# Patient Record
Sex: MALE | Race: WHITE | NOT HISPANIC OR LATINO | Employment: OTHER | URBAN - METROPOLITAN AREA
[De-identification: names, ages, dates, MRNs, and addresses within clinical notes are randomized per-mention and may not be internally consistent; named-entity substitution may affect disease eponyms.]

---

## 2017-01-03 ENCOUNTER — APPOINTMENT (OUTPATIENT)
Dept: LAB | Facility: MEDICAL CENTER | Age: 57
End: 2017-01-03
Payer: MEDICARE

## 2017-01-03 ENCOUNTER — ALLSCRIPTS OFFICE VISIT (OUTPATIENT)
Dept: OTHER | Facility: OTHER | Age: 57
End: 2017-01-03

## 2017-01-03 DIAGNOSIS — R18.8 OTHER ASCITES: ICD-10-CM

## 2017-01-03 DIAGNOSIS — Z12.5 ENCOUNTER FOR SCREENING FOR MALIGNANT NEOPLASM OF PROSTATE: ICD-10-CM

## 2017-01-03 DIAGNOSIS — R60.9 EDEMA: ICD-10-CM

## 2017-01-03 LAB
ALBUMIN SERPL BCP-MCNC: 3.1 G/DL (ref 3.5–5)
ALP SERPL-CCNC: 95 U/L (ref 46–116)
ALT SERPL W P-5'-P-CCNC: 16 U/L (ref 12–78)
ANION GAP SERPL CALCULATED.3IONS-SCNC: 7 MMOL/L (ref 4–13)
AST SERPL W P-5'-P-CCNC: 10 U/L (ref 5–45)
BILIRUB SERPL-MCNC: 0.94 MG/DL (ref 0.2–1)
BUN SERPL-MCNC: 22 MG/DL (ref 5–25)
CALCIUM SERPL-MCNC: 9.2 MG/DL (ref 8.3–10.1)
CHLORIDE SERPL-SCNC: 99 MMOL/L (ref 100–108)
CO2 SERPL-SCNC: 35 MMOL/L (ref 21–32)
CREAT SERPL-MCNC: 6.24 MG/DL (ref 0.6–1.3)
ERYTHROCYTE [DISTWIDTH] IN BLOOD BY AUTOMATED COUNT: 15.9 % (ref 11.6–15.1)
GFR SERPL CREATININE-BSD FRML MDRD: 9.4 ML/MIN/1.73SQ M
GLUCOSE SERPL-MCNC: 103 MG/DL (ref 65–140)
HCT VFR BLD AUTO: 36.7 % (ref 36.5–49.3)
HGB BLD-MCNC: 11.9 G/DL (ref 12–17)
MCH RBC QN AUTO: 30.7 PG (ref 26.8–34.3)
MCHC RBC AUTO-ENTMCNC: 32.4 G/DL (ref 31.4–37.4)
MCV RBC AUTO: 95 FL (ref 82–98)
PLATELET # BLD AUTO: 163 THOUSANDS/UL (ref 149–390)
PMV BLD AUTO: 11.1 FL (ref 8.9–12.7)
POTASSIUM SERPL-SCNC: 4 MMOL/L (ref 3.5–5.3)
PROT SERPL-MCNC: 6.8 G/DL (ref 6.4–8.2)
RBC # BLD AUTO: 3.87 MILLION/UL (ref 3.88–5.62)
SODIUM SERPL-SCNC: 141 MMOL/L (ref 136–145)
WBC # BLD AUTO: 6.1 THOUSAND/UL (ref 4.31–10.16)

## 2017-01-03 PROCEDURE — 80053 COMPREHEN METABOLIC PANEL: CPT

## 2017-01-03 PROCEDURE — 85027 COMPLETE CBC AUTOMATED: CPT

## 2017-01-03 PROCEDURE — 36415 COLL VENOUS BLD VENIPUNCTURE: CPT

## 2017-06-22 ENCOUNTER — GENERIC CONVERSION - ENCOUNTER (OUTPATIENT)
Dept: OTHER | Facility: OTHER | Age: 57
End: 2017-06-22

## 2018-01-12 NOTE — PROGRESS NOTES
Assessment    1  Ascites (789 59) (R18 8)   2  Dependent edema (782 3) (R60 9)   3  Encounter for preventive health examination (V70 0) (Z00 00)   4  Prostate cancer screening (V76 44) (Z12 5)    Plan  Ascites, Dependent edema    · (1) CBC/ PLT (NO DIFF); Status:Resulted - Requires Verification;   Done: [id number]  02:54PM   · (1) COMPREHENSIVE METABOLIC PANEL; Status:Resulted - Requires Verification;    Done: 82NDH8884 02:54PM   · * US ABDOMEN COMPLETE; Status:Hold For - Scheduling; Requested APW:[id number]; Insomnia due to medical condition    · TraZODone HCl - 50 MG Oral Tablet  Peripheral neuropathy    · Gabapentin 100 MG Oral Capsule  Prostate cancer screening    · (1) PSA (SCREEN) (Dx V76 44 Screen for Prostate Cancer); Status:Active; Requested  [id number];     Discussion/Summary  Impression: health maintenance visit  Currently, he eats a healthy diet and has an inadequate exercise regimen  Prostate cancer screening: the risks and benefits of prostate cancer screening were discussed and PSA was ordered  Colorectal cancer screening: colorectal cancer screening is current, colonoscopy is needed every five years and the next colonoscopy is due 2019  The immunizations are needed  He was advised to be evaluated by an optometrist  Advice and education were given regarding nutrition, aerobic exercise and weight bearing exercise  Patient discussion: discussed with the patient  History of Present Illness  HPI: Here CPX, but has two significant complaints  He has weakness in his abdomen  He would like to strengthen his muscles  He also has bilateral pitting edema  He is currently on hemodialysis  He has end stage kidney disease  I have no letters from his nephrologists  Restless Legs Syndrome: The patient is being seen for a routine clinic follow-up of restless legs syndrome  Management changes made at the last visit include adding Gabapentin   Symptoms:  spontaneous leg movements, urge to move legs and sleep disruption, but no abnormal sensation in legs, no leg numbness, no leg pain, no fatigue and no anxiety  The patient is currently experiencing symptoms  Current treatment includes gabapentin (Neurontin)  By report, there is good adherence with treatment, good tolerance of treatment, fair symptom control and Has only tried the 100 mg dose  Review of Systems    Constitutional: feeling poorly and feeling tired, but no fever, no recent weight gain, no chills and no recent weight loss  Eyes: No complaints of eye pain, no red eyes, no discharge from eyes, no itchy eyes  ENT: no complaints of earache, no hearing loss, no nosebleeds, no nasal discharge, no sore throat, no hoarseness  Cardiovascular: No complaints of slow heart rate, no fast heart rate, no chest pain, no palpitations, no leg claudication, no lower extremity  Respiratory: No complaints of shortness of breath, no wheezing, no cough, no SOB on exertion, no orthopnea or PND  Genitourinary: No complaints of dysuria, no incontinence, no hesitancy, no nocturia, no genital lesion, no testicular pain  Neurological: No compliants of headache, no confusion, no convulsions, no numbness or tingling, no dizziness or fainting, no limb weakness, no difficulty walking  Psychiatric: Is not suicidal, no sleep disturbances, no anxiety or depression, no change in personality, no emotional problems  Active Problems    1  Encounter for screening for malignant neoplasm of colon (V76 51) (Z12 11)   2  Hypertension (401 9) (I10)   3  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 01) (G47 01)   4  Neck pain (723 1) (M54 2)   5  Nocturnal leg cramps (327 52) (G47 62)   6  Peripheral neuropathy (356 9) (G62 9)   7  Polycystic kidney disease (753 12) (Q61 3)   8  Renal failure (586) (N19)   9  Tarsal tunnel syndrome of left side (355 5) (G57 52)   10  Tarsal tunnel syndrome, right (355 5) (G57 51)   11   White coat hypertension    Social History    · Alcohol use (V49 89) (Z78 9)   · Caffeine use (V49 89) (F15 90)   · Never a smoker  The social history was reviewed and updated today  The social history was reviewed and is unchanged  Current Meds   1  Calcium Acetate 667 MG Oral Capsule; TAKE 1 CAPSULE 3 TIMES DAILY WITH MEALS   Recorded   2  Fosrenol 500 MG Oral Tablet Chewable; CHEW AND SWALLOW 1 TABLET 3 TIMES   DAILY WITH MEALS Recorded   3  Gabapentin 100 MG Oral Capsule; Therapy: [id number] to (Kilo Gutiérrez)  Requested for: 07QLM7761 Recorded   4  TraZODone HCl - 50 MG Oral Tablet; One or two at hs prn sleep; Therapy: 75OHV1309 to (Jeremiah Stokes)  Requested for: 71TNT7598 Ordered   5  Triphrocaps CAPS; TAKE 1 CAPSULE Daily Recorded   6  Vitamin D3 1000 UNIT Oral Tablet; Take as directed Recorded   7  Vitamin E 200 UNIT Oral Capsule Recorded    The medication list was reviewed and updated today  Allergies    1  No Known Drug Allergies    Vitals   Recorded: 86LPO9390 02:09PM   Heart Rate 110   Respiration 20   Systolic 511 mm Hg   Diastolic 140 mm Hg   Height 5 ft 8 in   Weight 211 lb    BMI Calculated 32 08 kg/m2   BSA Calculated 2 1 m2     Physical Exam    Constitutional   General appearance: No acute distress, well appearing and well nourished  Eyes   Conjunctiva and lids: No erythema, swelling or discharge  Pupils and irises: Equal, round, reactive to light  Ears, Nose, Mouth, and Throat   Otoscopic examination: Tympanic membranes translucent with normal light reflex  Canals patent without erythema  Neck   Neck: Supple, symmetric, trachea midline, no masses  Pulmonary   Respiratory effort: No increased work of breathing or signs of respiratory distress  Auscultation of lungs: Clear to auscultation  Cardiovascular   Auscultation of heart: Normal rate and rhythm, normal S1 and S2, no murmurs  Abdomen   Abdomen: Abnormal   The abdomen was distended  Bowel sounds were normal  The abdomen was soft and nontender   The abdomen had ascites  no CVA tenderness   Lymphatic   Palpation of lymph nodes in neck: No lymphadenopathy  Musculoskeletal   Gait and station: Normal     Inspection/palpation of digits and nails: Normal without clubbing or cyanosis  Skin   Palpation of skin and subcutaneous tissue: Abnormal   Bilateral tense 1+ pitting edema  Psychiatric   Judgment and insight: Normal     Orientation to person, place and time: Normal        Results/Data  (1) COMPREHENSIVE METABOLIC PANEL 96HJY3778 40:08SB [facility] Order Number: [id number]     Test Name Result Flag Reference   GLUCOSE,RANDM 103 mg/dL     If the patient is fasting, the ADA then defines impaired fasting glucose as > 100 mg/dL and diabetes as > or equal to 123 mg/dL  SODIUM 141 mmol/L  136-145   POTASSIUM 4 0 mmol/L  3 5-5 3   CHLORIDE 99 mmol/L L 100-108   CARBON DIOXIDE 35 mmol/L H 21-32   ANION GAP (CALC) 7 mmol/L  4-13   BLOOD UREA NITROGEN 22 mg/dL  5-25   CREATININE 6 24 mg/dL H 0 60-1 30   Standardized to IDMS reference method   CALCIUM 9 2 mg/dL  8 3-10 1   BILI, TOTAL 0 94 mg/dL  0 20-1 00   ALK PHOSPHATAS 95 U/L     ALT (SGPT) 16 U/L  12-78   AST(SGOT) 10 U/L  5-45   ALBUMIN 3 1 g/dL L 3 5-5 0   TOTAL PROTEIN 6 8 g/dL  6 4-8 2   eGFR Non-African American 9 4 ml/min/1 73sq [facility] Disease Education Program recommendations are as follows:  GFR calculation is accurate only with a steady state creatinine  Chronic Kidney disease less than 60 ml/min/1 73 sq  meters  Kidney failure less than 15 ml/min/1 73 sq  meters       (1) CBC/ PLT (NO DIFF) [id number] 02:54PM Artur Childress Order Number: [id number]     Test Name Result Flag Reference   HEMATOCRIT 36 7 %  36 5-49 3   HEMOGLOBIN 11 9 g/dL L 12 0-17 0   MCHC 32 4 g/dL  31 4-37 4   MCH 30 7 pg  26 8-34 3   MCV 95 fL  82-98   PLATELET COUNT 991 Thousands/uL  149-390   RBC COUNT 3 87 Million/uL L 3 88-5 62   RDW 15 9 % H 11 6-15 1   WBC COUNT 6 10 Thousand/uL 4 31-10 16   MP 11 1 fL  8 9-12 7       Signatures   Electronically signed by : LOUISE Hernandez ; Jan 4 2017  9:36AM EST                       (Author)

## 2018-01-14 VITALS
HEIGHT: 68 IN | HEART RATE: 110 BPM | SYSTOLIC BLOOD PRESSURE: 154 MMHG | DIASTOLIC BLOOD PRESSURE: 100 MMHG | BODY MASS INDEX: 31.98 KG/M2 | WEIGHT: 211 LBS | RESPIRATION RATE: 20 BRPM

## 2019-09-21 ENCOUNTER — APPOINTMENT (EMERGENCY)
Dept: RADIOLOGY | Facility: HOSPITAL | Age: 59
DRG: 871 | End: 2019-09-21
Payer: MEDICARE

## 2019-09-21 ENCOUNTER — APPOINTMENT (EMERGENCY)
Dept: CT IMAGING | Facility: HOSPITAL | Age: 59
DRG: 871 | End: 2019-09-21
Payer: MEDICARE

## 2019-09-21 ENCOUNTER — HOSPITAL ENCOUNTER (INPATIENT)
Facility: HOSPITAL | Age: 59
LOS: 12 days | Discharge: NURSING, REHAB OR LONG-TERM CARE | DRG: 871 | End: 2019-10-03
Attending: EMERGENCY MEDICINE | Admitting: INTERNAL MEDICINE
Payer: MEDICARE

## 2019-09-21 DIAGNOSIS — R65.21 SEPTIC SHOCK (HCC): ICD-10-CM

## 2019-09-21 DIAGNOSIS — N18.6 CHRONIC KIDNEY DISEASE WITH END STAGE RENAL FAILURE ON DIALYSIS (HCC): Primary | ICD-10-CM

## 2019-09-21 DIAGNOSIS — I48.91 ATRIAL FIBRILLATION, UNSPECIFIED TYPE (HCC): ICD-10-CM

## 2019-09-21 DIAGNOSIS — Z99.2 CHRONIC KIDNEY DISEASE WITH END STAGE RENAL FAILURE ON DIALYSIS (HCC): Primary | ICD-10-CM

## 2019-09-21 DIAGNOSIS — R65.20 SEVERE SEPSIS WITHOUT SEPTIC SHOCK (HCC): ICD-10-CM

## 2019-09-21 DIAGNOSIS — L03.119 CELLULITIS OF LOWER EXTREMITY: ICD-10-CM

## 2019-09-21 DIAGNOSIS — L03.116 LEFT LEG CELLULITIS: ICD-10-CM

## 2019-09-21 DIAGNOSIS — K21.9 GERD (GASTROESOPHAGEAL REFLUX DISEASE): ICD-10-CM

## 2019-09-21 DIAGNOSIS — A41.9 SEVERE SEPSIS WITHOUT SEPTIC SHOCK (HCC): ICD-10-CM

## 2019-09-21 DIAGNOSIS — R65.20 SEVERE SEPSIS (HCC): ICD-10-CM

## 2019-09-21 DIAGNOSIS — N18.6 END STAGE RENAL DISEASE (HCC): ICD-10-CM

## 2019-09-21 DIAGNOSIS — A41.9 SEVERE SEPSIS (HCC): ICD-10-CM

## 2019-09-21 DIAGNOSIS — A41.9 SEPTIC SHOCK (HCC): ICD-10-CM

## 2019-09-21 DIAGNOSIS — R78.81 GRAM-NEGATIVE BACTEREMIA: ICD-10-CM

## 2019-09-21 DIAGNOSIS — R78.81 BACTEREMIA DUE TO GRAM-NEGATIVE BACTERIA: ICD-10-CM

## 2019-09-21 DIAGNOSIS — I73.9 PERIPHERAL VASCULAR DISEASE (HCC): ICD-10-CM

## 2019-09-21 PROBLEM — E87.29 HIGH ANION GAP METABOLIC ACIDOSIS: Status: ACTIVE | Noted: 2019-09-21

## 2019-09-21 PROBLEM — D69.6 THROMBOCYTOPENIA (HCC): Status: ACTIVE | Noted: 2019-09-21

## 2019-09-21 PROBLEM — Q44.6 POLYCYSTIC LIVER DISEASE: Status: ACTIVE | Noted: 2019-09-21

## 2019-09-21 PROBLEM — E87.20 LACTIC ACIDOSIS: Status: ACTIVE | Noted: 2019-09-21

## 2019-09-21 PROBLEM — R17 TOTAL BILIRUBIN, ELEVATED: Status: ACTIVE | Noted: 2019-09-21

## 2019-09-21 PROBLEM — E87.1 HYPONATREMIA: Status: ACTIVE | Noted: 2019-09-21

## 2019-09-21 PROBLEM — E87.2 LACTIC ACIDOSIS: Status: ACTIVE | Noted: 2019-09-21

## 2019-09-21 PROBLEM — E87.2 HIGH ANION GAP METABOLIC ACIDOSIS: Status: ACTIVE | Noted: 2019-09-21

## 2019-09-21 LAB
ALBUMIN SERPL BCP-MCNC: 2.7 G/DL (ref 3.5–5)
ALP SERPL-CCNC: 101 U/L (ref 46–116)
ALT SERPL W P-5'-P-CCNC: 11 U/L (ref 12–78)
ANION GAP SERPL CALCULATED.3IONS-SCNC: 16 MMOL/L (ref 4–13)
ANISOCYTOSIS BLD QL SMEAR: PRESENT
APTT PPP: 36 SECONDS (ref 23–37)
AST SERPL W P-5'-P-CCNC: 14 U/L (ref 5–45)
BASOPHILS # BLD MANUAL: 0 THOUSAND/UL (ref 0–0.1)
BASOPHILS NFR MAR MANUAL: 0 % (ref 0–1)
BILIRUB SERPL-MCNC: 3.3 MG/DL (ref 0.2–1)
BUN SERPL-MCNC: 75 MG/DL (ref 5–25)
CALCIUM SERPL-MCNC: 8.9 MG/DL (ref 8.3–10.1)
CHLORIDE SERPL-SCNC: 88 MMOL/L (ref 100–108)
CK SERPL-CCNC: 67 U/L (ref 39–308)
CO2 SERPL-SCNC: 28 MMOL/L (ref 21–32)
CREAT SERPL-MCNC: 9.54 MG/DL (ref 0.6–1.3)
DOHLE BOD BLD QL SMEAR: PRESENT
EOSINOPHIL # BLD MANUAL: 0 THOUSAND/UL (ref 0–0.4)
EOSINOPHIL NFR BLD MANUAL: 0 % (ref 0–6)
ERYTHROCYTE [DISTWIDTH] IN BLOOD BY AUTOMATED COUNT: 15.7 % (ref 11.6–15.1)
GFR SERPL CREATININE-BSD FRML MDRD: 5 ML/MIN/1.73SQ M
GLUCOSE SERPL-MCNC: 84 MG/DL (ref 65–140)
HCT VFR BLD AUTO: 36 % (ref 36.5–49.3)
HGB BLD-MCNC: 12.1 G/DL (ref 12–17)
INR PPP: 1.72 (ref 0.84–1.19)
LACTATE SERPL-SCNC: 5.2 MMOL/L (ref 0.5–2)
LACTATE SERPL-SCNC: 6.4 MMOL/L (ref 0.5–2)
LYMPHOCYTES # BLD AUTO: 0.17 THOUSAND/UL (ref 0.6–4.47)
LYMPHOCYTES # BLD AUTO: 5 % (ref 14–44)
MACROCYTES BLD QL AUTO: PRESENT
MCH RBC QN AUTO: 34.5 PG (ref 26.8–34.3)
MCHC RBC AUTO-ENTMCNC: 33.6 G/DL (ref 31.4–37.4)
MCV RBC AUTO: 103 FL (ref 82–98)
METAMYELOCYTES NFR BLD MANUAL: 7 % (ref 0–1)
MONOCYTES # BLD AUTO: 0.14 THOUSAND/UL (ref 0–1.22)
MONOCYTES NFR BLD: 4 % (ref 4–12)
NEUTROPHILS # BLD MANUAL: 2.87 THOUSAND/UL (ref 1.85–7.62)
NEUTS BAND NFR BLD MANUAL: 33 % (ref 0–8)
NEUTS SEG NFR BLD AUTO: 51 % (ref 43–75)
NRBC BLD AUTO-RTO: 0 /100 WBCS
PLATELET # BLD AUTO: 55 THOUSANDS/UL (ref 149–390)
PLATELET BLD QL SMEAR: ABNORMAL
PMV BLD AUTO: 12.3 FL (ref 8.9–12.7)
POLYCHROMASIA BLD QL SMEAR: PRESENT
POTASSIUM SERPL-SCNC: 4.9 MMOL/L (ref 3.5–5.3)
PROT SERPL-MCNC: 7.1 G/DL (ref 6.4–8.2)
PROTHROMBIN TIME: 19.4 SECONDS (ref 11.6–14.5)
RBC # BLD AUTO: 3.51 MILLION/UL (ref 3.88–5.62)
SODIUM SERPL-SCNC: 132 MMOL/L (ref 136–145)
TOTAL CELLS COUNTED SPEC: 100
TOXIC GRANULES BLD QL SMEAR: PRESENT
WBC # BLD AUTO: 3.42 THOUSAND/UL (ref 4.31–10.16)
WBC TOXIC VACUOLES BLD QL SMEAR: PRESENT

## 2019-09-21 PROCEDURE — 99291 CRITICAL CARE FIRST HOUR: CPT | Performed by: EMERGENCY MEDICINE

## 2019-09-21 PROCEDURE — 96365 THER/PROPH/DIAG IV INF INIT: CPT

## 2019-09-21 PROCEDURE — 84145 PROCALCITONIN (PCT): CPT | Performed by: PHYSICIAN ASSISTANT

## 2019-09-21 PROCEDURE — 99291 CRITICAL CARE FIRST HOUR: CPT | Performed by: PHYSICIAN ASSISTANT

## 2019-09-21 PROCEDURE — 96375 TX/PRO/DX INJ NEW DRUG ADDON: CPT

## 2019-09-21 PROCEDURE — 85007 BL SMEAR W/DIFF WBC COUNT: CPT | Performed by: EMERGENCY MEDICINE

## 2019-09-21 PROCEDURE — 96376 TX/PRO/DX INJ SAME DRUG ADON: CPT

## 2019-09-21 PROCEDURE — 83605 ASSAY OF LACTIC ACID: CPT | Performed by: EMERGENCY MEDICINE

## 2019-09-21 PROCEDURE — 99285 EMERGENCY DEPT VISIT HI MDM: CPT

## 2019-09-21 PROCEDURE — 36415 COLL VENOUS BLD VENIPUNCTURE: CPT | Performed by: EMERGENCY MEDICINE

## 2019-09-21 PROCEDURE — 83605 ASSAY OF LACTIC ACID: CPT | Performed by: PHYSICIAN ASSISTANT

## 2019-09-21 PROCEDURE — 82550 ASSAY OF CK (CPK): CPT | Performed by: PHYSICIAN ASSISTANT

## 2019-09-21 PROCEDURE — 75635 CT ANGIO ABDOMINAL ARTERIES: CPT

## 2019-09-21 PROCEDURE — 85730 THROMBOPLASTIN TIME PARTIAL: CPT | Performed by: EMERGENCY MEDICINE

## 2019-09-21 PROCEDURE — 85027 COMPLETE CBC AUTOMATED: CPT | Performed by: EMERGENCY MEDICINE

## 2019-09-21 PROCEDURE — 87186 SC STD MICRODIL/AGAR DIL: CPT | Performed by: EMERGENCY MEDICINE

## 2019-09-21 PROCEDURE — 85610 PROTHROMBIN TIME: CPT | Performed by: EMERGENCY MEDICINE

## 2019-09-21 PROCEDURE — 71046 X-RAY EXAM CHEST 2 VIEWS: CPT

## 2019-09-21 PROCEDURE — 87040 BLOOD CULTURE FOR BACTERIA: CPT | Performed by: EMERGENCY MEDICINE

## 2019-09-21 PROCEDURE — 96367 TX/PROPH/DG ADDL SEQ IV INF: CPT

## 2019-09-21 PROCEDURE — 80053 COMPREHEN METABOLIC PANEL: CPT | Performed by: EMERGENCY MEDICINE

## 2019-09-21 PROCEDURE — 99292 CRITICAL CARE ADDL 30 MIN: CPT | Performed by: PHYSICIAN ASSISTANT

## 2019-09-21 RX ORDER — CHLORHEXIDINE GLUCONATE 0.12 MG/ML
15 RINSE ORAL EVERY 12 HOURS SCHEDULED
Status: DISCONTINUED | OUTPATIENT
Start: 2019-09-21 | End: 2019-09-21

## 2019-09-21 RX ORDER — FENTANYL CITRATE 50 UG/ML
50 INJECTION, SOLUTION INTRAMUSCULAR; INTRAVENOUS ONCE
Status: COMPLETED | OUTPATIENT
Start: 2019-09-21 | End: 2019-09-21

## 2019-09-21 RX ORDER — ALBUMIN, HUMAN INJ 5% 5 %
25 SOLUTION INTRAVENOUS ONCE
Status: COMPLETED | OUTPATIENT
Start: 2019-09-21 | End: 2019-09-22

## 2019-09-21 RX ORDER — HEPARIN SODIUM 5000 [USP'U]/ML
5000 INJECTION, SOLUTION INTRAVENOUS; SUBCUTANEOUS EVERY 8 HOURS SCHEDULED
Status: DISCONTINUED | OUTPATIENT
Start: 2019-09-21 | End: 2019-09-22

## 2019-09-21 RX ORDER — POLYETHYLENE GLYCOL 3350 17 G/17G
17 POWDER, FOR SOLUTION ORAL DAILY PRN
Status: DISCONTINUED | OUTPATIENT
Start: 2019-09-21 | End: 2019-10-03 | Stop reason: HOSPADM

## 2019-09-21 RX ORDER — AMOXICILLIN 250 MG
1 CAPSULE ORAL
Status: DISCONTINUED | OUTPATIENT
Start: 2019-09-22 | End: 2019-10-03 | Stop reason: HOSPADM

## 2019-09-21 RX ORDER — MULTIVITAMIN
1 TABLET ORAL DAILY
COMMUNITY

## 2019-09-21 RX ORDER — CLINDAMYCIN PHOSPHATE 600 MG/50ML
600 INJECTION INTRAVENOUS EVERY 6 HOURS
Status: DISCONTINUED | OUTPATIENT
Start: 2019-09-21 | End: 2019-09-22

## 2019-09-21 RX ADMIN — CEFEPIME HYDROCHLORIDE 2000 MG: 2 INJECTION, POWDER, FOR SOLUTION INTRAVENOUS at 20:34

## 2019-09-21 RX ADMIN — SODIUM CHLORIDE 500 ML: 0.9 INJECTION, SOLUTION INTRAVENOUS at 21:41

## 2019-09-21 RX ADMIN — FENTANYL CITRATE 50 MCG: 50 INJECTION INTRAMUSCULAR; INTRAVENOUS at 20:26

## 2019-09-21 RX ADMIN — VANCOMYCIN HYDROCHLORIDE 1250 MG: 1 INJECTION, POWDER, LYOPHILIZED, FOR SOLUTION INTRAVENOUS at 21:38

## 2019-09-21 RX ADMIN — FENTANYL CITRATE 50 MCG: 50 INJECTION INTRAMUSCULAR; INTRAVENOUS at 21:55

## 2019-09-21 RX ADMIN — ALBUMIN (HUMAN) 25 G: 12.5 SOLUTION INTRAVENOUS at 23:59

## 2019-09-21 RX ADMIN — IODIXANOL 100 ML: 320 INJECTION, SOLUTION INTRAVASCULAR at 21:10

## 2019-09-22 ENCOUNTER — APPOINTMENT (INPATIENT)
Dept: ULTRASOUND IMAGING | Facility: HOSPITAL | Age: 59
DRG: 871 | End: 2019-09-22
Payer: MEDICARE

## 2019-09-22 PROBLEM — R78.81 GRAM-NEGATIVE BACTEREMIA: Status: ACTIVE | Noted: 2019-09-22

## 2019-09-22 LAB
ABO GROUP BLD: NORMAL
ALBUMIN SERPL BCP-MCNC: 2.9 G/DL (ref 3.5–5)
ALBUMIN SERPL BCP-MCNC: 2.9 G/DL (ref 3.5–5)
ALP SERPL-CCNC: 68 U/L (ref 46–116)
ALP SERPL-CCNC: 68 U/L (ref 46–116)
ALT SERPL W P-5'-P-CCNC: 25 U/L (ref 12–78)
ALT SERPL W P-5'-P-CCNC: 9 U/L (ref 12–78)
AMMONIA PLAS-SCNC: <10 UMOL/L (ref 11–35)
ANION GAP SERPL CALCULATED.3IONS-SCNC: 16 MMOL/L (ref 4–13)
ANION GAP SERPL CALCULATED.3IONS-SCNC: 18 MMOL/L (ref 4–13)
ANISOCYTOSIS BLD QL SMEAR: PRESENT
ARTERIAL PATENCY WRIST A: YES
AST SERPL W P-5'-P-CCNC: 10 U/L (ref 5–45)
AST SERPL W P-5'-P-CCNC: 25 U/L (ref 5–45)
BASE EX.OXY STD BLDV CALC-SCNC: 79.2 % (ref 60–80)
BASE EXCESS BLDA CALC-SCNC: -4.2 MMOL/L
BASE EXCESS BLDV CALC-SCNC: -5.4 MMOL/L
BASOPHILS # BLD AUTO: 0 THOUSANDS/ΜL (ref 0–0.1)
BASOPHILS # BLD MANUAL: 0 THOUSAND/UL (ref 0–0.1)
BASOPHILS NFR BLD AUTO: 0 % (ref 0–1)
BASOPHILS NFR MAR MANUAL: 0 % (ref 0–1)
BILIRUB SERPL-MCNC: 3.6 MG/DL (ref 0.2–1)
BILIRUB SERPL-MCNC: 4.3 MG/DL (ref 0.2–1)
BLD GP AB SCN SERPL QL: NEGATIVE
BLD SMEAR INTERP: NORMAL
BUN SERPL-MCNC: 77 MG/DL (ref 5–25)
BUN SERPL-MCNC: 89 MG/DL (ref 5–25)
CA-I BLD-SCNC: 1.01 MMOL/L (ref 1.12–1.32)
CALCIUM SERPL-MCNC: 8.5 MG/DL (ref 8.3–10.1)
CALCIUM SERPL-MCNC: 8.5 MG/DL (ref 8.3–10.1)
CHLORIDE SERPL-SCNC: 88 MMOL/L (ref 100–108)
CHLORIDE SERPL-SCNC: 89 MMOL/L (ref 100–108)
CK SERPL-CCNC: 42 U/L (ref 39–308)
CO2 SERPL-SCNC: 23 MMOL/L (ref 21–32)
CO2 SERPL-SCNC: 24 MMOL/L (ref 21–32)
CREAT SERPL-MCNC: 9.04 MG/DL (ref 0.6–1.3)
CREAT SERPL-MCNC: 9.25 MG/DL (ref 0.6–1.3)
DOHLE BOD BLD QL SMEAR: PRESENT
EOSINOPHIL # BLD AUTO: 0.12 THOUSAND/ΜL (ref 0–0.61)
EOSINOPHIL # BLD MANUAL: 0.09 THOUSAND/UL (ref 0–0.4)
EOSINOPHIL NFR BLD AUTO: 2 % (ref 0–6)
EOSINOPHIL NFR BLD MANUAL: 3 % (ref 0–6)
ERYTHROCYTE [DISTWIDTH] IN BLOOD BY AUTOMATED COUNT: 15.8 % (ref 11.6–15.1)
ERYTHROCYTE [DISTWIDTH] IN BLOOD BY AUTOMATED COUNT: 16 % (ref 11.6–15.1)
FIBRINOGEN PPP-MCNC: 569 MG/DL (ref 227–495)
GFR SERPL CREATININE-BSD FRML MDRD: 6 ML/MIN/1.73SQ M
GFR SERPL CREATININE-BSD FRML MDRD: 6 ML/MIN/1.73SQ M
GLUCOSE SERPL-MCNC: 60 MG/DL (ref 65–140)
GLUCOSE SERPL-MCNC: 78 MG/DL (ref 65–140)
HCO3 BLDA-SCNC: 20.6 MMOL/L (ref 22–28)
HCO3 BLDV-SCNC: 20 MMOL/L (ref 24–30)
HCT VFR BLD AUTO: 31.2 % (ref 36.5–49.3)
HCT VFR BLD AUTO: 35.6 % (ref 36.5–49.3)
HGB BLD-MCNC: 10.3 G/DL (ref 12–17)
HGB BLD-MCNC: 11.4 G/DL (ref 12–17)
IMM GRANULOCYTES # BLD AUTO: 0.02 THOUSAND/UL (ref 0–0.2)
IMM GRANULOCYTES NFR BLD AUTO: 0 % (ref 0–2)
INR PPP: 1.52 (ref 0.84–1.19)
INR PPP: 1.73 (ref 0.84–1.19)
LACTATE SERPL-SCNC: 2.1 MMOL/L (ref 0.5–2)
LACTATE SERPL-SCNC: 3.5 MMOL/L (ref 0.5–2)
LACTATE SERPL-SCNC: 3.6 MMOL/L (ref 0.5–2)
LACTATE SERPL-SCNC: 3.7 MMOL/L (ref 0.5–2)
LACTATE SERPL-SCNC: 3.7 MMOL/L (ref 0.5–2)
LYMPHOCYTES # BLD AUTO: 0.12 THOUSAND/UL (ref 0.6–4.47)
LYMPHOCYTES # BLD AUTO: 0.2 THOUSANDS/ΜL (ref 0.6–4.47)
LYMPHOCYTES # BLD AUTO: 4 % (ref 14–44)
LYMPHOCYTES NFR BLD AUTO: 4 % (ref 14–44)
MACROCYTES BLD QL AUTO: PRESENT
MAGNESIUM SERPL-MCNC: 1.6 MG/DL (ref 1.6–2.6)
MAGNESIUM SERPL-MCNC: 2.6 MG/DL (ref 1.6–2.6)
MCH RBC QN AUTO: 33.7 PG (ref 26.8–34.3)
MCH RBC QN AUTO: 34 PG (ref 26.8–34.3)
MCHC RBC AUTO-ENTMCNC: 32 G/DL (ref 31.4–37.4)
MCHC RBC AUTO-ENTMCNC: 33 G/DL (ref 31.4–37.4)
MCV RBC AUTO: 103 FL (ref 82–98)
MCV RBC AUTO: 105 FL (ref 82–98)
METAMYELOCYTES NFR BLD MANUAL: 1 % (ref 0–1)
MONOCYTES # BLD AUTO: 0.26 THOUSAND/ΜL (ref 0.17–1.22)
MONOCYTES # BLD AUTO: 0.89 THOUSAND/UL (ref 0–1.22)
MONOCYTES NFR BLD AUTO: 5 % (ref 4–12)
MONOCYTES NFR BLD: 29 % (ref 4–12)
NASAL CANNULA: 2
NASAL CANNULA: 2
NEUTROPHILS # BLD AUTO: 5.16 THOUSANDS/ΜL (ref 1.85–7.62)
NEUTROPHILS # BLD MANUAL: 1.94 THOUSAND/UL (ref 1.85–7.62)
NEUTS BAND NFR BLD MANUAL: 12 % (ref 0–8)
NEUTS SEG NFR BLD AUTO: 51 % (ref 43–75)
NEUTS SEG NFR BLD AUTO: 89 % (ref 43–75)
NRBC BLD AUTO-RTO: 0 /100 WBCS
NRBC BLD AUTO-RTO: 0 /100 WBCS
O2 CT BLDA-SCNC: 15.2 ML/DL (ref 16–23)
O2 CT BLDV-SCNC: 12.5 ML/DL
OSMOLALITY UR/SERPL-RTO: 291 MMOL/KG (ref 282–298)
OXYHGB MFR BLDA: 93.9 % (ref 94–97)
PCO2 BLDA: 36.8 MM HG (ref 36–44)
PCO2 BLDV: 38.8 MM HG (ref 42–50)
PH BLDA: 7.37 [PH] (ref 7.35–7.45)
PH BLDV: 7.33 [PH] (ref 7.3–7.4)
PHOSPHATE SERPL-MCNC: 5.2 MG/DL (ref 2.7–4.5)
PLATELET # BLD AUTO: 32 THOUSANDS/UL (ref 149–390)
PLATELET # BLD AUTO: 36 THOUSANDS/UL (ref 149–390)
PLATELET BLD QL SMEAR: ABNORMAL
PMV BLD AUTO: 13.1 FL (ref 8.9–12.7)
PMV BLD AUTO: 14 FL (ref 8.9–12.7)
PO2 BLDA: 91 MM HG (ref 75–129)
PO2 BLDV: 51.1 MM HG (ref 35–45)
POLYCHROMASIA BLD QL SMEAR: PRESENT
POTASSIUM SERPL-SCNC: 5.2 MMOL/L (ref 3.5–5.3)
POTASSIUM SERPL-SCNC: 6.1 MMOL/L (ref 3.5–5.3)
PROCALCITONIN SERPL-MCNC: 63.11 NG/ML
PROCALCITONIN SERPL-MCNC: 66.85 NG/ML
PROT SERPL-MCNC: 6.5 G/DL (ref 6.4–8.2)
PROT SERPL-MCNC: 6.5 G/DL (ref 6.4–8.2)
PROTHROMBIN TIME: 17.6 SECONDS (ref 11.6–14.5)
PROTHROMBIN TIME: 19.5 SECONDS (ref 11.6–14.5)
RBC # BLD AUTO: 3.03 MILLION/UL (ref 3.88–5.62)
RBC # BLD AUTO: 3.38 MILLION/UL (ref 3.88–5.62)
RH BLD: POSITIVE
SODIUM SERPL-SCNC: 129 MMOL/L (ref 136–145)
SODIUM SERPL-SCNC: 129 MMOL/L (ref 136–145)
SPECIMEN EXPIRATION DATE: NORMAL
SPECIMEN SOURCE: ABNORMAL
TOTAL CELLS COUNTED SPEC: 100
TOXIC GRANULES BLD QL SMEAR: PRESENT
TROPONIN I SERPL-MCNC: 0.05 NG/ML
TROPONIN I SERPL-MCNC: 0.06 NG/ML
URATE SERPL-MCNC: 5.6 MG/DL (ref 4.2–8)
WBC # BLD AUTO: 3.08 THOUSAND/UL (ref 4.31–10.16)
WBC # BLD AUTO: 5.76 THOUSAND/UL (ref 4.31–10.16)
WBC TOXIC VACUOLES BLD QL SMEAR: PRESENT

## 2019-09-22 PROCEDURE — 86901 BLOOD TYPING SEROLOGIC RH(D): CPT | Performed by: NURSE PRACTITIONER

## 2019-09-22 PROCEDURE — 99223 1ST HOSP IP/OBS HIGH 75: CPT | Performed by: INTERNAL MEDICINE

## 2019-09-22 PROCEDURE — NC001 PR NO CHARGE: Performed by: PHYSICIAN ASSISTANT

## 2019-09-22 PROCEDURE — 87070 CULTURE OTHR SPECIMN AEROBIC: CPT | Performed by: PHYSICIAN ASSISTANT

## 2019-09-22 PROCEDURE — P9037 PLATE PHERES LEUKOREDU IRRAD: HCPCS

## 2019-09-22 PROCEDURE — 84100 ASSAY OF PHOSPHORUS: CPT | Performed by: PHYSICIAN ASSISTANT

## 2019-09-22 PROCEDURE — 85027 COMPLETE CBC AUTOMATED: CPT | Performed by: PHYSICIAN ASSISTANT

## 2019-09-22 PROCEDURE — 84484 ASSAY OF TROPONIN QUANT: CPT | Performed by: NURSE PRACTITIONER

## 2019-09-22 PROCEDURE — 82550 ASSAY OF CK (CPK): CPT | Performed by: PHYSICIAN ASSISTANT

## 2019-09-22 PROCEDURE — 99291 CRITICAL CARE FIRST HOUR: CPT | Performed by: INTERNAL MEDICINE

## 2019-09-22 PROCEDURE — 85384 FIBRINOGEN ACTIVITY: CPT | Performed by: PHYSICIAN ASSISTANT

## 2019-09-22 PROCEDURE — 80053 COMPREHEN METABOLIC PANEL: CPT | Performed by: PHYSICIAN ASSISTANT

## 2019-09-22 PROCEDURE — 83605 ASSAY OF LACTIC ACID: CPT | Performed by: PHYSICIAN ASSISTANT

## 2019-09-22 PROCEDURE — 82948 REAGENT STRIP/BLOOD GLUCOSE: CPT

## 2019-09-22 PROCEDURE — 84145 PROCALCITONIN (PCT): CPT | Performed by: PHYSICIAN ASSISTANT

## 2019-09-22 PROCEDURE — 87081 CULTURE SCREEN ONLY: CPT | Performed by: NURSE PRACTITIONER

## 2019-09-22 PROCEDURE — 82140 ASSAY OF AMMONIA: CPT | Performed by: INTERNAL MEDICINE

## 2019-09-22 PROCEDURE — 30233R1 TRANSFUSION OF NONAUTOLOGOUS PLATELETS INTO PERIPHERAL VEIN, PERCUTANEOUS APPROACH: ICD-10-PCS | Performed by: INTERNAL MEDICINE

## 2019-09-22 PROCEDURE — 87186 SC STD MICRODIL/AGAR DIL: CPT | Performed by: PHYSICIAN ASSISTANT

## 2019-09-22 PROCEDURE — 85025 COMPLETE CBC W/AUTO DIFF WBC: CPT | Performed by: PHYSICIAN ASSISTANT

## 2019-09-22 PROCEDURE — 83605 ASSAY OF LACTIC ACID: CPT | Performed by: NURSE PRACTITIONER

## 2019-09-22 PROCEDURE — 99222 1ST HOSP IP/OBS MODERATE 55: CPT | Performed by: INTERNAL MEDICINE

## 2019-09-22 PROCEDURE — 82330 ASSAY OF CALCIUM: CPT | Performed by: PHYSICIAN ASSISTANT

## 2019-09-22 PROCEDURE — 93970 EXTREMITY STUDY: CPT

## 2019-09-22 PROCEDURE — 84550 ASSAY OF BLOOD/URIC ACID: CPT | Performed by: NURSE PRACTITIONER

## 2019-09-22 PROCEDURE — 86850 RBC ANTIBODY SCREEN: CPT | Performed by: NURSE PRACTITIONER

## 2019-09-22 PROCEDURE — 86900 BLOOD TYPING SEROLOGIC ABO: CPT | Performed by: NURSE PRACTITIONER

## 2019-09-22 PROCEDURE — 05PY33Z REMOVAL OF INFUSION DEVICE FROM UPPER VEIN, PERCUTANEOUS APPROACH: ICD-10-PCS | Performed by: SURGERY

## 2019-09-22 PROCEDURE — 83735 ASSAY OF MAGNESIUM: CPT | Performed by: PHYSICIAN ASSISTANT

## 2019-09-22 PROCEDURE — 82805 BLOOD GASES W/O2 SATURATION: CPT | Performed by: NURSE PRACTITIONER

## 2019-09-22 PROCEDURE — 83010 ASSAY OF HAPTOGLOBIN QUANT: CPT | Performed by: PHYSICIAN ASSISTANT

## 2019-09-22 PROCEDURE — 85007 BL SMEAR W/DIFF WBC COUNT: CPT | Performed by: PHYSICIAN ASSISTANT

## 2019-09-22 PROCEDURE — 83930 ASSAY OF BLOOD OSMOLALITY: CPT | Performed by: NURSE PRACTITIONER

## 2019-09-22 PROCEDURE — 85610 PROTHROMBIN TIME: CPT | Performed by: PHYSICIAN ASSISTANT

## 2019-09-22 PROCEDURE — 36600 WITHDRAWAL OF ARTERIAL BLOOD: CPT

## 2019-09-22 PROCEDURE — 82805 BLOOD GASES W/O2 SATURATION: CPT | Performed by: INTERNAL MEDICINE

## 2019-09-22 RX ORDER — HYDROMORPHONE HCL/PF 1 MG/ML
0.5 SYRINGE (ML) INJECTION EVERY 4 HOURS PRN
Status: DISCONTINUED | OUTPATIENT
Start: 2019-09-21 | End: 2019-09-22

## 2019-09-22 RX ORDER — DEXTROSE MONOHYDRATE 25 G/50ML
25 INJECTION, SOLUTION INTRAVENOUS ONCE
Status: COMPLETED | OUTPATIENT
Start: 2019-09-22 | End: 2019-09-22

## 2019-09-22 RX ORDER — ALBUMIN, HUMAN INJ 5% 5 %
25 SOLUTION INTRAVENOUS ONCE
Status: COMPLETED | OUTPATIENT
Start: 2019-09-22 | End: 2019-09-22

## 2019-09-22 RX ORDER — ACETAMINOPHEN 325 MG/1
650 TABLET ORAL EVERY 6 HOURS PRN
Status: DISCONTINUED | OUTPATIENT
Start: 2019-09-21 | End: 2019-10-03 | Stop reason: HOSPADM

## 2019-09-22 RX ORDER — LIDOCAINE HYDROCHLORIDE AND EPINEPHRINE 10; 10 MG/ML; UG/ML
1 INJECTION, SOLUTION INFILTRATION; PERINEURAL ONCE
Status: DISCONTINUED | OUTPATIENT
Start: 2019-09-22 | End: 2019-09-22

## 2019-09-22 RX ORDER — VANCOMYCIN HYDROCHLORIDE 500 MG/100ML
500 INJECTION, SOLUTION INTRAVENOUS ONCE
Status: COMPLETED | OUTPATIENT
Start: 2019-09-22 | End: 2019-09-22

## 2019-09-22 RX ORDER — OXYCODONE HYDROCHLORIDE 5 MG/1
5 TABLET ORAL EVERY 4 HOURS PRN
Status: DISCONTINUED | OUTPATIENT
Start: 2019-09-21 | End: 2019-09-22

## 2019-09-22 RX ORDER — LIDOCAINE HYDROCHLORIDE AND EPINEPHRINE 10; 10 MG/ML; UG/ML
20 INJECTION, SOLUTION INFILTRATION; PERINEURAL ONCE
Status: DISCONTINUED | OUTPATIENT
Start: 2019-09-22 | End: 2019-09-24

## 2019-09-22 RX ORDER — DEXTROSE MONOHYDRATE 25 G/50ML
INJECTION, SOLUTION INTRAVENOUS
Status: COMPLETED
Start: 2019-09-22 | End: 2019-09-22

## 2019-09-22 RX ORDER — SODIUM CHLORIDE, SODIUM GLUCONATE, SODIUM ACETATE, POTASSIUM CHLORIDE, MAGNESIUM CHLORIDE, SODIUM PHOSPHATE, DIBASIC, AND POTASSIUM PHOSPHATE .53; .5; .37; .037; .03; .012; .00082 G/100ML; G/100ML; G/100ML; G/100ML; G/100ML; G/100ML; G/100ML
1000 INJECTION, SOLUTION INTRAVENOUS ONCE
Status: COMPLETED | OUTPATIENT
Start: 2019-09-22 | End: 2019-09-22

## 2019-09-22 RX ORDER — MAGNESIUM SULFATE HEPTAHYDRATE 40 MG/ML
2 INJECTION, SOLUTION INTRAVENOUS ONCE
Status: COMPLETED | OUTPATIENT
Start: 2019-09-22 | End: 2019-09-22

## 2019-09-22 RX ADMIN — CALCIUM GLUCONATE 2 G: 98 INJECTION, SOLUTION INTRAVENOUS at 21:45

## 2019-09-22 RX ADMIN — NOREPINEPHRINE BITARTRATE 5 MCG/MIN: 1 INJECTION INTRAVENOUS at 09:43

## 2019-09-22 RX ADMIN — CEFEPIME HYDROCHLORIDE 1000 MG: 1 INJECTION, POWDER, FOR SOLUTION INTRAMUSCULAR; INTRAVENOUS at 21:19

## 2019-09-22 RX ADMIN — VANCOMYCIN HYDROCHLORIDE 500 MG: 500 INJECTION, SOLUTION INTRAVENOUS at 00:15

## 2019-09-22 RX ADMIN — METRONIDAZOLE 500 MG: 500 INJECTION, SOLUTION INTRAVENOUS at 16:00

## 2019-09-22 RX ADMIN — CLINDAMYCIN PHOSPHATE 600 MG: 600 INJECTION, SOLUTION INTRAVENOUS at 00:28

## 2019-09-22 RX ADMIN — DEXTROSE 50 % IN WATER (D50W) INTRAVENOUS SYRINGE 25 G: at 23:27

## 2019-09-22 RX ADMIN — CLINDAMYCIN PHOSPHATE 600 MG: 600 INJECTION, SOLUTION INTRAVENOUS at 06:13

## 2019-09-22 RX ADMIN — MAGNESIUM SULFATE HEPTAHYDRATE 2 G: 40 INJECTION, SOLUTION INTRAVENOUS at 06:15

## 2019-09-22 RX ADMIN — HEPARIN SODIUM 5000 UNITS: 5000 INJECTION INTRAVENOUS; SUBCUTANEOUS at 00:02

## 2019-09-22 RX ADMIN — DEXTROSE MONOHYDRATE 25 G: 25 INJECTION, SOLUTION INTRAVENOUS at 23:27

## 2019-09-22 RX ADMIN — SODIUM CHLORIDE, SODIUM GLUCONATE, SODIUM ACETATE, POTASSIUM CHLORIDE AND MAGNESIUM CHLORIDE 1000 ML: 526; 502; 368; 37; 30 INJECTION, SOLUTION INTRAVENOUS at 05:07

## 2019-09-22 RX ADMIN — ALBUMIN (HUMAN) 25 G: 12.5 SOLUTION INTRAVENOUS at 07:34

## 2019-09-22 RX ADMIN — ALBUMIN (HUMAN) 25 G: 12.5 SOLUTION INTRAVENOUS at 01:52

## 2019-09-22 NOTE — SEPSIS NOTE
Sepsis Note   Andrés Alcala 64 y o  male MRN: [id number]  Unit/Bed#:  Encounter: [id number]      qSOFA     Row Name 09/22/19 0300 09/22/19 0200 09/22/19 0130 09/22/19 0100 09/22/19 0030    Altered mental status GCS < 15              Respiratory Rate > / =22  0  1  1  1  1    Systolic BP < / =676  0  1  1  1  1    Q Sofa Score  0  2  2  2  2    Row Name 09/22/19 0000 09/21/19 2341 09/21/19 2239 09/21/19 2200 09/21/19 2159    Altered mental status GCS < 15  0            Respiratory Rate > / =22  1  0  1  0  0    Systolic BP < / =055  1  1  1  1  1    Q Sofa Score  2  1  2  1  1    Row Name 09/21/19 2029 09/21/19 1959             Altered mental status GCS < 15           Respiratory Rate > / =22  0  0       Systolic BP < / =589  1  1       Q Sofa Score  1  1           Initial Sepsis Screening     Row Name 09/21/19 2308                Is the patient's history suggestive of a new or worsening infection? (!) Yes (Proceed)  -HS        Suspected source of infection  soft tissue  -HS        Are two or more of the following signs & symptoms of infection both present and new to the patient? (!) Yes (Proceed)  -HS        Indicate SIRS criteria  Tachycardia > 90 bpm;Leukopenia (WBC < 4000 IJL);WBC > 10% bands  -HS        If the answer is yes to both questions, suspicion of sepsis is present          If severe sepsis is present AND tissue hypoperfusion perists in the hour after fluid resuscitation or lactate > 4, the patient meets criteria for SEPTIC SHOCK          Are any of the following organ dysfunction criteria present within 6 hours of suspected infection and SIRS criteria that are NOT considered to be chronic conditions? (!) Yes  -HS        Organ dysfunction  Bilirubin > 2 0 mg/dL; Lactate >/equal 4 0 mmol/L Pt chronic ESRD on HD w elevated creatinine, mostly anuric at baseline  SBP chornically low into 90's     -HS        Date of presentation of severe sepsis  09/21/19  -HS        Time of presentation of severe sepsis          Tissue hypoperfusion persists in the hour after crystalloid fluid administration, evidenced, by either:  * OR * Lactate level is greater to or equal 4 mmol/dL ( ___ mmol/dL in comment field)  -HS        Was hypotension present within one hour of the conclusion of crystalloid fluid administration?         Date of presentation of septic shock          Time of presentation of septic shock            User Key  (r) = Recorded By, (t) = Taken By, (c) = Cosigned By    [address]  Name Provider Type    HS [facility], [address]  Nurse Practitioner               Default Flowsheet Data (last 720 hours)      Sepsis Reassess     Row Name 09/22/19 0456                   Repeat Volume Status and Tissue Perfusion Assessment Performed    Repeat Volume Status and Tissue Perfusion Assessment Performed  Yes  -HS           Volume Status and Tissue Perfusion Post Fluid Resuscitation * Must Document All *    Vital Signs Reviewed (HR, RR, BP, T)  Yes  -HS        Shock Index Reviewed          Arterial Oxygen Saturation Reviewed (POx, SaO2 or SpO2)          Cardio  Normal S1/S2; No murmor  -HS        Pulmonary  (!) Rales bilat bases  -HS        Capillary Refill  Brisk  -HS        Peripheral Pulses  Dorsalis Pedis; Posterior Tibialis  -HS        Dorsalis Pedis   by doppler  -HS        Posterior Tibialis   by doppler  -HS        Skin  Warm;Dry BLE reddened, bullae present, some draining     -HS        Urine output assessed  Adequate Pt on MWF HD    -HS           *OR*   Intensive Monitoring- Must Document One of the Following Four *:    Vital Signs Reviewed          * Central Venous Pressure (CVP or RAP)          * Central Venous Oxygen (SVO2, ScvO2 or Oxygen saturation via central catheter)          * Bedside Cardiovascular US in IVC diameter and % collapse          * Passive Leg Raise OR Crystalloid Challenge            User Key  (r) = Recorded By, (t) = Taken By, (c) = Cosigned By    [address] Name Provider Type    HS Yadi Jamil, [facility] Nurse Practitioner

## 2019-09-22 NOTE — SEPSIS NOTE
Sepsis Note   Andrés Alcala 64 y o  male MRN: [id number]  Unit/Bed#: ED 11 Encounter: [id number]      qSOFA     Row Name 09/21/19 2239 09/21/19 2200 09/21/19 2159 09/21/19 2029 09/21/19 1959    Altered mental status GCS < 15              Respiratory Rate > / =22  1  0  0  0  0    Systolic BP < / =733  1  1  1  1  1    Q Sofa Score  2  1  1  1  1        Initial Sepsis Screening     Row Name 09/21/19 2308                Is the patient's history suggestive of a new or worsening infection? (!) Yes (Proceed)  -HS        Suspected source of infection  soft tissue  -HS        Are two or more of the following signs & symptoms of infection both present and new to the patient? (!) Yes (Proceed)  -HS        Indicate SIRS criteria  Tachycardia > 90 bpm;Leukopenia (WBC < 4000 IJL);WBC > 10% bands  -HS        If the answer is yes to both questions, suspicion of sepsis is present          If severe sepsis is present AND tissue hypoperfusion perists in the hour after fluid resuscitation or lactate > 4, the patient meets criteria for SEPTIC SHOCK          Are any of the following organ dysfunction criteria present within 6 hours of suspected infection and SIRS criteria that are NOT considered to be chronic conditions? (!) Yes  -HS        Organ dysfunction  Bilirubin > 2 0 mg/dL; Lactate >/equal 4 0 mmol/L Pt chronic ESRD on HD w elevated creatinine, mostly anuric at baseline  SBP chornically low into 90's  -HS        Date of presentation of severe sepsis  09/21/19  -HS        Time of presentation of severe sepsis          Tissue hypoperfusion persists in the hour after crystalloid fluid administration, evidenced, by either:  * OR * Lactate level is greater to or equal 4 mmol/dL ( ___ mmol/dL in comment field)  -HS        Was hypotension present within one hour of the conclusion of crystalloid fluid administration?           Date of presentation of septic shock          Time of presentation of septic shock   User Key  (r) = Recorded By, (t) = Taken By, (c) = Cosigned By    [address] Name Provider Type    RENETTA Garcia, [address] Nurse Practitioner

## 2019-09-22 NOTE — ED NOTES
Patient's dialysis schedule Monday, Wednesday and Friday  Daughters at bedside state that patient has not being having full dialysis sessions   Tessa Kellogg RN  09/21/19 3730

## 2019-09-22 NOTE — CONSULTS
Consultation - Infectious Disease   Yanira Cos 64 y o  male MRN: [id number]  Unit/Bed#:  Encounter: [id number]      IMPRESSION & RECOMMENDATIONS:   1  Septic shock, POA  Patient presented on admission with leukopenia, tachycardia, hypotension and lactic acidosis  Blood cultures have returned positive  Potential sources at this time include an intra-abdominal abscess/infected cyst, line-related sepsis, lower extremity hemorrhagic/necrotic bullae may be from systemic sepsis or could represent early myonecrosis/necrotizing SSTI, open sores on skin but unable to elicit history of a clear injury  Additional consideration for GI disease but again no clear history for diarrhea/recent GI upset/new foods  Patient remains afebrile however his mentation is not at baseline  Pressor requirements are slowly coming down  Gram stain reviewed with micro lab as below  CT imaging reviewed  Would continue the patient on cefepime  Continue vancomycin for now, likely discontinue tomorrow if improves  Will add Flagyl  Continue to trend fever curve/vitals  Repeat labs with CBC and chemistry  Follow up pending blood cultures  Agree with surgical consult--low threshold for repeat imaging  Patient may require removal of dialysis catheter pending clinical course  Would also obtain IR evaluation of this intra-abdominal cystic lesion for possible drainage  Ongoing close monitoring by ICU    2  Gram-negative bacteremia  Initial Gram stains reviewed with micro lab and there is growth in all 4 bottles, with definitive gram-negative rods, and there is suspicion for an atypical GN possibly Pseudomonas  Continue antibiotics as above  Continue to trend fever curve/vitals  Follow up pending culture data  Additional evaluations as above  Additional care as per primary    3  Acute encephalopathy  After review with the patient's daughter to be at his baseline    Will answer some directed questions but then speech becomes very tangential and he becomes fixated on recent events and is repeating himself  Low suspicion for CNS infection  Likely due to the above  Serial neuro exams  Continue antibiotics as above  Additional care as per primary     4  End-stage renal disease on hemodialysis via chronic catheter  Patient's daughter reported that he has been off peritoneal dialysis for least 2 years  She reports that he had recent central line catheter exchange in August reportedly as the site did not look intact  No acute issues currently at his site however patient acutely ill  Continue antibiotics as above  Follow up pending culture  Ongoing follow-up by Nephrology  Patient will likely require line removal, particularly if he fails to improve/declines further  Additional care as per primary    5  Autosomal dominant Polycystic kidney disease  Patient remains on dialysis as above  Previously evaluated for transplant  Possibly contributing to the above infection  Supportive care otherwise as per primary  Above plan discussed in detail with ICU attending and critical care advanced practitioner  ID consult service will continue to follow closely  HISTORY OF PRESENT ILLNESS:  Reason for Consult:  Gram-negative bacteremia    HPI: Sahra Cornell is a 64y o  year old male with polycystic kidney disease, end-stage renal disease on hemodialysis via right subclavian catheter  He presented to the hospital yesterday for leg pain  On initial evaluations patient had reported that he was golfing 2 days prior to admission and he felt a strain on his left leg and thought he had pulled a muscle  He reported difficulty with ambulation due to pain  Subsequently developed redness and clear fluid filled blisters with black and areas over both of his lower extremities left more than right  The patient was seen by his nephrologist during hemodialysis and he was prescribed Keflex and advised to go to the emergency department    The redness in the bullae worsen and they appear to track up his left leg  In the emergency department he was found to have a lactic acid to 6 4  He had a significant bandemia  He was started on broad-spectrum antibiotics with cefepime and vancomycin  CT abdomen pelvis with lower extremity imaging showed subcutaneous edema of the lower legs without discrete fluid collection to suggest abscess and no subcutaneous emphysema  There is mention of a right lower quadrant cystic mass with thickened walls  Patient was ultimately admitted for severe sepsis due to lower extremity cellulitis  CPK was noted to be normal   Lower extremity duplex was also ordered given patient's limited mobility recently  Vascular surgery has been consulted  Nephrology is also on board  No other acute events noted overnight on chart review  Patient remains afebrile  He remains leukopenic  Two of 2 blood cultures with gram-negative rods  Chest x-ray is unremarkable  CT imaging from admission noted  Patient's documented vitals are stable  LFTs unremarkable  Lactic acid down trending  Patient noted have thrombocytopenia and anemia  Procalcitonin elevated at 66  Patient has not been seen by our service previously  No other prior culture data in our system  Results reviewed in Care everywhere and patient has had prior HIV, hepatitis C and hepatitis B testing which were negative  Previous QuantiFERON testing was negative  Previously patient had peritoneal dialysis catheter which was removed in 2016 due to coagulase-negative Staph infection  Previous imaging of the abdomen and 2014 did not mention a cystic right lower quadrant lesion  Other prosthetic material appreciated on  imaging from CT  We are consulted at this time for further assistance in management given this patient's presentation of septic shock and bacteremia  On evaluation, the patient is difficult to redirect on exam and he has very tangential speech    Seems to be fixated on recently stretching his legs while golfing  On review with the patient he denies cutting into his skin or exposure to any lakes or k[place] or fresh water bodies  He denied having any obvious diarrheal illnesses recently  He denies eating out frequently he denies any new food exposures  He denies any exposures to animals  His daughter is present at bedside and she reports that her sister saw the patient on Wednesday and he was perfectly fine  They report that he recently had his catheter exchange towards the end of August because this site did not appear well  They report also that he has had difficulty with sitting through entire dialysis sessions  They note that they saw 2 small lesions on his left leg and then the patient went on to pop them on his own using a knife  He then started developing more of these lesions over his entire legs  The patient reported that he was having such stiffness and swelling in the legs that he was not able to ambulate or to move around  He denied having any other prosthetic material in his body  Discussed with his daughter and she denies having any recent travel or new exposures that she could think of  Patient is alert and awake and oriented to person, place and time but again difficult to obtain history from him  REVIEW OF SYSTEMS:  A complete 12 point system-based review of systems is negative other than that noted in the HPI  PAST MEDICAL HISTORY:  Past Medical History:   Diagnosis Date    Complication of renal dialysis     Polycystic kidney disease      No past surgical history on file      FAMILY HISTORY:  Non-contributory    SOCIAL HISTORY:  Social History   Social History     Substance and Sexual Activity   Alcohol Use Not on file     Social History     Substance and Sexual Activity   Drug Use Not on file     Social History     Tobacco Use   Smoking Status Never Smoker   Smokeless Tobacco Never Used       ALLERGIES:  No Known Allergies    MEDICATIONS:  All current active medications have been reviewed    PHYSICAL EXAM:  Temp:  [97 5 °F (36 4 °C)-98 8 °F (37 1 °C)] 97 5 °F (36 4 °C)  HR:  [] 98  Resp:  [14-29] 25  BP: ()/(49-65) 93/58  SpO2:  [94 %-100 %] 98 %  Temp (24hrs), Av 2 °F (36 8 °C), Min:97 5 °F (36 4 °C), Max:98 8 °F (37 1 °C)  Current: Temperature: 97 5 °F (36 4 °C)    Intake/Output Summary (Last 24 hours) at 2019 1454  Last data filed at 2019 0801  Gross per 24 hour   Intake 3550 ml   Output    Net 3550 ml       General Appearance:  Acutely ill-appearing, patient has very tangential speech and is difficult to redirect on exam, and in no respiratory distress   Head:  Normocephalic, without obvious abnormality, atraumatic   Eyes:  Conjunctiva pink and sclera anicteric, both eyes   Nose: Nares normal, mucosa normal, no drainage   Throat: Oropharynx moist without lesions   Neck: Supple, symmetrical, no adenopathy, no tenderness/mass/nodules   Back:   Unable to turn patient Vega at this time to examine his back   Lungs:   Clear to auscultation anteriorly bilaterally, respirations unlabored on nasal cannula   Chest Wall:  No tenderness or deformity   Heart:  RRR; no murmur, rub or gallop   Abdomen:   Soft, non-tender, non-distended, positive bowel sounds    Extremities: No cyanosis, clubbing; nonpitting edema present the lower extremities bilaterally   Skin: Patient has large necrotic bullae present in the lower extremities bilaterally some of these lesions have been popped open and patient reports when they popped there was some serous mixed with blood and black tissue  The other bullae remain intact in seemed to be coalescing  Lymph nodes: Cervical, supraclavicular nodes normal   Neurologic: Alert and oriented times 3, he is profoundly weak and has difficulty moving his extremities  Again his speech is very tangential in difficult to obtain history from him         LABS, IMAGING, & OTHER STUDIES:  Lab Results:  I have personally reviewed pertinent labs  Results from last 7 days   Lab Units 09/22/19  0412 09/21/19 2018   WBC Thousand/uL 3 08* 3 42*   HEMOGLOBIN g/dL 10 3* 12 1   PLATELETS Thousands/uL 32* 55*     Results from last 7 days   Lab Units 09/22/19  0412 09/21/19 2018   POTASSIUM mmol/L 5 2 4 9   CHLORIDE mmol/L 89* 88*   CO2 mmol/L 24 28   BUN mg/dL 77* 75*   CREATININE mg/dL 9 04* 9 54*   EGFR ml/min/1 73sq m 6 5   CALCIUM mg/dL 8 5 8 9   AST U/L 10 14   ALT U/L 9* 11*   ALK PHOS U/L 68 101     Results from last 7 days   Lab Units 09/21/19  2019 09/21/19 2018   GRAM STAIN RESULT  Gram negative rods* Gram negative rods*       Imaging Studies:   I have personally reviewed pertinent imaging study reports and images in PACS  Other Studies:   I have personally reviewed pertinent reports

## 2019-09-22 NOTE — PLAN OF CARE
Problem: Prexisting or High Potential for Compromised Skin Integrity  Goal: Skin integrity is maintained or improved  Description  INTERVENTIONS:  - Identify patients at risk for skin breakdown  - Assess and monitor skin integrity  - Assess and monitor nutrition and hydration status  - Monitor labs   - Assess for incontinence   - Turn and reposition patient  - Assist with mobility/ambulation  - Relieve pressure over bony prominences  - Avoid friction and shearing  - Provide appropriate hygiene as needed including keeping skin clean and dry  - Evaluate need for skin moisturizer/barrier cream  - Collaborate with interdisciplinary team   - Patient/family teaching  - Consider wound care consult   Outcome: Progressing     Problem: Potential for Falls  Goal: Patient will remain free of falls  Description  INTERVENTIONS:  - Assess patient frequently for physical needs  -  Identify cognitive and physical deficits and behaviors that affect risk of falls    -  [place] fall precautions as indicated by assessment   - Educate patient/family on patient safety including physical limitations  - Instruct patient to call for assistance with activity based on assessment  - Modify environment to reduce risk of injury  - Consider OT/PT consult to assist with strengthening/mobility  Outcome: Progressing     Problem: CARDIOVASCULAR - ADULT  Goal: Maintains optimal cardiac output and hemodynamic stability  Description  INTERVENTIONS:  - Monitor I/O, vital signs and rhythm  - Monitor for S/S and trends of decreased cardiac output  - Administer and titrate ordered vasoactive medications to optimize hemodynamic stability  - Assess quality of pulses, skin color and temperature  - Assess for signs of decreased coronary artery perfusion  - Instruct patient to report change in severity of symptoms  Outcome: Progressing  Goal: Absence of cardiac dysrhythmias or at baseline rhythm  Description  INTERVENTIONS:  - Continuous cardiac monitoring, vital signs, obtain 12 lead EKG if ordered  - Administer antiarrhythmic and heart rate control medications as ordered  - Monitor electrolytes and administer replacement therapy as ordered  Outcome: Progressing     Problem: RESPIRATORY - ADULT  Goal: Achieves optimal ventilation and oxygenation  Description  INTERVENTIONS:  - Assess for changes in respiratory status  - Assess for changes in mentation and behavior  - Position to facilitate oxygenation and minimize respiratory effort  - Oxygen administered by appropriate delivery if ordered  - Initiate smoking cessation education as indicated  - Encourage broncho-pulmonary hygiene including cough, deep breathe, Incentive Spirometry  - Assess the need for suctioning and aspirate as needed  - Assess and instruct to report SOB or any respiratory difficulty  - Respiratory Therapy support as indicated  Outcome: Progressing     Problem: METABOLIC, FLUID AND ELECTROLYTES - ADULT  Goal: Electrolytes maintained within normal limits  Description  INTERVENTIONS:  - Monitor labs and assess patient for signs and symptoms of electrolyte imbalances  - Administer electrolyte replacement as ordered  - Monitor response to electrolyte replacements, including repeat lab results as appropriate  - Instruct patient on fluid and nutrition as appropriate  Outcome: Progressing  Goal: Fluid balance maintained  Description  INTERVENTIONS:  - Monitor labs   - Monitor I/O and WT  - Instruct patient on fluid and nutrition as appropriate  - Assess for signs & symptoms of volume excess or deficit  Outcome: Progressing     Problem: SKIN/TISSUE INTEGRITY - ADULT  Goal: Incision(s), wounds(s) or drain site(s) healing without S/S of infection  Description  INTERVENTIONS  - Assess and document risk factors for skin impairment   - Assess and document dressing, incision, wound bed, drain sites and surrounding tissue  - Consider nutrition services referral as needed  - Oral mucous membranes remain intact  - Provide patient/ family education  Outcome: Progressing

## 2019-09-22 NOTE — CONSULTS
Consultation - Nephrology   Elmer Hills 64 y o  male MRN: [id number]  Unit/Bed#:  Encounter: [id number]    ASSESSMENT:  1  ESRD on HD (MWF @ [address]): next HD scheduled for Monday (will need to call to get orders as they are closed today)  - CXR reviewed by me today without signs of fluid overload  - etiology due to polycystic kidney disease  2  Access: right CVC  3  Hypotension: consider using albumin and midodrine  - he is not on outpatient antihypertensives  - avoid further IVF boluses  4  Anemia: hgb at goal  5  Mineral and bone disease: continue phoslo, renal diet  6  Severe Sepsis: secondary to LE cellulitis  - lactic acid 3 5  - blood cultures show gram negative rods  7  Hyponatremia: monitor  - avoid further fluid boluses  8  Borderline Hyperkalemia: low K/renal diet  9  Elevated bilirubin: per primary team  - normal LFTs    PLAN:  HD Monday  Will need to call dialysis unit for orders  Avoid further IVF  Consider midodrine/albumin for hypotension    HISTORY OF PRESENT ILLNESS:  Requesting Physician: Valerie Hernandez MD  Reason for Consult: ESRD on HD    Elmer Hills is a 64y o  year old male who was admitted to [address] [place] [address] after presenting with worsening pain and redness from LE wounds  The patient was advised by his nephrologist to be evaluated in the ER after his dialysis treatment for his LE wounds however the patient came to the ER after receiving only half his treatment due to his pain level  A renal consultation is requested today for assistance in the management of ESRD  Elmer Hills is a known ESRD patient who undergoes maintenance hemodialysis at [address] on MWF  He only received 2 hours of dialysis treatment on Friday due to significant pain from his LE wounds  He currently is sitting up in bed  He is uncomfortable in his positioning  He denies acute SOB  He feels it is appropriate to wait until Monday for dialysis    He states he makes a small amount of urine but is not on a diuretic  PAST MEDICAL HISTORY:  Past Medical History:   Diagnosis Date    Complication of renal dialysis     Polycystic kidney disease        PAST SURGICAL HISTORY:  No past surgical history on file  ALLERGIES:  No Known Allergies    SOCIAL HISTORY:  Social History     Substance and Sexual Activity   Alcohol Use Not on file     Social History     Substance and Sexual Activity   Drug Use Not on file     Social History     Tobacco Use   Smoking Status Never Smoker   Smokeless Tobacco Never Used       FAMILY HISTORY:  No family history on file  MEDICATIONS:    Current Facility-Administered Medications:     acetaminophen (TYLENOL) tablet 650 mg, 650 mg, Oral, Q6H PRN, SAM Jimenes    calcium acetate (PHOSLO) capsule 1,334 mg, 1,334 mg, Oral, TID With Meals, SAM Jimenes    cefepime (MAXIPIME) 1,000 mg in dextrose 5 % 50 mL IVPB, 1,000 mg, Intravenous, Q24H, SAM Carrizales    clindamycin (CLEOCIN) IVPB (premix) 600 mg, 600 mg, Intravenous, Q6H, SAM Carrizales, Last Rate: 100 mL/hr at 09/22/19 0613, 600 mg at 09/22/19 8557    HYDROmorphone (DILAUDID) injection 0 5 mg, 0 5 mg, Intravenous, Q4H PRN, SAM Jimenes    magnesium sulfate 2 g/50 mL IVPB (premix) 2 g, 2 g, Intravenous, Once, PAUL SEVILLA, 2 g at 09/22/19 0615    oxyCODONE (ROXICODONE) IR tablet 5 mg, 5 mg, Oral, Q4H PRN, SAM Jimenes    polyethylene glycol (MIRALAX) packet 17 g, 17 g, Oral, Daily PRN, SAM Jimenes    senna-docusate sodium (SENOKOT S) 8 6-50 mg per tablet 1 tablet, 1 tablet, Oral, HS, SAM Carrizales    vancomycin (VANCOCIN) IVPB (premix) 750 mg, 10 mg/kg, Intravenous, After Dialysis, Richard Galdamez MD    REVIEW OF SYSTEMS:  General: No fevers, chills  Cardiovascular: No chest pain, shortness of breath, palpitations, leg edema  Respiratory: No cough, sputum production, shortness of breath    Gastrointestinal: No nausea, vomiting, abdominal pain, diarrhea  Genitourinary: Makes a small amount of urine daily      PHYSICAL EXAM:  Current Weight: Weight - Scale: 86 8 kg (191 lb 5 8 oz)  First Weight: Weight - Scale: 85 9 kg (189 lb 6 oz)  Vitals:    09/22/19 0530 09/22/19 0600 09/22/19 0700 09/22/19 0757   BP: (!) 88/60 92/64 94/57 (!) 80/52   BP Location:   Right arm    Pulse: (!) 112 (!) 110 (!) 110    Resp: 19 22 20    Temp:   98 °F (36 7 °C)    TempSrc:   Oral    SpO2: 94% 97% 100%    Weight:  86 8 kg (191 lb 5 8 oz)     Height:           Intake/Output Summary (Last 24 hours) at 9/22/2019 0807  Last data filed at 9/22/2019 0801  Gross per 24 hour   Intake 3550 ml   Output    Net 3550 ml     General: NAD  Skin: no rash  HEENT: mm dry  Neck: supple  Chest: CTAB  CVS: RRR  Abdomen: soft nt nd  Extremities: no edema, + rash/erythema/bullae  Neuro: alert awake  Psych:  Mood and affect appropriate     Lab Results:   Results from last 7 days   Lab Units 09/22/19  0412 09/21/19 2018   WBC Thousand/uL 3 08* 3 42*   HEMOGLOBIN g/dL 10 3* 12 1   HEMATOCRIT % 31 2* 36 0*   PLATELETS Thousands/uL 32* 55*   POTASSIUM mmol/L 5 2 4 9   CHLORIDE mmol/L 89* 88*   CO2 mmol/L 24 28   BUN mg/dL 77* 75*   CREATININE mg/dL 9 04* 9 54*   CALCIUM mg/dL 8 5 8 9   MAGNESIUM mg/dL 1 6  --    PHOSPHORUS mg/dL 5 2*  --    ALK PHOS U/L 68 101   ALT U/L 9* 11*   AST U/L 10 14     Lab Results   Component Value Date    CALCIUM 8 5 09/22/2019    PHOS 5 2 (H) 09/22/2019

## 2019-09-22 NOTE — ED PROVIDER NOTES
History  Chief Complaint   Patient presents with    Leg Pain     peripheral vascular disease       History provided by:  Patient (Patient's 2 daughters)  History limited by: Patient very poor historian  Leg Pain   Location:  Leg  Time since incident:  3 days  Injury: no    Leg location:  R leg and L leg  Pain details:     Quality:  Aching and burning    Radiates to:  Does not radiate    Severity:  Moderate    Onset quality:  Gradual    Duration:  3 days    Timing:  Constant    Progression:  Worsening  Chronicity:  Recurrent  Relieved by:  None tried  Worsened by:  Nothing  Ineffective treatments:  None tried  Associated symptoms: no fever and no neck pain    Associated symptoms comment:  Multiple lower extremity nonhealing ulcers and blisters, worsening redness and pain particularly of the right leg prompting ED visit  Risk factors comment:  End-stage renal disease on dialysis      Prior to Admission Medications   Prescriptions Last Dose Informant Patient Reported? Taking? Multiple Vitamin (MULTIVITAMIN) tablet   Yes Yes   Sig: Take 1 tablet by mouth daily   NON FORMULARY   Yes Yes   Sig: Hemodialysis Monday, Wednesday, Friday   Sucroferric Oxyhydroxide (VELPHORO PO)   Yes Yes   Sig: Take 600 mg by mouth If the patient eats       1 tablet (600 mg) with meals  0 5 tablet (300 mg) with snacks   calcium acetate (PHOSLO) 667 mg capsule   Yes Yes   Sig: Take 338 mg by mouth If the patient eats       4 tablets (1352 mg) with meals  2 tablets (676 mg) with snacks      Facility-Administered Medications: None       Past Medical History:   Diagnosis Date    Complication of renal dialysis     Polycystic kidney disease        No past surgical history on file  No family history on file  I have reviewed and agree with the history as documented      Social History     Tobacco Use    Smoking status: Never Smoker    Smokeless tobacco: Never Used   Substance Use Topics    Alcohol use: Not on file    Drug use: Not on file Review of Systems   Constitutional: Negative for activity change, chills, diaphoresis and fever  HENT: Negative for congestion, sinus pressure and sore throat  Eyes: Negative for pain and visual disturbance  Respiratory: Negative for cough, chest tightness, shortness of breath, wheezing and stridor  Cardiovascular: Negative for chest pain and palpitations  Gastrointestinal: Negative for abdominal distention, abdominal pain, constipation, diarrhea, nausea and vomiting  Genitourinary: Positive for scrotal swelling (Known hernia)  Negative for dysuria and frequency  Musculoskeletal: Negative for neck pain and neck stiffness  Skin: Positive for color change, rash and wound  Neurological: Negative for dizziness, speech difficulty, light-headedness, numbness and headaches  Physical Exam  Physical Exam   Constitutional: He is oriented to person, place, and time  He appears distressed  Older than stated age   HENT:   Head: Normocephalic and atraumatic  Eyes: Pupils are equal, round, and reactive to light  Neck: Normal range of motion  Neck supple  No tracheal deviation present  Cardiovascular: Regular rhythm, normal heart sounds and intact distal pulses  No murmur heard  Tachycardic   Pulmonary/Chest: Effort normal and breath sounds normal  No stridor  No respiratory distress  Abdominal: Soft  He exhibits no distension  There is no tenderness  There is no rebound and no guarding  Musculoskeletal: Normal range of motion  Neurological: He is alert and oriented to person, place, and time  Skin: Skin is warm and dry  He is not diaphoretic  There is erythema  No pallor  Bilateral lower extremity multiple ulcers multiple ruptured blisters, few hemorrhagic blisters, petechiae over dependent portion of bilateral lower legs   Psychiatric: He has a normal mood and affect  Vitals reviewed        Vital Signs  ED Triage Vitals   Temperature Pulse Respirations Blood Pressure SpO2 09/21/19 2020 09/21/19 1959 09/21/19 1959 09/21/19 1959 09/21/19 1959   98 8 °F (37 1 °C) (!) 124 20 98/62 99 %      Temp Source Heart Rate Source Patient Position - Orthostatic VS BP Location FiO2 (%)   09/21/19 2020 09/21/19 1959 09/21/19 1959 09/21/19 1959 --   Oral Monitor Lying Left arm       Pain Score       09/21/19 2026       Worst Possible Pain           Vitals:    09/21/19 2029 09/21/19 2159 09/21/19 2200 09/21/19 2239   BP: 95/60 99/63 94/59 92/52   Pulse: (!) 119 (!) 118 (!) 116 (!) 117   Patient Position - Orthostatic VS: Sitting Sitting Sitting Lying         Visual Acuity      ED Medications  Medications   albumin human (FLEXBUMIN) 5 % injection 25 g (has no administration in time range)   heparin (porcine) subcutaneous injection 5,000 Units (has no administration in time range)   clindamycin (CLEOCIN) IVPB (premix) 600 mg (has no administration in time range)   cefepime (MAXIPIME) 2 g/50 mL dextrose IVPB (0 mg Intravenous Stopped 9/21/19 2134)   vancomycin (VANCOCIN) 1,250 mg in sodium chloride 0 9 % 250 mL IVPB (1,250 mg Intravenous New Bag 9/21/19 2138)   fentanyl citrate (PF) 100 MCG/2ML 50 mcg (50 mcg Intravenous Given 9/21/19 2026)   sodium chloride 0 9 % bolus 500 mL (0 mL Intravenous Stopped 9/21/19 2230)   iodixanol (VISIPAQUE) 320 MG/ML injection 100 mL (100 mL Intravenous Given 9/21/19 2110)   fentanyl citrate (PF) 100 MCG/2ML 50 mcg (50 mcg Intravenous Given 9/21/19 2155)       Diagnostic Studies  Results Reviewed     Procedure Component Value Units Date/Time    Platelet count [[id number]]     Lab Status:  No result Specimen:  Blood     Lactic acid, plasma [[id number]]  (Abnormal) Collected:  09/21/19 2237    Lab Status:  Final result Specimen:  Blood from Arm, Left Updated:  09/21/19 2317     LACTIC ACID 5 2 mmol/L     Narrative:       Result may be elevated if tourniquet was used during collection      CK [[id number]]  (Normal) Collected:  09/21/19 2716    Lab Status:  Final result Specimen:  Blood from Arm, Left Updated:  09/21/19 2303     Total CK 67 U/L     Procalcitonin [[id number]] Collected:  09/21/19 2237    Lab Status: In process Specimen:  Blood from Arm, Left Updated:  09/21/19 2242    CBC and differential [[id number]]  (Abnormal) Collected:  09/21/19 2018    Lab Status:  Final result Specimen:  Blood from Arm, Right Updated:  09/21/19 2056     WBC 3 42 Thousand/uL      RBC 3 51 Million/uL      Hemoglobin 12 1 g/dL      Hematocrit 36 0 %       fL      MCH 34 5 pg      MCHC 33 6 g/dL      RDW 15 7 %      MPV 12 3 fL      Platelets 55 Thousands/uL      nRBC 0 /100 WBCs     Lactic acid, plasma x2 [[id number]]  (Abnormal) Collected:  09/21/19 2018    Lab Status:  Final result Specimen:  Blood from Arm, Right Updated:  09/21/19 2050     LACTIC ACID 6 4 mmol/L     Narrative:       Result may be elevated if tourniquet was used during collection      Comprehensive metabolic panel [[id number]]  (Abnormal) Collected:  09/21/19 2018    Lab Status:  Final result Specimen:  Blood from Arm, Right Updated:  09/21/19 2041     Sodium 132 mmol/L      Potassium 4 9 mmol/L      Chloride 88 mmol/L      CO2 28 mmol/L      ANION GAP 16 mmol/L      BUN 75 mg/dL      Creatinine 9 54 mg/dL      Glucose 84 mg/dL      Calcium 8 9 mg/dL      AST 14 U/L      ALT 11 U/L      Alkaline Phosphatase 101 U/L      Total Protein 7 1 g/dL      Albumin 2 7 g/dL      Total Bilirubin 3 30 mg/dL      eGFR 5 ml/min/1 73sq m     Narrative:       [place] guidelines for Chronic Kidney Disease (CKD):     Stage 1 with normal or high GFR (GFR > 90 mL/min/1 73 square meters)    Stage 2 Mild CKD (GFR = 60-89 mL/min/1 73 square meters)    Stage 3A Moderate CKD (GFR = 45-59 mL/min/1 73 square meters)    Stage 3B Moderate CKD (GFR = 30-44 mL/min/1 73 square meters)    Stage 4 Severe CKD (GFR = 15-29 mL/min/1 73 square meters)    Stage 5 End Stage CKD (GFR <15 mL/min/1 73 square meters)  Note: GFR calculation is accurate only with a steady state creatinine    Protime-INR [[id number]]  (Abnormal) Collected:  09/21/19 2018    Lab Status:  Final result Specimen:  Blood from Arm, Right Updated:  09/21/19 2037     Protime 19 4 seconds      INR 1 72    APTT [[id number]]  (Normal) Collected:  09/21/19 2018    Lab Status:  Final result Specimen:  Blood from Arm, Right Updated:  09/21/19 2037     PTT 36 seconds     Blood culture #2 [[id number]] Collected:  09/21/19 2018    Lab Status: In process Specimen:  Blood from Arm, Right Updated:  09/21/19 2025    Blood culture #1 [[id number]] Collected:  09/21/19 2019    Lab Status: In process Specimen:  Blood from Arm, Left Updated:  09/21/19 2025                 XR chest 2 views   ED Interpretation by Franc Carreon DO (09/21 2158)   No acute pathology, dialysis catheter in place      CTA abdominal w run off w wo contrast   Final Result by Fariba Morales MD (09/21 2250)      1  Moderate peripheral arterial disease with patent vasculature up to the level of the proximal calves  Evaluation of distal calf arteries are limited due to circumferential calcification  2   Skin thickening and subcutaneous edema of the lower legs which may be due to cellulitis versus peripheral arterial disease  No discrete fluid collection to suggest abscess  No subcutaneous emphysema  3   Polycystic kidney and liver disease with innumerable cysts some which are calcified and hemorrhagic  4   Right lower quadrant cystic mass with thickened walls, calcifications, and heterogeneous hyperdensity which may represent enhancement versus hemorrhage  There is contact with the right kidney and cecum  Differential diagnosis includes infected    right renal cyst versus renal cell carcinoma  Differential also includes appendiceal mass such as mucinous cystadenocarcinoma  Further evaluation with ultrasound or MRI of the abdomen may be of value     5   Submucosal fat deposition within the mildly thickened urinary bladder wall may be due to chronic cystitis  Correlate with urinalysis  6   Distended stomach with fluid and food debris  No small bowel obstruction  Diverticulosis without evidence of diverticulitis  7   Right inguinal hernia with associated large right hydrocele  The study was marked in [facility] for immediate notification  Workstation performed: [id number]         Bilateral Lower Extremity Venous Duplex    (Results Pending)              Procedures  CriticalCare Time  Performed by: Jelani Purcell DO  Authorized by: Jelani Purcell DO     Critical care provider statement:     Critical care time (minutes):  40    Critical care time was exclusive of:  Separately billable procedures and treating other patients    Critical care was necessary to treat or prevent imminent or life-threatening deterioration of the following conditions:  Sepsis    Critical care was time spent personally by me on the following activities:  Obtaining history from patient or surrogate, development of treatment plan with patient or surrogate, discussions with consultants, evaluation of patient's response to treatment, examination of patient, ordering and performing treatments and interventions, ordering and review of laboratory studies, ordering and review of radiographic studies, re-evaluation of patient's condition and review of old charts           ED Course  ED Course as of Sep 21 2341   Sat Sep 21, 2019   2113 Elevated lactate, bandemia, I do have concern for sepsis, patient is an uric end-stage renal disease will give some IV fluid but I do not want to give 30 cc/kg as I do feel this would be detrimental and lead to pulmonary edema/respiratory failure                      Initial Sepsis Screening     Row Name 09/21/19 7855                Is the patient's history suggestive of a new or worsening infection?   (!) Yes (Proceed)  -HS        Suspected source of infection  soft tissue  -HS        Are two or more of the following signs & symptoms of infection both present and new to the patient? (!) Yes (Proceed)  -HS        Indicate SIRS criteria  Tachycardia > 90 bpm;Leukopenia (WBC < 4000 IJL);WBC > 10% bands  -HS        If the answer is yes to both questions, suspicion of sepsis is present          If severe sepsis is present AND tissue hypoperfusion perists in the hour after fluid resuscitation or lactate > 4, the patient meets criteria for SEPTIC SHOCK          Are any of the following organ dysfunction criteria present within 6 hours of suspected infection and SIRS criteria that are NOT considered to be chronic conditions? (!) Yes  -HS        Organ dysfunction  Bilirubin > 2 0 mg/dL; Lactate >/equal 4 0 mmol/L Pt chronic ESRD on HD w elevated creatinine, mostly anuric at baseline  SBP chornically low into 90's  -HS        Date of presentation of severe sepsis  09/21/19  -HS        Time of presentation of severe sepsis          Tissue hypoperfusion persists in the hour after crystalloid fluid administration, evidenced, by either:  * OR * Lactate level is greater to or equal 4 mmol/dL ( ___ mmol/dL in comment field)  -HS        Was hypotension present within one hour of the conclusion of crystalloid fluid administration?           Date of presentation of septic shock          Time of presentation of septic shock            User Key  (r) = Recorded By, (t) = Taken By, (c) = Cosigned By    [address] Name Provider Type    Tu Hagen Nurse Practitioner                  MDM  Number of Diagnoses or Management Options  End stage renal disease [facility]): new and requires workup  Left leg cellulitis: new and requires workup  Peripheral vascular disease [facility]): new and requires workup  Severe sepsis [facility]): new and requires workup  Diagnosis management comments:       Initial ED assessment:  42-year-old male bilateral lower extremity erythema, blisters     Initial DDx includes but is not limited to: Cellulitis, necrotizing fasciitis, peripheral vascular disease with ischemia,    Initial ED plan:   Blood work, IV antibiotics, will give some IV fluid but patient is an uric, I do not want to give large amount of fluid that would cause pulmonary edema        Final ED summary/disposition:   After evaluation and workup in the emergency department, found to have what appears to be cellulitis of lower legs  , multiple other chronic complaints, admitted to hospital for further treatment       Disposition  Final diagnoses:   Left leg cellulitis   Peripheral vascular disease ([facility] )   End stage renal disease ([facility] )   Severe sepsis ([facility] )     Time reflects when diagnosis was documented in both MDM as applicable and the Disposition within this note     Time User Action Codes Description Comment    9/21/2019 11:01 PM Beau Cisneros Add [L03 116] Left leg cellulitis     9/21/2019 11:01 PM Ginette FAIRBANKS Add [I73 9] Peripheral vascular disease ([facility] )     9/21/2019 11:01 PM Ginette FAIRBANKS Add [N18 6] End stage renal disease ([facility] )     9/21/2019 11:21 PM Terrell Motley [N18 6,  Z99 2] Chronic kidney disease with end stage renal failure on dialysis ([place] [address] )     9/21/2019 11:28 PM Ayana Rogers Add [L03 119] Cellulitis of lower extremity- bilateral     9/21/2019 11:39 PM Signyoandy Cisneros Add [A41 9,  R65 20] Severe sepsis [facility])       ED Disposition     ED Disposition Condition Date/Time Comment    Admit Stable Sat Sep 21, 2019 11:01 PM Case was discussed with critical care [address] and the patient's admission status was agreed to be Admission Status: inpatient status to the service of Dr Jimbo Antonio   Follow-up Information    None         Current Discharge Medication List      CONTINUE these medications which have NOT CHANGED    Details   calcium acetate (PHOSLO) 667 mg capsule Take 338 mg by mouth If the patient eats       4 tablets (1352 mg) with meals  2 tablets (676 mg) with snacks      Multiple Vitamin (MULTIVITAMIN) tablet Take 1 tablet by mouth daily      NON FORMULARY Hemodialysis Monday, Wednesday, Friday      Sucroferric Oxyhydroxide (VELPHORO PO) Take 600 mg by mouth If the patient eats       1 tablet (600 mg) with meals  0 5 tablet (300 mg) with snacks           No discharge procedures on file      ED Provider  Electronically Signed by           Esha Crow DO  09/21/19 8461

## 2019-09-22 NOTE — PROGRESS NOTES
Progress Note - Critical Care   Lakisha Garcia 64 y o  male MRN: [id number]  Unit/Bed#:  Encounter: [id number]    Attending Physician: Kenia Gonsalez MD      ______________________________________________________________________  Assessment and Plan:   Principal Problem:    Severe sepsis without septic shock [facility])  Active Problems:    Chronic kidney disease with end stage renal failure on dialysis ([facility] [address] )    Hyponatremia    Cellulitis of lower extremity- bilateral    High anion gap metabolic acidosis    Lactic acidosis    Polycystic liver disease    Total bilirubin, elevated    Thrombocytopenia (HCC)  Resolved Problems:    * No resolved hospital problems  *        Neuro:   Neuro checks, CAM-ICU  Pain Control: acetaminophen, oxy, dilaudid    CV:   Hypotension: reviewed outpatient dialysis records, SBP ranges b/w   Is not on any antihypertensives  Clinically appears dry  Give additional 1 liter isolyte and determine response  Will continue to closely monitor   Most recent Echo: 6/2017: EF 40%  Global hypokinesis  Moderately dilated right atrium  Mild TR, mild MR, mild pulmonary hypertension (PASP 38-50mmhg)  DVT prophylaxis: hold heparin SQ 2/2 thrombocytopenia  (-) venodynes 2/2 open extremity wounds  Access: Peripheral IV x2/Tunneled Subclavian tunneled catheter    Pulm:  Stable    GI:   Hyperbilirubinemia: LFTs WNL, do not suspect obstructive pathology  Trend  Monitor INR  Polycystic Liver Disease: outpatient f/u    :   ADPKD w/ ESRD on HD: Access: RIJ Tunneled subclavian cath  M-W-F [address]  Last HD 9/20  EDW 83 kg  Reviewed records, patient notoriously does not complete full session of HD  Does make minimal urine daily per patient  F/u nephrology consult for HD management  Inguinal Hernia w/ Hydrocele: No pain  Do not suspect strangulation/incarcaration  Stable  F/E/N:   Hyponatremia: f/u serum osm/urine osm, urine sodium (makes minimal urine at baseline)   Suspect hypovolemia    ID: Severe Sepsis: Suspect 2/2 sepsis  CT had no evidence of abcess/SQ emphysema to suggest nec fasc  Continue cefepime/vancomycin  Add clindamycin for endotoxin mediation  Check procalcitonin  Trend lactic, wbc/temps/cultures  Heme:   Thrombocytopenia: f/u Haptoglobin/ hemolysis smear/ fibrinogen to r/o DIC  Trend  Hold chemical DVT  Endo:   Stable     Msk/Skin:    Cellulitis: continue cefepime/vanco/ clinda  Monitor frequently to assess for expansion  CPK nl  CT no evidence of nec fasc  Sensory/motor intact  Given lack of ambulation, will check lower extremity duplex to r/o DVT  Consult wound care  May need surgical consult for potential debridement in near future  Disposition: Continue Step Down 1 care  Code Status: Level 1 - Full Code    Counseling / Coordination of Care  Total Critical Care time spent 0 minutes excluding procedures, teaching and family updates  ______________________________________________________________________    Chief Complaint: leg pain    24 Hour Events: Admitted before midnight  Started on cefepime/vanco/clinda  Received 1 liter albumin + 500 saline  Did not receive initial 30cc/kg IVF in ED 2/2 concern for overload in ESRD making minimal urine  Lactic decreased however persistent  Review of Systems   Respiratory: Negative for chest tightness and shortness of breath  Cardiovascular: Negative for chest pain  All other systems reviewed and are negative     ______________________________________________________________________    Physical Exam:   Physical Exam   Constitutional: He is oriented to person, place, and time  Non-toxic appearance  HENT:   Head: Normocephalic and atraumatic  Dry mucous membranes  tounge discolored dark   Eyes: Scleral icterus is present  Neck: Trachea normal  No JVD present  Cardiovascular: Tachycardia present  Pulmonary/Chest: Effort normal  He has rales in the right lower field and the left lower field     R subclavian HD line Abdominal: Soft  He exhibits distension  There is no tenderness  Reducible periumbilical hernia   Genitourinary: Left testis shows mass  Left testis shows no tenderness  Musculoskeletal: Normal range of motion  Neurological: He is alert and oriented to person, place, and time  No sensory deficit  GCS eye subscore is 4  GCS verbal subscore is 5  GCS motor subscore is 6  Skin: Skin is warm and dry  Capillary refill takes less than 2 seconds  Multiple LE skin wounds with serosanguinous bullae  Strong palable LE pulses (dp/pt)   Psychiatric: He has a normal mood and affect  His speech is normal        ______________________________________________________________________  Vitals:    19 0100 19 0130 19 0200 19 0300   BP: 96/55 (!) 87/57 (!) 79/49 102/65   BP Location:    Right arm   Pulse: (!) 114 (!) 114 (!) 116 (!) 115   Resp: (!) 28 (!) 25 (!) 29 18   Temp:    98 4 °F (36 9 °C)   TempSrc:    Oral   SpO2: 99% 100% 100% 100%   Weight:       Height:           Temperature:   Temp (24hrs), Av 5 °F (36 9 °C), Min:98 4 °F (36 9 °C), Max:98 8 °F (37 1 °C)    Current Temperature: 98 4 °F (36 9 °C)  Weights:   IBW: 68 4 kg    Body mass index is 29 1 kg/m²  Weight (last 2 days)     Date/Time   Weight    19 2341   86 8 (191 36)    19 1959   85 9 (189 38)            Hemodynamic Monitoring:  N/A     Non-Invasive/Invasive Ventilation Settings:  Respiratory    Lab Data (Last 4 hours)    None         O2/Vent Data (Last 4 hours)    None              No results found for: PHART, UDP8XQR, PO2ART, MOF0QIP, X2DJGGWI, BEART, SOURCE  SpO2: SpO2: 100 %  Intake and Outputs:  I/O        07 -  0700  0701 -  0700    IV Piggyback  550    Total Intake(mL/kg)  550 (6 3)    Net  +550                Nutrition:        Diet Orders   (From admission, onward)             Start     Ordered    19 2321  Diet Renal; Renal Restrictive; Yes; Fluid Restriction 1800 ML;  No  Diet effective now     Question Answer Comment   Diet Type Renal    Renal Renal Restrictive    Should patient have a fluid restriction? Yes    Fluid Restriction Fluid Restriction 1800 ML    Should patient have a protein modifier? No    RD to adjust diet per protocol? Yes        09/21/19 2332                Labs:   Results from last 7 days   Lab Units 09/22/19 0412 09/21/19 2018   WBC Thousand/uL 3 08* 3 42*   HEMOGLOBIN g/dL 10 3* 12 1   HEMATOCRIT % 31 2* 36 0*   PLATELETS Thousands/uL 32* 55*   BANDS PCT % 12* 33*   MONO PCT % 29* 4     Results from last 7 days   Lab Units 09/22/19 0412 09/21/19 2018   SODIUM mmol/L 129* 132*   POTASSIUM mmol/L 5 2 4 9   CHLORIDE mmol/L 89* 88*   CO2 mmol/L 24 28   ANION GAP mmol/L 16* 16*   BUN mg/dL 77* 75*   CREATININE mg/dL 9 04* 9 54*   CALCIUM mg/dL 8 5 8 9   GLUCOSE RANDOM mg/dL 78 84   ALT U/L 9* 11*   AST U/L 10 14   ALK PHOS U/L 68 101   ALBUMIN g/dL 2 9* 2 7*   TOTAL BILIRUBIN mg/dL 3 60* 3 30*     Results from last 7 days   Lab Units 09/22/19 0412   MAGNESIUM mg/dL 1 6   PHOSPHORUS mg/dL 5 2*      Results from last 7 days   Lab Units 09/22/19 0412 09/21/19 2018   INR  1 73* 1 72*   PTT seconds  --  36      Results from last 7 days   Lab Units 09/22/19 0412 09/22/19 0056   TROPONIN I ng/mL 0 05* 0 06*     Results from last 7 days   Lab Units 09/22/19 0412 09/22/19 0056 09/21/19 2237 09/21/19 2018   LACTIC ACID mmol/L 3 6* 3 7* 5 2* 6 4*     ABG:    VBG:          Imaging:  I have personally reviewed pertinent reports  EKG: Sinus tachycardia  Micro:       Allergies: No Known Allergies  Medications:   Scheduled Meds:    Current Facility-Administered Medications:  acetaminophen 650 mg Oral Q6H PRN SAM Ozuna    calcium acetate 1,334 mg Oral TID With Meals SAM Ozuna    cefepime 1,000 mg Intravenous Q24H SAM Ozuna    clindamycin 600 mg Intravenous Q6H SAM Ozuna Last Rate: Stopped (09/22/19 0101)   HYDROmorphone 0 5 mg Intravenous Q4H PRN Mackenzie Pool, CRNP    multi-electrolyte 1,000 mL Intravenous Once PAUL SEVILLA    oxyCODONE 5 mg Oral Q4H PRN Mackenzie Pool, CRNP    polyethylene glycol 17 g Oral Daily PRN Mackenzie Pool, CRNP    senna-docusate sodium 1 tablet Oral HS Maria E Franco, SAM    vancomycin 10 mg/kg Intravenous After Dialysis Eboni Pérez MD      Continuous Infusions:   PRN Meds:    acetaminophen 650 mg Q6H PRN   HYDROmorphone 0 5 mg Q4H PRN   oxyCODONE 5 mg Q4H PRN   polyethylene glycol 17 g Daily PRN   vancomycin 10 mg/kg After Dialysis     VTE Pharmacologic Prophylaxis: Pharmacologic VTE Prophylaxis contraindicated due to thrombocytopenia  VTE Mechanical Prophylaxis: reason for no mechanical VTE prophylaxis open LE wounds  Invasive lines and devices: Invasive Devices     Peripheral Intravenous Line            Peripheral IV 09/21/19 Left Arm less than 1 day    Peripheral IV 09/21/19 Right Antecubital less than 1 day          Hemodialysis Catheter            Hemodialysis Catheter Internal jugular less than 1 day    Permanent HD Catheter  less than 1 day                     Portions of the record may have been created with voice recognition software  Occasional wrong word or "sound a like" substitutions may have occurred due to the inherent limitations of voice recognition software  Read the chart carefully and recognize, using context, where substitutions have occurred      TROMSØ, Army Scheuermann

## 2019-09-22 NOTE — PROGRESS NOTES
Vancomycin Assessment    Ade Burnette is a 64 y o  male who received vancomycin 1250mg once for skin-soft tissue infection     Relevant clinical data and objective history reviewed:  Creatinine   Date Value Ref Range Status   09/21/2019 9 54 (H) 0 60 - 1 30 mg/dL Final     Comment:     Standardized to IDMS reference method   01/03/2017 6 24 (H) 0 60 - 1 30 mg/dL Final     Comment:     Standardized to IDMS reference method     BP 97/63 (BP Location: Right arm)   Pulse (!) 116   Temp 98 4 °F (36 9 °C) (Oral)   Resp 19   Ht 5' 8" (1 727 m)   Wt 86 8 kg (191 lb 5 8 oz)   SpO2 98%   BMI 29 10 kg/m²   No intake/output data recorded  Lab Results   Component Value Date/Time    BUN 75 (H) 09/21/2019 08:18 PM    WBC 3 42 (L) 09/21/2019 08:18 PM    HGB 12 1 09/21/2019 08:18 PM    HCT 36 0 (L) 09/21/2019 08:18 PM     (H) 09/21/2019 08:18 PM    PLT 55 (L) 09/21/2019 08:18 PM     Temp Readings from Last 3 Encounters:   09/21/19 98 4 °F (36 9 °C) (Oral)     Vancomycin Days of Therapy: 1    Assessment/Plan  The patient is currently on vancomycin utilizing scheduled dosing based on actual body weight  Baseline risks associated with therapy include: pre-existing renal impairment and advanced age  The patient received 1250mg once, needs a total load of 20mg/kg = 1750mg  After clinical evaluation will be continued with an additional 500mg IV once, then 750mg IV after dialysis  Pharmacy will also follow closely for s/sx of nephrotoxicity, infusion reactions and appropriateness of therapy  BMP and CBC will be ordered per protocol  Plan for trough as patient approaches steady state, prior to the 3rd dialysis session, which is to be determined  Due to infection severity, will target a trough of 15-20 (appropriate for most indications)   Pharmacy will continue to follow the patients culture results and clinical progress daily      Kim Vazquez, Pharmacist

## 2019-09-22 NOTE — H&P
History and Physical - Critical Care   Carlos Mojica 64 y o  male MRN: [id number]  Unit/Bed#: ED 11 Encounter: [id number]    Reason for Admission / Chief Complaint: leg pain    History of Present Illness:  Carlos Mojica is a 64 y o  male h/o autosomal dominant polycystic kidney disease, ESRD on HD M-W-F via R subclavian catheter, polycystic liver disease whom presented to the emergency department of evaluation of leg pain  Patient is a rather poor historian however verbalizes that he was golfing two days ago when he felt that he strained his left left leg and "pulled a muscle"  He had difficulty ambulating 2/2 pain and remained essentially non-ambulatory for the past two days  As of yesterday, he developed redness and clear fluid filled blistering with blackened areas on both lower extremities, L>R  He was seen by his nephrologist during his HD session yesterday which prescribed him keflex with the advisement to go to the ER if it worsens  The redness and bullae worsened and appeared to track up his left leg, prompting him to seek medical attention where he was found in the ED to have a lactic acid of 6 4, Anion Gap of 16 and a bandemia of 33  He was given 500ml of 0 9% NaCl, cefepime, vancomycin and a stat CT abd/pelvis w/ LE runoff showed LE subcutaneous edema of the lower legs w/o decrete fluid collection to suggest abcess and no SQ emphysema as well as a right lower quadrant cystic mass w/ thickened walls  Patient denies fevers, chills, SOB, chest pain, abdominal pain, n/v/d, paresthesia of the LE  He was reportedly ambulatory and able to walk on a golf course earlier on in the week  Due to patient's severe sepsis believed to be from LE cellulitis with AGMA/lactic acidosis, patient will be admitted to the ICU under SD level 1 under the critical care service and anticipate greater than 2 midnight stay       History obtained from chart review and the patient   ______________________________________________________________________    Assessment:  1  Severe Sepsis, suspect 2/2 lower extremity cellulitis (lactic acidosis, tachycardia, bandemia, evidence of infection)  2  B/l multiple Lower Extremity open skin wounds w/ cellulitis, bullae and ecchymosis, POA  3  AGMA/Lactic Acidosis 2/2 #1  4  ESRD on HD M-W-F @ [address]  Access: Right tunneled Subclavian HD catheter  5  Thrombocytopenia  6  Hyponatremia, suspect hypovolemic  7  Autosomal Dominant Polycystic Kidney/liver Disease  8  Known right inguinal hernia w/ large right hydrocele    Plan:    Neuro:   Neuro checks, CAM-ICU  Pain Control: order acetaminophen for mild, oxy moderate/severe, dilaudid breakthrough pain  CV:   Hypotension: reviewed outpatient dialysis records, SBP ranges b/w   Is not on any antihypertensives  Patient is currently asymptomatic and suspect current hypotension is not from sepsis and is close to his baseline  Will continue to closely monitor--give trial volume via albumin to evaluate response  Has received 500ml if isolyte in the emergency department  Most recent Echo: 6/2017: EF 40%  Global hypokinesis  Moderately dilated right atrium  Mild TR, mild MR, mild pulmonary hypertension (PASP 38-50mmhg)  DVT prophylaxis: heparin SQ  May need to hold if platelet count continues to drop  (-) venodynes 2/2 open extremity wounds  Access: Peripheral IV/Tunneled Subclavian tunneled catheter    Pulm:   Currently stable    GI:   Hyperbilirubinemia: LFTs WNL, do not suspect obstructive pathology  Trend  Monitor INR  Polycystic Liver Disease: outpatient f/u    :   ADPKD w/ ESRD on HD: Access: RIJ Tunneled subclavian cath  M-W-F [address]  Last HD 9/20  EDW 83 kg  Reviewed records, patient notoriously does not complete full session of HD  Does make minimal urine daily per patient  Will consult nephrology for HD management  Inguinal Hernia w/ Hydrocele: No pain   Do not suspect strangulation/incarcaration  Stable  F/E/N:   Hyponatremia: Check serum osm/urine osm, urine sodium (makes minimal urine at baseline)  Suspect hypovolemia    ID:   Severe Sepsis: Suspect 2/2 sepsis  CT had no evidence of abcess/SQ emphysema to suggest nec fasc  Continue cefepime/vancomycin  Add clindamycin for endotoxin mediation  Check procalcitonin  Trend lactic, wbc/temps/cultures  Heme:   Thrombocytopenia: check Haptoglobin, hemolysis smear, fibrinogen to r/o DIC  Trend  Hold chemical DVT if platelet <55 or evidence of bleeding  Endo:   Stable  Monitor    Msk/Skin:   Cellulitis: continue cefepime/vanco and will add clinda  Monitor frequently to assess for expansion  CPK nl  CT no evidence of nec fasc  Sensory/motor intact  Given lack of ambulation, will check lower extremity duplex to r/o DVT  Consult wound care  May need surgical consult for potential debridement in near future  Disposition:   Admit to ICU under SD Level 1  Anticipate >2 midnight stay  Spoke w/ Dr Loco Holm my critical care attending regarding management and plan of patient via landline  Spoke w/ patient and requests to be full code  Counseling / Coordination of Care  Total Critical Care time spent 40 minutes excluding procedures, teaching and family updates  ______________________________________________________________________  Past Medical History:  Past Medical History:   Diagnosis Date    Complication of renal dialysis     Polycystic kidney disease        Past Surgical History:  No past surgical history on file  Past Family History:  No family history on file      Social History:  Social History     Tobacco Use   Smoking Status Never Smoker   Smokeless Tobacco Never Used     Social History     Substance and Sexual Activity   Alcohol Use Not on file     Social History     Substance and Sexual Activity   Drug Use Not on file     Marital Status: /Civil Union  Exercise History: n/a    Medications:  Current Facility-Administered Medications   Medication Dose Route Frequency    vancomycin (VANCOCIN) 1,250 mg in sodium chloride 0 9 % 250 mL IVPB  15 mg/kg Intravenous Once     Home medications:  Prior to Admission medications    Medication Sig Start Date End Date Taking? Authorizing Provider   calcium acetate (PHOSLO) 667 mg capsule Take 338 mg by mouth If the patient eats       4 tablets (1352 mg) with meals  2 tablets (676 mg) with snacks   Yes Historical Provider, MD   Multiple Vitamin (MULTIVITAMIN) tablet Take 1 tablet by mouth daily   Yes Historical Provider, MD   NON FORMULARY Hemodialysis Monday, Wednesday, Friday   Yes Historical Provider, MD   Sucroferric Oxyhydroxide (VELPHORO PO) Take 600 mg by mouth If the patient eats       1 tablet (600 mg) with meals  0 5 tablet (300 mg) with snacks   Yes Historical Provider, MD     Allergies:  No Known Allergies    ROS:   Review of Systems   Constitutional: Negative for chills and fever  Respiratory: Negative for cough and shortness of breath  Cardiovascular: Positive for leg swelling  Negative for chest pain and palpitations  Gastrointestinal: Negative for abdominal pain, diarrhea, nausea and vomiting  Genitourinary: Positive for scrotal swelling  Negative for penile pain and testicular pain  Makes minimal urine   Skin: Positive for color change and wound  Negative for rash  Neurological: Negative for tremors, syncope and weakness  All other systems reviewed and are negative        Vitals:  Vitals:    19 2029 19 2159 19 2200 19 2239   BP: 95/60 99/63 94/59 92/52   BP Location: Left arm Left arm Left arm Left arm   Pulse: (!) 119 (!) 118 (!) 116 (!) 117   Resp:  18    Temp:       TempSrc:       SpO2: 95% 95% 95% 98%   Weight:       Height:         Temperature:   Temp (24hrs), Av 8 °F (37 1 °C), Min:98 8 °F (37 1 °C), Max:98 8 °F (37 1 °C)    Current Temperature: 98 8 °F (37 1 °C)    Weights:   IBW: 68 4 kg  Body mass index is 28 79 kg/m²  Hemodynamic Monitoring:  N/A     Non-Invasive/Invasive Ventilation Settings:  Respiratory    Lab Data (Last 4 hours)    None         O2/Vent Data (Last 4 hours)    None              No results found for: PHART, MJH2EST, PO2ART, ZDS2EIE, N2UFYSAO, BEART, SOURCE  SpO2: SpO2: 98 %     Physical Exam:  Physical Exam   Constitutional: He appears well-developed  Non-toxic appearance  No distress  HENT:   Head: Normocephalic and atraumatic  Eyes: Pupils are equal, round, and reactive to light  EOM are normal  Scleral icterus is present  Neck: Normal range of motion  No JVD present  Cardiovascular: Intact distal pulses and normal pulses  Tachycardia present  No murmur heard  Palpable DP/PT pulses b/l LE   Pulmonary/Chest: Effort normal  No respiratory distress  He has rales  RIJ tunneled HD catheter   Abdominal: Soft  Bowel sounds are normal  He exhibits no distension  Periumbilical hernia   Genitourinary:   Genitourinary Comments: Non-tender firm Left scrotal mass   Musculoskeletal: Normal range of motion  Able to dorsiflex/wiggle toes b/l    Neurological: He is alert  Skin: He is not diaphoretic  There is erythema  B/l LE erythema w/ obvious chronic venous stasis changes L>R  Erythema tracking up medial side of leg to groin and marked to eval for expansion  Multiple clear fluid filled bullae on lower extremities  Multiple areas of blackened soft tissue  See images uploaded in media and attached to H&P  Psychiatric: He has a normal mood and affect         Labs:  Results from last 7 days   Lab Units 09/21/19 2018   WBC Thousand/uL 3 42*   HEMOGLOBIN g/dL 12 1   HEMATOCRIT % 36 0*   PLATELETS Thousands/uL 55*   BANDS PCT % 33*   MONO PCT % 4     Results from last 7 days   Lab Units 09/21/19 2018   SODIUM mmol/L 132*   POTASSIUM mmol/L 4 9   CHLORIDE mmol/L 88*   CO2 mmol/L 28   ANION GAP mmol/L 16*   BUN mg/dL 75*   CREATININE mg/dL 9 54*   CALCIUM mg/dL 8 9   GLUCOSE RANDOM mg/dL 84   ALT U/L 11*   AST U/L 14   ALK PHOS U/L 101   ALBUMIN g/dL 2 7*   TOTAL BILIRUBIN mg/dL 3 30*          Results from last 7 days   Lab Units 09/21/19 2018   INR  1 72*   PTT seconds 36          Results from last 7 days   Lab Units 09/21/19 2018   LACTIC ACID mmol/L 6 4*     ABG:    VBG:            Imaging: CTA abdomen w/ runoff: 1   Moderate peripheral arterial disease with patent vasculature up to the level of the proximal calves   Evaluation of distal calf arteries are limited due to circumferential calcification  2   Skin thickening and subcutaneous edema of the lower legs which may be due to cellulitis versus peripheral arterial disease   No discrete fluid collection to suggest abscess   No subcutaneous emphysema  3   Polycystic kidney and liver disease with innumerable cysts some which are calcified and hemorrhagic  4   Right lower quadrant cystic mass with thickened walls, calcifications, and heterogeneous hyperdensity which may represent enhancement versus hemorrhage   There is contact with the right kidney and cecum   Differential diagnosis includes infected   right renal cyst versus renal cell carcinoma   Differential also includes appendiceal mass such as mucinous cystadenocarcinoma   Further evaluation with ultrasound or MRI of the abdomen may be of value  5   Submucosal fat deposition within the mildly thickened urinary bladder wall may be due to chronic cystitis   Correlate with urinalysis  6   Distended stomach with fluid and food debris   No small bowel obstruction   Diverticulosis without evidence of diverticulitis  7   Right inguinal hernia with associated large right hydrocele  I have personally reviewed pertinent reports  EKG: Sinus Tachycardia rate 118  QTc 468  Nonspecific T wave abnormality  This was personally reviewed by myself     Micro:          VTE Pharmacologic Prophylaxis: Heparin  VTE Mechanical Prophylaxis: reason for no mechanical VTE prophylaxis b/l lower extremity wounds    Invasive lines and devices: Invasive Devices     Peripheral Intravenous Line            Peripheral IV 09/21/19 Left Arm less than 1 day    Peripheral IV 09/21/19 Right Antecubital less than 1 day                Code Status: No Order  POA:    POLST:      Given critical illness, patient length of stay will require greater than two midnights  Portions of the record may have been created with voice recognition software  Occasional wrong word or "sound a like" substitutions may have occurred due to the inherent limitations of voice recognition software  Read the chart carefully and recognize, using context, where substitutions have occurred        [place], [place]

## 2019-09-23 ENCOUNTER — APPOINTMENT (INPATIENT)
Dept: ULTRASOUND IMAGING | Facility: HOSPITAL | Age: 59
DRG: 871 | End: 2019-09-23
Payer: MEDICARE

## 2019-09-23 ENCOUNTER — APPOINTMENT (INPATIENT)
Dept: RADIOLOGY | Facility: HOSPITAL | Age: 59
DRG: 871 | End: 2019-09-23
Payer: MEDICARE

## 2019-09-23 ENCOUNTER — TELEPHONE (OUTPATIENT)
Dept: VASCULAR SURGERY | Facility: CLINIC | Age: 59
End: 2019-09-23

## 2019-09-23 ENCOUNTER — APPOINTMENT (INPATIENT)
Dept: NON INVASIVE DIAGNOSTICS | Facility: HOSPITAL | Age: 59
DRG: 871 | End: 2019-09-23
Payer: MEDICARE

## 2019-09-23 PROBLEM — R65.21 SEPTIC SHOCK (HCC): Status: ACTIVE | Noted: 2019-09-21

## 2019-09-23 LAB
ABO GROUP BLD BPU: NORMAL
ALBUMIN SERPL BCP-MCNC: 2.7 G/DL (ref 3.5–5)
ALP SERPL-CCNC: 64 U/L (ref 46–116)
ALT SERPL W P-5'-P-CCNC: 19 U/L (ref 12–78)
ANION GAP SERPL CALCULATED.3IONS-SCNC: 13 MMOL/L (ref 4–13)
ANION GAP SERPL CALCULATED.3IONS-SCNC: 15 MMOL/L (ref 4–13)
ANION GAP SERPL CALCULATED.3IONS-SCNC: 16 MMOL/L (ref 4–13)
ANION GAP SERPL CALCULATED.3IONS-SCNC: 17 MMOL/L (ref 4–13)
ANISOCYTOSIS BLD QL SMEAR: PRESENT
APTT PPP: 37 SECONDS (ref 23–37)
AST SERPL W P-5'-P-CCNC: 15 U/L (ref 5–45)
BASOPHILS # BLD MANUAL: 0 THOUSAND/UL (ref 0–0.1)
BASOPHILS NFR MAR MANUAL: 0 % (ref 0–1)
BILIRUB DIRECT SERPL-MCNC: 2.71 MG/DL (ref 0–0.2)
BILIRUB SERPL-MCNC: 3.9 MG/DL (ref 0.2–1)
BLD SMEAR INTERP: NORMAL
BPU ID: NORMAL
BUN SERPL-MCNC: 74 MG/DL (ref 5–25)
BUN SERPL-MCNC: 92 MG/DL (ref 5–25)
BUN SERPL-MCNC: 92 MG/DL (ref 5–25)
BUN SERPL-MCNC: 97 MG/DL (ref 5–25)
CA-I BLD-SCNC: 1.06 MMOL/L (ref 1.12–1.32)
CA-I BLD-SCNC: 1.15 MMOL/L (ref 1.12–1.32)
CALCIUM SERPL-MCNC: 8.4 MG/DL (ref 8.3–10.1)
CALCIUM SERPL-MCNC: 8.6 MG/DL (ref 8.3–10.1)
CALCIUM SERPL-MCNC: 8.9 MG/DL (ref 8.3–10.1)
CALCIUM SERPL-MCNC: 9.3 MG/DL (ref 8.3–10.1)
CHLORIDE SERPL-SCNC: 89 MMOL/L (ref 100–108)
CHLORIDE SERPL-SCNC: 89 MMOL/L (ref 100–108)
CHLORIDE SERPL-SCNC: 91 MMOL/L (ref 100–108)
CHLORIDE SERPL-SCNC: 96 MMOL/L (ref 100–108)
CK SERPL-CCNC: 36 U/L (ref 39–308)
CO2 SERPL-SCNC: 21 MMOL/L (ref 21–32)
CO2 SERPL-SCNC: 22 MMOL/L (ref 21–32)
CO2 SERPL-SCNC: 22 MMOL/L (ref 21–32)
CO2 SERPL-SCNC: 23 MMOL/L (ref 21–32)
CREAT SERPL-MCNC: 6.61 MG/DL (ref 0.6–1.3)
CREAT SERPL-MCNC: 9.06 MG/DL (ref 0.6–1.3)
CREAT SERPL-MCNC: 9.19 MG/DL (ref 0.6–1.3)
CREAT SERPL-MCNC: 9.21 MG/DL (ref 0.6–1.3)
DEPRECATED AT III PPP: 34 % OF NORMAL (ref 92–136)
DEPRECATED D DIMER PPP: 3372 NG/ML (FEU)
EOSINOPHIL # BLD MANUAL: 0.28 THOUSAND/UL (ref 0–0.4)
EOSINOPHIL NFR BLD MANUAL: 3 % (ref 0–6)
ERYTHROCYTE [DISTWIDTH] IN BLOOD BY AUTOMATED COUNT: 15.9 % (ref 11.6–15.1)
FDP BLD QL AGGL: <10
FIBRINOGEN PPP-MCNC: 725 MG/DL (ref 227–495)
FOLATE SERPL-MCNC: 7 NG/ML (ref 3.1–17.5)
GFR SERPL CREATININE-BSD FRML MDRD: 6 ML/MIN/1.73SQ M
GFR SERPL CREATININE-BSD FRML MDRD: 8 ML/MIN/1.73SQ M
GLUCOSE SERPL-MCNC: 111 MG/DL (ref 65–140)
GLUCOSE SERPL-MCNC: 113 MG/DL (ref 65–140)
GLUCOSE SERPL-MCNC: 149 MG/DL (ref 65–140)
GLUCOSE SERPL-MCNC: 20 MG/DL (ref 65–140)
GLUCOSE SERPL-MCNC: 56 MG/DL (ref 65–140)
GLUCOSE SERPL-MCNC: 57 MG/DL (ref 65–140)
GLUCOSE SERPL-MCNC: 84 MG/DL (ref 65–140)
GLUCOSE SERPL-MCNC: 85 MG/DL (ref 65–140)
GLUCOSE SERPL-MCNC: 85 MG/DL (ref 65–140)
GLUCOSE SERPL-MCNC: 88 MG/DL (ref 65–140)
GLUCOSE SERPL-MCNC: 97 MG/DL (ref 65–140)
GLUCOSE SERPL-MCNC: <20 MG/DL (ref 65–140)
HCT VFR BLD AUTO: 31.9 % (ref 36.5–49.3)
HGB BLD-MCNC: 10.4 G/DL (ref 12–17)
HGB BLD-MCNC: 9.9 G/DL (ref 12–17)
INR PPP: 1.5 (ref 0.84–1.19)
INR PPP: 1.55 (ref 0.84–1.19)
LACTATE SERPL-SCNC: 1.9 MMOL/L (ref 0.5–2)
LDH SERPL-CCNC: 137 U/L (ref 81–234)
LYMPHOCYTES # BLD AUTO: 0.09 THOUSAND/UL (ref 0.6–4.47)
LYMPHOCYTES # BLD AUTO: 1 % (ref 14–44)
MAGNESIUM SERPL-MCNC: 2.1 MG/DL (ref 1.6–2.6)
MAGNESIUM SERPL-MCNC: 2.3 MG/DL (ref 1.6–2.6)
MAGNESIUM SERPL-MCNC: 2.4 MG/DL (ref 1.6–2.6)
MCH RBC QN AUTO: 33.4 PG (ref 26.8–34.3)
MCHC RBC AUTO-ENTMCNC: 32.6 G/DL (ref 31.4–37.4)
MCV RBC AUTO: 103 FL (ref 82–98)
METAMYELOCYTES NFR BLD MANUAL: 1 % (ref 0–1)
MONOCYTES # BLD AUTO: 0.65 THOUSAND/UL (ref 0–1.22)
MONOCYTES NFR BLD: 7 % (ref 4–12)
NEUTROPHILS # BLD MANUAL: 8.2 THOUSAND/UL (ref 1.85–7.62)
NEUTS BAND NFR BLD MANUAL: 35 % (ref 0–8)
NEUTS SEG NFR BLD AUTO: 53 % (ref 43–75)
NRBC BLD AUTO-RTO: 0 /100 WBCS
OVALOCYTES BLD QL SMEAR: PRESENT
PHOSPHATE SERPL-MCNC: 3.3 MG/DL (ref 2.7–4.5)
PHOSPHATE SERPL-MCNC: 4.7 MG/DL (ref 2.7–4.5)
PHOSPHATE SERPL-MCNC: 5.7 MG/DL (ref 2.7–4.5)
PLATELET # BLD AUTO: 51 THOUSANDS/UL (ref 149–390)
PLATELET # BLD AUTO: 54 THOUSANDS/UL (ref 149–390)
PLATELET BLD QL SMEAR: ABNORMAL
PMV BLD AUTO: 12.1 FL (ref 8.9–12.7)
PMV BLD AUTO: 12.5 FL (ref 8.9–12.7)
POIKILOCYTOSIS BLD QL SMEAR: PRESENT
POLYCHROMASIA BLD QL SMEAR: PRESENT
POTASSIUM SERPL-SCNC: 4.8 MMOL/L (ref 3.5–5.3)
POTASSIUM SERPL-SCNC: 5.7 MMOL/L (ref 3.5–5.3)
POTASSIUM SERPL-SCNC: 5.8 MMOL/L (ref 3.5–5.3)
POTASSIUM SERPL-SCNC: 5.9 MMOL/L (ref 3.5–5.3)
PROCALCITONIN SERPL-MCNC: 56.56 NG/ML
PROT SERPL-MCNC: 6.3 G/DL (ref 6.4–8.2)
PROTHROMBIN TIME: 17.4 SECONDS (ref 11.6–14.5)
PROTHROMBIN TIME: 17.8 SECONDS (ref 11.6–14.5)
RBC # BLD AUTO: 3.11 MILLION/UL (ref 3.88–5.62)
RETICS # AUTO: ABNORMAL 10*3/UL (ref 14356–105094)
RETICS # CALC: 2.69 % (ref 0.37–1.87)
SODIUM SERPL-SCNC: 127 MMOL/L (ref 136–145)
SODIUM SERPL-SCNC: 127 MMOL/L (ref 136–145)
SODIUM SERPL-SCNC: 128 MMOL/L (ref 136–145)
SODIUM SERPL-SCNC: 132 MMOL/L (ref 136–145)
TOTAL CELLS COUNTED SPEC: 100
TOXIC GRANULES BLD QL SMEAR: PRESENT
TPA PPP QL CHRO: 37 % OF NORMAL (ref 77–138)
UNIT DISPENSE STATUS: NORMAL
UNIT PRODUCT CODE: NORMAL
UNIT RH: NORMAL
WBC # BLD AUTO: 9.32 THOUSAND/UL (ref 4.31–10.16)

## 2019-09-23 PROCEDURE — 36620 INSERTION CATHETER ARTERY: CPT | Performed by: NURSE PRACTITIONER

## 2019-09-23 PROCEDURE — 85018 HEMOGLOBIN: CPT | Performed by: NURSE PRACTITIONER

## 2019-09-23 PROCEDURE — 87070 CULTURE OTHR SPECIMN AEROBIC: CPT | Performed by: PHYSICIAN ASSISTANT

## 2019-09-23 PROCEDURE — 99233 SBSQ HOSP IP/OBS HIGH 50: CPT | Performed by: INTERNAL MEDICINE

## 2019-09-23 PROCEDURE — 36556 INSERT NON-TUNNEL CV CATH: CPT | Performed by: NURSE PRACTITIONER

## 2019-09-23 PROCEDURE — 71045 X-RAY EXAM CHEST 1 VIEW: CPT

## 2019-09-23 PROCEDURE — 87205 SMEAR GRAM STAIN: CPT | Performed by: PHYSICIAN ASSISTANT

## 2019-09-23 PROCEDURE — 85027 COMPLETE CBC AUTOMATED: CPT | Performed by: PHYSICIAN ASSISTANT

## 2019-09-23 PROCEDURE — 83615 LACTATE (LD) (LDH) ENZYME: CPT | Performed by: PHYSICIAN ASSISTANT

## 2019-09-23 PROCEDURE — 83605 ASSAY OF LACTIC ACID: CPT | Performed by: PHYSICIAN ASSISTANT

## 2019-09-23 PROCEDURE — NC001 PR NO CHARGE: Performed by: NURSE PRACTITIONER

## 2019-09-23 PROCEDURE — 02HV33Z INSERTION OF INFUSION DEVICE INTO SUPERIOR VENA CAVA, PERCUTANEOUS APPROACH: ICD-10-PCS | Performed by: INTERNAL MEDICINE

## 2019-09-23 PROCEDURE — P9037 PLATE PHERES LEUKOREDU IRRAD: HCPCS

## 2019-09-23 PROCEDURE — 85379 FIBRIN DEGRADATION QUANT: CPT | Performed by: PHYSICIAN ASSISTANT

## 2019-09-23 PROCEDURE — 03HY32Z INSERTION OF MONITORING DEVICE INTO UPPER ARTERY, PERCUTANEOUS APPROACH: ICD-10-PCS | Performed by: INTERNAL MEDICINE

## 2019-09-23 PROCEDURE — 90945 DIALYSIS ONE EVALUATION: CPT

## 2019-09-23 PROCEDURE — 85045 AUTOMATED RETICULOCYTE COUNT: CPT | Performed by: PHYSICIAN ASSISTANT

## 2019-09-23 PROCEDURE — 82330 ASSAY OF CALCIUM: CPT | Performed by: INTERNAL MEDICINE

## 2019-09-23 PROCEDURE — 87040 BLOOD CULTURE FOR BACTERIA: CPT | Performed by: PHYSICIAN ASSISTANT

## 2019-09-23 PROCEDURE — 0T9030Z DRAINAGE OF RIGHT KIDNEY WITH DRAINAGE DEVICE, PERCUTANEOUS APPROACH: ICD-10-PCS | Performed by: RADIOLOGY

## 2019-09-23 PROCEDURE — 82550 ASSAY OF CK (CPK): CPT | Performed by: NURSE PRACTITIONER

## 2019-09-23 PROCEDURE — 84145 PROCALCITONIN (PCT): CPT | Performed by: NURSE PRACTITIONER

## 2019-09-23 PROCEDURE — 85730 THROMBOPLASTIN TIME PARTIAL: CPT | Performed by: PHYSICIAN ASSISTANT

## 2019-09-23 PROCEDURE — 80048 BASIC METABOLIC PNL TOTAL CA: CPT | Performed by: PHYSICIAN ASSISTANT

## 2019-09-23 PROCEDURE — 83918 ORGANIC ACIDS TOTAL QUANT: CPT | Performed by: PHYSICIAN ASSISTANT

## 2019-09-23 PROCEDURE — 30233R1 TRANSFUSION OF NONAUTOLOGOUS PLATELETS INTO PERIPHERAL VEIN, PERCUTANEOUS APPROACH: ICD-10-PCS | Performed by: INTERNAL MEDICINE

## 2019-09-23 PROCEDURE — 85049 AUTOMATED PLATELET COUNT: CPT | Performed by: NURSE PRACTITIONER

## 2019-09-23 PROCEDURE — 83735 ASSAY OF MAGNESIUM: CPT | Performed by: INTERNAL MEDICINE

## 2019-09-23 PROCEDURE — 82746 ASSAY OF FOLIC ACID SERUM: CPT | Performed by: PHYSICIAN ASSISTANT

## 2019-09-23 PROCEDURE — 85300 ANTITHROMBIN III ACTIVITY: CPT | Performed by: PHYSICIAN ASSISTANT

## 2019-09-23 PROCEDURE — 80076 HEPATIC FUNCTION PANEL: CPT | Performed by: PHYSICIAN ASSISTANT

## 2019-09-23 PROCEDURE — NC001 PR NO CHARGE: Performed by: INTERNAL MEDICINE

## 2019-09-23 PROCEDURE — 84100 ASSAY OF PHOSPHORUS: CPT | Performed by: INTERNAL MEDICINE

## 2019-09-23 PROCEDURE — C1729 CATH, DRAINAGE: HCPCS

## 2019-09-23 PROCEDURE — 85384 FIBRINOGEN ACTIVITY: CPT | Performed by: PHYSICIAN ASSISTANT

## 2019-09-23 PROCEDURE — 84100 ASSAY OF PHOSPHORUS: CPT | Performed by: PHYSICIAN ASSISTANT

## 2019-09-23 PROCEDURE — 10030 IMG GID FLU COLL DRG SFT TIS: CPT

## 2019-09-23 PROCEDURE — 83735 ASSAY OF MAGNESIUM: CPT | Performed by: PHYSICIAN ASSISTANT

## 2019-09-23 PROCEDURE — 82948 REAGENT STRIP/BLOOD GLUCOSE: CPT

## 2019-09-23 PROCEDURE — 93923 UPR/LXTR ART STDY 3+ LVLS: CPT | Performed by: SURGERY

## 2019-09-23 PROCEDURE — 85007 BL SMEAR W/DIFF WBC COUNT: CPT | Performed by: PHYSICIAN ASSISTANT

## 2019-09-23 PROCEDURE — 99233 SBSQ HOSP IP/OBS HIGH 50: CPT | Performed by: PHYSICIAN ASSISTANT

## 2019-09-23 PROCEDURE — 85362 FIBRIN DEGRADATION PRODUCTS: CPT | Performed by: PHYSICIAN ASSISTANT

## 2019-09-23 PROCEDURE — 4A133J1 MONITORING OF ARTERIAL PULSE, PERIPHERAL, PERCUTANEOUS APPROACH: ICD-10-PCS | Performed by: INTERNAL MEDICINE

## 2019-09-23 PROCEDURE — 93306 TTE W/DOPPLER COMPLETE: CPT | Performed by: INTERNAL MEDICINE

## 2019-09-23 PROCEDURE — 93970 EXTREMITY STUDY: CPT | Performed by: SURGERY

## 2019-09-23 PROCEDURE — 85610 PROTHROMBIN TIME: CPT | Performed by: PHYSICIAN ASSISTANT

## 2019-09-23 PROCEDURE — 49405 IMAGE CATH FLUID COLXN VISC: CPT | Performed by: RADIOLOGY

## 2019-09-23 PROCEDURE — 5A1D90Z PERFORMANCE OF URINARY FILTRATION, CONTINUOUS, GREATER THAN 18 HOURS PER DAY: ICD-10-PCS | Performed by: INTERNAL MEDICINE

## 2019-09-23 PROCEDURE — C8929 TTE W OR WO FOL WCON,DOPPLER: HCPCS

## 2019-09-23 PROCEDURE — 4A133B1 MONITORING OF ARTERIAL PRESSURE, PERIPHERAL, PERCUTANEOUS APPROACH: ICD-10-PCS | Performed by: INTERNAL MEDICINE

## 2019-09-23 PROCEDURE — 85420 FIBRINOLYTIC PLASMINOGEN: CPT | Performed by: PHYSICIAN ASSISTANT

## 2019-09-23 PROCEDURE — 93923 UPR/LXTR ART STDY 3+ LVLS: CPT

## 2019-09-23 PROCEDURE — 99291 CRITICAL CARE FIRST HOUR: CPT | Performed by: PHYSICIAN ASSISTANT

## 2019-09-23 RX ORDER — SEVELAMER HYDROCHLORIDE 800 MG/1
1600 TABLET, FILM COATED ORAL
Status: DISCONTINUED | OUTPATIENT
Start: 2019-09-23 | End: 2019-09-24

## 2019-09-23 RX ORDER — DEXTROSE MONOHYDRATE 25 G/50ML
INJECTION, SOLUTION INTRAVENOUS
Status: COMPLETED
Start: 2019-09-23 | End: 2019-09-23

## 2019-09-23 RX ORDER — LIDOCAINE WITH 8.4% SOD BICARB 0.9%(10ML)
SYRINGE (ML) INJECTION CODE/TRAUMA/SEDATION MEDICATION
Status: COMPLETED | OUTPATIENT
Start: 2019-09-23 | End: 2019-09-23

## 2019-09-23 RX ORDER — ALBUMIN, HUMAN INJ 5% 5 %
25 SOLUTION INTRAVENOUS ONCE
Status: COMPLETED | OUTPATIENT
Start: 2019-09-23 | End: 2019-09-23

## 2019-09-23 RX ORDER — DEXTROSE MONOHYDRATE 25 G/50ML
25 INJECTION, SOLUTION INTRAVENOUS ONCE
Status: COMPLETED | OUTPATIENT
Start: 2019-09-23 | End: 2019-09-23

## 2019-09-23 RX ORDER — ALBUMIN, HUMAN INJ 5% 5 %
12.5 SOLUTION INTRAVENOUS ONCE
Status: COMPLETED | OUTPATIENT
Start: 2019-09-23 | End: 2019-09-23

## 2019-09-23 RX ORDER — ALBUMIN, HUMAN INJ 5% 5 %
SOLUTION INTRAVENOUS
Status: COMPLETED
Start: 2019-09-23 | End: 2019-09-23

## 2019-09-23 RX ADMIN — Medication 20000 ML: at 15:06

## 2019-09-23 RX ADMIN — ALBUMIN, HUMAN INJ 5% 25 G: 5 SOLUTION at 05:33

## 2019-09-23 RX ADMIN — CALCIUM GLUCONATE 1 G: 98 INJECTION, SOLUTION INTRAVENOUS at 23:02

## 2019-09-23 RX ADMIN — DOXYCYCLINE 100 MG: 100 INJECTION, POWDER, LYOPHILIZED, FOR SOLUTION INTRAVENOUS at 12:45

## 2019-09-23 RX ADMIN — METRONIDAZOLE 500 MG: 500 INJECTION, SOLUTION INTRAVENOUS at 00:00

## 2019-09-23 RX ADMIN — ALBUMIN (HUMAN) 12.5 G: 12.5 SOLUTION INTRAVENOUS at 03:41

## 2019-09-23 RX ADMIN — DEXTROSE MONOHYDRATE 50 ML: 25 INJECTION, SOLUTION INTRAVENOUS at 08:30

## 2019-09-23 RX ADMIN — DOXYCYCLINE 100 MG: 100 INJECTION, POWDER, LYOPHILIZED, FOR SOLUTION INTRAVENOUS at 23:56

## 2019-09-23 RX ADMIN — DEXTROSE MONOHYDRATE 25 G: 25 INJECTION, SOLUTION INTRAVENOUS at 01:13

## 2019-09-23 RX ADMIN — DEXTROSE MONOHYDRATE 25 G: 25 INJECTION, SOLUTION INTRAVENOUS at 01:15

## 2019-09-23 RX ADMIN — Medication 20000 ML: at 22:22

## 2019-09-23 RX ADMIN — CEFTAZIDIME 2000 MG: 1 INJECTION, POWDER, FOR SOLUTION INTRAMUSCULAR; INTRAVENOUS at 14:18

## 2019-09-23 RX ADMIN — PERFLUTREN 1.2 ML/MIN: 6.52 INJECTION, SUSPENSION INTRAVENOUS at 08:50

## 2019-09-23 RX ADMIN — CALCIUM GLUCONATE 2 G: 98 INJECTION, SOLUTION INTRAVENOUS at 18:04

## 2019-09-23 RX ADMIN — ALBUMIN (HUMAN) 25 G: 12.5 SOLUTION INTRAVENOUS at 05:33

## 2019-09-23 RX ADMIN — LIDOCAINE HYDROCHLORIDE 10 ML: 10 INJECTION, SOLUTION INFILTRATION; PERINEURAL at 10:18

## 2019-09-23 RX ADMIN — METRONIDAZOLE 500 MG: 500 INJECTION, SOLUTION INTRAVENOUS at 07:37

## 2019-09-23 NOTE — CONSULTS
Progress Note - Carlos Mojica 1960, 64 y o  male MRN: [id number]    Unit/Bed#:  Encounter: [id number]    Primary Care Provider: Matt Bautista MD   Date and time admitted to hospital: 9/21/2019  7:56 PM    * Septic shock [facility])  Assessment & Plan  61-year-old male nonsmoker with a history of polycystic kidney and liver disease and ESRD on HD via chronic right subclavian tunnel catheter admitted with septic shock requiring pressors with GNR bacteremia, acute metabolic encephalopathy and BLE cellulitis w/large hemorrhagic bullae  Vascular surgery consulted to rule out acute LE ischemic process  Diagnostics:  -CTA abdomen,pelvis with bilateral iliofemoral runoff 9/21/2019:  No significant aortoiliac or femoropopliteal occlusive disease  Tibioperoneal trunk and proximal AT, PT and peroneal patent proximally but difficult to assess distal secondary to arterial calcification  + bilateral lower extremity subcutaneous edema and thickening without subcutaneous emphysema or signs of necrotizing fasciitis  Innumerable calcified hepatic and bilateral renal cysts  +RLQ 5 0 x 7 3 x 7 9 cm cystic mass along the right kidney and connected to collapse cecum  -LEVD 9/22/2019:  No acute or chronic DVT or superficial thrombophlebitis  Triphasic arterial waveforms bilaterally    Plan:  -CTA images reviewed by Dr Omer Simpson last p m  and discussed with Dr Katia Martinez this am   Difficult to assess tibial vessel secondary to arterial wall calcification but no evidence of large vessel occlusive disease that would cause BLE changes  No clinical evidence of acute limb ischemia  Change in pulse exam likely related to pressors  + palpable femoral popliteal pulses bilaterally with biphasic DP, PT and AT signals  -sepsis of unclear etiology  Central line infection vs infected abdominal cyst vs primary skin infection    -s/p removal chronic R subclavian permcath last PM by Dr Selma Sales and s/p bedside percutaneous abdominal drain today by IR  -patient evaluated at bedside with Dr Geraldo Richard and Dr Leandro Abdi  CT imaging and clinical exam most consistent with superficial process of the legs resulting from sepsis  Healthy tissue under ruptured bullae  BLEs motor and sensory intact  Recommending continue conservative management of LE wounds presently as sepsis markers improving on abx per ID   -no vascular intervention indicated  -continue to wean pressors as tolerated to improve peripheral perfusion  -local wound care to BLEs per general surgery and Wound Care  -continue supportive care per critical care  -continue abx per ID  -will need temporary HD catheter for CVVHD per nephrology  -d/w Dr Giovanna Shaikh    Gram-negative bacteremia  Assessment & Plan  GNR bacteremia with septic shock  -continue to follow cultures  -continue antibiotics per ID  -supportive care per critical care service  -see plan as outlined    Cellulitis of lower extremity- bilateral  Assessment & Plan  -continue antibiotics per ID  -continue local wound care per Wound Care  -continue supportive care related to sepsis  -see plan as outlined    Chronic kidney disease with end stage renal failure on dialysis [facility])  Assessment & Plan  Polycystic kidney disease with ESRD on HD MWF via chronic R subclavian permcath, s/p removal 9/22/19  -s/p recent line exchange in August @ OSH  -Hx of previous peritoneal dialysis, status post removal of PD catheter '16 secondary to staph infection  -patient refused AVF in past  -nephrology following    Will require temporary line for CVVHD        Consulting Service: Critical Care    Chief Complaint:  Bilateral lower extremity skin changes with blistering x 2 days    HPI: Charlotte Tay is a 64 y o  male nonsmoker with a history of polycystic kidney and liver disease and ESRD on HD via chronic right subclavian tunnel catheter who presented to the emergency room 9/21/2019 with 2 day history of malaise and bilateral skin discoloration and blisters, L>R, that started after golfing on Thursday  Patient initially thought he pulled a muscle in his leg and opened the blisters himself when they initially appeared  The skin changes and blisters progressed and Keflex was started as outpatient by nephrologist at dialysis, at which time he was referred to ER  Patient hypotensive with lactic acidosis (lactate 6 4) on presentation to ER and admitted with septic shock with GNR bacteremia, severe thrombocytopenia, acute metabolic encephalopathy and BLE cellulitis w/large hemorrhagic bullae  Pressors initiated and remains on Levophed @ 9 mcg  On doxycycline and Ceftazidime per ID  Sepsis source unclear and include line infection vs infected abdominal cysts vs skin infection  R subclavian dialysis catheter removed last PM and percutaneous drain of RLQ abdominal cyst placed this am by IR  Vascular surgery consulted to rule out acute LE ischemic process  Patient denies rest pain and denies history of claudication  CTA abdomen,pelvis with bilateral iliofemoral runoff 9/21/2010 reviewed and demonstrated no significant aortoiliac or femoropopliteal occlusive disease  Tibioperoneal trunk and proximal AT, PT and peroneal patent proximally but difficult to assess distal secondary to arterial calcification  + bilateral lower extremity subcutaneous edema and thickening without subcutaneous emphysema or signs of necrotizing fasciitis  Innumerable calcified hepatic and bilateral renal cysts  +RLQ 5 0 x 7 3 x 7 9 cm cystic mass along the right kidney and connected to collapse cecum  LEVD 9/22/2019 reviewed and demonstrates no acute or chronic DVT or superficial thrombophlebitis  Triphasic arterial waveforms bilaterally        Review of Systems:  General:  Generalized malaise  Cardiovascular: no chest pain or dyspnea on exertion  Respiratory: no cough, shortness of breath, or wheezing  Gastrointestinal: no abdominal pain, change in bowel habits, or black or bloody stools  Genitourinary ROS: no dysuria, trouble voiding, or hematuria  Musculoskeletal ROS:  As per the above HPI  Neurological ROS: no TIA or stroke symptoms  Hematological and Lymphatic ROS: + severe thrombocytopenia on admission  Dermatological ROS: As per the above HPI  Psychological ROS: negative  Acute mental status changes improved today  Ophthalmic ROS: negative  ENT ROS: negative    Past Medical History:  Past Medical History:   Diagnosis Date    Complication of renal dialysis     Polycystic kidney disease        Past Surgical History:  History reviewed  No pertinent surgical history  Social History:  Social History     Substance and Sexual Activity   Alcohol Use Not on file     Social History     Substance and Sexual Activity   Drug Use Not on file     Social History     Tobacco Use   Smoking Status Never Smoker   Smokeless Tobacco Never Used       Family History:  History reviewed  No pertinent family history      Allergies:  No Known Allergies    Medications:  Current Facility-Administered Medications   Medication Dose Route Frequency    acetaminophen (TYLENOL) tablet 650 mg  650 mg Oral Q6H PRN    cefTAZidime (FORTAZ) 2,000 mg in sodium chloride 0 9 % 50 mL IVPB  2,000 mg Intravenous Once    doxycycline (VIBRAMYCIN) 100 mg in sodium chloride 0 9 % 100 mL IVPB  100 mg Intravenous Q12H    insulin lispro (HumaLOG) 100 units/mL subcutaneous injection 1-6 Units  1-6 Units Subcutaneous Q6H [facility]    lidocaine-epinephrine (XYLOCAINE/EPINEPHRINE) 1 %-1:100,000 injection 20 mL  20 mL Infiltration Once    norepinephrine (LEVOPHED) 4 mg (STANDARD CONCENTRATION) IV in sodium chloride 0 9% 250 mL  1-30 mcg/min Intravenous Titrated    NxStage K 2/Ca 3 dialysis solution (RFP-400) 20,000 mL  20,000 mL Dialysis Continuous    polyethylene glycol (MIRALAX) packet 17 g  17 g Oral Daily PRN    senna-docusate sodium (SENOKOT S) 8 6-50 mg per tablet 1 tablet  1 tablet Oral HS    sevelamer (RENAGEL) tablet 1,600 mg 1,600 mg Oral TID With Meals       Vitals:  BP 97/60   Pulse (!) 136   Temp (!) 97 4 °F (36 3 °C) (Oral)   Resp 14   Ht 5' 8" (1 727 m)   Wt 90 3 kg (199 lb)   SpO2 95%   BMI 30 26 kg/m²     I/Os:  I/O last 24 hours: In: 1833 3 [I V :297 3; Blood:236; IV Piggyback:1300]  Out: 7 [Drains:7]    Lab Results and Cultures:   Lab Results   Component Value Date    WBC 9 32 09/23/2019    HGB 10 4 (L) 09/23/2019    HCT 31 9 (L) 09/23/2019     (H) 09/23/2019    PLT 51 (L) 09/23/2019     Lab Results   Component Value Date    CALCIUM 8 6 09/23/2019    K 5 9 (H) 09/23/2019    CO2 22 09/23/2019    CL 89 (L) 09/23/2019    BUN 92 (H) 09/23/2019    CREATININE 9 06 (H) 09/23/2019     Lab Results   Component Value Date    INR 1 55 (H) 09/23/2019    INR 1 50 (H) 09/23/2019    INR 1 52 (H) 09/22/2019    PROTIME 17 8 (H) 09/23/2019    PROTIME 17 4 (H) 09/23/2019    PROTIME 17 6 (H) 09/22/2019       Lipid Panel: No results found for: CHOL,     Blood Culture:   Lab Results   Component Value Date    BLOODCX Shewanella putrefaciens (A) 09/21/2019   ,   Urinalysis: No results found for: Joneen Means, SPECGRAV, PHUR, LEUKOCYTESUR, NITRITE, PROTEINUA, GLUCOSEU, KETONESU, BILIRUBINUR, BLOODU,   Urine Culture: No results found for: URINECX,   Wound Culure: No results found for: WOUNDCULT    Imaging:  CTA abdomen pelvis with bilateral iliofemoral runoff 9/21/2019:  Imaging study reviewed and as described above  See full report below:  VASCULAR STRUCTURES:   The abdominal aorta is normal in course and caliber  Moderate atherosclerotic calcifications  The celiac and superior mesenteric arteries are patent  Scattered calcifications of the superior mesenteric artery  Moderate   narrowing of the bilateral renal artery origins  The inferior mesenteric artery is patent  The iliac arteries are patent  The external and internal iliac arteries are patent    Scattered calcifications with mild narrowing of the distal internal iliac arteries  The common, profunda, and superficial femoral arteries are patent  There are mild calcifications of the superficial femoral arteries  The popliteal arteries, tibioperoneal trunks, peroneal arteries, anterior tibial arteries, and posterior   tibial arteries are mildly calcified however patent proximally  The distal arteries are not well evaluated due to circumferential calcification  There is diffuse skin thickening and subcutaneous edema bilateral lower extremities  No discrete fluid   collection to suggest abscess      OTHER FINDINGS     ABDOMEN     LIVER/BILIARY TREE:  Innumerable hepatic cysts some which are calcified  Mass effect on the portal veins which are narrowed  No significant biliary duct dilatation      GALLBLADDER:  No calcified gallstones  No pericholecystic inflammatory change      SPLEEN:  Unremarkable  Normal size      PANCREAS:  Unremarkable      ADRENAL GLANDS: Unremarkable      KIDNEYS/URETERS:  Markedly enlarged kidneys with innumerable renal cysts some which demonstrate calcifications and some which which demonstrate hyperdensity compatible with blood products  There are parapelvic cysts on the right  No hydronephrosis  No   hydroureter  The renal veins are patent      PELVIS     REPRODUCTIVE ORGANS:  Unremarkable for patient's age      URINARY BLADDER:  Submucosal fat deposition and mild urinary bladder wall thickening however it is under distended  Findings may represent chronic cystitis      ADDITIONAL ABDOMINAL AND PELVIC STRUCTURES     STOMACH AND BOWEL:  The stomach is distended with fluid and food debris  No small bowel obstruction  Stool-filled rectosigmoid colon  Diverticulosis without evidence of diverticulitis    The appendix is not well delineated      ABDOMINOPELVIC CAVITY:   There is a right lower quadrant cystic mass with heterogeneous hyperdensity and thick calcifications along the right kidney (series 2, image 80 measuring 5 0 x 7 3 x 7 9 cm with mild surrounding fat stranding  There may be a   connection to the kidney on series 2, image 72  There also appears to be a connection to the collapsed cecum (series 2, image 68)  Trace free fluid in the pelvis and in the perihepatic region      ABDOMINAL WALL/INGUINAL REGIONS:  There is a right-sided inguinal hernia with associated partially visualized large right hydrocele measuring up to 9 3 x 8 2 cm      OSSEOUS STRUCTURES:  No acute fracture or destructive osseous lesion      IMPRESSION:     1  Moderate peripheral arterial disease with patent vasculature up to the level of the proximal calves  Evaluation of distal calf arteries are limited due to circumferential calcification  2   Skin thickening and subcutaneous edema of the lower legs which may be due to cellulitis versus peripheral arterial disease  No discrete fluid collection to suggest abscess  No subcutaneous emphysema  3   Polycystic kidney and liver disease with innumerable cysts some which are calcified and hemorrhagic  4   Right lower quadrant cystic mass with thickened walls, calcifications, and heterogeneous hyperdensity which may represent enhancement versus hemorrhage  There is contact with the right kidney and cecum  Differential diagnosis includes infected   right renal cyst versus renal cell carcinoma  Differential also includes appendiceal mass such as mucinous cystadenocarcinoma  Further evaluation with ultrasound or MRI of the abdomen may be of value  5   Submucosal fat deposition within the mildly thickened urinary bladder wall may be due to chronic cystitis  Correlate with urinalysis  6   Distended stomach with fluid and food debris  No small bowel obstruction  Diverticulosis without evidence of diverticulitis  7   Right inguinal hernia with associated large right hydrocele    LEVD 9/22/2019:  Imaging study reviewed  No evidence of acute or chronic DVT or superficial thrombophlebitis bilaterally    Triphasic arterial waveforms bilaterally    Physical Exam:    General appearance: alert and oriented, in no acute distress  Skin: Skin color, texture, turgor normal  No rashes or lesions or See extremity exam below  Neurologic: Grossly normal  Head: Normocephalic, without obvious abnormality, atraumatic  Eyes: PERRL, EOMI, sclerae nonicteric  Throat: lips, mucosa, and tongue normal; teeth and gums normal  Neck: no adenopathy, no carotid bruit, no JVD, supple, symmetrical, trachea midline and thyroid not enlarged, symmetric, no tenderness/mass/nodules  Back: symmetric, no curvature  ROM normal  No CVA tenderness  Lungs: clear to auscultation bilaterally and Decreased breath sounds bilateral bases  Chest wall: no tenderness, Sutures intact right infraclavicular space s/p subclavian catheter removal   Site clear without erythema, induration or crepitus  Heart: regular rate and rhythm, S1, S2 normal, no murmur, click, rub or gallop  Abdomen: soft, non-tender; bowel sounds normal; no masses,  no organomegaly and Percutaneous drain in place right lower quadrant  Mild distension  No abdominal bruits  Extremities: See Clinical images below  Bilateral large hemorrhagic bulla in medial and lateral aspect of right lower leg and medial left thigh and lower leg  No evidence of necrotic tissue  Erythematous and purple discoloration  Lower extremities warm, pink and motor and sensory intact  Wound/Incision:    Left leg    Left leg    Right leg    Right leg    Right leg      Pulse exam:  Radial: Right: 2+ Left[de-identified] 2+  Femoral: Right: 2+ Left: 2+  Popliteal: Right: 1+ Left: 1+  DP: Right: doppler signal Left: doppler signal  PT: Right: doppler signal Left: doppler signal  Doppler signals:  Right:  Biphasic DP, AT, PT    Left:  Biphasic PT, AT, DP    Carmen Francis PA-C  9/23/2019  [facility], [phone]

## 2019-09-23 NOTE — PROGRESS NOTES
Vancomycin IV Pharmacy-to-Dose Consultation    Danelle Cooney is a 64 y o  male who is currently receiving Vancomycin IV with management by the Pharmacy Consult service  Assessment/Plan:  The patient was reviewed  Renal function is stable and no signs or symptoms of nephrotoxicity and/or infusion reactions were documented in the chart  Based on todays assessment, continue current vancomycin (day #2) dosing of 750 mg IV after dialysis on dialysis days (Monday, Wednesday , Friday), with a plan for trough to be drawn on 09/27  We will continue to follow the patients culture results and clinical progress daily      Junior Singer, Pharmacist

## 2019-09-23 NOTE — PROCEDURES
Arterial Line Insertion  Date/Time: 9/23/2019 12:45 AM  Performed by: Curtis Kowalski  Authorized by: SAM Suazo     Patient location:  Bedside  Other Assisting Provider: Yes (comment) (Pilo Monsivais PA-C)    Consent:     Consent obtained:  Written (WIfe)    Consent given by:  Spouse    Risks discussed:  Bleeding, infection, ischemia, pain and repeat procedure  Universal protocol:     Procedure explained and questions answered to patient or proxy's satisfaction: yes      Immediately prior to procedure a time out was called: yes      Patient identity confirmed:  Verbally with patient, hospital-assigned identification number and arm band  Indications:     Indications: hemodynamic monitoring, multiple ABGs and frequent labs / infusion    Pre-procedure details:     Skin preparation:  Chlorhexidine    Preparation: Patient was prepped and draped in sterile fashion    Sedation:     Sedation type: Anxiolysis  Anesthesia (see MAR for exact dosages): Anesthesia method:  Local infiltration    Local anesthetic:  Lidocaine 1% w/o epi  Procedure details:     Location / Tip of Catheter:  Radial    Laterality:  Right    Dk's test performed: yes      Dk's test abnormal: no      Needle gauge:  20 G    Placement technique:  Percutaneous    Number of attempts:  2    Successful placement: yes      Transducer: waveform confirmed    Post-procedure details:     Post-procedure:  Secured with tape, sutured, sterile dressing applied and wrist guard applied    CMS:  Unchanged    Patient tolerance of procedure:   Tolerated well, no immediate complications

## 2019-09-23 NOTE — CONSULTS
Consult Note - Wound   Darreld Edith 64 y o  male MRN: [id number]  Unit/Bed#:  Encounter: [id number]      Assessment:   Atypical presentation of blood filled blisters on both extremities  Bilateral lower legs with blood filled bullae filled  Bullae covering right lower extremity circumferentially  Bullae on left lower extremity have ruptured  Most of the exposed tissue is dark black which is concerning for necrosis and the possible formation of a thin layer of eschar  Intact, blood filled bullae on upper left leg/thigh area  Left posterior wound measures 5x6x0 1cm  Dark black tissue  Left lateral wounds cover an area that measures 11x7x0 1cm  Most of this tissue appears aly, black  Some yellow tissue  Left medial wounds cover an area that measures 9x5x0 1cm  Wound base is mostly dark, black  Does not appear particularly painful as the nurse is lifting up and down the legs for me to take pictures  Wound Care Plan:     1  Consider dermatology consult of this atypical presentation  2  On left lower leg where bullae have ruptured, cleanse wounds with normal saline  Cover with single layer xeroform  May cover with dry gauze dressing if patient prefers or patient getting out of bed  There are no intact bullae on left lower leg, so the application of a dressing will not cause rupturing of the bullae  Apply Xeroform gauze to any bullae that rupture in the future  Change xeroform gauze dressings once a day  3  Continue frequent repositioning  Use wedges  4  Offload heels from bed surface  5  Moisturize skin daily  6  Pressure redistribution cushion to chair when patient out of bed  7  Routine wound care nurse follow-up    Family in room  Discussed wound care with Adeline Zhao RN and with family  Vitals: Blood pressure 90/61, pulse (!) 141, temperature 97 7 °F (36 5 °C), temperature source Oral, resp  rate 20, height 5' 8" (1 727 m), weight 90 3 kg (199 lb), SpO2 99 %  ,Body mass index is 30 26 kg/m²  Wound 09/22/19 Arterial/ischemic ulcer Leg Left;Right (Active)   Wound Description Beefy red;Dark edges; Epithelialization;Light purple 9/23/2019  8:00 AM   Staging Stage II 9/22/2019 12:00 AM   Drainage Amount Scant 9/23/2019  8:00 AM   Drainage Description Serous;Clear 9/23/2019  8:00 AM   Treatments Elevated 9/23/2019  8:00 AM   Dressing Open to air 9/23/2019  8:00 AM   Patient Tolerance Tolerated well 9/23/2019  8:00 AM       Wound 09/23/19 Incision Catheter entry/exit site Chest Right;Upper (Active)   Wound Description Clean;Dry; Intact 9/23/2019  8:00 AM   [place]-wound Assessment Clean;Dry; Intact 9/23/2019  8:00 AM   Closure Sutures 9/23/2019  8:00 AM   Drainage Amount None 9/23/2019  8:00 AM   Treatments Cleansed 9/23/2019  8:00 AM   Dressing Open to air 9/23/2019  8:00 AM   Patient Tolerance Tolerated well 9/23/2019  8:00 AM       Wound 09/23/19 Pretibial Right (Active)   Wound Image      9/23/2019  4:11 PM       Wound 09/23/19 Pretibial Left;Proximal (Active)   Wound Image     9/23/2019  4:13 PM

## 2019-09-23 NOTE — RESPIRATORY THERAPY NOTE
ABG obtained from pt left radial artery by [facility] PAUL DELGADO  Positive Dk's test   Site held to hemostasis  Specimen labeled and sent to the lab

## 2019-09-23 NOTE — CONSULTS
-4 H [facility] 64 y o  male MRN: [id number]  Unit/Bed#:  Encounter: [id number]          ASSESSMENT:  Gram-negative sepsis with cutaneous manifestations  Cutaneous and Soft-Tissue Manifestations of Sepsis Due to Gram-Negative Enteric Bacilli       Aristeo Madison  Journal Reviews of Infectious Diseases          Vol  2, No  6 (Nov  - Dec , 1980), pp  [phone]    PLAN:  Suspect infected Perma catheter for hemodialysis  Discussed plan to remove Perma catheter with Infectious Disease as well as Nephrology  They were agreeable  This was completed at bedside  Reason for Consult / Principal Problem: Severe sepsis without septic shock ([facility] [address] )    HPI: Kathya Teixeira is a 64y o  year old male who presents with for positive blood cultures for gram-negative rods  , septic shock, Severe sepsis without septic shock ([facility] [address] ), polycystic kidney disease-hemodialysis dependent  Agree with history of present illness as delineated by Critical Care team   Chart reviewed at [facility] as well as [address] [place] [address]  Review of Systems  Constitutional:  Denies fever or chills   Eyes:  Denies change in visual acuity   HENT:  Denies nasal congestion or sore throat   Respiratory:  Denies cough or shortness of breath   Cardiovascular:  Denies chest pain   New peripheral edema  GI:  Denies abdominal pain, nausea, vomiting, bloody stools or diarrhea   Musculoskeletal:  Denies back pain or joint pain   Integument:  Worsening rash up bilateral lower extremities  Neurologic:  Admits to weakness and lethargy  Denies headache, focal weakness or sensory changes   Endocrine:  Denies polyuria or polydipsia  Patient is an uric   Lymphatic:  Denies swollen glands   Psychiatric:  Denies depression or anxiety     Historical Information   Past Medical History:   Diagnosis Date    Complication of renal dialysis     Polycystic kidney disease      No past surgical history on file    Social History   Social History     Substance and Sexual Activity   Alcohol Use Not on file     Social History     Substance and Sexual Activity   Drug Use Not on file     Social History     Tobacco Use   Smoking Status Never Smoker   Smokeless Tobacco Never Used     No family history on file  Meds/Allergies     Medications Prior to Admission   Medication    calcium acetate (PHOSLO) 667 mg capsule    Multiple Vitamin (MULTIVITAMIN) tablet    NON FORMULARY    Sucroferric Oxyhydroxide (VELPHORO PO)     Current Facility-Administered Medications   Medication Dose Route Frequency    acetaminophen (TYLENOL) tablet 650 mg  650 mg Oral Q6H PRN    calcium acetate (PHOSLO) capsule 1,334 mg  1,334 mg Oral TID With Meals    cefepime (MAXIPIME) 1,000 mg in dextrose 5 % 50 mL IVPB  1,000 mg Intravenous Q24H    dextrose 50 % IV solution 25 g  25 g Intravenous Once    [START ON 9/23/2019] insulin lispro (HumaLOG) 100 units/mL subcutaneous injection 1-6 Units  1-6 Units Subcutaneous Q6H [facility]    lidocaine-epinephrine (XYLOCAINE/EPINEPHRINE) 1 %-1:100,000 injection 20 mL  20 mL Infiltration Once    metroNIDAZOLE (FLAGYL) IVPB (premix) 500 mg  500 mg Intravenous Q8H    norepinephrine (LEVOPHED) 4 mg (STANDARD CONCENTRATION) IV in sodium chloride 0 9% 250 mL  1-30 mcg/min Intravenous Titrated    polyethylene glycol (MIRALAX) packet 17 g  17 g Oral Daily PRN    senna-docusate sodium (SENOKOT S) 8 6-50 mg per tablet 1 tablet  1 tablet Oral HS    vancomycin (VANCOCIN) IVPB (premix) 750 mg  10 mg/kg Intravenous After Dialysis       No Known Allergies    Objective     Blood pressure (!) 82/59, pulse 100, temperature 97 6 °F (36 4 °C), temperature source Axillary, resp  rate 18, height 5' 8" (1 727 m), weight 86 8 kg (191 lb 5 8 oz), SpO2 99 %        Intake/Output Summary (Last 24 hours) at 9/22/2019 0510  Last data filed at 9/22/2019 1600  Gross per 24 hour   Intake 3185 58 ml   Output 0 ml   Net 3185 58 ml       PHYSICAL EXAM  General appearance: fatigued  Lungs: clear to auscultation bilaterally  Heart: regular rate and rhythm, S1, S2 normal, no murmur, click, rub or gallop  Abdomen: soft, non-tender; bowel sounds normal; no masses,  no organomegaly  Rectal: deferred  Skin:  Hemorrhagic bullae both lower extremities  Erythema present  Areas of black and soft tissue  Images located on media  Lab Results:   No results displayed because visit has over 200 results  Imaging Studies: I have personally reviewed pertinent reports  Xr Chest 2 Views    Result Date: 9/22/2019  Narrative: CHEST INDICATION:   shortness of breath  Lower leg swelling  Redness  Pain  COMPARISON:  None EXAM PERFORMED/VIEWS:  XR CHEST PA & LATERAL FINDINGS:  Right-sided dialysis catheter noted  Aorta atherosclerotic and mildly uncoiled  Heart size top normal  The lungs are clear  No pneumothorax or pleural effusion  Age-appropriate degenerative changes are noted in the spine  Impression: No acute cardiopulmonary disease  Workstation performed: [id number]     Cta Abdominal W Run Off W Wo Contrast    Result Date: 9/21/2019  Narrative: CT ANGIOGRAM OF THE AORTA AND LOWER EXTREMITIES WITH IV CONTRAST INDICATION:  Abdominal pain, hyperbilirubinemia  COMPARISON: None  TECHNIQUE:  CT angiogram examination of the abdomen, pelvis, and lower extremities was performed according to standard protocol with intravenous contrast   This examination, like all CT scans performed in the [facility], was performed utilizing techniques to minimize radiation dose exposure, including the use of iterative reconstruction and automated exposure control  3D reconstructions were performed an independent workstation, and are supplied for review  Rad dose 2833 mGy-cm IV Contrast:  100 mL of iodixanol (VISIPAQUE) was administered intravenously without immediate adverse reaction    FINDINGS: VASCULAR STRUCTURES:   The abdominal aorta is normal in course and caliber  Moderate atherosclerotic calcifications  The celiac and superior mesenteric arteries are patent  Scattered calcifications of the superior mesenteric artery  Moderate narrowing of the bilateral renal artery origins  The inferior mesenteric artery is patent  The iliac arteries are patent  The external and internal iliac arteries are patent  Scattered calcifications with mild narrowing of the distal internal iliac arteries  The common, profunda, and superficial femoral arteries are patent  There are mild calcifications of the superficial femoral arteries  The popliteal arteries, tibioperoneal trunks, peroneal arteries, anterior tibial arteries, and posterior tibial arteries are mildly calcified however patent proximally  The distal arteries are not well evaluated due to circumferential calcification  There is diffuse skin thickening and subcutaneous edema bilateral lower extremities  No discrete fluid collection to suggest abscess  OTHER FINDINGS ABDOMEN LIVER/BILIARY TREE:  Innumerable hepatic cysts some which are calcified  Mass effect on the portal veins which are narrowed  No significant biliary duct dilatation  GALLBLADDER:  No calcified gallstones  No pericholecystic inflammatory change  SPLEEN:  Unremarkable  Normal size  PANCREAS:  Unremarkable  ADRENAL GLANDS: Unremarkable  KIDNEYS/URETERS:  Markedly enlarged kidneys with innumerable renal cysts some which demonstrate calcifications and some which which demonstrate hyperdensity compatible with blood products  There are parapelvic cysts on the right  No hydronephrosis  No  hydroureter  The renal veins are patent  PELVIS REPRODUCTIVE ORGANS:  Unremarkable for patient's age  URINARY BLADDER:  Submucosal fat deposition and mild urinary bladder wall thickening however it is under distended  Findings may represent chronic cystitis   ADDITIONAL ABDOMINAL AND PELVIC STRUCTURES STOMACH AND BOWEL:  The stomach is distended with fluid and food debris  No small bowel obstruction  Stool-filled rectosigmoid colon  Diverticulosis without evidence of diverticulitis  The appendix is not well delineated  ABDOMINOPELVIC CAVITY:   There is a right lower quadrant cystic mass with heterogeneous hyperdensity and thick calcifications along the right kidney (series 2, image 80 measuring 5 0 x 7 3 x 7 9 cm with mild surrounding fat stranding  There may be a connection to the kidney on series 2, image 72  There also appears to be a connection to the collapsed cecum (series 2, image 68)  Trace free fluid in the pelvis and in the perihepatic region  ABDOMINAL WALL/INGUINAL REGIONS:  There is a right-sided inguinal hernia with associated partially visualized large right hydrocele measuring up to 9 3 x 8 2 cm  OSSEOUS STRUCTURES:  No acute fracture or destructive osseous lesion  Impression: 1  Moderate peripheral arterial disease with patent vasculature up to the level of the proximal calves  Evaluation of distal calf arteries are limited due to circumferential calcification  2   Skin thickening and subcutaneous edema of the lower legs which may be due to cellulitis versus peripheral arterial disease  No discrete fluid collection to suggest abscess  No subcutaneous emphysema  3   Polycystic kidney and liver disease with innumerable cysts some which are calcified and hemorrhagic  4   Right lower quadrant cystic mass with thickened walls, calcifications, and heterogeneous hyperdensity which may represent enhancement versus hemorrhage  There is contact with the right kidney and cecum  Differential diagnosis includes infected right renal cyst versus renal cell carcinoma  Differential also includes appendiceal mass such as mucinous cystadenocarcinoma  Further evaluation with ultrasound or MRI of the abdomen may be of value  5   Submucosal fat deposition within the mildly thickened urinary bladder wall may be due to chronic cystitis    Correlate with urinalysis  6   Distended stomach with fluid and food debris  No small bowel obstruction  Diverticulosis without evidence of diverticulitis  7   Right inguinal hernia with associated large right hydrocele  The study was marked in [facility] for immediate notification  Workstation performed: [id number]        Counseling / Coordination of Care  Total time spent today  30 minutes  Greater than 50% of total time was spent with the patient and / or family counseling and / or coordination of care

## 2019-09-23 NOTE — INTERVAL H&P NOTE
Update:     64 M on dialysis in septic shock, possible sources infected legs, permacath (rmoved by gen surg last night), or inflamed right renal cystic mass  Ct reviewed - has calcifications and hyperdense elements  Hx of PCKD  Positive bacteremia for GNR    Exam: mild right LQ tenderness at site of cystic lesion  Legs with black bullae  Speaking slowsly but able to converse  Defers consent to his wife  I believe his legs are now the source of his septic shock and need to be addressed immediately  However in order to eliminate this other possible source will attempt aspiration/drainage of this lesion  D/w ICu and family that it may be a tumor, complex cyst, or infected or noninfected cystic lesion  Discussed elevated risk of bleeding in setting of thrombocytopenia, platelets will be available    Mp2 ASA4    Patient re-evaluated   Accept as history and physical     Mague Simms MD/September 23, 2019/9:58 AM

## 2019-09-23 NOTE — PROGRESS NOTES
[address] [facility] NOTE   Sahra Cornell 64 y o  male MRN: [id number]  Unit/Bed#:  Encounter: [id number]  Reason for Consult:  ESRD    ASSESSMENT and PLAN:  1  ESRD on hemodialysis:    · [address] dialyzes at [address] Monday, Wednesday, Friday  · End-stage disease secondary to polycystic kidney disease  · Patient will require continuous renal replacement therapy due to shock/pressor requirement  · Plan on inserting temporary catheter and initiating CVVHD  2  Access:    · PermCath right IJ removed due to gram-negative sepsis  · Plan:  Place temporary dialysis catheter  3  GNR septic shock:  · Procalcitonin level peak 66 85  · Etiology unclear   · PermCath removed  · Chest x-ray unrevealing  · Right lower quadrant cystic mass on CT  There may be a connection to the right kidney  There is also what appears to be a connection to the cecum   · IR- thick purulent, bloody material on aspiration  · On Levophed  · Lactic acid  4  Lower extremity lesions:    · Bilateral necrotic looking lesions with large bullae  · Significant arterial calcifications on CT which are circumferential in nature  ?  Calciphylaxis  Concern for lesions related to GNR sepsis  5  Right lower quadrant cystic mass:  Cystic mass right kidney versus renal cell carcinoma  IR performing evaluation  6  Hyperkalemia:    · Potassium 5 9  · Plan on low-potassium dialysate  7  Elevated T bili  8  Anemia, thrombocytopenia:    · Hemoglobin 10 4, monitor  · Platelet count 48-[id number] received platelet transfusion preprocedure  9  CKD MBD:    · Phosphorus 5 7 which is near goal     · Ionized calcium 1 01-received replacement  · Patient on PhosLo  Will discontinue and switch to a non calcium based binder  10  Hyponatremia:  Likely volume mediated, monitor  Sodium level down 127   Volume removal per dialysis  Spoke with critical care regarding plan of care    SUBJECTIVE / INTERVAL HISTORY:  Patient reports that sores on legs with blisters started last week  He states he has had them before and they went away by themselves  He denies lower extremity pain  He has no shortness of breath  OBJECTIVE:  Current Weight: Weight - Scale: 90 3 kg (199 lb)  Vitals:    09/23/19 0845 09/23/19 0900 09/23/19 0915 09/23/19 0930   BP:  105/64     Pulse: (!) 135 (!) 134 (!) 138 (!) 139   Resp: 13 (!) 26 21 22   Temp:       TempSrc:       SpO2: 99% 98% 97% 96%   Weight:       Height:           Intake/Output Summary (Last 24 hours) at 9/23/2019 0948  Last data filed at 9/23/2019 0800  Gross per 24 hour   Intake 747 34 ml   Output 0 ml   Net 747 34 ml     General:  Acutely and chronically ill-appearing  Skin: no rash  Eyes: icteric sclera  ENT: moist mucous membrane  Neck: supple, no JVD  Chest: CTA b/l, no ronchii, no wheeze, no rubs, no rales  CVS: s1s2, no murmur, no gallop, no rub  Abdomen:  Distended, firm  Extremities:  Multiple necrotic looking lesions with large blisters some appear blood filled    : no meeks  Neuro: AAOX3  Psych: normal affect  Medications:    Current Facility-Administered Medications:     acetaminophen (TYLENOL) tablet 650 mg, 650 mg, Oral, Q6H PRN, SAM Wolf    calcium acetate (PHOSLO) capsule 1,334 mg, 1,334 mg, Oral, TID With Meals, SAM Wolf    cefepime (MAXIPIME) 1,000 mg in dextrose 5 % 50 mL IVPB, 1,000 mg, Intravenous, Q24H, SAM Carrizales, Last Rate: 100 mL/hr at 09/22/19 2119, 1,000 mg at 09/22/19 2119    insulin lispro (HumaLOG) 100 units/mL subcutaneous injection 1-6 Units, 1-6 Units, Subcutaneous, Q6H [address] **AND** Fingerstick Glucose (POCT), , , Q6H, Lorenza Tobias PA-C    lidocaine-epinephrine (XYLOCAINE/EPINEPHRINE) 1 %-1:100,000 injection 20 mL, 20 mL, Infiltration, Once, Zaire Climes, MD    metroNIDAZOLE (FLAGYL) IVPB (premix) 500 mg, 500 mg, Intravenous, Q8H, Stefania Bro MD, Last Rate: 200 mL/hr at 09/23/19 0737, 500 mg at 09/23/19 0737    norepinephrine (LEVOPHED) 4 mg (STANDARD CONCENTRATION) IV in sodium chloride 0 9% 250 mL, 1-30 mcg/min, Intravenous, Titrated, SAM Landeros, Last Rate: 30 mL/hr at 09/23/19 0815, 8 mcg/min at 09/23/19 0815    polyethylene glycol (MIRALAX) packet 17 g, 17 g, Oral, Daily PRN, SAM Long    senna-docusate sodium (SENOKOT S) 8 6-50 mg per tablet 1 tablet, 1 tablet, Oral, HS, SAM Long    vancomycin (VANCOCIN) IVPB (premix) 750 mg, 10 mg/kg, Intravenous, After Dialysis, Evangelist Ruano MD    Laboratory Results:  Results from last 7 days   Lab Units 09/23/19  0445 09/23/19  0444 09/23/19  0101 09/22/19  2111 09/22/19  0412 09/21/19 2018   WBC Thousand/uL  --  9 32  --  5 76 3 08* 3 42*   HEMOGLOBIN g/dL  --  10 4*  --  11 4* 10 3* 12 1   HEMATOCRIT %  --  31 9*  --  35 6* 31 2* 36 0*   PLATELETS Thousands/uL  --  51*  --  36* 32* 55*   POTASSIUM mmol/L  --  5 9* 5 8* 6 1* 5 2 4 9   CHLORIDE mmol/L  --  89* 89* 88* 89* 88*   CO2 mmol/L  --  22 21 23 24 28   BUN mg/dL  --  92* 92* 89* 77* 75*   CREATININE mg/dL  --  9 06* 9 21* 9 25* 9 04* 9 54*   CALCIUM mg/dL  --  8 6 8 4 8 5 8 5 8 9   MAGNESIUM mg/dL 2 4  --   --  2 6 1 6  --    PHOSPHORUS mg/dL 5 7*  --   --   --  5 2*  --

## 2019-09-23 NOTE — ASSESSMENT & PLAN NOTE
Polycystic kidney disease with ESRD on HD MWF via chronic R subclavian permcath, s/p removal 9/22/19  -s/p recent line exchange in August @ OSH  -Hx of previous peritoneal dialysis, status post removal of PD catheter '16 secondary to staph infection  -patient refused AVF in past  -nephrology following    Will require temporary line for CVVHD

## 2019-09-23 NOTE — ASSESSMENT & PLAN NOTE
-continue antibiotics per ID  -continue local wound care per Wound Care  -continue supportive care related to sepsis  -see plan as outlined

## 2019-09-23 NOTE — ASSESSMENT & PLAN NOTE
28-year-old male nonsmoker with a history of polycystic kidney and liver disease and ESRD on HD via chronic right subclavian tunnel catheter admitted with septic shock requiring pressors with GNR bacteremia, acute metabolic encephalopathy and BLE cellulitis w/large hemorrhagic bullae  Vascular surgery consulted to rule out acute LE ischemic process  Diagnostics:  -CTA abdomen,pelvis with bilateral iliofemoral runoff 9/21/2019:  No significant aortoiliac or femoropopliteal occlusive disease  Tibioperoneal trunk and proximal AT, PT and peroneal patent proximally but difficult to assess distal secondary to arterial calcification  + bilateral lower extremity subcutaneous edema and thickening without subcutaneous emphysema or signs of necrotizing fasciitis  Innumerable calcified hepatic and bilateral renal cysts  +RLQ 5 0 x 7 3 x 7 9 cm cystic mass along the right kidney and connected to collapse cecum  -LEVD 9/22/2019:  No acute or chronic DVT or superficial thrombophlebitis  Triphasic arterial waveforms bilaterally    Plan:  -CTA images reviewed by Dr Pam Salazar last p m  and discussed with Dr Genevieve Gordon this am   Difficult to assess tibial vessel secondary to arterial wall calcification but no evidence of large vessel occlusive disease that would cause BLE changes  No clinical evidence of acute limb ischemia  Change in pulse exam likely related to pressors  + palpable femoral popliteal pulses bilaterally with biphasic DP, PT and AT signals  -sepsis of unclear etiology  Central line infection vs infected abdominal cyst vs primary skin infection  -s/p removal chronic R subclavian permcath last PM by Dr Hue Ontiveros and s/p bedside percutaneous abdominal drain today by IR  -patient evaluated at bedside with Dr eGm Hilton and Dr Hai Vizcaino  CT imaging and clinical exam most consistent with superficial process of the legs resulting from sepsis  Healthy tissue under ruptured bullae  BLEs motor and sensory intact  Recommending continue conservative management of LE wounds presently as sepsis markers improving on abx per ID   -no vascular intervention indicated  -continue to wean pressors as tolerated to improve peripheral perfusion  -local wound care to BLEs per general surgery and Wound Care  -continue supportive care per critical care  -continue abx per ID  -will need temporary HD catheter for CVVHD per nephrology  -d/w Dr  Rosiland Shaper

## 2019-09-23 NOTE — PROGRESS NOTES
Progress Note - Critical Care   Elmer Hills 64 y o  male MRN: [id number]  Unit/Bed#:  Encounter: [id number]    Attending Physician: Valerie Hernandez MD      ______________________________________________________________________  Assessment and Plan:   1  GNR Septic Shock, POA, source: infx renal cyst vs HD line infx vs infected LE hemorrhagic/necrotic bullae   2  Toxic Metabolic Encephalopathy 2/2 #1  3  Lower Extremity hemorrhagic/necrotic bullae, 2/2 cutaneous manifestation of GNR sepsis vs early myonecrosis/necrotizing STI   4  Persistent High AGMA/Lactic Acidosis  5  Hyponatremia  6  ESRD on HD MWF  7  Thrombocytopenia suspected 2/2 GNR sepsis  8  Hypoglycemia  9  Known autosomal dominant polycystic kidney/liver disease  10  Macrocytic Anemia       Neuro: neuro checks, CAM-ICU    CV:   Septic Shock: titrate norepinephrine for MAP >65mmhg  Continue arterial line monitoring  Currently running peripherally  If pressor requirements escalate/persist, will place central line (currently attempting line holiday given bacteremia)  Obtain 2 D echo-->if bacteremia persists, will need HELENA  Diminished LE pulses: still dopplerable but sign decreased from prior  Motor/sensory intact  Vascular surgery consulted  Personally spoke to them overnight  To see patient in this m  Most recent Echo: 6/2017: EF 40%  Global hypokinesis  Moderately dilated right atrium  Mild TR, mild MR, mild pulmonary hypertension (PASP 38-50mmhg)  DVT prophylaxis:  Chemical prophylaxis on hold secondary to thrombocytopenia  Vena dynes on hold secondary to open lower extremity wound  Current Access:  Peripheral IV x2  Attempting to perform line holiday for as long as possible given bacteremia however if pressor requirements increase, will place emergent central line  Pulm:  Stable  On room air  No known underlying structural lung disease  Monitor for signs of volume overload   Obtain CXR this am to assess volume overload    GI: Hyperbilirubinemia: Given nl LFTs, do not suspect obstructive pathology and instead favor hemolysis in setting of sepsis  F/u hepatic fx panel  Check hemolysis smear, haptoglobin, LDH, reticulocytes  Possible Intraabdominal abscess: Abdominal exam benign  No tenderness  No diarrhea  Per ID rec, will consult IR for evaluation of need for potential need for possible cyst drainage  Serial abdominal exams  Low threshold for repeat imaging  :   ESRD on HD MWF: Nephrology closely following  Is s/p permacath removal 2/2 suspected septic source  To determine if patient is to get dialyzed today and if HD vs CRRT given currently on norepi  BMP q8 hrs  Anuric at baseline  Possible Infected renal cyst: ID rec for IR consult to eval for drainage  Reviewing CT, there is RLQ cystic mass w/ heterogenous hyperdensity w  Mild fat stranding w/ possible connection to kidney and cecum  Polycystic Kidney Disease: was previously on renal transplant list but removed last year at patient's request 2/2 "personal issues" upon reviewing records  On HD for ESRD for past year after failing PD    F/E/N:   Hyponatremia: serum osmol 291  Uric acid 5 6  (-) Urine studies 2/2 anuria  Suspect hypervolemic hyponatremia  Continue to trend    ID:   GNR Septic Shock: Source differential: infected HD catheter vs infected renal cyst vs LE cellulitis  S/p permacath removal overnight  Surgery does not feel LE wounds/bullae are actively infected requiring any surgical intervention/debridement  ID following; cont cefepime/vanco/flagyl  Monitor WBC/temp/culture/procalcitonin  Supportive care  Low threshold to re-image  F/u culture speciation  IR consulted to eval potential need of renal cyst drainage (concern of infection)  Heme:   Thrombocytopenia: suspect 2/2 GNR sepsis  (-) evidence bleeding  Monitor  Is s/p 1 U platelets given for permacath removal  Check DIC, haptoglobin, reticulocyte, hemolysis smear, fibrinogen, LDH  Trend   Holding chemical DVT prophylaxis 2/2 thrombocytopenia  Macrocytic Anemia: check B12/folate    Endo:   Hypoglycemia: s/p several amps Dextrose  Likely 2/2 sepsis  Accuchecks q4hr     Msk/Skin:   Hemorrhagic/Necrotic Bullous LE lesions: Suspected skin manifestion of GNR sepsis per surgery  Not believed to be actively infected per surgery  Wound care consulted  Frequent assessments  Disposition: Continue critical care  Low threshold for re-imaging  F/u on vascular/IR consults  D/w nephrology plan for dialysis  F/u 2D echo and cultures  Supportive care  Wife at bedside has been extensively updated  Code Status: Level 1 - Full Code    Counseling / Coordination of Care  Total Critical Care time spent 55 minutes excluding procedures, teaching and family updates  ______________________________________________________________________    Chief Complaint: Leg pain    24 Hour Events:   · Surgery/Vascular consulted for worsening appearance of LE necrotic/hemorrhagic bullae (concern for necrotizing STI) and decreased pulses in LE (weak dopplers)  · Gen surgery eval @ bedside, felt LE wounds 2/2 GNR cutaneous manifestion and do not suspect deep tissue infection requiring surgical debridement  · Had tunneled HD line removed by general surgery last night for concern of infected HD line  Tip sent for culture  · Hypoglycemic ON requiring several amps of D50  · Remains on peripheral norepinephrine    I/O: 785/0 +785    Review of Systems   Unable to perform ROS: Mental status change   All other systems reviewed and are negative     ______________________________________________________________________    Physical Exam:   Physical Exam   Constitutional: He appears lethargic  He appears toxic  HENT:   Head: Normocephalic  Eyes: Pupils are equal, round, and reactive to light  Scleral icterus is present  Neck: No JVD present  Cardiovascular: Tachycardia present  Exam reveals decreased pulses     Pulses:       Carotid pulses are 2+ on the right side, and 2+ on the left side  Dorsalis pedis pulses are 1+ on the right side, and 1+ on the left side  Posterior tibial pulses are 1+ on the right side, and 1+ on the left side  Dusky nail beds, faint dopplerable signals in the lower extremities (DP/PT)   Pulmonary/Chest: No respiratory distress  He has decreased breath sounds  He has no rales  Abdominal: He exhibits distension  Bowel sounds are decreased  There is hepatomegaly  There is no tenderness  There is no rigidity and no guarding  Musculoskeletal: Normal range of motion  Neurological: He appears lethargic  GCS eye subscore is 3  GCS verbal subscore is 4  GCS motor subscore is 6  Skin: Capillary refill takes more than 3 seconds  There is cyanosis  See images  Multiple large fluid filled hemorrhagic bullae w/ necrosis in lower extremities b/l   Cool distal extremities  ______________________________________________________________________  Vitals:    19 0200 19 0300 19 0400 19 0500   BP: 103/61 96/61 101/58 100/58   Pulse: 103 (!) 106 102 (!) 115   Resp: 15 21 (!) 11 14   Temp:    97 6 °F (36 4 °C)   TempSrc:    Oral   SpO2: 94% 95% 96% 96%   Weight:       Height:           Temperature:   Temp (24hrs), Av 8 °F (36 6 °C), Min:97 5 °F (36 4 °C), Max:98 1 °F (36 7 °C)    Current Temperature: 97 6 °F (36 4 °C)  Weights:   IBW: 68 4 kg    Body mass index is 29 1 kg/m²    Weight (last 2 days)     Date/Time   Weight    19 0600   86 8 (191 36)    19 2341   86 8 (191 36)    19 1959   85 9 (189 38)            Hemodynamic Monitoring:  N/A     Non-Invasive/Invasive Ventilation Settings:  Respiratory    Lab Data (Last 4 hours)    None         O2/Vent Data (Last 4 hours)    None              Lab Results   Component Value Date    PHART 7 366 2019    NGJ5ZMW 36 8 2019    PO2ART 91 0 2019    XCF1USG 20 6 (L) 2019    BEART -4 2 2019    SOURCE Radial, Left 09/22/2019     SpO2: SpO2: 96 %  Intake and Outputs:  I/O       09/21 0701 - 09/22 0700 09/22 0701 - 09/23 0700    I V  (mL/kg) 1000 (11 5) 85 6 (1)    IV Piggyback 2050 600    Total Intake(mL/kg) 3050 (35 1) 685 6 (7 9)    Urine (mL/kg/hr)  0 (0)    Total Output  0    Net +3050 +685 6              Nutrition:        Diet Orders   (From admission, onward)             Start     Ordered    09/22/19 0648  Room Service  Once     Question:  Type of Service  Answer:  Room Service-Appropriate    09/22/19 4016    09/21/19 2321  Diet Renal; Renal Restrictive; Yes; Fluid Restriction 1800 ML; No  Diet effective now     Question Answer Comment   Diet Type Renal    Renal Renal Restrictive    Should patient have a fluid restriction? Yes    Fluid Restriction Fluid Restriction 1800 ML    Should patient have a protein modifier? No    RD to adjust diet per protocol?  Yes        09/21/19 2332                Labs:   Results from last 7 days   Lab Units 09/23/19 0444 09/22/19 2111 09/22/19 0412 09/21/19 2018   WBC Thousand/uL 9 32 5 76 3 08* 3 42*   HEMOGLOBIN g/dL 10 4* 11 4* 10 3* 12 1   HEMATOCRIT % 31 9* 35 6* 31 2* 36 0*   PLATELETS Thousands/uL 51* 36* 32* 55*   NEUTROS PCT %  --  89*  --   --    BANDS PCT % 35*  --  12* 33*   MONOS PCT %  --  5  --   --    MONO PCT % 7  --  29* 4     Results from last 7 days   Lab Units 09/23/19 0445 09/23/19 0444 09/23/19 0101 09/22/19 2111 09/22/19 0412 09/21/19 2018   SODIUM mmol/L  --  127* 127* 129* 129* 132*   POTASSIUM mmol/L  --  5 9* 5 8* 6 1* 5 2 4 9   CHLORIDE mmol/L  --  89* 89* 88* 89* 88*   CO2 mmol/L  --  22 21 23 24 28   ANION GAP mmol/L  --  16* 17* 18* 16* 16*   BUN mg/dL  --  92* 92* 89* 77* 75*   CREATININE mg/dL  --  9 06* 9 21* 9 25* 9 04* 9 54*   CALCIUM mg/dL  --  8 6 8 4 8 5 8 5 8 9   GLUCOSE RANDOM mg/dL  --  111 149* 60* 78 84   ALT U/L 19  --   --  25 9* 11*   AST U/L 15  --   --  25 10 14   ALK PHOS U/L 64  --   --  68 68 101   ALBUMIN g/dL 2 7*  --   -- 2  9* 2 9* 2 7*   TOTAL BILIRUBIN mg/dL 3 90*  --   --  4 30* 3 60* 3 30*     Results from last 7 days   Lab Units 09/23/19 0445 09/22/19 2111 09/22/19 0412   MAGNESIUM mg/dL 2 4 2 6 1 6   PHOSPHORUS mg/dL 5 7*  --  5 2*      Results from last 7 days   Lab Units 09/23/19 0445 09/22/19 2111 09/22/19 0412 09/21/19  2018   INR  1 50*  1 55* 1 52* 1 73* 1 72*   PTT seconds 37  --   --  36      Results from last 7 days   Lab Units 09/22/19 0412 09/22/19  0056   TROPONIN I ng/mL 0 05* 0 06*     Results from last 7 days   Lab Units 09/22/19 2111 09/22/19 0908 09/22/19 0630 09/22/19 0412 09/22/19 0056 09/21/19 2237 09/21/19 2018   LACTIC ACID mmol/L 2 1* 3 7* 3 5* 3 6* 3 7* 5 2* 6 4*     ABG:  Results from last 7 days   Lab Units 09/22/19 2152   PH ART  7 366   PCO2 ART mm Hg 36 8   PO2 ART mm Hg 91 0   HCO3 ART mmol/L 20 6*   BASE EXC ART mmol/L -4 2   ABG SOURCE  Radial, Left     VBG:  Results from last 7 days   Lab Units 09/22/19 2152 09/22/19  1051   PH JESSY   --  7 331   PCO2 JESSY mm Hg  --  38 8*   PO2 JESSY mm Hg  --  51 1*   HCO3 JESSY mmol/L  --  20 0*   BASE EXC JESSY mmol/L  --  -5 4   ABG SOURCE  Radial, Left  --      Results from last 7 days   Lab Units 09/22/19 0412 09/21/19 2237   PROCALCITONIN ng/ml 66 85* 63 11*       Imaging: No recent imaging  CTA 9/21 1   Moderate peripheral arterial disease with patent vasculature up to the level of the proximal calves   Evaluation of distal calf arteries are limited due to circumferential calcification  2   Skin thickening and subcutaneous edema of the lower legs which may be due to cellulitis versus peripheral arterial disease   No discrete fluid collection to suggest abscess   No subcutaneous emphysema  3   Polycystic kidney and liver disease with innumerable cysts some which are calcified and hemorrhagic    4   Right lower quadrant cystic mass with thickened walls, calcifications, and heterogeneous hyperdensity which may represent enhancement versus hemorrhage  Sharmaine Hernández is contact with the right kidney and cecum   Differential diagnosis includes infected   right renal cyst versus renal cell carcinoma   Differential also includes appendiceal mass such as mucinous cystadenocarcinoma   Further evaluation with ultrasound or MRI of the abdomen may be of value  5   Submucosal fat deposition within the mildly thickened urinary bladder wall may be due to chronic cystitis   Correlate with urinalysis  6   Distended stomach with fluid and food debris   No small bowel obstruction   Diverticulosis without evidence of diverticulitis  7   Right inguinal hernia with associated large right hydrocele  I have personally reviewed pertinent reports      EKG: Sinus tach  Micro:  Results from last 7 days   Lab Units 09/21/19  2019 09/21/19 2018   GRAM STAIN RESULT  Gram negative rods* Gram negative rods*     Allergies: No Known Allergies  Medications:   Scheduled Meds:    Current Facility-Administered Medications:  acetaminophen 650 mg Oral Q6H PRN SAM Alaniz    calcium acetate 1,334 mg Oral TID With Meals SAM Alaniz    cefepime 1,000 mg Intravenous Q24H SAM Alaniz Last Rate: 1,000 mg (09/22/19 2119)   insulin lispro 1-6 Units Subcutaneous Q6H [address] Lorenza Tobias PA-C    lidocaine-epinephrine 20 mL Infiltration Once Joanne Herring MD    metroNIDAZOLE 500 mg Intravenous Q8H Joe Javed MD Last Rate: 500 mg (09/23/19 0000)   norepinephrine 1-30 mcg/min Intravenous Titrated SAM Tracey Last Rate: 8 mcg/min (09/23/19 0520)   polyethylene glycol 17 g Oral Daily PRN SAM Alaniz    senna-docusate sodium 1 tablet Oral HS SAM Cabrales    vancomycin 10 mg/kg Intravenous After Dialysis Gagandeep Adams MD      Continuous Infusions:    norepinephrine 1-30 mcg/min Last Rate: 8 mcg/min (09/23/19 0520)     PRN Meds:    acetaminophen 650 mg Q6H PRN   polyethylene glycol 17 g Daily PRN   vancomycin 10 mg/kg After Dialysis     VTE Pharmacologic Prophylaxis: Pharmacologic VTE Prophylaxis contraindicated due to thrombocytopenia  VTE Mechanical Prophylaxis: sequential compression device  Invasive lines and devices: Invasive Devices     Peripheral Intravenous Line            Peripheral IV 09/21/19 Left Arm 1 day    Peripheral IV 09/21/19 Right Antecubital 1 day          Arterial Line            Arterial Line 09/22/19 1 day          Hemodialysis Catheter            Permanent HD Catheter  1 day                     Portions of the record may have been created with voice recognition software  Occasional wrong word or "sound a like" substitutions may have occurred due to the inherent limitations of voice recognition software  Read the chart carefully and recognize, using context, where substitutions have occurred      Irvin Barnhart

## 2019-09-23 NOTE — PROGRESS NOTES
Progress Note - Infectious Disease   Ok Susanne 64 y o  male MRN: [id number]  Unit/Bed#:  Encounter: [id number]      Impression/Recommendations:  1  Septic shock, POA  Leukopenia, tachycardia, refractory hypotension  Due to #2  Patient appears nontoxic, awake, but critically ill with bandemia and persistent pressor requirement  Procalcitonin remains high but trending down 66 1 > 56 57   Rec:  · Continue antibiotics as below  · Follow up blood cultures as below  · Follow temperatures closely  · Recheck CBC in AM  · Supportive care as per the primary service     2  Oxidase-positive GNR bacteremia  Consider Pseudomonas, Stenotrophomas, Aeromonas, Vibrio, Acinetobacter  Unclear source  Consider primary skin infection versus line infection versus superinfected cyst   Rec:  · Change antibiotics to ceftazidime, doxycycline  · Follow up ID/susceptibilities and tailor antibiotics as indicated  · Follow up repeat blood cultures      3   Purpuric skin lesions  Consider primary cellulitis although appears symmetric with no obvious portal of entry  Appears superficial with healthy tissue under ruptrued bullae making necrotizing or seeper infection less likely  Consider secondary phenomenon to sepsis, shock (more likely)  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  Less likely ecthyma gangrenosum as appear quite superficial   Rec:  · Continue management of sepsis as above  · Serial exams  · LWC per primary  · Close vascular follow-up ongoing     4  ESRD on HD  [facility]ia [facility]  Recent port malfunction status post exchange 8/17/19  She reports that he had recent central line catheter exchange x2 in mid August   Noted in Care Everywhere to be noncompliant with diet, HD  Rec:  · CVVHD to start per Renal  · Dose adjust antibiotics for dialysis     5  Polycystic kidney/liver disease  Extensive disease seen on CT imaging      The above plan was discussed in detail with the patient, his daughter at the bedside, Dr Erik Suero, Dr Mojgan Graham, and the vascular surgery service  I have spent >40 minutes with Patient and family today in which greater than 50% of this time was spent in counseling/coordination of care regarding Diagnostic results, Prognosis and Impressions  Antibiotics:  Vancomycin/Cefepime/Flagyl #3    Subjective:  Patient seen on AM rounds  Patient awake and alert  Denies prior skin lesions or wounds at home  Denies fevers, chills, sweats, nausea, vomiting, or diarrhea  24 Hour Events:  No documented fevers, chills, sweats, nausea, vomiting, or diarrhea  Remains in pressors with slight increase in requirements  Renal planning on starting CVVH  Permacath was removed yesterday and patient just had IR drain placed in RLQ cyst seen on CT  Objective:  Vitals:  Temp:  [97 4 °F (36 3 °C)-98 1 °F (36 7 °C)] 97 4 °F (36 3 °C)  HR:  [] 140  Resp:  [11-27] 17  BP: ()/(52-73) 100/59  SpO2:  [92 %-100 %] 94 %  Temp (24hrs), Av 6 °F (36 4 °C), Min:97 4 °F (36 3 °C), Max:98 1 °F (36 7 °C)  Current: Temperature: (!) 97 4 °F (36 3 °C)    Physical Exam:   General:  No acute distress  Eyes:  Normal lids and conjunctivae  ENT:  Normal external ears and nose  Neck:  Neck symmetric with midline trachea  Pulmonary:  Normal respiratory effort without accessory muscle use  Cardiovascular:  Regular rate and rhythm; no peripheral edema  Gastrointestinal:  No tenderness or distention  DILIA with serosanguinous fluid  Musculoskeletal:  No digital clubbing or cyanosis  Skin:  Dusky skin changes with large purpuric bullae over bilateral calves and upper inner thigh  Healthy pink tissue noted under several of the bullae that have unroofed  Neurologic:  Sensation grossly intact to light touch  Psychiatric:  Alert and awake; Normal mood    Lab Results:  I have personally reviewed pertinent labs    Results from last 7 days   Lab Units 19  0445 19  0444 19  0101 19  0412   POTASSIUM mmol/L  -- 5  9* 5 8* 6 1* 5 2   CHLORIDE mmol/L  --  89* 89* 88* 89*   CO2 mmol/L  --  22 21 23 24   BUN mg/dL  --  92* 92* 89* 77*   CREATININE mg/dL  --  9 06* 9 21* 9 25* 9 04*   EGFR ml/min/1 73sq m  --  6 6 6 6   CALCIUM mg/dL  --  8 6 8 4 8 5 8 5   AST U/L 15  --   --  25 10   ALT U/L 19  --   --  25 9*   ALK PHOS U/L 64  --   --  68 68     Results from last 7 days   Lab Units 09/23/19  0444 09/22/19  2111 09/22/19  0412   WBC Thousand/uL 9 32 5 76 3 08*   HEMOGLOBIN g/dL 10 4* 11 4* 10 3*   PLATELETS Thousands/uL 51* 36* 32*     Results from last 7 days   Lab Units 09/21/19 2019 09/21/19  2018   BLOOD CULTURE  Oxidase Positive gram negative pa* Oxidase Positive gram negative pa*   GRAM STAIN RESULT  Gram negative rods* Gram negative rods*       Imaging Studies:   I have personally reviewed pertinent imaging study reports and images in PACS  CT A/P reviewed personally extensive cystic disease of liver and kidney  Large calcified cyst RLQ  EKG, Pathology, and Other Studies:   I have personally reviewed pertinent reports

## 2019-09-23 NOTE — PROGRESS NOTES
Patient is Perma calf was felt to be the source for gram-negative sepsis  I was of the opinion that should be removed urgently in order to assist with recovery  Nephrology as well as Infectious Disease was consulted  This was discussed with the patient as well as his wife  Verbal agreement for this procedure was completed by both the patient's wife as well as the patient  Risks and benefits were explained  Prep diagnosis-Gram-negative sepsis    Postop diagnosis-same    Procedure-removal of Perma catheter  Anesthesia-local    Procedure-patient was identified visually and via armband  The place was in the supine position in the ICU  Verbal permission obtained from patient's family as delineated above  The area was prepped and draped in a sterile fashion  Time-out was then completed  1% lidocaine with epinephrine was then injected at the right anterior chest Perma catheter site  The external opening to the Perma catheter was then widened  The Velcro cuff was exposed by traction  This was not floating, on the other hand was not firmly adherent to subcutaneous tissues  The adhesive tissues to the Velcro cuff were then removed  The catheter was removed and the tip was sent for both cultures  Pressure was held at the insertion site over the right internal jugular site  Three sutures of 4 0 silk sutures were then placed for TULIO of loose closure of the exit site  Patient tolerated this procedure well

## 2019-09-23 NOTE — H&P (VIEW-ONLY)
-4 H [facility] 64 y o  male MRN: [id number]  Unit/Bed#:  Encounter: [id number]          ASSESSMENT:  Gram-negative sepsis with cutaneous manifestations  Cutaneous and Soft-Tissue Manifestations of Sepsis Due to Gram-Negative Enteric Bacilli       Reji Madison  Journal Reviews of Infectious Diseases          Vol  2, No  6 (Nov  - Dec , 1980), pp  [phone]    PLAN:  Suspect infected Perma catheter for hemodialysis  Discussed plan to remove Perma catheter with Infectious Disease as well as Nephrology  They were agreeable  This was completed at bedside  Reason for Consult / Principal Problem: Severe sepsis without septic shock ([facility] [address] )    HPI: Lakisha Garcia is a 64y o  year old male who presents with for positive blood cultures for gram-negative rods  , septic shock, Severe sepsis without septic shock ([facility] [address] ), polycystic kidney disease-hemodialysis dependent  Agree with history of present illness as delineated by Critical Care team   Chart reviewed at [facility] as well as [address] [place] [address]  Review of Systems  Constitutional:  Denies fever or chills   Eyes:  Denies change in visual acuity   HENT:  Denies nasal congestion or sore throat   Respiratory:  Denies cough or shortness of breath   Cardiovascular:  Denies chest pain   New peripheral edema  GI:  Denies abdominal pain, nausea, vomiting, bloody stools or diarrhea   Musculoskeletal:  Denies back pain or joint pain   Integument:  Worsening rash up bilateral lower extremities  Neurologic:  Admits to weakness and lethargy  Denies headache, focal weakness or sensory changes   Endocrine:  Denies polyuria or polydipsia  Patient is an uric   Lymphatic:  Denies swollen glands   Psychiatric:  Denies depression or anxiety     Historical Information   Past Medical History:   Diagnosis Date    Complication of renal dialysis     Polycystic kidney disease      No past surgical history on file    Social History   Social History     Substance and Sexual Activity   Alcohol Use Not on file     Social History     Substance and Sexual Activity   Drug Use Not on file     Social History     Tobacco Use   Smoking Status Never Smoker   Smokeless Tobacco Never Used     No family history on file  Meds/Allergies     Medications Prior to Admission   Medication    calcium acetate (PHOSLO) 667 mg capsule    Multiple Vitamin (MULTIVITAMIN) tablet    NON FORMULARY    Sucroferric Oxyhydroxide (VELPHORO PO)     Current Facility-Administered Medications   Medication Dose Route Frequency    acetaminophen (TYLENOL) tablet 650 mg  650 mg Oral Q6H PRN    calcium acetate (PHOSLO) capsule 1,334 mg  1,334 mg Oral TID With Meals    cefepime (MAXIPIME) 1,000 mg in dextrose 5 % 50 mL IVPB  1,000 mg Intravenous Q24H    dextrose 50 % IV solution 25 g  25 g Intravenous Once    [START ON 9/23/2019] insulin lispro (HumaLOG) 100 units/mL subcutaneous injection 1-6 Units  1-6 Units Subcutaneous Q6H [facility]    lidocaine-epinephrine (XYLOCAINE/EPINEPHRINE) 1 %-1:100,000 injection 20 mL  20 mL Infiltration Once    metroNIDAZOLE (FLAGYL) IVPB (premix) 500 mg  500 mg Intravenous Q8H    norepinephrine (LEVOPHED) 4 mg (STANDARD CONCENTRATION) IV in sodium chloride 0 9% 250 mL  1-30 mcg/min Intravenous Titrated    polyethylene glycol (MIRALAX) packet 17 g  17 g Oral Daily PRN    senna-docusate sodium (SENOKOT S) 8 6-50 mg per tablet 1 tablet  1 tablet Oral HS    vancomycin (VANCOCIN) IVPB (premix) 750 mg  10 mg/kg Intravenous After Dialysis       No Known Allergies    Objective     Blood pressure (!) 82/59, pulse 100, temperature 97 6 °F (36 4 °C), temperature source Axillary, resp  rate 18, height 5' 8" (1 727 m), weight 86 8 kg (191 lb 5 8 oz), SpO2 99 %        Intake/Output Summary (Last 24 hours) at 9/22/2019 7453  Last data filed at 9/22/2019 1600  Gross per 24 hour   Intake 3185 58 ml   Output 0 ml   Net 3185 58 ml       PHYSICAL EXAM  General appearance: fatigued  Lungs: clear to auscultation bilaterally  Heart: regular rate and rhythm, S1, S2 normal, no murmur, click, rub or gallop  Abdomen: soft, non-tender; bowel sounds normal; no masses,  no organomegaly  Rectal: deferred  Skin:  Hemorrhagic bullae both lower extremities  Erythema present  Areas of black and soft tissue  Images located on media  Lab Results:   No results displayed because visit has over 200 results  Imaging Studies: I have personally reviewed pertinent reports  Xr Chest 2 Views    Result Date: 9/22/2019  Narrative: CHEST INDICATION:   shortness of breath  Lower leg swelling  Redness  Pain  COMPARISON:  None EXAM PERFORMED/VIEWS:  XR CHEST PA & LATERAL FINDINGS:  Right-sided dialysis catheter noted  Aorta atherosclerotic and mildly uncoiled  Heart size top normal  The lungs are clear  No pneumothorax or pleural effusion  Age-appropriate degenerative changes are noted in the spine  Impression: No acute cardiopulmonary disease  Workstation performed: [id number]     Cta Abdominal W Run Off W Wo Contrast    Result Date: 9/21/2019  Narrative: CT ANGIOGRAM OF THE AORTA AND LOWER EXTREMITIES WITH IV CONTRAST INDICATION:  Abdominal pain, hyperbilirubinemia  COMPARISON: None  TECHNIQUE:  CT angiogram examination of the abdomen, pelvis, and lower extremities was performed according to standard protocol with intravenous contrast   This examination, like all CT scans performed in the [place], was performed utilizing techniques to minimize radiation dose exposure, including the use of iterative reconstruction and automated exposure control  3D reconstructions were performed an independent workstation, and are supplied for review  Rad dose 2833 mGy-cm IV Contrast:  100 mL of iodixanol (VISIPAQUE) was administered intravenously without immediate adverse reaction    FINDINGS: VASCULAR STRUCTURES:   The abdominal aorta is normal in course and caliber  Moderate atherosclerotic calcifications  The celiac and superior mesenteric arteries are patent  Scattered calcifications of the superior mesenteric artery  Moderate narrowing of the bilateral renal artery origins  The inferior mesenteric artery is patent  The iliac arteries are patent  The external and internal iliac arteries are patent  Scattered calcifications with mild narrowing of the distal internal iliac arteries  The common, profunda, and superficial femoral arteries are patent  There are mild calcifications of the superficial femoral arteries  The popliteal arteries, tibioperoneal trunks, peroneal arteries, anterior tibial arteries, and posterior tibial arteries are mildly calcified however patent proximally  The distal arteries are not well evaluated due to circumferential calcification  There is diffuse skin thickening and subcutaneous edema bilateral lower extremities  No discrete fluid collection to suggest abscess  OTHER FINDINGS ABDOMEN LIVER/BILIARY TREE:  Innumerable hepatic cysts some which are calcified  Mass effect on the portal veins which are narrowed  No significant biliary duct dilatation  GALLBLADDER:  No calcified gallstones  No pericholecystic inflammatory change  SPLEEN:  Unremarkable  Normal size  PANCREAS:  Unremarkable  ADRENAL GLANDS: Unremarkable  KIDNEYS/URETERS:  Markedly enlarged kidneys with innumerable renal cysts some which demonstrate calcifications and some which which demonstrate hyperdensity compatible with blood products  There are parapelvic cysts on the right  No hydronephrosis  No  hydroureter  The renal veins are patent  PELVIS REPRODUCTIVE ORGANS:  Unremarkable for patient's age  URINARY BLADDER:  Submucosal fat deposition and mild urinary bladder wall thickening however it is under distended  Findings may represent chronic cystitis   ADDITIONAL ABDOMINAL AND PELVIC STRUCTURES STOMACH AND BOWEL:  The stomach is distended with fluid and food debris  No small bowel obstruction  Stool-filled rectosigmoid colon  Diverticulosis without evidence of diverticulitis  The appendix is not well delineated  ABDOMINOPELVIC CAVITY:   There is a right lower quadrant cystic mass with heterogeneous hyperdensity and thick calcifications along the right kidney (series 2, image 80 measuring 5 0 x 7 3 x 7 9 cm with mild surrounding fat stranding  There may be a connection to the kidney on series 2, image 72  There also appears to be a connection to the collapsed cecum (series 2, image 68)  Trace free fluid in the pelvis and in the perihepatic region  ABDOMINAL WALL/INGUINAL REGIONS:  There is a right-sided inguinal hernia with associated partially visualized large right hydrocele measuring up to 9 3 x 8 2 cm  OSSEOUS STRUCTURES:  No acute fracture or destructive osseous lesion  Impression: 1  Moderate peripheral arterial disease with patent vasculature up to the level of the proximal calves  Evaluation of distal calf arteries are limited due to circumferential calcification  2   Skin thickening and subcutaneous edema of the lower legs which may be due to cellulitis versus peripheral arterial disease  No discrete fluid collection to suggest abscess  No subcutaneous emphysema  3   Polycystic kidney and liver disease with innumerable cysts some which are calcified and hemorrhagic  4   Right lower quadrant cystic mass with thickened walls, calcifications, and heterogeneous hyperdensity which may represent enhancement versus hemorrhage  There is contact with the right kidney and cecum  Differential diagnosis includes infected right renal cyst versus renal cell carcinoma  Differential also includes appendiceal mass such as mucinous cystadenocarcinoma  Further evaluation with ultrasound or MRI of the abdomen may be of value  5   Submucosal fat deposition within the mildly thickened urinary bladder wall may be due to chronic cystitis    Correlate with urinalysis  6   Distended stomach with fluid and food debris  No small bowel obstruction  Diverticulosis without evidence of diverticulitis  7   Right inguinal hernia with associated large right hydrocele  The study was marked in [facility] for immediate notification  Workstation performed: [id number]        Counseling / Coordination of Care  Total time spent today  30 minutes  Greater than 50% of total time was spent with the patient and / or family counseling and / or coordination of care

## 2019-09-23 NOTE — BRIEF OP NOTE (RAD/CATH)
IR DRAINAGE    PATIENT NAME: Andrés Alcala  : 1960  MRN: [id number]     Pre-op Diagnosis:   1  Left leg cellulitis    2  Peripheral vascular disease ([address] )    3  End stage renal disease ([facility] [address] )    4  Chronic kidney disease with end stage renal failure on dialysis ([facility] [address] )    5  Cellulitis of lower extremity- bilateral    6  Severe sepsis ([address] )    7  Bacteremia due to Gram-negative bacteria    8  Gram-negative bacteremia    9  Septic shock ([address] )    10  Severe sepsis without septic shock ([facility])      Post-op Diagnosis:   1  Left leg cellulitis    2  Peripheral vascular disease ([address] )    3  End stage renal disease ([facility] [address] )    4  Chronic kidney disease with end stage renal failure on dialysis ([facility] [address] )    5  Cellulitis of lower extremity- bilateral    6  Severe sepsis ([address] )    7  Bacteremia due to Gram-negative bacteria    8  Gram-negative bacteremia    9  Septic shock ([facility] [address] )    10  Severe sepsis without septic shock [facility])        Surgeon:   Valerio Lindsey MD  Assistants:     No qualified resident was available, Resident is only observing    Estimated Blood Loss: minimal  Findings:  Suspicious right renal cyst with calcifications, thick purulent bloody material on initial aspiration      Eight [address] Edwin drain placed    Specimens:  Culture    Complications:  None immediate    Anesthesia: Local    Valerio Lindsey MD     Date: 2019  Time: 10:37 AM

## 2019-09-23 NOTE — TELEPHONE ENCOUNTER
Incoming Vascular Consult Telephone Note    Vee VILLANUEVA  from [facility]  called with urgent consult    Return Phone Number: [address] [facility]     [phone]    - (AN ULTRASOUND) - (AN ULTRASOUND)     Provider Requesting:[place]    Diagnosis for Consult:  PVD with necrotic bullae    Britt Duster P:A notified

## 2019-09-23 NOTE — DISCHARGE INSTR - OTHER ORDERS
1   On left lower leg where bullae have ruptured, cleanse wounds with normal saline  Cover with single layer xeroform  May cover with dry gauze dressing if patient prefers or patient getting out of bed  There are no intact bullae on left lower leg, so the application of a dressing will not cause rupturing of the bullae  Apply Xeroform gauze to any bullae that rupture in the future  Change xeroform gauze dressings once a day  2  Continue frequent repositioning  Use wedges  3  Offload heels from bed surface  4  Moisturize skin daily  5  Pressure redistribution cushion to chair when patient out of bed

## 2019-09-23 NOTE — PROGRESS NOTES
Progress Note -Surgery KAY Maciaspili De Luna 64 y o  male MRN: [id number]  Unit/Bed#:  Encounter: [id number]    ASSESSMENT/PLAN:  Problem List     * (Principal) Septic shock ([address] )    Chronic kidney disease with end stage renal failure on dialysis (HCC)    Hyponatremia    Cellulitis of lower extremity- bilateral    High anion gap metabolic acidosis    Lactic acidosis    Polycystic liver disease    Total bilirubin, elevated    Thrombocytopenia (HCC)    Gram-negative bacteremia   63 yo M with GNR sepsis POD #1 s/p removal of permacath  Patient had an uneventful night  Incision looks good with 2 sutures  Have sutures removed in 7-10 days  Please call with any questions  VTE Pharmacologic Prophylaxis: Sequential compression device (Venodyne)     Subjective/Objective     Subjective: no complaints     Objective/Physical Exam: Blood pressure 105/64, pulse (!) 139, temperature (!) 97 4 °F (36 3 °C), temperature source Rectal, resp  rate 22, height 5' 8" (1 727 m), weight 90 3 kg (199 lb), SpO2 96 %  ,Body mass index is 30 26 kg/m²  General appearance: alert  CHest: R chest incision with 2 sutures  Flat and soft  No erythema        Current Facility-Administered Medications:     acetaminophen (TYLENOL) tablet 650 mg, 650 mg, Oral, Q6H PRN, Jet Cheek, CRNP    calcium acetate (PHOSLO) capsule 1,334 mg, 1,334 mg, Oral, TID With Meals, Jet Cheek, CRNP    cefepime (MAXIPIME) 1,000 mg in dextrose 5 % 50 mL IVPB, 1,000 mg, Intravenous, Q24H, SAM Carrizales, Last Rate: 100 mL/hr at 09/22/19 2119, 1,000 mg at 09/22/19 2119    insulin lispro (HumaLOG) 100 units/mL subcutaneous injection 1-6 Units, 1-6 Units, Subcutaneous, Q6H [address] **AND** Fingerstick Glucose (POCT), , , Q6H, Lorenza Tobias PA-C    lidocaine-epinephrine (XYLOCAINE/EPINEPHRINE) 1 %-1:100,000 injection 20 mL, 20 mL, Infiltration, Once, Lafe Gaucher, MD    metroNIDAZOLE (FLAGYL) IVPB (premix) 500 mg, 500 mg, Intravenous, Q8H, Lewis Randhawa MD, Last Rate: 200 mL/hr at 09/23/19 0737, 500 mg at 09/23/19 0737    norepinephrine (LEVOPHED) 4 mg (STANDARD CONCENTRATION) IV in sodium chloride 0 9% 250 mL, 1-30 mcg/min, Intravenous, Titrated, [place], CRNP, Last Rate: 30 mL/hr at 09/23/19 0815, 8 mcg/min at 09/23/19 0815    polyethylene glycol (MIRALAX) packet 17 g, 17 g, Oral, Daily PRN, Jose Rinne, CRNP    senna-docusate sodium (SENOKOT S) 8 6-50 mg per tablet 1 tablet, 1 tablet, Oral, HS, Jose Rinne, CRNP    vancomycin (VANCOCIN) IVPB (premix) 750 mg, 10 mg/kg, Intravenous, After Dialysis, Anel Romeo MD

## 2019-09-23 NOTE — PROGRESS NOTES
Critical Care Interval Progress Note     Donnie Benavides 64 y o  male MRN: [id number]    Unit/Bed#:  Encounter: [id number]    Impression:  1  GNR Septic Shock, POA, source: infx renal cyst vs HD line infx vs infected LE hemorrhagic/necrotic bullae   2  Toxic Metabolic Encephalopathy 2/2 #1  3  Lower Extremity hemorrhagic/necrotic bullae, 2/2 cutaneous manifestation of GNR sepsis vs early myonecrosis/necrotizing STI   4  Persistent High AGMA/Lactic Acidosis  5  Hyponatremia  6  ESRD on HD MWF  7  Thrombocytopenia suspected 2/2 GNR sepsis  8  Hypocalcemia  9  Known autosomal dominant polycystic kidney/liver disease  10  Macrocytic Anemia     Plan:  Neuro: neuro checks, CAM-ICU  Avoid narcotics in setting of encephalopathy  CV:   Shock: continue to titrate norepi for goal MAP >65mmhg  Currently @ 1mcg/min and running peripherally   If pressor requirements increase, will place arterial line/central line  Spoke at length w/ Dr Brianna Malin of general surgery  Feels that source of sepsis is likely from infected HD catheter as opposed to infected renal cyst +/- LE cellulitis and plan is to remove permacath tonight for source control  ID closely following  Trend WBC/temp/cultures  · Check echo to evaluate LV fx  Most recent echo on file 6/2017 showed EF 40% with global hypokinesis  Moderately dilated right atrium  Mild TR, mild MR, mild pulm HTN (PASP 38-50mmhg)  Diminished LE pulses b/l: significantly decreased from night prior however are still dopplerable  Motor/Sensory intact therefor do not suspect compartment syndrome  Vascular surgery consulted during the day but hasn't evaluated  Spoke w/ Dr Hernandez Jorge of vascular surgery  Given the presentation, he does not suspect an immediate vascular emergency and feels the diminished pulses are 2/2 systemic shock on pressors  Vascular to see patient in the am and at that time consider if arterial duplex is warranted  Access: 2 peripherals   HD line to be removed urgently by surgery  DVT prophylaxis: no chemical DVT prophylalxis 2/2 thrombocytopenia  (-) venodynes 2/2 open LE skin wounds  Pulm: Currently stable and on room air  Monitor for volume overload  Patient is ESRD and essentially anuric  GI:   · trend bilirubin/LFTs  Check hepatic function panel in am w/ Direct bili  No abdominal pain  · NPO 2/2 encephalopathy  · Ammonia nl  : Consensus to remove HD line tonight for potential sepsis source  Will re-eval in am for alternate access, unless needed sooner  Currently has no indication for emergent dialysis  If remains on pressors, may require CRRT  FEN: Trend BMP q8h for eval of hyperkalemia/acidosis  NPO  Trend sodium  Heme: Will transfuse 1 unit platelets pre-procedure of permacath removal  trend platelets, coags  ID: Continue cefepime, vanco, flagyl  Repeat BC in am  Await sensitivities  ID closely following  HD catheter to be removed tonight by general surgery and catheter tip will  be cultured  Monitor WBC/temp/cultures  T/c IR consult to eval for potential infected renal cyst   Feels that source of sepsis is likely from infected HD catheter as opposed to infected renal cyst +/- LE cellulitis  Low threshold for re imaging  Other: Updated patient's family at bedside at length multiple times throughout the course of the night  Spoke w/ the critical care fellow Dr Gaye Valentino and attending Dr Barb Bermudez in addition to Dr Nhung Lynch of general surgery and Dr Tatianna Jones of vascular surgery  Counseling / Coordination of Care: Total Critical Care time spent 60 minutes excluding procedures, teaching and family updates  ______________________________________________________________________    Chief Complaint: leg pain    Recent Events / Nursing Concern:   · Upon reassessment of patient's lower extremities from admission to now, the necrotic bullae are significantly worse and expanding   Please see images taken on admit & currently (attached)  · Started on norepi during the day after BP being nonresponsive to volume therapy  Max rate 7mcg/kg/min, currently weaned down to 1mcg/kg/min  · Worsened encephalopathy suspected 2/2 sepsis   · Given relative rapid progression of LE wounds, there is concern for necrotizing STI  Infusions:   norepi 1mcg/kg/min    I/O: 3 0/0 +3liters  Vitals:   Vitals:    19 2220 19 2232 19 2235 19 2300   BP: 92/55 95/55 95/57 (!) 82/59   BP Location:       Pulse: 95 99 99 100   Resp: 16 18 18 18   Temp: 98 1 °F (36 7 °C)  97 6 °F (36 4 °C)    TempSrc:   Axillary    SpO2:  99% 99% 99%   Weight:       Height:                 Temperature: Temp (24hrs), Av 9 °F (36 6 °C), Min:97 5 °F (36 4 °C), Max:98 4 °F (36 9 °C)  Current: Temperature: 97 6 °F (36 4 °C)    Hemodynamic Monitoring:  N/A       Respiratory:  SpO2: SpO2: 99 %       Physical Exam:  Physical Exam   Constitutional: He appears lethargic  He has a sickly appearance  HENT:   Head: Normocephalic and atraumatic  Eyes: Pupils are equal, round, and reactive to light  Conjunctivae are normal  Scleral icterus is present  Neck: No JVD present  Cardiovascular: Intact distal pulses  Tachycardia present  Pulses:       Dorsalis pedis pulses are 1+ on the right side, and 1+ on the left side  Posterior tibial pulses are 1+ on the right side, and 1+ on the left side  Lower extremities dopplerable but significantly weaker than night prior   Pulmonary/Chest: Effort normal and breath sounds normal    Abdominal: Soft  He exhibits distension  Musculoskeletal: Normal range of motion  B/l expanding hemorrhagic necrotic bullous (see images)  Motor/sensory intact  Distal fingertips cyanotic b/l w/ decreased capillary refill  Neurological: He has normal strength  He appears lethargic  No cranial nerve deficit  GCS eye subscore is 3  GCS verbal subscore is 4  GCS motor subscore is 6  Skin: Skin is warm, dry and intact  Capillary refill takes more than 3 seconds   He is not diaphoretic  There is cyanosis  Vitals reviewed        Allergies: No Known Allergies    Medications:   Scheduled Meds:  Current Facility-Administered Medications:  acetaminophen 650 mg Oral Q6H PRN Dewey Pluck, CRNP    calcium acetate 1,334 mg Oral TID With Meals Dewey Pluck, CRNP    cefepime 1,000 mg Intravenous Q24H Dewey Pluck, CRNP Last Rate: 1,000 mg (09/22/19 2119)   dextrose        insulin lispro 1-6 Units Subcutaneous Q6H [address] Lorenza Tobias PA-C    lidocaine-epinephrine 20 mL Infiltration Once Omar Nicole MD    metroNIDAZOLE 500 mg Intravenous Q8H Jeimy Boston MD Last Rate: 500 mg (09/22/19 1600)   norepinephrine 1-30 mcg/min Intravenous Titrated Ophelia MedicoALEXNP Last Rate: 2 mcg/min (09/22/19 1547)   polyethylene glycol 17 g Oral Daily PRN Dewey Pluck, CRNP    senna-docusate sodium 1 tablet Oral HS Dewey Pluck, CRNP    vancomycin 10 mg/kg Intravenous After Dialysis Antonina Rose MD      Continuous Infusions:  norepinephrine 1-30 mcg/min Last Rate: 2 mcg/min (09/22/19 1547)     PRN Meds:    acetaminophen 650 mg Q6H PRN   polyethylene glycol 17 g Daily PRN   vancomycin 10 mg/kg After Dialysis       Labs:   Results from last 7 days   Lab Units 09/22/19 2111 09/22/19 0412 09/21/19 2018   WBC Thousand/uL 5 76 3 08* 3 42*   HEMOGLOBIN g/dL 11 4* 10 3* 12 1   HEMATOCRIT % 35 6* 31 2* 36 0*   PLATELETS Thousands/uL 36* 32* 55*   NEUTROS PCT % 89*  --   --    BANDS PCT %  --  12* 33*   MONOS PCT % 5  --   --    MONO PCT %  --  29* 4     Results from last 7 days   Lab Units 09/22/19 2111 09/22/19 0412 09/21/19 2018   SODIUM mmol/L 129* 129* 132*   POTASSIUM mmol/L 6 1* 5 2 4 9   CHLORIDE mmol/L 88* 89* 88*   CO2 mmol/L 23 24 28   ANION GAP mmol/L 18* 16* 16*   BUN mg/dL 89* 77* 75*   CREATININE mg/dL 9 25* 9 04* 9 54*   CALCIUM mg/dL 8 5 8 5 8 9   GLUCOSE RANDOM mg/dL 60* 78 84   ALT U/L 25 9* 11*   AST U/L 25 10 14   ALK PHOS U/L 68 68 101   ALBUMIN g/dL 2 9* 2 9* 2 7*   TOTAL BILIRUBIN mg/dL 4 30* 3 60* 3 30*     Results from last 7 days   Lab Units 09/22/19 2111 09/22/19 0412   MAGNESIUM mg/dL 2 6 1 6   PHOSPHORUS mg/dL  --  5 2*      Results from last 7 days   Lab Units 09/22/19 2111 09/22/19 0412 09/21/19 2018   INR  1 52* 1 73* 1 72*   PTT seconds  --   --  36      Results from last 7 days   Lab Units 09/22/19 0412 09/22/19  0056   TROPONIN I ng/mL 0 05* 0 06*     Results from last 7 days   Lab Units 09/22/19 2111 09/22/19  0908 09/22/19 0630 09/22/19 0412 09/22/19 0056 09/21/19 2237 09/21/19 2018   LACTIC ACID mmol/L 2 1* 3 7* 3 5* 3 6* 3 7* 5 2* 6 4*     ABG:  Results from last 7 days   Lab Units 09/22/19 2152   PH ART  7 366   PCO2 ART mm Hg 36 8   PO2 ART mm Hg 91 0   HCO3 ART mmol/L 20 6*   BASE EXC ART mmol/L -4 2   ABG SOURCE  Radial, Left     VBG:  Results from last 7 days   Lab Units 09/22/19 2152 09/22/19  1051   PH JESSY   --  7 331   PCO2 JESSY mm Hg  --  38 8*   PO2 JESSY mm Hg  --  51 1*   HCO3 JESSY mmol/L  --  20 0*   BASE EXC JESSY mmol/L  --  -5 4   ABG SOURCE  Radial, Left  --      Results from last 7 days   Lab Units 09/22/19 0412 09/21/19 2237   PROCALCITONIN ng/ml 66 85* 63 11*       Diagnostic Imaging / Data: I have personally reviewed pertinent reports  EKG: Sinus tachycardia rate 105  Code Status: Level 1 - Full Code    Portions of the record may have been created with voice recognition software  Occasional wrong word or "sound a like" substitutions may have occurred due to the inherent limitations of voice recognition software  Read the chart carefully and recognize, using context, where substitutions have occurred      SIGNATURE: [place], [place]  DATE: September 23, 2019  TIME: 1:00 AM

## 2019-09-23 NOTE — ASSESSMENT & PLAN NOTE
GNR bacteremia with septic shock  -continue to follow cultures  -continue antibiotics per ID  -supportive care per critical care service  -see plan as outlined

## 2019-09-23 NOTE — SEDATION DOCUMENTATION
Right renal cyst drain placed by Dr Oliver Corona  Patient tolerated well and VSS  Culture sent to lab  Report given to RN

## 2019-09-23 NOTE — PROCEDURES
Temporary HD Catheter  Date/Time: 9/23/2019 1:33 PM  Performed by: SAM Zepeda  Authorized by: SAM Zepeda     Patient location:  Bedside  Other Assisting Provider: Yes (comment) (Dr Emily Henley)    Consent:     Consent obtained:  Written    Consent given by:  Patient and spouse    Risks discussed:  Arterial puncture, incorrect placement, bleeding, infection, nerve damage and pneumothorax  Universal protocol:     Procedure explained and questions answered to patient or proxy's satisfaction: yes      Relevant documents present and verified: yes      Test results available and properly labeled: yes      Radiology Images displayed and confirmed  If images not available, report reviewed: yes      Required blood products, implants, devices, and special equipment available: yes      Site/side marked: yes      Immediately prior to procedure, a time out was called: yes      Patient identity confirmed:  Verbally with patient, arm band and provided demographic data  Pre-procedure details:     Hand hygiene: Hand hygiene performed prior to insertion      Sterile barrier technique: All elements of maximal sterile technique followed      Skin preparation:  2% chlorhexidine    Skin preparation agent: Skin preparation agent completely dried prior to procedure    Indications:     Central line indications: medications requiring central line and dialysis    Anesthesia (see MAR for exact dosages):      Anesthesia method:  Local infiltration    Local anesthetic:  Lidocaine 1% w/o epi  Procedure details:     Location:  Right internal jugular    Vessel type: vein      Laterality:  Right    Approach: percutaneous technique used      Patient position:  Reverse Trendelenburg    Catheter type:  Triple lumen    Catheter size:  12 5 Fr    Catheter length:  16 cm    Landmarks identified: yes      Ultrasound guidance: yes      Sterile ultrasound techniques: Sterile gel and sterile probe covers were used      Number of attempts:  2    Successful placement: yes      Vessel of catheter tip end:  Cavoatrial junction  Post-procedure details:     Post-procedure:  Dressing applied and line sutured    Assessment:  Blood return through all ports, free fluid flow, no pneumothorax on x-ray and placement verified by x-ray    Post-procedure complications: none      Patient tolerance of procedure:   Tolerated well, no immediate complications    Observer: Yes (Dr Garay Antis)

## 2019-09-24 PROBLEM — Q61.3 POLYCYSTIC KIDNEY DISEASE: Status: ACTIVE | Noted: 2019-09-24

## 2019-09-24 LAB
ABO GROUP BLD BPU: NORMAL
ALBUMIN SERPL BCP-MCNC: 2.2 G/DL (ref 3.5–5)
ALP SERPL-CCNC: 83 U/L (ref 46–116)
ALT SERPL W P-5'-P-CCNC: 13 U/L (ref 12–78)
ANION GAP SERPL CALCULATED.3IONS-SCNC: 11 MMOL/L (ref 4–13)
ANION GAP SERPL CALCULATED.3IONS-SCNC: 11 MMOL/L (ref 4–13)
ANION GAP SERPL CALCULATED.3IONS-SCNC: 13 MMOL/L (ref 4–13)
ANION GAP SERPL CALCULATED.3IONS-SCNC: 14 MMOL/L (ref 4–13)
ANISOCYTOSIS BLD QL SMEAR: PRESENT
AST SERPL W P-5'-P-CCNC: 9 U/L (ref 5–45)
BASOPHILS # BLD MANUAL: 0 THOUSAND/UL (ref 0–0.1)
BASOPHILS NFR MAR MANUAL: 0 % (ref 0–1)
BILIRUB DIRECT SERPL-MCNC: 2.92 MG/DL (ref 0–0.2)
BILIRUB SERPL-MCNC: 4.3 MG/DL (ref 0.2–1)
BPU ID: NORMAL
BUN SERPL-MCNC: 45 MG/DL (ref 5–25)
BUN SERPL-MCNC: 50 MG/DL (ref 5–25)
BUN SERPL-MCNC: 52 MG/DL (ref 5–25)
BUN SERPL-MCNC: 61 MG/DL (ref 5–25)
CA-I BLD-SCNC: 1.16 MMOL/L (ref 1.12–1.32)
CA-I BLD-SCNC: 1.19 MMOL/L (ref 1.12–1.32)
CA-I BLD-SCNC: 1.21 MMOL/L (ref 1.12–1.32)
CA-I BLD-SCNC: 1.23 MMOL/L (ref 1.12–1.32)
CALCIUM SERPL-MCNC: 9.1 MG/DL (ref 8.3–10.1)
CALCIUM SERPL-MCNC: 9.1 MG/DL (ref 8.3–10.1)
CALCIUM SERPL-MCNC: 9.2 MG/DL (ref 8.3–10.1)
CALCIUM SERPL-MCNC: 9.4 MG/DL (ref 8.3–10.1)
CHLORIDE SERPL-SCNC: 100 MMOL/L (ref 100–108)
CHLORIDE SERPL-SCNC: 101 MMOL/L (ref 100–108)
CHLORIDE SERPL-SCNC: 98 MMOL/L (ref 100–108)
CHLORIDE SERPL-SCNC: 98 MMOL/L (ref 100–108)
CO2 SERPL-SCNC: 22 MMOL/L (ref 21–32)
CO2 SERPL-SCNC: 23 MMOL/L (ref 21–32)
CO2 SERPL-SCNC: 24 MMOL/L (ref 21–32)
CO2 SERPL-SCNC: 24 MMOL/L (ref 21–32)
CREAT SERPL-MCNC: 3.7 MG/DL (ref 0.6–1.3)
CREAT SERPL-MCNC: 3.76 MG/DL (ref 0.6–1.3)
CREAT SERPL-MCNC: 4.28 MG/DL (ref 0.6–1.3)
CREAT SERPL-MCNC: 5.17 MG/DL (ref 0.6–1.3)
DOHLE BOD BLD QL SMEAR: PRESENT
EOSINOPHIL # BLD MANUAL: 0 THOUSAND/UL (ref 0–0.4)
EOSINOPHIL NFR BLD MANUAL: 0 % (ref 0–6)
ERYTHROCYTE [DISTWIDTH] IN BLOOD BY AUTOMATED COUNT: 15.9 % (ref 11.6–15.1)
ERYTHROCYTE [DISTWIDTH] IN BLOOD BY AUTOMATED COUNT: 16 % (ref 11.6–15.1)
GFR SERPL CREATININE-BSD FRML MDRD: 11 ML/MIN/1.73SQ M
GFR SERPL CREATININE-BSD FRML MDRD: 14 ML/MIN/1.73SQ M
GFR SERPL CREATININE-BSD FRML MDRD: 17 ML/MIN/1.73SQ M
GFR SERPL CREATININE-BSD FRML MDRD: 17 ML/MIN/1.73SQ M
GLUCOSE SERPL-MCNC: 75 MG/DL (ref 65–140)
GLUCOSE SERPL-MCNC: 77 MG/DL (ref 65–140)
GLUCOSE SERPL-MCNC: 78 MG/DL (ref 65–140)
GLUCOSE SERPL-MCNC: 82 MG/DL (ref 65–140)
GLUCOSE SERPL-MCNC: 83 MG/DL (ref 65–140)
GLUCOSE SERPL-MCNC: 83 MG/DL (ref 65–140)
GLUCOSE SERPL-MCNC: 94 MG/DL (ref 65–140)
GLUCOSE SERPL-MCNC: 94 MG/DL (ref 65–140)
GLUCOSE SERPL-MCNC: 99 MG/DL (ref 65–140)
HAPTOGLOB SERPL-MCNC: 188 MG/DL (ref 34–200)
HCT VFR BLD AUTO: 26.8 % (ref 36.5–49.3)
HCT VFR BLD AUTO: 29 % (ref 36.5–49.3)
HGB BLD-MCNC: 9 G/DL (ref 12–17)
HGB BLD-MCNC: 9.6 G/DL (ref 12–17)
LYMPHOCYTES # BLD AUTO: 0.55 THOUSAND/UL (ref 0.6–4.47)
LYMPHOCYTES # BLD AUTO: 3 % (ref 14–44)
MACROCYTES BLD QL AUTO: PRESENT
MAGNESIUM SERPL-MCNC: 1.9 MG/DL (ref 1.6–2.6)
MAGNESIUM SERPL-MCNC: 2 MG/DL (ref 1.6–2.6)
MCH RBC QN AUTO: 33.8 PG (ref 26.8–34.3)
MCH RBC QN AUTO: 34 PG (ref 26.8–34.3)
MCHC RBC AUTO-ENTMCNC: 33.1 G/DL (ref 31.4–37.4)
MCHC RBC AUTO-ENTMCNC: 33.6 G/DL (ref 31.4–37.4)
MCV RBC AUTO: 101 FL (ref 82–98)
MCV RBC AUTO: 102 FL (ref 82–98)
METAMYELOCYTES NFR BLD MANUAL: 2 % (ref 0–1)
MONOCYTES # BLD AUTO: 1.47 THOUSAND/UL (ref 0–1.22)
MONOCYTES NFR BLD: 8 % (ref 4–12)
MRSA NOSE QL CULT: NORMAL
NEUTROPHILS # BLD MANUAL: 15.94 THOUSAND/UL (ref 1.85–7.62)
NEUTS BAND NFR BLD MANUAL: 10 % (ref 0–8)
NEUTS SEG NFR BLD AUTO: 77 % (ref 43–75)
NRBC BLD AUTO-RTO: 0 /100 WBCS
PHOSPHATE SERPL-MCNC: 2.4 MG/DL (ref 2.7–4.5)
PHOSPHATE SERPL-MCNC: 2.4 MG/DL (ref 2.7–4.5)
PHOSPHATE SERPL-MCNC: 2.5 MG/DL (ref 2.7–4.5)
PHOSPHATE SERPL-MCNC: 2.7 MG/DL (ref 2.7–4.5)
PLATELET # BLD AUTO: 25 THOUSANDS/UL (ref 149–390)
PLATELET # BLD AUTO: 42 THOUSANDS/UL (ref 149–390)
PLATELET BLD QL SMEAR: ABNORMAL
PMV BLD AUTO: 11.5 FL (ref 8.9–12.7)
PMV BLD AUTO: 12.9 FL (ref 8.9–12.7)
POTASSIUM SERPL-SCNC: 4 MMOL/L (ref 3.5–5.3)
POTASSIUM SERPL-SCNC: 4.4 MMOL/L (ref 3.5–5.3)
PROT SERPL-MCNC: 5.5 G/DL (ref 6.4–8.2)
RBC # BLD AUTO: 2.65 MILLION/UL (ref 3.88–5.62)
RBC # BLD AUTO: 2.84 MILLION/UL (ref 3.88–5.62)
SODIUM SERPL-SCNC: 133 MMOL/L (ref 136–145)
SODIUM SERPL-SCNC: 135 MMOL/L (ref 136–145)
SODIUM SERPL-SCNC: 135 MMOL/L (ref 136–145)
SODIUM SERPL-SCNC: 136 MMOL/L (ref 136–145)
TOTAL CELLS COUNTED SPEC: 100
TOXIC GRANULES BLD QL SMEAR: PRESENT
UNIT DISPENSE STATUS: NORMAL
UNIT PRODUCT CODE: NORMAL
UNIT RH: NORMAL
WBC # BLD AUTO: 18.32 THOUSAND/UL (ref 4.31–10.16)
WBC # BLD AUTO: 25.5 THOUSAND/UL (ref 4.31–10.16)

## 2019-09-24 PROCEDURE — 82330 ASSAY OF CALCIUM: CPT | Performed by: INTERNAL MEDICINE

## 2019-09-24 PROCEDURE — 90945 DIALYSIS ONE EVALUATION: CPT | Performed by: INTERNAL MEDICINE

## 2019-09-24 PROCEDURE — 93005 ELECTROCARDIOGRAM TRACING: CPT

## 2019-09-24 PROCEDURE — 80076 HEPATIC FUNCTION PANEL: CPT | Performed by: INTERNAL MEDICINE

## 2019-09-24 PROCEDURE — 85027 COMPLETE CBC AUTOMATED: CPT | Performed by: PHYSICIAN ASSISTANT

## 2019-09-24 PROCEDURE — 90945 DIALYSIS ONE EVALUATION: CPT

## 2019-09-24 PROCEDURE — 99231 SBSQ HOSP IP/OBS SF/LOW 25: CPT | Performed by: DERMATOLOGY

## 2019-09-24 PROCEDURE — 84100 ASSAY OF PHOSPHORUS: CPT | Performed by: INTERNAL MEDICINE

## 2019-09-24 PROCEDURE — 83735 ASSAY OF MAGNESIUM: CPT | Performed by: INTERNAL MEDICINE

## 2019-09-24 PROCEDURE — 99223 1ST HOSP IP/OBS HIGH 75: CPT | Performed by: PHYSICIAN ASSISTANT

## 2019-09-24 PROCEDURE — 80048 BASIC METABOLIC PNL TOTAL CA: CPT | Performed by: INTERNAL MEDICINE

## 2019-09-24 PROCEDURE — 80048 BASIC METABOLIC PNL TOTAL CA: CPT | Performed by: PHYSICIAN ASSISTANT

## 2019-09-24 PROCEDURE — 99233 SBSQ HOSP IP/OBS HIGH 50: CPT | Performed by: INTERNAL MEDICINE

## 2019-09-24 PROCEDURE — 82948 REAGENT STRIP/BLOOD GLUCOSE: CPT

## 2019-09-24 PROCEDURE — 85027 COMPLETE CBC AUTOMATED: CPT | Performed by: INTERNAL MEDICINE

## 2019-09-24 PROCEDURE — 85007 BL SMEAR W/DIFF WBC COUNT: CPT | Performed by: INTERNAL MEDICINE

## 2019-09-24 RX ORDER — MAGNESIUM SULFATE 1 G/100ML
1 INJECTION INTRAVENOUS ONCE
Status: COMPLETED | OUTPATIENT
Start: 2019-09-24 | End: 2019-09-24

## 2019-09-24 RX ORDER — METOPROLOL TARTRATE 5 MG/5ML
2.5 INJECTION INTRAVENOUS ONCE
Status: COMPLETED | OUTPATIENT
Start: 2019-09-24 | End: 2019-09-24

## 2019-09-24 RX ORDER — CHOLECALCIFEROL (VITAMIN D3) 10 MCG
1 TABLET ORAL
Status: DISCONTINUED | OUTPATIENT
Start: 2019-09-24 | End: 2019-10-03 | Stop reason: HOSPADM

## 2019-09-24 RX ADMIN — CEFTAZIDIME 2000 MG: 1 INJECTION, POWDER, FOR SOLUTION INTRAMUSCULAR; INTRAVENOUS at 14:41

## 2019-09-24 RX ADMIN — NOREPINEPHRINE BITARTRATE 7 MCG/MIN: 1 INJECTION INTRAVENOUS at 02:00

## 2019-09-24 RX ADMIN — SODIUM PHOSPHATE, MONOBASIC, MONOHYDRATE 6 MMOL: 276; 142 INJECTION, SOLUTION INTRAVENOUS at 13:17

## 2019-09-24 RX ADMIN — MAGNESIUM SULFATE HEPTAHYDRATE 1 G: 1 INJECTION, SOLUTION INTRAVENOUS at 12:34

## 2019-09-24 RX ADMIN — Medication 20000 ML: at 04:28

## 2019-09-24 RX ADMIN — DOXYCYCLINE 100 MG: 100 INJECTION, POWDER, LYOPHILIZED, FOR SOLUTION INTRAVENOUS at 23:37

## 2019-09-24 RX ADMIN — CALCIUM GLUCONATE 1 G: 98 INJECTION, SOLUTION INTRAVENOUS at 12:34

## 2019-09-24 RX ADMIN — Medication 20000 ML: at 15:57

## 2019-09-24 RX ADMIN — SENNOSIDES AND DOCUSATE SODIUM 1 TABLET: 8.6; 5 TABLET ORAL at 21:19

## 2019-09-24 RX ADMIN — CALCIUM GLUCONATE 1 G: 98 INJECTION, SOLUTION INTRAVENOUS at 06:06

## 2019-09-24 RX ADMIN — Medication 20000 ML: at 09:55

## 2019-09-24 RX ADMIN — CEFTAZIDIME 2000 MG: 1 INJECTION, POWDER, FOR SOLUTION INTRAMUSCULAR; INTRAVENOUS at 02:00

## 2019-09-24 RX ADMIN — SODIUM PHOSPHATE, MONOBASIC, MONOHYDRATE 6 MMOL: 276; 142 INJECTION, SOLUTION INTRAVENOUS at 18:12

## 2019-09-24 RX ADMIN — Medication 20000 ML: at 21:15

## 2019-09-24 RX ADMIN — Medication 1 CAPSULE: at 17:07

## 2019-09-24 RX ADMIN — METOPROLOL TARTRATE 2.5 MG: 1 INJECTION, SOLUTION INTRAVENOUS at 12:29

## 2019-09-24 RX ADMIN — DOXYCYCLINE 100 MG: 100 INJECTION, POWDER, LYOPHILIZED, FOR SOLUTION INTRAVENOUS at 11:49

## 2019-09-24 NOTE — PROGRESS NOTES
[address]  NOTE   Franklin Vizcaino 64 y o  male MRN: [id number]  Unit/Bed#:  Encounter: [id number]  Reason for Consult:  ESRD    ASSESSMENT and PLAN:  1  ESRD on hemodialysis:    · Maintenance dialysis at [address] Monday, Wednesday, Friday  · End-stage disease secondary to polycystic kidney disease  · Patient initiated on CVVHD on 09/23  · Continue current treatment rate but change prescription to 4 K/3 Calcium  Current potassium level 4 4  · Tolerating treatment-off pressors  Will run -50 mL/hour  2  Access:    · PermCath right IJ removed due to gram-negative sepsis  · Temporary catheter placed on 09/23  3  GNR septic shock: ID following  · Etiology   ? PermCath removed- culture revealed gram-negative rods  ? Chest x-ray unrevealing  ? Lower extremity cellulitis  ? Right lower quadrant cystic mass on CT   body fluid culture 2+ polys, no bacteria    ? IR- thick purulent, bloody material on aspiration  · Off pressors for approximately 1-2 hours  4  Lower extremity lesions:    · Bilateral necrotic looking lesions with large bullae-several have ruptured     · Significant arterial calcifications on CT which are circumferential in nature  ?  Calciphylaxis-neuropathy may be blunting pain  Concern for lesions related to GNR sepsis  5  Right lower quadrant cystic mass:  Cystic mass right kidney versus renal cell carcinoma  IR performing evaluation  6  Hyperkalemia:    · Potassium 4 4- switch to 4 K bath and monitor  7  Elevated T bili:  Hepatic panel pending  8  Anemia, thrombocytopenia:    · Hemoglobin 9 6- slight decline which may be somewhat dilutional   Continue to monitor  · Platelet count currently 42, received platelet transfusion yesterday  · DIC panel unrevealing  9  CKD MBD:    · Phosphorus 2 7  · Calcium Level except  · Discontinue sevelamer due to borderline low phosphorus  Continue to monitor since patient starting to take oral nourishment  10   Hyponatremia:  Likely volume mediated, improving  Volume removal per dialysis  Spoke with family member, critical care team regarding plan of care     DISPOSITION:  Continue CVVHD, adjust fluid composition, begin running patient negative    SUBJECTIVE / INTERVAL HISTORY:  No specific complaints  Patient states that the blisters on his legs broke  He describes his legs is feeling numb  Denies shortness of breath  OBJECTIVE:  Current Weight: Weight - Scale: 90 9 kg (200 lb 6 4 oz)  Vitals:    09/24/19 0500 09/24/19 0600 09/24/19 0700 09/24/19 0800   BP: 104/62 113/60 117/56 112/67   BP Location:       Pulse: (!) 107 (!) 106 (!) 108 (!) 111   Resp: 12 (!) 23 22 22   Temp:   98 °F (36 7 °C)    TempSrc:   Oral    SpO2: 95% 96% 96% 98%   Weight:  90 9 kg (200 lb 6 4 oz)     Height:           Intake/Output Summary (Last 24 hours) at 9/24/2019 1714  Last data filed at 9/24/2019 0800  Gross per 24 hour   Intake 1684 3 ml   Output 1017 ml   Net 667 3 ml     General: NAD, acutely and chronically ill-appearing  Skin: no rash  Eyes: anicteric sclera  ENT: moist mucous membrane  Neck: supple  Temporary dialysis catheter right IJ  Chest: CTA b/l, no ronchii, no wheeze, no rubs, no rales  CVS: s1s2, no murmur, no gallop, no rub  Slightly tachycardic  Abdomen: soft, nontender, nl sounds  Extremities: 2+ edema LE b/l    Black Eschar over wounds  : no meeks  Neuro: AAOX3  Psych: normal affect  Medications:    Current Facility-Administered Medications:     acetaminophen (TYLENOL) tablet 650 mg, 650 mg, Oral, Q6H PRN, SAM Loomis    cefTAZidime (FORTAZ) 2,000 mg in sodium chloride 0 9 % 50 mL IVPB, 2,000 mg, Intravenous, Q12H, Leon Ibarra MD, Stopped at 09/24/19 0700    doxycycline (VIBRAMYCIN) 100 mg in sodium chloride 0 9 % 100 mL IVPB, 100 mg, Intravenous, Q12H, Leon Ibarra MD, Stopped at 09/24/19 0700    insulin lispro (HumaLOG) 100 units/mL subcutaneous injection 1-6 Units, 1-6 Units, Subcutaneous, Q6H [facility] **AND** Fingerstick Glucose (POCT), , , Q6H, Lorenza Tobias PA-C    lidocaine-epinephrine (XYLOCAINE/EPINEPHRINE) 1 %-1:100,000 injection 20 mL, 20 mL, Infiltration, Once, Deana Sepulveda MD    norepinephrine (LEVOPHED) 4 mg (STANDARD CONCENTRATION) IV in sodium chloride 0 9% 250 mL, 1-30 mcg/min, Intravenous, Titrated, SAM Landeros, Stopped at 09/24/19 4208    NxStage K 4/Ca 3 dialysis solution (RFP-401) 20,000 mL, 20,000 mL, Dialysis, Continuous, SAM Moore    polyethylene glycol (MIRALAX) packet 17 g, 17 g, Oral, Daily PRN, SAM Long    senna-docusate sodium (SENOKOT S) 8 6-50 mg per tablet 1 tablet, 1 tablet, Oral, HS, SAM Long    sevelamer (RENAGEL) tablet 1,600 mg, 1,600 mg, Oral, TID With Meals, SAM Moore    Laboratory Results:  Results from last 7 days   Lab Units 09/24/19  0409 09/23/19  2216 09/23/19  1510 09/23/19  0445 09/23/19  0444 09/23/19  0101 09/22/19  2111 09/22/19  0412 09/21/19 2018   WBC Thousand/uL 18 32*  --   --   --  9 32  --  5 76 3 08* 3 42*   HEMOGLOBIN g/dL 9 6*  --  9 9*  --  10 4*  --  11 4* 10 3* 12 1   HEMATOCRIT % 29 0*  --   --   --  31 9*  --  35 6* 31 2* 36 0*   PLATELETS Thousands/uL 42*  --  54*  --  51*  --  36* 32* 55*   POTASSIUM mmol/L 4 4 4 8 5 7*  --  5 9* 5 8* 6 1* 5 2 4 9   CHLORIDE mmol/L 98* 96* 91*  --  89* 89* 88* 89* 88*   CO2 mmol/L 24 23 22  --  22 21 23 24 28   BUN mg/dL 61* 74* 97*  --  92* 92* 89* 77* 75*   CREATININE mg/dL 5 17* 6 61* 9 19*  --  9 06* 9 21* 9 25* 9 04* 9 54*   CALCIUM mg/dL 9 1 9 3 8 9  --  8 6 8 4 8 5 8 5 8 9   MAGNESIUM mg/dL 2 0 2 1 2 3 2 4  --   --  2 6 1 6  --    PHOSPHORUS mg/dL 2 7 3 3 4 7* 5 7*  --   --   --  5 2*  --

## 2019-09-24 NOTE — ASSESSMENT & PLAN NOTE
62-year-old male nonsmoker with a history of polycystic kidney and liver disease and ESRD on HD via chronic right subclavian tunnel catheter admitted with septic shock requiring pressors with GNR bacteremia, acute metabolic encephalopathy and BLE cellulitis w/large hemorrhagic bullae  Vascular surgery consulted to rule out acute LE ischemic process  Diagnostics:  -CTA abdomen,pelvis with bilateral iliofemoral runoff 9/21/2019:  No significant aortoiliac or femoropopliteal occlusive disease  Tibioperoneal trunk and proximal AT, PT and peroneal patent proximally but difficult to assess distal secondary to arterial calcification  + bilateral lower extremity subcutaneous edema and thickening without subcutaneous emphysema or signs of necrotizing fasciitis  Innumerable calcified hepatic and bilateral renal cysts  +RLQ 5 0 x 7 3 x 7 9 cm cystic mass along the right kidney and connected to collapse cecum  -LEVD 9/22/2019:  No acute or chronic DVT or superficial thrombophlebitis  Triphasic arterial waveforms bilaterally    -ROSALINE 9/23/19 R Triphasic waveforms at ankle; great toe pressure 84; L 1 94, unreliable, triphasic at ankle, great toe pressure 85    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    Plan:    Review of CTA lower extremity and lower extremity arterial duplex imaging studies show no evidence of large vessel vascular disease  There are triphasic ankle and met pressures  On examination, there are AT/DP signals bilaterally and motor-sensory intact  This is unlikely to be an acute limb ischemic event  No vascular intervention is indicated at this time      -Work up and treat underlying sepsis/ infectious process with ID   Currently on ATB with Fortaz and doxycycline   -s/p removal chronic R subclavian permcath   -local wound care to BLEs per general surgery and Wound Care  -continue supportive care per critical care  -continue abx per ID  -Temporary HD catheter in place as per nephrology  -The case and imaging was discussed with Dr Haydee Strong

## 2019-09-24 NOTE — ASSESSMENT & PLAN NOTE
Polycystic kidney disease with ESRD on HD MWF via chronic R subclavian permcath, s/p removal 9/22/19  -Hx of previous peritoneal dialysis, status post removal of PD catheter '16 secondary to staph infection  -patient refused AVF in past  -Temp catheter for HD as per nephrology

## 2019-09-24 NOTE — SOCIAL WORK
LOS 2 DAYS  PATIENT IS NOT A BUNDLE  PATIENT IS NOT A READMISSION  Patient currently in ICU, CM called patients spouse and review discharge plan and complete CM open  CM determined that patient was living in [address] [place] [address] in a 3 story home with about 6 steps into the home, 13 steps to 2nd floor, with bedroom on 2nd floor, and first floor 1/2 bath  Patient spouse reported that she would do anything she needed to do to get patient a good setup at home  CM reviewed with patients spouse about hospital deconditioning, PT and OT assessments, and possible need for rehab vs home therapy vs outpatient therapy pending progression and improvement  Cm also reviewed with patients spouse that pending recommendation from ID that patient could be sent home or to rehab with IV Abx vs PO pending recommendations  Patient spouse understood and informed CM that patient currently has Dialysis M-W-F at [facility] at 6am-9am [place]  Patients daughter reported that they want him to switch to [facility] dialysis center and would prefer that he change centers during this admission  CM explained to them that CM would check with nephrologist but was unclear if this could take placed during this admission  CM informed family that CM would look into this information  Patient does not have Hx of VNA or rehab  Patient spouse is HCR  Patient does not have POA papers and spouse would be interested in obtaining POA document which will be printed and placed at bedside  CM reviewed d/c planning process including the following: identifying help at home, patient preference for d/c planning needs, [facility] Meds to Bed program, availability of treatment team to discuss questions or concerns patient and/or family may have regarding understanding medications and recognizing signs and symptoms once discharged  CM also encouraged patient to follow up with all recommended appointments after discharge   Patient advised of importance for patient and family to participate in managing patients medical well being  No referrals placed at this time  Sepsis admission and CM will await ID recommendation before final discharge plan can be established department will continue to follow through discharge  CM department will follow through discharge

## 2019-09-24 NOTE — PROGRESS NOTES
Progress Note - Infectious Disease   Kit Siamiley 64 y o  male MRN: [id number]  Unit/Bed#:  Encounter: [id number]      Impression/Recommendations:  1  Septic shock, POA   Leukopenia, tachycardia, refractory hypotension  Due to #2/3  Procalcitonin remains high but trending down 66 1 > 56 57  Slowly improving, now off pressors with improving bandemia, although remains critically ill  Rec:  ? Continue antibiotics as below  ? Follow up blood cultures as below  ? Follow temperatures closely  ? Recheck CBC in AM  ? 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 Continue ceftazidime, doxycycline for now  ? Follow up final ID/susceptibilities and tailor antibiotics as indicated  ? Follow up cath tip cultures  ? Follow up repeat blood cultures      3   Purpuric skin lesions  Suspect primary cellulitis due to underlying chronic foot wounds  Appears superficial with healthy tissue under ruptrued bullae making necrotizing or deeper infection less likely  Consider secondary phenomenon to sepsis, shock  Less likely purpura fulminans given no DIC  Less likely ecthyma gangrenosum as appear quite superficial   Rec:  ? Continue antibiotics as above  ? Serial exams  ? [address] per primary     4  ESRD on HD  [facility]ia [facility]  Recent port malfunction status post exchange 8/17/19   She reports that he had recent central line catheter exchange x2 in mid August   Noted in Care Everywhere to be noncompliant with diet, HD  Rec:  ? CVVHD per Renal  ? Dose adjust antibiotics for dialysis     5   Polycystic kidney/liver disease   Extensive disease seen on CT imaging      6  Chronic foot wounds    Likely multifactorial   Appear superficial   Risk factor for infection as above  Rec:  · Needs close outpatient Podiatry follow-up  · Advised infection prevention strategies including keeping feet and wounds covered as much as possible and no environmental exposures, especially water sources  The above plan was discussed in detail with the patient, his daughter at the bedside, Renal team, and the CCM team   I have spent >40 minutes with Patient and family today in which greater than 50% of this time was spent in counseling/coordination of care regarding Diagnostic results, Intructions for management, Risk factor reductions and Impressions     Antibiotics:  Ceftazidime/Doxycycline #2  Antibiotics #4    Subjective:  Patient seen on AM rounds  Additional history obtained from patient and his daughter  He states that he went fishing with his grandkids [place] before Labor day which included wading in the water with sandals on  He does have chronic foot wounds on his left lateral foot and right hallux that were present at the time of these activities  At some point after that he had chills  On  noted in HD note that he has LLE and ankle pain for one day  24 Hour Events:  No documented fevers, chills, sweats, nausea, vomiting  Loose green stools noted  WBC up today but decreased bandemia  Off pressors  Remains on CVVHD      Objective:  Vitals:  Temp:  [97 4 °F (36 3 °C)-98 1 °F (36 7 °C)] 98 °F (36 7 °C)  HR:  [103-141] 111  Resp:  [12-34] 17  BP: ()/(53-71) 118/64  SpO2:  [92 %-99 %] 98 %  Temp (24hrs), Av 8 °F (36 6 °C), Min:97 4 °F (36 3 °C), Max:98 1 °F (36 7 °C)  Current: Temperature: 98 °F (36 7 °C)    Physical Exam:   General:  No acute distress  Eyes:  Normal lids and conjunctivae  ENT:  Normal external ears and nose  Neck:  Neck symmetric with midline trachea  Pulmonary:  Normal respiratory effort without accessory muscle use  Cardiovascular:  Regular rate and rhythm; no peripheral edema  Gastrointestinal:  No tenderness or distention  Musculoskeletal:  No digital clubbing or cyanosis  Skin:  Large partially circumferential purpura bullae of calves now ruptured with xeeroform in place  No extension of lesions  Dried scabs left lateral foot and right hallux  Neurologic:  Sensation grossly intact to light touch  Psychiatric:  Awake and alert although somewhat lethargic  Seems confused as to exact timeline of past events  Lab Results:  I have personally reviewed pertinent labs  Results from last 7 days   Lab Units 09/24/19  0409 09/23/19  2216 09/23/19  1510 09/23/19  0445  09/22/19 2111 09/22/19  0412   POTASSIUM mmol/L 4 4 4 8 5 7*  --    < > 6 1* 5 2   CHLORIDE mmol/L 98* 96* 91*  --    < > 88* 89*   CO2 mmol/L 24 23 22  --    < > 23 24   BUN mg/dL 61* 74* 97*  --    < > 89* 77*   CREATININE mg/dL 5 17* 6 61* 9 19*  --    < > 9 25* 9 04*   EGFR ml/min/1 73sq m 11 8 6  --    < > 6 6   CALCIUM mg/dL 9 1 9 3 8 9  --    < > 8 5 8 5   AST U/L  --   --   --  15  --  25 10   ALT U/L  --   --   --  19  --  25 9*   ALK PHOS U/L  --   --   --  64  --  68 68    < > = values in this interval not displayed  Results from last 7 days   Lab Units 09/24/19  0409 09/23/19  1510 09/23/19 0444 09/22/19 2111   WBC Thousand/uL 18 32*  --  9 32 5 76   HEMOGLOBIN g/dL 9 6* 9 9* 10 4* 11 4*   PLATELETS Thousands/uL 42* 54* 51* 36*     Results from last 7 days   Lab Units 09/23/19  1022 09/21/19  2019 09/21/19  2018   BLOOD CULTURE   --  Shewanella putrefaciens* Oxidase Positive gram negative pa*   GRAM STAIN RESULT  2+ Polys  No bacteria seen Gram negative rods* Gram negative rods*       Imaging Studies:   I have personally reviewed pertinent imaging study reports and images in PACS  CXR reviewed personally no pneumonia    EKG, Pathology, and Other Studies:   I have personally reviewed pertinent reports

## 2019-09-24 NOTE — ASSESSMENT & PLAN NOTE
24-year-old male nonsmoker with a history of polycystic kidney and liver disease and ESRD on HD via chronic right subclavian tunnel catheter admitted with septic shock requiring pressors with GNR bacteremia, acute metabolic encephalopathy and BLE cellulitis w/large hemorrhagic bullae  Vascular surgery consulted to rule out acute LE ischemic process  Diagnostics:  -CTA abdomen,pelvis with bilateral iliofemoral runoff 9/21/2019:  No significant aortoiliac or femoropopliteal occlusive disease  Tibioperoneal trunk and proximal AT, PT and peroneal patent proximally but difficult to assess distal secondary to arterial calcification  + bilateral lower extremity subcutaneous edema and thickening without subcutaneous emphysema or signs of necrotizing fasciitis  Innumerable calcified hepatic and bilateral renal cysts  +RLQ 5 0 x 7 3 x 7 9 cm cystic mass along the right kidney and connected to collapse cecum  -LEVD 9/22/2019:  No acute or chronic DVT or superficial thrombophlebitis  Triphasic arterial waveforms bilaterally    -ROSALINE 9/23/19 R Triphasic waveforms at ankle; great toe pressure 84; L 1 94, unreliable, triphasic at ankle, great toe pressure 85    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    Plan:    Review of CTA lower extremity and lower extremity arterial duplex imaging studies show no evidence of large vessel vascular disease  There are triphasic ankle and met pressures  On examination, there are AT/DP signals bilaterally and motor-sensory intact  This is unlikely to be an acute limb ischemic event  No vascular intervention is indicated at this time      -Work up and treat underlying sepsis/ infectious process with ID   Currently on ATB with Fortaz and doxycycline   -s/p removal chronic R subclavian permcath   -local wound care to BLEs per general surgery and Wound Care  -continue supportive care per critical care  -continue abx per ID  -Temporary HD catheter in place as per nephrology  -The case and imaging was discussed with Dr Suzanne Eason

## 2019-09-24 NOTE — PROGRESS NOTES
Progress Note - Critical Care   Charlotte Memos 64 y o  male MRN: [id number]  Unit/Bed#:  Encounter: [id number]    Attending Physician: Gaurang De Leon MD    _____________________________________________________________________  Assessment and Plan:   Principal Problem:    Septic shock [facility]  Active Problems:    Cellulitis of lower extremity- bilateral    High anion gap metabolic acidosis    Lactic acidosis    Chronic kidney disease with end stage renal failure on dialysis ([facility] [address] )    Hyponatremia    Total bilirubin, elevated    Polycystic liver disease    Thrombocytopenia (HCC)    Gram-negative bacteremia  Resolved Problems:    * No resolved hospital problems   *    Neuro  -No acute issues  -[facility] ICU  -Delirium precautions  -Regulate sleep/wake cycle    Cardiovascular  Septic Shock 2/2 to GNR Bacteremia  -Continue on levephed, wean as able for MAP >65  -Echo with EF 20%, if bacteremia persists - will need TTE  -Motor/sensory intact in bilateral feet    Respiratory  -No acute issues  -Stable on room air    GI  Hyperbilirubinemia  -Given normal LFT's, do not suspect obstructive pathology, favoring hemolysis in the setting of sepsis   -Check hemolysis studies  -Keep NPO for now  -Last BM 9/24    Renal//FEN  ESRD on HD (MWF) 2/2 PKD   -Npehrology following - input apprecaited  -Permacath removed given possible source of infection; temporary HD cath placed   -CVVH initiated given hypotension and likely inability to tolerate regular HD  -Removed himself from transplant list for "personal issues"  Possible Infected Renal Cyst  -S/P drain placement by IR into cyst near cecum     Infectious Disease  Septic Shock 2/2 to GNR Bacteremia  Bandemia  -Suspected source: infected HD catheter, which has been removed, vs infected renal cyst vs cellulitis  -Infectious Disease following - input appreciated    -Continue ceftazidime, and doxycycline   -Follow cultures     Heme/Onc  Thrombocytopenia   -Likely 2/2 to GNR sepsis, no evidence of bleeding; tolerated procedures well   -S/P 1 unit platelets given for permacath removal  -DIC panel unremarkable, hemolysis smear without schistocytes, retic count elevated, LD unremarkable, haptoglobin pending   -Trend, replace if <50   Macrocytic Anemia   -Check B12, folate WNL    Endocrine  -No acute issues    Musculoskeletal/Derm  Hemorrhagic Bullous LE Lesions  -Suspected skin manifestation of GNR sepsis  -Per surgery, does not appear to be infected  -Vascular Surgery following - input appreciated; anticipate LEAD's to be completed today  -Consult podiatry for wound management, and potential follow up along with Vascular Surgery  -Frequent turns and repositioning    Disposition: Continue ICU level of care 2/2 to [facility]     Code Status: Level 1 - Full Code    Counseling / Coordination of Care  Total time spent today 39 minutes  Greater than 50% of total time was spent with the patient and / or family counseling and / or coordination of care    ______________________________________________________________________    24 Hour Events: Mr Itzel Christian was examined bedisde this AM, with no acute events overnight  Review of Systems   Constitutional: Negative for fever  HENT: Negative for nosebleeds  Eyes: Negative for redness  Respiratory: Negative for stridor  Cardiovascular: Negative for chest pain  Gastrointestinal: Negative for abdominal pain  Genitourinary: Negative for hematuria  Skin: Negative for pallor  Neurological: Negative for seizures  _____________________________________________________________________    Physical Exam:   Physical Exam   Constitutional: He appears well-developed and well-nourished  He is cooperative  HENT:   Head: Normocephalic and atraumatic  Mouth/Throat: Oropharynx is clear and moist and mucous membranes are normal    Eyes: Pupils are equal, round, and reactive to light  Conjunctivae are normal    Neck: Neck supple     Cardiovascular: Regular rhythm, normal heart sounds and intact distal pulses  Tachycardia present  Exam reveals no gallop and no friction rub  No murmur heard  Pulmonary/Chest: Effort normal and breath sounds normal  He has no wheezes  He has no rhonchi  He has no rales  Abdominal: Soft  Normal appearance and bowel sounds are normal  He exhibits no distension  There is no tenderness  Musculoskeletal: He exhibits edema  Neurological: He is alert  Skin: Skin is warm and dry  Nursing note and vitals reviewed  _____________________________________________________________________  Vitals:    19 0253 19 0300 19 0400 19 0500   BP: 110/60 110/61 115/64 104/62   BP Location: Left arm      Pulse: (!) 110 (!) 109 (!) 110 (!) 107   Resp: 18  12   Temp: 97 9 °F (36 6 °C)      TempSrc: Oral      SpO2: 95% 93% 96% 95%   Weight:       Height:         Temperature:   Temp (24hrs), Av 7 °F (36 5 °C), Min:97 4 °F (36 3 °C), Max:98 1 °F (36 7 °C)    Current Temperature: 97 9 °F (36 6 °C)     Weights:   IBW: 68 4 kg    Body mass index is 30 26 kg/m²  Weight (last 2 days)     Date/Time   Weight    19 0600   90 3 (199)    19 0600   86 8 (191 36)            Non-Invasive/Invasive Ventilation Settings:  Respiratory    Lab Data (Last 4 hours)    None         O2/Vent Data (Last 4 hours)    None              SpO2: SpO2: 95 %     Intake and Outputs:  I/O        07 -  0700 701 -  07    P  O   0    I V  (mL/kg) 263 5 (2 9) 535 2 (5 9)    Blood  236    NG/GT  0    IV Piggyback 1200 498 3    Total Intake(mL/kg) 1463 5 (16 2) 1269 5 (14 1)    Urine (mL/kg/hr) 0 (0) 0 (0)    Drains  12    Other  586    Stool  0    Total Output 0 598    Net +1463 5 +671 5          Unmeasured Stool Occurrence  3 x        Nutrition:        Diet Orders   (From admission, onward)             Start     Ordered    19 0721  Diet NPO; Sips of clear liquids  Diet effective now     Question Answer Comment   Diet Type NPO NPO Except: Sips of clear liquids    RD to adjust diet per protocol?  Yes        09/23/19 0721    09/22/19 0648  Room Service  Once     Question:  Type of Service  Answer:  Room Service-Appropriate    09/22/19 0871              Labs:   Results from last 7 days   Lab Units 09/24/19  0409 09/23/19  1510 09/23/19  0444 09/22/19 2111 09/22/19 0412 09/21/19 2018   WBC Thousand/uL 18 32*  --  9 32 5 76 3 08* 3 42*   HEMOGLOBIN g/dL 9 6* 9 9* 10 4* 11 4* 10 3* 12 1   HEMATOCRIT % 29 0*  --  31 9* 35 6* 31 2* 36 0*   PLATELETS Thousands/uL 42* 54* 51* 36* 32* 55*   NEUTROS PCT %  --   --   --  89*  --   --    BANDS PCT % 10*  --  35*  --  12* 33*   MONOS PCT %  --   --   --  5  --   --    MONO PCT % 8  --  7  --  29* 4     Results from last 7 days   Lab Units 09/24/19  0409 09/23/19  2216 09/23/19  1510 09/23/19  0445 09/23/19  0444 09/23/19  0101 09/22/19 2111 09/22/19 0412 09/21/19 2018   SODIUM mmol/L 133* 132* 128*  --  127* 127* 129* 129* 132*   POTASSIUM mmol/L 4 4 4 8 5 7*  --  5 9* 5 8* 6 1* 5 2 4 9   CHLORIDE mmol/L 98* 96* 91*  --  89* 89* 88* 89* 88*   CO2 mmol/L 24 23 22  --  22 21 23 24 28   ANION GAP mmol/L 11 13 15*  --  16* 17* 18* 16* 16*   BUN mg/dL 61* 74* 97*  --  92* 92* 89* 77* 75*   CREATININE mg/dL 5 17* 6 61* 9 19*  --  9 06* 9 21* 9 25* 9 04* 9 54*   CALCIUM mg/dL 9 1 9 3 8 9  --  8 6 8 4 8 5 8 5 8 9   ALT U/L  --   --   --  19  --   --  25 9* 11*   AST U/L  --   --   --  15  --   --  25 10 14   ALK PHOS U/L  --   --   --  64  --   --  68 68 101   ALBUMIN g/dL  --   --   --  2 7*  --   --  2 9* 2 9* 2 7*   TOTAL BILIRUBIN mg/dL  --   --   --  3 90*  --   --  4 30* 3 60* 3 30*     Results from last 7 days   Lab Units 09/24/19  0409 09/23/19 2216 09/23/19  1510 09/23/19 0445 09/22/19 2111 09/22/19 0412   MAGNESIUM mg/dL 2 0 2 1 2 3 2 4 2 6 1 6   PHOSPHORUS mg/dL 2 7 3 3 4 7* 5 7*  --  5 2*      Results from last 7 days   Lab Units 09/23/19 0445 09/22/19 2111 09/22/19 0412 09/21/19 2018 INR  1 50*  1 55* 1 52* 1 73* 1 72*   PTT seconds 37  --   --  36     Results from last 7 days   Lab Units 09/22/19  0412 09/22/19  0056   TROPONIN I ng/mL 0 05* 0 06*     Results from last 7 days   Lab Units 09/23/19  0629 09/22/19  2111 09/22/19  0908 09/22/19  0630 09/22/19  0412 09/22/19  0056 09/21/19  2237   LACTIC ACID mmol/L 1 9 2 1* 3 7* 3 5* 3 6* 3 7* 5 2*     ABG:  Lab Results   Component Value Date    PHART 7 366 09/22/2019    ENB9HSC 36 8 09/22/2019    PO2ART 91 0 09/22/2019    EUH5HPL 20 6 (L) 09/22/2019    BEART -4 2 09/22/2019    SOURCE Radial, Left 09/22/2019     VBG:  Results from last 7 days   Lab Units 09/22/19  2152 09/22/19  1051   PH JESSY   --  7 331   PCO2 JESSY mm Hg  --  38 8*   PO2 JESSY mm Hg  --  51 1*   HCO3 JESSY mmol/L  --  20 0*   BASE EXC JESSY mmol/L  --  -5 4   ABG SOURCE  Radial, Left  --      Results from last 7 days   Lab Units 09/23/19  0445 09/22/19  0412 09/21/19  2237   PROCALCITONIN ng/ml 56 56* 66 85* 63 11*     Imaging:  I have personally reviewed pertinent reports  and I have personally reviewed pertinent films in PACS     EKG: Telemetry reviewed       Micro:  Results from last 7 days   Lab Units 09/23/19  1022 09/21/19  2019 09/21/19  2018   BLOOD CULTURE   --  Shewanella putrefaciens* Oxidase Positive gram negative pa*   GRAM STAIN RESULT  2+ Polys  No bacteria seen Gram negative rods* Gram negative rods*     Allergies: No Known Allergies     Medications:   Scheduled Meds:    Current Facility-Administered Medications:  acetaminophen 650 mg Oral Q6H PRN SAM Forde    calcium gluconate 1 G  100 mL IVPB 1 g Intravenous Once Gaurang De Leon MD    cefTAZidime 2,000 mg Intravenous Q12H Deidra Lara MD Last Rate: 2,000 mg (09/24/19 0200)   doxycycline 100 mg Intravenous Q12H Deidra Lara MD Last Rate: 100 mg (09/23/19 3448)   insulin lispro 1-6 Units Subcutaneous Q6H [address] Lorenza Tobias PA-C    lidocaine-epinephrine 20 mL Infiltration Once Jarvis Avelar MD norepinephrine 1-30 mcg/min Intravenous Titrated SAM Bernard Last Rate: 6 mcg/min (09/24/19 0502)   NxStage K 2/Ca 3 20,000 mL Dialysis Continuous Bibi Mccord MD    polyethylene glycol 17 g Oral Daily PRN SAM Dc    senna-docusate sodium 1 tablet Oral HS SAM Dc    sevelamer 1,600 mg Oral TID With Meals SAM Villa      Continuous Infusions:    norepinephrine 1-30 mcg/min Last Rate: 6 mcg/min (09/24/19 0502)   NxStage K 2/Ca 3 20,000 mL      PRN Meds:    acetaminophen 650 mg Q6H PRN   polyethylene glycol 17 g Daily PRN     VTE Pharmacologic Prophylaxis: Pharmacologic VTE Prophylaxis contraindicated due to thrombocytopenia  VTE Mechanical Prophylaxis: reason for no mechanical VTE prophylaxis large bilateral LE wounds     Invasive lines and devices: Invasive Devices     Peripheral Intravenous Line            Peripheral IV 09/21/19 Left Arm 2 days    Peripheral IV 09/21/19 Right Antecubital 2 days    Peripheral IV 09/23/19 Left Forearm 1 day          Arterial Line            Arterial Line 09/22/19 2 days          Line            Temporary HD Catheter less than 1 day          Drain            Closed/Suction Drain Right RUQ Bulb 8 5 Fr  less than 1 day              Portions of the record may have been created with voice recognition software  Occasional wrong word or "sound a like" substitutions may have occurred due to the inherent limitations of voice recognition software  Read the chart carefully and recognize, using context, where substitutions have occurred      Vasquez Adamson PA-C

## 2019-09-24 NOTE — PROGRESS NOTES
Progress Note - Carroll Wisdom 1960, 64 y o  male MRN: [id number]    Unit/Bed#:  Encounter: [id number]    Primary Care Provider: Annamarie Echols MD   Date and time admitted to hospital: 9/21/2019  7:56 PM    * Septic shock [facility])  Assessment & Plan  51-year-old male nonsmoker with a history of polycystic kidney and liver disease and ESRD on HD via chronic right subclavian tunnel catheter admitted with septic shock requiring pressors with GNR bacteremia, acute metabolic encephalopathy and BLE cellulitis w/large hemorrhagic bullae  Vascular surgery consulted to rule out acute LE ischemic process  Diagnostics:  -CTA abdomen,pelvis with bilateral iliofemoral runoff 9/21/2019:  No significant aortoiliac or femoropopliteal occlusive disease  Tibioperoneal trunk and proximal AT, PT and peroneal patent proximally but difficult to assess distal secondary to arterial calcification  + bilateral lower extremity subcutaneous edema and thickening without subcutaneous emphysema or signs of necrotizing fasciitis  Innumerable calcified hepatic and bilateral renal cysts  +RLQ 5 0 x 7 3 x 7 9 cm cystic mass along the right kidney and connected to collapse cecum  -LEVD 9/22/2019:  No acute or chronic DVT or superficial thrombophlebitis  Triphasic arterial waveforms bilaterally    -ROSALINE 9/23/19 R Triphasic waveforms at ankle; great toe pressure 84; L 1 94, unreliable, triphasic at ankle, great toe pressure 85    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    Plan:    Review of CTA lower extremity and lower extremity arterial duplex imaging studies show no evidence of large vessel vascular disease  There are triphasic ankle and met pressures  On examination, there are AT/DP signals bilaterally and motor-sensory intact  This is unlikely to be an acute limb ischemic event  No vascular intervention is indicated at this time      -Work up and treat underlying sepsis/ infectious process with ID   Currently on ATB with Juan David Vaughan and doxycycline   -s/p removal chronic R subclavian permcath   -local wound care to BLEs per general surgery and Wound Care  -continue supportive care per critical care  -continue abx per ID  -Temporary HD catheter in place as per nephrology  -The case and imaging was discussed with Dr Bobby Ybarra  Gram-negative bacteremia  Assessment & Plan  GNR bacteremia with septic shock  -continue to follow cultures  -continue antibiotics per ID  -supportive care per critical care service  -see plan as outlined    Cellulitis of lower extremity- bilateral  Assessment & Plan  -continue antibiotics per ID  -continue local wound care per Wound Care  -continue supportive care related to sepsis  -see plan as outlined    Chronic kidney disease with end stage renal failure on dialysis [facility])  Assessment & Plan  Polycystic kidney disease with ESRD on HD MWF via chronic R subclavian permcath, s/p removal 9/22/19  -Hx of previous peritoneal dialysis, status post removal of PD catheter '16 secondary to staph infection  -patient refused AVF in past  -Temp catheter for HD as per nephrology  Subjective:    No new complaints  Patient offers that he has been to [facility] and waking in water recently  He had had subjective fever  He suddenly developed marked discoloration of legs and bullae in the past 3 days  Vitals:  /62   Pulse 105   Temp 98 °F (36 7 °C) (Oral)   Resp 18   Ht 5' 8" (1 727 m)   Wt 90 9 kg (200 lb 6 4 oz)   SpO2 99%   BMI 30 47 kg/m²     I/Os:  I/O last 3 completed shifts: In: 2438 1 [I V :855 7; Blood:236; IV Piggyback:1346 3]  Out: 960 [Drains:12; Other:948]  I/O this shift:  In: 849 [P O :240;  I V :10; NG/GT:5; IV Piggyback:413]  Out: 732 [Other:732]    Lab Results and Cultures:   Lab Results   Component Value Date    WBC 18 32 (H) 09/24/2019    HGB 9 6 (L) 09/24/2019    HCT 29 0 (L) 09/24/2019     (H) 09/24/2019    PLT 42 (LL) 09/24/2019     Lab Results   Component Value Date CALCIUM 9 2 09/24/2019    K 4 0 09/24/2019    CO2 24 09/24/2019     09/24/2019    BUN 45 (H) 09/24/2019    CREATININE 3 70 (H) 09/24/2019     Lab Results   Component Value Date    INR 1 55 (H) 09/23/2019    INR 1 50 (H) 09/23/2019    INR 1 52 (H) 09/22/2019    PROTIME 17 8 (H) 09/23/2019    PROTIME 17 4 (H) 09/23/2019    PROTIME 17 6 (H) 09/22/2019        Blood Culture:   Lab Results   Component Value Date    BLOODCX No Growth at 24 hrs  09/23/2019   ,   Urinalysis: No results found for: Vikas Roses, SPECGRAV, PHUR, LEUKOCYTESUR, NITRITE, PROTEINUA, GLUCOSEU, KETONESU, BILIRUBINUR, BLOODU,   Urine Culture: No results found for: URINECX,   Wound Culure: No results found for: WOUNDCULT    Medications:  Current Facility-Administered Medications   Medication Dose Route Frequency    acetaminophen (TYLENOL) tablet 650 mg  650 mg Oral Q6H PRN    b complex-vitamin C-folic acid (NEPHROCAPS) capsule 1 capsule  1 capsule Oral Daily With Dinner    cefTAZidime (FORTAZ) 2,000 mg in sodium chloride 0 9 % 50 mL IVPB  2,000 mg Intravenous Q12H    doxycycline (VIBRAMYCIN) 100 mg in sodium chloride 0 9 % 100 mL IVPB  100 mg Intravenous Q12H    insulin lispro (HumaLOG) 100 units/mL subcutaneous injection 1-6 Units  1-6 Units Subcutaneous Q6H [facility]    lidocaine-epinephrine (XYLOCAINE/EPINEPHRINE) 1 %-1:100,000 injection 20 mL  20 mL Infiltration Once    norepinephrine (LEVOPHED) 4 mg (STANDARD CONCENTRATION) IV in sodium chloride 0 9% 250 mL  1-30 mcg/min Intravenous Titrated    NxStage K 4/Ca 3 dialysis solution (RFP-401) 20,000 mL  20,000 mL Dialysis Continuous    polyethylene glycol (MIRALAX) packet 17 g  17 g Oral Daily PRN    senna-docusate sodium (SENOKOT S) 8 6-50 mg per tablet 1 tablet  1 tablet Oral HS       Imaging:      VAS ROSALINE 9/23/19  CONCLUSION:     Impression  RIGHT LOWER LIMB  Unable to obtain ROSALINE's due to large blisters on patient's foot and ankle  PPG/PVR Tracings are dampened    Triphasic waveforms at the ankle  Great Toe Pressure: 84mmHg  within the healing range  LEFT LOWER LIMB  Ankle/Brachial Index: 1 94, unreliable  PPG/PVR Tracings are dampened  Triphasic waveforms at the ankle  Great Toe Pressure: 85mmHgwithin the healing range  Limited study due to large blisters and wounds  CTA abd with runoff 9/21/19  IMPRESSION:     1  Moderate peripheral arterial disease with patent vasculature up to the level of the proximal calves  Evaluation of distal calf arteries are limited due to circumferential calcification  2   Skin thickening and subcutaneous edema of the lower legs which may be due to cellulitis versus peripheral arterial disease  No discrete fluid collection to suggest abscess  No subcutaneous emphysema  3   Polycystic kidney and liver disease with innumerable cysts some which are calcified and hemorrhagic  4   Right lower quadrant cystic mass with thickened walls, calcifications, and heterogeneous hyperdensity which may represent enhancement versus hemorrhage  There is contact with the right kidney and cecum  Differential diagnosis includes infected   right renal cyst versus renal cell carcinoma  Differential also includes appendiceal mass such as mucinous cystadenocarcinoma  Further evaluation with ultrasound or MRI of the abdomen may be of value  5   Submucosal fat deposition within the mildly thickened urinary bladder wall may be due to chronic cystitis  Correlate with urinalysis  6   Distended stomach with fluid and food debris  No small bowel obstruction  Diverticulosis without evidence of diverticulitis  7   Right inguinal hernia with associated large right hydrocele          Physical Exam:    General appearance: ill appearing; receiving cvvhd; aa+O  Skin: lesions as below  Neurologic: Grossly normal  Head: Normocephalic, without obvious abnormality, atraumatic  Eyes: ? Sl icteric  Neck: no carotid bruit, no JVD and supple, symmetrical, trachea midline  Lungs: decreased BS  Heart: regular rate and rhythm, S1, S2 normal, no murmur, click, rub or gallop  Abdomen: soft, non-tender; bowel sounds normal; no masses,  no organomegaly  Extremities: Large bullae to bilateral lower extremities  Purple discoloration  Wounds to both lower extremities with large bullae as shown below  Many blisters have broken      2+ Radial pulses  There are AT/DP/PT signals present bilaterally            RIGHT    RIGHT        LEFT        LEFT        Mary Jane Cochran PA-C  9/24/2019  [facility]

## 2019-09-24 NOTE — PROGRESS NOTES
Vancomycin IV Pharmacy-to-Dose Consultation    Vanda Sharma is a 64 y o  male who was receiving Vancomycin IV with management by the Pharmacy Consult service for treatment of skin-soft tissue infection  The patient's Vancomycin therapy has been completed / discontinued  Thank you for allowing us to take part in this patient's care  Pharmacy will sign-off now; please call or re-consult if there are any questions          Eric Zamora, PharmD  Pharmacist

## 2019-09-24 NOTE — CONSULTS
Consultation -Dermatology Disease   Shell Manzano 64 y o  male MRN: [id number]  Unit/Bed#:  Encounter: [id number]      Assessment/Plan     Assessment:  Bullous cellulitis with sepsis, suspected gram negative on blood culture  Historically patient has had 5 year history of chronic dialysis with no prior skin diease  Had a few minor sores leg that quickly became much worse after wading in brackish water  with his grand kids at [facility]  Presented in septic shock, now stabilized and gram negative, shewanella putrefaciens, is growing from blood cultures  Although unusual this gram negative from marine enviroment can produce soft tissue and systemic infection  He appear to be responding to treatment        Plan:  Bulla appear to be marginating with viable base  Wound care as outline is appropiate as well as systemic antibiotic  I suspect skin was primary entry site of sepsis  Would not biopsy skin at this time as santiago as responding   This does NOT appear to be the dermatologic picture of other skin processed such as calciphylaxis or other vascular infarctive process  History of Present Illness   Physician Requesting Consult: Jono Plata MD  Reason for Consult / Principal Problem: cellulitis, atypical  Hx and PE limited by: exam legs only   HPI: Shell Manzano is a 64y o  year old male who presents withHistorically patient has had 5 year history of chronic dialysis with no prior skin diease  Had a few minor sores leg that quickly became much worse after wading in brackish water with his grand kids at [facility]  Presented in septic shock, now stabilized and gram negative is growing from  Blood culture      Consults    Review of Systems negative for significant pain and prior skin disease      Historical Information   Past Medical History:   Diagnosis Date    Complication of renal dialysis     Polycystic kidney disease      Past Surgical History:   Procedure Laterality Date    IR IMAGE GUIDED ASPIRATION / DRAINAGE  9/23/2019     Social History   Social History     Substance and Sexual Activity   Alcohol Use Not on file     Social History     Substance and Sexual Activity   Drug Use Not on file     Social History     Tobacco Use   Smoking Status Never Smoker   Smokeless Tobacco Never Used     Family History: non-contributory    Meds/Allergies   all current active meds have been reviewed    No Known Allergies    Objective       Intake/Output Summary (Last 24 hours) at 9/24/2019 1908  Last data filed at 9/24/2019 1812  Gross per 24 hour   Intake 1593 11 ml   Output 2089 ml   Net -495 89 ml       Invasive Devices:   Peripheral IV 09/21/19 Right Antecubital (Active)   Site Assessment Clean;Dry; Intact 9/24/2019 12:00 PM   Dressing Type Transparent 9/24/2019 12:00 PM   Line Status Flushed;Saline locked 9/24/2019 12:00 PM   Dressing Status Clean;Dry; Intact 9/24/2019 12:00 PM   Dressing Change Due 09/25/19 9/23/2019  8:00 AM       Peripheral IV 09/21/19 Left Arm (Active)   Site Assessment Clean;Dry; Intact 9/24/2019 12:00 PM   Dressing Type Transparent 9/24/2019 12:00 PM   Line Status Flushed;Saline locked 9/24/2019 12:00 PM   Dressing Status Clean;Dry; Intact 9/24/2019 12:00 PM   Dressing Change Due 09/25/19 9/23/2019  8:00 AM       Peripheral IV 09/23/19 Left Forearm (Active)   Site Assessment Clean;Dry; Intact 9/24/2019 12:00 PM   Dressing Type Transparent 9/24/2019 12:00 PM   Line Status No blood return;Flushed;Saline locked 9/24/2019 12:00 PM   Dressing Status Clean;Dry; Intact 9/24/2019 12:00 PM   Dressing Change Due 09/27/19 9/23/2019  8:00 AM       Closed/Suction Drain Right RUQ Bulb 8 5 Fr  (Active)   Site Description Healing 9/24/2019 12:00 PM   Dressing Status Old drainage 9/24/2019 12:00 PM   Drainage Appearance Bloody;Purulent 9/24/2019 12:00 PM   Status To bulb suction 9/24/2019 12:00 AM   Intake (mL) 0 mL 9/24/2019  2:00 PM   Output (mL) 0 mL 9/24/2019  2:00 PM       Arterial Line 09/22/19 (Active)   Site Assessment Clean;Dry; Intact 9/24/2019 12:00 PM   Line Status Pulsatile blood flow 9/24/2019 12:00 PM   Art Line Waveform Appropriate;Square wave test performed 9/24/2019 12:00 PM   Art Line Interventions Zeroed and calibrated; Leveled; Flushed per protocol 9/24/2019 12:00 PM   Color/Movement/Sensation Capillary refill greater than 3 sec;Dusky fingers/toes 9/24/2019 12:00 PM   Dressing Type Chlorhexidine dressing 9/24/2019 12:00 PM   Dressing Status Clean;Dry; Intact 9/24/2019 12:00 PM   Dressing Change Due 09/29/19 9/23/2019  8:00 AM       Temporary HD Catheter (Active)   Reasons to continue HD Cath Treatment Therapy 9/24/2019  5:49 AM   Goal for Removal D/C when blood cultures cleared and permacath placed 9/24/2019  5:49 AM   Line Necessity Reviewed Yes, reviewed with provider 9/24/2019  3:00 PM   Site Assessment Clean; Intact;Dry 9/24/2019  3:00 PM   Proximal Lumen ([place] Port-PICC only) Status Infusing 9/24/2019  3:00 PM   Distal Lumen ([place]-PICC only) Status Infusing 9/24/2019  3:00 PM   Dressing Type Chlorhexidine dressing 9/24/2019  3:00 PM   Dressing Status Clean;Dry; Intact 9/24/2019  3:00 PM       Physical Exam    Lab Results:   CBC:   Lab Results   Component Value Date    WBC 18 32 (H) 09/24/2019    RBC 2 84 (L) 09/24/2019     Imaging Studies: I have personally reviewed pertinent reports  EKG, Pathology, and Other Studies: I have personally reviewed pertinent reports  Bulla both legs half unroofed  Clean base on most though some echymmotic change at base  No anesthesia  Multiple erosions up to 10 cm    Plantar surface of feet not involved  Calf and both thighs with bullous erosions

## 2019-09-24 NOTE — CONSULTS
Consult - [address] Shah[address] Banerjee  64 y o  male MRN: [id number]  Unit/Bed#:  Encounter: [id number]    Assessment/Plan     Assessment:  1  Bulla of unknown etiology to bilateral lower extremities  2  Cellulitis of bilateral lower extremities  3  Polycystic kidney disease    Plan:  - after verbal consent was obtained, attention was directed to the right leg where there were noted to be 5 tense remaining bullae  Multiple other bulla have ruptured and have left skin which is desquamating superficially  The bullae were cleansed as needed and incision with a 11 blade  Copious amounts of straw colored serous fluid emanating from the bullae  The roof was left intact  - These bullae have an unclear etiology, he has reported similar issues in the past, but not near as extensive  States that he has incised some of them himself in the past as well  -Dressing comprised of xerform, dsd lightly compressive ACE wraps was applied to the b/l LE  Change qd  - Will check on 9/26, will likely need debridement of superficial desquamating skin at bedside    - continue [address] while in house  - await dermatology input    History of Present Illness     HPI:  Carroll Wisdom is a 64 y o  male who presents with multiple extensive bilateral bullae to the bilateral lower extremities  The left have been all nearly de roofed but roof was intact upon hospital admission  He has been having Xeroform applied to bilateral extremities daily  Notes that he has had this issue in the past, and notes go swimming prior to getting sick with 2 wounds on his feet  He has not been seen previously for this bulla formation    Consults  Review of Systems   Constitutional: Negative  HENT: Negative  Eyes: Negative  Respiratory: Negative  Cardiovascular: Negative  Gastrointestinal: Negative  Musculoskeletal:  Week   Skin:  As above   Neurological: Negative  Psych: negative         Historical Information   Past Medical History: Diagnosis Date    Complication of renal dialysis     Polycystic kidney disease      Past Surgical History:   Procedure Laterality Date    IR IMAGE GUIDED ASPIRATION / DRAINAGE  9/23/2019     Social History   Social History     Substance and Sexual Activity   Alcohol Use Not on file     Social History     Substance and Sexual Activity   Drug Use Not on file     Social History     Tobacco Use   Smoking Status Never Smoker   Smokeless Tobacco Never Used     Family History: History reviewed  No pertinent family history      Meds/Allergies   Medications Prior to Admission   Medication    calcium acetate (PHOSLO) 667 mg capsule    Multiple Vitamin (MULTIVITAMIN) tablet    NON FORMULARY    Sucroferric Oxyhydroxide (VELPHORO PO)     No Known Allergies    Objective   First Vitals:   Blood Pressure: 98/62 (09/21/19 1959)  Pulse: (!) 124 (09/21/19 1959)  Temperature: 98 8 °F (37 1 °C) (09/21/19 2020)  Temp Source: Oral (09/21/19 2020)  Respirations: 20 (09/21/19 1959)  Height: 5' 8" (172 7 cm) (09/21/19 1959)  Weight - Scale: 85 9 kg (189 lb 6 oz) (09/21/19 1959)  SpO2: 99 % (09/21/19 1959)    Current Vitals:   Blood Pressure: 108/70 (09/24/19 1700)  Pulse: (!) 108 (09/24/19 1700)  Temperature: 98 °F (36 7 °C) (09/24/19 0700)  Temp Source: Oral (09/24/19 0700)  Respirations: 16 (09/24/19 1700)  Height: 5' 8" (172 7 cm) (09/23/19 0949)  Weight - Scale: 90 9 kg (200 lb 6 4 oz) (09/24/19 0600)  SpO2: 99 % (09/24/19 1700)        /70   Pulse (!) 108   Temp 98 °F (36 7 °C) (Oral)   Resp 16   Ht 5' 8" (1 727 m)   Wt 90 9 kg (200 lb 6 4 oz)   SpO2 99%   BMI 30 47 kg/m²      General Appearance:    Alert, cooperative, no distress   Head:    Normocephalic, without obvious abnormality, atraumatic       Nose:   Moist mucous membranes   Neck:   Supple, symmetrical, trachea midline   Back:     Symmetric   Lungs:     Respirations unlabored   Heart:    Positive S1/S2   Abdomen:     Soft, non-tender   Extremities:   Manual muscle testing is weakened to bilateral lower extremities  Pulses:   DP is palpable bilaterally, PT is lightly palpable   Skin:   There are multiple full a across the bilateral lower extremities extending from the dorsal feet to the tibial tuberosity  There also of note is a more proximally along the inner aspect of the left thigh  All the bulla have the same similar presentation with hemorrhagic appearance and upon incision serous fluid emanating from the bulla  Cellulitis of the left leg has improved and is minimally warm to touch  Neurologic:   Gross sensation is intact  Protective sensation is intact  Lab Results:   No results displayed because visit has over 200 results  Results from last 7 days   Lab Units 09/23/19  1022 09/21/19 2019 09/21/19 2018   GRAM STAIN RESULT  2+ Polys  No bacteria seen Gram negative rods* Gram negative rods*       Results from last 7 days   Lab Units 09/23/19  0505 09/23/19  0443 09/21/19 2019   BLOOD CULTURE  No Growth at 24 hrs  No Growth at 24 hrs  Shewanella putrefaciens*       Invalid input(s): LABAEARO            Imaging: I have personally reviewed pertinent films in PACS  EKG, Pathology, and Other Studies: I have personally reviewed pertinent reports        Code Status: Level 1 - Full Code  Advance Directive and Living Will:      Power of :    POLST:

## 2019-09-25 ENCOUNTER — APPOINTMENT (INPATIENT)
Dept: ULTRASOUND IMAGING | Facility: HOSPITAL | Age: 59
DRG: 871 | End: 2019-09-25
Payer: MEDICARE

## 2019-09-25 PROBLEM — Q61.2 ADPKD (AUTOSOMAL DOMINANT POLYCYSTIC KIDNEY): Chronic | Status: ACTIVE | Noted: 2018-08-07

## 2019-09-25 PROBLEM — Q61.2 ADPKD (AUTOSOMAL DOMINANT POLYCYSTIC KIDNEY): Status: ACTIVE | Noted: 2018-08-07

## 2019-09-25 PROBLEM — Q44.6 POLYCYSTIC LIVER DISEASE: Status: ACTIVE | Noted: 2018-08-07

## 2019-09-25 PROBLEM — N18.6 CHRONIC KIDNEY DISEASE WITH END STAGE RENAL FAILURE ON DIALYSIS (HCC): Chronic | Status: ACTIVE | Noted: 2019-09-21

## 2019-09-25 PROBLEM — Z99.2 CHRONIC KIDNEY DISEASE WITH END STAGE RENAL FAILURE ON DIALYSIS (HCC): Chronic | Status: ACTIVE | Noted: 2019-09-21

## 2019-09-25 PROBLEM — Q44.6 POLYCYSTIC LIVER DISEASE: Chronic | Status: ACTIVE | Noted: 2018-08-07

## 2019-09-25 LAB
ABO GROUP BLD BPU: NORMAL
ABO GROUP BLD BPU: NORMAL
ANION GAP SERPL CALCULATED.3IONS-SCNC: 9 MMOL/L (ref 4–13)
ANISOCYTOSIS BLD QL SMEAR: PRESENT
ATRIAL RATE: 100 BPM
BACTERIA BLD CULT: ABNORMAL
BACTERIA BLD CULT: ABNORMAL
BACTERIA CATH TIP CULT: ABNORMAL
BASOPHILS # BLD MANUAL: 0 THOUSAND/UL (ref 0–0.1)
BASOPHILS NFR MAR MANUAL: 0 % (ref 0–1)
BPU ID: NORMAL
BPU ID: NORMAL
BUN SERPL-MCNC: 56 MG/DL (ref 5–25)
CALCIUM SERPL-MCNC: 9.6 MG/DL (ref 8.3–10.1)
CHLORIDE SERPL-SCNC: 103 MMOL/L (ref 100–108)
CO2 SERPL-SCNC: 25 MMOL/L (ref 21–32)
CREAT SERPL-MCNC: 4.33 MG/DL (ref 0.6–1.3)
DOHLE BOD BLD QL SMEAR: PRESENT
EOSINOPHIL # BLD MANUAL: 0 THOUSAND/UL (ref 0–0.4)
EOSINOPHIL NFR BLD MANUAL: 0 % (ref 0–6)
ERYTHROCYTE [DISTWIDTH] IN BLOOD BY AUTOMATED COUNT: 15.9 % (ref 11.6–15.1)
GFR SERPL CREATININE-BSD FRML MDRD: 14 ML/MIN/1.73SQ M
GLUCOSE SERPL-MCNC: 113 MG/DL (ref 65–140)
GLUCOSE SERPL-MCNC: 114 MG/DL (ref 65–140)
GLUCOSE SERPL-MCNC: 122 MG/DL (ref 65–140)
GLUCOSE SERPL-MCNC: 83 MG/DL (ref 65–140)
GRAM STN SPEC: ABNORMAL
GRAM STN SPEC: ABNORMAL
HCT VFR BLD AUTO: 27.2 % (ref 36.5–49.3)
HGB BLD-MCNC: 9 G/DL (ref 12–17)
LYMPHOCYTES # BLD AUTO: 0.27 THOUSAND/UL (ref 0.6–4.47)
LYMPHOCYTES # BLD AUTO: 1 % (ref 14–44)
MAGNESIUM SERPL-MCNC: 2 MG/DL (ref 1.6–2.6)
MCH RBC QN AUTO: 33.5 PG (ref 26.8–34.3)
MCHC RBC AUTO-ENTMCNC: 33.1 G/DL (ref 31.4–37.4)
MCV RBC AUTO: 101 FL (ref 82–98)
METAMYELOCYTES NFR BLD MANUAL: 1 % (ref 0–1)
METHYLMALONATE SERPL-SCNC: 814 NMOL/L (ref 0–378)
MONOCYTES # BLD AUTO: 0.54 THOUSAND/UL (ref 0–1.22)
MONOCYTES NFR BLD: 2 % (ref 4–12)
NEUTROPHILS # BLD MANUAL: 25.9 THOUSAND/UL (ref 1.85–7.62)
NEUTS BAND NFR BLD MANUAL: 16 % (ref 0–8)
NEUTS SEG NFR BLD AUTO: 80 % (ref 43–75)
NRBC BLD AUTO-RTO: 1 /100 WBCS
P AXIS: 35 DEGREES
PHOSPHATE SERPL-MCNC: 2 MG/DL (ref 2.7–4.5)
PLATELET # BLD AUTO: 28 THOUSANDS/UL (ref 149–390)
PLATELET BLD QL SMEAR: ABNORMAL
PMV BLD AUTO: 12.6 FL (ref 8.9–12.7)
POTASSIUM SERPL-SCNC: 3.9 MMOL/L (ref 3.5–5.3)
PR INTERVAL: 188 MS
QRS AXIS: -5 DEGREES
QRSD INTERVAL: 118 MS
QT INTERVAL: 364 MS
QTC INTERVAL: 469 MS
RBC # BLD AUTO: 2.69 MILLION/UL (ref 3.88–5.62)
SL AMB DISCLAIMER: ABNORMAL
SODIUM SERPL-SCNC: 137 MMOL/L (ref 136–145)
T WAVE AXIS: 67 DEGREES
TOTAL CELLS COUNTED SPEC: 100
UNIT DISPENSE STATUS: NORMAL
UNIT DISPENSE STATUS: NORMAL
UNIT PRODUCT CODE: NORMAL
UNIT PRODUCT CODE: NORMAL
UNIT RH: NORMAL
UNIT RH: NORMAL
VENTRICULAR RATE: 100 BPM
WBC # BLD AUTO: 26.98 THOUSAND/UL (ref 4.31–10.16)

## 2019-09-25 PROCEDURE — 99232 SBSQ HOSP IP/OBS MODERATE 35: CPT | Performed by: INTERNAL MEDICINE

## 2019-09-25 PROCEDURE — 83735 ASSAY OF MAGNESIUM: CPT | Performed by: INTERNAL MEDICINE

## 2019-09-25 PROCEDURE — 99233 SBSQ HOSP IP/OBS HIGH 50: CPT | Performed by: INTERNAL MEDICINE

## 2019-09-25 PROCEDURE — 93010 ELECTROCARDIOGRAM REPORT: CPT | Performed by: INTERNAL MEDICINE

## 2019-09-25 PROCEDURE — 97530 THERAPEUTIC ACTIVITIES: CPT

## 2019-09-25 PROCEDURE — G8978 MOBILITY CURRENT STATUS: HCPCS

## 2019-09-25 PROCEDURE — 80048 BASIC METABOLIC PNL TOTAL CA: CPT | Performed by: INTERNAL MEDICINE

## 2019-09-25 PROCEDURE — 85027 COMPLETE CBC AUTOMATED: CPT | Performed by: INTERNAL MEDICINE

## 2019-09-25 PROCEDURE — 76705 ECHO EXAM OF ABDOMEN: CPT

## 2019-09-25 PROCEDURE — 85007 BL SMEAR W/DIFF WBC COUNT: CPT | Performed by: INTERNAL MEDICINE

## 2019-09-25 PROCEDURE — 84100 ASSAY OF PHOSPHORUS: CPT | Performed by: INTERNAL MEDICINE

## 2019-09-25 PROCEDURE — 97163 PT EVAL HIGH COMPLEX 45 MIN: CPT

## 2019-09-25 PROCEDURE — 82948 REAGENT STRIP/BLOOD GLUCOSE: CPT

## 2019-09-25 PROCEDURE — 99222 1ST HOSP IP/OBS MODERATE 55: CPT | Performed by: INTERNAL MEDICINE

## 2019-09-25 PROCEDURE — G8979 MOBILITY GOAL STATUS: HCPCS

## 2019-09-25 RX ORDER — CIPROFLOXACIN 500 MG/1
500 TABLET, FILM COATED ORAL EVERY 24 HOURS
Status: DISCONTINUED | OUTPATIENT
Start: 2019-09-25 | End: 2019-10-03 | Stop reason: HOSPADM

## 2019-09-25 RX ADMIN — Medication 1 CAPSULE: at 17:04

## 2019-09-25 RX ADMIN — SENNOSIDES AND DOCUSATE SODIUM 1 TABLET: 8.6; 5 TABLET ORAL at 21:53

## 2019-09-25 RX ADMIN — CIPROFLOXACIN HYDROCHLORIDE 500 MG: 500 TABLET, FILM COATED ORAL at 18:24

## 2019-09-25 RX ADMIN — CEFTAZIDIME 2000 MG: 1 INJECTION, POWDER, FOR SOLUTION INTRAMUSCULAR; INTRAVENOUS at 01:34

## 2019-09-25 RX ADMIN — METOPROLOL TARTRATE 12.5 MG: 25 TABLET ORAL at 12:56

## 2019-09-25 NOTE — SOCIAL WORK
Referral placed to [facility]  As request by patient family  Patient currently active with [facility] in [facility] [address]  CM attempted to call patients spouse to review SNF options however, reach message machine  CM department will need to follow up regarding SNF recommendation and CM department will continue to follow through discharge

## 2019-09-25 NOTE — CONSULTS
Consultation - Cardiology   Kathya Teixeira 64 y o  male MRN: [id number]  Unit/Bed#:  Encounter: [id number]    Assessment/Plan     Principal Problem:    Septic shock ([facility] )  Active Problems:    Chronic kidney disease with end stage renal failure on dialysis ([facility] )    Hyponatremia    Cellulitis of lower extremity- bilateral    High anion gap metabolic acidosis    Lactic acidosis    Polycystic liver disease    Total bilirubin, elevated    Thrombocytopenia (HCC)    Gram-negative bacteremia    Polycystic kidney disease    Benign essential hypertension      Assessment/Plan    1  Atrial fibrillation with RVR  Telemetry reviewed  2 hours of AFib with RVR yesterday  No known history of atrial fibrillation  Continue to monitor for recurrence  Blood pressures have improved  I would start  low-dose metoprolol tartrate- 12 5 BID  2  Cardiomyopathy-worsened from prior , in setting of septic shock  Echo 2016- normal LVEF  Echo 2017- EF 40% (patient unaware of this)  Would advise f/u echo prior to discharge  Would advise f/u in office   Will need eventual ischemic evaluation if patient agreeable  Telemetry reviewed  No VT noted  3  Septic shock   GNR bacteremia  Shewanella putrefaciens suspect skin portal of entry with cellulitis with secondary HD cathetar, now removed  Initiated on CVVHD 9/23  Off Levophed  On fortaz- changed to oral cipro, ID following  F/U with [facility]    4  ESRD/HD  Jarome Buddle a cath removed given possible source of  infection  Temporary HD catheter placed  Pt have refused AV fistula in past    5  Anemia/thrombocytopenia  S/P platelet tranfusion  H/H stable  DIC panel negative    6  Polycystic kidney/liver disease    7  Purpuric skin lesion/chronic foot wounds         History of Present Illness   Physician Requesting Consult: Alfredo Vang MD  Reason for Consult / Principal Problem:  Atrial fibrillation with RVR    HPI: Kathya Teixeira is a 64y o  year old male  History of CMP, HTN, ESRD on HD ( polycystic kidney disease) ,polycystic liver disease who presents with leg pain, redness  Admitting  lactic acid of 6 4 with bandemia  It was felt his severe sepsis was secondary to lower extremity cellulitis and HD catheter which has been removed  Echocardiogram performed demonstrated an EF of 20%  He has been  tachycardic over last few days but has been sinus  Yesterday he had about 2 hours of atrial fibrillation with RVR with heart rates in the 1 teens  He was not given any particular medications  Levophed for hypotension currently off  Patient was seen in presents with his wife  He does not work  He has been able to do his outside chores including cutting breast   He is otherwise sedentary according to his wife  Denies any shortness of breath or chest pain  He does not sleep well but is not very clear why this is  He denies PND orthopnea  He has not seen a primary care provider in over 3 years  He takes minimal medications  Pt currently is poor historian   He is quit focused on his potassium and phosphorus levels and does not seem to be comprehending my conversation  Prior records in Care everywhere reviewed  He had seen Dr Librado Haney  of [facility] 2016 to establish care and echo stress testing  Lexiscan Myoview stress test was negative for ischemia  Transthoracic echo at that time showed normal LV function   Echocardiogram 2017 demonstrated EF of 40%  He did not have cardiology follow-up at that time  Patient does not recall any this  No family history of cardiac disease  Parents are alive  Pt is nonsmoker  Inpatient consult to Cardiology  Consult performed by: SAM Simpson  Consult ordered by: Laurie Hernández DO          Review of Systems   Constitutional: Positive for activity change and fatigue  HENT: Negative  Eyes: Negative  Respiratory: Negative for shortness of breath  Cardiovascular: Negative for chest pain, palpitations and leg swelling  Gastrointestinal: Negative  Musculoskeletal: Positive for gait problem  Neurological: Negative for syncope  Hematological: Bruises/bleeds easily  Psychiatric/Behavioral: Positive for confusion  Historical Information   Past Medical History:   Diagnosis Date    Complication of renal dialysis     Polycystic kidney disease      Past Surgical History:   Procedure Laterality Date    IR IMAGE GUIDED ASPIRATION / DRAINAGE  9/23/2019     Social History     Substance and Sexual Activity   Alcohol Use Not on file     Social History     Substance and Sexual Activity   Drug Use Not on file     Social History     Tobacco Use   Smoking Status Never Smoker   Smokeless Tobacco Never Used     Family History: History reviewed  No pertinent family history      Meds/Allergies   current meds:   Current Facility-Administered Medications   Medication Dose Route Frequency    acetaminophen (TYLENOL) tablet 650 mg  650 mg Oral Q6H PRN    b complex-vitamin C-folic acid (NEPHROCAPS) capsule 1 capsule  1 capsule Oral Daily With Dinner    cefTAZidime (FORTAZ) 2,000 mg in sodium chloride 0 9 % 50 mL IVPB  2,000 mg Intravenous Q12H    doxycycline (VIBRAMYCIN) 100 mg in sodium chloride 0 9 % 100 mL IVPB  100 mg Intravenous Q12H    insulin lispro (HumaLOG) 100 units/mL subcutaneous injection 1-6 Units  1-6 Units Subcutaneous TID AC    insulin lispro (HumaLOG) 100 units/mL subcutaneous injection 1-6 Units  1-6 Units Subcutaneous HS    polyethylene glycol (MIRALAX) packet 17 g  17 g Oral Daily PRN    senna-docusate sodium (SENOKOT S) 8 6-50 mg per tablet 1 tablet  1 tablet Oral HS    and PTA meds:    Medications Prior to Admission   Medication    calcium acetate (PHOSLO) 667 mg capsule    Multiple Vitamin (MULTIVITAMIN) tablet    NON FORMULARY    Sucroferric Oxyhydroxide (VELPHORO PO)     No Known Allergies    Objective   Vitals: Blood pressure 104/55, pulse (!) 106, temperature 98 3 °F (36 8 °C), temperature source Oral, resp  rate 20, height 5' 8" (1 727 m), weight 90 9 kg (200 lb 6 4 oz), SpO2 100 %  Orthostatic Blood Pressures      Most Recent Value   Blood Pressure  104/55 filed at 09/25/2019 6742   Patient Position - Orthostatic VS  Lying filed at 09/25/2019 0300            Intake/Output Summary (Last 24 hours) at 9/25/2019 1020  Last data filed at 9/25/2019 0400  Gross per 24 hour   Intake 1117 ml   Output 1195 ml   Net -78 ml       Invasive Devices     Peripheral Intravenous Line            Peripheral IV 09/23/19 Left Forearm 2 days    Peripheral IV 09/25/19 Left;Upper;Ventral (anterior) Arm less than 1 day          Arterial Line            Arterial Line 09/22/19 3 days          Line            Temporary HD Catheter 1 day          Drain            Closed/Suction Drain Right RUQ Bulb 8 5 Fr  2 days                Physical Exam: /55   Pulse (!) 106   Temp 98 3 °F (36 8 °C) (Oral)   Resp 20   Ht 5' 8" (1 727 m)   Wt 90 9 kg (200 lb 6 4 oz)   SpO2 100%   BMI 30 47 kg/m²   General Appearance:    Alert, cooperative, no distress, appears chronically ill   Head:    Normocephalic, no scleral icterus   Eyes:    PERRL   Nose:   Nares normal, septum midline, mucosa normal, no drainage    Throat:   Lips, mucosa, and tongue normal   Neck:   Supple, symmetrical, trachea midline     no JVD   Back:     Symmetric   Lungs:     Clear to auscultation bilaterally, respirations unlabored   Chest Wall:    No tenderness or deformity    Heart:    Regular rate and rhythm, S1 and S2 normal, no murmur, rub   or gallop   Abdomen:     Soft, non-tender, bowel sounds active all four quadrants,     no masses, no organomegaly   Extremities:   LE wrapped knees down  Mild edema right leg       Skin:   Skin warm   Neurologic:   Alert and oriented to person place and time   No focal deficits       Lab Results:   Recent Results (from the past 72 hour(s))   Ammonia    Collection Time: 09/22/19 10:51 AM   Result Value Ref Range    Ammonia <10 (L) 11 - 35 umol/L   Blood gas, venous    Collection Time: 09/22/19 10:51 AM   Result Value Ref Range    pH, Alvaro 7 331 7 300 - 7 400    pCO2, Alvaro 38 8 (L) 42 0 - 50 0 mm Hg    pO2, Alvaro 51 1 (H) 35 0 - 45 0 mm Hg    HCO3, Alvaro 20 0 (L) 24 - 30 mmol/L    Base Excess, Alvaro -5 4 mmol/L    O2 Content, Alvaro 12 5 ml/dL    O2 HGB, VENOUS 79 2 60 0 - 80 0 %    Nasal Cannula 2    MRSA culture    Collection Time: 09/22/19  4:08 PM   Result Value Ref Range    MRSA Culture Only       No Methicillin Resistant Staphlyococcus aureus (MRSA) isolated   Type and screen    Collection Time: 09/22/19  9:10 PM   Result Value Ref Range    ABO Grouping O     Rh Factor Positive     Antibody Screen Negative     Specimen Expiration Date [id number]    Comprehensive metabolic panel    Collection Time: 09/22/19  9:11 PM   Result Value Ref Range    Sodium 129 (L) 136 - 145 mmol/L    Potassium 6 1 (H) 3 5 - 5 3 mmol/L    Chloride 88 (L) 100 - 108 mmol/L    CO2 23 21 - 32 mmol/L    ANION GAP 18 (H) 4 - 13 mmol/L    BUN 89 (H) 5 - 25 mg/dL    Creatinine 9 25 (H) 0 60 - 1 30 mg/dL    Glucose 60 (L) 65 - 140 mg/dL    Calcium 8 5 8 3 - 10 1 mg/dL    AST 25 5 - 45 U/L    ALT 25 12 - 78 U/L    Alkaline Phosphatase 68 46 - 116 U/L    Total Protein 6 5 6 4 - 8 2 g/dL    Albumin 2 9 (L) 3 5 - 5 0 g/dL    Total Bilirubin 4 30 (H) 0 20 - 1 00 mg/dL    eGFR 6 ml/min/1 73sq m   CBC and differential    Collection Time: 09/22/19  9:11 PM   Result Value Ref Range    WBC 5 76 4 31 - 10 16 Thousand/uL    RBC 3 38 (L) 3 88 - 5 62 Million/uL    Hemoglobin 11 4 (L) 12 0 - 17 0 g/dL    Hematocrit 35 6 (L) 36 5 - 49 3 %     (H) 82 - 98 fL    MCH 33 7 26 8 - 34 3 pg    MCHC 32 0 31 4 - 37 4 g/dL    RDW 16 0 (H) 11 6 - 15 1 %    MPV 14 0 (H) 8 9 - 12 7 fL    Platelets 36 (LL) 938 - 390 Thousands/uL    nRBC 0 /100 WBCs    Neutrophils Relative 89 (H) 43 - 75 %    Immat GRANS % 0 0 - 2 %    Lymphocytes Relative 4 (L) 14 - 44 %    Monocytes Relative 5 4 - 12 %    Eosinophils Relative 2 0 - 6 %    Basophils Relative 0 0 - 1 %    Neutrophils Absolute 5 16 1 85 - 7 62 Thousands/µL    Immature Grans Absolute 0 02 0 00 - 0 20 Thousand/uL    Lymphocytes Absolute 0 20 (L) 0 60 - 4 47 Thousands/µL    Monocytes Absolute 0 26 0 17 - 1 22 Thousand/µL    Eosinophils Absolute 0 12 0 00 - 0 61 Thousand/µL    Basophils Absolute 0 00 0 00 - 0 10 Thousands/µL   CK    Collection Time: 09/22/19  9:11 PM   Result Value Ref Range    Total CK 42 39 - 308 U/L   Lactic acid, plasma    Collection Time: 09/22/19  9:11 PM   Result Value Ref Range    LACTIC ACID 2 1 (HH) 0 5 - 2 0 mmol/L   Magnesium    Collection Time: 09/22/19  9:11 PM   Result Value Ref Range    Magnesium 2 6 1 6 - 2 6 mg/dL   Protime-INR    Collection Time: 09/22/19  9:11 PM   Result Value Ref Range    Protime 17 6 (H) 11 6 - 14 5 seconds    INR 1 52 (H) 0 84 - 1 19   Calcium, ionized    Collection Time: 09/22/19  9:11 PM   Result Value Ref Range    Calcium, Ionized 1 01 (L) 1 12 - 1 32 mmol/L   Blood gas, arterial    Collection Time: 09/22/19  9:52 PM   Result Value Ref Range    pH, Arterial 7 366 7 350 - 7 450    pCO2, Arterial 36 8 36 0 - 44 0 mm Hg    pO2, Arterial 91 0 75 0 - 129 0 mm Hg    HCO3, Arterial 20 6 (L) 22 0 - 28 0 mmol/L    Base Excess, Arterial -4 2 mmol/L    O2 Content, Arterial 15 2 (L) 16 0 - 23 0 mL/dL    O2 HGB,Arterial  93 9 (L) 94 0 - 97 0 %    SOURCE Radial, Left     EMILY TEST Yes     Nasal Cannula 2    Prepare fresh frozen plasma:Has consent been obtained?  Yes, 2 Units    Collection Time: 09/22/19 10:06 PM   Result Value Ref Range    Unit Product Code R2352C79     Unit Number [id number]     Unit ABO O     Unit [facility]     Unit Dispense Status Crossmatched     Unit Product Code P1214I64     Unit Number [id number]     Unit ABO O     Unit [facility]     Unit Dispense Status Crossmatched    Fingerstick Glucose (POCT)    Collection Time: 09/22/19 11:24 PM   Result Value Ref Range    POC Glucose <20 (LL) 65 - 140 mg/dl   Culture, Catheter Tip    Collection Time: 09/22/19 11:32 PM   Result Value Ref Range    Catheter Tip Culture 35 colonies Shewanella putrefaciens (A)        Susceptibility    Shewanella putrefaciens - TOMMY     ZID Performed Yes       Aztreonam ($$$)  <=4 Susceptible ug/ml     Cefepime ($) <=2 00 Susceptible ug/ml     Cefotaxime ($) <=2 00 Susceptible ug/ml     Ceftazidime ($$) <=1 Susceptible ug/ml     Ceftriaxone ($$) <=1 00 Susceptible ug/ml     Ciprofloxacin ($)  <=1 00 Susceptible ug/ml     Gentamicin ($$) <=1 Susceptible ug/ml     Imipenem <=1 Susceptible ug/ml     Levofloxacin ($) <=0 25 Susceptible ug/ml     Meropenem ($$) <=1 00 Susceptible ug/ml     Piperacillin + Tazobactam ($$$) <=4 Susceptible ug/ml     Tobramycin ($) 2 Susceptible ug/ml     Trimethoprim + Sulfamethoxazole ($$$) <=2/38 Susceptible ug/ml   Fingerstick Glucose (POCT)    Collection Time: 09/23/19 12:00 AM   Result Value Ref Range    POC Glucose <20 (LL) 65 - 140 mg/dl   Basic metabolic panel    Collection Time: 09/23/19  1:01 AM   Result Value Ref Range    Sodium 127 (L) 136 - 145 mmol/L    Potassium 5 8 (H) 3 5 - 5 3 mmol/L    Chloride 89 (L) 100 - 108 mmol/L    CO2 21 21 - 32 mmol/L    ANION GAP 17 (H) 4 - 13 mmol/L    BUN 92 (H) 5 - 25 mg/dL    Creatinine 9 21 (H) 0 60 - 1 30 mg/dL    Glucose 149 (H) 65 - 140 mg/dL    Calcium 8 4 8 3 - 10 1 mg/dL    eGFR 6 ml/min/1 73sq m   Blood culture    Collection Time: 09/23/19  4:43 AM   Result Value Ref Range    Blood Culture No Growth at 48 hrs      CBC and differential    Collection Time: 09/23/19  4:44 AM   Result Value Ref Range    WBC 9 32 4 31 - 10 16 Thousand/uL    RBC 3 11 (L) 3 88 - 5 62 Million/uL    Hemoglobin 10 4 (L) 12 0 - 17 0 g/dL    Hematocrit 31 9 (L) 36 5 - 49 3 %     (H) 82 - 98 fL    MCH 33 4 26 8 - 34 3 pg    MCHC 32 6 31 4 - 37 4 g/dL    RDW 15 9 (H) 11 6 - 15 1 %    MPV 12 5 8 9 - 12 7 fL    Platelets 51 (L) 691 - 390 Thousands/uL    nRBC 0 /100 WBCs   Lactate dehydrogenase    Collection Time: 09/23/19  4:44 AM   Result Value Ref Range     81 - 234 U/L   Retic Count    Collection Time: 09/23/19  4:44 AM   Result Value Ref Range    Retic Ct Abs 83,700 14,356-105,094    Retic Ct Pct 2 69 (H) 0 37 - 1 87 %   Hemolysis Smear    Collection Time: 09/23/19  4:44 AM   Result Value Ref Range    Hemolysis Smear No Schistocytes or Helmet Cells noted    Basic metabolic panel    Collection Time: 09/23/19  4:44 AM   Result Value Ref Range    Sodium 127 (L) 136 - 145 mmol/L    Potassium 5 9 (H) 3 5 - 5 3 mmol/L    Chloride 89 (L) 100 - 108 mmol/L    CO2 22 21 - 32 mmol/L    ANION GAP 16 (H) 4 - 13 mmol/L    BUN 92 (H) 5 - 25 mg/dL    Creatinine 9 06 (H) 0 60 - 1 30 mg/dL    Glucose 111 65 - 140 mg/dL    Calcium 8 6 8 3 - 10 1 mg/dL    eGFR 6 ml/min/1 73sq m   Methylmalonic acid, serum    Collection Time: 09/23/19  4:44 AM   Result Value Ref Range    Methylmalonic Acid, S 814 (H) 0 - 378 nmol/L    Disclaimer: Comment    Folate    Collection Time: 09/23/19  4:44 AM   Result Value Ref Range    Folate 7 0 3 1 - 17 5 ng/mL   Manual Differential(PHLEBS Do Not Order)    Collection Time: 09/23/19  4:44 AM   Result Value Ref Range    Segmented % 53 43 - 75 %    Bands % 35 (H) 0 - 8 %    Lymphocytes % 1 (L) 14 - 44 %    Monocytes % 7 4 - 12 %    Eosinophils, % 3 0 - 6 %    Basophils % 0 0 - 1 %    Metamyelocytes% 1 0 - 1 %    Absolute Neutrophils 8 20 (H) 1 85 - 7 62 Thousand/uL    Lymphocytes Absolute 0 09 (L) 0 60 - 4 47 Thousand/uL    Monocytes Absolute 0 65 0 00 - 1 22 Thousand/uL    Eosinophils Absolute 0 28 0 00 - 0 40 Thousand/uL    Basophils Absolute 0 00 0 00 - 0 10 Thousand/uL    Total Counted 100     Toxic Granulation Present     Anisocytosis Present     Ovalocytes Present     Poikilocytes Present     Polychromasia Present     Platelet Estimate Decreased (A) Adequate   Magnesium    Collection Time: 09/23/19  4:45 AM   Result Value Ref Range    Magnesium 2 4 1 6 - 2 6 mg/dL Phosphorus    Collection Time: 09/23/19  4:45 AM   Result Value Ref Range    Phosphorus 5 7 (H) 2 7 - 4 5 mg/dL   Protime-INR    Collection Time: 09/23/19  4:45 AM   Result Value Ref Range    Protime 17 8 (H) 11 6 - 14 5 seconds    INR 1 55 (H) 0 84 - 1 19   Procalcitonin    Collection Time: 09/23/19  4:45 AM   Result Value Ref Range    Procalcitonin 56 56 (H) <=0 25 ng/ml   Hepatic function panel    Collection Time: 09/23/19  4:45 AM   Result Value Ref Range    Total Bilirubin 3 90 (H) 0 20 - 1 00 mg/dL    Bilirubin, Direct 2 71 (H) 0 00 - 0 20 mg/dL    Alkaline Phosphatase 64 46 - 116 U/L    AST 15 5 - 45 U/L    ALT 19 12 - 78 U/L    Total Protein 6 3 (L) 6 4 - 8 2 g/dL    Albumin 2 7 (L) 3 5 - 5 0 g/dL   Fibrin split products    Collection Time: 09/23/19  4:45 AM   Result Value Ref Range    FDP <10 <10, >40 <80   D-dimer, quantitative    Collection Time: 09/23/19  4:45 AM   Result Value Ref Range    D-Dimer, Quant 3,372 (H) <500 ng/ml (FEU)   Antithrombin III Activity    Collection Time: 09/23/19  4:45 AM   Result Value Ref Range    AntiThrombIN III Activity 34 (L) 92 - 136 % of Normal   Plasminogen activity    Collection Time: 09/23/19  4:45 AM   Result Value Ref Range    Plasminogen 37 (L) 77 - 138 % of Normal   Fibrinogen    Collection Time: 09/23/19  4:45 AM   Result Value Ref Range    Fibrinogen 725 (H) 227 - 495 mg/dL   APTT    Collection Time: 09/23/19  4:45 AM   Result Value Ref Range    PTT 37 23 - 37 seconds   Protime-INR    Collection Time: 09/23/19  4:45 AM   Result Value Ref Range    Protime 17 4 (H) 11 6 - 14 5 seconds    INR 1 50 (H) 0 84 - 1 19   CK (with reflex to MB)    Collection Time: 09/23/19  4:45 AM   Result Value Ref Range    Total CK 36 (L) 39 - 308 U/L   Blood culture    Collection Time: 09/23/19  5:05 AM   Result Value Ref Range    Blood Culture No Growth at 48 hrs      Fingerstick Glucose (POCT)    Collection Time: 09/23/19  5:36 AM   Result Value Ref Range    POC Glucose 56 (L) 65 - 140 mg/dl   Prepare platelet pheresis:Has consent been obtained?  Yes, 1 Units    Collection Time: 09/23/19  5:47 AM   Result Value Ref Range    Unit Product Code D0619B75     Unit Number [id number]     Unit ABO O     Unit [facility]     Unit Dispense Status Presumed Trans    Lactic acid, plasma    Collection Time: 09/23/19  6:29 AM   Result Value Ref Range    LACTIC ACID 1 9 0 5 - 2 0 mmol/L   Fingerstick Glucose (POCT)    Collection Time: 09/23/19  8:13 AM   Result Value Ref Range    POC Glucose 57 (L) 65 - 140 mg/dl   Fingerstick Glucose (POCT)    Collection Time: 09/23/19  8:15 AM   Result Value Ref Range    POC Glucose 84 65 - 140 mg/dl   Fingerstick Glucose (POCT)    Collection Time: 09/23/19  9:45 AM   Result Value Ref Range    POC Glucose 113 65 - 140 mg/dl   Body fluid culture and Gram stain    Collection Time: 09/23/19 10:22 AM   Result Value Ref Range    Body Fluid Culture, Sterile No growth     Gram Stain Result 2+ Polys     Gram Stain Result No bacteria seen    Hemoglobin    Collection Time: 09/23/19  3:10 PM   Result Value Ref Range    Hemoglobin 9 9 (L) 12 0 - 17 0 g/dL   Platelet count    Collection Time: 09/23/19  3:10 PM   Result Value Ref Range    Platelets 54 (L) 954 - 390 Thousands/uL    MPV 12 1 8 9 - 12 7 fL   Basic metabolic panel    Collection Time: 09/23/19  3:10 PM   Result Value Ref Range    Sodium 128 (L) 136 - 145 mmol/L    Potassium 5 7 (H) 3 5 - 5 3 mmol/L    Chloride 91 (L) 100 - 108 mmol/L    CO2 22 21 - 32 mmol/L    ANION GAP 15 (H) 4 - 13 mmol/L    BUN 97 (H) 5 - 25 mg/dL    Creatinine 9 19 (H) 0 60 - 1 30 mg/dL    Glucose 97 65 - 140 mg/dL    Calcium 8 9 8 3 - 10 1 mg/dL    eGFR 6 ml/min/1 73sq m   Calcium, ionized    Collection Time: 09/23/19  3:10 PM   Result Value Ref Range    Calcium, Ionized 1 06 (L) 1 12 - 1 32 mmol/L   Phosphorus    Collection Time: 09/23/19  3:10 PM   Result Value Ref Range    Phosphorus 4 7 (H) 2 7 - 4 5 mg/dL   Magnesium    Collection Time: 09/23/19 3:10 PM   Result Value Ref Range    Magnesium 2 3 1 6 - 2 6 mg/dL   Fingerstick Glucose (POCT)    Collection Time: 09/23/19  6:10 PM   Result Value Ref Range    POC Glucose <20 (LL) 65 - 140 mg/dl   Fingerstick Glucose (POCT)    Collection Time: 09/23/19  6:12 PM   Result Value Ref Range    POC Glucose 85 65 - 140 mg/dl   Fingerstick Glucose (POCT)    Collection Time: 09/23/19  7:59 PM   Result Value Ref Range    POC Glucose 20 (LL) 65 - 140 mg/dl   Fingerstick Glucose (POCT)    Collection Time: 09/23/19  8:04 PM   Result Value Ref Range    POC Glucose 85 65 - 140 mg/dl   Basic metabolic panel    Collection Time: 09/23/19 10:16 PM   Result Value Ref Range    Sodium 132 (L) 136 - 145 mmol/L    Potassium 4 8 3 5 - 5 3 mmol/L    Chloride 96 (L) 100 - 108 mmol/L    CO2 23 21 - 32 mmol/L    ANION GAP 13 4 - 13 mmol/L    BUN 74 (H) 5 - 25 mg/dL    Creatinine 6 61 (H) 0 60 - 1 30 mg/dL    Glucose 88 65 - 140 mg/dL    Calcium 9 3 8 3 - 10 1 mg/dL    eGFR 8 ml/min/1 73sq m   Calcium, ionized    Collection Time: 09/23/19 10:16 PM   Result Value Ref Range    Calcium, Ionized 1 15 1 12 - 1 32 mmol/L   Magnesium    Collection Time: 09/23/19 10:16 PM   Result Value Ref Range    Magnesium 2 1 1 6 - 2 6 mg/dL   Phosphorus    Collection Time: 09/23/19 10:16 PM   Result Value Ref Range    Phosphorus 3 3 2 7 - 4 5 mg/dL   Fingerstick Glucose (POCT)    Collection Time: 09/24/19 12:00 AM   Result Value Ref Range    POC Glucose 83 65 - 140 mg/dl   Basic metabolic panel    Collection Time: 09/24/19  4:09 AM   Result Value Ref Range    Sodium 133 (L) 136 - 145 mmol/L    Potassium 4 4 3 5 - 5 3 mmol/L    Chloride 98 (L) 100 - 108 mmol/L    CO2 24 21 - 32 mmol/L    ANION GAP 11 4 - 13 mmol/L    BUN 61 (H) 5 - 25 mg/dL    Creatinine 5 17 (H) 0 60 - 1 30 mg/dL    Glucose 77 65 - 140 mg/dL    Calcium 9 1 8 3 - 10 1 mg/dL    eGFR 11 ml/min/1 73sq m   Calcium, ionized    Collection Time: 09/24/19  4:09 AM   Result Value Ref Range    Calcium, Ionized 1 16 1 12 - 1 32 mmol/L   Magnesium    Collection Time: 09/24/19  4:09 AM   Result Value Ref Range    Magnesium 2 0 1 6 - 2 6 mg/dL   Phosphorus    Collection Time: 09/24/19  4:09 AM   Result Value Ref Range    Phosphorus 2 7 2 7 - 4 5 mg/dL   CBC and differential    Collection Time: 09/24/19  4:09 AM   Result Value Ref Range    WBC 18 32 (H) 4 31 - 10 16 Thousand/uL    RBC 2 84 (L) 3 88 - 5 62 Million/uL    Hemoglobin 9 6 (L) 12 0 - 17 0 g/dL    Hematocrit 29 0 (L) 36 5 - 49 3 %     (H) 82 - 98 fL    MCH 33 8 26 8 - 34 3 pg    MCHC 33 1 31 4 - 37 4 g/dL    RDW 16 0 (H) 11 6 - 15 1 %    MPV 12 9 (H) 8 9 - 12 7 fL    Platelets 42 (LL) 375 - 390 Thousands/uL    nRBC 0 /100 WBCs   Manual Differential(PHLEBS Do Not Order)    Collection Time: 09/24/19  4:09 AM   Result Value Ref Range    Segmented % 77 (H) 43 - 75 %    Bands % 10 (H) 0 - 8 %    Lymphocytes % 3 (L) 14 - 44 %    Monocytes % 8 4 - 12 %    Eosinophils, % 0 0 - 6 %    Basophils % 0 0 - 1 %    Metamyelocytes% 2 (H) 0 - 1 %    Absolute Neutrophils 15 94 (H) 1 85 - 7 62 Thousand/uL    Lymphocytes Absolute 0 55 (L) 0 60 - 4 47 Thousand/uL    Monocytes Absolute 1 47 (H) 0 00 - 1 22 Thousand/uL    Eosinophils Absolute 0 00 0 00 - 0 40 Thousand/uL    Basophils Absolute 0 00 0 00 - 0 10 Thousand/uL    Total Counted 100     Dohle Bodies Present     Toxic Granulation Present     Anisocytosis Present     Macrocytes Present     Platelet Estimate Decreased (A) Adequate   Prepare platelet pheresis:Has consent been obtained?  Yes, 1 Units    Collection Time: 09/24/19  5:47 AM   Result Value Ref Range    Unit Product Code V9585N99     Unit Number [id number]     Unit ABO A     Unit [facility]     Unit Dispense Status Presumed Trans    Fingerstick Glucose (POCT)    Collection Time: 09/24/19  6:17 AM   Result Value Ref Range    POC Glucose 75 65 - 140 mg/dl   Basic metabolic panel    Collection Time: 09/24/19 10:13 AM   Result Value Ref Range    Sodium 135 (L) 136 - 145 mmol/L    Potassium 4 0 3 5 - 5 3 mmol/L    Chloride 98 (L) 100 - 108 mmol/L    CO2 23 21 - 32 mmol/L    ANION GAP 14 (H) 4 - 13 mmol/L    BUN 52 (H) 5 - 25 mg/dL    Creatinine 4 28 (H) 0 60 - 1 30 mg/dL    Glucose 78 65 - 140 mg/dL    Calcium 9 1 8 3 - 10 1 mg/dL    eGFR 14 ml/min/1 73sq m   Calcium, ionized    Collection Time: 09/24/19 10:13 AM   Result Value Ref Range    Calcium, Ionized 1 19 1 12 - 1 32 mmol/L   Magnesium    Collection Time: 09/24/19 10:13 AM   Result Value Ref Range    Magnesium 1 9 1 6 - 2 6 mg/dL   Phosphorus    Collection Time: 09/24/19 10:13 AM   Result Value Ref Range    Phosphorus 2 5 (L) 2 7 - 4 5 mg/dL   Hepatic function panel    Collection Time: 09/24/19 10:13 AM   Result Value Ref Range    Total Bilirubin 4 30 (H) 0 20 - 1 00 mg/dL    Bilirubin, Direct 2 92 (H) 0 00 - 0 20 mg/dL    Alkaline Phosphatase 83 46 - 116 U/L    AST 9 5 - 45 U/L    ALT 13 12 - 78 U/L    Total Protein 5 5 (L) 6 4 - 8 2 g/dL    Albumin 2 2 (L) 3 5 - 5 0 g/dL   Fingerstick Glucose (POCT)    Collection Time: 09/24/19 11:53 AM   Result Value Ref Range    POC Glucose 83 65 - 140 mg/dl   ECG 12 lead    Collection Time: 09/24/19  1:02 PM   Result Value Ref Range    Ventricular Rate 100 BPM    Atrial Rate 100 BPM    UT Interval 188 ms    QRSD Interval 118 ms    QT Interval 364 ms    QTC Interval 469 ms    P Axis 35 degrees    QRS Axis -5 degrees    T Wave Axis 67 degrees   Basic metabolic panel    Collection Time: 09/24/19  4:14 PM   Result Value Ref Range    Sodium 135 (L) 136 - 145 mmol/L    Potassium 4 0 3 5 - 5 3 mmol/L    Chloride 100 100 - 108 mmol/L    CO2 24 21 - 32 mmol/L    ANION GAP 11 4 - 13 mmol/L    BUN 45 (H) 5 - 25 mg/dL    Creatinine 3 70 (H) 0 60 - 1 30 mg/dL    Glucose 94 65 - 140 mg/dL    Calcium 9 2 8 3 - 10 1 mg/dL    eGFR 17 ml/min/1 73sq m   Calcium, ionized    Collection Time: 09/24/19  4:14 PM   Result Value Ref Range    Calcium, Ionized 1 23 1 12 - 1 32 mmol/L   Magnesium Collection Time: 09/24/19  4:14 PM   Result Value Ref Range    Magnesium 2 0 1 6 - 2 6 mg/dL   Phosphorus    Collection Time: 09/24/19  4:14 PM   Result Value Ref Range    Phosphorus 2 4 (L) 2 7 - 4 5 mg/dL   Fingerstick Glucose (POCT)    Collection Time: 09/24/19  6:16 PM   Result Value Ref Range    POC Glucose 82 65 - 140 mg/dl   Basic metabolic panel    Collection Time: 09/24/19  9:24 PM   Result Value Ref Range    Sodium 136 136 - 145 mmol/L    Potassium 4 0 3 5 - 5 3 mmol/L    Chloride 101 100 - 108 mmol/L    CO2 22 21 - 32 mmol/L    ANION GAP 13 4 - 13 mmol/L    BUN 50 (H) 5 - 25 mg/dL    Creatinine 3 76 (H) 0 60 - 1 30 mg/dL    Glucose 99 65 - 140 mg/dL    Calcium 9 4 8 3 - 10 1 mg/dL    eGFR 17 ml/min/1 73sq m   Calcium, ionized    Collection Time: 09/24/19  9:24 PM   Result Value Ref Range    Calcium, Ionized 1 21 1 12 - 1 32 mmol/L   Magnesium    Collection Time: 09/24/19  9:24 PM   Result Value Ref Range    Magnesium 2 0 1 6 - 2 6 mg/dL   Phosphorus    Collection Time: 09/24/19  9:24 PM   Result Value Ref Range    Phosphorus 2 4 (L) 2 7 - 4 5 mg/dL   CBC and Platelet    Collection Time: 09/24/19  9:39 PM   Result Value Ref Range    WBC 25 50 (H) 4 31 - 10 16 Thousand/uL    RBC 2 65 (L) 3 88 - 5 62 Million/uL    Hemoglobin 9 0 (L) 12 0 - 17 0 g/dL    Hematocrit 26 8 (L) 36 5 - 49 3 %     (H) 82 - 98 fL    MCH 34 0 26 8 - 34 3 pg    MCHC 33 6 31 4 - 37 4 g/dL    RDW 15 9 (H) 11 6 - 15 1 %    Platelets 25 (LL) 212 - 390 Thousands/uL    MPV 11 5 8 9 - 12 7 fL   Fingerstick Glucose (POCT)    Collection Time: 09/24/19 11:36 PM   Result Value Ref Range    POC Glucose 94 65 - 140 mg/dl   Basic metabolic panel    Collection Time: 09/25/19  4:18 AM   Result Value Ref Range    Sodium 137 136 - 145 mmol/L    Potassium 3 9 3 5 - 5 3 mmol/L    Chloride 103 100 - 108 mmol/L    CO2 25 21 - 32 mmol/L    ANION GAP 9 4 - 13 mmol/L    BUN 56 (H) 5 - 25 mg/dL    Creatinine 4 33 (H) 0 60 - 1 30 mg/dL    Glucose 114 65 - 140 mg/dL    Calcium 9 6 8 3 - 10 1 mg/dL    eGFR 14 ml/min/1 73sq m   CBC and differential    Collection Time: 09/25/19  4:18 AM   Result Value Ref Range    WBC 26 98 (H) 4 31 - 10 16 Thousand/uL    RBC 2 69 (L) 3 88 - 5 62 Million/uL    Hemoglobin 9 0 (L) 12 0 - 17 0 g/dL    Hematocrit 27 2 (L) 36 5 - 49 3 %     (H) 82 - 98 fL    MCH 33 5 26 8 - 34 3 pg    MCHC 33 1 31 4 - 37 4 g/dL    RDW 15 9 (H) 11 6 - 15 1 %    MPV 12 6 8 9 - 12 7 fL    Platelets 28 (LL) 675 - 390 Thousands/uL    nRBC 1 /100 WBCs   Magnesium    Collection Time: 09/25/19  4:18 AM   Result Value Ref Range    Magnesium 2 0 1 6 - 2 6 mg/dL   Phosphorus    Collection Time: 09/25/19  4:18 AM   Result Value Ref Range    Phosphorus 2 0 (L) 2 7 - 4 5 mg/dL   Manual Differential(PHLEBS Do Not Order)    Collection Time: 09/25/19  4:18 AM   Result Value Ref Range    Segmented % 80 (H) 43 - 75 %    Bands % 16 (H) 0 - 8 %    Lymphocytes % 1 (L) 14 - 44 %    Monocytes % 2 (L) 4 - 12 %    Eosinophils, % 0 0 - 6 %    Basophils % 0 0 - 1 %    Metamyelocytes% 1 0 - 1 %    Absolute Neutrophils 25 90 (H) 1 85 - 7 62 Thousand/uL    Lymphocytes Absolute 0 27 (L) 0 60 - 4 47 Thousand/uL    Monocytes Absolute 0 54 0 00 - 1 22 Thousand/uL    Eosinophils Absolute 0 00 0 00 - 0 40 Thousand/uL    Basophils Absolute 0 00 0 00 - 0 10 Thousand/uL    Total Counted 100     Dohle Bodies Present     Anisocytosis Present     Platelet Estimate Decreased (A) Adequate   Fingerstick Glucose (POCT)    Collection Time: 09/25/19  6:04 AM   Result Value Ref Range    POC Glucose 113 65 - 140 mg/dl     Imaging: I have personally reviewed pertinent reports  EKG: sinus tachycardia 9/24  VTE Prophylaxis: Sequential compression device (Venodyne)     Code Status: Level 1 - Full Code  Advance Directive and Living Will:      Power of :    POLST:      Counseling / Coordination of Care  Total floor / unit time spent today 60 minutes    Greater than 50% of total time was spent with the patient and / or family counseling and / or coordination of care

## 2019-09-25 NOTE — PROGRESS NOTES
Progress Note - Critical Care   [place] Limb 64 y o  male MRN: [id number]  Unit/Bed#:  Encounter: [id number]    Attending Physician: Nicole Olguin MD    ______________________________________________________________________  Assessment and Plan:   Principal Problem:    Septic shock [facility])  Active Problems:    Cellulitis of lower extremity- bilateral    High anion gap metabolic acidosis    Lactic acidosis    Chronic kidney disease with end stage renal failure on dialysis [facility])    Polycystic kidney disease    Hyponatremia    Total bilirubin, elevated    Polycystic liver disease    Thrombocytopenia (HCC)    Gram-negative bacteremia  Resolved Problems:    * No resolved hospital problems   *    Neuro  -No acute issues  -[facility] ICU  -Delirium precautions  -Regulate sleep/wake cycle     Cardiovascular  Septic Shock 2/2 to GNR Bacteremia  -Continue on levephed, wean as able for MAP >65  -Echo with EF 20%, if bacteremia persists - will need TTE  -Motor/sensory intact in bilateral feet     Respiratory  -No acute issues  -Stable on room air     GI  Hyperbilirubinemia  -Given normal LFT's, do not suspect obstructive pathology, favoring hemolysis in the setting of sepsis, trend labs  -Tolerating renal diet  -Last BM 9/24     Renal//FEN  ESRD on HD (MWF) 2/2 PKD   -Npehrology following - input apprecaited  -Permacath removed given possible source of infection; temporary HD cath placed   -CVVH completed last evening, will attempt regular session of HD this morning   -Removed himself from transplant list for "personal issues"  Possible Infected Renal Cyst  -S/P drain placement by IR into cyst near cecum      Infectious Disease  Septic Shock 2/2 to GNR Bacteremia  Bandemia  -Suspected source: infected HD catheter, which has been removed, vs infected renal cyst vs cellulitis  -Infectious Disease following - input appreciated    -Continue ceftazidime, and doxycycline   -Follow cultures      Heme/Onc  Thrombocytopenia -Likely 2/2 to GNR sepsis, no evidence of bleeding; tolerated procedures well   -S/P 1 unit platelets given for permacath removal  -DIC panel unremarkable, hemolysis smear without schistocytes, retic count elevated, LD unremarkable, haptoglobin pending   -Trend, replace if <50   Macrocytic Anemia   -Check B12, folate WNL     Endocrine  -No acute issues     Musculoskeletal/Derm  Hemorrhagic Bullous LE Lesions  -Suspected skin manifestation of GNR sepsis  -Per surgery, does not appear to be infected  -Vascular Surgery following - input appreciated; anticipate LEAD's to be completed today  -Podiatry and Dermatology following - input appreciated   -Frequent turns and repositioning     Disposition: Downgrade to SD level of care     Code Status: Level 1 - Full Code    Counseling / Coordination of Care  Total time spent today 48 minutes  Greater than 50% of total time was spent with the patient and / or family counseling and / or coordination of care    ______________________________________________________________________    24 Hour Events: Mr Rki Park was examined bedside this AM, with no acute events overnight  He remains off the levophed since AM of 9/24  Overnight, his CRRT filter clotted, and after discussion with our Nephrology colleagues, it was determined to discontinue CRRT and attempt for a regular session of HD this morning  Denies pain, but does endorse mild discomfort in his legs with repositioning  Review of Systems   Constitutional: Negative for fever  HENT: Negative for nosebleeds  Eyes: Negative for redness  Respiratory: Negative for shortness of breath  Cardiovascular: Negative for chest pain  Gastrointestinal: Negative for blood in stool  Genitourinary: Negative for hematuria  Skin: Negative for pallor  Neurological: Negative for headaches  Psychiatric/Behavioral: Negative for confusion     ______________________________________________________________________    Physical Exam: Physical Exam   Constitutional: Vital signs are normal  He appears well-developed and well-nourished  He is cooperative  HENT:   Head: Normocephalic and atraumatic  Mouth/Throat: Oropharynx is clear and moist and mucous membranes are normal    Eyes: Pupils are equal, round, and reactive to light  Conjunctivae are normal    Neck: Neck supple  Cardiovascular: Normal rate, regular rhythm, normal heart sounds and intact distal pulses  Exam reveals no gallop and no friction rub  No murmur heard  Pulmonary/Chest: Effort normal and breath sounds normal  He has no wheezes  He has no rhonchi  He has no rales  Abdominal: Soft  Normal appearance and bowel sounds are normal  He exhibits no distension  There is no tenderness  Musculoskeletal: He exhibits edema  Neurological: He is alert  GCS eye subscore is 4  GCS verbal subscore is 5  GCS motor subscore is 6  Skin: Skin is warm and dry  Bilateral lower extremities wrapped up to knees   Nursing note and vitals reviewed  ______________________________________________________________________  Vitals:    19 0200 19 0300 19 0400 19 0500   BP: 103/62 103/51 92/54 101/59   BP Location:  Left arm     Pulse: (!) 113 (!) 110 (!) 108 (!) 109   Resp: 13 (!) 29 (!) 30 19   Temp:  98 5 °F (36 9 °C)     TempSrc:  Oral     SpO2: 98% 93% 96% 98%   Weight:       Height:         Temperature:   Temp (24hrs), Av 6 °F (37 °C), Min:98 °F (36 7 °C), Max:99 2 °F (37 3 °C)    Current Temperature: 98 5 °F (36 9 °C)     Weights:   IBW: 68 4 kg    Body mass index is 30 47 kg/m²  Weight (last 2 days)     Date/Time   Weight    19 0600   90 9 (200 4)    19 0600   90 3 (199)            Non-Invasive/Invasive Ventilation Settings:  Respiratory    Lab Data (Last 4 hours)    None         O2/Vent Data (Last 4 hours)    None              SpO2: SpO2: 98 %     Intake and Outputs:  I/O       701 -  07 07    P  O  0 340 I V  (mL/kg) 677 8 (7 5) 10 (0 1)    Blood 236     NG/GT 0 10    IV Piggyback 746 3 845    Total Intake(mL/kg) 1660 1 (18 3) 1205 (13 3)    Urine (mL/kg/hr) 0 (0) 0 (0)    Drains 12 10    Other 948 1334    Stool 0 0    Total Output 960 1344    Net +700 1 -139          Unmeasured Stool Occurrence 4 x 1 x        Nutrition:        Diet Orders   (From admission, onward)             Start     Ordered    09/24/19 0909  Diet Renal; Renal Restrictive; Yes; Fluid Restriction 1500 ML; No; Potassium 2 GM  Diet effective now     Question Answer Comment   Diet Type Renal    Renal Renal Restrictive    Should patient have a fluid restriction? Yes    Fluid Restriction Fluid Restriction 1500 ML    Should patient have a protein modifier? No    Other Restriction(s): Potassium 2 GM    RD to adjust diet per protocol?  Yes        09/24/19 0910    09/22/19 0648  Room Service  Once     Question:  Type of Service  Answer:  Room Service-Appropriate    09/22/19 3995              Labs:   Results from last 7 days   Lab Units 09/25/19 0418 09/24/19  2139 09/24/19  0409 09/23/19  1510 09/23/19  0444 09/22/19  2111 09/22/19  0412 09/21/19 2018   WBC Thousand/uL 26 98* 25 50* 18 32*  --  9 32 5 76 3 08* 3 42*   HEMOGLOBIN g/dL 9 0* 9 0* 9 6* 9 9* 10 4* 11 4* 10 3* 12 1   HEMATOCRIT % 27 2* 26 8* 29 0*  --  31 9* 35 6* 31 2* 36 0*   PLATELETS Thousands/uL 28* 25* 42* 54* 51* 36* 32* 55*   NEUTROS PCT %  --   --   --   --   --  89*  --   --    BANDS PCT % 16*  --  10*  --  35*  --  12* 33*   MONOS PCT %  --   --   --   --   --  5  --   --    MONO PCT % 2*  --  8  --  7  --  29* 4     Results from last 7 days   Lab Units 09/25/19 0418 09/24/19  2124 09/24/19  1614 09/24/19  1013 09/24/19  0409 09/23/19  2216 09/23/19  1510 09/23/19  0445  09/22/19  2111 09/22/19  0412 09/21/19 2018   SODIUM mmol/L 137 136 135* 135* 133* 132* 128*  --    < > 129* 129* 132*   POTASSIUM mmol/L 3 9 4 0 4 0 4 0 4 4 4 8 5 7*  --    < > 6 1* 5 2 4 9   CHLORIDE mmol/L 103 101 100 98* 98* 96* 91*  --    < > 88* 89* 88*   CO2 mmol/L 25 22 24 23 24 23 22  --    < > 23 24 28   ANION GAP mmol/L 9 13 11 14* 11 13 15*  --    < > 18* 16* 16*   BUN mg/dL 56* 50* 45* 52* 61* 74* 97*  --    < > 89* 77* 75*   CREATININE mg/dL 4 33* 3 76* 3 70* 4 28* 5 17* 6 61* 9 19*  --    < > 9 25* 9 04* 9 54*   CALCIUM mg/dL 9 6 9 4 9 2 9 1 9 1 9 3 8 9  --    < > 8 5 8 5 8 9   ALT U/L  --   --   --  13  --   --   --  19  --  25 9* 11*   AST U/L  --   --   --  9  --   --   --  15  --  25 10 14   ALK PHOS U/L  --   --   --  83  --   --   --  64  --  68 68 101   ALBUMIN g/dL  --   --   --  2 2*  --   --   --  2 7*  --  2 9* 2 9* 2 7*   TOTAL BILIRUBIN mg/dL  --   --   --  4 30*  --   --   --  3 90*  --  4 30* 3 60* 3 30*    < > = values in this interval not displayed       Results from last 7 days   Lab Units 09/25/19 0418 09/24/19 2124 09/24/19  1614 09/24/19  1013 09/24/19  0409 09/23/19  2216 09/23/19  1510   MAGNESIUM mg/dL 2 0 2 0 2 0 1 9 2 0 2 1 2 3   PHOSPHORUS mg/dL 2 0* 2 4* 2 4* 2 5* 2 7 3 3 4 7*      Results from last 7 days   Lab Units 09/23/19  0445 09/22/19 2111 09/22/19 0412 09/21/19  2018   INR  1 50*  1 55* 1 52* 1 73* 1 72*   PTT seconds 37  --   --  36     Results from last 7 days   Lab Units 09/22/19 0412 09/22/19  0056   TROPONIN I ng/mL 0 05* 0 06*     Results from last 7 days   Lab Units 09/23/19  0629 09/22/19  2111 09/22/19  0908 09/22/19  0630 09/22/19  0412 09/22/19  0056 09/21/19  2237   LACTIC ACID mmol/L 1 9 2 1* 3 7* 3 5* 3 6* 3 7* 5 2*     ABG:  Lab Results   Component Value Date    PHART 7 366 09/22/2019    KAG7TYB 36 8 09/22/2019    PO2ART 91 0 09/22/2019    [id number] 20 6 (L) 09/22/2019    BEART -4 2 09/22/2019    SOURCE Radial, Left 09/22/2019     VBG:  Results from last 7 days   Lab Units 09/22/19  2152 09/22/19  1051   PH JESSY   --  7 331   PCO2 JESSY mm Hg  --  38 8*   PO2 JESSY mm Hg  --  51 1*   HCO3 JESSY mmol/L  --  20 0*   BASE EXC JESSY mmol/L  --  -5 4   ABG SOURCE Radial, Left  --      Results from last 7 days   Lab Units 09/23/19  0445 09/22/19  0412 09/21/19  2237   PROCALCITONIN ng/ml 56 56* 66 85* 63 11*     Imaging:  I have personally reviewed pertinent reports  and I have personally reviewed pertinent films in PACS     EKG: Telemetry reviewed  Micro:  Results from last 7 days   Lab Units 09/23/19  1022 09/23/19  0505 09/23/19  0443 09/22/19  1608 09/21/19 2019 09/21/19 2018   BLOOD CULTURE   --  No Growth at 24 hrs  No Growth at 24 hrs   --  Shewanella putrefaciens* Oxidase Positive gram negative pa*   GRAM STAIN RESULT  2+ Polys  No bacteria seen  --   --   --  Gram negative rods* Gram negative rods*   BODY FLUID CULTURE, STERILE  No growth  --   --   --   --   --    MRSA CULTURE ONLY   --   --   --  No Methicillin Resistant Staphlyococcus aureus (MRSA) isolated  --   --      Allergies: No Known Allergies     Medications:   Scheduled Meds:    Current Facility-Administered Medications:  acetaminophen 650 mg Oral Q6H PRN SAM Suazo    b complex-vitamin C-folic acid 1 capsule Oral Daily With Dinner SAM Daniels    cefTAZidime 2,000 mg Intravenous Q12H Bishnu Gifford MD Last Rate: Stopped (09/25/19 0220)   doxycycline 100 mg Intravenous Q12H Bishnu Gifford MD Last Rate: Stopped (09/25/19 0052)   insulin lispro 1-6 Units Subcutaneous Q6H [address] Lorenza Tobias PA-C    polyethylene glycol 17 g Oral Daily PRN SAM Suazo    senna-docusate sodium 1 tablet Oral HS Maria E SAM Dubose      PRN Meds:    acetaminophen 650 mg Q6H PRN   polyethylene glycol 17 g Daily PRN     VTE Pharmacologic Prophylaxis: Pharmacologic VTE Prophylaxis contraindicated due to thrombocytopenia  VTE Mechanical Prophylaxis: sequential compression device     Invasive lines and devices:   Invasive Devices     Peripheral Intravenous Line            Peripheral IV 09/23/19 Left Forearm 2 days    Peripheral IV 09/25/19 Left;Upper;Ventral (anterior) Arm less than 1 day Arterial Line            Arterial Line 09/22/19 3 days          Line            Temporary HD Catheter 1 day          Drain            Closed/Suction Drain Right RUQ Bulb 8 5 Fr  1 day              Portions of the record may have been created with voice recognition software  Occasional wrong word or "sound a like" substitutions may have occurred due to the inherent limitations of voice recognition software  Read the chart carefully and recognize, using context, where substitutions have occurred      Tom Levine PA-C

## 2019-09-25 NOTE — PHYSICAL THERAPY NOTE
PHYSICAL THERAPY EVALUATION NOTE    Patient Name: Lakisha Garcia  [id number] Date: 9/25/2019  AGE:   64 y o  Mrn:   [id number]  ADMIT DX:  End stage renal disease ([place] [address] ) [N18 6]  Leg pain [M79 606]  Peripheral vascular disease ([place] [address] ) [I73 9]  Left leg cellulitis [V18 323]  Cellulitis of lower extremity [L03 119]  Chronic kidney disease with end stage renal failure on dialysis ([place] [address] ) [N18 6, Z99 2]    Past Medical History:   Diagnosis Date    Complication of renal dialysis     Polycystic kidney disease      Length Of Stay: 4  PHYSICAL THERAPY EVALUATION :   09/25/19 1035   Pain Assessment   Pain Assessment FLACC   Pain Location   (right leg)   Pain Rating: FLACC (Rest) - Face 0   Pain Rating: FLACC (Rest) - Legs 0   Pain Rating: FLACC (Rest) - Activity 0   Pain Rating: FLACC (Rest) - Cry 0   Pain Rating: FLACC (Rest) - Consolability 0   Score: FLACC (Rest) 0   Pain Rating: FLACC (Activity) - Face 1   Pain Rating: FLACC (Activity) - Legs 1   Pain Rating: FLACC (Activity) - Activity 1   Pain Rating: FLACC (Activity) - Cry 1   Pain Rating: FLACC (Activity) - Consolability 1   Score: FLACC (Activity) 5   Home Living   Type of Home House   Home Layout Two level;1/2 bath on main level;Bed/bath upstairs; Other (Comment)  (6+1 FRANCISCO JAVIER)   Additional Comments lives w/ spouse (though home alone frequently due to spouse being at work)  ambulates w/o device  no DME  independent w/ ADLs and driving  no falls in last 6 months  Prior Function   Comments pt seen supine in bed w/ spouse present  agreed to participate in PT eval  pt gave inconsistent reports of pain  pt needed frequent redirection for task focus  Restrictions/Precautions   Other Precautions Impulsive;Cognitive; Chair Alarm; Bed Alarm;Telemetry; Fall Risk;Pain   General   Additional Pertinent History 9/25/19 at 6:59, heart rate was 106 BPM   Family/Caregiver Present Yes   Cognition Arousal/Participation Lethargic   Orientation Level Oriented to person;Oriented to place;Oriented to time; Other (Comment)  (pt was identified w/ full name, birth date)   Following Commands Follows one step commands inconsistently   Comments room air resting pulse ox 95% and 106 BPM, active 93% and 112 BPM  2+ edema B LEs    RUE Assessment   RUE Assessment WFL   LUE Assessment   LUE Assessment WFL   RLE Assessment   RLE Assessment X  (limited active/passive ROM)   LLE Assessment   LLE Assessment WFL  (3/5)   Coordination   Movements are Fluid and Coordinated 0   Coordination and Movement Description impaired coordination UEs   Sensation X  (impaired light touch feet)   Bed Mobility   Supine to Sit 2  Maximal assistance   Additional items Assist x 1;HOB elevated; Increased time required;Verbal cues;LE management  (for bedrail use, breathing technique, safety)   Additional Comments pt stood approximately 30 seconds w/ roller walker and maxx1  pt was unable to weight shift or advance retreat  pt had difficulty maintaining L UE  on walker (Ann MELLO notified)  Transfers   Sit to Stand 2  Maximal assistance   Additional items Assist x 1; Increased time required;Verbal cues  (for hand placement, LE positioning)   Stand to Sit 1  Dependent  (uncontrolled descent to edge of bed)   Additional items Assist x 1; Impulsive;Verbal cues  (for hand placement, controlled descent)   Stand pivot Unable to assess   Ambulation/Elevation   Gait pattern Not appropriate   Assistive Device Rolling walker   Balance   Static Sitting Fair   Static Standing Poor -  (w/ roller walker)   Ambulatory Zero   Activity Tolerance   Activity Tolerance Patient limited by fatigue;Patient limited by pain   Nurse Made Aware spoke to Johnny Ernst CM   Assessment   Prognosis Fair   Problem List Decreased strength;Decreased range of motion;Decreased endurance; Impaired balance;Decreased mobility; Decreased coordination;Decreased cognition;Decreased safety awareness; Impaired sensation;Obesity; Decreased skin integrity;Pain  (LE edema)   Assessment Pt was golfing two days ago when he felt that he strained his left left leg and "pulled a muscle"  Had difficulty ambulating secondary to pain and remained essentially non-ambulatory for the past two days  Dx: septic shock, bacteremia, purpuric skin lesion, ESRD, chronic foot wounds, cellulitis, hyperbilirubinemia, and thrombocytopenia  9/22/19 permacath removal  9/23/19 IR drainage  order placed for PT eval and tx  pt presents w/ comorbidities of ESRD and cellulitis  and personal factors of living in 2 story house, stair(s) to enter home, decreased cognition and limited home support  pt presents w/ pain, weakness, decreased ROM, decreased endurance, impaired cognition, impaired balance, altered sensation, impaired coordination, decreased safety awareness, fall risk, LE edema and impaired skin integrity  these impairments are evident in findings from physical examination (weakness, decreased ROM, altered sensation, impaired coordination, impaired skin integrity and edema of extremities), mobility assessment (need for max to total assist w/ bed mobility and transfers, inability to mobilize out of bed, need for input for mobility and breathing technique), and Barthel Index: 20/100  pt needed input for task focus and mobility technique/safety  pt is at risk for falls due to physical and safety awareness deficits  pt's clinical presentation is unstable/unpredictable (evident in tachycardia, need for assist w/ bed mobility and transfers when usually mobilizing independently, pain impacting overall mobility status and need for input for task focus and mobility technique)  pt needs inpatient PT tx to improve mobility deficits and progress mobility training as appropriate  discharge recommendation is for inpatient rehab to reduce fall risk and maximize level of functional independence   Pt would benefit from OT consult to address safety awareness and ability to live alone/drive  Pt presently needs dependent means for out of bed mobilization  Goals   Patient Goals pt did not state goals when asked   STG Expiration Date 10/05/19   Short Term Goal #1 pt will: Increase bilateral LE strength 1/2 grade to facilitate independent mobility, Perform all bed mobility tasks w/ minx1 to decrease fall risk factors, Perform sit <---> stand transfers w/ minx1 to improve independence, Complete stand pivot transfer with roller walker w/ modx1 w/o LOB, Increase static standing balance 1 grade to decrease risk for falls, Complete exercise program independently, Tolerate standing 2 minutes w/ minx1 to facilitate functional task performance and Improve Barthel Index score to 45 or greater to facilitate independence  PT to see when ambulation training is appropriate  Plan   Treatment/Interventions Functional transfer training;LE strengthening/ROM; Therapeutic exercise; Endurance training;Cognitive reorientation;Patient/family training;Equipment eval/education; Bed mobility  (PT to see when ambulation training is appropriate )   PT Frequency 5x/wk   Recommendation   Recommendation Short-term skilled PT;OT consult   Equipment Recommended Other (Comment)  (pt needs dependent means for out of bed mobilization )   Barthel Index   Feeding 5   Bathing 0   Grooming Score 0   Dressing Score 0   Bladder Score 10   Bowels Score 0   Toilet Use Score 0   Transfers (Bed/Chair) Score 5   Mobility (Level Surface) Score 0   Stairs Score 0   Barthel Index Score 20     Skilled PT recommended while in hospital and upon DC to progress pt toward treatment goals       Federico Lea, PT

## 2019-09-25 NOTE — PROGRESS NOTES
[address] [facility] NOTE   Andrés Alcala 64 y o  male MRN: [id number]  Unit/Bed#:  Encounter: [id number]  Reason for Consult:  ESRD    ASSESSMENT and PLAN:  1  ESRD on hemodialysis:    · Maintenance dialysis at [facility], Wednesday, Friday  · End-stage disease secondary to polycystic kidney disease  · Patient initiated on CVVHD on 09/23 in the setting of septic shock  · CVVHD terminated the evening of 09/24/2019  · Although today is patient's usual day for hemodialysis there is no compelling reason to perform dialysis we will evaluate in the morning and possibly perform a short 2 hour treatment tomorrow 9/26 with a full treatment Friday  2  Access:    · PermCath right IJ removed due to gram-negative sepsis  · Temporary catheter placed on 09/23  · Patient will need reinsertion of PermCath after blood cultures clear  3  GNR septic shock: ID following  · Etiology   ? PermCath removed- culture revealed gram-negative rods  ? Chest x-ray unrevealing  ? Lower extremity cellulitis-final culture results showed Shewanella putrefaciens bacteremia which is usually found in water  Lower extremity likely entry point since he was wading in [employer] water at [facility]  ? Right lower quadrant cystic mass on CT   body fluid culture 2+ polys, no bacteria    · Off pressors over 24 hours  4  Lower extremity lesions:    · Bilateral necrotic looking lesions with large hemorrhagic bullae which have ruptured  Legs wrapped with Ace bandages    5  Right lower quadrant cystic mass:  Cystic mass right kidney  6  Hyperkalemia:  Resolved  · Potassium 3 9-low-potassium diet  7  Elevated T bili:  Hepatic panel pending  8  Anemia, thrombocytopenia:    · Hemoglobin 9 0  Below baseline, continue to monitor  · Thrombocytopenia-status post platelet transfusion  · DIC panel unrevealing  9  CKD MBD:    · Low phosphorus-renal restrictive diet discontinued  · Calcium Level except  10   Hyponatremia:  Likely volume mediated,  resolved  Volume removal per dialysis  Spoke with family member, critical care team regarding plan of care     DISPOSITION:  Hold on hemodialysis today and plan for possible 2 hour treatment tomorrow and then full treatment Friday    SUBJECTIVE / INTERVAL HISTORY:  No complaints  OBJECTIVE:  Current Weight: Weight - Scale: 90 9 kg (200 lb 6 4 oz)  Vitals:    09/25/19 0400 09/25/19 0500 09/25/19 0600 09/25/19 0659   BP: 92/54 101/59  104/55   BP Location:       Pulse: (!) 108 (!) 109  (!) 106   Resp: (!) 30 19  20   Temp:    98 3 °F (36 8 °C)   TempSrc:    Oral   SpO2: 96% 98%  100%   Weight:   90 9 kg (200 lb 6 4 oz)    Height:           Intake/Output Summary (Last 24 hours) at 9/25/2019 1025  Last data filed at 9/25/2019 0400  Gross per 24 hour   Intake 1117 ml   Output 1195 ml   Net -78 ml     General: NAD, comfortably lying in bed  Skin: no rash,   Eyes: icteric sclera  ENT: moist mucous membrane  Neck: supple  Chest: CTA b/l, no ronchii, no wheeze, no rubs, no rales  Normal effort  CVS: s1s2, no murmur, no gallop, no rub  Slightly tachycardic  Abdomen: soft, nontender, nl sounds  Extremities:  edema LE b/l-legs wrapped with Ace bandages    Moderate pedal edema noted  : no meeks  Neuro: AAOX3  Psych: normal affect  Medications:    Current Facility-Administered Medications:     acetaminophen (TYLENOL) tablet 650 mg, 650 mg, Oral, Q6H PRN, SAM Mensah    b complex-vitamin C-folic acid (NEPHROCAPS) capsule 1 capsule, 1 capsule, Oral, Daily With Dinner, SAM Johnston, 1 capsule at 09/24/19 1707    cefTAZidime (FORTAZ) 2,000 mg in sodium chloride 0 9 % 50 mL IVPB, 2,000 mg, Intravenous, Q12H, Jesus Coats MD, Stopped at 09/25/19 0220    doxycycline (VIBRAMYCIN) 100 mg in sodium chloride 0 9 % 100 mL IVPB, 100 mg, Intravenous, Q12H, Jesus Coats MD, Stopped at 09/25/19 0052    insulin lispro (HumaLOG) 100 units/mL subcutaneous injection 1-6 Units, 1-6 Units, Subcutaneous, TID AC **AND** Fingerstick Glucose (POCT), , , TID AC, Cecily Quiros, ALEXNP    insulin lispro (HumaLOG) 100 units/mL subcutaneous injection 1-6 Units, 1-6 Units, Subcutaneous, HS, Daiva Hansop, CRNP    polyethylene glycol [facility]) packet 17 g, 17 g, Oral, Daily PRN, Alexanderle Lane, CRNP    senna-docusate sodium (SENOKOT S) 8 6-50 mg per tablet 1 tablet, 1 tablet, Oral, HS, Duyen Lane, CRNP, 1 tablet at 09/24/19 2119    Laboratory Results:  Results from last 7 days   Lab Units 09/25/19  0418 09/24/19  2139 09/24/19  2124 09/24/19  1614 09/24/19  1013 09/24/19  0409 09/23/19  2216 09/23/19  1510  09/23/19  0444  09/22/19  2111 09/22/19  0412  09/21/19 2018   WBC Thousand/uL 26 98* 25 50*  --   --   --  18 32*  --   --   --  9 32  --  5 76 3 08*  --  3 42*   HEMOGLOBIN g/dL 9 0* 9 0*  --   --   --  9 6*  --  9 9*  --  10 4*  --  11 4* 10 3*  --  12 1   HEMATOCRIT % 27 2* 26 8*  --   --   --  29 0*  --   --   --  31 9*  --  35 6* 31 2*  --  36 0*   PLATELETS Thousands/uL 28* 25*  --   --   --  42*  --  54*  --  51*  --  36* 32*  --  55*   POTASSIUM mmol/L 3 9  --  4 0 4 0 4 0 4 4 4 8 5 7*  --  5 9*   < > 6 1* 5 2  --  4 9   CHLORIDE mmol/L 103  --  101 100 98* 98* 96* 91*  --  89*   < > 88* 89*  --  88*   CO2 mmol/L 25  --  22 24 23 24 23 22  --  22   < > 23 24  --  28   BUN mg/dL 56*  --  50* 45* 52* 61* 74* 97*  --  92*   < > 89* 77*  --  75*   CREATININE mg/dL 4 33*  --  3 76* 3 70* 4 28* 5 17* 6 61* 9 19*  --  9 06*   < > 9 25* 9 04*  --  9 54*   CALCIUM mg/dL 9 6  --  9 4 9 2 9 1 9 1 9 3 8 9  --  8 6   < > 8 5 8 5  --  8 9   MAGNESIUM mg/dL 2 0  --  2 0 2 0 1 9 2 0 2 1 2 3   < >  --   --  2 6 1 6   < >  --    PHOSPHORUS mg/dL 2 0*  --  2 4* 2 4* 2 5* 2 7 3 3 4 7*   < >  --   --   --  5 2*  --   --     < > = values in this interval not displayed

## 2019-09-25 NOTE — NURSING NOTE
[facility]  [facility]phrology contacted- verbal orders to d/c CVVHD at this time until further notice  Will continue to monitor

## 2019-09-25 NOTE — PROGRESS NOTES
Progress Note - Infectious Disease   Lakisha Garcia 64 y o  male MRN: [id number]  Unit/Bed#:  Encounter: [id number]      Impression/Recommendations:  1  Septic shock, POA   Leukopenia, tachycardia, refractory hypotension   Due to #2/3   Now off pressors with improving bandemia  Overall clinically improved despite worsening WBC which may be multifactorial and leukomoid at this point  No diarrhea to suggest C diff  Rec:  ? Continue antibiotics as below  ? Follow up final repeat blood cultures as below  ? Follow temperatures closely  ? Recheck CBC in AM  ? Supportive care as per the primary service     2   Shewanella putrefaciens bacteremia    Suspect primary skin portal of entry with cellulitis as below with secondary infection of HD catheter, now removed  Less likely due to renal cyst as fluid culture negative  Repeat blood cultures negative  Rec:  ? Change antibiotics to Cipro to continue for 14 days total of antibiotics through 10/4/19  ? Follow up repeat blood cultures      3   Purpuric skin lesions  Suspect primary cellulitis due to underlying chronic foot wounds in setting of recent [place] water exposure   Appears superficial with healthy tissue under ruptrued bullae making necrotizing or deep infection less likely   Consider additional role of sepsis, shock   Less likely purpura fulminans given no DIC   Less likely ecthyma gangrenosum as appear quite superficial   Rec:  ? Continue antibiotics as above  ? Serial exams  ? [address] per Podiatry     4   ESRD on HD   [facility]ia [facility]   Recent port malfunction status post exchange 8/17/19   She reports that he had recent central line catheter exchange x2 in mid August   Noted in Care Everywhere to be noncompliant with diet, HD  Rec:  ? Possible transition back to HD per Renal  ? Dose adjust antibiotics for dialysis     5   Polycystic kidney/liver disease   Extensive disease seen on CT imaging      6  Chronic foot wounds    Likely multifactorial   Appear superficial   Risk factor for infection as above  Rec:  ? Outpatient Podiatry follow-up  ? Advised infection prevention strategies including keeping feet and wounds covered as much as possible and no environmental exposures, especially water sources      The above plan was discussed in detail with the CCM team      Antibiotics:  Ceftazidime/Doxycycline #3  Antibiotics #5    Subjective:  Patient seen on AM rounds  Denies fevers, chills, sweats, nausea, vomiting, or diarrhea  24 Hour Events:  Was seen by Dermatology who agreed skin lesions likely due to infection  Seen by Podiatry and had remaining bullae drained which yielded straw-colored serous fluid  CVVHD stopped overnight after filter clotted  Remains off pressors  WBC climbing  Objective:  Vitals:  Temp:  [98 3 °F (36 8 °C)-99 2 °F (37 3 °C)] 98 3 °F (36 8 °C)  HR:  [102-147] 106  Resp:  [12-30] 20  BP: ()/(51-73) 104/55  SpO2:  [93 %-100 %] 100 %  Temp (24hrs), Av 7 °F (37 1 °C), Min:98 3 °F (36 8 °C), Max:99 2 °F (37 3 °C)  Current: Temperature: 98 3 °F (36 8 °C)    Physical Exam:   General:  No acute distress  Psychiatric:  Awake but lethargic  Pulmonary:  Normal respiratory excursion without accessory muscle use  Abdomen:  Soft, nontender  Extremities:  Stable purpuric skin lesions bilateral calves with bullae now all ruptured  Skin:  No rashes    Lab Results:  I have personally reviewed pertinent labs    Results from last 7 days   Lab Units 19  0418 19  2124 19  1614 19  1013  19  0445  19  2111   POTASSIUM mmol/L 3 9 4 0 4 0 4 0   < >  --    < > 6 1*   CHLORIDE mmol/L 103 101 100 98*   < >  --    < > 88*   CO2 mmol/L 25 22 24 23   < >  --    < > 23   BUN mg/dL 56* 50* 45* 52*   < >  --    < > 89*   CREATININE mg/dL 4 33* 3 76* 3 70* 4 28*   < >  --    < > 9 25*   EGFR ml/min/1 73sq m 14 17 17 14   < >  --    < > 6   CALCIUM mg/dL 9 6 9 4 9 2 9 1   < >  --    < > 8 5   AST U/L  --   --   --  9  -- 15  --  25   ALT U/L  --   --   --  13  --  19  --  25   ALK PHOS U/L  --   --   --  83  --  64  --  68    < > = values in this interval not displayed  Results from last 7 days   Lab Units 09/25/19  0418 09/24/19  2139 09/24/19  0409   WBC Thousand/uL 26 98* 25 50* 18 32*   HEMOGLOBIN g/dL 9 0* 9 0* 9 6*   PLATELETS Thousands/uL 28* 25* 42*     Results from last 7 days   Lab Units 09/23/19  1022 09/23/19  0505 09/23/19  0443 09/22/19  1608 09/21/19 2019 09/21/19  2018   BLOOD CULTURE   --  No Growth at 24 hrs  No Growth at 24 hrs   --  Shewanella putrefaciens* Oxidase Positive gram negative pa*   GRAM STAIN RESULT  2+ Polys  No bacteria seen  --   --   --  Gram negative rods* Gram negative rods*   BODY FLUID CULTURE, STERILE  No growth  --   --   --   --   --    MRSA CULTURE ONLY   --   --   --  No Methicillin Resistant Staphlyococcus aureus (MRSA) isolated  --   --        Imaging Studies:   I have personally reviewed pertinent imaging study reports and images in PACS  EKG, Pathology, and Other Studies:   I have personally reviewed pertinent reports

## 2019-09-25 NOTE — PLAN OF CARE
Problem: PHYSICAL THERAPY ADULT  Goal: Performs mobility at highest level of function for planned discharge setting  See evaluation for individualized goals  Description  Treatment/Interventions: Functional transfer training, LE strengthening/ROM, Therapeutic exercise, Endurance training, Cognitive reorientation, Patient/family training, Equipment eval/education, Bed mobility(PT to see when ambulation training is appropriate )  Equipment Recommended: Other (Comment)(pt needs dependent means for out of bed mobilization )       See flowsheet documentation for full assessment, interventions and recommendations  9/25/2019 1134 by Maylin Lopez PT  Outcome: Progressing  Note:   Prognosis: Fair  Problem List: Decreased strength, Decreased range of motion, Decreased endurance, Impaired balance, Decreased mobility, Decreased coordination, Decreased cognition, Decreased safety awareness, Impaired sensation, Obesity, Decreased skin integrity, Pain(LE edema)  Assessment: Therapist provided education to pt for mobility technique including transfers and roller walker use  Education was provided due to findings from evaluation  Frequent repetition was needed for carryover to be noted  Pt was found to have improvement after education w/ decreased level of assist to maintain safety and increased standing tolerance  Pt continues to be a fall risk  continued inpatient PT tx is indicated to reduce fall risk factors and progress mobility training as appropriate  Recommendation: Short-term skilled PT, OT consult          See flowsheet documentation for full assessment       9/25/2019 1128 by Maylin Lopez PT  Outcome: Progressing  Note:   Prognosis: Fair  Problem List: Decreased strength, Decreased range of motion, Decreased endurance, Impaired balance, Decreased mobility, Decreased coordination, Decreased cognition, Decreased safety awareness, Impaired sensation, Obesity, Decreased skin integrity, Pain(LE edema)  Assessment: Pt was golfing two days ago when he felt that he strained his left left leg and "pulled a muscle"  Had difficulty ambulating secondary to pain and remained essentially non-ambulatory for the past two days  Dx: septic shock, bacteremia, purpuric skin lesion, ESRD, chronic foot wounds, cellulitis, hyperbilirubinemia, and thrombocytopenia  9/22/19 permacath removal  9/23/19 IR drainage  order placed for PT eval and tx  pt presents w/ comorbidities of ESRD and cellulitis  and personal factors of living in 2 story house, stair(s) to enter home, decreased cognition and limited home support  pt presents w/ pain, weakness, decreased ROM, decreased endurance, impaired cognition, impaired balance, altered sensation, impaired coordination, decreased safety awareness, fall risk, LE edema and impaired skin integrity  these impairments are evident in findings from physical examination (weakness, decreased ROM, altered sensation, impaired coordination, impaired skin integrity and edema of extremities), mobility assessment (need for max to total assist w/ bed mobility and transfers, inability to mobilize out of bed, need for input for mobility and breathing technique), and Barthel Index: 20/100  pt needed input for task focus and mobility technique/safety  pt is at risk for falls due to physical and safety awareness deficits  pt's clinical presentation is unstable/unpredictable (evident in tachycardia, need for assist w/ bed mobility and transfers when usually mobilizing independently, pain impacting overall mobility status and need for input for task focus and mobility technique)  pt needs inpatient PT tx to improve mobility deficits and progress mobility training as appropriate  discharge recommendation is for inpatient rehab to reduce fall risk and maximize level of functional independence  Pt would benefit from OT consult to address safety awareness and ability to live alone/drive   Pt presently needs dependent means for out of bed mobilization  Recommendation: Short-term skilled PT, OT consult          See flowsheet documentation for full assessment

## 2019-09-25 NOTE — PHYSICAL THERAPY NOTE
PHYSICAL THERAPY TREATMENT NOTE    Patient Name: Guilherme Mims  [id number] Date: 9/25/2019 09/25/19 1050   Pain Assessment   Pain Assessment FLACC   Pain Location   (right leg)   Pain Rating: FLACC (Rest) - Face 0   Pain Rating: FLACC (Rest) - Legs 0   Pain Rating: FLACC (Rest) - Activity 0   Pain Rating: FLACC (Rest) - Cry 0   Pain Rating: FLACC (Rest) - Consolability 0   Score: FLACC (Rest) 0   Pain Rating: FLACC (Activity) - Face 1   Pain Rating: FLACC (Activity) - Legs 1   Pain Rating: FLACC (Activity) - Activity 1   Pain Rating: FLACC (Activity) - Cry 1   Pain Rating: FLACC (Activity) - Consolability 1   Score: FLACC (Activity) 5   Restrictions/Precautions   Other Precautions Impulsive;Cognitive; Chair Alarm; Bed Alarm;Telemetry; Fall Risk;Pain   General   Chart Reviewed Yes   Family/Caregiver Present Yes   Cognition   Arousal/Participation Lethargic   Attention Attends with cues to redirect   Orientation Level Oriented to person;Oriented to place;Oriented to time; Other (Comment)  (pt was identified w/ full name, birth date)   Following Commands Follows one step commands inconsistently   Subjective   Subjective pt agreed to participate in PT intervention  pt was provided education regarding mobility technique including walker management and transfer technique  education was completed via verbal instruction and demonstration  Bed Mobility   Additional Comments pt stood approximately 45 seconds w/ roller walker and modx1  pt continued to be unable to weight shift or advance retreat  continued difficulty w/ maintaining L UE  on walker was noted  Transfers   Sit to Stand 3  Moderate assistance   Additional items Assist x 1; Increased time required;Verbal cues  (for hand placement, LE positioning)   Stand to Sit 3  Moderate assistance   Additional items Assist x 1; Impulsive;Verbal cues  (for hand placement, controlled descent)   Stand pivot Unable to assess   Ambulation/Elevation   Gait pattern Not appropriate   Assistive Device Rolling walker   Balance   Static Sitting Fair   Static Standing Poor  (w/ roller walker)   Ambulatory Zero   Activity Tolerance   Activity Tolerance Patient limited by fatigue;Patient limited by pain   Nurse Made Aware spoke to Johnny Ernst CM   Assessment   Prognosis Fair   Problem List Decreased strength;Decreased range of motion;Decreased endurance; Impaired balance;Decreased mobility; Decreased coordination;Decreased cognition;Decreased safety awareness; Impaired sensation;Obesity; Decreased skin integrity;Pain  (LE edema)   Assessment Therapist provided education to pt for mobility technique including transfers and roller walker use  Education was provided due to findings from evaluation  Frequent repetition was needed for carryover to be noted  Pt was found to have improvement after education w/ decreased level of assist to maintain safety and increased standing tolerance  Pt continues to be a fall risk  continued inpatient PT tx is indicated to reduce fall risk factors and progress mobility training as appropriate  Goals   Patient Goals pt did not state goals when asked   STG Expiration Date 10/05/19   Short Term Goal #1 pt will: Increase bilateral LE strength 1/2 grade to facilitate independent mobility, Perform all bed mobility tasks w/ minx1 to decrease fall risk factors, Perform sit <---> stand transfers w/ minx1 to improve independence, Complete stand pivot transfer with roller walker w/ modx1 w/o LOB, Increase static standing balance 1 grade to decrease risk for falls, Complete exercise program independently, Tolerate standing 2 minutes w/ minx1 to facilitate functional task performance and Improve Barthel Index score to 45 or greater to facilitate independence  PT to see when ambulation training is appropriate     PT Treatment Day 1   Plan   Treatment/Interventions Functional transfer training;LE strengthening/ROM; Therapeutic exercise; Endurance training;Cognitive reorientation;Patient/family training;Equipment eval/education; Bed mobility  (PT to see when ambulation training is appropriate)   Progress Progressing toward goals   PT Frequency 5x/wk   Recommendation   Recommendation Short-term skilled PT;OT consult   Equipment Recommended Other (Comment)  (pt needs dependent means for out of bed mobilization)     Skilled inpatient PT recommended while in hospital to progress pt toward treatment goals      Salima Godwin, PT

## 2019-09-25 NOTE — PLAN OF CARE
Problem: PHYSICAL THERAPY ADULT  Goal: Performs mobility at highest level of function for planned discharge setting  See evaluation for individualized goals  Description  Treatment/Interventions: Functional transfer training, LE strengthening/ROM, Therapeutic exercise, Endurance training, Cognitive reorientation, Patient/family training, Equipment eval/education, Bed mobility(PT to see when ambulation training is appropriate )  Equipment Recommended: Other (Comment)(pt needs dependent means for out of bed mobilization )       See flowsheet documentation for full assessment, interventions and recommendations  Outcome: Progressing  Note:   Prognosis: Fair  Problem List: Decreased strength, Decreased range of motion, Decreased endurance, Impaired balance, Decreased mobility, Decreased coordination, Decreased cognition, Decreased safety awareness, Impaired sensation, Obesity, Decreased skin integrity, Pain(LE edema)  Assessment: Pt was golfing two days ago when he felt that he strained his left left leg and "pulled a muscle"  Had difficulty ambulating secondary to pain and remained essentially non-ambulatory for the past two days  Dx: septic shock, bacteremia, purpuric skin lesion, ESRD, chronic foot wounds, cellulitis, hyperbilirubinemia, and thrombocytopenia  9/22/19 permacath removal  9/23/19 IR drainage  order placed for PT eval and tx  pt presents w/ comorbidities of ESRD and cellulitis  and personal factors of living in 2 story house, stair(s) to enter home, decreased cognition and limited home support  pt presents w/ pain, weakness, decreased ROM, decreased endurance, impaired cognition, impaired balance, altered sensation, impaired coordination, decreased safety awareness, fall risk, LE edema and impaired skin integrity   these impairments are evident in findings from physical examination (weakness, decreased ROM, altered sensation, impaired coordination, impaired skin integrity and edema of extremities), mobility assessment (need for max to total assist w/ bed mobility and transfers, inability to mobilize out of bed, need for input for mobility and breathing technique), and Barthel Index: 20/100  pt needed input for task focus and mobility technique/safety  pt is at risk for falls due to physical and safety awareness deficits  pt's clinical presentation is unstable/unpredictable (evident in tachycardia, need for assist w/ bed mobility and transfers when usually mobilizing independently, pain impacting overall mobility status and need for input for task focus and mobility technique)  pt needs inpatient PT tx to improve mobility deficits and progress mobility training as appropriate  discharge recommendation is for inpatient rehab to reduce fall risk and maximize level of functional independence  Pt would benefit from OT consult to address safety awareness and ability to live alone/drive  Pt presently needs dependent means for out of bed mobilization  Recommendation: Short-term skilled PT, OT consult          See flowsheet documentation for full assessment

## 2019-09-26 ENCOUNTER — APPOINTMENT (INPATIENT)
Dept: DIALYSIS | Facility: HOSPITAL | Age: 59
DRG: 871 | End: 2019-09-26
Payer: MEDICARE

## 2019-09-26 PROBLEM — E87.1 HYPONATREMIA: Status: RESOLVED | Noted: 2019-09-21 | Resolved: 2019-09-26

## 2019-09-26 PROBLEM — E87.29 HIGH ANION GAP METABOLIC ACIDOSIS: Status: RESOLVED | Noted: 2019-09-21 | Resolved: 2019-09-26

## 2019-09-26 PROBLEM — I48.91 ATRIAL FIBRILLATION (HCC): Status: ACTIVE | Noted: 2019-09-26

## 2019-09-26 PROBLEM — E87.2 LACTIC ACIDOSIS: Status: RESOLVED | Noted: 2019-09-21 | Resolved: 2019-09-26

## 2019-09-26 PROBLEM — A41.9 SEPTIC SHOCK (HCC): Status: RESOLVED | Noted: 2019-09-21 | Resolved: 2019-09-26

## 2019-09-26 PROBLEM — E87.20 LACTIC ACIDOSIS: Status: RESOLVED | Noted: 2019-09-21 | Resolved: 2019-09-26

## 2019-09-26 PROBLEM — R65.21 SEPTIC SHOCK (HCC): Status: RESOLVED | Noted: 2019-09-21 | Resolved: 2019-09-26

## 2019-09-26 PROBLEM — E87.2 HIGH ANION GAP METABOLIC ACIDOSIS: Status: RESOLVED | Noted: 2019-09-21 | Resolved: 2019-09-26

## 2019-09-26 LAB
ALBUMIN SERPL BCP-MCNC: 1.7 G/DL (ref 3.5–5)
ALP SERPL-CCNC: 154 U/L (ref 46–116)
ALT SERPL W P-5'-P-CCNC: 13 U/L (ref 12–78)
ANION GAP SERPL CALCULATED.3IONS-SCNC: 10 MMOL/L (ref 4–13)
ANISOCYTOSIS BLD QL SMEAR: PRESENT
AST SERPL W P-5'-P-CCNC: 18 U/L (ref 5–45)
BACTERIA SPEC BFLD CULT: NO GROWTH
BASOPHILS # BLD MANUAL: 0 THOUSAND/UL (ref 0–0.1)
BASOPHILS NFR MAR MANUAL: 0 % (ref 0–1)
BILIRUB SERPL-MCNC: 4.3 MG/DL (ref 0.2–1)
BUN SERPL-MCNC: 80 MG/DL (ref 5–25)
CALCIUM SERPL-MCNC: 9.9 MG/DL (ref 8.3–10.1)
CHLORIDE SERPL-SCNC: 103 MMOL/L (ref 100–108)
CO2 SERPL-SCNC: 24 MMOL/L (ref 21–32)
CREAT FLD-MCNC: 3.51 MG/DL
CREAT SERPL-MCNC: 5.41 MG/DL (ref 0.6–1.3)
DOHLE BOD BLD QL SMEAR: PRESENT
EOSINOPHIL # BLD MANUAL: 0.29 THOUSAND/UL (ref 0–0.4)
EOSINOPHIL NFR BLD MANUAL: 1 % (ref 0–6)
ERYTHROCYTE [DISTWIDTH] IN BLOOD BY AUTOMATED COUNT: 16.1 % (ref 11.6–15.1)
GFR SERPL CREATININE-BSD FRML MDRD: 11 ML/MIN/1.73SQ M
GLUCOSE SERPL-MCNC: 102 MG/DL (ref 65–140)
GLUCOSE SERPL-MCNC: 109 MG/DL (ref 65–140)
GLUCOSE SERPL-MCNC: 110 MG/DL (ref 65–140)
GLUCOSE SERPL-MCNC: 128 MG/DL (ref 65–140)
GLUCOSE SERPL-MCNC: 97 MG/DL (ref 65–140)
GRAM STN SPEC: NORMAL
GRAM STN SPEC: NORMAL
HCT VFR BLD AUTO: 27.9 % (ref 36.5–49.3)
HGB BLD-MCNC: 9.2 G/DL (ref 12–17)
INR PPP: 1.55 (ref 0.84–1.19)
LYMPHOCYTES # BLD AUTO: 1.18 THOUSAND/UL (ref 0.6–4.47)
LYMPHOCYTES # BLD AUTO: 4 % (ref 14–44)
MCH RBC QN AUTO: 33.3 PG (ref 26.8–34.3)
MCHC RBC AUTO-ENTMCNC: 33 G/DL (ref 31.4–37.4)
MCV RBC AUTO: 101 FL (ref 82–98)
MONOCYTES # BLD AUTO: 0 THOUSAND/UL (ref 0–1.22)
MONOCYTES NFR BLD: 0 % (ref 4–12)
NEUTROPHILS # BLD MANUAL: 27.98 THOUSAND/UL (ref 1.85–7.62)
NEUTS BAND NFR BLD MANUAL: 11 % (ref 0–8)
NEUTS SEG NFR BLD AUTO: 84 % (ref 43–75)
NRBC BLD AUTO-RTO: 1 /100 WBCS
PLATELET # BLD AUTO: 43 THOUSANDS/UL (ref 149–390)
PLATELET BLD QL SMEAR: ABNORMAL
PMV BLD AUTO: 12.9 FL (ref 8.9–12.7)
POIKILOCYTOSIS BLD QL SMEAR: PRESENT
POLYCHROMASIA BLD QL SMEAR: PRESENT
POTASSIUM SERPL-SCNC: 4.1 MMOL/L (ref 3.5–5.3)
PROT SERPL-MCNC: 5 G/DL (ref 6.4–8.2)
PROTHROMBIN TIME: 17.8 SECONDS (ref 11.6–14.5)
RBC # BLD AUTO: 2.76 MILLION/UL (ref 3.88–5.62)
SODIUM SERPL-SCNC: 137 MMOL/L (ref 136–145)
TOTAL CELLS COUNTED SPEC: 100
WBC # BLD AUTO: 29.45 THOUSAND/UL (ref 4.31–10.16)

## 2019-09-26 PROCEDURE — 82948 REAGENT STRIP/BLOOD GLUCOSE: CPT

## 2019-09-26 PROCEDURE — 82570 ASSAY OF URINE CREATININE: CPT | Performed by: INTERNAL MEDICINE

## 2019-09-26 PROCEDURE — 80053 COMPREHEN METABOLIC PANEL: CPT | Performed by: INTERNAL MEDICINE

## 2019-09-26 PROCEDURE — 85610 PROTHROMBIN TIME: CPT | Performed by: INTERNAL MEDICINE

## 2019-09-26 PROCEDURE — 85007 BL SMEAR W/DIFF WBC COUNT: CPT | Performed by: INTERNAL MEDICINE

## 2019-09-26 PROCEDURE — 85027 COMPLETE CBC AUTOMATED: CPT | Performed by: INTERNAL MEDICINE

## 2019-09-26 PROCEDURE — 5A1D70Z PERFORMANCE OF URINARY FILTRATION, INTERMITTENT, LESS THAN 6 HOURS PER DAY: ICD-10-PCS | Performed by: INTERNAL MEDICINE

## 2019-09-26 PROCEDURE — 99232 SBSQ HOSP IP/OBS MODERATE 35: CPT | Performed by: INTERNAL MEDICINE

## 2019-09-26 PROCEDURE — 99233 SBSQ HOSP IP/OBS HIGH 50: CPT | Performed by: INTERNAL MEDICINE

## 2019-09-26 PROCEDURE — NC001 PR NO CHARGE: Performed by: INTERNAL MEDICINE

## 2019-09-26 RX ADMIN — Medication 1 CAPSULE: at 16:54

## 2019-09-26 RX ADMIN — ACETAMINOPHEN 650 MG: 325 TABLET, FILM COATED ORAL at 17:39

## 2019-09-26 RX ADMIN — SENNOSIDES AND DOCUSATE SODIUM 1 TABLET: 8.6; 5 TABLET ORAL at 21:09

## 2019-09-26 RX ADMIN — CIPROFLOXACIN HYDROCHLORIDE 500 MG: 500 TABLET, FILM COATED ORAL at 18:40

## 2019-09-26 RX ADMIN — METOPROLOL TARTRATE 12.5 MG: 25 TABLET ORAL at 21:09

## 2019-09-26 NOTE — NURSING NOTE
Pt continues to have copious amounts of drainage from the area surrounding the right abdominal DILIA drain  Stoma appliance applied, within one hour 300 mL of straw colored fluid collected with mucous tissue collection  Critical care MD and nephrology aware, fluid sent to lab for specimen as per order

## 2019-09-26 NOTE — PLAN OF CARE
UF goal set for 1 kg  Plan of care discussed with Nephrology NP at bedside  Keep SBP >90       Problem: METABOLIC, FLUID AND ELECTROLYTES - ADULT  Goal: Fluid balance maintained  Description  INTERVENTIONS:  - Monitor labs   - Monitor I/O and WT  - Instruct patient on fluid and nutrition as appropriate  - Assess for signs & symptoms of volume excess or deficit  Outcome: Progressing

## 2019-09-26 NOTE — ASSESSMENT & PLAN NOTE
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  · Blood cultures grew gram-negative rods, 1/2 culture positive for Shewanella Putrifacians (a marine acquired bacteria, likely due to recent fishing trip)  · Source of gram-negative rods HD the PermCath versus bilateral lower extremity cellulitis, less likely renal cyst identified on CT and drained by IR (cyst culture was negative)  · HD catheter removed by surgery and replaced with temporary catheter at this time  · Lower extremity cellulitis with hemorrhagic bullae seen and followed by vascular, surgery, Podiatry, Infectious Disease, wound care, Dermatology  No indication for ischemic changes, cellulitis appears to be superficial, with no deep tissue or bone involvement    · Was on ceftazidime and doxycycline per ID  · White blood cell count currently 29 45, repeat blood cultures negative at 72 hr, afebrile, saturating % on room air, blood pressure is in the high 90s to low 593D systolic, lactic acid within normal limits  · Septic shock has resolved at this time

## 2019-09-26 NOTE — PROGRESS NOTES
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  · Blood cultures grew gram-negative rods, 1/2 culture positive for Shewanella Putrifacians (a marine acquired bacteria, likely due to recent fishing trip)  · Source of gram-negative rods HD the PermCath versus bilateral lower extremity cellulitis, less likely renal cyst identified on CT and drained by IR (cyst culture was negative)  · HD catheter removed by surgery and replaced with temporary catheter at this time  · Lower extremity cellulitis with hemorrhagic bullae seen and followed by vascular, surgery, Podiatry, Infectious Disease, wound care, Dermatology  No indication for ischemic changes, cellulitis appears to be superficial, with no deep tissue or bone involvement    · Was on ceftazidime and doxycycline per ID  · White blood cell count currently 29 45, repeat blood cultures negative at 72 hr, afebrile, saturating % on room air, blood pressure is in the high 90s to low 840S systolic, lactic acid within normal limits  · Septic shock has resolved at this time    Cellulitis of lower extremity- bilateral  Assessment & Plan  · The patient presented with bilateral cellulitis of the lower extremities with hemorrhagic fluid filled black bullae  · According to the patient he frequently has blisters and fluid-filled bullae on his lower extremities but not to this extent  · Approximately 2 weeks prior to presentation the patient stated he had popped a couple of the blisters and shortly after was fishing were he was standing knee deep without boots  · Blood cultures grew gram-negative rods positive for Shewanella Putrefaciens   · Acute surgery, Podiatry, wound care, Dermatology, vascular surgery, infectious disease on consult  No evidence of ischemic changes, appears to be superficial   · Repeat blood culture is negative at 72 hr  · Was on ceftazidime and doxycycline, per ID switched to p o  Ciprofloxacin    Gram-negative bacteremia  Assessment & Plan  · Original olood cultures grew gram-negative rods, 1/2 culture positive for Shewanella Putrifacians (a marine acquired bacteria, likely due to recent fishing trip)  · Source of gram-negative rods HD the PermCath versus bilateral lower extremity cellulitis, less likely renal cyst identified on CT and drained by IR (cyst culture was negative)  · HD catheter removed by surgery and replaced with temporary catheter at this time  · Lower extremity cellulitis with hemorrhagic bullae seen and followed by vascular, surgery, Podiatry, Infectious Disease, wound care, Dermatology  No indication for ischemic changes, cellulitis appears to be superficial, with no deep tissue or bone involvement    · Was on ceftazidime and doxycycline per ID  · White blood cell count currently 29 45, repeat blood cultures negative at 72 hr, afebrile, saturating % on room air, blood pressure is in the high 90s to low 925G systolic, lactic acid within normal limits  · Septic shock has resolved at this time, patient still meets criteria for sepsis with tachycardia, evident source of infection, elevated white cell count currently 29    Total bilirubin, elevated  Assessment & Plan  · Total bilirubin 4 3, LFTs within normal limits  · Right upper quadrant ultrasound showed "Innumerable hepatic and right renal cysts, otherwise unremarkable study "    Thrombocytopenia ([address] )  Assessment & Plan  · Patient has chronic thrombocytopenia likely secondary to pre-existing liver and renal pathology  · Patient received a unit of platelets prior to surgery to remove hemodialysis catheter to increased platelets to greater than 50  · Platelets currently 43 today    Chronic kidney disease with end stage renal failure on dialysis [facility])  Assessment & Plan  · Patient has known chronic kidney disease end-stage renal failure on hemodialysis Monday Wednesday and Friday  · On admission with septic shock dialysis catheter was removed and replaced with temporary catheter end catheter tip blood cultures positive for gram-negative rods  · Patient was on CVVH and has transitioned back to hemodialysis at this time  · Current creatinine is 5 41, baseline according to Care everywhere is in the 10-15 range    Benign essential hypertension  Assessment & Plan  · Patient has known history of hypertension  · Blood pressure is currently running in the 93E to 558 systolic  · On metoprolol    Polycystic kidney disease  Assessment & Plan  · Patient has known polycystic kidney disease and is on hemodialysis for end-stage renal failure secondary to polycystic kidney disease  · CT scan after admission of the abdomen showed a calcified right renal cyst and was drained by IR, drain is still in, currently draining fluid suspicious for urine versus ascitic fluid, sent for analysis      Polycystic liver disease  Assessment & Plan  · Patient has known polycystic liver disease with cyst appreciated on the CT abdomen and ultrasound    Atrial fibrillation ([address] )  Assessment & Plan  · Patient with no previous history of atrial fibrillation developed AFib with RVR into the 130s to 150s lasting for 2 hr on telemetry  · Likely secondary to sepsis  · Cardiology consulted and started metoprolol  · Most recent echocardiogram showed LV EF of 40% cardiology advised follow-up echo prior to discharge  · Cardiology advised follow-up appointment in the office outpatient after discharge    Code Status: Level 1 - Full Code  POA:    POLST:      Reason for ICU admission:   Septic shock secondary to Gram-negative pa bacteremia    Active problems:   Principal Problem (Resolved):    Septic shock (HCC)  Active Problems:    Cellulitis of lower extremity- bilateral    Gram-negative bacteremia    Total bilirubin, elevated    Thrombocytopenia (HCC)    Chronic kidney disease with end stage renal failure on dialysis [facility])    Polycystic liver disease    Polycystic kidney disease    Benign essential hypertension    Atrial fibrillation ([facility] [address] )  Resolved Problems:    High anion gap metabolic acidosis    Lactic acidosis    Hyponatremia      Consultants:   Cardiology, Dermatology, Podiatry, wound care, vascular surgery, General surgery, Infectious Disease, Nephrology, Interventional Radiology    History of Present Illness:   Terri Patel is a 64 y o  male h/o autosomal dominant polycystic kidney disease, ESRD on HD M-W-F via R subclavian catheter, polycystic liver disease whom presented to the emergency department of evaluation of leg pain  Patient is a rather poor historian however verbalizes that he was golfing two days ago when he felt that he strained his left left leg and "pulled a muscle"  He had difficulty ambulating 2/2 pain and remained essentially non-ambulatory for two days prior to admission  As of the day before admission, he developed redness and clear fluid filled blistering with blackened areas on both lower extremities, L>R  He was seen by his nephrologist during his HD session yesterday which prescribed him keflex with the advisement to go to the ER if it worsened  The redness and bullae worsened and appeared to track up his left leg, prompting him to seek medical attention  Summary of clinical course: In the ED the patient was found to have a lactic acid of 6 4, Anion Gap of 16 and a bandemia of 33  He was found to be in septic shock with tachycardia, lactic acid is good elevation, white blood cell count elevation   He was given 500ml of 0 9% NaCl, cefepime, vancomycin and a stat CT abd/pelvis w/ LE runoff showed LE subcutaneous edema of the lower legs w/o decrete fluid collection to suggest abcess and no SQ emphysema as well as a right lower quadrant cystic mass w/ thickened walls  He was unable to keep pressures up after admission and was started on peripheral Levophed and 2 L nasal cannula  Blood cultures grew gram-negative rods (Shewanella Putrifaciens) and there was question whether the source was the lower extremities or hemodialysis catheter  Acute care surgery consulted to remove catheter which also grew gram-negative rods, vascular surgery, Podiatry, wound care, Dermatology and Infectious Disease were all consulted regarding the bilateral lower extremity cellulitis with large fluid-filled hemorrhagic bullae  There was no concern for ischemic changes and tissue changes appear to be superficial   Per infectious disease he was started on ceftazidime and doxycycline and he began to clinically improve, was weaned off Levophed and oxygen and has maintained pressures in the 60I to 507 systolic and is saturating % on room air  Nephrology has been consulted regarding the removal of his hemodialysis catheter and recommended putting in a temporary catheter for CVVH as he was not hemodynamically stable at the time  Patient was maintained on CVVH for 2 days and then filter called and patient was transitioned to hemodialysis as he had become hemodynamically stable by that time  His baseline creatinine appears to be 10 to 15 according to care everywhere and has been trending down with dialysis, currently 4 33  Additionally on CT performed in the ED, suspicious right renal cyst was identified and was seen by IR and put in a drain  Right renal cyst was not considered likely as cause of gram-negative bacteremia, cystic fluid culture did not grow anything  On 09/26 yellow fluid was noted to be leaking from around the drain, fluid collected for analysis possibly urine versus ascitic fluid      Infectious disease was following the patient since admission, he had originally started him on ceftazidime and doxycycline, however white blood cells continue to trend upward, patient switch to p o  Ciprofloxacin  He is presently on day 1 of 14 days  On 09/24 the patient was noted to have a 2 hr run of atrial fibrillation with RVR on telemetry  He has no previous history of atrial fibrillation known  Echocardiogram from 2016 was unremarkable however echocardiogram from 2017 showed LV EF 40% with significant wall motion abnormalities  Cardiology was consulted and recommended repeat echocardiogram prior to discharge and started the patient on metoprolol for rate control  Additionally cardiology recommended follow up outpatient after discharge for ischemic workup  Recent or scheduled procedures:   Hemodialysis Monday Wednesday Friday  HD PermCath removed by surgery  HD temporary catheter placed by critical care  Central line  Echocardiogram  EKG  Right upper quadrant ultrasound liver Dopplers  VAS ROSALINE and waveform analysis  Chest x-ray  IR guided aspiration and drainage  Bilateral lower extremity venous duplex    Outstanding/pending diagnostics:   Body fluid creatinine    Cultures:   None, blood cultures negative at 72 hr (repeat)       Mobilization Plan:   PT/OT and speech therapy    Nutrition Plan:   Currently on renal diet    VTE Pharmacologic Prophylaxis: Pharmacologic VTE Prophylaxis contraindicated due to Sepsis  VTE Mechanical Prophylaxis: foot pump applied    Discharge Plan:   Patient should be ready for discharge to rehab when medically stable    Initial Physical Therapy Recommendations:  See PT recommendations  Initial Occupational Therapy Recommendations:  See OT recommendations  Initial /Plan:  Rehab    Home medications that are not reordered and reason why:   Multivitamin      Spoke with Dr Bill Amador  regarding transfer  Please call [phone] with any questions or concerns       Portions of the record may have been created with voice recognition software  Occasional wrong word or "sound a like" substitutions may have occurred due to the inherent limitations of voice recognition software  Read the chart carefully and recognize, using context, where substitutions have occurred

## 2019-09-26 NOTE — PROGRESS NOTES
[address] [facility] NOTE   Lakisha Garcia 64 y o  male MRN: [id number]  Unit/Bed#:  Encounter: [id number]  Reason for Consult:  ESRD    ASSESSMENT and PLAN:  1  ESRD on hemodialysis:    · Maintenance dialysis at [facility] Monday, Wednesday, Friday  · End-stage disease secondary to polycystic kidney disease  · Patient initiated on CVVHD on 09/23 in the setting of septic shock  · CVVHD terminated the evening of 09/24/2019  · Resume intermittent hemodialysis as of today  Patient on 3 hour treatment with a plan for additional treatment Friday to get him back on his usual hemodialysis schedule  · Patient seen on hemodialysis treatment  Tolerating procedure without incident although blood pressure is dropping with ultrafiltration  Discussed with dialysis nurse  Will ultrafiltrate to tolerance keeping systolic blood pressure greater than 90  · Plan:  3 hour treatment today and usual treatment 9/27  2  Access:    · PermCath right IJ removed due to gram-negative sepsis  · Temporary catheter placed on 09/23  · Patient will need reinsertion of PermCath after blood cultures clear  3  GNR septic shock: ID following  · Etiology   ? PermCath removed- catheter culture revealed gram-negative rods  ? Lower extremity cellulitis-final culture results showed Shewanella putrefaciens bacteremia which is usually found in water  Lower extremity likely entry point since he was wading in [employer] water at [facility]  ? Right lower quadrant cystic mass on CT   Body fluid culture 2+ polys, no bacteria    · Off pressors over 24 hours  4  Lower extremity lesions:    · Bilateral necrotic looking lesions with large hemorrhagic bullae which have ruptured  Legs wrapped with Ace bandages    5  Right lower quadrant cystic mass:  Cystic mass right kidney  6  Hyperkalemia:  Resolved  7  Elevated T bili:  History of polycystic liver disease  8   Anemia, thrombocytopenia:    · Hemoglobin 9 2, stable · Thrombocytopenia-status post platelet transfusion  · DIC panel unrevealing  9  CKD MBD:    · Low phosphorus-renal restrictive diet discontinued  · Continue regular diet with potassium restriction  · Monitor phosphorus level  10  Hyponatremia:  Likely volume mediated,  resolved  Volume removal per dialysis  11  Encephalopathy:  Likely related to ICU delirium  Spoke with family member, hemodialysis nurse regarding plan     DISPOSITION:  Hemodialysis for 3 hours today    SUBJECTIVE / INTERVAL HISTORY:  Patient states he is unable to talk  Staff reports what appears to be ICU delirium  They report the patient has not had any significant rest periods in several days  OBJECTIVE:  Current Weight: Weight - Scale: 88 6 kg (195 lb 5 2 oz)  Vitals:    09/25/19 2300 09/26/19 0300 09/26/19 0556 09/26/19 0700   BP: 97/52 96/63  105/59   BP Location: Right arm Right arm     Pulse: 103 98  97   Resp: 20 20  20   Temp: 98 1 °F (36 7 °C) 98 °F (36 7 °C)  97 6 °F (36 4 °C)   TempSrc: Oral Oral  Oral   SpO2: 100% 100%  97%   Weight:   88 6 kg (195 lb 5 2 oz)    Height:           Intake/Output Summary (Last 24 hours) at 9/26/2019 0811  Last data filed at 9/26/2019 0600  Gross per 24 hour   Intake 20 ml   Output 15 ml   Net 5 ml     General: NAD  Lying in bed flat-appears comfortable  Skin: no rash, color sallow  Eyes: icteric sclera  ENT: moist mucous membrane  Neck: supple, right IJ temporary dialysis catheter in place  Chest: CTA b/l, no ronchii, no wheeze, no rubs, no rales  Normal effort  CVS: s1s2, no murmur, no gallop, no rub  Abdomen: soft, nontender, nl sounds  Extremities:  Mild to moderate edema LE b/l-legs wrapped with Ace bandages  : no meeks  Neuro:  No acute deficits in motor function    Patient states he is unable SP  Psych:  Appears anxious  Medications:    Current Facility-Administered Medications:     acetaminophen (TYLENOL) tablet 650 mg, 650 mg, Oral, Q6H PRN, Lenin Hood, CRNP    b complex-vitamin C-folic acid (NEPHROCAPS) capsule 1 capsule, 1 capsule, Oral, Daily With SAM Alvares, 1 capsule at 09/25/19 1704    ciprofloxacin (CIPRO) tablet 500 mg, 500 mg, Oral, Q24H, Fortino Rodriguez MD, 500 mg at 09/25/19 1824    insulin lispro (HumaLOG) 100 units/mL subcutaneous injection 1-6 Units, 1-6 Units, Subcutaneous, TID AC **AND** Fingerstick Glucose (POCT), , , TID AC, SAM Crowell    insulin lispro (HumaLOG) 100 units/mL subcutaneous injection 1-6 Units, 1-6 Units, Subcutaneous, HS, SAM Crowell    metoprolol tartrate (LOPRESSOR) partial tablet 12 5 mg, 12 5 mg, Oral, Q12H [address], SAM Boo, 12 5 mg at 09/25/19 1256    polyethylene glycol (MIRALAX) packet 17 g, 17 g, Oral, Daily PRN, SAM Dc    senna-docusate sodium (SENOKOT S) 8 6-50 mg per tablet 1 tablet, 1 tablet, Oral, HS, SAM cD, 1 tablet at 09/25/19 2153    Laboratory Results:  Results from last 7 days   Lab Units 09/26/19  0325 09/25/19  0418 09/24/19  2139 09/24/19  2124 09/24/19  1614 09/24/19  1013 09/24/19  0409 09/23/19  2216 09/23/19  1510  09/23/19  0444  09/22/19  2111 09/22/19  0412   WBC Thousand/uL 29 45* 26 98* 25 50*  --   --   --  18 32*  --   --   --  9 32  --  5 76 3 08*   HEMOGLOBIN g/dL 9 2* 9 0* 9 0*  --   --   --  9 6*  --  9 9*  --  10 4*  --  11 4* 10 3*   HEMATOCRIT % 27 9* 27 2* 26 8*  --   --   --  29 0*  --   --   --  31 9*  --  35 6* 31 2*   PLATELETS Thousands/uL 43* 28* 25*  --   --   --  42*  --  54*  --  51*  --  36* 32*   POTASSIUM mmol/L 4 1 3 9  --  4 0 4 0 4 0 4 4 4 8 5 7*  --  5 9*   < > 6 1* 5 2   CHLORIDE mmol/L 103 103  --  101 100 98* 98* 96* 91*  --  89*   < > 88* 89*   CO2 mmol/L 24 25  --  22 24 23 24 23 22  --  22   < > 23 24   BUN mg/dL 80* 56*  --  50* 45* 52* 61* 74* 97*  --  92*   < > 89* 77*   CREATININE mg/dL 5 41* 4 33*  --  3 76* 3 70* 4 28* 5 17* 6 61* 9 19*  --  9 06*   < > 9 25* 9 04*   CALCIUM mg/dL 9 9 9 6  --  9 4 9 2 9 1 9 1 9 3 8 9  --  8 6   < > 8 5 8 5   MAGNESIUM mg/dL  --  2 0  --  2 0 2 0 1 9 2 0 2 1 2 3   < >  --   --  2 6 1 6   PHOSPHORUS mg/dL  --  2 0*  --  2 4* 2 4* 2 5* 2 7 3 3 4 7*   < >  --   --   --  5 2*    < > = values in this interval not displayed

## 2019-09-26 NOTE — SOCIAL WORK
CM met with patient spouse to review discharge plan and recommendation for SNF  CM was asked to send SNF list with dual eligable provider to her e-mail at [email or web address]  Patient spouse will review and provide 3 choices  CM department will follow

## 2019-09-26 NOTE — ASSESSMENT & PLAN NOTE
· Original olood cultures grew gram-negative rods, 1/2 culture positive for Shewanella Putrifacians (a marine acquired bacteria, likely due to recent fishing trip)  · Source of gram-negative rods HD the PermCath versus bilateral lower extremity cellulitis, less likely renal cyst identified on CT and drained by IR (cyst culture was negative)  · HD catheter removed by surgery and replaced with temporary catheter at this time  · Lower extremity cellulitis with hemorrhagic bullae seen and followed by vascular, surgery, Podiatry, Infectious Disease, wound care, Dermatology  No indication for ischemic changes, cellulitis appears to be superficial, with no deep tissue or bone involvement    · Was on ceftazidime and doxycycline per ID  · White blood cell count currently 29 45, repeat blood cultures negative at 72 hr, afebrile, saturating % on room air, blood pressure is in the high 90s to low 043I systolic, lactic acid within normal limits  · Septic shock has resolved at this time, patient still meets criteria for sepsis with tachycardia, evident source of infection, elevated white cell count currently 29

## 2019-09-26 NOTE — ASSESSMENT & PLAN NOTE
· Patient has known history of hypertension  · Blood pressure is currently running in the 71L to 138 systolic  · On metoprolol

## 2019-09-26 NOTE — PROGRESS NOTES
Progress Note - Infectious Disease   Emanuel Carlson 64 y o  male MRN: [id number]  Unit/Bed#:  Encounter: [id number]      Impression/Recommendations:  1  Septic shock, POA   Leukopenia, tachycardia, refractory hypotension   Due to #2/3   Now off pressors with improving bandemia  Overall clinically improved despite worsening WBC which may be multifactorial and leukomoid at this point  No diarrhea to suggest C diff  Rec:  ? Continue antibiotics as below  ? Follow temperatures closely  ? Recheck CBC, procalcitonin in AM  ? Supportive care as per the primary service     2   Shewanella putrefaciens bacteremia    Suspect primary skin portal of entry with cellulitis as below with secondary infection of HD catheter, now removed   Less likely due to renal cyst as fluid culture negative   Repeat blood cultures negative  Rec:  ? Continue Cipro for 14 days total of antibiotics through 10/4/19  ? Follow up repeat blood cultures      3   Purpuric skin lesions   Suspect primary cellulitis due to underlying chronic foot wounds in setting of recent creek[place] water exposure   Appears superficial with healthy tissue under ruptrued bullae making necrotizing or deep infection less likely   Consider additional role of sepsis, shock   Less likely purpura fulminans given no DIC   Less likely ecthyma gangrenosum as appear quite superficial   Rec:  ? Continue antibiotics as above  ? Serial exams  ? [address] per Podiatry     4   ESRD on HD   [facility]   Recent port malfunction status post exchange 8/17/19   She reports that he had recent central line catheter exchange x2 in mid August   Noted in Care Everywhere to be noncompliant with diet, HD  Rec:  ? Possible transition back to HD per Renal  ? Dose adjust antibiotics for dialysis  ?  As blood cultures negative x72 hours, OK from ID for PermCath     5   Polycystic kidney/liver disease   Extensive disease seen on CT imaging      6   Chronic foot wounds   Likely multifactorial   Appear superficial   Risk factor for infection as above  Rec:  ? Outpatient Podiatry follow-up  ? Advised infection prevention strategies including keeping feet and wounds covered as much as possible and no environmental exposures, especially water sources      The above plan was discussed in detail with the Renal service  Antibiotics:  Cipro #2  Antibiotics #6    Subjective:  Patient seen on PM rounds  Patient somewhat lethargic and unable to provide coherent history  Not noted to have received any sedating meds recently  24 Hour Events:  No documented fevers, chills, sweats, nausea, vomiting, or diarrhea  WBC still rising  Objective:  Vitals:  Temp:  [97 6 °F (36 4 °C)-98 2 °F (36 8 °C)] 98 2 °F (36 8 °C)  HR:  [] 103  Resp:  [15-36] 36  BP: ()/(52-79) 107/67  SpO2:  [93 %-100 %] 93 %  Temp (24hrs), Av °F (36 7 °C), Min:97 6 °F (36 4 °C), Max:98 2 °F (36 8 °C)  Current: Temperature: 98 2 °F (36 8 °C)    Physical Exam:   General:  No acute distress  Psychiatric:  Sleeping, difficult to arouse, then lethargic  Pulmonary:  Normal respiratory excursion without accessory muscle use  Abdomen:  Soft, nontender  Extremities:  Bilateral ACE wraps intact  Skin:  No rashes    Lab Results:  I have personally reviewed pertinent labs  Results from last 7 days   Lab Units 19  0325 19  0418 19  2124  19  1013  19  0445   POTASSIUM mmol/L 4 1 3 9 4 0   < > 4 0   < >  --    CHLORIDE mmol/L 103 103 101   < > 98*   < >  --    CO2 mmol/L 24 25 22   < > 23   < >  --    BUN mg/dL 80* 56* 50*   < > 52*   < >  --    CREATININE mg/dL 5 41* 4 33* 3 76*   < > 4 28*   < >  --    EGFR ml/min/1 73sq m 11 14 17   < > 14   < >  --    CALCIUM mg/dL 9 9 9 6 9 4   < > 9 1   < >  --    AST U/L 18  --   --   --  9  --  15   ALT U/L 13  --   --   --  13  --  19   ALK PHOS U/L 154*  --   --   --  83  --  64    < > = values in this interval not displayed       Results from last 7 days   Lab Units 09/26/19  0325 09/25/19  0418 09/24/19  2139   WBC Thousand/uL 29 45* 26 98* 25 50*   HEMOGLOBIN g/dL 9 2* 9 0* 9 0*   PLATELETS Thousands/uL 43* 28* 25*     Results from last 7 days   Lab Units 09/23/19  1022 09/23/19  0505 09/23/19  0443 09/22/19  1608 09/21/19 2019 09/21/19  2018   BLOOD CULTURE   --  No Growth at 72 hrs  No Growth at 72 hrs   --  Shewanella putrefaciens* Shewanella putrefaciens*   GRAM STAIN RESULT  2+ Polys  No bacteria seen  --   --   --  Gram negative rods* Gram negative rods*   BODY FLUID CULTURE, STERILE  No growth  --   --   --   --   --    MRSA CULTURE ONLY   --   --   --  No Methicillin Resistant Staphlyococcus aureus (MRSA) isolated  --   --        Imaging Studies:   I have personally reviewed pertinent imaging study reports and images in PACS  EKG, Pathology, and Other Studies:   I have personally reviewed pertinent reports

## 2019-09-26 NOTE — PROGRESS NOTES
Progress Note - Podiatry  Alana Kemp 64 y o  male MRN: [id number]  Unit/Bed#: -01 Encounter: [id number]    Assessment/Plan:  1  Bullae formation of the b/l extremities  2  Cellulitis of b/l LE  3  PKD  - Patient refused having the dressings taken down today  - They are c/d/i without any evidence of strikethough  - The deroofed bullae have likely desquamated leaving detached nonviable skin  - Will attempt to debride at bedside tomorrow if patient is amenable  - Continue current [facility]    Subjective/Objective   Chief Complaint:   Chief Complaint   Patient presents with    Leg Pain     peripheral vascular disease       Subjective: 64 y o  y/o male was seen and evaluated at bedside  States that his legs are painful and he didn't want the dressing changed again today    Blood pressure 103/61, pulse 102, temperature 97 7 °F (36 5 °C), temperature source Oral, resp  rate 20, height 5' 8" (1 727 m), weight 88 6 kg (195 lb 5 2 oz), SpO2 100 %  ,Body mass index is 29 7 kg/m²  Invasive Devices     Peripheral Intravenous Line            Peripheral IV 09/23/19 Left Forearm 3 days    Peripheral IV 09/25/19 Left;Upper;Ventral (anterior) Arm 1 day          Line            Temporary HD Catheter 3 days          Drain            Closed/Suction Drain Right RUQ Bulb 8 5 Fr  3 days                Physical Exam:   General: Alert, cooperative and no distress  Lungs: Non labored breathing  Heart: Positive S1, S2  Abdomen: Soft, non-tender  Extremity:     dsg c/d/i to the bL LE with no evidence of streikthrough  NVS at baseline  CFT<3 sec      Lab, Imaging and other studies:   I have personally reviewed pertinent lab results  Imaging: I have personally reviewed pertinent films in PACS  EKG, Pathology, and Other Studies: I have personally reviewed pertinent reports

## 2019-09-26 NOTE — ASSESSMENT & PLAN NOTE
· Patient has chronic thrombocytopenia likely secondary to pre-existing liver and renal pathology  · Patient received a unit of platelets prior to surgery to remove hemodialysis catheter to increased platelets to greater than 50  · Platelets currently 43 today

## 2019-09-26 NOTE — ASSESSMENT & PLAN NOTE
· The patient presented with bilateral cellulitis of the lower extremities with hemorrhagic fluid filled black bullae  · According to the patient he frequently has blisters and fluid-filled bullae on his lower extremities but not to this extent  · Approximately 2 weeks prior to presentation the patient stated he had popped a couple of the blisters and shortly after was fishing were he was standing knee deep without boots  · Blood cultures grew gram-negative rods positive for Shewanella Putrefaciens   · Acute surgery, Podiatry, wound care, Dermatology, vascular surgery, infectious disease on consult  No evidence of ischemic changes, appears to be superficial   · Repeat blood culture is negative at 72 hr  · Was on ceftazidime and doxycycline, per ID switched to p o   Ciprofloxacin

## 2019-09-26 NOTE — HEMODIALYSIS
Pt completed HD tx using catheter  Able to maintain prescribed BFR  UF goal turned off d/t drop in bp  683ml removed

## 2019-09-26 NOTE — ASSESSMENT & PLAN NOTE
· Patient has known chronic kidney disease end-stage renal failure on hemodialysis Monday Wednesday and Friday  · On admission with septic shock dialysis catheter was removed and replaced with temporary catheter end catheter tip blood cultures positive for gram-negative rods  · Patient was on CVVH and has transitioned back to hemodialysis at this time  · Current creatinine is 5 41, baseline according to Care everywhere is in the 10-15 range

## 2019-09-26 NOTE — PROGRESS NOTES
Progress Note - Cardiology   Donnie Benavides 64 y o  male MRN: [id number]  Unit/Bed#:  Encounter: [id number]        Active Problems:    Chronic kidney disease with end stage renal failure on dialysis [facility])    Cellulitis of lower extremity- bilateral    Polycystic liver disease    Total bilirubin, elevated    Thrombocytopenia (HCC)    Gram-negative bacteremia    Polycystic kidney disease    Benign essential hypertension      Assessment/Plan     1  Atrial fibrillation with RVR  Telemetry reviewed  2 hours of AFib with RVR 9/24  No known history of atrial fibrillation  Continue to monitor for recurrence  Blood pressures have improved  Started low-dose metoprolol tartrate- 12 5 BID  Doses held for BP       2  Cardiomyopathy-worsened from prior , in setting of septic shock  Echo 2016- normal LVEF  Echo 2017- EF 40% (patient unaware of this)  Would advise f/u echo prior to discharge  Would advise f/u in office   Will need eventual ischemic evaluation if patient agreeable  Telemetry reviewed  No VT noted      3  Septic shock   GNR bacteremia  Shewanella putrefaciens suspect skin portal of entry with cellulitis with secondary HD cathetar, now removed  Off Levophed  On fortaz- changed to oral cipro, ID following  F/U with BC   Leukocytosis worsening     4  ESRD/HD  [facility] a cath removed given possible source of  infection  Temporary HD catheter placed  Pt have refused AV fistula in past     5  Anemia/thrombocytopenia  S/P platelet tranfusion  H/H stable  DIC panel negative     6  Polycystic kidney/liver disease     7  Purpuric skin lesion/chronic foot wounds    8  Altered mental status- encephalopathic  Seems to be worse today on my exam    Subjective/Objective   Chief Complaint/Subjective  Pt more sedate today  Denies CP, SOB  Unable to answer all my question     Spoke with CCM, renal        Vitals: /67   Pulse 103   Temp 98 2 °F (36 8 °C)   Resp (!) 36   Ht 5' 8" (1 727 m)   Wt 88 6 kg (195 lb 5 2 oz)   SpO2 93%   BMI 29 70 kg/m²     Vitals:    09/25/19 0600 09/26/19 0556   Weight: 90 9 kg (200 lb 6 4 oz) 88 6 kg (195 lb 5 2 oz)     Orthostatic Blood Pressures      Most Recent Value   Blood Pressure  107/67 filed at 09/26/2019 1145   Patient Position - Orthostatic VS  Lying filed at 09/26/2019 0300            Intake/Output Summary (Last 24 hours) at 9/26/2019 1319  Last data filed at 9/26/2019 1145  Gross per 24 hour   Intake 610 ml   Output 698 ml   Net -88 ml       Invasive Devices     Peripheral Intravenous Line            Peripheral IV 09/23/19 Left Forearm 3 days    Peripheral IV 09/25/19 Left;Upper;Ventral (anterior) Arm 1 day          Line            Temporary HD Catheter 2 days          Drain            Closed/Suction Drain Right RUQ Bulb 8 5 Fr  3 days                Current Facility-Administered Medications   Medication Dose Route Frequency    acetaminophen (TYLENOL) tablet 650 mg  650 mg Oral Q6H PRN    b complex-vitamin C-folic acid (NEPHROCAPS) capsule 1 capsule  1 capsule Oral Daily With Dinner    ciprofloxacin (CIPRO) tablet 500 mg  500 mg Oral Q24H    insulin lispro (HumaLOG) 100 units/mL subcutaneous injection 1-6 Units  1-6 Units Subcutaneous TID AC    insulin lispro (HumaLOG) 100 units/mL subcutaneous injection 1-6 Units  1-6 Units Subcutaneous HS    metoprolol tartrate (LOPRESSOR) partial tablet 12 5 mg  12 5 mg Oral Q12H SHERON    polyethylene glycol (MIRALAX) packet 17 g  17 g Oral Daily PRN    senna-docusate sodium (SENOKOT S) 8 6-50 mg per tablet 1 tablet  1 tablet Oral HS         Physical Exam: /67   Pulse 103   Temp 98 2 °F (36 8 °C)   Resp (!) 36   Ht 5' 8" (1 727 m)   Wt 88 6 kg (195 lb 5 2 oz)   SpO2 93%   BMI 29 70 kg/m²     General Appearance:    Sleepy,  cooperative, no distress, appears chronically ill   Head:    Normocephalic, no scleral icterus   Eyes:    PERRL   Nose:   Nares normal, septum midline, no drainage    Throat:   Lips, mucosa, and tongue normal   Neck:   Supple, symmetrical, trachea midline,         Back:     Symmetric, no CVA tenderness   Lungs:     Clear to auscultation bilaterally, respirations unlabored   Chest Wall:    No tenderness or deformity    Heart:    Regular rate and rhythm, S1 and S2 normal, no murmur, rub   or gallop               Skin:   Skinwarm   Neurologic:   Asleep, arousable                     Lab Results:   Recent Results (from the past 72 hour(s))   Hemoglobin    Collection Time: 09/23/19  3:10 PM   Result Value Ref Range    Hemoglobin 9 9 (L) 12 0 - 17 0 g/dL   Platelet count    Collection Time: 09/23/19  3:10 PM   Result Value Ref Range    Platelets 54 (L) 217 - 390 Thousands/uL    MPV 12 1 8 9 - 12 7 fL   Basic metabolic panel    Collection Time: 09/23/19  3:10 PM   Result Value Ref Range    Sodium 128 (L) 136 - 145 mmol/L    Potassium 5 7 (H) 3 5 - 5 3 mmol/L    Chloride 91 (L) 100 - 108 mmol/L    CO2 22 21 - 32 mmol/L    ANION GAP 15 (H) 4 - 13 mmol/L    BUN 97 (H) 5 - 25 mg/dL    Creatinine 9 19 (H) 0 60 - 1 30 mg/dL    Glucose 97 65 - 140 mg/dL    Calcium 8 9 8 3 - 10 1 mg/dL    eGFR 6 ml/min/1 73sq m   Calcium, ionized    Collection Time: 09/23/19  3:10 PM   Result Value Ref Range    Calcium, Ionized 1 06 (L) 1 12 - 1 32 mmol/L   Phosphorus    Collection Time: 09/23/19  3:10 PM   Result Value Ref Range    Phosphorus 4 7 (H) 2 7 - 4 5 mg/dL   Magnesium    Collection Time: 09/23/19  3:10 PM   Result Value Ref Range    Magnesium 2 3 1 6 - 2 6 mg/dL   Fingerstick Glucose (POCT)    Collection Time: 09/23/19  6:10 PM   Result Value Ref Range    POC Glucose <20 (LL) 65 - 140 mg/dl   Fingerstick Glucose (POCT)    Collection Time: 09/23/19  6:12 PM   Result Value Ref Range    POC Glucose 85 65 - 140 mg/dl   Fingerstick Glucose (POCT)    Collection Time: 09/23/19  7:59 PM   Result Value Ref Range    POC Glucose 20 (LL) 65 - 140 mg/dl   Fingerstick Glucose (POCT)    Collection Time: 09/23/19  8:04 PM   Result Value Ref Range POC Glucose 85 65 - 140 mg/dl   Basic metabolic panel    Collection Time: 09/23/19 10:16 PM   Result Value Ref Range    Sodium 132 (L) 136 - 145 mmol/L    Potassium 4 8 3 5 - 5 3 mmol/L    Chloride 96 (L) 100 - 108 mmol/L    CO2 23 21 - 32 mmol/L    ANION GAP 13 4 - 13 mmol/L    BUN 74 (H) 5 - 25 mg/dL    Creatinine 6 61 (H) 0 60 - 1 30 mg/dL    Glucose 88 65 - 140 mg/dL    Calcium 9 3 8 3 - 10 1 mg/dL    eGFR 8 ml/min/1 73sq m   Calcium, ionized    Collection Time: 09/23/19 10:16 PM   Result Value Ref Range    Calcium, Ionized 1 15 1 12 - 1 32 mmol/L   Magnesium    Collection Time: 09/23/19 10:16 PM   Result Value Ref Range    Magnesium 2 1 1 6 - 2 6 mg/dL   Phosphorus    Collection Time: 09/23/19 10:16 PM   Result Value Ref Range    Phosphorus 3 3 2 7 - 4 5 mg/dL   Fingerstick Glucose (POCT)    Collection Time: 09/24/19 12:00 AM   Result Value Ref Range    POC Glucose 83 65 - 140 mg/dl   Basic metabolic panel    Collection Time: 09/24/19  4:09 AM   Result Value Ref Range    Sodium 133 (L) 136 - 145 mmol/L    Potassium 4 4 3 5 - 5 3 mmol/L    Chloride 98 (L) 100 - 108 mmol/L    CO2 24 21 - 32 mmol/L    ANION GAP 11 4 - 13 mmol/L    BUN 61 (H) 5 - 25 mg/dL    Creatinine 5 17 (H) 0 60 - 1 30 mg/dL    Glucose 77 65 - 140 mg/dL    Calcium 9 1 8 3 - 10 1 mg/dL    eGFR 11 ml/min/1 73sq m   Calcium, ionized    Collection Time: 09/24/19  4:09 AM   Result Value Ref Range    Calcium, Ionized 1 16 1 12 - 1 32 mmol/L   Magnesium    Collection Time: 09/24/19  4:09 AM   Result Value Ref Range    Magnesium 2 0 1 6 - 2 6 mg/dL   Phosphorus    Collection Time: 09/24/19  4:09 AM   Result Value Ref Range    Phosphorus 2 7 2 7 - 4 5 mg/dL   CBC and differential    Collection Time: 09/24/19  4:09 AM   Result Value Ref Range    WBC 18 32 (H) 4 31 - 10 16 Thousand/uL    RBC 2 84 (L) 3 88 - 5 62 Million/uL    Hemoglobin 9 6 (L) 12 0 - 17 0 g/dL    Hematocrit 29 0 (L) 36 5 - 49 3 %     (H) 82 - 98 fL    MCH 33 8 26 8 - 34 3 pg MCHC 33 1 31 4 - 37 4 g/dL    RDW 16 0 (H) 11 6 - 15 1 %    MPV 12 9 (H) 8 9 - 12 7 fL    Platelets 42 (LL) 908 - 390 Thousands/uL    nRBC 0 /100 WBCs   Manual Differential(PHLEBS Do Not Order)    Collection Time: 09/24/19  4:09 AM   Result Value Ref Range    Segmented % 77 (H) 43 - 75 %    Bands % 10 (H) 0 - 8 %    Lymphocytes % 3 (L) 14 - 44 %    Monocytes % 8 4 - 12 %    Eosinophils, % 0 0 - 6 %    Basophils % 0 0 - 1 %    Metamyelocytes% 2 (H) 0 - 1 %    Absolute Neutrophils 15 94 (H) 1 85 - 7 62 Thousand/uL    Lymphocytes Absolute 0 55 (L) 0 60 - 4 47 Thousand/uL    Monocytes Absolute 1 47 (H) 0 00 - 1 22 Thousand/uL    Eosinophils Absolute 0 00 0 00 - 0 40 Thousand/uL    Basophils Absolute 0 00 0 00 - 0 10 Thousand/uL    Total Counted 100     Dohle Bodies Present     Toxic Granulation Present     Anisocytosis Present     Macrocytes Present     Platelet Estimate Decreased (A) Adequate   Prepare platelet pheresis:Has consent been obtained?  Yes, 1 Units    Collection Time: 09/24/19  5:47 AM   Result Value Ref Range    Unit Product Code Y3331L70     Unit Number [id number]     Unit ABO A     Unit [facility] POS     Unit Dispense Status Presumed Trans    Fingerstick Glucose (POCT)    Collection Time: 09/24/19  6:17 AM   Result Value Ref Range    POC Glucose 75 65 - 140 mg/dl   Basic metabolic panel    Collection Time: 09/24/19 10:13 AM   Result Value Ref Range    Sodium 135 (L) 136 - 145 mmol/L    Potassium 4 0 3 5 - 5 3 mmol/L    Chloride 98 (L) 100 - 108 mmol/L    CO2 23 21 - 32 mmol/L    ANION GAP 14 (H) 4 - 13 mmol/L    BUN 52 (H) 5 - 25 mg/dL    Creatinine 4 28 (H) 0 60 - 1 30 mg/dL    Glucose 78 65 - 140 mg/dL    Calcium 9 1 8 3 - 10 1 mg/dL    eGFR 14 ml/min/1 73sq m   Calcium, ionized    Collection Time: 09/24/19 10:13 AM   Result Value Ref Range    Calcium, Ionized 1 19 1 12 - 1 32 mmol/L   Magnesium    Collection Time: 09/24/19 10:13 AM   Result Value Ref Range    Magnesium 1 9 1 6 - 2 6 mg/dL   Phosphorus Collection Time: 09/24/19 10:13 AM   Result Value Ref Range    Phosphorus 2 5 (L) 2 7 - 4 5 mg/dL   Hepatic function panel    Collection Time: 09/24/19 10:13 AM   Result Value Ref Range    Total Bilirubin 4 30 (H) 0 20 - 1 00 mg/dL    Bilirubin, Direct 2 92 (H) 0 00 - 0 20 mg/dL    Alkaline Phosphatase 83 46 - 116 U/L    AST 9 5 - 45 U/L    ALT 13 12 - 78 U/L    Total Protein 5 5 (L) 6 4 - 8 2 g/dL    Albumin 2 2 (L) 3 5 - 5 0 g/dL   Fingerstick Glucose (POCT)    Collection Time: 09/24/19 11:53 AM   Result Value Ref Range    POC Glucose 83 65 - 140 mg/dl   ECG 12 lead    Collection Time: 09/24/19  1:02 PM   Result Value Ref Range    Ventricular Rate 100 BPM    Atrial Rate 100 BPM    MO Interval 188 ms    QRSD Interval 118 ms    QT Interval 364 ms    QTC Interval 469 ms    P Axis 35 degrees    QRS Axis -5 degrees    T Wave Axis 67 degrees   Basic metabolic panel    Collection Time: 09/24/19  4:14 PM   Result Value Ref Range    Sodium 135 (L) 136 - 145 mmol/L    Potassium 4 0 3 5 - 5 3 mmol/L    Chloride 100 100 - 108 mmol/L    CO2 24 21 - 32 mmol/L    ANION GAP 11 4 - 13 mmol/L    BUN 45 (H) 5 - 25 mg/dL    Creatinine 3 70 (H) 0 60 - 1 30 mg/dL    Glucose 94 65 - 140 mg/dL    Calcium 9 2 8 3 - 10 1 mg/dL    eGFR 17 ml/min/1 73sq m   Calcium, ionized    Collection Time: 09/24/19  4:14 PM   Result Value Ref Range    Calcium, Ionized 1 23 1 12 - 1 32 mmol/L   Magnesium    Collection Time: 09/24/19  4:14 PM   Result Value Ref Range    Magnesium 2 0 1 6 - 2 6 mg/dL   Phosphorus    Collection Time: 09/24/19  4:14 PM   Result Value Ref Range    Phosphorus 2 4 (L) 2 7 - 4 5 mg/dL   Fingerstick Glucose (POCT)    Collection Time: 09/24/19  6:16 PM   Result Value Ref Range    POC Glucose 82 65 - 140 mg/dl   Basic metabolic panel    Collection Time: 09/24/19  9:24 PM   Result Value Ref Range    Sodium 136 136 - 145 mmol/L    Potassium 4 0 3 5 - 5 3 mmol/L    Chloride 101 100 - 108 mmol/L    CO2 22 21 - 32 mmol/L    ANION GAP 13 4 - 13 mmol/L    BUN 50 (H) 5 - 25 mg/dL    Creatinine 3 76 (H) 0 60 - 1 30 mg/dL    Glucose 99 65 - 140 mg/dL    Calcium 9 4 8 3 - 10 1 mg/dL    eGFR 17 ml/min/1 73sq m   Calcium, ionized    Collection Time: 09/24/19  9:24 PM   Result Value Ref Range    Calcium, Ionized 1 21 1 12 - 1 32 mmol/L   Magnesium    Collection Time: 09/24/19  9:24 PM   Result Value Ref Range    Magnesium 2 0 1 6 - 2 6 mg/dL   Phosphorus    Collection Time: 09/24/19  9:24 PM   Result Value Ref Range    Phosphorus 2 4 (L) 2 7 - 4 5 mg/dL   CBC and Platelet    Collection Time: 09/24/19  9:39 PM   Result Value Ref Range    WBC 25 50 (H) 4 31 - 10 16 Thousand/uL    RBC 2 65 (L) 3 88 - 5 62 Million/uL    Hemoglobin 9 0 (L) 12 0 - 17 0 g/dL    Hematocrit 26 8 (L) 36 5 - 49 3 %     (H) 82 - 98 fL    MCH 34 0 26 8 - 34 3 pg    MCHC 33 6 31 4 - 37 4 g/dL    RDW 15 9 (H) 11 6 - 15 1 %    Platelets 25 (LL) 751 - 390 Thousands/uL    MPV 11 5 8 9 - 12 7 fL   Fingerstick Glucose (POCT)    Collection Time: 09/24/19 11:36 PM   Result Value Ref Range    POC Glucose 94 65 - 140 mg/dl   Basic metabolic panel    Collection Time: 09/25/19  4:18 AM   Result Value Ref Range    Sodium 137 136 - 145 mmol/L    Potassium 3 9 3 5 - 5 3 mmol/L    Chloride 103 100 - 108 mmol/L    CO2 25 21 - 32 mmol/L    ANION GAP 9 4 - 13 mmol/L    BUN 56 (H) 5 - 25 mg/dL    Creatinine 4 33 (H) 0 60 - 1 30 mg/dL    Glucose 114 65 - 140 mg/dL    Calcium 9 6 8 3 - 10 1 mg/dL    eGFR 14 ml/min/1 73sq m   CBC and differential    Collection Time: 09/25/19  4:18 AM   Result Value Ref Range    WBC 26 98 (H) 4 31 - 10 16 Thousand/uL    RBC 2 69 (L) 3 88 - 5 62 Million/uL    Hemoglobin 9 0 (L) 12 0 - 17 0 g/dL    Hematocrit 27 2 (L) 36 5 - 49 3 %     (H) 82 - 98 fL    MCH 33 5 26 8 - 34 3 pg    MCHC 33 1 31 4 - 37 4 g/dL    RDW 15 9 (H) 11 6 - 15 1 %    MPV 12 6 8 9 - 12 7 fL    Platelets 28 (LL) 891 - 390 Thousands/uL    nRBC 1 /100 WBCs   Magnesium    Collection Time: 09/25/19 4:18 AM   Result Value Ref Range    Magnesium 2 0 1 6 - 2 6 mg/dL   Phosphorus    Collection Time: 09/25/19  4:18 AM   Result Value Ref Range    Phosphorus 2 0 (L) 2 7 - 4 5 mg/dL   Manual Differential(PHLEBS Do Not Order)    Collection Time: 09/25/19  4:18 AM   Result Value Ref Range    Segmented % 80 (H) 43 - 75 %    Bands % 16 (H) 0 - 8 %    Lymphocytes % 1 (L) 14 - 44 %    Monocytes % 2 (L) 4 - 12 %    Eosinophils, % 0 0 - 6 %    Basophils % 0 0 - 1 %    Metamyelocytes% 1 0 - 1 %    Absolute Neutrophils 25 90 (H) 1 85 - 7 62 Thousand/uL    Lymphocytes Absolute 0 27 (L) 0 60 - 4 47 Thousand/uL    Monocytes Absolute 0 54 0 00 - 1 22 Thousand/uL    Eosinophils Absolute 0 00 0 00 - 0 40 Thousand/uL    Basophils Absolute 0 00 0 00 - 0 10 Thousand/uL    Total Counted 100     Dohle Bodies Present     Anisocytosis Present     Platelet Estimate Decreased (A) Adequate   Fingerstick Glucose (POCT)    Collection Time: 09/25/19  6:04 AM   Result Value Ref Range    POC Glucose 113 65 - 140 mg/dl   Fingerstick Glucose (POCT)    Collection Time: 09/25/19 11:48 AM   Result Value Ref Range    POC Glucose 83 65 - 140 mg/dl   Prepare fresh frozen plasma:Has consent been obtained?  Yes, 2 Units    Collection Time: 09/25/19  1:07 PM   Result Value Ref Range    Unit Product Code W4367U78     Unit Number [id number]     Unit ABO O     Unit [facility] POS     Unit Dispense Status Return to [facility]     Unit Product Code X6937I83     Unit Number [id number]     Unit ABO O     Unit [facility] POS     Unit Dispense Status Return to [facility]    Fingerstick Glucose (POCT)    Collection Time: 09/25/19  5:29 PM   Result Value Ref Range    POC Glucose 128 65 - 140 mg/dl   Fingerstick Glucose (POCT)    Collection Time: 09/25/19  9:06 PM   Result Value Ref Range    POC Glucose 122 65 - 140 mg/dl   CBC and differential    Collection Time: 09/26/19  3:25 AM   Result Value Ref Range    WBC 29 45 (H) 4 31 - 10 16 Thousand/uL    RBC 2 76 (L) 3 88 - 5 62 Million/uL Hemoglobin 9 2 (L) 12 0 - 17 0 g/dL    Hematocrit 27 9 (L) 36 5 - 49 3 %     (H) 82 - 98 fL    MCH 33 3 26 8 - 34 3 pg    MCHC 33 0 31 4 - 37 4 g/dL    RDW 16 1 (H) 11 6 - 15 1 %    MPV 12 9 (H) 8 9 - 12 7 fL    Platelets 43 (LL) 765 - 390 Thousands/uL    nRBC 1 /100 WBCs   Comprehensive metabolic panel    Collection Time: 09/26/19  3:25 AM   Result Value Ref Range    Sodium 137 136 - 145 mmol/L    Potassium 4 1 3 5 - 5 3 mmol/L    Chloride 103 100 - 108 mmol/L    CO2 24 21 - 32 mmol/L    ANION GAP 10 4 - 13 mmol/L    BUN 80 (H) 5 - 25 mg/dL    Creatinine 5 41 (H) 0 60 - 1 30 mg/dL    Glucose 110 65 - 140 mg/dL    Calcium 9 9 8 3 - 10 1 mg/dL    AST 18 5 - 45 U/L    ALT 13 12 - 78 U/L    Alkaline Phosphatase 154 (H) 46 - 116 U/L    Total Protein 5 0 (L) 6 4 - 8 2 g/dL    Albumin 1 7 (L) 3 5 - 5 0 g/dL    Total Bilirubin 4 30 (H) 0 20 - 1 00 mg/dL    eGFR 11 ml/min/1 73sq m   Protime-INR    Collection Time: 09/26/19  3:25 AM   Result Value Ref Range    Protime 17 8 (H) 11 6 - 14 5 seconds    INR 1 55 (H) 0 84 - 1 19   Manual Differential(PHLEBS Do Not Order)    Collection Time: 09/26/19  3:25 AM   Result Value Ref Range    Segmented % 84 (H) 43 - 75 %    Bands % 11 (H) 0 - 8 %    Lymphocytes % 4 (L) 14 - 44 %    Monocytes % 0 (L) 4 - 12 %    Eosinophils, % 1 0 - 6 %    Basophils % 0 0 - 1 %    Absolute Neutrophils 27 98 (H) 1 85 - 7 62 Thousand/uL    Lymphocytes Absolute 1 18 0 60 - 4 47 Thousand/uL    Monocytes Absolute 0 00 0 00 - 1 22 Thousand/uL    Eosinophils Absolute 0 29 0 00 - 0 40 Thousand/uL    Basophils Absolute 0 00 0 00 - 0 10 Thousand/uL    Total Counted 100     Dohle Bodies Present     Anisocytosis Present     Poikilocytes Present     Polychromasia Present     Platelet Estimate Decreased (A) Adequate   Fingerstick Glucose (POCT)    Collection Time: 09/26/19  7:13 AM   Result Value Ref Range    POC Glucose 102 65 - 140 mg/dl     Imaging: I have personally reviewed pertinent reports      Tele- nsr      Counseling / Coordination of Care  Total time spent today 20 minutes  Greater than 50% of total time was spent with the patient and / or family counseling and / or coordination of care

## 2019-09-26 NOTE — PROGRESS NOTES
Progress Note - Critical Care   Taylor Connors 64 y o  male MRN: [id number]  Unit/Bed#:  Encounter: [id number]    Attending Physician: Hilda Conti MD    ______________________________________________________________________  Assessment and Plan:   Principal Problem (Resolved):    Septic shock ([address] )  Active Problems:    Cellulitis of lower extremity- bilateral    Gram-negative bacteremia    Total bilirubin, elevated    Thrombocytopenia (HCC)    Chronic kidney disease with end stage renal failure on dialysis [facility])    Polycystic liver disease    Polycystic kidney disease    Benign essential hypertension  Resolved Problems:    High anion gap metabolic acidosis    Lactic acidosis    Hyponatremia    Neuro  -No acute issues  -[facility] ICU  -Delirium precautions  -Regulate sleep/wake cycle     Cardiovascular  Septic Shock 2/2 to GNR Bacteremia  -Shock resolved, off levophed since 9/24  -Echo with EF 20%, if bacteremia persists - will need TTE  -Motor/sensory intact in bilateral feet     Respiratory  -No acute issues  -Stable on room air     GI  Hyperbilirubinemia  -Given normal LFT's, do not suspect obstructive pathology, favoring hemolysis in the setting of sepsis, trend labs  -Tolerating renal diet  -Last BM 9/24     Renal//FEN   ESRD on HD (MWF) 2/2 PKD    -Npehrology following - input apprecaited  -Permacath removed given possible source of infection; temporary HD cath placed   -CVVH completed 9/24, will attempt regular session of HD this morning   -Removed himself from transplant list for "personal issues"  Possible Infected Renal Cyst  -S/P drain placement by IR into cyst near cecum      Infectious Disease  Septic Shock 2/2 to GNR Bacteremia  Bandemia   -Suspected source: infected HD catheter, which has been removed, vs infected renal cyst vs cellulitis   -Infectious Disease following - input appreciated    -Continue cipro  -Follow cultures    Heme/Onc  Thrombocytopenia   Macrocytic Anemia   -Likely 2/2 to GNR sepsis, no evidence of bleeding; tolerated procedures well   -S/P 1 unit platelets given for permacath removal  -DIC panel unremarkable, hemolysis smear without schistocytes, retic count elevated, LD unremarkable, haptoglobin pending   -Trend, replace if <50      Endocrine  -No acute issues     Musculoskeletal/Derm  Hemorrhagic Bullous LE Lesions  -Suspected skin manifestation of GNR sepsis  -Per surgery, does not appear to be infected  -Vascular Surgery following - input appreciated; anticipate LEAD's to be completed today  -Podiatry and Dermatology following - input appreciated   -Frequent turns and repositioning     Disposition: Downgrade to SD level of care     Code Status: Level 1 - Full Code    Counseling / Coordination of Care  Total time spent today 34 minutes  Greater than 50% of total time was spent with the patient and / or family counseling and / or coordination of care    ______________________________________________________________________    24 Hour Events: Mr Itzel Christian was examined bedside this AM, with no acute events overnight  He reports that he "feels good" and wants to know when he can go home  Denies pain  Review of Systems   Constitutional: Negative for fever  HENT: Negative for nosebleeds  Respiratory: Negative for shortness of breath  Cardiovascular: Negative for chest pain  Gastrointestinal: Negative for abdominal pain  Genitourinary: Negative for hematuria  Skin: Negative for pallor  Neurological: Negative for headaches  Psychiatric/Behavioral: Negative for confusion  ______________________________________________________________________    Physical Exam:   Physical Exam   Constitutional: He is oriented to person, place, and time  Vital signs are normal  He appears well-developed and well-nourished  He is cooperative  HENT:   Head: Normocephalic and atraumatic     Mouth/Throat: Oropharynx is clear and moist and mucous membranes are normal    Eyes: Pupils are equal, round, and reactive to light  Conjunctivae are normal    Neck: Neck supple  Cardiovascular: Normal rate, regular rhythm, normal heart sounds and intact distal pulses  Exam reveals no gallop and no friction rub  No murmur heard  Pulmonary/Chest: Effort normal and breath sounds normal  He has no wheezes  He has no rhonchi  He has no rales  Abdominal: Soft  Normal appearance and bowel sounds are normal  He exhibits no distension  There is no tenderness  Musculoskeletal: He exhibits edema  Neurological: He is alert and oriented to person, place, and time  GCS eye subscore is 4  GCS verbal subscore is 5  GCS motor subscore is 6  Skin: Skin is warm and dry  Bandages from toes to knees on bilateral lower extremities, CDI    Nursing note and vitals reviewed  ______________________________________________________________________  Vitals:    19 1500 19 1937 19 2300 19 0300   BP: 99/62 103/56 97/52 96/63   BP Location:  Right arm Right arm Right arm   Pulse: 105 103 103 98   Resp: (!) 28 15 20 20   Temp: 98 °F (36 7 °C) 98 2 °F (36 8 °C) 98 1 °F (36 7 °C) 98 °F (36 7 °C)   TempSrc: Oral Oral Oral Oral   SpO2: 100% 100% 100% 100%   Weight:       Height:         Temperature:   Temp (24hrs), Av 1 °F (36 7 °C), Min:98 °F (36 7 °C), Max:98 3 °F (36 8 °C)    Current Temperature: 98 °F (36 7 °C)     Weights:   IBW: 68 4 kg    Body mass index is 30 47 kg/m²  Weight (last 2 days)     Date/Time   Weight    19 0600   90 9 (200 4)    19 0600   90 9 (200 4)            Non-Invasive/Invasive Ventilation Settings:  Respiratory    Lab Data (Last 4 hours)    None         O2/Vent Data (Last 4 hours)    None              SpO2: SpO2: 100 %      Intake and Outputs:  I/O       09/ 0700    P  O  340 0    I V  (mL/kg) 10 (0 1)     NG/GT 15 15    IV Piggyback 845     Total Intake(mL/kg) 1210 (13 3) 15 (0 2)    Urine (mL/kg/hr) 0 (0) 0 (0)    Drains 10 10    Other 1334     Stool 0     Total Output 1344 10    Net -134 +5          Unmeasured Stool Occurrence 1 x 1 x        Nutrition:        Diet Orders   (From admission, onward)             Start     Ordered    09/25/19 1058  Diet Regular; Regular House; Potassium 2 GM  Diet effective now     Question Answer Comment   Diet Type Regular    Regular Regular House    Other Restriction(s): Potassium 2 GM    RD to adjust diet per protocol?  Yes        09/25/19 1057    09/22/19 0648  Room Service  Once     Question:  Type of Service  Answer:  Room Service-Appropriate    09/22/19 2389              Labs:   Results from last 7 days   Lab Units 09/26/19  0325 09/25/19  0418 09/24/19  2139 09/24/19  0409 09/23/19  1510 09/23/19  0444 09/22/19 2111 09/22/19 0412 09/21/19 2018   WBC Thousand/uL 29 45* 26 98* 25 50* 18 32*  --  9 32 5 76 3 08* 3 42*   HEMOGLOBIN g/dL 9 2* 9 0* 9 0* 9 6* 9 9* 10 4* 11 4* 10 3* 12 1   HEMATOCRIT % 27 9* 27 2* 26 8* 29 0*  --  31 9* 35 6* 31 2* 36 0*   PLATELETS Thousands/uL 43* 28* 25* 42* 54* 51* 36* 32* 55*   NEUTROS PCT %  --   --   --   --   --   --  89*  --   --    BANDS PCT % 11* 16*  --  10*  --  35*  --  12* 33*   MONOS PCT %  --   --   --   --   --   --  5  --   --    MONO PCT % 0* 2*  --  8  --  7  --  29* 4     Results from last 7 days   Lab Units 09/26/19  0325 09/25/19  0418 09/24/19  2124 09/24/19  1614 09/24/19  1013 09/24/19  0409 09/23/19 2216  09/23/19  0445  09/22/19 2111 09/22/19 0412 09/21/19 2018   SODIUM mmol/L 137 137 136 135* 135* 133* 132*   < >  --    < > 129* 129* 132*   POTASSIUM mmol/L 4 1 3 9 4 0 4 0 4 0 4 4 4 8   < >  --    < > 6 1* 5 2 4 9   CHLORIDE mmol/L 103 103 101 100 98* 98* 96*   < >  --    < > 88* 89* 88*   CO2 mmol/L 24 25 22 24 23 24 23   < >  --    < > 23 24 28   ANION GAP mmol/L 10 9 13 11 14* 11 13   < >  --    < > 18* 16* 16*   BUN mg/dL 80* 56* 50* 45* 52* 61* 74*   < >  --    < > 89* 77* 75*   CREATININE mg/dL 5 41* 4 33* 3 76* 3 70* 4 28* 5 17* 6 61*   < >  --    < > 9 25* 9 04* 9 54*   CALCIUM mg/dL 9 9 9 6 9 4 9 2 9 1 9 1 9 3   < >  --    < > 8 5 8 5 8 9   ALT U/L 13  --   --   --  13  --   --   --  19  --  25 9* 11*   AST U/L 18  --   --   --  9  --   --   --  15  --  25 10 14   ALK PHOS U/L 154*  --   --   --  83  --   --   --  64  --  68 68 101   ALBUMIN g/dL 1 7*  --   --   --  2 2*  --   --   --  2 7*  --  2 9* 2 9* 2 7*   TOTAL BILIRUBIN mg/dL 4 30*  --   --   --  4 30*  --   --   --  3 90*  --  4 30* 3 60* 3 30*    < > = values in this interval not displayed  Results from last 7 days   Lab Units 09/25/19  0418 09/24/19  2124 09/24/19  1614 09/24/19  1013 09/24/19  0409 09/23/19  2216 09/23/19  1510   MAGNESIUM mg/dL 2 0 2 0 2 0 1 9 2 0 2 1 2 3   PHOSPHORUS mg/dL 2 0* 2 4* 2 4* 2 5* 2 7 3 3 4 7*      Results from last 7 days   Lab Units 09/26/19  0325 09/23/19  0445 09/22/19 2111 09/22/19 0412 09/21/19 2018   INR  1 55* 1 50*  1 55* 1 52* 1 73* 1 72*   PTT seconds  --  37  --   --  36     Results from last 7 days   Lab Units 09/22/19  0412 09/22/19  0056   TROPONIN I ng/mL 0 05* 0 06*     Results from last 7 days   Lab Units 09/23/19  0629 09/22/19 2111 09/22/19  0908 09/22/19  0630 09/22/19  0412 09/22/19  0056 09/21/19  2237   LACTIC ACID mmol/L 1 9 2 1* 3 7* 3 5* 3 6* 3 7* 5 2*     ABG:  Lab Results   Component Value Date    PHART 7 366 09/22/2019    SRJ8FQU 36 8 09/22/2019    PO2ART 91 0 09/22/2019    ALU3BFY 20 6 (L) 09/22/2019    BEART -4 2 09/22/2019    SOURCE Radial, Left 09/22/2019     VBG:  Results from last 7 days   Lab Units 09/22/19  2152 09/22/19  1051   PH JESSY   --  7 331   PCO2 JESSY mm Hg  --  38 8*   PO2 JESSY mm Hg  --  51 1*   HCO3 JESSY mmol/L  --  20 0*   BASE EXC JESSY mmol/L  --  -5 4   ABG SOURCE  Radial, Left  --      Results from last 7 days   Lab Units 09/23/19  0445 09/22/19  0412 09/21/19  2237   PROCALCITONIN ng/ml 56 56* 66 85* 63 11*     Imaging:  I have personally reviewed pertinent reports     and I have personally reviewed pertinent films in PACS     EKG: Telemetry reviewed  Micro:  Results from last 7 days   Lab Units 09/23/19  1022 09/23/19  0505 09/23/19  0443 09/22/19  1608 09/21/19 2019 09/21/19  2018   BLOOD CULTURE   --  No Growth at 48 hrs  No Growth at 48 hrs   --  Shewanella putrefaciens* Shewanella putrefaciens*   GRAM STAIN RESULT  2+ Polys  No bacteria seen  --   --   --  Gram negative rods* Gram negative rods*   BODY FLUID CULTURE, STERILE  No growth  --   --   --   --   --    MRSA CULTURE ONLY   --   --   --  No Methicillin Resistant Staphlyococcus aureus (MRSA) isolated  --   --      Allergies: No Known Allergies     Medications:   Scheduled Meds:    Current Facility-Administered Medications:  acetaminophen 650 mg Oral Q6H PRN Karle LaneALEX sandovalNP   b complex-vitamin C-folic acid 1 capsule Oral Daily With Dinner Pinkey Moment, CRNP   ciprofloxacin 500 mg Oral Q24H Jessica Hahn MD   insulin lispro 1-6 Units Subcutaneous TID AC Daiva Galljohn, ALEXNP   insulin lispro 1-6 Units Subcutaneous HS Daiva GallopALEXNP   metoprolol tartrate 12 5 mg Oral Q12H [address] Vidya [place], ALEXNP   polyethylene glycol 17 g Oral Daily PRN Karle LaneALEXNP   senna-docusate sodium 1 tablet Oral HS Karle Lane, CRNP     PRN Meds:    acetaminophen 650 mg Q6H PRN   polyethylene glycol 17 g Daily PRN     VTE Pharmacologic Prophylaxis: Pharmacologic VTE Prophylaxis contraindicated due to thrombocytopenia  VTE Mechanical Prophylaxis: sequential compression device     Invasive lines and devices: Invasive Devices     Peripheral Intravenous Line            Peripheral IV 09/23/19 Left Forearm 3 days    Peripheral IV 09/25/19 Left;Upper;Ventral (anterior) Arm less than 1 day          Line            Temporary HD Catheter 2 days          Drain            Closed/Suction Drain Right RUQ Bulb 8 5 Fr  2 days              Portions of the record may have been created with voice recognition software    Occasional wrong word or "sound a like" substitutions may have occurred due to the inherent limitations of voice recognition software  Read the chart carefully and recognize, using context, where substitutions have occurred      Sharmin Cordova PA-C

## 2019-09-26 NOTE — NURSING NOTE
At lunch time pt eats most of turkey and cranberry juice on tray without difficulty, however, at last bite, pt coughs after taking a sip of juice  Pt has not shown evidence of aspiration previously, however, due to pt fatigue, MD was alerted and speech was consulted

## 2019-09-26 NOTE — ASSESSMENT & PLAN NOTE
· Total bilirubin 4 3, LFTs within normal limits  · Right upper quadrant ultrasound showed "Innumerable hepatic and right renal cysts, otherwise unremarkable study  "

## 2019-09-26 NOTE — ASSESSMENT & PLAN NOTE
· Patient with no previous history of atrial fibrillation developed AFib with RVR into the 130s to 150s lasting for 2 hr on telemetry  · Likely secondary to sepsis  · Cardiology consulted and started metoprolol  · Most recent echocardiogram showed LV EF of 40% cardiology advised follow-up echo prior to discharge  · Cardiology advised follow-up appointment in the office outpatient after discharge

## 2019-09-27 ENCOUNTER — APPOINTMENT (INPATIENT)
Dept: DIALYSIS | Facility: HOSPITAL | Age: 59
DRG: 871 | End: 2019-09-27
Attending: INTERNAL MEDICINE
Payer: MEDICARE

## 2019-09-27 LAB
ERYTHROCYTE [DISTWIDTH] IN BLOOD BY AUTOMATED COUNT: 16.5 % (ref 11.6–15.1)
GLUCOSE SERPL-MCNC: 105 MG/DL (ref 65–140)
GLUCOSE SERPL-MCNC: 69 MG/DL (ref 65–140)
GLUCOSE SERPL-MCNC: 92 MG/DL (ref 65–140)
GLUCOSE SERPL-MCNC: 93 MG/DL (ref 65–140)
HCT VFR BLD AUTO: 29.5 % (ref 36.5–49.3)
HGB BLD-MCNC: 9.4 G/DL (ref 12–17)
MCH RBC QN AUTO: 33.3 PG (ref 26.8–34.3)
MCHC RBC AUTO-ENTMCNC: 31.9 G/DL (ref 31.4–37.4)
MCV RBC AUTO: 105 FL (ref 82–98)
PLATELET # BLD AUTO: 45 THOUSANDS/UL (ref 149–390)
PMV BLD AUTO: 12.3 FL (ref 8.9–12.7)
PROCALCITONIN SERPL-MCNC: 23.03 NG/ML
RBC # BLD AUTO: 2.82 MILLION/UL (ref 3.88–5.62)
WBC # BLD AUTO: 18.91 THOUSAND/UL (ref 4.31–10.16)

## 2019-09-27 PROCEDURE — 85027 COMPLETE CBC AUTOMATED: CPT | Performed by: PSYCHIATRY & NEUROLOGY

## 2019-09-27 PROCEDURE — 0H9NXZZ DRAINAGE OF LEFT FOOT SKIN, EXTERNAL APPROACH: ICD-10-PCS | Performed by: PODIATRIST

## 2019-09-27 PROCEDURE — G8988 SELF CARE GOAL STATUS: HCPCS

## 2019-09-27 PROCEDURE — 97530 THERAPEUTIC ACTIVITIES: CPT

## 2019-09-27 PROCEDURE — 99232 SBSQ HOSP IP/OBS MODERATE 35: CPT | Performed by: INTERNAL MEDICINE

## 2019-09-27 PROCEDURE — G8987 SELF CARE CURRENT STATUS: HCPCS

## 2019-09-27 PROCEDURE — 99231 SBSQ HOSP IP/OBS SF/LOW 25: CPT | Performed by: INTERNAL MEDICINE

## 2019-09-27 PROCEDURE — 99232 SBSQ HOSP IP/OBS MODERATE 35: CPT | Performed by: HOSPITALIST

## 2019-09-27 PROCEDURE — 97167 OT EVAL HIGH COMPLEX 60 MIN: CPT

## 2019-09-27 PROCEDURE — 90935 HEMODIALYSIS ONE EVALUATION: CPT | Performed by: INTERNAL MEDICINE

## 2019-09-27 PROCEDURE — 82948 REAGENT STRIP/BLOOD GLUCOSE: CPT

## 2019-09-27 PROCEDURE — 84145 PROCALCITONIN (PCT): CPT | Performed by: PSYCHIATRY & NEUROLOGY

## 2019-09-27 PROCEDURE — 0HDKXZZ EXTRACTION OF RIGHT LOWER LEG SKIN, EXTERNAL APPROACH: ICD-10-PCS | Performed by: PODIATRIST

## 2019-09-27 PROCEDURE — 5A1D70Z PERFORMANCE OF URINARY FILTRATION, INTERMITTENT, LESS THAN 6 HOURS PER DAY: ICD-10-PCS | Performed by: INTERNAL MEDICINE

## 2019-09-27 RX ORDER — ALBUMIN (HUMAN) 12.5 G/50ML
25 SOLUTION INTRAVENOUS ONCE
Status: COMPLETED | OUTPATIENT
Start: 2019-09-27 | End: 2019-09-27

## 2019-09-27 RX ADMIN — ACETAMINOPHEN 650 MG: 325 TABLET, FILM COATED ORAL at 11:36

## 2019-09-27 RX ADMIN — ALBUMIN (HUMAN) 25 G: 0.25 INJECTION, SOLUTION INTRAVENOUS at 15:21

## 2019-09-27 RX ADMIN — CIPROFLOXACIN HYDROCHLORIDE 500 MG: 500 TABLET, FILM COATED ORAL at 18:14

## 2019-09-27 NOTE — PLAN OF CARE
Problem: PHYSICAL THERAPY ADULT  Goal: Performs mobility at highest level of function for planned discharge setting  See evaluation for individualized goals  Description  Treatment/Interventions: Functional transfer training, LE strengthening/ROM, Therapeutic exercise, Endurance training, Cognitive reorientation, Patient/family training, Equipment eval/education, Bed mobility(PT to see when ambulation training is appropriate )  Equipment Recommended: Other (Comment)(pt needs dependent means for out of bed mobilization )       See flowsheet documentation for full assessment, interventions and recommendations  Outcome: Progressing  Note:   Prognosis: Fair  Problem List: Decreased strength, Decreased range of motion, Decreased endurance, Impaired balance, Decreased mobility, Decreased coordination, Decreased cognition, Decreased safety awareness, Impaired sensation, Obesity, Decreased skin integrity, Pain(LE edema)  Assessment: Patient supine in bed and is agreeable to participate in therapy session  Patient able to transfer supine to sit with mod ax 1, increased time and verbal instruction for technique  Patient requires singificant time for sitting EOB prior to transfer trials  Patient requires manual assistance with limited follow through of verbal instruction for body positoning and technique for sit<>stand transfers  Patient requires mod ax2 for transfers with increased assistance for controlled descend  Patient able to perform stand pivot transfer from EOB to recliner with max ax2 and roller walker  Patient requires steadying balance assistance, walker management as well as step by step instruction for foot placement and path direction  Continue to focus on OOB mobility with progression of transfsers as appropriate  Recommendation: Short-term skilled PT          See flowsheet documentation for full assessment

## 2019-09-27 NOTE — PROGRESS NOTES
[address] [facility] NOTE   Emanuel Carlson 64 y o  male MRN: [id number]  Unit/Bed#: -01 Encounter: [id number]  Reason for Consult:  ESRD    ASSESSMENT and PLAN:  1  ESRD on hemodialysis:    · Maintenance dialysis at [facility] Monday, Wednesday, Friday  · Outpatient prescription:  Sodium 137, 2 K, 40 bicarb, temperature 37°C, 83 kg target weight, Qb 450, Qd standard, filter 180  Treatment time appears to be 3 hours 10 minutes  · Inpatient prescription adjusted:  Sodium 137, potassium per protocol, 35 bicarb, Qb 400, daily standard, filter 160, treatment time 4 hours  · End-stage disease secondary to polycystic kidney disease  · Patient initiated on CVVHD on 09/23 in the setting of septic shock  · CVVHD terminated the evening of 09/24/2019 and iHD restarted on 09/26  · Plan for hemodialysis today to resume usual schedule  · Plan:   usual maintenance hemodialysis treatment  2  Access:    · PermCath right IJ removed due to gram-negative sepsis  · Temporary catheter placed on 09/23  · Blood cultures have cleared  · PermCath insertion 9/30  3  GNR septic shock: ID following  · Etiology   ? PermCath removed- catheter culture revealed gram-negative rods- Shewanella putrefaciens bacteremia which is usually found in water   Lower extremity likely entry point since he [place] [address]  ? Right lower quadrant cystic mass on CT   Body fluid culture 2+ polys, no bacteria    4  Lower extremity lesions:    · Bilateral necrotic looking lesions with large hemorrhagic bullae which have ruptured   Legs wrapped with Ace bandages    5  Right lower quadrant cystic mass:  Cystic mass right kidney  6  Hyperkalemia:  Resolved  7  Elevated T bili:  History of polycystic liver disease  8  Anemia, thrombocytopenia:    · Hemoglobin 9 4, stable   · Thrombocytopenia-status post platelet transfusion  · DIC panel unrevealing  9   CKD MBD:    · Low phosphorus-renal restrictive diet discontinued  · Continue regular diet with potassium restriction  · Monitor phosphorus level  10  Hyponatremia:  Likely volume mediated,  resolved  Volume removal per dialysis  11  Encephalopathy:  Likely related to ICU delirium     DISPOSITION:  Hemodialysis today    SUBJECTIVE / INTERVAL HISTORY:  Complains of leg pain which he rates as 10/10    OBJECTIVE:  Current Weight: Weight - Scale: 84 9 kg (187 lb 2 7 oz)  Vitals:    09/26/19 2056 09/26/19 2306 09/27/19 0545 09/27/19 0700   BP: 101/64 99/60  99/65   BP Location: Right arm Right arm  Right arm   Pulse: 102 98  98   Resp: 18 18  18   Temp:  97 9 °F (36 6 °C)  98 1 °F (36 7 °C)   TempSrc:  Oral  Oral   SpO2: 97% 96%  90%   Weight:   84 9 kg (187 lb 2 7 oz)    Height:           Intake/Output Summary (Last 24 hours) at 9/27/2019 0948  Last data filed at 9/26/2019 1847  Gross per 24 hour   Intake 535 ml   Output 1188 ml   Net -653 ml     General: NAD, chronically ill-appearing  Skin: no rash  Eyes: icteric sclera  ENT: moist mucous membrane  Neck: supple, no JVD  Chest:  Clear to auscultation, tubular breath sounds right lower lobe  CVS: s1s2, no murmur, no gallop, no rub  Abdomen: soft, nontender, nl sounds  Extremities:  Bilateral Ace bandages    Pedal edema noted  : no meeks  Neuro:  no acute deficits  Psych: normal affect  Medications:    Current Facility-Administered Medications:     acetaminophen (TYLENOL) tablet 650 mg, 650 mg, Oral, Q6H PRN, Frank Canavan, MD, 650 mg at 09/26/19 1739    b complex-vitamin C-folic acid (NEPHROCAPS) capsule 1 capsule, 1 capsule, Oral, Daily With Ryan Thibodeaux MD, 1 capsule at 09/26/19 1654    ciprofloxacin (CIPRO) tablet 500 mg, 500 mg, Oral, Q24H, Frank Canavan, MD, 500 mg at 09/26/19 1840    insulin lispro (HumaLOG) 100 units/mL subcutaneous injection 1-6 Units, 1-6 Units, Subcutaneous, TID AC **AND** Fingerstick Glucose (POCT), , , TID AC, Frank Canavan, MD    insulin lispro (HumaLOG) 100 units/mL subcutaneous injection 1-6 Units, 1-6 Units, Subcutaneous, HS, Shweta Oshea MD    metoprolol tartrate (LOPRESSOR) partial tablet 12 5 mg, 12 5 mg, Oral, Q12H [address], Shweta Oshea MD, 12 5 mg at 09/26/19 2109    polyethylene glycol (MIRALAX) packet 17 g, 17 g, Oral, Daily PRN, Shweta Oshea MD    senna-docusate sodium (SENOKOT S) 8 6-50 mg per tablet 1 tablet, 1 tablet, Oral, HS, Shweta Oshea MD, 1 tablet at 09/26/19 2109    Laboratory Results:  Results from last 7 days   Lab Units 09/27/19  0453 09/26/19  0325 09/25/19  0418 09/24/19  2139 09/24/19  2124 09/24/19  1614 09/24/19  1013 09/24/19  0409 09/23/19  2216 09/23/19  1510  09/23/19  0444  09/22/19  2111   WBC Thousand/uL 18 91* 29 45* 26 98* 25 50*  --   --   --  18 32*  --   --   --  9 32  --  5 76   HEMOGLOBIN g/dL 9 4* 9 2* 9 0* 9 0*  --   --   --  9 6*  --  9 9*  --  10 4*  --  11 4*   HEMATOCRIT % 29 5* 27 9* 27 2* 26 8*  --   --   --  29 0*  --   --   --  31 9*  --  35 6*   PLATELETS Thousands/uL 45* 43* 28* 25*  --   --   --  42*  --  54*  --  51*  --  36*   POTASSIUM mmol/L  --  4 1 3 9  --  4 0 4 0 4 0 4 4 4 8 5 7*  --  5 9*   < > 6 1*   CHLORIDE mmol/L  --  103 103  --  101 100 98* 98* 96* 91*  --  89*   < > 88*   CO2 mmol/L  --  24 25  --  22 24 23 24 23 22  --  22   < > 23   BUN mg/dL  --  80* 56*  --  50* 45* 52* 61* 74* 97*  --  92*   < > 89*   CREATININE mg/dL  --  5 41* 4 33*  --  3 76* 3 70* 4 28* 5 17* 6 61* 9 19*  --  9 06*   < > 9 25*   CALCIUM mg/dL  --  9 9 9 6  --  9 4 9 2 9 1 9 1 9 3 8 9  --  8 6   < > 8 5   MAGNESIUM mg/dL  --   --  2 0  --  2 0 2 0 1 9 2 0 2 1 2 3   < >  --   --  2 6   PHOSPHORUS mg/dL  --   --  2 0*  --  2 4* 2 4* 2 5* 2 7 3 3 4 7*   < >  --   --   --     < > = values in this interval not displayed

## 2019-09-27 NOTE — SOCIAL WORK
CM met with patients spouse at bedside and determined that she wanted CM to see if [facility] is willing to get patient into in house dialysis  CM is waiting on confirmation  [facility] is able to accept next week but will require out of pocket cost  CM department will continue to follow through discharge

## 2019-09-27 NOTE — ASSESSMENT & PLAN NOTE
Lower extremity cellulitis and hemorrhagic bullae  Initial blood cultures demonstrated gram-negative rods, specifically Shewanella Putrifacians, a marine acquired bacteria  Additionally, nidus for infection PermCath (replaced by surgery using temporary catheter) versus lower extremity cellulitis; less likely renal cyst demonstrated on CT (drainage by IR and subsequent negative culture)  Sepsis criteria:  Tachycardia, nidus for infection leukocytosis  · Infectious Disease consult appreciated-continue ciprofloxacin through 10/04/2019  Continue to monitor temperature and CBC  · Wound care consult appreciated-ruptured bullae; cleanse using NS, single layer xeroform  Change Xeroform once daily  Frequent repositioning  Offload heels  Routine wound care nurse  · Dermatology consult appreciated-no biopsy recommended  Wound care as outlined above  Antibiotics as outlined above  · Podiatry consult appreciated-attempted debridement this p m  · Cardiology consult appreciated-AFib with RVR; continue metoprolol 12 5 mg b i d  (held for hypotension), cardiomyopathy; limited echo recommended in addition to outpatient ischemic evaluation  · Nephrology consult appreciated-resume usual HD schedule, PermCath insertion 09/30/2019

## 2019-09-27 NOTE — ASSESSMENT & PLAN NOTE
· Continue antibiotics per infectious disease recommendations  · Continue wound care per recommendations above

## 2019-09-27 NOTE — SPEECH THERAPY NOTE
Speech Language/Pathology    Speech/Language Pathology Progress Note    Patient Name: Mauricio Kim  [id number] Date: 9/27/2019     Consult rec'd for swallow eval  Attempted to see pt x2, earlier pt had change in MS, RN requested to hold eval until MD comes to evaluate pt  Second attempt at this time, pt on HD, spoke w/ nsg, pt still very lethargic; attempted to see pt, he could not keep his eyes open and maintain alertness  Will hold eval until tomorrow as able, as appropriate  Consider holding po until speech eval completed       April Elida Manrique CCC-SLP  Speech Pathogist  Available via  tiger text

## 2019-09-27 NOTE — PLAN OF CARE
Problem: OCCUPATIONAL THERAPY ADULT  Goal: Performs self-care activities at highest level of function for planned discharge setting  See evaluation for individualized goals  Description  Treatment Interventions: ADL retraining, Functional transfer training, Endurance training, UE strengthening/ROM, Patient/family training, Equipment evaluation/education, Compensatory technique education, Continued evaluation, Energy conservation, Activityengagement  Equipment Recommended: Bedside commode, Tub seat with back, Other (comment)(will continue to assess at rehab)       See flowsheet documentation for full assessment, interventions and recommendations  Note:   Limitation: Decreased ADL status, Decreased UE strength, Decreased Safe judgement during ADL, Decreased cognition, Decreased endurance, Decreased self-care trans, Decreased high-level ADLs     Assessment: Pt is a 62yo male admitted to [facility] on 9/21/2019  Pt presents w/ gram- negative bacteremia and significant PMH impacting his occupational performance including ESRD on dialysis, polycystic liver disease  Perma -catheter removal on 9/22/2019 due to felt to be source of gram - negative sepsis, and aspiration / drainage of R renal cyst/ lesion on 9/23/2019  Pt transferred from critical care to med surg on 9/26/2019  Pt not accurate historian upon eval, but confirms living in [address] [place] [address] w/ his wife  Pt reports I w/ ADL/ IADL w/ out use AD or DME and enjoys playing golf  Upon eval, pt lethargic but arousable w/ cues and decreased attention  Pt had difficulty following directions  Pt oriented to person consistently but disoriented to time, situation  Pt engaged in SBT ([employer] Screen) w/ score of 22 indicating cognitive impairment  Pt demonstrated B UE AROM WFL to complete ADL  Pt completed UBD w/ mod A  Pt able to adjust / reposition himself in bedside recliner chair w/ S and cues for hand placement, tech using B UE   Will continue to assess standing, functional mobility and functional cognitive skills to assist in safe discharge planning  Pt presents w/ decreased cognition, decreased activity tolerance, decreased endurance, decreased activity engagement, generalized weakness/ deconditioning, and increased pain impacting his I w/ dressing, bathing, functional mobility, functional transfers, activity engagement, clothing mgmt, oral hygiene  Pt would benefit from OT while in acute care to address deficits and post acute rehab when medically stable for discharge from acute care       OT Discharge Recommendation: Other (Comment)(post acute rehab)  OT - OK to Discharge: (post acute rehab)

## 2019-09-27 NOTE — PLAN OF CARE
Problem: CARDIOVASCULAR - ADULT  Goal: Maintains optimal cardiac output and hemodynamic stability  Description  INTERVENTIONS:  - Monitor I/O, vital signs and rhythm  - Monitor for S/S and trends of decreased cardiac output  - Administer and titrate ordered vasoactive medications to optimize hemodynamic stability  - Assess quality of pulses, skin color and temperature  - Assess for signs of decreased coronary artery perfusion  - Instruct patient to report change in severity of symptoms  Outcome: Progressing     Problem: METABOLIC, FLUID AND ELECTROLYTES - ADULT  Goal: Electrolytes maintained within normal limits  Description  INTERVENTIONS:  - Monitor labs and assess patient for signs and symptoms of electrolyte imbalances  - Administer electrolyte replacement as ordered  - Monitor response to electrolyte replacements, including repeat lab results as appropriate  - Instruct patient on fluid and nutrition as appropriate  Outcome: Progressing

## 2019-09-27 NOTE — PHYSICAL THERAPY NOTE
PHYSICAL THERAPY NOTE    Patient Name: Gualberto Craft  [id number] Date: 19 6922   Pain Assessment   Pain Assessment No/denies pain   Pain Score No Pain   Restrictions/Precautions   Other Precautions Impulsive;Cognitive; Chair Alarm; Bed Alarm;Telemetry; Fall Risk;Pain   General   Family/Caregiver Present No   Subjective   Subjective Patient supine in bed and is agreeable to participate in therapy session  Patient identifers obtained from name &   Bed Mobility   Supine to Sit 3  Moderate assistance   Additional items Assist x 1;HOB elevated; Bedrails; Increased time required;Verbal cues;LE management   Sit to Supine Unable to assess   Additional Comments Patient seated OOB in recliner post session with chair alarm engaged, call bell and belongings in reach  Transfers   Sit to Stand 3  Moderate assistance   Additional items Assist x 2;Armrests; Increased time required;Verbal cues   Stand to Sit 3  Moderate assistance   Additional items Assist x 2;Armrests; Increased time required;Verbal cues   Stand pivot 2  Maximal assistance   Additional items Assist x 2;Armrests; Increased time required;Verbal cues   Ambulation/Elevation   Assistive Device Rolling walker   Balance   Static Sitting Poor   Static Standing Poor   Activity Tolerance   Activity Tolerance Patient limited by fatigue;Patient limited by pain   Nurse Made Aware Spoke to Deb Elder [facility]    Assessment   Prognosis Fair   Problem List Decreased strength;Decreased range of motion;Decreased endurance; Impaired balance;Decreased mobility; Decreased coordination;Decreased cognition;Decreased safety awareness; Impaired sensation;Obesity; Decreased skin integrity;Pain  (LE edema)   Assessment Patient supine in bed and is agreeable to participate in therapy session  Patient able to transfer supine to sit with mod ax 1, increased time and verbal instruction for technique  Patient requires singificant time for sitting EOB prior to transfer trials  Patient requires manual assistance with limited follow through of verbal instruction for body positoning and technique for sit<>stand transfers  Patient requires mod ax2 for transfers with increased assistance for controlled descend  Patient able to perform stand pivot transfer from EOB to recliner with max ax2 and roller walker  Patient requires steadying balance assistance, walker management as well as step by step instruction for foot placement and path direction  Continue to focus on OOB mobility with progression of transfsers as appropriate  Goals   [facility] Expiration Date 10/05/19   PT Treatment Day 2   Plan   Treatment/Interventions Functional transfer training;LE strengthening/ROM; Therapeutic exercise; Endurance training;Cognitive reorientation;Patient/family training;Equipment eval/education; Bed mobility  (PT to see when ambulation training is appropriate )   Progress Progressing toward goals   PT Frequency 5x/wk   Recommendation   Recommendation Short-term skilled PT       Ana Salinas, PTA

## 2019-09-27 NOTE — PROGRESS NOTES
Progress Note - Cardiology   Annamaria De Luna 64 y o  male MRN: [id number]  Unit/Bed#: -01 Encounter: [id number]        Active Problems:    Chronic kidney disease with end stage renal failure on dialysis [facility])    Cellulitis of lower extremity- bilateral    Polycystic liver disease    Total bilirubin, elevated    Thrombocytopenia (HCC)    Gram-negative bacteremia    Polycystic kidney disease    Benign essential hypertension    Atrial fibrillation (HCC)      Assessment/Plan     1  Atrial fibrillation with RVR  Telemetry reviewed  2 hours of AFib with RVR 9/24  No known history of atrial fibrillation  Continue to monitor for recurrence  Blood pressures have improved  Started low-dose metoprolol tartrate- 12 5 BID  Doses held for BP       2  Cardiomyopathy-worsened from prior , in setting of septic shock  Echo 2016- normal LVEF  Echo 2017- EF 40% (patient unaware of this)  Would advise f/u limited echo prior to discharge  Would advise f/u in office   Will need eventual ischemic evaluation if patient agreeable  Telemetry reviewed  No VT noted      3  Septic shock   GNR bacteremia  Shewanella putrefaciens suspect skin portal of entry with cellulitis with secondary HD cathetar, now removed  Off Levophed for several days  On fortaz- changed to oral cipro, ID following  F/U with BC   Leukocytosis improved today     4  ESRD/HD  Howe [place] a cath removed given possible source of  infection  Temporary HD catheter placed  Pt have refused AV fistula in past     5  Anemia/thrombocytopenia  S/P platelet tranfusion  H/H stable  DIC panel negative     6  Polycystic kidney/liver disease     7  Purpuric skin lesion/chronic foot wounds    8  Altered mental status- encephalopathic  Similar to yesterday  Seems worse than few days ago    9/22 ammonia low    Subjective/Objective   Chief Complaint/Subjective  Pt still  sedate today  Denies CP, SOB  Unable to answer all my questions             Vitals: BP 99/65 (BP Location: Right arm)   Pulse 98   Temp 98 1 °F (36 7 °C) (Oral)   Resp 18   Ht 5' 8" (1 727 m)   Wt 84 9 kg (187 lb 2 7 oz)   SpO2 90%   BMI 28 46 kg/m²     Vitals:    09/26/19 0556 09/27/19 0545   Weight: 88 6 kg (195 lb 5 2 oz) 84 9 kg (187 lb 2 7 oz)     Orthostatic Blood Pressures      Most Recent Value   Blood Pressure  99/65 filed at 09/27/2019 0700   Patient Position - Orthostatic VS  Lying filed at 09/27/2019 0700            Intake/Output Summary (Last 24 hours) at 9/27/2019 0912  Last data filed at 9/26/2019 1847  Gross per 24 hour   Intake 535 ml   Output 1188 ml   Net -653 ml       Invasive Devices     Peripheral Intravenous Line            Peripheral IV 09/23/19 Left Forearm 4 days    Peripheral IV 09/25/19 Left;Upper;Ventral (anterior) Arm 2 days          Line            Temporary HD Catheter 3 days          Drain            Closed/Suction Drain Right RUQ Bulb 8 5 Fr  3 days                Current Facility-Administered Medications   Medication Dose Route Frequency    acetaminophen (TYLENOL) tablet 650 mg  650 mg Oral Q6H PRN    b complex-vitamin C-folic acid (NEPHROCAPS) capsule 1 capsule  1 capsule Oral Daily With Dinner    ciprofloxacin (CIPRO) tablet 500 mg  500 mg Oral Q24H    insulin lispro (HumaLOG) 100 units/mL subcutaneous injection 1-6 Units  1-6 Units Subcutaneous TID AC    insulin lispro (HumaLOG) 100 units/mL subcutaneous injection 1-6 Units  1-6 Units Subcutaneous HS    metoprolol tartrate (LOPRESSOR) partial tablet 12 5 mg  12 5 mg Oral Q12H SHERON    polyethylene glycol (MIRALAX) packet 17 g  17 g Oral Daily PRN    senna-docusate sodium (SENOKOT S) 8 6-50 mg per tablet 1 tablet  1 tablet Oral HS         Physical Exam: BP 99/65 (BP Location: Right arm)   Pulse 98   Temp 98 1 °F (36 7 °C) (Oral)   Resp 18   Ht 5' 8" (1 727 m)   Wt 84 9 kg (187 lb 2 7 oz)   SpO2 90%   BMI 28 46 kg/m²     General Appearance:    Sleepy,  cooperative, no distress, appears chronically ill   Head: Normocephalic, no scleral icterus   Eyes:    PERRL   Nose:   Nares normal, septum midline, no drainage    Throat:   Lips, mucosa, and tongue normal   Neck:   Supple, symmetrical, trachea midline,         Back:     Symmetric, no CVA tenderness   Lungs:     Clear to auscultation bilaterally, respirations unlabored   Chest Wall:    No tenderness or deformity    Heart:    Regular rate and rhythm, S1 and S2 normal, no murmur, rub   or gallop               Skin:   Skinwarm   Neurologic:   Asleep, arousable                     Lab Results:   Recent Results (from the past 72 hour(s))   Basic metabolic panel    Collection Time: 09/24/19 10:13 AM   Result Value Ref Range    Sodium 135 (L) 136 - 145 mmol/L    Potassium 4 0 3 5 - 5 3 mmol/L    Chloride 98 (L) 100 - 108 mmol/L    CO2 23 21 - 32 mmol/L    ANION GAP 14 (H) 4 - 13 mmol/L    BUN 52 (H) 5 - 25 mg/dL    Creatinine 4 28 (H) 0 60 - 1 30 mg/dL    Glucose 78 65 - 140 mg/dL    Calcium 9 1 8 3 - 10 1 mg/dL    eGFR 14 ml/min/1 73sq m   Calcium, ionized    Collection Time: 09/24/19 10:13 AM   Result Value Ref Range    Calcium, Ionized 1 19 1 12 - 1 32 mmol/L   Magnesium    Collection Time: 09/24/19 10:13 AM   Result Value Ref Range    Magnesium 1 9 1 6 - 2 6 mg/dL   Phosphorus    Collection Time: 09/24/19 10:13 AM   Result Value Ref Range    Phosphorus 2 5 (L) 2 7 - 4 5 mg/dL   Hepatic function panel    Collection Time: 09/24/19 10:13 AM   Result Value Ref Range    Total Bilirubin 4 30 (H) 0 20 - 1 00 mg/dL    Bilirubin, Direct 2 92 (H) 0 00 - 0 20 mg/dL    Alkaline Phosphatase 83 46 - 116 U/L    AST 9 5 - 45 U/L    ALT 13 12 - 78 U/L    Total Protein 5 5 (L) 6 4 - 8 2 g/dL    Albumin 2 2 (L) 3 5 - 5 0 g/dL   Fingerstick Glucose (POCT)    Collection Time: 09/24/19 11:53 AM   Result Value Ref Range    POC Glucose 83 65 - 140 mg/dl   ECG 12 lead    Collection Time: 09/24/19  1:02 PM   Result Value Ref Range    Ventricular Rate 100 BPM    Atrial Rate 100 BPM    MS Interval 188 ms QRSD Interval 118 ms    QT Interval 364 ms    QTC Interval 469 ms    P Axis 35 degrees    QRS Axis -5 degrees    T Wave Axis 67 degrees   Basic metabolic panel    Collection Time: 09/24/19  4:14 PM   Result Value Ref Range    Sodium 135 (L) 136 - 145 mmol/L    Potassium 4 0 3 5 - 5 3 mmol/L    Chloride 100 100 - 108 mmol/L    CO2 24 21 - 32 mmol/L    ANION GAP 11 4 - 13 mmol/L    BUN 45 (H) 5 - 25 mg/dL    Creatinine 3 70 (H) 0 60 - 1 30 mg/dL    Glucose 94 65 - 140 mg/dL    Calcium 9 2 8 3 - 10 1 mg/dL    eGFR 17 ml/min/1 73sq m   Calcium, ionized    Collection Time: 09/24/19  4:14 PM   Result Value Ref Range    Calcium, Ionized 1 23 1 12 - 1 32 mmol/L   Magnesium    Collection Time: 09/24/19  4:14 PM   Result Value Ref Range    Magnesium 2 0 1 6 - 2 6 mg/dL   Phosphorus    Collection Time: 09/24/19  4:14 PM   Result Value Ref Range    Phosphorus 2 4 (L) 2 7 - 4 5 mg/dL   Fingerstick Glucose (POCT)    Collection Time: 09/24/19  6:16 PM   Result Value Ref Range    POC Glucose 82 65 - 140 mg/dl   Basic metabolic panel    Collection Time: 09/24/19  9:24 PM   Result Value Ref Range    Sodium 136 136 - 145 mmol/L    Potassium 4 0 3 5 - 5 3 mmol/L    Chloride 101 100 - 108 mmol/L    CO2 22 21 - 32 mmol/L    ANION GAP 13 4 - 13 mmol/L    BUN 50 (H) 5 - 25 mg/dL    Creatinine 3 76 (H) 0 60 - 1 30 mg/dL    Glucose 99 65 - 140 mg/dL    Calcium 9 4 8 3 - 10 1 mg/dL    eGFR 17 ml/min/1 73sq m   Calcium, ionized    Collection Time: 09/24/19  9:24 PM   Result Value Ref Range    Calcium, Ionized 1 21 1 12 - 1 32 mmol/L   Magnesium    Collection Time: 09/24/19  9:24 PM   Result Value Ref Range    Magnesium 2 0 1 6 - 2 6 mg/dL   Phosphorus    Collection Time: 09/24/19  9:24 PM   Result Value Ref Range    Phosphorus 2 4 (L) 2 7 - 4 5 mg/dL   CBC and Platelet    Collection Time: 09/24/19  9:39 PM   Result Value Ref Range    WBC 25 50 (H) 4 31 - 10 16 Thousand/uL    RBC 2 65 (L) 3 88 - 5 62 Million/uL    Hemoglobin 9 0 (L) 12 0 - 17 0 g/dL    Hematocrit 26 8 (L) 36 5 - 49 3 %     (H) 82 - 98 fL    MCH 34 0 26 8 - 34 3 pg    MCHC 33 6 31 4 - 37 4 g/dL    RDW 15 9 (H) 11 6 - 15 1 %    Platelets 25 (LL) 627 - 390 Thousands/uL    MPV 11 5 8 9 - 12 7 fL   Fingerstick Glucose (POCT)    Collection Time: 09/24/19 11:36 PM   Result Value Ref Range    POC Glucose 94 65 - 140 mg/dl   Basic metabolic panel    Collection Time: 09/25/19  4:18 AM   Result Value Ref Range    Sodium 137 136 - 145 mmol/L    Potassium 3 9 3 5 - 5 3 mmol/L    Chloride 103 100 - 108 mmol/L    CO2 25 21 - 32 mmol/L    ANION GAP 9 4 - 13 mmol/L    BUN 56 (H) 5 - 25 mg/dL    Creatinine 4 33 (H) 0 60 - 1 30 mg/dL    Glucose 114 65 - 140 mg/dL    Calcium 9 6 8 3 - 10 1 mg/dL    eGFR 14 ml/min/1 73sq m   CBC and differential    Collection Time: 09/25/19  4:18 AM   Result Value Ref Range    WBC 26 98 (H) 4 31 - 10 16 Thousand/uL    RBC 2 69 (L) 3 88 - 5 62 Million/uL    Hemoglobin 9 0 (L) 12 0 - 17 0 g/dL    Hematocrit 27 2 (L) 36 5 - 49 3 %     (H) 82 - 98 fL    MCH 33 5 26 8 - 34 3 pg    MCHC 33 1 31 4 - 37 4 g/dL    RDW 15 9 (H) 11 6 - 15 1 %    MPV 12 6 8 9 - 12 7 fL    Platelets 28 (LL) 696 - 390 Thousands/uL    nRBC 1 /100 WBCs   Magnesium    Collection Time: 09/25/19  4:18 AM   Result Value Ref Range    Magnesium 2 0 1 6 - 2 6 mg/dL   Phosphorus    Collection Time: 09/25/19  4:18 AM   Result Value Ref Range    Phosphorus 2 0 (L) 2 7 - 4 5 mg/dL   Manual Differential(PHLEBS Do Not Order)    Collection Time: 09/25/19  4:18 AM   Result Value Ref Range    Segmented % 80 (H) 43 - 75 %    Bands % 16 (H) 0 - 8 %    Lymphocytes % 1 (L) 14 - 44 %    Monocytes % 2 (L) 4 - 12 %    Eosinophils, % 0 0 - 6 %    Basophils % 0 0 - 1 %    Metamyelocytes% 1 0 - 1 %    Absolute Neutrophils 25 90 (H) 1 85 - 7 62 Thousand/uL    Lymphocytes Absolute 0 27 (L) 0 60 - 4 47 Thousand/uL    Monocytes Absolute 0 54 0 00 - 1 22 Thousand/uL    Eosinophils Absolute 0 00 0 00 - 0 40 Thousand/uL Basophils Absolute 0 00 0 00 - 0 10 Thousand/uL    Total Counted 100     Dohle Bodies Present     Anisocytosis Present     Platelet Estimate Decreased (A) Adequate   Fingerstick Glucose (POCT)    Collection Time: 09/25/19  6:04 AM   Result Value Ref Range    POC Glucose 113 65 - 140 mg/dl   Fingerstick Glucose (POCT)    Collection Time: 09/25/19 11:48 AM   Result Value Ref Range    POC Glucose 83 65 - 140 mg/dl   Prepare fresh frozen plasma:Has consent been obtained?  Yes, 2 Units    Collection Time: 09/25/19  1:07 PM   Result Value Ref Range    Unit Product Code D9375K83     Unit Number [id number]     Unit ABO O     Unit [facility] POS     Unit Dispense Status Return to [facility]     Unit Product Code A8008M27     Unit Number [id number]     Unit ABO O     Unit [facility] POS     Unit Dispense Status Return to [facility]    Fingerstick Glucose (POCT)    Collection Time: 09/25/19  5:29 PM   Result Value Ref Range    POC Glucose 128 65 - 140 mg/dl   Fingerstick Glucose (POCT)    Collection Time: 09/25/19  9:06 PM   Result Value Ref Range    POC Glucose 122 65 - 140 mg/dl   CBC and differential    Collection Time: 09/26/19  3:25 AM   Result Value Ref Range    WBC 29 45 (H) 4 31 - 10 16 Thousand/uL    RBC 2 76 (L) 3 88 - 5 62 Million/uL    Hemoglobin 9 2 (L) 12 0 - 17 0 g/dL    Hematocrit 27 9 (L) 36 5 - 49 3 %     (H) 82 - 98 fL    MCH 33 3 26 8 - 34 3 pg    MCHC 33 0 31 4 - 37 4 g/dL    RDW 16 1 (H) 11 6 - 15 1 %    MPV 12 9 (H) 8 9 - 12 7 fL    Platelets 43 (LL) 101 - 390 Thousands/uL    nRBC 1 /100 WBCs   Comprehensive metabolic panel    Collection Time: 09/26/19  3:25 AM   Result Value Ref Range    Sodium 137 136 - 145 mmol/L    Potassium 4 1 3 5 - 5 3 mmol/L    Chloride 103 100 - 108 mmol/L    CO2 24 21 - 32 mmol/L    ANION GAP 10 4 - 13 mmol/L    BUN 80 (H) 5 - 25 mg/dL    Creatinine 5 41 (H) 0 60 - 1 30 mg/dL    Glucose 110 65 - 140 mg/dL    Calcium 9 9 8 3 - 10 1 mg/dL    AST 18 5 - 45 U/L    ALT 13 12 - 78 U/L    Alkaline Phosphatase 154 (H) 46 - 116 U/L    Total Protein 5 0 (L) 6 4 - 8 2 g/dL    Albumin 1 7 (L) 3 5 - 5 0 g/dL    Total Bilirubin 4 30 (H) 0 20 - 1 00 mg/dL    eGFR 11 ml/min/1 73sq m   Protime-INR    Collection Time: 09/26/19  3:25 AM   Result Value Ref Range    Protime 17 8 (H) 11 6 - 14 5 seconds    INR 1 55 (H) 0 84 - 1 19   Manual Differential(PHLEBS Do Not Order)    Collection Time: 09/26/19  3:25 AM   Result Value Ref Range    Segmented % 84 (H) 43 - 75 %    Bands % 11 (H) 0 - 8 %    Lymphocytes % 4 (L) 14 - 44 %    Monocytes % 0 (L) 4 - 12 %    Eosinophils, % 1 0 - 6 %    Basophils % 0 0 - 1 %    Absolute Neutrophils 27 98 (H) 1 85 - 7 62 Thousand/uL    Lymphocytes Absolute 1 18 0 60 - 4 47 Thousand/uL    Monocytes Absolute 0 00 0 00 - 1 22 Thousand/uL    Eosinophils Absolute 0 29 0 00 - 0 40 Thousand/uL    Basophils Absolute 0 00 0 00 - 0 10 Thousand/uL    Total Counted 100     Dohle Bodies Present     Anisocytosis Present     Poikilocytes Present     Polychromasia Present     Platelet Estimate Decreased (A) Adequate   Fingerstick Glucose (POCT)    Collection Time: 09/26/19  7:13 AM   Result Value Ref Range    POC Glucose 102 65 - 140 mg/dl   Fingerstick Glucose (POCT)    Collection Time: 09/26/19 12:52 PM   Result Value Ref Range    POC Glucose 69 65 - 140 mg/dl   Creatinine, body fluid    Collection Time: 09/26/19  2:19 PM   Result Value Ref Range    Creat, Fluid 3 51 mg/dL   Fingerstick Glucose (POCT)    Collection Time: 09/26/19  3:09 PM   Result Value Ref Range    POC Glucose 109 65 - 140 mg/dl   Fingerstick Glucose (POCT)    Collection Time: 09/26/19  9:16 PM   Result Value Ref Range    POC Glucose 97 65 - 140 mg/dl   CBC    Collection Time: 09/27/19  4:53 AM   Result Value Ref Range    WBC 18 91 (H) 4 31 - 10 16 Thousand/uL    RBC 2 82 (L) 3 88 - 5 62 Million/uL    Hemoglobin 9 4 (L) 12 0 - 17 0 g/dL    Hematocrit 29 5 (L) 36 5 - 49 3 %     (H) 82 - 98 fL    MCH 33 3 26 8 - 34 3 pg    MCHC 31 9 31 4 - 37 4 g/dL    RDW 16 5 (H) 11 6 - 15 1 %    Platelets 45 (LL) 272 - 390 Thousands/uL    MPV 12 3 8 9 - 12 7 fL   Fingerstick Glucose (POCT)    Collection Time: 09/27/19  7:40 AM   Result Value Ref Range    POC Glucose 93 65 - 140 mg/dl     Imaging: I have personally reviewed pertinent reports  Tele- nsr      Counseling / Coordination of Care  Total time spent today 20 minutes  Greater than 50% of total time was spent with the patient and / or family counseling and / or coordination of care

## 2019-09-27 NOTE — ASSESSMENT & PLAN NOTE
Present blood pressure 99/65  · Continue to monitor vitals  · Continue to hold metoprolol 12 5 mg in presence of hypotension

## 2019-09-27 NOTE — OCCUPATIONAL THERAPY NOTE
[address] Evaluation     Patient Name: Charlotte Tay  EOGTP'B Date: 9/27/2019  Problem List  Principal Problem:    Gram-negative bacteremia  Active Problems:    Chronic kidney disease with end stage renal failure on dialysis [facility])    Cellulitis of lower extremity- bilateral    Polycystic liver disease    Total bilirubin, elevated    Thrombocytopenia (HCC)    Polycystic kidney disease    Benign essential hypertension    Atrial fibrillation [facility])    Past Medical History  Past Medical History:   Diagnosis Date    Complication of renal dialysis     Polycystic kidney disease      Past Surgical History  Past Surgical History:   Procedure Laterality Date    IR IMAGE GUIDED ASPIRATION / DRAINAGE  9/23/2019 09/27/19 1225   Note Type   Note type Eval/Treat   Restrictions/Precautions   Weight Bearing Precautions Per Order No   Other Precautions Impulsive;Cognitive; Chair Alarm; Bed Alarm;Multiple lines;Telemetry; Fall Risk;Pain   Pain Assessment   Pain Assessment 0-10   Pain Score Worst Possible Pain   Pain Type Acute pain   Pain Location Leg   Pain Orientation Bilateral   Pain Frequency Constant/continuous   Effect of Pain on Daily Activities limits activity tolerance, standind tolerance and I w/ ADL performance   Hospital Pain Intervention(s) Repositioned; Ambulation/increased activity; Emotional support  (RNMalgorzata medicated pt prior to eval)   Response to Interventions tolerated, not changed   Home Living   Type of [address] [place] [address] Two level;Bed/bath upstairs;1/2 bath on main level  (+ FRANCISCO JAVIER)   Bathroom Shower/Tub Walk-in shower   Bathroom Toilet Standard   Bathroom Accessibility Accessible   Home Equipment Other (Comment)  (no AD for functional mobility)   Additional Comments Pt reports living w/ wife in [address] [place] [address]  Pt unsure # or if any steps to enter   Pt questionable historian upon eval (lethargic and limited attention, cog)   Prior Function   Level of [place] Independent with ADLs and functional mobility   Lives With Spouse   ADL Assistance Independent   IADLs Independent   Falls in the last 6 months   (pt unable to report upon eval)   Vocational Retired   Comments Pt reports I w/ ADL PTA   Lifestyle   Autonomy Pt reports I w/ ADL PTA w/ out use of AD  Pt questionable historian upon eval, and unable to provide detailed social history   Reciprocal Relationships Pt reports living w/ wife in [facility]   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   Intrinsic Gratification Pt reports enjoying golf and spending time w/ family   Psychosocial   Patient Behaviors/Mood [place] affect  (lethargic, confused, decreased attention / arousal )   ADL   Eating Assistance Unable to assess  (demo ROM / coordination to complete)   Grooming Assistance Unable to assess   UB Bathing Assistance Unable to assess   LB Bathing Assistance Unable to assess   UB Dressing Assistance 3  Moderate Assistance   UB Dressing Deficit Setup;Verbal cueing;Supervision/safety; Increased time to complete; Thread RUE; Thread LUE; Fasteners   LB Dressing Assistance Unable to assess   Additional Comments limited ADL assessment this AM due to fatigue, lethargic and difficulty following directions   Bed Mobility   Supine to Sit Unable to assess   Sit to Supine Unable to assess   Additional Comments Pt seated OOB in chair upon arrival and post eval w/ needs met, call bell in reach, and chair alarm activated   Transfers   Sit to Stand Unable to assess   Additional Comments Pt lethargic and difficulty following directions this AM  Pt able to adjust / Richarda Outlaw in chair w/ S and cues for hand placement   Functional Mobility   Functional Mobility   (will continue to assess)   Balance   Static Sitting Fair -   Dynamic Sitting Poor +   Activity Tolerance   Activity Tolerance Patient limited by fatigue;Patient limited by pain; Other (Comment)  (difficulty sustain arousal to follow directions)   RUE Assessment   RUE Assessment WFL   RUE Strength RUE Overall Strength   (able to complete ADL, generalized weakness)   LUE Assessment   LUE Assessment WFL   LUE Strength   LUE Overall Strength Other (Comment)  (able to complete ADL, generalized weakness)   Hand Function   Gross Motor Coordination Functional   Fine Motor Coordination Impaired  (will continue to assess, difficulty follow directions)   Sensation   Light Touch No apparent deficits  (B UE)   Vision - Complex Assessment   Acuity Able to read clock/calendar on wall without difficulty   Additional Comments will continue to assess  Lethargic, eye closed  Cues to maintain arousal w/ eyes open   Cognition   Overall Cognitive Status Impaired   Arousal/Participation Arousable;Lethargic   Attention Difficulty attending to directions   Orientation Level Oriented to person;Disoriented to situation;Disoriented to time   Memory Decreased recall of recent events;Decreased short term memory;Decreased recall of precautions  (able to report full name and birthdate)   Following Commands Follows one step commands inconsistently   Comments Identified pt by full name and birthdate  Pt lethargic but responsive for short periods  Difficulty following directions and limited recall  Pt unable to provide detailed social history  Pt engaged in SBT ([employer] Screen) w/ score of 22 indicating cognitive impairment  Will continue to assess to assist in safe discharge planning   Assessment   Limitation Decreased ADL status; Decreased UE strength;Decreased Safe judgement during ADL;Decreased cognition;Decreased endurance;Decreased self-care trans;Decreased high-level ADLs   Assessment Pt is a 60yo male [facility] on 9/21/2019  Pt presents w/ gram- negative bacteremia and significant PMH impacting his occupational performance including ESRD on dialysis, polycystic liver disease   Perma -catheter removal on 9/22/2019 due to felt to be source of gram - negative sepsis, and aspiration / drainage of R renal cyst/ lesion on 9/23/2019  Pt transferred from critical care to med surg on 9/26/2019  Pt not accurate historian upon eval, but confirms living in [address] w/ his wife  Pt reports I w/ ADL/ IADL w/ out use AD or DME and enjoys playing golf  Upon eval, pt lethargic but arousable w/ cues and decreased attention  Pt had difficulty following directions  Pt oriented to person consistently but disoriented to time, situation  Pt engaged in SBT ([employer] Screen) w/ score of 22 indicating cognitive impairment  Pt demonstrated B UE AROM WFL to complete ADL  Pt completed UBD w/ mod A  Pt able to adjust / reposition himself in bedside recliner chair w/ S and cues for hand placement, tech using B UE  Will continue to assess standing, functional mobility and functional cognitive skills to assist in safe discharge planning  Pt presents w/ decreased cognition, decreased activity tolerance, decreased endurance, decreased activity engagement, generalized weakness/ deconditioning, and increased pain impacting his I w/ dressing, bathing, functional mobility, functional transfers, activity engagement, clothing mgmt, oral hygiene  Pt would benefit from OT while in acute care to address deficits and post acute rehab when medically stable for discharge from acute care  Goals   Patient Goals Pt did not state upon eval due to lethargic and difficulty following directions and maintain arousal  Will continue to assess   Plan   Treatment Interventions ADL retraining;Functional transfer training; Endurance training;UE strengthening/ROM; Patient/family training;Equipment evaluation/education; Compensatory technique education;Continued evaluation; Energy conservation; Activityengagement   Goal Expiration Date 10/07/19   OT Frequency 3-5x/wk   Recommendation   OT Discharge Recommendation Other (Comment)  (post acute rehab)   Equipment Recommended Bedside commode;Tub seat with back; Other (comment)  (will continue to assess at rehab)   OT - OK to Discharge   (post acute rehab)   Barthel Index   Feeding 5   Bathing 0   Grooming Score 0   Dressing Score 5   Bladder Score 10   Bowels Score 0   Toilet Use Score 0   Transfers (Bed/Chair) Score 5   Mobility (Level Surface) Score 0   Stairs Score 0   Barthel Index Score 25   Modified Hugo Scale   Modified Greenwood Scale 4   Pt goals to be met by 10/7/2019:  1  Pt will demonstrate increased attention and participation in continued evaluation of functional cognitive skills, functional transfers/ mobility to assist in safe discharge planning  2  Pt will demonstrate increased arousal and activity tolerance for at least 15 minutes while engaged in UB ADL and grooming w/ S after set- up while seated unsupported at EOB w/ at least fair + balance to max I w/ ADL performance to return to PLOF w/ spouse  3  Pt will demonstrate good attention and participation in continued evaluation to assess LBD  4  Pt will demonstrated increased UE strength at least 4-/5 bilateral UE to max I w/ ADL performance and improve activity engagement  5  Pt will consistently follow two step directions w/ less than 1 cue/prompt to max I w/ ADL performance  6   Pt will complete bed mobility supine <> sit w/ min A x 1    Alana Rebollar, OTR/L

## 2019-09-27 NOTE — PROGRESS NOTES
Progress Note - Infectious Disease   Donnie Benavides 64 y o  male MRN: [id number]  Unit/Bed#: -01 Encounter: [id number]      Impression/Recommendations:  1  Septic shock, POA   Leukopenia, tachycardia, refractory hypotension   Due to #2/3   No other appreciable source   Clinically improving, now off pressors, afebrile with WBC trending down  Rec:  ? Continue antibiotics as below  ? Follow temperatures and WBC closely  ? Supportive care as per the primary service     2   Shewanella putrefaciens bacteremia    Suspect primary skin portal of entry with cellulitis as below with secondary infection of HD catheter, now removed   Less likely due to renal cyst as fluid culture negative   Repeat blood cultures negative  Rec:  ? Continue Cipro for 14 days total of antibiotics through 10/4/19  ? Follow mental status closely on quinolone although mild confusion preceded initiation     3   Bilateral lower extremity necrotizing skin infection   Suspect due to underlying chronic foot wounds in setting of recent creek/pond water exposure   Appears superficial with healthy tissue under ruptured bullae making deep infection less likely   Slowly improving  Rec:  ? Continue antibiotics as above  ? Serial exams  ? [address] per Podiatry     4   ESRD on HD   Via [facility]   Now with temp catheter in setting of #2  Noted in Care Everywhere to be noncompliant with diet, HD  Rec:  ? Dose adjust antibiotics for dialysis  ? As blood cultures negative >72 hours, OK from ID for PermCath     5   Polycystic kidney/liver disease   Extensive disease seen on CT imaging      6   Chronic foot wounds   Likely multifactorial   Appear superficial   Risk factor for infection as above  Rec:  ? Outpatient Podiatry follow-up  ?  Advised infection prevention strategies including keeping feet and wounds covered as much as possible and no environmental exposures, especially water sources      The patient is stable from an ID standpoint  I will reassess the patient on   Please call in the interim with new questions      Antibiotics:  Cipro #3  Antibiotics #7    Subjective:  Patient seen on AM rounds  Awake but still somewhat confused  States he has an "E infection from the ponds"  Complains of pain and pressure from ACE wraps  Wants to get up  Denies fevers, chills, sweats, nausea, vomiting, or diarrhea  24 Hour Events:  Was seen by Podiatry yesterday  Patient refused having dressings taken down  They will revisit today for possible debridement of devitalized skin  No documented fevers, chills, sweats, nausea, vomiting, or diarrhea  WBC now trending down  Objective:  Vitals:  Temp:  [97 7 °F (36 5 °C)-98 2 °F (36 8 °C)] 98 1 °F (36 7 °C)  HR:  [] 98  Resp:  [18-36] 18  BP: ()/(59-79) 99/65  SpO2:  [90 %-100 %] 90 %  Temp (24hrs), Av °F (36 7 °C), Min:97 7 °F (36 5 °C), Max:98 2 °F (36 8 °C)  Current: Temperature: 98 1 °F (36 7 °C)    Physical Exam:   General:  No acute distress  Psychiatric:  Awake but somewhat lethargic and confused  Pulmonary:  Normal respiratory excursion without accessory muscle use  Abdomen:  Soft, nontender  Extremities:  ACE wraps intact bilaterally without strikethrough  Skin:  No rashes    Lab Results:  I have personally reviewed pertinent labs  Results from last 7 days   Lab Units 19  0325 19  0418 19  2124  19  1013  19  0445   POTASSIUM mmol/L 4 1 3 9 4 0   < > 4 0   < >  --    CHLORIDE mmol/L 103 103 101   < > 98*   < >  --    CO2 mmol/L 24 25 22   < > 23   < >  --    BUN mg/dL 80* 56* 50*   < > 52*   < >  --    CREATININE mg/dL 5 41* 4 33* 3 76*   < > 4 28*   < >  --    EGFR ml/min/1 73sq m 11 14 17   < > 14   < >  --    CALCIUM mg/dL 9 9 9 6 9 4   < > 9 1   < >  --    AST U/L 18  --   --   --  9  --  15   ALT U/L 13  --   --   --  13  --  19   ALK PHOS U/L 154*  --   --   --  83  --  64    < > = values in this interval not displayed       Results from last 7 days Lab Units 09/27/19  0453 09/26/19  0325 09/25/19  0418   WBC Thousand/uL 18 91* 29 45* 26 98*   HEMOGLOBIN g/dL 9 4* 9 2* 9 0*   PLATELETS Thousands/uL 45* 43* 28*     Results from last 7 days   Lab Units 09/23/19  1022 09/23/19  0505 09/23/19  0443 09/22/19  1608 09/21/19 2019 09/21/19  2018   BLOOD CULTURE   --  No Growth at 72 hrs  No Growth at 72 hrs   --  Shewanella putrefaciens* Shewanella putrefaciens*   GRAM STAIN RESULT  2+ Polys  No bacteria seen  --   --   --  Gram negative rods* Gram negative rods*   BODY FLUID CULTURE, STERILE  No growth  --   --   --   --   --    MRSA CULTURE ONLY   --   --   --  No Methicillin Resistant Staphlyococcus aureus (MRSA) isolated  --   --        Imaging Studies:   I have personally reviewed pertinent imaging study reports and images in PACS  EKG, Pathology, and Other Studies:   I have personally reviewed pertinent reports

## 2019-09-27 NOTE — PROGRESS NOTES
Progress Note - Sahra Cornell 1960, 64 y o  male MRN: [id number]    Unit/Bed#: -01 Encounter: [id number]    Primary Care Provider: Maria T Craig MD   Date and time admitted to hospital: 9/21/2019  7:56 PM    * Gram-negative bacteremia  Assessment & Plan  Lower extremity cellulitis and hemorrhagic bullae  Initial blood cultures demonstrated gram-negative rods, specifically Shewanella Putrifacians, a marine acquired bacteria  Additionally, nidus for infection PermCath (replaced by surgery using temporary catheter) versus lower extremity cellulitis; less likely renal cyst demonstrated on CT (drainage by IR and subsequent negative culture)  Sepsis criteria:  Tachycardia, nidus for infection leukocytosis  · Infectious Disease consult appreciated-continue ciprofloxacin through 10/04/2019  Continue to monitor temperature and CBC  · Wound care consult appreciated-ruptured bullae; cleanse using NS, single layer xeroform  Change Xeroform once daily  Frequent repositioning  Offload heels  Routine wound care nurse  · Dermatology consult appreciated-no biopsy recommended  Wound care as outlined above  Antibiotics as outlined above  · Podiatry consult appreciated-attempted debridement this p m  · Cardiology consult appreciated-AFib with RVR; continue metoprolol 12 5 mg b i d  (held for hypotension), cardiomyopathy; limited echo recommended in addition to outpatient ischemic evaluation  · Nephrology consult appreciated-resume usual HD schedule, PermCath insertion 09/30/2019  Atrial fibrillation ([address] )  Assessment & Plan  AFib with RVR  · Continue telemetry  · See above for Cardiology recommendations  Benign essential hypertension  Assessment & Plan  Present blood pressure 99/65  · Continue to monitor vitals  · Continue to hold metoprolol 12 5 mg in presence of hypotension      Polycystic kidney disease  Assessment & Plan  Ultrasound right upper quadrant demonstrated hypertrophic and polycystic right kidney  Polycystic liver disease  Assessment & Plan   Innumerable hepatic cysts demonstrated on CTA abdomen  Cellulitis of lower extremity- bilateral  Assessment & Plan  · Continue antibiotics per infectious disease recommendations  · Continue wound care per recommendations above  Chronic kidney disease with end stage renal failure on dialysis [facility])  Assessment & Plan  · Continue to trend BMP  · Continue HD per nephrology recommendation  VTE Pharmacologic Prophylaxis:   Pharmacologic: Pharmacologic VTE Prophylaxis contraindicated due to Sepsis/bacteremia  Mechanical VTE Prophylaxis in Place: Yes    Patient Centered Rounds: I have performed bedside rounds with nursing staff today  Discussions with Specialists or Other Care Team Provider:  Podiatry  Education and Discussions with Family / Patient:  Patient  Time Spent for Care: 30 minutes  More than 50% of total time spent on counseling and coordination of care as described above  Current Length of Stay: 6 day(s)    Current Patient Status: Inpatient   Certification Statement: The patient will continue to require additional inpatient hospital stay due to Management of sepsis/bacteremia  Discharge Plan: To be determined  Code Status: Level 1 - Full Code      Subjective:   No acute distress noted at time of exam   Patient endorsed lower extremity pain/discomfort, bilaterally; adamant about removal of dressings, although he declined dressing removal yesterday  Recommended against removal of lower extremity dressings several times; patient continue to remove dressings-likely secondary to encephalopathic state  He denied fever, chills, nausea/vomiting/diarrhea      Objective:     Vitals:   Temp (24hrs), Av 9 °F (36 6 °C), Min:97 7 °F (36 5 °C), Max:98 1 °F (36 7 °C)    Temp:  [97 7 °F (36 5 °C)-98 1 °F (36 7 °C)] 98 1 °F (36 7 °C)  HR:  [] 98  Resp:  [18-20] 18  BP: ()/(59-79) 99/65  SpO2:  [90 %-100 %] 90 %  Body mass index is 28 46 kg/m²  Input and Output Summary (last 24 hours): Intake/Output Summary (Last 24 hours) at 9/27/2019 1219  Last data filed at 9/26/2019 1847  Gross per 24 hour   Intake 125 ml   Output 505 ml   Net -380 ml       Physical Exam:     Physical Exam   Constitutional: Vital signs are normal  He appears well-developed and well-nourished  He appears ill  No distress  HENT:   Head: Normocephalic and atraumatic  Eyes: Pupils are equal, round, and reactive to light  EOM are normal    Neck: Normal range of motion  Neck supple  Cardiovascular: Normal rate, regular rhythm, normal heart sounds, intact distal pulses and normal pulses  No murmur heard  Pulmonary/Chest: Effort normal and breath sounds normal  No respiratory distress  He has no decreased breath sounds  Abdominal: Soft  Normal appearance and bowel sounds are normal  He exhibits no distension  There is no tenderness  There is no rigidity and no rebound  Musculoskeletal: Normal range of motion  He exhibits edema and tenderness  Lower legs bandaged bilaterally  Serous drainage noted  Neurological: He is alert  Skin: Skin is warm and dry  Capillary refill takes less than 2 seconds  He is not diaphoretic  Psychiatric: He has a normal mood and affect  His behavior is normal  Judgment and thought content normal    Nursing note and vitals reviewed  Additional Data:     Labs:    Results from last 7 days   Lab Units 09/27/19  0453 09/26/19  0325  09/22/19  2111   WBC Thousand/uL 18 91* 29 45*   < > 5 76   HEMOGLOBIN g/dL 9 4* 9 2*   < > 11 4*   HEMATOCRIT % 29 5* 27 9*   < > 35 6*   PLATELETS Thousands/uL 45* 43*   < > 36*   BANDS PCT %  --  11*   < >  --    NEUTROS PCT %  --   --   --  89*   LYMPHS PCT %  --   --   --  4*   LYMPHO PCT %  --  4*   < >  --    MONOS PCT %  --   --   --  5   MONO PCT %  --  0*   < >  --    EOS PCT %  --  1   < > 2    < > = values in this interval not displayed       Results from last 7 days   Lab Units 09/26/19  0325   SODIUM mmol/L 137   POTASSIUM mmol/L 4 1   CHLORIDE mmol/L 103   CO2 mmol/L 24   BUN mg/dL 80*   CREATININE mg/dL 5 41*   ANION GAP mmol/L 10   CALCIUM mg/dL 9 9   ALBUMIN g/dL 1 7*   TOTAL BILIRUBIN mg/dL 4 30*   ALK PHOS U/L 154*   ALT U/L 13   AST U/L 18   GLUCOSE RANDOM mg/dL 110     Results from last 7 days   Lab Units 09/26/19  0325   INR  1 55*     Results from last 7 days   Lab Units 09/27/19  1129 09/27/19  0740 09/26/19  2116 09/26/19  1509 09/26/19  1252 09/26/19  0713 09/25/19  2106 09/25/19  1729 09/25/19  1148 09/25/19  0604 09/24/19  2336 09/24/19  1816   POC GLUCOSE mg/dl 105 93 97 109 69 102 122 128 83 113 94 82         Results from last 7 days   Lab Units 09/27/19  0453 09/23/19  0629 09/23/19  0445 09/22/19  2111 09/22/19  0908 09/22/19  0630 09/22/19  0412  09/21/19  2237   LACTIC ACID mmol/L  --  1 9  --  2 1* 3 7* 3 5* 3 6*   < > 5 2*   PROCALCITONIN ng/ml 23 03*  --  56 56*  --   --   --  66 85*  --  63 11*    < > = values in this interval not displayed  * I Have Reviewed All Lab Data Listed Above  * Additional Pertinent Lab Tests Reviewed: All Labs For Current Hospital Admission Reviewed    Imaging:    Imaging Reports Reviewed Today Include:  None  Imaging Personally Reviewed by Myself Includes:  None  Recent Cultures (last 7 days):     Results from last 7 days   Lab Units 09/23/19  1022 09/23/19  0505 09/23/19  0443 09/21/19  2019 09/21/19  2018   BLOOD CULTURE   --  No Growth After 4 Days  No Growth After 4 Days   Shewanella putrefaciens* Shewanella putrefaciens*   GRAM STAIN RESULT  2+ Polys  No bacteria seen  --   --  Gram negative rods* Gram negative rods*   BODY FLUID CULTURE, STERILE  No growth  --   --   --   --        Last 24 Hours Medication List:     Current Facility-Administered Medications:  acetaminophen 650 mg Oral Q6H PRN Aliyah Francis MD   b complex-vitamin C-folic acid 1 capsule Oral Daily With Aviva Hadley, MD   ciprofloxacin 500 mg Oral Q24H Tanisha Yuan MD   insulin lispro 1-6 Units Subcutaneous TID [facility] Tanisha Yuan MD   insulin lispro 1-6 Units Subcutaneous HS Tanisha Yuan MD   metoprolol tartrate 12 5 mg Oral Q12H [facility] Tanisha Yuna MD   polyethylene glycol 17 g Oral Daily PRN Tanisha Yuan MD   senna-docusate sodium 1 tablet Oral HS Tanisha Yuan MD        Today, Patient Was Seen By: Achille Gentleman Holter, DO    ** Please Note: Dictation voice to text software may have been used in the creation of this document   **

## 2019-09-27 NOTE — PROGRESS NOTES
Progress Note - Podiatry  Ade Burnette 64 y o  male MRN: [id number]  Unit/Bed#: -01 Encounter: [id number]    Assessment/Plan:  1  Bilateral lower extremity bulla formation  2  Venous stasis bilateral lower extremities  3  PKD on dialysis    -patient has been refusing to wear his dressings, this will significantly worse in his condition  He was also sitting with his feet in dependent position in his recliner   -due to copious drainage he must have feet elevated at all points at times  - dressing was changed today with Adaptic, Maxorb, ABD, and DSD, compressive Ace wraps   -after verbal consent was obtained devitalized tissue measuring 29 x 10 x 0 1 cm was removed from the right lateral leg   - there are also new onset blisters to the left foot, new bulla x3 were pierced and drained and approximately 15 cc of serous drainage was emanated from these bulla, roof was left intact  - some of the skin and previous bulla are beginning to form eschars, this is rather interesting considering that his ABIs are within normal limits  However this would likely indicate that there is some issues with his microvascular flow which could be stemming from his dialysis  - will check on Monday for further demarcation, patient will likely need further removal of devitalized tissue, and will need chronic wound care to allow these extensive wounds to heal     Subjective/Objective   Chief Complaint:   Chief Complaint   Patient presents with    Leg Pain     peripheral vascular disease       Subjective: 64 y o  y/o male was seen and evaluated at bedside  He is not very oriented, and seems confused    Blood pressure 99/65, pulse 98, temperature 98 1 °F (36 7 °C), temperature source Oral, resp  rate 18, height 5' 8" (1 727 m), weight 84 9 kg (187 lb 2 7 oz), SpO2 90 %  ,Body mass index is 28 46 kg/m²      Invasive Devices     Peripheral Intravenous Line            Peripheral IV 09/23/19 Left Forearm 4 days    Peripheral IV 09/25/19 Left;Upper;Ventral (anterior) Arm 2 days          Line            Temporary HD Catheter 3 days          Drain            Closed/Suction Drain Right RUQ Bulb 8 5 Fr  4 days                Physical Exam:   General: Alert, cooperative and no distress  Lungs: Non labored breathing  Heart: Positive S1, S2  Abdomen: Soft, non-tender  Extremity:     There are bilateral bulla formation, more so to the left lower extremity today  There is extensive desquamation of the devitalized tissue along the right lower extremity  The remainder of the tissue beneath the bulla appears discolored and almost necrotic, there have been several eschar formations along the left leg  Pulses remain palpable  There is no change in macro vascular status, MT is weakened but intact  Gross sensation is intact      Lab, Imaging and other studies:   I have personally reviewed pertinent lab results  Imaging: I have personally reviewed pertinent films in PACS  EKG, Pathology, and Other Studies: I have personally reviewed pertinent reports

## 2019-09-27 NOTE — SOCIAL WORK
CM receive call from patients spouse who request referral to [facility], and [facility]  CM will place referral and will await determination  CM department will follow through discharge

## 2019-09-27 NOTE — HEMODIALYSIS
Pre weight 84 9   Post weight 84 9 kg   No fluid removed  Able to maintain FBR as prescribed  Slept on and off with abrupt restlessness

## 2019-09-28 LAB
ABO GROUP BLD: NORMAL
ANION GAP SERPL CALCULATED.3IONS-SCNC: 13 MMOL/L (ref 4–13)
BACTERIA BLD CULT: NORMAL
BACTERIA BLD CULT: NORMAL
BLD GP AB SCN SERPL QL: NEGATIVE
BUN SERPL-MCNC: 65 MG/DL (ref 5–25)
CALCIUM SERPL-MCNC: 9.2 MG/DL (ref 8.3–10.1)
CHLORIDE SERPL-SCNC: 99 MMOL/L (ref 100–108)
CO2 SERPL-SCNC: 25 MMOL/L (ref 21–32)
CREAT SERPL-MCNC: 4.75 MG/DL (ref 0.6–1.3)
ERYTHROCYTE [DISTWIDTH] IN BLOOD BY AUTOMATED COUNT: 17.2 % (ref 11.6–15.1)
GFR SERPL CREATININE-BSD FRML MDRD: 13 ML/MIN/1.73SQ M
GLUCOSE SERPL-MCNC: 103 MG/DL (ref 65–140)
GLUCOSE SERPL-MCNC: 113 MG/DL (ref 65–140)
GLUCOSE SERPL-MCNC: 68 MG/DL (ref 65–140)
GLUCOSE SERPL-MCNC: 81 MG/DL (ref 65–140)
GLUCOSE SERPL-MCNC: 84 MG/DL (ref 65–140)
GLUCOSE SERPL-MCNC: 96 MG/DL (ref 65–140)
HCT VFR BLD AUTO: 26.7 % (ref 36.5–49.3)
HCT VFR BLD AUTO: 28.4 % (ref 36.5–49.3)
HCT VFR BLD AUTO: 29.4 % (ref 36.5–49.3)
HGB BLD-MCNC: 8.7 G/DL (ref 12–17)
HGB BLD-MCNC: 9 G/DL (ref 12–17)
HGB BLD-MCNC: 9.3 G/DL (ref 12–17)
MCH RBC QN AUTO: 33.2 PG (ref 26.8–34.3)
MCHC RBC AUTO-ENTMCNC: 31.7 G/DL (ref 31.4–37.4)
MCV RBC AUTO: 105 FL (ref 82–98)
PLATELET # BLD AUTO: 57 THOUSANDS/UL (ref 149–390)
PMV BLD AUTO: 12.3 FL (ref 8.9–12.7)
POTASSIUM SERPL-SCNC: 4.9 MMOL/L (ref 3.5–5.3)
RBC # BLD AUTO: 2.71 MILLION/UL (ref 3.88–5.62)
RH BLD: POSITIVE
SODIUM SERPL-SCNC: 137 MMOL/L (ref 136–145)
SPECIMEN EXPIRATION DATE: NORMAL
WBC # BLD AUTO: 15.03 THOUSAND/UL (ref 4.31–10.16)

## 2019-09-28 PROCEDURE — 86900 BLOOD TYPING SEROLOGIC ABO: CPT | Performed by: HOSPITALIST

## 2019-09-28 PROCEDURE — 86901 BLOOD TYPING SEROLOGIC RH(D): CPT | Performed by: HOSPITALIST

## 2019-09-28 PROCEDURE — 82948 REAGENT STRIP/BLOOD GLUCOSE: CPT

## 2019-09-28 PROCEDURE — 99233 SBSQ HOSP IP/OBS HIGH 50: CPT | Performed by: INTERNAL MEDICINE

## 2019-09-28 PROCEDURE — C9113 INJ PANTOPRAZOLE SODIUM, VIA: HCPCS | Performed by: HOSPITALIST

## 2019-09-28 PROCEDURE — 86850 RBC ANTIBODY SCREEN: CPT | Performed by: HOSPITALIST

## 2019-09-28 PROCEDURE — 99232 SBSQ HOSP IP/OBS MODERATE 35: CPT | Performed by: HOSPITALIST

## 2019-09-28 PROCEDURE — 85014 HEMATOCRIT: CPT | Performed by: HOSPITALIST

## 2019-09-28 PROCEDURE — 80048 BASIC METABOLIC PNL TOTAL CA: CPT | Performed by: PSYCHIATRY & NEUROLOGY

## 2019-09-28 PROCEDURE — G8996 SWALLOW CURRENT STATUS: HCPCS

## 2019-09-28 PROCEDURE — G8997 SWALLOW GOAL STATUS: HCPCS

## 2019-09-28 PROCEDURE — 99232 SBSQ HOSP IP/OBS MODERATE 35: CPT | Performed by: INTERNAL MEDICINE

## 2019-09-28 PROCEDURE — 92610 EVALUATE SWALLOWING FUNCTION: CPT

## 2019-09-28 PROCEDURE — 85027 COMPLETE CBC AUTOMATED: CPT | Performed by: PSYCHIATRY & NEUROLOGY

## 2019-09-28 PROCEDURE — 85018 HEMOGLOBIN: CPT | Performed by: HOSPITALIST

## 2019-09-28 RX ORDER — PANTOPRAZOLE SODIUM 40 MG/1
40 TABLET, DELAYED RELEASE ORAL
Status: DISCONTINUED | OUTPATIENT
Start: 2019-09-28 | End: 2019-09-28

## 2019-09-28 RX ORDER — PANTOPRAZOLE SODIUM 40 MG/1
40 INJECTION, POWDER, FOR SOLUTION INTRAVENOUS
Status: DISCONTINUED | OUTPATIENT
Start: 2019-09-28 | End: 2019-10-01

## 2019-09-28 RX ADMIN — CIPROFLOXACIN HYDROCHLORIDE 500 MG: 500 TABLET, FILM COATED ORAL at 18:02

## 2019-09-28 RX ADMIN — SENNOSIDES AND DOCUSATE SODIUM 1 TABLET: 8.6; 5 TABLET ORAL at 22:39

## 2019-09-28 RX ADMIN — PANTOPRAZOLE SODIUM 40 MG: 40 INJECTION, POWDER, FOR SOLUTION INTRAVENOUS at 11:15

## 2019-09-28 RX ADMIN — Medication 1 CAPSULE: at 18:02

## 2019-09-28 NOTE — PROGRESS NOTES
Cardiology Progress Note - Natividad Adames 64 y o  male MRN: [id number]    Unit/Bed#: -01 Encounter: [id number]      Assessment:  1  Cardiomyopathy - ?etiology  2  Paroxysmal atrial fibrillation  3  Septic shock  4  Shewanella putrefaciens bacteremia  5  Polycystic kidney/liver disease  6  ESRD on HD  7  Anemia/thrombocytopenia     Plan:  1  No further PAF, no ventricular rhythms noted on telemetry during stay   2  No AC due to one time event in setting of sepsis, also thrombocytopenic  3  Needs ischemic work-up, mental status precludes conversation regarding work-up  4  Continue beta-blocker as blood pressure allows, doses held due to hypotension  5  Low BP precludes addition of ACE-I    Subjective:   Patient seen and examined  No significant events overnight  Objective:     Vitals: Blood pressure 92/53, pulse 101, temperature 97 9 °F (36 6 °C), temperature source Oral, resp  rate 18, height 5' 8" (1 727 m), weight 82 8 kg (182 lb 8 7 oz), SpO2 98 %  , Body mass index is 27 76 kg/m² ,   Orthostatic Blood Pressures      Most Recent Value   Blood Pressure  92/53 filed at 09/27/2019 2220   Patient Position - Orthostatic VS  Lying filed at 09/27/2019 2220            Intake/Output Summary (Last 24 hours) at 9/28/2019 0755  Last data filed at 9/27/2019 1800  Gross per 24 hour   Intake 1000 ml   Output 500 ml   Net 500 ml       Physical Exam:    GEN: Natividad Adames appears confused  HEENT: pupils equal, round, and reactive to light; extraocular muscles intact  NECK: supple, no carotid bruits   HEART: regular rhythm, normal S1 and S2, no murmur  LUNGS: clear to auscultation bilaterally; no wheezes, rales, or rhonchi   ABDOMEN: normal bowel sounds, soft, no tenderness, no distention  EXTREMITIES: LE ACE-wraps in place   NEURO: no focal findings   SKIN: normal without suspicious lesions on exposed skin    Medications:      Current Facility-Administered Medications:     acetaminophen (TYLENOL) tablet 650 mg, 650 mg, Oral, Q6H PRN, Bindu De Jesus MD, 650 mg at 09/27/19 1136    b complex-vitamin C-folic acid (NEPHROCAPS) capsule 1 capsule, 1 capsule, Oral, Daily With Wendy Farah MD, 1 capsule at 09/26/19 1654    ciprofloxacin (CIPRO) tablet 500 mg, 500 mg, Oral, Q24H, Bindu De Jesus MD, 500 mg at 09/27/19 1814    insulin lispro (HumaLOG) 100 units/mL subcutaneous injection 1-6 Units, 1-6 Units, Subcutaneous, TID AC **AND** Fingerstick Glucose (POCT), , , TID AC, Bindu De Jesus MD    insulin lispro (HumaLOG) 100 units/mL subcutaneous injection 1-6 Units, 1-6 Units, Subcutaneous, HS, Bindu De Jesus MD    metoprolol tartrate (LOPRESSOR) partial tablet 12 5 mg, 12 5 mg, Oral, Q12H [facility], Bindu De Jesus MD, 12 5 mg at 09/26/19 2109    polyethylene glycol (MIRALAX) packet 17 g, 17 g, Oral, Daily PRN, Bindu De Jesus MD    senna-docusate sodium (SENOKOT S) 8 6-50 mg per tablet 1 tablet, 1 tablet, Oral, HS, Bindu De Jesus MD, 1 tablet at 09/26/19 2109     Labs & Results:    Results from last 7 days   Lab Units 09/23/19  0445 09/22/19  2111 09/22/19  0412 09/22/19  0056 09/21/19  2237   CK TOTAL U/L 36* 42  --   --  67   TROPONIN I ng/mL  --   --  0 05* 0 06*  --      Results from last 7 days   Lab Units 09/27/19  0453 09/26/19  0325 09/25/19  0418   WBC Thousand/uL 18 91* 29 45* 26 98*   HEMOGLOBIN g/dL 9 4* 9 2* 9 0*   HEMATOCRIT % 29 5* 27 9* 27 2*   PLATELETS Thousands/uL 45* 43* 28*         Results from last 7 days   Lab Units 09/26/19  0325 09/25/19  0418 09/24/19  2124  09/24/19  1013  09/23/19  0445   POTASSIUM mmol/L 4 1 3 9 4 0   < > 4 0   < >  --    CHLORIDE mmol/L 103 103 101   < > 98*   < >  --    CO2 mmol/L 24 25 22   < > 23   < >  --    BUN mg/dL 80* 56* 50*   < > 52*   < >  --    CREATININE mg/dL 5 41* 4 33* 3 76*   < > 4 28*   < >  --    CALCIUM mg/dL 9 9 9 6 9 4   < > 9 1   < >  --    ALK PHOS U/L 154*  --   --   --  83  --  64   ALT U/L 13  --   --   --  13 --  19   AST U/L 18  --   --   --  9  --  15    < > = values in this interval not displayed  Results from last 7 days   Lab Units 09/26/19  0325 09/23/19  0445 09/22/19 2111 09/21/19 2018   INR  1 55* 1 50*  1 55* 1 52*   < > 1 72*   PTT seconds  --  37  --   --  36    < > = values in this interval not displayed  Results from last 7 days   Lab Units 09/25/19  0418 09/24/19  2124 09/24/19  1614   MAGNESIUM mg/dL 2 0 2 0 2 0       Counseling / Coordination of Care  Total floor / unit time spent today 25 minutes  Greater than 50% of total time was spent with the patient and / or family counseling and / or coordination of care

## 2019-09-28 NOTE — SPEECH THERAPY NOTE
Speech Language/Pathology  Today's Date: 9/28/2019     Problem List  Patient Active Problem List   Diagnosis    Chronic kidney disease with end stage renal failure on dialysis ([facility] [address] )    Cellulitis of lower extremity- bilateral    Polycystic liver disease    Total bilirubin, elevated    Thrombocytopenia (HCC)    Gram-negative bacteremia    Polycystic kidney disease    ADPKD (autosomal dominant polycystic kidney)    Benign essential hypertension    Peripheral neuropathy    Atrial fibrillation [facility])       Past Medical History  Past Medical History:   Diagnosis Date    Complication of renal dialysis     Polycystic kidney disease        Past Surgical History  Past Surgical History:   Procedure Laterality Date    IR IMAGE GUIDED ASPIRATION / DRAINAGE  9/23/2019       Summary    Pt presents with mild-mod oral dysphagia with decreased and prolonged  mastication/bolus breakdown and oral holding which appear to be greatly impacted by pt's AMS and decreased attention to task  Pt frequently perseverating on the ingredients in food, writing down what he was doing, and touching wounds which SLP advised him not to do  Highly recommend decreasing distractions in room and providing with full supervision during meals for increased safety  Recommendations:   Diet: mechanically altered/level 2 diet and thin liquids   Meds: whole with liquid and whole with puree   Frequent Oral care: 4x/day  Aspiration precautions and compensatory swallowing strategies: upright posture, only feed when fully alert, slow rate of feeding, small bites/sips, quiet environment (tv off, limit talking, door closed, etc ) and alternating bites and sips  Other Recommendations/ considerations:   -Verbal prompts to clear oral cavity/take sips of liquids   -Extra gravy/sauces with meals       Current Medical Status  Pt is a 64 y o  male who presented to [address] [place] [address]  On 9/21 with leg pain found to be in septic shock   PMH significant for autosomal dominant polycystic kidney disease, ESRD on HD M-W-F via R subclavian catheter, and polycystic liver disease  General surgery consulted and there was concern his perma cath vs renal cyst vs leg wounds being the source of sepsis  Perma cath was removed 9/22  Pt was initially on a regular diet throughout hospital course with documentation being limited re % of meals eaten (25%  Documented a few times)  Pt was lethargic yesterday and ST was cancelled 2/2 pt being inappropriate for PO  This SLP spoke with RN this AM who reported pt was awake, confused and eating breakfast  Upon SLPs arrival pt was covered in his own feces, attempting to call someone from his room phone and orally holding large pieces of [place] toast in his mouth  SLP immediately asked pt to spit out material  Korina Shipley RN came in to assist SLP prior to beginning bedside assessment 2/2 large amount of feces throughout his bed and on his gown  Dysphagia eval delayed 2/2 to such  Past medical history:   Please see H&P for details    Special Studies:  CTA abdomin 9/21/19-  1  Moderate peripheral arterial disease with patent vasculature up to the level of the proximal calves  Evaluation of distal calf arteries are limited due to circumferential calcification  2   Skin thickening and subcutaneous edema of the lower legs which may be due to cellulitis versus peripheral arterial disease  No discrete fluid collection to suggest abscess  No subcutaneous emphysema  3   Polycystic kidney and liver disease with innumerable cysts some which are calcified and hemorrhagic  4   Right lower quadrant cystic mass with thickened walls, calcifications, and heterogeneous hyperdensity which may represent enhancement versus hemorrhage  There is contact with the right kidney and cecum  Differential diagnosis includes infected   right renal cyst versus renal cell carcinoma  Differential also includes appendiceal mass such as mucinous cystadenocarcinoma  Further evaluation with ultrasound or MRI of the abdomen may be of value  5   Submucosal fat deposition within the mildly thickened urinary bladder wall may be due to chronic cystitis  Correlate with urinalysis  6   Distended stomach with fluid and food debris  No small bowel obstruction  Diverticulosis without evidence of diverticulitis  7   Right inguinal hernia with associated large right hydrocele  CXR 9/23/19-Cardiomegaly  No acute disease        Social/Education/Vocational Hx:  Pt lives with family    Swallow Information   Current Risks for Dysphagia & Aspiration: AMS  Current Symptoms/Concerns: ? decreased PO intake; AMS concerning for increased risk of aspiration  Current Diet: regular diet and thin liquids   Baseline Diet: ? regular; pt unable to state    Baseline Assessment   Behavior/Cognition: alert and decreased attention  Speech/Language Status: able to participate in basic conversation and able to follow commands inconsistently- perseverating on tasks  Patient Positioning: upright in bed     Swallow Mechanism Exam   Facial: masked facies  Labial: WFL  Lingual: WFL  Velum: unable to visualize  Mandible: adequate ROM  Dentition: poor oral hygiene and some missing teeth  Vocal quality:clear/adequate   Volitional Cough: strong/productive   Respiratory: RA    Consistencies Assessed and Performance   Consistencies Administered: thin liquids, nectar thick, puree, soft solids and hard solids- small pieces of [place] toast, herberth doones     Please note assessment/PO intake was limited 2/2 pt perseverating on writing down ingredients of all food/drink "to help his memory and keep intake consistent"  SLP attempted several times to redirect and participate but ability to follow through was limited  Oral Stage: Pt self fed w/o difficulty  Retrieval from tsp, cup and straw were functional w/o anterior spill out of mouth   Mastication of solids was prolonged and at least mildly decreased with oral residue posteriorly on teeth- improved mastication and oral clearance with smaller bites controled by SLP  Oral control and transfer of puree and liquids was functional  SLP required moderate verbal and visual prompts throughout to maintain attention and clear oral cavity  Due ti current mental status highly recommend conservative diet of mechanical solids  Pharyngeal Stage: timely swallow across all trials  No overt s/s of aspiration  Voice clear, no coughing  Esophageal Concerns: none reported      Results Reviewed with: RN and MD   Dysphagia Goals:  1  pt will tolerate mechanical solids with thin liquids without s/s of aspiration x1-5 days or as required by treating SLP     2  Pt will trial advanced solids with SLP w/ adequate oral processing/no evidence of oral holding or residue for possible diet advancement        Speech Therapy Prognosis   Prognosis: fair    Prognosis Considerations: age, medical status, prior medical history, cognitive status and therapeutic potential      Tara NUNES  [address] Pathologist  Available via [employer]   [place] #[id number]  Alphonsa Frankel #[id number]

## 2019-09-28 NOTE — PLAN OF CARE
Bedside eval complete  Rx dysphagia 2-mechanical with thin liquids    -Meds as tolerated (RN today did whole in puree)  -Full supervision with meals  -Aspiration precautions

## 2019-09-28 NOTE — ASSESSMENT & PLAN NOTE
Paroxysmal atrial fibrillation with RVR  Not noted on telemetry  · See above for Cardiology recommendations; no recommendation to anticoagulate, although heparin 5000 units initiated until INR greater than or equal to 2 is achieved

## 2019-09-28 NOTE — PROGRESS NOTES
Progress Note - Donnie Benavides 1960, 64 y o  male MRN: [id number]    Unit/Bed#: -01 Encounter: [id number]    Primary Care Provider: Janice Orr MD   Date and time admitted to hospital: 9/21/2019  7:56 PM    * Gram-negative bacteremia  Assessment & Plan  Lower extremity cellulitis and hemorrhagic bullae  Initial blood cultures demonstrated gram-negative rods, specifically Shewanella Putrifacians, a marine acquired bacteria  Additionally, nidus for infection PermCath (replaced by surgery using temporary catheter) versus lower extremity cellulitis; less likely renal cyst demonstrated on CT (drainage by IR and subsequent negative culture)  Sepsis criteria:  tachycardia, nidus for infection and leukocytosis  · Infectious Disease consult appreciated-continue ciprofloxacin through 10/04/2019  Continue to monitor temperature and CBC  · Wound care consult appreciated-ruptured bullae; cleanse using NS, single layer xeroform  Change Xeroform once daily  Frequent repositioning  Offload heels  Routine wound care nurse  · Dermatology consult appreciated-no biopsy recommended  Wound care as outlined above  Antibiotics as outlined above  · Podiatry consult appreciated-debridement of the right lateral leg  Drainage of left foot bullae; eschars noted  · Cardiology consult appreciated-AFib with RVR; continue metoprolol 12 5 mg b i d  (hold for hypotension), no anti coagulation recommended, cardiomyopathy; limited echo recommended in addition to eventual ischemic evaluation  · Nephrology consult appreciated-resume usual HD schedule  · PermCath insertion 09/30/2019 (interventional radiology)  Atrial fibrillation [facility])  Assessment & Plan  Paroxysmal atrial fibrillation with RVR  Not noted on telemetry  · See above for Cardiology recommendations  Benign essential hypertension  Assessment & Plan  Present blood pressure 92/53  · Continue to monitor vitals    · Continue metoprolol 12 5 mg; hold in presence of hypotension (systolic less than 95)     Polycystic kidney disease  Assessment & Plan  Ultrasound right upper quadrant demonstrated hypertrophic and polycystic right kidney  Polycystic liver disease  Assessment & Plan   Innumerable hepatic cysts demonstrated on CTA abdomen  Cellulitis of lower extremity- bilateral  Assessment & Plan  · Continue antibiotics per infectious disease recommendations  · Continue wound care per recommendations above  Chronic kidney disease with end stage renal failure on dialysis [facility])  Assessment & Plan  · Continue to trend BMP  · Continue HD per nephrology recommendation; PermCath insertion on 2019  VTE Pharmacologic Prophylaxis:   Pharmacologic: Pharmacologic VTE Prophylaxis contraindicated due to Sepsis/bacteremia  Mechanical VTE Prophylaxis in Place: Yes    Patient Centered Rounds: I have performed bedside rounds with nursing staff today  Discussions with Specialists or Other Care Team Provider:  Case management  Education and Discussions with Family / Patient:  Patient  Time Spent for Care: 30 minutes  More than 50% of total time spent on counseling and coordination of care as described above  Current Length of Stay: 7 day(s)    Current Patient Status: Inpatient   Certification Statement: The patient will continue to require additional inpatient hospital stay due to Management of sepsis/bacteremia  Discharge Plan: To be determined  Code Status: Level 1 - Full Code      Subjective:   No acute distress noted at time of exam   Patient denied lower extremity pain/discomfort secondary to chronic venous stasis and lower extremity bullae; ace bandages present  Denied fever, chills, chest pain/palpitations  He stated that he is eating okay; modified diet to dysphagia level 2 mechanical and thin liquids per speech language pathology recommendation      Objective:     Vitals:   Temp (24hrs), Av 1 °F (36 7 °C), Min:97 9 °F (36 6 °C), Max:98 2 °F (36 8 °C)    Temp:  [97 9 °F (36 6 °C)-98 2 °F (36 8 °C)] 98 2 °F (36 8 °C)  HR:  [] 95  Resp:  [18] 18  BP: (82-97)/(46-54) 86/51  SpO2:  [95 %-98 %] 95 %  Body mass index is 27 76 kg/m²  Input and Output Summary (last 24 hours): Intake/Output Summary (Last 24 hours) at 9/28/2019 1023  Last data filed at 9/27/2019 1800  Gross per 24 hour   Intake 1000 ml   Output 500 ml   Net 500 ml       Physical Exam:     Physical Exam   Constitutional: Vital signs are normal  He appears well-developed and well-nourished  He appears ill  No distress  HENT:   Head: Normocephalic and atraumatic  Eyes: Pupils are equal, round, and reactive to light  EOM are normal    Neck: Normal range of motion  Neck supple  Cardiovascular: Normal rate, regular rhythm, normal heart sounds, intact distal pulses and normal pulses  No murmur heard  Pulmonary/Chest: Effort normal and breath sounds normal  No respiratory distress  He has no decreased breath sounds  Abdominal: Soft  Normal appearance and bowel sounds are normal  He exhibits no distension  There is no tenderness  There is no rigidity and no rebound  Musculoskeletal: Normal range of motion  He exhibits edema and tenderness  Lower legs bandaged bilaterally  Serous drainage noted  Neurological: He is alert  Alert, although he is somnolent and requires redirection at times to answer questions  Skin: Skin is warm and dry  Capillary refill takes less than 2 seconds  He is not diaphoretic  Psychiatric: He has a normal mood and affect  His behavior is normal  Judgment and thought content normal    Nursing note and vitals reviewed          Additional Data:     Labs:    Results from last 7 days   Lab Units 09/28/19  0816  09/26/19  0325  09/22/19  2111   WBC Thousand/uL 15 03*   < > 29 45*   < > 5 76   HEMOGLOBIN g/dL 9 0*   < > 9 2*   < > 11 4*   HEMATOCRIT % 28 4*   < > 27 9*   < > 35 6*   PLATELETS Thousands/uL 57*   < > 43*   < > 36*   BANDS PCT %  --   --  11*   < >  --    NEUTROS PCT %  --   --   --   --  89*   LYMPHS PCT %  --   --   --   --  4*   LYMPHO PCT %  --   --  4*   < >  --    MONOS PCT %  --   --   --   --  5   MONO PCT %  --   --  0*   < >  --    EOS PCT %  --   --  1   < > 2    < > = values in this interval not displayed  Results from last 7 days   Lab Units 09/28/19  0816 09/26/19  0325   SODIUM mmol/L 137 137   POTASSIUM mmol/L 4 9 4 1   CHLORIDE mmol/L 99* 103   CO2 mmol/L 25 24   BUN mg/dL 65* 80*   CREATININE mg/dL 4 75* 5 41*   ANION GAP mmol/L 13 10   CALCIUM mg/dL 9 2 9 9   ALBUMIN g/dL  --  1 7*   TOTAL BILIRUBIN mg/dL  --  4 30*   ALK PHOS U/L  --  154*   ALT U/L  --  13   AST U/L  --  18   GLUCOSE RANDOM mg/dL 84 110     Results from last 7 days   Lab Units 09/26/19  0325   INR  1 55*     Results from last 7 days   Lab Units 09/28/19  0801 09/27/19  2122 09/27/19  1725 09/27/19  1129 09/27/19  0740 09/26/19  2116 09/26/19  1509 09/26/19  1252 09/26/19  0713 09/25/19  2106 09/25/19  1729 09/25/19  1148   POC GLUCOSE mg/dl 68 81 92 105 93 97 109 69 102 122 128 83         Results from last 7 days   Lab Units 09/27/19  0453 09/23/19  0629 09/23/19  0445 09/22/19  2111 09/22/19  0908 09/22/19  0630 09/22/19  0412  09/21/19  2237   LACTIC ACID mmol/L  --  1 9  --  2 1* 3 7* 3 5* 3 6*   < > 5 2*   PROCALCITONIN ng/ml 23 03*  --  56 56*  --   --   --  66 85*  --  63 11*    < > = values in this interval not displayed  * I Have Reviewed All Lab Data Listed Above  * Additional Pertinent Lab Tests Reviewed: All Labs For Current Hospital Admission Reviewed    Imaging:    Imaging Reports Reviewed Today Include:  None  Imaging Personally Reviewed by Myself Includes:  None  Recent Cultures (last 7 days):     Results from last 7 days   Lab Units 09/23/19  1022 09/23/19  0505 09/23/19  0443 09/21/19 2019 09/21/19 2018   BLOOD CULTURE   --  No Growth After 5 Days  No Growth After 5 Days   Shewanella putrefaciens* Shewanella putrefaciens*   GRAM STAIN RESULT  2+ Polys  No bacteria seen  --   --  Gram negative rods* Gram negative rods*   BODY FLUID CULTURE, STERILE  No growth  --   --   --   --        Last 24 Hours Medication List:     Current Facility-Administered Medications:  acetaminophen 650 mg Oral Q6H PRN Rj Velez MD   b complex-vitamin C-folic acid 1 capsule Oral Daily With Desi Hill MD   ciprofloxacin 500 mg Oral Q24H Rj Velez MD   insulin lispro 1-6 Units Subcutaneous TID [facility] Rj Velez MD   insulin lispro 1-6 Units Subcutaneous HS Rj Velez MD   metoprolol tartrate 12 5 mg Oral Q12H [facility] [facility]   polyethylene glycol 17 g Oral Daily PRN Rj Velez MD   senna-docusate sodium 1 tablet Oral HS Rj Velez MD        Today, Patient Was Seen By: Verdel Dines Holter, DO    ** Please Note: Dictation voice to text software may have been used in the creation of this document   **

## 2019-09-28 NOTE — PLAN OF CARE
Problem: Prexisting or High Potential for Compromised Skin Integrity  Goal: Skin integrity is maintained or improved  Description  INTERVENTIONS:  - Identify patients at risk for skin breakdown  - Assess and monitor skin integrity  - Assess and monitor nutrition and hydration status  - Monitor labs   - Assess for incontinence   - Turn and reposition patient  - Assist with mobility/ambulation  - Relieve pressure over bony prominences  - Avoid friction and shearing  - Provide appropriate hygiene as needed including keeping skin clean and dry  - Evaluate need for skin moisturizer/barrier cream  - Collaborate with interdisciplinary team   - Patient/family teaching  - Consider wound care consult   Outcome: Progressing     Problem: Potential for Falls  Goal: Patient will remain free of falls  Description  INTERVENTIONS:  - Assess patient frequently for physical needs  -  Identify cognitive and physical deficits and behaviors that affect risk of falls    -  [place] fall precautions as indicated by assessment   - Educate patient/family on patient safety including physical limitations  - Instruct patient to call for assistance with activity based on assessment  - Modify environment to reduce risk of injury  - Consider OT/PT consult to assist with strengthening/mobility  Outcome: Progressing     Problem: CARDIOVASCULAR - ADULT  Goal: Maintains optimal cardiac output and hemodynamic stability  Description  INTERVENTIONS:  - Monitor I/O, vital signs and rhythm  - Monitor for S/S and trends of decreased cardiac output  - Administer and titrate ordered vasoactive medications to optimize hemodynamic stability  - Assess quality of pulses, skin color and temperature  - Assess for signs of decreased coronary artery perfusion  - Instruct patient to report change in severity of symptoms  Outcome: Progressing  Goal: Absence of cardiac dysrhythmias or at baseline rhythm  Description  INTERVENTIONS:  - Continuous cardiac monitoring, vital signs, obtain 12 lead EKG if ordered  - Administer antiarrhythmic and heart rate control medications as ordered  - Monitor electrolytes and administer replacement therapy as ordered  Outcome: Progressing     Problem: RESPIRATORY - ADULT  Goal: Achieves optimal ventilation and oxygenation  Description  INTERVENTIONS:  - Assess for changes in respiratory status  - Assess for changes in mentation and behavior  - Position to facilitate oxygenation and minimize respiratory effort  - Oxygen administered by appropriate delivery if ordered  - Initiate smoking cessation education as indicated  - Encourage broncho-pulmonary hygiene including cough, deep breathe, Incentive Spirometry  - Assess the need for suctioning and aspirate as needed  - Assess and instruct to report SOB or any respiratory difficulty  - Respiratory Therapy support as indicated  Outcome: Progressing     Problem: METABOLIC, FLUID AND ELECTROLYTES - ADULT  Goal: Electrolytes maintained within normal limits  Description  INTERVENTIONS:  - Monitor labs and assess patient for signs and symptoms of electrolyte imbalances  - Administer electrolyte replacement as ordered  - Monitor response to electrolyte replacements, including repeat lab results as appropriate  - Instruct patient on fluid and nutrition as appropriate  Outcome: Progressing  Goal: Fluid balance maintained  Description  INTERVENTIONS:  - Monitor labs   - Monitor I/O and WT  - Instruct patient on fluid and nutrition as appropriate  - Assess for signs & symptoms of volume excess or deficit  Outcome: Progressing     Problem: SKIN/TISSUE INTEGRITY - ADULT  Goal: Incision(s), wounds(s) or drain site(s) healing without S/S of infection  Description  INTERVENTIONS  - Assess and document risk factors for skin impairment   - Assess and document dressing, incision, wound bed, drain sites and surrounding tissue  - Consider nutrition services referral as needed  - Oral mucous membranes remain intact  - Provide patient/ family education  Outcome: Progressing     Problem: PAIN - ADULT  Goal: Verbalizes/displays adequate comfort level or baseline comfort level  Description  Interventions:  - Encourage patient to monitor pain and request assistance  - Assess pain using appropriate pain scale  - Administer analgesics based on type and severity of pain and evaluate response  - Implement non-pharmacological measures as appropriate and evaluate response  - Consider cultural and social influences on pain and pain management  - Notify physician/advanced practitioner if interventions unsuccessful or patient reports new pain  Outcome: Progressing     Problem: INFECTION - ADULT  Goal: Absence or prevention of progression during hospitalization  Description  INTERVENTIONS:  - Assess and monitor for signs and symptoms of infection  - Monitor lab/diagnostic results  - Monitor all insertion sites, i e  indwelling lines, tubes, and drains  - Monitor endotracheal if appropriate and nasal secretions for changes in amount and color  - [place] appropriate cooling/warming therapies per order  - Administer medications as ordered  - Instruct and encourage patient and family to use good hand hygiene technique  - Identify and instruct in appropriate isolation precautions for identified infection/condition  Outcome: Progressing  Goal: Absence of fever/infection during neutropenic period  Description  INTERVENTIONS:  - Monitor WBC    Outcome: Progressing     Problem: SAFETY ADULT  Goal: Maintain or return to baseline ADL function  Description  INTERVENTIONS:  -  Assess patient's ability to carry out ADLs; assess patient's baseline for ADL function and identify physical deficits which impact ability to perform ADLs (bathing, care of mouth/teeth, toileting, grooming, dressing, etc )  - Assess/evaluate cause of self-care deficits   - Assess range of motion  - Assess patient's mobility; develop plan if impaired  - Assess patient's need for assistive devices and provide as appropriate  - Encourage maximum independence but intervene and supervise when necessary  - Involve family in performance of ADLs  - Assess for home care needs following discharge   - Consider OT consult to assist with ADL evaluation and planning for discharge  - Provide patient education as appropriate  Outcome: Progressing  Goal: Maintain or return mobility status to optimal level  Description  INTERVENTIONS:  - Assess patient's baseline mobility status (ambulation, transfers, stairs, etc )    - Identify cognitive and physical deficits and behaviors that affect mobility  - Identify mobility aids required to assist with transfers and/or ambulation (gait belt, sit-to-stand, lift, walker, cane, etc )  - [place] fall precautions as indicated by assessment  - Record patient progress and toleration of activity level on Mobility SBAR; progress patient to next Phase/Stage  - Instruct patient to call for assistance with activity based on assessment  - Consider rehabilitation consult to assist with strengthening/weightbearing, etc   Outcome: Progressing     Problem: Knowledge Deficit  Goal: Patient/family/caregiver demonstrates understanding of disease process, treatment plan, medications, and discharge instructions  Description  Complete learning assessment and assess knowledge base  Interventions:  - Provide teaching at level of understanding  - Provide teaching via preferred learning methods  Outcome: Progressing     Problem: Nutrition/Hydration-ADULT  Goal: Nutrient/Hydration intake appropriate for improving, restoring or maintaining nutritional needs  Description  Monitor and assess patient's nutrition/hydration status for malnutrition  Collaborate with interdisciplinary team and initiate plan and interventions as ordered  Monitor patient's weight and dietary intake as ordered or per policy  Utilize nutrition screening tool and intervene as necessary   Determine patient's food preferences and provide high-protein, high-caloric foods as appropriate       INTERVENTIONS:  - Monitor oral intake, urinary output, labs, and treatment plans  - Assess nutrition and hydration status and recommend course of action  - Evaluate amount of meals eaten  - Assist patient with eating if necessary   - Allow adequate time for meals  - Recommend/ encourage appropriate diets, oral nutritional supplements, and vitamin/mineral supplements  - Order, calculate, and assess calorie counts as needed  - Recommend, monitor, and adjust tube feedings and TPN/PPN based on assessed needs  - Assess need for intravenous fluids  - Provide specific nutrition/hydration education as appropriate  - Include patient/family/caregiver in decisions related to nutrition  Outcome: Progressing

## 2019-09-28 NOTE — ASSESSMENT & PLAN NOTE
Present blood pressure 93/53  · Continue to monitor vitals    · Continue metoprolol 12 5 mg; hold parameters in presence of hypotension changed per cardiology (systolic less than 90)

## 2019-09-28 NOTE — PROGRESS NOTES
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: on Cipro per ID until 10/4/19  4  Leukocytosis: Improved today  5  Encephalopathy likely secondary to infection  6  Hemorrhagic bullae in lower extremities:  7  Anemia: Hgb stable  Continue to monitor  Stable  8  Mineral and bone disease: Check phos  9  PKD with renal and hepatic cysts  10  RLQ drainage: Appears to be urine draining from RLQ drain  SUBJECTIVE / INTERVAL HISTORY:    No overnight events    OBJECTIVE:  Current Weight: Weight - Scale: 82 8 kg (182 lb 8 7 oz)  Vitals:    09/27/19 2118 09/27/19 2220 09/28/19 0600 09/28/19 0832   BP: 95/51 92/53  (!) 86/51   BP Location:  Right arm  Right arm   Pulse: 97 101  95   Resp:  18  18   Temp:  97 9 °F (36 6 °C)  98 2 °F (36 8 °C)   TempSrc:  Oral  Oral   SpO2:  98%  95%   Weight:   82 8 kg (182 lb 8 7 oz)    Height:           Intake/Output Summary (Last 24 hours) at 9/28/2019 0900  Last data filed at 9/27/2019 1800  Gross per 24 hour   Intake 1000 ml   Output 500 ml   Net 500 ml       Review of Systems:    12 point ROS has been reviewed  Physical Exam   Constitutional: He is oriented to person, place, and time  He appears well-developed and well-nourished  No distress  HENT:   Head: Normocephalic and atraumatic  Eyes: Pupils are equal, round, and reactive to light  No scleral icterus  Neck: Normal range of motion  Neck supple     Cardiovascular: Normal rate, regular rhythm and normal heart sounds  Exam reveals no gallop and no friction rub  No murmur heard  Pulmonary/Chest: Effort normal and breath sounds normal  No respiratory distress  He has no wheezes  He has no rales  He exhibits no tenderness  Abdominal: Soft  Bowel sounds are normal  He exhibits no distension  There is no tenderness  There is no rebound  Musculoskeletal: Normal range of motion  He exhibits no edema  Neurological: He is alert and oriented to person, place, and time  Skin: No rash noted  He is not diaphoretic  Psychiatric: He has a normal mood and affect  Nursing note reviewed        Medications:    Current Facility-Administered Medications:     acetaminophen (TYLENOL) tablet 650 mg, 650 mg, Oral, Q6H PRN, Elvis Silva MD, 650 mg at 09/27/19 1136    b complex-vitamin C-folic acid (NEPHROCAPS) capsule 1 capsule, 1 capsule, Oral, Daily With Shannan Babinski, MD, 1 capsule at 09/26/19 1654    ciprofloxacin (CIPRO) tablet 500 mg, 500 mg, Oral, Q24H, Elvis Silva MD, 500 mg at 09/27/19 1814    insulin lispro (HumaLOG) 100 units/mL subcutaneous injection 1-6 Units, 1-6 Units, Subcutaneous, TID AC **AND** Fingerstick Glucose (POCT), , , TID AC, Elvis Silva MD    insulin lispro (HumaLOG) 100 units/mL subcutaneous injection 1-6 Units, 1-6 Units, Subcutaneous, HS, Elvis Silva MD    metoprolol tartrate (LOPRESSOR) partial tablet 12 5 mg, 12 5 mg, Oral, Q12H [facility]     polyethylene glycol (MIRALAX) packet 17 g, 17 g, Oral, Daily PRN, Elvis Silva MD    senna-docusate sodium (SENOKOT S) 8 6-50 mg per tablet 1 tablet, 1 tablet, Oral, HS, Elvis Silva MD, 1 tablet at 09/26/19 2109    Laboratory Results:  Results from last 7 days   Lab Units 09/28/19  0816 09/27/19  0453 09/26/19  0325 09/25/19  0418 09/24/19  2139 09/24/19  2124 09/24/19  1614 09/24/19  1013 09/24/19  0409 09/23/19  2216 09/23/19  1510  09/23/19  0444   WBC Thousand/uL 15 03* 18 91* 29 45* 26 98* 25 50* --   --   --  18 32*  --   --   --  9 32   HEMOGLOBIN g/dL 9 0* 9 4* 9 2* 9 0* 9 0*  --   --   --  9 6*  --  9 9*  --  10 4*   HEMATOCRIT % 28 4* 29 5* 27 9* 27 2* 26 8*  --   --   --  29 0*  --   --   --  31 9*   PLATELETS Thousands/uL 57* 45* 43* 28* 25*  --   --   --  42*  --  54*  --  51*   POTASSIUM mmol/L 4 9  --  4 1 3 9  --  4 0 4 0 4 0 4 4 4 8 5 7*  --  5 9*   CHLORIDE mmol/L 99*  --  103 103  --  101 100 98* 98* 96* 91*  --  89*   CO2 mmol/L 25  --  24 25  --  22 24 23 24 23 22  --  22   BUN mg/dL 65*  --  80* 56*  --  50* 45* 52* 61* 74* 97*  --  92*   CREATININE mg/dL 4 75*  --  5 41* 4 33*  --  3 76* 3 70* 4 28* 5 17* 6 61* 9 19*  --  9 06*   CALCIUM mg/dL 9 2  --  9 9 9 6  --  9 4 9 2 9 1 9 1 9 3 8 9  --  8 6   MAGNESIUM mg/dL  --   --   --  2 0  --  2 0 2 0 1 9 2 0 2 1 2 3   < >  --    PHOSPHORUS mg/dL  --   --   --  2 0*  --  2 4* 2 4* 2 5* 2 7 3 3 4 7*   < >  --     < > = values in this interval not displayed

## 2019-09-28 NOTE — ASSESSMENT & PLAN NOTE
Lower extremity cellulitis and hemorrhagic bullae  Initial blood cultures demonstrated gram-negative rods, specifically Shewanella Putrifacians, a marine acquired bacteria  Additionally, nidus for infection PermCath (replaced by surgery using temporary catheter) versus lower extremity cellulitis; less likely renal cyst demonstrated on CT (drainage and drain placement by IR-subsequent negative culture)  Sepsis criteria:  tachycardia, nidus for infection and leukocytosis  · Infectious Disease consult appreciated-continue ciprofloxacin through 10/04/2019  Continue to monitor temperature and CBC  · Wound care consult appreciated-ruptured bullae; cleanse using NS, single layer xeroform  Change Xeroform once daily  Frequent repositioning  Offload heels  Routine wound care nurse  · Dermatology consult appreciated-no biopsy recommended  Wound care as outlined above  Antibiotics as outlined above  · Podiatry consult appreciated-debridement of the right lateral leg  Drainage of left foot bullae; eschars noted  · Cardiology consult appreciated-AFib with RVR; continue metoprolol 12 5 mg b i d  (hold for hypotension), no anti coagulation recommended, cardiomyopathy; limited echo recommended in addition to eventual ischemic evaluation  · Nephrology consult appreciated-resume usual HD schedule  Discontinuation of D5W  · Interventional radiology consult appreciated- PermCath insertion and drain replacement

## 2019-09-28 NOTE — ASSESSMENT & PLAN NOTE
· Continue to trend BMP  · Continue HD per nephrology recommendation; PermCath placement by Interventional Radiology

## 2019-09-29 LAB
BASOPHILS # BLD MANUAL: 0.16 THOUSAND/UL (ref 0–0.1)
BASOPHILS NFR MAR MANUAL: 1 % (ref 0–1)
DOHLE BOD BLD QL SMEAR: PRESENT
EOSINOPHIL # BLD MANUAL: 0.33 THOUSAND/UL (ref 0–0.4)
EOSINOPHIL NFR BLD MANUAL: 2 % (ref 0–6)
ERYTHROCYTE [DISTWIDTH] IN BLOOD BY AUTOMATED COUNT: 17.2 % (ref 11.6–15.1)
GLUCOSE SERPL-MCNC: 105 MG/DL (ref 65–140)
GLUCOSE SERPL-MCNC: 165 MG/DL (ref 65–140)
GLUCOSE SERPL-MCNC: 217 MG/DL (ref 65–140)
GLUCOSE SERPL-MCNC: 37 MG/DL (ref 65–140)
GLUCOSE SERPL-MCNC: 45 MG/DL (ref 65–140)
GLUCOSE SERPL-MCNC: 45 MG/DL (ref 65–140)
GLUCOSE SERPL-MCNC: 62 MG/DL (ref 65–140)
GLUCOSE SERPL-MCNC: 89 MG/DL (ref 65–140)
GLUCOSE SERPL-MCNC: 93 MG/DL (ref 65–140)
HCT VFR BLD AUTO: 27.9 % (ref 36.5–49.3)
HGB BLD-MCNC: 9.1 G/DL (ref 12–17)
LYMPHOCYTES # BLD AUTO: 0.65 THOUSAND/UL (ref 0.6–4.47)
LYMPHOCYTES # BLD AUTO: 4 % (ref 14–44)
MACROCYTES BLD QL AUTO: PRESENT
MCH RBC QN AUTO: 33.7 PG (ref 26.8–34.3)
MCHC RBC AUTO-ENTMCNC: 32.6 G/DL (ref 31.4–37.4)
MCV RBC AUTO: 103 FL (ref 82–98)
MONOCYTES # BLD AUTO: 0.98 THOUSAND/UL (ref 0–1.22)
MONOCYTES NFR BLD: 6 % (ref 4–12)
NEUTROPHILS # BLD MANUAL: 14.19 THOUSAND/UL (ref 1.85–7.62)
NEUTS SEG NFR BLD AUTO: 87 % (ref 43–75)
NRBC BLD AUTO-RTO: 0 /100 WBCS
PLATELET # BLD AUTO: 72 THOUSANDS/UL (ref 149–390)
PLATELET BLD QL SMEAR: ABNORMAL
PMV BLD AUTO: 12.1 FL (ref 8.9–12.7)
POLYCHROMASIA BLD QL SMEAR: PRESENT
RBC # BLD AUTO: 2.7 MILLION/UL (ref 3.88–5.62)
TOTAL CELLS COUNTED SPEC: 100
TOXIC GRANULES BLD QL SMEAR: PRESENT
WBC # BLD AUTO: 16.31 THOUSAND/UL (ref 4.31–10.16)

## 2019-09-29 PROCEDURE — 82947 ASSAY GLUCOSE BLOOD QUANT: CPT | Performed by: HOSPITALIST

## 2019-09-29 PROCEDURE — 97530 THERAPEUTIC ACTIVITIES: CPT

## 2019-09-29 PROCEDURE — 90945 DIALYSIS ONE EVALUATION: CPT | Performed by: INTERNAL MEDICINE

## 2019-09-29 PROCEDURE — 85027 COMPLETE CBC AUTOMATED: CPT | Performed by: HOSPITALIST

## 2019-09-29 PROCEDURE — 85007 BL SMEAR W/DIFF WBC COUNT: CPT | Performed by: HOSPITALIST

## 2019-09-29 PROCEDURE — 97110 THERAPEUTIC EXERCISES: CPT

## 2019-09-29 PROCEDURE — 99232 SBSQ HOSP IP/OBS MODERATE 35: CPT | Performed by: HOSPITALIST

## 2019-09-29 PROCEDURE — 82948 REAGENT STRIP/BLOOD GLUCOSE: CPT

## 2019-09-29 PROCEDURE — 99231 SBSQ HOSP IP/OBS SF/LOW 25: CPT | Performed by: INTERNAL MEDICINE

## 2019-09-29 PROCEDURE — C9113 INJ PANTOPRAZOLE SODIUM, VIA: HCPCS | Performed by: HOSPITALIST

## 2019-09-29 RX ORDER — DEXTROSE MONOHYDRATE 25 G/50ML
INJECTION, SOLUTION INTRAVENOUS
Status: COMPLETED
Start: 2019-09-29 | End: 2019-09-29

## 2019-09-29 RX ORDER — DEXTROSE MONOHYDRATE 50 MG/ML
20 INJECTION, SOLUTION INTRAVENOUS CONTINUOUS
Status: DISCONTINUED | OUTPATIENT
Start: 2019-09-29 | End: 2019-10-01

## 2019-09-29 RX ADMIN — DEXTROSE 50 % IN WATER (D50W) INTRAVENOUS SYRINGE 50 ML: at 16:35

## 2019-09-29 RX ADMIN — DEXTROSE 20 ML/HR: 5 SOLUTION INTRAVENOUS at 17:02

## 2019-09-29 RX ADMIN — ACETAMINOPHEN 650 MG: 325 TABLET, FILM COATED ORAL at 20:32

## 2019-09-29 RX ADMIN — METOPROLOL TARTRATE 12.5 MG: 25 TABLET ORAL at 08:44

## 2019-09-29 RX ADMIN — CIPROFLOXACIN HYDROCHLORIDE 500 MG: 500 TABLET, FILM COATED ORAL at 20:32

## 2019-09-29 RX ADMIN — METOPROLOL TARTRATE 12.5 MG: 25 TABLET ORAL at 22:21

## 2019-09-29 RX ADMIN — PANTOPRAZOLE SODIUM 40 MG: 40 INJECTION, POWDER, FOR SOLUTION INTRAVENOUS at 08:44

## 2019-09-29 RX ADMIN — Medication 1 CAPSULE: at 15:43

## 2019-09-29 NOTE — PROGRESS NOTES
Pt DILIA drain had no output, flushed with 5mL  Urostomy bag emptied 220mL was obtained  Pt did not report any pain or discomfort at this time  Pt family bedside assisting with eating  IVF infusing at 20mL/hr  Pt telemetry working   VSS

## 2019-09-29 NOTE — PLAN OF CARE
Problem: Prexisting or High Potential for Compromised Skin Integrity  Goal: Skin integrity is maintained or improved  Description  INTERVENTIONS:  - Identify patients at risk for skin breakdown  - Assess and monitor skin integrity  - Assess and monitor nutrition and hydration status  - Monitor labs   - Assess for incontinence   - Turn and reposition patient  - Assist with mobility/ambulation  - Relieve pressure over bony prominences  - Avoid friction and shearing  - Provide appropriate hygiene as needed including keeping skin clean and dry  - Evaluate need for skin moisturizer/barrier cream  - Collaborate with interdisciplinary team   - Patient/family teaching  - Consider wound care consult   Outcome: Progressing     Problem: Potential for Falls  Goal: Patient will remain free of falls  Description  INTERVENTIONS:  - Assess patient frequently for physical needs  -  Identify cognitive and physical deficits and behaviors that affect risk of falls    -  [place] fall precautions as indicated by assessment   - Educate patient/family on patient safety including physical limitations  - Instruct patient to call for assistance with activity based on assessment  - Modify environment to reduce risk of injury  - Consider OT/PT consult to assist with strengthening/mobility  Outcome: Progressing     Problem: CARDIOVASCULAR - ADULT  Goal: Maintains optimal cardiac output and hemodynamic stability  Description  INTERVENTIONS:  - Monitor I/O, vital signs and rhythm  - Monitor for S/S and trends of decreased cardiac output  - Administer and titrate ordered vasoactive medications to optimize hemodynamic stability  - Assess quality of pulses, skin color and temperature  - Assess for signs of decreased coronary artery perfusion  - Instruct patient to report change in severity of symptoms  Outcome: Progressing  Goal: Absence of cardiac dysrhythmias or at baseline rhythm  Description  INTERVENTIONS:  - Continuous cardiac monitoring, vital signs, obtain 12 lead EKG if ordered  - Administer antiarrhythmic and heart rate control medications as ordered  - Monitor electrolytes and administer replacement therapy as ordered  Outcome: Progressing     Problem: RESPIRATORY - ADULT  Goal: Achieves optimal ventilation and oxygenation  Description  INTERVENTIONS:  - Assess for changes in respiratory status  - Assess for changes in mentation and behavior  - Position to facilitate oxygenation and minimize respiratory effort  - Oxygen administered by appropriate delivery if ordered  - Initiate smoking cessation education as indicated  - Encourage broncho-pulmonary hygiene including cough, deep breathe, Incentive Spirometry  - Assess the need for suctioning and aspirate as needed  - Assess and instruct to report SOB or any respiratory difficulty  - Respiratory Therapy support as indicated  Outcome: Progressing     Problem: METABOLIC, FLUID AND ELECTROLYTES - ADULT  Goal: Electrolytes maintained within normal limits  Description  INTERVENTIONS:  - Monitor labs and assess patient for signs and symptoms of electrolyte imbalances  - Administer electrolyte replacement as ordered  - Monitor response to electrolyte replacements, including repeat lab results as appropriate  - Instruct patient on fluid and nutrition as appropriate  Outcome: Progressing  Goal: Fluid balance maintained  Description  INTERVENTIONS:  - Monitor labs   - Monitor I/O and WT  - Instruct patient on fluid and nutrition as appropriate  - Assess for signs & symptoms of volume excess or deficit  Outcome: Progressing     Problem: SKIN/TISSUE INTEGRITY - ADULT  Goal: Incision(s), wounds(s) or drain site(s) healing without S/S of infection  Description  INTERVENTIONS  - Assess and document risk factors for skin impairment   - Assess and document dressing, incision, wound bed, drain sites and surrounding tissue  - Consider nutrition services referral as needed  - Oral mucous membranes remain intact  - Provide patient/ family education  Outcome: Progressing     Problem: PAIN - ADULT  Goal: Verbalizes/displays adequate comfort level or baseline comfort level  Description  Interventions:  - Encourage patient to monitor pain and request assistance  - Assess pain using appropriate pain scale  - Administer analgesics based on type and severity of pain and evaluate response  - Implement non-pharmacological measures as appropriate and evaluate response  - Consider cultural and social influences on pain and pain management  - Notify physician/advanced practitioner if interventions unsuccessful or patient reports new pain  Outcome: Progressing     Problem: INFECTION - ADULT  Goal: Absence or prevention of progression during hospitalization  Description  INTERVENTIONS:  - Assess and monitor for signs and symptoms of infection  - Monitor lab/diagnostic results  - Monitor all insertion sites, i e  indwelling lines, tubes, and drains  - Monitor endotracheal if appropriate and nasal secretions for changes in amount and color  - [place] appropriate cooling/warming therapies per order  - Administer medications as ordered  - Instruct and encourage patient and family to use good hand hygiene technique  - Identify and instruct in appropriate isolation precautions for identified infection/condition  Outcome: Progressing  Goal: Absence of fever/infection during neutropenic period  Description  INTERVENTIONS:  - Monitor WBC    Outcome: Progressing     Problem: SAFETY ADULT  Goal: Maintain or return to baseline ADL function  Description  INTERVENTIONS:  -  Assess patient's ability to carry out ADLs; assess patient's baseline for ADL function and identify physical deficits which impact ability to perform ADLs (bathing, care of mouth/teeth, toileting, grooming, dressing, etc )  - Assess/evaluate cause of self-care deficits   - Assess range of motion  - Assess patient's mobility; develop plan if impaired  - Assess patient's need for assistive devices and provide as appropriate  - Encourage maximum independence but intervene and supervise when necessary  - Involve family in performance of ADLs  - Assess for home care needs following discharge   - Consider OT consult to assist with ADL evaluation and planning for discharge  - Provide patient education as appropriate  Outcome: Progressing  Goal: Maintain or return mobility status to optimal level  Description  INTERVENTIONS:  - Assess patient's baseline mobility status (ambulation, transfers, stairs, etc )    - Identify cognitive and physical deficits and behaviors that affect mobility  - Identify mobility aids required to assist with transfers and/or ambulation (gait belt, sit-to-stand, lift, walker, cane, etc )  - [place] fall precautions as indicated by assessment  - Record patient progress and toleration of activity level on Mobility SBAR; progress patient to next Phase/Stage  - Instruct patient to call for assistance with activity based on assessment  - Consider rehabilitation consult to assist with strengthening/weightbearing, etc   Outcome: Progressing     Problem: Knowledge Deficit  Goal: Patient/family/caregiver demonstrates understanding of disease process, treatment plan, medications, and discharge instructions  Description  Complete learning assessment and assess knowledge base  Interventions:  - Provide teaching at level of understanding  - Provide teaching via preferred learning methods  Outcome: Progressing     Problem: Nutrition/Hydration-ADULT  Goal: Nutrient/Hydration intake appropriate for improving, restoring or maintaining nutritional needs  Description  Monitor and assess patient's nutrition/hydration status for malnutrition  Collaborate with interdisciplinary team and initiate plan and interventions as ordered  Monitor patient's weight and dietary intake as ordered or per policy  Utilize nutrition screening tool and intervene as necessary   Determine patient's food preferences and provide high-protein, high-caloric foods as appropriate       INTERVENTIONS:  - Monitor oral intake, urinary output, labs, and treatment plans  - Assess nutrition and hydration status and recommend course of action  - Evaluate amount of meals eaten  - Assist patient with eating if necessary   - Allow adequate time for meals  - Recommend/ encourage appropriate diets, oral nutritional supplements, and vitamin/mineral supplements  - Order, calculate, and assess calorie counts as needed  - Recommend, monitor, and adjust tube feedings and TPN/PPN based on assessed needs  - Assess need for intravenous fluids  - Provide specific nutrition/hydration education as appropriate  - Include patient/family/caregiver in decisions related to nutrition  Outcome: Progressing

## 2019-09-29 NOTE — PROGRESS NOTES
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: on Cipro per ID until 10/4/19  4  Leukocytosis:  Slightly up today   5  Encephalopathy likely secondary to infection  6  Hemorrhagic bullae in lower extremities:  7  Anemia: Hgb stable  Continue to monitor  Stable  8  Mineral and bone disease: Check phos  9  PKD with renal and hepatic cysts  10  RLQ drainage: Appears to be urine draining from RLQ drain  For IR tomorrow      SUBJECTIVE / INTERVAL HISTORY:    No overnight events    OBJECTIVE:  Current Weight: Weight - Scale: 82 kg (180 lb 12 4 oz)  Vitals:    09/28/19 2215 09/28/19 2244 09/29/19 0553 09/29/19 0740   BP: 96/56 90/53  99/54   BP Location: Left arm Left arm  Left arm   Pulse: 101   90   Resp: 18   20   Temp: 98 6 °F (37 °C)   98 6 °F (37 °C)   TempSrc: Oral   Oral   SpO2: 98%   99%   Weight:   82 kg (180 lb 12 4 oz)    Height:           Intake/Output Summary (Last 24 hours) at 9/29/2019 1034  Last data filed at 9/29/2019 0740  Gross per 24 hour   Intake 10 ml   Output 460 ml   Net -450 ml       Review of Systems:    12 point ROS has been reviewed  Physical Exam   Constitutional: He is oriented to person, place, and time  He appears well-developed and well-nourished  No distress  HENT:   Head: Normocephalic and atraumatic  Eyes: Pupils are equal, round, and reactive to light  No scleral icterus  Neck: Normal range of motion  Neck supple     Cardiovascular: Normal rate, regular rhythm and normal heart sounds  Exam reveals no gallop and no friction rub  No murmur heard  Pulmonary/Chest: Effort normal and breath sounds normal  No respiratory distress  He has no wheezes  He has no rales  He exhibits no tenderness  Abdominal: Soft  Bowel sounds are normal  He exhibits no distension  There is no tenderness  There is no rebound  Musculoskeletal: Normal range of motion  He exhibits no edema  Neurological: He is alert and oriented to person, place, and time  Skin: No rash noted  He is not diaphoretic  Psychiatric: He has a normal mood and affect  Nursing note and vitals reviewed        Medications:    Current Facility-Administered Medications:     acetaminophen (TYLENOL) tablet 650 mg, 650 mg, Oral, Q6H PRN, Triston Cervantes MD, 650 mg at 09/27/19 1136    b complex-vitamin C-folic acid (NEPHROCAPS) capsule 1 capsule, 1 capsule, Oral, Daily With Zeyad Hill MD, 1 capsule at 09/28/19 1802    ciprofloxacin (CIPRO) tablet 500 mg, 500 mg, Oral, Q24H, Triston Cervantes MD, 500 mg at 09/28/19 1802    insulin lispro (HumaLOG) 100 units/mL subcutaneous injection 1-6 Units, 1-6 Units, Subcutaneous, TID AC **AND** Fingerstick Glucose (POCT), , , TID AC, Triston Cervantes MD    insulin lispro (HumaLOG) 100 units/mL subcutaneous injection 1-6 Units, 1-6 Units, Subcutaneous, HS, Triston Cervantes MD    metoprolol tartrate (LOPRESSOR) partial tablet 12 5 mg, 12 5 mg, Oral, Q12H [facility], Dotty Ronquillo DO, 12 5 mg at 09/29/19 0844    pantoprazole (PROTONIX) injection 40 mg, 40 mg, Intravenous, Q24H [facility], Azalia Christie MD, 40 mg at 09/29/19 0844    polyethylene glycol (MIRALAX) packet 17 g, 17 g, Oral, Daily PRN, Triston Cervantes MD    senna-docusate sodium (SENOKOT S) 8 6-50 mg per tablet 1 tablet, 1 tablet, Oral, HS, Triston Cervantes MD, 1 tablet at 09/28/19 4077    Laboratory Results:  Results from last 7 days   Lab Units 09/29/19  0561 09/28/19  2158 09/28/19  1427 09/28/19  0816 09/27/19  0453 09/26/19  0325 09/25/19  0418 09/24/19  2139 09/24/19  2124 09/24/19  1614 09/24/19  1013 09/24/19  0409 09/23/19  2216 09/23/19  1510   WBC Thousand/uL 16 31*  --   --  15 03* 18 91* 29 45* 26 98* 25 50*  --   --   --  18 32*  --   --    HEMOGLOBIN g/dL 9 1* 9 3* 8 7* 9 0* 9 4* 9 2* 9 0* 9 0*  --   --   --  9 6*  --  9 9*   HEMATOCRIT % 27 9* 29 4* 26 7* 28 4* 29 5* 27 9* 27 2* 26 8*  --   --   --  29 0*  --   --    PLATELETS Thousands/uL 72*  --   --  57* 45* 43* 28* 25*  --   --   --  42*  --  54*   POTASSIUM mmol/L  --   --   --  4 9  --  4 1 3 9  --  4 0 4 0 4 0 4 4 4 8 5 7*   CHLORIDE mmol/L  --   --   --  99*  --  103 103  --  101 100 98* 98* 96* 91*   CO2 mmol/L  --   --   --  25  --  24 25  --  22 24 23 24 23 22   BUN mg/dL  --   --   --  65*  --  80* 56*  --  50* 45* 52* 61* 74* 97*   CREATININE mg/dL  --   --   --  4 75*  --  5 41* 4 33*  --  3 76* 3 70* 4 28* 5 17* 6 61* 9 19*   CALCIUM mg/dL  --   --   --  9 2  --  9 9 9 6  --  9 4 9 2 9 1 9 1 9 3 8 9   MAGNESIUM mg/dL  --   --   --   --   --   --  2 0  --  2 0 2 0 1 9 2 0 2 1 2 3   PHOSPHORUS mg/dL  --   --   --   --   --   --  2 0*  --  2 4* 2 4* 2 5* 2 7 3 3 4 7*

## 2019-09-29 NOTE — ASSESSMENT & PLAN NOTE
Present blood pressure 90/53  · Continue to monitor vitals    · Continue metoprolol 12 5 mg; hold in presence of hypotension (systolic less than 90)

## 2019-09-29 NOTE — ASSESSMENT & PLAN NOTE
Paroxysmal atrial fibrillation with RVR  Not noted on telemetry    · Cardiology recommendations appreciated

## 2019-09-29 NOTE — PHYSICAL THERAPY NOTE
PHYSICAL THERAPY NOTE    Patient Name: Carroll Wisdom  [id number] Date: 19 1031   Pain Assessment   Pain Assessment No/denies pain   Pain Score No Pain   Restrictions/Precautions   Weight Bearing Precautions Per Order No   Other Precautions Impulsive;Cognitive; Chair Alarm; Bed Alarm;Multiple lines;Telemetry; Fall Risk;Pain   General   Family/Caregiver Present No   Subjective   Subjective Patient supine in bed and is agreeable to participate in therapy session  Patient identifers obtained from name,  & ID bracelet  Bed Mobility   Supine to Sit Unable to assess   Sit to Supine Unable to assess   Additional Comments Patient supine in bed pre and post session with call bell and belongings in reach  Transfers   Sit to Stand Unable to assess  (pt refusing OOB mobility)   Activity Tolerance   Activity Tolerance Patient limited by fatigue; Other (Comment)  (limited cognition, decreased arousal & following directions)   Nurse Made Aware Spoke to Henry Obrien RN    Exercises   Quad Sets Supine;10 reps;AROM; Bilateral   Hip Flexion Supine;10 reps;AAROM; Bilateral   Ankle Pumps Supine;10 reps;AROM; Bilateral   Assessment   Prognosis Fair   Problem List Decreased strength;Decreased range of motion;Decreased endurance; Impaired balance;Decreased mobility; Decreased coordination;Decreased cognition;Decreased safety awareness; Impaired sensation;Obesity; Decreased skin integrity;Pain  (LE edema)   Assessment Patient supine in bed attempting to reposition in bed and using controls of bed  Provided mod ax1 for repositioning in bed  Patient participated in supine B LE exercise with AROM/AAROM and 100% input for form  Patient with limited arousal at times requiring stimulation to stay alert  Patient with difficulty following direction requiring repeative instruction with visual demonstration to participate   Patietn resistant and refused OOB mobility this sessio, nursing notified  Continue to focus on bed mobility and transfer progression as appropriate and able  Goals   Patient Goals "to stretch my legs"   STG Expiration Date 10/05/19   PT Treatment Day 3   Plan   Treatment/Interventions Functional transfer training;LE strengthening/ROM; Therapeutic exercise; Endurance training;Cognitive reorientation;Patient/family training;Equipment eval/education; Bed mobility;Spoke to nursing  (PT to see when ambulation training is appropriate )   Progress Slow progress, decreased activity tolerance   PT Frequency 5x/wk   Recommendation   Recommendation Short-term skilled PT       Gloria Novak, PTA

## 2019-09-29 NOTE — PROGRESS NOTES
Progress Note - Annamaria De Luna 1960, 64 y o  male MRN: [id number]    Unit/Bed#: -01 Encounter: [id number]    Primary Care Provider: Sha Bailey MD   Date and time admitted to hospital: 9/21/2019  7:56 PM        * Gram-negative bacteremia  Assessment & Plan  Lower extremity cellulitis and hemorrhagic bullae  Initial blood cultures demonstrated gram-negative rods, specifically Shewanella Putrifacians, a marine acquired bacteria  Additionally, nidus for infection PermCath (replaced by surgery using temporary catheter) versus lower extremity cellulitis; less likely renal cyst demonstrated on CT (drainage by IR and subsequent negative culture)  Sepsis criteria:  tachycardia, nidus for infection and leukocytosis  · Infectious Disease consult appreciated-continue ciprofloxacin through 10/04/2019  Continue to monitor temperature and CBC  · Wound care consult appreciated-ruptured bullae; cleanse using NS, single layer xeroform  Change Xeroform once daily  Frequent repositioning  Offload heels  Routine wound care nurse  · Dermatology consult appreciated-no biopsy recommended  Wound care as outlined above  Antibiotics as outlined above  · Podiatry consult appreciated-debridement of the right lateral leg  Drainage of left foot bullae; eschars noted  · Cardiology consult appreciated-AFib with RVR; continue metoprolol 12 5 mg b i d  (hold for hypotension), no anti coagulation recommended, cardiomyopathy; limited echo recommended in addition to eventual ischemic evaluation  · Nephrology consult appreciated-resume usual HD schedule  · PermCath insertion 09/30/2019 (interventional radiology)  Benign essential hypertension  Assessment & Plan  Present blood pressure 90/53  · Continue to monitor vitals    · Continue metoprolol 12 5 mg; hold in presence of hypotension (systolic less than 90)     Chronic kidney disease with end stage renal failure on dialysis [facility])  Assessment & Plan  Patient has known chronic kidney disease end-stage renal failure on hemodialysis  and Friday    · Continue to trend BMP  · Continue HD per nephrology recommendation; PermCath insertion on 2019  Polycystic kidney disease  Assessment & Plan  Patient has known polycystic kidney disease and is on hemodialysis for end-stage renal failure secondary to polycystic kidney disease  · CT scan after admission of the abdomen showed a calcified right renal cyst and was drained by IR, drain is still in, currently draining fluid suspicious for urine versus ascitic fluid, sent for analysis  · Ultrasound right upper quadrant demonstrated hypertrophic and polycystic right kidney  Cellulitis of lower extremity- bilateral  Assessment & Plan  · Continue antibiotics per infectious disease recommendations  · Continue wound care per recommendations above  Atrial fibrillation [facility])  Assessment & Plan  Paroxysmal atrial fibrillation with RVR  Not noted on telemetry  · Cardiology recommendations appreciated         VTE Pharmacologic Prophylaxis:   Pharmacologic: Pharmacologic VTE Prophylaxis contraindicated due to sepsis/bacteremia   Mechanical VTE Prophylaxis in Place: No    Discussions with Specialists or Other Care Team Provider: None    Education and Discussions with Family / Patient: Patient    Current Length of Stay: 8 day(s)    Current Patient Status: Inpatient     Discharge Plan / Estimated Discharge Date: Not yet determined     Code Status: Level 1 - Full Code      Subjective:   Patient was seen and examined this morning  He is alert and oriented to person, place and time  He denies chest pain, sob, nausea, vomiting, abdominal pain or leg pains  He is tolerating diet well       Objective:     Vitals:   Temp (24hrs), Av 3 °F (36 8 °C), Min:98 2 °F (36 8 °C), Max:98 6 °F (37 °C)    Temp:  [98 2 °F (36 8 °C)-98 6 °F (37 °C)] 98 6 °F (37 °C)  HR:  [] 101  Resp:  [18] 18  BP: ()/(51-56) 90/53  SpO2:  [93 %-98 %] 98 %  Body mass index is 27 49 kg/m²  Input and Output Summary (last 24 hours): Intake/Output Summary (Last 24 hours) at 9/29/2019 0815  Last data filed at 9/29/2019 0740  Gross per 24 hour   Intake 210 ml   Output 460 ml   Net -250 ml       Physical Exam:     Physical Exam   Constitutional: He is oriented to person, place, and time  He appears well-developed  He appears ill  No distress  HENT:   Head: Normocephalic  Eyes: Scleral icterus is present  Neck: Neck supple  No JVD present  Cardiovascular: Normal rate and regular rhythm  Pulmonary/Chest: Effort normal and breath sounds normal  No respiratory distress  He has no wheezes  He has no rales  Abdominal: Soft  Bowel sounds are normal  He exhibits distension  There is no tenderness  Musculoskeletal: He exhibits edema and tenderness  Bilateral lower legs bandage   Neurological: He is alert and oriented to person, place, and time  Skin: Skin is warm and dry  He is not diaphoretic  Psychiatric: He has a normal mood and affect  Vitals reviewed  Additional Data:     Labs:    Results from last 7 days   Lab Units 09/29/19  0533  09/22/19  2111   WBC Thousand/uL 16 31*   < > 5 76   HEMOGLOBIN g/dL 9 1*   < > 11 4*   HEMATOCRIT % 27 9*   < > 35 6*   PLATELETS Thousands/uL 72*   < > 36*   NEUTROS PCT %  --   --  89*   LYMPHS PCT %  --   --  4*   LYMPHO PCT % 4*   < >  --    MONOS PCT %  --   --  5   MONO PCT % 6   < >  --    EOS PCT % 2   < > 2    < > = values in this interval not displayed  Results from last 7 days   Lab Units 09/28/19  0816 09/26/19  0325   POTASSIUM mmol/L 4 9 4 1   CHLORIDE mmol/L 99* 103   CO2 mmol/L 25 24   BUN mg/dL 65* 80*   CREATININE mg/dL 4 75* 5 41*   CALCIUM mg/dL 9 2 9 9   ALK PHOS U/L  --  154*   ALT U/L  --  13   AST U/L  --  18     Results from last 7 days   Lab Units 09/26/19  0325   INR  1 55*       * I Have Reviewed All Lab Data Listed Above    * Additional Pertinent Lab Tests Reviewed: Asaf 68 Admission Reviewed    Imaging:    Imaging Reports Reviewed Today Include: None  Imaging Personally Reviewed by Myself Includes:  None    Recent Cultures (last 7 days):     Results from last 7 days   Lab Units 09/23/19  1022 09/23/19  0505 09/23/19  0443   BLOOD CULTURE   --  No Growth After 5 Days  No Growth After 5 Days  GRAM STAIN RESULT  2+ Polys  No bacteria seen  --   --    BODY FLUID CULTURE, STERILE  No growth  --   --        Last 24 Hours Medication List:     Current Facility-Administered Medications:  acetaminophen 650 mg Oral Q6H PRN Jen Guerrero MD   b complex-vitamin C-folic acid 1 capsule Oral Daily With Qi Contreras MD   ciprofloxacin 500 mg Oral Q24H Jen Guerrero MD   insulin lispro 1-6 Units Subcutaneous TID [facility] Jen Gu[facility]ro [facility]   insulin lispro 1-6 Units Subcutaneous HS Jen Guerrero MD   metoprolol tartrate 12 5 mg Oral Q12H [address] [facility],    pantoprazole 40 mg Intravenous Q24H [facility] Mindy Gross MD   polyethylene glycol 17 g Oral Daily PRN Jen Guerrero MD   senna-docusate sodium 1 tablet Oral HS Jen Guerrero MD        Today, Patient Was Seen By: Alan Tijerina MD    ** Please Note: This note has been constructed using a voice recognition system   **

## 2019-09-29 NOTE — ASSESSMENT & PLAN NOTE
Patient has known chronic kidney disease end-stage renal failure on hemodialysis Monday Wednesday and Friday    · Continue to trend BMP  · Continue HD per nephrology recommendation; PermCath insertion on 09/30/2019

## 2019-09-29 NOTE — PLAN OF CARE
Problem: PHYSICAL THERAPY ADULT  Goal: Performs mobility at highest level of function for planned discharge setting  See evaluation for individualized goals  Description  Treatment/Interventions: Functional transfer training, LE strengthening/ROM, Therapeutic exercise, Endurance training, Cognitive reorientation, Patient/family training, Equipment eval/education, Bed mobility(PT to see when ambulation training is appropriate )  Equipment Recommended: Other (Comment)(pt needs dependent means for out of bed mobilization )       See flowsheet documentation for full assessment, interventions and recommendations  Outcome: Not Progressing  Note:   Prognosis: Fair  Problem List: Decreased strength, Decreased range of motion, Decreased endurance, Impaired balance, Decreased mobility, Decreased coordination, Decreased cognition, Decreased safety awareness, Impaired sensation, Obesity, Decreased skin integrity, Pain(LE edema)  Assessment: Patient supine in bed attempting to reposition in bed and using controls of bed  Provided mod ax1 for repositioning in bed  Patient participated in supine B LE exercise with AROM/AAROM and 100% input for form  Patient with limited arousal at times requiring stimulation to stay alert  Patient with difficulty following direction requiring repeative instruction with visual demonstration to participate  Patietn resistant and refused OOB mobility this sessio, nursing notified  Continue to focus on bed mobility and transfer progression as appropriate and able  Recommendation: Short-term skilled PT          See flowsheet documentation for full assessment

## 2019-09-29 NOTE — PROGRESS NOTES
Cardiology Progress Note - Alana Kemp 64 y o  male MRN: [id number]    Unit/Bed#: -01 Encounter: [id number]      Assessment:  1  Cardiomyopathy - ?etiology  2  Paroxysmal atrial fibrillation  3  Septic shock  4  Shewanella putrefaciens bacteremia  5  Polycystic kidney/liver disease  6  ESRD on HD  7  Anemia/thrombocytopenia     Plan:  1  No further PAF, no ventricular rhythms noted on telemetry during stay   2  No AC due to one time event in setting of sepsis, also thrombocytopenic, now with melanotic stool  3  Needs ischemic work-up, mental status precludes conversation regarding work-up  4  Continue beta-blocker as blood pressure allows, doses continue to be held due to hypotension, will change  hold parameters   5  Low BP precludes addition of ACE-I  6  Volume status management with HD     Subjective:   Patient seen and examined  No significant events overnight  Objective:     Vitals: Blood pressure 90/53, pulse 101, temperature 98 6 °F (37 °C), temperature source Oral, resp  rate 18, height 5' 8" (1 727 m), weight 82 kg (180 lb 12 4 oz), SpO2 98 %  , Body mass index is 27 49 kg/m² ,   Orthostatic Blood Pressures      Most Recent Value   Blood Pressure  90/53 filed at 09/28/2019 2244   Patient Position - Orthostatic VS  Lying filed at 09/28/2019 2244            Intake/Output Summary (Last 24 hours) at 9/29/2019 0741  Last data filed at 9/29/2019 0740  Gross per 24 hour   Intake 210 ml   Output 360 ml   Net -150 ml       Physical Exam:    GEN: Alana Kemp appears confused  HEENT: pupils equal, round, and reactive to light; extraocular muscles intact  NECK: supple, no carotid bruits   HEART: regular rhythm, normal S1 and S2, no murmur  LUNGS: clear to auscultation bilaterally; no wheezes, rales, or rhonchi   ABDOMEN: normal bowel sounds, soft, no tenderness, no distention  EXTREMITIES: LE ACE-wraps in place   NEURO: no focal findings   SKIN: normal without suspicious lesions on exposed skin    Medications:      Current Facility-Administered Medications:     acetaminophen (TYLENOL) tablet 650 mg, 650 mg, Oral, Q6H PRN, Jen Guerrero MD, 650 mg at 09/27/19 1136    b complex-vitamin C-folic acid (NEPHROCAPS) capsule 1 capsule, 1 capsule, Oral, Daily With Betito Montes MD, 1 capsule at 09/28/19 1802    ciprofloxacin (CIPRO) tablet 500 mg, 500 mg, Oral, Q24H, Jen Guerrero MD, 500 mg at 09/28/19 1802    insulin lispro (HumaLOG) 100 units/mL subcutaneous injection 1-6 Units, 1-6 Units, Subcutaneous, TID AC **AND** Fingerstick Glucose (POCT), , , TID AC, Jen Guerrero MD    insulin lispro (HumaLOG) 100 units/mL subcutaneous injection 1-6 Units, 1-6 Units, Subcutaneous, HS, Jen Guerrero MD    metoprolol tartrate (LOPRESSOR) partial tablet 12 5 mg, 12 5 mg, Oral, Q12H [facility], Keshia Irizarry DO    pantoprazole (PROTONIX) injection 40 mg, 40 mg, Intravenous, Q24H [facility], Mindy Gross MD, 40 mg at 09/28/19 1115    polyethylene glycol (MIRALAX) packet 17 g, 17 g, Oral, Daily PRN, Jen Guerrero MD    senna-docusate sodium (SENOKOT S) 8 6-50 mg per tablet 1 tablet, 1 tablet, Oral, HS, Jen Guerrero MD, 1 tablet at 09/28/19 2239     Labs & Results:    Results from last 7 days   Lab Units 09/23/19  0445 09/22/19  2111   CK TOTAL U/L 36* 42     Results from last 7 days   Lab Units 09/29/19  0533 09/28/19  2158 09/28/19  1427 09/28/19  0816 09/27/19  0453   WBC Thousand/uL 16 31*  --   --  15 03* 18 91*   HEMOGLOBIN g/dL 9 1* 9 3* 8 7* 9 0* 9 4*   HEMATOCRIT % 27 9* 29 4* 26 7* 28 4* 29 5*   PLATELETS Thousands/uL 72*  --   --  57* 45*         Results from last 7 days   Lab Units 09/28/19  0816 09/26/19  0325 09/25/19  0418  09/24/19  1013  09/23/19  0445   POTASSIUM mmol/L 4 9 4 1 3 9   < > 4 0   < >  --    CHLORIDE mmol/L 99* 103 103   < > 98*   < >  --    CO2 mmol/L 25 24 25   < > 23   < >  --    BUN mg/dL 65* 80* 56*   < > 52*   < >  --    CREATININE mg/dL 4 75* 5 41* 4 33*   < > 4 28*   < >  --    CALCIUM mg/dL 9 2 9 9 9 6   < > 9 1   < >  --    ALK PHOS U/L  --  154*  --   --  83  --  64   ALT U/L  --  13  --   --  13  --  19   AST U/L  --  18  --   --  9  --  15    < > = values in this interval not displayed  Results from last 7 days   Lab Units 09/26/19  0325 09/23/19  0445 09/22/19  2111   INR  1 55* 1 50*  1 55* 1 52*   PTT seconds  --  37  --      Results from last 7 days   Lab Units 09/25/19  0418 09/24/19  2124 09/24/19  1614   MAGNESIUM mg/dL 2 0 2 0 2 0       Counseling / Coordination of Care  Total floor / unit time spent today 25 minutes  Greater than 50% of total time was spent with the patient and / or family counseling and / or coordination of care

## 2019-09-29 NOTE — ASSESSMENT & PLAN NOTE
Patient has known polycystic kidney disease and is on hemodialysis for end-stage renal failure secondary to polycystic kidney disease  · CT scan after admission of the abdomen showed a calcified right renal cyst and was drained by IR, drain is still in, currently draining fluid suspicious for urine versus ascitic fluid, sent for analysis  · Ultrasound right upper quadrant demonstrated hypertrophic and polycystic right kidney

## 2019-09-29 NOTE — ASSESSMENT & PLAN NOTE
Lower extremity cellulitis and hemorrhagic bullae  Initial blood cultures demonstrated gram-negative rods, specifically Shewanella Putrifacians, a marine acquired bacteria  Additionally, nidus for infection PermCath (replaced by surgery using temporary catheter) versus lower extremity cellulitis; less likely renal cyst demonstrated on CT (drainage by IR and subsequent negative culture)  Sepsis criteria:  tachycardia, nidus for infection and leukocytosis  · Infectious Disease consult appreciated-continue ciprofloxacin through 10/04/2019  Continue to monitor temperature and CBC  · Wound care consult appreciated-ruptured bullae; cleanse using NS, single layer xeroform  Change Xeroform once daily  Frequent repositioning  Offload heels  Routine wound care nurse  · Dermatology consult appreciated-no biopsy recommended  Wound care as outlined above  Antibiotics as outlined above  · Podiatry consult appreciated-debridement of the right lateral leg  Drainage of left foot bullae; eschars noted  · Cardiology consult appreciated-AFib with RVR; continue metoprolol 12 5 mg b i d  (hold for hypotension), no anti coagulation recommended, cardiomyopathy; limited echo recommended in addition to eventual ischemic evaluation  · Nephrology consult appreciated-resume usual HD schedule  · PermCath insertion 09/30/2019 (interventional radiology)

## 2019-09-30 ENCOUNTER — TELEPHONE (OUTPATIENT)
Dept: RADIOLOGY | Facility: HOSPITAL | Age: 59
End: 2019-09-30

## 2019-09-30 ENCOUNTER — HOSPITAL ENCOUNTER (INPATIENT)
Dept: DIALYSIS | Facility: HOSPITAL | Age: 59
Discharge: HOME | DRG: 871 | End: 2019-09-30
Payer: MEDICARE

## 2019-09-30 ENCOUNTER — APPOINTMENT (INPATIENT)
Dept: RADIOLOGY | Facility: HOSPITAL | Age: 59
DRG: 871 | End: 2019-09-30
Payer: MEDICARE

## 2019-09-30 ENCOUNTER — APPOINTMENT (INPATIENT)
Dept: DIALYSIS | Facility: HOSPITAL | Age: 59
DRG: 871 | End: 2019-09-30
Payer: MEDICARE

## 2019-09-30 ENCOUNTER — APPOINTMENT (INPATIENT)
Dept: RADIOLOGY | Facility: HOSPITAL | Age: 59
DRG: 871 | End: 2019-09-30
Attending: INTERNAL MEDICINE
Payer: MEDICARE

## 2019-09-30 LAB
ALBUMIN SERPL BCP-MCNC: 1.9 G/DL (ref 3.5–5)
ANION GAP SERPL CALCULATED.3IONS-SCNC: 12 MMOL/L (ref 4–13)
ANION GAP SERPL CALCULATED.3IONS-SCNC: 12 MMOL/L (ref 4–13)
ANION GAP SERPL CALCULATED.3IONS-SCNC: 17 MMOL/L (ref 4–13)
BASOPHILS # BLD AUTO: 0.09 THOUSANDS/ΜL (ref 0–0.1)
BASOPHILS NFR BLD AUTO: 1 % (ref 0–1)
BUN SERPL-MCNC: 49 MG/DL (ref 5–25)
BUN SERPL-MCNC: 51 MG/DL (ref 5–25)
BUN SERPL-MCNC: 95 MG/DL (ref 5–25)
CALCIUM SERPL-MCNC: 8.4 MG/DL (ref 8.3–10.1)
CALCIUM SERPL-MCNC: 8.4 MG/DL (ref 8.3–10.1)
CALCIUM SERPL-MCNC: 8.5 MG/DL (ref 8.3–10.1)
CHLORIDE SERPL-SCNC: 92 MMOL/L (ref 100–108)
CHLORIDE SERPL-SCNC: 96 MMOL/L (ref 100–108)
CHLORIDE SERPL-SCNC: 97 MMOL/L (ref 100–108)
CO2 SERPL-SCNC: 20 MMOL/L (ref 21–32)
CO2 SERPL-SCNC: 25 MMOL/L (ref 21–32)
CO2 SERPL-SCNC: 26 MMOL/L (ref 21–32)
CREAT SERPL-MCNC: 4.41 MG/DL (ref 0.6–1.3)
CREAT SERPL-MCNC: 4.45 MG/DL (ref 0.6–1.3)
CREAT SERPL-MCNC: 7.54 MG/DL (ref 0.6–1.3)
EOSINOPHIL # BLD AUTO: 0.18 THOUSAND/ΜL (ref 0–0.61)
EOSINOPHIL NFR BLD AUTO: 1 % (ref 0–6)
ERYTHROCYTE [DISTWIDTH] IN BLOOD BY AUTOMATED COUNT: 17.2 % (ref 11.6–15.1)
GFR SERPL CREATININE-BSD FRML MDRD: 14 ML/MIN/1.73SQ M
GFR SERPL CREATININE-BSD FRML MDRD: 14 ML/MIN/1.73SQ M
GFR SERPL CREATININE-BSD FRML MDRD: 7 ML/MIN/1.73SQ M
GLUCOSE SERPL-MCNC: 106 MG/DL (ref 65–140)
GLUCOSE SERPL-MCNC: 107 MG/DL (ref 65–140)
GLUCOSE SERPL-MCNC: 108 MG/DL (ref 65–140)
GLUCOSE SERPL-MCNC: 119 MG/DL (ref 65–140)
GLUCOSE SERPL-MCNC: 88 MG/DL (ref 65–140)
GLUCOSE SERPL-MCNC: 88 MG/DL (ref 65–140)
HCT VFR BLD AUTO: 28.6 % (ref 36.5–49.3)
HGB BLD-MCNC: 9.5 G/DL (ref 12–17)
IMM GRANULOCYTES # BLD AUTO: 0.25 THOUSAND/UL (ref 0–0.2)
IMM GRANULOCYTES NFR BLD AUTO: 2 % (ref 0–2)
INR PPP: 1.4 (ref 0.84–1.19)
LYMPHOCYTES # BLD AUTO: 0.56 THOUSANDS/ΜL (ref 0.6–4.47)
LYMPHOCYTES NFR BLD AUTO: 4 % (ref 14–44)
MCH RBC QN AUTO: 34.1 PG (ref 26.8–34.3)
MCHC RBC AUTO-ENTMCNC: 33.2 G/DL (ref 31.4–37.4)
MCV RBC AUTO: 103 FL (ref 82–98)
MONOCYTES # BLD AUTO: 0.77 THOUSAND/ΜL (ref 0.17–1.22)
MONOCYTES NFR BLD AUTO: 5 % (ref 4–12)
NEUTROPHILS # BLD AUTO: 12.77 THOUSANDS/ΜL (ref 1.85–7.62)
NEUTS SEG NFR BLD AUTO: 87 % (ref 43–75)
NRBC BLD AUTO-RTO: 0 /100 WBCS
PHOSPHATE SERPL-MCNC: 5.2 MG/DL (ref 2.7–4.5)
PLATELET # BLD AUTO: 99 THOUSANDS/UL (ref 149–390)
PMV BLD AUTO: 11.5 FL (ref 8.9–12.7)
POTASSIUM SERPL-SCNC: 4.2 MMOL/L (ref 3.5–5.3)
POTASSIUM SERPL-SCNC: 4.3 MMOL/L (ref 3.5–5.3)
POTASSIUM SERPL-SCNC: 6.2 MMOL/L (ref 3.5–5.3)
PROTHROMBIN TIME: 16.5 SECONDS (ref 11.6–14.5)
RBC # BLD AUTO: 2.79 MILLION/UL (ref 3.88–5.62)
SODIUM SERPL-SCNC: 129 MMOL/L (ref 136–145)
SODIUM SERPL-SCNC: 134 MMOL/L (ref 136–145)
SODIUM SERPL-SCNC: 134 MMOL/L (ref 136–145)
WBC # BLD AUTO: 14.62 THOUSAND/UL (ref 4.31–10.16)

## 2019-09-30 PROCEDURE — 85025 COMPLETE CBC W/AUTO DIFF WBC: CPT | Performed by: HOSPITALIST

## 2019-09-30 PROCEDURE — 99232 SBSQ HOSP IP/OBS MODERATE 35: CPT | Performed by: INTERNAL MEDICINE

## 2019-09-30 PROCEDURE — 75984 XRAY CONTROL CATHETER CHANGE: CPT | Performed by: RADIOLOGY

## 2019-09-30 PROCEDURE — C1769 GUIDE WIRE: HCPCS

## 2019-09-30 PROCEDURE — 85610 PROTHROMBIN TIME: CPT | Performed by: HOSPITALIST

## 2019-09-30 PROCEDURE — 75984 XRAY CONTROL CATHETER CHANGE: CPT

## 2019-09-30 PROCEDURE — C1894 INTRO/SHEATH, NON-LASER: HCPCS

## 2019-09-30 PROCEDURE — 80069 RENAL FUNCTION PANEL: CPT | Performed by: PHYSICIAN ASSISTANT

## 2019-09-30 PROCEDURE — 80048 BASIC METABOLIC PNL TOTAL CA: CPT | Performed by: INTERNAL MEDICINE

## 2019-09-30 PROCEDURE — 90935 HEMODIALYSIS ONE EVALUATION: CPT | Performed by: INTERNAL MEDICINE

## 2019-09-30 PROCEDURE — C1729 CATH, DRAINAGE: HCPCS

## 2019-09-30 PROCEDURE — C9113 INJ PANTOPRAZOLE SODIUM, VIA: HCPCS | Performed by: HOSPITALIST

## 2019-09-30 PROCEDURE — 02H633Z INSERTION OF INFUSION DEVICE INTO RIGHT ATRIUM, PERCUTANEOUS APPROACH: ICD-10-PCS | Performed by: RADIOLOGY

## 2019-09-30 PROCEDURE — 77001 FLUOROGUIDE FOR VEIN DEVICE: CPT | Performed by: RADIOLOGY

## 2019-09-30 PROCEDURE — 76937 US GUIDE VASCULAR ACCESS: CPT | Performed by: RADIOLOGY

## 2019-09-30 PROCEDURE — 82948 REAGENT STRIP/BLOOD GLUCOSE: CPT

## 2019-09-30 PROCEDURE — 0JH63XZ INSERTION OF TUNNELED VASCULAR ACCESS DEVICE INTO CHEST SUBCUTANEOUS TISSUE AND FASCIA, PERCUTANEOUS APPROACH: ICD-10-PCS | Performed by: RADIOLOGY

## 2019-09-30 PROCEDURE — C1750 CATH, HEMODIALYSIS,LONG-TERM: HCPCS

## 2019-09-30 PROCEDURE — 36558 INSERT TUNNELED CV CATH: CPT | Performed by: RADIOLOGY

## 2019-09-30 PROCEDURE — 99232 SBSQ HOSP IP/OBS MODERATE 35: CPT | Performed by: HOSPITALIST

## 2019-09-30 PROCEDURE — 36558 INSERT TUNNELED CV CATH: CPT

## 2019-09-30 PROCEDURE — NC001 PR NO CHARGE: Performed by: RADIOLOGY

## 2019-09-30 PROCEDURE — 76937 US GUIDE VASCULAR ACCESS: CPT

## 2019-09-30 PROCEDURE — 49423 EXCHANGE DRAINAGE CATHETER: CPT

## 2019-09-30 PROCEDURE — 80048 BASIC METABOLIC PNL TOTAL CA: CPT | Performed by: HOSPITALIST

## 2019-09-30 PROCEDURE — 77001 FLUOROGUIDE FOR VEIN DEVICE: CPT

## 2019-09-30 PROCEDURE — 49423 EXCHANGE DRAINAGE CATHETER: CPT | Performed by: RADIOLOGY

## 2019-09-30 PROCEDURE — 0T25X0Z CHANGE DRAINAGE DEVICE IN KIDNEY, EXTERNAL APPROACH: ICD-10-PCS | Performed by: RADIOLOGY

## 2019-09-30 RX ORDER — OXYCODONE HYDROCHLORIDE 5 MG/1
2.5 TABLET ORAL EVERY 4 HOURS PRN
Status: DISCONTINUED | OUTPATIENT
Start: 2019-09-30 | End: 2019-10-03 | Stop reason: HOSPADM

## 2019-09-30 RX ORDER — MIDODRINE HYDROCHLORIDE 5 MG/1
5 TABLET ORAL
Status: DISCONTINUED | OUTPATIENT
Start: 2019-10-02 | End: 2019-10-03

## 2019-09-30 RX ORDER — ALBUMIN (HUMAN) 12.5 G/50ML
25 SOLUTION INTRAVENOUS ONCE
Status: COMPLETED | OUTPATIENT
Start: 2019-09-30 | End: 2019-09-30

## 2019-09-30 RX ADMIN — METOPROLOL TARTRATE 12.5 MG: 25 TABLET ORAL at 21:02

## 2019-09-30 RX ADMIN — PANTOPRAZOLE SODIUM 40 MG: 40 INJECTION, POWDER, FOR SOLUTION INTRAVENOUS at 08:01

## 2019-09-30 RX ADMIN — CIPROFLOXACIN HYDROCHLORIDE 500 MG: 500 TABLET, FILM COATED ORAL at 19:52

## 2019-09-30 RX ADMIN — IOHEXOL 10 ML: 300 INJECTION, SOLUTION INTRAVENOUS at 14:20

## 2019-09-30 RX ADMIN — OXYCODONE HYDROCHLORIDE 2.5 MG: 5 TABLET ORAL at 11:52

## 2019-09-30 RX ADMIN — SENNOSIDES AND DOCUSATE SODIUM 1 TABLET: 8.6; 5 TABLET ORAL at 21:02

## 2019-09-30 RX ADMIN — OXYCODONE HYDROCHLORIDE 2.5 MG: 5 TABLET ORAL at 17:25

## 2019-09-30 RX ADMIN — Medication 1 CAPSULE: at 17:25

## 2019-09-30 RX ADMIN — ALBUMIN (HUMAN) 25 G: 12.5 SOLUTION INTRAVENOUS at 10:14

## 2019-09-30 NOTE — PROGRESS NOTES
61-year-old male on chronic hemodialysis secondary to polycystic kidney disease presents for placement of a tunneled dialysis catheter  The patient also had 1 of his cysts percutaneously drained, which was believed to be infected, and is now leaking around the drainage catheter  Cultures from that cyst were negative  We will also evaluate the drainage catheter today  The history and physical were reviewed, along with progress notes, and the patient was examined  There are no changes since it was written

## 2019-09-30 NOTE — PROCEDURES
Central Line Insertion  Date/Time: 9/30/2019 6:01 PM  Performed by: Abhinav Valdez MD  Authorized by: Abhinav Valdez MD     Patient location:  IR  Consent:     Consent obtained:  Written    Consent given by:  Patient    Risks discussed:  Arterial puncture, bleeding and infection    Alternatives discussed:  No treatment and delayed treatment  Universal protocol:     Procedure explained and questions answered to patient or proxy's satisfaction: yes      Relevant documents present and verified: yes      Test results available and properly labeled: yes      Radiology Images displayed and confirmed  If images not available, report reviewed: yes      Required blood products, implants, devices, and special equipment available: yes      Site/side marked: yes      Immediately prior to procedure, a time out was called: yes      Patient identity confirmed:  Verbally with patient and arm band  Pre-procedure details:     Hand hygiene: Hand hygiene performed prior to insertion      Sterile barrier technique: All elements of maximal sterile technique followed      Skin preparation:  2% chlorhexidine    Skin preparation agent: Skin preparation agent completely dried prior to procedure    Indications:     Central line indications: dialysis    Sedation:     Sedation type: Moderate (conscious) sedation  Anesthesia (see MAR for exact dosages):      Anesthesia method:  Local infiltration    Local anesthetic:  Lidocaine 1% WITH epi  Procedure details:     Location:  Right internal jugular    Vessel type: vein      Laterality:  Right    Approach: percutaneous technique used      Patient position:  Flat    Catheter type:  Double lumen    Catheter size:  14 Fr    Landmarks identified: yes      Ultrasound guidance: yes      Sterile ultrasound techniques: Sterile gel and sterile probe covers were used      Number of attempts:  1    Successful placement: yes      Vessel of catheter tip end:  RA  Post-procedure details:     Post-procedure: Dressing applied and line sutured    Assessment:  Blood return through all ports and placement verified by x-ray    Post-procedure complications: none      Patient tolerance of procedure:   Tolerated well, no immediate complications

## 2019-09-30 NOTE — OCCUPATIONAL THERAPY NOTE
Occupational Therapy Cancellation Note        Patient Name: Rose Best  MOCJD'C Date: 9/30/2019    Chart review completed  Spoke w/ RN, Laure Coe and PT, Krysta Lizama  Pt currently not appropriate to participate in OT tx session per RN due to medical status (low BP)  Will continue to follow as appropriate and schedule allows       Danna Rocha, OTR/L

## 2019-09-30 NOTE — PROGRESS NOTES
Progress Note - Infectious Disease   Ok Neither 64 y o  male MRN: [id number]  Unit/Bed#: -01 Encounter: [id number]      Impression/Recommendations:  1  Septic shock, POA   Leukopenia, tachycardia, refractory hypotension   Due to #2/3   No other appreciable source   Clinically improving, now off pressors, afebrile with WBC trending down  Rec:  ? Continue antibiotics as below  ? Follow temperatures and WBC closely  ? Supportive care as per the primary service     2   Shewanella putrefaciens bacteremia    Suspect primary skin portal of entry with cellulitis as below with secondary infection of HD catheter, now removed   Less likely due to renal cyst as fluid culture negative   Repeat blood cultures negative  Rec:  ? Continue Cipro for 14 days total of antibiotics through 10/4/19  ? Follow mental status closely on quinolone although mild confusion preceded initiation     3   Bilateral lower extremity necrotizing skin infection   Suspect due to underlying chronic foot wounds in setting of recent creek/pond water exposure   Appeared superficial with healthy tissue under ruptured bullae making deep infection less likely   Cellulitis improved but now with evolving necrotic wounds after bullae rupture  Rec:  ? Continue antibiotics as above  ? Serial exams  ? [address] per Podiatry  Needs reevaluation today      4   ESRD on HD   Via [facility]   Now with temp catheter in setting of #2  Noted in Care Everywhere to be noncompliant with diet, HD  Rec:  ? Dose adjust antibiotics for dialysis  ? As blood cultures negative >72 hours, OK from ID for PermCath  As temp cath placed when blood cultures already negative, could have conversion of current line to tunneled line      5   Polycystic kidney/liver disease   Extensive disease seen on CT imaging      6   Chronic foot wounds   Likely multifactorial   Appear superficial   Risk factor for infection as above  Rec:  ? Outpatient Podiatry follow-up  ?  Advised infection prevention strategies including keeping feet and wounds covered as much as possible and no environmental exposures, especially water sources      The above plan was discussed in detail with the Renal service  Antibiotics:  Cipro #6  Antibiotics #10    Subjective:  Patient seen on AM rounds  Legs are painful  Denies fevers, chills, sweats, nausea, vomiting, or diarrhea  24 Hour Events:  No documented fevers, chills, sweats, nausea, vomiting, or diarrhea  WBC trending down  Objective:  Vitals:  Temp:  [97 6 °F (36 4 °C)-98 2 °F (36 8 °C)] 97 6 °F (36 4 °C)  HR:  [82-95] 88  Resp:  [18-22] 22  BP: (75-97)/(43-54) 87/53  SpO2:  [96 %-97 %] 96 %  Temp (24hrs), Av °F (36 7 °C), Min:97 6 °F (36 4 °C), Max:98 2 °F (36 8 °C)  Current: Temperature: 97 6 °F (36 4 °C)    Physical Exam:   General:  No acute distress  Psychiatric:  Awake and alert  Pulmonary:  Normal respiratory excursion without accessory muscle use  Abdomen:  Soft, nontender  Extremities:  Near complete desquamation of lower extremity bullae with underlying necrotic changes of soft tissue  Resolved cellulitic changes of calves  Skin:  No rashes    Lab Results:  I have personally reviewed pertinent labs  Results from last 7 days   Lab Units 19  0645 19  0816 19  0325  19  1013   POTASSIUM mmol/L 6 2* 4 9 4 1   < > 4 0   CHLORIDE mmol/L 92* 99* 103   < > 98*   CO2 mmol/L 20* 25 24   < > 23   BUN mg/dL 95* 65* 80*   < > 52*   CREATININE mg/dL 7 54* 4 75* 5 41*   < > 4 28*   EGFR ml/min/1 73sq m 7 13 11   < > 14   CALCIUM mg/dL 8 5 9 2 9 9   < > 9 1   AST U/L  --   --  18  --  9   ALT U/L  --   --  13  --  13   ALK PHOS U/L  --   --  154*  --  83    < > = values in this interval not displayed       Results from last 7 days   Lab Units 19  0658 19  0533 19  2158  19  0816   WBC Thousand/uL 14 62* 16 31*  --   --  15 03*   HEMOGLOBIN g/dL 9 5* 9 1* 9 3*   < > 9 0*   PLATELETS Thousands/uL 99* 72*  -- --  57*    < > = values in this interval not displayed  Imaging Studies:   I have personally reviewed pertinent imaging study reports and images in PACS  EKG, Pathology, and Other Studies:   I have personally reviewed pertinent reports

## 2019-09-30 NOTE — BRIEF OP NOTE (RAD/CATH)
INTERVENTIONAL RADIOLOGY PROCEDURE NOTE    Date: 9/30/2019    Preoperative diagnosis:  Infected polycystic kidneys    Postoperative diagnosis: Same    Procedure:  Tube check and change    Surgeon: Yolanda Hahn MD     Assistant: None  No qualified resident was available  Blood loss:  Trace    Specimens: none    Findings:  Existing catheter occluded  Exchanged for new, larger 10 [employer] [place] catheter  Complex cyst with connection to a portion of the collecting system noted      Complications:  None immediate    Anesthesia: local

## 2019-09-30 NOTE — PROGRESS NOTES
Cardiology Progress Note - Redd Richards 64 y o  male MRN: [id number]    Unit/Bed#: -01 Encounter: [id number]        Subjective:    No significant events overnight  Scheduled to have permacath changes today  Review of Systems   Cardiovascular: Negative for chest pain, leg swelling and palpitations  Respiratory: Negative for shortness of breath  Objective:   Vitals: Blood pressure 90/53, pulse 83, temperature 98 °F (36 7 °C), temperature source Oral, resp  rate 20, height 5' 8" (1 727 m), weight 82 kg (180 lb 12 4 oz), SpO2 96 %  , Body mass index is 27 49 kg/m² , Orthostatic Blood Pressures      Most Recent Value   Blood Pressure  90/53 filed at 09/30/2019 0700   Patient Position - Orthostatic VS  Lying filed at 09/30/2019 4916         Systolic (79EKI), ZOS:13 , Min:90 , TJQ:22     Diastolic (13CLC), GET:50, Min:53, Max:54      Intake/Output Summary (Last 24 hours) at 9/30/2019 0843  Last data filed at 9/30/2019 0820  Gross per 24 hour   Intake 865 ml   Output 200 ml   Net 665 ml     Weight (last 2 days)     Date/Time   Weight    09/29/19 0553   82 (180 78)    09/28/19 0600   82 8 (182 54)                  Telemetry Review: NSR    Physical Exam   Cardiovascular: Normal rate, regular rhythm and normal heart sounds  Exam reveals no gallop and no friction rub  No murmur heard  Pulmonary/Chest: Breath sounds normal  He has no wheezes  He has no rales  Musculoskeletal: He exhibits no edema           Laboratory Results:        CBC with diff: Results from last 7 days   Lab Units 09/30/19  0658 09/29/19  0533 09/28/19  2158 09/28/19  1427 09/28/19  0816 09/27/19  0453 09/26/19  0325 09/25/19  0418 09/24/19  2139 09/24/19  0409   WBC Thousand/uL 14 62* 16 31*  --   --  15 03* 18 91* 29 45* 26 98* 25 50* 18 32*   HEMOGLOBIN g/dL 9 5* 9 1* 9 3* 8 7* 9 0* 9 4* 9 2* 9 0* 9 0* 9 6*   HEMATOCRIT % 28 6* 27 9* 29 4* 26 7* 28 4* 29 5* 27 9* 27 2* 26 8* 29 0*   MCV fL 103* 103*  --   --  105* 105* 101* 101* 101* 102*   PLATELETS Thousands/uL 99* 72*  --   --  57* 45* 43* 28* 25* 42*   MCH pg 34 1 33 7  --   --  33 2 33 3 33 3 33 5 34 0 33 8   MCHC g/dL 33 2 32 6  --   --  31 7 31 9 33 0 33 1 33 6 33 1   RDW % 17 2* 17 2*  --   --  17 2* 16 5* 16 1* 15 9* 15 9* 16 0*   MPV fL 11 5 12 1  --   --  12 3 12 3 12 9* 12 6 11 5 12 9*   NRBC AUTO /100 WBCs 0 0  --   --   --   --  1 1  --  0         CMP:  Results from last 7 days   Lab Units 09/30/19  0645 09/28/19  0816 09/26/19 0325 09/25/19 0418 09/24/19 2124 09/24/19 1614 09/24/19  1013   POTASSIUM mmol/L 6 2* 4 9 4 1 3 9 4 0 4 0 4 0   CHLORIDE mmol/L 92* 99* 103 103 101 100 98*   CO2 mmol/L 20* 25 24 25 22 24 23   BUN mg/dL 95* 65* 80* 56* 50* 45* 52*   CREATININE mg/dL 7 54* 4 75* 5 41* 4 33* 3 76* 3 70* 4 28*   CALCIUM mg/dL 8 5 9 2 9 9 9 6 9 4 9 2 9 1   AST U/L  --   --  18  --   --   --  9   ALT U/L  --   --  13  --   --   --  13   ALK PHOS U/L  --   --  154*  --   --   --  83   EGFR ml/min/1 73sq m 7 13 11 14 17 17 14         BMP:  Results from last 7 days   Lab Units 09/30/19  0645 09/28/19  0816 09/26/19 0325 09/25/19 0418 09/24/19 2124 09/24/19 1614 09/24/19  1013   POTASSIUM mmol/L 6 2* 4 9 4 1 3 9 4 0 4 0 4 0   CHLORIDE mmol/L 92* 99* 103 103 101 100 98*   CO2 mmol/L 20* 25 24 25 22 24 23   BUN mg/dL 95* 65* 80* 56* 50* 45* 52*   CREATININE mg/dL 7 54* 4 75* 5 41* 4 33* 3 76* 3 70* 4 28*   CALCIUM mg/dL 8 5 9 2 9 9 9 6 9 4 9 2 9 1       BNP:     Magnesium:   Results from last 7 days   Lab Units 09/25/19  0418 09/24/19  2124 09/24/19  1614 09/24/19  1013 09/24/19  0409 09/23/19  2216 09/23/19  1510   MAGNESIUM mg/dL 2 0 2 0 2 0 1 9 2 0 2 1 2 3       Coags:   Results from last 7 days   Lab Units 09/30/19  0647 09/26/19  0325   INR  1 40* 1 55*           Cardiac testing:   Results for orders placed during the hospital encounter of 09/21/19   Echo complete with contrast if indicated    Nilton Zavala [address] [place] [address]  [phone]    Transthoracic Echocardiogram  2D, M-mode, Doppler, and Color Doppler    Study date:  23-Sep-2019    Patient: Negra Rivas  MR number: [id number]  Account number: [de-identified]  : 1960  Age: 64 years  Gender: Male  Status: Inpatient  Location: Bedside  Height: 68 in  Weight: 198 7 lb  BP: 88/ 53 mmHg    Indications: Septic shock  Diagnoses: R57 9 - Shock, unspecified    Sonographer:  Nathaly Warren RDCS  Referring Physician:  Mojgan Vargas PA-C  Group:  [facility]  Interpreting Physician:  DO AZALEA Kingston    LEFT VENTRICLE:  The ventricle was dilated  Systolic function was markedly reduced  Ejection fraction was estimated to be 20 %  There was severe diffuse hypokinesis with regional variations  Wall thickness was mildly increased  The changes were consistent with eccentric hypertrophy  Left ventricular diastolic function parameters were abnormal     RIGHT VENTRICLE:  The ventricle was mildly to moderately dilated  Systolic function was moderately reduced  LEFT ATRIUM:  The atrium was moderately dilated  RIGHT ATRIUM:  The atrium was mildly dilated  MITRAL VALVE:  There was mild regurgitation  TRICUSPID VALVE:  There was moderate regurgitation  Estimated peak PA pressure was 46 mmHg  The findings suggest mild pulmonary hypertension  PULMONIC VALVE:  There was mild regurgitation  PERICARDIUM:  There was a left pleural effusion  HISTORY: PRIOR HISTORY: ESRD on dialysis  PROCEDURE: The procedure was performed at the bedside  This was a routine study  The transthoracic approach was used  The study included complete 2D imaging, M-mode, complete spectral Doppler, and color Doppler  The heart rate was 97 bpm,  at the start of the study  Intravenous contrast ( 1 2 mL of Definity in NSS) was administered to opacify the left ventricle  Echocardiographic views were limited due to restricted patient mobility   Image quality was adequate  LEFT VENTRICLE: The ventricle was dilated  Systolic function was markedly reduced  Ejection fraction was estimated to be 20 %  There was severe diffuse hypokinesis with regional variations  Wall thickness was mildly increased  The changes  were consistent with eccentric hypertrophy  No evidence of apical thrombus  DOPPLER: Left ventricular diastolic function parameters were abnormal     VENTRICULAR SEPTUM: There was diastolic flattening  These changes are consistent with RV volume overload  RIGHT VENTRICLE: The ventricle was mildly to moderately dilated  Systolic function was moderately reduced  LEFT ATRIUM: The atrium was moderately dilated  RIGHT ATRIUM: The atrium was mildly dilated  MITRAL VALVE: Valve structure was normal  There was normal leaflet separation  DOPPLER: The transmitral velocity was within the normal range  There was no evidence for stenosis  There was mild regurgitation  AORTIC VALVE: The valve was trileaflet  Leaflets exhibited normal thickness and normal cuspal separation  DOPPLER: Transaortic velocity was within the normal range  There was no evidence for stenosis  There was no regurgitation  TRICUSPID VALVE: The valve structure was normal  There was normal leaflet separation  DOPPLER: The transtricuspid velocity was within the normal range  There was no evidence for stenosis  There was moderate regurgitation  Pulmonary artery  systolic pressure was mildly increased  Estimated peak PA pressure was 46 mmHg  The findings suggest mild pulmonary hypertension  PULMONIC VALVE: Leaflets exhibited normal thickness, no calcification, and normal cuspal separation  DOPPLER: The transpulmonic velocity was within the normal range  There was mild regurgitation  PERICARDIUM: There was no pericardial effusion  There was a left pleural effusion  The pericardium was normal in appearance  AORTA: The root exhibited normal size      SYSTEMIC VEINS: IVC: The inferior vena cava was mildly dilated  Respirophasic changes were blunted (less than 50% variation)      SYSTEM MEASUREMENT TABLES    2D  %FS: 16 42 %  Ao Diam: 3 47 cm  EDV(Teich): 202 97 ml  EF Biplane: 21 5 %  EF(Teich): 33 73 %  ESV(Teich): 134 52 ml  IVSd: 1 13 cm  LA Area: 29 62 cm2  LA Diam: 4 27 cm  LVEDV MOD A2C: 134 96 ml  LVEDV MOD A4C: 168 64 ml  LVEDV MOD BP: 156 71 ml  LVEF MOD A2C: 13 35 %  LVEF MOD A4C: 23 3 %  LVESV MOD A2C: 116 94 ml  LVESV MOD A4C: 129 35 ml  LVESV MOD BP: 123 01 ml  LVIDd: 6 32 cm  LVIDs: 5 29 cm  LVLd A2C: 8 49 cm  LVLd A4C: 8 82 cm  LVLs A2C: 8 6 cm  LVLs A4C: 8 59 cm  LVPWd: 1 13 cm  RA Area: 19 52 cm2  RVIDd: 4 84 cm  SV MOD A2C: 18 02 ml  SV MOD A4C: 39 29 ml  SV(Teich): 68 45 ml    CW  TR MaxP 78 mmHg  TR Vmax: 2 82 m/s    MM  TAPSE: 1 26 cm    PW  E': 0 07 m/s  E/E': 12 01  MV A Jorje: 0 3 m/s  MV Dec [place]: 8 51 m/s2  MV DecT: 92 39 ms  MV E Jorje: 0 79 m/s  MV E/A Ratio: 2 66  MV PHT: 26 79 ms  MVA By PHT: 8 21 cm2    [facility] Accredited [facility]ch[facility]    Prepared and electronically signed by    Shawanda Gallardo DO  Signed 23-Sep-2019 13:24:36         Meds/Allergies     Current Facility-Administered Medications:  acetaminophen 650 mg Oral Q6H PRN Shivani Tolliver MD    b complex-vitamin C-folic acid 1 capsule Oral Daily With Sandrita Michelle MD    ciprofloxacin 500 mg Oral Q24H Shivani Tolliver MD    dextrose 20 mL/hr Intravenous Continuous Ana Hutchinson MD Last Rate: 20 mL/hr (19 1702)   metoprolol tartrate 12 5 mg Oral Q12H [address] Shawanda Gallardo DO    pantoprazole 40 mg Intravenous Q24H [address] Ana Hutchinson MD    polyethylene glycol 17 g Oral Daily PRN Shivani Tolliver MD    senna-docusate sodium 1 tablet Oral HS Shivani Tolliver MD        dextrose 20 mL/hr Last Rate: 20 mL/hr (19 1702)     Medications Prior to Admission   Medication    calcium acetate (PHOSLO) 667 mg capsule    Multiple Vitamin (MULTIVITAMIN) tablet    NON FORMULARY    Sucroferric Oxyhydroxide (VELPHORO PO)       Assessment:  Principal Problem:    Gram-negative bacteremia  Active Problems:    Chronic kidney disease with end stage renal failure on dialysis (HCC)    Cellulitis of lower extremity- bilateral    Polycystic liver disease    Total bilirubin, elevated    Thrombocytopenia (HCC)    Polycystic kidney disease    Benign essential hypertension    Atrial fibrillation (HCC)      Impression:  1  Cardiomyopathy - unclear etiology  Will need ischemic eval, but suspect non-ischemic component  2  ESRD - on HD  3  Sepsis - GN bacteremia  4  PAF - in NSR  Holding anticoagulation due to thrombocytopenia  No further episodes  Plan:  1  Will continue b-blockers as BP allows  2  Will need w/u for cardiomyopathy once stabilizes

## 2019-09-30 NOTE — DISCHARGE INSTRUCTIONS
TUBE CARE INSTRUCTIONS    Care after your procedure:    Resume your normal diet  Small sips of flat soda will help with nausea  1  The properly functioning catheter should be forward flushed once (1x) daily with 10ml of normal saline using clean technique  You will be given a prescription for flushes  To flush the tube, clean both connections with alcohol swab  Twist off the drainage bag/ bulb  tubing and twist the saline syringe into the drainage tube and flush  Remove the syringe and twist the drainage bag / bulb tubing tubing back on     2  The drainage bag/bulb may be emptied as necessary  Keep a record of the amount of fluid you drain from your tube  This should be done with clean technique as well  3  A fresh dressing should be applied daily over the tube insertion site  4  As the tube is secured to the skin with only a suture,try not to pull on your tube  Tub baths are not permitted  Showers are permitted if the patient's skin entry site is prevented from getting wet  Similarly, washcloth "baths" are acceptable  Contact Interventional Radiology at [phone] Marija PATIENTS: Contact Interventional Radiology at [phone]) Keyana Thompson PATIENTS: Contact Interventional Radiology at [phone]) if:    1  Leakage or large amounts of liquid around the catheter  2  Fever of 101 degrees lasting several hours without other obvious cause (such as sore throat, flu, etc)  3  Persistent nausea or vomiting  4  Diminished drainage, which may be associated with pressure or pain  Or when the     drainage from your tube is less than 10mls for 48 hours  5  Catheter pulled back or falls out  The following pharmacies carry the flush syringes         [facility]                     [facility]  [address]                         [address] [facility]  Phone [phone]            Phone [phone]   [place] [address]                                [phone]  [address] [facility] [place] Sheila [address]                      [address] [place]  Phone [phone]            Phone [phone]                      Cathie Heal                                                                                                          [phone]  [facility]  [facility] [address]  [address] [facility]  Phone [phone] Placement   WHAT YOU NEED TO KNOW:   A perma-cath is a catheter placed through a vein into or near your right atrium  Your right atrium is the right upper chamber of your heart  A perma-cath is used for dialysis in an emergency or until a long-term device is ready to use  After your procedure, you will have some pain and swelling on your chest and neck  You may have some bruises on your chest and neck  You may also have 2 dressings, one on your chest and one on your neck  DISCHARGE INSTRUCTIONS:   Call 911 for any of the following:   · You feel lightheaded, short of breath, and have chest pain  · Your catheter comes out   Contact Interventional Radiology at [phone] Marija PATIENTS: Contact Interventional Radiology at [phone]) Dami Kay PATIENTS: Contact Interventional Radiology at [phone]) if:  · Blood soaks through your bandage  · You have new swelling in your arm, neck, face, or chest on your right side  · Your catheter gets wet  · Your bruises or pain get worse  · You have a fever or chills  · Persistent nausea or vomiting  · Your incision is red, swollen, or draining pus     · You have questions or concerns about your condition or care  Self-care:       · Resume your normal diet  · Keep your dressings dry  Do not take a shower or swim  You may take a tub bath, but do not get your dressings wet  Water in your wound can cause bacteria to grow and cause an infection  If your dressing gets wet, dry it off and cover it with dry sterile gauze  Call your healthcare provider  Do not use soaps or ointments  · Do not change your dressings  Your healthcare provider or dialysis nurse will change your dressings  Your dressings should stay in place until your healthcare provider removes them  The dressing on your chest will stay as long as you have the catheter in place  The dressing prevents infection  · Do not remove the red and blue caps from the end of your catheter  The caps prevent air from getting into your catheter  Follow up with your healthcare provider as directed: Write down your questions so you remember to ask them during your visits        Epidural Blood Patch   WHAT YOU NEED TO KNOW:   An epidural blood patch is a procedure used to relieve a headache caused by spinal fluid leak after a dural puncture  A healthcare provider will inject a sample of your own blood into your back, near the dural puncture site  The blood will clot, which may patch the leak  An epidural blood patch may also help reduce other spinal fluid leak symptoms, such as nausea, vomiting, hearing or vision trouble, or a stiff neck  WHILE YOU ARE HERE:   Before the procedure:   · Informed consent is a legal document that explains the tests, treatments, or procedures that you may need  Informed consent means you understand what will be done and can make decisions about what you want  You give your permission when you sign the consent form  You can have someone sign this form for you if you are not able to sign it  You have the right to understand your medical care in words you know   Before you sign the consent form, understand the risks and benefits of what will be done  Make sure all your questions are answered  · You may be given caffeine to drink or as a medicine through an IV  Caffeine causes your veins to constrict (narrow), which could help improve your symptoms  · You may be given medicine to decrease pain and nausea  During the procedure: Your healthcare provider will ask you to sit down, lie on your side, or lie on your stomach  He will then locate the area of your spine where he will inject the blood  This will likely be right below your dural puncture site, in between the vertebrae  You may need an x-ray to show the right area to place the epidural blood patch  Your healthcare provider will draw blood from a vein in your arm  He will then slowly inject the blood into the area of your spine near the puncture site  Tell your healthcare provider if you feel back or neck pain, or pain that spreads down your legs  Also tell your healthcare provider if your headache gets worse or you feel pressure  After the procedure:   · You may need a spinal CT scan to check the blood patch  You will need to lie still and flat on your back for 2 to 24 hours after your procedure  You may also be directed to elevate your legs  Do not get up to walk until healthcare providers tell you it is okay  · Your headache may improve immediately or within a few days  You may have mild back, neck, or leg pain, or a higher temperature for 1 to 2 days after your procedure  Ask your healthcare provider if you may use NSAIDs for pain and fever  RISKS:   Rarely, you may be at risk for an infection in the injection site  Without treatment, your spinal fluid may continue to leak  This may increase your risk of infection  Your headache may lead to a blood clot near your brain  A severe leak could lead to seizures and may be life-threatening  You may need a second blood patch procedure if a large amount of spinal fluid has leaked   You may need surgery to repair your dural damage    CARE AGREEMENT:

## 2019-09-30 NOTE — PROGRESS NOTES
Progress Note - Natividad Adames 1960, 64 y o  male MRN: [id number]    Unit/Bed#: -01 Encounter: [id number]    Primary Care Provider: Larry Martinez MD   Date and time admitted to hospital: 9/21/2019  7:56 PM    * Gram-negative bacteremia  Assessment & Plan  Lower extremity cellulitis and hemorrhagic bullae  Initial blood cultures demonstrated gram-negative rods, specifically Shewanella Putrifacians, a marine acquired bacteria  Additionally, nidus for infection PermCath (replaced by surgery using temporary catheter) versus lower extremity cellulitis; less likely renal cyst demonstrated on CT (drainage by IR and subsequent negative culture)  Sepsis criteria:  tachycardia, nidus for infection and leukocytosis  · Infectious Disease consult appreciated-continue ciprofloxacin through 10/04/2019  Continue to monitor temperature and CBC  · Wound care consult appreciated-ruptured bullae; cleanse using NS, single layer xeroform  Change Xeroform once daily  Frequent repositioning  Offload heels  Routine wound care nurse  · Dermatology consult appreciated-no biopsy recommended  Wound care as outlined above  Antibiotics as outlined above  · Podiatry consult appreciated-debridement of the right lateral leg  Drainage of left foot bullae; eschars noted  · Cardiology consult appreciated-AFib with RVR; continue metoprolol 12 5 mg b i d  (hold for hypotension), no anti coagulation recommended, cardiomyopathy; limited echo recommended in addition to eventual ischemic evaluation  · Nephrology consult appreciated-resume usual HD schedule  · PermCath insertion and DILIA drain removal by interventional radiology  Atrial fibrillation [facility])  Assessment & Plan  Paroxysmal atrial fibrillation with RVR  Not noted on telemetry  · See above for Cardiology recommendations  Benign essential hypertension  Assessment & Plan  Present blood pressure 87/53  · Continue to monitor vitals    · Continue metoprolol 12 5 mg; hold parameters in presence of hypotension changed per cardiology (systolic less than 90)     Polycystic kidney disease  Assessment & Plan  Ultrasound right upper quadrant demonstrated hypertrophic and polycystic right kidney  Polycystic liver disease  Assessment & Plan   Innumerable hepatic cysts demonstrated on CTA abdomen  Cellulitis of lower extremity- bilateral  Assessment & Plan  · Continue antibiotics per infectious disease recommendations  · Continue wound care per recommendations above  Chronic kidney disease with end stage renal failure on dialysis [facility])  Assessment & Plan  · Continue to trend BMP  · Continue HD per nephrology recommendation; PermCath insertion and right flank drain removal by interventional radiology  VTE Pharmacologic Prophylaxis:   Pharmacologic: Pharmacologic VTE Prophylaxis contraindicated due to sepsis/bacteremia  Mechanical VTE Prophylaxis in Place: Yes    Patient Centered Rounds: I have performed bedside rounds with nursing staff today  Discussions with Specialists or Other Care Team Provider:  Case management  Education and Discussions with Family / Patient:  Patient  Time Spent for Care: 30 minutes  More than 50% of total time spent on counseling and coordination of care as described above  Current Length of Stay: 9 day(s)    Current Patient Status: Inpatient   Certification Statement: The patient will continue to require additional inpatient hospital stay due to Management of sepsis/bacteremia  Discharge Plan: To be determined  Code Status: Level 1 - Full Code      Subjective:   No acute distress noted at time of exam   Patient alert and oriented to person, although he is not oriented to place or time  He denied fever, chills, breathing problems and chest pain/palpitations  Also, he denied lower extremity pain/discomfort  He endorsed appropriate appetite and stated he is eating OK      Objective:     Vitals:   Temp (24hrs), Av °F (36 7 °C), Min:97 6 °F (36 4 °C), Max:98 2 °F (36 8 °C)    Temp:  [97 6 °F (36 4 °C)-98 2 °F (36 8 °C)] 97 6 °F (36 4 °C)  HR:  [82-95] 88  Resp:  [18-22] 22  BP: (75-97)/(43-54) 87/53  SpO2:  [96 %-97 %] 96 %  Body mass index is 27 49 kg/m²  Input and Output Summary (last 24 hours): Intake/Output Summary (Last 24 hours) at 9/30/2019 1110  Last data filed at 9/30/2019 0930  Gross per 24 hour   Intake 1265 ml   Output 200 ml   Net 1065 ml       Physical Exam:     Physical Exam   Constitutional: Vital signs are normal  He appears well-developed and well-nourished  He appears ill  No distress  HENT:   Head: Normocephalic and atraumatic  Eyes: Pupils are equal, round, and reactive to light  EOM are normal    Neck: Normal range of motion  Neck supple  Cardiovascular: Normal rate, regular rhythm, normal heart sounds, intact distal pulses and normal pulses  No murmur heard  Pulmonary/Chest: Effort normal and breath sounds normal  No respiratory distress  He has no decreased breath sounds  Abdominal: Soft  Normal appearance and bowel sounds are normal  He exhibits no distension  There is no tenderness  There is no rigidity and no rebound  Musculoskeletal: Normal range of motion  He exhibits edema  Lower legs bandaged bilaterally  Serous drainage noted  Neurological: He is alert  Alert, although he is somnolent and requires redirection at times to answer questions  Skin: Skin is warm and dry  Capillary refill takes less than 2 seconds  He is not diaphoretic  Psychiatric: He has a normal mood and affect  His behavior is normal  Judgment and thought content normal    Nursing note and vitals reviewed          Additional Data:     Labs:    Results from last 7 days   Lab Units 09/30/19  0658  09/26/19  0325   WBC Thousand/uL 14 62*   < > 29 45*   HEMOGLOBIN g/dL 9 5*   < > 9 2*   HEMATOCRIT % 28 6*   < > 27 9*   PLATELETS Thousands/uL 99*   < > 43*   BANDS PCT %  --   --  11*   NEUTROS PCT % 87*  -- --    LYMPHS PCT % 4*  --   --    LYMPHO PCT   --    < > 4*   MONOS PCT % 5  --   --    MONO PCT   --    < > 0*   EOS PCT % 1   < > 1    < > = values in this interval not displayed  Results from last 7 days   Lab Units 09/30/19  0645  09/26/19  0325   SODIUM mmol/L 129*   < > 137   POTASSIUM mmol/L 6 2*   < > 4 1   CHLORIDE mmol/L 92*   < > 103   CO2 mmol/L 20*   < > 24   BUN mg/dL 95*   < > 80*   CREATININE mg/dL 7 54*   < > 5 41*   ANION GAP mmol/L 17*   < > 10   CALCIUM mg/dL 8 5   < > 9 9   ALBUMIN g/dL  --   --  1 7*   TOTAL BILIRUBIN mg/dL  --   --  4 30*   ALK PHOS U/L  --   --  154*   ALT U/L  --   --  13   AST U/L  --   --  18   GLUCOSE RANDOM mg/dL 108   < > 110    < > = values in this interval not displayed  Results from last 7 days   Lab Units 09/30/19  0647   INR  1 40*     Results from last 7 days   Lab Units 09/30/19  0741 09/30/19  0029 09/29/19  2049 09/29/19  1654 09/29/19  1634 09/29/19  1626 09/29/19  1622 09/29/19  1551 09/29/19  1139 09/29/19  0831 09/28/19  2110 09/28/19  1601   POC GLUCOSE mg/dl 107 106 105 165* 45* 37* 45* 62* 89 93 113 103         Results from last 7 days   Lab Units 09/27/19  0453   PROCALCITONIN ng/ml 23 03*           * I Have Reviewed All Lab Data Listed Above  * Additional Pertinent Lab Tests Reviewed: All Labs For Current Hospital Admission Reviewed    Imaging:    Imaging Reports Reviewed Today Include:  None  Imaging Personally Reviewed by Myself Includes:  None      Recent Cultures (last 7 days):           Last 24 Hours Medication List:     Current Facility-Administered Medications:  acetaminophen 650 mg Oral Q6H PRN Tritson Cervantes MD    b complex-vitamin C-folic acid 1 capsule Oral Daily With Collins Fitzgerald MD    ciprofloxacin 500 mg Oral Q24H Triston Cervantes MD    dextrose 20 mL/hr Intravenous Continuous Azalia Christie MD Last Rate: 20 mL/hr (09/29/19 6373)   metoprolol tartrate 12 5 mg Oral Q12H [address],     pantoprazole 40 mg Intravenous Q24H Kaiden Soto MD    polyethylene glycol 17 g Oral Daily PRN Frank Canavan, MD    senna-docusate sodium 1 tablet Oral HS Frank Canavan, MD         Today, Patient Was Seen By: Houston Clinch Holter, DO    ** Please Note: Dictation voice to text software may have been used in the creation of this document   **

## 2019-09-30 NOTE — SOCIAL WORK
Patient remains not medically stable for discharge  CM followed up with the STR facilities that are following patient [facility] and [facility]  [facility] was interested in [facility] as they would be able to possible provide the in house HD  CM also looked into the referral to [facility] to see the status of getting patient switched from [address]  Admissions had responded to in [facility] requesting copies of the records from [facility] for them to send to [place] to review to make a decision  CM spoke with Nephrology PA to see if there was simpler way for this to happen vs having to send the information through 3 different people  [facility] confirmed they need to get the information to review from Admissions  CM will follow up with admissions at [facility] to see if we can obtain what is needed to review  CM department will continue to follow to assist with discharge coordination

## 2019-09-30 NOTE — PROGRESS NOTES
NEPHROLOGY PROGRESS NOTE   Natividad Adames 64 y o  male MRN: [id number]  Unit/Bed#: -01 Encounter: [id number]  Reason for Consult: ESRD on HD    ASSESSMENT/PLAN:  1  ESRD on HD MWF at [facility]  - was on CVVHD, off since 9/24/19  - HD MWF  2  Access- for perm cath placement by IR today (perm cath removed 9/22)  3  Anemia- hgb stable  4  Shewanella putrefaciens bacteremia- ID on board  - continue cipro until 10/4  - hemorrhagic bullae in bilateral LE  5  Encephalopathy- likely secondary to infection  6  Hypotension- acute on chronic  - not on any antihypertensive medications at home  - give albumin x1 today with dialysis  7  Hyperphosphatemia- on binders as an outpatient  - hold binders now that he is not eating well  - recheck phos  8  Hyperkalemia- recheck blood work 2hr after dialysis today  9  Hyponatremia- recheck blood work 2hr after dialysis today  10  PCKD with renal and hepatic cysts    Patient seen and examined on HD  Blood pressure low but patient asymptomatic   2K bath  4 hr treatment  UF goal even but with hypotension, nurse did give 500ml bolus and also reduced goal to 100ml so he will have received approximately 900ml fluid by the end of treatment if we are unable to pull any more fluid due to hypotension  SUBJECTIVE:  Patient states he is very tired  He denies SOB  He states at outpatient dialysis his blood pressures are usually around 653 systolic      OBJECTIVE:  Current Weight: Weight - Scale: 82 kg (180 lb 12 4 oz)  Vitals:    09/30/19 0830 09/30/19 0900 09/30/19 0930 09/30/19 0945   BP: (!) 86/53 (!) 85/52 (!) 75/43 90/51   BP Location:       Pulse: 87 88 82 88   Resp: 20 22 20 22   Temp: 97 6 °F (36 4 °C)      TempSrc: Oral      SpO2:       Weight:       Height:           Intake/Output Summary (Last 24 hours) at 9/30/2019 0959  Last data filed at 9/30/2019 0930  Gross per 24 hour   Intake 1265 ml   Output 200 ml   Net 1065 ml     General: NAD  Skin: no rash  HEENT: normocephalic  Neck: supple  Lungs: CTAB  Cardiac: RRR  Extremities: wrapped, mild edema in dependent hips  GI: soft nt nd  Neuro: alert arousable, answers questions appropriately and follows commands  Psych: mood and affect approrpiate    Medications:    Current Facility-Administered Medications:     acetaminophen (TYLENOL) tablet 650 mg, 650 mg, Oral, Q6H PRN, Pamela Singer MD, 650 mg at 09/29/19 2032    b complex-vitamin C-folic acid (NEPHROCAPS) capsule 1 capsule, 1 capsule, Oral, Daily With Sam Buitrago MD, 1 capsule at 09/29/19 1543    ciprofloxacin (CIPRO) tablet 500 mg, 500 mg, Oral, Q24H, Pamela Singer MD, 500 mg at 09/29/19 2032    dextrose 5 % infusion, 20 mL/hr, Intravenous, Continuous, Herson Olivera MD, Last Rate: 20 mL/hr at 09/29/19 1702, 20 mL/hr at 09/29/19 1702    metoprolol tartrate (LOPRESSOR) partial tablet 12 5 mg, 12 5 mg, Oral, Q12H [address], Kennedy Phelps DO, 12 5 mg at 09/29/19 2221    pantoprazole (PROTONIX) injection 40 mg, 40 mg, Intravenous, Q24H [address], Herson Olivera MD, 40 mg at 09/30/19 0801    polyethylene glycol (MIRALAX) packet 17 g, 17 g, Oral, Daily PRN, Pamela Singer MD    senna-docusate sodium (SENOKOT S) 8 6-50 mg per tablet 1 tablet, 1 tablet, Oral, HS, Pamela Singer MD, 1 tablet at 09/28/19 2239    Laboratory Results:  Results from last 7 days   Lab Units 09/30/19  0658 09/30/19  0645 09/29/19  0533 09/28/19  2158 09/28/19  1427 09/28/19  0816 09/27/19  0453 09/26/19  0325 09/25/19  0418 09/24/19  2139 09/24/19  2124 09/24/19  1614 09/24/19  1013 09/24/19  0409  09/23/19  2216 09/23/19  1510   WBC Thousand/uL 14 62*  --  16 31*  --   --  15 03* 18 91* 29 45* 26 98* 25 50*  --   --   --  18 32*   < >  --   --    HEMOGLOBIN g/dL 9 5*  --  9 1* 9 3* 8 7* 9 0* 9 4* 9 2* 9 0* 9 0*  --   --   --  9 6*  --   --  9 9*   HEMATOCRIT % 28 6*  --  27 9* 29 4* 26 7* 28 4* 29 5* 27 9* 27 2* 26 8*  --   --   --  29 0*   < >  --   --    PLATELETS Thousands/uL 99* --  72*  --   --  57* 45* 43* 28* 25*  --   --   --  42*  --   --  54*   POTASSIUM mmol/L  --  6 2*  --   --   --  4 9  --  4 1 3 9  --  4 0 4 0 4 0 4 4  --  4 8 5 7*   CHLORIDE mmol/L  --  92*  --   --   --  99*  --  103 103  --  101 100 98* 98*  --  96* 91*   CO2 mmol/L  --  20*  --   --   --  25  --  24 25  --  22 24 23 24  --  23 22   BUN mg/dL  --  95*  --   --   --  65*  --  80* 56*  --  50* 45* 52* 61*  --  74* 97*   CREATININE mg/dL  --  7 54*  --   --   --  4 75*  --  5 41* 4 33*  --  3 76* 3 70* 4 28* 5 17*  --  6 61* 9 19*   CALCIUM mg/dL  --  8 5  --   --   --  9 2  --  9 9 9 6  --  9 4 9 2 9 1 9 1  --  9 3 8 9   MAGNESIUM mg/dL  --   --   --   --   --   --   --   --  2 0  --  2 0 2 0 1 9 2 0  --  2 1 2 3   PHOSPHORUS mg/dL  --   --   --   --   --   --   --   --  2 0*  --  2 4* 2 4* 2 5* 2 7  --  3 3 4 7*    < > = values in this interval not displayed

## 2019-09-30 NOTE — HEMODIALYSIS
Patient completed 4 hrs of HD, hypotensive through out the treatment, 800 ml NSS given and albumin per MAR    Post HD weight 84 7 kg

## 2019-10-01 LAB
ANION GAP SERPL CALCULATED.3IONS-SCNC: 11 MMOL/L (ref 4–13)
BASOPHILS # BLD MANUAL: 0 THOUSAND/UL (ref 0–0.1)
BASOPHILS NFR MAR MANUAL: 0 % (ref 0–1)
BUN SERPL-MCNC: 60 MG/DL (ref 5–25)
CALCIUM SERPL-MCNC: 7.9 MG/DL (ref 8.3–10.1)
CHLORIDE SERPL-SCNC: 96 MMOL/L (ref 100–108)
CO2 SERPL-SCNC: 24 MMOL/L (ref 21–32)
CREAT SERPL-MCNC: 5.55 MG/DL (ref 0.6–1.3)
DOHLE BOD BLD QL SMEAR: PRESENT
EOSINOPHIL # BLD MANUAL: 0 THOUSAND/UL (ref 0–0.4)
EOSINOPHIL NFR BLD MANUAL: 0 % (ref 0–6)
ERYTHROCYTE [DISTWIDTH] IN BLOOD BY AUTOMATED COUNT: 17.7 % (ref 11.6–15.1)
GFR SERPL CREATININE-BSD FRML MDRD: 10 ML/MIN/1.73SQ M
GLUCOSE SERPL-MCNC: 158 MG/DL (ref 65–140)
HBV CORE AB SER QL: NORMAL
HBV CORE IGM SER QL: NORMAL
HBV SURFACE AB SER-ACNC: <3.1 MIU/ML
HBV SURFACE AG SER QL: NORMAL
HCT VFR BLD AUTO: 26.2 % (ref 36.5–49.3)
HCV AB SER QL: NORMAL
HGB BLD-MCNC: 8.3 G/DL (ref 12–17)
INR PPP: 1.52 (ref 0.84–1.19)
LYMPHOCYTES # BLD AUTO: 0.15 THOUSAND/UL (ref 0.6–4.47)
LYMPHOCYTES # BLD AUTO: 1 % (ref 14–44)
MACROCYTES BLD QL AUTO: PRESENT
MCH RBC QN AUTO: 33.5 PG (ref 26.8–34.3)
MCHC RBC AUTO-ENTMCNC: 31.7 G/DL (ref 31.4–37.4)
MCV RBC AUTO: 106 FL (ref 82–98)
MONOCYTES # BLD AUTO: 0.6 THOUSAND/UL (ref 0–1.22)
MONOCYTES NFR BLD: 4 % (ref 4–12)
NEUTROPHILS # BLD MANUAL: 14.35 THOUSAND/UL (ref 1.85–7.62)
NEUTS BAND NFR BLD MANUAL: 4 % (ref 0–8)
NEUTS SEG NFR BLD AUTO: 91 % (ref 43–75)
NRBC BLD AUTO-RTO: 0 /100 WBCS
PLATELET # BLD AUTO: 96 THOUSANDS/UL (ref 149–390)
PLATELET BLD QL SMEAR: ABNORMAL
PMV BLD AUTO: 12.1 FL (ref 8.9–12.7)
POLYCHROMASIA BLD QL SMEAR: PRESENT
POTASSIUM SERPL-SCNC: 5.3 MMOL/L (ref 3.5–5.3)
PROTHROMBIN TIME: 17.6 SECONDS (ref 11.6–14.5)
RBC # BLD AUTO: 2.48 MILLION/UL (ref 3.88–5.62)
SODIUM SERPL-SCNC: 131 MMOL/L (ref 136–145)
TOTAL CELLS COUNTED SPEC: 100
WBC # BLD AUTO: 15.11 THOUSAND/UL (ref 4.31–10.16)

## 2019-10-01 PROCEDURE — 99232 SBSQ HOSP IP/OBS MODERATE 35: CPT | Performed by: INTERNAL MEDICINE

## 2019-10-01 PROCEDURE — 86706 HEP B SURFACE ANTIBODY: CPT | Performed by: INTERNAL MEDICINE

## 2019-10-01 PROCEDURE — C9113 INJ PANTOPRAZOLE SODIUM, VIA: HCPCS | Performed by: HOSPITALIST

## 2019-10-01 PROCEDURE — 86803 HEPATITIS C AB TEST: CPT | Performed by: INTERNAL MEDICINE

## 2019-10-01 PROCEDURE — 85610 PROTHROMBIN TIME: CPT | Performed by: HOSPITALIST

## 2019-10-01 PROCEDURE — 85007 BL SMEAR W/DIFF WBC COUNT: CPT | Performed by: HOSPITALIST

## 2019-10-01 PROCEDURE — 85027 COMPLETE CBC AUTOMATED: CPT | Performed by: HOSPITALIST

## 2019-10-01 PROCEDURE — 87340 HEPATITIS B SURFACE AG IA: CPT | Performed by: INTERNAL MEDICINE

## 2019-10-01 PROCEDURE — 86704 HEP B CORE ANTIBODY TOTAL: CPT | Performed by: INTERNAL MEDICINE

## 2019-10-01 PROCEDURE — 86705 HEP B CORE ANTIBODY IGM: CPT | Performed by: INTERNAL MEDICINE

## 2019-10-01 PROCEDURE — 93005 ELECTROCARDIOGRAM TRACING: CPT

## 2019-10-01 PROCEDURE — 80048 BASIC METABOLIC PNL TOTAL CA: CPT | Performed by: PSYCHIATRY & NEUROLOGY

## 2019-10-01 RX ORDER — OXYCODONE HYDROCHLORIDE 5 MG/1
5 TABLET ORAL EVERY 4 HOURS PRN
Status: DISCONTINUED | OUTPATIENT
Start: 2019-10-01 | End: 2019-10-03 | Stop reason: HOSPADM

## 2019-10-01 RX ORDER — HEPARIN SODIUM 5000 [USP'U]/ML
5000 INJECTION, SOLUTION INTRAVENOUS; SUBCUTANEOUS EVERY 8 HOURS SCHEDULED
Status: DISCONTINUED | OUTPATIENT
Start: 2019-10-01 | End: 2019-10-03 | Stop reason: HOSPADM

## 2019-10-01 RX ADMIN — Medication 1 CAPSULE: at 19:00

## 2019-10-01 RX ADMIN — PANTOPRAZOLE SODIUM 40 MG: 40 INJECTION, POWDER, FOR SOLUTION INTRAVENOUS at 08:34

## 2019-10-01 RX ADMIN — METOPROLOL TARTRATE 12.5 MG: 25 TABLET ORAL at 21:21

## 2019-10-01 RX ADMIN — METOPROLOL TARTRATE 12.5 MG: 25 TABLET ORAL at 08:40

## 2019-10-01 RX ADMIN — OXYCODONE HYDROCHLORIDE 2.5 MG: 5 TABLET ORAL at 06:07

## 2019-10-01 RX ADMIN — CIPROFLOXACIN HYDROCHLORIDE 500 MG: 500 TABLET, FILM COATED ORAL at 19:04

## 2019-10-01 RX ADMIN — OXYCODONE HYDROCHLORIDE 5 MG: 5 TABLET ORAL at 10:44

## 2019-10-01 RX ADMIN — HEPARIN SODIUM 5000 UNITS: 5000 INJECTION INTRAVENOUS; SUBCUTANEOUS at 21:22

## 2019-10-01 NOTE — SOCIAL WORK
CM called and left message with [facility] admissions at [facility] awaiting return call  Message also sent in [address]

## 2019-10-01 NOTE — PROGRESS NOTES
Progress Note - Infectious Disease   Giselle Chamberlain 64 y o  male MRN: [id number]  Unit/Bed#: -01 Encounter: [id number]      Impression/Plan:  1  Septic shock, POA   Leukopenia, tachycardia, refractory hypotension   Due to #2/3   No other appreciable source   Clinically improving, now off pressors, afebrile with WBC moderately elevated  Rec:  ? Continue antibiotics as below  ? Follow temperatures and [address]  ? Supportive care as per the primary service     2   Shewanella putrefaciens bacteremia    Suspect primary skin portal of entry with cellulitis as below with secondary infection of HD catheter, now removed   Less likely due to renal cyst as fluid culture negative   Repeat blood cultures negative  Rec:  ? Continue Cipro for 14 days total of antibiotics through 10/4/19  ? Follow mental status closely on quinolone although mild confusion preceded initiation     3   Bilateral lower extremity necrotizing skin infection   Suspect due to underlying chronic foot wounds in setting of recent creek/pond water exposure   Appeared superficial with healthy tissue under ruptured bullae making deep infection less likely   Cellulitis improved but now with evolving necrotic wounds after bullae rupture  Rec:  ? Continue antibiotics as above  ? Serial exams  ? [address] per Podiatry and discussed with RN     4   ESRD on HD   Via [facility]   Now status post removal of temporary catheter and placement of new PermCath today in setting of #2   Noted in Care Everywhere to be noncompliant with diet, HD  Rec:  ? Dose adjust antibiotics for dialysis  ? As blood cultures negative >72 hours, PermCath placed today      5   Polycystic kidney/liver disease   Extensive disease seen on CT imaging      6   Chronic foot wounds   Likely multifactorial   Appear superficial   Risk factor for infection as above  Rec:  ? Outpatient Podiatry follow-up  ?  Advised infection prevention strategies including keeping feet and wounds covered as much as possible and no environmental exposures, especially water sources      The above plan was discussed in detail with patient, RN, and primary care team      Antibiotics:  Cipro #7  Antibiotics #11     Subjective:  Patient is status post bilateral leg wound care about 10 minutes ago  Legs are painful  RN reports the patient has been asking for pain medication more today and prior to this he had been fairly controlled on Tylenol alone  He denies fevers, chills, sweats, nausea, vomiting, or diarrhea  Objective:  Vitals:  Temp:  [98 2 °F (36 8 °C)-99 7 °F (37 6 °C)] 98 3 °F (36 8 °C)  HR:  [] 95  Resp:  [16-20] 20  BP: ()/(50-66) 93/53  SpO2:  [90 %-100 %] 96 %  Temp (24hrs), Av 7 °F (37 1 °C), Min:98 2 °F (36 8 °C), Max:99 7 °F (37 6 °C)  Current: Temperature: 98 3 °F (36 8 °C)    Physical Exam:   General Appearance:  Arousable from sleep, requesting pain medication, no acute distress  Legs elevated in bed    Throat: Oropharynx moist without lesions  Lungs:   Clear to auscultation bilaterally, respirations unlabored   Heart:  RRR; no murmur   Abdomen:   Soft, non-tender  Extremities: Bilateral legs wrapped from toes to thighs with Xeroform, gauze, and Ace wraps intact without strike through drainage  Recent wound photos reviewed   : Soft 4+ scrotal swelling without erythema or tenderness   Skin: No new rashes    Right anterior chest wall PermCath in place and nontender     Labs, Imaging, & Other studies:   All pertinent labs and imaging studies were personally reviewed  Results from last 7 days   Lab Units 10/01/19  0519 09/30/19  0658 19  0533   WBC Thousand/uL 15 11* 14 62* 16 31*   HEMOGLOBIN g/dL 8 3* 9 5* 9 1*   PLATELETS Thousands/uL 96* 99* 72*     Results from last 7 days   Lab Units 10/01/19  0519  09/26/19  0325   POTASSIUM mmol/L 5 3   < > 4 1   CHLORIDE mmol/L 96*   < > 103   CO2 mmol/L 24   < > 24   BUN mg/dL 60*   < > 80*   CREATININE mg/dL 5 55*   < > 5 41*   EGFR ml/min/1 73sq m 10   < > 11   CALCIUM mg/dL 7 9*   < > 9 9   AST U/L  --   --  18   ALT U/L  --   --  13   ALK PHOS U/L  --   --  154*    < > = values in this interval not displayed       Results from last 7 days   Lab Units 09/27/19  0453   PROCALCITONIN ng/ml 23 03*

## 2019-10-01 NOTE — MALNUTRITION/BMI
This medical record reflects one or more clinical indicators suggestive of malnutrition and/or morbid obesity  Malnutrition Findings:   Malnutrition type: Acute illness(in the setting of chronic illness)  Degree of Malnutrition: Unspecified protein calorie malnutrition(related to inadequate oral intake)  Malnutrition Characteristics: Inadequate energy(as evidenced by 7 day period poor PO intake, stress of infection  )    BMI Findings: Body mass index is 28 53 kg/m²  See Nutrition note dated 10/1/19 for additional details  Completed nutrition assessment is viewable in the nutrition documentation

## 2019-10-01 NOTE — SOCIAL WORK
CM received call back from Celia at [facility] and determined that patient did not have 3 nights in ICU which would place this patient in "site neutral"  Celia informed CM that they do not have any beds on that unit  CM department will continue to follow through discharge

## 2019-10-01 NOTE — PROGRESS NOTES
Cardiology Progress Note - Lakisha Garcia 64 y o  male MRN: [id number]    Unit/Bed#: -01 Encounter: [id number]        Subjective:    No significant events overnight  Remains in NSR  Review of Systems   Cardiovascular: Negative for chest pain, leg swelling and palpitations  Respiratory: Negative for shortness of breath  Objective:   Vitals: Blood pressure 93/53, pulse 95, temperature 98 3 °F (36 8 °C), temperature source Oral, resp  rate 20, height 5' 8" (1 727 m), weight 85 1 kg (187 lb 9 8 oz), SpO2 96 %  , Body mass index is 28 53 kg/m² ,   Orthostatic Blood Pressures      Most Recent Value   Blood Pressure  93/53 filed at 10/01/2019 0700   Patient Position - Orthostatic VS  Lying filed at 10/01/2019 8672         Systolic (34ADW), XTU:43 , Min:75 , CHRISTINA:578     Diastolic (73LAT), EYE:81, Min:43, Max:66      Intake/Output Summary (Last 24 hours) at 10/1/2019 0828  Last data filed at 9/30/2019 2101  Gross per 24 hour   Intake 980 ml   Output 300 ml   Net 680 ml     Weight (last 2 days)     Date/Time   Weight    10/01/19 0600   85 1 (187 61)    09/29/19 0553   82 (180 78)                  Telemetry Review: NSR    Physical Exam   Cardiovascular: Normal rate, regular rhythm and normal heart sounds  Exam reveals no gallop and no friction rub  No murmur heard  Pulmonary/Chest: Breath sounds normal  He has no wheezes  He has no rales  Musculoskeletal: He exhibits no edema           Laboratory Results:        CBC with diff:   Results from last 7 days   Lab Units 10/01/19  0519 09/30/19  7825 09/29/19  0533 09/28/19  2158 09/28/19  1427 09/28/19  0816 09/27/19  0453 09/26/19  0325 09/25/19  0418   WBC Thousand/uL 15 11* 14 62* 16 31*  --   --  15 03* 18 91* 29 45* 26 98*   HEMOGLOBIN g/dL 8 3* 9 5* 9 1* 9 3* 8 7* 9 0* 9 4* 9 2* 9 0*   HEMATOCRIT % 26 2* 28 6* 27 9* 29 4* 26 7* 28 4* 29 5* 27 9* 27 2*   MCV fL 106* 103* 103*  --   --  105* 105* 101* 101*   PLATELETS Thousands/uL 96* 99* 72*  --   -- 57* 45* 43* 28*   MCH pg 33 5 34 1 33 7  --   --  33 2 33 3 33 3 33 5   MCHC g/dL 31 7 33 2 32 6  --   --  31 7 31 9 33 0 33 1   RDW % 17 7* 17 2* 17 2*  --   --  17 2* 16 5* 16 1* 15 9*   MPV fL 12 1 11 5 12 1  --   --  12 3 12 3 12 9* 12 6   NRBC AUTO /100 WBCs 0 0 0  --   --   --   --  1 1         CMP:  Results from last 7 days   Lab Units 10/01/19  0519 09/30/19  1313 09/30/19  1311 09/30/19  0645 09/28/19  0816 09/26/19  0325 09/25/19  0418  09/24/19  1013   POTASSIUM mmol/L 5 3 4 3 4 2 6 2* 4 9 4 1 3 9   < > 4 0   CHLORIDE mmol/L 96* 97* 96* 92* 99* 103 103   < > 98*   CO2 mmol/L 24 25 26 20* 25 24 25   < > 23   BUN mg/dL 60* 51* 49* 95* 65* 80* 56*   < > 52*   CREATININE mg/dL 5 55* 4 45* 4 41* 7 54* 4 75* 5 41* 4 33*   < > 4 28*   CALCIUM mg/dL 7 9* 8 4 8 4 8 5 9 2 9 9 9 6   < > 9 1   AST U/L  --   --   --   --   --  18  --   --  9   ALT U/L  --   --   --   --   --  13  --   --  13   ALK PHOS U/L  --   --   --   --   --  154*  --   --  83   EGFR ml/min/1 73sq m 10 14 14 7 13 11 14   < > 14    < > = values in this interval not displayed           BMP:  Results from last 7 days   Lab Units 10/01/19  0519 09/30/19  1313 09/30/19  1311 09/30/19  0645 09/28/19  0816 09/26/19  0325 09/25/19  0418   POTASSIUM mmol/L 5 3 4 3 4 2 6 2* 4 9 4 1 3 9   CHLORIDE mmol/L 96* 97* 96* 92* 99* 103 103   CO2 mmol/L 24 25 26 20* 25 24 25   BUN mg/dL 60* 51* 49* 95* 65* 80* 56*   CREATININE mg/dL 5 55* 4 45* 4 41* 7 54* 4 75* 5 41* 4 33*   CALCIUM mg/dL 7 9* 8 4 8 4 8 5 9 2 9 9 9 6       BNP:     Magnesium:   Results from last 7 days   Lab Units 09/25/19  0418 09/24/19  2124 09/24/19  1614 09/24/19  1013   MAGNESIUM mg/dL 2 0 2 0 2 0 1 9       Coags:   Results from last 7 days   Lab Units 10/01/19  0519 09/30/19  0647 09/26/19  0325   INR  1 52* 1 40* 1 55*           Cardiac testing:   Results for orders placed during the hospital encounter of 09/21/19   Echo complete with contrast if indicated    N[facility] V[facility]quez [address]  [phone]    Transthoracic Echocardiogram  2D, M-mode, Doppler, and Color Doppler    Study date:  23-Sep-2019    Patient: Montez Duron  MR number: [id number]  Account number: [de-identified]  : 1960  Age: 64 years  Gender: Male  Status: Inpatient  Location: Bedside  Height: 68 in  Weight: 198 7 lb  BP: 88/ 53 mmHg    Indications: Septic shock  Diagnoses: R57 9 - Shock, unspecified    Sonographer:  Latonia Gimenez RDCS  Referring Physician:  Marylou Fowler PA-C  Group:  [facility]  Interpreting Physician:  DO AZALEA Fairbanks    LEFT VENTRICLE:  The ventricle was dilated  Systolic function was markedly reduced  Ejection fraction was estimated to be 20 %  There was severe diffuse hypokinesis with regional variations  Wall thickness was mildly increased  The changes were consistent with eccentric hypertrophy  Left ventricular diastolic function parameters were abnormal     RIGHT VENTRICLE:  The ventricle was mildly to moderately dilated  Systolic function was moderately reduced  LEFT ATRIUM:  The atrium was moderately dilated  RIGHT ATRIUM:  The atrium was mildly dilated  MITRAL VALVE:  There was mild regurgitation  TRICUSPID VALVE:  There was moderate regurgitation  Estimated peak PA pressure was 46 mmHg  The findings suggest mild pulmonary hypertension  PULMONIC VALVE:  There was mild regurgitation  PERICARDIUM:  There was a left pleural effusion  HISTORY: PRIOR HISTORY: ESRD on dialysis  PROCEDURE: The procedure was performed at the bedside  This was a routine study  The transthoracic approach was used  The study included complete 2D imaging, M-mode, complete spectral Doppler, and color Doppler  The heart rate was 97 bpm,  at the start of the study  Intravenous contrast ( 1 2 mL of Definity in NSS) was administered to opacify the left ventricle   Echocardiographic views were limited due to restricted patient mobility  Image quality was adequate  LEFT VENTRICLE: The ventricle was dilated  Systolic function was markedly reduced  Ejection fraction was estimated to be 20 %  There was severe diffuse hypokinesis with regional variations  Wall thickness was mildly increased  The changes  were consistent with eccentric hypertrophy  No evidence of apical thrombus  DOPPLER: Left ventricular diastolic function parameters were abnormal     VENTRICULAR SEPTUM: There was diastolic flattening  These changes are consistent with RV volume overload  RIGHT VENTRICLE: The ventricle was mildly to moderately dilated  Systolic function was moderately reduced  LEFT ATRIUM: The atrium was moderately dilated  RIGHT ATRIUM: The atrium was mildly dilated  MITRAL VALVE: Valve structure was normal  There was normal leaflet separation  DOPPLER: The transmitral velocity was within the normal range  There was no evidence for stenosis  There was mild regurgitation  AORTIC VALVE: The valve was trileaflet  Leaflets exhibited normal thickness and normal cuspal separation  DOPPLER: Transaortic velocity was within the normal range  There was no evidence for stenosis  There was no regurgitation  TRICUSPID VALVE: The valve structure was normal  There was normal leaflet separation  DOPPLER: The transtricuspid velocity was within the normal range  There was no evidence for stenosis  There was moderate regurgitation  Pulmonary artery  systolic pressure was mildly increased  Estimated peak PA pressure was 46 mmHg  The findings suggest mild pulmonary hypertension  PULMONIC VALVE: Leaflets exhibited normal thickness, no calcification, and normal cuspal separation  DOPPLER: The transpulmonic velocity was within the normal range  There was mild regurgitation  PERICARDIUM: There was no pericardial effusion  There was a left pleural effusion  The pericardium was normal in appearance      AORTA: The root exhibited normal size     SYSTEMIC VEINS: IVC: The inferior vena cava was mildly dilated  Respirophasic changes were blunted (less than 50% variation)      SYSTEM MEASUREMENT TABLES    2D  %FS: 16 42 %  Ao Diam: 3 47 cm  EDV(Teich): 202 97 ml  EF Biplane: 21 5 %  EF(Teich): 33 73 %  ESV(Teich): 134 52 ml  IVSd: 1 13 cm  LA Area: 29 62 cm2  LA Diam: 4 27 cm  LVEDV MOD A2C: 134 96 ml  LVEDV MOD A4C: 168 64 ml  LVEDV MOD BP: 156 71 ml  LVEF MOD A2C: 13 35 %  LVEF MOD A4C: 23 3 %  LVESV MOD A2C: 116 94 ml  LVESV MOD A4C: 129 35 ml  LVESV MOD BP: 123 01 ml  LVIDd: 6 32 cm  LVIDs: 5 29 cm  LVLd A2C: 8 49 cm  LVLd A4C: 8 82 cm  LVLs A2C: 8 6 cm  LVLs A4C: 8 59 cm  LVPWd: 1 13 cm  RA Area: 19 52 cm2  RVIDd: 4 84 cm  SV MOD A2C: 18 02 ml  SV MOD A4C: 39 29 ml  SV(Teich): 68 45 ml    CW  TR MaxP 78 mmHg  TR Vmax: 2 82 m/s    MM  TAPSE: 1 26 cm    PW  E': 0 07 m/s  E/E': 12 01  MV A Jorje: 0 3 m/s  MV Dec Sullivan: 8 51 m/s2  MV DecT: 92 39 ms  MV E Jorje: 0 79 m/s  MV E/A Ratio: 2 66  MV PHT: 26 79 ms  MVA By PHT: 8 21 cm2    [employer] Accredited Echocardiography Laboratory    Prepared and electronically signed by    Marcia Hebert DO  Signed 23-Sep-2019 13:24:36         Meds/Allergies     Current Facility-Administered Medications:  acetaminophen 650 mg Oral Q6H PRN Jarocho Mukherjee MD    b complex-vitamin C-folic acid 1 capsule Oral Daily With Michelle Choi MD    ciprofloxacin 500 mg Oral Q24H Jarocho Mukherjee MD    dextrose 20 mL/hr Intravenous Continuous Raleigh Vega MD Last Rate: 20 mL/hr (19 1702)   metoprolol tartrate 12 5 mg Oral Q12H [facility]    [START ON 10/2/2019] midodrine 5 mg Oral Before Dialysis Margurite Cabot, MD    oxyCODONE 2 5 mg Oral Q4H PRN [place] C Holter, DO    pantoprazole 40 mg Intravenous Q24H [address] Raleigh Vega MD    polyethylene glycol 17 g Oral Daily PRN Jarocho Mukherjee MD    senna-docusate sodium 1 tablet Oral HS Jarocho Mukherjee MD        dextrose 20 mL/hr Last Rate: 20 mL/hr (09/29/19 1702)     Medications Prior to Admission   Medication    calcium acetate (PHOSLO) 667 mg capsule    Multiple Vitamin (MULTIVITAMIN) tablet    NON FORMULARY    Sucroferric Oxyhydroxide (VELPHORO PO)       Assessment:  Principal Problem:    Gram-negative bacteremia  Active Problems:    Chronic kidney disease with end stage renal failure on dialysis (HCC)    Cellulitis of lower extremity- bilateral    Polycystic liver disease    Total bilirubin, elevated    Thrombocytopenia (HCC)    Polycystic kidney disease    Benign essential hypertension    Atrial fibrillation (HCC)      Impression:  1  Cardiomyopathy - unclear etiology  Will need ischemic eval, but suspect non-ischemic component  2  ESRD - on HD  3  Sepsis - GN bacteremia  On antibiotics  4  PAF - in NSR  Holding anticoagulation due to thrombocytopenia  No further episodes  Plan:  1  Will continue b-blockers as BP allows  2  Will need w/u for cardiomyopathy once stabilizes

## 2019-10-01 NOTE — PROGRESS NOTES
NEPHROLOGY PROGRESS NOTE   Elmer Hills 64 y o  male MRN: [id number]  Unit/Bed#: -01 Encounter: [id number]  Reason for Consult: ESRD    ASSESSMENT/PLAN:  1  ESRD on HD MWF at [facility]  - was on CVVHD, off since 9/24/19  - HD MWF  - patient potentially wants to switch to out dialysis unit at [facility]  2  Access- perm cath placement by IR 9/30  3  Anemia- hgb stable  4  Shewanella putrefaciens bacteremia- ID on board  - continue cipro until 10/4  - hemorrhagic bullae in bilateral LE  5  Encephalopathy- likely secondary to infection  6  Hypotension- acute on chronic  - not on any antihypertensive medications at home  - start midodrine 5mg pre dialysis treatment  7  Hyperphosphatemia- on phoslo as an outpatient  - restart binders if patient continues to eat better  - place on a renal diet  8  Hyperkalemia- mild, monitor  9  Hyponatremia- monitor  - may be secondary to D5W, discontinue today  10  PCKD with renal and hepatic cysts    Disposition:  HD tomorrow    SUBJECTIVE:  Patient complaining that he is not receiving pain medication fast enough  States his legs hurt badly this am   Denies sob      OBJECTIVE:  Current Weight: Weight - Scale: 85 1 kg (187 lb 9 8 oz)  Vitals:    09/30/19 2352 10/01/19 0600 10/01/19 0602 10/01/19 0700   BP: 92/51  112/62 93/53   BP Location: Left arm  Left arm Left arm   Pulse: 98   95   Resp: 16   20   Temp: 99 7 °F (37 6 °C)   98 3 °F (36 8 °C)   TempSrc: Oral   Oral   SpO2: 96%   96%   Weight:  85 1 kg (187 lb 9 8 oz)     Height:           Intake/Output Summary (Last 24 hours) at 10/1/2019 0934  Last data filed at 9/30/2019 2101  Gross per 24 hour   Intake 580 ml   Output 300 ml   Net 280 ml     General: NAD  Skin: bullae of LE, wrapped  HEENT: normocephalic  Neck: supple  Lungs: CTAB  Cardiac: RRR  Extremities: bullae of LE  GI: soft nt nd  Neuro: alert awake  Psych: mood and affect appropriate    Medications:    Current Facility-Administered Medications:    acetaminophen (TYLENOL) tablet 650 mg, 650 mg, Oral, Q6H PRN, Leon Watson MD, 650 mg at 09/29/19 2032    b complex-vitamin C-folic acid (NEPHROCAPS) capsule 1 capsule, 1 capsule, Oral, Daily With Nicki Patten MD, 1 capsule at 09/30/19 1725    ciprofloxacin (CIPRO) tablet 500 mg, 500 mg, Oral, Q24H, Leon Watson MD, 500 mg at 09/30/19 1952    dextrose 5 % infusion, 20 mL/hr, Intravenous, Continuous, Ming Lundy MD, Last Rate: 20 mL/hr at 09/29/19 1702, 20 mL/hr at 09/29/19 1702    metoprolol tartrate (LOPRESSOR) partial tablet 12 5 mg, 12 5 mg, Oral, Q12H [address], Olga Irvin DO, 12 5 mg at 10/01/19 0840    [START ON 10/2/2019] midodrine (PROAMATINE) tablet 5 mg, 5 mg, Oral, Before Dialysis, Lauren Rosado MD    oxyCODONE (ROXICODONE) IR tablet 2 5 mg, 2 5 mg, Oral, Q4H PRN, Jordan C Holter, DO, 2 5 mg at 10/01/19 0607    pantoprazole (PROTONIX) injection 40 mg, 40 mg, Intravenous, Q24H [address], Ming Lundy MD, 40 mg at 10/01/19 0834    polyethylene glycol (MIRALAX) packet 17 g, 17 g, Oral, Daily PRN, Leon Watson MD    senna-docusate sodium (SENOKOT S) 8 6-50 mg per tablet 1 tablet, 1 tablet, Oral, HS, Leon Watson MD, 1 tablet at 09/30/19 2102    Laboratory Results:  Results from last 7 days   Lab Units 10/01/19  0519 09/30/19  1313 09/30/19  1311 09/30/19  0658 09/30/19  0645 09/29/19  0533 09/28/19  2158 09/28/19  1427 09/28/19  0816 09/27/19  0453 09/26/19  0325 09/25/19  0418  09/24/19  2124 09/24/19  1614 09/24/19  1013   WBC Thousand/uL 15 11*  --   --  14 62*  --  16 31*  --   --  15 03* 18 91* 29 45* 26 98*   < >  --   --   --    HEMOGLOBIN g/dL 8 3*  --   --  9 5*  --  9 1* 9 3* 8 7* 9 0* 9 4* 9 2* 9 0*   < >  --   --   --    HEMATOCRIT % 26 2*  --   --  28 6*  --  27 9* 29 4* 26 7* 28 4* 29 5* 27 9* 27 2*   < >  --   --   --    PLATELETS Thousands/uL 96*  --   --  99*  --  72*  --   --  57* 45* 43* 28*   < >  --   --   --    POTASSIUM mmol/L 5 3 4 3 4 2  --  6 2*  -- --   --  4 9  --  4 1 3 9  --  4 0 4 0 4 0   CHLORIDE mmol/L 96* 97* 96*  --  92*  --   --   --  99*  --  103 103  --  101 100 98*   CO2 mmol/L 24 25 26  --  20*  --   --   --  25  --  24 25  --  22 24 23   BUN mg/dL 60* 51* 49*  --  95*  --   --   --  65*  --  80* 56*  --  50* 45* 52*   CREATININE mg/dL 5 55* 4 45* 4 41*  --  7 54*  --   --   --  4 75*  --  5 41* 4 33*  --  3 76* 3 70* 4 28*   CALCIUM mg/dL 7 9* 8 4 8 4  --  8 5  --   --   --  9 2  --  9 9 9 6  --  9 4 9 2 9 1   MAGNESIUM mg/dL  --   --   --   --   --   --   --   --   --   --   --  2 0  --  2 0 2 0 1 9   PHOSPHORUS mg/dL  --   --  5 2*  --   --   --   --   --   --   --   --  2 0*  --  2 4* 2 4* 2 5*    < > = values in this interval not displayed

## 2019-10-01 NOTE — PROGRESS NOTES
Krystin Richardson. I have attached this chart to inform you that despite my best efforts, Mrs. Branch does not wish to be treated for her sleep apnea. I have gone over this in detail with her. She states she will call back if she is interested and I have given her my card. Progress Note - Podiatry  Shell Manzano 64 y o  male MRN: [id number]  Unit/Bed#: -01 Encounter: [id number]    Assessment/Plan:  B/l LE ulcerations  B/l LE necrotizing soft tissue infection  PKD    - Ulcerations are continuing to demarcate  Bullae formation has nearly seized  - Xeroform, dsd, compressive dressing was applied to the b/l LE  - His drainage is still significant from the b/l LE  - Continue elevation and daily dressing changes  - Many of the areas are now turning into eschar, this is again likely due to compromised microvascular circulation  - Patient will have a protracted wound healing course  - Thankfully the cellulitis remains resolved  Subjective/Objective   Chief Complaint:   Chief Complaint   Patient presents with    Leg Pain     peripheral vascular disease       Subjective: 64 y o  y/o male was seen and evaluated at bedside  Wants pain medication  Blood pressure 91/53, pulse 98, temperature 99 1 °F (37 3 °C), temperature source Oral, resp  rate 20, height 5' 8" (1 727 m), weight 85 1 kg (187 lb 9 8 oz), SpO2 96 %  ,Body mass index is 28 53 kg/m²  Invasive Devices     Central Venous Catheter Line            CVC Central Lines 09/30/19 Double less than 1 day          Peripheral Intravenous Line            Peripheral IV 09/29/19 Right Forearm 2 days          Hemodialysis Catheter            Permanent HD Catheter  1 day          Drain            Closed/Suction Drain Right RUQ Bulb 10 Fr  8 days                Physical Exam:   General: Alert, cooperative and no distress  Lungs: Non labored breathing  Heart: Positive S1, S2  Abdomen: Soft, non-tender  Extremity: There are extensive wound involving the entire LE b/l below the knee extending onto the feet  There have been no new bullae formation over the past 24 hours  The skin continues to desquamate and demarcate  The wounds are weeping serous fluid, copious amounts  The area are now beginnins to turn into eschar             Lab, Imaging and other studies:   I have personally reviewed pertinent lab results  Imaging: I have personally reviewed pertinent films in PACS  EKG, Pathology, and Other Studies: I have personally reviewed pertinent reports

## 2019-10-01 NOTE — SOCIAL WORK
CM spoke with patient spouse again via phone and informed her that NOLAN is planning for possible discharge tomorrow pending medical stability, setup of dialysis, and bed at rehab  CM reviewed with patient spouse about transportation concerns and informed her that if patient meets medical necessity to be transport to and from dialysis via BLS they will do that  Once patient require [facility] they will work with her and him to obtain/complete [facility] [id number] to reduce cost for patient  Patients spouse is very pleased and will let CM know later today regarding which agency she decided that she would select for him  OSCAR called Johnnette Cockayne at [facility] to review info and determined that they spoke with [facility] today, awaiting transfer form, they requested hep B panel, CM spoke with NOLAN who placed order, CM reviewed discharge plan with [facility] and determined that he maybe getting switch to Tuesday, Thursday, Saturday chair times at their agency  Johnnette Cockayne will inform CM department once approved  CM department will need to follow up with final choice from spouse and finish final needs for dialysis switch  Cm department will follow through discharge

## 2019-10-01 NOTE — PROGRESS NOTES
Progress Note - Zuleika Vallecillo 1960, 64 y o  male MRN: [id number]    Unit/Bed#: -01 Encounter: [id number]    Primary Care Provider: Rosemarie Banks MD   Date and time admitted to hospital: 9/21/2019  7:56 PM    * Gram-negative bacteremia  Assessment & Plan  Lower extremity cellulitis and hemorrhagic bullae  Initial blood cultures demonstrated gram-negative rods, specifically Shewanella Putrifacians, a marine acquired bacteria  Additionally, nidus for infection PermCath (replaced by surgery using temporary catheter) versus lower extremity cellulitis; less likely renal cyst demonstrated on CT (drainage and drain placement by IR-subsequent negative culture)  Sepsis criteria:  tachycardia, nidus for infection and leukocytosis  · Infectious Disease consult appreciated-continue ciprofloxacin through 10/04/2019  Continue to monitor temperature and CBC  · Wound care consult appreciated-ruptured bullae; cleanse using NS, single layer xeroform  Change Xeroform once daily  Frequent repositioning  Offload heels  Routine wound care nurse  · Dermatology consult appreciated-no biopsy recommended  Wound care as outlined above  Antibiotics as outlined above  · Podiatry consult appreciated-debridement of the right lateral leg  Drainage of left foot bullae; eschars noted  · Cardiology consult appreciated-AFib with RVR; continue metoprolol 12 5 mg b i d  (hold for hypotension), no anti coagulation recommended, cardiomyopathy; limited echo recommended in addition to eventual ischemic evaluation  · Nephrology consult appreciated-resume usual HD schedule  Discontinuation of D5W  · Interventional radiology consult appreciated- PermCath insertion and drain replacement  Atrial fibrillation [facility])  Assessment & Plan  Paroxysmal atrial fibrillation with RVR  Not noted on telemetry    · See above for Cardiology recommendations; no recommendation to anticoagulate, although heparin 5000 units initiated until INR greater than or equal to 2 is achieved  Benign essential hypertension  Assessment & Plan  Present blood pressure 93/53  · Continue to monitor vitals  · Continue metoprolol 12 5 mg; hold parameters in presence of hypotension changed per cardiology (systolic less than 90)     Polycystic kidney disease  Assessment & Plan  Ultrasound right upper quadrant demonstrated hypertrophic and polycystic right kidney  Patient possesses autosomal dominant polycystic kidney disease and cyst infection noted on CT  · Previous occlusion of catheter; replaced by Interventional Radiology  Polycystic liver disease  Assessment & Plan   Innumerable hepatic cysts demonstrated on CTA abdomen  Cellulitis of lower extremity- bilateral  Assessment & Plan  · Continue antibiotics per infectious disease recommendations  · Continue wound care per recommendations above  Chronic kidney disease with end stage renal failure on dialysis [facility])  Assessment & Plan  · Continue to trend BMP  · Continue HD per nephrology recommendation; PermCath placement by Interventional Radiology  VTE Pharmacologic Prophylaxis:   Pharmacologic: Heparin  Mechanical VTE Prophylaxis in Place: No    Patient Centered Rounds: I have performed bedside rounds with nursing staff today  Discussions with Specialists or Other Care Team Provider:  Case management  Education and Discussions with Family / Patient:  Patient and patient's spouse  Time Spent for Care: 30 minutes  More than 50% of total time spent on counseling and coordination of care as described above  Current Length of Stay: 10 day(s)    Current Patient Status: Inpatient   Certification Statement: The patient will continue to require additional inpatient hospital stay due to continued management for sepsis/bacteremia in addition to placement  Discharge Plan: To be determined      Code Status: Level 1 - Full Code    Subjective:   No acute distress noted at time of exam   Patient endorsed lower extremity pain/discomfort, particularly throughout wound care  Discussed with patient addition of pain medication to regimen  No other complaints stated at time of exam   He endorsed appropriate appetite and stated he is eating well  Objective:     Vitals:   Temp (24hrs), Av 7 °F (37 1 °C), Min:98 2 °F (36 8 °C), Max:99 7 °F (37 6 °C)    Temp:  [98 2 °F (36 8 °C)-99 7 °F (37 6 °C)] 98 3 °F (36 8 °C)  HR:  [] 95  Resp:  [16-20] 20  BP: ()/(50-66) 93/53  SpO2:  [90 %-100 %] 96 %  Body mass index is 28 53 kg/m²  Input and Output Summary (last 24 hours): Intake/Output Summary (Last 24 hours) at 10/1/2019 1224  Last data filed at 2019 2101  Gross per 24 hour   Intake 280 ml   Output    Net 280 ml       Physical Exam:     Physical Exam   Constitutional: He is oriented to person, place, and time  He appears well-developed  He is cooperative  He is easily aroused  He appears ill  No distress  HENT:   Head: Normocephalic and atraumatic  Eyes: Pupils are equal, round, and reactive to light  EOM are normal    Neck: Normal range of motion  Neck supple  Cardiovascular: Normal rate, regular rhythm, normal heart sounds, intact distal pulses and normal pulses  Exam reveals no friction rub  No murmur heard  Pulmonary/Chest: Effort normal and breath sounds normal  No respiratory distress  He has no decreased breath sounds  Abdominal: Soft  Normal appearance and bowel sounds are normal  He exhibits no distension  There is no tenderness  There is no rigidity and no rebound  Musculoskeletal: Normal range of motion  He exhibits edema and tenderness  Right ankle: He exhibits swelling  Tenderness  Left ankle: He exhibits swelling  Tenderness  Right lower leg: He exhibits tenderness and edema  Left lower leg: He exhibits tenderness and edema  Edema and erythema noted in addition to bullae of the lower extremites  Dressings and bandages in place  Serosanguineous drainage noted  Neurological: He is alert, oriented to person, place, and time and easily aroused  Skin: Skin is warm, dry and intact  Capillary refill takes less than 2 seconds  He is not diaphoretic  Psychiatric: He has a normal mood and affect  His speech is normal and behavior is normal  Judgment and thought content normal  Cognition and memory are normal    Nursing note and vitals reviewed  Additional Data:     Labs:    Results from last 7 days   Lab Units 10/01/19  0519 09/30/19  0658   WBC Thousand/uL 15 11* 14 62*   HEMOGLOBIN g/dL 8 3* 9 5*   HEMATOCRIT % 26 2* 28 6*   PLATELETS Thousands/uL 96* 99*   BANDS PCT % 4  --    NEUTROS PCT %  --  87*   LYMPHS PCT %  --  4*   LYMPHO PCT % 1*  --    MONOS PCT %  --  5   MONO PCT % 4  --    EOS PCT % 0 1     Results from last 7 days   Lab Units 10/01/19  0519  09/30/19  1311  09/26/19  0325   SODIUM mmol/L 131*   < > 134*   < > 137   POTASSIUM mmol/L 5 3   < > 4 2   < > 4 1   CHLORIDE mmol/L 96*   < > 96*   < > 103   CO2 mmol/L 24   < > 26   < > 24   BUN mg/dL 60*   < > 49*   < > 80*   CREATININE mg/dL 5 55*   < > 4 41*   < > 5 41*   ANION GAP mmol/L 11   < > 12   < > 10   CALCIUM mg/dL 7 9*   < > 8 4   < > 9 9   ALBUMIN g/dL  --   --  1 9*  --  1 7*   TOTAL BILIRUBIN mg/dL  --   --   --   --  4 30*   ALK PHOS U/L  --   --   --   --  154*   ALT U/L  --   --   --   --  13   AST U/L  --   --   --   --  18   GLUCOSE RANDOM mg/dL 158*   < > 88   < > 110    < > = values in this interval not displayed       Results from last 7 days   Lab Units 10/01/19  0519   INR  1 52*     Results from last 7 days   Lab Units 09/30/19  1140 09/30/19  0741 09/30/19  0029 09/29/19  2049 09/29/19  1654 09/29/19  1634 09/29/19  1626 09/29/19  1622 09/29/19  1551 09/29/19  1139 09/29/19  0831 09/28/19  2110   POC GLUCOSE mg/dl 119 107 106 105 165* 45* 37* 45* 62* 89 93 113         Results from last 7 days   Lab Units 09/27/19  0453   PROCALCITONIN ng/ml 23 03* * I Have Reviewed All Lab Data Listed Above  * Additional Pertinent Lab Tests Reviewed: All Labs For Current Hospital Admission Reviewed    Imaging:    Imaging Reports Reviewed Today Include:  None  Imaging Personally Reviewed by Myself Includes:  None  Recent Cultures (last 7 days):           Last 24 Hours Medication List:     Current Facility-Administered Medications:  acetaminophen 650 mg Oral Q6H PRN Pamela Singer MD   b complex-vitamin C-folic acid 1 capsule Oral Daily With Mandy Zaragoza MD   ciprofloxacin 500 mg Oral Q24H Pamela Singer MD   heparin (porcine) 5,000 Units Subcutaneous Q8H [address] [facility] C Holter, DO   metoprolol tartrate 12 5 mg Oral Q12H [facility],    [START ON 10/2/2019] midodrine 5 mg Oral Before Dialysis Roseanna Quiroga MD   [START ON 10/2/2019] omeprazole (PRILOSEC) oral suspension 20 mg Oral Early Morning Marvin C Holter, DO   oxyCODONE 2 5 mg Oral Q4H PRN [place] C Holter,    oxyCODONE 5 mg Oral Q4H PRN [place] C Holter,    polyethylene glycol 17 g Oral Daily PRN Pamela Singer MD   senna-docusate sodium 1 tablet Oral HS Pamela Singer MD        Today, Patient Was Seen By: Timmy Settler Holter, DO    ** Please Note: Dictation voice to text software may have been used in the creation of this document   **

## 2019-10-01 NOTE — PROGRESS NOTES
Bilateral wound care completed with Dr Catherine Reyes (podiatry)  Ok to continue use of xerofoam in place of adaptic

## 2019-10-01 NOTE — SOCIAL WORK
OSCAR received message from [facility] after asking several time if they have a bed and was informed today after being told possible T,TH,S that they do not have bed now  Cm spoke with patient spouse, reviewed additional options, and determined that she wanted to Stay at [facility] until one of the agencies could help get patient transfer to [place]  OSCAR called [address] at [phone] and spoke with Glory Cordero to inform her that patient has since opted to keep his appointment at [facility]  Glory Cordero understand and will have ready for possible re start of dialysis at [facility] on Friday  CM department will follow through discharge  Patient will require BLS transport at this time for transfer to dialysis center  CM department will need to follow up with rehabs once patients spouse select facility  CM department will follow through discharge

## 2019-10-01 NOTE — SOCIAL WORK
CM spoke with [facility] admissions and determined that they did not receive call yet from [facility] to release the information  CM requested that they release the info but they require [facility] admissions to call them to release info  [facility] rep will be calling in next few minutes to provide them the [phone] number to call  CM message back through [facility] to clarify process

## 2019-10-02 ENCOUNTER — APPOINTMENT (INPATIENT)
Dept: DIALYSIS | Facility: HOSPITAL | Age: 59
DRG: 871 | End: 2019-10-02
Payer: MEDICARE

## 2019-10-02 LAB
ANION GAP SERPL CALCULATED.3IONS-SCNC: 12 MMOL/L (ref 4–13)
ATRIAL RATE: 96 BPM
BUN SERPL-MCNC: 81 MG/DL (ref 5–25)
CALCIUM SERPL-MCNC: 8.3 MG/DL (ref 8.3–10.1)
CHLORIDE SERPL-SCNC: 93 MMOL/L (ref 100–108)
CO2 SERPL-SCNC: 23 MMOL/L (ref 21–32)
CREAT SERPL-MCNC: 7.34 MG/DL (ref 0.6–1.3)
ERYTHROCYTE [DISTWIDTH] IN BLOOD BY AUTOMATED COUNT: 16.8 % (ref 11.6–15.1)
GFR SERPL CREATININE-BSD FRML MDRD: 7 ML/MIN/1.73SQ M
GLUCOSE SERPL-MCNC: 98 MG/DL (ref 65–140)
HCT VFR BLD AUTO: 26 % (ref 36.5–49.3)
HGB BLD-MCNC: 8.6 G/DL (ref 12–17)
INR PPP: 1.2 (ref 0.84–1.19)
MCH RBC QN AUTO: 34 PG (ref 26.8–34.3)
MCHC RBC AUTO-ENTMCNC: 33.1 G/DL (ref 31.4–37.4)
MCV RBC AUTO: 103 FL (ref 82–98)
P AXIS: 3 DEGREES
PLATELET # BLD AUTO: 151 THOUSANDS/UL (ref 149–390)
PMV BLD AUTO: 11.6 FL (ref 8.9–12.7)
POTASSIUM SERPL-SCNC: 6.3 MMOL/L (ref 3.5–5.3)
PR INTERVAL: 196 MS
PROTHROMBIN TIME: 14.6 SECONDS (ref 11.6–14.5)
QRS AXIS: -9 DEGREES
QRSD INTERVAL: 116 MS
QT INTERVAL: 384 MS
QTC INTERVAL: 485 MS
RBC # BLD AUTO: 2.53 MILLION/UL (ref 3.88–5.62)
SODIUM SERPL-SCNC: 128 MMOL/L (ref 136–145)
T WAVE AXIS: 131 DEGREES
VENTRICULAR RATE: 96 BPM
WBC # BLD AUTO: 15.29 THOUSAND/UL (ref 4.31–10.16)

## 2019-10-02 PROCEDURE — 85027 COMPLETE CBC AUTOMATED: CPT | Performed by: PSYCHIATRY & NEUROLOGY

## 2019-10-02 PROCEDURE — 85610 PROTHROMBIN TIME: CPT | Performed by: HOSPITALIST

## 2019-10-02 PROCEDURE — 99232 SBSQ HOSP IP/OBS MODERATE 35: CPT | Performed by: INTERNAL MEDICINE

## 2019-10-02 PROCEDURE — 80048 BASIC METABOLIC PNL TOTAL CA: CPT | Performed by: PHYSICIAN ASSISTANT

## 2019-10-02 PROCEDURE — 92526 ORAL FUNCTION THERAPY: CPT

## 2019-10-02 PROCEDURE — 90935 HEMODIALYSIS ONE EVALUATION: CPT | Performed by: INTERNAL MEDICINE

## 2019-10-02 PROCEDURE — 93010 ELECTROCARDIOGRAM REPORT: CPT | Performed by: INTERNAL MEDICINE

## 2019-10-02 RX ORDER — GABAPENTIN 100 MG/1
100 CAPSULE ORAL DAILY
Status: DISCONTINUED | OUTPATIENT
Start: 2019-10-02 | End: 2019-10-03 | Stop reason: HOSPADM

## 2019-10-02 RX ADMIN — MIDODRINE HYDROCHLORIDE 5 MG: 5 TABLET ORAL at 08:48

## 2019-10-02 RX ADMIN — HEPARIN SODIUM 5000 UNITS: 5000 INJECTION INTRAVENOUS; SUBCUTANEOUS at 22:16

## 2019-10-02 RX ADMIN — OXYCODONE HYDROCHLORIDE 5 MG: 5 TABLET ORAL at 08:48

## 2019-10-02 RX ADMIN — OXYCODONE HYDROCHLORIDE 5 MG: 5 TABLET ORAL at 18:13

## 2019-10-02 RX ADMIN — HEPARIN SODIUM 5000 UNITS: 5000 INJECTION INTRAVENOUS; SUBCUTANEOUS at 06:20

## 2019-10-02 RX ADMIN — SENNOSIDES AND DOCUSATE SODIUM 1 TABLET: 8.6; 5 TABLET ORAL at 22:17

## 2019-10-02 RX ADMIN — Medication 1 CAPSULE: at 18:13

## 2019-10-02 RX ADMIN — OXYCODONE HYDROCHLORIDE 5 MG: 5 TABLET ORAL at 13:00

## 2019-10-02 RX ADMIN — CIPROFLOXACIN HYDROCHLORIDE 500 MG: 500 TABLET, FILM COATED ORAL at 18:13

## 2019-10-02 RX ADMIN — OXYCODONE HYDROCHLORIDE 5 MG: 5 TABLET ORAL at 02:56

## 2019-10-02 RX ADMIN — EPOETIN ALFA 4000 UNITS: 4000 SOLUTION INTRAVENOUS; SUBCUTANEOUS at 12:39

## 2019-10-02 NOTE — PLAN OF CARE
Dialysis Goals:  Receive 4 hours of hemodialysis today without problems or issues  Remove 1kg (1500ml) of fluid, or more if BP allows  Keep SBP >90

## 2019-10-02 NOTE — NURSING NOTE
Patient awake in bed, c/o pain and discomfort  Pain meds given, patient repositioned  Safety measures in place   Will continue to monitor

## 2019-10-02 NOTE — ASSESSMENT & PLAN NOTE
Lower extremity cellulitis and hemorrhagic bullae  Initial blood cultures demonstrated gram-negative rods, specifically Shewanella Putrifacians, a marine acquired bacteria  Additionally, nidus for infection PermCath (replaced by surgery using temporary catheter) versus lower extremity cellulitis; less likely renal cyst demonstrated on CT (drainage and drain placement by IR-subsequent negative culture)  Sepsis criteria:  tachycardia, nidus for infection and leukocytosis  · Infectious Disease consult appreciated  · Continue ciprofloxacin through 10/04/2019  · Continue to monitor temperature and CBC  · Wound care consult appreciated  · Continue local wound care  · Dermatology consult appreciated-no biopsy recommended  · Wound care as outlined above  · Antibiotics as outlined above  · Podiatry consult appreciated-previous debridement  Presence of eschars  · Continue local wound care  · Cardiology consult appreciated-AFib with RVR  · Continue metoprolol 12 5 mg b i d  (hold for hypotension)  · No anti coagulation recommended  · Limited echo recommended in addition to eventual ischemic evaluation  · Nephrology consult appreciated-resume usual HD schedule  · Interventional radiology consult appreciated  · PermCath and drain present

## 2019-10-02 NOTE — PHYSICAL THERAPY NOTE
PHYSICAL THERAPY NOTE      Patient Name: Mahin MCCRAY Date: 10/2/2019     Attempt made to see patient for physical therapy session however patient receiving dialysis  Will continue to follow as appropriate and able      Ender Morales PTA

## 2019-10-02 NOTE — PROGRESS NOTES
Cardiology Progress Note - Ok Susanne 64 y o  male MRN: [id number]    Unit/Bed#: -01 Encounter: [id number]        Subjective:    No significant events overnight  Remains in NSR  Review of Systems   Cardiovascular: Negative for chest pain, leg swelling and palpitations  Respiratory: Negative for shortness of breath  Objective:   Vitals: Blood pressure 103/56, pulse 93, temperature 98 8 °F (37 1 °C), temperature source Oral, resp  rate 18, height 5' 8" (1 727 m), weight 84 1 kg (185 lb 4 8 oz), SpO2 98 %  , Body mass index is 28 17 kg/m² ,   Orthostatic Blood Pressures      Most Recent Value   Blood Pressure  103/56 filed at 10/02/2019 0836   Patient Position - Orthostatic VS  Lying filed at 10/02/2019 4873         Systolic (36JXP), SVC:61 , Min:91 , QLO:217     Diastolic (28DTU), GYL:18, Min:53, Max:58      Intake/Output Summary (Last 24 hours) at 10/2/2019 0907  Last data filed at 10/1/2019 2101  Gross per 24 hour   Intake 900 ml   Output 5 ml   Net 895 ml     Weight (last 2 days)     Date/Time   Weight    10/02/19 0600   84 1 (185 3)    10/01/19 0600   85 1 (187 61)                  Telemetry Review: NSR    Physical Exam   Cardiovascular: Normal rate, regular rhythm and normal heart sounds  Exam reveals no gallop and no friction rub  No murmur heard  Pulmonary/Chest: Breath sounds normal  He has no wheezes  He has no rales  Musculoskeletal: He exhibits no edema           Laboratory Results:        CBC with diff:   Results from last 7 days   Lab Units 10/01/19  0519 09/30/19  0658 09/29/19  0533 09/28/19  2158 09/28/19  1427 09/28/19  0816 09/27/19  0453 09/26/19  0325   WBC Thousand/uL 15 11* 14 62* 16 31*  --   --  15 03* 18 91* 29 45*   HEMOGLOBIN g/dL 8 3* 9 5* 9 1* 9 3* 8 7* 9 0* 9 4* 9 2*   HEMATOCRIT % 26 2* 28 6* 27 9* 29 4* 26 7* 28 4* 29 5* 27 9*   MCV fL 106* 103* 103*  --   --  105* 105* 101*   PLATELETS Thousands/uL 96* 99* 72*  --   --  57* 45* 43*   MCH pg 33 5 34 1 33 7 --   --  33 2 33 3 33 3   MCHC g/dL 31 7 33 2 32 6  --   --  31 7 31 9 33 0   RDW % 17 7* 17 2* 17 2*  --   --  17 2* 16 5* 16 1*   MPV fL 12 1 11 5 12 1  --   --  12 3 12 3 12 9*   NRBC AUTO /100 WBCs 0 0 0  --   --   --   --  1         CMP:  Results from last 7 days   Lab Units 10/01/19  0519 09/30/19  1313 09/30/19  1311 19  0645 19  0816 19  0325   POTASSIUM mmol/L 5 3 4 3 4 2 6 2* 4 9 4 1   CHLORIDE mmol/L 96* 97* 96* 92* 99* 103   CO2 mmol/L 24 25 26 20* 25 24   BUN mg/dL 60* 51* 49* 95* 65* 80*   CREATININE mg/dL 5 55* 4 45* 4 41* 7 54* 4 75* 5 41*   CALCIUM mg/dL 7 9* 8 4 8 4 8 5 9 2 9 9   AST U/L  --   --   --   --   --  18   ALT U/L  --   --   --   --   --  13   ALK PHOS U/L  --   --   --   --   --  154*   EGFR ml/min/1 73sq m 10 14 14 7 13 11         BMP:  Results from last 7 days   Lab Units 10/01/19  0519 09/30/19  1313 09/30/19  1311 19  0645 19  0819  0325   POTASSIUM mmol/L 5 3 4 3 4 2 6 2* 4 9 4 1   CHLORIDE mmol/L 96* 97* 96* 92* 99* 103   CO2 mmol/L 24 25 26 20* 25 24   BUN mg/dL 60* 51* 49* 95* 65* 80*   CREATININE mg/dL 5 55* 4 45* 4 41* 7 54* 4 75* 5 41*   CALCIUM mg/dL 7 9* 8 4 8 4 8 5 9 2 9 9       BNP:     Magnesium:         Coags:   Results from last 7 days   Lab Units 10/01/19  0519 09/30/19  0647 19  0325   INR  1 52* 1 40* 1 55*           Cardiac testing:   Results for orders placed during the hospital encounter of 19   Echo complete with contrast if indicated    Narrative [facility] [address] [facility]  [phone]    Transthoracic Echocardiogram  2D, M-mode, Doppler, and Color Doppler    Study date:  23-Sep-2019    Patient: Laquita Cai  MR number: [id number]  Account number: [de-identified]  : 1960  Age: 64 years  Gender: Male  Status: Inpatient  Location: Bedside  Height: 68 in  Weight: 198 7 lb  BP: 88/ 53 mmHg    Indications: Septic shock      Diagnoses: R57 9 - Shock, unspecified    Sonographer:  Ahmet Littlejohn RDCS  Ref[facility] Phys[facility]:  [facility]  Group:  [facility]  Interpreting Physician:  DO AZALEA Armstrong    LEFT VENTRICLE:  The ventricle was dilated  Systolic function was markedly reduced  Ejection fraction was estimated to be 20 %  There was severe diffuse hypokinesis with regional variations  Wall thickness was mildly increased  The changes were consistent with eccentric hypertrophy  Left ventricular diastolic function parameters were abnormal     RIGHT VENTRICLE:  The ventricle was mildly to moderately dilated  Systolic function was moderately reduced  LEFT ATRIUM:  The atrium was moderately dilated  RIGHT ATRIUM:  The atrium was mildly dilated  MITRAL VALVE:  There was mild regurgitation  TRICUSPID VALVE:  There was moderate regurgitation  Estimated peak PA pressure was 46 mmHg  The findings suggest mild pulmonary hypertension  PULMONIC VALVE:  There was mild regurgitation  PERICARDIUM:  There was a left pleural effusion  HISTORY: PRIOR HISTORY: ESRD on dialysis  PROCEDURE: The procedure was performed at the bedside  This was a routine study  The transthoracic approach was used  The study included complete 2D imaging, M-mode, complete spectral Doppler, and color Doppler  The heart rate was 97 bpm,  at the start of the study  Intravenous contrast ( 1 2 mL of Definity in NSS) was administered to opacify the left ventricle  Echocardiographic views were limited due to restricted patient mobility  Image quality was adequate  LEFT VENTRICLE: The ventricle was dilated  Systolic function was markedly reduced  Ejection fraction was estimated to be 20 %  There was severe diffuse hypokinesis with regional variations  Wall thickness was mildly increased  The changes  were consistent with eccentric hypertrophy  No evidence of apical thrombus   DOPPLER: Left ventricular diastolic function parameters were abnormal     VENTRICULAR SEPTUM: There was diastolic flattening  These changes are consistent with RV volume overload  RIGHT VENTRICLE: The ventricle was mildly to moderately dilated  Systolic function was moderately reduced  LEFT ATRIUM: The atrium was moderately dilated  RIGHT ATRIUM: The atrium was mildly dilated  MITRAL VALVE: Valve structure was normal  There was normal leaflet separation  DOPPLER: The transmitral velocity was within the normal range  There was no evidence for stenosis  There was mild regurgitation  AORTIC VALVE: The valve was trileaflet  Leaflets exhibited normal thickness and normal cuspal separation  DOPPLER: Transaortic velocity was within the normal range  There was no evidence for stenosis  There was no regurgitation  TRICUSPID VALVE: The valve structure was normal  There was normal leaflet separation  DOPPLER: The transtricuspid velocity was within the normal range  There was no evidence for stenosis  There was moderate regurgitation  Pulmonary artery  systolic pressure was mildly increased  Estimated peak PA pressure was 46 mmHg  The findings suggest mild pulmonary hypertension  PULMONIC VALVE: Leaflets exhibited normal thickness, no calcification, and normal cuspal separation  DOPPLER: The transpulmonic velocity was within the normal range  There was mild regurgitation  PERICARDIUM: There was no pericardial effusion  There was a left pleural effusion  The pericardium was normal in appearance  AORTA: The root exhibited normal size  SYSTEMIC VEINS: IVC: The inferior vena cava was mildly dilated  Respirophasic changes were blunted (less than 50% variation)      SYSTEM MEASUREMENT TABLES    2D  %FS: 16 42 %  Ao Diam: 3 47 cm  EDV(Teich): 202 97 ml  EF Biplane: 21 5 %  EF(Teich): 33 73 %  ESV(Teich): 134 52 ml  IVSd: 1 13 cm  LA Area: 29 62 cm2  LA Diam: 4 27 cm  LVEDV MOD A2C: 134 96 ml  LVEDV MOD A4C: 168 64 ml  LVEDV MOD BP: 156 71 ml  LVEF MOD A2C: 13 35 %  LVEF MOD A4C: 23 3 %  LVESV MOD A2C: 116 94 ml  LVESV MOD A4C: 129 35 ml  LVESV MOD BP: 123 01 ml  LVIDd: 6 32 cm  LVIDs: 5 29 cm  LVLd A2C: 8 49 cm  LVLd A4C: 8 82 cm  LVLs A2C: 8 6 cm  LVLs A4C: 8 59 cm  LVPWd: 1 13 cm  RA Area: 19 52 cm2  RVIDd: 4 84 cm  SV MOD A2C: 18 02 ml  SV MOD A4C: 39 29 ml  SV(Teich): 68 45 ml    CW  TR MaxP 78 mmHg  TR Vmax: 2 82 m/s    MM  TAPSE: 1 26 cm    PW  E': 0 07 m/s  E/E': 12 01  MV A Jorje: 0 3 m/s  MV Dec [place]: 8 51 m/s2  MV DecT: 92 39 ms  MV E Jorje: 0 79 m/s  MV E/A Ratio: 2 66  MV PHT: 26 79 ms  MVA By PHT: 8 21 cm2    [facility] Commission Accredited Echocardiography Laboratory    Prepared and electronically signed by    Kennedy Phelps DO  Signed 23-Sep-2019 13:24:36         Meds/Allergies     Current Facility-Administered Medications:  acetaminophen 650 mg Oral Q6H PRN Pamela Singer MD   b complex-vitamin C-folic acid 1 capsule Oral Daily With Mandy Zaragoza MD   ciprofloxacin 500 mg Oral Q24H Pamela Singer MD   heparin (porcine) 5,000 Units Subcutaneous Q8H [address] [facility] Holter, DO   metoprolol tartrate 12 5 mg Oral Q12H [address], DO   midodrine 5 mg Oral Before Dialysis Roseanna Quiroga MD   omeprazole (PRILOSEC) oral suspension 20 mg Oral Early Morning Marvin SUKHDEV Connorter, DO   oxyCODONE 2 5 mg Oral Q4H PRN [facility] C Holter, DO   oxyCODONE 5 mg Oral Q4H PRN [facility] C Holter, DO   polyethylene glycol 17 g Oral Daily PRN Pamela Singer MD   senna-docusate sodium 1 tablet Oral HS Pamela Singer MD        Medications Prior to Admission   Medication    calcium acetate (PHOSLO) 667 mg capsule    Multiple Vitamin (MULTIVITAMIN) tablet    NON FORMULARY    Sucroferric Oxyhydroxide (VELPHORO PO)       Assessment:  Principal Problem:    Gram-negative bacteremia  Active Problems:    Chronic kidney disease with end stage renal failure on dialysis (HCC)    Cellulitis of lower extremity- bilateral    Polycystic liver disease    Total bilirubin, elevated Thrombocytopenia ([facility] [address] )    Polycystic kidney disease    Benign essential hypertension    Atrial fibrillation (HCC)      Impression:  1  Cardiomyopathy - unclear etiology  Will need ischemic eval, but suspect non-ischemic component  2  ESRD - on HD  3  Sepsis - GN bacteremia  On antibiotics  4  PAF - in NSR  Holding anticoagulation due to thrombocytopenia  No further episodes  Plan:  1  Will continue b-blockers as BP allows  2  Will need w/u for cardiomyopathy once stabilizes  Possibly as an outpatient

## 2019-10-02 NOTE — ASSESSMENT & PLAN NOTE
Present blood pressure 82/58  · Continue to monitor vitals    · Continue metoprolol 12 5 mg; hold parameters in presence of hypotension changed per cardiology (systolic less than 90) no abdominal distension

## 2019-10-02 NOTE — ASSESSMENT & PLAN NOTE
Ultrasound right upper quadrant demonstrated hypertrophic and polycystic right kidney  Patient possesses autosomal dominant polycystic kidney disease and cyst infection noted on CT  · Catheter present

## 2019-10-02 NOTE — HEMODIALYSIS
Received 4 hours of hemodialysis  UF was turned off for 1hr 15 min for SBP 80's  Right permacath worked very well, blood flow of 400 ml/min maintained  0 6kg (896ml) of fluid removed  Dr Walker Getting aware of low BP's and UF off for short time  Pre wt = 86kg  Post wt = 85 4kg, both using bed scale

## 2019-10-02 NOTE — ASSESSMENT & PLAN NOTE
Paroxysmal atrial fibrillation with RVR  Not noted on telemetry  · See above for Cardiology recommendations; no recommendation to anticoagulate

## 2019-10-02 NOTE — PHYSICAL THERAPY NOTE
Physical Therapy Cancellation Note    Second attempt made to see patient for physical therapy session however patient blood pressure low, not appropriate at this time  Will continue to follow as appropriate      Yolis Mahan, SOFY

## 2019-10-02 NOTE — PROGRESS NOTES
NEPHROLOGY PROGRESS NOTE   Rip Krishnan 64 y o  male MRN: [id number]  Unit/Bed#: -01 Encounter: [id number]  Reason for Consult: esrd    ASSESSMENT/PLAN:  1  ESRD on HD MWF at [facility]  - was on CVVHD, off since 9/24/19  - continue HD MWF  - patient wants to switch to our dialysis [facility] but no [facility] available as per  note, patient to go back to [facility] until [facility] has an availability  2  Access- perm cath placement by IR 9/30  3  Anemia- hgb stable  4  Shewanella putrefaciens bacteremia- ID on board  - continue cipro until 10/4  - hemorrhagic bullae in bilateral LE  5  Encephalopathy- likely secondary to infection  6  Hypotension- acute on chronic  - not on any antihypertensive medications at home  - start midodrine 5mg pre dialysis treatment  7  Hyperphosphatemia- on phoslo as an outpatient  - restart binders if patient continues to eat better  - place on a renal diet  8  Hyperkalemia- mild, monitor  9  Hyponatremia- monitor  - may be secondary to D5W, discontinued 10/1  10  PCKD with renal and hepatic cysts    Disposition:  HD today, patient seen and examined on HD, midodrine given prior to HD start, goal UF 1L    SUBJECTIVE:  Patient denies sob  Complaining of pain in his legs      OBJECTIVE:  Current Weight: Weight - Scale: 84 1 kg (185 lb 4 8 oz)  Vitals:    10/01/19 1519 10/01/19 2121 10/02/19 0600 10/02/19 0836   BP: 91/53 104/58  103/56   BP Location: Left arm Left arm  Left arm   Pulse: 98 92  93   Resp: 20 20  18   Temp: 99 1 °F (37 3 °C) 99 4 °F (37 4 °C)  98 8 °F (37 1 °C)   TempSrc: Oral Oral  Oral   SpO2: 96% 96%  98%   Weight:   84 1 kg (185 lb 4 8 oz)    Height:           Intake/Output Summary (Last 24 hours) at 10/2/2019 1002  Last data filed at 10/1/2019 2101  Gross per 24 hour   Intake 900 ml   Output 5 ml   Net 895 ml     General: NAD  Skin: no rash  HEENT: normocephalic  Neck: supple  Lungs: CTAB  Cardiac: RRR  Extremities: + bullae of wrapped legs  GI: soft nt nd  Neuro: alert awake  Psych: mood and affect appropriate    Medications:    Current Facility-Administered Medications:     acetaminophen (TYLENOL) tablet 650 mg, 650 mg, Oral, Q6H PRN, Kelvin Unger MD, 650 mg at 09/29/19 2032    b complex-vitamin C-folic acid (NEPHROCAPS) capsule 1 capsule, 1 capsule, Oral, Daily With Jared Covington MD, 1 capsule at 10/01/19 1900    ciprofloxacin (CIPRO) tablet 500 mg, 500 mg, Oral, Q24H, Kelvin Unger MD, 500 mg at 10/01/19 1904    gabapentin (NEURONTIN) capsule 100 mg, 100 mg, Oral, Daily, Jordan C Holter, DO    heparin (porcine) subcutaneous injection 5,000 Units, 5,000 Units, Subcutaneous, Q8H [address], Jordan C Holter, DO, 5,000 Units at 10/02/19 0620    metoprolol tartrate (LOPRESSOR) partial tablet 12 5 mg, 12 5 mg, Oral, Q12H [address], Koffi Almaguer DO, 12 5 mg at 10/01/19 2121    midodrine (PROAMATINE) tablet 5 mg, 5 mg, Oral, Before Dialysis, Yousif Lemus MD, 5 mg at 10/02/19 0848    omeprazole (PriLOSEC) oral suspension 20 mg, 20 mg, Oral, Early Morning, Jordan C Holter, DO, Stopped at 10/02/19 0849    oxyCODONE (ROXICODONE) IR tablet 2 5 mg, 2 5 mg, Oral, Q4H PRN, Jordan C Holter, DO, 2 5 mg at 10/01/19 0607    oxyCODONE (ROXICODONE) IR tablet 5 mg, 5 mg, Oral, Q4H PRN, Jordan C Holter, DO, 5 mg at 10/02/19 0848    polyethylene glycol (MIRALAX) packet 17 g, 17 g, Oral, Daily PRN, Kelvin Unger MD    senna-docusate sodium (SENOKOT S) 8 6-50 mg per tablet 1 tablet, 1 tablet, Oral, HS, Kelvin Unger MD, 1 tablet at 09/30/19 2102    Laboratory Results:  Results from last 7 days   Lab Units 10/02/19  0947 10/01/19  0519 09/30/19  1313 09/30/19  1311 09/30/19  0658 09/30/19  0645 09/29/19  0533 09/28/19  2158 09/28/19  1427 09/28/19  0816 09/27/19  0453 09/26/19  0325   WBC Thousand/uL 15 29* 15 11*  --   --  14 62*  --  16 31*  --   --  15 03* 18 91* 29 45*   HEMOGLOBIN g/dL 8 6* 8 3*  --   --  9 5*  --  9 1* 9 3* 8 7* 9  0* 9 4* 9 2*   HEMATOCRIT % 26 0* 26 2*  --   --  28 6*  --  27 9* 29 4* 26 7* 28 4* 29 5* 27 9*   PLATELETS Thousands/uL 151 96*  --   --  99*  --  72*  --   --  57* 45* 43*   POTASSIUM mmol/L  --  5 3 4 3 4 2  --  6 2*  --   --   --  4 9  --  4 1   CHLORIDE mmol/L  --  96* 97* 96*  --  92*  --   --   --  99*  --  103   CO2 mmol/L  --  24 25 26  --  20*  --   --   --  25  --  24   BUN mg/dL  --  60* 51* 49*  --  95*  --   --   --  65*  --  80*   CREATININE mg/dL  --  5 55* 4 45* 4 41*  --  7 54*  --   --   --  4 75*  --  5 41*   CALCIUM mg/dL  --  7 9* 8 4 8 4  --  8 5  --   --   --  9 2  --  9 9   PHOSPHORUS mg/dL  --   --   --  5 2*  --   --   --   --   --   --   --   --

## 2019-10-02 NOTE — SPEECH THERAPY NOTE
Speech Language/Pathology    Speech/Language Pathology Progress Note    Patient Name: Romy Esteban  [id number] Date: 10/2/2019       Subjective:  Pt reported tolerating current diet, however c/o tongue being dry and burning sensation  Pt requesting cookie earlier today  Objective:  Pt seen for ongoing diagnostic dysphagia tx  Assessed for diet upgrade with hard and soft solid (cookie w/ milk)  Pt with adequate bolus acceptance  Mild anterior loss with thin liquid  Prolonged mastication with hard solid (pt independently dipped cookie in milk to make softer since tongue burning)  Pt with ongoing waxing/waning mental status as he was noted to close eyes and lean head to R side while masticating food  Swallow initiation appeared timely and complete  No overt s/s aspiration with all trials  Assessment:  Pt continues to present with mild to moderate oral dysphagia given prolonged mastication time and increased risk for aspiration given fluctuating level of alertness while eating       Plan/Recommendations:  Continue dysphagia level 2 diet w/ thin liquids  Meds as tolerated  Aspiration precautions and compensatory strategies: only feed when fully awake/alert, slow rate, small bites and sips, reduce distractions (tv off, limit talking, door closed), alternate bites/sips   Oral care 4x/day

## 2019-10-02 NOTE — PROGRESS NOTES
Progress Note - Redd Richards 1960, 64 y o  male MRN: [id number]    Unit/Bed#: -01 Encounter: [id number]    Primary Care Provider: Francoise Chacon MD   Date and time admitted to hospital: 9/21/2019  7:56 PM    * Gram-negative bacteremia  Assessment & Plan  Lower extremity cellulitis and hemorrhagic bullae  Initial blood cultures demonstrated gram-negative rods, specifically Shewanella Putrifacians, a marine acquired bacteria  Additionally, nidus for infection PermCath (replaced by surgery using temporary catheter) versus lower extremity cellulitis; less likely renal cyst demonstrated on CT (drainage and drain placement by IR-subsequent negative culture)  Sepsis criteria:  tachycardia, nidus for infection and leukocytosis  · Infectious Disease consult appreciated  · Continue ciprofloxacin through 10/04/2019  · Continue to monitor temperature and CBC  · Wound care consult appreciated  · Continue local wound care  · Dermatology consult appreciated-no biopsy recommended  · Wound care as outlined above  · Antibiotics as outlined above  · Podiatry consult appreciated-previous debridement  Presence of eschars  · Continue local wound care  · Cardiology consult appreciated-AFib with RVR  · Continue metoprolol 12 5 mg b i d  (hold for hypotension)  · No anti coagulation recommended  · Limited echo recommended in addition to eventual ischemic evaluation  · Nephrology consult appreciated-resume usual HD schedule  · Interventional radiology consult appreciated  · PermCath and drain present  Atrial fibrillation [facility])  Assessment & Plan  Paroxysmal atrial fibrillation with RVR  Not noted on telemetry  · See above for Cardiology recommendations; no recommendation to anticoagulate  Benign essential hypertension  Assessment & Plan  Present blood pressure 82/58  · Continue to monitor vitals    · Continue metoprolol 12 5 mg; hold parameters in presence of hypotension changed per cardiology (systolic less than 90)     Polycystic kidney disease  Assessment & Plan  Ultrasound right upper quadrant demonstrated hypertrophic and polycystic right kidney  Patient possesses autosomal dominant polycystic kidney disease and cyst infection noted on CT  · Catheter present  Thrombocytopenia (HCC)  Assessment & Plan  Presence of pancytopenia  · DIC panel pending  Polycystic liver disease  Assessment & Plan   Innumerable hepatic cysts demonstrated on CTA abdomen  Cellulitis of lower extremity- bilateral  Assessment & Plan  · Continue antibiotics per infectious disease recommendations  · Continue wound care per recommendations above  · Gabapentin 100 mg daily initiated for lower extremity pain  Chronic kidney disease with end stage renal failure on dialysis [facility])  Assessment & Plan  · Continue to trend BMP  · Continue HD this a m  per nephrology recommendation; PermCath present  VTE Pharmacologic Prophylaxis:   Pharmacologic: Heparin  Mechanical VTE Prophylaxis in Place: No    Patient Centered Rounds: I have performed bedside rounds with nursing staff today  Discussions with Specialists or Other Care Team Provider:  Case management  Education and Discussions with Family / Patient:  Patient  Time Spent for Care: 30 minutes  More than 50% of total time spent on counseling and coordination of care as described above  Current Length of Stay: 11 day(s)    Current Patient Status: Inpatient   Certification Statement: The patient will continue to require additional inpatient hospital stay due to continued management sepsis/bacteremia and hemodialysis  Discharge Plan: To be determined  Code Status: Level 1 - Full Code      Subjective:   No acute distress noted at time of exam   He endorsed lower extremity pain/discomfort, although lesser in comparison to previous exam; advised patient to use p r n  pain medication and gabapentin  No other complaints stated by patient    He endorsed appropriate appetite and stated he is eating well  Objective:     Vitals:   Temp (24hrs), Av °F (37 2 °C), Min:98 8 °F (37 1 °C), Max:99 4 °F (37 4 °C)    Temp:  [98 8 °F (37 1 °C)-99 4 °F (37 4 °C)] 98 8 °F (37 1 °C)  HR:  [92-98] 95  Resp:  [18-20] 18  BP: ()/(44-61) 82/58  SpO2:  [96 %-98 %] 98 %  Body mass index is 28 17 kg/m²  Input and Output Summary (last 24 hours): Intake/Output Summary (Last 24 hours) at 10/2/2019 1125  Last data filed at 10/2/2019 0935  Gross per 24 hour   Intake 1100 ml   Output 0 ml   Net 1100 ml       Physical Exam:     Physical Exam   Constitutional: He is oriented to person, place, and time  Vital signs are normal  He appears well-developed  He appears cachectic  He is cooperative  He appears ill  No distress  HENT:   Head: Normocephalic and atraumatic  Eyes: Pupils are equal, round, and reactive to light  EOM are normal    Neck: Normal range of motion  Neck supple  Cardiovascular: Normal rate, regular rhythm, normal heart sounds, intact distal pulses and normal pulses  No murmur heard  Pulmonary/Chest: Effort normal and breath sounds normal  No respiratory distress  He has no decreased breath sounds  Abdominal: Soft  Normal appearance and bowel sounds are normal  He exhibits no distension  There is no tenderness  There is no rigidity and no rebound  Musculoskeletal: Normal range of motion  Right ankle: He exhibits swelling  Tenderness  Left ankle: He exhibits swelling  Tenderness  Right lower leg: He exhibits tenderness and swelling  Left lower leg: He exhibits tenderness and swelling  Lower extremity dressings and bandages intact, bilaterally  Serosanguineous drainage noted  Neurological: He is alert and oriented to person, place, and time  Skin: Skin is warm and dry  Capillary refill takes less than 2 seconds  He is not diaphoretic  Psychiatric: He has a normal mood and affect   His behavior is normal  Judgment and thought content normal    Nursing note and vitals reviewed  Additional Data:     Labs:    Results from last 7 days   Lab Units 10/02/19  0947 10/01/19  0519 09/30/19  0658   WBC Thousand/uL 15 29* 15 11* 14 62*   HEMOGLOBIN g/dL 8 6* 8 3* 9 5*   HEMATOCRIT % 26 0* 26 2* 28 6*   PLATELETS Thousands/uL 151 96* 99*   BANDS PCT %  --  4  --    NEUTROS PCT %  --   --  87*   LYMPHS PCT %  --   --  4*   LYMPHO PCT %  --  1*  --    MONOS PCT %  --   --  5   MONO PCT %  --  4  --    EOS PCT %  --  0 1     Results from last 7 days   Lab Units 10/02/19  0947  09/30/19  1311  09/26/19  0325   SODIUM mmol/L 128*   < > 134*   < > 137   POTASSIUM mmol/L 6 3*   < > 4 2   < > 4 1   CHLORIDE mmol/L 93*   < > 96*   < > 103   CO2 mmol/L 23   < > 26   < > 24   BUN mg/dL 81*   < > 49*   < > 80*   CREATININE mg/dL 7 34*   < > 4 41*   < > 5 41*   ANION GAP mmol/L 12   < > 12   < > 10   CALCIUM mg/dL 8 3   < > 8 4   < > 9 9   ALBUMIN g/dL  --   --  1 9*  --  1 7*   TOTAL BILIRUBIN mg/dL  --   --   --   --  4 30*   ALK PHOS U/L  --   --   --   --  154*   ALT U/L  --   --   --   --  13   AST U/L  --   --   --   --  18   GLUCOSE RANDOM mg/dL 98   < > 88   < > 110    < > = values in this interval not displayed  Results from last 7 days   Lab Units 10/02/19  0947   INR  1 20*     Results from last 7 days   Lab Units 09/30/19  1140 09/30/19  0741 09/30/19  0029 09/29/19  2049 09/29/19  1654 09/29/19  1634 09/29/19  1626 09/29/19  1622 09/29/19  1551 09/29/19  1139 09/29/19  0831 09/28/19  2110   POC GLUCOSE mg/dl 119 107 106 105 165* 45* 37* 45* 62* 89 93 113         Results from last 7 days   Lab Units 09/27/19  0453   PROCALCITONIN ng/ml 23 03*           * I Have Reviewed All Lab Data Listed Above  * Additional Pertinent Lab Tests Reviewed: All Labs For Current Hospital Admission Reviewed    Imaging:    Imaging Reports Reviewed Today Include:  None  Imaging Personally Reviewed by Myself Includes:  None      Recent Cultures (last 7 days):           Last 24 Hours Medication List:     Current Facility-Administered Medications:  acetaminophen 650 mg Oral Q6H PRN Gwen Loyd MD   b complex-vitamin C-folic acid 1 capsule Oral Daily With Tima Coelho MD   ciprofloxacin 500 mg Oral Q24H Gwen Loyd MD   gabapentin 100 mg Oral Daily [place] C Holter, DO   heparin (porcine) 5,000 Units Subcutaneous Q8H [facility] Jordan C Holter, DO   metoprolol tartrate 12 5 mg Oral Q12H [facility] Melecio Handley DO   midodrine 5 mg Oral Before Dialysis Suraj Fung MD   omeprazole (PRILOSEC) oral suspension 20 mg Oral Early Morning Jordan C Holter,    oxyCODONE 2 5 mg Oral Q4H PRN [place] C Holter,    oxyCODONE 5 mg Oral Q4H PRN [place] C Holter, DO   polyethylene glycol 17 g Oral Daily PRN Gwen Loyd MD   senna-docusate sodium 1 tablet Oral HS Gwen Loyd MD        Today, Patient Was Seen By: Brenita Curet Holter, DO    ** Please Note: Dictation voice to text software may have been used in the creation of this document   **

## 2019-10-02 NOTE — PROGRESS NOTES
Progress Note - Infectious Disease   Emanuel Carlson 64 y o  male MRN: [id number]  Unit/Bed#: -01 Encounter: [id number]      Impression/Plan:  1  Septic shock, POA   Leukopenia, tachycardia, refractory hypotension   Due to #2/3   No other appreciable source   Clinically stable, off pressors, afebrile with WBC moderately elevated  Rec:  ? Continue antibiotic as below  ? Follow temperatures and [address]  ? Supportive care as per the primary service     2   Shewanella putrefaciens bacteremia    Suspect primary skin portal of entry with cellulitis as below with secondary infection of HD catheter, now removed   Less likely due to renal cyst as fluid culture negative   Repeat blood cultures negative  Rec:  ? Continue Cipro for 14 days total of antibiotics through 10/4/19  ? Follow mental status closely on quinolone although mild confusion preceded initiation     3   Bilateral lower extremity necrotizing skin infection   Suspect due to underlying chronic foot wounds in setting of recent creek/pond water exposure   Appeared superficial with healthy tissue under ruptured bullae making deep infection less likely   Cellulitis improved but now with evolving necrotic wounds after bullae rupture  Rec:  ? Continue antibiotics as above  ? Serial exams  ? [facility] per Podiatry and discussed with RN  Will take photos with next dressing change      4   ESRD on HD   Via [facility]   Now status post removal of temporary catheter and placement of new PermCath today in setting of #2   Noted in Care Everywhere to be noncompliant with diet, HD  Rec:  ? Dose adjust antibiotic for dialysis  ? New PermCath placed 10/1/19      5   Polycystic kidney/liver disease   Extensive disease seen on CT imaging      6   Chronic foot wounds   Likely multifactorial   Appear superficial   Risk factor for infection as above  Rec:  ? Outpatient Podiatry follow-up  ?  Advised infection prevention strategies including keeping feet and wounds covered as much as possible and no environmental exposures, especially water sources      The above plan was discussed in detail with patient, RN, and primary care team      Antibiotics:  Cipro #8  Antibiotics #12     Subjective:  Patient is status post HD today  He is for bilateral leg wound care later today  RN will coordinate and update wound photos in chart    Legs are painful   Patient is groggy and falling asleep while eating food  He denies fevers, chills, sweats, nausea, vomiting, or diarrhea  Objective:  Vitals:  Temp:  [98 °F (36 7 °C)-99 4 °F (37 4 °C)] 98 °F (36 7 °C)  HR:  [90-99] 98  Resp:  [18-20] 18  BP: ()/(44-61) 98/54  SpO2:  [96 %-98 %] 98 %  Temp (24hrs), Av 8 °F (37 1 °C), Min:98 °F (36 7 °C), Max:99 4 °F (37 4 °C)  Current: Temperature: 98 °F (36 7 °C)    Physical Exam:   General Appearance:  Arousable from sleep, resting in bed, in no acute distress  Throat: Oropharynx moist without lesions  Lungs:   Clear to auscultation bilaterally; no wheezes, rhonchi or rales; respirations unlabored   Heart:  RRR; no murmur   Abdomen:   Soft, non-tender, non-distended, positive bowel sounds  Extremities: Bilateral legs wrapped from toes to thighs with Xeroform gauze and Ace wraps intact without strike through drainage  Skin: No new rashes or lesions    Right anterior chest wall PermCath site in place and nontender and arm IV site nontender     Labs, Imaging, & Other studies:   All pertinent labs and imaging studies were personally reviewed  Results from last 7 days   Lab Units 10/02/19  0947 10/01/19  0519 19  0658   WBC Thousand/uL 15 29* 15 11* 14 62*   HEMOGLOBIN g/dL 8 6* 8 3* 9 5*   PLATELETS Thousands/uL 151 96* 99*     Results from last 7 days   Lab Units 10/02/19  0947  19  0325   POTASSIUM mmol/L 6 3*   < > 4 1   CHLORIDE mmol/L 93*   < > 103   CO2 mmol/L 23   < > 24   BUN mg/dL 81*   < > 80*   CREATININE mg/dL 7 34*   < > 5 41*   EGFR ml/min/1 73sq m 7   < > 11   CALCIUM mg/dL 8 3   < > 9 9   AST U/L  --   --  18   ALT U/L  --   --  13   ALK PHOS U/L  --   --  154*    < > = values in this interval not displayed       Results from last 7 days   Lab Units 09/27/19  0453   PROCALCITONIN ng/ml 23 03*

## 2019-10-03 VITALS
DIASTOLIC BLOOD PRESSURE: 55 MMHG | SYSTOLIC BLOOD PRESSURE: 96 MMHG | BODY MASS INDEX: 28.8 KG/M2 | OXYGEN SATURATION: 93 % | RESPIRATION RATE: 18 BRPM | WEIGHT: 190.04 LBS | HEIGHT: 68 IN | HEART RATE: 109 BPM | TEMPERATURE: 98.2 F

## 2019-10-03 LAB
ANION GAP SERPL CALCULATED.3IONS-SCNC: 11 MMOL/L (ref 4–13)
APTT PPP: 41 SECONDS (ref 23–37)
BASOPHILS # BLD AUTO: 0.12 THOUSANDS/ΜL (ref 0–0.1)
BASOPHILS NFR BLD AUTO: 1 % (ref 0–1)
BLD SMEAR INTERP: NORMAL
BUN SERPL-MCNC: 51 MG/DL (ref 5–25)
CALCIUM SERPL-MCNC: 8 MG/DL (ref 8.3–10.1)
CHLORIDE SERPL-SCNC: 94 MMOL/L (ref 100–108)
CO2 SERPL-SCNC: 25 MMOL/L (ref 21–32)
CREAT SERPL-MCNC: 5.38 MG/DL (ref 0.6–1.3)
DEPRECATED AT III PPP: 57 % OF NORMAL (ref 92–136)
DEPRECATED D DIMER PPP: 7145 NG/ML (FEU)
EOSINOPHIL # BLD AUTO: 0.13 THOUSAND/ΜL (ref 0–0.61)
EOSINOPHIL NFR BLD AUTO: 1 % (ref 0–6)
ERYTHROCYTE [DISTWIDTH] IN BLOOD BY AUTOMATED COUNT: 16.9 % (ref 11.6–15.1)
FDP BLD QL AGGL: >10 <20
FERRITIN SERPL-MCNC: 1339 NG/ML (ref 8–388)
FIBRINOGEN PPP-MCNC: 796 MG/DL (ref 227–495)
GFR SERPL CREATININE-BSD FRML MDRD: 11 ML/MIN/1.73SQ M
GLUCOSE SERPL-MCNC: 108 MG/DL (ref 65–140)
HCT VFR BLD AUTO: 26.6 % (ref 36.5–49.3)
HGB BLD-MCNC: 8.2 G/DL (ref 12–17)
IMM GRANULOCYTES # BLD AUTO: 0.12 THOUSAND/UL (ref 0–0.2)
IMM GRANULOCYTES NFR BLD AUTO: 1 % (ref 0–2)
INR PPP: 1.42 (ref 0.84–1.19)
IRON SATN MFR SERPL: 10 %
IRON SERPL-MCNC: 16 UG/DL (ref 65–175)
LYMPHOCYTES # BLD AUTO: 0.53 THOUSANDS/ΜL (ref 0.6–4.47)
LYMPHOCYTES NFR BLD AUTO: 4 % (ref 14–44)
MAGNESIUM SERPL-MCNC: 2 MG/DL (ref 1.6–2.6)
MCH RBC QN AUTO: 33.1 PG (ref 26.8–34.3)
MCHC RBC AUTO-ENTMCNC: 30.8 G/DL (ref 31.4–37.4)
MCV RBC AUTO: 107 FL (ref 82–98)
MONOCYTES # BLD AUTO: 1.05 THOUSAND/ΜL (ref 0.17–1.22)
MONOCYTES NFR BLD AUTO: 9 % (ref 4–12)
NEUTROPHILS # BLD AUTO: 10.01 THOUSANDS/ΜL (ref 1.85–7.62)
NEUTS SEG NFR BLD AUTO: 84 % (ref 43–75)
NRBC BLD AUTO-RTO: 0 /100 WBCS
PHOSPHATE SERPL-MCNC: 7.4 MG/DL (ref 2.7–4.5)
PLATELET # BLD AUTO: 184 THOUSANDS/UL (ref 149–390)
PMV BLD AUTO: 11.4 FL (ref 8.9–12.7)
POTASSIUM SERPL-SCNC: 4.9 MMOL/L (ref 3.5–5.3)
PROTHROMBIN TIME: 16.6 SECONDS (ref 11.6–14.5)
RBC # BLD AUTO: 2.48 MILLION/UL (ref 3.88–5.62)
SODIUM SERPL-SCNC: 130 MMOL/L (ref 136–145)
TIBC SERPL-MCNC: 166 UG/DL (ref 250–450)
TPA PPP QL CHRO: 71 % OF NORMAL (ref 77–138)
WBC # BLD AUTO: 11.96 THOUSAND/UL (ref 4.31–10.16)

## 2019-10-03 PROCEDURE — 85025 COMPLETE CBC W/AUTO DIFF WBC: CPT | Performed by: INTERNAL MEDICINE

## 2019-10-03 PROCEDURE — 99232 SBSQ HOSP IP/OBS MODERATE 35: CPT | Performed by: INTERNAL MEDICINE

## 2019-10-03 PROCEDURE — 85362 FIBRIN DEGRADATION PRODUCTS: CPT | Performed by: PSYCHIATRY & NEUROLOGY

## 2019-10-03 PROCEDURE — 85730 THROMBOPLASTIN TIME PARTIAL: CPT | Performed by: PSYCHIATRY & NEUROLOGY

## 2019-10-03 PROCEDURE — 92526 ORAL FUNCTION THERAPY: CPT

## 2019-10-03 PROCEDURE — 82728 ASSAY OF FERRITIN: CPT | Performed by: INTERNAL MEDICINE

## 2019-10-03 PROCEDURE — 85379 FIBRIN DEGRADATION QUANT: CPT | Performed by: PSYCHIATRY & NEUROLOGY

## 2019-10-03 PROCEDURE — 85420 FIBRINOLYTIC PLASMINOGEN: CPT | Performed by: PSYCHIATRY & NEUROLOGY

## 2019-10-03 PROCEDURE — 83550 IRON BINDING TEST: CPT | Performed by: INTERNAL MEDICINE

## 2019-10-03 PROCEDURE — 85384 FIBRINOGEN ACTIVITY: CPT | Performed by: PSYCHIATRY & NEUROLOGY

## 2019-10-03 PROCEDURE — 83735 ASSAY OF MAGNESIUM: CPT | Performed by: INTERNAL MEDICINE

## 2019-10-03 PROCEDURE — 85300 ANTITHROMBIN III ACTIVITY: CPT | Performed by: PSYCHIATRY & NEUROLOGY

## 2019-10-03 PROCEDURE — 85610 PROTHROMBIN TIME: CPT | Performed by: PSYCHIATRY & NEUROLOGY

## 2019-10-03 PROCEDURE — 84100 ASSAY OF PHOSPHORUS: CPT | Performed by: INTERNAL MEDICINE

## 2019-10-03 PROCEDURE — 99239 HOSP IP/OBS DSCHRG MGMT >30: CPT | Performed by: INTERNAL MEDICINE

## 2019-10-03 PROCEDURE — 80048 BASIC METABOLIC PNL TOTAL CA: CPT | Performed by: INTERNAL MEDICINE

## 2019-10-03 PROCEDURE — 83540 ASSAY OF IRON: CPT | Performed by: INTERNAL MEDICINE

## 2019-10-03 RX ORDER — MIDODRINE HYDROCHLORIDE 5 MG/1
10 TABLET ORAL
Status: DISCONTINUED | OUTPATIENT
Start: 2019-10-03 | End: 2019-10-03 | Stop reason: HOSPADM

## 2019-10-03 RX ORDER — OXYCODONE HYDROCHLORIDE 5 MG/1
5 TABLET ORAL EVERY 4 HOURS PRN
Qty: 30 TABLET | Refills: 0 | Status: ON HOLD | OUTPATIENT
Start: 2019-10-03 | End: 2019-10-15

## 2019-10-03 RX ORDER — MIDODRINE HYDROCHLORIDE 10 MG/1
10 TABLET ORAL
Qty: 12 TABLET | Refills: 0 | Status: ON HOLD | OUTPATIENT
Start: 2019-10-03 | End: 2019-11-21 | Stop reason: SDUPTHER

## 2019-10-03 RX ORDER — OMEPRAZOLE 20 MG/1
20 TABLET, DELAYED RELEASE ORAL DAILY
Qty: 30 TABLET | Refills: 0 | Status: SHIPPED | OUTPATIENT
Start: 2019-10-03

## 2019-10-03 RX ORDER — GABAPENTIN 100 MG/1
100 CAPSULE ORAL DAILY
Qty: 30 CAPSULE | Refills: 0 | Status: SHIPPED | OUTPATIENT
Start: 2019-10-04 | End: 2019-11-21 | Stop reason: HOSPADM

## 2019-10-03 RX ORDER — CHOLECALCIFEROL (VITAMIN D3) 10 MCG
1 TABLET ORAL
Qty: 30 CAPSULE | Refills: 0 | Status: SHIPPED | OUTPATIENT
Start: 2019-10-03 | End: 2020-01-10 | Stop reason: ALTCHOICE

## 2019-10-03 RX ORDER — CIPROFLOXACIN 500 MG/1
500 TABLET, FILM COATED ORAL EVERY 24 HOURS
Qty: 1 TABLET | Refills: 0 | Status: SHIPPED | OUTPATIENT
Start: 2019-10-03 | End: 2019-10-04

## 2019-10-03 RX ADMIN — OXYCODONE HYDROCHLORIDE 5 MG: 5 TABLET ORAL at 12:01

## 2019-10-03 RX ADMIN — OXYCODONE HYDROCHLORIDE 5 MG: 5 TABLET ORAL at 02:33

## 2019-10-03 RX ADMIN — OXYCODONE HYDROCHLORIDE 5 MG: 5 TABLET ORAL at 06:33

## 2019-10-03 RX ADMIN — HEPARIN SODIUM 5000 UNITS: 5000 INJECTION INTRAVENOUS; SUBCUTANEOUS at 06:34

## 2019-10-03 RX ADMIN — GABAPENTIN 100 MG: 100 CAPSULE ORAL at 09:19

## 2019-10-03 RX ADMIN — OMEPRAZOLE 20 MG: 20 CAPSULE, DELAYED RELEASE ORAL at 06:34

## 2019-10-03 RX ADMIN — OXYCODONE HYDROCHLORIDE 2.5 MG: 5 TABLET ORAL at 09:19

## 2019-10-03 NOTE — PROGRESS NOTES
NEPHROLOGY PROGRESS NOTE   Danelle Cooney 64 y o  male MRN: [id number]  Unit/Bed#: -01 Encounter: [id number]  Reason for Consult: ESRD    ASSESSMENT/PLAN:  1  ESRD on HD MWF at [facility]  - was on CVVHD, off since 9/24/19  - continue HD MWF  - patient wants to switch to our dialysis unit at [facility] but no chairs available, [address] unavailable to accept patient, patient will go back to [address] [facility] unit  2  Access- perm cath placement by IR 9/30  3  Anemia- hgb slightly decreased from admission  4  Shewanella putrefaciens bacteremia- ID on board  - continue cipro until 10/4  - hemorrhagic bullae in bilateral LE  5  Encephalopathy- likely secondary to infection  6  Hypotension- acute on chronic  - not on any antihypertensive medications at home  - start midodrine 5mg pre dialysis treatment, increase to 10mg as patient still low BP on HD yesterday  7  Hyperphosphatemia- initially binders on hold due to patient not eating well  - restarted binders phoslo 1334mg TID with meals  - place on a renal diet  8  Hyperkalemia- mild, monitor  9  Hyponatremia- monitor  - may be secondary to D5W, discontinued 10/1  10  PCKD with renal and hepatic cysts    Disposition:  Ok for discharge today    SUBJECTIVE:  Patient denies SOB  Still with leg pain  Had a long discussion with patient about dialysis clinic options  I also then called his wife Rolando March to explain the situation with her   also aware of my discussion with patient and wife  All in all, patient will need to [facility] [address] [facility] as [facility] not have a chair and [address] does not offer beds during treatment  Wife very frustrated as she feels [facility] not [facility] appropriately  She is upset he will need to go back [facility]  She understands he can try to transfer to the [facility] unit once he is discharged  She understands that we want to discharge him from the hospital today as old [place] [facility] has a bed available for him  Upon talking with Dr Mich Santiago, he stated the [facility] unit states the patient is noncompliant with dialysis treatments and only shows 1-2 times per week       Time of discussion with  and wife was 30 minutes total     OBJECTIVE:  Current Weight: Weight - Scale: 86 2 kg (190 lb 0 6 oz)  Vitals:    10/02/19 2243 10/03/19 0540 10/03/19 0600 10/03/19 0824   BP: (!) 82/52 92/58  96/55   BP Location: Right arm Right arm  Right arm   Pulse: 104   (!) 109   Resp: 18   18   Temp: 98 4 °F (36 9 °C)   98 2 °F (36 8 °C)   TempSrc: Oral   Oral   SpO2: 92%   93%   Weight:   86 2 kg (190 lb 0 6 oz)    Height:           Intake/Output Summary (Last 24 hours) at 10/3/2019 1137  Last data filed at 10/2/2019 2117  Gross per 24 hour   Intake 1040 ml   Output    Net 1040 ml     General: NAD  Skin: no diffuse rash  HEENT: normocephalic  Neck: supple  Lungs: CTAB  Cardiac: RRR  Extremities: + bullous rash over extremities  GI: soft nt nd  Neuro: alert awake  Psych: slow to respond, appears confused    Medications:    Current Facility-Administered Medications:     acetaminophen (TYLENOL) tablet 650 mg, 650 mg, Oral, Q6H PRN, Leon Watson MD, 650 mg at 09/29/19 2032    b complex-vitamin C-folic acid (NEPHROCAPS) capsule 1 capsule, 1 capsule, Oral, Daily With Nicki Patten MD, 1 capsule at 10/02/19 1813    calcium acetate (PHOSLO) capsule 1,334 mg, 1,334 mg, Oral, TID With Meals, Eliseo Ortega, PAReguloC    ciprofloxacin (CIPRO) tablet 500 mg, 500 mg, Oral, Q24H, Leon Watson MD, 500 mg at 10/02/19 1813    epoetin bob (EPOGEN,PROCRIT) injection 4,000 Units, 4,000 Units, Intravenous, After Dialysis, Jamel Rainey MD, 4,000 Units at 10/02/19 1239    gabapentin (NEURONTIN) capsule 100 mg, 100 mg, Oral, Daily, Jordan C Holter, DO, 100 mg at 10/03/19 0919    heparin (porcine) subcutaneous injection 5,000 Units, 5,000 Units, Subcutaneous, Q8H [facility], Jordan C Holter, DO, 5,000 Units at 10/03/19 0634    metoprolol tartrate (LOPRESSOR) partial tablet 12 5 mg, 12 5 mg, Oral, Q12H [address], Paticia Salines, DO, 12 5 mg at 10/01/19 2121    midodrine (PROAMATINE) tablet 10 mg, 10 mg, Oral, Before Dialysis, [facility], PA-C    omeprazole (PriLOSEC) oral suspension 20 mg, 20 mg, Oral, Early Morning, Jordan C Holter, DO, 20 mg at 10/03/19 5125    oxyCODONE (ROXICODONE) IR tablet 2 5 mg, 2 5 mg, Oral, Q4H PRN, Jordan C Holter, DO, 2 5 mg at 10/03/19 0919    oxyCODONE (ROXICODONE) IR tablet 5 mg, 5 mg, Oral, Q4H PRN, Jordan C Holter, DO, 5 mg at 10/03/19 8976    polyethylene glycol (MIRALAX) packet 17 g, 17 g, Oral, Daily PRN, Shweta Oshea MD    senna-docusate sodium (SENOKOT S) 8 6-50 mg per tablet 1 tablet, 1 tablet, Oral, HS, Shweta Oshea MD, 1 tablet at 10/02/19 2217    Laboratory Results:  Results from last 7 days   Lab Units 10/03/19  0532 10/02/19  0947 10/01/19  0519 09/30/19  1313 09/30/19  1311 09/30/19  0658 09/30/19  0645 09/29/19  0533 09/28/19  2158 09/28/19  1427 09/28/19  0816 09/27/19  0453   WBC Thousand/uL 11 96* 15 29* 15 11*  --   --  14 62*  --  16 31*  --   --  15 03* 18 91*   HEMOGLOBIN g/dL 8 2* 8 6* 8 3*  --   --  9 5*  --  9 1* 9 3* 8 7* 9 0* 9 4*   HEMATOCRIT % 26 6* 26 0* 26 2*  --   --  28 6*  --  27 9* 29 4* 26 7* 28 4* 29 5*   PLATELETS Thousands/uL 184 151 96*  --   --  99*  --  72*  --   --  57* 45*   POTASSIUM mmol/L 4 9 6 3* 5 3 4 3 4 2  --  6 2*  --   --   --  4 9  --    CHLORIDE mmol/L 94* 93* 96* 97* 96*  --  92*  --   --   --  99*  --    CO2 mmol/L 25 23 24 25 26  --  20*  --   --   --  25  --    BUN mg/dL 51* 81* 60* 51* 49*  --  95*  --   --   --  65*  --    CREATININE mg/dL 5 38* 7 34* 5 55* 4 45* 4 41*  --  7 54*  --   --   --  4 75*  --    CALCIUM mg/dL 8 0* 8 3 7 9* 8 4 8 4  --  8 5  --   --   --  9 2  --    MAGNESIUM mg/dL 2 0  --   --   --   --   --   --   --   --   --   --   --    PHOSPHORUS mg/dL 7 4*  --   --   --  5 2*  --   --   --   --   --   --   --

## 2019-10-03 NOTE — ASSESSMENT & PLAN NOTE
DIC panel demonstrated abnormal fibrinogen, fibrin split products and dimer; likely secondary to infection

## 2019-10-03 NOTE — SPEECH THERAPY NOTE
Speech Language/Pathology    Speech/Language Pathology Progress Note    Patient Name: Yanira Sung  MFRYS'S Date: 10/3/2019      Subjective:  Pt asleep upon arrival  Easily roused to auditory stim with increased SYMONE this session  Pt c/o tongue hurting, SLP observed redness/sores on surface and anterior portion of tongue  Pt also c/o limited food options on current diet at hospital, and is eager to be upgraded to regular diet  Objective:  Pt seen for ongoing diagnostic dysphagia tx  Assessed diet tolerance and possible upgrade at lunch with finely chopped turkey and gravy, bread, and pears  Pt w/ good bolus acceptance without anterior loss  Slow mastication, though observed to take very large bites of turkey/bread  Pt independently cut smaller bites as session progressed  Adequate oral control with thin liquids via cup and straw  Timely swallow initiation  No overt s/s aspiration with all consistencies  Assessment:  Pt presents with improving oral dysphagia given increased SYMONE this session, however rec to continue current diet given fluctuating mental status  May consider advancing diet at next level of care as mental status remains stable  Plan/Recommendations:  Continue dysphagia level 2 diet w/ thin liquids     Meds as tolerated  Aspiration precautions and compensatory strategies: only feed when fully awake/alert, small bites and sips, slow rate, reduce distractions, alternate bites and sips

## 2019-10-03 NOTE — ASSESSMENT & PLAN NOTE
Lower extremity cellulitis and hemorrhagic bullae  Initial blood cultures demonstrated gram-negative rods, specifically Shewanella Putrifacians, a marine acquired bacteria  Nidus for infection PermCath (replaced by surgery using temporary catheter and reinserted by interventional radiology) versus lower extremity cellulitis; less likely renal cyst demonstrated on CT (drainage and drain placement by IR-subsequent negative culture)  Initial Sepsis criteria achieved:  tachycardia, obvious infection and leukocytosis  · Infectious Disease consult appreciated  · Continue ciprofloxacin through 10/04/2019  · Continue to monitor temperature and CBC  · Wound care consult appreciated  · Continue local wound care  · Dermatology consult appreciated-no biopsy recommended  · Wound care as outlined above  · Antibiotics as outlined above  · Podiatry consult appreciated-previous debridement  Presence of eschars  · Continue local wound care  · Cardiology consult appreciated-AFib with RVR  · Continue metoprolol 12 5 mg b i d  (hold for hypotension)  · No anti coagulation recommended at this time; recommended outpatient cardiology management [facility] cardiology [place])  · Limited echo recommended in addition to eventual ischemic evaluation outpatient  · Nephrology consult appreciated-resume usual HD schedule  · Interventional radiology consult appreciated  · PermCath and drain present

## 2019-10-03 NOTE — ASSESSMENT & PLAN NOTE
Present blood pressure 96/55  · Continue to monitor vitals    · Continue metoprolol 12 5 mg; hold parameters in presence of hypotension changed per cardiology (systolic less than 90)

## 2019-10-03 NOTE — PROGRESS NOTES
Cardiology Progress Note - Yanira Sung 64 y o  male MRN: [id number]    Unit/Bed#: -01 Encounter: [id number]        Subjective:    No significant events overnight  Remains in NSR  Pain is improving  Review of Systems   Cardiovascular: Negative for chest pain, leg swelling and palpitations  Respiratory: Negative for shortness of breath  Objective:   Vitals: Blood pressure 92/58, pulse 104, temperature 98 4 °F (36 9 °C), temperature source Oral, resp  rate 18, height 5' 8" (1 727 m), weight 86 2 kg (190 lb 0 6 oz), SpO2 92 %  , Body mass index is 28 89 kg/m² ,   Orthostatic Blood Pressures      Most Recent Value   Blood Pressure  92/58 filed at 10/03/2019 0540   Patient Position - Orthostatic VS  Lying filed at 10/02/2019 4427         Systolic (04CLZ), GSC:67 , Min:79 , WRN:153     Diastolic (20CBL), FQH:57, Min:44, Max:61      Intake/Output Summary (Last 24 hours) at 10/3/2019 0823  Last data filed at 10/2/2019 2117  Gross per 24 hour   Intake 1240 ml   Output    Net 1240 ml     Weight (last 2 days)     Date/Time   Weight    10/03/19 0600   86 2 (190 04)    10/02/19 0600   84 1 (185 3)    10/01/19 0600   85 1 (187 61)                  Telemetry Review: NSR    Physical Exam   Cardiovascular: Normal rate, regular rhythm and normal heart sounds  Exam reveals no gallop and no friction rub  No murmur heard  Pulmonary/Chest: Breath sounds normal  He has no wheezes  He has no rales  Musculoskeletal: He exhibits no edema           Laboratory Results:        CBC with diff:   Results from last 7 days   Lab Units 10/03/19  0532 10/02/19  8614 10/01/19  0519 09/30/19  6363 09/29/19  0533 09/28/19  2158 09/28/19  1427 09/28/19  0816 09/27/19  0453   WBC Thousand/uL 11 96* 15 29* 15 11* 14 62* 16 31*  --   --  15 03* 18 91*   HEMOGLOBIN g/dL 8 2* 8 6* 8 3* 9 5* 9 1* 9 3* 8 7* 9 0* 9 4*   HEMATOCRIT % 26 6* 26 0* 26 2* 28 6* 27 9* 29 4* 26 7* 28 4* 29 5*   MCV fL 107* 103* 106* 103* 103*  --   --  105* 105*   PLATELETS Thousands/uL 184 151 96* 99* 72*  --   --  57* 45*   MCH pg 33 1 34 0 33 5 34 1 33 7  --   --  33 2 33 3   MCHC g/dL 30 8* 33 1 31 7 33 2 32 6  --   --  31 7 31 9   RDW % 16 9* 16 8* 17 7* 17 2* 17 2*  --   --  17 2* 16 5*   MPV fL 11 4 11 6 12 1 11 5 12 1  --   --  12 3 12 3   NRBC AUTO /100 WBCs 0  --  0 0 0  --   --   --   --          CMP:  Results from last 7 days   Lab Units 10/03/19  0532 10/02/19  0947 10/01/19  0519 09/30/19  1313 09/30/19  1311 09/30/19  0645 09/28/19  0816   POTASSIUM mmol/L 4 9 6 3* 5 3 4 3 4 2 6 2* 4 9   CHLORIDE mmol/L 94* 93* 96* 97* 96* 92* 99*   CO2 mmol/L 25 23 24 25 26 20* 25   BUN mg/dL 51* 81* 60* 51* 49* 95* 65*   CREATININE mg/dL 5 38* 7 34* 5 55* 4 45* 4 41* 7 54* 4 75*   CALCIUM mg/dL 8 0* 8 3 7 9* 8 4 8 4 8 5 9 2   EGFR ml/min/1 73sq m 11 7 10 14 14 7 13         BMP:  Results from last 7 days   Lab Units 10/03/19  0532 10/02/19  0947 10/01/19  0519 09/30/19  1313 09/30/19  1311 09/30/19  0645 09/28/19  0816   POTASSIUM mmol/L 4 9 6 3* 5 3 4 3 4 2 6 2* 4 9   CHLORIDE mmol/L 94* 93* 96* 97* 96* 92* 99*   CO2 mmol/L 25 23 24 25 26 20* 25   BUN mg/dL 51* 81* 60* 51* 49* 95* 65*   CREATININE mg/dL 5 38* 7 34* 5 55* 4 45* 4 41* 7 54* 4 75*   CALCIUM mg/dL 8 0* 8 3 7 9* 8 4 8 4 8 5 9 2       BNP:     Magnesium:   Results from last 7 days   Lab Units 10/03/19  0532   MAGNESIUM mg/dL 2 0       Coags:   Results from last 7 days   Lab Units 10/03/19  0200 10/02/19  0947 10/01/19  0519 09/30/19  0647   PTT seconds 41*  --   --   --    INR  1 42* 1 20* 1 52* 1 40*           Cardiac testing:   Results for orders placed during the hospital encounter of 09/21/19   Echo complete with contrast if indicated    Narrative [place] [id number], [address] [facility]  [phone]    Transthoracic Echocardiogram  2D, M-mode, Doppler, and Color Doppler    Study date:  23-Sep-2019    Patient: Montez Duron  MR number: [id number]  Account number: [id number]  : 1960  Age: 64 years  Gender: Male  Status: Inpatient  Location: Bedside  Height: 68 in  Weight: 198 7 lb  BP: 88/ 53 mmHg    Indications: Septic shock  Diagnoses: R57 9 - Shock, unspecified    Sonographer:  Dimple Noguera RDCS  Referring Physician:  Enrrique Estrada PA-C  Group:  [facility]  Interpreting Physician:  Issac Carroll DO    SUMMARY    LEFT VENTRICLE:  The ventricle was dilated  Systolic function was markedly reduced  Ejection fraction was estimated to be 20 %  There was severe diffuse hypokinesis with regional variations  Wall thickness was mildly increased  The changes were consistent with eccentric hypertrophy  Left ventricular diastolic function parameters were abnormal     RIGHT VENTRICLE:  The ventricle was mildly to moderately dilated  Systolic function was moderately reduced  LEFT ATRIUM:  The atrium was moderately dilated  RIGHT ATRIUM:  The atrium was mildly dilated  MITRAL VALVE:  There was mild regurgitation  TRICUSPID VALVE:  There was moderate regurgitation  Estimated peak PA pressure was 46 mmHg  The findings suggest mild pulmonary hypertension  PULMONIC VALVE:  There was mild regurgitation  PERICARDIUM:  There was a left pleural effusion  HISTORY: PRIOR HISTORY: ESRD on dialysis  PROCEDURE: The procedure was performed at the bedside  This was a routine study  The transthoracic approach was used  The study included complete 2D imaging, M-mode, complete spectral Doppler, and color Doppler  The heart rate was 97 bpm,  at the start of the study  Intravenous contrast ( 1 2 mL of Definity in NSS) was administered to opacify the left ventricle  Echocardiographic views were limited due to restricted patient mobility  Image quality was adequate  LEFT VENTRICLE: The ventricle was dilated  Systolic function was markedly reduced  Ejection fraction was estimated to be 20 %   There was severe diffuse hypokinesis with regional variations  Wall thickness was mildly increased  The changes  were consistent with eccentric hypertrophy  No evidence of apical thrombus  DOPPLER: Left ventricular diastolic function parameters were abnormal     VENTRICULAR SEPTUM: There was diastolic flattening  These changes are consistent with RV volume overload  RIGHT VENTRICLE: The ventricle was mildly to moderately dilated  Systolic function was moderately reduced  LEFT ATRIUM: The atrium was moderately dilated  RIGHT ATRIUM: The atrium was mildly dilated  MITRAL VALVE: Valve structure was normal  There was normal leaflet separation  DOPPLER: The transmitral velocity was within the normal range  There was no evidence for stenosis  There was mild regurgitation  AORTIC VALVE: The valve was trileaflet  Leaflets exhibited normal thickness and normal cuspal separation  DOPPLER: Transaortic velocity was within the normal range  There was no evidence for stenosis  There was no regurgitation  TRICUSPID VALVE: The valve structure was normal  There was normal leaflet separation  DOPPLER: The transtricuspid velocity was within the normal range  There was no evidence for stenosis  There was moderate regurgitation  Pulmonary artery  systolic pressure was mildly increased  Estimated peak PA pressure was 46 mmHg  The findings suggest mild pulmonary hypertension  PULMONIC VALVE: Leaflets exhibited normal thickness, no calcification, and normal cuspal separation  DOPPLER: The transpulmonic velocity was within the normal range  There was mild regurgitation  PERICARDIUM: There was no pericardial effusion  There was a left pleural effusion  The pericardium was normal in appearance  AORTA: The root exhibited normal size  SYSTEMIC VEINS: IVC: The inferior vena cava was mildly dilated  Respirophasic changes were blunted (less than 50% variation)      SYSTEM MEASUREMENT TABLES    2D  %FS: 16 42 %  Ao Diam: 3 47 cm  EDV(Teich): 202 97 ml  EF Biplane: 21 5 %  EF(Teich): 33 73 %  ESV(Teich): 134 52 ml  IVSd: 1 13 cm  LA Area: 29 62 cm2  LA Diam: 4 27 cm  LVEDV MOD A2C: 134 96 ml  LVEDV MOD A4C: 168 64 ml  LVEDV MOD BP: 156 71 ml  LVEF MOD A2C: 13 35 %  LVEF MOD A4C: 23 3 %  LVESV MOD A2C: 116 94 ml  LVESV MOD A4C: 129 35 ml  LVESV MOD BP: 123 01 ml  LVIDd: 6 32 cm  LVIDs: 5 29 cm  LVLd A2C: 8 49 cm  LVLd A4C: 8 82 cm  LVLs A2C: 8 6 cm  LVLs A4C: 8 59 cm  LVPWd: 1 13 cm  RA Area: 19 52 cm2  RVIDd: 4 84 cm  SV MOD A2C: 18 02 ml  SV MOD A4C: 39 29 ml  SV(Teich): 68 45 ml    CW  TR MaxP 78 mmHg  TR Vmax: 2 82 m/s    MM  TAPSE: 1 26 cm    PW  E': 0 07 m/s  E/E': 12 01  MV A Jorje: 0 3 m/s  MV Dec Dixon: 8 51 m/s2  MV DecT: 92 39 ms  MV E Jorje: 0 79 m/s  MV E/A Ratio: 2 66  MV PHT: 26 79 ms  MVA By PHT: 8 21 cm2    [employer] Accredited Echocardiography Laboratory    Prepared and electronically signed by    Partha Ramesh DO  Signed 23-Sep-2019 13:24:36         Meds/Allergies     Current Facility-Administered Medications:  acetaminophen 650 mg Oral Q6H PRN Aliyah Francis MD   b complex-vitamin C-folic acid 1 capsule Oral Daily With Aviva Hadley MD   ciprofloxacin 500 mg Oral Q24H Aliyah Francis MD   epoetin bob 4,000 Units Intravenous After Dialysis Norman Rivas MD   gabapentin 100 mg Oral Daily Jordan C Holter, DO   heparin (porcine) 5,000 Units Subcutaneous Q8H [facility] Jordan C Holter, DO   metoprolol tartrate 12 5 mg Oral Q12H [facility], DO   midodrine 5 mg Oral Before Dialysis Norman Rivas MD   omeprazole (PRILOSEC) oral suspension 20 mg Oral Early Morning Jordan C Holter, DO   oxyCODONE 2 5 mg Oral Q4H PRN [place] C Holter, DO   oxyCODONE 5 mg Oral Q4H PRN [place] C Holter,    polyethylene glycol 17 g Oral Daily PRN Aliyah Francis MD   senna-docusate sodium 1 tablet Oral HS Aliyah Francis MD        Medications Prior to Admission   Medication    calcium acetate (PHOSLO) 667 mg capsule    Multiple Vitamin (MULTIVITAMIN) tablet  NON FORMULARY    Sucroferric Oxyhydroxide (VELPHORO PO)       Assessment:  Principal Problem:    Gram-negative bacteremia  Active Problems:    Chronic kidney disease with end stage renal failure on dialysis (HCC)    Cellulitis of lower extremity- bilateral    Polycystic liver disease    Total bilirubin, elevated    Thrombocytopenia (HCC)    Polycystic kidney disease    Benign essential hypertension    Atrial fibrillation (HCC)      Impression:  1  Cardiomyopathy - unclear etiology  Will need ischemic eval, but suspect non-ischemic component  2  ESRD - on HD  3  Sepsis - GN bacteremia  On antibiotics  4  PAF - in NSR  Holding anticoagulation due to thrombocytopenia  No further episodes  Plan:  1  Will continue b-blockers as BP allows  2  Will need w/u for cardiomyopathy once stabilizes  Possibly as an outpatient

## 2019-10-03 NOTE — TRANSPORTATION MEDICAL NECESSITY
Section I - General Information    Name of Patient: Rip Krishnan                 : 1960    Medicare #: [id number]  Transport Date: 10/03/19 (PCS is valid for round trips on this date and for all repetitive trips in the 60-day range as noted below )  Origin: [address]: [address]  Is the pt's stay covered under Medicare Part A (PPS/DRG)   []     Closest appropriate facility? If no, why is transport to more distant facility required? Yes  If hospice pt, is this transport related to pt's terminal illness? NA       Section II - Medical Necessity Questionnaire  Ambulance transportation is medically necessary only if other means of transport are contraindicated or would be potentially harmful to the patient  To meet this requirement, the patient must either be "bed confined" or suffer from a condition such that transport by means other than ambulance is contraindicated by the patient's condition  The following questions must be answered by the medical professional signing below for this form to be valid:    1)  Describe the MEDICAL CONDITION (physical and/or mental) of this patient AT [address] [place] [address] that requires the patient to be transported in an ambulance and why transport by other means is contraindicated by the patient's condition: Cellulitis of lower extremity; Peripheral vascular disease; Gram-negative bacteremia; Extensive lower extremity wounds - Bullae on left lower extremity have ruptured  Most of the exposed tissue is dark black which is concerning for necrosis and the possible formation of a thin layer of eschar  Intact, blood filled bullae on upper left leg/thigh area  2) Is the patient "bed confined" as defined below?      Yes  To be "be confined" the patient must satisfy all three of the following conditions: (1) unable to get up from bed without Assistance; AND (2) unable to ambulate; AND (3) unable to sit in a chair or wheelchair  3) Can this patient safely be transported by car or wheelchair van (i e , seated during transport without a medical attendant or monitoring)? No    4) In addition to completing questions 1-3 above, please check any of the following conditions that apply*:   *Note: supporting documentation for any boxes checked must be maintained in the patient's medical records  If hosp-hosp transfer, describe services needed at 2nd facility not available at 1st facility? Moderate/severe pain on movement   Medical attendant required   Unable to tolerate seated position for time needed to transport   Other(specify) [place] high fall risk; Patient requires mechanical lift for all OOB transfers      Section III - Signature of Physician or Healthcare Professional  I certify that the above information is true and correct based on my evaluation of this patient, and represent that the patient requires transport by ambulance and that other forms of transport are contraindicated  I understand that this information will be used by the Centers for Medicare and Medicaid Services (CMS) to support the determination of medical necessity for ambulance services, and I represent that I have personal knowledge of the patient's condition at time of transport  [x]  If this box is checked, I also certify that the patient is physically or mentally incapable of signing the ambulance service's claim and that the institution with which I am affiliated has furnished care, services, or assistance to the patient  My signature below is made on behalf of the patient pursuant to 42 CFR §424 36(b)(4)   In accordance with 42 CFR §424 37, the specific reason(s) that the patient is physically or mentally incapable of signing the claim form is as follows:       Signature of Physician* or Healthcare Professional______________________________________________________________  Signature Date 10/03/19 (For scheduled repetitive transports, this form is not valid for transports performed more than 60 days after this date)    Printed Name & Credentials of Physician or Healthcare Professional (MD, DO, RN, etc )________________________________  *Form must be signed by patient's attending physician for scheduled, repetitive transports   For non-repetitive, unscheduled ambulance transports, if unable to obtain the signature of the attending physician, any of the following may sign (choose appropriate option below)  [] Physician Assistant []  Clinical Nurse Specialist []  Registered Nurse  []  Nurse Practitioner  [x] Discharge Planner

## 2019-10-03 NOTE — DISCHARGE SUMMARY
Discharge- Romy Esteban 1960, 64 y o  male MRN: [id number]    Unit/Bed#: -01 Encounter: [id number]    Primary Care Provider: Aditya Payne MD   Date and time admitted to hospital: 9/21/2019  7:56 PM    * Gram-negative bacteremia  Assessment & Plan  Lower extremity cellulitis and hemorrhagic bullae  Initial blood cultures demonstrated gram-negative rods, specifically Shewanella Putrifacians, a marine acquired bacteria  Nidus for infection PermCath (replaced by surgery using temporary catheter and reinserted by interventional radiology) versus lower extremity cellulitis; less likely renal cyst demonstrated on CT (drainage and drain placement by IR-subsequent negative culture)  Initial Sepsis criteria achieved:  tachycardia, obvious infection and leukocytosis  · Infectious Disease consult appreciated  · Continue ciprofloxacin through 10/04/2019  · Continue to monitor temperature and CBC  · Wound care consult appreciated  · Continue local wound care  · Dermatology consult appreciated-no biopsy recommended  · Wound care as outlined above  · Antibiotics as outlined above  · Podiatry consult appreciated-previous debridement  Presence of eschars  · Continue local wound care  · Cardiology consult appreciated-AFib with RVR  · Continue metoprolol 12 5 mg b i d  (hold for hypotension)  · No anti coagulation recommended at this time; recommended outpatient cardiology management [facility] cardiology [facility])  · Limited echo recommended in addition to eventual ischemic evaluation outpatient  · Nephrology consult appreciated-resume usual HD schedule  · Interventional radiology consult appreciated  · PermCath and drain present  Atrial fibrillation [facility])  Assessment & Plan  Paroxysmal atrial fibrillation with RVR  Not noted on telemetry  · See above for Cardiology recommendations; no recommendation to anticoagulate      Benign essential hypertension  Assessment & Plan  Present blood pressure 96/55  · Continue to monitor vitals  · Continue metoprolol 12 5 mg; hold parameters in presence of hypotension changed per cardiology (systolic less than 90)     Polycystic kidney disease  Assessment & Plan  Ultrasound right upper quadrant demonstrated hypertrophic and polycystic right kidney  Patient possesses autosomal dominant polycystic kidney disease and cyst infection noted on CT  · Catheter present  Thrombocytopenia ([facility] [address] )  Assessment & Plan  DIC panel demonstrated abnormal fibrinogen, fibrin split products and dimer; likely secondary to infection  Polycystic liver disease  Assessment & Plan   Innumerable hepatic cysts demonstrated on CTA abdomen  Cellulitis of lower extremity- bilateral  Assessment & Plan  · Continue antibiotics per infectious disease recommendations  · Continue wound care per recommendations above  · Continue Gabapentin 100 mg daily for lower extremity pain  Chronic kidney disease with end stage renal failure on dialysis [facility])  Assessment & Plan  · Continue to trend BMP  · Continue HD Monday, Wednesday, Friday per nephrology recommendation; PermCath present  Discharging Physician / Practitioner: Bella Stinson DO  PCP: Austin Santos MD  Admission Date:   Admission Orders (From admission, onward)     Ordered        09/21/19 2302  Inpatient Admission (expected length of stay for this patient Order details is greater than two midnights)  Once                   Discharge Date: 10/03/19    Resolved Problems  Date Reviewed: 10/3/2019          Resolved    Hyponatremia 9/26/2019     Resolved by  Jorge Barnes PA-C    High anion gap metabolic acidosis 3/00/1745     Resolved by  Jorge Barnes PA-C    Lactic acidosis 9/26/2019     Resolved by  Jorge Barnes PA-C    Septic shock ([facility] [address] ) 9/26/2019     Resolved by  Bella Stinson DO          Consultations During Hospital Stay:  · Infectious disease  · Nephrology  · Cardiology  · General surgery    · Vascular surgery  · Interventional Radiology  · Physical therapy  · Podiatry  · Occupational therapy  · Speech language pathology  Procedures Performed:   · Atrial line insertion  · Temporary catheter insertion/removal   · PermCath removal/insertion  · Right renal cyst aspiration  Significant Findings / Test Results:   · CTA abdomen-monitor peripheral arterial disease  Subcutaneous edema of the lower legs; cellulitis versus PAD  Polycystic kidney/liver disease and innumerable cysts; calcified and hemorrhagic  Right lower quadrant cystic mass, calcifications and heterogeneous hyperdensity; infected right renal cyst versus renal cell carcinoma  Submucosal deposition within the mildly thickened urinary bladder; chronic cystitis  Distended stomach; no small bowel obstruction  Diverticulosis without diverticulitis  · VAS lower limb venous Doppler-no evidence of acute or chronic DVT  No evidence of superficial thrombophlebitis  · VAS ROSALINE-left lower limb ankle/brachial index 1 94  Right lower limb unable to obtain secondary to blisters on patient has a foot/ankle  · XR chest-no acute cardiopulmonary disease  · US right upper quadrant-innumerable hepatic right renal cysts  · Blood culture-gram negative rods (Shewanella putrefaciens)  · Lactic acid-6 4  · Procalcitonin-63  · Troponin-0 06, 0 05   · Fibrinogen-569  · DIC-abnormal fibrin split, dimer, antithrombin III, plasminogen, fibrinogen, PTT, PT/INR  · Hepatic function-total bili 3 9, direct bili 2 7, total protein 6 3  · Ferritin 1339  · TIBC 166, iron 16, iron saturation 10%  · CMP-sodium 129, potassium 6 1 creatinine 9 25   · Calcium, ionized-1 01  Incidental Findings:   · Right inguinal hernia with associated large right hydrocele  Test Results Pending at Discharge (will require follow up): · None  Outpatient Tests Requested:  · None  Complications:  None      Reason for Admission:  Severe sepsis and gram-negative bacteremia secondary to lower extremity cellulitis  Hospital Course:     Charlotte Tay is a 64 y o  male patient that originally [facility] ED 9/21/2019 due to lower extremity pain  Initial vitals in the ED:  Temperature 98 8° F, blood pressure 98/62, heart rate 124, SpO2 99%  See above for pertinent labs and imaging  Patient admitted to [address] [place] [address]; initially critical care/ICU  Severe sepsis secondary to lower extremity cellulitis in presence of lactic acidosis, tachycardia, bandemia and nidus of infection  See above for consultants and recommendations  IV antibiotics continued  Labs trended  Removal of PermCath  Insertion of temporary catheter  Aspiration and insertion of drain in presence of infected right renal cyst   Continuation of hemodialysis on Monday/Wednesday and Friday schedule  Intermittent hypotension, although patient's vitals stable  Primary complaint of lower extremity pain, managed by pain regimen including addition gabapentin 100 mg q d  Continuation of local wound care and intermittent debridement as warranted  Labs trending downward pertaining to patient's gram-negative bacteremia/sepsis  Patient and patient's spouse amenable to discharge to [address] [place] [address] for STR and continuation of dialysis  Please see above list of diagnoses and related plan for additional information  Condition at Discharge: stable     Discharge Day Visit / Exam:     Subjective:  No acute distress noted at time of exam   He endorsed lower extremity pain, although he stated it is lesser in comparison to previous exam   He denied chest pain/palpitations, shortness of breath/breathing problems and nausea/vomiting/diarrhea  He endorsed appropriate appetite stated he is eating well  Also, he stated that he is sleeping OK  Discussed with patient and patient's spouse discharge to [address] [place] [address] for short-term rehab-he is amenable    Vitals: Blood Pressure: 96/55 (10/03/19 0824)  Pulse: (!) 109 (10/03/19 0824)  Temperature: 98 2 °F (36 8 °C) (10/03/19 0824)  Temp Source: Oral (10/03/19 0824)  Respirations: 18 (10/03/19 0824)  Height: 5' 8" (172 7 cm) (09/23/19 0949)  Weight - Scale: 86 2 kg (190 lb 0 6 oz) (10/03/19 0600)  SpO2: 93 % (10/03/19 0824)  Exam:   Physical Exam   Constitutional: He is oriented to person, place, and time  Vital signs are normal  He appears well-developed  He is cooperative  He appears ill  No distress  HENT:   Head: Normocephalic and atraumatic  Eyes: Pupils are equal, round, and reactive to light  EOM are normal    Neck: Normal range of motion  Neck supple  Cardiovascular: Normal rate, regular rhythm, normal heart sounds, intact distal pulses and normal pulses  Exam reveals no friction rub  No murmur heard  Pulses:       Dorsalis pedis pulses are 2+ on the right side, and 2+ on the left side  Pulmonary/Chest: Effort normal and breath sounds normal  No respiratory distress  He has no decreased breath sounds  Abdominal: Soft  Normal appearance and bowel sounds are normal  He exhibits no distension and no abdominal bruit  There is no tenderness  There is no rigidity and no rebound  Musculoskeletal:   Dressing and bandaging of lower extremities intact bilaterally  Serosanguineous drainage noted  Erythema and edema noted  Bullae and eschars noted  Neurological: He is alert and oriented to person, place, and time  Skin: Skin is warm and dry  Capillary refill takes less than 2 seconds  Psychiatric: He has a normal mood and affect  His speech is normal and behavior is normal  Judgment and thought content normal  Cognition and memory are normal  He is attentive  Nursing note and vitals reviewed  Discussion with Family:  Patient's spouse  Discharge instructions/Information to patient and family:   See after visit summary for information provided to patient and family        Provisions for Follow-Up Care:  See after visit summary for information related to follow-up care and any pertinent home health orders  Disposition:     Laura Owen (see below)    For Discharges to [address]:   · [facility] [place] [facility] to reach physician and no message left  Planned Readmission:  None  Discharge Statement:  I spent greater than 30 minutes discharging the patient  This time was spent on the day of discharge  I had direct contact with the patient on the day of discharge  Greater than 50% of the total time was spent examining patient, answering all patient questions, arranging and discussing plan of care with patient as well as directly providing post-discharge instructions  Additional time then spent on discharge activities  Discharge Medications:  See after visit summary for reconciled discharge medications provided to patient and family        ** Please Note: This note has been constructed using a voice recognition system **

## 2019-10-03 NOTE — PROGRESS NOTES
Progress Note - Infectious Disease   Rissa Arts 64 y o  male MRN: [id number]  Unit/Bed#: -01 Encounter: [id number]      Impression/Plan:  1  Septic shock, POA   Leukopenia, tachycardia, refractory hypotension   Due to #2/3   No other appreciable source   Clinically stable, off pressors, afebrile with WBC trending down to 11 9 today  Rec:  ? Continue antibiotic as below  ? Follow temperatures and [address]  ? Supportive care as per the primary service     2   Shewanella putrefaciens bacteremia    Suspect primary skin portal of entry with cellulitis as below with secondary infection of HD catheter, now removed   Less likely due to renal cyst as fluid culture negative   Repeat blood cultures negative  Rec:  ? Continue Cipro for 14 days total of antibiotics through 10/4/19  ? Following mental status closely on quinolone although mild confusion preceded initiation     3   Bilateral lower extremity necrotizing skin infection   Suspect due to underlying chronic foot wounds in setting of recent creek/pond water exposure   Appeared superficial with healthy tissue under ruptured bullae making deep infection less likely   Cellulitis improved but now with evolving necrotic wounds after bullae rupture  Rec:  ? Continue antibiotics as above  ? Serial exams  ? [facility] per Podiatry and RN     4   ESRD on HD   [facility]   Now status post removal of temporary catheter and placement of new PermCath today in setting of #2   Noted in Care Everywhere to be noncompliant with diet, HD  Rec:  ? Dose adjust antibiotic for dialysis  ? New PermCath placed 10/1/19      5   Polycystic kidney/liver disease   Extensive disease seen on CT imaging      6   Chronic foot wounds   Likely multifactorial   Appear superficial   Risk factor for infection as above  Rec:  ? Outpatient Podiatry and wound center follow-up  ?  Advised infection prevention strategies including keeping feet and wounds covered as much as possible and no environmental exposures, especially water sources      The above plan was discussed in detail with patient, RN, and primary care team      Antibiotics:  Cipro #9  Antibiotics #13     Subjective:  He is for bilateral leg wound care later today  Yesterday's wound dressing change photos reviewed  Legs are painful   Patient is groggy and falling asleep during visit but requesting pain medication for wound dressing and wanted to know when he can have a different diet   He denies fevers, chills, sweats, nausea, vomiting, or diarrhea  Objective:  Vitals:  Temp:  [98 °F (36 7 °C)-99 °F (37 2 °C)] 98 4 °F (36 9 °C)  HR:  [] 104  Resp:  [18] 18  BP: ()/(44-61) 92/58  SpO2:  [92 %-98 %] 92 %  Temp (24hrs), Av 6 °F (37 °C), Min:98 °F (36 7 °C), Max:99 °F (37 2 °C)  Current: Temperature: 98 4 °F (36 9 °C)    Physical Exam:   General Appearance:  Arousable from sleep, falls asleep during visit, nontoxic, no acute distress  Throat: Oropharynx moist without lesions  Lungs:   Clear to auscultation bilaterally; no wheezes, rhonchi or rales; respirations unlabored   Heart:  RRR; no murmur   Abdomen:   Soft, non-tender, non-distended, positive bowel sounds  Extremities: Bilateral leg Ace wraps in place from toes to thighs without strike through drainage or erythema outside of Ace wraps  Yesterday's wound photo dressings reviewed with blisters deflated and no purulence or redness surrounding wound edges that are cleaning up with local wound care   Skin: No new rashes or lesions    Anterior right chest wall PermCath site nontender, noninflamed     Labs, Imaging, & Other studies:   All pertinent labs and imaging studies were personally reviewed  Results from last 7 days   Lab Units 10/03/19  0532 10/02/19  0947 10/01/19  0519   WBC Thousand/uL 11 96* 15 29* 15 11*   HEMOGLOBIN g/dL 8 2* 8 6* 8 3*   PLATELETS Thousands/uL 184 151 96*     Results from last 7 days   Lab Units 10/03/19  0532   POTASSIUM mmol/L 4 9   CHLORIDE mmol/L 94*   CO2 mmol/L 25   BUN mg/dL 51*   CREATININE mg/dL 5 38*   EGFR ml/min/1 73sq m 11   CALCIUM mg/dL 8 0*     Results from last 7 days   Lab Units 09/27/19  0453   PROCALCITONIN ng/ml 23 03*

## 2019-10-03 NOTE — SOCIAL WORK
OSCAR heard from the nephrology team that DEPART[facility] OF [facility] was not able to accept patient at this time  Nephrology PA did reach out to patient and family and update them on this and that at this point he would need to return to [facility] and work with them on getting transferred to another site of their choice  Patient is medically stable for discharge  CM set up BLS transport through [facility] and spoke with Bernice LEVY 2:45 PM transport was arranged with [facility]  Medical necessity was completed and provided to primary nurse  A copy was made a placed in medical records for scanning  CM reviewed discharge plan and time with patient and wife  CM answered all patient's wife's questions about switching dialysis units and why this couldn't happen at this point  CM notified [facility] liaison that patient would be discharging to them and CM was asked to make arrangements for transportation for HD tomorrow  CM contacted his [facility] site to notify them that he would be returning and to confirm his chair time  Patient was a MWF 5:35 AM   Patient is on for 3 hours and 10 min  CM called [facility] and spoke with Shekhar Wade and [facility] KEO  CM was told that they were unable to do the transport that they did not have a crew available that early  CM contacted [facility] and spoke with Theresa Fountain and and asked him if this was something that we could provide  He reported that yes it was something that we could do  When CM then tried to actually set up the transport Theresa Fountain said he had to ask his supervisor to get approval       CM attempted to get transportation from additional EMS companies including [employer] , [employer], [employer], and [employer]  None of them are able to provide this transportation to get patient to his HD  [facility] was able to provide a transport home from the HD  CM contacted [facility] and left a message requesting a call back to discuss possibly getting his chair time switched    OSCAR received a call back from [facility] who originally told CM that they did not have any availability to change patient's chair time but that someone could follow up tomorrow the  Evalene Collet to see if they could move anything around  CM then received another call from [facility] who reported that they could take patient at a 2:00 PM slot  CM accepted this chair time for tomorrow  CM then contacted [facility] and changed the transportation arrangements  The morning return trip was cancelled and a new trip was scheduled  Patient will get picked up at 1:15 pm and then again at 5:30 pm to return to facility  CM updated Colleen the [facility] liaison with the new HD chair time info for just tomorrow  Since patient is already at [facility], [facility] staff will notify patient and his family of the change in chair time for tomorrow  No further CM discharge needs were discussed or identified

## 2019-10-03 NOTE — ASSESSMENT & PLAN NOTE
· Continue to trend BMP  · Continue HD Monday, Wednesday, Friday per nephrology recommendation; Phani mckya

## 2019-10-03 NOTE — ASSESSMENT & PLAN NOTE
· Continue antibiotics per infectious disease recommendations  · Continue wound care per recommendations above  · Continue Gabapentin 100 mg daily for lower extremity pain

## 2019-10-03 NOTE — PLAN OF CARE
Dysphagia level 2 w/ thin liquids  Meds as tolerated  Aspiration precautions and compensatory strategies (small bites/sips, slow rate, only feed when awake/alert)

## 2019-10-04 ENCOUNTER — TRANSITIONAL CARE MANAGEMENT (OUTPATIENT)
Dept: FAMILY MEDICINE CLINIC | Facility: MEDICAL CENTER | Age: 59
End: 2019-10-04

## 2019-10-04 ENCOUNTER — TELEPHONE (OUTPATIENT)
Dept: INFECTIOUS DISEASES | Facility: CLINIC | Age: 59
End: 2019-10-04

## 2019-10-04 NOTE — TELEPHONE ENCOUNTER
Debra Patron called me from the dialysis center for this pt  He presented to the center today with a temp of 100 degrees  Also, they state he was not dc'd with a dose of cipro for today  I did contact Carly Potts, the nurse on his unit at [facility] and they took a verbal order for patient to receive his cipro this afternoon once he returns from dialysis  I also let her know per Dr Lorie Stanton that we should not be concerned with temps less than 100 4  I contacted the dialysis center back and Dr Kylah Johnson spoke with me to inform that she has done blood cultures and questions whether we want her to give this patient any antibiotics with HD, and I stated that he completes his oral course of cipro today and that at this time, there is no need for IV abx  Dr Kylah Johnson verbalized understanding and will continue to monitor this patient and call us with any further questions or concerns

## 2019-10-05 ENCOUNTER — TELEPHONE (OUTPATIENT)
Dept: OTHER | Facility: OTHER | Age: 59
End: 2019-10-05

## 2019-10-05 NOTE — TELEPHONE ENCOUNTER
Sent this message to Dr Daksha Shen @ [phone]/ Xuan Buck from [place]   Lenoard Friends  98- / Abnormal labs       [facility] 20-30 minutes if no call back from Dr Stephen Pittman

## 2019-10-06 ENCOUNTER — APPOINTMENT (EMERGENCY)
Dept: RADIOLOGY | Facility: HOSPITAL | Age: 59
DRG: 314 | End: 2019-10-06
Payer: MEDICARE

## 2019-10-06 ENCOUNTER — TELEPHONE (OUTPATIENT)
Dept: OTHER | Facility: OTHER | Age: 59
End: 2019-10-06

## 2019-10-06 ENCOUNTER — HOSPITAL ENCOUNTER (INPATIENT)
Facility: HOSPITAL | Age: 59
LOS: 9 days | Discharge: NURSING, REHAB OR LONG-TERM CARE | DRG: 314 | End: 2019-10-16
Attending: EMERGENCY MEDICINE | Admitting: INTERNAL MEDICINE
Payer: MEDICARE

## 2019-10-06 DIAGNOSIS — R78.81 GRAM-NEGATIVE BACTEREMIA: ICD-10-CM

## 2019-10-06 DIAGNOSIS — K40.90 INGUINAL HERNIA: ICD-10-CM

## 2019-10-06 DIAGNOSIS — T14.8XXA MULTIPLE WOUNDS OF SKIN: ICD-10-CM

## 2019-10-06 DIAGNOSIS — T07.XXXA MULTIPLE WOUNDS: ICD-10-CM

## 2019-10-06 DIAGNOSIS — Q44.6 POLYCYSTIC LIVER DISEASE: Chronic | ICD-10-CM

## 2019-10-06 DIAGNOSIS — L03.119 CELLULITIS OF LOWER EXTREMITY: ICD-10-CM

## 2019-10-06 DIAGNOSIS — R60.0 LOWER EXTREMITY EDEMA: ICD-10-CM

## 2019-10-06 DIAGNOSIS — Q61.2 ADPKD (AUTOSOMAL DOMINANT POLYCYSTIC KIDNEY): Chronic | ICD-10-CM

## 2019-10-06 DIAGNOSIS — M79.89 SWELLING OF LOWER EXTREMITY: ICD-10-CM

## 2019-10-06 DIAGNOSIS — G62.9 PERIPHERAL NEUROPATHY: Chronic | ICD-10-CM

## 2019-10-06 DIAGNOSIS — I95.9 HYPOTENSION: Chronic | ICD-10-CM

## 2019-10-06 DIAGNOSIS — K56.7 ILEUS (HCC): ICD-10-CM

## 2019-10-06 DIAGNOSIS — E87.70 VOLUME OVERLOAD: Primary | ICD-10-CM

## 2019-10-06 DIAGNOSIS — T14.90XD HEALING WOUND: ICD-10-CM

## 2019-10-06 DIAGNOSIS — N18.6 CHRONIC KIDNEY DISEASE WITH END STAGE RENAL FAILURE ON DIALYSIS (HCC): Chronic | ICD-10-CM

## 2019-10-06 DIAGNOSIS — Z99.2 CHRONIC KIDNEY DISEASE WITH END STAGE RENAL FAILURE ON DIALYSIS (HCC): Chronic | ICD-10-CM

## 2019-10-06 PROBLEM — D63.1 ANEMIA DUE TO CHRONIC KIDNEY DISEASE, ON CHRONIC DIALYSIS (HCC): Chronic | Status: ACTIVE | Noted: 2019-10-06

## 2019-10-06 LAB
ALBUMIN SERPL BCP-MCNC: 1.5 G/DL (ref 3.5–5)
ALP SERPL-CCNC: 236 U/L (ref 46–116)
ALT SERPL W P-5'-P-CCNC: 20 U/L (ref 12–78)
ANION GAP SERPL CALCULATED.3IONS-SCNC: 11 MMOL/L (ref 4–13)
ANISOCYTOSIS BLD QL SMEAR: PRESENT
APTT PPP: 34 SECONDS (ref 23–37)
AST SERPL W P-5'-P-CCNC: 32 U/L (ref 5–45)
BASOPHILS # BLD MANUAL: 0.07 THOUSAND/UL (ref 0–0.1)
BASOPHILS NFR MAR MANUAL: 1 % (ref 0–1)
BILIRUB SERPL-MCNC: 2.9 MG/DL (ref 0.2–1)
BUN SERPL-MCNC: 60 MG/DL (ref 5–25)
CALCIUM SERPL-MCNC: 8.6 MG/DL (ref 8.3–10.1)
CHLORIDE SERPL-SCNC: 92 MMOL/L (ref 100–108)
CO2 SERPL-SCNC: 29 MMOL/L (ref 21–32)
CREAT SERPL-MCNC: 7.54 MG/DL (ref 0.6–1.3)
DOHLE BOD BLD QL SMEAR: PRESENT
EOSINOPHIL # BLD MANUAL: 0.14 THOUSAND/UL (ref 0–0.4)
EOSINOPHIL NFR BLD MANUAL: 2 % (ref 0–6)
ERYTHROCYTE [DISTWIDTH] IN BLOOD BY AUTOMATED COUNT: 15.9 % (ref 11.6–15.1)
GFR SERPL CREATININE-BSD FRML MDRD: 7 ML/MIN/1.73SQ M
GLUCOSE SERPL-MCNC: 105 MG/DL (ref 65–140)
GLUCOSE SERPL-MCNC: 109 MG/DL (ref 65–140)
HCT VFR BLD AUTO: 30.4 % (ref 36.5–49.3)
HGB BLD-MCNC: 9.4 G/DL (ref 12–17)
HYPERCHROMIA BLD QL SMEAR: PRESENT
INR PPP: 1.34 (ref 0.84–1.19)
LACTATE SERPL-SCNC: 2.1 MMOL/L (ref 0.5–2)
LG PLATELETS BLD QL SMEAR: PRESENT
LYMPHOCYTES # BLD AUTO: 0.9 THOUSAND/UL (ref 0.6–4.47)
LYMPHOCYTES # BLD AUTO: 13 % (ref 14–44)
MCH RBC QN AUTO: 32.4 PG (ref 26.8–34.3)
MCHC RBC AUTO-ENTMCNC: 30.9 G/DL (ref 31.4–37.4)
MCV RBC AUTO: 105 FL (ref 82–98)
MONOCYTES # BLD AUTO: 0.49 THOUSAND/UL (ref 0–1.22)
MONOCYTES NFR BLD: 7 % (ref 4–12)
NEUTROPHILS # BLD MANUAL: 5.2 THOUSAND/UL (ref 1.85–7.62)
NEUTS BAND NFR BLD MANUAL: 9 % (ref 0–8)
NEUTS SEG NFR BLD AUTO: 66 % (ref 43–75)
NRBC BLD AUTO-RTO: 0 /100 WBCS
PLATELET # BLD AUTO: 263 THOUSANDS/UL (ref 149–390)
PLATELET BLD QL SMEAR: ADEQUATE
PMV BLD AUTO: 10.3 FL (ref 8.9–12.7)
POLYCHROMASIA BLD QL SMEAR: PRESENT
POTASSIUM SERPL-SCNC: 4.8 MMOL/L (ref 3.5–5.3)
PROCALCITONIN SERPL-MCNC: 10.48 NG/ML
PROT SERPL-MCNC: 6.3 G/DL (ref 6.4–8.2)
PROTHROMBIN TIME: 15.9 SECONDS (ref 11.6–14.5)
RBC # BLD AUTO: 2.9 MILLION/UL (ref 3.88–5.62)
SODIUM SERPL-SCNC: 132 MMOL/L (ref 136–145)
TOTAL CELLS COUNTED SPEC: 100
TOXIC GRANULES BLD QL SMEAR: PRESENT
VARIANT LYMPHS # BLD AUTO: 2 %
WBC # BLD AUTO: 6.93 THOUSAND/UL (ref 4.31–10.16)

## 2019-10-06 PROCEDURE — 87040 BLOOD CULTURE FOR BACTERIA: CPT | Performed by: EMERGENCY MEDICINE

## 2019-10-06 PROCEDURE — 85007 BL SMEAR W/DIFF WBC COUNT: CPT | Performed by: EMERGENCY MEDICINE

## 2019-10-06 PROCEDURE — 99220 PR INITIAL OBSERVATION CARE/DAY 70 MINUTES: CPT | Performed by: INTERNAL MEDICINE

## 2019-10-06 PROCEDURE — 82948 REAGENT STRIP/BLOOD GLUCOSE: CPT

## 2019-10-06 PROCEDURE — 85730 THROMBOPLASTIN TIME PARTIAL: CPT | Performed by: EMERGENCY MEDICINE

## 2019-10-06 PROCEDURE — 96365 THER/PROPH/DIAG IV INF INIT: CPT

## 2019-10-06 PROCEDURE — 99284 EMERGENCY DEPT VISIT MOD MDM: CPT | Performed by: EMERGENCY MEDICINE

## 2019-10-06 PROCEDURE — 71045 X-RAY EXAM CHEST 1 VIEW: CPT

## 2019-10-06 PROCEDURE — 36415 COLL VENOUS BLD VENIPUNCTURE: CPT | Performed by: EMERGENCY MEDICINE

## 2019-10-06 PROCEDURE — 85610 PROTHROMBIN TIME: CPT | Performed by: EMERGENCY MEDICINE

## 2019-10-06 PROCEDURE — 80053 COMPREHEN METABOLIC PANEL: CPT | Performed by: EMERGENCY MEDICINE

## 2019-10-06 PROCEDURE — 84145 PROCALCITONIN (PCT): CPT | Performed by: EMERGENCY MEDICINE

## 2019-10-06 PROCEDURE — 83605 ASSAY OF LACTIC ACID: CPT | Performed by: EMERGENCY MEDICINE

## 2019-10-06 PROCEDURE — 99285 EMERGENCY DEPT VISIT HI MDM: CPT

## 2019-10-06 PROCEDURE — 85027 COMPLETE CBC AUTOMATED: CPT | Performed by: EMERGENCY MEDICINE

## 2019-10-06 RX ORDER — SIMETHICONE 80 MG
80 TABLET,CHEWABLE ORAL ONCE
Status: COMPLETED | OUTPATIENT
Start: 2019-10-06 | End: 2019-10-06

## 2019-10-06 RX ORDER — NICOTINE POLACRILEX 2 MG
1 LOZENGE BUCCAL DAILY
COMMUNITY
End: 2020-01-10 | Stop reason: ALTCHOICE

## 2019-10-06 RX ORDER — OXYCODONE HYDROCHLORIDE 5 MG/1
5 TABLET ORAL EVERY 4 HOURS PRN
Status: DISPENSED | OUTPATIENT
Start: 2019-10-06 | End: 2019-10-13

## 2019-10-06 RX ORDER — PANTOPRAZOLE SODIUM 40 MG/1
40 TABLET, DELAYED RELEASE ORAL
Status: DISCONTINUED | OUTPATIENT
Start: 2019-10-07 | End: 2019-10-16 | Stop reason: HOSPADM

## 2019-10-06 RX ORDER — SIMETHICONE 80 MG
80 TABLET,CHEWABLE ORAL EVERY 6 HOURS PRN
Status: DISCONTINUED | OUTPATIENT
Start: 2019-10-06 | End: 2019-10-16 | Stop reason: HOSPADM

## 2019-10-06 RX ORDER — CIPROFLOXACIN 2 MG/ML
400 INJECTION, SOLUTION INTRAVENOUS ONCE
Status: COMPLETED | OUTPATIENT
Start: 2019-10-06 | End: 2019-10-06

## 2019-10-06 RX ORDER — ACETAMINOPHEN 325 MG/1
650 TABLET ORAL EVERY 6 HOURS PRN
Status: DISCONTINUED | OUTPATIENT
Start: 2019-10-06 | End: 2019-10-16 | Stop reason: HOSPADM

## 2019-10-06 RX ORDER — CHOLECALCIFEROL (VITAMIN D3) 10 MCG
1 TABLET ORAL
Status: DISCONTINUED | OUTPATIENT
Start: 2019-10-07 | End: 2019-10-16 | Stop reason: HOSPADM

## 2019-10-06 RX ORDER — HEPARIN SODIUM 5000 [USP'U]/ML
5000 INJECTION, SOLUTION INTRAVENOUS; SUBCUTANEOUS EVERY 8 HOURS SCHEDULED
Status: DISCONTINUED | OUTPATIENT
Start: 2019-10-06 | End: 2019-10-16 | Stop reason: HOSPADM

## 2019-10-06 RX ORDER — MIDODRINE HYDROCHLORIDE 5 MG/1
10 TABLET ORAL
Status: DISCONTINUED | OUTPATIENT
Start: 2019-10-06 | End: 2019-10-16 | Stop reason: HOSPADM

## 2019-10-06 RX ORDER — ONDANSETRON 2 MG/ML
4 INJECTION INTRAMUSCULAR; INTRAVENOUS EVERY 6 HOURS PRN
Status: DISCONTINUED | OUTPATIENT
Start: 2019-10-06 | End: 2019-10-16 | Stop reason: HOSPADM

## 2019-10-06 RX ORDER — GABAPENTIN 100 MG/1
100 CAPSULE ORAL DAILY
Status: DISCONTINUED | OUTPATIENT
Start: 2019-10-07 | End: 2019-10-16 | Stop reason: HOSPADM

## 2019-10-06 RX ADMIN — CIPROFLOXACIN 400 MG: 2 INJECTION, SOLUTION INTRAVENOUS at 19:23

## 2019-10-06 RX ADMIN — SIMETHICONE CHEW TAB 80 MG 80 MG: 80 TABLET ORAL at 21:15

## 2019-10-06 NOTE — TELEPHONE ENCOUNTER
Twylla Sensing from [address] called regarding pt needing to go to the Er  Directly Sourav to Twylla Sensing from [address]

## 2019-10-06 NOTE — ED PROVIDER NOTES
History  Chief Complaint   Patient presents with    Abdominal Pain     Pt presents to the ED with c/o abdominal pain and swelling that started a few days ago  Pt also has B/L leg infections that he was being treated for at the [facility], pt was sent by staff for evaluation of swelling in his abdomen     63 y/o male presents today from [address] [place] [address] facility with concern for infection in his leg wounds and abdominal swelling  He was recently admitted for gram negative bacteremia due to lower extremity wounds and was discharged 3 days ago on Cipro  His last day of Cipro was yesterday  He is also on hemodialysis and his last treatment was Friday  Bps have been running low per [address], however the patient states his blood pressures are usually  systolic  He states he is not due for dialysis until Tuesday  No fever  Patient has no complaints at this time  History provided by:  Patient, medical records, nursing home and EMS personnel      Prior to Admission Medications   Prescriptions Last Dose Informant Patient Reported? Taking? Multiple Vitamin (MULTIVITAMIN) tablet   Yes Yes   Sig: Take 1 tablet by mouth daily   NON FORMULARY   Yes No   Sig: Hemodialysis Monday, Wednesday, Friday   Sucroferric Oxyhydroxide (VELPHORO PO)   Yes Yes   Sig: Take 600 mg by mouth If the patient eats       1 tablet (600 mg) with meals  0 5 tablet (300 mg) with snacks   b complex-vitamin C-folic acid (NEPHROCAPS) 1 mg capsule   No Yes   Sig: Take 1 capsule by mouth daily with dinner   calcium acetate (PHOSLO) 667 mg capsule   Yes Yes   Sig: Take 338 mg by mouth If the patient eats       4 tablets (1352 mg) with meals  2 tablets (676 mg) with snacks   gabapentin (NEURONTIN) 100 mg capsule   No Yes   Sig: Take 1 capsule (100 mg total) by mouth daily   metoprolol tartrate (LOPRESSOR) 25 mg tablet   No Yes   Sig: Take 0 5 tablets (12 5 mg total) by mouth every 12 (twelve) hours   midodrine (PROAMATINE) 10 MG tablet   No Yes Sig: Take 1 tablet (10 mg total) by mouth Before Dialysis (30 mins before dialysis) for up to 12 doses   multivitamin-iron-minerals-folic acid (THERAPEUTIC-M) TABS tablet   Yes Yes   Sig: Take 1 tablet by mouth daily   omeprazole (PriLOSEC OTC) 20 MG tablet   No Yes   Sig: Take 1 tablet (20 mg total) by mouth daily   oxyCODONE (ROXICODONE) 5 mg immediate release tablet   No Yes   Sig: Take 1 tablet (5 mg total) by mouth every 4 (four) hours as needed for severe pain for up to 10 daysMax Daily Amount: 30 mg      Facility-Administered Medications: None       Past Medical History:   Diagnosis Date    Complication of renal dialysis     Polycystic kidney disease        Past Surgical History:   Procedure Laterality Date    IR IMAGE GUIDED ASPIRATION / DRAINAGE  9/23/2019    IR [place] [facility] [employer] [facility] PLACEMENT  9/30/2019       History reviewed  No pertinent family history  I have reviewed and agree with the history as documented  Social History     Tobacco Use    Smoking status: Never Smoker    Smokeless tobacco: Never Used   Substance Use Topics    Alcohol use: Not on file    Drug use: Not on file        Review of Systems   Constitutional: Positive for fatigue  Negative for chills  Respiratory: Negative for chest tightness and shortness of breath  Cardiovascular: Positive for leg swelling  Negative for chest pain  Gastrointestinal: Positive for abdominal distention  Musculoskeletal: Positive for back pain (chronic)  Neurological: Negative for dizziness and headaches  Psychiatric/Behavioral: Negative for confusion  Physical Exam  Physical Exam   Constitutional: He is oriented to person, place, and time  Appears chronically ill   HENT:   Head: Normocephalic and atraumatic  Eyes: Pupils are equal, round, and reactive to light  EOM are normal    Neck: Normal range of motion  Cardiovascular: Normal rate and regular rhythm     Pulmonary/Chest: Effort normal and breath sounds normal    Abdominal: He exhibits distension (moderately, soft)  There is no tenderness  There is no rebound  Neurological: He is alert and oriented to person, place, and time  Skin: Capillary refill takes less than 2 seconds  Patient has healing wounds with eschar present on b/l lower legs  There is some dependent edema present in both thighs with some residual erythema present  Psychiatric: He has a normal mood and affect  Nursing note and vitals reviewed  Vital Signs  ED Triage Vitals [10/06/19 1840]   Temperature Pulse Respirations Blood Pressure SpO2   99 9 °F (37 7 °C) 65 16 100/55 99 %      Temp Source Heart Rate Source Patient Position - Orthostatic VS BP Location FiO2 (%)   Oral Monitor Lying Right arm --      Pain Score       --           Vitals:    10/06/19 1840   BP: 100/55   Pulse: 65   Patient Position - Orthostatic VS: Lying         Visual Acuity      ED Medications  Medications   ciprofloxacin (CIPRO) IVPB (premix) 400 mg (400 mg Intravenous New Bag 10/6/19 1923)       Diagnostic Studies  Results Reviewed     Procedure Component Value Units Date/Time    CBC and differential [[id number]]  (Abnormal) Collected:  10/06/19 1908    Lab Status:  Final result Specimen:  Blood from Arm, Left Updated:  10/06/19 1953     WBC 6 93 Thousand/uL      RBC 2 90 Million/uL      Hemoglobin 9 4 g/dL      Hematocrit 30 4 %       fL      MCH 32 4 pg      MCHC 30 9 g/dL      RDW 15 9 %      MPV 10 3 fL      Platelets 688 Thousands/uL      nRBC 0 /100 WBCs     Narrative: This is an appended report  These results have been appended to a previously verified report  Lactic acid x2 [[id number]]  (Abnormal) Collected:  10/06/19 1908    Lab Status:  Final result Specimen:  Blood from Arm, Left Updated:  10/06/19 1943     LACTIC ACID 2 1 mmol/L     Narrative:       Result may be elevated if tourniquet was used during collection      Comprehensive metabolic panel [[id number]]  (Abnormal) Collected:  10/06/19 1908    Lab Status:  Final result Specimen:  Blood from Arm, Left Updated:  10/06/19 1931     Sodium 132 mmol/L      Potassium 4 8 mmol/L      Chloride 92 mmol/L      CO2 29 mmol/L      ANION GAP 11 mmol/L      BUN 60 mg/dL      Creatinine 7 54 mg/dL      Glucose 109 mg/dL      Calcium 8 6 mg/dL      AST 32 U/L      ALT 20 U/L      Alkaline Phosphatase 236 U/L      Total Protein 6 3 g/dL      Albumin 1 5 g/dL      Total Bilirubin 2 90 mg/dL      eGFR 7 ml/min/1 73sq m     Narrative:       [place] guidelines for Chronic Kidney Disease (CKD):     Stage 1 with normal or high GFR (GFR > 90 mL/min/1 73 square meters)    Stage 2 Mild CKD (GFR = 60-89 mL/min/1 73 square meters)    Stage 3A Moderate CKD (GFR = 45-59 mL/min/1 73 square meters)    Stage 3B Moderate CKD (GFR = 30-44 mL/min/1 73 square meters)    Stage 4 Severe CKD (GFR = 15-29 mL/min/1 73 square meters)    Stage 5 End Stage CKD (GFR <15 mL/min/1 73 square meters)  Note: GFR calculation is accurate only with a steady state creatinine    Protime-INR [[id number]]  (Abnormal) Collected:  10/06/19 1908    Lab Status:  Final result Specimen:  Blood from Arm, Left Updated:  10/06/19 1926     Protime 15 9 seconds      INR 1 34    APTT [[id number]]  (Normal) Collected:  10/06/19 1908    Lab Status:  Final result Specimen:  Blood from Arm, Left Updated:  10/06/19 1926     PTT 34 seconds     Blood culture #2 [[id number]] Collected:  10/06/19 1922    Lab Status: In process Specimen:  Blood from Arm, Left Updated:  10/06/19 1925    Procalcitonin [[id number]] Collected:  10/06/19 1908    Lab Status: In process Specimen:  Blood from Arm, Left Updated:  10/06/19 1913    Blood culture #1 [[id number]] Collected:  10/06/19 1908    Lab Status:   In process Specimen:  Blood from Arm, Left Updated:  10/06/19 1913    Lactic acid x2 [[id number]]     Lab Status:  No result Specimen:  Blood                  XR chest 1 view portable    (Results Pending) Procedures  Procedures       ED Course                               MDM  Number of Diagnoses or Management Options  Healing wound: new and requires workup  Lower extremity edema: new and requires workup  Volume overload: new and requires workup  Diagnosis management comments: MDM: Patient presents to the Emergency Department and was diagnosed with acute volume overload  This is a new problem that will require additional planned work-up in a hospitalized setting  Clinical laboratory testing, radiology imaging, and medical testing (EKG) were ordered  I independently reviewed the radiologic imaging, EKG, and laboratory studies  This case is considered high risk secondary to the above listed disease process that poses a threat to bodily function that requires further diagnostic testing and management which may include the administration of parenteral controlled substances  Discussed with NOLAN  We had a detailed discussion of the patient's condition and case,  including need for admission  Accepts to his/her service  Bed request/bridging orders placed  Patient's lactate is 2 1  He does not meet sepsis criteria and is overall fluid overloaded  I discussed with hospitalist and we do not think patient needs IVF resucitation at this time             Amount and/or Complexity of Data Reviewed  Clinical lab tests: ordered and reviewed  Tests in the radiology section of CPT®: ordered and reviewed  Tests in the medicine section of CPT®: reviewed and ordered  Obtain history from someone other than the patient: yes  Review and summarize past medical records: yes  Discuss the patient with other providers: yes  Independent visualization of images, tracings, or specimens: yes    Risk of Complications, Morbidity, and/or Mortality  Presenting problems: high  Diagnostic procedures: high  Management options: high    Patient Progress  Patient progress: stable      Disposition  Final diagnoses:   Volume overload   Lower extremity edema   Healing wound     Time reflects when diagnosis was documented in both MDM as applicable and the Disposition within this note     Time User Action Codes Description Comment    10/6/2019  7:04 PM Maryene Ask Add [E87 70] Volume overload     10/6/2019  7:04 PM Karen Deras [G65 364] Lower extremity cellulitis     10/6/2019  7:35 PM Aliene Ask Remove [M56 [id number]] Lower extremity cellulitis     10/6/2019  7:36 PM Aubrie Ask Add [R60 0] Lower extremity edema     10/6/2019  7:39 PM [place], [address] [place] [address] [T14 [id number]] Healing wound       ED Disposition     ED Disposition Condition Date/Time Comment    Admit Stable Sun Oct 6, 2019  7:48 PM Case was discussed with NOLAN and the patient's admission status was agreed to be Admission Status: observation status to the service of Dr Susy Abreu   Follow-up Information    None         Patient's Medications   Discharge Prescriptions    No medications on file     No discharge procedures on file      ED Provider  Electronically Signed by           Wilfrido Reynolds DO  10/06/19 Eyad Shoemaker DO  10/06/19 Emelina

## 2019-10-07 ENCOUNTER — APPOINTMENT (INPATIENT)
Dept: CT IMAGING | Facility: HOSPITAL | Age: 59
DRG: 314 | End: 2019-10-07
Payer: MEDICARE

## 2019-10-07 LAB
ANION GAP SERPL CALCULATED.3IONS-SCNC: 10 MMOL/L (ref 4–13)
ANISOCYTOSIS BLD QL SMEAR: PRESENT
BASOPHILS # BLD MANUAL: 0 THOUSAND/UL (ref 0–0.1)
BASOPHILS NFR MAR MANUAL: 0 % (ref 0–1)
BUN SERPL-MCNC: 70 MG/DL (ref 5–25)
CALCIUM SERPL-MCNC: 8.6 MG/DL (ref 8.3–10.1)
CHLORIDE SERPL-SCNC: 94 MMOL/L (ref 100–108)
CO2 SERPL-SCNC: 27 MMOL/L (ref 21–32)
CREAT SERPL-MCNC: 8.39 MG/DL (ref 0.6–1.3)
DOHLE BOD BLD QL SMEAR: PRESENT
EOSINOPHIL # BLD MANUAL: 0.15 THOUSAND/UL (ref 0–0.4)
EOSINOPHIL NFR BLD MANUAL: 2 % (ref 0–6)
ERYTHROCYTE [DISTWIDTH] IN BLOOD BY AUTOMATED COUNT: 16 % (ref 11.6–15.1)
GFR SERPL CREATININE-BSD FRML MDRD: 6 ML/MIN/1.73SQ M
GLUCOSE SERPL-MCNC: 103 MG/DL (ref 65–140)
GLUCOSE SERPL-MCNC: 107 MG/DL (ref 65–140)
GLUCOSE SERPL-MCNC: 92 MG/DL (ref 65–140)
GLUCOSE SERPL-MCNC: 95 MG/DL (ref 65–140)
GLUCOSE SERPL-MCNC: 96 MG/DL (ref 65–140)
HCT VFR BLD AUTO: 29.2 % (ref 36.5–49.3)
HGB BLD-MCNC: 9 G/DL (ref 12–17)
HYPERCHROMIA BLD QL SMEAR: PRESENT
LG PLATELETS BLD QL SMEAR: PRESENT
LYMPHOCYTES # BLD AUTO: 1.04 THOUSAND/UL (ref 0.6–4.47)
LYMPHOCYTES # BLD AUTO: 14 % (ref 14–44)
MCH RBC QN AUTO: 32.4 PG (ref 26.8–34.3)
MCHC RBC AUTO-ENTMCNC: 30.8 G/DL (ref 31.4–37.4)
MCV RBC AUTO: 105 FL (ref 82–98)
MONOCYTES # BLD AUTO: 0.22 THOUSAND/UL (ref 0–1.22)
MONOCYTES NFR BLD: 3 % (ref 4–12)
NEUTROPHILS # BLD MANUAL: 6.01 THOUSAND/UL (ref 1.85–7.62)
NEUTS BAND NFR BLD MANUAL: 7 % (ref 0–8)
NEUTS SEG NFR BLD AUTO: 74 % (ref 43–75)
NRBC BLD AUTO-RTO: 0 /100 WBCS
PLATELET # BLD AUTO: 232 THOUSANDS/UL (ref 149–390)
PLATELET BLD QL SMEAR: ADEQUATE
PMV BLD AUTO: 10.3 FL (ref 8.9–12.7)
POLYCHROMASIA BLD QL SMEAR: PRESENT
POTASSIUM SERPL-SCNC: 4.9 MMOL/L (ref 3.5–5.3)
RBC # BLD AUTO: 2.78 MILLION/UL (ref 3.88–5.62)
SODIUM SERPL-SCNC: 131 MMOL/L (ref 136–145)
TOTAL CELLS COUNTED SPEC: 100
WBC # BLD AUTO: 7.42 THOUSAND/UL (ref 4.31–10.16)

## 2019-10-07 PROCEDURE — 99232 SBSQ HOSP IP/OBS MODERATE 35: CPT | Performed by: INTERNAL MEDICINE

## 2019-10-07 PROCEDURE — 85027 COMPLETE CBC AUTOMATED: CPT | Performed by: PHYSICIAN ASSISTANT

## 2019-10-07 PROCEDURE — 82948 REAGENT STRIP/BLOOD GLUCOSE: CPT

## 2019-10-07 PROCEDURE — 85007 BL SMEAR W/DIFF WBC COUNT: CPT | Performed by: PHYSICIAN ASSISTANT

## 2019-10-07 PROCEDURE — 80048 BASIC METABOLIC PNL TOTAL CA: CPT | Performed by: PHYSICIAN ASSISTANT

## 2019-10-07 PROCEDURE — 87040 BLOOD CULTURE FOR BACTERIA: CPT | Performed by: INTERNAL MEDICINE

## 2019-10-07 PROCEDURE — 99222 1ST HOSP IP/OBS MODERATE 55: CPT | Performed by: INTERNAL MEDICINE

## 2019-10-07 PROCEDURE — 5A1D70Z PERFORMANCE OF URINARY FILTRATION, INTERMITTENT, LESS THAN 6 HOURS PER DAY: ICD-10-PCS | Performed by: INTERNAL MEDICINE

## 2019-10-07 PROCEDURE — NC001 PR NO CHARGE: Performed by: RADIOLOGY

## 2019-10-07 PROCEDURE — 74177 CT ABD & PELVIS W/CONTRAST: CPT

## 2019-10-07 RX ORDER — ALBUMIN (HUMAN) 12.5 G/50ML
25 SOLUTION INTRAVENOUS AS NEEDED
Status: DISCONTINUED | OUTPATIENT
Start: 2019-10-07 | End: 2019-10-16 | Stop reason: HOSPADM

## 2019-10-07 RX ADMIN — CALCIUM ACETATE 1334 MG: 667 CAPSULE ORAL at 17:52

## 2019-10-07 RX ADMIN — IOHEXOL 100 ML: 350 INJECTION, SOLUTION INTRAVENOUS at 15:11

## 2019-10-07 RX ADMIN — CALCIUM ACETATE 1334 MG: 667 CAPSULE ORAL at 09:09

## 2019-10-07 RX ADMIN — ACETAMINOPHEN 650 MG: 325 TABLET ORAL at 17:52

## 2019-10-07 RX ADMIN — ACETAMINOPHEN 650 MG: 325 TABLET ORAL at 21:17

## 2019-10-07 RX ADMIN — HEPARIN SODIUM 5000 UNITS: 5000 INJECTION INTRAVENOUS; SUBCUTANEOUS at 21:17

## 2019-10-07 RX ADMIN — IOHEXOL 50 ML: 240 INJECTION, SOLUTION INTRATHECAL; INTRAVASCULAR; INTRAVENOUS; ORAL at 15:09

## 2019-10-07 RX ADMIN — GABAPENTIN 100 MG: 100 CAPSULE ORAL at 09:09

## 2019-10-07 RX ADMIN — PANTOPRAZOLE SODIUM 40 MG: 40 TABLET, DELAYED RELEASE ORAL at 06:09

## 2019-10-07 RX ADMIN — Medication 1 CAPSULE: at 17:52

## 2019-10-07 RX ADMIN — HEPARIN SODIUM 5000 UNITS: 5000 INJECTION INTRAVENOUS; SUBCUTANEOUS at 06:08

## 2019-10-07 NOTE — SOCIAL WORK
CM met with patient, patient's spouse and patient's daughter at bedside  Patient appears alert but confused at times and his family provided all history  Patient's family stated they just found out now that patient was readmitted from [facility] and weren't sure why he came to [facility]  Patient's spouse did state she's aware he was supposed to switch to [facility] starting tomorrow for dialysis  Originally, patient's spouse indicated she'd like for patient to return to [facility] upon discharge  OSCAR did mention that [facility] does have a male bedside dialysis bed available as per [facility]  Patient's family expressed interest in this during last admission and patient, his spouse and daughter all would like to meet with [facility] to discuss their program and would like CM to send a referral to [facility] CM sent referral to [facility]  [facility] is on site at [facility] and will meet with patient and his family  CM Department will continue to follow patient

## 2019-10-07 NOTE — PROGRESS NOTES
[facility] Internal [facility] Progress Note  Patient: Rose Best 64 y o  male   MRN: [id number]  PCP: Milton Bingham MD  Unit/Bed#: -01 Encounter: [id number]  Date Of Visit: 10/07/19    Assessment:    Principal Problem:    Hypotension  Active Problems:    Chronic kidney disease with end stage renal failure on dialysis ([facility])    Hyponatremia    Swelling of lower extremity    Lactic acidosis    Benign essential hypertension    Atrial fibrillation ([facility] )    Anemia due to chronic kidney disease, on chronic dialysis ([facility] )      Plan:    Anemia due to chronic kidney disease, on chronic dialysis ([facility] )  Assessment & Plan  · Hemoglobin today is 9 4, monitor      Atrial fibrillation ([facility] )  Assessment & Plan  · Developed atrial fibrillation with rapid ventricular response during last admission  · Not on any anticoagulation per Cardiology  · Continue metoprolol 12 5 mg b i d  With hold parameters  · Monitor vital signs      Benign essential hypertension  Assessment & Plan  · Per Cardiology, continue metoprolol with hold parameters at reduced dose  · Midodrine for hypotension  · Monitor blood pressure  Some improvement after admission given multiple wounds in his lower extremities DILIA drain from abdomen and abdominal pain would obtain repeat set of blood cultures     Lactic acidosis  Assessment & Plan  · Lactic acidosis present upon admission at 2 1  Doubt acute infectious etiology  Check blood cultures nonetheless  · Monitoring off of antibiotics      Swelling of lower extremity  Assessment & Plan  · Lower extremities are still swollen, however do not appear acutely infected at this time  But do appear to need active wound care will consult wound care service  · Completed oral ciprofloxacin as an outpatient 2 days ago  Received extra dose of IV ciprofloxacin emergency department tonight  ? Patient also had gram-negative bacteremia, grew Shewanella Putrifacians, a marine acquired bacteria    Had PermCath replaced at that time  Infectious Disease had been on board  · No fevers  Monitor vital signs      Hyponatremia  Assessment & Plan  · Mild, 132 upon admission  · Suspect hypervolemic hyponatremia  Defer management to Nephrology assess status after hemodialysis  · Monitor      Chronic kidney disease with end stage renal failure on dialysis [facility])  Assessment & Plan  · Patient originally is scheduled for hemodialysis this Tuesday  · Appears somewhat volume overloaded on exam today  · Consult Nephrology  · Monitor electrolytes  · Continue Nephrocaps, phosphate binders      * Hypotension  Assessment & Plan  · History of chronic hypotension, per patient he typically runs anywhere from  systolic  · Continue midodrine on dialysis days  · Patient is acutely overloaded, no indication for IV fluids  · Monitor blood pressure  · Nephrology added albumin today      Abdominal pain, has indwelling DILIA drain for infected renal cyst versus renal carcinoma based on recent CT imaging also had abdominal distension from retained stomach contents, right inguinal hernia with large right hydrocele/would obtain further imaging/may also need IR tube check         VTE Pharmacologic Prophylaxis:   Pharmacologic: Heparin  Mechanical VTE Prophylaxis in Place: No active ulcers lower extremities    Discussions with Specialists or Other Care Team Provider:  PATIENT WILL REQUIRE MORE THAN 2 MIDNIGHT STAY DUE TO ONGOING ISSUES WITH HYPOTENSION ABDOMINAL DISTENSION WHICH IS INCREASING UNCLEAR SOURCE AND NEEDS FURTHER WORKUP AND NEED FOR ADVANCED WOUND CARE    Time Spent for Care: 45 minutes  More than 50% of total time spent on counseling and coordination of care as described above  Subjective:   Continues to complain of lower abdominal pain and distension denies recent bowel movement and denies recent course of diarrhea nausea vomiting    Appears chronically ill    Objective:     Vitals:   Temp (24hrs), Av 8 °F (37 1 °C), Min:97 9 °F (36 6 °C), Max:99 9 °F (37 7 °C)    Temp:  [97 9 °F (36 6 °C)-99 9 °F (37 7 °C)] 97 9 °F (36 6 °C)  HR:  [] 79  Resp:  [16-19] 19  BP: ()/(44-62) 100/60  SpO2:  [95 %-100 %] 96 %  Body mass index is 31 71 kg/m²  Input and Output Summary (last 24 hours): Intake/Output Summary (Last 24 hours) at 10/7/2019 1015  Last data filed at 10/7/2019 0901  Gross per 24 hour   Intake 200 ml   Output 0 ml   Net 200 ml       Physical Exam:     Physical Exam:   General appearance: alert, appears stated age and cooperative  Head: Normocephalic, without obvious abnormality, atraumatic  Lungs: diminished breath sounds  Heart: regular rate and rhythm  Abdomen: Distended with tympany active bowel sounds heard right lower quadrant DILIA drain with cloudy drainage no rebound or guarding  Back: Both lower extremities dressed with gauze dressings with breakthrough drainage serous  Extremities: Both lower extremities with the gauze dressings with breakthrough drainage  Neurologic: Grossly normal      Additional Data:     Labs:    Results from last 7 days   Lab Units 10/06/19  1908 10/03/19  0532   WBC Thousand/uL 6 93 11 96*   HEMOGLOBIN g/dL 9 4* 8 2*   HEMATOCRIT % 30 4* 26 6*   PLATELETS Thousands/uL 263 184   NEUTROS PCT %  --  84*   LYMPHS PCT %  --  4*   LYMPHO PCT % 13*  --    MONOS PCT %  --  9   MONO PCT % 7  --    EOS PCT % 2 1     Results from last 7 days   Lab Units 10/06/19  1908   POTASSIUM mmol/L 4 8   CHLORIDE mmol/L 92*   CO2 mmol/L 29   BUN mg/dL 60*   CREATININE mg/dL 7 54*   CALCIUM mg/dL 8 6   ALK PHOS U/L 236*   ALT U/L 20   AST U/L 32     Results from last 7 days   Lab Units 10/06/19  1908   INR  1 34*       * I Have Reviewed All Lab Data Listed Above  * Additional Pertinent Lab Tests Reviewed: All Labs For Current Hospital Admission Reviewed    Imaging:  Xr Chest 1 View Portable    Result Date: 10/7/2019  Narrative: CHEST INDICATION:   shortness of breath   COMPARISON:  9/23/2019 EXAM PERFORMED/VIEWS:  XR CHEST PORTABLE Images: 2 FINDINGS:  Right-sided central venous catheter is present and appear satisfactory in position, similar to prior  Cardia mediastinal silhouette probably within normal limits given limited inspiratory effort on both images  The lungs are clear  No pneumothorax or pleural effusion  Osseous structures appear within normal limits for patient age  Impression: Satisfactory line positioning  No acute findings Workstation performed: [id number]     Xr Chest Portable    Result Date: 9/23/2019  Narrative: CHEST INDICATION:   temp catheter line insertion  COMPARISON:  9/23/2019 EXAM PERFORMED/VIEWS:  XR CHEST PORTABLE FINDINGS:  There is a right internal jugular central line with its tip projecting at the cavoatrial junction  Cardiomediastinal silhouette appears unremarkable  No pneumothorax  No effusions  Clear lung fields  Osseous structures appear within normal limits for patient age  Impression: No pneumothorax status post central line placement  Workstation performed: [id number]     Xr Chest 2 Views    Result Date: 9/22/2019  Narrative: CHEST INDICATION:   shortness of breath  Lower leg swelling  Redness  Pain  COMPARISON:  None EXAM PERFORMED/VIEWS:  XR CHEST PA & LATERAL FINDINGS:  Right-sided dialysis catheter noted  Aorta atherosclerotic and mildly uncoiled  Heart size top normal  The lungs are clear  No pneumothorax or pleural effusion  Age-appropriate degenerative changes are noted in the spine  Impression: No acute cardiopulmonary disease  Workstation performed: [id number]     Cta Abdominal W Run Off W Wo Contrast    Result Date: 9/21/2019  Narrative: CT ANGIOGRAM OF THE AORTA AND LOWER EXTREMITIES WITH IV CONTRAST INDICATION:  Abdominal pain, hyperbilirubinemia  COMPARISON: None   TECHNIQUE:  CT angiogram examination of the abdomen, pelvis, and lower extremities was performed according to standard protocol with intravenous contrast   This examination, like all CT scans performed in the [facility], was performed utilizing techniques to minimize radiation dose exposure, including the use of iterative reconstruction and automated exposure control  3D reconstructions were performed an independent workstation, and are supplied for review  Rad dose 2833 mGy-cm IV Contrast:  100 mL of iodixanol (VISIPAQUE) was administered intravenously without immediate adverse reaction  FINDINGS: VASCULAR STRUCTURES:   The abdominal aorta is normal in course and caliber  Moderate atherosclerotic calcifications  The celiac and superior mesenteric arteries are patent  Scattered calcifications of the superior mesenteric artery  Moderate narrowing of the bilateral renal artery origins  The inferior mesenteric artery is patent  The iliac arteries are patent  The external and internal iliac arteries are patent  Scattered calcifications with mild narrowing of the distal internal iliac arteries  The common, profunda, and superficial femoral arteries are patent  There are mild calcifications of the superficial femoral arteries  The popliteal arteries, tibioperoneal trunks, peroneal arteries, anterior tibial arteries, and posterior tibial arteries are mildly calcified however patent proximally  The distal arteries are not well evaluated due to circumferential calcification  There is diffuse skin thickening and subcutaneous edema bilateral lower extremities  No discrete fluid collection to suggest abscess  OTHER FINDINGS ABDOMEN LIVER/BILIARY TREE:  Innumerable hepatic cysts some which are calcified  Mass effect on the portal veins which are narrowed  No significant biliary duct dilatation  GALLBLADDER:  No calcified gallstones  No pericholecystic inflammatory change  SPLEEN:  Unremarkable  Normal size  PANCREAS:  Unremarkable  ADRENAL GLANDS: Unremarkable   KIDNEYS/URETERS:  Markedly enlarged kidneys with innumerable renal cysts some which demonstrate calcifications and some which which demonstrate hyperdensity compatible with blood products  There are parapelvic cysts on the right  No hydronephrosis  No  hydroureter  The renal veins are patent  PELVIS REPRODUCTIVE ORGANS:  Unremarkable for patient's age  URINARY BLADDER:  Submucosal fat deposition and mild urinary bladder wall thickening however it is under distended  Findings may represent chronic cystitis  ADDITIONAL ABDOMINAL AND PELVIC STRUCTURES STOMACH AND BOWEL:  The stomach is distended with fluid and food debris  No small bowel obstruction  Stool-filled rectosigmoid colon  Diverticulosis without evidence of diverticulitis  The appendix is not well delineated  ABDOMINOPELVIC CAVITY:   There is a right lower quadrant cystic mass with heterogeneous hyperdensity and thick calcifications along the right kidney (series 2, image 80 measuring 5 0 x 7 3 x 7 9 cm with mild surrounding fat stranding  There may be a connection to the kidney on series 2, image 72  There also appears to be a connection to the collapsed cecum (series 2, image 68)  Trace free fluid in the pelvis and in the perihepatic region  ABDOMINAL WALL/INGUINAL REGIONS:  There is a right-sided inguinal hernia with associated partially visualized large right hydrocele measuring up to 9 3 x 8 2 cm  OSSEOUS STRUCTURES:  No acute fracture or destructive osseous lesion  Impression: 1  Moderate peripheral arterial disease with patent vasculature up to the level of the proximal calves  Evaluation of distal calf arteries are limited due to circumferential calcification  2   Skin thickening and subcutaneous edema of the lower legs which may be due to cellulitis versus peripheral arterial disease  No discrete fluid collection to suggest abscess  No subcutaneous emphysema  3   Polycystic kidney and liver disease with innumerable cysts some which are calcified and hemorrhagic   4   Right lower quadrant cystic mass with thickened walls, calcifications, and heterogeneous hyperdensity which may represent enhancement versus hemorrhage  There is contact with the right kidney and cecum  Differential diagnosis includes infected right renal cyst versus renal cell carcinoma  Differential also includes appendiceal mass such as mucinous cystadenocarcinoma  Further evaluation with ultrasound or MRI of the abdomen may be of value  5   Submucosal fat deposition within the mildly thickened urinary bladder wall may be due to chronic cystitis  Correlate with urinalysis  6   Distended stomach with fluid and food debris  No small bowel obstruction  Diverticulosis without evidence of diverticulitis  7   Right inguinal hernia with associated large right hydrocele  The study was marked in [facility] for immediate notification  Workstation performed: [id number]     Ir Tube Change    Result Date: 10/1/2019  Narrative: Infected right renal cyst tube check and exchange Clinical History: Leaking around catheter placed into right renal cyst   Patient has autosomal dominant polycystic kidney disease and suspected cyst infection  Patient also has renal failure  Contrast: 10 mL Omnipaque 300 Fluoro time: 3 minutes Number of Images: 7 Concious sedation time: None given Technique: The patient was brought to the interventional radiology suite and placed supine on the table  After a  view was obtained, contrast was injected into the cyst drainage catheter and multiple images were obtained  Findings: The existing catheter is kinked and partially occluded  The cyst is complex, and there appears to be a connection to an isolated portion of the collecting system of the right kidney  Intervention: The existing catheter was prepped and draped in the usual sterile fashion  Lidocaine was administered to the skin, and the suture was cut  The catheter was transected and removed over a heavy duty wire   A new 10 [place] catheter was advanced  over the wire, the loop formed, and contrast injected confirming satisfactory position  The catheter was sutured in place and connected to a DILIA bulb  Impression: Impression: 1  Existing catheter kinked and partially occluded  2   Existing cyst is complex and appears to connect to an isolated portion of the right collecting system  Workstation performed: [id number]     Nikolai Arciniega & Waveform Analysis, Multiple Levels    Result Date: 9/23/2019  Narrative:  [facility] REPORT CLINICAL: Indications:  PVD, Unspecified [I73 9]  Patient presents in the ICU with bilateral large blisters and open wounds  There is discoloration of both legs  Risk Factors The patient has history of CKD and PAD  He has no history of HTN, Diabetes or HLD  Clinical Right Pressure:  88/ mm Hg  FINDINGS:  Segment       Ri             Left                         P  W            P  W          Ant  Tibial       Triphasic  171  Triphasic  Post  Tibial      Triphasic       Triphasic  Ankle                        171             Great Toe     84              85                CONCLUSION:  Impression RIGHT LOWER LIMB Unable to obtain ROSALINE's due to large blisters on patient's foot and ankle  PPG/PVR Tracings are dampened  Triphasic waveforms at the ankle  Great Toe Pressure: 84mmHg  within the healing range  LEFT LOWER LIMB Ankle/Brachial Index: 1 94, unreliable  PPG/PVR Tracings are dampened  Triphasic waveforms at the ankle  Great Toe Pressure: 85mmHgwithin the healing range  Limited study due to large blisters and wounds  SIGNATURE: Electronically Signed by: Leydi Hall on 2019-09-23 06:30:18 PM    Ir Image Guided Aspiration / Drainage    Result Date: 9/23/2019  Narrative: Abscess Drainage right renal cyst History: Septic shock with gram-negative bacteremia  Evidence of inflammation of a right inferior partially calcified renal cyst on CT September 21, 2019    Patient is unstable and is not safe to go to the CT scanner, therefore ultrasound-guided aspiration versus drainage reattempted  His blood pressure is too low for sedation  Polycystic kidney disease  Thrombocytopenia  Contrast: 0 Fluoroscopy time: 0 Anesthesia: Local Procedure: After explaining the risks and benefits of the procedure to the patient and his family, informed consent was obtained  Specifically the risk of bleeding, worsening sepsis, or placing a drain into a neoplasm were discussed  Ultrasound was used to localize the suspicious right renal cysts  Procedure: The patient was identified verbally and by wristband  Timeout was performed  The procedure was performed in the ICU  The patient was prepped and draped in the usual sterile fashion  Using live ultrasound guidance a 22-gauge Chiba needle was advanced into the calcified right inferior cyst correlating with series 2 image 81 on CT of September 21  The patient was focally tender at this site  Thick purulent material could be aspirated  Decision was made to place a drain  Given the small size of the cyst it was felt that a wire would give minimal purchase and therefore trocar technique was chosen  In 8-Polish Rodriguez-Allen drain was then advanced in parallel with the existing needle under live ultrasound guidance into the cystic mass  Thick bloody material resulted  Saline was injected under live ultrasound confirming that the tube communicated with the drain  The drain was hooked to gravity bag drainage and sutured to the skin  The patient tolerated the procedure well without apparent immediate complications  Findings: Thick walled partially calcified cystic mass arising from the inferior pole of the polycystic right kidney correlating with site on CT  Needle within the lesion  Drain within the lesion  On injection, saline in the cavity via ultrasound  Thick bloody purulent material obtained   Specimens: Culture     Impression: Impression: Ultrasound-guided drain placement into an inferior right renal cyst suspicious for infection with bloody pus obtained  Plan: Flush every shift Monitor output Return for tube check in 10 days Workstation performed: [id number]     Xr Chest Portable Icu    Result Date: 9/23/2019  Narrative: CHEST INDICATION:   r/o pulm edema  COMPARISON:  9/21/2019 EXAM PERFORMED/VIEWS:  XR CHEST PORTABLE ICU FINDINGS: Cardiomegaly is seen  Hypoventilation is noted  There is no infiltrate or pneumothorax  The costophrenic angles are clear  Osseous structures appear within normal limits for patient age  Impression: Cardiomegaly  No acute disease Workstation performed: [id number]     Bilateral Lower Extremity Venous Duplex    Result Date: 9/23/2019  Narrative:  [facility] REPORT CLINICAL: Indications: Bilateral Swelling of Limb [R22 4]  Bilateral leg swelling and blistering for 2 days Risk Factors The patient has history of CKD and PAD  FINDINGS:  Segment  Right            Left              Impression       Impression       CFV      Normal (Patent)  Normal (Patent)     CONCLUSION:  Impression: RIGHT LOWER LIMB: No evidence of acute or chronic deep vein thrombosis  No evidence of superficial thrombophlebitis noted  Doppler evaluation shows a normal response to augmentation maneuvers  Popliteal, posterior tibial and anterior tibial arterial Doppler waveforms are triphasic  LEFT LOWER LIMB: No evidence of acute or chronic deep vein thrombosis  No evidence of superficial thrombophlebitis noted  Doppler evaluation shows a normal response to augmentation maneuvers  Popliteal, posterior tibial and anterior tibial arterial Doppler waveforms are triphasic    SIGNATURE: Electronically Signed by: Richard Sterling on 2019-09-23 09:28:33 AM    Ir Permacath Placement    Result Date: 10/1/2019  Narrative: Tunneled dialysis catheter placement Clinical History: Patient with a temporary catheter placed after existing tunneled catheter infection now presents for placement of a new tunneled catheter   Fluoro time: 0 4 minutes Number of Images: 2 Concious sedation time: None given Technique: The patient was brought to the interventional radiology suite and identified verbally and by wrist band  The patient was placed supine on the table  The right internal jugular vein was evaluated as a potential access site with ultrasound  The vessel was found to be patent and compressible  The right neck and upper chest were prepped and draped in the usual sterile fashion  All elements of maximal sterile barrier technique were followed (cap, mask, sterile gown, sterile gloves, large sterile sheet, hand hygiene, and 2% chlorhexidine for cutaneous antisepsis)  Lidocaine was administered to the skin and a small skin incision was made  Under ultrasound guidance, the right internal jugular vein was accessed using single wall Seldinger technique  Static images of real time needle entry into the vessel were obtained  An 0 018 wire was then advanced through the needle into the central venous system  The needle was removed, and a 5 Romanian coaxial dilator was inserted  A heavy duty wire was inserted through the outer dilator and a 14 Romanian peel-away sheath was inserted over the wire  A 28 cm Hemo flow cath was then tunneled under the skin of the upper chest  The catheter was advanced through the peel-away and the peel-away was removed  The catheter tip was then positioned in the right atrium under fluoroscopic control  The external portion of the catheter was sutured to the skin with 2-0 Prolene  The neck incision was then closed with Vicryl suture and Histoacryl  A sterile dressing was applied and 1,000 unit heparin/cc solution was administered into each of the lumens  Impression: Impression: 1  Successful ultrasound and fluoroscopically guided placement of a 28cm Hemo flow cath via the right internal jugular vein  The tip of the catheter is in the right atrium and may be used immediately   Workstation performed: [id number]     Us Right Upper Quadrant With Liver Dopplers    Result Date: 9/25/2019  Narrative: RIGHT UPPER QUADRANT ULTRASOUND INDICATION: Elevated bilirubin  COMPARISON: CT 9/21/2019  TECHNIQUE:   Real-time ultrasound of the right upper quadrant was performed with a curvilinear transducer with both volumetric sweeps and still imaging techniques  FINDINGS: PANCREAS:  Visualized portions show no abnormality  AORTA AND IVC:  Visualized portions are normal for patient age  LIVER: Hepatomegaly is noted  Echogenicity and contour are normal  Innumerable hepatic cysts are present  Normal directional flow is present within the portal vein  BILIARY: The gallbladder is normal in caliber  No wall thickening or pericholecystic fluid  No stones or sludge  No sonographic Delaney's sign  No intrahepatic biliary dilatation  CBD measures 4 mm  No choledocholithiasis  KIDNEY: Right kidney is enlarged and polycystic  ASCITES:   None  Impression: Innumerable hepatic and right renal cysts, otherwise unremarkable study  Workstation performed: [id number]     Imaging Reports Reviewed Today Include:  Reviewed chest x-ray  Imaging Personally Reviewed by Myself Includes:    Procedure: Xr Chest 1 View Portable    Result Date: 10/7/2019  Narrative: CHEST INDICATION:   shortness of breath  COMPARISON:  9/23/2019 EXAM PERFORMED/VIEWS:  XR CHEST PORTABLE Images: 2 FINDINGS:  Right-sided central venous catheter is present and appear satisfactory in position, similar to prior  Cardia mediastinal silhouette probably within normal limits given limited inspiratory effort on both images  The lungs are clear  No pneumothorax or pleural effusion  Osseous structures appear within normal limits for patient age  Impression: Satisfactory line positioning    No acute findings Workstation performed: [id number]        Recent Cultures (last 7 days):           Last 24 Hours Medication List:     Current Facility-Administered Medications:  acetaminophen 650 mg Oral Q6H PRN Asuncion Goltz, PA-C albumin human 25 g Intravenous PRN Hilaria Poplar, CRNP   b complex-vitamin C-folic acid 1 capsule Oral Daily With [place] ColacePAUL   calcium acetate 1,334 mg Oral TID With Meals Ahmet Arteaga PA-C   gabapentin 100 mg Oral Daily Ahmet Arteaga PA-C   heparin (porcine) 5,000 Units Subcutaneous [facility] Arthmary Arteaga PA-C   metoprolol tartrate 12 5 mg Oral Q12H [address] Ahmet Arteaga PA-C   midodrine 10 mg Oral Before Dialysis Ahmet Arteaga PA-C   ondansetron 4 mg Intravenous Q6H PRN Ahmet Arteaga PA-C   oxyCODONE 5 mg Oral Q4H PRN Ahmet Arteaga PA-C   pantoprazole 40 mg Oral Early Morning Arthmary Arteaga PA-C   simethicone 80 mg Oral Q6H PRN Ahmet Arteaga PA-C        Today, Patient Was Seen By: Curtis Bolaños MD    ** Please Note: Dragon 360 Dictation voice to text software may have been used in the creation of this document   **

## 2019-10-07 NOTE — PLAN OF CARE
Problem: GENITOURINARY - ADULT  Goal: Maintains or returns to baseline urinary function  Description  INTERVENTIONS:  - Assess urinary function  - Encourage oral fluids to ensure adequate hydration if ordered  - Administer IV fluids as ordered to ensure adequate hydration  - Administer ordered medications as needed  - Offer frequent toileting  - Follow urinary retention protocol if ordered  Outcome: Progressing  Goal: Absence of urinary retention  Description  INTERVENTIONS:  - Assess patients ability to void and empty bladder  - Monitor I/O  - Bladder scan as needed  - Discuss with physician/AP medications to alleviate retention as needed  - Discuss catheterization for long term situations as appropriate  Outcome: Progressing     Problem: METABOLIC, FLUID AND ELECTROLYTES - ADULT  Goal: Electrolytes maintained within normal limits  Description  INTERVENTIONS:  - Monitor labs and assess patient for signs and symptoms of electrolyte imbalances  - Administer electrolyte replacement as ordered  - Monitor response to electrolyte replacements, including repeat lab results as appropriate  - Instruct patient on fluid and nutrition as appropriate  Outcome: Progressing  Goal: Fluid balance maintained  Description  INTERVENTIONS:  - Monitor labs   - Monitor I/O and WT  - Instruct patient on fluid and nutrition as appropriate  - Assess for signs & symptoms of volume excess or deficit  Outcome: Progressing  Goal: Glucose maintained within target range  Description  INTERVENTIONS:  - Monitor Blood Glucose as ordered  - Assess for signs and symptoms of hyperglycemia and hypoglycemia  - Administer ordered medications to maintain glucose within target range  - Assess nutritional intake and initiate nutrition service referral as needed  Outcome: Progressing     Problem: SKIN/TISSUE INTEGRITY - ADULT  Goal: Skin integrity remains intact  Description  INTERVENTIONS  - Identify patients at risk for skin breakdown  - Assess and monitor skin integrity  - Assess and monitor nutrition and hydration status  - Monitor labs (i e  albumin)  - Assess for incontinence   - Turn and reposition patient  - Assist with mobility/ambulation  - Relieve pressure over bony prominences  - Avoid friction and shearing  - Provide appropriate hygiene as needed including keeping skin clean and dry  - Evaluate need for skin moisturizer/barrier cream  - Collaborate with interdisciplinary team (i e  Nutrition, Rehabilitation, etc )   - Patient/family teaching  Outcome: Progressing  Goal: Incision(s), wounds(s) or drain site(s) healing without S/S of infection  Description  INTERVENTIONS  - Assess and document risk factors for skin impairment   - Assess and document dressing, incision, wound bed, drain sites and surrounding tissue  - Consider nutrition services referral as needed  - Oral mucous membranes remain intact  - Provide patient/ family education  Outcome: Progressing  Goal: Oral mucous membranes remain intact  Description  INTERVENTIONS  - Assess oral mucosa and hygiene practices  - Implement preventative oral hygiene regimen  - Implement oral medicated treatments as ordered  - Initiate Nutrition services referral as needed  Outcome: Progressing

## 2019-10-07 NOTE — ASSESSMENT & PLAN NOTE
· History of chronic hypotension, per patient he typically runs anywhere from  systolic  · Continue midodrine on dialysis days  · Patient is acutely overloaded, no indication for IV fluids  · Monitor blood pressure

## 2019-10-07 NOTE — ASSESSMENT & PLAN NOTE
· Per Cardiology, continue metoprolol with hold parameters at reduced dose  · Midodrine for hypotension  · Monitor blood pressure

## 2019-10-07 NOTE — ED NOTES
Pt's O2 sat dropped to 86%, applied 2L of O2 via nasal canula   Dr Anh Fermin made aware     Alis Dawson, GABRIEL  10/06/19 2049

## 2019-10-07 NOTE — H&P
H&P- Redd Richards 1960, 64 y o  male MRN: [id number]  Unit/Bed#: -01 Encounter: [id number]  Primary Care Provider: Francoise Chacon MD   Date and time admitted to hospital: 10/6/2019  6:37 PM    Anemia due to chronic kidney disease, on chronic dialysis [facility])  Assessment & Plan  · Hemoglobin today is 9 4, monitor  Atrial fibrillation (HCC)  Assessment & Plan  · Developed atrial fibrillation with rapid ventricular response during last admission  · Not on any anticoagulation per Cardiology  · Continue metoprolol 12 5 mg b i d  With hold parameters  · Monitor vital signs  Benign essential hypertension  Assessment & Plan  · Per Cardiology, continue metoprolol with hold parameters at reduced dose  · Midodrine for hypotension  · Monitor blood pressure  Lactic acidosis  Assessment & Plan  · Lactic acidosis present upon admission at 2 1  Doubt acute infectious etiology  · Monitoring off of antibiotics  Swelling of lower extremity  Assessment & Plan  · Lower extremities are still swollen, however do not appear acutely infected at this time  · Completed oral ciprofloxacin as an outpatient 2 days ago  Received extra dose of IV ciprofloxacin emergency department tonight  · Patient also had gram-negative bacteremia, grew Shewanella Putrifacians, a marine acquired bacteria  Had PermCath replaced at that time  Infectious Disease had been on board  · No fevers  Monitor vital signs  Hyponatremia  Assessment & Plan  · Mild, 132 upon admission  · Suspect hypervolemic hyponatremia  · Monitor  Chronic kidney disease with end stage renal failure on dialysis [facility])  Assessment & Plan  · Patient originally is scheduled for hemodialysis this Tuesday  · Appears somewhat volume overloaded on exam today  · Consult Nephrology  · Monitor electrolytes  · Continue Nephrocaps, phosphate binders      * Hypotension  Assessment & Plan  · History of chronic hypotension, per patient he typically runs anywhere from  systolic  · Continue midodrine on dialysis days  · Patient is acutely overloaded, no indication for IV fluids  · Monitor blood pressure  VTE Prophylaxis: Heparin  / reason for no mechanical VTE prophylaxis bilateral healing wounds   Code Status: FULL CODE  POLST: POLST form is not discussed and not completed at this time  Discussion with family: patient at bedside     Anticipated Length of Stay:  Patient will be admitted on an Observation basis with an anticipated length of stay of less than 2 midnights  Justification for Hospital Stay: volume overload, likely needs dialysis    Total Time for Visit, including Counseling / Coordination of Care: 1 hour  Greater than 50% of this total time spent on direct patient counseling and coordination of care  Chief Complaint:   Leg swelling     History of Present Illness:    Carlos Mojica is a 64 y o  male with a PMH of ESRD on HD who presents with lower extremity edema and abdominal pain  Patient had a recent hospitalization for almost 2 weeks secondary to gram-negative bacteremia and severe sepsis  During that stay the patient had his PermCath replaced, finished a course of ciprofloxacin  Notably during his stay the patient developed atrial fibrillation with rapid ventricular response  Had been seen in consultation by Infectious Disease  He completed a course of ciprofloxacin through 10/04/2019 and was discharged to acute rehabilitation  The patient was sent in for evaluation secondary to abdominal pain as well as lower leg edema  Nursing facility was concerned about reinfection in his leg wounds  Denied any fevers or chills  Reports that the swelling in his legs has been getting worse  He is scheduled for dialysis on Tuesday  Patient reports abdominal pain that he attributes to gas pains  Reports that he had 1 episode of passing gas when he rolled over, and his abdominal pain felt much improved    The patient typically has blood pressures anywhere from  systolic  Denies any pain at this time  Review of Systems:    Review of Systems   Constitutional: Positive for fatigue  Negative for activity change, appetite change, chills and fever  HENT: Negative for congestion, rhinorrhea, sinus pressure and sore throat  Eyes: Negative for photophobia, pain and visual disturbance  Respiratory: Negative for cough, shortness of breath and wheezing  Cardiovascular: Positive for leg swelling  Negative for chest pain and palpitations  Gastrointestinal: Positive for abdominal pain  Negative for abdominal distention, constipation, diarrhea, nausea and vomiting  Endocrine: Negative for cold intolerance, heat intolerance, polydipsia and polyuria  Genitourinary: Negative for difficulty urinating, dysuria, flank pain, frequency and hematuria  Musculoskeletal: Negative for arthralgias, back pain and joint swelling  Skin: Negative for color change, pallor and rash  Allergic/Immunologic: Negative  Neurological: Negative for dizziness, syncope, weakness, light-headedness and headaches  Hematological: Negative  Psychiatric/Behavioral: Negative  Past Medical and Surgical History:     Past Medical History:   Diagnosis Date    Complication of renal dialysis     Polycystic kidney disease        Past Surgical History:   Procedure Laterality Date    IR IMAGE GUIDED ASPIRATION / DRAINAGE  9/23/2019    IR [address] PLACEMENT  9/30/2019       Meds/Allergies:    Prior to Admission medications    Medication Sig Start Date End Date Taking? Authorizing Provider   b complex-vitamin C-folic acid (NEPHROCAPS) 1 mg capsule Take 1 capsule by mouth daily with dinner 10/3/19  Yes Ronnie Kulkarni MD   calcium acetate (PHOSLO) 667 mg capsule Take 338 mg by mouth If the patient eats       4 tablets (1352 mg) with meals  2 tablets (676 mg) with snacks   Yes Historical Provider, MD   gabapentin (NEURONTIN) 100 mg capsule Take 1 capsule (100 mg total) by mouth daily 10/4/19 11/3/19 Yes Ronnie Kulkarni MD   metoprolol tartrate (LOPRESSOR) 25 mg tablet Take 0 5 tablets (12 5 mg total) by mouth every 12 (twelve) hours 10/3/19 11/2/19 Yes Andrei Barfield MD   midodrine (PROAMATINE) 10 MG tablet Take 1 tablet (10 mg total) by mouth Before Dialysis (30 mins before dialysis) for up to 12 doses 10/3/19  Yes Andrei Barfield MD   Multiple Vitamin (MULTIVITAMIN) tablet Take 1 tablet by mouth daily   Yes Historical Provider, MD   multivitamin-iron-minerals-folic acid (THERAPEUTIC-M) TABS tablet Take 1 tablet by mouth daily   Yes Historical Provider, MD   omeprazole (PriLOSEC OTC) 20 MG tablet Take 1 tablet (20 mg total) by mouth daily 10/3/19  Yes Ronnie Kulkarni MD   oxyCODONE (ROXICODONE) 5 mg immediate release tablet Take 1 tablet (5 mg total) by mouth every 4 (four) hours as needed for severe pain for up to 10 daysMax Daily Amount: 30 mg 10/3/19 10/13/19 Yes Ronnie Kulkarni MD   Sucroferric Oxyhydroxide (VELPHORO PO) Take 600 mg by mouth If the patient eats       1 tablet (600 mg) with meals  0 5 tablet (300 mg) with snacks   Yes Historical Provider, MD   NON FORMULARY Hemodialysis Monday, Wednesday, Friday    Historical Provider, MD     I have reviewed home medications with patient personally      Allergies: No Known Allergies    Social History:     Marital Status: /Civil Union   Occupation: non-contributory   Patient Pre-hospital Living Situation: [facility]  Patient Pre-hospital Level of Mobility: not assessed  Patient Pre-hospital Diet Restrictions: renal restrictive, dysphagia 2 - thin liquids  Substance Use History:   Social History     Substance and Sexual Activity   Alcohol Use Not on file     Social History     Tobacco Use   Smoking Status Never Smoker   Smokeless Tobacco Never Used     Social History     Substance and Sexual Activity   Drug Use Not on file       Family History:    non-contributory    Physical Exam:     Vitals:   Blood Pressure: (!) 77/44 (10/06/19 2104)  Pulse: 103 (10/06/19 2104)  Temperature: 98 6 °F (37 °C) (10/06/19 2104)  Temp Source: Oral (10/06/19 2104)  Respirations: 16 (10/06/19 2104)  Height: 5' 8" (172 7 cm) (10/06/19 2104)  Weight - Scale: 94 5 kg (208 lb 5 4 oz) (10/06/19 2104)  SpO2: 99 % (10/06/19 2048)    Physical Exam   Constitutional: He is oriented to person, place, and time  No distress  Nasal cannula in place  Chronically ill-appearing, appears tired  HENT:   Head: Normocephalic and atraumatic  Mouth/Throat: Oropharynx is clear and moist    Eyes: Pupils are equal, round, and reactive to light  EOM are normal  No scleral icterus  Neck: Neck supple  Cardiovascular: Normal rate, regular rhythm and normal heart sounds  No murmur heard  B/l lower extremity healing wounds  No evidence of infection  B/l edema  Pulmonary/Chest: Effort normal and breath sounds normal  No respiratory distress  He has no wheezes  He has no rales  Abdominal: Soft  Bowel sounds are normal  He exhibits no distension  There is tenderness (mild, lower quadrants)  Musculoskeletal: He exhibits no deformity  Neurological: He is alert and oriented to person, place, and time  No cranial nerve deficit  GCS eye subscore is 4  GCS verbal subscore is 5  GCS motor subscore is 6  Skin: Skin is warm and dry  Capillary refill takes less than 2 seconds  No rash noted  He is not diaphoretic  No erythema  No pallor  Psychiatric: He has a normal mood and affect  Nursing note and vitals reviewed  Additional Data:     Lab Results: I have personally reviewed pertinent reports        Results from last 7 days   Lab Units 10/06/19  1908 10/03/19  0532   WBC Thousand/uL 6 93 11 96*   HEMOGLOBIN g/dL 9 4* 8 2*   HEMATOCRIT % 30 4* 26 6*   PLATELETS Thousands/uL 263 184   BANDS PCT % 9*  --    NEUTROS PCT %  --  84*   LYMPHS PCT %  --  4*   LYMPHO PCT % 13*  --    MONOS PCT %  --  9   MONO PCT % 7  --    EOS PCT % 2 1     Results from last 7 days   Lab Units 10/06/19  1908   SODIUM mmol/L 132*   POTASSIUM mmol/L 4 8   CHLORIDE mmol/L 92*   CO2 mmol/L 29   BUN mg/dL 60*   CREATININE mg/dL 7 54*   ANION GAP mmol/L 11   CALCIUM mg/dL 8 6   ALBUMIN g/dL 1 5*   TOTAL BILIRUBIN mg/dL 2 90*   ALK PHOS U/L 236*   ALT U/L 20   AST U/L 32   GLUCOSE RANDOM mg/dL 109     Results from last 7 days   Lab Units 10/06/19  1908   INR  1 34*     Results from last 7 days   Lab Units 10/06/19  2114 09/30/19  1140 09/30/19  0741 09/30/19  0029   POC GLUCOSE mg/dl 105 119 107 106         Results from last 7 days   Lab Units 10/06/19  1908   LACTIC ACID mmol/L 2 1*       Imaging: I have personally reviewed pertinent films in PACS    XR chest 1 view portable    (Results Pending)     EKG, Pathology, and Other Studies Reviewed on Admission:   · None     Allscripts / Epic Records Reviewed: Yes     ** Please Note: This note has been constructed using a voice recognition system   **

## 2019-10-07 NOTE — CONSULTS
[address] [facility] 64 y o  male MRN: [id number]  Unit/Bed#: -01 Encounter: [id number]    ASSESSMENT and PLAN:  1  ESRD on HD:   · Previously dialyzed at [address] felt Monday, Wednesday, Friday  · Now being transitioned to TTS since he is going to unit closer to his rehab facility  · Current hemodialysis prescription:  Qt 3 hrs 10 min  Qb 450, Qd standard, 2 K, 2 0 calcium,  40 bicarbonate, sodium 137, filter size 180  Target weight 83 kg  · Patient appears clinically stable  Labs assessed and chest x-ray personally reviewed  No compelling reason for hemodialysis today  · Plan:    · Switch to outpatient schedule TTS  · Adjust hemodialysis treatment  Extend time to 3 hours 30 minutes  · Utilize albumin during treatment for hypotension  2  Access:  Right IJ PermCath site looks clean, nontender  3  Chronic hypotension:    · On midodrine pre treatment  · On beta-blocker due to recent treatment for atrial fibrillation with RVR during last hospitalization  · Plan:  Continue midodrine 10 mg predialysis  4  Anemia:   · Hemoglobin below goal 9 4  · Started on GRISELDA during recent hospitalization  Previously on micera which will be continued at outpatient clinic  · Plan:  Continue Epogen  5  Mineral and bone disease:  Continue binder, renal diet  6  Abdominal pain:    · Management per primary team  · Hyperbilirubinemia  7  Lower extremity edema/cellulitis/wounds:    · Recent infection due to Shewanella putrifacians likely acquired when patient was in [place]  Completed course of p o  Cipr0  Given a dose of IV Cipro on admission   · Afebrile  Temperature 99 9° on admission  8  Atrial fibrillation: On beta-blocker  9  Hyponatremia:  Likely volume mediated  Monitor  10  Lactic acidosis:  Lactic acid 2 1 on admission    Monitor    SUMMARY OF RECOMMENDATIONS:  Hemodialysis tomorrow    HISTORY OF PRESENT ILLNESS:  Requesting Physician: Jayda Leos MD  Reason for Consult: ESRD on HD    Rissa Foster is a 64 y o  male with end-stage renal disease on hemodialysis who was admitted to S  after presenting for evaluation of abdominal pain and increasing lower extremity edema  Patient was recently hospitalized from 09/21/2019 to 10/03/2019 for treatment of gram-negative bacteremia due to Shewanella putrefaciens  He also was treated for new onset atrial fibrillation with RVR  He required removal of PermCath and had a temporary dialysis catheter in place for CRRT in the setting of septic shock felt to be secondary to lower extremity cellulitis/wound infection  PermCath was reinserted on 09/30/2019  Patient completed course of antibiotics and was discharged to rehab  He has chronic hypotension treated with midodrine  A renal consultation is requested today for assistance in the management of ESRD  Rissa Foster is a known ESRD patient who undergoes maintenance currently at [address] [place] [address]  Up until now patient was being dialyzed at [facility] Monday, Wednesday, Friday  PAST MEDICAL HISTORY:  Past Medical History:   Diagnosis Date    Complication of renal dialysis     Polycystic kidney disease        PAST SURGICAL HISTORY:  Past Surgical History:   Procedure Laterality Date    IR IMAGE GUIDED ASPIRATION / DRAINAGE  9/23/2019    IR [facility] PLACEMENT  9/30/2019       ALLERGIES:  No Known Allergies    SOCIAL HISTORY:  Social History     Substance and Sexual Activity   Alcohol Use Not on file     Social History     Substance and Sexual Activity   Drug Use Not on file     Social History     Tobacco Use   Smoking Status Never Smoker   Smokeless Tobacco Never Used       FAMILY HISTORY:  History reviewed  No pertinent family history      MEDICATIONS:    Current Facility-Administered Medications:     acetaminophen (TYLENOL) tablet 650 mg, 650 mg, Oral, Q6H PRN, Karine Rodriguez PA-C    b complex-vitamin C-folic acid (NEPHROCAPS) capsule 1 capsule, 1 capsule, Oral, Daily With Michael Hernandez, PA-C    calcium acetate (PHOSLO) capsule 1,334 mg, 1,334 mg, Oral, TID With Meals, Elia Pemberton PA-C    gabapentin (NEURONTIN) capsule 100 mg, 100 mg, Oral, Daily, Elia Pemberton, PA-C    heparin (porcine) subcutaneous injection 5,000 Units, 5,000 Units, Subcutaneous, Q8H [facility], 5,000 Units at 10/07/19 0608 **AND** Platelet count, , , Once, Elia Pemberton PA-C    metoprolol tartrate (LOPRESSOR) partial tablet 12 5 mg, 12 5 mg, Oral, Q12H [place], Elia Pemberton PA-C    midodrine (PROAMATINE) tablet 10 mg, 10 mg, Oral, Before Dialysis, Elia Pemberton PA-C    ondansetron [facility]) injection 4 mg, 4 mg, Intravenous, Q6H PRN, Elia Pemberton PA-C    oxyCODONE (ROXICODONE) IR tablet 5 mg, 5 mg, Oral, Q4H PRN, Elia Pemberton, PA-C    pantoprazole (PROTONIX) EC tablet 40 mg, 40 mg, Oral, Early Morning, Elia Pemberton PA-C, 40 mg at 10/07/19 0609    simethicone (MYLICON) chewable tablet 80 mg, 80 mg, Oral, Q6H PRN, Elia Pemberton PA-C    REVIEW OF SYSTEMS:  General: No fevers, chills  Cardiovascular: No chest pain, shortness of breath, palpitations  Respiratory: No cough, sputum production, shortness of breath  Gastrointestinal: No nausea, vomiting,  diarrhea  Positive for abdominal pain  Genitourinary: No hematuria      PHYSICAL EXAM:  Current Weight: Weight - Scale: 94 6 kg (208 lb 8 9 oz)(patient not ambulating at this time)  First Weight: Weight - Scale: 96 3 kg (212 lb 4 9 oz)  Vitals:    10/06/19 2300 10/07/19 0000 10/07/19 0600 10/07/19 0617   BP: (!) 77/47 (!) 82/62     BP Location: Right arm Right arm     Pulse: 100      Resp: 16      Temp: 98 6 °F (37 °C)      TempSrc: Oral      SpO2: 98%   95%   Weight:   94 6 kg (208 lb 8 9 oz)    Height:           Intake/Output Summary (Last 24 hours) at 10/7/2019 0754  Last data filed at 10/6/2019 2201  Gross per 24 hour   Intake 200 ml   Output 0 ml   Net 200 ml     Physical Exam   Constitutional: He appears well-developed and well-nourished  He has a sickly appearance  HENT:   Head: Normocephalic and atraumatic  Mouth/Throat: Oropharynx is clear and moist    Eyes: Conjunctivae and EOM are normal    Neck: Normal range of motion  Neck supple  No JVD present  Cardiovascular: Normal rate, regular rhythm and intact distal pulses  Exam reveals no gallop and no friction rub  No murmur heard  Pulmonary/Chest: Effort normal and breath sounds normal  No stridor  No respiratory distress  He has no wheezes  He has no rales  Abdominal: Soft  He exhibits distension  He exhibits no mass  There is no tenderness  There is no rebound and no guarding  Tympanic to percussion, high-pitched bowel sounds  Musculoskeletal: Normal range of motion  He exhibits edema (Lower extremity edema  Legs wrapped with Kerlix)  He exhibits no tenderness or deformity  Neurological: He is alert  Skin: Skin is warm and dry  No rash noted  No erythema  No pallor  Psychiatric: He has a normal mood and affect  His behavior is normal  Judgment and thought content normal          Invasive Devices:      Lab Results:   Results from last 7 days   Lab Units 10/06/19  1908 10/03/19  0532 10/02/19  0947  09/30/19  1311   WBC Thousand/uL 6 93 11 96* 15 29*   < >  --    HEMOGLOBIN g/dL 9 4* 8 2* 8 6*   < >  --    HEMATOCRIT % 30 4* 26 6* 26 0*   < >  --    PLATELETS Thousands/uL 263 184 151   < >  --    POTASSIUM mmol/L 4 8 4 9 6 3*   < > 4 2   CHLORIDE mmol/L 92* 94* 93*   < > 96*   CO2 mmol/L 29 25 23   < > 26   BUN mg/dL 60* 51* 81*   < > 49*   CREATININE mg/dL 7 54* 5 38* 7 34*   < > 4 41*   CALCIUM mg/dL 8 6 8 0* 8 3   < > 8 4   MAGNESIUM mg/dL  --  2 0  --   --   --    PHOSPHORUS mg/dL  --  7 4*  --   --  5 2*   ALK PHOS U/L 236*  --   --   --   --    ALT U/L 20  --   --   --   --    AST U/L 32  --   --   --   --     < > = values in this interval not displayed       Lab Results   Component Value Date    CALCIUM 8 6 10/06/2019    PHOS 7 4 (H) 10/03/2019     Other Studies:

## 2019-10-07 NOTE — UTILIZATION REVIEW
10/6/2019  2017 OBSERVATION and CHANGED 10/7/2019  1220 INPATIENT RE:  Patient will need > 2 midnight stay for continued treatment of hyponatremia and continued complaints of lower abdominal pain and distention  Start   Ordered   10/07/19 1220  Inpatient Admission Once     Transfer Service: General Medicine       Question Answer Comment   Admitting Physician SHIRAZ Carlisle    Level of Care Med Surg    Estimated length of stay More than 2 Midnights    Certification I certify that inpatient services are medically necessary for this patient for a duration of greater than two midnights  See H&P and MD Progress Notes for additional information about the patient's course of treatment          10/07/19 1220

## 2019-10-07 NOTE — UTILIZATION REVIEW
Initial Clinical Review    Admission: Date/Time/Statement:   Orders Placed This Encounter   Procedures    Place in Observation (expected length of stay for this patient is less than two midnights)     Standing Status:   Standing     Number of Occurrences:   1     Order Specific Question:   Admitting Physician     Answer:   Selwyn Loyd     Order Specific Question:   Level of Care     Answer:   Med Surg [16]     ED Arrival Information     Expected Arrival Acuity Means of Arrival Escorted By Service Admission Type    - 10/6/2019 18:36 [facility]gent [facility] General Medicine Emergency    Arrival Complaint    Leg Infection        Chief Complaint   Patient presents with    Abdominal Pain     Pt presents to the ED with c/o abdominal pain and swelling that started a few days ago  Pt also has B/L leg infections that he was being treated for at the SNF, pt was sent by staff for evaluation of swelling in his abdomen     Assessment/Plan: 64 y o  male on HD who presents with lower extremity edema and abdominal pain  The patient was sent in for evaluation secondary to abdominal pain as well as lower leg edema  Nursing facility was concerned about reinfection in his leg wounds  Denied any fevers or chills  Reports that the swelling in his legs has been getting worse  He is scheduled for dialysis on Tuesday  Patient reports abdominal pain that he attributes to gas pains  Reports that he had 1 episode of passing gas when he rolled over, and his abdominal pain felt much improved  The patient typically has blood pressures anywhere from  systolic       Anemia due to chronic kidney disease, on chronic dialysis [facility])  Assessment & Plan  · Hemoglobin today is 9 4, monitor  Atrial fibrillation (HCC)  Assessment & Plan  · Developed atrial fibrillation with rapid ventricular response during last admission  · Not on any anticoagulation per Cardiology    · Continue metoprolol 12 5 mg b i d  With hold parameters  · Monitor vital signs  Benign essential hypertension  Assessment & Plan  · Per Cardiology, continue metoprolol with hold parameters at reduced dose  · Midodrine for hypotension  · Monitor blood pressure  Lactic acidosis  Assessment & Plan  · Lactic acidosis present upon admission at 2 1  Doubt acute infectious etiology  · Monitoring off of antibiotics  Swelling of lower extremity  Assessment & Plan  · Lower extremities are still swollen, however do not appear acutely infected at this time  · Completed oral ciprofloxacin as an outpatient 2 days ago  Received extra dose of IV ciprofloxacin emergency department tonight  ? Patient also had gram-negative bacteremia, grew Shewanella Putrifacians, a marine acquired bacteria  Had PermCath replaced at that time  Infectious Disease had been on board  · No fevers  Monitor vital signs  Hyponatremia  Assessment & Plan  · Mild, 132 upon admission  · Suspect hypervolemic hyponatremia  · Monitor  Chronic kidney disease with end stage renal failure on dialysis [facility])  Assessment & Plan  · Patient originally is scheduled for hemodialysis this Tuesday  · Appears somewhat volume overloaded on exam today  · Consult Nephrology  · Monitor electrolytes  · Continue Nephrocaps, phosphate binders  * Hypotension  Assessment & Plan  · History of chronic hypotension, per patient he typically runs anywhere from  systolic  · Continue midodrine on dialysis days  · Patient is acutely overloaded, no indication for IV fluids  · Monitor blood pressure    ED Triage Vitals   Temperature Pulse Respirations Blood Pressure SpO2   10/06/19 1840 10/06/19 1840 10/06/19 1840 10/06/19 1840 10/06/19 1840   99 9 °F (37 7 °C) 65 16 100/55 99 %      Temp Source Heart Rate Source Patient Position - Orthostatic VS BP Location FiO2 (%)   10/06/19 1840 10/06/19 1840 10/06/19 1840 10/06/19 1840 --   Oral Monitor Lying Right arm       Pain Score       10/06/19 2048       Worst Possible Pain        Wt Readings from Last 1 Encounters:   10/07/19 94 6 kg (208 lb 8 9 oz)     Additional Vital Signs:   10/07/19 0745  97 9 °F (36 6 °C)  79  19  100/60  96 %  None (Room air)  Lying   10/07/19 0617          95 %  None (Room air)     10/07/19 0000        82/62Abnormal       Lying   10/06/19 2300  98 6 °F (37 °C)  100  16  77/47Abnormal   98 %  Nasal cannula  Lying   10/06/19 2104  98 6 °F (37 °C)  103  16  77/44Abnormal   97 %  Nasal cannula  Lying   10/06/19 2048    105  16  87/53Abnormal   99 %  Nasal cannula  Lying   10/06/19 2019      18           10/06/19 2018    107Abnormal     79/50Abnormal   100 %  Nasal cannula  Lying   10/06/19 1840  99 9 °F (37 7 °C)  65  16  100/55  99 %  None (Room air)  Lying      Weights (last 14 days)     Date/Time  Weight  Weight Method  Height   10/07/19 0600  94 6 kg (208 lb 8 9 oz)   Bed scale     Weight: patient not ambulating at this time at 10/07/19 0600   10/06/19 2104  94 5 kg (208 lb 5 4 oz)  Bed scale  5' 8" (1 727 m)   10/06/19 1840  96 3 kg (212 lb 4 9 oz)    5' 8" (1 727 m)       Pertinent Labs/Diagnostic Test Results:   Results from last 7 days   Lab Units 10/06/19  1908 10/03/19  0532 10/02/19  0947 10/01/19  0519   WBC Thousand/uL 6 93 11 96* 15 29* 15 11*   HEMOGLOBIN g/dL 9 4* 8 2* 8 6* 8 3*   HEMATOCRIT % 30 4* 26 6* 26 0* 26 2*   PLATELETS Thousands/uL 263 184 151 96*   NEUTROS ABS Thousands/µL  --  10 01*  --   --    BANDS PCT % 9*  --   --  4         Results from last 7 days   Lab Units 10/06/19  1908 10/03/19  0532 10/02/19  0947 10/01/19  0519 09/30/19  1313 09/30/19  1311   SODIUM mmol/L 132* 130* 128* 131* 134* 134*   POTASSIUM mmol/L 4 8 4 9 6 3* 5 3 4 3 4 2   CHLORIDE mmol/L 92* 94* 93* 96* 97* 96*   CO2 mmol/L 29 25 23 24 25 26   ANION GAP mmol/L 11 11 12 11 12 12   BUN mg/dL 60* 51* 81* 60* 51* 49*   CREATININE mg/dL 7 54* 5 38* 7 34* 5 55* 4 45* 4 41*   EGFR ml/min/1 73sq m 7 11 7 10 14 14 CALCIUM mg/dL 8 6 8 0* 8 3 7 9* 8 4 8 4   MAGNESIUM mg/dL  --  2 0  --   --   --   --    PHOSPHORUS mg/dL  --  7 4*  --   --   --  5 2*     Results from last 7 days   Lab Units 10/06/19  1908 09/30/19  1311   AST U/L 32  --    ALT U/L 20  --    ALK PHOS U/L 236*  --    TOTAL PROTEIN g/dL 6 3*  --    ALBUMIN g/dL 1 5* 1 9*   TOTAL BILIRUBIN mg/dL 2 90*  --      Results from last 7 days   Lab Units 10/07/19  0738 10/06/19  2114 09/30/19  1140   POC GLUCOSE mg/dl 96 105 119     Results from last 7 days   Lab Units 10/06/19  1908 10/03/19  0532 10/02/19  0947 10/01/19  0519 09/30/19  1313 09/30/19  1311   GLUCOSE RANDOM mg/dL 109 108 98 158* 88 88       Results from last 7 days   Lab Units 10/03/19  0200   D DIMER QUANT ng/ml (FEU) 7,145*     Results from last 7 days   Lab Units 10/06/19  1908 10/03/19  0200 10/02/19  0947   PROTIME seconds 15 9* 16 6* 14 6*   INR  1 34* 1 42* 1 20*   PTT seconds 34 41*  --          Results from last 7 days   Lab Units 10/06/19  1908   PROCALCITONIN ng/ml 10 48*     Results from last 7 days   Lab Units 10/06/19  1908   LACTIC ACID mmol/L 2 1*                 Results from last 7 days   Lab Units 10/03/19  0532   FERRITIN ng/mL 1,339*     Results from last 7 days   Lab Units 10/01/19  1325   HEP B S AG  Non-reactive   HEP C AB  Non-reactive   HEP B C IGM  Non-reactive   HEP B C TOTAL AB  Non-reactive           Results from last 7 days   Lab Units 10/06/19  1908 10/01/19  0519   TOTAL COUNTED  100 100       10/7 cxr: no acute findings      ED Treatment:   Medication Administration from 10/06/2019 1836 to 10/06/2019 2102       Date/Time Order Dose Route Action Action by Comments     10/06/2019 2023 ciprofloxacin (CIPRO) IVPB (premix) 400 mg 0 mg Intravenous Stopped Tomma Prosper, RN      10/06/2019 1923 ciprofloxacin (CIPRO) IVPB (premix) 400 mg 400 mg Intravenous New Bag Neftaly Cheng RN         Past Medical History:   Diagnosis Date    Complication of renal dialysis     Polycystic kidney disease      Present on Admission:   Benign essential hypertension   Swelling of lower extremity   Atrial fibrillation (HCC)   Lactic acidosis   Hyponatremia   Hypotension      Admitting Diagnosis: Legionella infection (HCC) [A48 1]  Lower extremity edema [R60 0]  Volume overload [E87 70]  Healing wound [T14 90XD]  Age/Sex: 64 y o  male  Admission Orders:    Current Facility-Administered Medications:  acetaminophen 650 mg Oral Q6H PRN Waynetta Body, PA-C   albumin human 25 g Intravenous PRN SAM Bhatti complex-vitamin C-folic acid 1 capsule Oral Daily With [employer], PA-C   calcium acetate 1,334 mg Oral TID With Meals Waynetta Body, PA-C   gabapentin 100 mg Oral Daily Waynetta Body, PA-C   heparin (porcine) 5,000 Units Subcutaneous [facility] Waynetta Body, PA-C   metoprolol tartrate 12 5 mg Oral Q12H [address] Waynetta Body, PA-C   midodrine 10 mg Oral Before Dialysis Waynetta Body, PA-C   ondansetron 4 mg Intravenous Q6H PRN Waynetta Body, PA-C   oxyCODONE 5 mg Oral Q4H PRN Waynetta Body, PA-C   pantoprazole 40 mg Oral Early Morning Waynetta Body, PA-C   simethicone 80 mg Oral Q6H PRN Waynetta Body, PA-C       IP CONSULT TO NEPHROLOGY  Renal diet & fluid restriction 1500 ml  Daily wt  I&O  Nasal cannula      Network Utilization Review Department  Phone: [phone]; Fax [phone]  [email or web address]  ATTENTION: Please call with any questions or concerns to [phone]  and carefully listen to the prompts so that you are directed to the right person  Send all requests for admission clinical reviews, approved or denied determinations and any other requests to fax [phone]   All voicemails are confidential

## 2019-10-07 NOTE — PLAN OF CARE
Problem: Potential for Falls  Goal: Patient will remain free of falls  Description  INTERVENTIONS:  - Assess patient frequently for physical needs  -  Identify cognitive and physical deficits and behaviors that affect risk of falls    -  [place] fall precautions as indicated by assessment   - Educate patient/family on patient safety including physical limitations  - Instruct patient to call for assistance with activity based on assessment  - Modify environment to reduce risk of injury  - Consider OT/PT consult to assist with strengthening/mobility  Outcome: Progressing     Problem: Prexisting or High Potential for Compromised Skin Integrity  Goal: Skin integrity is maintained or improved  Description  INTERVENTIONS:  - Identify patients at risk for skin breakdown  - Assess and monitor skin integrity  - Assess and monitor nutrition and hydration status  - Monitor labs   - Assess for incontinence   - Turn and reposition patient  - Assist with mobility/ambulation  - Relieve pressure over bony prominences  - Avoid friction and shearing  - Provide appropriate hygiene as needed including keeping skin clean and dry  - Evaluate need for skin moisturizer/barrier cream  - Collaborate with interdisciplinary team   - Patient/family teaching  - Consider wound care consult   Outcome: Progressing     Problem: PAIN - ADULT  Goal: Verbalizes/displays adequate comfort level or baseline comfort level  Description  Interventions:  - Encourage patient to monitor pain and request assistance  - Assess pain using appropriate pain scale  - Administer analgesics based on type and severity of pain and evaluate response  - Implement non-pharmacological measures as appropriate and evaluate response  - Consider cultural and social influences on pain and pain management  - Notify physician/advanced practitioner if interventions unsuccessful or patient reports new pain  Outcome: Progressing     Problem: INFECTION - ADULT  Goal: Absence or prevention of progression during hospitalization  Description  INTERVENTIONS:  - Assess and monitor for signs and symptoms of infection  - Monitor lab/diagnostic results  - Monitor all insertion sites, i e  indwelling lines, tubes, and drains  - Monitor endotracheal if appropriate and nasal secretions for changes in amount and color  - [place] appropriate cooling/warming therapies per order  - Administer medications as ordered  - Instruct and encourage patient and family to use good hand hygiene technique  - Identify and instruct in appropriate isolation precautions for identified infection/condition  Outcome: Progressing  Goal: Absence of fever/infection during neutropenic period  Description  INTERVENTIONS:  - Monitor WBC    Outcome: Progressing     Problem: SAFETY ADULT  Goal: Patient will remain free of falls  Description  INTERVENTIONS:  - Assess patient frequently for physical needs  -  Identify cognitive and physical deficits and behaviors that affect risk of falls    -  [place] fall precautions as indicated by assessment   - Educate patient/family on patient safety including physical limitations  - Instruct patient to call for assistance with activity based on assessment  - Modify environment to reduce risk of injury  - Consider OT/PT consult to assist with strengthening/mobility  Outcome: Progressing  Goal: Maintain or return to baseline ADL function  Description  INTERVENTIONS:  -  Assess patient's ability to carry out ADLs; assess patient's baseline for ADL function and identify physical deficits which impact ability to perform ADLs (bathing, care of mouth/teeth, toileting, grooming, dressing, etc )  - Assess/evaluate cause of self-care deficits   - Assess range of motion  - Assess patient's mobility; develop plan if impaired  - Assess patient's need for assistive devices and provide as appropriate  - Encourage maximum independence but intervene and supervise when necessary  - Involve family in performance of ADLs  - Assess for home care needs following discharge   - Consider OT consult to assist with ADL evaluation and planning for discharge  - Provide patient education as appropriate  Outcome: Progressing  Goal: Maintain or return mobility status to optimal level  Description  INTERVENTIONS:  - Assess patient's baseline mobility status (ambulation, transfers, stairs, etc )    - Identify cognitive and physical deficits and behaviors that affect mobility  - Identify mobility aids required to assist with transfers and/or ambulation (gait belt, sit-to-stand, lift, walker, cane, etc )  - [place] fall precautions as indicated by assessment  - Record patient progress and toleration of activity level on Mobility SBAR; progress patient to next Phase/Stage  - Instruct patient to call for assistance with activity based on assessment  - Consider rehabilitation consult to assist with strengthening/weightbearing, etc   Outcome: Progressing     Problem: DISCHARGE PLANNING  Goal: Discharge to home or other facility with appropriate resources  Description  INTERVENTIONS:  - Identify barriers to discharge w/patient and caregiver  - Arrange for needed discharge resources and transportation as appropriate  - Identify discharge learning needs (meds, wound care, etc )  - Arrange for interpretive services to assist at discharge as needed  - Refer to Case Management Department for coordinating discharge planning if the patient needs post-hospital services based on physician/advanced practitioner order or complex needs related to functional status, cognitive ability, or social support system  Outcome: Progressing     Problem: Knowledge Deficit  Goal: Patient/family/caregiver demonstrates understanding of disease process, treatment plan, medications, and discharge instructions  Description  Complete learning assessment and assess knowledge base    Interventions:  - Provide teaching at level of understanding  - Provide teaching via preferred learning methods  Outcome: Progressing

## 2019-10-07 NOTE — ASSESSMENT & PLAN NOTE
· Patient originally is scheduled for hemodialysis this Tuesday  · Appears somewhat volume overloaded on exam today  · Consult Nephrology  · Monitor electrolytes  · Continue Nephrocaps, phosphate binders

## 2019-10-07 NOTE — SOCIAL WORK
CM received VM from [facility] ([phone] [id number]) asking for call back re: existing referral  CM called back and spoke with Neda Levine reports patient was denied at [address] but she is unable to state why  Neda Levine did state there is a TTS bed available at [facility] if family is interested in pursuing this facility instead of continuing with [facility]  CM Department to follow up with patient and family to see if interested and will send referral to [facility] admissions for review  CM Department will continue to follow patient

## 2019-10-07 NOTE — PROGRESS NOTES
Pt blood pressure upon arrival on MS3 was 77 systolic  Admitting SLIM notified of finding, per SLIM pt overloaded with fluid, will monitor

## 2019-10-07 NOTE — ASSESSMENT & PLAN NOTE
· Developed atrial fibrillation with rapid ventricular response during last admission  · Not on any anticoagulation per Cardiology  · Continue metoprolol 12 5 mg b i d  With hold parameters  · Monitor vital signs

## 2019-10-07 NOTE — CONSULTS
Consult Note - Wound   Redd Richards 64 y o  male MRN: [id number]  Unit/Bed#: -01 Encounter: [id number]      Assessment:   Pt  Just recently discharged from [facility] where podiatry had seen patient for bilat lower extremity blood filled bullae  Debridement performed last admission  Bilat legs with dressings on them  Dried green drainage seen on dressings  Pt  Scheduled for IR today at 3pm  Family in room  Plan:   1, Need to consult podiatry for leg wound care  Dr Danna Cuenca DPM, saw patient last admission  2  KAY Putnam for SLIM, tiger texted Dr Issac Dugan to tell him of need for podiatry consult  Discussed with Anahi Rivera RN, and patient and family  Vitals: Blood pressure 96/60, pulse 101, temperature 98 5 °F (36 9 °C), temperature source Oral, resp  rate 18, height 5' 8" (1 727 m), weight 94 6 kg (208 lb 8 9 oz), SpO2 96 %  ,Body mass index is 31 71 kg/m²  Wound 09/23/19 Incision Catheter entry/exit site Chest Right;Upper (Active)       Wound 09/21/19 Traumatic Other (Comment) Leg Right; Lower (Active)   Wound Description MISHA 10/7/2019  7:45 AM   [place]-wound Assessment MISHA 10/7/2019  7:45 AM   Closure None 10/7/2019  7:45 AM   Drainage Amount Scant 10/6/2019  9:04 PM   Treatments Site care 10/6/2019  9:04 PM   Dressing Non adherent;Dry dressing 10/7/2019  7:45 AM   Wound packed? No 10/7/2019  7:45 AM   Dressing Status Clean;Dry; Intact 10/7/2019  7:45 AM       Wound 09/21/19 Traumatic Other (Comment) Leg Left; Lower (Active)   Wound Description MISHA 10/7/2019  7:45 AM   [place]-wound Assessment MISHA 10/7/2019  7:45 AM   Closure None 10/7/2019  7:45 AM   Drainage Amount Scant 10/6/2019  9:04 PM   Treatments Site care 10/6/2019  9:04 PM   Dressing Non adherent;Dry dressing 10/7/2019  7:45 AM   Wound packed? No 10/7/2019  7:45 AM   Dressing Status Clean;Dry; Intact 10/7/2019  7:45 AM       Wound 09/27/19 Traumatic Other (Comment) Thigh Inner;Medial;Left (Active)

## 2019-10-07 NOTE — ASSESSMENT & PLAN NOTE
· Lower extremities are still swollen, however do not appear acutely infected at this time  · Completed oral ciprofloxacin as an outpatient 2 days ago  Received extra dose of IV ciprofloxacin emergency department tonight  · Patient also had gram-negative bacteremia, grew Shewanella Putrifacians, a marine acquired bacteria  Had PermCath replaced at that time  Infectious Disease had been on board  · No fevers  Monitor vital signs

## 2019-10-07 NOTE — CONSULTS
IR consulted for right renal cyst tube check  This tube is in a right renal cyst of polycystic kidney  Suggest CT scan of the abdomen and then we can remove the tube since it is not draining much fluid daily  It is likely not the cause of any symptoms or abdominal pain

## 2019-10-08 ENCOUNTER — APPOINTMENT (INPATIENT)
Dept: DIALYSIS | Facility: HOSPITAL | Age: 59
DRG: 314 | End: 2019-10-08
Payer: MEDICARE

## 2019-10-08 LAB
ANION GAP SERPL CALCULATED.3IONS-SCNC: 11 MMOL/L (ref 4–13)
BASOPHILS # BLD AUTO: 0.03 THOUSANDS/ΜL (ref 0–0.1)
BASOPHILS NFR BLD AUTO: 0 % (ref 0–1)
BUN SERPL-MCNC: 74 MG/DL (ref 5–25)
CALCIUM SERPL-MCNC: 8.5 MG/DL (ref 8.3–10.1)
CHLORIDE SERPL-SCNC: 90 MMOL/L (ref 100–108)
CO2 SERPL-SCNC: 26 MMOL/L (ref 21–32)
CREAT SERPL-MCNC: 8.93 MG/DL (ref 0.6–1.3)
EOSINOPHIL # BLD AUTO: 0.23 THOUSAND/ΜL (ref 0–0.61)
EOSINOPHIL NFR BLD AUTO: 3 % (ref 0–6)
ERYTHROCYTE [DISTWIDTH] IN BLOOD BY AUTOMATED COUNT: 15.9 % (ref 11.6–15.1)
GFR SERPL CREATININE-BSD FRML MDRD: 6 ML/MIN/1.73SQ M
GLUCOSE SERPL-MCNC: 101 MG/DL (ref 65–140)
GLUCOSE SERPL-MCNC: 62 MG/DL (ref 65–140)
GLUCOSE SERPL-MCNC: 75 MG/DL (ref 65–140)
GLUCOSE SERPL-MCNC: 85 MG/DL (ref 65–140)
GLUCOSE SERPL-MCNC: 92 MG/DL (ref 65–140)
HCT VFR BLD AUTO: 28.8 % (ref 36.5–49.3)
HGB BLD-MCNC: 8.9 G/DL (ref 12–17)
IMM GRANULOCYTES # BLD AUTO: 0.09 THOUSAND/UL (ref 0–0.2)
IMM GRANULOCYTES NFR BLD AUTO: 1 % (ref 0–2)
LYMPHOCYTES # BLD AUTO: 0.52 THOUSANDS/ΜL (ref 0.6–4.47)
LYMPHOCYTES NFR BLD AUTO: 6 % (ref 14–44)
MCH RBC QN AUTO: 32.1 PG (ref 26.8–34.3)
MCHC RBC AUTO-ENTMCNC: 30.9 G/DL (ref 31.4–37.4)
MCV RBC AUTO: 104 FL (ref 82–98)
MONOCYTES # BLD AUTO: 1.21 THOUSAND/ΜL (ref 0.17–1.22)
MONOCYTES NFR BLD AUTO: 15 % (ref 4–12)
NEUTROPHILS # BLD AUTO: 6.15 THOUSANDS/ΜL (ref 1.85–7.62)
NEUTS SEG NFR BLD AUTO: 75 % (ref 43–75)
NRBC BLD AUTO-RTO: 0 /100 WBCS
PLATELET # BLD AUTO: 230 THOUSANDS/UL (ref 149–390)
PMV BLD AUTO: 10.5 FL (ref 8.9–12.7)
POTASSIUM SERPL-SCNC: 5.2 MMOL/L (ref 3.5–5.3)
RBC # BLD AUTO: 2.77 MILLION/UL (ref 3.88–5.62)
SODIUM SERPL-SCNC: 127 MMOL/L (ref 136–145)
WBC # BLD AUTO: 8.23 THOUSAND/UL (ref 4.31–10.16)

## 2019-10-08 PROCEDURE — 99223 1ST HOSP IP/OBS HIGH 75: CPT | Performed by: PODIATRIST

## 2019-10-08 PROCEDURE — 82948 REAGENT STRIP/BLOOD GLUCOSE: CPT

## 2019-10-08 PROCEDURE — 99232 SBSQ HOSP IP/OBS MODERATE 35: CPT | Performed by: INTERNAL MEDICINE

## 2019-10-08 PROCEDURE — 5A1D70Z PERFORMANCE OF URINARY FILTRATION, INTERMITTENT, LESS THAN 6 HOURS PER DAY: ICD-10-PCS | Performed by: INTERNAL MEDICINE

## 2019-10-08 PROCEDURE — 85025 COMPLETE CBC W/AUTO DIFF WBC: CPT | Performed by: INTERNAL MEDICINE

## 2019-10-08 PROCEDURE — 80048 BASIC METABOLIC PNL TOTAL CA: CPT | Performed by: INTERNAL MEDICINE

## 2019-10-08 RX ORDER — MIDODRINE HYDROCHLORIDE 5 MG/1
10 TABLET ORAL
Status: DISCONTINUED | OUTPATIENT
Start: 2019-10-08 | End: 2019-10-16 | Stop reason: HOSPADM

## 2019-10-08 RX ORDER — LIDOCAINE HYDROCHLORIDE 20 MG/ML
15 SOLUTION OROPHARYNGEAL 4 TIMES DAILY PRN
Status: DISCONTINUED | OUTPATIENT
Start: 2019-10-08 | End: 2019-10-16

## 2019-10-08 RX ADMIN — HEPARIN SODIUM 5000 UNITS: 5000 INJECTION INTRAVENOUS; SUBCUTANEOUS at 21:48

## 2019-10-08 RX ADMIN — Medication 1 CAPSULE: at 16:50

## 2019-10-08 RX ADMIN — ALBUMIN (HUMAN) 25 G: 0.25 INJECTION, SOLUTION INTRAVENOUS at 09:55

## 2019-10-08 RX ADMIN — CALCIUM ACETATE 1334 MG: 667 CAPSULE ORAL at 13:38

## 2019-10-08 RX ADMIN — MIDODRINE HYDROCHLORIDE 10 MG: 5 TABLET ORAL at 08:35

## 2019-10-08 RX ADMIN — METOPROLOL TARTRATE 12.5 MG: 25 TABLET ORAL at 21:48

## 2019-10-08 RX ADMIN — PANTOPRAZOLE SODIUM 40 MG: 40 TABLET, DELAYED RELEASE ORAL at 05:24

## 2019-10-08 RX ADMIN — HEPARIN SODIUM 5000 UNITS: 5000 INJECTION INTRAVENOUS; SUBCUTANEOUS at 05:24

## 2019-10-08 RX ADMIN — HEPARIN SODIUM 5000 UNITS: 5000 INJECTION INTRAVENOUS; SUBCUTANEOUS at 13:38

## 2019-10-08 RX ADMIN — OXYCODONE HYDROCHLORIDE 5 MG: 5 TABLET ORAL at 13:44

## 2019-10-08 NOTE — PLAN OF CARE
Problem: Potential for Falls  Goal: Patient will remain free of falls  Description  INTERVENTIONS:  - Assess patient frequently for physical needs  -  Identify cognitive and physical deficits and behaviors that affect risk of falls    -  [place] fall precautions as indicated by assessment   - Educate patient/family on patient safety including physical limitations  - Instruct patient to call for assistance with activity based on assessment  - Modify environment to reduce risk of injury  - Consider OT/PT consult to assist with strengthening/mobility  Outcome: Progressing     Problem: Prexisting or High Potential for Compromised Skin Integrity  Goal: Skin integrity is maintained or improved  Description  INTERVENTIONS:  - Identify patients at risk for skin breakdown  - Assess and monitor skin integrity  - Assess and monitor nutrition and hydration status  - Monitor labs   - Assess for incontinence   - Turn and reposition patient  - Assist with mobility/ambulation  - Relieve pressure over bony prominences  - Avoid friction and shearing  - Provide appropriate hygiene as needed including keeping skin clean and dry  - Evaluate need for skin moisturizer/barrier cream  - Collaborate with interdisciplinary team   - Patient/family teaching  - Consider wound care consult   Outcome: Progressing     Problem: PAIN - ADULT  Goal: Verbalizes/displays adequate comfort level or baseline comfort level  Description  Interventions:  - Encourage patient to monitor pain and request assistance  - Assess pain using appropriate pain scale  - Administer analgesics based on type and severity of pain and evaluate response  - Implement non-pharmacological measures as appropriate and evaluate response  - Consider cultural and social influences on pain and pain management  - Notify physician/advanced practitioner if interventions unsuccessful or patient reports new pain  Outcome: Progressing     Problem: INFECTION - ADULT  Goal: Absence or prevention of progression during hospitalization  Description  INTERVENTIONS:  - Assess and monitor for signs and symptoms of infection  - Monitor lab/diagnostic results  - Monitor all insertion sites, i e  indwelling lines, tubes, and drains  - Monitor endotracheal if appropriate and nasal secretions for changes in amount and color  - [place] appropriate cooling/warming therapies per order  - Administer medications as ordered  - Instruct and encourage patient and family to use good hand hygiene technique  - Identify and instruct in appropriate isolation precautions for identified infection/condition  Outcome: Progressing  Goal: Absence of fever/infection during neutropenic period  Description  INTERVENTIONS:  - Monitor WBC    Outcome: Progressing     Problem: SAFETY ADULT  Goal: Patient will remain free of falls  Description  INTERVENTIONS:  - Assess patient frequently for physical needs  -  Identify cognitive and physical deficits and behaviors that affect risk of falls    -  [place] fall precautions as indicated by assessment   - Educate patient/family on patient safety including physical limitations  - Instruct patient to call for assistance with activity based on assessment  - Modify environment to reduce risk of injury  - Consider OT/PT consult to assist with strengthening/mobility  Outcome: Progressing  Goal: Maintain or return to baseline ADL function  Description  INTERVENTIONS:  -  Assess patient's ability to carry out ADLs; assess patient's baseline for ADL function and identify physical deficits which impact ability to perform ADLs (bathing, care of mouth/teeth, toileting, grooming, dressing, etc )  - Assess/evaluate cause of self-care deficits   - Assess range of motion  - Assess patient's mobility; develop plan if impaired  - Assess patient's need for assistive devices and provide as appropriate  - Encourage maximum independence but intervene and supervise when necessary  - Involve family in performance of ADLs  - Assess for home care needs following discharge   - Consider OT consult to assist with ADL evaluation and planning for discharge  - Provide patient education as appropriate  Outcome: Progressing  Goal: Maintain or return mobility status to optimal level  Description  INTERVENTIONS:  - Assess patient's baseline mobility status (ambulation, transfers, stairs, etc )    - Identify cognitive and physical deficits and behaviors that affect mobility  - Identify mobility aids required to assist with transfers and/or ambulation (gait belt, sit-to-stand, lift, walker, cane, etc )  - [place] fall precautions as indicated by assessment  - Record patient progress and toleration of activity level on Mobility SBAR; progress patient to next Phase/Stage  - Instruct patient to call for assistance with activity based on assessment  - Consider rehabilitation consult to assist with strengthening/weightbearing, etc   Outcome: Progressing     Problem: DISCHARGE PLANNING  Goal: Discharge to home or other facility with appropriate resources  Description  INTERVENTIONS:  - Identify barriers to discharge w/patient and caregiver  - Arrange for needed discharge resources and transportation as appropriate  - Identify discharge learning needs (meds, wound care, etc )  - Arrange for interpretive services to assist at discharge as needed  - Refer to Case Management Department for coordinating discharge planning if the patient needs post-hospital services based on physician/advanced practitioner order or complex needs related to functional status, cognitive ability, or social support system  Outcome: Progressing     Problem: Knowledge Deficit  Goal: Patient/family/caregiver demonstrates understanding of disease process, treatment plan, medications, and discharge instructions  Description  Complete learning assessment and assess knowledge base    Interventions:  - Provide teaching at level of understanding  - Provide teaching via preferred learning methods  Outcome: Progressing     Problem: GENITOURINARY - ADULT  Goal: Maintains or returns to baseline urinary function  Description  INTERVENTIONS:  - Assess urinary function  - Encourage oral fluids to ensure adequate hydration if ordered  - Administer IV fluids as ordered to ensure adequate hydration  - Administer ordered medications as needed  - Offer frequent toileting  - Follow urinary retention protocol if ordered  Outcome: Progressing  Goal: Absence of urinary retention  Description  INTERVENTIONS:  - Assess patients ability to void and empty bladder  - Monitor I/O  - Bladder scan as needed  - Discuss with physician/AP medications to alleviate retention as needed  - Discuss catheterization for long term situations as appropriate  Outcome: Progressing     Problem: METABOLIC, FLUID AND ELECTROLYTES - ADULT  Goal: Electrolytes maintained within normal limits  Description  INTERVENTIONS:  - Monitor labs and assess patient for signs and symptoms of electrolyte imbalances  - Administer electrolyte replacement as ordered  - Monitor response to electrolyte replacements, including repeat lab results as appropriate  - Instruct patient on fluid and nutrition as appropriate  Outcome: Progressing  Goal: Fluid balance maintained  Description  INTERVENTIONS:  - Monitor labs   - Monitor I/O and WT  - Instruct patient on fluid and nutrition as appropriate  - Assess for signs & symptoms of volume excess or deficit  Outcome: Progressing  Goal: Glucose maintained within target range  Description  INTERVENTIONS:  - Monitor Blood Glucose as ordered  - Assess for signs and symptoms of hyperglycemia and hypoglycemia  - Administer ordered medications to maintain glucose within target range  - Assess nutritional intake and initiate nutrition service referral as needed  Outcome: Progressing     Problem: SKIN/TISSUE INTEGRITY - ADULT  Goal: Skin integrity remains intact  Description  INTERVENTIONS  - Identify patients at risk for skin breakdown  - Assess and monitor skin integrity  - Assess and monitor nutrition and hydration status  - Monitor labs (i e  albumin)  - Assess for incontinence   - Turn and reposition patient  - Assist with mobility/ambulation  - Relieve pressure over bony prominences  - Avoid friction and shearing  - Provide appropriate hygiene as needed including keeping skin clean and dry  - Evaluate need for skin moisturizer/barrier cream  - Collaborate with interdisciplinary team (i e  Nutrition, Rehabilitation, etc )   - Patient/family teaching  Outcome: Progressing  Goal: Incision(s), wounds(s) or drain site(s) healing without S/S of infection  Description  INTERVENTIONS  - Assess and document risk factors for skin impairment   - Assess and document dressing, incision, wound bed, drain sites and surrounding tissue  - Consider nutrition services referral as needed  - Oral mucous membranes remain intact  - Provide patient/ family education  Outcome: Progressing  Goal: Oral mucous membranes remain intact  Description  INTERVENTIONS  - Assess oral mucosa and hygiene practices  - Implement preventative oral hygiene regimen  - Implement oral medicated treatments as ordered  - Initiate Nutrition services referral as needed  Outcome: Progressing

## 2019-10-08 NOTE — PROGRESS NOTES
[address] Internal Medicine Progress Note  Patient: Natividad Adames 64 y o  male   MRN: [id number]  PCP: Larry Martinez MD  Unit/Bed#: -[place] Encounter: [id number]  Date Of Visit: 10/08/19    Assessment:    Principal Problem:    Hypotension  Active Problems:    Chronic kidney disease with end stage renal failure on dialysis ([facility])    Hyponatremia    Swelling of lower extremity    Lactic acidosis    Benign essential hypertension    Atrial fibrillation ([facility] )    Anemia due to chronic kidney disease, on chronic dialysis ([facility] )      Plan:    Anemia due to chronic kidney disease, on chronic dialysis ([facility] )  Assessment & Plan  · Hemoglobin today is 9 4, monitor      Atrial fibrillation ([facility] )  Assessment & Plan  · Developed atrial fibrillation with rapid ventricular response during last admission  · Not on any anticoagulation per Cardiology  · Continue metoprolol 12 5 mg b i d  With hold parameters  · Monitor vital signs      Benign essential hypertension  Assessment & Plan  · Per Cardiology, continue metoprolol with hold parameters at reduced dose  · Midodrine for hypotension  · Monitor blood pressure  Some improvement after admission given multiple wounds in his lower extremities DILIA drain from abdomen and abdominal pain would obtain repeat set of blood cultures     Lactic acidosis  Assessment & Plan  · Lactic acidosis present upon admission at 2 1  Doubt acute infectious etiology  Check blood cultures nonetheless  · Monitoring off of antibiotics      Swelling of lower extremity  Assessment & Plan  · Lower extremities are still swollen, however do not appear acutely infected at this time  But do appear to need active wound care will consult wound care service and Podiatry service has attended to wounds with dressing changes as outlined  · Completed oral ciprofloxacin as an outpatient 2 days ago  Received extra dose of IV ciprofloxacin emergency department tonight    ? Patient also had gram-negative bacteremia, grew Shewanella Putrifacians, a marine acquired bacteria  Had PermCath replaced at that time  Infectious Disease had been on board prior and will have re-evaluation here I cannot ascertain whether this patient has cellulitis which is less likely but concern for calciphylaxis  · No fevers  Monitor vital signs      Hyponatremia  Assessment & Plan  · Mild, 132 upon admission  · Suspect hypervolemic hyponatremia  Defer management to Nephrology assess status after hemodialysis  · Monitor      Chronic kidney disease with end stage renal failure on dialysis [facility])  Assessment & Plan  · Patient originally is scheduled for hemodialysis this Tuesday  · Appears somewhat volume overloaded on exam today  · Consult Nephrology  · Monitor electrolytes  · Continue Nephrocaps, phosphate binders  · Nephrology raising concerns of possible calciphylaxis lower extremities will ask surgical input to see if we can get punch biopsy and also have Infectious Disease opinion     * Hypotension  Assessment & Plan  · History of chronic hypotension, per patient he typically runs anywhere from  systolic  · Continue midodrine on dialysis days was again hypotensive today during dialysis in receive albumin and IV fluids  · Patient is acutely overloaded, no indication for IV fluids  · Monitor blood pressure    · Nephrology added albumin today      Abdominal pain, has indwelling DILIA drain for infected renal cyst versus renal carcinoma based on recent CT imaging also had abdominal distension from retained stomach contents, right inguinal hernia with large right hydrocele/would obtain further imaging/may seen by IR who felt that DILIA drain can probably be pulled had CT scan yesterday showing moderate small-bowel ileus(change from previous) and moderate ascites which is increased from prior and he may benefit from paracentesis will ask IR to assess and perform therapeutic and diagnostic/    General surgery to assess and follow small-bowel ileus and inguinal hernia and large right hydrocele in relation to ileus however he is moving his bowels and does not have emesis but very poor appetite  Reviewed surgical opinion and no surgical intervention is indicated at this time for his mild ileus and hernia but did not address possible calciphylaxis involving lower extremities(I did not raise the issue on my original consult) I would certainly welcome their input and possible need for punch biopsy although was seen by Dermatology on last admission and their impression was bullouscellulitis  And not calciphylaxis         VTE Pharmacologic Prophylaxis:   Pharmacologic: Heparin  Mechanical VTE Prophylaxis in Place: No active ulcers lower extremities    Discussions with Specialists or Other Care Team Provider:  INFORMED CASE MANAGEMENT PATIENT WILL NEED EXTENDED COURSE OF CARE DUE TO ONGOING HYPOTENSION INCREASING ABDOMINAL DISTENSION NEED FOR PARACENTESIS AND WORKUP AND SURGICAL EVALUATION OF ILEUS    Time Spent for Care: 45 minutes  More than 50% of total time spent on counseling and coordination of care as described above  Subjective:   Continues to complain of lower abdominal pain and distension denies recent bowel movement and denies recent course of diarrhea nausea vomiting  Appears chronically ill poor appetite and persistently hypotensive    Objective:     Vitals:   Temp (24hrs), Av 3 °F (36 8 °C), Min:98 °F (36 7 °C), Max:98 5 °F (36 9 °C)    Temp:  [98 °F (36 7 °C)-98 5 °F (36 9 °C)] 98 °F (36 7 °C)  HR:  [] 99  Resp:  [16-18] 16  BP: ()/(41-63) 86/52  SpO2:  [94 %-95 %] 95 %  Body mass index is 32 08 kg/m²  Input and Output Summary (last 24 hours):        Intake/Output Summary (Last 24 hours) at 10/8/2019 1137  Last data filed at 10/8/2019 1000  Gross per 24 hour   Intake 660 ml   Output 10 ml   Net 650 ml       Physical Exam:     Physical Exam:   General appearance: alert, appears stated age and cooperative  Head: Normocephalic, without obvious abnormality, atraumatic  Lungs: diminished breath sounds  Heart: regular rate and rhythm  Abdomen: Distended with tympany active bowel sounds heard right lower quadrant DILIA drain with cloudy drainage no rebound or guarding  Back: Both lower extremities dressed with gauze dressings with breakthrough drainage serous  Extremities: Both lower extremities with the gauze dressings with breakthrough drainage reviewed photographs from patient's phone of wounds  Neurologic: Grossly normal      Additional Data:     Labs:    Results from last 7 days   Lab Units 10/08/19  0500   WBC Thousand/uL 8 23   HEMOGLOBIN g/dL 8 9*   HEMATOCRIT % 28 8*   PLATELETS Thousands/uL 230   NEUTROS PCT % 75   LYMPHS PCT % 6*   MONOS PCT % 15*   EOS PCT % 3     Results from last 7 days   Lab Units 10/08/19  0500  10/06/19  1908   POTASSIUM mmol/L 5 2   < > 4 8   CHLORIDE mmol/L 90*   < > 92*   CO2 mmol/L 26   < > 29   BUN mg/dL 74*   < > 60*   CREATININE mg/dL 8 93*   < > 7 54*   CALCIUM mg/dL 8 5   < > 8 6   ALK PHOS U/L  --   --  236*   ALT U/L  --   --  20   AST U/L  --   --  32    < > = values in this interval not displayed  Results from last 7 days   Lab Units 10/06/19  1908   INR  1 34*       * I Have Reviewed All Lab Data Listed Above  * Additional Pertinent Lab Tests Reviewed: All Labs For Current Hospital Admission Reviewed    Imaging:  Xr Chest 1 View Portable    Result Date: 10/7/2019  Narrative: CHEST INDICATION:   shortness of breath  COMPARISON:  9/23/2019 EXAM PERFORMED/VIEWS:  XR CHEST PORTABLE Images: 2 FINDINGS:  Right-sided central venous catheter is present and appear satisfactory in position, similar to prior  Cardia mediastinal silhouette probably within normal limits given limited inspiratory effort on both images  The lungs are clear  No pneumothorax or pleural effusion  Osseous structures appear within normal limits for patient age  Impression: Satisfactory line positioning    No acute findings Workstation performed: [id number]     Xr Chest Portable    Result Date: 9/23/2019  Narrative: CHEST INDICATION:   temp catheter line insertion  COMPARISON:  9/23/2019 EXAM PERFORMED/VIEWS:  XR CHEST PORTABLE FINDINGS:  There is a right internal jugular central line with its tip projecting at the cavoatrial junction  Cardiomediastinal silhouette appears unremarkable  No pneumothorax  No effusions  Clear lung fields  Osseous structures appear within normal limits for patient age  Impression: No pneumothorax status post central line placement  Workstation performed: [id number]     Xr Chest 2 Views    Result Date: 9/22/2019  Narrative: CHEST INDICATION:   shortness of breath  Lower leg swelling  Redness  Pain  COMPARISON:  None EXAM PERFORMED/VIEWS:  XR CHEST PA & LATERAL FINDINGS:  Right-sided dialysis catheter noted  Aorta atherosclerotic and mildly uncoiled  Heart size top normal  The lungs are clear  No pneumothorax or pleural effusion  Age-appropriate degenerative changes are noted in the spine  Impression: No acute cardiopulmonary disease  Workstation performed: [id number]     Cta Abdominal W Run Off W Wo Contrast    Result Date: 9/21/2019  Narrative: CT ANGIOGRAM OF THE AORTA AND LOWER EXTREMITIES WITH IV CONTRAST INDICATION:  Abdominal pain, hyperbilirubinemia  COMPARISON: None  TECHNIQUE:  CT angiogram examination of the abdomen, pelvis, and lower extremities was performed according to standard protocol with intravenous contrast   This examination, like all CT scans performed in the [facility], was performed utilizing techniques to minimize radiation dose exposure, including the use of iterative reconstruction and automated exposure control  3D reconstructions were performed an independent workstation, and are supplied for review     Rad dose 2833 mGy-cm IV Contrast:  100 mL of iodixanol (VISIPAQUE) was administered intravenously without immediate adverse reaction  FINDINGS: VASCULAR STRUCTURES:   The abdominal aorta is normal in course and caliber  Moderate atherosclerotic calcifications  The celiac and superior mesenteric arteries are patent  Scattered calcifications of the superior mesenteric artery  Moderate narrowing of the bilateral renal artery origins  The inferior mesenteric artery is patent  The iliac arteries are patent  The external and internal iliac arteries are patent  Scattered calcifications with mild narrowing of the distal internal iliac arteries  The common, profunda, and superficial femoral arteries are patent  There are mild calcifications of the superficial femoral arteries  The popliteal arteries, tibioperoneal trunks, peroneal arteries, anterior tibial arteries, and posterior tibial arteries are mildly calcified however patent proximally  The distal arteries are not well evaluated due to circumferential calcification  There is diffuse skin thickening and subcutaneous edema bilateral lower extremities  No discrete fluid collection to suggest abscess  OTHER FINDINGS ABDOMEN LIVER/BILIARY TREE:  Innumerable hepatic cysts some which are calcified  Mass effect on the portal veins which are narrowed  No significant biliary duct dilatation  GALLBLADDER:  No calcified gallstones  No pericholecystic inflammatory change  SPLEEN:  Unremarkable  Normal size  PANCREAS:  Unremarkable  ADRENAL GLANDS: Unremarkable  KIDNEYS/URETERS:  Markedly enlarged kidneys with innumerable renal cysts some which demonstrate calcifications and some which which demonstrate hyperdensity compatible with blood products  There are parapelvic cysts on the right  No hydronephrosis  No  hydroureter  The renal veins are patent  PELVIS REPRODUCTIVE ORGANS:  Unremarkable for patient's age  URINARY BLADDER:  Submucosal fat deposition and mild urinary bladder wall thickening however it is under distended  Findings may represent chronic cystitis   ADDITIONAL ABDOMINAL AND PELVIC STRUCTURES STOMACH AND BOWEL:  The stomach is distended with fluid and food debris  No small bowel obstruction  Stool-filled rectosigmoid colon  Diverticulosis without evidence of diverticulitis  The appendix is not well delineated  ABDOMINOPELVIC CAVITY:   There is a right lower quadrant cystic mass with heterogeneous hyperdensity and thick calcifications along the right kidney (series 2, image 80 measuring 5 0 x 7 3 x 7 9 cm with mild surrounding fat stranding  There may be a connection to the kidney on series 2, image 72  There also appears to be a connection to the collapsed cecum (series 2, image 68)  Trace free fluid in the pelvis and in the perihepatic region  ABDOMINAL WALL/INGUINAL REGIONS:  There is a right-sided inguinal hernia with associated partially visualized large right hydrocele measuring up to 9 3 x 8 2 cm  OSSEOUS STRUCTURES:  No acute fracture or destructive osseous lesion  Impression: 1  Moderate peripheral arterial disease with patent vasculature up to the level of the proximal calves  Evaluation of distal calf arteries are limited due to circumferential calcification  2   Skin thickening and subcutaneous edema of the lower legs which may be due to cellulitis versus peripheral arterial disease  No discrete fluid collection to suggest abscess  No subcutaneous emphysema  3   Polycystic kidney and liver disease with innumerable cysts some which are calcified and hemorrhagic  4   Right lower quadrant cystic mass with thickened walls, calcifications, and heterogeneous hyperdensity which may represent enhancement versus hemorrhage  There is contact with the right kidney and cecum  Differential diagnosis includes infected right renal cyst versus renal cell carcinoma  Differential also includes appendiceal mass such as mucinous cystadenocarcinoma  Further evaluation with ultrasound or MRI of the abdomen may be of value   5   Submucosal fat deposition within the mildly thickened urinary bladder wall may be due to chronic cystitis  Correlate with urinalysis  6   Distended stomach with fluid and food debris  No small bowel obstruction  Diverticulosis without evidence of diverticulitis  7   Right inguinal hernia with associated large right hydrocele  The study was marked in [facility] for immediate notification  Workstation performed: [id number]     Ir Tube Change    Result Date: 10/1/2019  Narrative: Infected right renal cyst tube check and exchange Clinical History: Leaking around catheter placed into right renal cyst   Patient has autosomal dominant polycystic kidney disease and suspected cyst infection  Patient also has renal failure  Contrast: 10 mL Omnipaque 300 Fluoro time: 3 minutes Number of Images: 7 Concious sedation time: None given Technique: The patient was brought to the interventional radiology suite and placed supine on the table  After a  view was obtained, contrast was injected into the cyst drainage catheter and multiple images were obtained  Findings: The existing catheter is kinked and partially occluded  The cyst is complex, and there appears to be a connection to an isolated portion of the collecting system of the right kidney  Intervention: The existing catheter was prepped and draped in the usual sterile fashion  Lidocaine was administered to the skin, and the suture was cut  The catheter was transected and removed over a heavy duty wire  A new 10 British catheter was advanced  over the wire, the loop formed, and contrast injected confirming satisfactory position  The catheter was sutured in place and connected to a DILIA bulb  Impression: Impression: 1  Existing catheter kinked and partially occluded  2   Existing cyst is complex and appears to connect to an isolated portion of the right collecting system   Workstation performed: [id number]     [place] Brand & Waveform Analysis, Multiple Levels    Result Date: 9/23/2019  Narrative:  [facility] REPORT CLINICAL: Indications:  PVD, Unspecified [I73 9]  Patient presents in the ICU with bilateral large blisters and open wounds  There is discoloration of both legs  Risk Factors The patient has history of CKD and PAD  He has no history of HTN, Diabetes or HLD  Clinical Right Pressure:  88/ mm Hg  FINDINGS:  Segment       Ri             Left                         P  W            P  W          Ant  Tibial       Triphasic  171  Triphasic  Post  Tibial      Triphasic       Triphasic  Ankle                        171             Great Toe     84              85                CONCLUSION:  Impression RIGHT LOWER LIMB Unable to obtain ROSALINE's due to large blisters on patient's foot and ankle  PPG/PVR Tracings are dampened  Triphasic waveforms at the ankle  Great Toe Pressure: 84mmHg  within the healing range  LEFT LOWER LIMB Ankle/Brachial Index: 1 94, unreliable  PPG/PVR Tracings are dampened  Triphasic waveforms at the ankle  Great Toe Pressure: 85mmHgwithin the healing range  Limited study due to large blisters and wounds  SIGNATURE: Electronically Signed by: Balta Arciniega on 2019-09-23 06:30:18 PM    Ir Image Guided Aspiration / Drainage    Result Date: 9/23/2019  Narrative: Abscess Drainage right renal cyst History: Septic shock with gram-negative bacteremia  Evidence of inflammation of a right inferior partially calcified renal cyst on CT September 21, 2019  Patient is unstable and is not safe to go to the CT scanner, therefore ultrasound-guided aspiration versus drainage reattempted  His blood pressure is too low for sedation  Polycystic kidney disease  Thrombocytopenia  Contrast: 0 Fluoroscopy time: 0 Anesthesia: Local Procedure: After explaining the risks and benefits of the procedure to the patient and his family, informed consent was obtained  Specifically the risk of bleeding, worsening sepsis, or placing a drain into a neoplasm were discussed    Ultrasound was used to localize the suspicious right renal cysts  Procedure: The patient was identified verbally and by wristband  Timeout was performed  The procedure was performed in the ICU  The patient was prepped and draped in the usual sterile fashion  Using live ultrasound guidance a 22-gauge Chiba needle was advanced into the calcified right inferior cyst correlating with series 2 image 81 on CT of September 21  The patient was focally tender at this site  Thick purulent material could be aspirated  Decision was made to place a drain  Given the small size of the cyst it was felt that a wire would give minimal purchase and therefore trocar technique was chosen  In 8-Korean Rodriguez-Allen drain was then advanced in parallel with the existing needle under live ultrasound guidance into the cystic mass  Thick bloody material resulted  Saline was injected under live ultrasound confirming that the tube communicated with the drain  The drain was hooked to gravity bag drainage and sutured to the skin  The patient tolerated the procedure well without apparent immediate complications  Findings: Thick walled partially calcified cystic mass arising from the inferior pole of the polycystic right kidney correlating with site on CT  Needle within the lesion  Drain within the lesion  On injection, saline in the cavity via ultrasound  Thick bloody purulent material obtained  Specimens: Culture     Impression: Impression: Ultrasound-guided drain placement into an inferior right renal cyst suspicious for infection with bloody pus obtained  Plan: Flush every shift Monitor output Return for tube check in 10 days Workstation performed: [id number]     Xr Chest Portable Icu    Result Date: 9/23/2019  Narrative: CHEST INDICATION:   r/o pulm edema  COMPARISON:  9/21/2019 EXAM PERFORMED/VIEWS:  XR CHEST PORTABLE ICU FINDINGS: Cardiomegaly is seen  Hypoventilation is noted  There is no infiltrate or pneumothorax  The costophrenic angles are clear   Osseous structures appear within normal limits for patient age  Impression: Cardiomegaly  No acute disease Workstation performed: [id number]     Bilateral Lower Extremity Venous Duplex    Result Date: 9/23/2019  Narrative:  [facility] REPORT CLINICAL: Indications: Bilateral Swelling of Limb [R22 4]  Bilateral leg swelling and blistering for 2 days Risk Factors The patient has history of CKD and PAD  FINDINGS:  Segment  Right            Left              Impression       Impression       CFV      Normal (Patent)  Normal (Patent)     CONCLUSION:  Impression: RIGHT LOWER LIMB: No evidence of acute or chronic deep vein thrombosis  No evidence of superficial thrombophlebitis noted  Doppler evaluation shows a normal response to augmentation maneuvers  Popliteal, posterior tibial and anterior tibial arterial Doppler waveforms are triphasic  LEFT LOWER LIMB: No evidence of acute or chronic deep vein thrombosis  No evidence of superficial thrombophlebitis noted  Doppler evaluation shows a normal response to augmentation maneuvers  Popliteal, posterior tibial and anterior tibial arterial Doppler waveforms are triphasic    SIGNATURE: Electronically Signed by: Tanner Coffey on 2019-09-23 09:28:33 AM    Ir Permacath Placement    Result Date: 10/1/2019  Narrative: Tunneled dialysis catheter placement Clinical History: Patient with a temporary catheter placed after existing tunneled catheter infection now presents for placement of a new tunneled catheter  Fluoro time: 0 4 minutes Number of Images: 2 Concious sedation time: None given Technique: The patient was brought to the interventional radiology suite and identified verbally and by wrist band  The patient was placed supine on the table  The right internal jugular vein was evaluated as a potential access site with ultrasound  The vessel was found to be patent and compressible   The right neck and upper chest were prepped and draped in the usual sterile fashion  All elements of maximal sterile barrier technique were followed (cap, mask, sterile gown, sterile gloves, large sterile sheet, hand hygiene, and 2% chlorhexidine for cutaneous antisepsis)  Lidocaine was administered to the skin and a small skin incision was made  Under ultrasound guidance, the right internal jugular vein was accessed using single wall Seldinger technique  Static images of real time needle entry into the vessel were obtained  An 0 018 wire was then advanced through the needle into the central venous system  The needle was removed, and a 5 [place] coaxial dilator was inserted  A heavy duty wire was inserted through the outer dilator and a 14 [place] peel-away sheath was inserted over the wire  A 28 cm Hemo flow cath was then tunneled under the skin of the upper chest  The catheter was advanced through the peel-away and the peel-away was removed  The catheter tip was then positioned in the right atrium under fluoroscopic control  The external portion of the catheter was sutured to the skin with 2-0 Prolene  The neck incision was then closed with Vicryl suture and Histoacryl  A sterile dressing was applied and 1,000 unit heparin/cc solution was administered into each of the lumens  Impression: Impression: 1  Successful ultrasound and fluoroscopically guided placement of a 28cm Hemo flow cath via the right internal jugular vein  The tip of the catheter is in the right atrium and may be used immediately  Workstation performed: [id number]     Us Right Upper Quadrant With Liver Dopplers    Result Date: 9/25/2019  Narrative: RIGHT UPPER QUADRANT ULTRASOUND INDICATION: Elevated bilirubin  COMPARISON: CT 9/21/2019  TECHNIQUE:   Real-time ultrasound of the right upper quadrant was performed with a curvilinear transducer with both volumetric sweeps and still imaging techniques  FINDINGS: PANCREAS:  Visualized portions show no abnormality  AORTA AND IVC:  Visualized portions are normal for patient age  LIVER: Hepatomegaly is noted  Echogenicity and contour are normal  Innumerable hepatic cysts are present  Normal directional flow is present within the portal vein  BILIARY: The gallbladder is normal in caliber  No wall thickening or pericholecystic fluid  No stones or sludge  No sonographic Delaney's sign  No intrahepatic biliary dilatation  CBD measures 4 mm  No choledocholithiasis  KIDNEY: Right kidney is enlarged and polycystic  ASCITES:   None  Impression: Innumerable hepatic and right renal cysts, otherwise unremarkable study  Workstation performed: [id number]     Imaging Reports Reviewed Today Include:  Reviewed chest x-ray  Imaging Personally Reviewed by Myself Includes:    Procedure: Xr Chest 1 View Portable    Result Date: 10/7/2019  Narrative: CHEST INDICATION:   shortness of breath  COMPARISON:  9/23/2019 EXAM PERFORMED/VIEWS:  XR CHEST PORTABLE Images: 2 FINDINGS:  Right-sided central venous catheter is present and appear satisfactory in position, similar to prior  Cardia mediastinal silhouette probably within normal limits given limited inspiratory effort on both images  The lungs are clear  No pneumothorax or pleural effusion  Osseous structures appear within normal limits for patient age  Impression: Satisfactory line positioning  No acute findings Workstation performed: [id number]        Recent Cultures (last 7 days):     Results from last 7 days   Lab Units 10/06/19  1922 10/06/19  1908   BLOOD CULTURE  No Growth at 24 hrs  No Growth at 24 hrs         Last 24 Hours Medication List:     Current Facility-Administered Medications:  acetaminophen 650 mg Oral Q6H PRN Layla Lopez PA-C    albumin human 25 g Intravenous PRN SAM García Last Rate: Stopped (10/08/19 1006)   b complex-vitamin C-folic acid 1 capsule Oral Daily With Susie Gutierrez PA-C    calcium acetate 1,334 mg Oral TID With Meals Layla Lopez PA-C    gabapentin 100 mg Oral Daily Layla Lopez PA-C heparin (porcine) 5,000 Units Subcutaneous Replaced by [facility] Dicie Nawaf, PAUL    metoprolol tartrate 12 5 mg Oral Q12H [address] Dicie Nawaf, PAUL    midodrine 10 mg Oral Before Dialysis Dicruel Nawaf, PAUL    ondansetron 4 mg Intravenous Q6H PRN Dicie Nawaf, PA-SUKHDEV    oxyCODONE 5 mg Oral Q4H PRN Dicie NawafPAUL hidalgo    pantoprazole 40 mg Oral Early Morning Dicie Nawaf, PA-SUKHDEV    simethicone 80 mg Oral Q6H PRN Dicie Nawaf, PAUL         Today, Patient Was Seen By: Vickey Mccauley MD    ** Please Note: Dragon 360 Dictation voice to text software may have been used in the creation of this document   **

## 2019-10-08 NOTE — PROGRESS NOTES
[address] [facility] NOTE   Rissa Arts 64 y o  male MRN: [id number]  Unit/Bed#: -01 Encounter: [id number]  Reason for Consult:  ESRD    ASSESSMENT and PLAN:  1  ESRD on HD:  underlying disease, PCKD  · Patient was previously dialyzed at [facility] but has now transition to local [facility] unit  Current plan is for treatment at [facility]  · Current hemodialysis prescription:  Qt 3 hrs 10 min  Qb 450, Qd standard, 2 K, 2 0 calcium,  40 bicarbonate, sodium 137, filter size 180  Target weight 83 kg  · Patient had hemodialysis today  Patient was hypotensive and filter clotted x1 on treatment  Patient lost blood in the circuit  Filter was changed and dialysis treatment was completed  2 L ultrafiltrate was removed  · Plan:    ? Continue hemodialysis TTS  ? May need to add heparin to prevent filter clotting  ? Continue to Utilize albumin during treatment for hypotension  ? Check labs on Thursday  2  Access:  Right IJ PermCath site looks clean, nontender  3  Chronic hypotension:    · On midodrine pre treatment  · On beta-blocker due to recent treatment for atrial fibrillation with RVR during last hospitalization  · Plan:    · Continue midodrine 10 mg predialysis  Add a 2nd dose half-way through treatment  4  Anemia:   · Hemoglobin below goal  · No schistocytes on hemolyzed smear, haptoglobin normal  · Continue Epogen    5  Mineral and bone disease:    · Phosphorus 7 4  · Continue binder, renal diet  · Recheck phosphorus  6  Abdominal pain/ileus:   History of polycystic liver disease  · CT of the abdomen:  Findings consistent with ileus  Moderate ascites  · Spoke with primary team who was planning on paracentesis with fluid cultures  · Hyperbilirubinemia  7  Lower extremity edema/cellulitis/wounds:    · Recent infection due to Shewanella putrifacians likely acquired when patient was in pond water  Completed course of p o  Cipro    Given a dose of IV Cipro on admission   · Severe wounds with black eschar  8  Atrial fibrillation: On beta-blocker  9  Hyponatremia:  Sodium down to 127 this morning-  Likely volume mediated  Monitor    DISPOSITION:  Continue current hemodialysis schedule  Check phosphorus in the morning may need to adjust binder    SUBJECTIVE / INTERVAL HISTORY:  Complains of not being able to get out of bed and walk although I pointed out to him the severity of his wounds likely precludes walking  OBJECTIVE:  Current Weight: Weight - Scale: 95 7 kg (210 lb 15 7 oz)(pt not ambulating nomi  Elmo Palma )  Vitals:    10/08/19 1030 10/08/19 1100 10/08/19 1130 10/08/19 1135   BP: 101/55 (!) 96/49 (!) 86/52 98/50   BP Location:   Right arm    Pulse: 95 102 99 96   Resp: 18 18 16 16   Temp:    98 5 °F (36 9 °C)   TempSrc:       SpO2:       Weight:       Height:           Intake/Output Summary (Last 24 hours) at 10/8/2019 1313  Last data filed at 10/8/2019 1130  Gross per 24 hour   Intake 960 ml   Output 2010 ml   Net -1050 ml     General: NAD  Skin: no rash  Eyes: Icteric sclera  ENT: moist mucous membrane  Neck: supple, no JVD  Chest:  Decreased breath sounds in the bases, no wheezes rhonchi or rales  CVS: s1s2, no murmur, no gallop, no rub  Abdomen:  Distended, tympanic  Extremities:  Severe diffuse wounds lower extremities which are covered by black eschar  Some surrounding erythema noted  Pain with movement reported    No significant edema  : no meeks  Neuro: AAOX3  Psych: normal affect  Medications:    Current Facility-Administered Medications:     acetaminophen (TYLENOL) tablet 650 mg, 650 mg, Oral, Q6H PRN, Katie Rico PA-C, 650 mg at 10/07/19 2117    albumin human (FLEXBUMIN) 25 % injection 25 g, 25 g, Intravenous, PRN, SAM Wong, Stopped at 10/08/19 1006    b complex-vitamin C-folic acid (NEPHROCAPS) capsule 1 capsule, 1 capsule, Oral, Daily With Dinner, Katie Rico PA-C, 1 capsule at 10/07/19 1752    calcium acetate (PHOSLO) capsule 1,334 mg, 1,334 mg, Oral, TID With Meals, Ifeoma Sandhu PA-C, Stopped at 10/08/19 1434    gabapentin (NEURONTIN) capsule 100 mg, 100 mg, Oral, Daily, Ifeoma Sandhu PA-C, Stopped at 10/08/19 0948    heparin (porcine) subcutaneous injection 5,000 Units, 5,000 Units, Subcutaneous, Q8H [address], 5,000 Units at 10/08/19 0524 **AND** Platelet count, , , Once, Ifeoma Sandhu PA-C    metoprolol tartrate (LOPRESSOR) partial tablet 12 5 mg, 12 5 mg, Oral, Q12H SHERON, Ifeoma Sandhu PA-C, Stopped at 10/07/19 2123    midodrine (PROAMATINE) tablet 10 mg, 10 mg, Oral, Before Dialysis, Ifeoma Sandhu PA-C, 10 mg at 10/08/19 0835    ondansetron (ZOFRAN) injection 4 mg, 4 mg, Intravenous, Q6H PRN, Ifeoma Sandhu PA-C    oxyCODONE (ROXICODONE) IR tablet 5 mg, 5 mg, Oral, Q4H PRN, Ifeoma Sandhu PA-C    pantoprazole (PROTONIX) EC tablet 40 mg, 40 mg, Oral, Early Morning, Ifeoma Sandhu PA-C, 40 mg at 10/08/19 0524    simethicone (MYLICON) chewable tablet 80 mg, 80 mg, Oral, Q6H PRN, Ifeoma Sandhu PA-C    Laboratory Results:  Results from last 7 days   Lab Units 10/08/19  0500 10/07/19  1301 10/06/19  1908 10/03/19  0532 10/02/19  0947   WBC Thousand/uL 8 23 7 42 6 93 11 96* 15 29*   HEMOGLOBIN g/dL 8 9* 9 0* 9 4* 8 2* 8 6*   HEMATOCRIT % 28 8* 29 2* 30 4* 26 6* 26 0*   PLATELETS Thousands/uL 230 232 263 184 151   POTASSIUM mmol/L 5 2 4 9 4 8 4 9 6 3*   CHLORIDE mmol/L 90* 94* 92* 94* 93*   CO2 mmol/L 26 27 29 25 23   BUN mg/dL 74* 70* 60* 51* 81*   CREATININE mg/dL 8 93* 8 39* 7 54* 5 38* 7 34*   CALCIUM mg/dL 8 5 8 6 8 6 8 0* 8 3   MAGNESIUM mg/dL  --   --   --  2 0  --    PHOSPHORUS mg/dL  --   --   --  7 4*  --

## 2019-10-08 NOTE — CONSULTS
Consult - [address] Shahriar Banerjee  64 y o  male MRN: [id number]  Unit/Bed#: -01 Encounter: [id number]        ASSESSMENT:  Multiple wounds with skin infarction / necrosis bilateral lower extremity  ESRD with dialysis    PLAN:  Patient examined at the bedside with his daughter  Reviewed previous medical records including arterial study  His condition, prognosis, and plan of care were discussed  His skin is probably devascularized after severe sepsis 3 weeks ago  Wounds are demarcating at this time without significant cellulitis  Some epithelization starts to appear on margins of some eschars  Will continue to monitor the progress for now  Discussed possible wound debridement  This patient is under care of Dr Gabriela Firas and will transfer the service  History of Present Illness     Danelle Cooney is a 64 y o  male who consulted for LE wounds  He developed wounds / bullous lesions about 3 weeks ago  He was treated with local care and removing of the lesions  Wounds started to necrose after that  His daughter reported it started when he had sepsis  He feels wounds are much better now without much drainage  Still painful and he is not able to straighten his left leg because of the wounds  Pain presents with pressure  Not able to walk at this time          Inpatient consult to Podiatry  Consult performed by: Angella Blankenship DPM  Consult ordered by: Shaylee Felipe MD          Review of Systems   Constitutional: Negative  HENT: Negative  Eyes: Negative  Respiratory: Negative  Cardiovascular: Negative  Gastrointestinal: Negative  Musculoskeletal: See HPI  Skin: See HPI  Neurological: Negative  Psych: negative         Historical Information   Past Medical History:   Diagnosis Date    Complication of renal dialysis     Polycystic kidney disease      Past Surgical History:   Procedure Laterality Date    IR IMAGE GUIDED ASPIRATION / DRAINAGE  9/23/2019    YOV[address] ALVES PLACEMENT  9/30/2019     Social History   Social History     Substance and Sexual Activity   Alcohol Use Not on file     Social History     Substance and Sexual Activity   Drug Use Not on file     Social History     Tobacco Use   Smoking Status Never Smoker   Smokeless Tobacco Never Used     Family History: History reviewed  No pertinent family history  Meds/Allergies   Medications Prior to Admission   Medication    b complex-vitamin C-folic acid (NEPHROCAPS) 1 mg capsule    calcium acetate (PHOSLO) 667 mg capsule    gabapentin (NEURONTIN) 100 mg capsule    metoprolol tartrate (LOPRESSOR) 25 mg tablet    midodrine (PROAMATINE) 10 MG tablet    Multiple Vitamin (MULTIVITAMIN) tablet    multivitamin-iron-minerals-folic acid (THERAPEUTIC-M) TABS tablet    omeprazole (PriLOSEC OTC) 20 MG tablet    oxyCODONE (ROXICODONE) 5 mg immediate release tablet    Sucroferric Oxyhydroxide (VELPHORO PO)    NON FORMULARY     No Known Allergies    Objective   First Vitals:   Blood Pressure: 100/55 (10/06/19 1840)  Pulse: 65 (10/06/19 1840)  Temperature: 99 9 °F (37 7 °C) (10/06/19 1840)  Temp Source: Oral (10/06/19 1840)  Respirations: 16 (10/06/19 1840)  Height: 5' 8" (172 7 cm) (10/06/19 1840)  Weight - Scale: 96 3 kg (212 lb 4 9 oz) (10/06/19 1840)  SpO2: 99 % (10/06/19 1840)    Current Vitals:   Blood Pressure: 97/56 (10/08/19 1342)  Pulse: 97 (10/08/19 1342)  Temperature: 98 5 °F (36 9 °C) (10/08/19 1135)  Temp Source: Oral (10/08/19 0000)  Respirations: 16 (10/08/19 1135)  Height: 5' 8" (172 7 cm) (10/06/19 2104)  Weight - Scale: 95 7 kg (210 lb 15 7 oz)(pt not ambulating nomi  Elaina Arbour ) (10/08/19 0600)  SpO2: 95 % (10/08/19 0000)        BP 97/56 (BP Location: Right arm)   Pulse 97   Temp 98 5 °F (36 9 °C)   Resp 16   Ht 5' 8" (1 727 m)   Wt 95 7 kg (210 lb 15 7 oz) Comment: pt not ambulating nomi     SpO2 95%   BMI 32 08 kg/m²      General Appearance:    Alert, cooperative, no distress   Head:    Normocephalic, without obvious abnormality, atraumatic             Neck:   Supple, symmetrical, trachea midline   Back:     Symmetric   Lungs:     Respirations unlabored   Heart:    Regular rate and rhythm   Abdomen:     Soft, non-tender   Extremities:   LE edema without cellulitis  Pulses:   Non-palpable pedal pulses  Skin:   Multiple eschars noted BLE including left lower thigh  Wounds are dry  Skin is still warm BLE  Wounds start to demarcate  Neurologic:   Gross sensation is intact  AAO X 3  Lab Results:     Admission on 10/06/2019   Component Date Value    WBC 10/06/2019 6 93     RBC 10/06/2019 2 90*    Hemoglobin 10/06/2019 9 4*    Hematocrit 10/06/2019 30 4*    MCV 10/06/2019 105*    MCH 10/06/2019 32 4     MCHC 10/06/2019 30 9*    RDW 10/06/2019 15 9*    MPV 10/06/2019 10 3     Platelets 02/44/9478 263     nRBC 10/06/2019 0     Sodium 10/06/2019 132*    Potassium 10/06/2019 4 8     Chloride 10/06/2019 92*    CO2 10/06/2019 29     ANION GAP 10/06/2019 11     BUN 10/06/2019 60*    Creatinine 10/06/2019 7 54*    Glucose 10/06/2019 109     Calcium 10/06/2019 8 6     AST 10/06/2019 32     ALT 10/06/2019 20     Alkaline Phosphatase 10/06/2019 236*    Total Protein 10/06/2019 6 3*    Albumin 10/06/2019 1 5*    Total Bilirubin 10/06/2019 2 90*    eGFR 10/06/2019 7     LACTIC ACID 10/06/2019 2 1*    Procalcitonin 10/06/2019 10 48*    Protime 10/06/2019 15 9*    INR 10/06/2019 1 34*    PTT 10/06/2019 34     Blood Culture 10/06/2019 No Growth at 24 hrs   Blood Culture 10/06/2019 No Growth at 24 hrs       Segmented % 10/06/2019 66     Bands % 10/06/2019 9*    Lymphocytes % 10/06/2019 13*    Monocytes % 10/06/2019 7     Eosinophils, % 10/06/2019 2     Basophils % 10/06/2019 1     Atypical Lymphocytes % 10/06/2019 2*    Absolute Neutrophils 10/06/2019 5 20     Lymphocytes Absolute 10/06/2019 0 90     Monocytes Absolute 10/06/2019 0 49     Eosinophils Absolute 10/06/2019 0 14  Basophils Absolute 10/06/2019 0 07     Total Counted 10/06/2019 100     Dohle Bodies 10/06/2019 Present     Toxic Granulation 10/06/2019 Present     Anisocytosis 10/06/2019 Present     Hypochromia 10/06/2019 Present     Polychromasia 10/06/2019 Present     Platelet Estimate 67/15/6599 Adequate     Large Platelet 16/73/2174 Present     POC Glucose 10/06/2019 105     Sodium 10/07/2019 131*    Potassium 10/07/2019 4 9     Chloride 10/07/2019 94*    CO2 10/07/2019 27     ANION GAP 10/07/2019 10     BUN 10/07/2019 70*    Creatinine 10/07/2019 8 39*    Glucose 10/07/2019 107     Calcium 10/07/2019 8 6     eGFR 10/07/2019 6     WBC 10/07/2019 7 42     RBC 10/07/2019 2 78*    Hemoglobin 10/07/2019 9 0*    Hematocrit 10/07/2019 29 2*    MCV 10/07/2019 105*    MCH 10/07/2019 32 4     MCHC 10/07/2019 30 8*    RDW 10/07/2019 16 0*    MPV 10/07/2019 10 3     Platelets 28/14/0587 232     nRBC 10/07/2019 0     POC Glucose 10/07/2019 96     Blood Culture 10/07/2019 No Growth at 24 hrs       POC Glucose 10/07/2019 103     Segmented % 10/07/2019 74     Bands % 10/07/2019 7     Lymphocytes % 10/07/2019 14     Monocytes % 10/07/2019 3*    Eosinophils, % 10/07/2019 2     Basophils % 10/07/2019 0     Absolute Neutrophils 10/07/2019 6 01     Lymphocytes Absolute 10/07/2019 1 04     Monocytes Absolute 10/07/2019 0 22     Eosinophils Absolute 10/07/2019 0 15     Basophils Absolute 10/07/2019 0 00     Total Counted 10/07/2019 100     Dohle Bodies 10/07/2019 Present     Anisocytosis 10/07/2019 Present     Hypochromia 10/07/2019 Present     Polychromasia 10/07/2019 Present     Platelet Estimate 72/28/3967 Adequate     Large Platelet 48/79/9103 Present     POC Glucose 10/07/2019 92     POC Glucose 10/07/2019 95     WBC 10/08/2019 8 23     RBC 10/08/2019 2 77*    Hemoglobin 10/08/2019 8 9*    Hematocrit 10/08/2019 28 8*    MCV 10/08/2019 104*    MCH 10/08/2019 32 1     [facility] 10/08/2019 30 9*    RDW 10/08/2019 15 9*    MPV 10/08/2019 10 5     Platelets 66/44/9817 230     nRBC 10/08/2019 0     Neutrophils Relative 10/08/2019 75     Immat GRANS % 10/08/2019 1     Lymphocytes Relative 10/08/2019 6*    Monocytes Relative 10/08/2019 15*    Eosinophils Relative 10/08/2019 3     Basophils Relative 10/08/2019 0     Neutrophils Absolute 10/08/2019 6 15     Immature Grans Absolute 10/08/2019 0 09     Lymphocytes Absolute 10/08/2019 0 52*    Monocytes Absolute 10/08/2019 1 21     Eosinophils Absolute 10/08/2019 0 23     Basophils Absolute 10/08/2019 0 03     Sodium 10/08/2019 127*    Potassium 10/08/2019 5 2     Chloride 10/08/2019 90*    CO2 10/08/2019 26     ANION GAP 10/08/2019 11     BUN 10/08/2019 74*    Creatinine 10/08/2019 8 93*    Glucose 10/08/2019 92     Calcium 10/08/2019 8 5     eGFR 10/08/2019 6     POC Glucose 10/08/2019 85     POC Glucose 10/08/2019 62*    POC Glucose 10/08/2019 101              Results from last 7 days   Lab Units 10/07/19  1301 10/06/19  1922 10/06/19  1908   BLOOD CULTURE  No Growth at 24 hrs  No Growth at 24 hrs  No Growth at 24 hrs  Imaging: I have personally reviewed pertinent films in PACS  EKG, Pathology, labs, and Other Studies: I have personally reviewed pertinent reports

## 2019-10-08 NOTE — HEMODIALYSIS
Pt completed tx using perm cath  Able to maintain prescribed BFR  System clotted mid way through tx  Unable to return blood  Tx resumed with new system  BP dipped during tx  Albumin and NSS given  Removed 2L

## 2019-10-08 NOTE — PROGRESS NOTES
Consultation -General Surgery  Gualberto Craft 64 y o  male MRN: [id number]  Unit/Bed#: -01 Encounter: [id number]        Consults    ASSESSMENT/PLAN:  Problem List Right inguinal hernia  Abdominal pain    * (Principal) Hypotension (Chronic)    Chronic kidney disease with end stage renal failure on dialysis (HCC) (Chronic)    Hyponatremia    Swelling of lower extremity    Lactic acidosis    Polycystic liver disease (Chronic)    Total bilirubin, elevated    Thrombocytopenia (HCC)    Gram-negative bacteremia    Polycystic kidney disease (Chronic)    ADPKD (autosomal dominant polycystic kidney) (Chronic)    Benign essential hypertension (Chronic)    Peripheral neuropathy (Chronic)    Atrial fibrillation (HCC)    Anemia due to chronic kidney disease, on chronic dialysis (HCC) (Chronic)   65 yo M with multiple medical problems and history of right inguinal hernia for >30 years  No clinical change in hernia nor signs of obstruction  Denies scrotal pain  Patient is having bowel movements and no nausea or vomiting  Has abdominal pain only at drain site when lying on right side  IR consulted for paracentesis  No urgency for surgical intervention at this time  Discussed with Dr Frank Pearson  · Care per primary team  · PO pain medication PRN, avoid narcotics  · Recommend Miralax daily  · Encourage OOB and ambulation         Reason for Consult / Principal Problem:    HPI: Gualberto Craft is a 64y o  year old male who presents with Hypotension      Review of Systems   Constitutional: Positive for activity change and appetite change  Negative for chills and fever  HENT: Negative  Eyes: Negative  Respiratory: Negative  Gastrointestinal: Positive for abdominal distention and abdominal pain (at drain site)  Negative for constipation, diarrhea, nausea and vomiting  Endocrine: Negative  Genitourinary: Negative  Musculoskeletal: Negative  Skin: Negative  Allergic/Immunologic: Negative  Neurological: Negative  Hematological: Negative  Psychiatric/Behavioral: Negative  Historical Information   Past Medical History:   Diagnosis Date    Complication of renal dialysis     Polycystic kidney disease      Past Surgical History:   Procedure Laterality Date    IR IMAGE GUIDED ASPIRATION / DRAINAGE  9/23/2019    IR [address] PLACEMENT  9/30/2019     Social History   Social History     Substance and Sexual Activity   Alcohol Use Not on file     Social History     Substance and Sexual Activity   Drug Use Not on file     Social History     Tobacco Use   Smoking Status Never Smoker   Smokeless Tobacco Never Used     History reviewed  No pertinent family history      Meds/Allergies     Medications Prior to Admission   Medication    b complex-vitamin C-folic acid (NEPHROCAPS) 1 mg capsule    calcium acetate (PHOSLO) 667 mg capsule    gabapentin (NEURONTIN) 100 mg capsule    metoprolol tartrate (LOPRESSOR) 25 mg tablet    midodrine (PROAMATINE) 10 MG tablet    Multiple Vitamin (MULTIVITAMIN) tablet    multivitamin-iron-minerals-folic acid (THERAPEUTIC-M) TABS tablet    omeprazole (PriLOSEC OTC) 20 MG tablet    oxyCODONE (ROXICODONE) 5 mg immediate release tablet    Sucroferric Oxyhydroxide (VELPHORO PO)    NON FORMULARY     Current Facility-Administered Medications   Medication Dose Route Frequency    acetaminophen (TYLENOL) tablet 650 mg  650 mg Oral Q6H PRN    albumin human (FLEXBUMIN) 25 % injection 25 g  25 g Intravenous PRN    b complex-vitamin C-folic acid (NEPHROCAPS) capsule 1 capsule  1 capsule Oral Daily With Dinner    calcium acetate (PHOSLO) capsule 1,334 mg  1,334 mg Oral TID With Meals    gabapentin (NEURONTIN) capsule 100 mg  100 mg Oral Daily    heparin (porcine) subcutaneous injection 5,000 Units  5,000 Units Subcutaneous Q8H [facility]    metoprolol tartrate (LOPRESSOR) partial tablet 12 5 mg  12 5 mg Oral Q12H SHERON    midodrine (PROAMATINE) tablet 10 mg  10 mg Oral Before Dialysis    midodrine (PROAMATINE) tablet 10 mg  10 mg Oral Once in dialysis    ondansetron (ZOFRAN) injection 4 mg  4 mg Intravenous Q6H PRN    oxyCODONE (ROXICODONE) IR tablet 5 mg  5 mg Oral Q4H PRN    pantoprazole (PROTONIX) EC tablet 40 mg  40 mg Oral Early Morning    simethicone (MYLICON) chewable tablet 80 mg  80 mg Oral Q6H PRN       No Known Allergies    Objective     Blood pressure 97/56, pulse 97, temperature 98 5 °F (36 9 °C), resp  rate 16, height 5' 8" (1 727 m), weight 95 7 kg (210 lb 15 7 oz), SpO2 95 %  Intake/Output Summary (Last 24 hours) at 10/8/2019 1419  Last data filed at 10/8/2019 1345  Gross per 24 hour   Intake 960 ml   Output 2010 ml   Net -1050 ml       PHYSICAL EXAM  General appearance: alert and oriented, in no acute distress  Skin: Skin color, texture, turgor normal  No rashes or lesions  Head: Normocephalic, without obvious abnormality  Heart: regular rate and rhythm, S1, S2 normal, no murmur, click, rub or gallop  Lungs: clear to auscultation bilaterally  Abdomen: large and round, tender at drain site, RUQ, no rebound or guarding  bowel contents in right scrotum , nonredicible and nontentder    Drain site looks without drainage or erythema  Neurological: normal without focal findings    Lab Results:    WBC 10/08/2019 8 23     RBC 10/08/2019 2 77*    Hemoglobin 10/08/2019 8 9*    Hematocrit 10/08/2019 28 8*    MCV 10/08/2019 104*    MCH 10/08/2019 32 1     MCHC 10/08/2019 30 9*    RDW 10/08/2019 15 9*    MPV 10/08/2019 10 5     Platelets 93/37/2770 230     nRBC 10/08/2019 0     Neutrophils Relative 10/08/2019 75     Immat GRANS % 10/08/2019 1     Lymphocytes Relative 10/08/2019 6*    Monocytes Relative 10/08/2019 15*    Eosinophils Relative 10/08/2019 3     Basophils Relative 10/08/2019 0     Neutrophils Absolute 10/08/2019 6 15     Immature Grans Absolute 10/08/2019 0 09     Lymphocytes Absolute 10/08/2019 0 52*    Monocytes Absolute 10/08/2019 1 21     Eosinophils Absolute 10/08/2019 0 23     Basophils Absolute 10/08/2019 0 03     Sodium 10/08/2019 127*    Potassium 10/08/2019 5 2     Chloride 10/08/2019 90*    CO2 10/08/2019 26     ANION GAP 10/08/2019 11     BUN 10/08/2019 74*    Creatinine 10/08/2019 8 93*    Glucose 10/08/2019 92     Calcium 10/08/2019 8 5     eGFR 10/08/2019 6     POC Glucose 10/08/2019 85     POC Glucose 10/08/2019 62*     Imaging Studies: I have personally reviewed pertinent reports  Counseling / Coordination of Care  Total time spent today  30 minutes  Greater than 50% of total time was spent with the patient and / or family counseling and / or coordination of care

## 2019-10-08 NOTE — PLAN OF CARE
Problem: Potential for Falls  Goal: Patient will remain free of falls  Description  INTERVENTIONS:  - Assess patient frequently for physical needs  -  Identify cognitive and physical deficits and behaviors that affect risk of falls    -  [place] fall precautions as indicated by assessment   - Educate patient/family on patient safety including physical limitations  - Instruct patient to call for assistance with activity based on assessment  - Modify environment to reduce risk of injury  - Consider OT/PT consult to assist with strengthening/mobility  Outcome: Progressing     Problem: Prexisting or High Potential for Compromised Skin Integrity  Goal: Skin integrity is maintained or improved  Description  INTERVENTIONS:  - Identify patients at risk for skin breakdown  - Assess and monitor skin integrity  - Assess and monitor nutrition and hydration status  - Monitor labs   - Assess for incontinence   - Turn and reposition patient  - Assist with mobility/ambulation  - Relieve pressure over bony prominences  - Avoid friction and shearing  - Provide appropriate hygiene as needed including keeping skin clean and dry  - Evaluate need for skin moisturizer/barrier cream  - Collaborate with interdisciplinary team   - Patient/family teaching  - Consider wound care consult   Outcome: Progressing     Problem: PAIN - ADULT  Goal: Verbalizes/displays adequate comfort level or baseline comfort level  Description  Interventions:  - Encourage patient to monitor pain and request assistance  - Assess pain using appropriate pain scale  - Administer analgesics based on type and severity of pain and evaluate response  - Implement non-pharmacological measures as appropriate and evaluate response  - Consider cultural and social influences on pain and pain management  - Notify physician/advanced practitioner if interventions unsuccessful or patient reports new pain  Outcome: Progressing     Problem: INFECTION - ADULT  Goal: Absence or prevention of progression during hospitalization  Description  INTERVENTIONS:  - Assess and monitor for signs and symptoms of infection  - Monitor lab/diagnostic results  - Monitor all insertion sites, i e  indwelling lines, tubes, and drains  - Monitor endotracheal if appropriate and nasal secretions for changes in amount and color  - [place] appropriate cooling/warming therapies per order  - Administer medications as ordered  - Instruct and encourage patient and family to use good hand hygiene technique  - Identify and instruct in appropriate isolation precautions for identified infection/condition  Outcome: Progressing  Goal: Absence of fever/infection during neutropenic period  Description  INTERVENTIONS:  - Monitor WBC    Outcome: Progressing     Problem: SAFETY ADULT  Goal: Patient will remain free of falls  Description  INTERVENTIONS:  - Assess patient frequently for physical needs  -  Identify cognitive and physical deficits and behaviors that affect risk of falls    -  [place] fall precautions as indicated by assessment   - Educate patient/family on patient safety including physical limitations  - Instruct patient to call for assistance with activity based on assessment  - Modify environment to reduce risk of injury  - Consider OT/PT consult to assist with strengthening/mobility  Outcome: Progressing  Goal: Maintain or return to baseline ADL function  Description  INTERVENTIONS:  -  Assess patient's ability to carry out ADLs; assess patient's baseline for ADL function and identify physical deficits which impact ability to perform ADLs (bathing, care of mouth/teeth, toileting, grooming, dressing, etc )  - Assess/evaluate cause of self-care deficits   - Assess range of motion  - Assess patient's mobility; develop plan if impaired  - Assess patient's need for assistive devices and provide as appropriate  - Encourage maximum independence but intervene and supervise when necessary  - Involve family in performance of ADLs  - Assess for home care needs following discharge   - Consider OT consult to assist with ADL evaluation and planning for discharge  - Provide patient education as appropriate  Outcome: Progressing  Goal: Maintain or return mobility status to optimal level  Description  INTERVENTIONS:  - Assess patient's baseline mobility status (ambulation, transfers, stairs, etc )    - Identify cognitive and physical deficits and behaviors that affect mobility  - Identify mobility aids required to assist with transfers and/or ambulation (gait belt, sit-to-stand, lift, walker, cane, etc )  - [place] fall precautions as indicated by assessment  - Record patient progress and toleration of activity level on Mobility SBAR; progress patient to next Phase/Stage  - Instruct patient to call for assistance with activity based on assessment  - Consider rehabilitation consult to assist with strengthening/weightbearing, etc   Outcome: Progressing     Problem: DISCHARGE PLANNING  Goal: Discharge to home or other facility with appropriate resources  Description  INTERVENTIONS:  - Identify barriers to discharge w/patient and caregiver  - Arrange for needed discharge resources and transportation as appropriate  - Identify discharge learning needs (meds, wound care, etc )  - Arrange for interpretive services to assist at discharge as needed  - Refer to Case Management Department for coordinating discharge planning if the patient needs post-hospital services based on physician/advanced practitioner order or complex needs related to functional status, cognitive ability, or social support system  Outcome: Progressing     Problem: Knowledge Deficit  Goal: Patient/family/caregiver demonstrates understanding of disease process, treatment plan, medications, and discharge instructions  Description  Complete learning assessment and assess knowledge base    Interventions:  - Provide teaching at level of understanding  - Provide teaching via preferred learning methods  Outcome: Progressing     Problem: GENITOURINARY - ADULT  Goal: Maintains or returns to baseline urinary function  Description  INTERVENTIONS:  - Assess urinary function  - Encourage oral fluids to ensure adequate hydration if ordered  - Administer IV fluids as ordered to ensure adequate hydration  - Administer ordered medications as needed  - Offer frequent toileting  - Follow urinary retention protocol if ordered  Outcome: Progressing  Goal: Absence of urinary retention  Description  INTERVENTIONS:  - Assess patients ability to void and empty bladder  - Monitor I/O  - Bladder scan as needed  - Discuss with physician/AP medications to alleviate retention as needed  - Discuss catheterization for long term situations as appropriate  Outcome: Progressing     Problem: METABOLIC, FLUID AND ELECTROLYTES - ADULT  Goal: Electrolytes maintained within normal limits  Description  INTERVENTIONS:  - Monitor labs and assess patient for signs and symptoms of electrolyte imbalances  - Administer electrolyte replacement as ordered  - Monitor response to electrolyte replacements, including repeat lab results as appropriate  - Instruct patient on fluid and nutrition as appropriate  Outcome: Progressing  Goal: Fluid balance maintained  Description  INTERVENTIONS:  - Monitor labs   - Monitor I/O and WT  - Instruct patient on fluid and nutrition as appropriate  - Assess for signs & symptoms of volume excess or deficit  Outcome: Progressing  Goal: Glucose maintained within target range  Description  INTERVENTIONS:  - Monitor Blood Glucose as ordered  - Assess for signs and symptoms of hyperglycemia and hypoglycemia  - Administer ordered medications to maintain glucose within target range  - Assess nutritional intake and initiate nutrition service referral as needed  Outcome: Progressing     Problem: SKIN/TISSUE INTEGRITY - ADULT  Goal: Skin integrity remains intact  Description  INTERVENTIONS  - Identify patients at risk for skin breakdown  - Assess and monitor skin integrity  - Assess and monitor nutrition and hydration status  - Monitor labs (i e  albumin)  - Assess for incontinence   - Turn and reposition patient  - Assist with mobility/ambulation  - Relieve pressure over bony prominences  - Avoid friction and shearing  - Provide appropriate hygiene as needed including keeping skin clean and dry  - Evaluate need for skin moisturizer/barrier cream  - Collaborate with interdisciplinary team (i e  Nutrition, Rehabilitation, etc )   - Patient/family teaching  Outcome: Progressing  Goal: Incision(s), wounds(s) or drain site(s) healing without S/S of infection  Description  INTERVENTIONS  - Assess and document risk factors for skin impairment   - Assess and document dressing, incision, wound bed, drain sites and surrounding tissue  - Consider nutrition services referral as needed  - Oral mucous membranes remain intact  - Provide patient/ family education  Outcome: Progressing  Goal: Oral mucous membranes remain intact  Description  INTERVENTIONS  - Assess oral mucosa and hygiene practices  - Implement preventative oral hygiene regimen  - Implement oral medicated treatments as ordered  - Initiate Nutrition services referral as needed  Outcome: Progressing

## 2019-10-08 NOTE — PLAN OF CARE
Pt above dry weight  UF goal set for 3L   Plan of care reviewed with patient and Nephrologist    Problem: METABOLIC, FLUID AND ELECTROLYTES - ADULT  Goal: Fluid balance maintained  Description  INTERVENTIONS:  - Monitor labs   - Monitor I/O and WT  - Instruct patient on fluid and nutrition as appropriate  - Assess for signs & symptoms of volume excess or deficit  Outcome: Progressing

## 2019-10-08 NOTE — SOCIAL WORK
CM received notification from [address] [place] [address] liaison that [address] has given approval for [address] [place] [address] to initiate the referral process for patient to get bedside dialysis at [address] [place] [address] liaison to meet with family  CM will continue to follow

## 2019-10-09 ENCOUNTER — APPOINTMENT (INPATIENT)
Dept: RADIOLOGY | Facility: HOSPITAL | Age: 59
DRG: 314 | End: 2019-10-09
Payer: MEDICARE

## 2019-10-09 PROBLEM — R79.89 ELEVATED D-DIMER: Status: ACTIVE | Noted: 2019-10-09

## 2019-10-09 PROBLEM — R18.8 ASCITES: Status: ACTIVE | Noted: 2019-10-09

## 2019-10-09 PROBLEM — T14.8XXA MULTIPLE WOUNDS OF SKIN: Status: ACTIVE | Noted: 2019-09-21

## 2019-10-09 PROBLEM — K56.7 ILEUS (HCC): Status: ACTIVE | Noted: 2019-10-09

## 2019-10-09 LAB
ALBUMIN SERPL BCP-MCNC: 1.6 G/DL (ref 3.5–5)
ALP SERPL-CCNC: 229 U/L (ref 46–116)
ALT SERPL W P-5'-P-CCNC: 15 U/L (ref 12–78)
ANION GAP SERPL CALCULATED.3IONS-SCNC: 15 MMOL/L (ref 4–13)
APPEARANCE FLD: NORMAL
AST SERPL W P-5'-P-CCNC: 32 U/L (ref 5–45)
BILIRUB DIRECT SERPL-MCNC: 1.86 MG/DL (ref 0–0.2)
BILIRUB SERPL-MCNC: 2.4 MG/DL (ref 0.2–1)
BUN SERPL-MCNC: 58 MG/DL (ref 5–25)
CALCIUM SERPL-MCNC: 8.5 MG/DL (ref 8.3–10.1)
CHLORIDE SERPL-SCNC: 93 MMOL/L (ref 100–108)
CO2 SERPL-SCNC: 24 MMOL/L (ref 21–32)
COLOR FLD: YELLOW
CREAT SERPL-MCNC: 7.6 MG/DL (ref 0.6–1.3)
EOSINOPHIL NFR FLD MANUAL: 1 %
GFR SERPL CREATININE-BSD FRML MDRD: 7 ML/MIN/1.73SQ M
GLUCOSE FLD-MCNC: 122 MG/DL
GLUCOSE SERPL-MCNC: 142 MG/DL (ref 65–140)
GLUCOSE SERPL-MCNC: 143 MG/DL (ref 65–140)
GLUCOSE SERPL-MCNC: 145 MG/DL (ref 65–140)
GLUCOSE SERPL-MCNC: 86 MG/DL (ref 65–140)
GLUCOSE SERPL-MCNC: 92 MG/DL (ref 65–140)
LDH FLD L TO P-CCNC: 61 U/L
LYMPHOCYTES NFR BLD AUTO: 5 %
MONO+MESO NFR FLD MANUAL: 1 %
MONOCYTES NFR BLD AUTO: 48 %
NEUTS SEG NFR BLD AUTO: 45 %
PHOSPHATE SERPL-MCNC: 6.7 MG/DL (ref 2.7–4.5)
POTASSIUM SERPL-SCNC: 4.5 MMOL/L (ref 3.5–5.3)
PROCALCITONIN SERPL-MCNC: 8.29 NG/ML
PROT SERPL-MCNC: 5.7 G/DL (ref 6.4–8.2)
SITE: NORMAL
SODIUM SERPL-SCNC: 132 MMOL/L (ref 136–145)
TOTAL CELLS COUNTED SPEC: 100
WBC # FLD MANUAL: 446 /UL

## 2019-10-09 PROCEDURE — 49083 ABD PARACENTESIS W/IMAGING: CPT

## 2019-10-09 PROCEDURE — 99223 1ST HOSP IP/OBS HIGH 75: CPT | Performed by: INTERNAL MEDICINE

## 2019-10-09 PROCEDURE — 84145 PROCALCITONIN (PCT): CPT | Performed by: INTERNAL MEDICINE

## 2019-10-09 PROCEDURE — 76080 X-RAY EXAM OF FISTULA: CPT

## 2019-10-09 PROCEDURE — 99231 SBSQ HOSP IP/OBS SF/LOW 25: CPT | Performed by: DERMATOLOGY

## 2019-10-09 PROCEDURE — 80048 BASIC METABOLIC PNL TOTAL CA: CPT | Performed by: INTERNAL MEDICINE

## 2019-10-09 PROCEDURE — 80076 HEPATIC FUNCTION PANEL: CPT | Performed by: INTERNAL MEDICINE

## 2019-10-09 PROCEDURE — 49424 ASSESS CYST CONTRAST INJECT: CPT | Performed by: RADIOLOGY

## 2019-10-09 PROCEDURE — 49424 ASSESS CYST CONTRAST INJECT: CPT

## 2019-10-09 PROCEDURE — 49083 ABD PARACENTESIS W/IMAGING: CPT | Performed by: RADIOLOGY

## 2019-10-09 PROCEDURE — 99233 SBSQ HOSP IP/OBS HIGH 50: CPT | Performed by: INTERNAL MEDICINE

## 2019-10-09 PROCEDURE — 82945 GLUCOSE OTHER FLUID: CPT | Performed by: INTERNAL MEDICINE

## 2019-10-09 PROCEDURE — 83615 LACTATE (LD) (LDH) ENZYME: CPT | Performed by: INTERNAL MEDICINE

## 2019-10-09 PROCEDURE — 76080 X-RAY EXAM OF FISTULA: CPT | Performed by: RADIOLOGY

## 2019-10-09 PROCEDURE — 0W9G3ZZ DRAINAGE OF PERITONEAL CAVITY, PERCUTANEOUS APPROACH: ICD-10-PCS | Performed by: RADIOLOGY

## 2019-10-09 PROCEDURE — 99232 SBSQ HOSP IP/OBS MODERATE 35: CPT | Performed by: INTERNAL MEDICINE

## 2019-10-09 PROCEDURE — 87205 SMEAR GRAM STAIN: CPT | Performed by: INTERNAL MEDICINE

## 2019-10-09 PROCEDURE — 89051 BODY FLUID CELL COUNT: CPT | Performed by: INTERNAL MEDICINE

## 2019-10-09 PROCEDURE — 87070 CULTURE OTHR SPECIMN AEROBIC: CPT | Performed by: INTERNAL MEDICINE

## 2019-10-09 PROCEDURE — 82948 REAGENT STRIP/BLOOD GLUCOSE: CPT

## 2019-10-09 PROCEDURE — 84100 ASSAY OF PHOSPHORUS: CPT | Performed by: INTERNAL MEDICINE

## 2019-10-09 RX ORDER — NYSTATIN 100000 [USP'U]/G
POWDER TOPICAL 2 TIMES DAILY
Status: DISCONTINUED | OUTPATIENT
Start: 2019-10-09 | End: 2019-10-16 | Stop reason: HOSPADM

## 2019-10-09 RX ADMIN — IOHEXOL 5 ML: 350 INJECTION, SOLUTION INTRAVENOUS at 11:10

## 2019-10-09 RX ADMIN — OXYCODONE HYDROCHLORIDE 5 MG: 5 TABLET ORAL at 17:14

## 2019-10-09 RX ADMIN — HEPARIN SODIUM 5000 UNITS: 5000 INJECTION INTRAVENOUS; SUBCUTANEOUS at 06:06

## 2019-10-09 RX ADMIN — Medication 1 CAPSULE: at 17:14

## 2019-10-09 RX ADMIN — HEPARIN SODIUM 5000 UNITS: 5000 INJECTION INTRAVENOUS; SUBCUTANEOUS at 14:08

## 2019-10-09 RX ADMIN — GABAPENTIN 100 MG: 100 CAPSULE ORAL at 08:47

## 2019-10-09 RX ADMIN — PANTOPRAZOLE SODIUM 40 MG: 40 TABLET, DELAYED RELEASE ORAL at 06:06

## 2019-10-09 RX ADMIN — OXYCODONE HYDROCHLORIDE 5 MG: 5 TABLET ORAL at 21:03

## 2019-10-09 RX ADMIN — METOPROLOL TARTRATE 12.5 MG: 25 TABLET ORAL at 21:37

## 2019-10-09 RX ADMIN — METOPROLOL TARTRATE 12.5 MG: 25 TABLET ORAL at 08:46

## 2019-10-09 RX ADMIN — HEPARIN SODIUM 5000 UNITS: 5000 INJECTION INTRAVENOUS; SUBCUTANEOUS at 21:03

## 2019-10-09 RX ADMIN — NYSTATIN: 100000 POWDER TOPICAL at 17:15

## 2019-10-09 NOTE — SEDATION DOCUMENTATION
1800 cc cloudy yellow fluid drained via left paracentesis  Labs sent per orders  Adhesive bandage to site c/d/i

## 2019-10-09 NOTE — SOCIAL WORK
CM attempted to contact patient's spouse, Nataliia Sotelo, to discuss and see if she is interested in [address] [place] [address] reviewing for admission or if she would like to continue with [facility]  CM left voicemail and requested return call to discuss  If interested in pursuing [facility], OSCAR to call Dejah Castro in admissions at [facility] at [phone]  CM Department will continue to follow patient

## 2019-10-09 NOTE — ASSESSMENT & PLAN NOTE
· Continue hemodialysis per Nephrology  · Appeared somewhat volume overloaded on exam   · Nephrology on board  · Monitor electrolytes  · Continue Nephrocaps, phosphate binders

## 2019-10-09 NOTE — SEDATION DOCUMENTATION
Renal abscess tube check completed in IR by Dr Yves Silva without complication  New dry dressing applied to site  Tube to bulb suction

## 2019-10-09 NOTE — ASSESSMENT & PLAN NOTE
· History of chronic hypotension, per patient he typically runs anywhere from  systolic  · Continue midodrine on dialysis days  · Patient was acutely overloaded, no indication for IV fluids  · Monitor blood pressure

## 2019-10-09 NOTE — ASSESSMENT & PLAN NOTE
· Lactic acidosis present upon admission at 2 1  Doubt acute infectious etiology  · Monitoring off of antibiotics

## 2019-10-09 NOTE — ASSESSMENT & PLAN NOTE
· Mild, 132 upon admission  · Stable  · Suspect hypervolemic hyponatremia  · Continue dialysis  · Nephrology on board  · Monitor

## 2019-10-09 NOTE — ASSESSMENT & PLAN NOTE
· Underwent diagnostic and therapeutic paracenteses today  · Follow-up results  · As per discussion with Infectious Disease doctor, does not have SBP with the results of the ascitic fluid analysis

## 2019-10-09 NOTE — PROGRESS NOTES
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  · Completed oral ciprofloxacin as an outpatient 2 days ago  Received extra dose of IV ciprofloxacin emergency department tonight  · Patient also had gram-negative bacteremia, grew Shewanella Putrifacians, a marine acquired bacteria  Had PermCath replaced at that time  Infectious Disease had been on board  · No fevers  Monitor vital signs  · As per Infectious Disease doctor, no additional antibiotics for now  · Hematology was consulted for possible biopsy  · Continue local wound care  · Follow-up repeat blood cultures  · Podiatry on board  · As per my discussion with Dermatology, he will evaluate the patient and if patient needs biopsy to rule out calciphylaxis, he would recommend that the surgeon be the one to do it  We will wait for the official consult  Ileus ([address] )  Assessment & Plan  · With history of right inguinal hernia for more than 30 years  · Surgery on board  · No origin see for any surgical intervention at this time  · Recommend MiraLax daily  · Encourage out of bed and ambulation  · P o  Pain medication p r n  And avoid narcotics as much as possible    Chronic kidney disease with end stage renal failure on dialysis [facility])  Assessment & Plan  · Continue hemodialysis per Nephrology  · Appeared somewhat volume overloaded on exam   · Nephrology on board  · Monitor electrolytes  · Continue Nephrocaps, phosphate binders      Ascites  Assessment & Plan  · Underwent diagnostic and therapeutic paracenteses today   · Follow-up results  · As per discussion with Infectious Disease doctor, does not have SBP with the results of the ascitic fluid analysis  Elevated d-dimer  Assessment & Plan  · Nephrologist recommended a V/Q scan to rule out PE  Nephrologist told me that patient had significantly elevated D-dimer on the prior admissions  Lower extremity duplex scan done at that time was negative for DVT  · Follow-up results of the V/Q scan  Anemia due to chronic kidney disease, on chronic dialysis [facility])  Assessment & Plan  · Monitor  · For further workup and management if this worsens significantly    Atrial fibrillation ([address] )  Assessment & Plan  · Developed atrial fibrillation with rapid ventricular response during last admission  · Not on any anticoagulation per Cardiology  · Continue metoprolol 12 5 mg b i d  With hold parameters  · Monitor vital signs  Benign essential hypertension  Assessment & Plan  · Per Cardiology, continue metoprolol with hold parameters at reduced dose  · Midodrine for hypotension  · Monitor blood pressure  Polycystic kidney disease  Assessment & Plan  · Patient still has indwelling DILIA drain for the renal cyst   · May eventually need to be referred to IR, for possible pulling out of this DILIA drain  Lactic acidosis  Assessment & Plan  · Lactic acidosis present upon admission at 2 1  Doubt acute infectious etiology  · Monitoring off of antibiotics  Hyponatremia  Assessment & Plan  · Mild, 132 upon admission  · Stable  · Suspect hypervolemic hyponatremia  · Continue dialysis  · Nephrology on board  · Monitor  * Hypotension  Assessment & Plan  · History of chronic hypotension, per patient he typically runs anywhere from  systolic  · Continue midodrine on dialysis days  · Patient was acutely overloaded, no indication for IV fluids  · Monitor blood pressure          VTE Pharmacologic Prophylaxis:   Pharmacologic: Heparin  Mechanical VTE Prophylaxis in Place: No    Patient Centered Rounds: I have performed bedside rounds with nursing staff today  Discussions with Specialists or Other Care Team Provider:  Case management  Nephrologist   Advance practitioner for Nephrology  General surgeon  Dermatologist   Infectious Disease doctor  Education and Discussions with Family / Patient:  Patient  I offered to call patient's family but patient declined  Time Spent for Care: 45 minutes  More than 50% of total time spent on counseling and coordination of care as described above  Current Length of Stay: 2 day(s)    Current Patient Status: Inpatient   Certification Statement: The patient will continue to require additional inpatient hospital stay due to Above findings and plans  Discharge Plan:  None yet  Code Status: Level 1 - Full Code      Subjective: When I saw the patient earlier this morning, patient said his doing fine  However, patient admits to occasional lower extremity pains from his wounds  Patient denies any abdominal pains  No other pains  No other complaints  Patient denies any shortness of breath      Objective:     Vitals:   Temp (24hrs), Av 8 °F (37 1 °C), Min:98 6 °F (37 °C), Max:99 °F (37 2 °C)    Temp:  [98 6 °F (37 °C)-99 °F (37 2 °C)] 98 7 °F (37 1 °C)  HR:  [] 96  Resp:  [16-18] 18  BP: ()/(47-59) 83/50  SpO2:  [89 %-99 %] 97 %  Body mass index is 31 98 kg/m²  Input and Output Summary (last 24 hours): Intake/Output Summary (Last 24 hours) at 10/9/2019 1714  Last data filed at 10/9/2019 1551  Gross per 24 hour   Intake 420 ml   Output 1800 ml   Net -1380 ml       Physical Exam:     Physical Exam   Constitutional: No distress  HENT:   Head: Normocephalic and atraumatic  Eyes: Right eye exhibits no discharge  Left eye exhibits no discharge  No scleral icterus  Neck: No JVD present  No tracheal deviation present  No thyromegaly present     Cardiovascular: Normal rate, regular rhythm and normal heart sounds  Exam reveals no gallop and no friction rub  No murmur heard  Pulmonary/Chest: Effort normal and breath sounds normal  No stridor  No respiratory distress  He has no wheezes  He has no rales  Abdominal: Soft  Bowel sounds are normal  He exhibits distension  He exhibits no mass  There is no tenderness  There is no rebound and no guarding  Positive for DILIA drain  Musculoskeletal: He exhibits edema and tenderness  Positive for bilateral lower extremity edema and tenderness  Positive for wound dressing on bilateral legs  Neurological: He is alert  Skin: Skin is warm  No rash noted  He is not diaphoretic  No pallor  Psychiatric: He has a normal mood and affect  His behavior is normal  Thought content normal    Vitals reviewed  Additional Data:     Labs:    Results from last 7 days   Lab Units 10/08/19  0500   WBC Thousand/uL 8 23   HEMOGLOBIN g/dL 8 9*   HEMATOCRIT % 28 8*   PLATELETS Thousands/uL 230   NEUTROS PCT % 75   LYMPHS PCT % 6*   MONOS PCT % 15*   EOS PCT % 3     Results from last 7 days   Lab Units 10/09/19  0605   POTASSIUM mmol/L 4 5   CHLORIDE mmol/L 93*   CO2 mmol/L 24   BUN mg/dL 58*   CREATININE mg/dL 7 60*   CALCIUM mg/dL 8 5   ALK PHOS U/L 229*   ALT U/L 15   AST U/L 32     Results from last 7 days   Lab Units 10/06/19  1908   INR  1 34*       * I Have Reviewed All Lab Data Listed Above  * Additional Pertinent Lab Tests Reviewed: [facility] Admission Reviewed    Imaging:    Imaging Reports Reviewed Today Include:  Diagnostic imaging studies that were done on this admission  Imaging Personally Reviewed by Myself Includes:  None  Recent Cultures (last 7 days):     Results from last 7 days   Lab Units 10/07/19  1301 10/06/19  1922 10/06/19  1908   BLOOD CULTURE  No Growth at 48 hrs  No Growth at 48 hrs  No Growth at 48 hrs         Last 24 Hours Medication List:     Current Facility-Administered Medications:  acetaminophen 650 mg Oral Q6H PRN Aldean Josefa PAUL Ambrosio    albumin human 25 g Intravenous PRN SAM Banegas Last Rate: Stopped (10/08/19 1006)   b complex-vitamin C-folic acid 1 capsule Oral Daily With Sherita SullivanPAUL hernandez    gabapentin 100 mg Oral Daily Kelly NawafPAUL hidalgo    heparin (porcine) 5,000 Units Subcutaneous [facility] Dicie NawafPAUL hidalgo    Lidocaine Viscous HCl 15 mL Swish & Spit 4x Daily PRN Kelly NawafPAUL hidalgo    metoprolol tartrate 12 5 mg Oral Q12H [address] Dicie NawafPAUL hidalgo    midodrine 10 mg Oral Before Dialysis Dicie Nawaf, PAUL    midodrine 10 mg Oral Once in dialysis SAM Banegas    nystatin  Topical BID Ignacia Pham PA-C    ondansetron 4 mg Intravenous Q6H PRN Dicie NawafPAUL hidalgo    oxyCODONE 5 mg Oral Q4H PRN Kelly NawafPAUL hidalgo    pantoprazole 40 mg Oral Early Morning Dicie NawafPAUL    simethicone 80 mg Oral Q6H PRN Barneyie NawafPAUL hidalgo         Today, Patient Was Seen By: Jose Echevarria MD    ** Please Note: Dragon 360 Dictation voice to text software may have been used in the creation of this document   **

## 2019-10-09 NOTE — PROGRESS NOTES
Progress Note - Podiatry  Gualberto Craft 64 y o  male MRN: [id number]  Unit/Bed#: -01 Encounter: [id number]    Assessment/Plan:  1  B/l LE bullae formation with eschar formation 2/2 2 and 3  2  PKD on dialysis  3  Diabetes with neuropathy  4  Cellulitis of left thigh    - Dressings were taken down and legs were examined with ID  - no evidence of cellulitis below the knee  - Demarcation continues for the b/l LE wounds  -Microvascular circulation is likely comprised and could very well be complicated by calciphylaxis  - Dermatology deferred biopsy and any intervention on the last admission  - Continue Xeroform dsg changes  - Advised elevation of the b/l LE  - Will need follow-up with me in the wound care center  - He will likely need debridement in the future with skin substitute application  Subjective/Objective   Chief Complaint:   Chief Complaint   Patient presents with    Abdominal Pain     Pt presents to the ED with c/o abdominal pain and swelling that started a few days ago  Pt also has B/L leg infections that he was being treated for at the SNF, pt was sent by staff for evaluation of swelling in his abdomen       Subjective: 64 y o  y/o male was seen and evaluated at bedside  Blood pressure 91/54, pulse 91, temperature 98 6 °F (37 °C), temperature source Oral, resp  rate 18, height 5' 8" (1 727 m), weight 95 4 kg (210 lb 5 1 oz), SpO2 94 %  ,Body mass index is 31 98 kg/m²  Invasive Devices     Peripheral Intravenous Line            Peripheral IV 10/06/19 Left Antecubital 2 days    Peripheral IV 10/06/19 Left Antecubital 2 days          Hemodialysis Catheter            Permanent HD Catheter  8 days          Drain            Closed/Suction Drain Right RUQ Bulb 10 Fr  16 days                Physical Exam:   General: Alert, cooperative and no distress  Lungs: Non labored breathing  Heart: Positive S1, S2  Abdomen: Soft, non-tender    Extremity: b/l LE bullae formation with extensive eschar formation      DP are palpabel b/l   There is minimal erythema below the knee  Significant skin sloughing b/l  Continues to demarcate  Eschars are adherent  No SOI below the knee  MMT is 4/5 b/l  Lab, Imaging and other studies:   I have personally reviewed pertinent lab results  Imaging: I have personally reviewed pertinent films in PACS  EKG, Pathology, and Other Studies: I have personally reviewed pertinent reports

## 2019-10-09 NOTE — TREATMENT PLAN
Met with wife at bedside       Answered all questions    Relayed questions that wife had for Attending to the Attending on IM team

## 2019-10-09 NOTE — ASSESSMENT & PLAN NOTE
· Nephrologist recommended a V/Q scan to rule out PE  Nephrologist told me that patient had significantly elevated D-dimer on the prior admissions  Lower extremity duplex scan done at that time was negative for DVT  · Follow-up results of the V/Q scan

## 2019-10-09 NOTE — PLAN OF CARE
Problem: Potential for Falls  Goal: Patient will remain free of falls  Description  INTERVENTIONS:  - Assess patient frequently for physical needs  -  Identify cognitive and physical deficits and behaviors that affect risk of falls  -  [place] fall precautions as indicated by assessment   - Educate patient/family on patient safety including physical limitations  - Instruct patient to call for assistance with activity based on assessment  - Modify environment to reduce risk of injury  - Consider OT/PT consult to assist with strengthening/mobility  Outcome: Progressing     Problem: SAFETY ADULT  Goal: Patient will remain free of falls  Description  INTERVENTIONS:  - Assess patient frequently for physical needs  -  Identify cognitive and physical deficits and behaviors that affect risk of falls    -  [place] fall precautions as indicated by assessment   - Educate patient/family on patient safety including physical limitations  - Instruct patient to call for assistance with activity based on assessment  - Modify environment to reduce risk of injury  - Consider OT/PT consult to assist with strengthening/mobility  Outcome: Progressing     Problem: SKIN/TISSUE INTEGRITY - ADULT  Goal: Skin integrity remains intact  Description  INTERVENTIONS  - Identify patients at risk for skin breakdown  - Assess and monitor skin integrity  - Assess and monitor nutrition and hydration status  - Monitor labs (i e  albumin)  - Assess for incontinence   - Turn and reposition patient  - Assist with mobility/ambulation  - Relieve pressure over bony prominences  - Avoid friction and shearing  - Provide appropriate hygiene as needed including keeping skin clean and dry  - Evaluate need for skin moisturizer/barrier cream  - Collaborate with interdisciplinary team (i e  Nutrition, Rehabilitation, etc )   - Patient/family teaching  Outcome: Progressing  Goal: Incision(s), wounds(s) or drain site(s) healing without S/S of infection  Description  INTERVENTIONS  - Assess and document risk factors for skin impairment   - Assess and document dressing, incision, wound bed, drain sites and surrounding tissue  - Consider nutrition services referral as needed  - Oral mucous membranes remain intact  - Provide patient/ family education  Outcome: Progressing  Goal: Oral mucous membranes remain intact  Description  INTERVENTIONS  - Assess oral mucosa and hygiene practices  - Implement preventative oral hygiene regimen  - Implement oral medicated treatments as ordered  - Initiate Nutrition services referral as needed  Outcome: Progressing

## 2019-10-09 NOTE — CONSULTS
Consultation - Infectious Disease   Rip Krishnan 64 y o  male MRN: [id number]  Unit/Bed#: -01 Encounter: [id number]      IMPRESSION & RECOMMENDATIONS:     1  Bilateral lower extremity eschar skin wounds   Initially suspected to develop due to underlying chronic foot wounds in setting of recent fishing water exposures including [place] 8 weeks ago, followed by [facility] and then [facility]  Cellulitis has improved, but he continues to evolve eschar wounds after bullae rupture  Calciphylaxis component is quite possible  Rec:  · Continue monitoring closely off completed antibiotic course   · [address] per Podiatry and RN   · Serial exams  · Recommend dermatology consult for skin biopsy     2  Groin/inner thigh intertrigo  Developed with increased overall edema likely secondary to ascites  Rec:  Continue barrier cream prn  Add nystatin powder BID    3   s/p recent Shewanella putrefaciens bacteremia    Presented 9/21/19 Septic shock, POA with leukopenia, tachycardia, refractory hypotension  Suspect primary skin portal of entry with cellulitis as below with secondary infection of HD catheter, s/p removal   Repeat blood cultures negative  Rec:  ? Finished Cipro completing 14 days total of antibiotics through 10/4/19     4   ESRD on HD   [facility]   status post new PermCath 10/1/19 in setting of #2     Rec:  ? Dialysis schedule is Tuesday Thursday Saturday     5   Polycystic kidney/liver disease   Extensive disease seen on CT imaging  S/p IR tube check in right renal cyst and changed today and status post paracentesis  Parasitic fluid with 446 white cells and 45% neutrophils  SBP clinically not suspect  Rec:   Follow-up parasitic fluid culture off antibiotics at present     6   Chronic foot wounds   Likely multifactorial   Appear superficial   Risk factor for infection as above  Rec:  ? Local wound care per Podiatry     The above plan was discussed in detail with patient, RN, Dr Danielle Garzon, and primary care team   Thank you for this consultation  We will follow along with you  Antibiotics:  None     Completed a 2+ week course of antibiotics with Cipro on 10/6/19  HISTORY OF PRESENT ILLNESS:  Reason for Consult: Cellulitis versus calciphylaxis     HPI: Carroll Wisdom is a 64 y o  male with a PMH of ESRD on HD, PCKD and liver disease who presented with lower extremity edema and abdominal pain on 10/6/19  Patient is well known to us s/p recent hospitalization for almost 2 weeks secondary to Shewanella putrefaciens bacteremia presenting with septic shock on 09/21/2019  During that admission, he had his PermCath replaced and finished a 2 week antibiotic course with ciprofloxacin on 10/4/19 upon discharge to acute rehabilitation  The patient was sent in for evaluation on 10/6/19 secondary to abdominal pain as well as lower leg edema as his nursing facility was concerned about infection in his leg wounds  patient blood cultures were obtained, he was given a 1 time dose of ciprofloxacin, and admitted for further workup  Podiatry and wound care were consulted for his leg wound care  Daily Xeroform gauze dressings have been being applied  Infectious Disease is now being consulted today regarding evaluation of the leg wounds for cellulitis versus calciphylaxis  Patient denies any fevers or chills and no fever has been reported here  White cell count has been within normal limits  Procalcitonin level was elevated at 66 on last admission and 10/6/19, greatly reduced to 10  The patient has history of blood pressures frequently in the  systolic range  He denies any abdominal or focal foot or leg pain at this time  He is status post paracentesis this a m  with removal of 2 L of fluid  He denies any nausea, vomiting, chest pain, shortness of breath, or cough  He does not urinate      REVIEW OF SYSTEMS:  As above in HPI as well as  A complete system-based review of systems is otherwise negative  PAST MEDICAL HISTORY:  Past Medical History:   Diagnosis Date    Complication of renal dialysis     Polycystic kidney disease      Past Surgical History:   Procedure Laterality Date    IR IMAGE GUIDED ASPIRATION / DRAINAGE  2019    IR [place] [facility] PLACEMENT  2019       FAMILY HISTORY:  Non-contributory    SOCIAL HISTORY:  Social History     Substance and Sexual Activity   Alcohol Use Not on file     Social History     Substance and Sexual Activity   Drug Use Not on file     Social History     Tobacco Use   Smoking Status Never Smoker   Smokeless Tobacco Never Used       ALLERGIES:  No Known Allergies    MEDICATIONS:  All current active medications have been reviewed  PHYSICAL EXAM:  Vitals:  Temp:  [98 5 °F (36 9 °C)-99 °F (37 2 °C)] 98 6 °F (37 °C)  HR:  [] 103  Resp:  [16-18] 18  BP: ()/(47-59) 85/49  SpO2:  [89 %-99 %] 93 %  Temp (24hrs), Av 7 °F (37 1 °C), Min:98 5 °F (36 9 °C), Max:99 °F (37 2 °C)  Current: Temperature: 98 6 °F (37 °C)     Physical Exam:  General:  72-year-old disheveled, chronically ill-appearing male being cleaned up at present,   turned in bed, in no acute distress  Eyes:  Conjunctive clear with no hemorrhages or effusions  Oropharynx:  No ulcers, no lesions  Neck:  Supple, no lymphadenopathy  Lungs:  Clear to auscultation bilaterally, no accessory muscle use  Cardiac:  Regular rate and rhythm, decreased tones, no murmur  Abdomen:  Soft, no focal tenderness, soft protuberance with ascites, right lower quadrant new DILIA bulb with scant dark kaylin drainage  Extremities:  Bilateral lower legs Ace wrap from toes to below knees with Xeroform gauze and Ace wraps without any obvious bloody strike through drainage  No palpable tenderness at few areas of redness in thighs adjacent to proximal wound edges  Dressings unwrapped and leg wound photographs as below:  :  No Vega catheter in place, soft 3+ scrotal edema, no urethral drainage    Skin:  Right anterior chest wall PermCath in place and nontender  Patient has satellite eruption of inner thighs, scrotal and groin areas  Neurological:  Alert and oriented x3, Moves all four extremities freely to help turn self in bed                          LABS, IMAGING, & OTHER STUDIES:  Lab Results:  I have personally reviewed pertinent labs  Results from last 7 days   Lab Units 10/09/19  0605 10/08/19  0500 10/07/19  1301 10/06/19  1908   POTASSIUM mmol/L 4 5 5 2 4 9 4 8   CHLORIDE mmol/L 93* 90* 94* 92*   CO2 mmol/L 24 26 27 29   BUN mg/dL 58* 74* 70* 60*   CREATININE mg/dL 7 60* 8 93* 8 39* 7 54*   EGFR ml/min/1 73sq m 7 6 6 7   CALCIUM mg/dL 8 5 8 5 8 6 8 6   AST U/L 32  --   --  32   ALT U/L 15  --   --  20   ALK PHOS U/L 229*  --   --  236*     Results from last 7 days   Lab Units 10/08/19  0500 10/07/19  1301 10/06/19  1908   WBC Thousand/uL 8 23 7 42 6 93   HEMOGLOBIN g/dL 8 9* 9 0* 9 4*   PLATELETS Thousands/uL 230 232 263     Results from last 7 days   Lab Units 10/07/19  1301 10/06/19  1922 10/06/19  1908   BLOOD CULTURE  No Growth at 24 hrs  No Growth at 48 hrs  No Growth at 48 hrs  Results from last 7 days   Lab Units 10/06/19  1908   PROCALCITONIN ng/ml 10 48*       Imaging Studies:   I have personally reviewed pertinent imaging study reports and images in PACS  10/7/19 CT A/P: 1   Redemonstration of findings of ADPKD  Status post placement of a percutaneous drainage catheter into an exophytic complex cystic lesion at the lower pole of the right kidney with resultant mild decrease in size   The renal lesions are incompletely   characterized on this single phase exam     2   Mild distal small bowel and marked diffuse colonic dilation, without a transition point   Findings are new since the prior exam and consistent with colonic/small bowel ileus  3   Increased volume of ascites, now moderate  EKG, Pathology, and Other Studies:   I have personally reviewed pertinent reports

## 2019-10-09 NOTE — ASSESSMENT & PLAN NOTE
· With history of right inguinal hernia for more than 30 years  · Surgery on board  · No origin see for any surgical intervention at this time  · Recommend MiraLax daily  · Encourage out of bed and ambulation  · P o  Pain medication p r n   And avoid narcotics as much as possible

## 2019-10-09 NOTE — ASSESSMENT & PLAN NOTE
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  · Completed oral ciprofloxacin as an outpatient 2 days ago  Received extra dose of IV ciprofloxacin emergency department tonight  · Patient also had gram-negative bacteremia, grew Shewanella Putrifacians, a marine acquired bacteria  Had PermCath replaced at that time  Infectious Disease had been on board  · No fevers  Monitor vital signs  · As per Infectious Disease doctor, no additional antibiotics for now  · Hematology was consulted for possible biopsy  · Continue local wound care  · Follow-up repeat blood cultures  · Podiatry on board  · As per my discussion with Dermatology, he will evaluate the patient and if patient needs biopsy to rule out calciphylaxis, he would recommend that the surgeon be the one to do it  We will wait for the official consult

## 2019-10-09 NOTE — ASSESSMENT & PLAN NOTE
· Patient still has indwelling DILIA drain for the renal cyst   · May eventually need to be referred to IR, for possible pulling out of this DILIA drain

## 2019-10-09 NOTE — PROGRESS NOTES
[address] [facility] NOTE   Gualberto Craft 64 y o  male MRN: [id number]  Unit/Bed#: -01 Encounter: [id number]  Reason for Consult: ESRD       ASSESSMENT and PLAN:  1  ESRD on HD:  underlying disease, PCKD  · Patient was previously dialyzed at [facility] but has now transition to local [facility] unit  Current plan is for treatment at [facility]  · Outpatient hemodialysis prescription:  Qt 3 hrs 10 min  Qb 450, Qd standard, 2 K, 2 0 calcium,  40 bicarbonate, sodium 137, filter size 180   Target weight 83 kg  · Last hemodialysis treatment 14/7 which was complicated by hypotension and filter clotting during treatment  Unfortunately patient lost blood in circuit  · Plan:    ? Continue hemodialysis TTS  ? May need to add heparin to prevent filter clotting-will hold for now and monitor  ? Continue to Utilize albumin during treatment for hypotension  2  Access:  Right IJ PermCath site looks clean, nontender  3  Chronic hypotension:    · On midodrine  · On beta-blocker due to recent treatment for atrial fibrillation with RVR during last hospitalization  · Plan:    ? Midodrine pre treatment and [facility]FP through hemodialysis treatment  4  Anemia:   · Hemoglobin below goal  · No schistocytes on hemolyzed smear, haptoglobin normal  · Platelet count has rebounded and is currently normal  · Continue Epogen    5  Mineral and bone disease:    · Phosphorus 6 7  · Calcium based binder discontinue 10/8 due lower extremity wounds and suspicion for calciphylaxis  · Unfortunately binders are contraindicated at this time due to ileus  · Will discuss plan of care with Dr Micah Miller service/surgery  6  Abdominal pain/ileus:   History of polycystic liver disease  · CT of the abdomen:  Findings consistent with ileus  Moderate ascites  · Surgery following  · Plan for paracentesis today with fluid cultures  7   Lower extremity edema/cellulitis/wounds:    · Recent infection due to Shewanella putrifacians likely acquired when patient was in pond water   Completed course of p o  Gracie [place] a dose of IV Cipro on admission   · Severe wounds with  black eschar- ?  Calciphylaxis  · Discussed concerns with Dr Marina Almaguer who related concerns to primary service  · Calcium based binder discontinued 10/8  · Procalcitonin level trending down but remains elevated, 10 48 as of 10/6  · I spoke with primary service this morning, Dr Yodit Sharma regarding concerns  · He has consulted Infectious Disease for evaluation  · He spoke with surgery concerning biopsy  Surgery recommends dermatology evaluation  · I also spoke with primary service regarding use of binder which is contraindicated at this time due to ileus  8  Atrial fibrillation:  On beta-blocker  9  Hyponatremia:  Generally mild with current sodium level 132  Likely volume mediated  Continue to monitor  SUBJECTIVE / INTERVAL HISTORY:  No complaints at this time  Patient eating breakfast    OBJECTIVE:  Current Weight: Weight - Scale: 95 4 kg (210 lb 5 1 oz)(pt not ambulating)  Vitals:    10/08/19 1342 10/08/19 2100 10/09/19 0600 10/09/19 0700   BP: 97/56 113/51  119/52   BP Location: Right arm Right arm  Right arm   Pulse: 97 101  101   Resp:  16  18   Temp:  99 °F (37 2 °C)  98 6 °F (37 °C)   TempSrc:  Oral  Oral   SpO2:  95%  (!) 89%   Weight:   95 4 kg (210 lb 5 1 oz)    Height:           Intake/Output Summary (Last 24 hours) at 10/9/2019 0905  Last data filed at 10/8/2019 1710  Gross per 24 hour   Intake 400 ml   Output 2000 ml   Net -1600 ml     General: NAD, appears to be comfortably lying in bed  Patient lays flat a great deal of the time  Skin: no rash  Eyes: Icteric sclera  ENT: moist mucous membrane  Neck: supple, no JVD  Chest: CTA b/l, no ronchii, no wheeze, no rubs, no rales  CVS: s1s2, no murmur, no gallop, no rub  Abdomen:  Distended, tympanic  Extremities:  No significant edema lower extremities  Legs are wrapped bilaterally with Kerlix    Patchy erythema some of which extends from approximately wounds in the upper thighs    : no meeks  Neuro:  No acute deficits  Psych: normal affect  Medications:    Current Facility-Administered Medications:     acetaminophen (TYLENOL) tablet 650 mg, 650 mg, Oral, Q6H PRN, Bob Vang PA-C, 650 mg at 10/07/19 2117    albumin human (FLEXBUMIN) 25 % injection 25 g, 25 g, Intravenous, PRN, SAM Johnston, Stopped at 10/08/19 1006    b complex-vitamin C-folic acid (NEPHROCAPS) capsule 1 capsule, 1 capsule, Oral, Daily With Cristiane Jane PA-C, 1 capsule at 10/08/19 1650    gabapentin (NEURONTIN) capsule 100 mg, 100 mg, Oral, Daily, Bob Vang PA-C, 100 mg at 10/09/19 0847    heparin (porcine) subcutaneous injection 5,000 Units, 5,000 Units, Subcutaneous, Q8H [address], 5,000 Units at 10/09/19 0606 **AND** Platelet count, , , Once, Bob Vang PA-C    Lidocaine Viscous HCl (XYLOCAINE) 2 % mucosal solution 15 mL, 15 mL, Swish & Spit, 4x Daily PRN, Bob Vang PA-C    metoprolol tartrate (LOPRESSOR) partial tablet 12 5 mg, 12 5 mg, Oral, Q12H [address], Bob Vang PA-C, 12 5 mg at 10/09/19 0846    midodrine (PROAMATINE) tablet 10 mg, 10 mg, Oral, Before Dialysis, Bob Vang PA-C, 10 mg at 10/08/19 0835    midodrine (PROAMATINE) tablet 10 mg, 10 mg, Oral, Once in dialysis, SAM Johnston, Stopped at 10/08/19 1320    ondansetron (ZOFRAN) injection 4 mg, 4 mg, Intravenous, Q6H PRN, Bob Vang PA-C    oxyCODONE (ROXICODONE) IR tablet 5 mg, 5 mg, Oral, Q4H PRN, Bob Vang PA-C, 5 mg at 10/08/19 1344    pantoprazole (PROTONIX) EC tablet 40 mg, 40 mg, Oral, Early Morning, Bob Vang PA-C, 40 mg at 10/09/19 0606    simethicone (MYLICON) chewable tablet 80 mg, 80 mg, Oral, Q6H PRN, Bob Vang PA-C    Laboratory Results:  Results from last 7 days   Lab Units 10/09/19  0605 10/08/19  0500 10/07/19  1301 10/06/19  1908 10/03/19  0532 10/02/19  0947   WBC Thousand/uL  --  8 23 7 42 6 93 11 96* 15 29*   HEMOGLOBIN g/dL  --  8 9* 9 0* 9 4* 8 2* 8 6*   HEMATOCRIT %  --  28 8* 29 2* 30 4* 26 6* 26 0*   PLATELETS Thousands/uL  --  230 232 263 184 151   POTASSIUM mmol/L 4 5 5 2 4 9 4 8 4 9 6 3*   CHLORIDE mmol/L 93* 90* 94* 92* 94* 93*   CO2 mmol/L 24 26 27 29 25 23   BUN mg/dL 58* 74* 70* 60* 51* 81*   CREATININE mg/dL 7 60* 8 93* 8 39* 7 54* 5 38* 7 34*   CALCIUM mg/dL 8 5 8 5 8 6 8 6 8 0* 8 3   MAGNESIUM mg/dL  --   --   --   --  2 0  --    PHOSPHORUS mg/dL 6 7*  --   --   --  7 4*  --

## 2019-10-09 NOTE — PLAN OF CARE
Problem: Potential for Falls  Goal: Patient will remain free of falls  Description  INTERVENTIONS:  - Assess patient frequently for physical needs  -  Identify cognitive and physical deficits and behaviors that affect risk of falls    -  [place] fall precautions as indicated by assessment   - Educate patient/family on patient safety including physical limitations  - Instruct patient to call for assistance with activity based on assessment  - Modify environment to reduce risk of injury  - Consider OT/PT consult to assist with strengthening/mobility  Outcome: Progressing     Problem: Prexisting or High Potential for Compromised Skin Integrity  Goal: Skin integrity is maintained or improved  Description  INTERVENTIONS:  - Identify patients at risk for skin breakdown  - Assess and monitor skin integrity  - Assess and monitor nutrition and hydration status  - Monitor labs   - Assess for incontinence   - Turn and reposition patient  - Assist with mobility/ambulation  - Relieve pressure over bony prominences  - Avoid friction and shearing  - Provide appropriate hygiene as needed including keeping skin clean and dry  - Evaluate need for skin moisturizer/barrier cream  - Collaborate with interdisciplinary team   - Patient/family teaching  - Consider wound care consult   Outcome: Progressing     Problem: PAIN - ADULT  Goal: Verbalizes/displays adequate comfort level or baseline comfort level  Description  Interventions:  - Encourage patient to monitor pain and request assistance  - Assess pain using appropriate pain scale  - Administer analgesics based on type and severity of pain and evaluate response  - Implement non-pharmacological measures as appropriate and evaluate response  - Consider cultural and social influences on pain and pain management  - Notify physician/advanced practitioner if interventions unsuccessful or patient reports new pain  Outcome: Progressing     Problem: INFECTION - ADULT  Goal: Absence or prevention of progression during hospitalization  Description  INTERVENTIONS:  - Assess and monitor for signs and symptoms of infection  - Monitor lab/diagnostic results  - Monitor all insertion sites, i e  indwelling lines, tubes, and drains  - Monitor endotracheal if appropriate and nasal secretions for changes in amount and color  - [place] appropriate cooling/warming therapies per order  - Administer medications as ordered  - Instruct and encourage patient and family to use good hand hygiene technique  - Identify and instruct in appropriate isolation precautions for identified infection/condition  Outcome: Progressing  Goal: Absence of fever/infection during neutropenic period  Description  INTERVENTIONS:  - Monitor WBC    Outcome: Progressing     Problem: SAFETY ADULT  Goal: Patient will remain free of falls  Description  INTERVENTIONS:  - Assess patient frequently for physical needs  -  Identify cognitive and physical deficits and behaviors that affect risk of falls    -  [place] fall precautions as indicated by assessment   - Educate patient/family on patient safety including physical limitations  - Instruct patient to call for assistance with activity based on assessment  - Modify environment to reduce risk of injury  - Consider OT/PT consult to assist with strengthening/mobility  Outcome: Progressing  Goal: Maintain or return to baseline ADL function  Description  INTERVENTIONS:  -  Assess patient's ability to carry out ADLs; assess patient's baseline for ADL function and identify physical deficits which impact ability to perform ADLs (bathing, care of mouth/teeth, toileting, grooming, dressing, etc )  - Assess/evaluate cause of self-care deficits   - Assess range of motion  - Assess patient's mobility; develop plan if impaired  - Assess patient's need for assistive devices and provide as appropriate  - Encourage maximum independence but intervene and supervise when necessary  - Involve family in performance of ADLs  - Assess for home care needs following discharge   - Consider OT consult to assist with ADL evaluation and planning for discharge  - Provide patient education as appropriate  Outcome: Progressing  Goal: Maintain or return mobility status to optimal level  Description  INTERVENTIONS:  - Assess patient's baseline mobility status (ambulation, transfers, stairs, etc )    - Identify cognitive and physical deficits and behaviors that affect mobility  - Identify mobility aids required to assist with transfers and/or ambulation (gait belt, sit-to-stand, lift, walker, cane, etc )  - [place] fall precautions as indicated by assessment  - Record patient progress and toleration of activity level on Mobility SBAR; progress patient to next Phase/Stage  - Instruct patient to call for assistance with activity based on assessment  - Consider rehabilitation consult to assist with strengthening/weightbearing, etc   Outcome: Progressing     Problem: DISCHARGE PLANNING  Goal: Discharge to home or other facility with appropriate resources  Description  INTERVENTIONS:  - Identify barriers to discharge w/patient and caregiver  - Arrange for needed discharge resources and transportation as appropriate  - Identify discharge learning needs (meds, wound care, etc )  - Arrange for interpretive services to assist at discharge as needed  - Refer to Case Management Department for coordinating discharge planning if the patient needs post-hospital services based on physician/advanced practitioner order or complex needs related to functional status, cognitive ability, or social support system  Outcome: Progressing     Problem: Knowledge Deficit  Goal: Patient/family/caregiver demonstrates understanding of disease process, treatment plan, medications, and discharge instructions  Description  Complete learning assessment and assess knowledge base    Interventions:  - Provide teaching at level of understanding  - Provide teaching via preferred learning methods  Outcome: Progressing     Problem: GENITOURINARY - ADULT  Goal: Maintains or returns to baseline urinary function  Description  INTERVENTIONS:  - Assess urinary function  - Encourage oral fluids to ensure adequate hydration if ordered  - Administer IV fluids as ordered to ensure adequate hydration  - Administer ordered medications as needed  - Offer frequent toileting  - Follow urinary retention protocol if ordered  Outcome: Progressing  Goal: Absence of urinary retention  Description  INTERVENTIONS:  - Assess patients ability to void and empty bladder  - Monitor I/O  - Bladder scan as needed  - Discuss with physician/AP medications to alleviate retention as needed  - Discuss catheterization for long term situations as appropriate  Outcome: Progressing     Problem: METABOLIC, FLUID AND ELECTROLYTES - ADULT  Goal: Electrolytes maintained within normal limits  Description  INTERVENTIONS:  - Monitor labs and assess patient for signs and symptoms of electrolyte imbalances  - Administer electrolyte replacement as ordered  - Monitor response to electrolyte replacements, including repeat lab results as appropriate  - Instruct patient on fluid and nutrition as appropriate  Outcome: Progressing  Goal: Fluid balance maintained  Description  INTERVENTIONS:  - Monitor labs   - Monitor I/O and WT  - Instruct patient on fluid and nutrition as appropriate  - Assess for signs & symptoms of volume excess or deficit  Outcome: Progressing  Goal: Glucose maintained within target range  Description  INTERVENTIONS:  - Monitor Blood Glucose as ordered  - Assess for signs and symptoms of hyperglycemia and hypoglycemia  - Administer ordered medications to maintain glucose within target range  - Assess nutritional intake and initiate nutrition service referral as needed  Outcome: Progressing     Problem: SKIN/TISSUE INTEGRITY - ADULT  Goal: Skin integrity remains intact  Description  INTERVENTIONS  - Identify patients at risk for skin breakdown  - Assess and monitor skin integrity  - Assess and monitor nutrition and hydration status  - Monitor labs (i e  albumin)  - Assess for incontinence   - Turn and reposition patient  - Assist with mobility/ambulation  - Relieve pressure over bony prominences  - Avoid friction and shearing  - Provide appropriate hygiene as needed including keeping skin clean and dry  - Evaluate need for skin moisturizer/barrier cream  - Collaborate with interdisciplinary team (i e  Nutrition, Rehabilitation, etc )   - Patient/family teaching  Outcome: Progressing  Goal: Incision(s), wounds(s) or drain site(s) healing without S/S of infection  Description  INTERVENTIONS  - Assess and document risk factors for skin impairment   - Assess and document dressing, incision, wound bed, drain sites and surrounding tissue  - Consider nutrition services referral as needed  - Oral mucous membranes remain intact  - Provide patient/ family education  Outcome: Progressing  Goal: Oral mucous membranes remain intact  Description  INTERVENTIONS  - Assess oral mucosa and hygiene practices  - Implement preventative oral hygiene regimen  - Implement oral medicated treatments as ordered  - Initiate Nutrition services referral as needed  Outcome: Progressing

## 2019-10-09 NOTE — SOCIAL WORK
CM received call back from patient's spouse, Robin Simmons is not interested in [facility] at this time as it is too far from her home  Patient's spouse asked about Veron[facility] Benjamin stated as per her knowledge, the only facility in that area [facility] is [facility]  Robin Simmons stated she couldn't decide between [facility] and [facility] until she did research and would be calling CM back tomorrow  CM will continue to follow patient

## 2019-10-10 ENCOUNTER — APPOINTMENT (INPATIENT)
Dept: NUCLEAR MEDICINE | Facility: HOSPITAL | Age: 59
DRG: 314 | End: 2019-10-10
Payer: MEDICARE

## 2019-10-10 ENCOUNTER — APPOINTMENT (INPATIENT)
Dept: RADIOLOGY | Facility: HOSPITAL | Age: 59
DRG: 314 | End: 2019-10-10
Payer: MEDICARE

## 2019-10-10 ENCOUNTER — APPOINTMENT (INPATIENT)
Dept: DIALYSIS | Facility: HOSPITAL | Age: 59
DRG: 314 | End: 2019-10-10
Payer: MEDICARE

## 2019-10-10 PROBLEM — E87.2 LACTIC ACIDOSIS: Status: RESOLVED | Noted: 2019-09-21 | Resolved: 2019-10-10

## 2019-10-10 PROBLEM — E87.20 LACTIC ACIDOSIS: Status: RESOLVED | Noted: 2019-09-21 | Resolved: 2019-10-10

## 2019-10-10 LAB
ANION GAP SERPL CALCULATED.3IONS-SCNC: 10 MMOL/L (ref 4–13)
BUN SERPL-MCNC: 66 MG/DL (ref 5–25)
CALCIUM SERPL-MCNC: 8.1 MG/DL (ref 8.3–10.1)
CHLORIDE SERPL-SCNC: 96 MMOL/L (ref 100–108)
CO2 SERPL-SCNC: 27 MMOL/L (ref 21–32)
CREAT SERPL-MCNC: 8.62 MG/DL (ref 0.6–1.3)
ERYTHROCYTE [DISTWIDTH] IN BLOOD BY AUTOMATED COUNT: 16.3 % (ref 11.6–15.1)
GFR SERPL CREATININE-BSD FRML MDRD: 6 ML/MIN/1.73SQ M
GLUCOSE SERPL-MCNC: 115 MG/DL (ref 65–140)
GLUCOSE SERPL-MCNC: 115 MG/DL (ref 65–140)
GLUCOSE SERPL-MCNC: 120 MG/DL (ref 65–140)
GLUCOSE SERPL-MCNC: 138 MG/DL (ref 65–140)
GLUCOSE SERPL-MCNC: 158 MG/DL (ref 65–140)
HCT VFR BLD AUTO: 26.7 % (ref 36.5–49.3)
HGB BLD-MCNC: 8.1 G/DL (ref 12–17)
MCH RBC QN AUTO: 31.9 PG (ref 26.8–34.3)
MCHC RBC AUTO-ENTMCNC: 30.3 G/DL (ref 31.4–37.4)
MCV RBC AUTO: 105 FL (ref 82–98)
PHOSPHATE SERPL-MCNC: 6.1 MG/DL (ref 2.7–4.5)
PLATELET # BLD AUTO: 193 THOUSANDS/UL (ref 149–390)
PMV BLD AUTO: 10 FL (ref 8.9–12.7)
POTASSIUM SERPL-SCNC: 4.5 MMOL/L (ref 3.5–5.3)
PTH-INTACT SERPL-MCNC: 33.9 PG/ML (ref 18.4–80.1)
RBC # BLD AUTO: 2.54 MILLION/UL (ref 3.88–5.62)
SODIUM SERPL-SCNC: 133 MMOL/L (ref 136–145)
WBC # BLD AUTO: 12.69 THOUSAND/UL (ref 4.31–10.16)

## 2019-10-10 PROCEDURE — 0JBM3ZX EXCISION OF LEFT UPPER LEG SUBCUTANEOUS TISSUE AND FASCIA, PERCUTANEOUS APPROACH, DIAGNOSTIC: ICD-10-PCS | Performed by: SURGERY

## 2019-10-10 PROCEDURE — 99232 SBSQ HOSP IP/OBS MODERATE 35: CPT | Performed by: INTERNAL MEDICINE

## 2019-10-10 PROCEDURE — 83970 ASSAY OF PARATHORMONE: CPT | Performed by: INTERNAL MEDICINE

## 2019-10-10 PROCEDURE — A9558 XE133 XENON 10MCI: HCPCS

## 2019-10-10 PROCEDURE — 88312 SPECIAL STAINS GROUP 1: CPT | Performed by: PATHOLOGY

## 2019-10-10 PROCEDURE — 84100 ASSAY OF PHOSPHORUS: CPT | Performed by: NURSE PRACTITIONER

## 2019-10-10 PROCEDURE — 11106 INCAL BX SKN SINGLE LES: CPT | Performed by: SURGERY

## 2019-10-10 PROCEDURE — 71046 X-RAY EXAM CHEST 2 VIEWS: CPT

## 2019-10-10 PROCEDURE — 80048 BASIC METABOLIC PNL TOTAL CA: CPT | Performed by: INTERNAL MEDICINE

## 2019-10-10 PROCEDURE — 88305 TISSUE EXAM BY PATHOLOGIST: CPT | Performed by: PATHOLOGY

## 2019-10-10 PROCEDURE — 78582 LUNG VENTILAT&PERFUS IMAGING: CPT

## 2019-10-10 PROCEDURE — A9540 TC99M MAA: HCPCS

## 2019-10-10 PROCEDURE — 85027 COMPLETE CBC AUTOMATED: CPT | Performed by: INTERNAL MEDICINE

## 2019-10-10 PROCEDURE — 82948 REAGENT STRIP/BLOOD GLUCOSE: CPT

## 2019-10-10 RX ORDER — SEVELAMER HYDROCHLORIDE 800 MG/1
800 TABLET, FILM COATED ORAL
Status: DISCONTINUED | OUTPATIENT
Start: 2019-10-10 | End: 2019-10-16 | Stop reason: HOSPADM

## 2019-10-10 RX ORDER — DOCUSATE SODIUM 100 MG/1
100 CAPSULE, LIQUID FILLED ORAL 2 TIMES DAILY
Status: DISCONTINUED | OUTPATIENT
Start: 2019-10-10 | End: 2019-10-16 | Stop reason: HOSPADM

## 2019-10-10 RX ADMIN — DOCUSATE SODIUM 100 MG: 100 CAPSULE, LIQUID FILLED ORAL at 08:58

## 2019-10-10 RX ADMIN — GABAPENTIN 100 MG: 100 CAPSULE ORAL at 13:17

## 2019-10-10 RX ADMIN — EPOETIN ALFA 4000 UNITS: 4000 SOLUTION INTRAVENOUS; SUBCUTANEOUS at 18:36

## 2019-10-10 RX ADMIN — ALBUMIN (HUMAN) 25 G: 0.25 INJECTION, SOLUTION INTRAVENOUS at 17:45

## 2019-10-10 RX ADMIN — Medication 1 CAPSULE: at 20:12

## 2019-10-10 RX ADMIN — RENAGEL 800 MG: 800 TABLET ORAL at 13:15

## 2019-10-10 RX ADMIN — OXYCODONE HYDROCHLORIDE 5 MG: 5 TABLET ORAL at 03:03

## 2019-10-10 RX ADMIN — RENAGEL 800 MG: 800 TABLET ORAL at 20:12

## 2019-10-10 RX ADMIN — MIDODRINE HYDROCHLORIDE 10 MG: 5 TABLET ORAL at 14:57

## 2019-10-10 RX ADMIN — ACETAMINOPHEN 650 MG: 325 TABLET ORAL at 13:18

## 2019-10-10 RX ADMIN — NYSTATIN: 100000 POWDER TOPICAL at 08:59

## 2019-10-10 RX ADMIN — HEPARIN SODIUM 5000 UNITS: 5000 INJECTION INTRAVENOUS; SUBCUTANEOUS at 22:04

## 2019-10-10 RX ADMIN — HEPARIN SODIUM 5000 UNITS: 5000 INJECTION INTRAVENOUS; SUBCUTANEOUS at 05:13

## 2019-10-10 RX ADMIN — HEPARIN SODIUM 5000 UNITS: 5000 INJECTION INTRAVENOUS; SUBCUTANEOUS at 13:15

## 2019-10-10 RX ADMIN — PANTOPRAZOLE SODIUM 40 MG: 40 TABLET, DELAYED RELEASE ORAL at 05:13

## 2019-10-10 RX ADMIN — DOCUSATE SODIUM 100 MG: 100 CAPSULE, LIQUID FILLED ORAL at 20:12

## 2019-10-10 RX ADMIN — RENAGEL 800 MG: 800 TABLET ORAL at 09:17

## 2019-10-10 NOTE — ASSESSMENT & PLAN NOTE
· Mild  · Improving  · Stable  Suspect hypervolemic hyponatremia  · Continue dialysis    · Continue to monitor daily BMP

## 2019-10-10 NOTE — PLAN OF CARE
Problem: Potential for Falls  Goal: Patient will remain free of falls  Description  INTERVENTIONS:  - Assess patient frequently for physical needs  -  Identify cognitive and physical deficits and behaviors that affect risk of falls    -  [place] fall precautions as indicated by assessment   - Educate patient/family on patient safety including physical limitations  - Instruct patient to call for assistance with activity based on assessment  - Modify environment to reduce risk of injury  - Consider OT/PT consult to assist with strengthening/mobility  Outcome: Progressing     Problem: Prexisting or High Potential for Compromised Skin Integrity  Goal: Skin integrity is maintained or improved  Description  INTERVENTIONS:  - Identify patients at risk for skin breakdown  - Assess and monitor skin integrity  - Assess and monitor nutrition and hydration status  - Monitor labs   - Assess for incontinence   - Turn and reposition patient  - Assist with mobility/ambulation  - Relieve pressure over bony prominences  - Avoid friction and shearing  - Provide appropriate hygiene as needed including keeping skin clean and dry  - Evaluate need for skin moisturizer/barrier cream  - Collaborate with interdisciplinary team   - Patient/family teaching  - Consider wound care consult   Outcome: Progressing     Problem: PAIN - ADULT  Goal: Verbalizes/displays adequate comfort level or baseline comfort level  Description  Interventions:  - Encourage patient to monitor pain and request assistance  - Assess pain using appropriate pain scale  - Administer analgesics based on type and severity of pain and evaluate response  - Implement non-pharmacological measures as appropriate and evaluate response  - Consider cultural and social influences on pain and pain management  - Notify physician/advanced practitioner if interventions unsuccessful or patient reports new pain  Outcome: Progressing     Problem: INFECTION - ADULT  Goal: Absence or prevention of progression during hospitalization  Description  INTERVENTIONS:  - Assess and monitor for signs and symptoms of infection  - Monitor lab/diagnostic results  - Monitor all insertion sites, i e  indwelling lines, tubes, and drains  - Monitor endotracheal if appropriate and nasal secretions for changes in amount and color  - [place] appropriate cooling/warming therapies per order  - Administer medications as ordered  - Instruct and encourage patient and family to use good hand hygiene technique  - Identify and instruct in appropriate isolation precautions for identified infection/condition  Outcome: Progressing  Goal: Absence of fever/infection during neutropenic period  Description  INTERVENTIONS:  - Monitor WBC    Outcome: Progressing     Problem: SAFETY ADULT  Goal: Patient will remain free of falls  Description  INTERVENTIONS:  - Assess patient frequently for physical needs  -  Identify cognitive and physical deficits and behaviors that affect risk of falls    -  [place] fall precautions as indicated by assessment   - Educate patient/family on patient safety including physical limitations  - Instruct patient to call for assistance with activity based on assessment  - Modify environment to reduce risk of injury  - Consider OT/PT consult to assist with strengthening/mobility  Outcome: Progressing  Goal: Maintain or return to baseline ADL function  Description  INTERVENTIONS:  -  Assess patient's ability to carry out ADLs; assess patient's baseline for ADL function and identify physical deficits which impact ability to perform ADLs (bathing, care of mouth/teeth, toileting, grooming, dressing, etc )  - Assess/evaluate cause of self-care deficits   - Assess range of motion  - Assess patient's mobility; develop plan if impaired  - Assess patient's need for assistive devices and provide as appropriate  - Encourage maximum independence but intervene and supervise when necessary  - Involve family in performance of ADLs  - Assess for home care needs following discharge   - Consider OT consult to assist with ADL evaluation and planning for discharge  - Provide patient education as appropriate  Outcome: Progressing  Goal: Maintain or return mobility status to optimal level  Description  INTERVENTIONS:  - Assess patient's baseline mobility status (ambulation, transfers, stairs, etc )    - Identify cognitive and physical deficits and behaviors that affect mobility  - Identify mobility aids required to assist with transfers and/or ambulation (gait belt, sit-to-stand, lift, walker, cane, etc )  - [place] fall precautions as indicated by assessment  - Record patient progress and toleration of activity level on Mobility SBAR; progress patient to next Phase/Stage  - Instruct patient to call for assistance with activity based on assessment  - Consider rehabilitation consult to assist with strengthening/weightbearing, etc   Outcome: Progressing     Problem: DISCHARGE PLANNING  Goal: Discharge to home or other facility with appropriate resources  Description  INTERVENTIONS:  - Identify barriers to discharge w/patient and caregiver  - Arrange for needed discharge resources and transportation as appropriate  - Identify discharge learning needs (meds, wound care, etc )  - Arrange for interpretive services to assist at discharge as needed  - Refer to Case Management Department for coordinating discharge planning if the patient needs post-hospital services based on physician/advanced practitioner order or complex needs related to functional status, cognitive ability, or social support system  Outcome: Progressing     Problem: Knowledge Deficit  Goal: Patient/family/caregiver demonstrates understanding of disease process, treatment plan, medications, and discharge instructions  Description  Complete learning assessment and assess knowledge base    Interventions:  - Provide teaching at level of understanding  - Provide teaching via preferred learning methods  Outcome: Progressing     Problem: GENITOURINARY - ADULT  Goal: Maintains or returns to baseline urinary function  Description  INTERVENTIONS:  - Assess urinary function  - Encourage oral fluids to ensure adequate hydration if ordered  - Administer IV fluids as ordered to ensure adequate hydration  - Administer ordered medications as needed  - Offer frequent toileting  - Follow urinary retention protocol if ordered  Outcome: Progressing  Goal: Absence of urinary retention  Description  INTERVENTIONS:  - Assess patients ability to void and empty bladder  - Monitor I/O  - Bladder scan as needed  - Discuss with physician/AP medications to alleviate retention as needed  - Discuss catheterization for long term situations as appropriate  Outcome: Progressing     Problem: METABOLIC, FLUID AND ELECTROLYTES - ADULT  Goal: Electrolytes maintained within normal limits  Description  INTERVENTIONS:  - Monitor labs and assess patient for signs and symptoms of electrolyte imbalances  - Administer electrolyte replacement as ordered  - Monitor response to electrolyte replacements, including repeat lab results as appropriate  - Instruct patient on fluid and nutrition as appropriate  Outcome: Progressing  Goal: Fluid balance maintained  Description  INTERVENTIONS:  - Monitor labs   - Monitor I/O and WT  - Instruct patient on fluid and nutrition as appropriate  - Assess for signs & symptoms of volume excess or deficit  Outcome: Progressing  Goal: Glucose maintained within target range  Description  INTERVENTIONS:  - Monitor Blood Glucose as ordered  - Assess for signs and symptoms of hyperglycemia and hypoglycemia  - Administer ordered medications to maintain glucose within target range  - Assess nutritional intake and initiate nutrition service referral as needed  Outcome: Progressing     Problem: SKIN/TISSUE INTEGRITY - ADULT  Goal: Skin integrity remains intact  Description  INTERVENTIONS  - Identify patients at risk for skin breakdown  - Assess and monitor skin integrity  - Assess and monitor nutrition and hydration status  - Monitor labs (i e  albumin)  - Assess for incontinence   - Turn and reposition patient  - Assist with mobility/ambulation  - Relieve pressure over bony prominences  - Avoid friction and shearing  - Provide appropriate hygiene as needed including keeping skin clean and dry  - Evaluate need for skin moisturizer/barrier cream  - Collaborate with interdisciplinary team (i e  Nutrition, Rehabilitation, etc )   - Patient/family teaching  Outcome: Progressing  Goal: Incision(s), wounds(s) or drain site(s) healing without S/S of infection  Description  INTERVENTIONS  - Assess and document risk factors for skin impairment   - Assess and document dressing, incision, wound bed, drain sites and surrounding tissue  - Consider nutrition services referral as needed  - Oral mucous membranes remain intact  - Provide patient/ family education  Outcome: Progressing  Goal: Oral mucous membranes remain intact  Description  INTERVENTIONS  - Assess oral mucosa and hygiene practices  - Implement preventative oral hygiene regimen  - Implement oral medicated treatments as ordered  - Initiate Nutrition services referral as needed  Outcome: Progressing

## 2019-10-10 NOTE — PROGRESS NOTES
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 contributing to extensive wound development  Mild leukocytosis  May be reactive to inflammation in setting of extensive wounds  Rec:  · Continue monitoring closely off completed antibiotic course   · [address] per Podiatry and RN   · Serial exams  · Dermatology recommending wedge biopsy by surgery     2  Groin/inner thigh intertrigo  Developed with increased overall edema likely secondary to ascites  Improving  Rec:  Continue barrier cream prn  Add nystatin powder BID     3   s/p recent Shewanella putrefaciens bacteremia    Presented 9/21/19 Septic shock, POA with leukopenia, tachycardia, refractory hypotension  Suspect primary skin portal of entry with cellulitis as below with secondary infection of HD catheter, s/p removal   Repeat blood cultures negative  Rec:  ? Finished Cipro completing 14 days total of antibiotics through 10/4/19     4   ESRD on HD   [facility]   status post new PermCath 10/1/19 in setting of #2     Rec:  ? Dialysis schedule is Tuesday Thursday Saturday     5   Polycystic kidney/liver disease   Extensive disease seen on CT imaging  S/p IR tube check in right renal cyst and changed today and status post paracentesis  Parasitic fluid with 446 white cells and 45% neutrophils  SBP clinically not suspect  Rec:   Follow-up parasitic fluid culture off antibiotics at present     6   Chronic foot wounds   Likely multifactorial   Appear superficial   Risk factor for infection as above  Rec:  ? Local wound care per Podiatry     The above plan was discussed in detail with patient, RN, and primary care team      Antibiotics:  None     Subjective:  Patient has no fever, chills, sweats; no nausea, vomiting, diarrhea; no cough, shortness of breath; no pain at rest at moment with patient quite sleepy  No new symptoms  Objective:  Vitals:  Temp:  [98 6 °F (37 °C)-98 9 °F (37 2 °C)] 98 9 °F (37 2 °C)  HR:  [] 100  Resp:  [18] 18  BP: ()/(47-93) 100/52  SpO2:  [90 %-97 %] 90 %  Temp (24hrs), Av 7 °F (37 1 °C), Min:98 6 °F (37 °C), Max:98 9 °F (37 2 °C)  Current: Temperature: 98 9 °F (37 2 °C)    Physical Exam:   General Appearance:  Sleeping soundly, weakly arousable on exam, nontoxic, no acute distress  Throat: Oropharynx moist without lesions  Lungs:   Clear to auscultation bilaterally; respirations unlabored   Heart:  RRR; no murmur   Abdomen:   Soft, non-tender, soft protuberant with ascites, positive bowel sounds, right lower quadrant DILIA bulb in place with scant kaylin rate drainage   Extremities: Bilateral lower extremity gauze wrapped wounds with soap strike through kaylin drainage scattered  At proximal medial thigh wound edges purplish pink hue without induration, tenderness to palpation, or active drainage   : No Vega catheter in place, soft 3+ scrotal edema with scrotum elevated with towel  Skin: No new rashes or lesions  Extensive leg wounds as above    Right chest PermCath site nontender, left arm IV site nontender     Labs, Imaging, & Other studies:   All pertinent labs and imaging studies were personally reviewed  Results from last 7 days   Lab Units 10/10/19  0442 10/08/19  0500 10/07/19  1301   WBC Thousand/uL 12 69* 8 23 7 42   HEMOGLOBIN g/dL 8 1* 8 9* 9 0*   PLATELETS Thousands/uL 193 230 232     Results from last 7 days   Lab Units 10/10/19  0442 10/09/19  0605  10/06/19  1908   POTASSIUM mmol/L 4 5 4 5   < > 4 8   CHLORIDE mmol/L 96* 93* < > 92*   CO2 mmol/L 27 24   < > 29   BUN mg/dL 66* 58*   < > 60*   CREATININE mg/dL 8 62* 7 60*   < > 7 54*   EGFR ml/min/1 73sq m 6 7   < > 7   CALCIUM mg/dL 8 1* 8 5   < > 8 6   AST U/L  --  32  --  32   ALT U/L  --  15  --  20   ALK PHOS U/L  --  229*  --  236*    < > = values in this interval not displayed  Results from last 7 days   Lab Units 10/09/19  1040 10/06/19  1908   PROCALCITONIN ng/ml 8 29* 10 48*     Results from last 7 days   Lab Units 10/09/19  1122 10/07/19  1301 10/06/19  1922 10/06/19  1908   BLOOD CULTURE   --  No Growth at 48 hrs  No Growth at 72 hrs  No Growth at 72 hrs     GRAM STAIN RESULT  Rare Polys  No bacteria seen  --   --   --

## 2019-10-10 NOTE — ASSESSMENT & PLAN NOTE
· Status post paracentesis  Ascitic fluid was noted to be cloudy with 200 PMNs  Not consistent with SBP  Will continue to monitor clinically at this time    Abdominal pain is actually improved today  · May need further therapeutic paracenteses, monitor clinically

## 2019-10-10 NOTE — PROGRESS NOTES
[address] [facility] NOTE   Zuleika Vallecillo 64 y o  male MRN: [id number]  Unit/Bed#: -01 Encounter: [id number]  Reason for Consult: ESRD    ASSESSMENT and PLAN:  1  ESRD on HD:  underlying disease, PCKD  · Patient was previously dialyzed at [facility] but has now transition to local [facility] unit   Current plan is for treatment at [facility] TTS  · Outpatient hemodialysis prescription:  Qt 3 hrs 10 min  Qb 450, Qd standard, 2 K, 2 0 calcium,  40 bicarbonate, sodium 137, filter size 180   Target weight 83 kg  Historically poorly compliant with treatment  · Last hemodialysis treatment 84/3 which was complicated by hypotension and filter clotting during treatment  Unfortunately patient lost blood in circuit  Hemoglobin was near 9, currently 8 1  · Plan:    ? Continue hemodialysis TTS  Increase treatment time to 4 hours   ? Continue to utilize albumin during treatment for hypotension  2  Access:  Right IJ PermCath site looks clean, nontender  3  Chronic hypotension:    · On midodrine  · On beta-blocker due to recent treatment for atrial fibrillation with RVR during last hospitalization  · Plan:    ? Midodrine pre treatment and assisted through hemodialysis treatment  4  Anemia:   · Hemoglobin below goal  · Iron low, ferritin elevated  · No schistocytes on hemolyzed smear, haptoglobin normal  · Platelet count has rebounded and is currently normal  · Continue Epogen    5  Mineral and bone disease:    · Phosphorus 6 1  · Calcium based binder discontinue 10/8 due lower extremity wounds and suspicion for calciphylaxis  · Start sevelamer (low dose)-ileus seems to be resolving  6  Abdominal pain/ileus:   History of polycystic liver disease  · CT of the abdomen:  Findings consistent with ileus   Moderate ascites  · Surgery following  · Status post paracentesis for 1800 mL  · No evidence of SBE- ID follow-up  · Ileus may be resolving patient having output rectally   7   Lower extremity edema/cellulitis/wounds:    · Recent infection due to Shewanella putrifacians likely acquired when patient was in pond water   Completed course of p o  Maria Esther Solis a dose of IV Cipro on admission  ID on board  · Lower extremity wounds, 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 Calcium based binder previously discontinued  ? No vitamin D supplement/vitamin-D analogs  ? Patient is not on Coumadin  ? No Iron supplement  ? Increase hemodialysis time to increase clearances  May need daily treatment  ? Start sevelamer to control serum phosphorus levels optimally less than 4 5  ? PTH 33 9  No indication for cinacalcet which is indicated to keep PTH >100, <400 optimally   ? Seen by Dermatology recommending full-thickness biopsy  · Sodium thiosulfate:    ? Closely monitor serum calcium level, bicarbonate levels, WBCs, anion gap  ? QTC personally calculated at 480 ms (EKG calculation 500 ms) which is slightly above normal for adult male (normal 470 ms)  ? Will need to determine risk versus benefit  Most of the studies are with a QTC greater than or equal to 500 ms  Benefit of sodium thiosulfate in addition to supportive care is unclear  Will discuss with Dr Fadia Prado  ? Sodium thiosulfate 25 g with each hemodialysis treatment  Monitor for side effects:  Hypocalcemia, prolonged QT interval, volume overload  May need to decrease dose to 12 5 g if patient has symptomatology  8  Atrial fibrillation:  On beta-blocker  Not on Coumadin  9  Hyponatremia:  Generally mild with current sodium level 133  Likely volume mediated  Continue to monitor  10  Prior D-dimer elevation:  V/Q scan today    DISPOSITION:  V/Q scan this morning  Hemodialysis plan for this afternoon  Likely start sodium thiosulfate with treatment  Increase hemodialysis time to 4 hours- may need to perform hemodialysis daily    SUBJECTIVE / INTERVAL HISTORY:  No complaints at this time    Requesting breakfast   Spoke with nursing and primary service regarding plan of care   According to nursing the patient's wife did not know with dialysis was when she was talking to the nurse last evening (?)    OBJECTIVE:  Current Weight: Weight - Scale: 95 1 kg (209 lb 10 5 oz)(pt not ambulating)  Vitals:    10/09/19 1551 10/09/19 2135 10/10/19 0559 10/10/19 0700   BP: (!) 83/50 131/93  100/52   BP Location: Right arm Right arm  Right arm   Pulse: 96 97  100   Resp: 18 18  18   Temp: 98 7 °F (37 1 °C) 98 6 °F (37 °C)  98 9 °F (37 2 °C)   TempSrc: Oral Oral  Oral   SpO2: 97% 97%  90%   Weight:   95 1 kg (209 lb 10 5 oz)    Height:           Intake/Output Summary (Last 24 hours) at 10/10/2019 0936  Last data filed at 10/10/2019 0444  Gross per 24 hour   Intake 300 ml   Output 1800 ml   Net -1500 ml     General: NAD, comfortably lying in bed  Skin: no rash, skin sallow  Eyes: icteric sclera  ENT: moist mucous membrane  Neck: supple, no G  Chest: CTA b/l, no ronchii, no wheeze, no rubs, no rales  CVS: s1s2, no murmur, no gallop, no rub  Irregular rhythm  Abdomen: soft, nontender, nl sounds  Extremities:  Lower extremities wrapped bilaterally    No excessive edema  : no meeks  Neuro: AAOX3  Psych:  Flat affect  Medications:    Current Facility-Administered Medications:     acetaminophen (TYLENOL) tablet 650 mg, 650 mg, Oral, Q6H PRN, Elia Pemberton PA-C, 650 mg at 10/07/19 2117    albumin human (FLEXBUMIN) 25 % injection 25 g, 25 g, Intravenous, PRN, SAM Salas, Stopped at 10/08/19 1006    b complex-vitamin C-folic acid (NEPHROCAPS) capsule 1 capsule, 1 capsule, Oral, Daily With Michael Hernandez PA-C, 1 capsule at 10/09/19 1714    docusate sodium (COLACE) capsule 100 mg, 100 mg, Oral, BID, Martita Jorgensen MD, 100 mg at 10/10/19 0858    gabapentin (NEURONTIN) capsule 100 mg, 100 mg, Oral, Daily, Elia Pemberton PA-C, 100 mg at 10/09/19 0847    heparin (porcine) subcutaneous injection 5,000 Units, 5,000 Units, Subcutaneous, Q8H [address], 5,000 Units at 10/10/19 0513 **AND** Platelet count, , , Once, Opal Osobrne PA-C    Lidocaine Viscous HCl (XYLOCAINE) 2 % mucosal solution 15 mL, 15 mL, Swish & Spit, 4x Daily PRN, Opal Osborne PA-C    metoprolol tartrate (LOPRESSOR) partial tablet 12 5 mg, 12 5 mg, Oral, Q12H [address], Opal Bustossen, PA-C, Stopped at 10/10/19 0900    midodrine (PROAMATINE) tablet 10 mg, 10 mg, Oral, Before Dialysis, Opal Osborne, PA-C, 10 mg at 10/08/19 0835    midodrine (PROAMATINE) tablet 10 mg, 10 mg, Oral, Once in dialysis, Zollie Sames, CRNP, Stopped at 10/08/19 1320    nystatin (MYCOSTATIN) powder, , Topical, BID, Gerhardt Lynn, PA-C    [facility]etron [facility]) injection 4 mg, 4 mg, Intravenous, Q6H PRN, Opal Bustossen, PA-C    oxyCODONE (ROXICODONE) IR tablet 5 mg, 5 mg, Oral, Q4H PRN, Opal Osborne, PA-C, 5 mg at 10/10/19 0303    pantoprazole (PROTONIX) EC tablet 40 mg, 40 mg, Oral, Early Morning, Opal Osborne, PA-C, 40 mg at 10/10/19 1190    sevelamer (RENAGEL) tablet 800 mg, 800 mg, Oral, TID With Meals, Zollie Sames, CRNP, 800 mg at 10/10/19 0917    simethicone (MYLICON) chewable tablet 80 mg, 80 mg, Oral, Q6H PRN, Opal Antoniomussen, PA-C    Laboratory Results:  Results from last 7 days   Lab Units 10/10/19  0442 10/09/19  0605 10/08/19  0500 10/07/19  1301 10/06/19  1908   WBC Thousand/uL 12 69*  --  8 23 7 42 6 93   HEMOGLOBIN g/dL 8 1*  --  8 9* 9 0* 9 4*   HEMATOCRIT % 26 7*  --  28 8* 29 2* 30 4*   PLATELETS Thousands/uL 193  --  230 232 263   POTASSIUM mmol/L 4 5 4 5 5 2 4 9 4 8   CHLORIDE mmol/L 96* 93* 90* 94* 92*   CO2 mmol/L 27 24 26 27 29   BUN mg/dL 66* 58* 74* 70* 60*   CREATININE mg/dL 8 62* 7 60* 8 93* 8 39* 7 54*   CALCIUM mg/dL 8 1* 8 5 8 5 8 6 8 6   PHOSPHORUS mg/dL 6 1* 6 7*  --   --   --

## 2019-10-10 NOTE — ASSESSMENT & PLAN NOTE
· VQ scan negative   Lower extremity duplex scan done on the recent admission was negative for DVT as well

## 2019-10-10 NOTE — HEMODIALYSIS
Tx completed via catheter  BFR at 325 for tx  Pt tolerated well  Midodrine, epo, and albumin given during tx  I am unsure why the previous weights were so high  I weighed the pt with no extra pillow or blankets on the bed and with the bed in proper position and post HD weight was 83 5 kg  Pt unable to stand on standing scale at this time

## 2019-10-10 NOTE — ASSESSMENT & PLAN NOTE
· History of chronic hypotension, per patient he typically runs anywhere from  systolic  · Continue midodrine on dialysis days    · Monitor blood pressure

## 2019-10-10 NOTE — SOCIAL WORK
CM met with Dr Claudean Bears and Edin Brewer PA-C to discuss HD plans at [facility]  Per Dr Claudean Bears and Jarad Tesfaye, Dana Turner will do HD at [facility]  [place], at that time if family wants to  [address] HD from [address] to Dana Turner they will have to contact Dana Turner and request the change  CM will inform family of this information and notify Dana Turner of same

## 2019-10-10 NOTE — PROGRESS NOTES
Progress Note - Taylor Connors 1960, 64 y o  male MRN: [id number]    Unit/Bed#: -01 Encounter: [id number]    Primary Care Provider: Shon Griffiths MD   Date and time admitted to hospital: 10/6/2019  6:37 PM        * Hypotension  Assessment & Plan  · History of chronic hypotension, per patient he typically runs anywhere from  systolic  · Continue midodrine on dialysis days  · Monitor blood pressure    Chronic kidney disease with end stage renal failure on dialysis [facility])  Assessment & Plan  · Continue hemodialysis per Nephrology  Multiple wounds of skin  Assessment & Plan  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  · Completed oral ciprofloxacin as an outpatient 2 days ago  Received extra dose of IV ciprofloxacin emergency department   · Patient also had gram-negative bacteremia, grew Shewanella Putrifacians, a marine acquired bacteria  Had PermCath replaced at that time  Infectious Disease had been on board  · No fevers  Monitor vital signs  As per Infectious Disease doctor, no additional antibiotics for now  · Hematology was consulted for possible biopsy  · Continue local wound care  · Follow-up repeat blood cultures  · Podiatry on board  · As per discussion with Dermatology - recommending wedge biopsy to definitively diagnose calciphylaxis  Patient is noted to have prolonged QTC of around 500  Nephrology wanted to start treatment with sodium thiosulfate though in the setting of his prolonged QTC, hypocalcemia, and volume overload, patient has opted to wait for biopsy diagnosis in order to decide if he would want to receive sodium thiosulfate given that this medication comes with its own risks    I believe that it is reasonable at this time to seek a biopsy diagnosis before proceeding with this treatment modality  I have spoke with Dr Ana Andino with General surgery to see if they can perform a wedge biopsy - he will see patient today 10/10  Will follow up with plan of care  Discussed this with Nephrology    Elevated d-dimer  Assessment & Plan  · VQ scan negative  Lower extremity duplex scan done at that time was negative for DVT as well  ·     Ascites  Assessment & Plan  · Status post paracentesis  Ascitic fluid was noted to be cloudy with 200 PMNs  Not consistent with SBP  Will continue to monitor clinically at this time  Abdominal pain is actually improved today  · May need further therapeutic paracenteses, monitor clinically    Ileus [facility])  Assessment & Plan  · With history of right inguinal hernia for more than 30 years  · Surgery on board  Bowel regimen  No surgical intervention indicated at this time  · Recommend MiraLax daily  · Encourage out of bed and ambulation  · P o  Pain medication p r n  And avoid narcotics as much as possible    Anemia due to chronic kidney disease, on chronic dialysis [facility])  Assessment & Plan  · Monitor closely  Hemoglobin currently stable at 8 1  · For further workup and management if this worsens significantly this admission    Atrial fibrillation [facility])  Assessment & Plan  · Developed atrial fibrillation with rapid ventricular response during last admission  · Not on any anticoagulation per Cardiology  · Continue metoprolol 12 5 mg b i d  With hold parameters  · Monitor vital signs  Benign essential hypertension  Assessment & Plan  · Per Cardiology, continue metoprolol with hold parameters at reduced dose  · Midodrine for hypotension  · Monitor blood pressure  Polycystic kidney disease  Assessment & Plan  · Patient still has indwelling DILIA drain for the renal cyst   · Will need to monitor output over the next 24 hours    If less than 10 cc over the next 24 hours will consider removing and discussed with IR as if not draining anymore this will only act as potential nidus for infection    Hyponatremia  Assessment & Plan  · Mild, 133  · Stable  Suspect hypervolemic hyponatremia  · Continue dialysis  · Continue to monitor daily BMP        VTE Pharmacologic Prophylaxis:   Pharmacologic: Heparin  Mechanical VTE Prophylaxis in Place: No    Patient Centered Rounds: I have performed bedside rounds with nursing staff today  Discussions with Specialists or Other Care Team Provider:  Case management  Nephrologist   Advance practitioner for Nephrology  General surgeon  Dermatologist   Infectious Disease doctor  Education and Discussions with Family / Patient:  Patient  I offered to call patient's family but patient declined  Time Spent for Care: 45 minutes  More than 50% of total time spent on counseling and coordination of care as described above  Current Length of Stay: 3 day(s)    Current Patient Status: Inpatient   Certification Statement: The patient will continue to require additional inpatient hospital stay due to Above findings and plans  Discharge Plan:  None yet  Code Status: Level 1 - Full Code      Subjective: When I saw the patient earlier this morning, patient said his doing fine  However, patient admits to occasional lower extremity pains from his wounds  Patient denies any abdominal pains  No other pains  No other complaints  Patient denies any shortness of breath      Objective:     Vitals:   Temp (24hrs), Av 8 °F (37 1 °C), Min:98 6 °F (37 °C), Max:98 9 °F (37 2 °C)    Temp:  [98 6 °F (37 °C)-98 9 °F (37 2 °C)] 98 9 °F (37 2 °C)  HR:  [] 102  Resp:  [16-18] 16  BP: ()/(47-93) 102/54  SpO2:  [90 %-97 %] 90 %  Body mass index is 31 88 kg/m²  Input and Output Summary (last 24 hours): Intake/Output Summary (Last 24 hours) at 10/10/2019 1634  Last data filed at 10/10/2019 1430  Gross per 24 hour   Intake 320 ml   Output 0 ml   Net 320 ml       Physical Exam:     Physical Exam   Constitutional: No distress  HENT:   Head: Normocephalic and atraumatic  Eyes: Right eye exhibits no discharge  Left eye exhibits no discharge  No scleral icterus  Neck: No JVD present  No tracheal deviation present  No thyromegaly present  Cardiovascular: Normal rate, regular rhythm and normal heart sounds  Exam reveals no gallop and no friction rub  No murmur heard  Pulmonary/Chest: Effort normal and breath sounds normal  No stridor  No respiratory distress  He has no wheezes  He has no rales  Abdominal: Soft  Bowel sounds are normal  He exhibits distension  He exhibits no mass  There is no tenderness  There is no rebound and no guarding  Positive for DILIA drain  Musculoskeletal: He exhibits edema and tenderness  Positive for bilateral lower extremity edema and tenderness  Positive for wound dressing on bilateral legs  Neurological: He is alert  Skin: Skin is warm  No rash noted  He is not diaphoretic  No pallor  Psychiatric: He has a normal mood and affect  His behavior is normal  Thought content normal    Vitals reviewed  Additional Data:     Labs:    Results from last 7 days   Lab Units 10/10/19  0442 10/08/19  0500   WBC Thousand/uL 12 69* 8 23   HEMOGLOBIN g/dL 8 1* 8 9*   HEMATOCRIT % 26 7* 28 8*   PLATELETS Thousands/uL 193 230   NEUTROS PCT %  --  75   LYMPHS PCT %  --  6*   MONOS PCT %  --  15*   EOS PCT %  --  3     Results from last 7 days   Lab Units 10/10/19  0442 10/09/19  0605   POTASSIUM mmol/L 4 5 4 5   CHLORIDE mmol/L 96* 93*   CO2 mmol/L 27 24   BUN mg/dL 66* 58*   CREATININE mg/dL 8 62* 7 60*   CALCIUM mg/dL 8 1* 8 5   ALK PHOS U/L  --  229*   ALT U/L  --  15   AST U/L  --  32     Results from last 7 days   Lab Units 10/06/19  1908   INR  1 34*       * I Have Reviewed All Lab Data Listed Above  * Additional Pertinent Lab Tests Reviewed:  [facility]    Imaging:    Imaging Reports Reviewed Today Include:  Diagnostic imaging studies that were done on this admission  Imaging Personally Reviewed by Myself Includes:  None  Recent Cultures (last 7 days):     Results from last 7 days   Lab Units 10/09/19  1122 10/07/19  1301 10/06/19  1922 10/06/19  1908   BLOOD CULTURE   --  No Growth at 72 hrs  No Growth at 72 hrs  No Growth at 72 hrs  GRAM STAIN RESULT  Rare Polys  No bacteria seen  --   --   --    BODY FLUID CULTURE, STERILE  No growth  --   --   --        Last 24 Hours Medication List:     Current Facility-Administered Medications:  acetaminophen 650 mg Oral Q6H PRN Katie Rico PA-C    albumin human 25 g Intravenous PRN SAM Wong Last Rate: Stopped (10/08/19 1006)   b complex-vitamin C-folic acid 1 capsule Oral Daily With Jg Naik PA-C    docusate sodium 100 mg Oral BID Estephanie López MD    gabapentin 100 mg Oral Daily Katie Rico PA-C    heparin (porcine) 5,000 Units Subcutaneous [facility] Katie Rico PA-C    Lidocaine Viscous HCl 15 mL Swish & Spit 4x Daily PRN Katie Rico PA-C    lidocaine-epinephrine 20 mL Infiltration Once Homero Valentin DO    metoprolol tartrate 12 5 mg Oral Q12H Denise Block PA-C    midodrine 10 mg Oral Before Dialysis Katie Rico PA-C    midodrine 10 mg Oral Once in dialysis SAM Wong    nystatin  Topical BID Keanu Jacobsen PA-C    ondansetron 4 mg Intravenous Q6H PRN Katie Rico PA-C    oxyCODONE 5 mg Oral Q4H PRN Katie Rico PA-C    pantoprazole 40 mg Oral Early Morning Katie Rico PA-C    sevelamer 800 mg Oral TID With Meals SAM Wong    simethicone 80 mg Oral Q6H PRN Katie Rico PA-C         Today, Patient Was Seen By: Estephanie López MD    ** Please Note: Dragon 360 Dictation voice to text software may have been used in the creation of this document   **

## 2019-10-10 NOTE — ASSESSMENT & PLAN NOTE
· Patient still has indwelling DILIA drain for the renal cyst   · Will need to monitor output over the next 24 hours  If less than 10 cc over the next 24 hours will consider removing and discussed with IR as if not draining anymore this will only act as potential nidus for infection  · According to my discussion with patient's nurse, patient's drain is less than 10 cc for the past 24 hours  Thus I will refer the patient to IR for removal of the DILIA drain

## 2019-10-10 NOTE — CONSULTS
Consultation - Dermatology    Felicia Rivas 64 y o  male MRN: [id number]  Unit/Bed#: -01 Encounter: [id number]      Assessment/Plan     Assessment:  Extensive leg ulceration and necrosis, secondary to prior cellulitis  There is a probable underlying component of calciphylxis that precipitated the acute cellulitic event and is provoking nonhealing of present ulcers  Plan:  Consider wedge biopsy by surgery of ascending area of ulcerated necrotic area ( edge of one of thigh ulcers)  The pathology is in the arteriole  and medium sized vessels of dermis and fascia where there is intimal calcification  (Punch biopsy at bedside will not obtain adequate tissue for analysis)  If diagnosis of end stage renal disease calciphylaxis is confirmed, treatment is controversial   Supportive care and  calcium phosphate optimization  Sodium thiosulfate is considered but efficacy in  controlled trials is not clear but may be considered if failure of supportive care  History of Present Illness   Physician Requesting Consult: Kelley Best MD  Reason for Consult / Principal Problem: leg ulcers  Hx and PE limited by: none  HPI: Felicia Rivas is a 64y o  year old male who presents with nonhealing leg ulcers  Historically he presented with sepsis and acute bullous ulceration of leg, calves , thigh bilaterally  He stabilized with antibiotic but necrotic eschars have been slow to heal   He is a chonic dialysis patient  The acute episode ocuured after marine exposure      Consults    Review of Systems    Historical Information   Past Medical History:   Diagnosis Date    Complication of renal dialysis     Polycystic kidney disease      Past Surgical History:   Procedure Laterality Date    IR IMAGE GUIDED ASPIRATION / DRAINAGE  9/23/2019    IR PARACENTESIS  10/9/2019    IR [place] F[place] PLACEMENT  9/30/2019     Social History   Social History     Substance and Sexual Activity   Alcohol Use Not on file Social History     Substance and Sexual Activity   Drug Use Not on file     Social History     Tobacco Use   Smoking Status Never Smoker   Smokeless Tobacco Never Used     Family History: non-contributory    Meds/Allergies   all current active meds have been reviewed    No Known Allergies    Objective       Intake/Output Summary (Last 24 hours) at 10/9/2019 2015  Last data filed at 10/9/2019 1832  Gross per 24 hour   Intake 420 ml   Output 1800 ml   Net -1380 ml       Invasive Devices:   Peripheral IV 10/06/19 Left Antecubital (Active)   Site Assessment Clean;Dry; Intact 10/9/2019  6:00 PM   Dressing Type Transparent 10/9/2019  6:00 PM   Line Status Flushed;Saline locked 10/9/2019  6:00 PM   Dressing Status Clean;Dry; Intact 10/9/2019  6:00 PM   Dressing Change Due 10/10/19 10/9/2019  6:00 PM   Reason Not Rotated Not due 10/9/2019  6:00 PM       Peripheral IV 10/06/19 Left Antecubital (Active)   Site Assessment Clean;Dry; Intact 10/9/2019  6:00 PM   Dressing Type Transparent 10/9/2019  6:00 PM   Line Status Saline locked 10/9/2019  6:00 PM   Dressing Status Clean;Dry; Intact 10/9/2019  6:00 PM   Dressing Change Due 10/10/19 10/9/2019  6:00 PM   Reason Not Rotated Not due 10/9/2019  6:00 PM       Closed/Suction Drain Right RUQ Bulb 10 Fr  (Active)   Site Description Healing 10/7/2019  9:01 AM   Dressing Status Clean;Dry; Intact 10/7/2019  9:01 AM   Drainage Appearance Brown;Green 10/7/2019  9:01 AM   Status To bulb suction 10/7/2019  9:01 AM   Intake (mL) 6 mL 10/7/2019 11:25 AM   Output (mL) 10 mL 10/7/2019  6:01 PM       Permanent HD Catheter  (Active)   Line Necessity Reviewed Yes, reviewed with provider 10/9/2019  6:00 PM   Site Assessment Clean;Dry; Intact 10/9/2019  6:00 PM   Proximal Lumen ([place]-PICC only) Status Capped;Normal saline locked 10/9/2019  6:00 PM   Distal Lumen ([place]-PICC only) Status Normal saline locked; Capped 10/9/2019  6:00 PM   Dressing Type Chlorhexidine dressing 10/9/2019  6:00 PM Dressing Status Clean;Dry; Intact 10/9/2019  6:00 PM   Dressing Change Due 10/13/19 10/9/2019  6:00 PM       Physical Exam    Lab Results: CBC: No results found for: WBC, RBC  Imaging Studies: I have personally reviewed pertinent reports  EKG, Pathology, and Other Studies: I have personally reviewed pertinent reports  Counseling / Coordination of Care  Total floor / unit time spent today 30 minutes  Skin exam shows sallow color  Ulceration and escahar confined to legs only  Marked scrotal edema present  Eschar more than 20 cm present both calves ankles and thigh  (  As compared to admission photos  At time of sepsis, the present areas of involvement are confined to previous bullous and necrotic areas  I don't believe there are new and necrotic areas)  Capillary fill is intact

## 2019-10-10 NOTE — ASSESSMENT & PLAN NOTE
· Monitor closely  Hemoglobin went down today to 7 2   · No active bleeding  · EPO shots, as per Nephrology, 3 times a week  · For further workup and management if this worsens significantly

## 2019-10-10 NOTE — PROCEDURES
Left thigh skin biopsy    After obtaining both written and verbal consent from the patient as well as discussing his wife left thigh area was prepped and draped in a sterile fashion  Time-out was performed with appropriate nursing staff in the room  I chose an area at the edge of a moist eschar as well as erythema  This area was cleansed with alcohol  1% lidocaine with epinephrine was used to infiltrate skin and subcutaneous tissues  Full-thickness skin wedge biopsy was performed  This was 8 mm wide by 1 5 cm in length  This was closed with simple 3 0 chromic sutures  There was excellent hemostasis  Sterile dressing was applied  Tolerated well

## 2019-10-10 NOTE — ASSESSMENT & PLAN NOTE
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  · Completed oral ciprofloxacin as an outpatient 2 days ago  Received extra dose of IV ciprofloxacin emergency department   · Patient also had gram-negative bacteremia, grew Shewanella Putrifacians, a marine acquired bacteria  Had PermCath replaced at that time  Infectious Disease had been on board  · No fevers  Monitor vital signs  As per Infectious Disease doctor, no additional antibiotics for now  · Hematology was consulted for possible biopsy  · Continue local wound care  · Follow-up repeat blood cultures: So far negative  · Podiatry on board  · As per discussion with Dermatology - recommended wedge biopsy to definitively diagnose calciphylaxis  Status post wedge biopsy done by surgery yesterday  Follow-up biopsy results  Patient is noted to have prolonged QTC of around 500  Nephrology wanted to start treatment with sodium thiosulfate though in the setting of his prolonged QTC, hypocalcemia, and volume overload, patient has opted to wait for biopsy diagnosis in order to decide if he would want to receive sodium thiosulfate given that this medication comes with its own risks  I believe that it is reasonable at this time to seek a biopsy diagnosis before proceeding with this treatment modality  Will follow up with plan of care    Discussed this with Nephrology

## 2019-10-10 NOTE — ASSESSMENT & PLAN NOTE
· With history of right inguinal hernia for more than 30 years  · Surgery on board  Bowel regimen  No surgical intervention indicated at this time  · Recommend MiraLax daily  · Encourage out of bed and ambulation  · P o  Pain medication p r n   And avoid narcotics as much as possible

## 2019-10-11 LAB
ANION GAP SERPL CALCULATED.3IONS-SCNC: 8 MMOL/L (ref 4–13)
BASOPHILS # BLD MANUAL: 0 THOUSAND/UL (ref 0–0.1)
BASOPHILS NFR MAR MANUAL: 0 % (ref 0–1)
BUN SERPL-MCNC: 55 MG/DL (ref 5–25)
CALCIUM SERPL-MCNC: 8 MG/DL (ref 8.3–10.1)
CHLORIDE SERPL-SCNC: 98 MMOL/L (ref 100–108)
CO2 SERPL-SCNC: 28 MMOL/L (ref 21–32)
CREAT SERPL-MCNC: 6.82 MG/DL (ref 0.6–1.3)
EOSINOPHIL # BLD MANUAL: 0.11 THOUSAND/UL (ref 0–0.4)
EOSINOPHIL NFR BLD MANUAL: 1 % (ref 0–6)
ERYTHROCYTE [DISTWIDTH] IN BLOOD BY AUTOMATED COUNT: 16.3 % (ref 11.6–15.1)
GFR SERPL CREATININE-BSD FRML MDRD: 8 ML/MIN/1.73SQ M
GLUCOSE SERPL-MCNC: 116 MG/DL (ref 65–140)
GLUCOSE SERPL-MCNC: 120 MG/DL (ref 65–140)
GLUCOSE SERPL-MCNC: 131 MG/DL (ref 65–140)
GLUCOSE SERPL-MCNC: 144 MG/DL (ref 65–140)
GLUCOSE SERPL-MCNC: 279 MG/DL (ref 65–140)
HCT VFR BLD AUTO: 23.6 % (ref 36.5–49.3)
HGB BLD-MCNC: 7.2 G/DL (ref 12–17)
LYMPHOCYTES # BLD AUTO: 0.67 THOUSAND/UL (ref 0.6–4.47)
LYMPHOCYTES # BLD AUTO: 6 % (ref 14–44)
MCH RBC QN AUTO: 32.4 PG (ref 26.8–34.3)
MCHC RBC AUTO-ENTMCNC: 30.5 G/DL (ref 31.4–37.4)
MCV RBC AUTO: 106 FL (ref 82–98)
METAMYELOCYTES NFR BLD MANUAL: 1 % (ref 0–1)
MONOCYTES # BLD AUTO: 0.89 THOUSAND/UL (ref 0–1.22)
MONOCYTES NFR BLD: 8 % (ref 4–12)
NEUTROPHILS # BLD MANUAL: 9.37 THOUSAND/UL (ref 1.85–7.62)
NEUTS BAND NFR BLD MANUAL: 10 % (ref 0–8)
NEUTS SEG NFR BLD AUTO: 74 % (ref 43–75)
NRBC BLD AUTO-RTO: 0 /100 WBCS
PLATELET # BLD AUTO: 123 THOUSANDS/UL (ref 149–390)
PLATELET BLD QL SMEAR: ADEQUATE
PMV BLD AUTO: 10.7 FL (ref 8.9–12.7)
POTASSIUM SERPL-SCNC: 4 MMOL/L (ref 3.5–5.3)
RBC # BLD AUTO: 2.22 MILLION/UL (ref 3.88–5.62)
SODIUM SERPL-SCNC: 134 MMOL/L (ref 136–145)
TOTAL CELLS COUNTED SPEC: 100
WBC # BLD AUTO: 11.16 THOUSAND/UL (ref 4.31–10.16)

## 2019-10-11 PROCEDURE — 99232 SBSQ HOSP IP/OBS MODERATE 35: CPT | Performed by: INTERNAL MEDICINE

## 2019-10-11 PROCEDURE — 80048 BASIC METABOLIC PNL TOTAL CA: CPT | Performed by: INTERNAL MEDICINE

## 2019-10-11 PROCEDURE — 82948 REAGENT STRIP/BLOOD GLUCOSE: CPT

## 2019-10-11 PROCEDURE — 85027 COMPLETE CBC AUTOMATED: CPT | Performed by: INTERNAL MEDICINE

## 2019-10-11 PROCEDURE — 85007 BL SMEAR W/DIFF WBC COUNT: CPT | Performed by: INTERNAL MEDICINE

## 2019-10-11 RX ADMIN — RENAGEL 800 MG: 800 TABLET ORAL at 17:27

## 2019-10-11 RX ADMIN — NYSTATIN: 100000 POWDER TOPICAL at 08:13

## 2019-10-11 RX ADMIN — RENAGEL 800 MG: 800 TABLET ORAL at 08:13

## 2019-10-11 RX ADMIN — ALBUMIN (HUMAN) 25 G: 0.25 INJECTION, SOLUTION INTRAVENOUS at 23:39

## 2019-10-11 RX ADMIN — NYSTATIN: 100000 POWDER TOPICAL at 17:29

## 2019-10-11 RX ADMIN — METOPROLOL TARTRATE 12.5 MG: 25 TABLET ORAL at 08:12

## 2019-10-11 RX ADMIN — HEPARIN SODIUM 5000 UNITS: 5000 INJECTION INTRAVENOUS; SUBCUTANEOUS at 15:00

## 2019-10-11 RX ADMIN — HEPARIN SODIUM 5000 UNITS: 5000 INJECTION INTRAVENOUS; SUBCUTANEOUS at 21:07

## 2019-10-11 RX ADMIN — DOCUSATE SODIUM 100 MG: 100 CAPSULE, LIQUID FILLED ORAL at 17:27

## 2019-10-11 RX ADMIN — ACETAMINOPHEN 650 MG: 325 TABLET ORAL at 21:12

## 2019-10-11 RX ADMIN — RENAGEL 800 MG: 800 TABLET ORAL at 13:20

## 2019-10-11 RX ADMIN — Medication 1 CAPSULE: at 17:27

## 2019-10-11 RX ADMIN — HEPARIN SODIUM 5000 UNITS: 5000 INJECTION INTRAVENOUS; SUBCUTANEOUS at 04:46

## 2019-10-11 RX ADMIN — GABAPENTIN 100 MG: 100 CAPSULE ORAL at 08:12

## 2019-10-11 RX ADMIN — PANTOPRAZOLE SODIUM 40 MG: 40 TABLET, DELAYED RELEASE ORAL at 04:46

## 2019-10-11 NOTE — PROGRESS NOTES
Progress Note - Infectious Disease   Carlos Mojica 64 y o  male MRN: [id number]  Unit/Bed#: -01 Encounter: [id number]      Impression/Plan:  1  Bilateral lower extremity eschar skin wounds   Initially suspected to develop due to underlying chronic foot wounds in setting of recent fishing water exposures including [address] 8 weeks ago, followed by [facility] and then [facility]  Cellulitis has improved, but he has evolved extensive eschar wounds after bullae rupture  Consider underlying calciphylaxis component contributing to extensive wound development  Mild leukocytosis  May be reactive to inflammation in setting of extensive wounds  Rec:  · Continue monitoring closely off completed antibiotic course   · [facility] per Podiatry and RN   · Serial exams  · Follow up 10/10/19 skin biopsy results     2  Groin/inner thigh intertrigo  Developed with increased overall edema likely secondary to ascites  Improving  Rec:  Continue barrier cream prn  Continue nystatin powder BID     3   s/p recent Shewanella putrefaciens bacteremia    Presented 9/21/19 Septic shock, POA with leukopenia, tachycardia, refractory hypotension  Suspect primary skin portal of entry with cellulitis as below with secondary infection of HD catheter, s/p removal   Repeat blood cultures negative  Rec:  ? Finished Cipro completing 14 days total of antibiotics through 10/4/19     4   ESRD on HD   [facility]ia [facility]   status post new PermCath 10/1/19 in setting of #2     Rec:  ? Dialysis schedule is Tuesday Thursday Saturday     5   Polycystic kidney/liver disease   Extensive disease seen on CT imaging  S/p IR tube check in right renal cyst and changed today and status post paracentesis   Parasitic fluid with 446 white cells and 45% neutrophils, Ascites fluid cx no growth   SBP clinically not suspect  Rec:   Follow-up parasitic fluid culture off antibiotics at present     6   Chronic foot wounds   Likely multifactorial   Appear superficial   Risk factor for infection as above  Rec:  ? Local wound care per Podiatry     The above plan was discussed in detail with patient, RN, and primary care team  Infectious disease follow-up will resume on 10/14/2019  Please call infectious disease on-call physician over the weekend if any questions develop  Thank you!     Antibiotics:  None      Subjective:  Patient has no fever, chills, sweats; no nausea, vomiting, diarrhea; no cough, shortness of breath; no pain at rest at moment  in legs  No new symptoms  Objective:  Vitals:  Temp:  [97 9 °F (36 6 °C)-98 5 °F (36 9 °C)] 98 5 °F (36 9 °C)  HR:  [] 108  Resp:  [16-18] 18  BP: ()/(47-64) 118/64  SpO2:  [94 %-98 %] 98 %  Temp (24hrs), Av 2 °F (36 8 °C), Min:97 9 °F (36 6 °C), Max:98 5 °F (36 9 °C)  Current: Temperature: 98 5 °F (36 9 °C)    Physical Exam:   General Appearance:  Alert, sleepy in bed, cooperative, nontoxic, no acute distress  Throat: Oropharynx moist without lesions  Lungs:   Clear to auscultation bilaterally; respirations unlabored   Heart:  RRR; no murmur   Abdomen:   Soft, non-tender, protuberant, positive bowel sounds  Extremities: Bilateral lower extremity gauze wraps with some honey crusted strike through drainage  At proximal medial thigh wound edges purplish pink you without induration, tenderness or active drainage  Left medial thigh skin biopsy site sutures intact, closed and dry  Skin: No new rashes or lesions    Right chest PermCath site nontender     Labs, Imaging, & Other studies:   All pertinent labs and imaging studies were personally reviewed  Results from last 7 days   Lab Units 10/11/19  0443 10/10/19  0442 10/08/19  0500   WBC Thousand/uL 11 16* 12 69* 8 23   HEMOGLOBIN g/dL 7 2* 8 1* 8 9*   PLATELETS Thousands/uL 123* 193 230     Results from last 7 days   Lab Units 10/11/19  0443  10/09/19  0605  10/06/19  1908   POTASSIUM mmol/L 4 0   < > 4 5   < > 4 8   CHLORIDE mmol/L 98*   < > 93*   < > 92*   CO2 mmol/L 28   < > 24   < > 29   BUN mg/dL 55*   < > 58*   < > 60*   CREATININE mg/dL 6 82*   < > 7 60*   < > 7 54*   EGFR ml/min/1 73sq m 8   < > 7   < > 7   CALCIUM mg/dL 8 0*   < > 8 5   < > 8 6   AST U/L  --   --  32  --  32   ALT U/L  --   --  15  --  20   ALK PHOS U/L  --   --  229*  --  236*    < > = values in this interval not displayed  Results from last 7 days   Lab Units 10/09/19  1040 10/06/19  1908   PROCALCITONIN ng/ml 8 29* 10 48*     Results from last 7 days   Lab Units 10/09/19  1122 10/07/19  1301 10/06/19  1922 10/06/19  1908   BLOOD CULTURE   --  No Growth at 72 hrs  No Growth After 4 Days  No Growth After 4 Days     GRAM STAIN RESULT  Rare Polys  No bacteria seen  --   --   --    BODY FLUID CULTURE, STERILE  No growth  --   --   --

## 2019-10-11 NOTE — PROGRESS NOTES
Pt resting in bed  No complaints at this time  Call bell and bedside table within reach  Alarm on  Will continue to monitor

## 2019-10-11 NOTE — PROGRESS NOTES
Pt sleeping comfortably, no pain noted  Call bell within reach, bed locked and in lowest position  New pillowcase and absorbant pad put on pillow under feet  Will continue to monitor     Srinivas Donis RN

## 2019-10-11 NOTE — SOCIAL WORK
OSCAR returned a call to [facility] admissions after receiving voicemail inquiring as to patient status  CM called back and spoke with Cassandra; OSCAR relayed that as far as she is aware, patient's spouse wanted to do some research and wasn't prepared to pursue [facility] at this time  As per chart note review, patient will discharge to [facility] for SNF and will follow up with [facility] upon discharge if still interested in switching to [facility] from  [facility]  Président Rik MOORE will continue to follow patient

## 2019-10-11 NOTE — SOCIAL WORK
CM received call from [address] [place] [address] looking for update  Declan Juan knows patient is interested in [facility] but stated he still has bed available at [facility] if he would like  CM also called [facility] and spoke with Charge RN Ivette Landers who confirmed patient's chair is on hold for TTS at 1 pm     CM received call from Clements at [facility] who stated she was told patient was approved for their IP HD bed  Patient not yet medically stable for discharge  CM will continue to follow patient

## 2019-10-11 NOTE — PROGRESS NOTES
[address] [facility] NOTE   Emanuel Carlson 64 y o  male MRN: [id number]  Unit/Bed#: -01 Encounter: [id number]  Reason for Consult: ESRD    ASSESSMENT and PLAN:  1  ESRD on HD:  underlying disease, PCKD  · Patient was previously dialyzed at [facility] but has now transition to local [facility] unit   Current plan is for treatment at [facility] TTS  · Outpatient hemodialysis prescription:  Qt 3 hrs 10 min  Qb 450, Qd standard, 2 K, 2 0 calcium,  40 bicarbonate, sodium 137, filter size 180   Target weight 83 kg  Historically poorly compliant with treatment  · Hemodialysis treatment 88/0 which was complicated by hypotension and filter clotting during treatment   Unfortunately patient lost blood in circuit  · Treatment 10/10 proceeded without incident   Patient 0 5 kg above dry weight at the end of treatment  · Plan:    ? Continue hemodialysis TTS  ? Continue to utilize albumin during treatment for hypotension  2  Access:  Right IJ PermCath site looks clean, nontender  3  Chronic hypotension:    · On midodrine  · On beta-blocker due to recent treatment for atrial fibrillation with RVR during last hospitalization  · Plan:    ? Midodrine pre treatment and jail through hemodialysis treatment  4  Anemia:   · Hemoglobin below goal, currently 7 2  Patient lost blood in hemodialysis circuit during treatment on 10/08 which is likely contributing  · Iron low, ferritin elevated  · No schistocytes on hemolyzed smear, haptoglobin normal  · Platelet count has rebounded and is currently normal  · Continue Epogen    5  Mineral and bone disease:    · Phosphorus 6 1  · Calcium based binder discontinue 10/8 due lower extremity wounds and suspicion for calciphylaxis  · Sevelamer started 10/10  6  Abdominal pain/ileus:   History of polycystic liver disease  · CT of the abdomen:  Findings consistent with ileus   Moderate ascites  · Surgery following  · Status post paracentesis for 1800 mL    · No evidence of SBE- ID follow-up  · Ileus may be resolving patient having output rectally   7  Lower extremity edema/cellulitis/wounds:    · Recent infection due to Shewanella putrifacians likely acquired when patient was [place]   Completed course of p o  Cipro  Given a dose of IV Cipro on admission  ID on board  · No indication for Abx at this time per ID  · Lower extremity wounds, 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 Calcium based binder previously discontinued  ? No vitamin D supplement/vitamin-D analogs  ? Patient is not on Coumadin  ? No Iron supplement  ? Increase hemodialysis time to increase clearances  ? Start sevelamer to control serum phosphorus levels optimally less than 4 5  ? PTH 33 9  No indication for cinacalcet which is indicated to keep PTH >100, <400 optimally   ? Status post skin biopsy 10/10/2019-awaiting results  ? Will need to determine if this is truly calciphylaxis before patient will consider treatment with sodium thiosulfate due to increased risk with prolonged QT  8  Atrial fibrillation:  On beta-blocker  Not on Coumadin  9  Hyponatremia:  Mild, sodium currently 134  10   Prior D-dimer elevation:  V/Q scan -low probability of PE     DISPOSITION:  Plan for hemodialysis tomorrow  Await results of skin biopsy    SUBJECTIVE / INTERVAL HISTORY:  Sleepy, no complaints    OBJECTIVE:  Current Weight: Weight - Scale: 94 5 kg (208 lb 5 4 oz)(pt not ambulating)  Vitals:    10/10/19 2300 10/11/19 0600 10/11/19 0700 10/11/19 1200   BP:   118/64 135/56   BP Location:   Left arm    Pulse: 99  (!) 108    Resp: 18  18    Temp: 97 9 °F (36 6 °C)  98 5 °F (36 9 °C)    TempSrc: Oral  Oral    SpO2: 94%  98%    Weight:  94 5 kg (208 lb 5 4 oz)     Height:           Intake/Output Summary (Last 24 hours) at 10/11/2019 1419  Last data filed at 10/11/2019 0443  Gross per 24 hour   Intake 500 ml   Output 2500 ml   Net -2000 ml     General: NAD  Skin: no rash, color poor  Eyes: icteric sclera  ENT: moist mucous membrane  Neck: supple  No JVD  Chest: CTA b/l, no ronchii, no wheeze, no rubs, no rales  Normal effort  CVS: s1s2, no murmur, no gallop, no rub  Abdomen:  Abdomen distended, nontender, tympanic, bowel sounds present  Extremities:  Extensive bilateral lower extremity wounds  Legs wrapped with Kerlix    Erythematous areas particularly left thigh is extending towards groin  : no meeks  Neuro:  No acute deficits  Psych: normal affect  Medications:    Current Facility-Administered Medications:     acetaminophen (TYLENOL) tablet 650 mg, 650 mg, Oral, Q6H PRN, Norma Mahoney, PA-C, 650 mg at 10/10/19 1318    albumin human (FLEXBUMIN) 25 % injection 25 g, 25 g, Intravenous, PRN, Kelli Fisher, CRNP, Last Rate: 0 mL/hr at 10/08/19 1006, 25 g at 10/10/19 1745    b complex-vitamin C-folic acid (NEPHROCAPS) capsule 1 capsule, 1 capsule, Oral, Daily With Ashley Benítez PA-C, 1 capsule at 10/10/19 2012    docusate sodium (COLACE) capsule 100 mg, 100 mg, Oral, BID, Todd Flowers MD, 100 mg at 10/10/19 2012    epoetin bob (EPOGEN,PROCRIT) injection 4,000 Units, 4,000 Units, Intravenous, Once per day on Tue Thu Sat, Kelli Fisher, ALEXNP, 4,000 Units at 10/10/19 1836    gabapentin (NEURONTIN) capsule 100 mg, 100 mg, Oral, Daily, Norma Clear, PA-C, 100 mg at 10/11/19 1931    heparin (porcine) subcutaneous injection 5,000 Units, 5,000 Units, Subcutaneous, Q8H [facility], 5,000 Units at 10/11/19 0446 **AND** Platelet count, , , Once, Norma Clear PA-C    Lidocaine Viscous HCl (XYLOCAINE) 2 % mucosal solution 15 mL, 15 mL, Swish & Spit, 4x Daily PRN, Norma Mahoney PA-C    lidocaine-epinephrine (XYLOCAINE-MPF/EPINEPHRINE) 1%-1:200,000 injection 20 mL, 20 mL, Infiltration, Once, Homero Valentin,     metoprolol tartrate (LOPRESSOR) partial tablet 12 5 mg, 12 5 mg, Oral, Q12H [facility], [place] Clear, PA-C, 12 5 mg at 10/11/19 4376    midodrine (PROAMATINE) tablet 10 mg, 10 mg, Oral, Before Dialysis, [place] Clear, PA-C, 10 mg at 10/10/19 1457    midodrine (PROAMATINE) tablet 10 mg, 10 mg, Oral, Once in dialysis, Valli Scheuermann, CRNP, Stopped at 10/08/19 1320    nystatin (MYCOSTATIN) powder, , Topical, BID, KAY Denise-C    ondansetron [facility]) injection 4 mg, 4 mg, Intravenous, Q6H PRN, Danyel Rutledge PA-C    oxyCODONE (ROXICODONE) IR tablet 5 mg, 5 mg, Oral, Q4H PRN, Danyel Rutledge PA-C, 5 mg at 10/10/19 0303    pantoprazole (PROTONIX) EC tablet 40 mg, 40 mg, Oral, Early Morning, KAY [place]-C, 40 mg at 10/11/19 0446    sevelamer (RENAGEL) tablet 800 mg, 800 mg, Oral, TID With Meals, Valli Scheuermann, CRNP, 800 mg at 10/11/19 1320    simethicone (MYLICON) chewable tablet 80 mg, 80 mg, Oral, Q6H PRN, KAY [place]-C    Laboratory Results:  Results from last 7 days   Lab Units 10/11/19  0443 10/10/19  0442 10/09/19  0605 10/08/19  0500 10/07/19  1301 10/06/19  1908   WBC Thousand/uL 11 16* 12 69*  --  8 23 7 42 6 93   HEMOGLOBIN g/dL 7 2* 8 1*  --  8 9* 9 0* 9 4*   HEMATOCRIT % 23 6* 26 7*  --  28 8* 29 2* 30 4*   PLATELETS Thousands/uL 123* 193  --  230 232 263   POTASSIUM mmol/L 4 0 4 5 4 5 5 2 4 9 4 8   CHLORIDE mmol/L 98* 96* 93* 90* 94* 92*   CO2 mmol/L 28 27 24 26 27 29   BUN mg/dL 55* 66* 58* 74* 70* 60*   CREATININE mg/dL 6 82* 8 62* 7 60* 8 93* 8 39* 7 54*   CALCIUM mg/dL 8 0* 8 1* 8 5 8 5 8 6 8 6   PHOSPHORUS mg/dL  --  6 1* 6 7*  --   --   --

## 2019-10-11 NOTE — PLAN OF CARE
Problem: Potential for Falls  Goal: Patient will remain free of falls  Description  INTERVENTIONS:  - Assess patient frequently for physical needs  -  Identify cognitive and physical deficits and behaviors that affect risk of falls    -  [place] fall precautions as indicated by assessment   - Educate patient/family on patient safety including physical limitations  - Instruct patient to call for assistance with activity based on assessment  - Modify environment to reduce risk of injury  - Consider OT/PT consult to assist with strengthening/mobility  Outcome: Progressing     Problem: Prexisting or High Potential for Compromised Skin Integrity  Goal: Skin integrity is maintained or improved  Description  INTERVENTIONS:  - Identify patients at risk for skin breakdown  - Assess and monitor skin integrity  - Assess and monitor nutrition and hydration status  - Monitor labs   - Assess for incontinence   - Turn and reposition patient  - Assist with mobility/ambulation  - Relieve pressure over bony prominences  - Avoid friction and shearing  - Provide appropriate hygiene as needed including keeping skin clean and dry  - Evaluate need for skin moisturizer/barrier cream  - Collaborate with interdisciplinary team   - Patient/family teaching  - Consider wound care consult   Outcome: Progressing     Problem: PAIN - ADULT  Goal: Verbalizes/displays adequate comfort level or baseline comfort level  Description  Interventions:  - Encourage patient to monitor pain and request assistance  - Assess pain using appropriate pain scale  - Administer analgesics based on type and severity of pain and evaluate response  - Implement non-pharmacological measures as appropriate and evaluate response  - Consider cultural and social influences on pain and pain management  - Notify physician/advanced practitioner if interventions unsuccessful or patient reports new pain  Outcome: Progressing     Problem: INFECTION - ADULT  Goal: Absence or prevention of progression during hospitalization  Description  INTERVENTIONS:  - Assess and monitor for signs and symptoms of infection  - Monitor lab/diagnostic results  - Monitor all insertion sites, i e  indwelling lines, tubes, and drains  - Monitor endotracheal if appropriate and nasal secretions for changes in amount and color  - [place] appropriate cooling/warming therapies per order  - Administer medications as ordered  - Instruct and encourage patient and family to use good hand hygiene technique  - Identify and instruct in appropriate isolation precautions for identified infection/condition  Outcome: Progressing     Problem: INFECTION - ADULT  Goal: Absence of fever/infection during neutropenic period  Description  INTERVENTIONS:  - Monitor WBC    Outcome: Progressing     Problem: SAFETY ADULT  Goal: Patient will remain free of falls  Description  INTERVENTIONS:  - Assess patient frequently for physical needs  -  Identify cognitive and physical deficits and behaviors that affect risk of falls    -  [place] fall precautions as indicated by assessment   - Educate patient/family on patient safety including physical limitations  - Instruct patient to call for assistance with activity based on assessment  - Modify environment to reduce risk of injury  - Consider OT/PT consult to assist with strengthening/mobility  Outcome: Progressing  Goal: Maintain or return to baseline ADL function  Description  INTERVENTIONS:  -  Assess patient's ability to carry out ADLs; assess patient's baseline for ADL function and identify physical deficits which impact ability to perform ADLs (bathing, care of mouth/teeth, toileting, grooming, dressing, etc )  - Assess/evaluate cause of self-care deficits   - Assess range of motion  - Assess patient's mobility; develop plan if impaired  - Assess patient's need for assistive devices and provide as appropriate  - Encourage maximum independence but intervene and supervise when necessary  - Involve family in performance of ADLs  - Assess for home care needs following discharge   - Consider OT consult to assist with ADL evaluation and planning for discharge  - Provide patient education as appropriate  Outcome: Progressing  Goal: Maintain or return mobility status to optimal level  Description  INTERVENTIONS:  - Assess patient's baseline mobility status (ambulation, transfers, stairs, etc )    - Identify cognitive and physical deficits and behaviors that affect mobility  - Identify mobility aids required to assist with transfers and/or ambulation (gait belt, sit-to-stand, lift, walker, cane, etc )  - [place] fall precautions as indicated by assessment  - Record patient progress and toleration of activity level on Mobility SBAR; progress patient to next Phase/Stage  - Instruct patient to call for assistance with activity based on assessment  - Consider rehabilitation consult to assist with strengthening/weightbearing, etc   Outcome: Progressing     Problem: DISCHARGE PLANNING  Goal: Discharge to home or other facility with appropriate resources  Description  INTERVENTIONS:  - Identify barriers to discharge w/patient and caregiver  - Arrange for needed discharge resources and transportation as appropriate  - Identify discharge learning needs (meds, wound care, etc )  - Arrange for interpretive services to assist at discharge as needed  - Refer to Case Management Department for coordinating discharge planning if the patient needs post-hospital services based on physician/advanced practitioner order or complex needs related to functional status, cognitive ability, or social support system  Outcome: Progressing     Problem: Knowledge Deficit  Goal: Patient/family/caregiver demonstrates understanding of disease process, treatment plan, medications, and discharge instructions  Description  Complete learning assessment and assess knowledge base    Interventions:  - Provide teaching at level of understanding  - Provide teaching via preferred learning methods  Outcome: Progressing     Problem: METABOLIC, FLUID AND ELECTROLYTES - ADULT  Goal: Electrolytes maintained within normal limits  Description  INTERVENTIONS:  - Monitor labs and assess patient for signs and symptoms of electrolyte imbalances  - Administer electrolyte replacement as ordered  - Monitor response to electrolyte replacements, including repeat lab results as appropriate  - Instruct patient on fluid and nutrition as appropriate  Outcome: Progressing  Goal: Fluid balance maintained  Description  INTERVENTIONS:  - Monitor labs   - Monitor I/O and WT  - Instruct patient on fluid and nutrition as appropriate  - Assess for signs & symptoms of volume excess or deficit  Outcome: Progressing  Goal: Glucose maintained within target range  Description  INTERVENTIONS:  - Monitor Blood Glucose as ordered  - Assess for signs and symptoms of hyperglycemia and hypoglycemia  - Administer ordered medications to maintain glucose within target range  - Assess nutritional intake and initiate nutrition service referral as needed  Outcome: Progressing

## 2019-10-11 NOTE — PLAN OF CARE
Problem: Potential for Falls  Goal: Patient will remain free of falls  Description  INTERVENTIONS:  - Assess patient frequently for physical needs  -  Identify cognitive and physical deficits and behaviors that affect risk of falls    -  [place] fall precautions as indicated by assessment   - Educate patient/family on patient safety including physical limitations  - Instruct patient to call for assistance with activity based on assessment  - Modify environment to reduce risk of injury  - Consider OT/PT consult to assist with strengthening/mobility  Outcome: Progressing     Problem: Prexisting or High Potential for Compromised Skin Integrity  Goal: Skin integrity is maintained or improved  Description  INTERVENTIONS:  - Identify patients at risk for skin breakdown  - Assess and monitor skin integrity  - Assess and monitor nutrition and hydration status  - Monitor labs   - Assess for incontinence   - Turn and reposition patient  - Assist with mobility/ambulation  - Relieve pressure over bony prominences  - Avoid friction and shearing  - Provide appropriate hygiene as needed including keeping skin clean and dry  - Evaluate need for skin moisturizer/barrier cream  - Collaborate with interdisciplinary team   - Patient/family teaching  - Consider wound care consult   Outcome: Progressing     Problem: PAIN - ADULT  Goal: Verbalizes/displays adequate comfort level or baseline comfort level  Description  Interventions:  - Encourage patient to monitor pain and request assistance  - Assess pain using appropriate pain scale  - Administer analgesics based on type and severity of pain and evaluate response  - Implement non-pharmacological measures as appropriate and evaluate response  - Consider cultural and social influences on pain and pain management  - Notify physician/advanced practitioner if interventions unsuccessful or patient reports new pain  Outcome: Progressing     Problem: INFECTION - ADULT  Goal: Absence or prevention of progression during hospitalization  Description  INTERVENTIONS:  - Assess and monitor for signs and symptoms of infection  - Monitor lab/diagnostic results  - Monitor all insertion sites, i e  indwelling lines, tubes, and drains  - Monitor endotracheal if appropriate and nasal secretions for changes in amount and color  - [place] appropriate cooling/warming therapies per order  - Administer medications as ordered  - Instruct and encourage patient and family to use good hand hygiene technique  - Identify and instruct in appropriate isolation precautions for identified infection/condition  Outcome: Progressing  Goal: Absence of fever/infection during neutropenic period  Description  INTERVENTIONS:  - Monitor WBC    Outcome: Progressing     Problem: SAFETY ADULT  Goal: Patient will remain free of falls  Description  INTERVENTIONS:  - Assess patient frequently for physical needs  -  Identify cognitive and physical deficits and behaviors that affect risk of falls    -  [place] fall precautions as indicated by assessment   - Educate patient/family on patient safety including physical limitations  - Instruct patient to call for assistance with activity based on assessment  - Modify environment to reduce risk of injury  - Consider OT/PT consult to assist with strengthening/mobility  Outcome: Progressing  Goal: Maintain or return to baseline ADL function  Description  INTERVENTIONS:  -  Assess patient's ability to carry out ADLs; assess patient's baseline for ADL function and identify physical deficits which impact ability to perform ADLs (bathing, care of mouth/teeth, toileting, grooming, dressing, etc )  - Assess/evaluate cause of self-care deficits   - Assess range of motion  - Assess patient's mobility; develop plan if impaired  - Assess patient's need for assistive devices and provide as appropriate  - Encourage maximum independence but intervene and supervise when necessary  - Involve family in performance of ADLs  - Assess for home care needs following discharge   - Consider OT consult to assist with ADL evaluation and planning for discharge  - Provide patient education as appropriate  Outcome: Progressing  Goal: Maintain or return mobility status to optimal level  Description  INTERVENTIONS:  - Assess patient's baseline mobility status (ambulation, transfers, stairs, etc )    - Identify cognitive and physical deficits and behaviors that affect mobility  - Identify mobility aids required to assist with transfers and/or ambulation (gait belt, sit-to-stand, lift, walker, cane, etc )  - [place] fall precautions as indicated by assessment  - Record patient progress and toleration of activity level on Mobility SBAR; progress patient to next Phase/Stage  - Instruct patient to call for assistance with activity based on assessment  - Consider rehabilitation consult to assist with strengthening/weightbearing, etc   Outcome: Progressing     Problem: DISCHARGE PLANNING  Goal: Discharge to home or other facility with appropriate resources  Description  INTERVENTIONS:  - Identify barriers to discharge w/patient and caregiver  - Arrange for needed discharge resources and transportation as appropriate  - Identify discharge learning needs (meds, wound care, etc )  - Arrange for interpretive services to assist at discharge as needed  - Refer to Case Management Department for coordinating discharge planning if the patient needs post-hospital services based on physician/advanced practitioner order or complex needs related to functional status, cognitive ability, or social support system  Outcome: Progressing     Problem: Knowledge Deficit  Goal: Patient/family/caregiver demonstrates understanding of disease process, treatment plan, medications, and discharge instructions  Description  Complete learning assessment and assess knowledge base    Interventions:  - Provide teaching at level of understanding  - Provide teaching via preferred learning methods  Outcome: Progressing     Problem: GENITOURINARY - ADULT  Goal: Maintains or returns to baseline urinary function  Description  INTERVENTIONS:  - Assess urinary function  - Encourage oral fluids to ensure adequate hydration if ordered  - Administer IV fluids as ordered to ensure adequate hydration  - Administer ordered medications as needed  - Offer frequent toileting  - Follow urinary retention protocol if ordered  Outcome: Progressing  Goal: Absence of urinary retention  Description  INTERVENTIONS:  - Assess patients ability to void and empty bladder  - Monitor I/O  - Bladder scan as needed  - Discuss with physician/AP medications to alleviate retention as needed  - Discuss catheterization for long term situations as appropriate  Outcome: Progressing     Problem: METABOLIC, FLUID AND ELECTROLYTES - ADULT  Goal: Electrolytes maintained within normal limits  Description  INTERVENTIONS:  - Monitor labs and assess patient for signs and symptoms of electrolyte imbalances  - Administer electrolyte replacement as ordered  - Monitor response to electrolyte replacements, including repeat lab results as appropriate  - Instruct patient on fluid and nutrition as appropriate  Outcome: Progressing  Goal: Fluid balance maintained  Description  INTERVENTIONS:  - Monitor labs   - Monitor I/O and WT  - Instruct patient on fluid and nutrition as appropriate  - Assess for signs & symptoms of volume excess or deficit  Outcome: Progressing  Goal: Glucose maintained within target range  Description  INTERVENTIONS:  - Monitor Blood Glucose as ordered  - Assess for signs and symptoms of hyperglycemia and hypoglycemia  - Administer ordered medications to maintain glucose within target range  - Assess nutritional intake and initiate nutrition service referral as needed  Outcome: Progressing     Problem: SKIN/TISSUE INTEGRITY - ADULT  Goal: Skin integrity remains intact  Description  INTERVENTIONS  - Identify patients at risk for skin breakdown  - Assess and monitor skin integrity  - Assess and monitor nutrition and hydration status  - Monitor labs (i e  albumin)  - Assess for incontinence   - Turn and reposition patient  - Assist with mobility/ambulation  - Relieve pressure over bony prominences  - Avoid friction and shearing  - Provide appropriate hygiene as needed including keeping skin clean and dry  - Evaluate need for skin moisturizer/barrier cream  - Collaborate with interdisciplinary team (i e  Nutrition, Rehabilitation, etc )   - Patient/family teaching  Outcome: Progressing  Goal: Incision(s), wounds(s) or drain site(s) healing without S/S of infection  Description  INTERVENTIONS  - Assess and document risk factors for skin impairment   - Assess and document dressing, incision, wound bed, drain sites and surrounding tissue  - Consider nutrition services referral as needed  - Oral mucous membranes remain intact  - Provide patient/ family education  Outcome: Progressing  Goal: Oral mucous membranes remain intact  Description  INTERVENTIONS  - Assess oral mucosa and hygiene practices  - Implement preventative oral hygiene regimen  - Implement oral medicated treatments as ordered  - Initiate Nutrition services referral as needed  Outcome: Progressing     Problem: Nutrition/Hydration-ADULT  Goal: Nutrient/Hydration intake appropriate for improving, restoring or maintaining nutritional needs  Description  Monitor and assess patient's nutrition/hydration status for malnutrition  Collaborate with interdisciplinary team and initiate plan and interventions as ordered  Monitor patient's weight and dietary intake as ordered or per policy  Utilize nutrition screening tool and intervene as necessary  Determine patient's food preferences and provide high-protein, high-caloric foods as appropriate       INTERVENTIONS:  - Monitor oral intake, urinary output, labs, and treatment plans  - Assess nutrition and hydration status and recommend course of action  - Evaluate amount of meals eaten  - Assist patient with eating if necessary   - Allow adequate time for meals  - Recommend/ encourage appropriate diets, oral nutritional supplements, and vitamin/mineral supplements  - Order, calculate, and assess calorie counts as needed  - Recommend, monitor, and adjust tube feedings and TPN/PPN based on assessed needs  - Assess need for intravenous fluids  - Provide specific nutrition/hydration education as appropriate  - Include patient/family/caregiver in decisions related to nutrition  Outcome: Progressing

## 2019-10-11 NOTE — PROGRESS NOTES
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  · Completed oral ciprofloxacin as an outpatient 2 days ago  Received extra dose of IV ciprofloxacin emergency department   · Patient also had gram-negative bacteremia, grew Shewanella Putrifacians, a marine acquired bacteria  Had PermCath replaced at that time  Infectious Disease had been on board  · No fevers  Monitor vital signs  As per Infectious Disease doctor, no additional antibiotics for now  · Hematology was consulted for possible biopsy  · Continue local wound care  · Follow-up repeat blood cultures: So far negative  · Podiatry on board  · As per discussion with Dermatology - recommended wedge biopsy to definitively diagnose calciphylaxis  Status post wedge biopsy done by surgery yesterday  Follow-up biopsy results  Patient is noted to have prolonged QTC of around 500  Nephrology wanted to start treatment with sodium thiosulfate though in the setting of his prolonged QTC, hypocalcemia, and volume overload, patient has opted to wait for biopsy diagnosis in order to decide if he would want to receive sodium thiosulfate given that this medication comes with its own risks  I believe that it is reasonable at this time to seek a biopsy diagnosis before proceeding with this treatment modality  Will follow up with plan of care  Discussed this with Nephrology    [facility]  Assessment & Plan  · With history of right inguinal hernia for more than 30 years  · Surgery on board  Bowel regimen    No surgical intervention indicated at this time  · Recommend MiraLax daily  · Encourage out of bed and ambulation  · P o  Pain medication p r n  And avoid narcotics as much as possible    Polycystic kidney disease  Assessment & Plan  · Patient still has indwelling DILIA drain for the renal cyst   · Will need to monitor output over the next 24 hours  If less than 10 cc over the next 24 hours will consider removing and discussed with IR as if not draining anymore this will only act as potential nidus for infection  · According to my discussion with patient's nurse, patient's drain is less than 10 cc for the past 24 hours  Thus I will refer the patient to IR for removal of the DILIA drain  Chronic kidney disease with end stage renal failure on dialysis [facility])  Assessment & Plan  · Continue hemodialysis per Nephrology  Ascites  Assessment & Plan  · Status post paracentesis  Ascitic fluid was noted to be cloudy with 200 PMNs  Not consistent with SBP  Will continue to monitor clinically at this time  Abdominal pain is actually improved today  · May need further therapeutic paracenteses, monitor clinically    Elevated d-dimer  Assessment & Plan  · VQ scan negative  Lower extremity duplex scan done on the recent admission was negative for DVT as well      Anemia due to chronic kidney disease, on chronic dialysis [facility])  Assessment & Plan  · Monitor closely  Hemoglobin went down today to 7 2   · No active bleeding  · EPO shots, as per Nephrology, 3 times a week  · For further workup and management if this worsens significantly  Atrial fibrillation (HCC)  Assessment & Plan  · Developed atrial fibrillation with rapid ventricular response during last admission  · Not on any anticoagulation per Cardiology  · Continue metoprolol 12 5 mg b i d  With hold parameters  · Monitor vital signs  Benign essential hypertension  Assessment & Plan  · Per Cardiology, continue metoprolol with hold parameters at reduced dose    · Midodrine for hypotension  · Monitor blood pressure  Hyponatremia  Assessment & Plan  · Mild  · Improving  · Stable  Suspect hypervolemic hyponatremia  · Continue dialysis  · Continue to monitor daily BMP    * Hypotension  Assessment & Plan  · History of chronic hypotension, per patient he typically runs anywhere from  systolic  · Continue midodrine on dialysis days  · Monitor blood pressure        VTE Pharmacologic Prophylaxis:   Pharmacologic: Heparin  Mechanical VTE Prophylaxis in Place: No    Patient Centered Rounds: I have performed bedside rounds with nursing staff today  Discussions with Specialists or Other Care Team Provider:  Case management  Nephrology  Education and Discussions with Family / Patient:  Patient  I called patient's spouse, and left a voicemail message for call back  Time Spent for Care: 30 minutes  More than 50% of total time spent on counseling and coordination of care as described above  Current Length of Stay: 4 day(s)    Current Patient Status: Inpatient   Certification Statement: The patient will continue to require additional inpatient hospital stay due to Above findings and plans  Discharge Plan:  None yet today  Code Status: Level 1 - Full Code      Subjective:   Patient feels better today  However, patient still has occasional pains on the lower extremities  No abdominal pains  No other complaints  No shortness of breath  Objective:     Vitals:   Temp (24hrs), Av 3 °F (36 8 °C), Min:97 9 °F (36 6 °C), Max:98 5 °F (36 9 °C)    Temp:  [97 9 °F (36 6 °C)-98 5 °F (36 9 °C)] 98 4 °F (36 9 °C)  HR:  [] 98  Resp:  [16-18] 16  BP: ()/(48-64) 96/52  SpO2:  [94 %-98 %] 96 %  Body mass index is 31 68 kg/m²  Input and Output Summary (last 24 hours):        Intake/Output Summary (Last 24 hours) at 10/11/2019 1723  Last data filed at 10/11/2019 0443  Gross per 24 hour   Intake 300 ml   Output 2500 ml   Net -2200 ml       Physical Exam: Physical Exam   Constitutional: No distress  HENT:   Head: Normocephalic and atraumatic  Eyes: Right eye exhibits no discharge  Left eye exhibits no discharge  No scleral icterus  Neck: No JVD present  No tracheal deviation present  No thyromegaly present  Cardiovascular: Normal rate, regular rhythm and normal heart sounds  Exam reveals no gallop and no friction rub  No murmur heard  Pulmonary/Chest: Effort normal and breath sounds normal  No stridor  No respiratory distress  He has no wheezes  He has no rales  Abdominal: Soft  Bowel sounds are normal  He exhibits distension  He exhibits no mass  There is no tenderness  There is no rebound and no guarding  Positive for mild distension  Musculoskeletal: He exhibits edema and tenderness  Positive for bilateral lower extremity tenderness and edema  Neurological: He is alert  Skin: Skin is warm  No rash noted  He is not diaphoretic  No pallor  Positive for bilateral lower extremity edema and tenderness  Positive for wound dressing on bilateral legs  Psychiatric: He has a normal mood and affect  His behavior is normal  Thought content normal    Vitals reviewed  Additional Data:     Labs:    Results from last 7 days   Lab Units 10/11/19  0443  10/08/19  0500   WBC Thousand/uL 11 16*   < > 8 23   HEMOGLOBIN g/dL 7 2*   < > 8 9*   HEMATOCRIT % 23 6*   < > 28 8*   PLATELETS Thousands/uL 123*   < > 230   NEUTROS PCT %  --   --  75   LYMPHS PCT %  --   --  6*   LYMPHO PCT % 6*  --   --    MONOS PCT %  --   --  15*   MONO PCT % 8  --   --    EOS PCT % 1  --  3    < > = values in this interval not displayed       Results from last 7 days   Lab Units 10/11/19  0443  10/09/19  0605   POTASSIUM mmol/L 4 0   < > 4 5   CHLORIDE mmol/L 98*   < > 93*   CO2 mmol/L 28   < > 24   BUN mg/dL 55*   < > 58*   CREATININE mg/dL 6 82*   < > 7 60*   CALCIUM mg/dL 8 0*   < > 8 5   ALK PHOS U/L  --   --  229*   ALT U/L  --   --  15   AST U/L  --   --  32 < > = values in this interval not displayed  Results from last 7 days   Lab Units 10/06/19  1908   INR  1 34*       * I Have Reviewed All Lab Data Listed Above  * Additional Pertinent Lab Tests Reviewed: Asaf 68 Admission Reviewed    Imaging:    Imaging Reports Reviewed Today Include:  Diagnostic imaging studies that were done on this admission  Imaging Personally Reviewed by Myself Includes:  None  Recent Cultures (last 7 days):     Results from last 7 days   Lab Units 10/09/19  1122 10/07/19  1301 10/06/19  1922 10/06/19  1908   BLOOD CULTURE   --  No Growth After 4 Days  No Growth After 4 Days  No Growth After 4 Days     GRAM STAIN RESULT  Rare Polys  No bacteria seen  --   --   --    BODY FLUID CULTURE, STERILE  No growth  --   --   --        Last 24 Hours Medication List:     Current Facility-Administered Medications:  acetaminophen 650 mg Oral Q6H PRN [place] Clear, PA-C    albumin human 25 g Intravenous PRN Kelli Divine, CRNP Last Rate: 0 g (10/08/19 1006)   b complex-vitamin C-folic acid 1 capsule Oral Daily With KAY Lomeli-C    docusate sodium 100 mg Oral BID Todd Flowers MD    epoetin bob 4,000 Units Intravenous Once per day on Tue Thu Sat Kelli Vikyine, CRNP    gabapentin 100 mg Oral Daily [place] Clear, PA-C    heparin (porcine) 5,000 Units Subcutaneous [facility] [place] Clear, PA-C    Lidocaine Viscous HCl 15 mL Swish & Spit 4x Daily PRN [place] Clear, PA-C    lidocaine-epinephrine 20 mL Infiltration Once Homero Valentin DO    metoprolol tartrate 12 5 mg Oral Q12H KAY Perez-SUKHDEV    midodrine 10 mg Oral Before Dialysis [place] Clear, PA-C    midodrine 10 mg Oral Once in dialysis Kelli Divine, CRNP    nystatin  Topical BID Vasyl Murillo PA-C    ondansetron 4 mg Intravenous Q6H PRN [place] Clear, PA-C    oxyCODONE 5 mg Oral Q4H PRN [place] Clear, PA-C    pantoprazole 40 mg Oral Early Morning [place] Clear, PA-C    sevelamer 800 mg Oral TID With Meals SAM Soliz    simethicone 80 mg Oral Q6H PRN Ifeoma Sandhu PA-C         Today, Patient Was Seen By: Gardenia Alaniz MD    ** Please Note: Dragon 360 Dictation voice to text software may have been used in the creation of this document   **

## 2019-10-12 ENCOUNTER — APPOINTMENT (INPATIENT)
Dept: DIALYSIS | Facility: HOSPITAL | Age: 59
DRG: 314 | End: 2019-10-12
Payer: MEDICARE

## 2019-10-12 LAB
ANION GAP SERPL CALCULATED.3IONS-SCNC: 10 MMOL/L (ref 4–13)
BACTERIA BLD CULT: NORMAL
BACTERIA SPEC BFLD CULT: NO GROWTH
BUN SERPL-MCNC: 75 MG/DL (ref 5–25)
CALCIUM SERPL-MCNC: 8.1 MG/DL (ref 8.3–10.1)
CHLORIDE SERPL-SCNC: 96 MMOL/L (ref 100–108)
CO2 SERPL-SCNC: 29 MMOL/L (ref 21–32)
CREAT SERPL-MCNC: 8.49 MG/DL (ref 0.6–1.3)
ERYTHROCYTE [DISTWIDTH] IN BLOOD BY AUTOMATED COUNT: 16.2 % (ref 11.6–15.1)
GFR SERPL CREATININE-BSD FRML MDRD: 6 ML/MIN/1.73SQ M
GLUCOSE SERPL-MCNC: 109 MG/DL (ref 65–140)
GLUCOSE SERPL-MCNC: 110 MG/DL (ref 65–140)
GLUCOSE SERPL-MCNC: 123 MG/DL (ref 65–140)
GLUCOSE SERPL-MCNC: 125 MG/DL (ref 65–140)
GLUCOSE SERPL-MCNC: 136 MG/DL (ref 65–140)
GRAM STN SPEC: NORMAL
GRAM STN SPEC: NORMAL
HCT VFR BLD AUTO: 23.4 % (ref 36.5–49.3)
HGB BLD-MCNC: 7.1 G/DL (ref 12–17)
MCH RBC QN AUTO: 32 PG (ref 26.8–34.3)
MCHC RBC AUTO-ENTMCNC: 30.3 G/DL (ref 31.4–37.4)
MCV RBC AUTO: 105 FL (ref 82–98)
PLATELET # BLD AUTO: 137 THOUSANDS/UL (ref 149–390)
PMV BLD AUTO: 10.3 FL (ref 8.9–12.7)
POTASSIUM SERPL-SCNC: 4 MMOL/L (ref 3.5–5.3)
RBC # BLD AUTO: 2.22 MILLION/UL (ref 3.88–5.62)
SODIUM SERPL-SCNC: 135 MMOL/L (ref 136–145)
WBC # BLD AUTO: 12.59 THOUSAND/UL (ref 4.31–10.16)

## 2019-10-12 PROCEDURE — 90935 HEMODIALYSIS ONE EVALUATION: CPT | Performed by: INTERNAL MEDICINE

## 2019-10-12 PROCEDURE — 80048 BASIC METABOLIC PNL TOTAL CA: CPT | Performed by: INTERNAL MEDICINE

## 2019-10-12 PROCEDURE — 85027 COMPLETE CBC AUTOMATED: CPT | Performed by: INTERNAL MEDICINE

## 2019-10-12 PROCEDURE — 99232 SBSQ HOSP IP/OBS MODERATE 35: CPT | Performed by: INTERNAL MEDICINE

## 2019-10-12 PROCEDURE — 82948 REAGENT STRIP/BLOOD GLUCOSE: CPT

## 2019-10-12 RX ADMIN — HEPARIN SODIUM 5000 UNITS: 5000 INJECTION INTRAVENOUS; SUBCUTANEOUS at 21:59

## 2019-10-12 RX ADMIN — HEPARIN SODIUM 5000 UNITS: 5000 INJECTION INTRAVENOUS; SUBCUTANEOUS at 05:22

## 2019-10-12 RX ADMIN — PANTOPRAZOLE SODIUM 40 MG: 40 TABLET, DELAYED RELEASE ORAL at 05:22

## 2019-10-12 RX ADMIN — HEPARIN SODIUM 5000 UNITS: 5000 INJECTION INTRAVENOUS; SUBCUTANEOUS at 14:05

## 2019-10-12 RX ADMIN — DOCUSATE SODIUM 100 MG: 100 CAPSULE, LIQUID FILLED ORAL at 17:02

## 2019-10-12 RX ADMIN — OXYCODONE HYDROCHLORIDE 5 MG: 5 TABLET ORAL at 15:58

## 2019-10-12 RX ADMIN — RENAGEL 800 MG: 800 TABLET ORAL at 17:02

## 2019-10-12 RX ADMIN — EPOETIN ALFA 4000 UNITS: 4000 SOLUTION INTRAVENOUS; SUBCUTANEOUS at 12:05

## 2019-10-12 RX ADMIN — Medication 1 CAPSULE: at 17:02

## 2019-10-12 RX ADMIN — OXYCODONE HYDROCHLORIDE 5 MG: 5 TABLET ORAL at 22:07

## 2019-10-12 RX ADMIN — ACETAMINOPHEN 650 MG: 325 TABLET ORAL at 14:03

## 2019-10-12 RX ADMIN — RENAGEL 800 MG: 800 TABLET ORAL at 14:04

## 2019-10-12 RX ADMIN — NYSTATIN: 100000 POWDER TOPICAL at 09:09

## 2019-10-12 NOTE — ASSESSMENT & PLAN NOTE
· Monitor closely  Hemoglobin went down today to 7 2   · No active bleeding  · EPO shots, as per Nephrology, 3 times a week

## 2019-10-12 NOTE — HEMODIALYSIS
Patient tolerated 4 hour treatment with removal of 1 6kg  No distress noted post treatment,BP stable throughout treatment

## 2019-10-12 NOTE — PROGRESS NOTES
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 contributing to extensive wound development   Mild leukocytosis   May be reactive to inflammation in setting of extensive wounds  Wounds reportedly remains stable with no overt evidence of developing cellulitis  Rec:  · Continue to monitor closely off antibiotics  · Continue close serial skin exams  · [facility] per Podiatry and RN   · Follow up 10/10/19 skin biopsy results     2  Groin/inner thigh intertrigo  Developed with increased overall edema likely secondary to ascites  Improving  Rec:  Continue barrier cream prn  Continue nystatin powder BID     3   s/p recent Shewanella putrefaciens bacteremia    Presented 9/21/19 Septic shock, POA with leukopenia, tachycardia, refractory hypotension  Suspect primary skin portal of entry with cellulitis as below with secondary infection of HD catheter, s/p removal   Repeat blood cultures negative  Rec:  ? Finished Cipro completing 14 days total of antibiotics through 10/4/19     4   ESRD on HD   Via [facility]   status post new PermCath 10/1/19 in setting of #2     Rec:  ? Dialysis schedule is Tuesday Thursday Saturday     5   Polycystic kidney/liver disease   Extensive disease seen on CT imaging  S/p IR tube check in right renal cyst and changed today and status post paracentesis   Parasitic fluid with 446 white cells and 45% neutrophils, Ascites fluid cx remains negative    SBP clinically not suspected      6   Chronic foot wounds   Likely multifactorial   Appear superficial   Risk factor for infection as above  Rec:  ? Local wound care per Podiatry     Antibiotics:  None       Subjective:  Patient states he feels tired  Currently receiving hemodialysis at bedside  Denies fevers, chills  Tolerating oral intake  Objective:  Vitals:  Temp:  [97 3 °F (36 3 °C)-98 4 °F (36 9 °C)] 97 3 °F (36 3 °C)  HR:  [] 113  Resp:  [16-18] 18  BP: ()/(42-99) 114/65  SpO2:  [96 %-97 %] 97 %  Temp (24hrs), Av °F (36 7 °C), Min:97 3 °F (36 3 °C), Max:98 4 °F (36 9 °C)  Current: Temperature: (!) 97 3 °F (36 3 °C)    Physical Exam:   General:  No acute distress, pale  Eyes:  Conjunctive clear with no hemorrhages or effusions  Oropharynx:  No ulcers, no lesions  Neck:  Supple, no lymphadenopathy  Lungs:  Clear to auscultation bilaterally, no accessory muscle use  Cardiac:  Regular rate and rhythm, no murmurs  Abdomen:  Soft, non-tender, non-distented  Extremities:  No peripheral cyanosis, clubbing, or edema  Skin:  Bilateral lower extremity eschars on, with prominent eschar at left medial thigh with mild surrounding erythema  No fluctuance, drainage  Neurological:  Moves all four extremities spontaneously    Lab Results:  I have personally reviewed pertinent labs  Results from last 7 days   Lab Units 10/12/19  0932 10/11/19  0443 10/10/19  0442 10/09/19  0605  10/06/19  1908   POTASSIUM mmol/L 4 0 4 0 4 5 4 5   < > 4 8   CHLORIDE mmol/L 96* 98* 96* 93*   < > 92*   CO2 mmol/L 29 28 27 24   < > 29   BUN mg/dL 75* 55* 66* 58*   < > 60*   CREATININE mg/dL 8 49* 6 82* 8 62* 7 60*   < > 7 54*   EGFR ml/min/1 73sq m 6 8 6 7   < > 7   CALCIUM mg/dL 8 1* 8 0* 8 1* 8 5   < > 8 6   AST U/L  --   --   --  32  --  32   ALT U/L  --   --   --  15  --  20   ALK PHOS U/L  --   --   --  229*  --  236*    < > = values in this interval not displayed       Results from last 7 days   Lab Units 10/12/19  0932 10/11/19  0443 10/10/19  0442   WBC Thousand/uL 12 59* 11 16* 12 69*   HEMOGLOBIN g/dL 7 1* 7 2* 8 1*   PLATELETS Thousands/uL 137* 123* 193     Results from last 7 days   Lab Units 10/09/19  1122 10/07/19  1301 10/06/19  1922 10/06/19  1908   BLOOD CULTURE   --  No Growth After 4 Days  No Growth After 5 Days  No Growth After 5 Days  GRAM STAIN RESULT  Rare Polys  No bacteria seen  --   --   --    BODY FLUID CULTURE, STERILE  No growth  --   --   --        Imaging Studies:   I have personally reviewed pertinent imaging study reports and images in PACS  EKG, Pathology, and Other Studies:   I have personally reviewed pertinent reports

## 2019-10-12 NOTE — PROGRESS NOTES
Progress Note - Alana Kemp 1960, 64 y o  male MRN: [id number]    Unit/Bed#: -01 Encounter: [id number]    Primary Care Provider: Teodoro Watts MD   Date and time admitted to hospital: 10/6/2019  6:37 PM        * Hypotension  Assessment & Plan  · History of chronic hypotension, per patient he typically runs anywhere from  systolic  · Continue midodrine on dialysis days  · Monitor blood pressure    Ascites  Assessment & Plan  · Status post paracentesis  Ascitic fluid was noted to be cloudy with 200 PMNs  Not consistent with SBP  Will continue to monitor clinically at this time  Abdominal pain is actually improved today  · May need further therapeutic paracenteses, monitor clinically    Ileus [facility])  Assessment & Plan  · With history of right inguinal hernia for more than 30 years  · Surgery on board  Bowel regimen  No surgical intervention indicated at this time  · Recommend MiraLax daily  · Encourage out of bed and ambulation  · P o  Pain medication p r n  And avoid narcotics as much as possible    Anemia due to chronic kidney disease, on chronic dialysis [facility])  Assessment & Plan  · Monitor closely  Hemoglobin went down today to 7 2   · No active bleeding  · EPO shots, as per Nephrology, 3 times a week  Atrial fibrillation (HCC)  Assessment & Plan  · Developed atrial fibrillation with rapid ventricular response during last admission  · Not on any anticoagulation per Cardiology  · Continue metoprolol 12 5 mg b i d  With hold parameters  · Monitor vital signs  Benign essential hypertension  Assessment & Plan  · Per Cardiology, continue metoprolol with hold parameters at reduced dose  · Midodrine for hypotension  · Monitor blood pressure    Polycystic kidney disease  Assessment & Plan  · Patient still has indwelling DILIA drain for the renal cyst   · Will need to monitor output over the next 24 hours    If less than 10 cc over the next 24 hours will consider removing and discussed with IR as if not draining anymore this will only act as potential nidus for infection  · IR consulted    Multiple wounds of skin  Assessment & Plan  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  · Continue local wound care  · Follow-up on biopsy results  · Dermatology and Infectious Disease input noted    Hyponatremia  Assessment & Plan  · Monitor sodium levels  · Nephrology following    Chronic kidney disease with end stage renal failure on dialysis [facility])  Assessment & Plan  · Continue hemodialysis per Nephrology  VTE Pharmacologic Prophylaxis:   Pharmacologic: Heparin  Mechanical VTE Prophylaxis in Place: Yes    Patient Centered Rounds: I have performed bedside rounds with nursing staff today  Discussions with Specialists or Other Care Team Provider:     Education and Discussions with Family / Patient:  Discussed with the patient    Time Spent for Care: 30 minutes  More than 50% of total time spent on counseling and coordination of care as described above  Current Length of Stay: 5 day(s)    Current Patient Status: Inpatient   Certification Statement: The patient will continue to require additional inpatient hospital stay due to As mentioned    Discharge Plan:  Awaiting clinical and symptomatic improvement    Code Status: Level 1 - Full Code      Subjective:     Comfortably in bed  Pulse feeling okay  History chart labs medications reviewed    Objective:     Vitals:   Temp (24hrs), Av °F (36 7 °C), Min:97 3 °F (36 3 °C), Max:98 4 °F (36 9 °C)    Temp:  [97 3 °F (36 3 °C)-98 4 °F (36 9 °C)] 97 3 °F (36 3 °C)  HR:  [] 104  Resp:  [16-18] 18  BP: ()/(42-99) 116/63  SpO2:  [96 %-97 %] 97 %  Body mass index is 28 66 kg/m²  Input and Output Summary (last 24 hours):        Intake/Output Summary (Last 24 hours) at 10/12/2019 1420  Last data filed at 10/12/2019 1330  Gross per 24 hour   Intake 1200 ml   Output 3700 ml   Net -2500 ml       Physical Exam:     Physical Exam    Comfortably in bed  Neck supple  Lungs diminished breath sounds bases  Heart sounds S1-S2 noted  Abdomen soft  Ascites noted  Awake alert obeys simple commands  Bilateral lower extremity ulcerative lesions noted  Pulses noted  No rash    Additional Data:     Labs:    Results from last 7 days   Lab Units 10/12/19  0932 10/11/19  0443  10/08/19  0500   WBC Thousand/uL 12 59* 11 16*   < > 8 23   HEMOGLOBIN g/dL 7 1* 7 2*   < > 8 9*   HEMATOCRIT % 23 4* 23 6*   < > 28 8*   PLATELETS Thousands/uL 137* 123*   < > 230   BANDS PCT %  --  10*  --   --    NEUTROS PCT %  --   --   --  75   LYMPHS PCT %  --   --   --  6*   LYMPHO PCT %  --  6*  --   --    MONOS PCT %  --   --   --  15*   MONO PCT %  --  8  --   --    EOS PCT %  --  1  --  3    < > = values in this interval not displayed  Results from last 7 days   Lab Units 10/12/19  0932  10/09/19  0605   SODIUM mmol/L 135*   < > 132*   POTASSIUM mmol/L 4 0   < > 4 5   CHLORIDE mmol/L 96*   < > 93*   CO2 mmol/L 29   < > 24   BUN mg/dL 75*   < > 58*   CREATININE mg/dL 8 49*   < > 7 60*   ANION GAP mmol/L 10   < > 15*   CALCIUM mg/dL 8 1*   < > 8 5   ALBUMIN g/dL  --   --  1 6*   TOTAL BILIRUBIN mg/dL  --   --  2 40*   ALK PHOS U/L  --   --  229*   ALT U/L  --   --  15   AST U/L  --   --  32   GLUCOSE RANDOM mg/dL 110   < > 92    < > = values in this interval not displayed       Results from last 7 days   Lab Units 10/06/19  1908   INR  1 34*     Results from last 7 days   Lab Units 10/12/19  1111 10/12/19  0741 10/11/19  2105 10/11/19  1540 10/11/19  1052 10/11/19  0738 10/10/19  2049 10/10/19  1510 10/10/19  1104 10/10/19  0814 10/09/19  2054 10/09/19  1553   POC GLUCOSE mg/dl 109 123 120 144* 279* 131 158* 138 115 115 142* 145*         Results from last 7 days   Lab Units 10/09/19  1040 10/06/19  1908   LACTIC ACID mmol/L  --  2 1*   PROCALCITONIN ng/ml 8 29* 10 48*           * I Have Reviewed All Lab Data Listed Above  * Additional Pertinent Lab Tests Reviewed: All Labs Within Last 24 Hours Reviewed    Imaging:    Imaging Reports Reviewed Today Include:  Imaging studies noted  Imaging Personally Reviewed by Myself Includes:     Recent Cultures (last 7 days):     Results from last 7 days   Lab Units 10/09/19  1122 10/07/19  1301 10/06/19  1922 10/06/19  1908   BLOOD CULTURE   --  No Growth After 4 Days  No Growth After 5 Days  No Growth After 5 Days     GRAM STAIN RESULT  Rare Polys  No bacteria seen  --   --   --    BODY FLUID CULTURE, STERILE  No growth  --   --   --        Last 24 Hours Medication List:     Current Facility-Administered Medications:  acetaminophen 650 mg Oral Q6H PRN Ifeoma Sandhu PA-C    albumin human 25 g Intravenous PRN SAM Soliz Last Rate: 0 g (10/08/19 1006)   b complex-vitamin C-folic acid 1 capsule Oral Daily With Luis Alberto Vera PA-C    docusate sodium 100 mg Oral BID Allan Robert MD    epoetin bob 4,000 Units Intravenous Once per day on Tue Thu Sat SAM Soliz    gabapentin 100 mg Oral Daily Ifeoma Sandhu PA-C    heparin (porcine) 5,000 Units Subcutaneous [facility] Ifeoma Sandhu PA-C    Lidocaine Viscous HCl 15 mL Swish & Spit 4x Daily PRN Ifeoma Sandhu PA-C    lidocaine-epinephrine 20 mL Infiltration Once Homero Valentin DO    metoprolol tartrate 12 5 mg Oral Q12H Darby Mohs, PA-C    midodrine 10 mg Oral Before Dialysis Ifeoma Sandhu PA-C    midodrine 10 mg Oral Once in dialysis SAM Soliz    nystatin  Topical BID Faizan Carlin PA-C    ondansetron 4 mg Intravenous Q6H PRN Ifeoma Sandhu PA-C    oxyCODONE 5 mg Oral Q4H PRN Ifeoma Sandhu PA-C    pantoprazole 40 mg Oral Early Morning Ifeoma Sandhu PA-C    sevelamer 800 mg Oral TID With Meals SAM Soliz    simethicone 80 mg Oral Q6H PRN Fredderick Meuse, PA-C         Today, Patient Was Seen By: Regina Quigley MD    ** Please Note: Dictation voice to text software may have been used in the creation of this document   **

## 2019-10-12 NOTE — ASSESSMENT & PLAN NOTE
· Patient still has indwelling DILIA drain for the renal cyst   · Will need to monitor output over the next 24 hours  If less than 10 cc over the next 24 hours will consider removing and discussed with IR as if not draining anymore this will only act as potential nidus for infection    · IR consulted

## 2019-10-12 NOTE — PROGRESS NOTES
Pt is stable and resting comfortably in bed  Bed is locked on lowest position and alarmed  Call bell with in reach  Will continue to monitor      Mario Marcial RN

## 2019-10-12 NOTE — ASSESSMENT & PLAN NOTE
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

## 2019-10-12 NOTE — PROGRESS NOTES
[address] [facility] NOTE   Sahra Cornell 64 y o  male MRN: [id number]  Unit/Bed#: -01 Encounter: [id number]  Reason for Consult: ESRD    ASSESSMENT and PLAN:    65 yo male with PMHx of ESRD on HD, a fib with RVR, HTN, PKD, recent admission and d/c on 10/3 for shewanella putrifacians infection, who presents on 10/6 with abd pain and LE edema  During prior admission, pt had TDC removed and was on CRRT for shock  Eventually [facility] was reinserted on 9/30/19       1) ESRD     - outpatient dialysis unit [address] [facility]  - outpatient dialysis time TTS (was on MWF)  - access R IJ TDC  - agree with hold ca based phos binder  - cont phos binder  - Qtc prolonged  - consider sodium thiosulfate soon - await biopsy results  - albumin with dialysis and midodrine with dialysis  - PTH is not elevated  - HD Saturday - seen and examined on dialysis  UF 2 5 L if tolerates  - please do not d/c from renal standpoint until final pathology returns and we have plan thereafter  Thank you  - pt will next be evaluated on Monday  Please call if issues shall arise until then anytime  Thank you       2) abd pain     - CT  completed  Ascites  Ileus  - Per Primary Team and Surgery team  - to note  Pt has PKD  - bili leone down trending  - s/p paracentesis 10/9 - f/u final studies     3) MBD - cont phos binder     4) hypotension      - on midodrine     5) anemia     - monitoring Hb  - iron deficient   Ferritin high  - transfuse if Hb < 7     6) LE wounds - per Primary Care team     - prior Derm Eval did not appear to be calciphylaxis   - but, proximal lesions currently may be starting of calciphylaxis?  - may need to consider sodium thiosulfate  - Derm Consult reviewed  - Surgery completed biopsy on 10/01     7) prior D Dimer elevation     - per Primary Team  - V/Q unrevealing  - prior duplex unrevealing LE     8) drain management - per IR     9) cardiomyopathy - pt was to have outpatient eval    SUBJECTIVE / INTERVAL HISTORY:  Pt denies complaints  Seen and examined on dialysis  OBJECTIVE:  Current Weight: Weight - Scale: 85 5 kg (188 lb 7 9 oz)(unable to ambulate)  Vitals:    10/12/19 0400 10/12/19 0537 10/12/19 0700 10/12/19 0758   BP: 102/56  105/99 105/99   BP Location:   Left arm    Pulse:   98    Resp:   18    Temp:   (!) 97 3 °F (36 3 °C)    TempSrc:   Oral    SpO2:   97%    Weight:  85 5 kg (188 lb 7 9 oz)     Height:           Intake/Output Summary (Last 24 hours) at 10/12/2019 0936  Last data filed at 10/12/2019 0453  Gross per 24 hour   Intake 900 ml   Output 700 ml   Net 200 ml     General: NAD  Skin: no rash  Eyes: anicteric sclera  ENT: moist mucous membrane  Neck: supple  Chest: CTA b/l, no ronchii, no wheeze, no rubs, no rales  CVS: s1s2, no murmur, no gallop, no rub  Abdomen: soft, nontender, nl sounds, distended  Extremities: no edema LE b/l but b/l LE wrapped  Wounds necrotic in places   Erythema proximally  : no meeks  Neuro: AAOX3  Psych: normal affect    Medications:    Current Facility-Administered Medications:     acetaminophen (TYLENOL) tablet 650 mg, 650 mg, Oral, Q6H PRN, Jeff Bedoya PA-C, 650 mg at 10/11/19 2112    albumin human (FLEXBUMIN) 25 % injection 25 g, 25 g, Intravenous, PRN, PedALEX NielsenNP, Last Rate: 0 mL/hr at 10/08/19 1006, 25 g at 10/11/19 2339    b complex-vitamin C-folic acid (NEPHROCAPS) capsule 1 capsule, 1 capsule, Oral, Daily With Brigitte Holloway PA-C, 1 capsule at 10/11/19 1727    docusate sodium (COLACE) capsule 100 mg, 100 mg, Oral, BID, Clifton Luevano MD, Stopped at 10/12/19 0908    epoetin bob (EPOGEN,PROCRIT) injection 4,000 Units, 4,000 Units, Intravenous, Once per day on Tue Thu Sat, PedALEX NielsenNP, 4,000 Units at 10/10/19 1836    gabapentin (NEURONTIN) capsule 100 mg, 100 mg, Oral, Daily, Jeff Bedoya PA-C, Stopped at 10/12/19 0908    heparin (porcine) subcutaneous injection 5,000 Units, 5,000 Units, Subcutaneous, Q8H [facility], 5,000 Units at 10/12/19 1623 **AND** Platelet count, , , Once, Bob Vang PA-C    Lidocaine Viscous HCl (XYLOCAINE) 2 % mucosal solution 15 mL, 15 mL, Swish & Spit, 4x Daily PRN, Bob Vang PA-C    lidocaine-epinephrine (XYLOCAINE-MPF/EPINEPHRINE) 1%-1:200,000 injection 20 mL, 20 mL, Infiltration, Once, Homero Valentin DO    metoprolol tartrate (LOPRESSOR) partial tablet 12 5 mg, 12 5 mg, Oral, Q12H [place], Bob Vang PA-C, Stopped at 10/12/19 0909    midodrine (PROAMATINE) tablet 10 mg, 10 mg, Oral, Before Dialysis, Bob Vang PA-C, 10 mg at 10/10/19 1457    midodrine (PROAMATINE) tablet 10 mg, 10 mg, Oral, Once in dialysis, SAM Johnston, Stopped at 10/08/19 1320    nystatin (MYCOSTATIN) powder, , Topical, BID, Oni Swanson PA-C    ondansetron [facility]) injection 4 mg, 4 mg, Intravenous, Q6H PRN, Bob Vang PA-C    oxyCODONE (ROXICODONE) IR tablet 5 mg, 5 mg, Oral, Q4H PRN, Bob Vang PA-C, 5 mg at 10/10/19 0303    pantoprazole (PROTONIX) EC tablet 40 mg, 40 mg, Oral, Early Morning, Bob Vang PA-C, 40 mg at 10/12/19 0522    sevelamer (RENAGEL) tablet 800 mg, 800 mg, Oral, TID With Meals, SAM Johnston, Stopped at 10/12/19 0909    simethicone (MYLICON) chewable tablet 80 mg, 80 mg, Oral, Q6H PRN, Bob Vang PA-C    Laboratory Results:  Results from last 7 days   Lab Units 10/11/19  0443 10/10/19  0442 10/09/19  0605 10/08/19  0500 10/07/19  1301 10/06/19  1908   WBC Thousand/uL 11 16* 12 69*  --  8 23 7 42 6 93   HEMOGLOBIN g/dL 7 2* 8 1*  --  8 9* 9 0* 9 4*   HEMATOCRIT % 23 6* 26 7*  --  28 8* 29 2* 30 4*   PLATELETS Thousands/uL 123* 193  --  230 232 263   POTASSIUM mmol/L 4 0 4 5 4 5 5 2 4 9 4 8   CHLORIDE mmol/L 98* 96* 93* 90* 94* 92*   CO2 mmol/L 28 27 24 26 27 29   BUN mg/dL 55* 66* 58* 74* 70* 60*   CREATININE mg/dL 6 82* 8 62* 7 60* 8 93* 8 39* 7 54*   CALCIUM mg/dL 8 0* 8 1* 8 5 8 5 8 6 8 6   PHOSPHORUS mg/dL  --  6 1* 6 7*  --   --   --

## 2019-10-13 LAB
GLUCOSE SERPL-MCNC: 122 MG/DL (ref 65–140)
GLUCOSE SERPL-MCNC: 124 MG/DL (ref 65–140)
GLUCOSE SERPL-MCNC: 141 MG/DL (ref 65–140)

## 2019-10-13 PROCEDURE — 82948 REAGENT STRIP/BLOOD GLUCOSE: CPT

## 2019-10-13 PROCEDURE — 99232 SBSQ HOSP IP/OBS MODERATE 35: CPT | Performed by: INTERNAL MEDICINE

## 2019-10-13 RX ADMIN — ACETAMINOPHEN 650 MG: 325 TABLET ORAL at 05:53

## 2019-10-13 RX ADMIN — GABAPENTIN 100 MG: 100 CAPSULE ORAL at 08:06

## 2019-10-13 RX ADMIN — HEPARIN SODIUM 5000 UNITS: 5000 INJECTION INTRAVENOUS; SUBCUTANEOUS at 13:44

## 2019-10-13 RX ADMIN — HEPARIN SODIUM 5000 UNITS: 5000 INJECTION INTRAVENOUS; SUBCUTANEOUS at 22:02

## 2019-10-13 RX ADMIN — NYSTATIN 1 APPLICATION: 100000 POWDER TOPICAL at 08:08

## 2019-10-13 RX ADMIN — DOCUSATE SODIUM 100 MG: 100 CAPSULE, LIQUID FILLED ORAL at 08:06

## 2019-10-13 RX ADMIN — NYSTATIN: 100000 POWDER TOPICAL at 18:23

## 2019-10-13 RX ADMIN — RENAGEL 800 MG: 800 TABLET ORAL at 08:06

## 2019-10-13 RX ADMIN — DOCUSATE SODIUM 100 MG: 100 CAPSULE, LIQUID FILLED ORAL at 18:23

## 2019-10-13 RX ADMIN — Medication 1 CAPSULE: at 18:23

## 2019-10-13 RX ADMIN — PANTOPRAZOLE SODIUM 40 MG: 40 TABLET, DELAYED RELEASE ORAL at 05:53

## 2019-10-13 RX ADMIN — OXYCODONE HYDROCHLORIDE 5 MG: 5 TABLET ORAL at 10:03

## 2019-10-13 RX ADMIN — RENAGEL 800 MG: 800 TABLET ORAL at 13:44

## 2019-10-13 RX ADMIN — HEPARIN SODIUM 5000 UNITS: 5000 INJECTION INTRAVENOUS; SUBCUTANEOUS at 05:53

## 2019-10-13 RX ADMIN — RENAGEL 800 MG: 800 TABLET ORAL at 18:23

## 2019-10-13 RX ADMIN — OXYCODONE HYDROCHLORIDE 5 MG: 5 TABLET ORAL at 15:47

## 2019-10-13 NOTE — PROGRESS NOTES
Progress Note - Donnie Benavides 1960, 64 y o  male MRN: [id number]    Unit/Bed#: -01 Encounter: [id number]    Primary Care Provider: Janice Orr MD   Date and time admitted to hospital: 10/6/2019  6:37 PM        * Hypotension  Assessment & Plan  · History of chronic hypotension, per patient he typically runs anywhere from  systolic  · Continue midodrine on dialysis days  · Monitor blood pressure    Ascites  Assessment & Plan  · Status post paracentesis  Ascitic fluid was noted to be cloudy with 200 PMNs  Not consistent with SBP  Will continue to monitor clinically at this time  Abdominal pain is actually improved today  · May need further therapeutic paracenteses, monitor clinically    [facility])  Assessment & Plan  · With history of right inguinal hernia for more than 30 years  · Surgery on board  Bowel regimen  No surgical intervention indicated at this time  · Recommend MiraLax daily  · Encourage out of bed and ambulation  · P o  Pain medication p r n  And avoid narcotics as much as possible    Anemia due to chronic kidney disease, on chronic dialysis [facility])  Assessment & Plan  · Monitor closely  Hemoglobin went down today to 7 2   · No active bleeding  · EPO shots, as per Nephrology, 3 times a week  Atrial fibrillation (HCC)  Assessment & Plan  · Developed atrial fibrillation with rapid ventricular response during last admission  · Not on any anticoagulation per Cardiology  · Continue metoprolol 12 5 mg b i d  With hold parameters  Benign essential hypertension  Assessment & Plan  · Per Cardiology, continue metoprolol with hold parameters at reduced dose  · Midodrine for hypotension  · Monitor blood pressure    Polycystic kidney disease  Assessment & Plan  · Patient still has indwelling DILIA drain for the renal cyst   · Will need to monitor output over the next 24 hours    If less than 10 cc over the next 24 hours will consider removing and discussed with IR as if not draining anymore this will only act as potential nidus for infection  · IR consulted    Multiple wounds of skin  Assessment & Plan  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  · Continue local wound care  · Follow-up on biopsy results  · Dermatology and Infectious Disease input noted    Hyponatremia  Assessment & Plan  · Monitor sodium levels  · Nephrology following    Chronic kidney disease with end stage renal failure on dialysis [facility])  Assessment & Plan  · Continue hemodialysis per Nephrology  VTE Pharmacologic Prophylaxis:   Pharmacologic: Heparin  Mechanical VTE Prophylaxis in Place: Yes    Patient Centered Rounds: I have performed bedside rounds with nursing staff today  Discussions with Specialists or Other Care Team Provider:  Nephrology    Education and Discussions with Family / Patient:  Discussed with the patient, spouse at bedside discussed in detail questions answered    Time Spent for Care: 30 minutes  More than 50% of total time spent on counseling and coordination of care as described above  Current Length of Stay: 6 day(s)    Current Patient Status: Inpatient   Certification Statement: The patient will continue to require additional inpatient hospital stay due to As mentioned    Discharge Plan:  Awaiting skin biopsy results discussed with Nephrology Dr Judson Linn    Code Status: Level 1 - Full Code      Subjective:     Comfortably in bed  Encouraged out of bed into a chair    Objective:     Vitals:   Temp (24hrs), Av 7 °F (37 1 °C), Min:97 8 °F (36 6 °C), Max:99 6 °F (37 6 °C)    Temp:  [97 8 °F (36 6 °C)-99 6 °F (37 6 °C)] 97 8 °F (36 6 °C)  HR:  [] 110  Resp:  [18] 18  BP: ()/(48-59) 100/59  SpO2:  [96 %] 96 %  Body mass index is 27 89 kg/m²       Input and Output Summary (last 24 hours):     No intake or output data in the 24 hours ending 10/13/19 1357    Physical Exam:     Physical Exam    Comfortably in bed  Neck supple  Lungs diminished breath sounds bilaterally  Heart sounds S1-S2 noted  Abdomen soft ascites noted  Awake obeys simple commands  Bilateral lower extremity ulcerations noted  Pulses noted  No rash    Additional Data:     Labs:    Results from last 7 days   Lab Units 10/12/19  0932 10/11/19  0443  10/08/19  0500   WBC Thousand/uL 12 59* 11 16*   < > 8 23   HEMOGLOBIN g/dL 7 1* 7 2*   < > 8 9*   HEMATOCRIT % 23 4* 23 6*   < > 28 8*   PLATELETS Thousands/uL 137* 123*   < > 230   BANDS PCT %  --  10*  --   --    NEUTROS PCT %  --   --   --  75   LYMPHS PCT %  --   --   --  6*   LYMPHO PCT %  --  6*  --   --    MONOS PCT %  --   --   --  15*   MONO PCT %  --  8  --   --    EOS PCT %  --  1  --  3    < > = values in this interval not displayed  Results from last 7 days   Lab Units 10/12/19  0932  10/09/19  0605   SODIUM mmol/L 135*   < > 132*   POTASSIUM mmol/L 4 0   < > 4 5   CHLORIDE mmol/L 96*   < > 93*   CO2 mmol/L 29   < > 24   BUN mg/dL 75*   < > 58*   CREATININE mg/dL 8 49*   < > 7 60*   ANION GAP mmol/L 10   < > 15*   CALCIUM mg/dL 8 1*   < > 8 5   ALBUMIN g/dL  --   --  1 6*   TOTAL BILIRUBIN mg/dL  --   --  2 40*   ALK PHOS U/L  --   --  229*   ALT U/L  --   --  15   AST U/L  --   --  32   GLUCOSE RANDOM mg/dL 110   < > 92    < > = values in this interval not displayed       Results from last 7 days   Lab Units 10/06/19  1908   INR  1 34*     Results from last 7 days   Lab Units 10/13/19  1127 10/13/19  0806 10/12/19  2056 10/12/19  1625 10/12/19  1111 10/12/19  0741 10/11/19  2105 10/11/19  1540 10/11/19  1052 10/11/19  0738 10/10/19  2049 10/10/19  1510   POC GLUCOSE mg/dl 124 141* 136 125 109 123 120 144* 279* 131 158* 138         Results from last 7 days   Lab Units 10/09/19  1040 10/06/19  1908   LACTIC ACID mmol/L  --  2 1*   PROCALCITONIN ng/ml 8 29* 10 48*           * I Have Reviewed All Lab Data Listed Above  * Additional Pertinent Lab Tests Reviewed: All Labs Within Last 24 Hours Reviewed    Imaging:    Imaging Reports Reviewed Today Include:   Imaging Personally Reviewed by Myself Includes:     Recent Cultures (last 7 days):     Results from last 7 days   Lab Units 10/09/19  1122 10/07/19  1301 10/06/19  1922 10/06/19  1908   BLOOD CULTURE   --  No Growth After 5 Days  No Growth After 5 Days  No Growth After 5 Days     GRAM STAIN RESULT  Rare Polys  No bacteria seen  --   --   --    BODY FLUID CULTURE, STERILE  No growth  --   --   --        Last 24 Hours Medication List:     Current Facility-Administered Medications:  acetaminophen 650 mg Oral Q6H PRN Jeff Bedoya, PA-SUKHDEV    albumin human 25 g Intravenous PRN Peder Darting, CRNP Last Rate: 0 g (10/08/19 1006)   b complex-vitamin C-folic acid 1 capsule Oral Daily With Ladan Harris PA-C    docusate sodium 100 mg Oral BID Clifton Luevano MD    epoetin bob 4,000 Units Intravenous Once per day on Tue Thu Sat Pedgwen Santamaria, SAM    gabapentin 100 mg Oral Daily Jeff PAUL Bedoya    heparin (porcine) 5,000 Units Subcutaneous [facility] [address] Jeff PAUL Bedoya    Lidocaine Viscous HCl 15 mL Swish & Spit 4x Daily PRN Jeff BedyoaPAUL rod    lidocaine-epinephrine 20 mL Infiltration Once Homero Valentin DO    metoprolol tartrate 12 5 mg Oral Q12H Mmae Ba PA-C    midodrine 10 mg Oral Before Dialysis Jeff PAUL Bedoya    midodrine 10 mg Oral Once in dialysis Jono Santamaria, ASM    nystatin  Topical BID Linda Rivers PA-C    ondansetron 4 mg Intravenous Q6H PRN Jeff Bedoya, PA-SUKHDEV    oxyCODONE 5 mg Oral Q4H PRN Jeff Bedoya, PA-SUKHDEV    pantoprazole 40 mg Oral Early Morning Jeff Bedoya, PA-SUKHDEV    sevelamer 800 mg Oral TID With Meals Pedgwen Darting, CROLIVIA    simethicone 80 mg Oral Q6H PRN Jeff PAUL Bedoya         Today, Patient Was Seen By: Josué Pacheco MD    ** Please Note: Dictation voice to text software may have been used in the creation of this document   **

## 2019-10-13 NOTE — PLAN OF CARE
Problem: Potential for Falls  Goal: Patient will remain free of falls  Description  INTERVENTIONS:  - Assess patient frequently for physical needs  -  Identify cognitive and physical deficits and behaviors that affect risk of falls    -  [place] fall precautions as indicated by assessment   - Educate patient/family on patient safety including physical limitations  - Instruct patient to call for assistance with activity based on assessment  - Modify environment to reduce risk of injury  - Consider OT/PT consult to assist with strengthening/mobility  Outcome: Progressing     Problem: Prexisting or High Potential for Compromised Skin Integrity  Goal: Skin integrity is maintained or improved  Description  INTERVENTIONS:  - Identify patients at risk for skin breakdown  - Assess and monitor skin integrity  - Assess and monitor nutrition and hydration status  - Monitor labs   - Assess for incontinence   - Turn and reposition patient  - Assist with mobility/ambulation  - Relieve pressure over bony prominences  - Avoid friction and shearing  - Provide appropriate hygiene as needed including keeping skin clean and dry  - Evaluate need for skin moisturizer/barrier cream  - Collaborate with interdisciplinary team   - Patient/family teaching  - Consider wound care consult   Outcome: Progressing     Problem: PAIN - ADULT  Goal: Verbalizes/displays adequate comfort level or baseline comfort level  Description  Interventions:  - Encourage patient to monitor pain and request assistance  - Assess pain using appropriate pain scale  - Administer analgesics based on type and severity of pain and evaluate response  - Implement non-pharmacological measures as appropriate and evaluate response  - Consider cultural and social influences on pain and pain management  - Notify physician/advanced practitioner if interventions unsuccessful or patient reports new pain  Outcome: Progressing     Problem: INFECTION - ADULT  Goal: Absence or prevention of progression during hospitalization  Description  INTERVENTIONS:  - Assess and monitor for signs and symptoms of infection  - Monitor lab/diagnostic results  - Monitor all insertion sites, i e  indwelling lines, tubes, and drains  - Monitor endotracheal if appropriate and nasal secretions for changes in amount and color  - [place] appropriate cooling/warming therapies per order  - Administer medications as ordered  - Instruct and encourage patient and family to use good hand hygiene technique  - Identify and instruct in appropriate isolation precautions for identified infection/condition  Outcome: Progressing  Goal: Absence of fever/infection during neutropenic period  Description  INTERVENTIONS:  - Monitor WBC    Outcome: Progressing     Problem: SAFETY ADULT  Goal: Patient will remain free of falls  Description  INTERVENTIONS:  - Assess patient frequently for physical needs  -  Identify cognitive and physical deficits and behaviors that affect risk of falls    -  [place] fall precautions as indicated by assessment   - Educate patient/family on patient safety including physical limitations  - Instruct patient to call for assistance with activity based on assessment  - Modify environment to reduce risk of injury  - Consider OT/PT consult to assist with strengthening/mobility  Outcome: Progressing  Goal: Maintain or return to baseline ADL function  Description  INTERVENTIONS:  -  Assess patient's ability to carry out ADLs; assess patient's baseline for ADL function and identify physical deficits which impact ability to perform ADLs (bathing, care of mouth/teeth, toileting, grooming, dressing, etc )  - Assess/evaluate cause of self-care deficits   - Assess range of motion  - Assess patient's mobility; develop plan if impaired  - Assess patient's need for assistive devices and provide as appropriate  - Encourage maximum independence but intervene and supervise when necessary  - Involve family in performance of ADLs  - Assess for home care needs following discharge   - Consider OT consult to assist with ADL evaluation and planning for discharge  - Provide patient education as appropriate  Outcome: Progressing  Goal: Maintain or return mobility status to optimal level  Description  INTERVENTIONS:  - Assess patient's baseline mobility status (ambulation, transfers, stairs, etc )    - Identify cognitive and physical deficits and behaviors that affect mobility  - Identify mobility aids required to assist with transfers and/or ambulation (gait belt, sit-to-stand, lift, walker, cane, etc )  - [place] fall precautions as indicated by assessment  - Record patient progress and toleration of activity level on Mobility SBAR; progress patient to next Phase/Stage  - Instruct patient to call for assistance with activity based on assessment  - Consider rehabilitation consult to assist with strengthening/weightbearing, etc   Outcome: Progressing     Problem: DISCHARGE PLANNING  Goal: Discharge to home or other facility with appropriate resources  Description  INTERVENTIONS:  - Identify barriers to discharge w/patient and caregiver  - Arrange for needed discharge resources and transportation as appropriate  - Identify discharge learning needs (meds, wound care, etc )  - Arrange for interpretive services to assist at discharge as needed  - Refer to Case Management Department for coordinating discharge planning if the patient needs post-hospital services based on physician/advanced practitioner order or complex needs related to functional status, cognitive ability, or social support system  Outcome: Progressing     Problem: Knowledge Deficit  Goal: Patient/family/caregiver demonstrates understanding of disease process, treatment plan, medications, and discharge instructions  Description  Complete learning assessment and assess knowledge base    Interventions:  - Provide teaching at level of understanding  - Provide teaching via preferred learning methods  Outcome: Progressing     Problem: GENITOURINARY - ADULT  Goal: Maintains or returns to baseline urinary function  Description  INTERVENTIONS:  - Assess urinary function  - Encourage oral fluids to ensure adequate hydration if ordered  - Administer IV fluids as ordered to ensure adequate hydration  - Administer ordered medications as needed  - Offer frequent toileting  - Follow urinary retention protocol if ordered  Outcome: Progressing  Goal: Absence of urinary retention  Description  INTERVENTIONS:  - Assess patients ability to void and empty bladder  - Monitor I/O  - Bladder scan as needed  - Discuss with physician/AP medications to alleviate retention as needed  - Discuss catheterization for long term situations as appropriate  Outcome: Progressing     Problem: METABOLIC, FLUID AND ELECTROLYTES - ADULT  Goal: Electrolytes maintained within normal limits  Description  INTERVENTIONS:  - Monitor labs and assess patient for signs and symptoms of electrolyte imbalances  - Administer electrolyte replacement as ordered  - Monitor response to electrolyte replacements, including repeat lab results as appropriate  - Instruct patient on fluid and nutrition as appropriate  Outcome: Progressing  Goal: Fluid balance maintained  Description  INTERVENTIONS:  - Monitor labs   - Monitor I/O and WT  - Instruct patient on fluid and nutrition as appropriate  - Assess for signs & symptoms of volume excess or deficit  Outcome: Progressing  Goal: Glucose maintained within target range  Description  INTERVENTIONS:  - Monitor Blood Glucose as ordered  - Assess for signs and symptoms of hyperglycemia and hypoglycemia  - Administer ordered medications to maintain glucose within target range  - Assess nutritional intake and initiate nutrition service referral as needed  Outcome: Progressing     Problem: SKIN/TISSUE INTEGRITY - ADULT  Goal: Skin integrity remains intact  Description  INTERVENTIONS  - Identify patients at risk for skin breakdown  - Assess and monitor skin integrity  - Assess and monitor nutrition and hydration status  - Monitor labs (i e  albumin)  - Assess for incontinence   - Turn and reposition patient  - Assist with mobility/ambulation  - Relieve pressure over bony prominences  - Avoid friction and shearing  - Provide appropriate hygiene as needed including keeping skin clean and dry  - Evaluate need for skin moisturizer/barrier cream  - Collaborate with interdisciplinary team (i e  Nutrition, Rehabilitation, etc )   - Patient/family teaching  Outcome: Progressing  Goal: Incision(s), wounds(s) or drain site(s) healing without S/S of infection  Description  INTERVENTIONS  - Assess and document risk factors for skin impairment   - Assess and document dressing, incision, wound bed, drain sites and surrounding tissue  - Consider nutrition services referral as needed  - Oral mucous membranes remain intact  - Provide patient/ family education  Outcome: Progressing  Goal: Oral mucous membranes remain intact  Description  INTERVENTIONS  - Assess oral mucosa and hygiene practices  - Implement preventative oral hygiene regimen  - Implement oral medicated treatments as ordered  - Initiate Nutrition services referral as needed  Outcome: Progressing     Problem: Nutrition/Hydration-ADULT  Goal: Nutrient/Hydration intake appropriate for improving, restoring or maintaining nutritional needs  Description  Monitor and assess patient's nutrition/hydration status for malnutrition  Collaborate with interdisciplinary team and initiate plan and interventions as ordered  Monitor patient's weight and dietary intake as ordered or per policy  Utilize nutrition screening tool and intervene as necessary  Determine patient's food preferences and provide high-protein, high-caloric foods as appropriate       INTERVENTIONS:  - Monitor oral intake, urinary output, labs, and treatment plans  - Assess nutrition and hydration status and recommend course of action  - Evaluate amount of meals eaten  - Assist patient with eating if necessary   - Allow adequate time for meals  - Recommend/ encourage appropriate diets, oral nutritional supplements, and vitamin/mineral supplements  - Order, calculate, and assess calorie counts as needed  - Recommend, monitor, and adjust tube feedings and TPN/PPN based on assessed needs  - Assess need for intravenous fluids  - Provide specific nutrition/hydration education as appropriate  - Include patient/family/caregiver in decisions related to nutrition  Outcome: Progressing

## 2019-10-13 NOTE — TREATMENT PLAN
Renal short note    Reviewed with Primary Team Attending     - awaiting path  - please do not discharge patient until we have final biopsy results on skin biopsy and if pt does require sodium thiosulfate the plan is to start treatment inpatient due to prolonged Qtc  - also from [facility] rehab standpoint - pending is ensuring we have sodium thiosulfate there  I have Naveen Dies today regarding this  Pt cannot be d/c to [facility] until we have supply if this is what pt needs    Weekday renal team - please call me with final plans once set up and I can assist in facilitating if needed with regards to [facility]       Thank you

## 2019-10-13 NOTE — ASSESSMENT & PLAN NOTE
· Developed atrial fibrillation with rapid ventricular response during last admission  · Not on any anticoagulation per Cardiology  · Continue metoprolol 12 5 mg b i d  With hold parameters

## 2019-10-13 NOTE — PROGRESS NOTES
Progress Note - Infectious Disease   Ade Burnette 64 y o  male MRN: [id number]  Unit/Bed#: -01 Encounter: [id number]    Impression/Plan:  1  Bilateral lower extremity eschar skin wounds   Initially suspected to develop due to underlying chronic foot wounds in setting of recent fishing water exposures including [address] 8 weeks ago, followed by [facility] and then [facility]  Cellulitis has improved, but [place] extensive eschar wounds after bullae rupture  Consider underlying calciphylaxis component contributing to extensive wound development   Mild leukocytosis   May be reactive to inflammation in setting of extensive wounds  Wounds reportedly remains stable with no overt evidence of developing cellulitis  Rec:  · Continue to monitor closely off antibiotics  · Continue close serial skin exams  · [facility] per Podiatry and RN   · Follow up 10/10/19 skin biopsy results, which are still pending      2  Groin/inner thigh intertrigo  Developed with increased overall edema likely secondary to ascites  Improving  Rec:  Continue barrier cream prn  Continue nystatin powder BID     3   s/p recent Shewanella putrefaciens bacteremia    Presented 9/21/19 Septic shock, POA with leukopenia, tachycardia, refractory hypotension  Suspect primary skin portal of entry with cellulitis as below with secondary infection of HD catheter, s/p removal   Repeat blood cultures negative  Rec:  ? Finished Cipro completing 14 days total of antibiotics through 10/4/19     4   ESRD on HD   [facility]   status post new PermCath 10/1/19 in setting of #2     Rec:  ? Dialysis schedule is Tuesday Thursday Saturday     5   Polycystic kidney/liver disease   Extensive disease seen on CT imaging  S/p IR tube check in right renal cyst and changed today and status post paracentesis   Parasitic fluid with 446 white cells and 45% neutrophils, Ascites fluid cx remains negative    SBP clinically not suspected      6   Chronic foot wounds   Likely multifactorial   Appear superficial   Risk factor for infection as above  Rec:  ? Local wound care per Podiatry     Antibiotics:  None        Subjective:  Wounds are being redressed this morning  They appear stable with no worsening redness  Patient denies pain  No fevers, chills, sweats  Objective:  Vitals:  Temp:  [97 8 °F (36 6 °C)-99 6 °F (37 6 °C)] 97 8 °F (36 6 °C)  HR:  [] 110  Resp:  [18] 18  BP: ()/(48-66) 109/56  SpO2:  [96 %] 96 %  Temp (24hrs), Av 7 °F (37 1 °C), Min:97 8 °F (36 6 °C), Max:99 6 °F (37 6 °C)  Current: Temperature: 97 8 °F (36 6 °C)    Physical Exam:   General:  No acute distress, resting comfortably, chronically ill-appearing  Eyes:  Conjunctive clear with no hemorrhages or effusions  Oropharynx:  No ulcers, no lesions  Neck:  Supple, no lymphadenopathy  Lungs:  Clear to auscultation bilaterally, no accessory muscle use  Cardiac:  Regular rate and rhythm, no murmurs  Abdomen:  Soft, non-tender, non-distented  Extremities:  No peripheral cyanosis, clubbing, or edema  Skin:  Bilateral lower extremity eschars appears stable with mild surrounding erythema which is stable  No fluctuance, active drainage  Neurological:  Moves all four extremities spontaneously    Lab Results:  I have personally reviewed pertinent labs  Results from last 7 days   Lab Units 10/12/19  0932 10/11/19  0443 10/10/19  0442 10/09/19  0605  10/06/19  1908   POTASSIUM mmol/L 4 0 4 0 4 5 4 5   < > 4 8   CHLORIDE mmol/L 96* 98* 96* 93*   < > 92*   CO2 mmol/L 29 28 27 24   < > 29   BUN mg/dL 75* 55* 66* 58*   < > 60*   CREATININE mg/dL 8 49* 6 82* 8 62* 7 60*   < > 7 54*   EGFR ml/min/1 73sq m 6 8 6 7   < > 7   CALCIUM mg/dL 8 1* 8 0* 8 1* 8 5   < > 8 6   AST U/L  --   --   --  32  --  32   ALT U/L  --   --   --  15  --  20   ALK PHOS U/L  --   --   --  229*  --  236*    < > = values in this interval not displayed       Results from last 7 days   Lab Units 10/12/19  0932 10/11/19  118 10/10/19  0442   WBC Thousand/uL 12 59* 11 16* 12 69*   HEMOGLOBIN g/dL 7 1* 7 2* 8 1*   PLATELETS Thousands/uL 137* 123* 193     Results from last 7 days   Lab Units 10/09/19  1122 10/07/19  1301 10/06/19  1922 10/06/19  1908   BLOOD CULTURE   --  No Growth After 5 Days  No Growth After 5 Days  No Growth After 5 Days  GRAM STAIN RESULT  Rare Polys  No bacteria seen  --   --   --    BODY FLUID CULTURE, STERILE  No growth  --   --   --        Imaging Studies:   I have personally reviewed pertinent imaging study reports and images in PACS  EKG, Pathology, and Other Studies:   I have personally reviewed pertinent reports

## 2019-10-14 ENCOUNTER — APPOINTMENT (INPATIENT)
Dept: RADIOLOGY | Facility: HOSPITAL | Age: 59
DRG: 314 | End: 2019-10-14
Payer: MEDICARE

## 2019-10-14 LAB
ANION GAP SERPL CALCULATED.3IONS-SCNC: 8 MMOL/L (ref 4–13)
BUN SERPL-MCNC: 72 MG/DL (ref 5–25)
CALCIUM SERPL-MCNC: 8.3 MG/DL (ref 8.3–10.1)
CHLORIDE SERPL-SCNC: 98 MMOL/L (ref 100–108)
CO2 SERPL-SCNC: 28 MMOL/L (ref 21–32)
CREAT SERPL-MCNC: 7.97 MG/DL (ref 0.6–1.3)
ERYTHROCYTE [DISTWIDTH] IN BLOOD BY AUTOMATED COUNT: 16.4 % (ref 11.6–15.1)
GFR SERPL CREATININE-BSD FRML MDRD: 7 ML/MIN/1.73SQ M
GLUCOSE SERPL-MCNC: 101 MG/DL (ref 65–140)
GLUCOSE SERPL-MCNC: 109 MG/DL (ref 65–140)
GLUCOSE SERPL-MCNC: 120 MG/DL (ref 65–140)
GLUCOSE SERPL-MCNC: 129 MG/DL (ref 65–140)
GLUCOSE SERPL-MCNC: 131 MG/DL (ref 65–140)
GLUCOSE SERPL-MCNC: 167 MG/DL (ref 65–140)
HCT VFR BLD AUTO: 23.9 % (ref 36.5–49.3)
HGB BLD-MCNC: 7.2 G/DL (ref 12–17)
MCH RBC QN AUTO: 31.9 PG (ref 26.8–34.3)
MCHC RBC AUTO-ENTMCNC: 30.1 G/DL (ref 31.4–37.4)
MCV RBC AUTO: 106 FL (ref 82–98)
PLATELET # BLD AUTO: 168 THOUSANDS/UL (ref 149–390)
PMV BLD AUTO: 10 FL (ref 8.9–12.7)
POTASSIUM SERPL-SCNC: 4 MMOL/L (ref 3.5–5.3)
RBC # BLD AUTO: 2.26 MILLION/UL (ref 3.88–5.62)
SODIUM SERPL-SCNC: 134 MMOL/L (ref 136–145)
WBC # BLD AUTO: 11.22 THOUSAND/UL (ref 4.31–10.16)

## 2019-10-14 PROCEDURE — 85027 COMPLETE CBC AUTOMATED: CPT | Performed by: INTERNAL MEDICINE

## 2019-10-14 PROCEDURE — 99232 SBSQ HOSP IP/OBS MODERATE 35: CPT | Performed by: INTERNAL MEDICINE

## 2019-10-14 PROCEDURE — 82948 REAGENT STRIP/BLOOD GLUCOSE: CPT

## 2019-10-14 PROCEDURE — 0TP5X0Z REMOVAL OF DRAINAGE DEVICE FROM KIDNEY, EXTERNAL APPROACH: ICD-10-PCS | Performed by: RADIOLOGY

## 2019-10-14 PROCEDURE — 99231 SBSQ HOSP IP/OBS SF/LOW 25: CPT | Performed by: RADIOLOGY

## 2019-10-14 PROCEDURE — 80048 BASIC METABOLIC PNL TOTAL CA: CPT | Performed by: INTERNAL MEDICINE

## 2019-10-14 RX ORDER — HYDROMORPHONE HCL/PF 1 MG/ML
1 SYRINGE (ML) INJECTION ONCE
Status: COMPLETED | OUTPATIENT
Start: 2019-10-14 | End: 2019-10-14

## 2019-10-14 RX ADMIN — RENAGEL 800 MG: 800 TABLET ORAL at 13:24

## 2019-10-14 RX ADMIN — DOCUSATE SODIUM 100 MG: 100 CAPSULE, LIQUID FILLED ORAL at 18:18

## 2019-10-14 RX ADMIN — HYDROMORPHONE HYDROCHLORIDE 1 MG: 1 INJECTION, SOLUTION INTRAMUSCULAR; INTRAVENOUS; SUBCUTANEOUS at 14:11

## 2019-10-14 RX ADMIN — HEPARIN SODIUM 5000 UNITS: 5000 INJECTION INTRAVENOUS; SUBCUTANEOUS at 21:01

## 2019-10-14 RX ADMIN — DOCUSATE SODIUM 100 MG: 100 CAPSULE, LIQUID FILLED ORAL at 09:11

## 2019-10-14 RX ADMIN — Medication 1 CAPSULE: at 18:18

## 2019-10-14 RX ADMIN — GABAPENTIN 100 MG: 100 CAPSULE ORAL at 09:11

## 2019-10-14 RX ADMIN — PANTOPRAZOLE SODIUM 40 MG: 40 TABLET, DELAYED RELEASE ORAL at 05:42

## 2019-10-14 RX ADMIN — NYSTATIN 1 APPLICATION: 100000 POWDER TOPICAL at 09:14

## 2019-10-14 RX ADMIN — HEPARIN SODIUM 5000 UNITS: 5000 INJECTION INTRAVENOUS; SUBCUTANEOUS at 13:24

## 2019-10-14 RX ADMIN — RENAGEL 800 MG: 800 TABLET ORAL at 09:11

## 2019-10-14 RX ADMIN — RENAGEL 800 MG: 800 TABLET ORAL at 18:18

## 2019-10-14 RX ADMIN — HEPARIN SODIUM 5000 UNITS: 5000 INJECTION INTRAVENOUS; SUBCUTANEOUS at 05:42

## 2019-10-14 RX ADMIN — NYSTATIN 1 APPLICATION: 100000 POWDER TOPICAL at 18:18

## 2019-10-14 NOTE — PROGRESS NOTES
Progress Note - Giselle Chamberlain 1960, 64 y o  male MRN: [id number]    Unit/Bed#: -01 Encounter: [id number]    Primary Care Provider: Nicky Figueroa MD   Date and time admitted to hospital: 10/6/2019  6:37 PM        * Hypotension  Assessment & Plan  · History of chronic hypotension, per patient he typically runs anywhere from  systolic  · Continue midodrine on dialysis days  · Monitor blood pressure    Ascites  Assessment & Plan  · Status post paracentesis  Ascitic fluid was noted to be cloudy with 200 PMNs  Not consistent with SBP  Will continue to monitor clinically at this time  Abdominal pain is actually improved today  · May need further therapeutic paracenteses, monitor clinically    [facility])  Assessment & Plan  · With history of right inguinal hernia for more than 30 years  · Surgery on board  Bowel regimen  No surgical intervention indicated at this time  · Recommend MiraLax daily  · Encourage out of bed and ambulation  · P o  Pain medication p r n  And avoid narcotics as much as possible    Anemia due to chronic kidney disease, on chronic dialysis [facility])  Assessment & Plan  · Monitor closely  Hemoglobin went down today to 7 2   · No active bleeding  · EPO shots, as per Nephrology, 3 times a week  Atrial fibrillation (HCC)  Assessment & Plan  · Developed atrial fibrillation with rapid ventricular response during last admission  · Not on any anticoagulation per Cardiology  · Continue metoprolol 12 5 mg b i d  With hold parameters  Benign essential hypertension  Assessment & Plan  · Per Cardiology, continue metoprolol with hold parameters at reduced dose  · Midodrine for hypotension  · Monitor blood pressure    Polycystic kidney disease  Assessment & Plan  · Patient still has indwelling DILIA drain for the renal cyst   · Will need to monitor output over the next 24 hours    If less than 10 cc over the next 24 hours will consider removing and discussed with IR as if not draining anymore this will only act as potential nidus for infection  · IR consulted    Multiple wounds of skin  Assessment & Plan  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  · Continue local wound care  · Follow-up on biopsy results  · Dermatology and Infectious Disease input noted    Hyponatremia  Assessment & Plan  · Monitor sodium levels  · Nephrology following    Chronic kidney disease with end stage renal failure on dialysis [facility])  Assessment & Plan  · Continue hemodialysis per Nephrology  VTE Pharmacologic Prophylaxis:   Pharmacologic: Heparin  Mechanical VTE Prophylaxis in Place: Yes    Patient Centered Rounds: I have performed bedside rounds with nursing staff today  Discussions with Specialists or Other Care Team Provider:     Education and Discussions with Family / Patient: pt, updated spouse    Time Spent for Care: 30 minutes  More than 50% of total time spent on counseling and coordination of care as described above  Current Length of Stay: 7 day(s)    Current Patient Status: Inpatient   Certification Statement: The patient will continue to require additional inpatient hospital stay due to as mentioned    Discharge Plan: pending placement    Code Status: Level 1 - Full Code      Subjective:     Comfortably lying in bed       Objective:     Vitals:   Temp (24hrs), Av 6 °F (37 °C), Min:98 6 °F (37 °C), Max:98 7 °F (37 1 °C)    Temp:  [98 6 °F (37 °C)-98 7 °F (37 1 °C)] 98 6 °F (37 °C)  HR:  [106-115] 107  Resp:  [17-18] 18  BP: ()/(54-63) 107/62  SpO2:  [97 %-99 %] 97 %  Body mass index is 27 86 kg/m²  Input and Output Summary (last 24 hours):        Intake/Output Summary (Last 24 hours) at 10/14/2019 1247  Last data filed at 10/14/2019 0910  Gross per 24 hour   Intake 240 ml   Output 0 ml   Net 240 ml Physical Exam:     Physical Exam    Comfortably in bed  Neck supple  Mucous members are moist  Lungs diminished breath sounds bilaterally  Heart sounds S1-S2 noted  Abdomen soft, ascites noted  Awake alert obeys simple commands  Bilateral lower extremity ulcerations noted  Pulses noted  No rash    Additional Data:     Labs:    Results from last 7 days   Lab Units 10/14/19  0606  10/11/19  0443  10/08/19  0500   WBC Thousand/uL 11 22*   < > 11 16*   < > 8 23   HEMOGLOBIN g/dL 7 2*   < > 7 2*   < > 8 9*   HEMATOCRIT % 23 9*   < > 23 6*   < > 28 8*   PLATELETS Thousands/uL 168   < > 123*   < > 230   BANDS PCT %  --   --  10*  --   --    NEUTROS PCT %  --   --   --   --  75   LYMPHS PCT %  --   --   --   --  6*   LYMPHO PCT %  --   --  6*  --   --    MONOS PCT %  --   --   --   --  15*   MONO PCT %  --   --  8  --   --    EOS PCT %  --   --  1  --  3    < > = values in this interval not displayed  Results from last 7 days   Lab Units 10/14/19  0606  10/09/19  0605   SODIUM mmol/L 134*   < > 132*   POTASSIUM mmol/L 4 0   < > 4 5   CHLORIDE mmol/L 98*   < > 93*   CO2 mmol/L 28   < > 24   BUN mg/dL 72*   < > 58*   CREATININE mg/dL 7 97*   < > 7 60*   ANION GAP mmol/L 8   < > 15*   CALCIUM mg/dL 8 3   < > 8 5   ALBUMIN g/dL  --   --  1 6*   TOTAL BILIRUBIN mg/dL  --   --  2 40*   ALK PHOS U/L  --   --  229*   ALT U/L  --   --  15   AST U/L  --   --  32   GLUCOSE RANDOM mg/dL 101   < > 92    < > = values in this interval not displayed  Results from last 7 days   Lab Units 10/14/19  1139 10/14/19  0839 10/13/19  2048 10/13/19  1608 10/13/19  1127 10/13/19  0806 10/12/19  2056 10/12/19  1625 10/12/19  1111 10/12/19  0741 10/11/19  2105 10/11/19  1540   POC GLUCOSE mg/dl 131 129 120 122 124 141* 136 125 109 123 120 144*         Results from last 7 days   Lab Units 10/09/19  1040   PROCALCITONIN ng/ml 8 29*           * I Have Reviewed All Lab Data Listed Above  * Additional Pertinent Lab Tests Reviewed:  All Labs Within Last 24 Hours Reviewed    Imaging:    Imaging Reports Reviewed Today Include:   Imaging Personally Reviewed by Myself Includes:     Recent Cultures (last 7 days):     Results from last 7 days   Lab Units 10/09/19  1122 10/07/19  1301   BLOOD CULTURE   --  No Growth After 5 Days  GRAM STAIN RESULT  Rare Polys  No bacteria seen  --    BODY FLUID CULTURE, STERILE  No growth  --        Last 24 Hours Medication List:     Current Facility-Administered Medications:  acetaminophen 650 mg Oral Q6H PRN Darcie Body, PA-C    albumin human 25 g Intravenous PRN Maria Guadalupe Overall, CRNP Last Rate: 0 g (10/08/19 1006)   b complex-vitamin C-folic acid 1 capsule Oral Daily With Kael Guadalupe PA-C    docusate sodium 100 mg Oral BID Dileep Mendoza MD    epoetin bob 4,000 Units Intravenous Once per day on Tue Thu Sat Maria Guadalupe Overall, CRNP    gabapentin 100 mg Oral Daily Darcie Body, PA-C    heparin (porcine) 5,000 Units Subcutaneous [facility] [address] Darcie Body, PA-C    HYDROmorphone 1 mg Intravenous Once Genia Pollock MD    Lidocaine Viscous HCl 15 mL Swish & Spit 4x Daily PRN Darcie Body, PA-C    lidocaine-epinephrine 20 mL Infiltration Once Homero Valentin DO    metoprolol tartrate 12 5 mg Oral Q12H [address] Darcie Body, PA-C    midodrine 10 mg Oral Before Dialysis Darcie Body, PA-C    midodrine 10 mg Oral Once in dialysis Maria Guadalupe Overall, CRNP    nystatin  Topical BID [place], PA-C    ondansetron 4 mg Intravenous Q6H PRN Darcie Body, PA-C    pantoprazole 40 mg Oral Early Morning Darcie Body, PA-C    sevelamer 800 mg Oral TID With Meals Maria Guadalupe Overall, CRNP    simethicone 80 mg Oral Q6H PRN Darcie Body, PA-C         Today, Patient Was Seen By: Genia Pollock MD    ** Please Note: Dictation voice to text software may have been used in the creation of this document   **

## 2019-10-14 NOTE — PROGRESS NOTES
IR followup note on this 59m with a drain in a right renal cyst in a polycystic kidney  I initially placed this drain bedside in the ICU on 9/23 in the setting of septic shock  CT at that time revealed inflammation near this cyst and without a clear source for infection drain placement was indicated    Output has been low, near 5-10 cc/day  This never was culture positive  Tube check has revealed some communication with a portion of the collection system, but most recent check is simply a small cavity  Given the rim calcifications I am not sure the cavity will ever collapse truly as it may be rigid, it may simply refill with fluid  On exam, site is nontender and there is rust colored fluid in the DILIA bulb  Given location and his ESRD, I believe this is low risk of urine leak  I discussed this with the patient and removed the tube at bedside   There was trace fluid leakage after removal which is normal    Plan:  Change dressing BID until no further leakage  Monitor for abd pain    IR to sign off

## 2019-10-14 NOTE — PROGRESS NOTES
Progress Note - Podiatry  Kathyakhurram Teixeira 64 y o  male MRN: [id number]  Unit/Bed#: -01 Encounter: [id number]    Assessment/Plan:  1  Eschars of the bilateral lower extremities complicated by venous stasis and impairment microvascular circulation  - thankfully legs appear free of infection  - demarcation appears to be more or less complete, continue Xeroform, DSD for now  - may need debridement on outpatient basis  Continue daily dressing changes and is stable to discharge from podiatric standpoint  - biopsy seems to be negative for calciphylaxis    Subjective/Objective   Chief Complaint:   Chief Complaint   Patient presents with    Abdominal Pain     Pt presents to the ED with c/o abdominal pain and swelling that started a few days ago  Pt also has B/L leg infections that he was being treated for at the [facility], pt was sent by staff for evaluation of swelling in his abdomen       Subjective: 64 y o  y/o male was seen and evaluated at bedside  Offers no complaints    Blood pressure 103/64, pulse (!) 110, temperature 98 3 °F (36 8 °C), temperature source Oral, resp  rate 20, height 5' 8" (1 727 m), weight 83 1 kg (183 lb 3 2 oz), SpO2 96 %  ,Body mass index is 27 86 kg/m²  Invasive Devices     Peripheral Intravenous Line            Peripheral IV 10/14/19 Left Antecubital less than 1 day          Hemodialysis Catheter            Permanent HD Catheter  14 days                Physical Exam:   General: Alert, cooperative and no distress  Lungs: Non labored breathing  Heart: Positive S1, S2  Abdomen: Soft, non-tender  Extremity:   Eschar formation bilateral lower extremities, weeping as much improved, pulses are palpable  Lab, Imaging and other studies:   I have personally reviewed pertinent lab results  Imaging: I have personally reviewed pertinent films in PACS  EKG, Pathology, and Other Studies: I have personally reviewed pertinent reports

## 2019-10-14 NOTE — PROGRESS NOTES
Progress Note - Infectious Disease   Redd Richards 64 y o  male MRN: [id number]  Unit/Bed#: -01 Encounter: [id number]      Impression/Plan:  1  Bilateral lower extremity eschar skin wounds   Initially suspected to develop due to underlying chronic foot wounds in setting of recent fishing water exposures including [address] 8 weeks ago, followed by [employer] and then [facility]  Cellulitis has improved, but [facility] extensive eschar wounds after bullae rupture   Mild leukocytosis   May be reactive to inflammation in setting of extensive wounds   Wounds reportedly remains stable with no overt evidence of developing cellulitis  Rec:  · Continue to monitor closely off antibiotics  · Continue close serial skin exams  · [address] per Podiatry and RN  Will photograph wounds today with dressing change  · 10/10/19 skin biopsy results not consistent with calciphylaxis     2  Groin/inner thigh intertrigo  Developed with increased overall edema likely secondary to ascites  Improving  Rec:  Continue barrier cream prn  Continue nystatin powder BID     3   s/p recent Shewanella putrefaciens bacteremia    Presented 9/21/19 Septic shock, POA with leukopenia, tachycardia, refractory hypotension  Suspect primary skin portal of entry with cellulitis as below with secondary infection of HD catheter, s/p removal   Repeat blood cultures negative  Rec:  ? Finished Cipro completing 14 days total of antibiotics through 10/4/19     4   ESRD on HD   [facility]ia [facility]   status post new PermCath 10/1/19 in setting of #2     Rec:  ? Dialysis schedule is Tuesday Thursday Saturday     5   Polycystic kidney/liver disease   Extensive disease seen on CT imaging   S/p IR tube check in right renal cyst and changed today and status post paracentesis   Parasitic fluid with 446 white cells and 45% neutrophils, Ascites fluid cx remains negative   SBP clinically not suspected      6   Chronic foot wounds   Likely multifactorial   Appear superficial   Risk factor for infection as above  Rec:  ? Local wound care per Podiatry    Above plan discussed in detail with patient, RN, Mary Steel liaison, family at bedside and Dr Shraddha Preciado  All of questions answered and reassurance given  I spend 40 minutes on unit floor with 25 minutes in counseling and coordination of wound care      Antibiotics:  None      Subjective:  Wounds are to be redressed today when supplies arrive  Patient reports leg wound pain is the same  No fevers, chills, sweats  Objective:  Vitals:  Temp:  [98 6 °F (37 °C)-98 7 °F (37 1 °C)] 98 6 °F (37 °C)  HR:  [106-115] 107  Resp:  [17-18] 18  BP: ()/(54-63) 107/62  SpO2:  [97 %-99 %] 97 %  Temp (24hrs), Av 6 °F (37 °C), Min:98 6 °F (37 °C), Max:98 7 °F (37 1 °C)  Current: Temperature: 98 6 °F (37 °C)    Physical Exam:   General Appearance:  Alert, interactive, nontoxic, no acute distress  Resting in bed  Throat: Oropharynx moist without lesions  Lungs:   Clear to auscultation bilaterally; no wheezes, rhonchi or rales; respirations unlabored   Heart:  RRR; no murmur   Abdomen:   Soft, non-tender, protuberant, scant dark output in RLQ DILIA bulb, positive bowel sounds  Extremities: Bilateral lower extremity gauze wraps with some honey crusted strike through drainage  No erythema of thighs extending past dressing  Left medial thigh skin biopsy site sutures intact  Skin: No new rashes or lesions  Right chest permcath site nontender       Labs, Imaging, & Other studies:   All pertinent labs and imaging studies were personally reviewed  Results from last 7 days   Lab Units 10/14/19  0606 10/12/19  0932 10/11/19  0443   WBC Thousand/uL 11 22* 12 59* 11 16*   HEMOGLOBIN g/dL 7 2* 7 1* 7 2*   PLATELETS Thousands/uL 168 137* 123*     Results from last 7 days   Lab Units 10/14/19  0606  10/09/19  0605   POTASSIUM mmol/L 4 0   < > 4 5   CHLORIDE mmol/L 98*   < > 93*   CO2 mmol/L 28   < > 24   BUN mg/dL 72*   < > 58*   CREATININE mg/dL 7 97*   < > 7 60*   EGFR ml/min/1 73sq m 7   < > 7   CALCIUM mg/dL 8 3   < > 8 5   AST U/L  --   --  32   ALT U/L  --   --  15   ALK PHOS U/L  --   --  229*    < > = values in this interval not displayed  Results from last 7 days   Lab Units 10/09/19  1040   PROCALCITONIN ng/ml 8 29*     Results from last 7 days   Lab Units 10/09/19  1122 10/07/19  1301   BLOOD CULTURE   --  No Growth After 5 Days     GRAM STAIN RESULT  Rare Polys  No bacteria seen  --    BODY FLUID CULTURE, STERILE  No growth  --

## 2019-10-14 NOTE — PROGRESS NOTES
[address] [facility] NOTE   Felicia Rivas 64 y o  male MRN: [id number]  Unit/Bed#: -01 Encounter: [id number]  Reason for Consult:  ESRD    ASSESSMENT and PLAN:  1  ESRD on HD:  underlying disease, PCKD  · Patient was previously dialyzed at [facility] but has now transition to local [address] [place] [address] unit     · Currently being dialyzed TTS due to Outpatient plan  · Outpatient hemodialysis prescription:  Qt 3 hrs 10 min  Qb 450, Qd standard, 2 K, 2 0 calcium,  40 bicarbonate, sodium 137, filter size 180   Target weight 83 kg   Historically poorly compliant with treatment    · Patient has had intermittent inter dialytic hypotension therefore midodrine dose increased with 2nd dose given during treatment  · Patient still has indwelling DILIA drain into renal cyst  · Plan:    ? Continue hemodialysis TTS  2  Access:  Right IJ PermCath site looks clean, nontender  3  Chronic hypotension:    · On midodrine  · On beta-blocker due to recent treatment for atrial fibrillation with RVR during last hospitalization  · Plan:    ? Continue Midodrine pre treatment and longterm through hemodialysis treatment  4  Anemia:   · Hemoglobin below goal, currently 7 2   · Transfuse for hemoglobin less than 7  · Iron low, ferritin elevated   Iron was on hold due to concerns for calciphylaxis  Iron infusions can be resumed on hemodialysis  · No schistocytes on hemolyzed smear, haptoglobin normal  · Platelet count remains normal  · Continue Epogen    5  Mineral and bone disease:    · On sevelamer  6  Abdominal pain/ileus:   History of polycystic liver disease  · CT of the abdomen:  Findings consistent with ileus   Moderate ascites  · On MiraLax daily  · Status post paracentesis for 1800 mL  No evidence of SBP  7  Lower extremity edema/cellulitis/wounds:    · Recent infection due to Shewanella putrifacians likely acquired when patient was in pond water[place]river water   Completed course of p o  Cipro   Given a dose of IV Cipro on admission   ID on board  · No indication for Abx at this time per ID  · Lower extremity wounds, concern for calciphylaxis  ? Tissue biopsy 10/10/2019:  Multiple levels are examined revealing ulceration with superficial and deep vascular proliferation, early scar and fat necrosis  No deep dermal or subcutaneous medial small vascular calcifications diagnostic of calciphylaxis are identified  ? Due to concerns for calciphylaxis:    ? Calcium based binder previously discontinued- patient on sevelamer  ? No vitamin D supplement/vitamin-D analogs  ? Dialysis time increased to 4 hours to increase clearances  ? PTH 33 9    ? At this time no definitive indicators of calciphylaxis on biopsy therefore hold sodium thiosulfate  Will discuss further with Dr Timmy Montes      8  Atrial fibrillation:  On beta-blocker   Not on Coumadin  9  Hyponatremia:  Mild, sodium currently 134  10  Prior D-dimer elevation:  V/Q scan -low probability of PE    DISPOSITION:  Hemodialysis tomorrow    SUBJECTIVE / INTERVAL HISTORY:  No complaints  Leg pain with dressing changes      OBJECTIVE:  Current Weight: Weight - Scale: 83 1 kg (183 lb 3 2 oz)(pt not ambulating)  Vitals:    10/13/19 2200 10/14/19 0600 10/14/19 0700 10/14/19 0911   BP: (!) 85/54  113/62 107/62   BP Location: Right arm  Right arm    Pulse: (!) 115  (!) 109 (!) 107   Resp: 18  18    Temp: 98 7 °F (37 1 °C)  98 6 °F (37 °C)    TempSrc: Oral  Oral    SpO2: 98%  97%    Weight:  83 1 kg (183 lb 3 2 oz)     Height:           Intake/Output Summary (Last 24 hours) at 10/14/2019 1208  Last data filed at 10/14/2019 0910  Gross per 24 hour   Intake 240 ml   Output 0 ml   Net 240 ml     General: NAD  Skin: no rash, color poor, sallow  Eyes: icteric sclera  ENT: moist mucous membrane  Neck: supple, no JVD  Chest: CTA b/l, no ronchii, no wheeze, no rubs, no rales  CVS: s1s2, no murmur, no gallop, no rub  Abdomen: soft, nontender, nl sounds  Extremities:  Extensive wounds with black-are over lower extremities extending up into the thigh area on the left leg    Some patchy reddened areas right thigh  : no meeks  Neuro: AAOX3  Psych: normal affect  Medications:    Current Facility-Administered Medications:     acetaminophen (TYLENOL) tablet 650 mg, 650 mg, Oral, Q6H PRN, Celine Morrison PA-C, 650 mg at 10/13/19 0553    albumin human (FLEXBUMIN) 25 % injection 25 g, 25 g, Intravenous, PRN, SAM Martinez, Last Rate: 0 mL/hr at 10/08/19 1006, 25 g at 10/11/19 2339    b complex-vitamin C-folic acid (NEPHROCAPS) capsule 1 capsule, 1 capsule, Oral, Daily With Bon De Oliveira PA-C, 1 capsule at 10/13/19 1823    docusate sodium (COLACE) capsule 100 mg, 100 mg, Oral, BID, Melisa Ogden MD, 100 mg at 10/14/19 0911    epoetin bob (EPOGEN,PROCRIT) injection 4,000 Units, 4,000 Units, Intravenous, Once per day on Tue Thu Sat, SAM Martinez, 4,000 Units at 10/12/19 1205    gabapentin (NEURONTIN) capsule 100 mg, 100 mg, Oral, Daily, Celine Morrison PA-C, 100 mg at 10/14/19 0911    heparin (porcine) subcutaneous injection 5,000 Units, 5,000 Units, Subcutaneous, Q8H [address], 5,000 Units at 10/14/19 0542 **AND** Platelet count, , , Once, Celine Morrison PA-C    HYDROmorphone (DILAUDID) injection 1 mg, 1 mg, Intravenous, Once, Regina Quigley MD    Lidocaine Viscous HCl (XYLOCAINE) 2 % mucosal solution 15 mL, 15 mL, Swish & Spit, 4x Daily PRN, Celine Morrison PA-C    lidocaine-epinephrine (XYLOCAINE-MPF/EPINEPHRINE) 1%-1:200,000 injection 20 mL, 20 mL, Infiltration, Once, Homero Valentin DO    metoprolol tartrate (LOPRESSOR) partial tablet 12 5 mg, 12 5 mg, Oral, Q12H [address], Celine Morrison PA-C, Stopped at 10/12/19 0909    midodrine (PROAMATINE) tablet 10 mg, 10 mg, Oral, Before Dialysis, Celine Morrison PA-C, 10 mg at 10/10/19 1457    midodrine (PROAMATINE) tablet 10 mg, 10 mg, Oral, Once in dialysis, SAM Martinez, Stopped at 10/08/19 1320    nystatin (MYCOSTATIN) powder, , Topical, BID, Kelly Addison PA-C, 1 application at 66/32/12 0914    ondansetron [facility]) injection 4 mg, 4 mg, Intravenous, Q6H PRN, Kimi Martinez PA-C    pantoprazole (PROTONIX) EC tablet 40 mg, 40 mg, Oral, Early Morning, Kimi Martinez PA-C, 40 mg at 10/14/19 0542    sevelamer (RENAGEL) tablet 800 mg, 800 mg, Oral, TID With Meals, SAM Concepcion, 800 mg at 10/14/19 0911    simethicone (MYLICON) chewable tablet 80 mg, 80 mg, Oral, Q6H PRN, Kimi Martinez PA-C    Laboratory Results:  Results from last 7 days   Lab Units 10/14/19  0606 10/12/19  0932 10/11/19  0443 10/10/19  0442 10/09/19  0605 10/08/19  0500 10/07/19  1301   WBC Thousand/uL 11 22* 12 59* 11 16* 12 69*  --  8 23 7 42   HEMOGLOBIN g/dL 7 2* 7 1* 7 2* 8 1*  --  8 9* 9 0*   HEMATOCRIT % 23 9* 23 4* 23 6* 26 7*  --  28 8* 29 2*   PLATELETS Thousands/uL 168 137* 123* 193  --  230 232   POTASSIUM mmol/L 4 0 4 0 4 0 4 5 4 5 5 2 4 9   CHLORIDE mmol/L 98* 96* 98* 96* 93* 90* 94*   CO2 mmol/L 28 29 28 27 24 26 27   BUN mg/dL 72* 75* 55* 66* 58* 74* 70*   CREATININE mg/dL 7 97* 8 49* 6 82* 8 62* 7 60* 8 93* 8 39*   CALCIUM mg/dL 8 3 8 1* 8 0* 8 1* 8 5 8 5 8 6   PHOSPHORUS mg/dL  --   --   --  6 1* 6 7*  --   --

## 2019-10-14 NOTE — PLAN OF CARE
Problem: Potential for Falls  Goal: Patient will remain free of falls  Description  INTERVENTIONS:  - Assess patient frequently for physical needs  -  Identify cognitive and physical deficits and behaviors that affect risk of falls    -  [place] fall precautions as indicated by assessment   - Educate patient/family on patient safety including physical limitations  - Instruct patient to call for assistance with activity based on assessment  - Modify environment to reduce risk of injury  - Consider OT/PT consult to assist with strengthening/mobility  Outcome: Progressing     Problem: Prexisting or High Potential for Compromised Skin Integrity  Goal: Skin integrity is maintained or improved  Description  INTERVENTIONS:  - Identify patients at risk for skin breakdown  - Assess and monitor skin integrity  - Assess and monitor nutrition and hydration status  - Monitor labs   - Assess for incontinence   - Turn and reposition patient  - Assist with mobility/ambulation  - Relieve pressure over bony prominences  - Avoid friction and shearing  - Provide appropriate hygiene as needed including keeping skin clean and dry  - Evaluate need for skin moisturizer/barrier cream  - Collaborate with interdisciplinary team   - Patient/family teaching  - Consider wound care consult   Outcome: Progressing     Problem: PAIN - ADULT  Goal: Verbalizes/displays adequate comfort level or baseline comfort level  Description  Interventions:  - Encourage patient to monitor pain and request assistance  - Assess pain using appropriate pain scale  - Administer analgesics based on type and severity of pain and evaluate response  - Implement non-pharmacological measures as appropriate and evaluate response  - Consider cultural and social influences on pain and pain management  - Notify physician/advanced practitioner if interventions unsuccessful or patient reports new pain  Outcome: Progressing     Problem: INFECTION - ADULT  Goal: Absence or prevention of progression during hospitalization  Description  INTERVENTIONS:  - Assess and monitor for signs and symptoms of infection  - Monitor lab/diagnostic results  - Monitor all insertion sites, i e  indwelling lines, tubes, and drains  - Monitor endotracheal if appropriate and nasal secretions for changes in amount and color  - [place] appropriate cooling/warming therapies per order  - Administer medications as ordered  - Instruct and encourage patient and family to use good hand hygiene technique  - Identify and instruct in appropriate isolation precautions for identified infection/condition  Outcome: Progressing  Goal: Absence of fever/infection during neutropenic period  Description  INTERVENTIONS:  - Monitor WBC    Outcome: Progressing     Problem: SAFETY ADULT  Goal: Patient will remain free of falls  Description  INTERVENTIONS:  - Assess patient frequently for physical needs  -  Identify cognitive and physical deficits and behaviors that affect risk of falls    -  [place] fall precautions as indicated by assessment   - Educate patient/family on patient safety including physical limitations  - Instruct patient to call for assistance with activity based on assessment  - Modify environment to reduce risk of injury  - Consider OT/PT consult to assist with strengthening/mobility  Outcome: Progressing  Goal: Maintain or return to baseline ADL function  Description  INTERVENTIONS:  -  Assess patient's ability to carry out ADLs; assess patient's baseline for ADL function and identify physical deficits which impact ability to perform ADLs (bathing, care of mouth/teeth, toileting, grooming, dressing, etc )  - Assess/evaluate cause of self-care deficits   - Assess range of motion  - Assess patient's mobility; develop plan if impaired  - Assess patient's need for assistive devices and provide as appropriate  - Encourage maximum independence but intervene and supervise when necessary  - Involve family in performance of ADLs  - Assess for home care needs following discharge   - Consider OT consult to assist with ADL evaluation and planning for discharge  - Provide patient education as appropriate  Outcome: Progressing  Goal: Maintain or return mobility status to optimal level  Description  INTERVENTIONS:  - Assess patient's baseline mobility status (ambulation, transfers, stairs, etc )    - Identify cognitive and physical deficits and behaviors that affect mobility  - Identify mobility aids required to assist with transfers and/or ambulation (gait belt, sit-to-stand, lift, walker, cane, etc )  - [place] fall precautions as indicated by assessment  - Record patient progress and toleration of activity level on Mobility SBAR; progress patient to next Phase/Stage  - Instruct patient to call for assistance with activity based on assessment  - Consider rehabilitation consult to assist with strengthening/weightbearing, etc   Outcome: Progressing     Problem: DISCHARGE PLANNING  Goal: Discharge to home or other facility with appropriate resources  Description  INTERVENTIONS:  - Identify barriers to discharge w/patient and caregiver  - Arrange for needed discharge resources and transportation as appropriate  - Identify discharge learning needs (meds, wound care, etc )  - Arrange for interpretive services to assist at discharge as needed  - Refer to Case Management Department for coordinating discharge planning if the patient needs post-hospital services based on physician/advanced practitioner order or complex needs related to functional status, cognitive ability, or social support system  Outcome: Progressing     Problem: Knowledge Deficit  Goal: Patient/family/caregiver demonstrates understanding of disease process, treatment plan, medications, and discharge instructions  Description  Complete learning assessment and assess knowledge base    Interventions:  - Provide teaching at level of understanding  - Provide teaching via preferred learning methods  Outcome: Progressing     Problem: GENITOURINARY - ADULT  Goal: Maintains or returns to baseline urinary function  Description  INTERVENTIONS:  - Assess urinary function  - Encourage oral fluids to ensure adequate hydration if ordered  - Administer IV fluids as ordered to ensure adequate hydration  - Administer ordered medications as needed  - Offer frequent toileting  - Follow urinary retention protocol if ordered  Outcome: Progressing  Goal: Absence of urinary retention  Description  INTERVENTIONS:  - Assess patients ability to void and empty bladder  - Monitor I/O  - Bladder scan as needed  - Discuss with physician/AP medications to alleviate retention as needed  - Discuss catheterization for long term situations as appropriate  Outcome: Progressing     Problem: METABOLIC, FLUID AND ELECTROLYTES - ADULT  Goal: Electrolytes maintained within normal limits  Description  INTERVENTIONS:  - Monitor labs and assess patient for signs and symptoms of electrolyte imbalances  - Administer electrolyte replacement as ordered  - Monitor response to electrolyte replacements, including repeat lab results as appropriate  - Instruct patient on fluid and nutrition as appropriate  Outcome: Progressing  Goal: Fluid balance maintained  Description  INTERVENTIONS:  - Monitor labs   - Monitor I/O and WT  - Instruct patient on fluid and nutrition as appropriate  - Assess for signs & symptoms of volume excess or deficit  Outcome: Progressing  Goal: Glucose maintained within target range  Description  INTERVENTIONS:  - Monitor Blood Glucose as ordered  - Assess for signs and symptoms of hyperglycemia and hypoglycemia  - Administer ordered medications to maintain glucose within target range  - Assess nutritional intake and initiate nutrition service referral as needed  Outcome: Progressing     Problem: SKIN/TISSUE INTEGRITY - ADULT  Goal: Skin integrity remains intact  Description  INTERVENTIONS  - Identify patients at risk for skin breakdown  - Assess and monitor skin integrity  - Assess and monitor nutrition and hydration status  - Monitor labs (i e  albumin)  - Assess for incontinence   - Turn and reposition patient  - Assist with mobility/ambulation  - Relieve pressure over bony prominences  - Avoid friction and shearing  - Provide appropriate hygiene as needed including keeping skin clean and dry  - Evaluate need for skin moisturizer/barrier cream  - Collaborate with interdisciplinary team (i e  Nutrition, Rehabilitation, etc )   - Patient/family teaching  Outcome: Progressing  Goal: Incision(s), wounds(s) or drain site(s) healing without S/S of infection  Description  INTERVENTIONS  - Assess and document risk factors for skin impairment   - Assess and document dressing, incision, wound bed, drain sites and surrounding tissue  - Consider nutrition services referral as needed  - Oral mucous membranes remain intact  - Provide patient/ family education  Outcome: Progressing  Goal: Oral mucous membranes remain intact  Description  INTERVENTIONS  - Assess oral mucosa and hygiene practices  - Implement preventative oral hygiene regimen  - Implement oral medicated treatments as ordered  - Initiate Nutrition services referral as needed  Outcome: Progressing     Problem: Nutrition/Hydration-ADULT  Goal: Nutrient/Hydration intake appropriate for improving, restoring or maintaining nutritional needs  Description  Monitor and assess patient's nutrition/hydration status for malnutrition  Collaborate with interdisciplinary team and initiate plan and interventions as ordered  Monitor patient's weight and dietary intake as ordered or per policy  Utilize nutrition screening tool and intervene as necessary  Determine patient's food preferences and provide high-protein, high-caloric foods as appropriate       INTERVENTIONS:  - Monitor oral intake, urinary output, labs, and treatment plans  - Assess nutrition and hydration status and recommend course of action  - Evaluate amount of meals eaten  - Assist patient with eating if necessary   - Allow adequate time for meals  - Recommend/ encourage appropriate diets, oral nutritional supplements, and vitamin/mineral supplements  - Order, calculate, and assess calorie counts as needed  - Recommend, monitor, and adjust tube feedings and TPN/PPN based on assessed needs  - Assess need for intravenous fluids  - Provide specific nutrition/hydration education as appropriate  - Include patient/family/caregiver in decisions related to nutrition  Outcome: Progressing

## 2019-10-15 ENCOUNTER — APPOINTMENT (INPATIENT)
Dept: DIALYSIS | Facility: HOSPITAL | Age: 59
DRG: 314 | End: 2019-10-15
Payer: MEDICARE

## 2019-10-15 LAB
GLUCOSE SERPL-MCNC: 102 MG/DL (ref 65–140)
GLUCOSE SERPL-MCNC: 119 MG/DL (ref 65–140)
GLUCOSE SERPL-MCNC: 120 MG/DL (ref 65–140)
GLUCOSE SERPL-MCNC: 94 MG/DL (ref 65–140)

## 2019-10-15 PROCEDURE — 99232 SBSQ HOSP IP/OBS MODERATE 35: CPT | Performed by: INTERNAL MEDICINE

## 2019-10-15 PROCEDURE — 82948 REAGENT STRIP/BLOOD GLUCOSE: CPT

## 2019-10-15 PROCEDURE — 90935 HEMODIALYSIS ONE EVALUATION: CPT | Performed by: INTERNAL MEDICINE

## 2019-10-15 RX ORDER — OXYCODONE HYDROCHLORIDE 5 MG/1
5 TABLET ORAL EVERY 4 HOURS PRN
Qty: 12 TABLET | Refills: 0 | Status: SHIPPED | OUTPATIENT
Start: 2019-10-15 | End: 2019-10-16

## 2019-10-15 RX ORDER — SIMETHICONE 80 MG
80 TABLET,CHEWABLE ORAL EVERY 6 HOURS PRN
Qty: 30 TABLET | Refills: 0 | Status: SHIPPED | OUTPATIENT
Start: 2019-10-15

## 2019-10-15 RX ORDER — SEVELAMER HYDROCHLORIDE 800 MG/1
800 TABLET, FILM COATED ORAL
Qty: 30 TABLET | Refills: 0 | Status: SHIPPED | OUTPATIENT
Start: 2019-10-15 | End: 2019-10-25

## 2019-10-15 RX ORDER — HYDROMORPHONE HCL/PF 1 MG/ML
0.5 SYRINGE (ML) INJECTION EVERY 6 HOURS PRN
Status: DISCONTINUED | OUTPATIENT
Start: 2019-10-15 | End: 2019-10-16 | Stop reason: HOSPADM

## 2019-10-15 RX ORDER — NYSTATIN 100000 [USP'U]/G
POWDER TOPICAL 2 TIMES DAILY
Qty: 15 G | Refills: 0 | Status: SHIPPED | OUTPATIENT
Start: 2019-10-15 | End: 2020-01-10

## 2019-10-15 RX ADMIN — NYSTATIN: 100000 POWDER TOPICAL at 12:16

## 2019-10-15 RX ADMIN — DOCUSATE SODIUM 100 MG: 100 CAPSULE, LIQUID FILLED ORAL at 08:42

## 2019-10-15 RX ADMIN — GABAPENTIN 100 MG: 100 CAPSULE ORAL at 08:43

## 2019-10-15 RX ADMIN — Medication 1 CAPSULE: at 18:44

## 2019-10-15 RX ADMIN — MIDODRINE HYDROCHLORIDE 10 MG: 5 TABLET ORAL at 15:33

## 2019-10-15 RX ADMIN — HEPARIN SODIUM 5000 UNITS: 5000 INJECTION INTRAVENOUS; SUBCUTANEOUS at 13:14

## 2019-10-15 RX ADMIN — HEPARIN SODIUM 5000 UNITS: 5000 INJECTION INTRAVENOUS; SUBCUTANEOUS at 05:18

## 2019-10-15 RX ADMIN — RENAGEL 800 MG: 800 TABLET ORAL at 08:42

## 2019-10-15 RX ADMIN — METOPROLOL TARTRATE 12.5 MG: 25 TABLET ORAL at 21:21

## 2019-10-15 RX ADMIN — EPOETIN ALFA 4000 UNITS: 4000 SOLUTION INTRAVENOUS; SUBCUTANEOUS at 18:41

## 2019-10-15 RX ADMIN — HEPARIN SODIUM 5000 UNITS: 5000 INJECTION INTRAVENOUS; SUBCUTANEOUS at 21:21

## 2019-10-15 RX ADMIN — RENAGEL 800 MG: 800 TABLET ORAL at 18:44

## 2019-10-15 RX ADMIN — ACETAMINOPHEN 650 MG: 325 TABLET ORAL at 05:18

## 2019-10-15 RX ADMIN — RENAGEL 800 MG: 800 TABLET ORAL at 12:16

## 2019-10-15 RX ADMIN — HYDROMORPHONE HYDROCHLORIDE 0.5 MG: 1 INJECTION, SOLUTION INTRAMUSCULAR; INTRAVENOUS; SUBCUTANEOUS at 16:09

## 2019-10-15 RX ADMIN — DOCUSATE SODIUM 100 MG: 100 CAPSULE, LIQUID FILLED ORAL at 18:44

## 2019-10-15 RX ADMIN — PANTOPRAZOLE SODIUM 40 MG: 40 TABLET, DELAYED RELEASE ORAL at 05:18

## 2019-10-15 NOTE — ASSESSMENT & PLAN NOTE
Patient still has indwelling DILIA drain for the renal cyst   Will need to monitor output over the next 24 hours  If less than 10 cc over the next 24 hours will consider removing and discussed with IR as if not draining anymore this will only act as potential nidus for infection    IR consulted

## 2019-10-15 NOTE — ASSESSMENT & PLAN NOTE
Developed atrial fibrillation with rapid ventricular response during last admission  Not on any anticoagulation per Cardiology  Continue metoprolol 12 5 mg b i d  With hold parameters

## 2019-10-15 NOTE — SOCIAL WORK
CM scheduled BLS transport via Sharmila Lopez @ [facility] for tentative picku @ 11:00am to [facility] to update CM Tirzi on Wednesday  Pt's wife Amada Gonsalez [phone] updated re: tentative discharge time  Wife requested medical update; Dr Clau Estrada made aware  SLIM and facility updated re: discharge time   Report: [phone] and fax# [phone]

## 2019-10-15 NOTE — PROGRESS NOTES
Progress Note - Felicia Rivas 1960, 64 y o  male MRN: [id number]    Unit/Bed#: -01 Encounter: [id number]    Primary Care Provider: Chantel Goldstein MD   Date and time admitted to hospital: 10/6/2019  6:37 PM        ESRD (end stage renal disease) on dialysis [facility])  Assessment & Plan  HD today  Ileus [facility])  Assessment & Plan  With history of right inguinal hernia for more than 30 years  Surgery on board  Bowel regimen  No surgical intervention indicated at this time  Recommend MiraLax daily  Encourage out of bed and ambulation  P o  Pain medication p r n  And avoid narcotics as much as possible    Anemia due to chronic kidney disease, on chronic dialysis [facility])  Assessment & Plan  Monitor closely  Hemoglobin went down today to 7 2  No active bleeding  EPO shots, as per Nephrology, 3 times a week  Atrial fibrillation [facility])  Assessment & Plan  Developed atrial fibrillation with rapid ventricular response during last admission  Not on any anticoagulation per Cardiology  Continue metoprolol 12 5 mg b i d  With hold parameters  Benign essential hypertension  Assessment & Plan  Per Cardiology, continue metoprolol with hold parameters at reduced dose  Midodrine for hypotension  Monitor blood pressure    Polycystic kidney disease  Assessment & Plan  Patient still has indwelling DILIA drain for the renal cyst   Will need to monitor output over the next 24 hours  If less than 10 cc over the next 24 hours will consider removing and discussed with IR as if not draining anymore this will only act as potential nidus for infection  IR consulted    Multiple wounds of skin  Assessment & Plan  Lower extremities are still swollen, however do not appear acutely infected at this time  With bilateral lower extremity eschar skin wounds, 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    Continue local wound care  Follow-up on biopsy results  Dermatology and Infectious Disease input noted - no abx at this time  Hyponatremia  Assessment & Plan  Monitor sodium levels  Nephrology following    * Hypotension  Assessment & Plan  History of chronic hypotension, per patient he typically runs anywhere from  systolic  Continue midodrine on dialysis days  Monitor blood pressure        VTE Pharmacologic Prophylaxis:   Pharmacologic: Heparin  Mechanical VTE Prophylaxis in Place: Yes    Patient Centered Rounds: I have performed bedside rounds with nursing staff today  Discussions with Specialists or Other Care Team Provider: Discussed with ID - no abx  Education and Discussions with Family / Patient: discussed with patient and with daughter  Time Spent for Care: 30 minutes  More than 50% of total time spent on counseling and coordination of care as described above  Current Length of Stay: 8 day(s)    Current Patient Status: Inpatient   Certification Statement: The patient will continue to require additional inpatient hospital stay due to Needs rehab  Medically stable but rehab tomorrow - after HD today  Discharge Plan: Likely tomorrow  Code Status: Level 1 - Full Code      Subjective:   Patient seen and examined  Feeling "okay'    Objective:     Vitals:   Temp (24hrs), Av 1 °F (36 7 °C), Min:98 °F (36 7 °C), Max:98 4 °F (36 9 °C)    Temp:  [98 °F (36 7 °C)-98 4 °F (36 9 °C)] 98 °F (36 7 °C)  HR:  [102-107] 102  Resp:  [17-18] 17  BP: ()/(57-70) 117/69  SpO2:  [97 %-98 %] 98 %  Body mass index is 28 02 kg/m²  Input and Output Summary (last 24 hours): Intake/Output Summary (Last 24 hours) at 10/15/2019 1611  Last data filed at 10/15/2019 1200  Gross per 24 hour   Intake 540 ml   Output    Net 540 ml       Physical Exam:     Physical Exam   Constitutional: He appears well-developed  No distress  HENT:   Head: Normocephalic  Mouth/Throat: No oropharyngeal exudate  Eyes: Pupils are equal, round, and reactive to light   Right eye exhibits no discharge  Left eye exhibits no discharge  No scleral icterus  Neck: No tracheal deviation present  No thyromegaly present  Cardiovascular: Normal rate  Exam reveals no gallop and no friction rub  No murmur heard  Pulmonary/Chest: Effort normal  No stridor  No respiratory distress  He has no wheezes  He exhibits no tenderness  Abdominal: Soft  He exhibits no distension and no mass  There is no tenderness  There is no rebound and no guarding  No hernia  Musculoskeletal: He exhibits no edema, tenderness or deformity  Lymphadenopathy:     He has no cervical adenopathy  Neurological: He is alert  He displays normal reflexes  No cranial nerve deficit  He exhibits normal muscle tone  Coordination normal    Skin: Capillary refill takes less than 2 seconds  No rash noted  He is not diaphoretic  No erythema  No pallor  Chronic wounds  Psychiatric: He has a normal mood and affect  Additional Data:     Labs:    Results from last 7 days   Lab Units 10/14/19  0606  10/11/19  0443   WBC Thousand/uL 11 22*   < > 11 16*   HEMOGLOBIN g/dL 7 2*   < > 7 2*   HEMATOCRIT % 23 9*   < > 23 6*   PLATELETS Thousands/uL 168   < > 123*   BANDS PCT %  --   --  10*   LYMPHO PCT %  --   --  6*   MONO PCT %  --   --  8   EOS PCT %  --   --  1    < > = values in this interval not displayed  Results from last 7 days   Lab Units 10/14/19  0606  10/09/19  0605   SODIUM mmol/L 134*   < > 132*   POTASSIUM mmol/L 4 0   < > 4 5   CHLORIDE mmol/L 98*   < > 93*   CO2 mmol/L 28   < > 24   BUN mg/dL 72*   < > 58*   CREATININE mg/dL 7 97*   < > 7 60*   ANION GAP mmol/L 8   < > 15*   CALCIUM mg/dL 8 3   < > 8 5   ALBUMIN g/dL  --   --  1 6*   TOTAL BILIRUBIN mg/dL  --   --  2 40*   ALK PHOS U/L  --   --  229*   ALT U/L  --   --  15   AST U/L  --   --  32   GLUCOSE RANDOM mg/dL 101   < > 92    < > = values in this interval not displayed           Results from last 7 days   Lab Units 10/15/19  1526 10/15/19  1133 10/15/19  0820 10/14/19  2102 10/14/19  1537 10/14/19  1139 10/14/19  0839 10/13/19  2048 10/13/19  1608 10/13/19  1127 10/13/19  0806 10/12/19  2056   POC GLUCOSE mg/dl 94 102 120 109 167* 131 129 120 122 124 141* 136         Results from last 7 days   Lab Units 10/09/19  1040   PROCALCITONIN ng/ml 8 29*           * I Have Reviewed All Lab Data Listed Above  * Additional Pertinent Lab Tests Reviewed: All Labs For Current Hospital Admission Reviewed    Imaging:    Imaging Reports Reviewed Today Include:   None pertinent to this encounter  Imaging Personally Reviewed by Myself Includes:  As above      Recent Cultures (last 7 days):     Results from last 7 days   Lab Units 10/09/19  1122   GRAM STAIN RESULT  Rare Polys  No bacteria seen   BODY FLUID CULTURE, STERILE  No growth       Last 24 Hours Medication List:     Current Facility-Administered Medications:  acetaminophen 650 mg Oral Q6H PRN Robin Rinaldi PA-C    albumin human 25 g Intravenous PRN Clydene Comes, CRNP Last Rate: 0 g (10/08/19 1006)   b complex-vitamin C-folic acid 1 capsule Oral Daily With [place] PAUL Salinas    docusate sodium 100 mg Oral BID Dionicio Nascimento MD    epoetin bob 4,000 Units Intravenous Once per day on Tue Thu Sat Clydene Comes, CRNP    gabapentin 100 mg Oral Daily Robin Rinaldi PA-C    heparin (porcine) 5,000 Units Subcutaneous [facility] Robin Rinaldi PA-C    HYDROmorphone 0 5 mg Intravenous Q6H PRN Tino Gomez MD    Lidocaine Viscous HCl 15 mL Swish & Spit 4x Daily PRN Robin Rinaldi PA-C    lidocaine-epinephrine 20 mL Infiltration Once Homero Valentin DO    metoprolol tartrate 12 5 mg Oral Q12H [address] Robin Rinaldi PA-C    midodrine 10 mg Oral Before Dialysis Robin Rinaldi PA-C    midodrine 10 mg Oral Once in dialysis Clydene Comes, CRNP    nystatin  Topical BID Maribel NightPAUL    ondansetron 4 mg Intravenous Q6H PRN Robin Rinaldi PA-C    pantoprazole 40 mg Oral Early Morning Robin Rinaldi PA-C    sevelamer 800 mg Oral TID With Meals SAM Haddad    simethicone 80 mg Oral Q6H PRN Natasha Limon PA-C         Today, Patient Was Seen By: Steph Dee MD    ** Please Note: Dictation voice to text software may have been used in the creation of this document   **

## 2019-10-15 NOTE — ASSESSMENT & PLAN NOTE
Monitor closely  Hemoglobin went down today to 7 2  No active bleeding  EPO shots, as per Nephrology, 3 times a week

## 2019-10-15 NOTE — ASSESSMENT & PLAN NOTE
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

## 2019-10-15 NOTE — PLAN OF CARE
UF goal -1L  Problem: Nutrition/Hydration-ADULT  Goal: Nutrient/Hydration intake appropriate for improving, restoring or maintaining nutritional needs  Description  Monitor and assess patient's nutrition/hydration status for malnutrition  Collaborate with interdisciplinary team and initiate plan and interventions as ordered  Monitor patient's weight and dietary intake as ordered or per policy  Utilize nutrition screening tool and intervene as necessary  Determine patient's food preferences and provide high-protein, high-caloric foods as appropriate       INTERVENTIONS:  - Monitor oral intake, urinary output, labs, and treatment plans  - Assess nutrition and hydration status and recommend course of action  - Evaluate amount of meals eaten  - Assist patient with eating if necessary   - Allow adequate time for meals  - Recommend/ encourage appropriate diets, oral nutritional supplements, and vitamin/mineral supplements  - Order, calculate, and assess calorie counts as needed  - Recommend, monitor, and adjust tube feedings and TPN/PPN based on assessed needs  - Assess need for intravenous fluids  - Provide specific nutrition/hydration education as appropriate  - Include patient/family/caregiver in decisions related to nutrition  Outcome: Progressing

## 2019-10-15 NOTE — ASSESSMENT & PLAN NOTE
Per Cardiology, continue metoprolol with hold parameters at reduced dose  Midodrine for hypotension    Monitor blood pressure

## 2019-10-15 NOTE — HEMODIALYSIS
HD treatment completed  (abreviated as per discussion with advance practitioners)   Good catheter performance 400 flow  Venous chamber resistance increasing with close of treatment  HR increasing with UF  Following discussion of Pt presentation with on call Nephrology, UF decreased  Bed scale descrepancy   UF with today's treatment     1 0 Kg    No distress evident with Pt

## 2019-10-15 NOTE — PROGRESS NOTES
NEPHROLOGY PROGRESS NOTE   Danelle Cooney 64 y o  male MRN: [id number]  Unit/Bed#: -01 Encounter: [id number]  Reason for Consult: ESRD    ASSESSMENT/PLAN:  1  ESRD on HD TTS @ [address] (previously at [facility]  - possibility of patient going to [facility] for dialysis now  - EDW is 83kg  - history of poor compliance with dialysis  2  Access- right IJ perm cath  3  Anemia- hgb below goal  - transfuse for hgb <7  - ok to restart IV iron with HD  - continue epogen 4,000 units with HD  4  Secondary Hyperparathyroidism- continue sevelamer with meals  - previously on phoslo  - could go back on phoslo if needed (discontinued initially due to concern for calciphylaxis)  5  LE wounds  - Tissue biopsy 10/10/2019:  Multiple levels are examined revealing ulceration with superficial and deep vascular proliferation, early scar and fat necrosis  No deep dermal or subcutaneous medial small vascular calcifications diagnostic of calciphylaxis are identified  - no need for sodium thiosulfate  - 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  6  Hypotension- continue midodrine 10mg pre HD    Disposition:  Okay to discharge from renal standpoint  Plan per wife for patient to go to [address] [place] [address] Drive:  Patient without acute complaints      OBJECTIVE:  Current Weight: Weight - Scale: 83 6 kg (184 lb 4 9 oz)(unable to stand )  Vitals:    10/15/19 0000 10/15/19 0400 10/15/19 0600 10/15/19 0751   BP: 94/57 101/60  104/60   BP Location:    Right arm   Pulse:    (!) 107   Resp:    18   Temp:    98 °F (36 7 °C)   TempSrc:    Oral   SpO2:    97%   Weight:   83 6 kg (184 lb 4 9 oz)    Height:         No intake or output data in the 24 hours ending 10/15/19 1143  General: NAD  Skin: no diffuse rash  HEENT: normocephalic  Neck: mm dry  Lungs: CTAB  Cardiac: RRR  Extremities: LE wrapped, no edema  GI: soft nt  Neuro: alert awake  Psych: mood and affect appropriate    Medications:    Current Facility-Administered Medications:     acetaminophen (TYLENOL) tablet 650 mg, 650 mg, Oral, Q6H PRN, Luis Carlos Adamson PA-C, 650 mg at 10/15/19 0518    albumin human (FLEXBUMIN) 25 % injection 25 g, 25 g, Intravenous, PRN, Debi Ros, CRNP, Last Rate: 0 mL/hr at 10/08/19 1006, 25 g at 10/11/19 2339    b complex-vitamin C-folic acid (NEPHROCAPS) capsule 1 capsule, 1 capsule, Oral, Daily With Lexx Cortés PA-C, 1 capsule at 10/14/19 1818    docusate sodium (COLACE) capsule 100 mg, 100 mg, Oral, BID, Jasmina Block MD, 100 mg at 10/15/19 3623    epoetin bob (EPOGEN,PROCRIT) injection 4,000 Units, 4,000 Units, Intravenous, Once per day on Tue Thu Sat, Debi Ros, CRNP, 4,000 Units at 10/12/19 1205    gabapentin (NEURONTIN) capsule 100 mg, 100 mg, Oral, Daily, Luis Carlos Adamson PA-C, 100 mg at 10/15/19 0843    heparin (porcine) subcutaneous injection 5,000 Units, 5,000 Units, Subcutaneous, Q8H [address], 5,000 Units at 10/15/19 0518 **AND** Platelet count, , , Once, Luis Carlos Adamson PA-C    Lidocaine Viscous HCl (XYLOCAINE) 2 % mucosal solution 15 mL, 15 mL, Swish & Spit, 4x Daily PRN, Luis Carlos Adamson PA-C    lidocaine-epinephrine (XYLOCAINE-MPF/EPINEPHRINE) 1%-1:200,000 injection 20 mL, 20 mL, Infiltration, Once, Homero Valentin,     metoprolol tartrate (LOPRESSOR) partial tablet 12 5 mg, 12 5 mg, Oral, Q12H [address], Luis Carlos Adamson PA-C, Stopped at 10/12/19 0909    midodrine (PROAMATINE) tablet 10 mg, 10 mg, Oral, Before Dialysis, Luis Carlos Adamson PA-C, 10 mg at 10/10/19 1457    midodrine (PROAMATINE) tablet 10 mg, 10 mg, Oral, Once in dialysis, SAM Graves, Stopped at 10/08/19 1320    nystatin (MYCOSTATIN) powder, , Topical, BID, Nicole Curiel PA-C, 1 application at 59/66/42 1818    ondansetron (ZOFRAN) injection 4 mg, 4 mg, Intravenous, Q6H PRN, Luis Carlos Adamson PA-C    pantoprazole (PROTONIX) EC tablet 40 mg, 40 mg, Oral, Early Morning, Luis Carlos Adamson, PAUL, 40 mg at 10/15/19 0518    sevelamer (RENAGEL) tablet 800 mg, 800 mg, Oral, TID With Meals, SAM White, 800 mg at 10/15/19 7573    simethicone (MYLICON) chewable tablet 80 mg, 80 mg, Oral, Q6H PRN, Lexis Khan PA-C    Laboratory Results:  Results from last 7 days   Lab Units 10/14/19  0606 10/12/19  0932 10/11/19  0443 10/10/19  0442 10/09/19  0605   WBC Thousand/uL 11 22* 12 59* 11 16* 12 69*  --    HEMOGLOBIN g/dL 7 2* 7 1* 7 2* 8 1*  --    HEMATOCRIT % 23 9* 23 4* 23 6* 26 7*  --    PLATELETS Thousands/uL 168 137* 123* 193  --    POTASSIUM mmol/L 4 0 4 0 4 0 4 5 4 5   CHLORIDE mmol/L 98* 96* 98* 96* 93*   CO2 mmol/L 28 29 28 27 24   BUN mg/dL 72* 75* 55* 66* 58*   CREATININE mg/dL 7 97* 8 49* 6 82* 8 62* 7 60*   CALCIUM mg/dL 8 3 8 1* 8 0* 8 1* 8 5   PHOSPHORUS mg/dL  --   --   --  6 1* 6 7*

## 2019-10-15 NOTE — TRANSPORTATION MEDICAL NECESSITY
Section I - General Information    Name of Patient: Vanda Sharma                 : 1960    Medicare #: [id number]  Transport Date: 10/15/19 (PCS is valid for round trips on this date and for all repetitive trips in the 60-day range as noted below )  Origin: [address]: [place]   Is the pt's stay covered under Medicare Part A (PPS/DRG)   []     Closest appropriate facility? If no, why is transport to more distant facility required? Yes  If hospice pt, is this transport related to pt's terminal illness? NA       Section II - Medical Necessity Questionnaire  Ambulance transportation is medically necessary only if other means of transport are contraindicated or would be potentially harmful to the patient  To meet this requirement, the patient must either be "bed confined" or suffer from a condition such that transport by means other than ambulance is contraindicated by the patient's condition  The following questions must be answered by the medical professional signing below for this form to be valid:    1)  Describe the MEDICAL CONDITION (physical and/or mental) of this patient AT [address] [place] [address] that requires the patient to be transported in an ambulance and why transport by other means is contraindicated by the patient's Hypotension; hyponatremia; multiple wounds of skin; lower extremities are still swollen; bilateral lower extremity eschar; polycystic kidney disease; pt has indwelling DILIA drain; hypertension; ;atrial fibrillation; end stage renal disease on dialysis    2) Is the patient "bed confined" as defined below? Yes  To be "be confined" the patient must satisfy all three of the following conditions: (1) unable to get up from bed without Assistance; AND (2) unable to ambulate; AND (3) unable to sit in a chair or wheelchair      3) Can this patient safely be transported by car or wheelchair van (i e , seated during transport without a medical attendant or monitoring)? No    4) In addition to completing questions 1-3 above, please check any of the following conditions that apply*:   *Note: supporting documentation for any boxes checked must be maintained in the patient's medical records  If hosp-hosp transfer, describe services needed at 2nd facility not available at 1st facility? Moderate/severe pain on movement   Medical attendant required   Unable to tolerate seated position for time needed to transport   Unable to sit in a chair or wheelchair due to decubitus ulcers or other wounds       Section III - Signature of Physician or Healthcare Professional  I certify that the above information is true and correct based on my evaluation of this patient, and represent that the patient requires transport by ambulance and that other forms of transport are contraindicated  I understand that this information will be used by the Centers for Medicare and Medicaid Services (CMS) to support the determination of medical necessity for ambulance services, and I represent that I have personal knowledge of the patient's condition at time of transport  [x]  If this box is checked, I also certify that the patient is physically or mentally incapable of signing the ambulance service's claim and that the institution with which I am affiliated has furnished care, services, or assistance to the patient  My signature below is made on behalf of the patient pursuant to 42 CFR §424 36(b)(4)  In accordance with 42 CFR §424 37, the specific reason(s) that the patient is physically or mentally incapable of signing the claim form is as follows:  Kin Arroyo Physician* or Healthcare Professional_____________________________________________________  Signature Date 10/15/19 (For scheduled repetitive transports, this form is not valid for transports performed more than 60 days after this date)    Printed Name & Credentials of Physician or Healthcare Professional (MD, DO, RN, etc )_[place], [place]       *Form must be signed by patient's attending physician for scheduled, repetitive transports   For non-repetitive, unscheduled ambulance transports, if unable to obtain the signature of the attending physician, any of the following may sign (choose appropriate option below)  [] Physician Assistant []  Clinical Nurse Specialist []  Registered Nurse  []  Nurse Practitioner  [x] Discharge Planner

## 2019-10-15 NOTE — ASSESSMENT & PLAN NOTE
With history of right inguinal hernia for more than 30 years  Surgery on board  Bowel regimen  No surgical intervention indicated at this time  Recommend MiraLax daily  Encourage out of bed and ambulation  P o  Pain medication p r n   And avoid narcotics as much as possible

## 2019-10-15 NOTE — SOCIAL WORK
CM met with pt and wife to update re: [facility] plan [facility]  HD scheduled for 1:15 today  CM messaged [facility] @ [facility] via [facility] confirming facility can accept patient after  HD today

## 2019-10-16 VITALS
RESPIRATION RATE: 18 BRPM | WEIGHT: 183.42 LBS | SYSTOLIC BLOOD PRESSURE: 107 MMHG | BODY MASS INDEX: 27.8 KG/M2 | TEMPERATURE: 98.7 F | OXYGEN SATURATION: 97 % | HEART RATE: 100 BPM | HEIGHT: 68 IN | DIASTOLIC BLOOD PRESSURE: 59 MMHG

## 2019-10-16 LAB
GLUCOSE SERPL-MCNC: 104 MG/DL (ref 65–140)
GLUCOSE SERPL-MCNC: 126 MG/DL (ref 65–140)

## 2019-10-16 PROCEDURE — 99239 HOSP IP/OBS DSCHRG MGMT >30: CPT | Performed by: INTERNAL MEDICINE

## 2019-10-16 PROCEDURE — 82948 REAGENT STRIP/BLOOD GLUCOSE: CPT

## 2019-10-16 PROCEDURE — 99232 SBSQ HOSP IP/OBS MODERATE 35: CPT | Performed by: INTERNAL MEDICINE

## 2019-10-16 RX ORDER — OXYCODONE HYDROCHLORIDE 5 MG/1
5 TABLET ORAL EVERY 4 HOURS PRN
Qty: 12 TABLET | Refills: 0 | Status: SHIPPED | OUTPATIENT
Start: 2019-10-16 | End: 2019-10-19

## 2019-10-16 RX ORDER — LIDOCAINE HYDROCHLORIDE 20 MG/ML
15 SOLUTION OROPHARYNGEAL 4 TIMES DAILY PRN
Status: DISCONTINUED | OUTPATIENT
Start: 2019-10-16 | End: 2019-10-16 | Stop reason: HOSPADM

## 2019-10-16 RX ADMIN — NYSTATIN: 100000 POWDER TOPICAL at 08:09

## 2019-10-16 RX ADMIN — GABAPENTIN 100 MG: 100 CAPSULE ORAL at 08:08

## 2019-10-16 RX ADMIN — ACETAMINOPHEN 650 MG: 325 TABLET ORAL at 00:04

## 2019-10-16 RX ADMIN — RENAGEL 800 MG: 800 TABLET ORAL at 08:08

## 2019-10-16 RX ADMIN — HEPARIN SODIUM 5000 UNITS: 5000 INJECTION INTRAVENOUS; SUBCUTANEOUS at 06:26

## 2019-10-16 RX ADMIN — METOPROLOL TARTRATE 12.5 MG: 25 TABLET ORAL at 08:08

## 2019-10-16 RX ADMIN — DOCUSATE SODIUM 100 MG: 100 CAPSULE, LIQUID FILLED ORAL at 08:08

## 2019-10-16 RX ADMIN — HYDROMORPHONE HYDROCHLORIDE 0.5 MG: 1 INJECTION, SOLUTION INTRAMUSCULAR; INTRAVENOUS; SUBCUTANEOUS at 10:29

## 2019-10-16 RX ADMIN — PANTOPRAZOLE SODIUM 40 MG: 40 TABLET, DELAYED RELEASE ORAL at 06:26

## 2019-10-16 NOTE — PLAN OF CARE
Problem: Potential for Falls  Goal: Patient will remain free of falls  Description  INTERVENTIONS:  - Assess patient frequently for physical needs  -  Identify cognitive and physical deficits and behaviors that affect risk of falls    -  [place] fall precautions as indicated by assessment   - Educate patient/family on patient safety including physical limitations  - Instruct patient to call for assistance with activity based on assessment  - Modify environment to reduce risk of injury  - Consider OT/PT consult to assist with strengthening/mobility  Outcome: Progressing     Problem: Prexisting or High Potential for Compromised Skin Integrity  Goal: Skin integrity is maintained or improved  Description  INTERVENTIONS:  - Identify patients at risk for skin breakdown  - Assess and monitor skin integrity  - Assess and monitor nutrition and hydration status  - Monitor labs   - Assess for incontinence   - Turn and reposition patient  - Assist with mobility/ambulation  - Relieve pressure over bony prominences  - Avoid friction and shearing  - Provide appropriate hygiene as needed including keeping skin clean and dry  - Evaluate need for skin moisturizer/barrier cream  - Collaborate with interdisciplinary team   - Patient/family teaching  - Consider wound care consult   Outcome: Progressing     Problem: PAIN - ADULT  Goal: Verbalizes/displays adequate comfort level or baseline comfort level  Description  Interventions:  - Encourage patient to monitor pain and request assistance  - Assess pain using appropriate pain scale  - Administer analgesics based on type and severity of pain and evaluate response  - Implement non-pharmacological measures as appropriate and evaluate response  - Consider cultural and social influences on pain and pain management  - Notify physician/advanced practitioner if interventions unsuccessful or patient reports new pain  Outcome: Progressing     Problem: INFECTION - ADULT  Goal: Absence or prevention of progression during hospitalization  Description  INTERVENTIONS:  - Assess and monitor for signs and symptoms of infection  - Monitor lab/diagnostic results  - Monitor all insertion sites, i e  indwelling lines, tubes, and drains  - Monitor endotracheal if appropriate and nasal secretions for changes in amount and color  - [place] appropriate cooling/warming therapies per order  - Administer medications as ordered  - Instruct and encourage patient and family to use good hand hygiene technique  - Identify and instruct in appropriate isolation precautions for identified infection/condition  Outcome: Progressing  Goal: Absence of fever/infection during neutropenic period  Description  INTERVENTIONS:  - Monitor WBC    Outcome: Progressing     Problem: SAFETY ADULT  Goal: Patient will remain free of falls  Description  INTERVENTIONS:  - Assess patient frequently for physical needs  -  Identify cognitive and physical deficits and behaviors that affect risk of falls    -  [place] fall precautions as indicated by assessment   - Educate patient/family on patient safety including physical limitations  - Instruct patient to call for assistance with activity based on assessment  - Modify environment to reduce risk of injury  - Consider OT/PT consult to assist with strengthening/mobility  Outcome: Progressing  Goal: Maintain or return to baseline ADL function  Description  INTERVENTIONS:  -  Assess patient's ability to carry out ADLs; assess patient's baseline for ADL function and identify physical deficits which impact ability to perform ADLs (bathing, care of mouth/teeth, toileting, grooming, dressing, etc )  - Assess/evaluate cause of self-care deficits   - Assess range of motion  - Assess patient's mobility; develop plan if impaired  - Assess patient's need for assistive devices and provide as appropriate  - Encourage maximum independence but intervene and supervise when necessary  - Involve family in performance of ADLs  - Assess for home care needs following discharge   - Consider OT consult to assist with ADL evaluation and planning for discharge  - Provide patient education as appropriate  Outcome: Progressing  Goal: Maintain or return mobility status to optimal level  Description  INTERVENTIONS:  - Assess patient's baseline mobility status (ambulation, transfers, stairs, etc )    - Identify cognitive and physical deficits and behaviors that affect mobility  - Identify mobility aids required to assist with transfers and/or ambulation (gait belt, sit-to-stand, lift, walker, cane, etc )  - [place] fall precautions as indicated by assessment  - Record patient progress and toleration of activity level on Mobility SBAR; progress patient to next Phase/Stage  - Instruct patient to call for assistance with activity based on assessment  - Consider rehabilitation consult to assist with strengthening/weightbearing, etc   Outcome: Progressing     Problem: DISCHARGE PLANNING  Goal: Discharge to home or other facility with appropriate resources  Description  INTERVENTIONS:  - Identify barriers to discharge w/patient and caregiver  - Arrange for needed discharge resources and transportation as appropriate  - Identify discharge learning needs (meds, wound care, etc )  - Arrange for interpretive services to assist at discharge as needed  - Refer to Case Management Department for coordinating discharge planning if the patient needs post-hospital services based on physician/advanced practitioner order or complex needs related to functional status, cognitive ability, or social support system  Outcome: Progressing     Problem: Knowledge Deficit  Goal: Patient/family/caregiver demonstrates understanding of disease process, treatment plan, medications, and discharge instructions  Description  Complete learning assessment and assess knowledge base    Interventions:  - Provide teaching at level of understanding  - Provide teaching via preferred learning methods  Outcome: Progressing     Problem: GENITOURINARY - ADULT  Goal: Maintains or returns to baseline urinary function  Description  INTERVENTIONS:  - Assess urinary function  - Encourage oral fluids to ensure adequate hydration if ordered  - Administer IV fluids as ordered to ensure adequate hydration  - Administer ordered medications as needed  - Offer frequent toileting  - Follow urinary retention protocol if ordered  Outcome: Progressing  Goal: Absence of urinary retention  Description  INTERVENTIONS:  - Assess patients ability to void and empty bladder  - Monitor I/O  - Bladder scan as needed  - Discuss with physician/AP medications to alleviate retention as needed  - Discuss catheterization for long term situations as appropriate  Outcome: Progressing     Problem: METABOLIC, FLUID AND ELECTROLYTES - ADULT  Goal: Electrolytes maintained within normal limits  Description  INTERVENTIONS:  - Monitor labs and assess patient for signs and symptoms of electrolyte imbalances  - Administer electrolyte replacement as ordered  - Monitor response to electrolyte replacements, including repeat lab results as appropriate  - Instruct patient on fluid and nutrition as appropriate  Outcome: Progressing  Goal: Fluid balance maintained  Description  INTERVENTIONS:  - Monitor labs   - Monitor I/O and WT  - Instruct patient on fluid and nutrition as appropriate  - Assess for signs & symptoms of volume excess or deficit  Outcome: Progressing  Goal: Glucose maintained within target range  Description  INTERVENTIONS:  - Monitor Blood Glucose as ordered  - Assess for signs and symptoms of hyperglycemia and hypoglycemia  - Administer ordered medications to maintain glucose within target range  - Assess nutritional intake and initiate nutrition service referral as needed  Outcome: Progressing     Problem: SKIN/TISSUE INTEGRITY - ADULT  Goal: Skin integrity remains intact  Description  INTERVENTIONS  - Identify patients at risk for skin breakdown  - Assess and monitor skin integrity  - Assess and monitor nutrition and hydration status  - Monitor labs (i e  albumin)  - Assess for incontinence   - Turn and reposition patient  - Assist with mobility/ambulation  - Relieve pressure over bony prominences  - Avoid friction and shearing  - Provide appropriate hygiene as needed including keeping skin clean and dry  - Evaluate need for skin moisturizer/barrier cream  - Collaborate with interdisciplinary team (i e  Nutrition, Rehabilitation, etc )   - Patient/family teaching  Outcome: Progressing  Goal: Incision(s), wounds(s) or drain site(s) healing without S/S of infection  Description  INTERVENTIONS  - Assess and document risk factors for skin impairment   - Assess and document dressing, incision, wound bed, drain sites and surrounding tissue  - Consider nutrition services referral as needed  - Oral mucous membranes remain intact  - Provide patient/ family education  Outcome: Progressing  Goal: Oral mucous membranes remain intact  Description  INTERVENTIONS  - Assess oral mucosa and hygiene practices  - Implement preventative oral hygiene regimen  - Implement oral medicated treatments as ordered  - Initiate Nutrition services referral as needed  Outcome: Progressing     Problem: Nutrition/Hydration-ADULT  Goal: Nutrient/Hydration intake appropriate for improving, restoring or maintaining nutritional needs  Description  Monitor and assess patient's nutrition/hydration status for malnutrition  Collaborate with interdisciplinary team and initiate plan and interventions as ordered  Monitor patient's weight and dietary intake as ordered or per policy  Utilize nutrition screening tool and intervene as necessary  Determine patient's food preferences and provide high-protein, high-caloric foods as appropriate       INTERVENTIONS:  - Monitor oral intake, urinary output, labs, and treatment plans  - Assess nutrition and hydration status and recommend course of action  - Evaluate amount of meals eaten  - Assist patient with eating if necessary   - Allow adequate time for meals  - Recommend/ encourage appropriate diets, oral nutritional supplements, and vitamin/mineral supplements  - Order, calculate, and assess calorie counts as needed  - Recommend, monitor, and adjust tube feedings and TPN/PPN based on assessed needs  - Assess need for intravenous fluids  - Provide specific nutrition/hydration education as appropriate  - Include patient/family/caregiver in decisions related to nutrition  Outcome: Progressing

## 2019-10-16 NOTE — PLAN OF CARE
Problem: Potential for Falls  Goal: Patient will remain free of falls  Description  INTERVENTIONS:  - Assess patient frequently for physical needs  -  Identify cognitive and physical deficits and behaviors that affect risk of falls    -  [place] fall precautions as indicated by assessment   - Educate patient/family on patient safety including physical limitations  - Instruct patient to call for assistance with activity based on assessment  - Modify environment to reduce risk of injury  - Consider OT/PT consult to assist with strengthening/mobility  Outcome: Progressing     Problem: Prexisting or High Potential for Compromised Skin Integrity  Goal: Skin integrity is maintained or improved  Description  INTERVENTIONS:  - Identify patients at risk for skin breakdown  - Assess and monitor skin integrity  - Assess and monitor nutrition and hydration status  - Monitor labs   - Assess for incontinence   - Turn and reposition patient  - Assist with mobility/ambulation  - Relieve pressure over bony prominences  - Avoid friction and shearing  - Provide appropriate hygiene as needed including keeping skin clean and dry  - Evaluate need for skin moisturizer/barrier cream  - Collaborate with interdisciplinary team   - Patient/family teaching  - Consider wound care consult   Outcome: Progressing     Problem: PAIN - ADULT  Goal: Verbalizes/displays adequate comfort level or baseline comfort level  Description  Interventions:  - Encourage patient to monitor pain and request assistance  - Assess pain using appropriate pain scale  - Administer analgesics based on type and severity of pain and evaluate response  - Implement non-pharmacological measures as appropriate and evaluate response  - Consider cultural and social influences on pain and pain management  - Notify physician/advanced practitioner if interventions unsuccessful or patient reports new pain  Outcome: Progressing     Problem: INFECTION - ADULT  Goal: Absence or prevention of progression during hospitalization  Description  INTERVENTIONS:  - Assess and monitor for signs and symptoms of infection  - Monitor lab/diagnostic results  - Monitor all insertion sites, i e  indwelling lines, tubes, and drains  - Monitor endotracheal if appropriate and nasal secretions for changes in amount and color  - [place] appropriate cooling/warming therapies per order  - Administer medications as ordered  - Instruct and encourage patient and family to use good hand hygiene technique  - Identify and instruct in appropriate isolation precautions for identified infection/condition  Outcome: Progressing  Goal: Absence of fever/infection during neutropenic period  Description  INTERVENTIONS:  - Monitor WBC    Outcome: Progressing     Problem: SAFETY ADULT  Goal: Patient will remain free of falls  Description  INTERVENTIONS:  - Assess patient frequently for physical needs  -  Identify cognitive and physical deficits and behaviors that affect risk of falls    -  [place] fall precautions as indicated by assessment   - Educate patient/family on patient safety including physical limitations  - Instruct patient to call for assistance with activity based on assessment  - Modify environment to reduce risk of injury  - Consider OT/PT consult to assist with strengthening/mobility  Outcome: Progressing  Goal: Maintain or return to baseline ADL function  Description  INTERVENTIONS:  -  Assess patient's ability to carry out ADLs; assess patient's baseline for ADL function and identify physical deficits which impact ability to perform ADLs (bathing, care of mouth/teeth, toileting, grooming, dressing, etc )  - Assess/evaluate cause of self-care deficits   - Assess range of motion  - Assess patient's mobility; develop plan if impaired  - Assess patient's need for assistive devices and provide as appropriate  - Encourage maximum independence but intervene and supervise when necessary  - Involve family in performance of ADLs  - Assess for home care needs following discharge   - Consider OT consult to assist with ADL evaluation and planning for discharge  - Provide patient education as appropriate  Outcome: Progressing  Goal: Maintain or return mobility status to optimal level  Description  INTERVENTIONS:  - Assess patient's baseline mobility status (ambulation, transfers, stairs, etc )    - Identify cognitive and physical deficits and behaviors that affect mobility  - Identify mobility aids required to assist with transfers and/or ambulation (gait belt, sit-to-stand, lift, walker, cane, etc )  - [place] fall precautions as indicated by assessment  - Record patient progress and toleration of activity level on Mobility SBAR; progress patient to next Phase/Stage  - Instruct patient to call for assistance with activity based on assessment  - Consider rehabilitation consult to assist with strengthening/weightbearing, etc   Outcome: Progressing     Problem: DISCHARGE PLANNING  Goal: Discharge to home or other facility with appropriate resources  Description  INTERVENTIONS:  - Identify barriers to discharge w/patient and caregiver  - Arrange for needed discharge resources and transportation as appropriate  - Identify discharge learning needs (meds, wound care, etc )  - Arrange for interpretive services to assist at discharge as needed  - Refer to Case Management Department for coordinating discharge planning if the patient needs post-hospital services based on physician/advanced practitioner order or complex needs related to functional status, cognitive ability, or social support system  Outcome: Progressing     Problem: Knowledge Deficit  Goal: Patient/family/caregiver demonstrates understanding of disease process, treatment plan, medications, and discharge instructions  Description  Complete learning assessment and assess knowledge base    Interventions:  - Provide teaching at level of understanding  - Provide teaching via preferred learning methods  Outcome: Progressing     Problem: GENITOURINARY - ADULT  Goal: Maintains or returns to baseline urinary function  Description  INTERVENTIONS:  - Assess urinary function  - Encourage oral fluids to ensure adequate hydration if ordered  - Administer IV fluids as ordered to ensure adequate hydration  - Administer ordered medications as needed  - Offer frequent toileting  - Follow urinary retention protocol if ordered  Outcome: Progressing  Goal: Absence of urinary retention  Description  INTERVENTIONS:  - Assess patients ability to void and empty bladder  - Monitor I/O  - Bladder scan as needed  - Discuss with physician/AP medications to alleviate retention as needed  - Discuss catheterization for long term situations as appropriate  Outcome: Progressing     Problem: METABOLIC, FLUID AND ELECTROLYTES - ADULT  Goal: Electrolytes maintained within normal limits  Description  INTERVENTIONS:  - Monitor labs and assess patient for signs and symptoms of electrolyte imbalances  - Administer electrolyte replacement as ordered  - Monitor response to electrolyte replacements, including repeat lab results as appropriate  - Instruct patient on fluid and nutrition as appropriate  Outcome: Progressing  Goal: Fluid balance maintained  Description  INTERVENTIONS:  - Monitor labs   - Monitor I/O and WT  - Instruct patient on fluid and nutrition as appropriate  - Assess for signs & symptoms of volume excess or deficit  Outcome: Progressing  Goal: Glucose maintained within target range  Description  INTERVENTIONS:  - Monitor Blood Glucose as ordered  - Assess for signs and symptoms of hyperglycemia and hypoglycemia  - Administer ordered medications to maintain glucose within target range  - Assess nutritional intake and initiate nutrition service referral as needed  Outcome: Progressing     Problem: SKIN/TISSUE INTEGRITY - ADULT  Goal: Skin integrity remains intact  Description  INTERVENTIONS  - Identify patients at risk for skin breakdown  - Assess and monitor skin integrity  - Assess and monitor nutrition and hydration status  - Monitor labs (i e  albumin)  - Assess for incontinence   - Turn and reposition patient  - Assist with mobility/ambulation  - Relieve pressure over bony prominences  - Avoid friction and shearing  - Provide appropriate hygiene as needed including keeping skin clean and dry  - Evaluate need for skin moisturizer/barrier cream  - Collaborate with interdisciplinary team (i e  Nutrition, Rehabilitation, etc )   - Patient/family teaching  Outcome: Progressing  Goal: Incision(s), wounds(s) or drain site(s) healing without S/S of infection  Description  INTERVENTIONS  - Assess and document risk factors for skin impairment   - Assess and document dressing, incision, wound bed, drain sites and surrounding tissue  - Consider nutrition services referral as needed  - Oral mucous membranes remain intact  - Provide patient/ family education  Outcome: Progressing  Goal: Oral mucous membranes remain intact  Description  INTERVENTIONS  - Assess oral mucosa and hygiene practices  - Implement preventative oral hygiene regimen  - Implement oral medicated treatments as ordered  - Initiate Nutrition services referral as needed  Outcome: Progressing     Problem: Nutrition/Hydration-ADULT  Goal: Nutrient/Hydration intake appropriate for improving, restoring or maintaining nutritional needs  Description  Monitor and assess patient's nutrition/hydration status for malnutrition  Collaborate with interdisciplinary team and initiate plan and interventions as ordered  Monitor patient's weight and dietary intake as ordered or per policy  Utilize nutrition screening tool and intervene as necessary  Determine patient's food preferences and provide high-protein, high-caloric foods as appropriate       INTERVENTIONS:  - Monitor oral intake, urinary output, labs, and treatment plans  - Assess nutrition and hydration status and recommend course of action  - Evaluate amount of meals eaten  - Assist patient with eating if necessary   - Allow adequate time for meals  - Recommend/ encourage appropriate diets, oral nutritional supplements, and vitamin/mineral supplements  - Order, calculate, and assess calorie counts as needed  - Recommend, monitor, and adjust tube feedings and TPN/PPN based on assessed needs  - Assess need for intravenous fluids  - Provide specific nutrition/hydration education as appropriate  - Include patient/family/caregiver in decisions related to nutrition  Outcome: Progressing

## 2019-10-16 NOTE — SOCIAL WORK
CM informed by [facility] patient is medically stable [facility]  CM had VM from [place] with Tomasa Moran; patient will be transported via [address] [place] [address] at 11 am to [facility] CM confirmed timing with [facility]  CM also called and spoke with patient's spouse, Nataliia Sotelo  IMM reviewed  Nataliia Sotelo is in agreement with discharge today but is requesting update from [facility] first; message relayed to Dr Ene Henry  Patient and facility also aware of discharge plans  CMN completed, copy placed on sticker chart and copy placed for scanning to medical records

## 2019-10-16 NOTE — PROGRESS NOTES
NEPHROLOGY PROGRESS NOTE   Lakisha Garcia 64 y o  male MRN: [id number]  Unit/Bed#: -01 Encounter: [id number]  Reason for Consult: ESRD    ASSESSMENT/PLAN:  1  ESRD on HD TTS @ [address] (previously at [facility])  - patient going to [facility] for rehab and dialysis now  - EDW is 83kg  - history of poor compliance with dialysis  2  Access- right IJ perm cath  3  Anemia- hgb below goal  - transfuse for hgb <7  - ok to restart IV iron with HD  - continue epogen 4,000 units with HD  4  Secondary Hyperparathyroidism- continue sevelamer with meals  - previously on phoslo  - could go back on phoslo if needed (discontinued initially due to concern for calciphylaxis)  5  LE wounds  - Tissue biopsy 10/10/2019:  Multiple levels are examined revealing ulceration with superficial and deep vascular proliferation, early scar and fat necrosis  No deep dermal or subcutaneous medial small vascular calcifications diagnostic of calciphylaxis are identified  - no need for sodium thiosulfate  - 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  6  Hypotension- continue midodrine 10mg pre HD    Disposition:  Okay for discharge today from a renal standpoint    SUBJECTIVE:  Patient denies SOB  Feels well overall      OBJECTIVE:  Current Weight: Weight - Scale: 83 2 kg (183 lb 6 8 oz)(pt unable to ambulate)  Vitals:    10/15/19 2212 10/16/19 0354 10/16/19 0600 10/16/19 0700   BP: 99/58 102/60  107/59   BP Location: Right arm   Right arm   Pulse: (!) 110   100   Resp: 18   18   Temp: 99 8 °F (37 7 °C)   98 7 °F (37 1 °C)   TempSrc: Oral   Oral   SpO2: 99%   97%   Weight:   83 2 kg (183 lb 6 8 oz)    Height:           Intake/Output Summary (Last 24 hours) at 10/16/2019 0929  Last data filed at 10/15/2019 2230  Gross per 24 hour   Intake 1280 ml   Output 1600 ml   Net -320 ml     General: NAD  Skin: no rash  HEENT: normocephalic  Neck: supple  Lungs: CTAB  Cardiac: RRR  Extremities: LE wrapped without edema  GI: soft nt nd  Neuro: alert awake  Psych: mood and affect appropriate    Medications:    Current Facility-Administered Medications:     acetaminophen (TYLENOL) tablet 650 mg, 650 mg, Oral, Q6H PRN, Ani Hurtado PA-C, 650 mg at 10/16/19 0004    albumin human (FLEXBUMIN) 25 % injection 25 g, 25 g, Intravenous, PRN, SAM Perales, Last Rate: 0 mL/hr at 10/08/19 1006, 25 g at 10/11/19 2339    b complex-vitamin C-folic acid (NEPHROCAPS) capsule 1 capsule, 1 capsule, Oral, Daily With She Green PA-C, 1 capsule at 10/15/19 1844    docusate sodium (COLACE) capsule 100 mg, 100 mg, Oral, BID, Marisa Catalan MD, 100 mg at 10/16/19 0808    epoetin bob (EPOGEN,PROCRIT) injection 4,000 Units, 4,000 Units, Intravenous, Once per day on Tue Thu Sat, SAM Perales, 4,000 Units at 10/15/19 1841    gabapentin (NEURONTIN) capsule 100 mg, 100 mg, Oral, Daily, Ani Hurtado PA-C, 100 mg at 10/16/19 0808    heparin (porcine) subcutaneous injection 5,000 Units, 5,000 Units, Subcutaneous, Q8H [facility], 5,000 Units at 10/16/19 0626 **AND** Platelet count, , , Once, Ani Hurtado PA-C    HYDROmorphone (DILAUDID) injection 0 5 mg, 0 5 mg, Intravenous, Q6H PRN, Delmis Ga MD, 0 5 mg at 10/15/19 1609    Lidocaine Viscous HCl (XYLOCAINE) 2 % mucosal solution 15 mL, 15 mL, Swish & Spit, 4x Daily PRN, Ani Hurtado PA-C    lidocaine-epinephrine (XYLOCAINE-MPF/EPINEPHRINE) 1%-1:200,000 injection 20 mL, 20 mL, Infiltration, Once, Homero Valentin,     metoprolol tartrate (LOPRESSOR) partial tablet 12 5 mg, 12 5 mg, Oral, Q12H [facility], Ani Hurtado PA-C, 12 5 mg at 10/16/19 0808    midodrine (PROAMATINE) tablet 10 mg, 10 mg, Oral, Before Dialysis, Ani Hurtado PA-C, 10 mg at 10/15/19 1533    midodrine (PROAMATINE) tablet 10 mg, 10 mg, Oral, Once in dialysis, SAM Perales, Stopped at 10/08/19 1320    nystatin (MYCOSTATIN) powder, , Topical, BID, PAUL Mcfarlane ondansetron (ZOFRAN) injection 4 mg, 4 mg, Intravenous, Q6H PRN, [place] Clear, PA-C    pantoprazole (PROTONIX) EC tablet 40 mg, 40 mg, Oral, Early Morning, [place] Clear, PA-C, 40 mg at 10/16/19 9761    sevelamer (RENAGEL) tablet 800 mg, 800 mg, Oral, TID With Meals, Kelli Divine, CRNP, 800 mg at 10/16/19 0808    simethicone (MYLICON) chewable tablet 80 mg, 80 mg, Oral, Q6H PRN, [place] Clear, PA-C    Laboratory Results:  Results from last 7 days   Lab Units 10/14/19  0606 10/12/19  0932 10/11/19  0443 10/10/19  0442   WBC Thousand/uL 11 22* 12 59* 11 16* 12 69*   HEMOGLOBIN g/dL 7 2* 7 1* 7 2* 8 1*   HEMATOCRIT % 23 9* 23 4* 23 6* 26 7*   PLATELETS Thousands/uL 168 137* 123* 193   POTASSIUM mmol/L 4 0 4 0 4 0 4 5   CHLORIDE mmol/L 98* 96* 98* 96*   CO2 mmol/L 28 29 28 27   BUN mg/dL 72* 75* 55* 66*   CREATININE mg/dL 7 97* 8 49* 6 82* 8 62*   CALCIUM mg/dL 8 3 8 1* 8 0* 8 1*   PHOSPHORUS mg/dL  --   --   --  6 1*

## 2019-10-16 NOTE — TRANSPORTATION MEDICAL NECESSITY
Section I - General Information    Name of Patient: Sahra Cornell                 : 1960    Medicare #: [id number]  Transport Date: 10/16/19 (PCS is valid for round trips on this date and for all repetitive trips in the 60-day range as noted below )  Origin: [place] [address]: [address], [address], [place], [address] [place]  Is the pt's stay covered under Medicare Part A (PPS/DRG)   [x]     Closest appropriate facility? If no, why is transport to more distant facility required? Yes  If hospice pt, is this transport related to pt's terminal illness? NA       Section II - Medical Necessity Questionnaire  Ambulance transportation is medically necessary only if other means of transport are contraindicated or would be potentially harmful to the patient  To meet this requirement, the patient must either be "bed confined" or suffer from a condition such that transport by means other than ambulance is contraindicated by the patient's condition  The following questions must be answered by the medical professional signing below for this form to be valid:    1)  Describe the MEDICAL CONDITION (physical and/or mental) of this patient AT [address] [place] [address] that requires the patient to be transported in an ambulance and why transport by other means is contraindicated by the patient's condition:Patient is a max assist for transfers and is bed confined at this time  Patient has eschars of the bilateral lower extremities complicated by venous stasis and impairment microvascular circulation and is unable to complete stand and pivot at this time; lower extremities are still swollen  Unable to tolerated seated position unattended for duration of transport, medical attendant required to maintain safety  2) Is the patient "bed confined" as defined below?      Yes  To be "be confined" the patient must satisfy all three of the following conditions: (1) unable to get up from bed without Assistance; AND (2) unable to ambulate; AND (3) unable to sit in a chair or wheelchair  3) Can this patient safely be transported by car or wheelchair van (i e , seated during transport without a medical attendant or monitoring)? No    4) In addition to completing questions 1-3 above, please check any of the following conditions that apply*:   *Note: supporting documentation for any boxes checked must be maintained in the patient's medical records  If hosp-hosp transfer, describe services needed at 2nd facility not available at 1st facility? Medical attendant required   Unable to tolerate seated position for time needed to transport   Unable to sit in a chair or wheelchair due to decubitus ulcers or other wounds   Other(specify) patient has eschars and swelling of the bilateral lower extremities complicated by venous stasis and impairment microvascular circulation  unable to complete stand and pivot      Section III - Signature of Physician or Healthcare Professional  I certify that the above information is true and correct based on my evaluation of this patient, and represent that the patient requires transport by ambulance and that other forms of transport are contraindicated  I understand that this information will be used by the Centers for Medicare and Medicaid Services (CMS) to support the determination of medical necessity for ambulance services, and I represent that I have personal knowledge of the patient's condition at time of transport  []  If this box is checked, I also certify that the patient is physically or mentally incapable of signing the ambulance service's claim and that the institution with which I am affiliated has furnished care, services, or assistance to the patient  My signature below is made on behalf of the patient pursuant to 42 CFR §424 36(b)(4)   In accordance with 42 CFR §424 37, the specific reason(s) that the patient is physically or mentally incapable of signing the claim form is as follows: N/A  Signature of Physician* or Healthcare Professional______________________________________________________________  Signature Date 10/16/19 (For scheduled repetitive transports, this form is not valid for transports performed more than 60 days after this date)    Printed Name & Credentials of Physician or Healthcare Professional (MD, DO, RN, etc )________________________________  *Form must be signed by patient's attending physician for scheduled, repetitive transports   For non-repetitive, unscheduled ambulance transports, if unable to obtain the signature of the attending physician, any of the following may sign (choose appropriate option below)  [] Physician Assistant []  Clinical Nurse Specialist []  Registered Nurse  []  Nurse Practitioner  [x] Discharge Planner

## 2019-10-16 NOTE — PROGRESS NOTES
Progress Note - Infectious Disease   Terri Patel 64 y o  male MRN: [id number]  Unit/Bed#: -01 Encounter: [id number]      Impression/Plan:  1  Bilateral lower extremity eschar skin wounds   Initially suspected to develop due to underlying chronic foot wounds in setting of recent fishing water exposures including [address] 8 weeks ago, followed [facility] [employer] [facility]  Cellulitis has improved, but [facility] extensive eschar wounds after bullae rupture   Mild leukocytosis likely reactive to inflammation in setting of extensive wounds   Wounds remain stable with no overt evidence of developing cellulitis  Rec:  · Monitoring closely off antibiotics  · Continue close serial skin exams  · [address] per Podiatry and RN  10/14/19 wounds photos uploaded to chart  · 10/10/19 skin biopsy results not consistent with calciphylaxis  · F/U with podiatry and wound center outpatient     2  Groin/inner thigh intertrigo  Developed with increased overall edema likely secondary to ascites  Improved  Rec:  Continue barrier cream prn     3   s/p recent Shewanella putrefaciens bacteremia    Presented 9/21/19 Septic shock, POA with leukopenia, tachycardia, refractory hypotension  Suspect primary skin portal of entry with cellulitis as below with secondary infection of HD catheter, s/p removal   Repeat blood cultures negative  Rec:  ? Finished Cipro completing 14 days total of antibiotics through 10/4/19     4   ESRD on HD   [facility]   status post new PermCath 10/1/19 in setting of #2     Rec:  ? Dialysis schedule is Tuesday Thursday Saturday     5   Polycystic kidney/liver disease   Extensive disease seen on CT imaging   S/p IR tube check in right renal cyst and changed today and status post paracentesis   Parasitic fluid with 446 white cells and 45% neutrophils, Ascites fluid cx remains negative   SBP clinically not suspected      6   Chronic foot wounds   Likely multifactorial   Appear superficial   Risk factor for infection as above  Rec:  ? Local wound care per Podiatry     Above plan discussed in detail with patient, PCT, RN, and Dr Parish Mis  Disposition planning to [facility] in progress      Antibiotics:  None      Subjective:  Patient feels ok  No fevers, chills, sweats  He had a large BM and being cleaned up  Leg wound pain is fairly well controlled  Objective:  Vitals:  Temp:  [97 8 °F (36 6 °C)-99 8 °F (37 7 °C)] 98 7 °F (37 1 °C)  HR:  [100-126] 100  Resp:  [17-18] 18  BP: ()/(58-87) 107/59  SpO2:  [97 %-99 %] 97 %  Temp (24hrs), Av 5 °F (36 9 °C), Min:97 8 °F (36 6 °C), Max:99 8 °F (37 7 °C)  Current: Temperature: 98 7 °F (37 1 °C)    Physical Exam:   General Appearance:  Alert, interactive, nontoxic, no acute distress  Helps turn self in bed for bedding and pad change   Throat: Oropharynx moist without lesions  Lungs:   Clear to auscultation bilaterally; no wheezes, rhonchi or rales; respirations unlabored   Heart:  RRR; no murmur   Abdomen:   Soft, non-tender, protuberant, positive bowel sounds  Extremities: Bilateral LE gauze wraps with some honey crusted strike through drainage  No erythema of thighs outside of dressings  Left medial thigh skin biopsy site sutures intact without surrounding palpable tenderness or fluctuance  Skin: No new rashes or lesions  Right chest permacath site and left arm IV site nontender       Labs, Imaging, & Other studies:   All pertinent labs and imaging studies were personally reviewed  Results from last 7 days   Lab Units 10/14/19  0606 10/12/19  0932 10/11/19  0443   WBC Thousand/uL 11 22* 12 59* 11 16*   HEMOGLOBIN g/dL 7 2* 7 1* 7 2*   PLATELETS Thousands/uL 168 137* 123*     Results from last 7 days   Lab Units 10/14/19  0606   POTASSIUM mmol/L 4 0   CHLORIDE mmol/L 98*   CO2 mmol/L 28   BUN mg/dL 72*   CREATININE mg/dL 7 97*   EGFR ml/min/1 73sq m 7   CALCIUM mg/dL 8 3         Results from last 7 days   Lab Units 10/09/19  1122   GRAM STAIN RESULT  Rare Polys  No bacteria seen   BODY FLUID CULTURE, STERILE  No growth

## 2019-10-17 ENCOUNTER — TRANSITIONAL CARE MANAGEMENT (OUTPATIENT)
Dept: FAMILY MEDICINE CLINIC | Facility: MEDICAL CENTER | Age: 59
End: 2019-10-17

## 2019-10-17 ENCOUNTER — HOSPITAL ENCOUNTER (OUTPATIENT)
Dept: RADIOLOGY | Facility: HOSPITAL | Age: 59
Discharge: HOME | End: 2019-10-17
Admitting: RADIOLOGY
Payer: MEDICARE

## 2019-10-17 ENCOUNTER — HOSPITAL ENCOUNTER (OUTPATIENT)
Dept: RADIOLOGY | Facility: HOSPITAL | Age: 59
Discharge: HOME | End: 2019-10-17
Payer: MEDICARE

## 2019-10-17 VITALS — SYSTOLIC BLOOD PRESSURE: 119 MMHG | DIASTOLIC BLOOD PRESSURE: 63 MMHG | HEART RATE: 99 BPM | OXYGEN SATURATION: 99 %

## 2019-10-17 DIAGNOSIS — N18.9 CHRONIC RENAL DISEASE: ICD-10-CM

## 2019-10-17 DIAGNOSIS — N18.6 ESRD (END STAGE RENAL DISEASE) (HCC): ICD-10-CM

## 2019-10-17 PROCEDURE — C1750 CATH, HEMODIALYSIS,LONG-TERM: HCPCS

## 2019-10-17 PROCEDURE — 36581 REPLACE TUNNELED CV CATH: CPT | Performed by: RADIOLOGY

## 2019-10-17 PROCEDURE — 36598 INJ W/FLUOR EVAL CV DEVICE: CPT

## 2019-10-17 PROCEDURE — 36581 REPLACE TUNNELED CV CATH: CPT

## 2019-10-17 PROCEDURE — C1769 GUIDE WIRE: HCPCS

## 2019-10-17 PROCEDURE — NC001 PR NO CHARGE: Performed by: RADIOLOGY

## 2019-10-17 PROCEDURE — 77001 FLUOROGUIDE FOR VEIN DEVICE: CPT | Performed by: RADIOLOGY

## 2019-10-17 PROCEDURE — C1725 CATH, TRANSLUMIN NON-LASER: HCPCS

## 2019-10-17 PROCEDURE — C1894 INTRO/SHEATH, NON-LASER: HCPCS

## 2019-10-17 PROCEDURE — 77001 FLUOROGUIDE FOR VEIN DEVICE: CPT

## 2019-10-17 RX ADMIN — IOHEXOL 10 ML: 350 INJECTION, SOLUTION INTRAVENOUS at 13:50

## 2019-10-17 RX ADMIN — IOHEXOL 5 ML: 350 INJECTION, SOLUTION INTRAVENOUS at 17:21

## 2019-10-17 RX ADMIN — Medication: at 14:00

## 2019-10-17 NOTE — DISCHARGE INSTRUCTIONS
Perma-cath Placement   WHAT YOU NEED TO KNOW:   A perma-cath is a catheter placed through a vein into or near your right atrium  Your right atrium is the right upper chamber of your heart  A perma-cath is used for dialysis in an emergency or until a long-term device is ready to use  After your procedure, you will have some pain and swelling on your chest and neck  You may have some bruises on your chest and neck  You may also have 2 dressings, one on your chest and one on your neck  DISCHARGE INSTRUCTIONS:   Call 911 for any of the following:   · You feel lightheaded, short of breath, and have chest pain  · Your catheter comes out   Contact Interventional Radiology at [phone] Marija PATIENTS: Contact Interventional Radiology at [phone]) Rosario Cobos PATIENTS: Contact Interventional Radiology at [phone]) if:  · Blood soaks through your bandage  · You have new swelling in your arm, neck, face, or chest on your right side  · Your catheter gets wet  · Your bruises or pain get worse  · You have a fever or chills  · Persistent nausea or vomiting  · Your incision is red, swollen, or draining pus  · You have questions or concerns about your condition or care  Self-care:       · Resume your normal diet  · Keep your dressings dry  Do not take a shower or swim  You may take a tub bath, but do not get your dressings wet  Water in your wound can cause bacteria to grow and cause an infection  If your dressing gets wet, dry it off and cover it with dry sterile gauze  Call your healthcare provider  Do not use soaps or ointments  · Do not change your dressings  Your healthcare provider or dialysis nurse will change your dressings  Your dressings should stay in place until your healthcare provider removes them  The dressing on your chest will stay as long as you have the catheter in place  The dressing prevents infection  · Do not remove the red and blue caps from the end of your catheter   The caps prevent air from getting into your catheter    Follow up with your healthcare provider as directed: Write down your questions so you remember to ask them during your visits

## 2019-10-17 NOTE — BRIEF OP NOTE (RAD/CATH)
[facility] EXCH[facility]  Procedure Note    PATIENT NAME: Giselle Chamberlain  : 1960  MRN: [id number]     Pre-op Diagnosis:   1  Chronic renal disease      Post-op Diagnosis:   1  Chronic renal disease        Surgeon:   Dipak Salamanca MD  Assistants:     No qualified resident was available, Resident is only observing    Estimated Blood Loss: minimal  Findings:     Initially, a catheter g was performed  This revealed thrombus at the end of the dialysis catheter  TPA infusion was attempted in the holding area, this did not succeed in establishing flow  Therefore decision made is made to exchange the catheter  It was exchange over wire  The clot/fibrin was disrupted with a balloon    New 28 cm tunneled dialysis catheter was placed and it aspirates and flushes appropriately    Specimens:  None    Complications:  None immediate    Anesthesia: Local    Dipak Salamanca MD     Date: 10/17/2019  Time: 5:00 PM

## 2019-10-17 NOTE — SEDATION DOCUMENTATION
Blue port flushing, but not aspirating blood  Dr Jessica Kincaid made aware  Pt states he can tolerate permacath exchange without sedation

## 2019-10-24 ENCOUNTER — TELEPHONE (OUTPATIENT)
Dept: INTERNAL MEDICINE CLINIC | Facility: CLINIC | Age: 59
End: 2019-10-24

## 2019-10-24 NOTE — TELEPHONE ENCOUNTER
Wife called she states dr Gaudencio Fournier recommended you for her , and he has end stage renal disease, made an appt with you on 11-7-19, (hope that will work for you), also she wants to know if you go to [facility] to see pt?

## 2019-10-24 NOTE — TELEPHONE ENCOUNTER
Nov 7 will work, I saw 10 am  For primary care, we are seeing patients in the office right now  Please let her know that I will be speaking with Dr Charis Pallas from nephrology regarding her 's care for continuity of care  That being said, I would need to  be seeing him in the primary care office for right now

## 2019-10-26 ENCOUNTER — HOSPITAL ENCOUNTER (INPATIENT)
Facility: HOSPITAL | Age: 59
LOS: 26 days | Discharge: NURSING, REHAB OR LONG-TERM CARE | DRG: 576 | End: 2019-11-21
Attending: EMERGENCY MEDICINE | Admitting: INTERNAL MEDICINE
Payer: MEDICARE

## 2019-10-26 DIAGNOSIS — Z99.2 CHRONIC KIDNEY DISEASE WITH END STAGE RENAL FAILURE ON DIALYSIS (HCC): ICD-10-CM

## 2019-10-26 DIAGNOSIS — E87.5 HYPERKALEMIA: ICD-10-CM

## 2019-10-26 DIAGNOSIS — L03.90 CELLULITIS: Primary | ICD-10-CM

## 2019-10-26 DIAGNOSIS — E87.2 LACTIC ACIDOSIS: ICD-10-CM

## 2019-10-26 DIAGNOSIS — N50.89 SCROTAL SWELLING: ICD-10-CM

## 2019-10-26 DIAGNOSIS — K59.00 CONSTIPATION, UNSPECIFIED CONSTIPATION TYPE: ICD-10-CM

## 2019-10-26 DIAGNOSIS — N18.6 ESRD (END STAGE RENAL DISEASE) ON DIALYSIS (HCC): ICD-10-CM

## 2019-10-26 DIAGNOSIS — T14.8XXA MULTIPLE WOUNDS OF SKIN: ICD-10-CM

## 2019-10-26 DIAGNOSIS — Z99.2 ESRD (END STAGE RENAL DISEASE) ON DIALYSIS (HCC): ICD-10-CM

## 2019-10-26 DIAGNOSIS — S81.809A MULTIPLE OPENS WOUND OF LOWER EXTREMITY, UNSPECIFIED LATERALITY, INITIAL ENCOUNTER: ICD-10-CM

## 2019-10-26 DIAGNOSIS — S81.802D WOUND OF LEFT LOWER EXTREMITY, SUBSEQUENT ENCOUNTER: ICD-10-CM

## 2019-10-26 DIAGNOSIS — N18.6 CHRONIC KIDNEY DISEASE WITH END STAGE RENAL FAILURE ON DIALYSIS (HCC): ICD-10-CM

## 2019-10-26 DIAGNOSIS — S81.801D WOUND OF RIGHT LOWER EXTREMITY, SUBSEQUENT ENCOUNTER: ICD-10-CM

## 2019-10-26 DIAGNOSIS — E77.8 HYPOPROTEINEMIA (HCC): ICD-10-CM

## 2019-10-26 LAB
ALBUMIN SERPL BCP-MCNC: 1.5 G/DL (ref 3.5–5)
ALP SERPL-CCNC: 405 U/L (ref 46–116)
ALT SERPL W P-5'-P-CCNC: 13 U/L (ref 12–78)
ANION GAP SERPL CALCULATED.3IONS-SCNC: 9 MMOL/L (ref 4–13)
APTT PPP: 41 SECONDS (ref 23–37)
AST SERPL W P-5'-P-CCNC: 26 U/L (ref 5–45)
BASOPHILS # BLD AUTO: 0.06 THOUSANDS/ΜL (ref 0–0.1)
BASOPHILS NFR BLD AUTO: 1 % (ref 0–1)
BILIRUB SERPL-MCNC: 1 MG/DL (ref 0.2–1)
BUN SERPL-MCNC: 65 MG/DL (ref 5–25)
CALCIUM SERPL-MCNC: 8.8 MG/DL (ref 8.3–10.1)
CHLORIDE SERPL-SCNC: 96 MMOL/L (ref 100–108)
CO2 SERPL-SCNC: 31 MMOL/L (ref 21–32)
CREAT SERPL-MCNC: 6.96 MG/DL (ref 0.6–1.3)
EOSINOPHIL # BLD AUTO: 1.41 THOUSAND/ΜL (ref 0–0.61)
EOSINOPHIL NFR BLD AUTO: 18 % (ref 0–6)
ERYTHROCYTE [DISTWIDTH] IN BLOOD BY AUTOMATED COUNT: 16 % (ref 11.6–15.1)
GFR SERPL CREATININE-BSD FRML MDRD: 8 ML/MIN/1.73SQ M
GLUCOSE SERPL-MCNC: 118 MG/DL (ref 65–140)
HCT VFR BLD AUTO: 31.2 % (ref 36.5–49.3)
HGB BLD-MCNC: 9.6 G/DL (ref 12–17)
IMM GRANULOCYTES # BLD AUTO: 0.04 THOUSAND/UL (ref 0–0.2)
IMM GRANULOCYTES NFR BLD AUTO: 1 % (ref 0–2)
INR PPP: 1.08 (ref 0.84–1.19)
LACTATE SERPL-SCNC: 1.3 MMOL/L (ref 0.5–2)
LACTATE SERPL-SCNC: 2.2 MMOL/L (ref 0.5–2)
LYMPHOCYTES # BLD AUTO: 0.64 THOUSANDS/ΜL (ref 0.6–4.47)
LYMPHOCYTES NFR BLD AUTO: 8 % (ref 14–44)
MCH RBC QN AUTO: 31.4 PG (ref 26.8–34.3)
MCHC RBC AUTO-ENTMCNC: 30.8 G/DL (ref 31.4–37.4)
MCV RBC AUTO: 102 FL (ref 82–98)
MONOCYTES # BLD AUTO: 0.9 THOUSAND/ΜL (ref 0.17–1.22)
MONOCYTES NFR BLD AUTO: 12 % (ref 4–12)
NEUTROPHILS # BLD AUTO: 4.78 THOUSANDS/ΜL (ref 1.85–7.62)
NEUTS SEG NFR BLD AUTO: 60 % (ref 43–75)
NRBC BLD AUTO-RTO: 0 /100 WBCS
PLATELET # BLD AUTO: 316 THOUSANDS/UL (ref 149–390)
PMV BLD AUTO: 9.1 FL (ref 8.9–12.7)
POTASSIUM SERPL-SCNC: 5.8 MMOL/L (ref 3.5–5.3)
PROCALCITONIN SERPL-MCNC: 6.49 NG/ML
PROT SERPL-MCNC: 7.3 G/DL (ref 6.4–8.2)
PROTHROMBIN TIME: 13.4 SECONDS (ref 11.6–14.5)
RBC # BLD AUTO: 3.06 MILLION/UL (ref 3.88–5.62)
SODIUM SERPL-SCNC: 136 MMOL/L (ref 136–145)
WBC # BLD AUTO: 7.83 THOUSAND/UL (ref 4.31–10.16)

## 2019-10-26 PROCEDURE — 85730 THROMBOPLASTIN TIME PARTIAL: CPT | Performed by: EMERGENCY MEDICINE

## 2019-10-26 PROCEDURE — 85025 COMPLETE CBC W/AUTO DIFF WBC: CPT | Performed by: EMERGENCY MEDICINE

## 2019-10-26 PROCEDURE — 36415 COLL VENOUS BLD VENIPUNCTURE: CPT | Performed by: EMERGENCY MEDICINE

## 2019-10-26 PROCEDURE — 93005 ELECTROCARDIOGRAM TRACING: CPT

## 2019-10-26 PROCEDURE — 99291 CRITICAL CARE FIRST HOUR: CPT | Performed by: EMERGENCY MEDICINE

## 2019-10-26 PROCEDURE — 85610 PROTHROMBIN TIME: CPT | Performed by: EMERGENCY MEDICINE

## 2019-10-26 PROCEDURE — 99223 1ST HOSP IP/OBS HIGH 75: CPT | Performed by: PHYSICIAN ASSISTANT

## 2019-10-26 PROCEDURE — 87040 BLOOD CULTURE FOR BACTERIA: CPT | Performed by: EMERGENCY MEDICINE

## 2019-10-26 PROCEDURE — 87205 SMEAR GRAM STAIN: CPT | Performed by: EMERGENCY MEDICINE

## 2019-10-26 PROCEDURE — 99285 EMERGENCY DEPT VISIT HI MDM: CPT

## 2019-10-26 PROCEDURE — 83605 ASSAY OF LACTIC ACID: CPT | Performed by: EMERGENCY MEDICINE

## 2019-10-26 PROCEDURE — 84145 PROCALCITONIN (PCT): CPT | Performed by: EMERGENCY MEDICINE

## 2019-10-26 PROCEDURE — 87186 SC STD MICRODIL/AGAR DIL: CPT | Performed by: EMERGENCY MEDICINE

## 2019-10-26 PROCEDURE — 87147 CULTURE TYPE IMMUNOLOGIC: CPT | Performed by: EMERGENCY MEDICINE

## 2019-10-26 PROCEDURE — 87070 CULTURE OTHR SPECIMN AEROBIC: CPT | Performed by: EMERGENCY MEDICINE

## 2019-10-26 PROCEDURE — 96365 THER/PROPH/DIAG IV INF INIT: CPT

## 2019-10-26 PROCEDURE — 80053 COMPREHEN METABOLIC PANEL: CPT | Performed by: EMERGENCY MEDICINE

## 2019-10-26 PROCEDURE — 87077 CULTURE AEROBIC IDENTIFY: CPT | Performed by: EMERGENCY MEDICINE

## 2019-10-26 PROCEDURE — 96374 THER/PROPH/DIAG INJ IV PUSH: CPT

## 2019-10-26 PROCEDURE — 96361 HYDRATE IV INFUSION ADD-ON: CPT

## 2019-10-26 RX ORDER — ACETAMINOPHEN 325 MG/1
650 TABLET ORAL EVERY 6 HOURS PRN
Status: DISCONTINUED | OUTPATIENT
Start: 2019-10-26 | End: 2019-11-21 | Stop reason: HOSPADM

## 2019-10-26 RX ORDER — ONDANSETRON 2 MG/ML
4 INJECTION INTRAMUSCULAR; INTRAVENOUS EVERY 6 HOURS PRN
Status: DISCONTINUED | OUTPATIENT
Start: 2019-10-26 | End: 2019-11-21 | Stop reason: HOSPADM

## 2019-10-26 RX ORDER — FENTANYL CITRATE 50 UG/ML
50 INJECTION, SOLUTION INTRAMUSCULAR; INTRAVENOUS ONCE
Status: COMPLETED | OUTPATIENT
Start: 2019-10-26 | End: 2019-10-26

## 2019-10-26 RX ORDER — HEPARIN SODIUM 5000 [USP'U]/ML
5000 INJECTION, SOLUTION INTRAVENOUS; SUBCUTANEOUS EVERY 8 HOURS SCHEDULED
Status: DISCONTINUED | OUTPATIENT
Start: 2019-10-26 | End: 2019-11-21 | Stop reason: HOSPADM

## 2019-10-26 RX ORDER — SEVELAMER HYDROCHLORIDE 800 MG/1
800 TABLET, FILM COATED ORAL
Status: DISCONTINUED | OUTPATIENT
Start: 2019-10-27 | End: 2019-11-21 | Stop reason: HOSPADM

## 2019-10-26 RX ORDER — OXYCODONE HYDROCHLORIDE 5 MG/1
5 TABLET ORAL EVERY 8 HOURS PRN
Status: DISCONTINUED | OUTPATIENT
Start: 2019-10-26 | End: 2019-10-30

## 2019-10-26 RX ORDER — PANTOPRAZOLE SODIUM 40 MG/1
40 TABLET, DELAYED RELEASE ORAL
Status: DISCONTINUED | OUTPATIENT
Start: 2019-10-27 | End: 2019-11-21 | Stop reason: HOSPADM

## 2019-10-26 RX ORDER — CHOLECALCIFEROL (VITAMIN D3) 10 MCG
1 TABLET ORAL
Status: DISCONTINUED | OUTPATIENT
Start: 2019-10-27 | End: 2019-11-21 | Stop reason: HOSPADM

## 2019-10-26 RX ORDER — OXYCODONE HYDROCHLORIDE 5 MG/1
5 TABLET ORAL EVERY 6 HOURS PRN
Status: DISCONTINUED | OUTPATIENT
Start: 2019-10-26 | End: 2019-10-26

## 2019-10-26 RX ORDER — GABAPENTIN 100 MG/1
100 CAPSULE ORAL DAILY
Status: DISCONTINUED | OUTPATIENT
Start: 2019-10-27 | End: 2019-11-13

## 2019-10-26 RX ADMIN — HEPARIN SODIUM 5000 UNITS: 5000 INJECTION INTRAVENOUS; SUBCUTANEOUS at 22:36

## 2019-10-26 RX ADMIN — ACETAMINOPHEN 650 MG: 325 TABLET, FILM COATED ORAL at 23:33

## 2019-10-26 RX ADMIN — CEFEPIME HYDROCHLORIDE 2000 MG: 2 INJECTION, POWDER, FOR SOLUTION INTRAVENOUS at 18:25

## 2019-10-26 RX ADMIN — FUROSEMIDE 120 MG: 10 INJECTION, SOLUTION INTRAMUSCULAR; INTRAVENOUS at 22:26

## 2019-10-26 RX ADMIN — FENTANYL CITRATE 50 MCG: 50 INJECTION INTRAMUSCULAR; INTRAVENOUS at 18:02

## 2019-10-26 RX ADMIN — SODIUM CHLORIDE 500 ML: 0.9 INJECTION, SOLUTION INTRAVENOUS at 18:25

## 2019-10-26 NOTE — ED PROVIDER NOTES
History  Chief Complaint   Patient presents with    Cellulitis     Pt states that he has cellulitis in both lower extremities and a hernia  Pt states that he is afraid to move his legs due to the hernia  EMS reports that nursing home states that pts cellulitis is necrotic  History provided by:  Patient   used: No    Fever - 9 weeks to 76 years   Max temp prior to arrival:  100 7  Severity:  Moderate  Duration:  1 day  Timing:  Constant  Progression:  Worsening  Chronicity:  New  Relieved by:  None tried  Worsened by:  Nothing  Ineffective treatments:  None tried  Associated symptoms: rash    Associated symptoms: no chest pain, no chills, no confusion, no congestion, no diarrhea, no dysuria, no nausea and no sore throat        Prior to Admission Medications   Prescriptions Last Dose Informant Patient Reported? Taking? Multiple Vitamin (MULTIVITAMIN) tablet   Yes Yes   Sig: Take 1 tablet by mouth daily   NON FORMULARY   Yes Yes   Sig: Hemodialysis Monday, Wednesday, Friday   Sucroferric Oxyhydroxide (VELPHORO PO)   Yes Yes   Sig: Take 600 mg by mouth If the patient eats       1 tablet (600 mg) with meals  0 5 tablet (300 mg) with snacks   b complex-vitamin C-folic acid (NEPHROCAPS) 1 mg capsule   No Yes   Sig: Take 1 capsule by mouth daily with dinner   calcium acetate (PHOSLO) 667 mg capsule   Yes Yes   Sig: Take 338 mg by mouth If the patient eats       4 tablets (1352 mg) with meals  2 tablets (676 mg) with snacks   gabapentin (NEURONTIN) 100 mg capsule   No Yes   Sig: Take 1 capsule (100 mg total) by mouth daily   metoprolol tartrate (LOPRESSOR) 25 mg tablet   No Yes   Sig: Take 0 5 tablets (12 5 mg total) by mouth every 12 (twelve) hours   midodrine (PROAMATINE) 10 MG tablet   No Yes   Sig: Take 1 tablet (10 mg total) by mouth Before Dialysis (30 mins before dialysis) for up to 12 doses   multivitamin-iron-minerals-folic acid (THERAPEUTIC-M) TABS tablet   Yes Yes   Sig: Take 1 tablet by mouth daily   nystatin (MYCOSTATIN) powder   No Yes   Sig: Apply topically 2 (two) times a day   omeprazole (PriLOSEC OTC) 20 MG tablet   No Yes   Sig: Take 1 tablet (20 mg total) by mouth daily   sevelamer (RENAGEL) 800 mg tablet   No No   Sig: Take 1 tablet (800 mg total) by mouth 3 (three) times a day with meals for 10 days   simethicone (MYLICON) 80 mg chewable tablet   No Yes   Sig: Chew 1 tablet (80 mg total) every 6 (six) hours as needed for flatulence      Facility-Administered Medications: None       Past Medical History:   Diagnosis Date    Complication of renal dialysis     Polycystic kidney disease        Past Surgical History:   Procedure Laterality Date    IR CATHETERGRAM  10/17/2019    IR IMAGE GUIDED ASPIRATION / DRAINAGE  9/23/2019    IR PARACENTESIS  10/9/2019    IR [address] EXCHANGE  10/17/2019    IR [address] PLACEMENT  9/30/2019       History reviewed  No pertinent family history  I have reviewed and agree with the history as documented  Social History     Tobacco Use    Smoking status: Never Smoker    Smokeless tobacco: Never Used   Substance Use Topics    Alcohol use: Not Currently    Drug use: Not Currently        Review of Systems   Constitutional: Positive for fever  Negative for activity change, appetite change, chills and fatigue  HENT: Negative for congestion, dental problem, facial swelling, sore throat, tinnitus and trouble swallowing  Eyes: Negative for pain, discharge and itching  Respiratory: Negative for apnea, chest tightness and wheezing  Cardiovascular: Negative for chest pain, palpitations and leg swelling  Gastrointestinal: Negative for abdominal pain, diarrhea and nausea  Genitourinary: Negative for difficulty urinating, dysuria and flank pain  Musculoskeletal: Positive for gait problem  Negative for arthralgias, back pain, joint swelling and neck pain  Skin: Positive for rash and wound  Negative for color change     Neurological: Negative for dizziness and facial asymmetry  Psychiatric/Behavioral: Negative for agitation, behavioral problems and confusion  The patient is not nervous/anxious  All other systems reviewed and are negative  Physical Exam  Physical Exam   Constitutional: He is oriented to person, place, and time  He appears well-developed and well-nourished  No distress  HENT:   Head: Normocephalic and atraumatic  Right Ear: External ear normal    Left Ear: External ear normal    Eyes: Pupils are equal, round, and reactive to light  EOM are normal  Right eye exhibits no discharge  Left eye exhibits no discharge  Neck: Normal range of motion  Neck supple  No tracheal deviation present  No thyromegaly present  Cardiovascular: Regular rhythm  No murmur heard  Tachycardic   Pulmonary/Chest: Effort normal and breath sounds normal    Abdominal: Soft  Bowel sounds are normal  He exhibits no distension  There is no tenderness  Genitourinary:   Genitourinary Comments: Large right scrotum consistent with previous description of ascites to right scrotum   Musculoskeletal: Normal range of motion  He exhibits no edema or deformity  Neurological: He is alert and oriented to person, place, and time  No cranial nerve deficit  He exhibits normal muscle tone  Skin: Skin is warm  Capillary refill takes less than 2 seconds  Rash noted  He is not diaphoretic  Multiple areas of open wound with black eschar  Draining purulent, foul-smelling drainage   Psychiatric: He has a normal mood and affect  His behavior is normal    Nursing note and vitals reviewed        Vital Signs  ED Triage Vitals   Temperature Pulse Respirations Blood Pressure SpO2   10/26/19 1720 10/26/19 1716 10/26/19 1716 10/26/19 1716 10/26/19 1716   99 1 °F (37 3 °C) (!) 107 16 114/73 100 %      Temp Source Heart Rate Source Patient Position - Orthostatic VS BP Location FiO2 (%)   10/26/19 1720 10/26/19 1716 10/26/19 1716 10/26/19 1716 --   Oral Monitor Lying Right arm Pain Score       10/26/19 1716       Worst Possible Pain           Vitals:    10/26/19 2030 10/26/19 2132 10/26/19 2233 10/26/19 2302   BP: 123/79 113/69 114/70 107/64   Pulse: (!) 108 (!) 106 104    Patient Position - Orthostatic VS: Lying Lying  Lying         Visual Acuity      ED Medications  Medications   b complex-vitamin C-folic acid (NEPHROCAPS) capsule 1 capsule (has no administration in time range)   calcium acetate (PHOSLO) capsule 1,334 mg (has no administration in time range)   gabapentin (NEURONTIN) capsule 100 mg (has no administration in time range)   metoprolol tartrate (LOPRESSOR) partial tablet 12 5 mg (0 mg Oral Hold 10/26/19 2255)   pantoprazole (PROTONIX) EC tablet 40 mg (has no administration in time range)   sevelamer (RENAGEL) tablet 800 mg (has no administration in time range)   ondansetron (ZOFRAN) injection 4 mg (has no administration in time range)   heparin (porcine) subcutaneous injection 5,000 Units (5,000 Units Subcutaneous Given 10/26/19 2236)   acetaminophen (TYLENOL) tablet 650 mg (has no administration in time range)   oxyCODONE (ROXICODONE) IR tablet 5 mg (has no administration in time range)   fentanyl citrate (PF) 100 MCG/2ML 50 mcg (50 mcg Intravenous Given 10/26/19 1802)   sodium chloride 0 9 % bolus 500 mL (0 mL Intravenous Stopped 10/26/19 2025)   cefepime (MAXIPIME) 2 g/50 mL dextrose IVPB (0 mg Intravenous Stopped 10/26/19 1855)   furosemide (LASIX) 120 mg in dextrose 5 % 50 mL IVPB (120 mg Intravenous New Bag 10/26/19 2226)       Diagnostic Studies  Results Reviewed     Procedure Component Value Units Date/Time    Lactic acid x2 [[id number]]  (Normal) Collected:  10/26/19 2053    Lab Status:  Final result Specimen:  Blood from Arm, Right Updated:  10/26/19 2115     LACTIC ACID 1 3 mmol/L     Narrative:       Result may be elevated if tourniquet was used during collection  Wound culture and Gram stain [[id number]] Collected:  10/26/19 2023    Lab Status:   In process Specimen:  Wound from Leg, Right Updated:  10/26/19 2025    Lactic acid x2 [[id number]]  (Abnormal) Collected:  10/26/19 1745    Lab Status:  Final result Specimen:  Blood from Arm, Right Updated:  10/26/19 1818     LACTIC ACID 2 2 mmol/L     Narrative:       Result may be elevated if tourniquet was used during collection      Comprehensive metabolic panel [[id number]]  (Abnormal) Collected:  10/26/19 1745    Lab Status:  Final result Specimen:  Blood from Arm, Right Updated:  10/26/19 1809     Sodium 136 mmol/L      Potassium 5 8 mmol/L      Chloride 96 mmol/L      CO2 31 mmol/L      ANION GAP 9 mmol/L      BUN 65 mg/dL      Creatinine 6 96 mg/dL      Glucose 118 mg/dL      Calcium 8 8 mg/dL      AST 26 U/L      ALT 13 U/L      Alkaline Phosphatase 405 U/L      Total Protein 7 3 g/dL      Albumin 1 5 g/dL      Total Bilirubin 1 00 mg/dL      eGFR 8 ml/min/1 73sq m     Narrative:       [place] guidelines for Chronic Kidney Disease (CKD):     Stage 1 with normal or high GFR (GFR > 90 mL/min/1 73 square meters)    Stage 2 Mild CKD (GFR = 60-89 mL/min/1 73 square meters)    Stage 3A Moderate CKD (GFR = 45-59 mL/min/1 73 square meters)    Stage 3B Moderate CKD (GFR = 30-44 mL/min/1 73 square meters)    Stage 4 Severe CKD (GFR = 15-29 mL/min/1 73 square meters)    Stage 5 End Stage CKD (GFR <15 mL/min/1 73 square meters)  Note: GFR calculation is accurate only with a steady state creatinine    Protime-INR [[id number]]  (Normal) Collected:  10/26/19 1745    Lab Status:  Final result Specimen:  Blood from Arm, Right Updated:  10/26/19 1806     Protime 13 4 seconds      INR 1 08    APTT [[id number]]  (Abnormal) Collected:  10/26/19 1745    Lab Status:  Final result Specimen:  Blood from Arm, Right Updated:  10/26/19 1806     PTT 41 seconds     CBC and differential [[id number]]  (Abnormal) Collected:  10/26/19 1745    Lab Status:  Final result Specimen:  Blood from Arm, Right Updated: 10/26/19 1754     WBC 7 83 Thousand/uL      RBC 3 06 Million/uL      Hemoglobin 9 6 g/dL      Hematocrit 31 2 %       fL      MCH 31 4 pg      MCHC 30 8 g/dL      RDW 16 0 %      MPV 9 1 fL      Platelets 492 Thousands/uL      nRBC 0 /100 WBCs      Neutrophils Relative 60 %      Immat GRANS % 1 %      Lymphocytes Relative 8 %      Monocytes Relative 12 %      Eosinophils Relative 18 %      Basophils Relative 1 %      Neutrophils Absolute 4 78 Thousands/µL      Immature Grans Absolute 0 04 Thousand/uL      Lymphocytes Absolute 0 64 Thousands/µL      Monocytes Absolute 0 90 Thousand/µL      Eosinophils Absolute 1 41 Thousand/µL      Basophils Absolute 0 06 Thousands/µL     Blood culture #1 [[id number]] Collected:  10/26/19 1745    Lab Status: In process Specimen:  Blood from Arm, Left Updated:  10/26/19 1751    Blood culture #2 [[id number]] Collected:  10/26/19 1745    Lab Status: In process Specimen:  Blood from Arm, Right Updated:  10/26/19 1750    Procalcitonin [[id number]] Collected:  10/26/19 1745    Lab Status:   In process Specimen:  Blood from Arm, Right Updated:  10/26/19 1749    UA w Reflex to Microscopic w Reflex to Culture [[id number]]     Lab Status:  No result Specimen:  Urine                  No orders to display              Procedures  CriticalCare Time  Performed by: Rajendra Pimentel MD  Authorized by: Rajendra Pimentel MD     Critical care provider statement:     Critical care time (minutes):  35    Critical care was necessary to treat or prevent imminent or life-threatening deterioration of the following conditions:  Sepsis    Critical care was time spent personally by me on the following activities:  Ordering and review of laboratory studies and ordering and review of radiographic studies    I assumed direction of critical care for this patient from another provider in my specialty: no    ECG 12 Lead Documentation Only  Date/Time: 10/26/2019 11:14 PM  Performed by: Rajendra Pimentel MD  Authorized by: Ramakrishna Quintana MD     Indications / Diagnosis:  Hyperkalemia  ECG reviewed by me, the ED Provider: yes    Patient location:  ED  Rate:     ECG rate:  105    ECG rate assessment: normal    Rhythm:     Rhythm: sinus rhythm and sinus tachycardia    Ectopy:     Ectopy: none    QRS:     QRS axis:  Left    QRS intervals:  Normal  Conduction:     Conduction: normal    ST segments:     ST segments:  Normal  T waves:     T waves: normal             ED Course                   Initial Sepsis Screening     Row Name 10/26/19 1831                Is the patient's history suggestive of a new or worsening infection?         Suspected source of infection  wound infection  -MI        Are two or more of the following signs & symptoms of infection both present and new to the patient? (!) Yes (Proceed)  -MI        Indicate SIRS criteria  Altered mental status; Tachycardia > 90 bpm  -MI        If the answer is yes to both questions, suspicion of sepsis is present          If severe sepsis is present AND tissue hypoperfusion perists in the hour after fluid resuscitation or lactate > 4, the patient meets criteria for SEPTIC SHOCK          Are any of the following organ dysfunction criteria present within 6 hours of suspected infection and SIRS criteria that are NOT considered to be chronic conditions?         Organ dysfunction  Lactate > 2 0 mmol/L  -MI        Date of presentation of severe sepsis  10/26/19  -MI        Time of presentation of severe sepsis  1832  -MI        Tissue hypoperfusion persists in the hour after crystalloid fluid administration, evidenced, by either:          Was hypotension present within one hour of the conclusion of crystalloid fluid administration?           Date of presentation of septic shock          Time of presentation of septic shock            User Key  (r) = Recorded By, (t) = Taken By, (c) = Cosigned By    Initials Name Provider Type    MI Ramakrishna Quintana MD Physician MDM  Number of Diagnoses or Management Options  Cellulitis: new and requires workup  Hyperkalemia: new and requires workup  Lactic acidosis: new and requires workup  Diagnosis management comments: Patient sent from rehab for evaluation of fever, worsening lower extremity rash with foul-smelling discharge  Tachycardic upon arrival with suspected source of infection  Sepsis labs ordered  Lactic acid 2 2, sepsis alert initiated  No hemodynamic instability  Patient given gentle hydration  Given cefepime and vancomycin  Laboratory studies with hyperkalemia, no EKG changes  Known dialysis patient  No treatment indicated due to no EKG changes  Broad antibiotics given for patient due to suspected worsening lower extremity infected wounds  Wound culture ordered  Patient admitted to hospitalist service in stable condition  Seen in ER by admitting team        Amount and/or Complexity of Data Reviewed  Clinical lab tests: ordered and reviewed  Tests in the radiology section of CPT®: ordered and reviewed  Tests in the medicine section of CPT®: reviewed and ordered        Disposition  Final diagnoses:   Cellulitis   Lactic acidosis   Hyperkalemia     Time reflects when diagnosis was documented in both MDM as applicable and the Disposition within this note     Time User Action Codes Description Comment    10/26/2019  7:41 PM [employer] Add [L03 90] Cellulitis     10/26/2019  7:41 PM [employer] Add [E87 2] Lactic acidosis     10/26/2019  7:41 PM [employer] Add [E87 5] Hyperkalemia       ED Disposition     ED Disposition Condition Date/Time Comment    Admit Stable Sat Oct 26, 2019  7:40 PM Case was discussed with Dr Cecile Church and the patient's admission status was agreed to be Admission Status: inpatient status to the service of Dr Cecile Church           Follow-up Information    None         Current Discharge Medication List      CONTINUE these medications which have NOT CHANGED    Details   b complex-vitamin C-folic acid (NEPHROCAPS) 1 mg capsule Take 1 capsule by mouth daily with dinner  Qty: 30 capsule, Refills: 0    Associated Diagnoses: Chronic kidney disease with end stage renal failure on dialysis ([facility])      calcium acetate (PHOSLO) 667 mg capsule Take 338 mg by mouth If the patient eats       4 tablets (1352 mg) with meals  2 tablets (676 mg) with snacks      gabapentin (NEURONTIN) 100 mg capsule Take 1 capsule (100 mg total) by mouth daily  Qty: 30 capsule, Refills: 0    Associated Diagnoses: Cellulitis of lower extremity      metoprolol tartrate (LOPRESSOR) 25 mg tablet Take 0 5 tablets (12 5 mg total) by mouth every 12 (twelve) hours  Qty: 30 tablet, Refills: 0    Associated Diagnoses: Atrial fibrillation, unspecified type ([facility])      midodrine (PROAMATINE) 10 MG tablet Take 1 tablet (10 mg total) by mouth Before Dialysis (30 mins before dialysis) for up to 12 doses  Qty: 12 tablet, Refills: 0    Associated Diagnoses: Chronic kidney disease with end stage renal failure on dialysis [facility])      Multiple Vitamin (MULTIVITAMIN) tablet Take 1 tablet by mouth daily      multivitamin-iron-minerals-folic acid (THERAPEUTIC-M) TABS tablet Take 1 tablet by mouth daily      NON FORMULARY Hemodialysis Monday, Wednesday, Friday      nystatin (MYCOSTATIN) powder Apply topically 2 (two) times a day  Qty: 15 g, Refills: 0    Associated Diagnoses: Multiple wounds of skin      omeprazole (PriLOSEC OTC) 20 MG tablet Take 1 tablet (20 mg total) by mouth daily  Qty: 30 tablet, Refills: 0    Associated Diagnoses: GERD (gastroesophageal reflux disease)      simethicone (MYLICON) 80 mg chewable tablet Chew 1 tablet (80 mg total) every 6 (six) hours as needed for flatulence  Qty: 30 tablet, Refills: 0    Associated Diagnoses: Chronic kidney disease with end stage renal failure on dialysis ([facility])      Sucroferric Oxyhydroxide (VELPHORO PO) Take 600 mg by mouth If the patient eats       1 tablet (600 mg) with meals  0 5 tablet (300 mg) with snacks      sevelamer (RENAGEL) 800 mg tablet Take 1 tablet (800 mg total) by mouth 3 (three) times a day with meals for 10 days  Qty: 30 tablet, Refills: 0    Associated Diagnoses: Chronic kidney disease with end stage renal failure on dialysis [facility]); Multiple wounds of skin           No discharge procedures on file      ED Provider  Electronically Signed by           Jose Guadalupe Carbajal MD  10/26/19 1418

## 2019-10-26 NOTE — SEPSIS NOTE
Sepsis Note   Rose Best 64 y o  male MRN: [id number]  Unit/Bed#: ED 28 Encounter: [id number]      qSOFA     Row Name 10/26/19 1800 10/26/19 1716             Altered mental status GCS < 15           Respiratory Rate > / =22  0  0       Systolic BP < / =316  0  0       Q Sofa Score  0  0           Initial Sepsis Screening     Row Name 10/26/19 1831                Is the patient's history suggestive of a new or worsening infection?         Suspected source of infection  wound infection  -MI        Are two or more of the following signs & symptoms of infection both present and new to the patient? (!) Yes (Proceed)  -MI        Indicate SIRS criteria  Altered mental status; Tachycardia > 90 bpm  -MI        If the answer is yes to both questions, suspicion of sepsis is present          If severe sepsis is present AND tissue hypoperfusion perists in the hour after fluid resuscitation or lactate > 4, the patient meets criteria for SEPTIC SHOCK          Are any of the following organ dysfunction criteria present within 6 hours of suspected infection and SIRS criteria that are NOT considered to be chronic conditions?         Organ dysfunction  Lactate > 2 0 mmol/L  -MI        Date of presentation of severe sepsis  10/26/19  -MI        Time of presentation of severe sepsis  1832  -MI        Tissue hypoperfusion persists in the hour after crystalloid fluid administration, evidenced, by either:          Was hypotension present within one hour of the conclusion of crystalloid fluid administration?           Date of presentation of septic shock          Time of presentation of septic shock            User Key  (r) = Recorded By, (t) = Taken By, (c) = Cosigned By    Initials Name Provider Type    MI Linus Rodriguez MD Physician

## 2019-10-26 NOTE — LETTER
Date: 11/18/2019    To whom it may concern: This is to certify that Giselle Chamberlain has been under my care for the following diagnosis: Severe Lower Extremity Wounds requiring grafts with encephalopathy requiring his wife to assist with medical decision making  I feel that his wife, Hugo Roth, is unable to serve on Jury Duty at this time for the above mentioned medical reasons as her assistance will continue to be required for medical decision making while he is in the hospital and for care post discharge        Sincerely,        Jose Andres, DO

## 2019-10-27 ENCOUNTER — APPOINTMENT (INPATIENT)
Dept: ULTRASOUND IMAGING | Facility: HOSPITAL | Age: 59
DRG: 576 | End: 2019-10-27
Payer: MEDICARE

## 2019-10-27 PROBLEM — N50.89 SCROTAL SWELLING: Status: ACTIVE | Noted: 2019-10-27

## 2019-10-27 LAB
ANION GAP SERPL CALCULATED.3IONS-SCNC: 13 MMOL/L (ref 4–13)
BUN SERPL-MCNC: 71 MG/DL (ref 5–25)
CALCIUM SERPL-MCNC: 8.3 MG/DL (ref 8.3–10.1)
CHLORIDE SERPL-SCNC: 98 MMOL/L (ref 100–108)
CO2 SERPL-SCNC: 22 MMOL/L (ref 21–32)
CREAT SERPL-MCNC: 7.41 MG/DL (ref 0.6–1.3)
GFR SERPL CREATININE-BSD FRML MDRD: 7 ML/MIN/1.73SQ M
GLUCOSE SERPL-MCNC: 94 MG/DL (ref 65–140)
PLATELET # BLD AUTO: 249 THOUSANDS/UL (ref 149–390)
PMV BLD AUTO: 9.4 FL (ref 8.9–12.7)
POTASSIUM SERPL-SCNC: 5.6 MMOL/L (ref 3.5–5.3)
POTASSIUM SERPL-SCNC: 5.8 MMOL/L (ref 3.5–5.3)
SODIUM SERPL-SCNC: 133 MMOL/L (ref 136–145)

## 2019-10-27 PROCEDURE — 99222 1ST HOSP IP/OBS MODERATE 55: CPT | Performed by: INTERNAL MEDICINE

## 2019-10-27 PROCEDURE — 76870 US EXAM SCROTUM: CPT

## 2019-10-27 PROCEDURE — 99232 SBSQ HOSP IP/OBS MODERATE 35: CPT | Performed by: INTERNAL MEDICINE

## 2019-10-27 PROCEDURE — 80048 BASIC METABOLIC PNL TOTAL CA: CPT | Performed by: PHYSICIAN ASSISTANT

## 2019-10-27 PROCEDURE — 84132 ASSAY OF SERUM POTASSIUM: CPT | Performed by: PHYSICIAN ASSISTANT

## 2019-10-27 PROCEDURE — 85049 AUTOMATED PLATELET COUNT: CPT | Performed by: PHYSICIAN ASSISTANT

## 2019-10-27 RX ORDER — SODIUM BICARBONATE 650 MG/1
1300 TABLET ORAL
Status: COMPLETED | OUTPATIENT
Start: 2019-10-27 | End: 2019-10-27

## 2019-10-27 RX ORDER — TORSEMIDE 20 MG/1
40 TABLET ORAL
Status: DISCONTINUED | OUTPATIENT
Start: 2019-10-27 | End: 2019-11-21 | Stop reason: HOSPADM

## 2019-10-27 RX ADMIN — HEPARIN SODIUM 5000 UNITS: 5000 INJECTION INTRAVENOUS; SUBCUTANEOUS at 21:28

## 2019-10-27 RX ADMIN — SODIUM BICARBONATE 650 MG TABLET 1300 MG: at 16:32

## 2019-10-27 RX ADMIN — METOPROLOL TARTRATE 12.5 MG: 25 TABLET ORAL at 09:34

## 2019-10-27 RX ADMIN — OXYCODONE HYDROCHLORIDE 5 MG: 5 TABLET ORAL at 23:00

## 2019-10-27 RX ADMIN — CALCIUM ACETATE 1334 MG: 667 CAPSULE ORAL at 09:34

## 2019-10-27 RX ADMIN — CALCIUM ACETATE 1334 MG: 667 CAPSULE ORAL at 13:22

## 2019-10-27 RX ADMIN — FUROSEMIDE 120 MG: 10 INJECTION, SOLUTION INTRAMUSCULAR; INTRAVENOUS at 13:25

## 2019-10-27 RX ADMIN — Medication 1 CAPSULE: at 16:18

## 2019-10-27 RX ADMIN — GABAPENTIN 100 MG: 100 CAPSULE ORAL at 09:34

## 2019-10-27 RX ADMIN — SEVELAMER HYDROCHLORIDE 800 MG: 800 TABLET, FILM COATED PARENTERAL at 09:34

## 2019-10-27 RX ADMIN — OXYCODONE HYDROCHLORIDE 5 MG: 5 TABLET ORAL at 13:22

## 2019-10-27 RX ADMIN — ACETAMINOPHEN 650 MG: 325 TABLET, FILM COATED ORAL at 09:39

## 2019-10-27 RX ADMIN — ACETAMINOPHEN 650 MG: 325 TABLET, FILM COATED ORAL at 16:41

## 2019-10-27 RX ADMIN — SODIUM BICARBONATE 650 MG TABLET 1300 MG: at 14:51

## 2019-10-27 RX ADMIN — TORSEMIDE 40 MG: 20 TABLET ORAL at 16:26

## 2019-10-27 RX ADMIN — HEPARIN SODIUM 5000 UNITS: 5000 INJECTION INTRAVENOUS; SUBCUTANEOUS at 13:22

## 2019-10-27 RX ADMIN — PANTOPRAZOLE SODIUM 40 MG: 40 TABLET, DELAYED RELEASE ORAL at 06:05

## 2019-10-27 RX ADMIN — CALCIUM ACETATE 1334 MG: 667 CAPSULE ORAL at 16:19

## 2019-10-27 RX ADMIN — HEPARIN SODIUM 5000 UNITS: 5000 INJECTION INTRAVENOUS; SUBCUTANEOUS at 06:05

## 2019-10-27 RX ADMIN — METOPROLOL TARTRATE 12.5 MG: 25 TABLET ORAL at 21:27

## 2019-10-27 RX ADMIN — SEVELAMER HYDROCHLORIDE 800 MG: 800 TABLET, FILM COATED PARENTERAL at 13:22

## 2019-10-27 RX ADMIN — OXYCODONE HYDROCHLORIDE 5 MG: 5 TABLET ORAL at 04:42

## 2019-10-27 RX ADMIN — SEVELAMER HYDROCHLORIDE 800 MG: 800 TABLET, FILM COATED PARENTERAL at 16:19

## 2019-10-27 NOTE — H&P
[address] [facility]  H&P- Javier Couchn 1960, 64 y o  male MRN: [id number]    Unit/Bed#: -01 Encounter: [id number]    Primary Care Provider: Alan De Anda   Date and time admitted to hospital: 10/26/2019  5:10 PM        * Multiple wounds of skin  Assessment & Plan  · POA, there was concerns for sepsis by ER provider  Apparently patient had fever at nursing home  Wounds are chronic and appear stable  Patient is overall asymptomatic in this regard  No leukocytosis  Not meeting sepsis criteria  · Admit patient to med/surg under inpatient status   · Hold further antibiotics   · Trend temps, CBC  · Would restart IV Ancef if patient spikes temperature and obtain ID consultation     ESRD (end stage renal disease) on dialysis [facility])  Assessment & Plan  · Patient is supposed to get HD MWF  Slightly hyperkalemic, but less than 6 0 and is asymptomatic  As per multiple sources the patient has been shortening his dialysis sessions   · If the patient is still here Monday he will need a Nephrology consultation   · Monitor BMP     ADPKD (autosomal dominant polycystic kidney)  Assessment & Plan  · On dialysis   · See plan for ESRD     Atrial fibrillation [facility])  Assessment & Plan  · Patient is tachycardic on admission, but not in A  Fib  Prior episode was a localized event  Not on anticoagulation   · Continue Metoprolol   · No need for Telemetry     Anemia due to chronic kidney disease, on chronic dialysis (HCC)  Assessment & Plan  · Hgb appears to be at baseline  · Monitor CBC    Hyperkalemia  Assessment & Plan  · POA at 5 8  Asymptomatic  No EKG changes  Patient does cut his HD sessions short as per sources     · Reached out to on call nephrology for further recommendations   · Recommended Lasix 120 mg IV once - ordered   · Recheck K at midnight   · He will need nephrology consult if still here Monday for routine HD      VTE Prophylaxis: Heparin  / reason for no mechanical VTE prophylaxis None needed as per VTE protocol    Code Status: Full Code  POLST: POLST form is not discussed and not completed at this time  Discussion with family: Wife at bedside     Anticipated Length of Stay:  Patient will be admitted on an Inpatient basis with an anticipated length of stay of  Greater than 2 midnights  Justification for Hospital Stay: As per above assessment and plan     Total Time for Visit, including Counseling / Coordination of Care: 1 hour  Greater than 50% of this total time spent on direct patient counseling and coordination of care  Chief Complaint:   Leg Wounds    History of Present Illness:    Taylor Connors is a 64 y o  male with a history of PKD, ESRD on HD, and Atrial Fibrillation who presents with leg wounds  As per patient he apparently had a fever of 100*F at his nursing home so he was sent in to the hospital  Overall the patient denies feeling feverish  The patient states that he does have chronic leg wounds, but overall reports that he feels that they are improving  He denies any new pain, increased drainage, or increased redness at the area  The patient's main complaint is an inguinal hernia, however this is not a new complaint and was evaluated prior to not be incarcerated  He denies any chest pain or palpitations  He denies any shortness of breath  The patient's wife expressed concerns that his abdomen retains fluid, however the patient denies he feels his abdomen is filling up with fluid and does not know any change  The patient states that he only very rarely makes urine  As per the patient he states that he is getting dialysis 4 times weekly for a short treatment as less intense  Currently the patient is not complaining of any new acute complaint  Review of Systems:    Review of Systems   Constitutional: Negative for appetite change, chills, diaphoresis, fatigue and fever  HENT: Negative for congestion, rhinorrhea and sore throat      Respiratory: Negative for cough, chest tightness, shortness of breath and wheezing  Cardiovascular: Negative for chest pain, palpitations and leg swelling  Gastrointestinal: Negative for abdominal pain, constipation, diarrhea, nausea and vomiting  Genitourinary: Positive for testicular pain (Hernia)  Negative for dysuria  Musculoskeletal: Negative for arthralgias and myalgias  Skin: Positive for wound (Chronic)  Negative for color change  Neurological: Negative for dizziness, syncope, weakness, light-headedness, numbness and headaches  All other systems reviewed and are negative  Past Medical and Surgical History:     Past Medical History:   Diagnosis Date    Complication of renal dialysis     Polycystic kidney disease        Past Surgical History:   Procedure Laterality Date    IR CATHETERGRAM  10/17/2019    IR IMAGE GUIDED ASPIRATION / DRAINAGE  9/23/2019    IR PARACENTESIS  10/9/2019    IR [address] EXCHANGE  10/17/2019    IR [address] PLACEMENT  9/30/2019       Meds/Allergies:    Prior to Admission medications    Medication Sig Start Date End Date Taking? Authorizing Provider   b complex-vitamin C-folic acid (NEPHROCAPS) 1 mg capsule Take 1 capsule by mouth daily with dinner 10/3/19  Yes Ronnie Kulkarni MD   calcium acetate (PHOSLO) 667 mg capsule Take 338 mg by mouth If the patient eats       4 tablets (1352 mg) with meals  2 tablets (676 mg) with snacks   Yes Historical Provider, MD   gabapentin (NEURONTIN) 100 mg capsule Take 1 capsule (100 mg total) by mouth daily 10/4/19 11/3/19 Yes Ronnie Kulkarni MD   metoprolol tartrate (LOPRESSOR) 25 mg tablet Take 0 5 tablets (12 5 mg total) by mouth every 12 (twelve) hours 10/3/19 11/2/19 Yes Ronnie Kulkarni MD   midodrine (PROAMATINE) 10 MG tablet Take 1 tablet (10 mg total) by mouth Before Dialysis (30 mins before dialysis) for up to 12 doses 10/3/19  Yes Ronnie Kulkarni MD   Multiple Vitamin (MULTIVITAMIN) tablet Take 1 tablet by mouth daily   Yes Historical Provider, MD   multivitamin-iron-minerals-folic acid (THERAPEUTIC-M) TABS tablet Take 1 tablet by mouth daily   Yes Historical Provider, MD   NON FORMULARY Hemodialysis Monday, Wednesday, Friday   Yes Historical Provider, MD   nystatin (MYCOSTATIN) powder Apply topically 2 (two) times a day 10/15/19  Yes Elva Quispe MD   omeprazole (PriLOSEC OTC) 20 MG tablet Take 1 tablet (20 mg total) by mouth daily 10/3/19  Yes Edmar Penn MD   simethicone (MYLICON) 80 mg chewable tablet Chew 1 tablet (80 mg total) every 6 (six) hours as needed for flatulence 10/15/19  Yes Elva Quispe MD   Sucroferric Oxyhydroxide (VELPHORO PO) Take 600 mg by mouth If the patient eats       1 tablet (600 mg) with meals  0 5 tablet (300 mg) with snacks   Yes Historical Provider, MD   sevelamer (RENAGEL) 800 mg tablet Take 1 tablet (800 mg total) by mouth 3 (three) times a day with meals for 10 days 10/15/19 10/25/19  Elva Quispe MD     I have reviewed home medications with a medical source (PCP, Pharmacy, other)  Allergies: No Known Allergies    Social History:     Marital Status: /Civil Union   Occupation: none  Patient Pre-hospital Living Situation: [facility]  Patient Pre-hospital Level of Mobility: Full  Patient Pre-hospital Diet Restrictions: None  Substance Use History:   Social History     Substance and Sexual Activity   Alcohol Use Not Currently     Social History     Tobacco Use   Smoking Status Never Smoker   Smokeless Tobacco Never Used     Social History     Substance and Sexual Activity   Drug Use Not Currently       Family History:    History reviewed  No pertinent family history      Physical Exam:     Vitals:   Blood Pressure: 123/79 (10/26/19 2030)  Pulse: (!) 108 (10/26/19 2030)  Temperature: 99 1 °F (37 3 °C) (10/26/19 1720)  Temp Source: Oral (10/26/19 1720)  Respirations: 16 (10/26/19 2030)  Height: 5' 8" (172 7 cm) (10/26/19 1716)  Weight - Scale: 84 2 kg (185 lb 10 oz) (10/26/19 1716)  SpO2: 98 % (10/26/19 2030)    Physical Exam   Constitutional: He is oriented to person, place, and time  Vital signs are normal  He appears well-developed and well-nourished  Non-toxic appearance  He appears ill (Chronic)  No distress  HENT:   Head: Normocephalic and atraumatic  Mouth/Throat: Mucous membranes are not dry  Eyes: Pupils are equal, round, and reactive to light  Conjunctivae and EOM are normal  Pupils are equal    Neck: Neck supple  Cardiovascular: Regular rhythm, S1 normal, S2 normal, normal heart sounds and intact distal pulses  Tachycardia present  Exam reveals no S3 and no S4  No murmur heard  Pulmonary/Chest: Effort normal and breath sounds normal  No accessory muscle usage or stridor  No respiratory distress  He has no decreased breath sounds  He has no wheezes  He has no rhonchi  He has no rales  He exhibits no tenderness  Abdominal: Soft  Bowel sounds are normal  He exhibits distension  He exhibits no fluid wave and no mass  There is no tenderness  There is no rigidity, no rebound and no guarding  Neurological: He is alert and oriented to person, place, and time  He is not disoriented  GCS eye subscore is 4  GCS verbal subscore is 5  GCS motor subscore is 6  Skin: Skin is warm and dry  Patient with multiple severe chronic leg wounds  None of which appear to be actively infected  No erythema extending proximally  No lymphangitis noted  Additional Data:     Lab Results: I have personally reviewed pertinent reports        Results from last 7 days   Lab Units 10/26/19  1745   WBC Thousand/uL 7 83   HEMOGLOBIN g/dL 9 6*   HEMATOCRIT % 31 2*   PLATELETS Thousands/uL 316   NEUTROS PCT % 60   LYMPHS PCT % 8*   MONOS PCT % 12   EOS PCT % 18*     Results from last 7 days   Lab Units 10/26/19  1745   SODIUM mmol/L 136   POTASSIUM mmol/L 5 8*   CHLORIDE mmol/L 96*   CO2 mmol/L 31   BUN mg/dL 65*   CREATININE mg/dL 6 96*   ANION GAP mmol/L 9   CALCIUM mg/dL 8 8 ALBUMIN g/dL 1 5*   TOTAL BILIRUBIN mg/dL 1 00   ALK PHOS U/L 405*   ALT U/L 13   AST U/L 26   GLUCOSE RANDOM mg/dL 118     Results from last 7 days   Lab Units 10/26/19  1745   INR  1 08             Results from last 7 days   Lab Units 10/26/19  2053 10/26/19  1745   LACTIC ACID mmol/L 1 3 2 2*       Imaging: I have personally reviewed pertinent reports  No orders to display       EKG, Pathology, and Other Studies Reviewed on Admission:   · EKG: Sinus Tachy, 105 BPM    Allscripts / Epic Records Reviewed: Yes     ** Please Note: This note has been constructed using a voice recognition system   **

## 2019-10-27 NOTE — ASSESSMENT & PLAN NOTE
· Patient is supposed to get HD MWF  Slightly hyperkalemic, but less than 6 0 and is asymptomatic   As per multiple sources the patient has been shortening his dialysis sessions   · If the patient is still here Monday he will need a Nephrology consultation   · Monitor BMP

## 2019-10-27 NOTE — ASSESSMENT & PLAN NOTE
· Patient is tachycardic on admission, but not in A  Fib  Prior episode was a localized event   Not on anticoagulation   · Continue Metoprolol   · No need for Telemetry

## 2019-10-27 NOTE — CONSULTS
Consultation - General Surgery  Yanira Sung 64 y o  male MRN: [id number]  Unit/Bed#: -01 Encounter: [id number]    Assessment/Plan     Assessment/Plan:  64 y o  yo male with chronic right inguinal hernia    Patient remains a poor surgical candidate, with persistent wound healing problems and cardiac history  Would avoid invasive surgical intervention at this time  Continue symptom management with elevation    History of Present Illness     HPI:  Yanira Sung is a 64 y o  male who presents with multiple complaints  Patient reports that he was admitted for a fever of 100  He is known to general surgery for recent sepsis requiring emergent dialysis catheter removal as well as bilateral lower extremity wounds  Today, the patient reports persistent right scrotal swelling  He reports this has been present for years  He reports increased discomfort when he attempts to ambulate  He reports that previously, he had been able to reduce the hernia, but for patient's recent memory, it has remained constant  Patient is a poor historian  Inpatient consult to Acute Care Surgery     Performed by  Asher Polk     Authorized by Dami Carroll MD              Review of Systems   Constitutional: Negative  HENT: Negative  Eyes: Negative  Respiratory: Negative  Cardiovascular: Negative  Gastrointestinal: Positive for abdominal distention  Endocrine: Negative  Genitourinary: Positive for scrotal swelling (right)  Musculoskeletal:        Difficulty ambulating   Skin: Positive for wound  Allergic/Immunologic: Negative  Neurological: Negative  Hematological: Negative  Psychiatric/Behavioral: Negative          Historical Information   Past Medical History:   Diagnosis Date    Complication of renal dialysis     Polycystic kidney disease      Past Surgical History:   Procedure Laterality Date    IR CATHETERGRAM  10/17/2019    IR IMAGE GUIDED ASPIRATION / DRAINAGE 9/23/2019    IR PARACENTESIS  10/9/2019    IR [address] EXCHANGE  10/17/2019    IR [address] [place] [address] PLACEMENT  9/30/2019     Social History   Social History     Substance and Sexual Activity   Alcohol Use Not Currently     Social History     Substance and Sexual Activity   Drug Use Not Currently     Social History     Tobacco Use   Smoking Status Never Smoker   Smokeless Tobacco Never Used     Family History: History reviewed  No pertinent family history  Meds/Allergies   current meds:   Current Facility-Administered Medications   Medication Dose Route Frequency    acetaminophen (TYLENOL) tablet 650 mg  650 mg Oral Q6H PRN    b complex-vitamin C-folic acid (NEPHROCAPS) capsule 1 capsule  1 capsule Oral Daily With Dinner    calcium acetate (PHOSLO) capsule 1,334 mg  1,334 mg Oral TID With Meals    furosemide (LASIX) 120 mg in dextrose 5 % 50 mL IVPB  120 mg Intravenous Once    gabapentin (NEURONTIN) capsule 100 mg  100 mg Oral Daily    heparin (porcine) subcutaneous injection 5,000 Units  5,000 Units Subcutaneous Q8H [address]    metoprolol tartrate (LOPRESSOR) partial tablet 12 5 mg  12 5 mg Oral Q12H [address]    ondansetron (ZOFRAN) injection 4 mg  4 mg Intravenous Q6H PRN    oxyCODONE (ROXICODONE) IR tablet 5 mg  5 mg Oral Q8H PRN    pantoprazole (PROTONIX) EC tablet 40 mg  40 mg Oral Early Morning    sevelamer (RENAGEL) tablet 800 mg  800 mg Oral TID With Meals    sodium bicarbonate tablet 1,300 mg  1,300 mg Oral BID after meals    torsemide (DEMADEX) tablet 40 mg  40 mg Oral BID (diuretic)    and PTA meds:   Prior to Admission Medications   Prescriptions Last Dose Informant Patient Reported? Taking? Multiple Vitamin (MULTIVITAMIN) tablet   Yes Yes   Sig: Take 1 tablet by mouth daily   NON FORMULARY   Yes Yes   Sig: Hemodialysis Monday, Wednesday, Friday   Sucroferric Oxyhydroxide (VELPHORO PO)   Yes Yes   Sig: Take 600 mg by mouth If the patient eats       1 tablet (600 mg) with meals  0 5 tablet (300 mg) with snacks   b complex-vitamin C-folic acid (NEPHROCAPS) 1 mg capsule   No Yes   Sig: Take 1 capsule by mouth daily with dinner   calcium acetate (PHOSLO) 667 mg capsule   Yes Yes   Sig: Take 338 mg by mouth If the patient eats       4 tablets (1352 mg) with meals  2 tablets (676 mg) with snacks   gabapentin (NEURONTIN) 100 mg capsule   No Yes   Sig: Take 1 capsule (100 mg total) by mouth daily   metoprolol tartrate (LOPRESSOR) 25 mg tablet   No Yes   Sig: Take 0 5 tablets (12 5 mg total) by mouth every 12 (twelve) hours   midodrine (PROAMATINE) 10 MG tablet   No Yes   Sig: Take 1 tablet (10 mg total) by mouth Before Dialysis (30 mins before dialysis) for up to 12 doses   multivitamin-iron-minerals-folic acid (THERAPEUTIC-M) TABS tablet   Yes Yes   Sig: Take 1 tablet by mouth daily   nystatin (MYCOSTATIN) powder   No Yes   Sig: Apply topically 2 (two) times a day   omeprazole (PriLOSEC OTC) 20 MG tablet   No Yes   Sig: Take 1 tablet (20 mg total) by mouth daily   sevelamer (RENAGEL) 800 mg tablet   No No   Sig: Take 1 tablet (800 mg total) by mouth 3 (three) times a day with meals for 10 days   simethicone (MYLICON) 80 mg chewable tablet   No Yes   Sig: Chew 1 tablet (80 mg total) every 6 (six) hours as needed for flatulence      Facility-Administered Medications: None     No Known Allergies    Objective   First Vitals:   Blood Pressure: 114/73 (10/26/19 1716)  Pulse: (!) 107 (10/26/19 1716)  Temperature: 99 1 °F (37 3 °C) (10/26/19 1720)  Temp Source: Oral (10/26/19 1720)  Respirations: 16 (10/26/19 1716)  Height: 5' 8" (172 7 cm) (10/26/19 1716)  Weight - Scale: 84 2 kg (185 lb 10 oz) (10/26/19 1716)  SpO2: 100 % (10/26/19 1716)    Current Vitals:   Blood Pressure: 126/64 (10/27/19 0846)  Pulse: 104 (10/27/19 0846)  Temperature: 98 2 °F (36 8 °C) (10/27/19 0846)  Temp Source: Temporal (10/27/19 0846)  Respirations: 18 (10/27/19 0846)  Height: 5' 8" (172 7 cm) (10/26/19 2132)  Weight - Scale: 84 2 kg (185 lb 10 oz) (10/26/19 1716)  SpO2: 100 % (10/27/19 0846)      Intake/Output Summary (Last 24 hours) at 10/27/2019 1119  Last data filed at 10/26/2019 2025  Gross per 24 hour   Intake 550 ml   Output    Net 550 ml       Invasive Devices     Central Venous Catheter Line            CVC Central Lines Double -- days          Peripheral Intravenous Line            Peripheral IV 10/26/19 Left Antecubital less than 1 day    Peripheral IV 10/26/19 Right Antecubital less than 1 day          Line            Temporary HD Catheter -- days                Physical Exam   Constitutional: He appears well-developed and well-nourished  No distress  HENT:   Head: Normocephalic and atraumatic  Eyes: Pupils are equal, round, and reactive to light  EOM are normal    Neck: Normal range of motion  Cardiovascular: Normal rate and regular rhythm  Pulmonary/Chest: Effort normal and breath sounds normal    Abdominal: Soft  He exhibits no distension  There is no tenderness  A hernia is present  Genitourinary:   Genitourinary Comments: R scrotal swelling   Musculoskeletal: Normal range of motion  Skin: He is not diaphoretic  Lab Results:   CBC:   Lab Results   Component Value Date    WBC 7 83 10/26/2019    HGB 9 6 (L) 10/26/2019    HCT 31 2 (L) 10/26/2019     (H) 10/26/2019     10/27/2019    MCH 31 4 10/26/2019    MCHC 30 8 (L) 10/26/2019    RDW 16 0 (H) 10/26/2019    MPV 9 4 10/27/2019    NRBC 0 10/26/2019   , CMP:   Lab Results   Component Value Date    SODIUM 133 (L) 10/27/2019    K 5 8 (H) 10/27/2019    CL 98 (L) 10/27/2019    CO2 22 10/27/2019    BUN 71 (H) 10/27/2019    CREATININE 7 41 (H) 10/27/2019    CALCIUM 8 3 10/27/2019    AST 26 10/26/2019    ALT 13 10/26/2019    ALKPHOS 405 (H) 10/26/2019    EGFR 7 10/27/2019   , Coagulation:   Lab Results   Component Value Date    INR 1 08 10/26/2019     Imaging: I have personally reviewed pertinent reports     and I have personally reviewed pertinent films in PACS  EKG, Pathology, and Other Studies: I have personally reviewed pertinent reports  and I have personally reviewed pertinent films in PACS    Counseling / Coordination of Care  Total floor / unit time spent today 20 minutes  Greater than 50% of total time was spent with the patient and / or family counseling and / or coordination of care

## 2019-10-27 NOTE — CONSULTS
Consultation - Nephrology   Felicia Rivas 64 y o  male MRN: [id number]  Unit/Bed#: -01 Encounter: [id number]    Inpatient consult to Nephrology  Consult performed by: Gurpreet Farrar MD  Consult ordered by: Po Sullivan MD          History of Present Illness   Physician Requesting Consult: Po Sullivan MD  Reason for Consult / Principal Problem:  ESRD on hemodialysis  Hx and PE limited by:  Patient is not a good historian   HPI: Felicia Rivas is a 64y o  year old male with history of polycystic kidney disease/ESRD on hemodialysis/atrial fibrillation with chronic leg wounds who presents with a fever of 100 at the nursing home facility subsequently sent to the hospital   He denies any chills  He denies any new type pain but chronic leg pain  He denies any nausea vomiting or diarrhea  No chest pain or shortness of Breath  No significant cough  He still urinates but minimal urine output  Reportedly he receives hemodialysis 4 times a week at the nursing home  This will include Monday Wednesday Friday and Tuesday      History obtained from the patient, the chart and old records which I completely reviewed        General:  See HPI, eating and drinking well according to the patient  Cardiovascular:  No chest pain or shortness of Breath  Respiratory:  No cough or shortness of Breath  Gastrointestinal:  No nausea vomiting or diarrhea  Genitourinary:  See HPI  Neuro:  No significant headaches  Rest of review of systems as completely reviewed with the patient are negative    Historical Information   Past Medical History:   Diagnosis Date    Complication of renal dialysis     Polycystic kidney disease      Past Surgical History:   Procedure Laterality Date    IR CATHETERGRAM  10/17/2019    IR IMAGE GUIDED ASPIRATION / DRAINAGE  9/23/2019    IR PARACENTESIS  10/9/2019    [facility]  10/[facility]    [facility]  9/30/2019     Social History   Social History Substance and Sexual Activity   Alcohol Use Not Currently     Social History     Substance and Sexual Activity   Drug Use Not Currently     Social History     Tobacco Use   Smoking Status Never Smoker   Smokeless Tobacco Never Used     History reviewed  No pertinent family history      Meds/Allergies   all current active meds have been reviewed, current meds:   Current Facility-Administered Medications   Medication Dose Route Frequency    acetaminophen (TYLENOL) tablet 650 mg  650 mg Oral Q6H PRN    b complex-vitamin C-folic acid (NEPHROCAPS) capsule 1 capsule  1 capsule Oral Daily With Dinner    calcium acetate (PHOSLO) capsule 1,334 mg  1,334 mg Oral TID With Meals    gabapentin (NEURONTIN) capsule 100 mg  100 mg Oral Daily    heparin (porcine) subcutaneous injection 5,000 Units  5,000 Units Subcutaneous Q8H [facility]    metoprolol tartrate (LOPRESSOR) partial tablet 12 5 mg  12 5 mg Oral Q12H [facility]    ondansetron (ZOFRAN) injection 4 mg  4 mg Intravenous Q6H PRN    oxyCODONE (ROXICODONE) IR tablet 5 mg  5 mg Oral Q8H PRN    pantoprazole (PROTONIX) EC tablet 40 mg  40 mg Oral Early Morning    sevelamer (RENAGEL) tablet 800 mg  800 mg Oral TID With Meals    and PTA meds:    Medications Prior to Admission   Medication    b complex-vitamin C-folic acid (NEPHROCAPS) 1 mg capsule    calcium acetate (PHOSLO) 667 mg capsule    gabapentin (NEURONTIN) 100 mg capsule    metoprolol tartrate (LOPRESSOR) 25 mg tablet    midodrine (PROAMATINE) 10 MG tablet    Multiple Vitamin (MULTIVITAMIN) tablet    multivitamin-iron-minerals-folic acid (THERAPEUTIC-M) TABS tablet    NON FORMULARY    nystatin (MYCOSTATIN) powder    omeprazole (PriLOSEC OTC) 20 MG tablet    simethicone (MYLICON) 80 mg chewable tablet    Sucroferric Oxyhydroxide (VELPHORO PO)    sevelamer (RENAGEL) 800 mg tablet       No Known Allergies    Objective     Intake/Output Summary (Last 24 hours) at 10/27/2019 1113  Last data filed at 10/26/2019 2025  Gross per 24 hour   Intake 550 ml   Output    Net 550 ml     Patient Vitals for the past 24 hrs:   BP Temp Temp src Pulse Resp SpO2 Height Weight   10/27/19 0846 126/64 98 2 °F (36 8 °C) Temporal 104 18 100 %     10/26/19 2302 107/64          10/26/19 2233 114/70   104       10/26/19 2132 113/69 98 5 °F (36 9 °C) Oral (!) 106 16 94 % 5' 8" (1 727 m)    10/26/19 2030 123/79   (!) 108 16 98 %     10/26/19 1900 118/75   102 16 100 %     10/26/19 1830 127/70   102 18 98 %     10/26/19 1800 116/79   105 18 96 %     10/26/19 1720  99 1 °F (37 3 °C) Oral        10/26/19 1716 114/73   (!) 107 16 100 % 5' 8" (1 727 m) 84 2 kg (185 lb 10 oz)       Invasive Devices:      Vitals:    10/27/19 0846   BP: 126/64   Pulse: 104   Resp: 18   Temp: 98 2 °F (36 8 °C)   SpO2: 100%     General:  Well-developed well-nourished no acute distress at this time lying in bed  Skin:  Lower extremities are completely wrapped; no other acute rash  Eyes:  No scleral icterus and noninjected  ENT:  Normocephalic/atraumatic, mucous membranes moist  Neck:  Supple, no jugular venous distention, no carotid bruits, 1+ carotid upstroke, no thyromegaly  Back:  No CVA tenderness  Chest:  Clear to auscultation percussion, good respiratory effort, no use of accessory respiratory muscles  Right IJ permanent Jhon catheter clean and dry with no erythema no exudate and no tenderness over the tunnel site  CVS:  Regular rate and rhythm without evidence of a cardiac rub or gallops or murmurs  Abdomen:  Slightly distended, but soft nontender with normal bowel sounds, no overt fluid wave, no overt masses, no hepatosplenomegaly  Extremities:  Legs are wrapped, no overt edema, no overt cyanosis, no femoral bruits  Groin:  Extremely large inguinal hernia and to the scrotal area  Neuro:  No gross focality  Psych:  Alert and oriented    Current Weight: Weight - Scale: 84 2 kg (185 lb 10 oz)  First Weight: Weight - Scale: 84 2 kg (185 lb 10 oz)    Lab Results:  I have personally reviewed pertinent labs  CBC:   Lab Results   Component Value Date    WBC 7 83 10/26/2019    RBC 3 06 (L) 10/26/2019     CMP:   Lab Results   Component Value Date    CL 98 (L) 10/27/2019    CO2 22 10/27/2019    BUN 71 (H) 10/27/2019    CREATININE 7 41 (H) 10/27/2019    CALCIUM 8 3 10/27/2019    AST 26 10/26/2019    ALT 13 10/26/2019    ALKPHOS 405 (H) 10/26/2019    EGFR 7 10/27/2019     Phosphorus:   Lab Results   Component Value Date    PHOS 6 1 (H) 10/10/2019     Magnesium:   Lab Results   Component Value Date    MG 2 0 10/03/2019     Urinalysis: No results found for: COLORU, CLARITYU, SPECGRAV, PHUR, LEUKOCYTESUR, NITRITE, PROTEINUA, GLUCOSEU, KETONESU, BILIRUBINUR, BLOODU  BMP:   Lab Results   Component Value Date    SODIUM 133 (L) 10/27/2019    CO2 22 10/27/2019    BUN 71 (H) 10/27/2019    CREATININE 7 41 (H) 10/27/2019    CALCIUM 8 3 10/27/2019     Radiology review:  None at this time          Assessment/Plan   1  ESRD:  · HD  MTWF at the nursing home facility which we will verify  · Next HD in a m  Will notify hemodialysis  · Access:  Permanent Jhon catheter in the right IJ area recently changed on 10/17  · Renal diet especially low-potassium    2  Blood pressure/volume:    · Blood pressure acceptable  Hold parameters  · Volume:  Appears euvolemic  3  Electrolytes:  · Hyperkalemia:  Less than 6 0  Appears somewhat chronic  Push low-potassium diet/HD in a m , will give 1 dose of furosemide intravenously  No indications for urgent hemodialysis as long as potassium is less than 6 0 which is the case  Will also add sodium bicarbonate for 1 day  · Hypokalemia:  From ESRD:  Fluid restriction and HD  4  Mineral bone disorder:  · Hyperphosphatemia:  On PhosLo/Sevelamar and renal diet    5   Anemia of ESRD:  · Hemoglobin acceptable at 9 6 which is higher than recent  · Check outpatient orders regarding GRISELDA  · Check stool for occult blood  · Monitor hemoglobin    6  Other issues:  · PRIMARY PROBLEM:  POSSIBLE SEPSIS WITH FEVER? RELATED TO LEG WOUNDS AND OF COURSE WE ALWAYS NEED TO MAKE SURE IT IS NOT RELATED TO LINE SEPSIS FROM THE PERMANENT ALANA CATHETER  INFECTIOUS DISEASE HAS BEEN PLACED ON CONSULT AND PATIENT IS ON EMPIRIC ANTIBIOTICS PER PRIMARY SERVICE  AWAIT BLOOD CULTURES  · Polycystic kidney disease asymptomatic  · Atrial fibrillation    Discussed with primary service          Portions of the record may have been created with voice recognition software   Occasional wrong word or "sound a like" substitutions may have occurred due to the inherent limitations of voice recognition software   Read the chart carefully and recognize, using context, where substitutions have occurred

## 2019-10-27 NOTE — ASSESSMENT & PLAN NOTE
Chronic inguinal hernia - scrotal swelling causing patient impairment of quality of life  Scrotal support  Patient request surgical inputs  Surgery consult

## 2019-10-27 NOTE — ASSESSMENT & PLAN NOTE
· POA, there was concerns for sepsis by ER provider  Apparently patient had fever at nursing home  Wounds are chronic and appear stable  Patient is overall asymptomatic in this regard  No leukocytosis  Not meeting sepsis criteria     · Admit patient to med/surg under inpatient status   · Hold further antibiotics   · Trend temps, CBC  · Would restart IV Ancef if patient spikes temperature and obtain ID consultation

## 2019-10-27 NOTE — PROGRESS NOTES
Progress Note - Emanuel Carlson 1960, 64 y o  male MRN: [id number]    Unit/Bed#: -01 Encounter: [id number]    Primary Care Provider: Tonja Medina   Date and time admitted to hospital: 10/26/2019  5:10 PM        * Multiple wounds of skin  Assessment & Plan  · POA, there was concerns for sepsis by ER provider  Apparently patient had fever at nursing home  Wounds are chronic and appear stable  Patient is overall asymptomatic in this regard  No leukocytosis  Not meeting sepsis criteria  · Admit patient to med/surg under inpatient status   · Hold further antibiotics   · Trend temps, CBC  · Would restart IV Ancef if patient spikes temperature and obtain ID consultation     Scrotal swelling  Assessment & Plan  Chronic inguinal hernia - scrotal swelling causing patient impairment of quality of life  Scrotal support  Patient request surgical inputs  Surgery consult      Hyperkalemia  Assessment & Plan  Nephrology following  Monitor    ESRD (end stage renal disease) on dialysis [facility])  Assessment & Plan  · Patient is supposed to get HD MWF  Slightly hyperkalemic, but less than 6 0 and is asymptomatic  As per multiple sources the patient has been shortening his dialysis sessions   · Nephrology following    Anemia due to chronic kidney disease, on chronic dialysis ([facility] [address] )  Assessment & Plan  · Hgb appears to be at baseline    Atrial fibrillation [facility])  Assessment & Plan  · Patient is tachycardic on admission, Not on anticoagulation   · Continue Metoprolol     ADPKD (autosomal dominant polycystic kidney)  Assessment & Plan  · On dialysis     VTE Pharmacologic Prophylaxis:   Pharmacologic: Heparin  Mechanical VTE Prophylaxis in Place: Yes    Patient Centered Rounds: I have performed bedside rounds with nursing staff today  Discussions with Specialists or Other Care Team Provider:     Education and Discussions with Family / Patient: pt, called updated spouse in detail     Time Spent for Care: 30 minutes  More than 50% of total time spent on counseling and coordination of care as described above  Current Length of Stay: 1 day(s)    Current Patient Status: Inpatient   Certification Statement: The patient will continue to require additional inpatient hospital stay due to as mentioned    Discharge Plan: awaiting clinical and symptomatic improvement    Code Status: Level 1 - Full Code      Subjective:     Reports scrotal fullness and pain  He reports scrotal swelling is impairing his quality of life and ambulation  He reports he had a mild fever in the SNF and was referred here  He is agreeable with plan as outlined  Encouraged out of bed into a chair    Objective:     Vitals:   Temp (24hrs), Av 6 °F (37 °C), Min:98 2 °F (36 8 °C), Max:99 1 °F (37 3 °C)    Temp:  [98 2 °F (36 8 °C)-99 1 °F (37 3 °C)] 98 2 °F (36 8 °C)  HR:  [102-108] 104  Resp:  [16-18] 18  BP: (107-127)/(64-79) 126/64  SpO2:  [94 %-100 %] 100 %  Body mass index is 28 22 kg/m²  Input and Output Summary (last 24 hours):        Intake/Output Summary (Last 24 hours) at 10/27/2019 1250  Last data filed at 10/26/2019 2025  Gross per 24 hour   Intake 550 ml   Output    Net 550 ml       Physical Exam:     Physical Exam    Comfortably in bed  Neck supple  Lungs diminished breath sounds bilateral  Heart sounds S1-S2 noted  Abdomen soft  Scrotal swelling noted  Bilateral lower extremity skin breakdown/ulcerations noted  Awake alert obeys simple commands  Pulses noted  No rash      Additional Data:     Labs:    Results from last 7 days   Lab Units 10/27/19  0034 10/26/19  1745   WBC Thousand/uL  --  7 83   HEMOGLOBIN g/dL  --  9 6*   HEMATOCRIT %  --  31 2*   PLATELETS Thousands/uL 249 316   NEUTROS PCT %  --  60   LYMPHS PCT %  --  8*   MONOS PCT %  --  12   EOS PCT %  --  18*     Results from last 7 days   Lab Units 10/27/19  0517  10/26/19  1745   SODIUM mmol/L 133*  --  136   POTASSIUM mmol/L 5 8*   < > 5 8*   CHLORIDE mmol/L 98*  --  96*   CO2 mmol/L 22  --  31   BUN mg/dL 71*  --  65*   CREATININE mg/dL 7 41*  --  6 96*   ANION GAP mmol/L 13  --  9   CALCIUM mg/dL 8 3  --  8 8   ALBUMIN g/dL  --   --  1 5*   TOTAL BILIRUBIN mg/dL  --   --  1 00   ALK PHOS U/L  --   --  405*   ALT U/L  --   --  13   AST U/L  --   --  26   GLUCOSE RANDOM mg/dL 94  --  118    < > = values in this interval not displayed  Results from last 7 days   Lab Units 10/26/19  1745   INR  1 08             Results from last 7 days   Lab Units 10/26/19  2053 10/26/19  1745   LACTIC ACID mmol/L 1 3 2 2*   PROCALCITONIN ng/ml  --  6 49*           * I Have Reviewed All Lab Data Listed Above  * Additional Pertinent Lab Tests Reviewed:  All Labs Within Last 24 Hours Reviewed    Imaging:    Imaging Reports Reviewed Today Include:   Imaging Personally Reviewed by Myself Includes:     Recent Cultures (last 7 days):     Results from last 7 days   Lab Units 10/26/19  2023   GRAM STAIN RESULT  3+ Polys*  3+ Gram Positive Rods Resembling Diphtheroids*  2+ Gram positive cocci in pairs*       Last 24 Hours Medication List:     Current Facility-Administered Medications:  acetaminophen 650 mg Oral Q6H PRN SAM Flores   b complex-vitamin C-folic acid 1 capsule Oral Daily With Dinner Jesse Sandy PA-C   calcium acetate 1,334 mg Oral TID With Meals Jesse Sandy PA-C   furosemide 120 mg Intravenous Once Nancy Brown MD   gabapentin 100 mg Oral Daily Jesse Sandy PA-C   heparin (porcine) 5,000 Units Subcutaneous [facility] Jesse Chun PA-C   metoprolol tartrate 12 5 mg Oral Q12H [facility] [place] Nancy Brown MD   ondansetron 4 mg Intravenous Q6H PRN Jesse Sandy PA-C   oxyCODONE 5 mg Oral Q8H PRN SAM Flores   pantoprazole 40 mg Oral Early Morning Jessekeerthi Sandy PA-C   sevelamer 800 mg Oral TID With Meals Jesse Sandy PA-C   sodium bicarbonate 1,300 mg Oral BID after meals Nancy Brown MD   torsemide 40 mg Oral BID (diuretic) Nancy Brown MD        Today, Patient Was Seen By: Marielle Lazar MD    ** Please Note: Dictation voice to text software may have been used in the creation of this document   **

## 2019-10-27 NOTE — ASSESSMENT & PLAN NOTE
· POA at 5 8  Asymptomatic  No EKG changes  Patient does cut his HD sessions short as per sources     · Reached out to on call nephrology for further recommendations   · Recommended Lasix 120 mg IV once - ordered   · Recheck K at midnight   · He will need nephrology consult if still here Monday for routine HD

## 2019-10-27 NOTE — ASSESSMENT & PLAN NOTE
· Patient is supposed to get HD MWF  Slightly hyperkalemic, but less than 6 0 and is asymptomatic   As per multiple sources the patient has been shortening his dialysis sessions   · Nephrology following

## 2019-10-28 ENCOUNTER — APPOINTMENT (INPATIENT)
Dept: DIALYSIS | Facility: HOSPITAL | Age: 59
DRG: 576 | End: 2019-10-28
Payer: MEDICARE

## 2019-10-28 PROBLEM — N18.6 ESRD (END STAGE RENAL DISEASE) (HCC): Status: ACTIVE | Noted: 2019-10-28

## 2019-10-28 LAB
ANION GAP SERPL CALCULATED.3IONS-SCNC: 12 MMOL/L (ref 4–13)
ATRIAL RATE: 105 BPM
BASOPHILS # BLD AUTO: 0.13 THOUSANDS/ΜL (ref 0–0.1)
BASOPHILS NFR BLD AUTO: 2 % (ref 0–1)
BUN SERPL-MCNC: 82 MG/DL (ref 5–25)
CALCIUM SERPL-MCNC: 8.7 MG/DL (ref 8.3–10.1)
CHLORIDE SERPL-SCNC: 99 MMOL/L (ref 100–108)
CO2 SERPL-SCNC: 24 MMOL/L (ref 21–32)
CREAT SERPL-MCNC: 8.77 MG/DL (ref 0.6–1.3)
EOSINOPHIL # BLD AUTO: 1.27 THOUSAND/ΜL (ref 0–0.61)
EOSINOPHIL NFR BLD AUTO: 17 % (ref 0–6)
ERYTHROCYTE [DISTWIDTH] IN BLOOD BY AUTOMATED COUNT: 15.9 % (ref 11.6–15.1)
FERRITIN SERPL-MCNC: 956 NG/ML (ref 8–388)
GFR SERPL CREATININE-BSD FRML MDRD: 6 ML/MIN/1.73SQ M
GLUCOSE SERPL-MCNC: 101 MG/DL (ref 65–140)
HCT VFR BLD AUTO: 28.7 % (ref 36.5–49.3)
HGB BLD-MCNC: 8.6 G/DL (ref 12–17)
IMM GRANULOCYTES # BLD AUTO: 0.03 THOUSAND/UL (ref 0–0.2)
IMM GRANULOCYTES NFR BLD AUTO: 0 % (ref 0–2)
IRON SATN MFR SERPL: 21 %
IRON SERPL-MCNC: 90 UG/DL (ref 65–175)
LYMPHOCYTES # BLD AUTO: 0.58 THOUSANDS/ΜL (ref 0.6–4.47)
LYMPHOCYTES NFR BLD AUTO: 8 % (ref 14–44)
MAGNESIUM SERPL-MCNC: 2 MG/DL (ref 1.6–2.6)
MCH RBC QN AUTO: 31.3 PG (ref 26.8–34.3)
MCHC RBC AUTO-ENTMCNC: 30 G/DL (ref 31.4–37.4)
MCV RBC AUTO: 104 FL (ref 82–98)
MONOCYTES # BLD AUTO: 0.98 THOUSAND/ΜL (ref 0.17–1.22)
MONOCYTES NFR BLD AUTO: 13 % (ref 4–12)
NEUTROPHILS # BLD AUTO: 4.62 THOUSANDS/ΜL (ref 1.85–7.62)
NEUTS SEG NFR BLD AUTO: 60 % (ref 43–75)
NRBC BLD AUTO-RTO: 0 /100 WBCS
P AXIS: 77 DEGREES
PHOSPHATE SERPL-MCNC: 6.3 MG/DL (ref 2.7–4.5)
PLATELET # BLD AUTO: 256 THOUSANDS/UL (ref 149–390)
PMV BLD AUTO: 9.3 FL (ref 8.9–12.7)
POTASSIUM SERPL-SCNC: 6.1 MMOL/L (ref 3.5–5.3)
PR INTERVAL: 160 MS
QRS AXIS: -9 DEGREES
QRSD INTERVAL: 104 MS
QT INTERVAL: 350 MS
QTC INTERVAL: 462 MS
RBC # BLD AUTO: 2.75 MILLION/UL (ref 3.88–5.62)
SODIUM SERPL-SCNC: 135 MMOL/L (ref 136–145)
T WAVE AXIS: 118 DEGREES
TIBC SERPL-MCNC: 435 UG/DL (ref 250–450)
VENTRICULAR RATE: 105 BPM
WBC # BLD AUTO: 7.61 THOUSAND/UL (ref 4.31–10.16)

## 2019-10-28 PROCEDURE — 99232 SBSQ HOSP IP/OBS MODERATE 35: CPT | Performed by: INTERNAL MEDICINE

## 2019-10-28 PROCEDURE — 83540 ASSAY OF IRON: CPT | Performed by: INTERNAL MEDICINE

## 2019-10-28 PROCEDURE — 82728 ASSAY OF FERRITIN: CPT | Performed by: INTERNAL MEDICINE

## 2019-10-28 PROCEDURE — 83735 ASSAY OF MAGNESIUM: CPT | Performed by: INTERNAL MEDICINE

## 2019-10-28 PROCEDURE — 80048 BASIC METABOLIC PNL TOTAL CA: CPT | Performed by: INTERNAL MEDICINE

## 2019-10-28 PROCEDURE — 90935 HEMODIALYSIS ONE EVALUATION: CPT | Performed by: INTERNAL MEDICINE

## 2019-10-28 PROCEDURE — 83550 IRON BINDING TEST: CPT | Performed by: INTERNAL MEDICINE

## 2019-10-28 PROCEDURE — 84100 ASSAY OF PHOSPHORUS: CPT | Performed by: INTERNAL MEDICINE

## 2019-10-28 PROCEDURE — 85025 COMPLETE CBC W/AUTO DIFF WBC: CPT | Performed by: INTERNAL MEDICINE

## 2019-10-28 PROCEDURE — 93010 ELECTROCARDIOGRAM REPORT: CPT | Performed by: INTERNAL MEDICINE

## 2019-10-28 RX ADMIN — OXYCODONE HYDROCHLORIDE 5 MG: 5 TABLET ORAL at 20:40

## 2019-10-28 RX ADMIN — HEPARIN SODIUM 5000 UNITS: 5000 INJECTION INTRAVENOUS; SUBCUTANEOUS at 13:12

## 2019-10-28 RX ADMIN — SEVELAMER HYDROCHLORIDE 800 MG: 800 TABLET, FILM COATED PARENTERAL at 09:11

## 2019-10-28 RX ADMIN — CALCIUM ACETATE 1334 MG: 667 CAPSULE ORAL at 17:29

## 2019-10-28 RX ADMIN — ACETAMINOPHEN 650 MG: 325 TABLET, FILM COATED ORAL at 06:31

## 2019-10-28 RX ADMIN — HEPARIN SODIUM 5000 UNITS: 5000 INJECTION INTRAVENOUS; SUBCUTANEOUS at 20:39

## 2019-10-28 RX ADMIN — Medication 1 CAPSULE: at 17:29

## 2019-10-28 RX ADMIN — GABAPENTIN 100 MG: 100 CAPSULE ORAL at 09:11

## 2019-10-28 RX ADMIN — CALCIUM ACETATE 1334 MG: 667 CAPSULE ORAL at 13:12

## 2019-10-28 RX ADMIN — CALCIUM ACETATE 1334 MG: 667 CAPSULE ORAL at 09:12

## 2019-10-28 RX ADMIN — PANTOPRAZOLE SODIUM 40 MG: 40 TABLET, DELAYED RELEASE ORAL at 05:32

## 2019-10-28 RX ADMIN — HEPARIN SODIUM 5000 UNITS: 5000 INJECTION INTRAVENOUS; SUBCUTANEOUS at 05:32

## 2019-10-28 RX ADMIN — SEVELAMER HYDROCHLORIDE 800 MG: 800 TABLET, FILM COATED PARENTERAL at 17:29

## 2019-10-28 RX ADMIN — OXYCODONE HYDROCHLORIDE 5 MG: 5 TABLET ORAL at 09:11

## 2019-10-28 RX ADMIN — ACETAMINOPHEN 650 MG: 325 TABLET, FILM COATED ORAL at 12:20

## 2019-10-28 RX ADMIN — SEVELAMER HYDROCHLORIDE 800 MG: 800 TABLET, FILM COATED PARENTERAL at 13:12

## 2019-10-28 NOTE — PROGRESS NOTES
Progress Note - General Surgery   Taylor Connors 64 y o  male MRN: [id number]  Unit/Bed#: -[place] Encounter: [id number]    Assessment/Plan:  64 y o  male with symptomatic R scrotal swelling     R scrotal ultrasound reveals R hydrocele  Would defer to urology for symptom control  Given patient comorbidities, will recommend against surgical correction of R inguinal hernia at this time    Subjective/Objective     Subjective: Patient denies complaints  Objective:     Vitals: Temp:  [98 2 °F (36 8 °C)-98 9 °F (37 2 °C)] 98 6 °F (37 °C)  HR:  [] 102  Resp:  [18] 18  BP: (103-126)/(59-72) 107/65  Body mass index is 28 22 kg/m²  I/O       10/26 0701 - 10/27 0700 10/27 0701 - 10/28 0700 10/28 0701 - 10/29 0700    P  O   240     IV Piggyback 550      Total Intake(mL/kg) 550 (6 5) 240 (2 9)     Net +550 +240                  Physical Exam:  GEN: NAD  HEENT: MMM  CV: RRR  Lung: Normal effort  Ab: Soft, NT/ND  Extrem: B/L lower extremity dressings C/D/I  Neuro:  A+Ox3    Lab, Imaging and other studies:   CBC with diff:   Lab Results   Component Value Date    WBC 7 61 10/28/2019    HGB 8 6 (L) 10/28/2019    HCT 28 7 (L) 10/28/2019     (H) 10/28/2019     10/28/2019    MCH 31 3 10/28/2019    MCHC 30 0 (L) 10/28/2019    RDW 15 9 (H) 10/28/2019    MPV 9 3 10/28/2019    NRBC 0 10/28/2019   , BMP/CMP:   Lab Results   Component Value Date    SODIUM 135 (L) 10/28/2019    K 6 1 (H) 10/28/2019    CL 99 (L) 10/28/2019    CO2 24 10/28/2019    BUN 82 (H) 10/28/2019    CREATININE 8 77 (H) 10/28/2019    CALCIUM 8 7 10/28/2019    EGFR 6 10/28/2019   , Magnesium: No components found for: MAG  VTE Pharmacologic Prophylaxis: Heparin  VTE Mechanical Prophylaxis: reason for no mechanical VTE prophylaxis B/L wounds

## 2019-10-28 NOTE — PROGRESS NOTES
Progress Note - Carroll Wisdom 1960, 64 y o  male MRN: [id number]    Unit/Bed#: -01 Encounter: [id number]    Primary Care Provider: Felisa Alejandre   Date and time admitted to hospital: 10/26/2019  5:10 PM        ESRD (end stage renal disease) ([facility] [address] )  Assessment & Plan  Potassium is 6 1  Scheduled for dialysis today  Renal following  Scrotal swelling  Assessment & Plan  Chronic inguinal hernia - scrotal swelling causing patient impairment of quality of life  US -     "1   Large complex right hydrocele containing numerous septations  2   Increased vascularity in the left testicle with respect to the right, although both testicles could not be imaged on the same plane limiting direct comparison   Correlate clinically for orchitis "    * Multiple wounds of skin  Assessment & Plan  Will continue to monitor for temperatures  No temperature since admission  Patient states he is unable to ambulate secondary to pain in his scrotum from swelling  VTE Pharmacologic Prophylaxis:   Pharmacologic: Heparin  Mechanical VTE Prophylaxis in Place: Yes    Patient Centered Rounds: I have performed bedside rounds with nursing staff today  Discussions with Specialists or Other Care Team Provider: Discussed with surgery  They are currently in the OR  They will get back to me after they are out of the OR  I did discussed the patient's concerns with the surgical team and they are now or  Education and Discussions with Family / Patient:  Not discussed with family today  Time Spent for Care: 30 minutes  More than 50% of total time spent on counseling and coordination of care as described above  Current Length of Stay: 2 day(s)    Current Patient Status: Inpatient   Certification Statement: The patient will continue to require additional inpatient hospital stay due to Monitor for temperatures  Serial labs    Need definitive plan regarding Scrotal swelling    Discharge Plan:  Not medically stable for discharge back to nursing home  Code Status: Level 1 - Full Code      Subjective:   Patient seen and examined  Feeling okay"  "Frustrated" by scrotal swelling and feels like his leg wounds will never heal as long as he cannot walk and he cannot walk secondary to scrotal swelling  Objective:     Vitals:   Temp (24hrs), Av 5 °F (36 9 °C), Min:98 °F (36 7 °C), Max:98 9 °F (37 2 °C)    Temp:  [98 °F (36 7 °C)-98 9 °F (37 2 °C)] 98 °F (36 7 °C)  HR:  [] 109  Resp:  [18-20] 18  BP: ()/(56-73) 100/61  SpO2:  [96 %-97 %] 96 %  Body mass index is 28 22 kg/m²  Input and Output Summary (last 24 hours): Intake/Output Summary (Last 24 hours) at 10/28/2019 1452  Last data filed at 10/28/2019 5908  Gross per 24 hour   Intake 440 ml   Output    Net 440 ml       Physical Exam:     Physical Exam   Constitutional: He appears well-developed  No distress  HENT:   Head: Normocephalic  Mouth/Throat: No oropharyngeal exudate  Eyes: Pupils are equal, round, and reactive to light  Right eye exhibits no discharge  Left eye exhibits no discharge  No scleral icterus  Neck: Normal range of motion  No tracheal deviation present  No thyromegaly present  Cardiovascular: Normal rate  Exam reveals no gallop and no friction rub  No murmur heard  Pulmonary/Chest: Effort normal  No respiratory distress  He has no wheezes  He has no rales  Abdominal: Soft  He exhibits no distension and no mass  There is no tenderness  There is no guarding  Genitourinary:   Genitourinary Comments: scoral swelling and tenderness  Neurological: He is alert  He displays normal reflexes  No cranial nerve deficit  Coordination normal    Skin: Capillary refill takes less than 2 seconds  No rash noted  He is not diaphoretic  No erythema  There is pallor  Psychiatric: He has a normal mood and affect           Additional Data:     Labs:    Results from last 7 days   Lab Units 10/28/19  0624   WBC Thousand/uL 7 61   HEMOGLOBIN g/dL 8 6*   HEMATOCRIT % 28 7*   PLATELETS Thousands/uL 256   NEUTROS PCT % 60   LYMPHS PCT % 8*   MONOS PCT % 13*   EOS PCT % 17*     Results from last 7 days   Lab Units 10/28/19  0624  10/26/19  1745   SODIUM mmol/L 135*   < > 136   POTASSIUM mmol/L 6 1*   < > 5 8*   CHLORIDE mmol/L 99*   < > 96*   CO2 mmol/L 24   < > 31   BUN mg/dL 82*   < > 65*   CREATININE mg/dL 8 77*   < > 6 96*   ANION GAP mmol/L 12   < > 9   CALCIUM mg/dL 8 7   < > 8 8   ALBUMIN g/dL  --   --  1 5*   TOTAL BILIRUBIN mg/dL  --   --  1 00   ALK PHOS U/L  --   --  405*   ALT U/L  --   --  13   AST U/L  --   --  26   GLUCOSE RANDOM mg/dL 101   < > 118    < > = values in this interval not displayed  Results from last 7 days   Lab Units 10/26/19  1745   INR  1 08             Results from last 7 days   Lab Units 10/26/19  2053 10/26/19  1745   LACTIC ACID mmol/L 1 3 2 2*   PROCALCITONIN ng/ml  --  6 49*           * I Have Reviewed All Lab Data Listed Above  * Additional Pertinent Lab Tests Reviewed: [address] Admission Reviewed    Imaging:    Imaging Reports Reviewed Today Include:   US testicles -     "1   Large complex right hydrocele containing numerous septations  2   Increased vascularity in the left testicle with respect to the right, although both testicles could not be imaged on the same plane limiting direct comparison   Correlate clinically for orchitis "  Imaging Personally Reviewed by Myself Includes:  As above  Recent Cultures (last 7 days):     Results from last 7 days   Lab Units 10/26/19  2023 10/26/19  1745   BLOOD CULTURE   --  No Growth at 24 hrs  No Growth at 24 hrs     GRAM STAIN RESULT  3+ Polys*  3+ Gram Positive Rods Resembling Diphtheroids*  2+ Gram positive cocci in pairs*  --    WOUND CULTURE  2+ Growth of Non lactose fermenting gram negative pa*  --        Last 24 Hours Medication List:     Current Facility-Administered Medications:  acetaminophen 650 mg Oral Q6H PRN Damaris S Na, SAM   b complex-vitamin C-folic acid 1 capsule Oral Daily With Dinner Jesse Andrade PA-C   calcium acetate 1,334 mg Oral TID With Meals Jesse Andrade PA-C   epoetin bob 8,000 Units Intravenous After Dialysis Katarina Husain MD   gabapentin 100 mg Oral Daily Jesse Chun PA-C   heparin (porcine) 5,000 Units Subcutaneous [facility] Jesse Chun PA-C   metoprolol tartrate 12 5 mg Oral Q12H [address] Hilary Severino MD   ondansetron 4 mg Intravenous Q6H PRN Jesse Andrade PA-C   oxyCODONE 5 mg Oral Q8H PRN Damaris MoranxSAM   pantoprazole 40 mg Oral Early Morning Jesse Andrade PA-C   sevelamer 800 mg Oral TID With Meals Jesse Andrade PA-C   torsemide 40 mg Oral BID (diuretic) Hilary Severino MD        Today, Patient Was Seen By: Salvador Guallpa MD    ** Please Note: Dictation voice to text software may have been used in the creation of this document   **

## 2019-10-28 NOTE — ASSESSMENT & PLAN NOTE
Chronic inguinal hernia - scrotal swelling causing patient impairment of quality of life  US -     "1   Large complex right hydrocele containing numerous septations      2   Increased vascularity in the left testicle with respect to the right, although both testicles could not be imaged on the same plane limiting direct comparison   Correlate clinically for orchitis "

## 2019-10-28 NOTE — ASSESSMENT & PLAN NOTE
History of chronic hypotension, per patient he typically runs anywhere from  systolic  Continue midodrine on dialysis days    Monitor blood pressure

## 2019-10-28 NOTE — DISCHARGE SUMMARY
Discharge- Danelle Cooney 1960, 64 y o  male MRN: [id number]    Unit/Bed#: -01 Encounter: [id number]    Primary Care Provider: Noel Perez   Date and time admitted to hospital: 10/6/2019  6:37 PM        [facility])  Assessment & Plan  Tolerating diet and passing stool/BM  Resolved  Polycystic kidney disease  Assessment & Plan  Patient still has indwelling DILIA drain for the renal cyst   Outpatient removal by IR  Hyponatremia  Assessment & Plan  Monitor sodium levels  Nephrology following while in hospital    Management as per nephrology outpatient  * Hypotension  Assessment & Plan  History of chronic hypotension, per patient he typically runs anywhere from  systolic  Continue midodrine on dialysis days  Monitor blood pressure          Discharging Physician / Practitioner: Malou Byers MD  PCP: Noel Perez  Admission Date:   Admission Orders (From admission, onward)     Ordered        10/07/19 1220  Inpatient Admission  Once         10/06/19 2017  Place in Observation (expected length of stay for this patient is less than two midnights)  Once                   Discharge Date: 10/28/19    Resolved Problems  Date Reviewed: 10/28/2019          Resolved    Lactic acidosis 10/10/2019     Resolved by  Sheela Stallworth MD          Consultations During Hospital Stay:  · Nephrology - Dr Kuldip Dowling  · ID - Dr Benji Corrales  · Podiatry - Dr Anum Rivero  · Wound care  · IR - Dr Bethanie Regalado      Procedures Performed:   None  Significant Findings / Test Results:   · None     Incidental Findings:   · None  Test Results Pending at Discharge (will require follow up): · None  Outpatient Tests Requested:  · None  Complications:    None  Reason for Admission:   Abdominal [facility] CT Course:     Danelle Cooney is a 64 y o  male patient who originally presented to the hospital on 10/6/2019 due to abdominal pain as well as lower extremity edema      The patient had been recently hospitalized for almost 2 weeks secondary to gram-negative bacteremia and severe sepsis which was thought to be secondary to lower extremity wounds  At the time he had a Perma-Cath which was replaced and had completed as outpatient course of ciprofloxacin  There was concern for ileus on this admission however the patient's symptoms which included constipation and abdominal distention resolved with treatment of constipation and at the time of discharge the patient was tolerating a diet and having normal bowel movements  He does have end-stage renal disease and Nephrology were consulted and they managed his end-stage renal disease with hemodialysis  He also had hyponatremia which was also managed by Nephrology  Infectious diseases were consulted given the patient's recent history of bacteremia  Antibiotics were initiated on admission but these were discontinued by Infectious Diseases as the lower extremity edema was felt to be chronic  Podiatry were also consulted and did not recommend antibiotics either  The patient was discharged to [facility] for [facility]hab  Please see above list of diagnoses and related plan for additional information  Condition at Discharge: stable     Discharge Day Visit / Exam:     Subjective:      Patient seen and examined   No new complaints  Vitals: Blood Pressure: 107/59 (10/16/19 0800)  Pulse: 100 (10/16/19 0700)  Temperature: 98 7 °F (37 1 °C) (10/16/19 0700)  Temp Source: Oral (10/16/19 0700)  Respirations: 18 (10/16/19 0700)  Height: 5' 8" (172 7 cm) (10/11/19 1534)  Weight - Scale: 83 2 kg (183 lb 6 8 oz)(pt unable to ambulate) (10/16/19 0600)  SpO2: 97 % (10/16/19 0700)  Exam:   Physical Exam   Constitutional: No distress  HENT:   Head: Normocephalic  Mouth/Throat: No oropharyngeal exudate  Eyes: Right eye exhibits no discharge  Left eye exhibits no discharge  No scleral icterus  Neck: No JVD present  No tracheal deviation present  No thyromegaly present  Cardiovascular: Normal rate  Exam reveals no gallop and no friction rub  No murmur heard  Pulmonary/Chest: Effort normal  No stridor  No respiratory distress  He has no wheezes  He has no rales  He exhibits no tenderness  Abdominal: Soft  Bowel sounds are normal  He exhibits no distension and no mass  There is no rebound and no guarding  No hernia  Musculoskeletal: He exhibits edema  He exhibits no tenderness or deformity  Lymphadenopathy:     He has no cervical adenopathy  Neurological: He is alert  No cranial nerve deficit or sensory deficit  He exhibits normal muscle tone  Coordination normal    Skin: Capillary refill takes less than 2 seconds  He is not diaphoretic  There is pallor  Discussion with Family: not discussed with family  Discharge instructions/Information to patient and family:   See after visit summary for information provided to patient and family  Provisions for Follow-Up Care:  See after visit summary for information related to follow-up care and any pertinent home health orders  Disposition:     Other [facility]   For Discharges to [address]:   · Not Applicable to this Patient - Not Applicable to this Patient    Planned Readmission: none   Discharge Statement:  I spent 45 minutes discharging the patient  This time was spent on the day of discharge  I had direct contact with the patient on the day of discharge  Greater than 50% of the total time was spent examining patient, answering all patient questions, arranging and discussing plan of care with patient as well as directly providing post-discharge instructions  Additional time then spent on discharge activities  Discharge Medications:  See after visit summary for reconciled discharge medications provided to patient and family        ** Please Note: This note has been constructed using a voice recognition system **

## 2019-10-28 NOTE — PLAN OF CARE
Completed 2 1/2 hour of hemodialysis  Post treatment weight 82 7 kg  No issues noted during treatment  Next scheduled treatment will be tomorrow  Current hemodialysis plan of care is to keep even  Patient is 0 4 kg below EDW so no fluid is going to be removed during treatment today  Pre treatment serum potassium 6 1 so utilize a 2 K+ bath in order to decrease serum potassium to more acceptable level in order to maintain homeostasis  CVC currently patent at 450 BFR

## 2019-10-28 NOTE — ASSESSMENT & PLAN NOTE
Will continue to monitor for temperatures  No temperature since admission  Patient states he is unable to ambulate secondary to pain in his scrotum from swelling

## 2019-10-28 NOTE — PROGRESS NOTES
NEPHROLOGY DIALYSIS PROCEDURE NOTE      Patient seen and examined on Hemodialysis, tolerating procedure well  All documentation, labs, medications were reviewed by myself, and the treatment plan was reviewed with nurse and patient  Seen on Dialysis at : 9:57 am  Dialysis Access: Left IJ tunneled PermCath  Vitals: 88/60  Dialysis time: 2 5 Hours  Dialyzer: F180  Sodium bath: 137  Potassium bath: 2 K+  Bicarbonate bath: 30  Calcium bath: 2 5  Ultrafiltration: run even  Blood flow rate: 450 cc/min  Dialysis flow rate: 600 cc/min  Dialysis temperature: 36C  Medications given on HD: Epogen    Assessment/Plan:    1 ) End Stage Renal Disease  -Modality:In-Center Hemodialysis  -Schedule: Monday/Tuesday/Wednesday/Friday  -Dialysis Unit: [facility]  -Access: Right IJ tunneled PermCath  -Presciption:  Reviewed  -Status:  Hyperkalemic, blood pressure dropped during dialysis  -Plan:   · Currently below his dry weight, will plan to run him even  · Placed on the low 2 K potassium bath  · Low-potassium diet  · Plan for next dialysis tomorrow  · Will continue his 4 days a week schedule  · Blood cultures are negative    2 ) Anemia  - Epogen with dialysis    3 ) Mineral bone disorder-chronic kidney disease  - renal diet  -continue calcium acetate with meals along with Renagel    4 ) Blood pressure/volume status  - currently below his dry weight will plan to run him even, developed intra dialytic hypotension, asymptomatic    Review of Systems: The entire 12 point ROS has been reviewed      Physical Exam:    General:  Awake, no acute distress  Skin:  No rashes no lesions  CVS:  S1-S2 appreciated  Lungs:  Clear  Abdomen:  Nontender nondistended  Access:  Right IJ PermCath with no erythema or exudate  Extremities:  No edema, legs are wrapped  Neuro:  Alert orient x3          Current Facility-Administered Medications:     acetaminophen (TYLENOL) tablet 650 mg, 650 mg, Oral, Q6H PRN, Damaris S Na, SAM, 650 mg at 10/28/19 2870    b complex-vitamin C-folic acid (NEPHROCAPS) capsule 1 capsule, 1 capsule, Oral, Daily With Dinner, Jesse Alba PA-C, 1 capsule at 10/27/19 1618    calcium acetate (PHOSLO) capsule 1,334 mg, 1,334 mg, Oral, TID With Meals, Chloe Leger PA-C, 1,334 mg at 10/28/19 0912    gabapentin (NEURONTIN) capsule 100 mg, 100 mg, Oral, Daily, Jesse Chun PA-C, 100 mg at 10/28/19 0911    heparin (porcine) subcutaneous injection 5,000 Units, 5,000 Units, Subcutaneous, Q8H [address], 5,000 Units at 10/28/19 0532 **AND** [COMPLETED] Platelet count, , , Once, Jesse Chun PA-C    metoprolol tartrate (LOPRESSOR) partial tablet 12 5 mg, 12 5 mg, Oral, Q12H [address], Martinez Yang MD, 12 5 mg at 10/27/19 2127    ondansetron (ZOFRAN) injection 4 mg, 4 mg, Intravenous, Q6H PRN, Chloe Leger PA-C    oxyCODONE (ROXICODONE) IR tablet 5 mg, 5 mg, Oral, Q8H PRN, Damaris Moranx, CRNP, 5 mg at 10/28/19 0911    pantoprazole (PROTONIX) EC tablet 40 mg, 40 mg, Oral, Early Morning, Jesse Chun PA-C, 40 mg at 10/28/19 0532    sevelamer (RENAGEL) tablet 800 mg, 800 mg, Oral, TID With Meals, Jesse Chun PA-C, 800 mg at 10/28/19 0911    torsemide (DEMADEX) tablet 40 mg, 40 mg, Oral, BID (diuretic), Martinez Yang MD, 40 mg at 10/27/19 1626

## 2019-10-28 NOTE — ASSESSMENT & PLAN NOTE
Monitor sodium levels  Nephrology following while in hospital    Management as per nephrology outpatient

## 2019-10-29 ENCOUNTER — APPOINTMENT (INPATIENT)
Dept: DIALYSIS | Facility: HOSPITAL | Age: 59
DRG: 576 | End: 2019-10-29
Attending: INTERNAL MEDICINE
Payer: MEDICARE

## 2019-10-29 PROBLEM — L89.309 PRESSURE ULCER, BUTTOCK: Status: ACTIVE | Noted: 2019-10-29

## 2019-10-29 LAB
ANION GAP SERPL CALCULATED.3IONS-SCNC: 8 MMOL/L (ref 4–13)
BUN SERPL-MCNC: 51 MG/DL (ref 5–25)
CALCIUM SERPL-MCNC: 8.4 MG/DL (ref 8.3–10.1)
CHLORIDE SERPL-SCNC: 100 MMOL/L (ref 100–108)
CO2 SERPL-SCNC: 29 MMOL/L (ref 21–32)
CREAT SERPL-MCNC: 6.3 MG/DL (ref 0.6–1.3)
ERYTHROCYTE [DISTWIDTH] IN BLOOD BY AUTOMATED COUNT: 15.9 % (ref 11.6–15.1)
GFR SERPL CREATININE-BSD FRML MDRD: 9 ML/MIN/1.73SQ M
GLUCOSE SERPL-MCNC: 76 MG/DL (ref 65–140)
HCT VFR BLD AUTO: 31.1 % (ref 36.5–49.3)
HGB BLD-MCNC: 9.4 G/DL (ref 12–17)
MCH RBC QN AUTO: 30.9 PG (ref 26.8–34.3)
MCHC RBC AUTO-ENTMCNC: 30.2 G/DL (ref 31.4–37.4)
MCV RBC AUTO: 102 FL (ref 82–98)
PLATELET # BLD AUTO: 228 THOUSANDS/UL (ref 149–390)
PMV BLD AUTO: 9.6 FL (ref 8.9–12.7)
POTASSIUM SERPL-SCNC: 5 MMOL/L (ref 3.5–5.3)
RBC # BLD AUTO: 3.04 MILLION/UL (ref 3.88–5.62)
SODIUM SERPL-SCNC: 137 MMOL/L (ref 136–145)
WBC # BLD AUTO: 6.55 THOUSAND/UL (ref 4.31–10.16)

## 2019-10-29 PROCEDURE — 99232 SBSQ HOSP IP/OBS MODERATE 35: CPT | Performed by: INTERNAL MEDICINE

## 2019-10-29 PROCEDURE — 80048 BASIC METABOLIC PNL TOTAL CA: CPT | Performed by: PHYSICIAN ASSISTANT

## 2019-10-29 PROCEDURE — 85027 COMPLETE CBC AUTOMATED: CPT | Performed by: PHYSICIAN ASSISTANT

## 2019-10-29 PROCEDURE — 99223 1ST HOSP IP/OBS HIGH 75: CPT | Performed by: UROLOGY

## 2019-10-29 PROCEDURE — 99232 SBSQ HOSP IP/OBS MODERATE 35: CPT | Performed by: PHYSICIAN ASSISTANT

## 2019-10-29 RX ADMIN — PANTOPRAZOLE SODIUM 40 MG: 40 TABLET, DELAYED RELEASE ORAL at 05:42

## 2019-10-29 RX ADMIN — TORSEMIDE 40 MG: 20 TABLET ORAL at 17:00

## 2019-10-29 RX ADMIN — ACETAMINOPHEN 650 MG: 325 TABLET, FILM COATED ORAL at 15:33

## 2019-10-29 RX ADMIN — OXYCODONE HYDROCHLORIDE 5 MG: 5 TABLET ORAL at 21:08

## 2019-10-29 RX ADMIN — OXYCODONE HYDROCHLORIDE 5 MG: 5 TABLET ORAL at 11:38

## 2019-10-29 RX ADMIN — SEVELAMER HYDROCHLORIDE 800 MG: 800 TABLET, FILM COATED PARENTERAL at 18:08

## 2019-10-29 RX ADMIN — CALCIUM ACETATE 1334 MG: 667 CAPSULE ORAL at 13:00

## 2019-10-29 RX ADMIN — SEVELAMER HYDROCHLORIDE 800 MG: 800 TABLET, FILM COATED PARENTERAL at 08:56

## 2019-10-29 RX ADMIN — METOPROLOL TARTRATE 25 MG: 25 TABLET, FILM COATED ORAL at 21:08

## 2019-10-29 RX ADMIN — GABAPENTIN 100 MG: 100 CAPSULE ORAL at 11:38

## 2019-10-29 RX ADMIN — CALCIUM ACETATE 1334 MG: 667 CAPSULE ORAL at 18:08

## 2019-10-29 RX ADMIN — ACETAMINOPHEN 650 MG: 325 TABLET, FILM COATED ORAL at 04:13

## 2019-10-29 RX ADMIN — CALCIUM ACETATE 1334 MG: 667 CAPSULE ORAL at 08:57

## 2019-10-29 RX ADMIN — HEPARIN SODIUM 5000 UNITS: 5000 INJECTION INTRAVENOUS; SUBCUTANEOUS at 21:09

## 2019-10-29 RX ADMIN — METOPROLOL TARTRATE 12.5 MG: 25 TABLET ORAL at 11:38

## 2019-10-29 RX ADMIN — HEPARIN SODIUM 5000 UNITS: 5000 INJECTION INTRAVENOUS; SUBCUTANEOUS at 05:42

## 2019-10-29 RX ADMIN — HEPARIN SODIUM 5000 UNITS: 5000 INJECTION INTRAVENOUS; SUBCUTANEOUS at 14:11

## 2019-10-29 RX ADMIN — SEVELAMER HYDROCHLORIDE 800 MG: 800 TABLET, FILM COATED PARENTERAL at 13:01

## 2019-10-29 RX ADMIN — Medication 1 CAPSULE: at 18:08

## 2019-10-29 NOTE — PLAN OF CARE
1000 ml to be removed, discussed with patient  Problem: METABOLIC, FLUID AND ELECTROLYTES - ADULT  Goal: Electrolytes maintained within normal limits  Description  INTERVENTIONS:  - Monitor labs and assess patient for signs and symptoms of electrolyte imbalances  - Administer electrolyte replacement as ordered  - Monitor response to electrolyte replacements, including repeat lab results as appropriate  - Instruct patient on fluid and nutrition as appropriate  Outcome: Progressing  Goal: Fluid balance maintained  Description  INTERVENTIONS:  - Monitor labs   - Monitor I/O and WT  - Instruct patient on fluid and nutrition as appropriate  - Assess for signs & symptoms of volume excess or deficit  Outcome: Progressing

## 2019-10-29 NOTE — CONSULTS
UROLOGY CONSULTATION NOTE     Patient Identifiers: Tricia Negrete (MRN [id number])  Service Requesting Consultation: Brad Rader DO    Service Providing Consultation:  Urology, Ursula Suero, Tu Kaufman    Date of Service: 10/29/2019  Inpatient consult to Urology  Consult performed by: SAM Adan  Consult ordered by: Yefri Schultz DO    Inpatient consult to Urology  Consult performed by: SAM Adan  Consult ordered by: Elva Quispe MD          Reason for Consultation:     ASSESSMENT:     64 y o  old male with  history of polycystic kidney disease, infected status, status post percutaneous abscess drain and significantly complex right hydrocele  PLAN:   Continue medical optimization  Although imaging appears nonsuspicious, will rule out malignancy  Obtain AFP, beta HCG and LD  No emergent  surgical intervention indicated at this time  Attending to perform 2nd physical examination and discussion of possible management options at bedside late today  History of Present Illness:     Tricia Negrete is a 64 y o  old comorbid male and nursing home resident with history of end-stage renal disease, hemodialysis dependence, polycystic kidney disease with prior infected right renal cyst status post IR percutaneous drain insertion, admitted for multiple lower extremity wounds  Patient reports significant scrotal swelling and presence of CT confirmed hydrocele for at least 1 year not accompanied by fever or chills, but reportedly impeding mobility  Of note, patient is anuric  His clinical situation is further complicated by abdominal pelvic ascites  Ultrasound of scrotum and testicles confirmed large complex hydrocele with numerous internal septations and debris measuring 15 6 x 9 3 x 11 4 without evidence of varicocele or scrotal thickness  There is no evidence for  Intra or extra testicular mass or hernia  There is mild hyperemia of left testicle   Patient was evaluated by General Surgery with physical examination findings negative for inguinal hernia   Past Medical, Past Surgical History:     Past Medical History:   Diagnosis Date    Complication of renal dialysis     Polycystic kidney disease    :    Past Surgical History:   Procedure Laterality Date    IR CATHETERGRAM  10/17/2019    IR IMAGE GUIDED ASPIRATION / DRAINAGE  9/23/2019    IR PARACENTESIS  10/9/2019    IR [address] EXCHANGE  10/17/2019    IR [address] PLACEMENT  9/30/2019   :    Medications, Allergies:     Current Facility-Administered Medications   Medication Dose Route Frequency    acetaminophen (TYLENOL) tablet 650 mg  650 mg Oral Q6H PRN    b complex-vitamin C-folic acid (NEPHROCAPS) capsule 1 capsule  1 capsule Oral Daily With Dinner    calcium acetate (PHOSLO) capsule 1,334 mg  1,334 mg Oral TID With Meals    epoetin bob (EPOGEN,PROCRIT) injection 8,000 Units  8,000 Units Intravenous After Dialysis    gabapentin (NEURONTIN) capsule 100 mg  100 mg Oral Daily    heparin (porcine) subcutaneous injection 5,000 Units  5,000 Units Subcutaneous Q8H [address]    metoprolol tartrate (LOPRESSOR) partial tablet 12 5 mg  12 5 mg Oral Q12H [address]    ondansetron (ZOFRAN) injection 4 mg  4 mg Intravenous Q6H PRN    oxyCODONE (ROXICODONE) IR tablet 5 mg  5 mg Oral Q8H PRN    pantoprazole (PROTONIX) EC tablet 40 mg  40 mg Oral Early Morning    sevelamer (RENAGEL) tablet 800 mg  800 mg Oral TID With Meals    torsemide (DEMADEX) tablet 40 mg  40 mg Oral BID (diuretic)       Allergies:  No Known Allergies:    Social and Family History:   Social History:   Social History     Tobacco Use    Smoking status: Never Smoker    Smokeless tobacco: Never Used   Substance Use Topics    Alcohol use: Not Currently    Drug use: Not Currently     Social History     Tobacco Use   Smoking Status Never Smoker   Smokeless Tobacco Never Used       Family History:  History reviewed   No pertinent family history :     Review of Systems: Review of Systems - History obtained from chart review and the patient  Respiratory ROS: no cough, shortness of breath, or wheezing  Cardiovascular ROS: no chest pain or dyspnea on exertion  Gastrointestinal ROS: positive for - increasing abdominal girth  Genito-Urinary ROS: positive for - scrotal mass/pain and scrotal swelling  negative for - dysuria, genital discharge, hematuria, nocturia, urinary frequency/urgency or primarily and uric secondary to ESRD/HD dependent  Neurological ROS: no TIA or stroke symptoms    All other systems queried were negative  Physical Exam:   General: Patient is pleasant and in NAD  Awake and alert  /71   Pulse (!) 111   Temp 98 5 °F (36 9 °C) (Oral)   Resp 18   Ht 5' 8" (1 727 m)   Wt 84 2 kg (185 lb 10 oz)   SpO2 94%   BMI 28 22 kg/m² Temp (24hrs), Av 1 °F (37 3 °C), Min:98 5 °F (36 9 °C), Max:100 °F (37 8 °C)  current; Temperature: 98 5 °F (36 9 °C)  I/O last 24 hours: In: 9086 [P O :850; I V :1000]  Out:  [Other:2000]    General appearance: alert and oriented, in no acute distress, appears older than stated age, cooperative, no distress, pale and Chronically ill-appearing  Head: Normocephalic, without obvious abnormality, atraumatic  Neck: no adenopathy, no carotid bruit, no JVD, supple, symmetrical, trachea midline and thyroid not enlarged, symmetric, no tenderness/mass/nodules  Lungs: diminished breath sounds  Heart: regular rate and rhythm, S1, S2 normal, no murmur, click, rub or gallop  Abdomen: abnormal findings:  ascites  Extremities: Bilateral lower extremity dressing  Pulses: 2+ and symmetric  Neurologic: Grossly normal  Genitalia--circumcised male with normal glans penis--midline urethra, normal phallus  Normal left testicle  Significant right palpable fluid collection without superficial scrotal sac edema, erythema or swelling  Right scrotal sac is firm without ability to significantly compress    No drainage of genitalia either via urethra or from scrotal sac skin  No crepitus and mildly tender on palpation    Labs:     Lab Results   Component Value Date    HGB 9 4 (L) 10/29/2019    HCT 31 1 (L) 10/29/2019    WBC 6 55 10/29/2019     10/29/2019   ]    Lab Results   Component Value Date    K 5 0 10/29/2019     10/29/2019    CO2 29 10/29/2019    BUN 51 (H) 10/29/2019    CREATININE 6 30 (H) 10/29/2019    CALCIUM 8 4 10/29/2019   ]    Imaging:   I personally reviewed the images and report of the following studies, and reviewed them with the patient:    CT Abdomen: See below and US Scrotal: See below    US scrotum and testicles [[id number]] Collected: 10/27/19 1541   Order Status: Completed Updated: 10/27/19 2019   Narrative:     SCROTAL ULTRASOUND    INDICATION:    Scrotal swelling       COMPARISON: CT abdomen and pelvis on 10/7/2019    TECHNIQUE:   Ultrasound the scrotal contents was performed with a high frequency linear transducer utilizing volumetric sweep imaging as well as standard still image techniques  Imaging performed in longitudinal and transverse orientation  Color and   spectral Doppler evaluation also performed bilaterally  FINDINGS:    TESTES:     RIGHT testis = 3 5 x 2 9 x 3 1 cm   Normal contour with homogeneous smooth echotexture  No intratesticular mass lesion or calcifications  LEFT testis = 4 4 x 1 9 x 2 6 cm  Normal contour with heterogeneous echotexture  No intratesticular mass lesion or calcifications  There is increased vascularity in the left testicle with respect to the right, although both testicles could not be imaged on the same plane during Doppler interrogation  EPIDIDYMIDES:   Right epididymal head is heterogeneous and asymmetrically enlarged measuring 2 5 x 2 7 x 1 7 cm   Left epididymal head measures 1 2 x 1 2 x 1 0 cm  Doppler ultrasound demonstrates normal blood flow        HYDROCELE:  Large complex right hydrocele with numerous internal septations and debris measuring 15 6 x 9 3 x 11 4     VARICOCELE:  None present  SCROTUM:  Scrotal thickness and appearance within normal limits  No evidence for extratesticular mass or hernia demonstrated  Impression:        1   Large complex right hydrocele containing numerous septations  2   Increased vascularity in the left testicle with respect to the right, although both testicles could not be imaged on the same plane limiting direct comparison   Correlate clinically for orchitis  Thank you for allowing me to participate in this patients care  Please do not hesitate to call with any additional questions    SAM Tolbert

## 2019-10-29 NOTE — ASSESSMENT & PLAN NOTE
· Podiatry evaluation for wound care recommendations given severe pains reported at times  · Will continue to monitor for temperatures  No temperature since admission    · Supportive care

## 2019-10-29 NOTE — PROGRESS NOTES
NEPHROLOGY PROGRESS NOTE   Mauricio Kim 64 y o  male MRN: [id number]  Unit/Bed#: [facility] Encounter: [id number]  Reason for Consult: ESRD on HD    ASSESSMENT/PLAN:  1  ESRD on HD 4x/week at [address] x2 5 hours per treatment  - EDW: 83kg  - currently below EDW to 81 7kg  - appears euvolemic  2  Access- permanent dialysis catheter  3  Anemia- he receives venofer weekly  4  Hypotension- continue midodrine 10mg TID  5  Tachycardia- consider cardiology consult  - blood cultures negative x48 hours  6  Hyperkalemia- renal diet/low K diet  - 2K bath  - recheck labs today/tomorrow  7  Hyperphosphatemia- continue phoslo and sevelamer with meals  8  Right Hydrocele with numerous septations  9  LE wounds- status post abx course  - continue local wound care    Disposition:  HD today    SUBJECTIVE:  Patient without acute complaints  Denies sob  Has pain in legs        OBJECTIVE:  Current Weight: Weight - Scale: 84 2 kg (185 lb 10 oz)  Vitals:    10/29/19 0900 10/29/19 0930 10/29/19 1000 10/29/19 1030   BP: 121/75 112/65 100/67 108/64   Pulse: (!) 108 (!) 114 (!) 115 (!) 119   Resp: 18 20 18 20   Temp:       TempSrc:       SpO2:       Weight:       Height:           Intake/Output Summary (Last 24 hours) at 10/29/2019 1106  Last data filed at 10/29/2019 0835  Gross per 24 hour   Intake 740 ml   Output 500 ml   Net 240 ml     General: NAD  Skin: no rash  HEENT: normocephalic  Neck: no JVD  Lungs: CTAB  Cardiac: RRR  Extremities: no edema, legs wrapped  GI: soft nt nd  Neuro: alert awake  Psych: mood and affect appropriate    Medications:    Current Facility-Administered Medications:     acetaminophen (TYLENOL) tablet 650 mg, 650 mg, Oral, Q6H PRN, Damaris Monzon, SAM, 650 mg at 10/29/19 2270    b complex-vitamin C-folic acid (NEPHROCAPS) capsule 1 capsule, 1 capsule, Oral, Daily With Dinner, Jesse Steel PA-C, 1 capsule at 10/28/19 8380    calcium acetate (PHOSLO) capsule 1,334 mg, 1,334 mg, Oral, TID With Meals, Elmer Gramajo PA-C, 1,334 mg at 10/29/19 3955    epoetin bob (EPOGEN,PROCRIT) injection 8,000 Units, 8,000 Units, Intravenous, After Dialysis, Paris Lawrence MD    gabapentin (NEURONTIN) capsule 100 mg, 100 mg, Oral, Daily, Jesse Chun PA-C, 100 mg at 10/28/19 0911    heparin (porcine) subcutaneous injection 5,000 Units, 5,000 Units, Subcutaneous, Q8H [address], 5,000 Units at 10/29/19 0542 **AND** [COMPLETED] Platelet count, , , Once, Jesse Camarillo PA-C    metoprolol tartrate (LOPRESSOR) partial tablet 12 5 mg, 12 5 mg, Oral, Q12H [address], Lanny Gurrola MD, 12 5 mg at 10/27/19 2127    ondansetron (ZOFRAN) injection 4 mg, 4 mg, Intravenous, Q6H PRN, Elmer Gramajo PA-C    oxyCODONE (ROXICODONE) IR tablet 5 mg, 5 mg, Oral, Q8H PRN, Damaris S Na, CRNP, 5 mg at 10/28/19 2040    pantoprazole (PROTONIX) EC tablet 40 mg, 40 mg, Oral, Early Morning, Jesse Chun PA-C, 40 mg at 10/29/19 0542    sevelamer (RENAGEL) tablet 800 mg, 800 mg, Oral, TID With Meals, Jesse Chun PA-C, 800 mg at 10/29/19 0856    torsemide (DEMADEX) tablet 40 mg, 40 mg, Oral, BID (diuretic), Lanny Gurrola MD, 40 mg at 10/27/19 1626    Laboratory Results:  Results from last 7 days   Lab Units 10/28/19  0624 10/27/19  0517 10/27/19  0034 10/26/19  1745   WBC Thousand/uL 7 61  --   --  7 83   HEMOGLOBIN g/dL 8 6*  --   --  9 6*   HEMATOCRIT % 28 7*  --   --  31 2*   PLATELETS Thousands/uL 256  --  249 316   POTASSIUM mmol/L 6 1* 5 8* 5 6* 5 8*   CHLORIDE mmol/L 99* 98*  --  96*   CO2 mmol/L 24 22  --  31   BUN mg/dL 82* 71*  --  65*   CREATININE mg/dL 8 77* 7 41*  --  6 96*   CALCIUM mg/dL 8 7 8 3  --  8 8   MAGNESIUM mg/dL 2 0  --   --   --    PHOSPHORUS mg/dL 6 3*  --   --   --

## 2019-10-29 NOTE — ASSESSMENT & PLAN NOTE
· Rate / Rhythm control -   · Metoprolol  · Monitor on telemetry  · Consider cardiologist evaluation  · Anticoagulation - None prehospital   Defer to outpatient team following discharge

## 2019-10-29 NOTE — SOCIAL WORK
LOS 3  Patient is not a bundle  Patient is a 30 day readmission  This is patient's 3rd hospitalization in 6 weeks  Patient lives at home with his wife Evaristo Hylton however he has been at a [address] since 10/3 when he discharged to [place]  Patient returned 10/6/2019 due to abdominal pain as well as lower extremity edema  The first admission he had been hospitalized for almost 2 weeks secondary to gram-negative bacteremia and severe sepsis which was thought to be secondary to lower extremity wounds  At the time he had a Perma-Cath which was replaced and had completed as outpatient course of ciprofloxacin  This admission patient had a fever at [facility] and was sent to the ED for evaluation  Patient verbalized to CM that he didn't feel feverish but what was bothering him the most was his hernia, but is also not a new complaint  CM discussed how he was doing at [facility] with the bedside HD  He said he is getting it 4 times a week and it is going good  He hasn't been able to do much rehab yet do to swelling and his leg wounds but is anxious to start this  A post acute care recommendation was made by your care team for STR  Discussed Freedom of Choice with patient  Choice is to return/continue services  A referral was made to [facility] for patient  [facility] is able to accept patient back for STR and bedside HD  CM offered to contact patient's wife Evaristo Hylton however, he reported that she would be back shortly and discuss this with her  CM reviewed discharge planning process including the following: identifying caregivers at home, preference for d/c planning needs, [facility] Meds to Bed program, availability of treatment team to discuss questions or concerns patient and/or family may have regarding diagnosis, plan of care, old or new medications and discharge planning  CM name and role reviewed  CM will continue to follow for care coordination and update assessment as necessary

## 2019-10-29 NOTE — PLAN OF CARE
Problem: Prexisting or High Potential for Compromised Skin Integrity  Goal: Skin integrity is maintained or improved  Description  INTERVENTIONS:  - Identify patients at risk for skin breakdown  - Assess and monitor skin integrity  - Assess and monitor nutrition and hydration status  - Monitor labs   - Assess for incontinence   - Turn and reposition patient  - Assist with mobility/ambulation  - Relieve pressure over bony prominences  - Avoid friction and shearing  - Provide appropriate hygiene as needed including keeping skin clean and dry  - Evaluate need for skin moisturizer/barrier cream  - Collaborate with interdisciplinary team   - Patient/family teaching  - Consider wound care consult   Outcome: Progressing     Problem: Potential for Falls  Goal: Patient will remain free of falls  Description  INTERVENTIONS:  - Assess patient frequently for physical needs  -  Identify cognitive and physical deficits and behaviors that affect risk of falls    -  [place] fall precautions as indicated by assessment   - Educate patient/family on patient safety including physical limitations  - Instruct patient to call for assistance with activity based on assessment  - Modify environment to reduce risk of injury  - Consider OT/PT consult to assist with strengthening/mobility  Outcome: Progressing     Problem: INFECTION - ADULT  Goal: Absence or prevention of progression during hospitalization  Description  INTERVENTIONS:  - Assess and monitor for signs and symptoms of infection  - Monitor lab/diagnostic results  - Monitor all insertion sites, i e  indwelling lines, tubes, and drains  - Monitor endotracheal if appropriate and nasal secretions for changes in amount and color  - [place] appropriate cooling/warming therapies per order  - Administer medications as ordered  - Instruct and encourage patient and family to use good hand hygiene technique  - Identify and instruct in appropriate isolation precautions for identified infection/condition  Outcome: Progressing     Problem: METABOLIC, FLUID AND ELECTROLYTES - ADULT  Goal: Electrolytes maintained within normal limits  Description  INTERVENTIONS:  - Monitor labs and assess patient for signs and symptoms of electrolyte imbalances  - Administer electrolyte replacement as ordered  - Monitor response to electrolyte replacements, including repeat lab results as appropriate  - Instruct patient on fluid and nutrition as appropriate  Outcome: Progressing  Goal: Fluid balance maintained  Description  INTERVENTIONS:  - Monitor labs   - Monitor I/O and WT  - Instruct patient on fluid and nutrition as appropriate  - Assess for signs & symptoms of volume excess or deficit  Outcome: Progressing     Problem: SKIN/TISSUE INTEGRITY - ADULT  Goal: Skin integrity remains intact  Description  INTERVENTIONS  - Identify patients at risk for skin breakdown  - Assess and monitor skin integrity  - Assess and monitor nutrition and hydration status  - Monitor labs (i e  albumin)  - Assess for incontinence   - Turn and reposition patient  - Assist with mobility/ambulation  - Relieve pressure over bony prominences  - Avoid friction and shearing  - Provide appropriate hygiene as needed including keeping skin clean and dry  - Evaluate need for skin moisturizer/barrier cream  - Collaborate with interdisciplinary team (i e  Nutrition, Rehabilitation, etc )   - Patient/family teaching  Outcome: Progressing  Goal: Incision(s), wounds(s) or drain site(s) healing without S/S of infection  Description  INTERVENTIONS  - Assess and document risk factors for skin impairment   - Assess and document dressing, incision, wound bed, drain sites and surrounding tissue  - Consider nutrition services referral as needed  - Oral mucous membranes remain intact  - Provide patient/ family education  Outcome: Progressing  Goal: Oral mucous membranes remain intact  Description  INTERVENTIONS  - Assess oral mucosa and hygiene practices  - Implement preventative oral hygiene regimen  - Implement oral medicated treatments as ordered  - Initiate Nutrition services referral as needed  Outcome: Progressing     Problem: Nutrition/Hydration-ADULT  Goal: Nutrient/Hydration intake appropriate for improving, restoring or maintaining nutritional needs  Description  Monitor and assess patient's nutrition/hydration status for malnutrition  Collaborate with interdisciplinary team and initiate plan and interventions as ordered  Monitor patient's weight and dietary intake as ordered or per policy  Utilize nutrition screening tool and intervene as necessary  Determine patient's food preferences and provide high-protein, high-caloric foods as appropriate       INTERVENTIONS:  - Monitor oral intake, urinary output, labs, and treatment plans  - Assess nutrition and hydration status and recommend course of action  - Evaluate amount of meals eaten  - Assist patient with eating if necessary   - Allow adequate time for meals  - Recommend/ encourage appropriate diets, oral nutritional supplements, and vitamin/mineral supplements  - Order, calculate, and assess calorie counts as needed  - Recommend, monitor, and adjust tube feedings and TPN/PPN based on assessed needs  - Assess need for intravenous fluids  - Provide specific nutrition/hydration education as appropriate  - Include patient/family/caregiver in decisions related to nutrition  Outcome: Progressing

## 2019-10-29 NOTE — PROGRESS NOTES
Progress Note - Annamaria De Luna 1960, 64 y o  male MRN: [id number]    Unit/Bed#: -01 Encounter: [id number]    Primary Care Provider: Rickie Crespo   Date and time admitted to hospital: 10/26/2019  5:10 PM        * Multiple wounds of skin  Assessment & Plan  · Podiatry evaluation for wound care recommendations given severe pains reported at times  · Will continue to monitor for temperatures  No temperature since admission  · Supportive care          Scrotal swelling  Assessment & Plan  · Ultrasound showed "Large complex right hydrocele containing numerous septations  Increased vascularity in the left testicle with respect to the right, although both testicles could not be imaged on the same plane limiting direct comparison   Correlate clinically for orchitis "  · Urology consultation today - awaiting attending evaluation, but preliminary recommendation is for the patient to have AFP, beta HCG, and LD checked  Couch that there is no emergent  surgical intervention needed  · Supportive care  · Pain management  Monitor pain levels  Atrial fibrillation (HCC)  Assessment & Plan  · Rate / Rhythm control -   · Metoprolol  · Monitor on telemetry  · Consider cardiologist evaluation  · Anticoagulation - None prehospital   Defer to outpatient team following discharge  ESRD (end stage renal disease) ([facility] )  Assessment & Plan  · On routine dialysis  · Nephrology following  Anemia due to chronic kidney disease, on chronic dialysis ([facility] )  Assessment & Plan  · At baseline overall  · Intermittent monitoring  Pressure ulcer, right and left buttocks POA  Assessment & Plan  · Local wound care  · Turn / Reposition frequently  VTE Pharmacologic Prophylaxis:   Pharmacologic: Heparin  Mechanical VTE Prophylaxis in Place: Yes    Patient Centered Rounds: I have performed bedside rounds with nursing staff today      Discussions with Specialists or Other Care Team Provider: None yet today     Education and Discussions with Family / Patient: Patient only  Time Spent for Care: 30 minutes  More than 50% of total time spent on counseling and coordination of care as described above  Current Length of Stay: 3 day(s)  Current Patient Status: Inpatient     Certification Statement: The patient will continue to require additional inpatient hospital stay due to evaluation by urology and supportive care to be able to ambulate  Discharge Plan / Estimated Discharge Date: to be determined  Code Status: Level 1 - Full Code      Subjective: The patient continues to have lower extremity pain  He feels that sometimes his skin is so hard that feels like he is wearing sandals  The patient still has some pain in the scrotum region  He denies any dizziness or lightheadedness  He has had some persistent tachycardia with heart rates typically between 100 - 120  He denies any chest pain or palpitations  Objective:     Vitals:   Temp (24hrs), Av 8 °F (37 1 °C), Min:98 5 °F (36 9 °C), Max:99 4 °F (37 4 °C)    Temp:  [98 5 °F (36 9 °C)-99 4 °F (37 4 °C)] 98 7 °F (37 1 °C)  HR:  [104-119] 104  Resp:  [18-20] 18  BP: (100-124)/(61-75) 112/61  SpO2:  [93 %-95 %] 95 %  Body mass index is 28 22 kg/m²  Input and Output Summary (last 24 hours): Intake/Output Summary (Last 24 hours) at 10/29/2019 1828  Last data filed at 10/29/2019 1230  Gross per 24 hour   Intake 830 ml   Output 1500 ml   Net -670 ml       Physical Exam:     Physical Exam   Constitutional: He is oriented to person, place, and time  No distress  HENT:   Mouth/Throat: Oropharynx is clear and moist  No oropharyngeal exudate  Eyes: Pupils are equal, round, and reactive to light  Cardiovascular: Regular rhythm  Tachycardic rate   Pulmonary/Chest: Effort normal  He has no wheezes  He has no rales  Abdominal: Soft  Bowel sounds are normal  He exhibits no distension  There is no tenderness     Genitourinary:   Genitourinary Comments: Massive scrotal edema with some tenderness on palpation   Musculoskeletal: He exhibits edema and tenderness  Ulcers covered with bandages on the bilateral lower extremities  Neurological: He is alert and oriented to person, place, and time  Skin: Skin is warm and dry  No rash noted  Vitals reviewed  Additional Data:     Labs:    Results from last 7 days   Lab Units 10/29/19  1105 10/28/19  0624   WBC Thousand/uL 6 55 7 61   HEMOGLOBIN g/dL 9 4* 8 6*   HEMATOCRIT % 31 1* 28 7*   PLATELETS Thousands/uL 228 256   NEUTROS PCT %  --  60   LYMPHS PCT %  --  8*   MONOS PCT %  --  13*   EOS PCT %  --  17*     Results from last 7 days   Lab Units 10/29/19  1105  10/26/19  1745   POTASSIUM mmol/L 5 0   < > 5 8*   CHLORIDE mmol/L 100   < > 96*   CO2 mmol/L 29   < > 31   BUN mg/dL 51*   < > 65*   CREATININE mg/dL 6 30*   < > 6 96*   CALCIUM mg/dL 8 4   < > 8 8   ALK PHOS U/L  --   --  405*   ALT U/L  --   --  13   AST U/L  --   --  26    < > = values in this interval not displayed  Results from last 7 days   Lab Units 10/26/19  1745   INR  1 08       * I Have Reviewed All Lab Data Listed Above  * Additional Pertinent Lab Tests Reviewed: All Labs For Current Hospital Admission Reviewed    Imaging:    Imaging Reports Reviewed Today Include: all imaging since admission  Imaging Personally Reviewed by Myself Includes:  None    Recent Cultures (last 7 days):     Results from last 7 days   Lab Units 10/26/19  2023 10/26/19  1745   BLOOD CULTURE   --  No Growth at 48 hrs  No Growth at 48 hrs     GRAM STAIN RESULT  3+ Polys*  3+ Gram Positive Rods Resembling Diphtheroids*  2+ Gram positive cocci in pairs*  --    WOUND CULTURE  2+ Growth of Proteus mirabilis*  2+ Growth of Staphylococcus aureus*  --        Last 24 Hours Medication List:     Current Facility-Administered Medications:  acetaminophen 650 mg Oral Q6H PRN Damaris SAM [place]   b complex-vitamin C-folic acid 1 capsule Oral Daily With tidy Jesse Alba PA-C   calcium acetate 1,334 mg Oral TID With Meals Shawn P Brannon Boas, PA-C   epoetin bob 8,000 Units Intravenous After Dialysis Quin Castañeda MD   gabapentin 100 mg Oral Daily Jesse Chun PA-C   heparin (porcine) 5,000 Units Subcutaneous [facility] Jesse Chun PA-C   metoprolol tartrate 25 mg Oral Q12H [address] Homero Purcell DO   ondansetron 4 mg Intravenous Q6H PRN Jesse Alba PA-C   oxyCODONE 5 mg Oral Q8H PRN SAM Flores   pantoprazole 40 mg Oral Early Morning Jesse Josebelgica Alba PA-C   sevelamer 800 mg Oral TID With Meals Jesse Alba PA-C   torsemide 40 mg Oral BID (diuretic) Martinez Yang MD        Today, Patient Was Seen By: Cleveland Yates DO    ** Please Note: This note has been constructed using a voice recognition system   **

## 2019-10-29 NOTE — ASSESSMENT & PLAN NOTE
· Ultrasound showed "Large complex right hydrocele containing numerous septations  Increased vascularity in the left testicle with respect to the right, although both testicles could not be imaged on the same plane limiting direct comparison   Correlate clinically for orchitis "  · Urology consultation today - awaiting attending evaluation, but preliminary recommendation is for the patient to have AFP, beta HCG, and LD checked  Axis that there is no emergent  surgical intervention needed  · Supportive care  · Pain management  Monitor pain levels

## 2019-10-30 PROBLEM — S81.801A WOUND OF RIGHT LEG: Status: ACTIVE | Noted: 2019-10-26

## 2019-10-30 PROBLEM — S81.802A WOUND OF LEFT LEG: Status: ACTIVE | Noted: 2019-10-26

## 2019-10-30 LAB
AFP-TM SERPL-MCNC: 1.8 NG/ML (ref 0.5–8)
ANION GAP SERPL CALCULATED.3IONS-SCNC: 7 MMOL/L (ref 4–13)
BACTERIA WND AEROBE CULT: ABNORMAL
BUN SERPL-MCNC: 39 MG/DL (ref 5–25)
CALCIUM SERPL-MCNC: 8.7 MG/DL (ref 8.3–10.1)
CHLORIDE SERPL-SCNC: 99 MMOL/L (ref 100–108)
CO2 SERPL-SCNC: 29 MMOL/L (ref 21–32)
CREAT SERPL-MCNC: 5.01 MG/DL (ref 0.6–1.3)
GFR SERPL CREATININE-BSD FRML MDRD: 12 ML/MIN/1.73SQ M
GLUCOSE SERPL-MCNC: 90 MG/DL (ref 65–140)
GRAM STN SPEC: ABNORMAL
HCG SERPL QL: NEGATIVE
LDH SERPL-CCNC: 138 U/L (ref 81–234)
POTASSIUM SERPL-SCNC: 4.6 MMOL/L (ref 3.5–5.3)
SODIUM SERPL-SCNC: 135 MMOL/L (ref 136–145)

## 2019-10-30 PROCEDURE — 83615 LACTATE (LD) (LDH) ENZYME: CPT | Performed by: NURSE PRACTITIONER

## 2019-10-30 PROCEDURE — 84703 CHORIONIC GONADOTROPIN ASSAY: CPT | Performed by: NURSE PRACTITIONER

## 2019-10-30 PROCEDURE — 82105 ALPHA-FETOPROTEIN SERUM: CPT | Performed by: NURSE PRACTITIONER

## 2019-10-30 PROCEDURE — 99232 SBSQ HOSP IP/OBS MODERATE 35: CPT | Performed by: INTERNAL MEDICINE

## 2019-10-30 PROCEDURE — 80048 BASIC METABOLIC PNL TOTAL CA: CPT | Performed by: PHYSICIAN ASSISTANT

## 2019-10-30 RX ORDER — OXYCODONE HYDROCHLORIDE 5 MG/1
5 TABLET ORAL EVERY 6 HOURS PRN
Status: DISCONTINUED | OUTPATIENT
Start: 2019-10-30 | End: 2019-10-31

## 2019-10-30 RX ADMIN — METOPROLOL TARTRATE 25 MG: 25 TABLET, FILM COATED ORAL at 09:54

## 2019-10-30 RX ADMIN — METOPROLOL TARTRATE 25 MG: 25 TABLET, FILM COATED ORAL at 22:01

## 2019-10-30 RX ADMIN — PANTOPRAZOLE SODIUM 40 MG: 40 TABLET, DELAYED RELEASE ORAL at 06:00

## 2019-10-30 RX ADMIN — SEVELAMER HYDROCHLORIDE 800 MG: 800 TABLET, FILM COATED PARENTERAL at 09:54

## 2019-10-30 RX ADMIN — HEPARIN SODIUM 5000 UNITS: 5000 INJECTION INTRAVENOUS; SUBCUTANEOUS at 21:57

## 2019-10-30 RX ADMIN — CALCIUM ACETATE 1334 MG: 667 CAPSULE ORAL at 13:42

## 2019-10-30 RX ADMIN — CALCIUM ACETATE 1334 MG: 667 CAPSULE ORAL at 09:54

## 2019-10-30 RX ADMIN — HEPARIN SODIUM 5000 UNITS: 5000 INJECTION INTRAVENOUS; SUBCUTANEOUS at 05:59

## 2019-10-30 RX ADMIN — GABAPENTIN 100 MG: 100 CAPSULE ORAL at 09:54

## 2019-10-30 RX ADMIN — SEVELAMER HYDROCHLORIDE 800 MG: 800 TABLET, FILM COATED PARENTERAL at 13:42

## 2019-10-30 RX ADMIN — HEPARIN SODIUM 5000 UNITS: 5000 INJECTION INTRAVENOUS; SUBCUTANEOUS at 14:13

## 2019-10-30 RX ADMIN — OXYCODONE HYDROCHLORIDE 5 MG: 5 TABLET ORAL at 10:14

## 2019-10-30 RX ADMIN — ACETAMINOPHEN 650 MG: 325 TABLET, FILM COATED ORAL at 16:57

## 2019-10-30 RX ADMIN — TORSEMIDE 40 MG: 20 TABLET ORAL at 09:53

## 2019-10-30 NOTE — H&P (VIEW-ONLY)
Consult - [address] Lavonne  64 y o  male MRN: [id number]  Unit/Bed#: -01 Encounter: [id number]    Assessment/Plan     Assessment:  1  Wound of the b/l LE that are unstageable 2/2 DM with neuropathy complicated by history oe bullae formation and microvascular disease    - continue xerform dsd changes qd  - Eschars are becoming more less adherent and would benefit from debridement and possible graft application  - Will plan to take to OR on 10/31 for removal of eschar, debridement and possible stravix application to the right foot, debridement of b/l legs and feet    History of Present Illness     HPI:  Annamaria De Luna is a 64 y o  male who presents with eschar formation and is well known to the podiatry service  He suffered necrotixing infection with bullae formation and later eschar formation  He has been unable to make it to a wound care appointment due to hospital readmission       Consults  Review of Systems   Constitutional: Negative  HENT: Negative  Eyes: Negative  Respiratory: Negative  Cardiovascular: Negative  Gastrointestinal: Negative  Musculoskeletal: as above   Skin:as above   Neurological: Negative  Psych: negative  Historical Information   Past Medical History:   Diagnosis Date    Complication of renal dialysis     Polycystic kidney disease      Past Surgical History:   Procedure Laterality Date    IR CATHETERGRAM  10/17/2019    IR IMAGE GUIDED ASPIRATION / DRAINAGE  9/23/2019    IR PARACENTESIS  10/9/2019    IR [place] [facility] EXCHANGE  10/17/2019    IR [place] [facility] PLACEMENT  9/30/2019     Social History   Social History     Substance and Sexual Activity   Alcohol Use Not Currently     Social History     Substance and Sexual Activity   Drug Use Not Currently     Social History     Tobacco Use   Smoking Status Never Smoker   Smokeless Tobacco Never Used     Family History: History reviewed  No pertinent family history      Meds/Allergies   Medications Prior to Admission   Medication    b complex-vitamin C-folic acid (NEPHROCAPS) 1 mg capsule    calcium acetate (PHOSLO) 667 mg capsule    gabapentin (NEURONTIN) 100 mg capsule    metoprolol tartrate (LOPRESSOR) 25 mg tablet    midodrine (PROAMATINE) 10 MG tablet    Multiple Vitamin (MULTIVITAMIN) tablet    multivitamin-iron-minerals-folic acid (THERAPEUTIC-M) TABS tablet    NON FORMULARY    nystatin (MYCOSTATIN) powder    omeprazole (PriLOSEC OTC) 20 MG tablet    simethicone (MYLICON) 80 mg chewable tablet    Sucroferric Oxyhydroxide (VELPHORO PO)    sevelamer (RENAGEL) 800 mg tablet     No Known Allergies    Objective   First Vitals:   Blood Pressure: 114/73 (10/26/19 1716)  Pulse: (!) 107 (10/26/19 1716)  Temperature: 99 1 °F (37 3 °C) (10/26/19 1720)  Temp Source: Oral (10/26/19 1720)  Respirations: 16 (10/26/19 1716)  Height: 5' 8" (172 7 cm) (10/26/19 1716)  Weight - Scale: 84 2 kg (185 lb 10 oz) (10/26/19 1716)  SpO2: 100 % (10/26/19 1716)    Current Vitals:   Blood Pressure: 111/63 (10/30/19 0700)  Pulse: 97 (10/30/19 0700)  Temperature: 99 °F (37 2 °C) (10/30/19 0700)  Temp Source: Oral (10/30/19 0700)  Respirations: 18 (10/30/19 0700)  Height: 5' 8" (172 7 cm) (10/26/19 2132)  Weight - Scale: 84 2 kg (185 lb 10 oz) (10/26/19 1716)  SpO2: 94 % (10/30/19 0700)        /63 (BP Location: Right arm)   Pulse 97   Temp 99 °F (37 2 °C) (Oral)   Resp 18   Ht 5' 8" (1 727 m)   Wt 84 2 kg (185 lb 10 oz)   SpO2 94%   BMI 28 22 kg/m²      General Appearance:    Alert, cooperative, no distress   Head:    Normocephalic, without obvious abnormality, atraumatic   Eyes:    PERRL,       Nose:   Moist mucous membranes   Neck:   Supple, symmetrical, trachea midline   Back:     Symmetric   Lungs:     Respirations unlabored   Heart:    S1 and S2 normal   Abdomen:     Soft, non-tender   Extremities:   Eschar formation which is lifting encompassing nearly the entire right extremity below the knee   Pulses: DP are lightly palpable b/l, Pt are difficult to palpable  Skin:   Multiple eschar formation with underlying fibrotic tissue  No SOI   Neurologic:   Gross sensation is intact  Lab Results:   Admission on 10/26/2019   Component Date Value    WBC 10/26/2019 7 83     RBC 10/26/2019 3 06*    Hemoglobin 10/26/2019 9 6*    Hematocrit 10/26/2019 31 2*    MCV 10/26/2019 102*    MCH 10/26/2019 31 4     MCHC 10/26/2019 30 8*    RDW 10/26/2019 16 0*    MPV 10/26/2019 9 1     Platelets 87/22/0738 316     nRBC 10/26/2019 0     Neutrophils Relative 10/26/2019 60     Immat GRANS % 10/26/2019 1     Lymphocytes Relative 10/26/2019 8*    Monocytes Relative 10/26/2019 12     Eosinophils Relative 10/26/2019 18*    Basophils Relative 10/26/2019 1     Neutrophils Absolute 10/26/2019 4 78     Immature Grans Absolute 10/26/2019 0 04     Lymphocytes Absolute 10/26/2019 0 64     Monocytes Absolute 10/26/2019 0 90     Eosinophils Absolute 10/26/2019 1 41*    Basophils Absolute 10/26/2019 0 06     Sodium 10/26/2019 136     Potassium 10/26/2019 5 8*    Chloride 10/26/2019 96*    CO2 10/26/2019 31     ANION GAP 10/26/2019 9     BUN 10/26/2019 65*    Creatinine 10/26/2019 6 96*    Glucose 10/26/2019 118     Calcium 10/26/2019 8 8     AST 10/26/2019 26     ALT 10/26/2019 13     Alkaline Phosphatase 10/26/2019 405*    Total Protein 10/26/2019 7 3     Albumin 10/26/2019 1 5*    Total Bilirubin 10/26/2019 1 00     eGFR 10/26/2019 8     LACTIC ACID 10/26/2019 2 2*    LACTIC ACID 10/26/2019 1 3     Procalcitonin 10/26/2019 6 49*    Protime 10/26/2019 13 4     INR 10/26/2019 1 08     PTT 10/26/2019 41*    Blood Culture 10/26/2019 No Growth at 72 hrs   Blood Culture 10/26/2019 No Growth at 72 hrs       Wound Culture 10/26/2019 2+ Growth of Proteus mirabilis*    Wound Culture 10/26/2019 2+ Growth of Methicillin Resistant Staphylococcus aureus*    Wound Culture 10/26/2019 4+ Growth of      Gram Stain Result 10/26/2019 3+ Polys*    Gram Stain Result 10/26/2019 3+ Gram Positive Rods Resembling Diphtheroids*    Gram Stain Result 10/26/2019 2+ Gram positive cocci in pairs*    Platelets 91/57/5377 249     MPV 10/27/2019 9 4     Potassium 10/27/2019 5 6*    Sodium 10/27/2019 133*    Potassium 10/27/2019 5 8*    Chloride 10/27/2019 98*    CO2 10/27/2019 22     ANION GAP 10/27/2019 13     BUN 10/27/2019 71*    Creatinine 10/27/2019 7 41*    Glucose 10/27/2019 94     Calcium 10/27/2019 8 3     eGFR 10/27/2019 7     WBC 10/28/2019 7 61     RBC 10/28/2019 2 75*    Hemoglobin 10/28/2019 8 6*    Hematocrit 10/28/2019 28 7*    MCV 10/28/2019 104*    MCH 10/28/2019 31 3     MCHC 10/28/2019 30 0*    RDW 10/28/2019 15 9*    MPV 10/28/2019 9 3     Platelets 10/38/6710 256     nRBC 10/28/2019 0     Neutrophils Relative 10/28/2019 60     Immat GRANS % 10/28/2019 0     Lymphocytes Relative 10/28/2019 8*    Monocytes Relative 10/28/2019 13*    Eosinophils Relative 10/28/2019 17*    Basophils Relative 10/28/2019 2*    Neutrophils Absolute 10/28/2019 4 62     Immature Grans Absolute 10/28/2019 0 03     Lymphocytes Absolute 10/28/2019 0 58*    Monocytes Absolute 10/28/2019 0 98     Eosinophils Absolute 10/28/2019 1 27*    Basophils Absolute 10/28/2019 0 13*    Sodium 10/28/2019 135*    Potassium 10/28/2019 6 1*    Chloride 10/28/2019 99*    CO2 10/28/2019 24     ANION GAP 10/28/2019 12     BUN 10/28/2019 82*    Creatinine 10/28/2019 8 77*    Glucose 10/28/2019 101     Calcium 10/28/2019 8 7     eGFR 10/28/2019 6     Magnesium 10/28/2019 2 0     Phosphorus 10/28/2019 6 3*    Ferritin 10/28/2019 956*    Iron Saturation 10/28/2019 21     TIBC 10/28/2019 435     Iron 10/28/2019 90     Ventricular Rate 10/26/2019 105     Atrial Rate 10/26/2019 105     RI Interval 10/26/2019 160     QRSD Interval 10/26/2019 104     QT Interval 10/26/2019 350     QTC Interval 10/26/2019 462  P Axis 10/26/2019 77     QRS Axis 10/26/2019 -9     T Wave Axis 10/26/2019 118     Sodium 10/29/2019 137     Potassium 10/29/2019 5 0     Chloride 10/29/2019 100     CO2 10/29/2019 29     ANION GAP 10/29/2019 8     BUN 10/29/2019 51*    Creatinine 10/29/2019 6 30*    Glucose 10/29/2019 76     Calcium 10/29/2019 8 4     eGFR 10/29/2019 9     WBC 10/29/2019 6 55     RBC 10/29/2019 3 04*    Hemoglobin 10/29/2019 9 4*    Hematocrit 10/29/2019 31 1*    MCV 10/29/2019 102*    MCH 10/29/2019 30 9     MCHC 10/29/2019 30 2*    RDW 10/29/2019 15 9*    Platelets 85/85/7338 228     MPV 10/29/2019 9 6     AFP TUMOR MARKER 10/30/2019 1 8     LD 10/30/2019 138     Preg, Serum 10/30/2019 Negative     Sodium 10/30/2019 135*    Potassium 10/30/2019 4 6     Chloride 10/30/2019 99*    CO2 10/30/2019 29     ANION GAP 10/30/2019 7     BUN 10/30/2019 39*    Creatinine 10/30/2019 5 01*    Glucose 10/30/2019 90     Calcium 10/30/2019 8 7     eGFR 10/30/2019 12        Results from last 7 days   Lab Units 10/26/19  2023   GRAM STAIN RESULT  3+ Polys*  3+ Gram Positive Rods Resembling Diphtheroids*  2+ Gram positive cocci in pairs*       Results from last 7 days   Lab Units 10/26/19  1745   BLOOD CULTURE  No Growth at 72 hrs  No Growth at 72 hrs  Invalid input(s): LABAEARO            Imaging: I have personally reviewed pertinent films in PACS  EKG, Pathology, and Other Studies: I have personally reviewed pertinent reports        Code Status: Level 1 - Full Code  Advance Directive and Living Will:      Power of :    POLST:

## 2019-10-30 NOTE — CONSULTS
Consult - [address] Lavonne  64 y o  male MRN: [id number]  Unit/Bed#: -01 Encounter: [id number]    Assessment/Plan     Assessment:  1  Wound of the b/l LE that are unstageable 2/2 DM with neuropathy complicated by history oe bullae formation and microvascular disease    - continue xerform dsd changes qd  - Eschars are becoming more less adherent and would benefit from debridement and possible graft application  - Will plan to take to OR on 10/31 for removal of eschar, debridement and possible stravix application to the right foot, debridement of b/l legs and feet    History of Present Illness     HPI:  Giselle Chamberlain is a 64 y o  male who presents with eschar formation and is well known to the podiatry service  He suffered necrotixing infection with bullae formation and later eschar formation  He has been unable to make it to a wound care appointment due to hospital readmission       Consults  Review of Systems   Constitutional: Negative  HENT: Negative  Eyes: Negative  Respiratory: Negative  Cardiovascular: Negative  Gastrointestinal: Negative  Musculoskeletal: as above   Skin:as above   Neurological: Negative  Psych: negative  Historical Information   Past Medical History:   Diagnosis Date    Complication of renal dialysis     Polycystic kidney disease      Past Surgical History:   Procedure Laterality Date    IR CATHETERGRAM  10/17/2019    IR IMAGE GUIDED ASPIRATION / DRAINAGE  9/23/2019    IR PARACENTESIS  10/9/2019    [facility] [facility] EXCHANGE  10/17/2019    IR [place] [facility] PLACEMENT  9/30/2019     Social History   Social History     Substance and Sexual Activity   Alcohol Use Not Currently     Social History     Substance and Sexual Activity   Drug Use Not Currently     Social History     Tobacco Use   Smoking Status Never Smoker   Smokeless Tobacco Never Used     Family History: History reviewed  No pertinent family history      Meds/Allergies   Medications Prior to Admission   Medication    b complex-vitamin C-folic acid (NEPHROCAPS) 1 mg capsule    calcium acetate (PHOSLO) 667 mg capsule    gabapentin (NEURONTIN) 100 mg capsule    metoprolol tartrate (LOPRESSOR) 25 mg tablet    midodrine (PROAMATINE) 10 MG tablet    Multiple Vitamin (MULTIVITAMIN) tablet    multivitamin-iron-minerals-folic acid (THERAPEUTIC-M) TABS tablet    NON FORMULARY    nystatin (MYCOSTATIN) powder    omeprazole (PriLOSEC OTC) 20 MG tablet    simethicone (MYLICON) 80 mg chewable tablet    Sucroferric Oxyhydroxide (VELPHORO PO)    sevelamer (RENAGEL) 800 mg tablet     No Known Allergies    Objective   First Vitals:   Blood Pressure: 114/73 (10/26/19 1716)  Pulse: (!) 107 (10/26/19 1716)  Temperature: 99 1 °F (37 3 °C) (10/26/19 1720)  Temp Source: Oral (10/26/19 1720)  Respirations: 16 (10/26/19 1716)  Height: 5' 8" (172 7 cm) (10/26/19 1716)  Weight - Scale: 84 2 kg (185 lb 10 oz) (10/26/19 1716)  SpO2: 100 % (10/26/19 1716)    Current Vitals:   Blood Pressure: 111/63 (10/30/19 0700)  Pulse: 97 (10/30/19 0700)  Temperature: 99 °F (37 2 °C) (10/30/19 0700)  Temp Source: Oral (10/30/19 0700)  Respirations: 18 (10/30/19 0700)  Height: 5' 8" (172 7 cm) (10/26/19 2132)  Weight - Scale: 84 2 kg (185 lb 10 oz) (10/26/19 1716)  SpO2: 94 % (10/30/19 0700)        /63 (BP Location: Right arm)   Pulse 97   Temp 99 °F (37 2 °C) (Oral)   Resp 18   Ht 5' 8" (1 727 m)   Wt 84 2 kg (185 lb 10 oz)   SpO2 94%   BMI 28 22 kg/m²      General Appearance:    Alert, cooperative, no distress   Head:    Normocephalic, without obvious abnormality, atraumatic   Eyes:    PERRL,       Nose:   Moist mucous membranes   Neck:   Supple, symmetrical, trachea midline   Back:     Symmetric   Lungs:     Respirations unlabored   Heart:    S1 and S2 normal   Abdomen:     Soft, non-tender   Extremities:   Eschar formation which is lifting encompassing nearly the entire right extremity below the knee   Pulses: DP are lightly palpable b/l, Pt are difficult to palpable  Skin:   Multiple eschar formation with underlying fibrotic tissue  No SOI   Neurologic:   Gross sensation is intact  Lab Results:   Admission on 10/26/2019   Component Date Value    WBC 10/26/2019 7 83     RBC 10/26/2019 3 06*    Hemoglobin 10/26/2019 9 6*    Hematocrit 10/26/2019 31 2*    MCV 10/26/2019 102*    MCH 10/26/2019 31 4     MCHC 10/26/2019 30 8*    RDW 10/26/2019 16 0*    MPV 10/26/2019 9 1     Platelets 89/48/2039 316     nRBC 10/26/2019 0     Neutrophils Relative 10/26/2019 60     Immat GRANS % 10/26/2019 1     Lymphocytes Relative 10/26/2019 8*    Monocytes Relative 10/26/2019 12     Eosinophils Relative 10/26/2019 18*    Basophils Relative 10/26/2019 1     Neutrophils Absolute 10/26/2019 4 78     Immature Grans Absolute 10/26/2019 0 04     Lymphocytes Absolute 10/26/2019 0 64     Monocytes Absolute 10/26/2019 0 90     Eosinophils Absolute 10/26/2019 1 41*    Basophils Absolute 10/26/2019 0 06     Sodium 10/26/2019 136     Potassium 10/26/2019 5 8*    Chloride 10/26/2019 96*    CO2 10/26/2019 31     ANION GAP 10/26/2019 9     BUN 10/26/2019 65*    Creatinine 10/26/2019 6 96*    Glucose 10/26/2019 118     Calcium 10/26/2019 8 8     AST 10/26/2019 26     ALT 10/26/2019 13     Alkaline Phosphatase 10/26/2019 405*    Total Protein 10/26/2019 7 3     Albumin 10/26/2019 1 5*    Total Bilirubin 10/26/2019 1 00     eGFR 10/26/2019 8     LACTIC ACID 10/26/2019 2 2*    LACTIC ACID 10/26/2019 1 3     Procalcitonin 10/26/2019 6 49*    Protime 10/26/2019 13 4     INR 10/26/2019 1 08     PTT 10/26/2019 41*    Blood Culture 10/26/2019 No Growth at 72 hrs   Blood Culture 10/26/2019 No Growth at 72 hrs       Wound Culture 10/26/2019 2+ Growth of Proteus mirabilis*    Wound Culture 10/26/2019 2+ Growth of Methicillin Resistant Staphylococcus aureus*    Wound Culture 10/26/2019 4+ Growth of      Gram Stain Result 10/26/2019 3+ Polys*    Gram Stain Result 10/26/2019 3+ Gram Positive Rods Resembling Diphtheroids*    Gram Stain Result 10/26/2019 2+ Gram positive cocci in pairs*    Platelets 06/14/3869 249     MPV 10/27/2019 9 4     Potassium 10/27/2019 5 6*    Sodium 10/27/2019 133*    Potassium 10/27/2019 5 8*    Chloride 10/27/2019 98*    CO2 10/27/2019 22     ANION GAP 10/27/2019 13     BUN 10/27/2019 71*    Creatinine 10/27/2019 7 41*    Glucose 10/27/2019 94     Calcium 10/27/2019 8 3     eGFR 10/27/2019 7     WBC 10/28/2019 7 61     RBC 10/28/2019 2 75*    Hemoglobin 10/28/2019 8 6*    Hematocrit 10/28/2019 28 7*    MCV 10/28/2019 104*    MCH 10/28/2019 31 3     MCHC 10/28/2019 30 0*    RDW 10/28/2019 15 9*    MPV 10/28/2019 9 3     Platelets 78/39/6101 256     nRBC 10/28/2019 0     Neutrophils Relative 10/28/2019 60     Immat GRANS % 10/28/2019 0     Lymphocytes Relative 10/28/2019 8*    Monocytes Relative 10/28/2019 13*    Eosinophils Relative 10/28/2019 17*    Basophils Relative 10/28/2019 2*    Neutrophils Absolute 10/28/2019 4 62     Immature Grans Absolute 10/28/2019 0 03     Lymphocytes Absolute 10/28/2019 0 58*    Monocytes Absolute 10/28/2019 0 98     Eosinophils Absolute 10/28/2019 1 27*    Basophils Absolute 10/28/2019 0 13*    Sodium 10/28/2019 135*    Potassium 10/28/2019 6 1*    Chloride 10/28/2019 99*    CO2 10/28/2019 24     ANION GAP 10/28/2019 12     BUN 10/28/2019 82*    Creatinine 10/28/2019 8 77*    Glucose 10/28/2019 101     Calcium 10/28/2019 8 7     eGFR 10/28/2019 6     Magnesium 10/28/2019 2 0     Phosphorus 10/28/2019 6 3*    Ferritin 10/28/2019 956*    Iron Saturation 10/28/2019 21     TIBC 10/28/2019 435     Iron 10/28/2019 90     Ventricular Rate 10/26/2019 105     Atrial Rate 10/26/2019 105     MI Interval 10/26/2019 160     QRSD Interval 10/26/2019 104     QT Interval 10/26/2019 350     QTC Interval 10/26/2019 462  P Axis 10/26/2019 77     QRS Axis 10/26/2019 -9     T Wave Axis 10/26/2019 118     Sodium 10/29/2019 137     Potassium 10/29/2019 5 0     Chloride 10/29/2019 100     CO2 10/29/2019 29     ANION GAP 10/29/2019 8     BUN 10/29/2019 51*    Creatinine 10/29/2019 6 30*    Glucose 10/29/2019 76     Calcium 10/29/2019 8 4     eGFR 10/29/2019 9     WBC 10/29/2019 6 55     RBC 10/29/2019 3 04*    Hemoglobin 10/29/2019 9 4*    Hematocrit 10/29/2019 31 1*    MCV 10/29/2019 102*    MCH 10/29/2019 30 9     MCHC 10/29/2019 30 2*    RDW 10/29/2019 15 9*    Platelets 92/12/3231 228     MPV 10/29/2019 9 6     AFP TUMOR MARKER 10/30/2019 1 8     LD 10/30/2019 138     Preg, Serum 10/30/2019 Negative     Sodium 10/30/2019 135*    Potassium 10/30/2019 4 6     Chloride 10/30/2019 99*    CO2 10/30/2019 29     ANION GAP 10/30/2019 7     BUN 10/30/2019 39*    Creatinine 10/30/2019 5 01*    Glucose 10/30/2019 90     Calcium 10/30/2019 8 7     eGFR 10/30/2019 12        Results from last 7 days   Lab Units 10/26/19  2023   GRAM STAIN RESULT  3+ Polys*  3+ Gram Positive Rods Resembling Diphtheroids*  2+ Gram positive cocci in pairs*       Results from last 7 days   Lab Units 10/26/19  1745   BLOOD CULTURE  No Growth at 72 hrs  No Growth at 72 hrs  Invalid input(s): LABAEARO            Imaging: I have personally reviewed pertinent films in PACS  EKG, Pathology, and Other Studies: I have personally reviewed pertinent reports        Code Status: Level 1 - Full Code  Advance Directive and Living Will:      Power of :    POLST:

## 2019-10-30 NOTE — SOCIAL WORK
Patient is not medically stable for discharge  CM updated liaison Leigh from [facility] who is continuing to follow patient as they have accepted him back  SLIM had reported that pending the podiatry consult he could possibly be ready in the next 24- 48 hours  Podiatry has now seen patient and plans on taking him to the OR tomorrow 10/31 for removal of eschar, debridement and possible stravix application to the right foot, debridement of b/l legs and feet  CM department will continue to follow to assist with discharge coordination

## 2019-10-30 NOTE — PROGRESS NOTES
NEPHROLOGY PROGRESS NOTE   Luisa Yates 64 y o  male MRN: [id number]  Unit/Bed#: -Lin[place] Encounter: [id number]  Reason for Consult: esrd    ASSESSMENT/PLAN:  1  ESRD on HD 4x/week at [address] x2 5 hours per treatment M/T/Th/F  - EDW: 83kg  - currently below EDW to 81 7kg  - appears euvolemic  2  Access- permanent dialysis catheter  3  Anemia- he receives venofer weekly  4  Hypotension- continue midodrine 10mg TID  5  Tachycardia/ A fib- consider cardiology consult  - blood cultures negative x48 hours  6  Hyperkalemia- renal diet/low K diet  - improved s/p dialysis  7  Hyperphosphatemia- continue phoslo and sevelamer with meals  8  Right Hydrocele with numerous septations  9  LE wounds- status post abx course  - continue local wound care    Disposition:  Next HD Thursday  Stable from a renal standpoint for discharge    SUBJECTIVE:  Patient without acute complaints  Denies sob      OBJECTIVE:  Current Weight: Weight - Scale: 84 2 kg (185 lb 10 oz)  Vitals:    10/29/19 1511 10/29/19 2108 10/29/19 2226 10/30/19 0700   BP: 112/61 109/63 128/70 111/63   BP Location: Right arm  Right arm Right arm   Pulse: 104 100 100 97   Resp: 18  18 18   Temp: 98 7 °F (37 1 °C)  98 3 °F (36 8 °C) 99 °F (37 2 °C)   TempSrc: Oral  Oral Oral   SpO2: 95%  96% 94%   Weight:       Height:           Intake/Output Summary (Last 24 hours) at 10/30/2019 1041  Last data filed at 10/29/2019 2052  Gross per 24 hour   Intake 540 ml   Output 1500 ml   Net -960 ml     General: NAD  Skin: no rash  HEENT: normocephalic  Neck: no JVD  Lungs: CTAB  Cardiac: RRR  Extremities: legs wrapped without edema  GI: soft nt round  Neuro: alert awake  Psych: mood and affect appropriate    Medications:    Current Facility-Administered Medications:     acetaminophen (TYLENOL) tablet 650 mg, 650 mg, Oral, Q6H PRN, Damaris S Na, CRNP, 650 mg at 10/29/19 1533    b complex-vitamin C-folic acid (NEPHROCAPS) capsule 1 capsule, 1 capsule, Oral, Daily With Tor PAUL Peña, 1 capsule at 10/29/19 1808    calcium acetate (PHOSLO) capsule 1,334 mg, 1,334 mg, Oral, TID With Meals, Chloe Leger PA-C, 1,334 mg at 10/30/19 0954    epoetin bob (EPOGEN,PROCRIT) injection 8,000 Units, 8,000 Units, Intravenous, After Dialysis, Quin Castañeda MD    gabapentin (NEURONTIN) capsule 100 mg, 100 mg, Oral, Daily, Jesse Chun PA-C, 100 mg at 10/30/19 0954    heparin (porcine) subcutaneous injection 5,000 Units, 5,000 Units, Subcutaneous, Q8H [address], 5,000 Units at 10/30/19 0559 **AND** [COMPLETED] Platelet count, , , Once, Jesse Alba PA-C    metoprolol tartrate (LOPRESSOR) tablet 25 mg, 25 mg, Oral, Q12H [address], [place] Yates, DO, 25 mg at 10/30/19 0954    ondansetron (ZOFRAN) injection 4 mg, 4 mg, Intravenous, Q6H PRN, Jesse Chun PA-C    oxyCODONE (ROXICODONE) IR tablet 5 mg, 5 mg, Oral, Q8H PRN, Damaris Moranx, CRNP, 5 mg at 10/30/19 1014    pantoprazole (PROTONIX) EC tablet 40 mg, 40 mg, Oral, Early Morning, Jesse Chun PA-C, 40 mg at 10/30/19 0600    sevelamer (RENAGEL) tablet 800 mg, 800 mg, Oral, TID With Meals, Jesse Chun PA-C, 800 mg at 10/30/19 0954    torsemide (DEMADEX) tablet 40 mg, 40 mg, Oral, BID (diuretic), Martinez Yang MD, 40 mg at 10/30/19 0953    Laboratory Results:  Results from last 7 days   Lab Units 10/30/19  0420 10/29/19  1105 10/28/19  0624 10/27/19  0517 10/27/19  0034 10/26/19  1745   WBC Thousand/uL  --  6 55 7 61  --   --  7 83   HEMOGLOBIN g/dL  --  9 4* 8 6*  --   --  9 6*   HEMATOCRIT %  --  31 1* 28 7*  --   --  31 2*   PLATELETS Thousands/uL  --  228 256  --  249 316   POTASSIUM mmol/L 4 6 5 0 6 1* 5 8* 5 6* 5 8*   CHLORIDE mmol/L 99* 100 99* 98*  --  96*   CO2 mmol/L 29 29 24 22  --  31   BUN mg/dL 39* 51* 82* 71*  --  65*   CREATININE mg/dL 5 01* 6 30* 8 77* 7 41*  --  6 96*   CALCIUM mg/dL 8 7 8 4 8 7 8 3  --  8 8   MAGNESIUM mg/dL  --   --  2 0  --   --   --    PHOSPHORUS mg/dL  --   --  6 3*  --   --   --

## 2019-10-30 NOTE — ASSESSMENT & PLAN NOTE
· Ultrasound showed "Large complex right hydrocele containing numerous septations  Increased vascularity in the left testicle with respect to the right, although both testicles could not be imaged on the same plane limiting direct comparison   Correlate clinically for orchitis "  · Urology saw patient 10/29/2019 and recommends no surgical intervention acutely and to manage problems with legs and get therapy first   · Follow up AFP, beta HCG, and LD checked  · Supportive care  · Pain management  Monitor pain levels

## 2019-10-30 NOTE — PROGRESS NOTES
Patient resting in bed  Bed low and locked  Call bell within reach  No signs of distress  Will continue to monitor

## 2019-10-30 NOTE — ASSESSMENT & PLAN NOTE
· Rate / Rhythm control -   · Metoprolol increased to 25 mg bid on 10/29/2019  · Monitor on telemetry  Monitor HR and blood pressures  · Anticoagulation - None prehospital   Defer to outpatient team following discharge

## 2019-10-30 NOTE — PROGRESS NOTES
Progress Note - Alana Kemp 1960, 64 y o  male MRN: [id number]    Unit/Bed#: -01 Encounter: [id number]    Primary Care Provider: Tete Chen   Date and time admitted to hospital: 10/26/2019  5:10 PM        * Multiple wounds of skin  Assessment & Plan  · Plan for OR tomorrow for debridement of the lower extremity wounds  · Will continue to monitor for temperatures  No temperature since admission  · Supportive care          Scrotal swelling  Assessment & Plan  · Ultrasound showed "Large complex right hydrocele containing numerous septations  Increased vascularity in the left testicle with respect to the right, although both testicles could not be imaged on the same plane limiting direct comparison   Correlate clinically for orchitis "  · Urology saw patient 10/29/2019 and recommends no surgical intervention acutely and to manage problems with legs and get therapy first   · Follow up AFP, beta HCG, and LD checked  · Supportive care  · Pain management  Monitor pain levels  [facility]  Assessment & Plan  · Rate / Rhythm control -   · Metoprolol increased to 25 mg bid on 10/29/2019  · Monitor on telemetry  Monitor HR and blood pressures  · Anticoagulation - None prehospital   Defer to outpatient team following discharge  ESRD (end stage renal disease) ([facility] )  Assessment & Plan  · On routine dialysis  · Nephrology following  Anemia due to chronic kidney disease, on chronic dialysis ([facility] )  Assessment & Plan  · At baseline overall  · Intermittent monitoring  Pressure ulcer, right and left buttocks POA  Assessment & Plan  · Local wound care  · Turn / Reposition frequently  VTE Pharmacologic Prophylaxis:   Pharmacologic: Heparin  Mechanical VTE Prophylaxis in Place: Yes    Patient Centered Rounds: I have performed bedside rounds with nursing staff today      Discussions with Specialists or Other Care Team Provider:  None yet today    Education and Discussions with Family / Patient:  Updated wife at bedside    Time Spent for Care: 30 minutes  More than 50% of total time spent on counseling and coordination of care as described above  Current Length of Stay: 4 day(s)  Current Patient Status: Inpatient     Certification Statement: The patient will continue to require additional inpatient hospital stay due to Continued evaluation treatment for ulcers of the lower extremities  Discharge Plan / Estimated Discharge Date:  Most likely by Saturday, patient may be discharged full  However, depends on Podiatry clearance  Plan is for subacute rehab post discharge  Code Status: Level 1 - Full Code      Subjective: The patient continues to have some pain in his legs which was worse transiently after he had his debridement done  However, the patient feels that his pain is currently controlled  Objective:     Vitals:   Temp (24hrs), Av 9 °F (37 2 °C), Min:98 3 °F (36 8 °C), Max:99 3 °F (37 4 °C)    Temp:  [98 3 °F (36 8 °C)-99 3 °F (37 4 °C)] 99 3 °F (37 4 °C)  HR:  [] 93  Resp:  [18] 18  BP: (106-128)/(58-70) 106/58  SpO2:  [94 %-97 %] 97 %  Body mass index is 28 22 kg/m²  Input and Output Summary (last 24 hours): Intake/Output Summary (Last 24 hours) at 10/30/2019 1640  Last data filed at 10/29/2019 2052  Gross per 24 hour   Intake 120 ml   Output    Net 120 ml       Physical Exam:     Physical Exam   Constitutional: He is oriented to person, place, and time  No distress  HENT:   Mouth/Throat: Oropharynx is clear and moist  No oropharyngeal exudate  Cardiovascular: Normal rate and regular rhythm  No murmur heard  Pulmonary/Chest: Effort normal and breath sounds normal  He has no wheezes  He has no rales  Abdominal: Soft  Bowel sounds are normal  He exhibits no distension  There is no tenderness     Genitourinary:   Genitourinary Comments: Stable scrotal edema   Musculoskeletal:   Small amount of drainage through bandages which cover the bilateral lower extremities  No evidence of any erythema or warmth  No focal tenderness on palpation of the lower extremities  Neurological: He is alert and oriented to person, place, and time  Vitals reviewed  Additional Data:     Labs:    Results from last 7 days   Lab Units 10/29/19  1105 10/28/19  0624   WBC Thousand/uL 6 55 7 61   HEMOGLOBIN g/dL 9 4* 8 6*   HEMATOCRIT % 31 1* 28 7*   PLATELETS Thousands/uL 228 256   NEUTROS PCT %  --  60   LYMPHS PCT %  --  8*   MONOS PCT %  --  13*   EOS PCT %  --  17*     Results from last 7 days   Lab Units 10/30/19  0420  10/26/19  1745   POTASSIUM mmol/L 4 6   < > 5 8*   CHLORIDE mmol/L 99*   < > 96*   CO2 mmol/L 29   < > 31   BUN mg/dL 39*   < > 65*   CREATININE mg/dL 5 01*   < > 6 96*   CALCIUM mg/dL 8 7   < > 8 8   ALK PHOS U/L  --   --  405*   ALT U/L  --   --  13   AST U/L  --   --  26    < > = values in this interval not displayed  Results from last 7 days   Lab Units 10/26/19  1745   INR  1 08       * I Have Reviewed All Lab Data Listed Above  * Additional Pertinent Lab Tests Reviewed: All Labs Within Last 24 Hours Reviewed    Imaging:    Imaging Reports Reviewed Today Include: No new imaging  Imaging Personally Reviewed by Myself Includes:  None    Recent Cultures (last 7 days):     Results from last 7 days   Lab Units 10/26/19  2023 10/26/19  1745   BLOOD CULTURE   --  No Growth at 72 hrs  No Growth at 72 hrs     GRAM STAIN RESULT  3+ Polys*  3+ Gram Positive Rods Resembling Diphtheroids*  2+ Gram positive cocci in pairs*  --    WOUND CULTURE  2+ Growth of Proteus mirabilis*  2+ Growth of Methicillin Resistant Staphylococcus aureus*  4+ Growth of   --        Last 24 Hours Medication List:     Current Facility-Administered Medications:  acetaminophen 650 mg Oral Q6H PRN SAM Flores   b complex-vitamin C-folic acid 1 capsule Oral Daily With Dinner Jesse Chun PA-C   calcium acetate 1,334 mg Oral TID With Meals Jesse BRAY Olivia Frias PA-C   epoetin bob 8,000 Units Intravenous After Dialysis Sharlene Escobedo MD   gabapentin 100 mg Oral Daily Jesse Chun PA-C   heparin (porcine) 5,000 Units Subcutaneous [facility] Jesse Chun PA-C   metoprolol tartrate 25 mg Oral Q12H [address] Homero Purcell DO   ondansetron 4 mg Intravenous Q6H PRN Kari Davies PA-C   oxyCODONE 5 mg Oral Q8H PRN SAM Flores   pantoprazole 40 mg Oral Early Morning Jesse Sims PA-C   sevelamer 800 mg Oral TID With Meals Jesse Sims PA-C   torsemide 40 mg Oral BID (diuretic) Estrellita Sin MD        Today, Patient Was Seen By: Leonardo Lopez DO    ** Please Note: This note has been constructed using a voice recognition system   **

## 2019-10-30 NOTE — ASSESSMENT & PLAN NOTE
· Plan for OR tomorrow for debridement of the lower extremity wounds  · Will continue to monitor for temperatures  No temperature since admission    · Supportive care

## 2019-10-31 ENCOUNTER — APPOINTMENT (INPATIENT)
Dept: DIALYSIS | Facility: HOSPITAL | Age: 59
DRG: 576 | End: 2019-10-31
Payer: MEDICARE

## 2019-10-31 ENCOUNTER — ANESTHESIA (INPATIENT)
Dept: PERIOP | Facility: HOSPITAL | Age: 59
DRG: 576 | End: 2019-10-31
Payer: MEDICARE

## 2019-10-31 ENCOUNTER — ANESTHESIA EVENT (INPATIENT)
Dept: PERIOP | Facility: HOSPITAL | Age: 59
DRG: 576 | End: 2019-10-31
Payer: MEDICARE

## 2019-10-31 PROBLEM — S81.809A MULTIPLE AND OPEN WOUND OF LOWER LIMB: Status: ACTIVE | Noted: 2019-09-21

## 2019-10-31 LAB
ALBUMIN SERPL BCP-MCNC: 1.4 G/DL (ref 3.5–5)
ALP SERPL-CCNC: 189 U/L (ref 46–116)
ALT SERPL W P-5'-P-CCNC: <6 U/L (ref 12–78)
ANION GAP SERPL CALCULATED.3IONS-SCNC: 9 MMOL/L (ref 4–13)
AST SERPL W P-5'-P-CCNC: 14 U/L (ref 5–45)
BASOPHILS # BLD AUTO: 0.08 THOUSANDS/ΜL (ref 0–0.1)
BASOPHILS NFR BLD AUTO: 1 % (ref 0–1)
BILIRUB SERPL-MCNC: 0.9 MG/DL (ref 0.2–1)
BUN SERPL-MCNC: 31 MG/DL (ref 5–25)
CALCIUM SERPL-MCNC: 8 MG/DL (ref 8.3–10.1)
CHLORIDE SERPL-SCNC: 100 MMOL/L (ref 100–108)
CO2 SERPL-SCNC: 29 MMOL/L (ref 21–32)
CREAT SERPL-MCNC: 4.61 MG/DL (ref 0.6–1.3)
EOSINOPHIL # BLD AUTO: 0.48 THOUSAND/ΜL (ref 0–0.61)
EOSINOPHIL NFR BLD AUTO: 5 % (ref 0–6)
ERYTHROCYTE [DISTWIDTH] IN BLOOD BY AUTOMATED COUNT: 15.9 % (ref 11.6–15.1)
GFR SERPL CREATININE-BSD FRML MDRD: 13 ML/MIN/1.73SQ M
GLUCOSE SERPL-MCNC: 90 MG/DL (ref 65–140)
GLUCOSE SERPL-MCNC: 92 MG/DL (ref 65–140)
HCT VFR BLD AUTO: 22.7 % (ref 36.5–49.3)
HGB BLD-MCNC: 7 G/DL (ref 12–17)
IMM GRANULOCYTES # BLD AUTO: 0.06 THOUSAND/UL (ref 0–0.2)
IMM GRANULOCYTES NFR BLD AUTO: 1 % (ref 0–2)
LYMPHOCYTES # BLD AUTO: 0.7 THOUSANDS/ΜL (ref 0.6–4.47)
LYMPHOCYTES NFR BLD AUTO: 7 % (ref 14–44)
MCH RBC QN AUTO: 31.8 PG (ref 26.8–34.3)
MCHC RBC AUTO-ENTMCNC: 30.8 G/DL (ref 31.4–37.4)
MCV RBC AUTO: 103 FL (ref 82–98)
MONOCYTES # BLD AUTO: 0.97 THOUSAND/ΜL (ref 0.17–1.22)
MONOCYTES NFR BLD AUTO: 9 % (ref 4–12)
NEUTROPHILS # BLD AUTO: 8.16 THOUSANDS/ΜL (ref 1.85–7.62)
NEUTS SEG NFR BLD AUTO: 77 % (ref 43–75)
NRBC BLD AUTO-RTO: 0 /100 WBCS
PLATELET # BLD AUTO: 204 THOUSANDS/UL (ref 149–390)
PMV BLD AUTO: 9.4 FL (ref 8.9–12.7)
POTASSIUM SERPL-SCNC: 4.5 MMOL/L (ref 3.5–5.3)
PROT SERPL-MCNC: 5.8 G/DL (ref 6.4–8.2)
RBC # BLD AUTO: 2.2 MILLION/UL (ref 3.88–5.62)
SODIUM SERPL-SCNC: 138 MMOL/L (ref 136–145)
WBC # BLD AUTO: 10.45 THOUSAND/UL (ref 4.31–10.16)

## 2019-10-31 PROCEDURE — 0JBP0ZZ EXCISION OF LEFT LOWER LEG SUBCUTANEOUS TISSUE AND FASCIA, OPEN APPROACH: ICD-10-PCS | Performed by: PODIATRIST

## 2019-10-31 PROCEDURE — 99232 SBSQ HOSP IP/OBS MODERATE 35: CPT | Performed by: INTERNAL MEDICINE

## 2019-10-31 PROCEDURE — 80053 COMPREHEN METABOLIC PANEL: CPT | Performed by: INTERNAL MEDICINE

## 2019-10-31 PROCEDURE — 0JBN0ZZ EXCISION OF RIGHT LOWER LEG SUBCUTANEOUS TISSUE AND FASCIA, OPEN APPROACH: ICD-10-PCS | Performed by: PODIATRIST

## 2019-10-31 PROCEDURE — 0JBR0ZZ EXCISION OF LEFT FOOT SUBCUTANEOUS TISSUE AND FASCIA, OPEN APPROACH: ICD-10-PCS | Performed by: PODIATRIST

## 2019-10-31 PROCEDURE — 85025 COMPLETE CBC W/AUTO DIFF WBC: CPT | Performed by: INTERNAL MEDICINE

## 2019-10-31 PROCEDURE — 94664 DEMO&/EVAL PT USE INHALER: CPT

## 2019-10-31 PROCEDURE — 82948 REAGENT STRIP/BLOOD GLUCOSE: CPT

## 2019-10-31 PROCEDURE — 0JBM0ZZ EXCISION OF LEFT UPPER LEG SUBCUTANEOUS TISSUE AND FASCIA, OPEN APPROACH: ICD-10-PCS | Performed by: PODIATRIST

## 2019-10-31 PROCEDURE — 0JBQ0ZZ EXCISION OF RIGHT FOOT SUBCUTANEOUS TISSUE AND FASCIA, OPEN APPROACH: ICD-10-PCS | Performed by: PODIATRIST

## 2019-10-31 PROCEDURE — 87040 BLOOD CULTURE FOR BACTERIA: CPT | Performed by: INTERNAL MEDICINE

## 2019-10-31 RX ORDER — FENTANYL CITRATE 50 UG/ML
INJECTION, SOLUTION INTRAMUSCULAR; INTRAVENOUS AS NEEDED
Status: DISCONTINUED | OUTPATIENT
Start: 2019-10-31 | End: 2019-10-31 | Stop reason: SURG

## 2019-10-31 RX ORDER — HYDROMORPHONE HCL/PF 1 MG/ML
2 SYRINGE (ML) INJECTION EVERY 4 HOURS PRN
Status: DISCONTINUED | OUTPATIENT
Start: 2019-10-31 | End: 2019-11-01

## 2019-10-31 RX ORDER — VANCOMYCIN HYDROCHLORIDE 1 G/200ML
12.5 INJECTION, SOLUTION INTRAVENOUS EVERY 24 HOURS
Status: DISCONTINUED | OUTPATIENT
Start: 2019-10-31 | End: 2019-10-31

## 2019-10-31 RX ORDER — ALBUMIN, HUMAN INJ 5% 5 %
12.5 SOLUTION INTRAVENOUS ONCE
Status: COMPLETED | OUTPATIENT
Start: 2019-10-31 | End: 2019-10-31

## 2019-10-31 RX ORDER — ACETAMINOPHEN 325 MG/1
650 TABLET ORAL EVERY 6 HOURS SCHEDULED
Status: DISCONTINUED | OUTPATIENT
Start: 2019-10-31 | End: 2019-11-21 | Stop reason: HOSPADM

## 2019-10-31 RX ORDER — OXYCODONE HYDROCHLORIDE 10 MG/1
10 TABLET ORAL EVERY 4 HOURS PRN
Status: DISCONTINUED | OUTPATIENT
Start: 2019-10-31 | End: 2019-11-21 | Stop reason: HOSPADM

## 2019-10-31 RX ORDER — FENTANYL CITRATE/PF 50 MCG/ML
25 SYRINGE (ML) INJECTION
Status: DISCONTINUED | OUTPATIENT
Start: 2019-10-31 | End: 2019-10-31 | Stop reason: HOSPADM

## 2019-10-31 RX ORDER — CEFAZOLIN SODIUM 1 G/3ML
INJECTION, POWDER, FOR SOLUTION INTRAMUSCULAR; INTRAVENOUS AS NEEDED
Status: DISCONTINUED | OUTPATIENT
Start: 2019-10-31 | End: 2019-10-31 | Stop reason: SURG

## 2019-10-31 RX ORDER — MAGNESIUM HYDROXIDE 1200 MG/15ML
LIQUID ORAL AS NEEDED
Status: DISCONTINUED | OUTPATIENT
Start: 2019-10-31 | End: 2019-10-31 | Stop reason: HOSPADM

## 2019-10-31 RX ORDER — PROPOFOL 10 MG/ML
INJECTION, EMULSION INTRAVENOUS CONTINUOUS PRN
Status: DISCONTINUED | OUTPATIENT
Start: 2019-10-31 | End: 2019-10-31 | Stop reason: SURG

## 2019-10-31 RX ORDER — PROPOFOL 10 MG/ML
INJECTION, EMULSION INTRAVENOUS AS NEEDED
Status: DISCONTINUED | OUTPATIENT
Start: 2019-10-31 | End: 2019-10-31 | Stop reason: SURG

## 2019-10-31 RX ORDER — ONDANSETRON 2 MG/ML
INJECTION INTRAMUSCULAR; INTRAVENOUS AS NEEDED
Status: DISCONTINUED | OUTPATIENT
Start: 2019-10-31 | End: 2019-10-31 | Stop reason: SURG

## 2019-10-31 RX ORDER — SODIUM CHLORIDE, SODIUM LACTATE, POTASSIUM CHLORIDE, CALCIUM CHLORIDE 600; 310; 30; 20 MG/100ML; MG/100ML; MG/100ML; MG/100ML
INJECTION, SOLUTION INTRAVENOUS CONTINUOUS PRN
Status: DISCONTINUED | OUTPATIENT
Start: 2019-10-31 | End: 2019-10-31 | Stop reason: SURG

## 2019-10-31 RX ORDER — OXYCODONE HYDROCHLORIDE 5 MG/1
5 TABLET ORAL EVERY 4 HOURS PRN
Status: DISCONTINUED | OUTPATIENT
Start: 2019-10-31 | End: 2019-11-21 | Stop reason: HOSPADM

## 2019-10-31 RX ORDER — MIDAZOLAM HYDROCHLORIDE 2 MG/2ML
INJECTION, SOLUTION INTRAMUSCULAR; INTRAVENOUS AS NEEDED
Status: DISCONTINUED | OUTPATIENT
Start: 2019-10-31 | End: 2019-10-31 | Stop reason: SURG

## 2019-10-31 RX ORDER — ONDANSETRON 2 MG/ML
4 INJECTION INTRAMUSCULAR; INTRAVENOUS ONCE AS NEEDED
Status: DISCONTINUED | OUTPATIENT
Start: 2019-10-31 | End: 2019-10-31 | Stop reason: HOSPADM

## 2019-10-31 RX ADMIN — HEPARIN SODIUM 5000 UNITS: 5000 INJECTION INTRAVENOUS; SUBCUTANEOUS at 22:06

## 2019-10-31 RX ADMIN — ACETAMINOPHEN 650 MG: 325 TABLET, FILM COATED ORAL at 15:00

## 2019-10-31 RX ADMIN — CALCIUM ACETATE 1334 MG: 667 CAPSULE ORAL at 08:05

## 2019-10-31 RX ADMIN — ACETAMINOPHEN 650 MG: 325 TABLET, FILM COATED ORAL at 21:29

## 2019-10-31 RX ADMIN — PHENYLEPHRINE HYDROCHLORIDE 100 MCG: 10 INJECTION INTRAVENOUS at 18:55

## 2019-10-31 RX ADMIN — ONDANSETRON 4 MG: 2 INJECTION INTRAMUSCULAR; INTRAVENOUS at 18:22

## 2019-10-31 RX ADMIN — PHENYLEPHRINE HYDROCHLORIDE 100 MCG: 10 INJECTION INTRAVENOUS at 18:41

## 2019-10-31 RX ADMIN — MIDAZOLAM HYDROCHLORIDE 0.5 MG: 1 INJECTION, SOLUTION INTRAMUSCULAR; INTRAVENOUS at 18:31

## 2019-10-31 RX ADMIN — METOPROLOL TARTRATE 25 MG: 25 TABLET, FILM COATED ORAL at 08:04

## 2019-10-31 RX ADMIN — PHENYLEPHRINE HYDROCHLORIDE 200 MCG: 10 INJECTION INTRAVENOUS at 19:21

## 2019-10-31 RX ADMIN — PHENYLEPHRINE HYDROCHLORIDE 100 MCG: 10 INJECTION INTRAVENOUS at 19:06

## 2019-10-31 RX ADMIN — FENTANYL CITRATE 25 MCG: 50 INJECTION INTRAMUSCULAR; INTRAVENOUS at 18:37

## 2019-10-31 RX ADMIN — Medication 1000 MG: at 22:03

## 2019-10-31 RX ADMIN — SEVELAMER HYDROCHLORIDE 800 MG: 800 TABLET, FILM COATED PARENTERAL at 08:04

## 2019-10-31 RX ADMIN — PROPOFOL 80 MCG/KG/MIN: 10 INJECTION, EMULSION INTRAVENOUS at 18:27

## 2019-10-31 RX ADMIN — PHENYLEPHRINE HYDROCHLORIDE 300 MCG: 10 INJECTION INTRAVENOUS at 19:24

## 2019-10-31 RX ADMIN — VANCOMYCIN HYDROCHLORIDE 1750 MG: 1 INJECTION, POWDER, LYOPHILIZED, FOR SOLUTION INTRAVENOUS at 23:47

## 2019-10-31 RX ADMIN — ALBUMIN (HUMAN) 12.5 G: 12.5 SOLUTION INTRAVENOUS at 20:02

## 2019-10-31 RX ADMIN — PROPOFOL 25 MG: 10 INJECTION, EMULSION INTRAVENOUS at 18:23

## 2019-10-31 RX ADMIN — MIDAZOLAM HYDROCHLORIDE 1 MG: 1 INJECTION, SOLUTION INTRAMUSCULAR; INTRAVENOUS at 18:20

## 2019-10-31 RX ADMIN — OXYCODONE HYDROCHLORIDE 5 MG: 5 TABLET ORAL at 05:30

## 2019-10-31 RX ADMIN — GABAPENTIN 100 MG: 100 CAPSULE ORAL at 08:04

## 2019-10-31 RX ADMIN — SODIUM CHLORIDE, SODIUM LACTATE, POTASSIUM CHLORIDE, AND CALCIUM CHLORIDE: .6; .31; .03; .02 INJECTION, SOLUTION INTRAVENOUS at 18:15

## 2019-10-31 RX ADMIN — PANTOPRAZOLE SODIUM 40 MG: 40 TABLET, DELAYED RELEASE ORAL at 05:26

## 2019-10-31 RX ADMIN — ACETAMINOPHEN 650 MG: 325 TABLET, FILM COATED ORAL at 08:04

## 2019-10-31 RX ADMIN — PHENYLEPHRINE HYDROCHLORIDE 200 MCG: 10 INJECTION INTRAVENOUS at 19:16

## 2019-10-31 RX ADMIN — FENTANYL CITRATE 25 MCG: 50 INJECTION, SOLUTION INTRAMUSCULAR; INTRAVENOUS at 20:09

## 2019-10-31 RX ADMIN — CEFAZOLIN SODIUM 2000 MG: 1 INJECTION, POWDER, FOR SOLUTION INTRAMUSCULAR; INTRAVENOUS at 18:29

## 2019-10-31 RX ADMIN — PROPOFOL 25 MG: 10 INJECTION, EMULSION INTRAVENOUS at 18:22

## 2019-10-31 RX ADMIN — EPOETIN ALFA 8000 UNITS: 10000 SOLUTION INTRAVENOUS; SUBCUTANEOUS at 13:21

## 2019-10-31 RX ADMIN — FENTANYL CITRATE 50 MCG: 50 INJECTION INTRAMUSCULAR; INTRAVENOUS at 18:21

## 2019-10-31 RX ADMIN — PHENYLEPHRINE HYDROCHLORIDE 200 MCG: 10 INJECTION INTRAVENOUS at 19:12

## 2019-10-31 RX ADMIN — PROPOFOL 50 MG: 10 INJECTION, EMULSION INTRAVENOUS at 18:21

## 2019-10-31 RX ADMIN — PHENYLEPHRINE HYDROCHLORIDE 300 MCG: 10 INJECTION INTRAVENOUS at 19:27

## 2019-10-31 NOTE — OP NOTE
OPERATIVE REPORT - Podiatry  PATIENT NAME: Kathya Teixeira    :  1960  MRN: [id number]  Pt Location: AN OR ROOM 02    SURGERY DATE: 10/31/2019    Surgeon(s) and Role:     * Caroline Ragland DPM - Primary     * Julio Mazariegos DPM - Assisting    Pre-op Diagnosis:  Wound of right lower extremity, subsequent encounter [S81 801D]  Wound of left lower extremity, subsequent encounter [S81 802D]    Post-Op Diagnosis Codes:     * Wound of right lower extremity, subsequent encounter [S81 801D]     * Wound of left lower extremity, subsequent encounter [S81 802D]    Procedure(s) (LRB):  DEBRIDEMENT WOUND ([address] OUT) (Bilateral)    Specimen(s):  * No specimens in log *    Estimated Blood Loss:   60 mL    Drains:  * No LDAs found *    Anesthesia Type:   Light IV sedation, Choice    Hemostasis:  Surgical dissection  Materials:  None    Operative Findings:  Extensive bilateral lower extremity eshars extending up to the knee bilaterally and continuing onto the distal medial thigh of the left lower extremity  Post debridement, underneath b/l eshars liquified fat was noted  Unclear etiology of liquified fat  Underneath eshars and fat, bleeding granular tissue and muscle belly was noted  Adequate bleeding was noted throughout the entire procedure  Complications:   None    Procedure and Technique:     Under mild sedation, the patient was brought into the operating room  Patient was left on the stretcher in the supine position throughout the entire duration of the procedure  A time out was performed to confirm the correct patient, procedure and site with all parties in agreement  Following IV sedation, the legs were scrubbed, prepped and draped in the usual aseptic manner and placed on the operating room table  Attention was then directed to eshars of bilateral lower extremities  Utilizing a #10 blade and forceps, the eshars were superficially and excisionally debrided to the level of subcutaneous fat    Once all eshars were removed, curettes were then used to debride slough, necrotic, and devitalized tissue from the wound bases down to the level of muscle and granular tissue, without removing muscle  During this process liquified fat was noted of bilateral lower extremities  Is unclear the etiology of the liquified fat  No purulence was appreciated, no deep probing sinus tracts were appreciated, moderate undermining of the wounds was appreciated bilaterally  Wounds were not malodorous, in no acute clinical signs of infection were appreciated of lower extremity wounds bilaterally  Wound bases were a combination of fibro granular tissue and muscle belly  Post surgical debridement the wounds were irrigated with 3L of normal sterile saline  The wounds were then measured as follows:    Left hallux - 3 cm x 2 5 cm x 0 2 cm  Left dorsal foot - 2 6 cm x 3 5 cm x 0 2 cm  Left distal dorsal foot - 0 8 cm x 1 cm x 0 2 cm  Left leg - 11 2 cm in circumference  Left leg extension above the tibial tuberosity, medial thigh - 16 cm x 5 cm  Right dorsal foot - 9 5 cm x 11 cm x 0 5 cm  Right lateral heel - 1 cm x 0 5 cm x 0 2 cm  Right leg - 34 cm x 27 cm (almost entire circumference of leg)  x  0 5 cm    A total of 1,161 4  sq cm was debrided  The wounds were then dressed with Xeroform applied to the wound bed, gauze 4x4s, ABDs, Kerlix, and a light Ace wrap  The patient tolerated the procedure and anesthesia well and was transported to the PACU with vital signs stable  There is still  fibrotic tissue on the base of the wounds  Due to the extensive nature of the wounds, the fibrotic tissue, wound beds are not currently ready for graft application  Podiatry planning to prep the wounds for graft application by utilizing enzymatic debrider Santyl on the floor, before returning for another future debridement of the wounds with graft application      Dr Prem Rosado was present during the entire procedure and participated in all key aspects  SIGNATURE: Susan Solano DPM  DATE: October 31, 2019  TIME: 7:44 PM      Portions of the record may have been created with voice recognition software  Occasional wrong word or "sound a like" substitutions may have occurred due to the inherent limitations of voice recognition software  Read the chart carefully and recognize, using context, where substitutions have occurred

## 2019-10-31 NOTE — PROGRESS NOTES
Progress Note - Mahin Mora 1960, 64 y o  male MRN: [id number]    Unit/Bed#: S -01 Encounter: [id number]    Primary Care Provider: Mee Jaquez   Date and time admitted to hospital: 10/26/2019  5:10 PM        * Multiple and open wound of lower limb  Assessment & Plan  · Procedure - Plan for Debridement, removal of Eschar, and Possible Stravix application today by Dr Alonzo Mcdaniel  · Preoperative Assessment -   · Prehospital patient identified to have EF 20%  He currently appears to be euvolemic through as he has ESRD and regular dialysis treatments  He will receive dialysis before surgery as well  Avoid high amounts of fluids at all perioperatively  Monitor blood pressures and oxygen saturations  However, procedure is medically necessary and he is as optimized as he will be for surgery  Patient is moderate but acceptable risk to proceed with surgery today  · Will continue to monitor for temperatures  No temperature since admission  · Supportive care    Scrotal swelling  Assessment & Plan  · Ultrasound showed "Large complex right hydrocele containing numerous septations  Increased vascularity in the left testicle with respect to the right, although both testicles could not be imaged on the same plane limiting direct comparison   Correlate clinically for orchitis "  · Urology saw patient 10/29/2019 and recommends no surgical intervention acutely and to manage problems with legs and get therapy first   · AFP and LDH within normal limits  · Follow up beta HCG  · Supportive care  · Pain management  Monitor pain levels  Atrial fibrillation ([address] )  Assessment & Plan  · Rate / Rhythm control -   · Metoprolol increased to 25 mg bid on 10/29/2019  · Monitor on telemetry  Monitor HR and blood pressures  · Anticoagulation - None prehospital   Defer to outpatient team following discharge  ESRD (end stage renal disease) ([address] )  Assessment & Plan  · On routine dialysis      · Nephrology following  Anemia due to chronic kidney disease, on chronic dialysis ([facility] [address] )  Assessment & Plan  · At baseline overall  · Intermittent monitoring  Pressure ulcer, right and left buttocks POA  Assessment & Plan  · Local wound care  · Turn / Reposition frequently  VTE Pharmacologic Prophylaxis:   Pharmacologic: Heparin  Mechanical VTE Prophylaxis in Place: Yes    Patient Centered Rounds: I have performed bedside rounds with nursing staff today  Discussions with Specialists or Other Care Team Provider: None    Education and Discussions with Family / Patient:  Patient only    Time Spent for Care: 30 minutes  More than 50% of total time spent on counseling and coordination of care as described above  Current Length of Stay: 5 day(s)  Current Patient Status: Inpatient     Certification Statement: The patient will continue to require additional inpatient hospital stay due to evaluation and treatment of above medical conditions  Discharge Plan / Estimated Discharge Date: to be determined once cleared by podiatry  Code Status: Level 1 - Full Code      Subjective: The patient has no acute complaints of pain today  He feels that his pain is reasonably controlled  The patient states that normally he can walk without significant shortness of breath  The patient denies any shortness of breath currently  He denies any dizziness or lightheadedness  The patient has some anxiety about surgery but is reassured by the end of our visit  Objective:     Vitals:   Temp (24hrs), Av 6 °F (37 °C), Min:98 1 °F (36 7 °C), Max:99 3 °F (37 4 °C)    Temp:  [98 1 °F (36 7 °C)-99 3 °F (37 4 °C)] 98 4 °F (36 9 °C)  HR:  [93-97] 97  Resp:  [18] 18  BP: (102-121)/(58-66) 102/62  SpO2:  [97 %] 97 %  Body mass index is 28 22 kg/m²  Input and Output Summary (last 24 hours):        Intake/Output Summary (Last 24 hours) at 10/31/2019 1141  Last data filed at 10/31/2019 0800  Gross per 24 hour   Intake 420 ml Output    Net 420 ml       Physical Exam:     Physical Exam   Constitutional: He is oriented to person, place, and time  No distress  HENT:   Mouth/Throat: Oropharynx is clear and moist    Cardiovascular: Normal rate and regular rhythm  Pulmonary/Chest: Effort normal  He has no wheezes  He has no rales  Abdominal: Soft  Bowel sounds are normal  He exhibits no distension  There is no tenderness  Genitourinary:   Genitourinary Comments: Scrotal edema / hydrocele   Musculoskeletal: He exhibits no edema or tenderness  Legs wrapped with only scant drainage on to the bandages  Bandages are jami unwrapped  Neurological: He is alert and oriented to person, place, and time  No cranial nerve deficit or sensory deficit  He exhibits normal muscle tone  Vitals reviewed  Additional Data:     Labs:    Results from last 7 days   Lab Units 10/29/19  1105 10/28/19  0624   WBC Thousand/uL 6 55 7 61   HEMOGLOBIN g/dL 9 4* 8 6*   HEMATOCRIT % 31 1* 28 7*   PLATELETS Thousands/uL 228 256   NEUTROS PCT %  --  60   LYMPHS PCT %  --  8*   MONOS PCT %  --  13*   EOS PCT %  --  17*     Results from last 7 days   Lab Units 10/30/19  0420  10/26/19  1745   POTASSIUM mmol/L 4 6   < > 5 8*   CHLORIDE mmol/L 99*   < > 96*   CO2 mmol/L 29   < > 31   BUN mg/dL 39*   < > 65*   CREATININE mg/dL 5 01*   < > 6 96*   CALCIUM mg/dL 8 7   < > 8 8   ALK PHOS U/L  --   --  405*   ALT U/L  --   --  13   AST U/L  --   --  26    < > = values in this interval not displayed  Results from last 7 days   Lab Units 10/26/19  1745   INR  1 08       * I Have Reviewed All Lab Data Listed Above  * Additional Pertinent Lab Tests Reviewed:  All Labs Within Last 24 Hours Reviewed    Imaging:    Imaging Reports Reviewed Today Include: No new imaging  Imaging Personally Reviewed by Myself Includes:  None    Recent Cultures (last 7 days):     Results from last 7 days   Lab Units 10/26/19  2023 10/26/19  1745   BLOOD CULTURE   --  No Growth After 4 Days   No Growth After 4 Days  GRAM STAIN RESULT  3+ Polys*  3+ Gram Positive Rods Resembling Diphtheroids*  2+ Gram positive cocci in pairs*  --    WOUND CULTURE  2+ Growth of Proteus mirabilis*  2+ Growth of Methicillin Resistant Staphylococcus aureus*  4+ Growth of   --        Last 24 Hours Medication List:     Current Facility-Administered Medications:  acetaminophen 650 mg Oral Q6H PRN SAM Flores   b complex-vitamin C-folic acid 1 capsule Oral Daily With Dinner Jesse Chun PA-C   calcium acetate 1,334 mg Oral TID With Meals Jesse Cleotha DoorPAUL   epoetin bob 8,000 Units Intravenous After Dialysis Christopher Mariscal MD   gabapentin 100 mg Oral Daily Jesse Chun PA-C   heparin (porcine) 5,000 Units Subcutaneous [facility] Jesse Chun PA-C   metoprolol tartrate 25 mg Oral Q12H [address] Homero Purcell DO   ondansetron 4 mg Intravenous Q6H PRN Hang Brooke PA-C   oxyCODONE 5 mg Oral Q6H PRN Sky Mckeon DO   pantoprazole 40 mg Oral Early Morning Jesse Cleotha PAUL [place]   sevelamer 800 mg Oral TID With Meals Jesse Cleotha PAUL [place]   torsemide 40 mg Oral BID (diuretic) Janeen Rivera MD        Today, Patient Was Seen By: Sky Mckeon DO    ** Please Note: This note has been constructed using a voice recognition system   **

## 2019-10-31 NOTE — HEMODIALYSIS
Uneventful hemodialysis treatment lasting 2 5 hours as planned    Right permacath maintains 450 bfr   Given epogen 8000 units   -volume removal 1450  -pre weight 80 8 kg  -post weight 80 kg

## 2019-10-31 NOTE — ANESTHESIA PREPROCEDURE EVALUATION
Review of Systems/Medical History          Cardiovascular  Hypertension ,    Pulmonary       GI/Hepatic    Liver disease ,        Kidney disease ESRD, Chronic kidney disease , Dialysis ,        Endo/Other  Diabetes poorly controlled ,      GYN       Hematology  Anemia ,     Musculoskeletal       Neurology   Psychology           Physical Exam    Airway    Mallampati score: II         Dental   No notable dental hx     Cardiovascular      Pulmonary      Other Findings        Anesthesia Plan  ASA Score- 3     Anesthesia Type- IV sedation with anesthesia with ASA Monitors  Additional Monitors:   Airway Plan:     Comment: I, Dr Yamila Palencia, the attending physician, have personally seen and evaluated the patient prior to anesthetic care  I have reviewed the pre-anesthetic record, and other medical records if appropriate to the anesthetic care  If a CRNA is involved in the case, I have reviewed the CRNA assessment, if present, and agree  The patient is in a suitable condition to proceed with my formulated anesthetic plan        Plan Factors-    Induction- intravenous  Postoperative Plan-     Informed Consent- Anesthetic plan and risks discussed with patient  I personally reviewed this patient with the CRNA  Discussed and agreed on the Anesthesia Plan with the CRNA  Millie Waddell

## 2019-10-31 NOTE — PROGRESS NOTES
Progress Note - Podiatry  Martínezranjana Kim 64 y o  male MRN: [id number]  Unit/Bed#: S -01 Encounter: [id number]    Assessment/Plan:  1  Bilateral LE eschar formation and wounds 2/2 #2 and #3  2  PKD    - npo status confirmed, clearance per SLIM  - Will plan for OR tonight after 1730 for debridement of b/l LE wounds, possible graft and wound vac application  -I expect that this will take multiple debridements, grafts to heal these extensive b/l LE wounds, but thankfully they are stable with no signs of infection  - consent signed and in chart    Subjective/Objective   Chief Complaint:   Chief Complaint   Patient presents with    Cellulitis     Pt states that he has cellulitis in both lower extremities and a hernia  Pt states that he is afraid to move his legs due to the hernia  EMS reports that nursing home states that pts cellulitis is necrotic  Subjective: 64 y o  y/o male was seen and evaluated at bedside  Offers no O/N complaints  Blood pressure 102/62, pulse 97, temperature 98 4 °F (36 9 °C), temperature source Oral, resp  rate 18, height 5' 8" (1 727 m), weight 84 2 kg (185 lb 10 oz), SpO2 97 %  ,Body mass index is 28 22 kg/m²  Invasive Devices     Peripheral Intravenous Line            Peripheral IV 10/30/19 Distal;Left;Upper;Ventral (anterior) Arm 1 day          Line            Temporary HD Catheter -- days                Physical Exam:   General: Alert, cooperative and no distress  Lungs: Non labored breathing  Heart: Positive S1, S2  Abdomen: Soft, non-tender  Extremity: b/l LE are wrapped and are c/d/i with no evidence of strikethrough          Lab, Imaging and other studies:   I have personally reviewed pertinent lab results  Imaging: I have personally reviewed pertinent films in PACS  EKG, Pathology, and Other Studies: I have personally reviewed pertinent reports    VTE Pharmacologic Prophylaxis: Heparin

## 2019-10-31 NOTE — ASSESSMENT & PLAN NOTE
· Procedure - Plan for Debridement, removal of Eschar, and Possible Stravix application today by Dr Marie Katz  · Preoperative Assessment -   · Prehospital patient identified to have EF 20%  He currently appears to be euvolemic through as he has ESRD and regular dialysis treatments  He will receive dialysis before surgery as well  Avoid high amounts of fluids at all perioperatively  Monitor blood pressures and oxygen saturations  However, procedure is medically necessary and he is as optimized as he will be for surgery  Patient is moderate but acceptable risk to proceed with surgery today  · Will continue to monitor for temperatures  No temperature since admission    · Supportive care

## 2019-10-31 NOTE — PLAN OF CARE
Chronic hemodialysis treatment scheduled for 2 5 hours  Using 2 mEq/L dialysate solution for serum potassium 4 6 yesterday  Fluid goal 1500 ml as tolerated as patient is below EDW of 83 kg  Treatment plan discussed with Dr Mathew Lyons and patient

## 2019-10-31 NOTE — ASSESSMENT & PLAN NOTE
· Ultrasound showed "Large complex right hydrocele containing numerous septations  Increased vascularity in the left testicle with respect to the right, although both testicles could not be imaged on the same plane limiting direct comparison   Correlate clinically for orchitis "  · Urology saw patient 10/29/2019 and recommends no surgical intervention acutely and to manage problems with legs and get therapy first   · AFP and LDH within normal limits  · Follow up beta HCG  · Supportive care  · Pain management  Monitor pain levels

## 2019-10-31 NOTE — ANESTHESIA POSTPROCEDURE EVALUATION
Post-Op Assessment Note    CV Status:  Stable  Pain Score: 0    Pain management: adequate     Mental Status:  Sleepy   Hydration Status:  Euvolemic   PONV Controlled:  Controlled   Airway Patency:  Patent   Post Op Vitals Reviewed: Yes      Staff: CRNA   Comments: vss sv nonobstructed uneventful          BP   101/48   Temp     Pulse 111   Resp 24   SpO2 98

## 2019-10-31 NOTE — PROGRESS NOTES
NEPHROLOGY PROGRESS NOTE   Sahra Cornell 64 y o  male MRN: [id number]  Unit/Bed#: S -01 Encounter: [id number]  Reason for Consult: esrd    ASSESSMENT/PLAN:  1  ESRD on HD 4x/week at [facility]  - HD x2 5 hours per treatment M/T/Th/F  - EDW: 83kg  - appears euvolemic  2  Access- permanent dialysis catheter  3  Anemia- he receives venofer weekly  4  Hypotension- continue midodrine 10mg TID  5  Tachycardia/ A fib- consider cardiology consult  - blood cultures negative x48 hours  6  Hyperkalemia- renal diet/low K diet  - improved s/p dialysis  7  Hyperphosphatemia- continue phoslo and sevelamer with meals  8  Right Hydrocele with numerous septations  9   LE wounds- status post abx course  - continue local wound care  - debridement today per podiatry    Disposition:  HD this afternoon    SUBJECTIVE:  Patient without acute complaints    OBJECTIVE:  Current Weight: Weight - Scale: 84 2 kg (185 lb 10 oz)  Vitals:    10/30/19 1505 10/30/19 2158 10/31/19 0700 10/31/19 0859   BP: 106/58 108/65 121/66 102/62   BP Location: Right arm Left arm Right arm    Pulse: 93 97 97    Resp: 18 18 18    Temp: 99 3 °F (37 4 °C) 98 1 °F (36 7 °C) 98 4 °F (36 9 °C)    TempSrc: Oral Oral Oral    SpO2: 97% 97% 97%    Weight:       Height:           Intake/Output Summary (Last 24 hours) at 10/31/2019 0942  Last data filed at 10/31/2019 0800  Gross per 24 hour   Intake 420 ml   Output    Net 420 ml     General: NAD  Skin: no diffuse rash  HEENT: normocephalic  Neck: supple  Lungs: CTAB  Cardiac: RRR  Extremities: legs wrapped  GI: soft nt nd  Neuro: alert awake  Psych: mood and affect appropriate    Medications:    Current Facility-Administered Medications:     acetaminophen (TYLENOL) tablet 650 mg, 650 mg, Oral, Q6H PRN, Damaris S Na, CRNP, 650 mg at 10/31/19 0804    b complex-vitamin C-folic acid (NEPHROCAPS) capsule 1 capsule, 1 capsule, Oral, Daily With Dinner, Jesse Camarillo PA-C, 1 capsule at 10/29/19 1808    calcium acetate (PHOSLO) capsule 1,334 mg, 1,334 mg, Oral, TID With Meals, Tato Gaming PA-C, 1,334 mg at 10/31/19 0805    epoetin bob (EPOGEN,PROCRIT) injection 8,000 Units, 8,000 Units, Intravenous, After Dialysis, Cira Alaniz MD    gabapentin (NEURONTIN) capsule 100 mg, 100 mg, Oral, Daily, Jesse Chun PA-C, 100 mg at 10/31/19 0804    heparin (porcine) subcutaneous injection 5,000 Units, 5,000 Units, Subcutaneous, Q8H [address], 5,000 Units at 10/30/19 2157 **AND** [COMPLETED] Platelet count, , , Once, Jesse Sage PA-C    metoprolol tartrate (LOPRESSOR) tablet 25 mg, 25 mg, Oral, Q12H [address], [place] , DO, 25 mg at 10/31/19 0804    ondansetron (ZOFRAN) injection 4 mg, 4 mg, Intravenous, Q6H PRN, Jesse Chun PA-C    oxyCODONE (ROXICODONE) IR tablet 5 mg, 5 mg, Oral, Q6H PRN, Twylla , DO, 5 mg at 10/31/19 0530    pantoprazole (PROTONIX) EC tablet 40 mg, 40 mg, Oral, Early Morning, Jesse Chun PA-C, 40 mg at 10/31/19 0526    sevelamer (RENAGEL) tablet 800 mg, 800 mg, Oral, TID With Meals, Tato Gaming PA-C, 800 mg at 10/31/19 0804    torsemide (DEMADEX) tablet 40 mg, 40 mg, Oral, BID (diuretic), Margo Siddiqui MD, 40 mg at 10/30/19 9626    Laboratory Results:  Results from last 7 days   Lab Units 10/30/19  0420 10/29/19  1105 10/28/19  0624 10/27/19  0517 10/27/19  0034 10/26/19  1745   WBC Thousand/uL  --  6 55 7 61  --   --  7 83   HEMOGLOBIN g/dL  --  9 4* 8 6*  --   --  9 6*   HEMATOCRIT %  --  31 1* 28 7*  --   --  31 2*   PLATELETS Thousands/uL  --  228 256  --  249 316   POTASSIUM mmol/L 4 6 5 0 6 1* 5 8* 5 6* 5 8*   CHLORIDE mmol/L 99* 100 99* 98*  --  96*   CO2 mmol/L 29 29 24 22  --  31   BUN mg/dL 39* 51* 82* 71*  --  65*   CREATININE mg/dL 5 01* 6 30* 8 77* 7 41*  --  6 96*   CALCIUM mg/dL 8 7 8 4 8 7 8 3  --  8 8   MAGNESIUM mg/dL  --   --  2 0  --   --   --    PHOSPHORUS mg/dL  --   --  6 3*  --   --   --

## 2019-11-01 ENCOUNTER — APPOINTMENT (INPATIENT)
Dept: DIALYSIS | Facility: HOSPITAL | Age: 59
DRG: 576 | End: 2019-11-01
Payer: MEDICARE

## 2019-11-01 PROBLEM — Z99.2 ANEMIA DUE TO CHRONIC KIDNEY DISEASE, ON CHRONIC DIALYSIS (HCC): Status: ACTIVE | Noted: 2019-10-06

## 2019-11-01 PROBLEM — D63.1 ANEMIA DUE TO CHRONIC KIDNEY DISEASE, ON CHRONIC DIALYSIS (HCC): Status: ACTIVE | Noted: 2019-10-06

## 2019-11-01 PROBLEM — N18.6 ANEMIA DUE TO CHRONIC KIDNEY DISEASE, ON CHRONIC DIALYSIS (HCC): Status: ACTIVE | Noted: 2019-10-06

## 2019-11-01 LAB
ABO GROUP BLD BPU: NORMAL
ABO GROUP BLD: NORMAL
ANION GAP SERPL CALCULATED.3IONS-SCNC: 8 MMOL/L (ref 4–13)
BACTERIA BLD CULT: NORMAL
BACTERIA BLD CULT: NORMAL
BASOPHILS # BLD AUTO: 0.08 THOUSANDS/ΜL (ref 0–0.1)
BASOPHILS NFR BLD AUTO: 1 % (ref 0–1)
BLD GP AB SCN SERPL QL: NEGATIVE
BPU ID: NORMAL
BUN SERPL-MCNC: 35 MG/DL (ref 5–25)
CALCIUM SERPL-MCNC: 8.2 MG/DL (ref 8.3–10.1)
CHLORIDE SERPL-SCNC: 100 MMOL/L (ref 100–108)
CO2 SERPL-SCNC: 27 MMOL/L (ref 21–32)
CREAT SERPL-MCNC: 5.2 MG/DL (ref 0.6–1.3)
CROSSMATCH: NORMAL
EOSINOPHIL # BLD AUTO: 0.65 THOUSAND/ΜL (ref 0–0.61)
EOSINOPHIL NFR BLD AUTO: 7 % (ref 0–6)
ERYTHROCYTE [DISTWIDTH] IN BLOOD BY AUTOMATED COUNT: 16.2 % (ref 11.6–15.1)
GFR SERPL CREATININE-BSD FRML MDRD: 11 ML/MIN/1.73SQ M
GLUCOSE SERPL-MCNC: 113 MG/DL (ref 65–140)
GLUCOSE SERPL-MCNC: 83 MG/DL (ref 65–140)
HCT VFR BLD AUTO: 25.6 % (ref 36.5–49.3)
HGB BLD-MCNC: 7.7 G/DL (ref 12–17)
IMM GRANULOCYTES # BLD AUTO: 0.03 THOUSAND/UL (ref 0–0.2)
IMM GRANULOCYTES NFR BLD AUTO: 0 % (ref 0–2)
LYMPHOCYTES # BLD AUTO: 0.63 THOUSANDS/ΜL (ref 0.6–4.47)
LYMPHOCYTES NFR BLD AUTO: 7 % (ref 14–44)
MCH RBC QN AUTO: 30.4 PG (ref 26.8–34.3)
MCHC RBC AUTO-ENTMCNC: 30.1 G/DL (ref 31.4–37.4)
MCV RBC AUTO: 101 FL (ref 82–98)
MONOCYTES # BLD AUTO: 1.02 THOUSAND/ΜL (ref 0.17–1.22)
MONOCYTES NFR BLD AUTO: 11 % (ref 4–12)
NEUTROPHILS # BLD AUTO: 7.17 THOUSANDS/ΜL (ref 1.85–7.62)
NEUTS SEG NFR BLD AUTO: 74 % (ref 43–75)
NRBC BLD AUTO-RTO: 0 /100 WBCS
PLATELET # BLD AUTO: 210 THOUSANDS/UL (ref 149–390)
PMV BLD AUTO: 9.4 FL (ref 8.9–12.7)
POTASSIUM SERPL-SCNC: 4.7 MMOL/L (ref 3.5–5.3)
RBC # BLD AUTO: 2.53 MILLION/UL (ref 3.88–5.62)
RH BLD: POSITIVE
SODIUM SERPL-SCNC: 135 MMOL/L (ref 136–145)
SPECIMEN EXPIRATION DATE: NORMAL
UNIT DISPENSE STATUS: NORMAL
UNIT PRODUCT CODE: NORMAL
UNIT RH: NORMAL
WBC # BLD AUTO: 9.58 THOUSAND/UL (ref 4.31–10.16)

## 2019-11-01 PROCEDURE — 86923 COMPATIBILITY TEST ELECTRIC: CPT

## 2019-11-01 PROCEDURE — 86900 BLOOD TYPING SEROLOGIC ABO: CPT | Performed by: PHYSICIAN ASSISTANT

## 2019-11-01 PROCEDURE — 94762 N-INVAS EAR/PLS OXIMTRY CONT: CPT

## 2019-11-01 PROCEDURE — 85025 COMPLETE CBC W/AUTO DIFF WBC: CPT | Performed by: STUDENT IN AN ORGANIZED HEALTH CARE EDUCATION/TRAINING PROGRAM

## 2019-11-01 PROCEDURE — 99221 1ST HOSP IP/OBS SF/LOW 40: CPT | Performed by: PHYSICIAN ASSISTANT

## 2019-11-01 PROCEDURE — P9016 RBC LEUKOCYTES REDUCED: HCPCS

## 2019-11-01 PROCEDURE — 30233N1 TRANSFUSION OF NONAUTOLOGOUS RED BLOOD CELLS INTO PERIPHERAL VEIN, PERCUTANEOUS APPROACH: ICD-10-PCS | Performed by: INTERNAL MEDICINE

## 2019-11-01 PROCEDURE — 99232 SBSQ HOSP IP/OBS MODERATE 35: CPT | Performed by: INTERNAL MEDICINE

## 2019-11-01 PROCEDURE — 80048 BASIC METABOLIC PNL TOTAL CA: CPT | Performed by: STUDENT IN AN ORGANIZED HEALTH CARE EDUCATION/TRAINING PROGRAM

## 2019-11-01 PROCEDURE — 94760 N-INVAS EAR/PLS OXIMETRY 1: CPT

## 2019-11-01 PROCEDURE — 86901 BLOOD TYPING SEROLOGIC RH(D): CPT | Performed by: PHYSICIAN ASSISTANT

## 2019-11-01 PROCEDURE — 86850 RBC ANTIBODY SCREEN: CPT | Performed by: PHYSICIAN ASSISTANT

## 2019-11-01 PROCEDURE — 90935 HEMODIALYSIS ONE EVALUATION: CPT | Performed by: INTERNAL MEDICINE

## 2019-11-01 PROCEDURE — 5A1D70Z PERFORMANCE OF URINARY FILTRATION, INTERMITTENT, LESS THAN 6 HOURS PER DAY: ICD-10-PCS | Performed by: INTERNAL MEDICINE

## 2019-11-01 RX ORDER — HYDROMORPHONE HCL/PF 1 MG/ML
1 SYRINGE (ML) INJECTION EVERY 4 HOURS PRN
Status: DISCONTINUED | OUTPATIENT
Start: 2019-11-01 | End: 2019-11-21 | Stop reason: HOSPADM

## 2019-11-01 RX ORDER — ALBUMIN (HUMAN) 12.5 G/50ML
25 SOLUTION INTRAVENOUS ONCE
Status: COMPLETED | OUTPATIENT
Start: 2019-11-01 | End: 2019-11-01

## 2019-11-01 RX ADMIN — SEVELAMER HYDROCHLORIDE 800 MG: 800 TABLET, FILM COATED PARENTERAL at 18:23

## 2019-11-01 RX ADMIN — HEPARIN SODIUM 5000 UNITS: 5000 INJECTION INTRAVENOUS; SUBCUTANEOUS at 06:21

## 2019-11-01 RX ADMIN — ACETAMINOPHEN 650 MG: 325 TABLET, FILM COATED ORAL at 18:22

## 2019-11-01 RX ADMIN — Medication 1000 MG: at 21:45

## 2019-11-01 RX ADMIN — OXYCODONE HYDROCHLORIDE 10 MG: 10 TABLET ORAL at 10:53

## 2019-11-01 RX ADMIN — GABAPENTIN 100 MG: 100 CAPSULE ORAL at 08:30

## 2019-11-01 RX ADMIN — METOPROLOL TARTRATE 25 MG: 25 TABLET, FILM COATED ORAL at 22:00

## 2019-11-01 RX ADMIN — HYDROMORPHONE HYDROCHLORIDE 1 MG: 1 INJECTION, SOLUTION INTRAMUSCULAR; INTRAVENOUS; SUBCUTANEOUS at 14:17

## 2019-11-01 RX ADMIN — PANTOPRAZOLE SODIUM 40 MG: 40 TABLET, DELAYED RELEASE ORAL at 06:22

## 2019-11-01 RX ADMIN — CALCIUM ACETATE 1334 MG: 667 CAPSULE ORAL at 18:23

## 2019-11-01 RX ADMIN — ACETAMINOPHEN 650 MG: 325 TABLET, FILM COATED ORAL at 06:22

## 2019-11-01 RX ADMIN — COLLAGENASE SANTYL: 250 OINTMENT TOPICAL at 08:31

## 2019-11-01 RX ADMIN — VANCOMYCIN HYDROCHLORIDE 750 MG: 750 INJECTION, SOLUTION INTRAVENOUS at 10:04

## 2019-11-01 RX ADMIN — Medication 1 CAPSULE: at 18:23

## 2019-11-01 RX ADMIN — HEPARIN SODIUM 5000 UNITS: 5000 INJECTION INTRAVENOUS; SUBCUTANEOUS at 22:00

## 2019-11-01 RX ADMIN — TORSEMIDE 40 MG: 20 TABLET ORAL at 18:23

## 2019-11-01 RX ADMIN — HEPARIN SODIUM 5000 UNITS: 5000 INJECTION INTRAVENOUS; SUBCUTANEOUS at 14:17

## 2019-11-01 RX ADMIN — ACETAMINOPHEN 650 MG: 325 TABLET, FILM COATED ORAL at 11:00

## 2019-11-01 RX ADMIN — EPOETIN ALFA 8000 UNITS: 10000 SOLUTION INTRAVENOUS; SUBCUTANEOUS at 10:42

## 2019-11-01 RX ADMIN — ALBUMIN (HUMAN) 25 G: 12.5 SOLUTION INTRAVENOUS at 09:14

## 2019-11-01 RX ADMIN — ACETAMINOPHEN 650 MG: 325 TABLET, FILM COATED ORAL at 03:29

## 2019-11-01 NOTE — PROGRESS NOTES
NEPHROLOGY PROGRESS NOTE   Rose Best 64 y o  male MRN: [id number]  Unit/Bed#: S -01 Encounter: [id number]  Reason for Consult: ESRD    ASSESSMENT/PLAN:  1  ESRD on HD 4x/week at [facility]  - HD x2 5 hours per treatment M/T/Th/F  - EDW: 83kg  - appears euvolemic  2  Access- permanent dialysis catheter  3  Anemia- he receives venofer weekly  4  Hypotension- continue midodrine 10mg TID  5  Tachycardia/ A fib- consider cardiology consult  - blood cultures negative x48 hours  6  Hyperkalemia- renal diet/low K diet  - improved s/p dialysis  7  Hyperphosphatemia- continue phoslo and sevelamer with meals  8  Right Hydrocele with numerous septations  9  LE wounds- status post abx course  - continue local wound care  - debridement 10/31 per podiatry    Disposition:  Stable from a renal standpoint for discharge    SUBJECTIVE:  Patient asking for more pain medications  Currently on dialysis  Tolerating well but BP soft      OBJECTIVE:  Current Weight: Weight - Scale: 84 2 kg (185 lb 10 oz)  Vitals:    11/01/19 0858 11/01/19 0900 11/01/19 0930 11/01/19 1000   BP: 91/62 99/68 (!) 82/59 99/61   BP Location: Right arm Right arm Right arm Right arm   Pulse: 88 88 92 93   Resp: 16 16 16 16   Temp: 99 °F (37 2 °C)      TempSrc: Oral      SpO2:       Weight:       Height:           Intake/Output Summary (Last 24 hours) at 11/1/2019 1009  Last data filed at 11/1/2019 0858  Gross per 24 hour   Intake 1740 ml   Output 1450 ml   Net 290 ml     General: NAD  Skin: no rash  HEENT: normocephalic, mm dry  Neck: supple no JVD  Lungs: CTAB  Cardiac: RRR  Extremities: legs wrapped  GI: soft nt nd  Neuro: alert awake  Psych: mood and affect appropriate    Medications:    Current Facility-Administered Medications:     acetaminophen (TYLENOL) tablet 650 mg, 650 mg, Oral, Q6H PRN, Mihir , DPM, 650 mg at 11/01/19 0329    acetaminophen (TYLENOL) tablet 650 mg, 650 mg, Oral, Q6H [address], Mihir , DPM, 650 mg at 11/01/19 0622    b complex-vitamin C-folic acid (NEPHROCAPS) capsule 1 capsule, 1 capsule, Oral, Daily With Dinner, Marlo Cohen, DPM, 1 capsule at 10/29/19 1808    calcium acetate (PHOSLO) capsule 1,334 mg, 1,334 mg, Oral, TID With Meals, Angelitoil Cohen, DPM, 1,334 mg at 10/31/19 0805    cefepime (MAXIPIME) 1,000 mg in dextrose 5 % 50 mL IVPB, 1,000 mg, Intravenous, Q24H, Gearld Bradley, DO, Last Rate: 100 mL/hr at 10/31/19 2203, 1,000 mg at 10/31/19 2203    collagenase (SANTYL) ointment, , Topical, Daily, Marlo Cohen, DPM, Stopped at 11/01/19 0831    epoetin bob (EPOGEN,PROCRIT) injection 8,000 Units, 8,000 Units, Intravenous, After Dialysis, Marlo Cohen DPM, 8,000 Units at 10/31/19 1321    gabapentin (NEURONTIN) capsule 100 mg, 100 mg, Oral, Daily, Marlo Cohen, DPM, Stopped at 11/01/19 0830    heparin (porcine) subcutaneous injection 5,000 Units, 5,000 Units, Subcutaneous, Q8H [facility], 5,000 Units at 11/01/19 0621 **AND** [COMPLETED] Platelet count, , , Once, Jesse Chun PA-C    HYDROmorphone (DILAUDID) injection 2 mg, 2 mg, Intravenous, Q4H PRN, Marlo Cohen, DPM    metoprolol tartrate (LOPRESSOR) tablet 25 mg, 25 mg, Oral, Q12H [facility], Marlo Cohen, DPM, 25 mg at 10/31/19 0804    ondansetron (ZOFRAN) injection 4 mg, 4 mg, Intravenous, Q6H PRN, Marlo Cohen, DPM    oxyCODONE (ROXICODONE) IR tablet 10 mg, 10 mg, Oral, Q4H PRN, Marlo Cohen, DPM    oxyCODONE (ROXICODONE) IR tablet 5 mg, 5 mg, Oral, Q4H PRN, Daril Cohen, DPM    pantoprazole (PROTONIX) EC tablet 40 mg, 40 mg, Oral, Early Morning, Daril Cohen, DPM, 40 mg at 11/01/19 6462    sevelamer (RENAGEL) tablet 800 mg, 800 mg, Oral, TID With Meals, Daril Cohen, DPM, 800 mg at 10/31/19 0804    torsemide (DEMADEX) tablet 40 mg, 40 mg, Oral, BID (diuretic), Daril Cohen, DPM, 40 mg at 10/30/19 0953    vancomycin (VANCOCIN) IVPB (premix) 750 mg, 10 mg/kg, Intravenous, After Dialysis, Brad Rader DO, Last Rate: 150 mL/hr at 11/01/19 1004, 750 mg at 11/01/19 1004    Laboratory Results:  Results from last 7 days   Lab Units 11/01/19  0629 10/31/19  2308 10/31/19  2307 10/30/19  0420 10/29/19  1105 10/28/19  0624 10/27/19  0517 10/27/19  0034 10/26/19  1745   WBC Thousand/uL 9 58 10 45*  --   --  6 55 7 61  --   --  7 83   HEMOGLOBIN g/dL 7 7* 7 0*  --   --  9 4* 8 6*  --   --  9 6*   HEMATOCRIT % 25 6* 22 7*  --   --  31 1* 28 7*  --   --  31 2*   PLATELETS Thousands/uL 210 204  --   --  228 256  --  249 316   POTASSIUM mmol/L 4 7  --  4 5 4 6 5 0 6 1* 5 8* 5 6* 5 8*   CHLORIDE mmol/L 100  --  100 99* 100 99* 98*  --  96*   CO2 mmol/L 27  --  29 29 29 24 22  --  31   BUN mg/dL 35*  --  31* 39* 51* 82* 71*  --  65*   CREATININE mg/dL 5 20*  --  4 61* 5 01* 6 30* 8 77* 7 41*  --  6 96*   CALCIUM mg/dL 8 2*  --  8 0* 8 7 8 4 8 7 8 3  --  8 8   MAGNESIUM mg/dL  --   --   --   --   --  2 0  --   --   --    PHOSPHORUS mg/dL  --   --   --   --   --  6 3*  --   --   --

## 2019-11-01 NOTE — CONSULTS
Consultation - Plastic Surgery   Rip Krishnan 64 y o  male MRN: [id number]  Unit/Bed#: S -01 Encounter: [id number]      Assessment/Plan   63 yo CM admitted 10/26/19 with acute on chronic bilateral lower extremity wounds since September 2019, previously presented with severe sepsis with gram negative bacteremia from marine acquired bacteria Shewanella Putrifacians, who yesterday had operative debridement of necrotic tissue and eschar formation by podiatry  Wound cultures positive for MRSA and Proteus  Plastic surgery has been consulted for consideration of skin grafting to assist in healing of these extensive wounds      -Continue Vanco/cefepime, renal dosing   -Appreciate podiatry's consultation  Discussed care with Dr Danielle Garzon and recommend additional debridement prior to consideration of skin grafting    -Enzymatic debridement with Santyl and daily dressing changes per podiatry    -Tentatively will target next Thursday 11/7 for STSG  Await Dr Nate Orosco' personal evaluation for potential change in plan  -Chronic disease anemia, Hgb 7 7  Will consider this when taking to OR  Increased possibility of requiring blood products    -Optimize nutritional status   -Continue non-weightbearing   -Continue to offload pressure from legs and sacrum with frequent turning, non weightbearing    -Will need to establish care with outpatient wound care center  History of Present Illness   Physician Requesting Consult: Sky Mckeon DO  Reason for Consult / Principal Problem: Extensive necrotic lower extremity wounds, for consideration of skin graft vs  Flap  HPI: Rip Krishnan is a 64y o  year old male who presents with multiple and extensive necrotic wounds of lower extremity circumferentially from knee to toe  These began in September "spontaneously" over the course of 2 days without trauma that he recalls    He is ESRD on Dialysis 4x/week and has been admitted twice before for bacteremia from these wounds  Podiatry took him to the OR yesterday for debridement and requested this consultation for consideration of skin grafting to minimize further risk of infection, sepsis, and delayed wound healing  He is currently a resident of [facility] for the past 3 weeks  Prior to that, he lived at home  He is presently non weight bearing due to these wounds since September  He complains of a great deal of pain as well as continual muscle wasting  Other comorbidities include atrial fibrillation (not presently anticoagulated), essential HTN, polycystic kidney disease progressed to HD-ESRD, polycystic liver disease, anemia of chronic kidney disease on EPO  New this admission is large hydrocele of scrotum including inguinal hernia, seen by gen surg and urology, no surgical intervention advised  Consults    Review of Systems   Constitutional: Negative for chills, diaphoresis, fatigue and fever  HENT: Negative  Eyes: Negative  Respiratory: Negative  Cardiovascular: Negative  Gastrointestinal: Negative  Endocrine: Negative  Genitourinary: Positive for scrotal swelling  Negative for dysuria and urgency  Musculoskeletal: Positive for gait problem and myalgias  Allergic/Immunologic: Negative  Hematological: Bruises/bleeds easily  Psychiatric/Behavioral: Negative for agitation, behavioral problems and confusion  The patient is nervous/anxious  Historical Information   Past Medical History:   Diagnosis Date    Complication of renal dialysis     Polycystic kidney disease      Past Surgical History:   Procedure Laterality Date    IR CATHETERGRAM  10/17/2019    IR IMAGE GUIDED ASPIRATION / DRAINAGE  9/23/2019    IR PARACENTESIS  10/9/2019    IR [address] EXCHANGE  10/17/2019    IR [address] PLACEMENT  9/30/2019    WOUND DEBRIDEMENT Bilateral 10/31/2019    Procedure: DEBRIDEMENT WOUND ([facility]);   Surgeon: Elia Mauro DPM;  Location: [facility]; Service: Podiatry     Social History   Social History     Substance and Sexual Activity   Alcohol Use Not Currently     Social History     Substance and Sexual Activity   Drug Use Not Currently     Social History     Tobacco Use   Smoking Status Never Smoker   Smokeless Tobacco Never Used     Family History: non-contributory    Meds/Allergies   current meds:   Current Facility-Administered Medications   Medication Dose Route Frequency    acetaminophen (TYLENOL) tablet 650 mg  650 mg Oral Q6H PRN    acetaminophen (TYLENOL) tablet 650 mg  650 mg Oral Q6H [address]    b complex-vitamin C-folic acid (NEPHROCAPS) capsule 1 capsule  1 capsule Oral Daily With Dinner    calcium acetate (PHOSLO) capsule 1,334 mg  1,334 mg Oral TID With Meals    cefepime (MAXIPIME) 1,000 mg in dextrose 5 % 50 mL IVPB  1,000 mg Intravenous Q24H    collagenase (SANTYL) ointment   Topical Daily    epoetin bob (EPOGEN,PROCRIT) injection 8,000 Units  8,000 Units Intravenous After Dialysis    gabapentin (NEURONTIN) capsule 100 mg  100 mg Oral Daily    heparin (porcine) subcutaneous injection 5,000 Units  5,000 Units Subcutaneous Q8H [address]    HYDROmorphone (DILAUDID) injection 1 mg  1 mg Intravenous Q4H PRN    metoprolol tartrate (LOPRESSOR) tablet 25 mg  25 mg Oral Q12H [address]    ondansetron (ZOFRAN) injection 4 mg  4 mg Intravenous Q6H PRN    oxyCODONE (ROXICODONE) IR tablet 10 mg  10 mg Oral Q4H PRN    oxyCODONE (ROXICODONE) IR tablet 5 mg  5 mg Oral Q4H PRN    pantoprazole (PROTONIX) EC tablet 40 mg  40 mg Oral Early Morning    sevelamer (RENAGEL) tablet 800 mg  800 mg Oral TID With Meals    torsemide (DEMADEX) tablet 40 mg  40 mg Oral BID (diuretic)    vancomycin (VANCOCIN) IVPB (premix) 750 mg  10 mg/kg Intravenous After Dialysis       No Known Allergies    Objective     Intake/Output Summary (Last 24 hours) at 11/1/2019 1509  Last data filed at 11/1/2019 1130  Gross per 24 hour   Intake 1740 ml   Output 700 ml   Net 1040 ml Invasive Devices     Peripheral Intravenous Line            Peripheral IV 10/30/19 Distal;Left;Upper;Ventral (anterior) Arm 2 days    Peripheral IV 11/01/19 Right;Ventral (anterior) Forearm less than 1 day          Line            Temporary HD Catheter -- days                Physical Exam   Constitutional: He is oriented to person, place, and time  He appears well-developed and well-nourished  No distress  HENT:   Head: Normocephalic and atraumatic  Cardiovascular: Normal rate, regular rhythm, normal heart sounds and intact distal pulses  Exam reveals no gallop and no friction rub  No murmur heard  Pulmonary/Chest: Effort normal and breath sounds normal  No stridor  No respiratory distress  He has no wheezes  He has no rales  He exhibits no tenderness  Abdominal: Soft  He exhibits no distension  There is no tenderness  Musculoskeletal: He exhibits tenderness and deformity  He exhibits no edema  ACE wrap, kerlix, ABD, and xeroform dressings removed to reveal bilateral lower extremities with extensive necrotic tissue loss with yellow exudate RLE > LLE extending circumferentially from base of toes, dorsum of feet, up to the knee  Left medial thigh also with superficial debrided wound  Intermittent healthy bleeding wound beds but predominantly yellow brown to black nonviable tissue remaining  Please refer to many clinical images taken at bedside  Neurological: He is alert and oriented to person, place, and time  No cranial nerve deficit  Skin: Skin is warm and dry  He is not diaphoretic                 Lab Results:   Lab Results   Component Value Date    WBC 9 58 11/01/2019    HGB 7 7 (L) 11/01/2019    HCT 25 6 (L) 11/01/2019     (H) 11/01/2019     11/01/2019      Lab Results   Component Value Date/Time    WOUNDCULT 2+ Growth of Proteus mirabilis (A) 10/26/2019 08:23 PM    WOUNDCULT (A) 10/26/2019 08:23 PM     2+ Growth of Methicillin Resistant Staphylococcus aureus    WOUNDCULT 4+ Growth of  10/26/2019 08:23 PM     EKG, Pathology, and Other Studies:   Lab Results   Component Value Date/Time    [facility]  10/10/2019 07:54 PM     A  Skin, left thigh, excisional biopsy:  - Ulceration with superficial and deep vascular proliferation, regenerative squamous atypia, early     scar, and fat necrosis, associated with fibrinopurulent debris and acute and chronic inflammation  See Note  -- Single blood vessel with intimal vascular calcifications and rare superficial ulcer bed       calcifications; not diagnostic for calciphylaxis  -- Favor ulcer bed vascular changes; no definitive vasculitis  -- Hyaline arteriolosclerosis and intraluminal foamy macrophages suggestive of atherosclerosis;       atherosclerotic thromboemboli cannot be excluded  -- At least superficial fungal yeast and bacteria; confirming special stains for fungus (PAS),      bacteria (B&H) and acid fast bacilli (Janeal Reji) pending; results will be reported in an addendum    - Negative for malignancy on multiple examined levels  Interpretation performed at [facility], [address] [place] [address]  VTE Prophylaxis: Sequential compression device (Venodyne)  and Heparin    Code Status: Level 1 - Full Code    Counseling / Coordination of Care  Total floor / unit time spent today 45 minutes  Greater than 50% of total time was spent with the patient and / or family counseling and / or coordination of care  A description of the counseling / coordination of care: changing wound dressings to fully assess wounds and form accurate assessment of acuity of care and need for surgical intervention      Linda Gutiérrez PA-C

## 2019-11-01 NOTE — HEMODIALYSIS
Pre weight 83 5 kg  Post weight 83 5 kg  No fluid removal with HD  Unable to maintain BFR as prescribed  No issues with HD, stable

## 2019-11-01 NOTE — PROGRESS NOTES
Progress Note - Podiatry  Giselle Chamberlain 64 y o  male MRN: [id number]  Unit/Bed#: S -01 Encounter: [id number]    Assessment/Plan:  1  B/l Extensive LE wound 1 day s/p debridement  2  PKD on dialysis  -Dressing left intact today  - Extensive b/l LE wounds with loss of tissue to muscle  Will likely need repeat debridement  - I am concerned about the increased opportunity of infection with such extensive loss of soft tissue envelope below the knee  Will consult plastic surgery for evaluation for possible grafting vs  Flap  - currently in pain, but controlled  - Long discussion with patient regarding the severity of wounds, possibility of infection, and the long duration of care needed to restore his LE  Subjective/Objective   Chief Complaint:   Chief Complaint   Patient presents with    Cellulitis     Pt states that he has cellulitis in both lower extremities and a hernia  Pt states that he is afraid to move his legs due to the hernia  EMS reports that nursing home states that pts cellulitis is necrotic  Subjective: 64 y o  y/o male was seen and evaluated at bedside  Complaining of pain in his legs    Blood pressure 96/53, pulse 90, temperature 98 2 °F (36 8 °C), temperature source Oral, resp  rate 16, height 5' 8" (1 727 m), weight 84 2 kg (185 lb 10 oz), SpO2 99 %  ,Body mass index is 28 22 kg/m²  Invasive Devices     Peripheral Intravenous Line            Peripheral IV 10/30/19 Distal;Left;Upper;Ventral (anterior) Arm 2 days          Line            Temporary HD Catheter -- days                Physical Exam:   General: Alert, cooperative and no distress  Lungs: Non labored breathing  Heart: Positive S1, S2  Abdomen: Soft, non-tender  Extremity: CFT intact and normal to b/l LE  dsg intact c/d/i with no evidence of strikethrough  Lab, Imaging and other studies:   I have personally reviewed pertinent lab results        Imaging: I have personally reviewed pertinent films in PACS  EKG, Pathology, and Other Studies: I have personally reviewed pertinent reports    VTE Pharmacologic Prophylaxis: Heparin

## 2019-11-01 NOTE — PROGRESS NOTES
Progress Note - Danelle Cooney 1960, 64 y o  male MRN: [id number]    Unit/Bed#: S -01 Encounter: [id number]    Primary Care Provider: Noel Perez   Date and time admitted to hospital: 10/26/2019  5:10 PM        * Multiple and open wound of lower limb  Assessment & Plan  · Procedure - Debridement, removal of Eschar on 10/31/2019 by Dr Garbiela Frias  · Podiatry consulting plastic surgery to evaluate for possible graft vs  Flap procedure  · Monitor for any signs of Infection (felt to be at risk) -   · Had hypotension and fever perioperatively  Per podiatry, no obvious infected tissue seen during procedure, but at high risk for development of infection  · Antibiotic - Continue Vancomycin / Cefepime (renal dosed)  · Follow up blood cultures from 10/31/2019  · Monitor blood pressures - 25 g albumin today  Due to ESRD and risk for volume overload, holding on large amounts of IV fluids at present time  · Monitor temperatures and serial leg exams  · Pain Control -   · Oxycodone 5-10 mg  · Breakthrough pain - decrease dilaudid to 1 mg dose  · Monitor pain levels  · Supportive care    Scrotal swelling  Assessment & Plan  · Ultrasound showed "Large complex right hydrocele containing numerous septations  Increased vascularity in the left testicle with respect to the right, although both testicles could not be imaged on the same plane limiting direct comparison   Correlate clinically for orchitis "  · Urology saw patient 10/29/2019 and recommends no surgical intervention acutely and to manage problems with legs and get therapy first   · AFP and LDH within normal limits  · Follow up beta HCG  · Supportive care  · Pain management  Monitor pain levels  Atrial fibrillation ([address] )  Assessment & Plan  · Rate / Rhythm control -   · Metoprolol increased to 25 mg bid on 10/29/2019  · Monitor on telemetry  Monitor HR and blood pressures    · Anticoagulation - None prehospital   Defer to outpatient team following discharge  ESRD (end stage renal disease) ([facility] [address] )  Assessment & Plan  · On routine dialysis  · Nephrology following  Anemia due to chronic kidney disease, on chronic dialysis and Acute Blood Loss Anemia (HCC)  Assessment & Plan  · There was a slight drop in hemoglobin during procedure  · Will discuss with nephrology the possibility of transfusion with next dialysis treatment  · Monitor counts - CBC in am     Pressure ulcer, right and left buttocks POA  Assessment & Plan  · Local wound care  · Turn / Reposition frequently  VTE Pharmacologic Prophylaxis:   Pharmacologic: Heparin  Mechanical VTE Prophylaxis in Place: No    Patient Centered Rounds: I have performed bedside rounds with nursing staff today  Discussions with Specialists or Other Care Team Provider: Dr Marie Katz    Education and Discussions with Family / Patient: Patient only  Declined family update  Time Spent for Care: 30 minutes  More than 50% of total time spent on counseling and coordination of care as described above  Current Length of Stay: 6 day(s)  Current Patient Status: Inpatient     Certification Statement: The patient will continue to require additional inpatient hospital stay due to continued evaluation and treatment for the lower extremity wounds  Discharge Plan / Estimated Discharge Date: to be determined  Not likely in next 72 hours  Code Status: Level 1 - Full Code      Subjective: The patient complains of lower extremity pain which he feels is worse post operatively  He had hypotension and fever perioperatively  Denies dizziness  Fever resolved, but hypotension remains  He is currently on dialysis  He is now getting albumin that I ordered earlier      Objective:     Vitals:   Temp (24hrs), Av 3 °F (36 8 °C), Min:97 °F (36 1 °C), Max:102 1 °F (38 9 °C)    Temp:  [97 °F (36 1 °C)-102 1 °F (38 9 °C)] 98 4 °F (36 9 °C)  HR:  [] 93  Resp:  [16-18] 16  BP: ()/(49-70) 88/52  SpO2:  [92 %-99 %] 92 %  Body mass index is 28 22 kg/m²  Input and Output Summary (last 24 hours): Intake/Output Summary (Last 24 hours) at 11/1/2019 0849  Last data filed at 11/1/2019 0423  Gross per 24 hour   Intake 1540 ml   Output 1450 ml   Net 90 ml       Physical Exam:     Physical Exam   Constitutional: He is oriented to person, place, and time  No distress  HENT:   Slightly dry oral mucosa   Eyes: Pupils are equal, round, and reactive to light  Cardiovascular: Normal rate and regular rhythm  Pulmonary/Chest: Effort normal  He has no wheezes  He has no rales  Abdominal: Soft  Bowel sounds are normal  He exhibits no distension  There is no tenderness  Musculoskeletal: He exhibits no edema or tenderness  Neurological: He is alert and oriented to person, place, and time  No cranial nerve deficit  He exhibits normal muscle tone  Skin: Skin is warm and dry  No rash noted  Vitals reviewed  Additional Data:     Labs:    Results from last 7 days   Lab Units 11/01/19  0629   WBC Thousand/uL 9 58   HEMOGLOBIN g/dL 7 7*   HEMATOCRIT % 25 6*   PLATELETS Thousands/uL 210   NEUTROS PCT % 74   LYMPHS PCT % 7*   MONOS PCT % 11   EOS PCT % 7*     Results from last 7 days   Lab Units 11/01/19  0629 10/31/19  2307   POTASSIUM mmol/L 4 7 4 5   CHLORIDE mmol/L 100 100   CO2 mmol/L 27 29   BUN mg/dL 35* 31*   CREATININE mg/dL 5 20* 4 61*   CALCIUM mg/dL 8 2* 8 0*   ALK PHOS U/L  --  189*   ALT U/L  --  <6*   AST U/L  --  14     Results from last 7 days   Lab Units 10/26/19  1745   INR  1 08       * I Have Reviewed All Lab Data Listed Above  * Additional Pertinent Lab Tests Reviewed: All Labs Within Last 24 Hours Reviewed    Imaging:    Imaging Reports Reviewed Today Include:No new imaging  Imaging Personally Reviewed by Myself Includes:  None    Recent Cultures (last 7 days):     Results from last 7 days   Lab Units 10/26/19  2023 10/26/19  1745   BLOOD CULTURE   --  No Growth After 5 Days  No Growth After 5 Days  GRAM STAIN RESULT  3+ Polys*  3+ Gram Positive Rods Resembling Diphtheroids*  2+ Gram positive cocci in pairs*  --    WOUND CULTURE  2+ Growth of Proteus mirabilis*  2+ Growth of Methicillin Resistant Staphylococcus aureus*  4+ Growth of   --        Last 24 Hours Medication List:     Current Facility-Administered Medications:  acetaminophen 650 mg Oral Q6H PRN JUAN TuckerM    acetaminophen 650 mg Oral Q6H [facility] Luis Mata DPM    albumin human 25 g Intravenous Once Zenobia Trimble DO    b complex-vitamin C-folic acid 1 capsule Oral Daily With Janak Biancagwen, JUANM    calcium acetate 1,334 mg Oral TID With Meals Luis Mata DPM    cefepime 1,000 mg Intravenous Q24H Zenobia Trimble DO Last Rate: 1,000 mg (10/31/19 2203)   collagenase  Topical Daily Luis Mata DPM    epoetin bob 8,000 Units Intravenous After Dialysis Luis Mata DPM    gabapentin 100 mg Oral Daily Luis Mata DPM    heparin (porcine) 5,000 Units Subcutaneous [facility] Luis Mata DPM    HYDROmorphone 2 mg Intravenous Q4H PRN Luis Mata DPM    metoprolol tartrate 25 mg Oral Q12H [facility] Luis Mata DPM    ondansetron 4 mg Intravenous Q6H PRN Luis Mata DPM    oxyCODONE 10 mg Oral Q4H PRN Luis Mata DPM    oxyCODONE 5 mg Oral Q4H PRN Luis Mata DPM    pantoprazole 40 mg Oral Early Morning Luis Mata DPM    sevelamer 800 mg Oral TID With Meals Luis Mata DPM    torsemide 40 mg Oral BID (diuretic) Luis Mata DPM    vancomycin 10 mg/kg Intravenous After Dialysis Zenobia Trimble DO         Today, Patient Was Seen By: Zenobia Trimble DO    ** Please Note: This note has been constructed using a voice recognition system   **

## 2019-11-01 NOTE — RESPIRATORY THERAPY NOTE
RT Protocol Note  Natividad Adames 64 y o  male MRN: [id number]  Unit/Bed#: S -01 Encounter: [id number]    Assessment    Principal Problem:    Multiple and open wound of lower limb  Active Problems:    Atrial fibrillation (HCC)    Anemia due to chronic kidney disease, on chronic dialysis (HCC)    Scrotal swelling    ESRD (end stage renal disease) (HCC)    Pressure ulcer, right and left buttocks POA      Home Pulmonary Medications:  n/a    Past Medical History:   Diagnosis Date    Complication of renal dialysis     Polycystic kidney disease      Social History     Socioeconomic History    Marital status: /Civil Union     Spouse name: None    Number of children: None    Years of education: None    Highest education level: None   Occupational History    None   Social Needs    Financial resource strain: None    Food insecurity:     Worry: None     Inability: None    Transportation needs:     Medical: None     Non-medical: None   Tobacco Use    Smoking status: Never Smoker    Smokeless tobacco: Never Used   Substance and Sexual Activity    Alcohol use: Not Currently    Drug use: Not Currently    Sexual activity: None   Lifestyle    Physical activity:     Days per week: None     Minutes per session: None    Stress: None   Relationships    Social connections:     Talks on phone: None     Gets together: None     Attends [employer] witness service: None     Active member of club or organization: None     Attends meetings of clubs or organizations: None     Relationship status: None    Intimate partner violence:     Fear of current or ex partner: None     Emotionally abused: None     Physically abused: None     Forced sexual activity: None   Other Topics Concern    None   Social History Narrative    None       Subjective         Objective    Physical Exam:   Assessment Type: (P) Assess only  General Appearance: (P) Awake  Respiratory Pattern: (P) Normal  Chest Assessment: (P) Chest expansion symmetrical  Bilateral Breath Sounds: (P) Clear, Diminished    Vitals:  Blood pressure 91/50, pulse 105, temperature 98 3 °F (36 8 °C), temperature source Oral, resp  rate 16, height 5' 8" (1 727 m), weight 84 2 kg (185 lb 10 oz), SpO2 97 %  Imaging and other studies: I have personally reviewed pertinent reports  Plan    Respiratory Plan: (P) No distress/Pulmonary history, Discontinue Protocol        Resp Comments: (P) Pt assessed per protocol  Pt states he has no pulmonary hx and does not take pulmonary meds at home  Pt states he does not feel short of breath at this time  Scheduled nebs not inidcated at this time  Will discontinue protocol

## 2019-11-01 NOTE — ASSESSMENT & PLAN NOTE
· Procedure - Debridement, removal of Eschar on 10/31/2019 by Dr Edin De La Torre  · Podiatry consulting plastic surgery to evaluate for possible graft vs  Flap procedure  · Monitor for any signs of Infection (felt to be at risk) -   · Had hypotension and fever perioperatively  Per podiatry, no obvious infected tissue seen during procedure, but at high risk for development of infection  · Antibiotic - Continue Vancomycin / Cefepime (renal dosed)  · Follow up blood cultures from 10/31/2019  · Monitor blood pressures - 25 g albumin today  Due to ESRD and risk for volume overload, holding on large amounts of IV fluids at present time  · Monitor temperatures and serial leg exams  · Pain Control -   · Oxycodone 5-10 mg  · Breakthrough pain - decrease dilaudid to 1 mg dose  · Monitor pain levels    · Supportive care

## 2019-11-01 NOTE — PROGRESS NOTES
Vancomycin Assessment    Shell Manzano is a 64 y o  male who is currently receiving vancomycin for skin and soft tissue infection  Relevant clinical data and objective history reviewed:  Creatinine   Date Value Ref Range Status   10/30/2019 5 01 (H) 0 60 - 1 30 mg/dL Final     Comment:     Standardized to IDMS reference method   10/29/2019 6 30 (H) 0 60 - 1 30 mg/dL Final     Comment:     Standardized to IDMS reference method   10/28/2019 8 77 (H) 0 60 - 1 30 mg/dL Final     Comment:     Standardized to IDMS reference method     BP (!) 83/52   Pulse (!) 106   Temp (!) 97 °F (36 1 °C)   Resp 16   Ht 5' 8" (1 727 m)   Wt 84 2 kg (185 lb 10 oz)   SpO2 98%   BMI 28 22 kg/m²   I/O last 3 completed shifts: In: 56 [P O :420; I V :500; Other:100]  Out: 1450 [Other:1450]  Lab Results   Component Value Date/Time    BUN 39 (H) 10/30/2019 04:20 AM    WBC 6 55 10/29/2019 11:05 AM    HGB 9 4 (L) 10/29/2019 11:05 AM    HCT 31 1 (L) 10/29/2019 11:05 AM     (H) 10/29/2019 11:05 AM     10/29/2019 11:05 AM     Temp Readings from Last 3 Encounters:   10/31/19 (!) 97 °F (36 1 °C)   10/16/19 98 7 °F (37 1 °C) (Oral)   10/03/19 98 2 °F (36 8 °C) (Oral)     Vancomycin Days of Therapy: 1    Assessment/Plan  The patient is being started on vancomycin utilizing PRN dosing after dialysis based on actual body weight  Baseline risks associated with therapy include: pre-existing renal impairment and concomitant nephrotoxic medications  The patient will be given a loading dose of vancomycin IV 1750mg (20mg/kg) once on 10/31 and started on PRN dosing after dialysis with vancomycin IV 750mg (10mg/kg)  Pharmacy will also follow closely for s/sx of nephrotoxicity, infusion reactions and appropriateness of therapy  BMP and CBC will be ordered per protocol  Plan for level prior to the 3rd hemodialysis session on 11/5  Due to infection severity, will target a trough of 15-20    Pharmacy will continue to follow the patients culture results and clinical progress daily      Kyle Graham, PharmD  Pharmacist

## 2019-11-01 NOTE — SOCIAL WORK
Patient remains not medically stable for discharge  Patient went to the OR yesterday with podiatry for bilateral debridement and washout of his lower extremities  Based on this procedure a consult was placed to plastic surgery for evaluation for possible grafting vs flap  CM department will continue to follow patient to assist with discharge coordination

## 2019-11-01 NOTE — ASSESSMENT & PLAN NOTE
· There was a slight drop in hemoglobin during procedure  · Will discuss with nephrology the possibility of transfusion with next dialysis treatment    · Monitor counts - CBC in am

## 2019-11-01 NOTE — PROGRESS NOTES
Vancomycin IV Pharmacy-to-Dose Consultation    Gualberto Craft is a 64 y o  male who is currently receiving Vancomycin IV with management by the Pharmacy Consult service  Assessment/Plan:  The patient was reviewed  Renal function is stable and no signs or symptoms of nephrotoxicity and/or infusion reactions were documented in the chart  Based on todays assessment, continue current vancomycin (day # 2) dosing of 750 mg IV after each dialysis session with a plan for trough to be drawn at 0600 on 11/04  We will continue to follow the patients culture results and clinical progress daily      Pranav Martell, Pharmacist

## 2019-11-02 PROBLEM — K59.00 CONSTIPATION: Status: ACTIVE | Noted: 2019-11-02

## 2019-11-02 LAB
ALBUMIN SERPL BCP-MCNC: 1.3 G/DL (ref 3.5–5)
ALP SERPL-CCNC: 216 U/L (ref 46–116)
ALT SERPL W P-5'-P-CCNC: 6 U/L (ref 12–78)
ANION GAP SERPL CALCULATED.3IONS-SCNC: 8 MMOL/L (ref 4–13)
AST SERPL W P-5'-P-CCNC: 21 U/L (ref 5–45)
BASOPHILS # BLD AUTO: 0.05 THOUSANDS/ΜL (ref 0–0.1)
BASOPHILS NFR BLD AUTO: 1 % (ref 0–1)
BILIRUB SERPL-MCNC: 0.7 MG/DL (ref 0.2–1)
BUN SERPL-MCNC: 27 MG/DL (ref 5–25)
CALCIUM SERPL-MCNC: 8.3 MG/DL (ref 8.3–10.1)
CHLORIDE SERPL-SCNC: 101 MMOL/L (ref 100–108)
CO2 SERPL-SCNC: 26 MMOL/L (ref 21–32)
CREAT SERPL-MCNC: 4.23 MG/DL (ref 0.6–1.3)
EOSINOPHIL # BLD AUTO: 0.57 THOUSAND/ΜL (ref 0–0.61)
EOSINOPHIL NFR BLD AUTO: 7 % (ref 0–6)
ERYTHROCYTE [DISTWIDTH] IN BLOOD BY AUTOMATED COUNT: 16.1 % (ref 11.6–15.1)
GFR SERPL CREATININE-BSD FRML MDRD: 15 ML/MIN/1.73SQ M
GLUCOSE SERPL-MCNC: 104 MG/DL (ref 65–140)
HCT VFR BLD AUTO: 24.3 % (ref 36.5–49.3)
HGB BLD-MCNC: 7.3 G/DL (ref 12–17)
IMM GRANULOCYTES # BLD AUTO: 0.05 THOUSAND/UL (ref 0–0.2)
IMM GRANULOCYTES NFR BLD AUTO: 1 % (ref 0–2)
LYMPHOCYTES # BLD AUTO: 0.54 THOUSANDS/ΜL (ref 0.6–4.47)
LYMPHOCYTES NFR BLD AUTO: 7 % (ref 14–44)
MCH RBC QN AUTO: 30.9 PG (ref 26.8–34.3)
MCHC RBC AUTO-ENTMCNC: 30 G/DL (ref 31.4–37.4)
MCV RBC AUTO: 103 FL (ref 82–98)
MONOCYTES # BLD AUTO: 0.73 THOUSAND/ΜL (ref 0.17–1.22)
MONOCYTES NFR BLD AUTO: 9 % (ref 4–12)
NEUTROPHILS # BLD AUTO: 6.01 THOUSANDS/ΜL (ref 1.85–7.62)
NEUTS SEG NFR BLD AUTO: 75 % (ref 43–75)
NRBC BLD AUTO-RTO: 0 /100 WBCS
PLATELET # BLD AUTO: 123 THOUSANDS/UL (ref 149–390)
PMV BLD AUTO: 11.6 FL (ref 8.9–12.7)
POTASSIUM SERPL-SCNC: 4.7 MMOL/L (ref 3.5–5.3)
PROT SERPL-MCNC: 5.7 G/DL (ref 6.4–8.2)
RBC # BLD AUTO: 2.36 MILLION/UL (ref 3.88–5.62)
SODIUM SERPL-SCNC: 135 MMOL/L (ref 136–145)
WBC # BLD AUTO: 7.95 THOUSAND/UL (ref 4.31–10.16)

## 2019-11-02 PROCEDURE — 99232 SBSQ HOSP IP/OBS MODERATE 35: CPT | Performed by: INTERNAL MEDICINE

## 2019-11-02 PROCEDURE — 85025 COMPLETE CBC W/AUTO DIFF WBC: CPT | Performed by: INTERNAL MEDICINE

## 2019-11-02 PROCEDURE — 80053 COMPREHEN METABOLIC PANEL: CPT | Performed by: INTERNAL MEDICINE

## 2019-11-02 PROCEDURE — 94762 N-INVAS EAR/PLS OXIMTRY CONT: CPT

## 2019-11-02 RX ORDER — BISACODYL 10 MG
10 SUPPOSITORY, RECTAL RECTAL ONCE
Status: COMPLETED | OUTPATIENT
Start: 2019-11-02 | End: 2019-11-02

## 2019-11-02 RX ORDER — DOCUSATE SODIUM 100 MG/1
100 CAPSULE, LIQUID FILLED ORAL 2 TIMES DAILY
Status: DISCONTINUED | OUTPATIENT
Start: 2019-11-02 | End: 2019-11-12

## 2019-11-02 RX ORDER — SENNOSIDES 8.6 MG
2 TABLET ORAL 2 TIMES DAILY PRN
Status: DISCONTINUED | OUTPATIENT
Start: 2019-11-02 | End: 2019-11-09

## 2019-11-02 RX ADMIN — DOCUSATE SODIUM 100 MG: 100 CAPSULE, LIQUID FILLED ORAL at 17:36

## 2019-11-02 RX ADMIN — ACETAMINOPHEN 650 MG: 325 TABLET, FILM COATED ORAL at 01:00

## 2019-11-02 RX ADMIN — OXYCODONE HYDROCHLORIDE 10 MG: 10 TABLET ORAL at 14:23

## 2019-11-02 RX ADMIN — Medication 1000 MG: at 21:54

## 2019-11-02 RX ADMIN — Medication 1 CAPSULE: at 17:37

## 2019-11-02 RX ADMIN — ACETAMINOPHEN 650 MG: 325 TABLET, FILM COATED ORAL at 17:36

## 2019-11-02 RX ADMIN — OXYCODONE HYDROCHLORIDE 10 MG: 10 TABLET ORAL at 08:09

## 2019-11-02 RX ADMIN — CALCIUM ACETATE 1334 MG: 667 CAPSULE ORAL at 08:09

## 2019-11-02 RX ADMIN — CALCIUM ACETATE 1334 MG: 667 CAPSULE ORAL at 13:04

## 2019-11-02 RX ADMIN — GABAPENTIN 100 MG: 100 CAPSULE ORAL at 08:10

## 2019-11-02 RX ADMIN — COLLAGENASE SANTYL 1 APPLICATION: 250 OINTMENT TOPICAL at 11:06

## 2019-11-02 RX ADMIN — SEVELAMER HYDROCHLORIDE 800 MG: 800 TABLET, FILM COATED PARENTERAL at 08:10

## 2019-11-02 RX ADMIN — HEPARIN SODIUM 5000 UNITS: 5000 INJECTION INTRAVENOUS; SUBCUTANEOUS at 21:54

## 2019-11-02 RX ADMIN — CALCIUM ACETATE 1334 MG: 667 CAPSULE ORAL at 17:36

## 2019-11-02 RX ADMIN — PANTOPRAZOLE SODIUM 40 MG: 40 TABLET, DELAYED RELEASE ORAL at 06:34

## 2019-11-02 RX ADMIN — SEVELAMER HYDROCHLORIDE 800 MG: 800 TABLET, FILM COATED PARENTERAL at 13:04

## 2019-11-02 RX ADMIN — ACETAMINOPHEN 650 MG: 325 TABLET, FILM COATED ORAL at 03:01

## 2019-11-02 RX ADMIN — ACETAMINOPHEN 650 MG: 325 TABLET, FILM COATED ORAL at 10:50

## 2019-11-02 RX ADMIN — HEPARIN SODIUM 5000 UNITS: 5000 INJECTION INTRAVENOUS; SUBCUTANEOUS at 13:04

## 2019-11-02 RX ADMIN — ACETAMINOPHEN 650 MG: 325 TABLET, FILM COATED ORAL at 06:33

## 2019-11-02 RX ADMIN — SEVELAMER HYDROCHLORIDE 800 MG: 800 TABLET, FILM COATED PARENTERAL at 17:36

## 2019-11-02 RX ADMIN — HEPARIN SODIUM 5000 UNITS: 5000 INJECTION INTRAVENOUS; SUBCUTANEOUS at 06:34

## 2019-11-02 RX ADMIN — BISACODYL 10 MG: 10 SUPPOSITORY RECTAL at 16:44

## 2019-11-02 NOTE — ASSESSMENT & PLAN NOTE
· Rate / Rhythm control -   · Metoprolol increased to 25 mg bid on 10/29/2019  Not consistently getting medication due to blood pressure parameters  · Monitor on telemetry  Monitor HR and blood pressures  · Anticoagulation - None prehospital   Defer to outpatient team following discharge

## 2019-11-02 NOTE — PROGRESS NOTES
Progress Note - Kathya Teixeira 1960, 64 y o  male MRN: [id number]    Unit/Bed#: S -01 Encounter: [id number]    Primary Care Provider: Jesse Ignacio   Date and time admitted to hospital: 10/26/2019  5:10 PM        * Multiple and open wound of lower limb  Assessment & Plan  · Procedure - Debridement, removal of Eschar on 10/31/2019 by Dr Yumiko Mendez  · Podiatry consulted plastic surgery for possible graft vs  Flap procedure  Plastic surgery recommends additional debridement first, but then tentatively anticipates STSG 11/7/2019, pending evaluation by Dr Clarisa Andrews  · Monitor for any signs of Infection (felt to be at risk) -   · Had hypotension and fever perioperatively  Per podiatry, no obvious infected tissue seen during procedure, but at high risk for development of infection  · Antibiotic - Continue Vancomycin / Cefepime (renal dosed)  · Follow up blood cultures from 10/31/2019  · Monitor temperatures and serial leg exams  · Pain Control -   · Oxycodone 5-10 mg  · Breakthrough pain - Dilaudid 1 mg IV  · Monitor pain levels  · Supportive care    Scrotal swelling  Assessment & Plan  · Ultrasound showed "Large complex right hydrocele containing numerous septations  Increased vascularity in the left testicle with respect to the right, although both testicles could not be imaged on the same plane limiting direct comparison   Correlate clinically for orchitis "  · Urology saw patient 10/29/2019 and recommends no surgical intervention acutely and to manage problems with legs and get therapy first   · AFP and LDH within normal limits  · Follow up beta HCG  · Supportive care  · Pain management  Monitor pain levels  Atrial fibrillation ([address] )  Assessment & Plan  · Rate / Rhythm control -   · Metoprolol increased to 25 mg bid on 10/29/2019  Not consistently getting medication due to blood pressure parameters  · Monitor on telemetry  Monitor HR and blood pressures    · Anticoagulation - None prehospital   Defer to outpatient team following discharge  ESRD (end stage renal disease) ([facility] [address] )  Assessment & Plan  · On routine dialysis  · Nephrology following  Anemia due to chronic kidney disease, on chronic dialysis and Acute Blood Loss Anemia (HCC)  Assessment & Plan  · There was a slight drop in hemoglobin during procedure  · Consider transfusion with dialysis prior to any additional procedure, if needed to get hemoglobin over 8   · Monitor counts - CBC in am     Pressure ulcer, right and left buttocks POA  Assessment & Plan  · Local wound care  · Turn / Reposition frequently  Constipation  Assessment & Plan  · Dulcolax suppository now  · Add Colace scheduled  · Add BID PRN senna  · Additional adjustments as needed  · Monitor stools  VTE Pharmacologic Prophylaxis:   Pharmacologic: Heparin  Mechanical VTE Prophylaxis in Place: No (pain in legs)    Patient Centered Rounds: I have performed bedside rounds with nursing staff today  Discussions with Specialists or Other Care Team Provider: None today  Education and Discussions with Family / Patient:  Patient only    Time Spent for Care: 30 minutes  More than 50% of total time spent on counseling and coordination of care as described above  Current Length of Stay: 7 day(s)  Current Patient Status: Inpatient     Certification Statement: The patient will continue to require additional inpatient hospital stay due to evaluation and treatment for lower extremity wounds as noted above  Discharge Plan / Estimated Discharge Date: to be determined but not likely until next weekend or later  Code Status: Level 1 - Full Code      Subjective: The patient is examined while he attempted to use the bedpan  The patient seems to be in some distress stating that he feels like the stool is ready to come out but he cannot actually passed the stool  The patient does complain of some abdominal discomfort related to his constipation    The patient denies any dizziness or lightheadedness  No nausea or vomiting  The patient feels that the leg pain is doing better today  His blood pressures are also improved today compared to yesterday  Objective:     Vitals:   Temp (24hrs), Av 9 °F (37 2 °C), Min:98 6 °F (37 °C), Max:99 °F (37 2 °C)    Temp:  [98 6 °F (37 °C)-99 °F (37 2 °C)] 98 9 °F (37 2 °C)  HR:  [] 94  Resp:  [16-18] 18  BP: ()/(48-67) 111/67  SpO2:  [92 %-98 %] 94 %  Body mass index is 28 22 kg/m²  Input and Output Summary (last 24 hours): Intake/Output Summary (Last 24 hours) at 2019 1607  Last data filed at 2019 1401  Gross per 24 hour   Intake 720 ml   Output    Net 720 ml       Physical Exam:     Physical Exam   Constitutional: He is oriented to person, place, and time  Appears uncomfortable trying to have a bowel movement   HENT:   Mouth/Throat: Oropharynx is clear and moist  No oropharyngeal exudate  Eyes: Pupils are equal, round, and reactive to light  Cardiovascular: Normal rate and regular rhythm  No murmur heard  Pulmonary/Chest: Effort normal    Abdominal: Soft  He exhibits distension (Mildly)  There is no tenderness  Decreased bowel sounds   Genitourinary:   Genitourinary Comments: Stable to slightly improved scrotal edema   Musculoskeletal: He exhibits no edema  Lower extremity wounds are completely wrapped with bandages an Ace bandage  No obvious drainage through the bandages  No cellulitis proximal to the areas of bandage  Neurological: He is alert and oriented to person, place, and time  No sensory deficit  He exhibits normal muscle tone  Vitals reviewed        Additional Data:     Labs:    Results from last 7 days   Lab Units 19  0346   WBC Thousand/uL 7 95   HEMOGLOBIN g/dL 7 3*   HEMATOCRIT % 24 3*   PLATELETS Thousands/uL 123*   NEUTROS PCT % 75   LYMPHS PCT % 7*   MONOS PCT % 9   EOS PCT % 7*     Results from last 7 days   Lab Units 19  0346   POTASSIUM mmol/L 4 7   CHLORIDE mmol/L 101   CO2 mmol/L 26   BUN mg/dL 27*   CREATININE mg/dL 4 23*   CALCIUM mg/dL 8 3   ALK PHOS U/L 216*   ALT U/L 6*   AST U/L 21     Results from last 7 days   Lab Units 10/26/19  1745   INR  1 08       * I Have Reviewed All Lab Data Listed Above  * Additional Pertinent Lab Tests Reviewed: All Labs Within Last 24 Hours Reviewed    Imaging:    Imaging Reports Reviewed Today Include: No new imaging  Imaging Personally Reviewed by Myself Includes: None    Recent Cultures (last 7 days):     Results from last 7 days   Lab Units 10/31/19  2306 10/26/19  2023 10/26/19  1745   BLOOD CULTURE  No Growth at 24 hrs  No Growth at 24 hrs   --  No Growth After 5 Days  No Growth After 5 Days     GRAM STAIN RESULT   --  3+ Polys*  3+ Gram Positive Rods Resembling Diphtheroids*  2+ Gram positive cocci in pairs*  --    WOUND CULTURE   --  2+ Growth of Proteus mirabilis*  2+ Growth of Methicillin Resistant Staphylococcus aureus*  4+ Growth of   --        Last 24 Hours Medication List:     Current Facility-Administered Medications:  acetaminophen 650 mg Oral Q6H PRN Gini Strong DPM    acetaminophen 650 mg Oral Q6H [address] Gini Strong DPM    b complex-vitamin C-folic acid 1 capsule Oral Daily With Aura NANO Olvera    bisacodyl 10 mg Rectal Once Mat Griffin, DO    calcium acetate 1,334 mg Oral TID With Meals Gini Strong DPM    cefepime 1,000 mg Intravenous Q24H Mat Griffin, DO Last Rate: 1,000 mg (11/01/19 2145)   collagenase  Topical Daily Gini Strong DPM    docusate sodium 100 mg Oral BID Mat Griffin,     epoetin bob 8,000 Units Intravenous After Dialysis Gini Strong DPM    gabapentin 100 mg Oral Daily Gini Strong DPM    heparin (porcine) 5,000 Units Subcutaneous [facility] Gini Strong DPM    HYDROmorphone 1 mg Intravenous Q4H PRN Mat Griffin, DO    metoprolol tartrate 25 mg Oral Q12H [address] Gini Strong DPM    ondansetron 4 mg Intravenous Q6H PRN Airam Rogers Bharti Coffee, DPM    oxyCODONE 10 mg Oral Q4H PRN Lennis Sandrita, DPM    oxyCODONE 5 mg Oral Q4H PRN Lennis Sandrita, DPM    pantoprazole 40 mg Oral Early Morning Lennis Sandrita, DPM    senna 2 tablet Oral BID PRN Inderjit Chong DO    sevelamer 800 mg Oral TID With Meals Lennis Sandrita, DPM    torsemide 40 mg Oral BID (diuretic) Lennis Sandrita, DPM    vancomycin 10 mg/kg Intravenous After Dialysis Inderjit Chong DO Last Rate: 750 mg (11/01/19 1004)        Today, Patient Was Seen By: Inderjit Chong DO    ** Please Note: This note has been constructed using a voice recognition system   **

## 2019-11-02 NOTE — PROGRESS NOTES
Vancomycin IV Pharmacy-to-Dose Consultation    Elmer Hills is a 64 y o  male who is currently receiving Vancomycin IV with management by the Pharmacy Consult service for the treatment of skin and soft tissue infection  Assessment/Plan:    The patient's chart was reviewed  There are no signs or symptoms of nephrotoxicity and/or infusion reactions documented  The following nephrotoxicity factors are present:  Medications: None  Patient-Factors: renal dysfunction    Based on today's assessment, will continue current vancomycin (Day # 3) dosing of 750mg IV after each dialysis session, with a plan for trough to be drawn at 0600 on 11/4  We will continue to follow the patient's culture results and clinical progress daily        Ping Brown, PharmD   Pharmacist

## 2019-11-02 NOTE — ASSESSMENT & PLAN NOTE
· Dulcolax suppository now  · Add Colace scheduled  · Add BID PRN senna  · Additional adjustments as needed  · Monitor stools

## 2019-11-02 NOTE — PLAN OF CARE
Problem: Prexisting or High Potential for Compromised Skin Integrity  Goal: Skin integrity is maintained or improved  Description  INTERVENTIONS:  - Identify patients at risk for skin breakdown  - Assess and monitor skin integrity  - Assess and monitor nutrition and hydration status  - Monitor labs   - Assess for incontinence   - Turn and reposition patient  - Assist with mobility/ambulation  - Relieve pressure over bony prominences  - Avoid friction and shearing  - Provide appropriate hygiene as needed including keeping skin clean and dry  - Evaluate need for skin moisturizer/barrier cream  - Collaborate with interdisciplinary team   - Patient/family teaching  - Consider wound care consult   Outcome: Progressing     Problem: Potential for Falls  Goal: Patient will remain free of falls  Description  INTERVENTIONS:  - Assess patient frequently for physical needs  -  Identify cognitive and physical deficits and behaviors that affect risk of falls    -  [place] fall precautions as indicated by assessment   - Educate patient/family on patient safety including physical limitations  - Instruct patient to call for assistance with activity based on assessment  - Modify environment to reduce risk of injury  - Consider OT/PT consult to assist with strengthening/mobility  Outcome: Progressing     Problem: INFECTION - ADULT  Goal: Absence or prevention of progression during hospitalization  Description  INTERVENTIONS:  - Assess and monitor for signs and symptoms of infection  - Monitor lab/diagnostic results  - Monitor all insertion sites, i e  indwelling lines, tubes, and drains  - Monitor endotracheal if appropriate and nasal secretions for changes in amount and color  - [place] appropriate cooling/warming therapies per order  - Administer medications as ordered  - Instruct and encourage patient and family to use good hand hygiene technique  - Identify and instruct in appropriate isolation precautions for identified infection/condition  Outcome: Progressing     Problem: METABOLIC, FLUID AND ELECTROLYTES - ADULT  Goal: Electrolytes maintained within normal limits  Description  INTERVENTIONS:  - Monitor labs and assess patient for signs and symptoms of electrolyte imbalances  - Administer electrolyte replacement as ordered  - Monitor response to electrolyte replacements, including repeat lab results as appropriate  - Instruct patient on fluid and nutrition as appropriate  Outcome: Progressing  Goal: Fluid balance maintained  Description  INTERVENTIONS:  - Monitor labs   - Monitor I/O and WT  - Instruct patient on fluid and nutrition as appropriate  - Assess for signs & symptoms of volume excess or deficit  Outcome: Progressing     Problem: SKIN/TISSUE INTEGRITY - ADULT  Goal: Skin integrity remains intact  Description  INTERVENTIONS  - Identify patients at risk for skin breakdown  - Assess and monitor skin integrity  - Assess and monitor nutrition and hydration status  - Monitor labs (i e  albumin)  - Assess for incontinence   - Turn and reposition patient  - Assist with mobility/ambulation  - Relieve pressure over bony prominences  - Avoid friction and shearing  - Provide appropriate hygiene as needed including keeping skin clean and dry  - Evaluate need for skin moisturizer/barrier cream  - Collaborate with interdisciplinary team (i e  Nutrition, Rehabilitation, etc )   - Patient/family teaching  Outcome: Progressing  Goal: Incision(s), wounds(s) or drain site(s) healing without S/S of infection  Description  INTERVENTIONS  - Assess and document risk factors for skin impairment   - Assess and document dressing, incision, wound bed, drain sites and surrounding tissue  - Consider nutrition services referral as needed  - Oral mucous membranes remain intact  - Provide patient/ family education  Outcome: Progressing  Goal: Oral mucous membranes remain intact  Description  INTERVENTIONS  - Assess oral mucosa and hygiene practices  - Implement preventative oral hygiene regimen  - Implement oral medicated treatments as ordered  - Initiate Nutrition services referral as needed  Outcome: Progressing     Problem: Nutrition/Hydration-ADULT  Goal: Nutrient/Hydration intake appropriate for improving, restoring or maintaining nutritional needs  Description  Monitor and assess patient's nutrition/hydration status for malnutrition  Collaborate with interdisciplinary team and initiate plan and interventions as ordered  Monitor patient's weight and dietary intake as ordered or per policy  Utilize nutrition screening tool and intervene as necessary  Determine patient's food preferences and provide high-protein, high-caloric foods as appropriate       INTERVENTIONS:  - Monitor oral intake, urinary output, labs, and treatment plans  - Assess nutrition and hydration status and recommend course of action  - Evaluate amount of meals eaten  - Assist patient with eating if necessary   - Allow adequate time for meals  - Recommend/ encourage appropriate diets, oral nutritional supplements, and vitamin/mineral supplements  - Order, calculate, and assess calorie counts as needed  - Recommend, monitor, and adjust tube feedings and TPN/PPN based on assessed needs  - Assess need for intravenous fluids  - Provide specific nutrition/hydration education as appropriate  - Include patient/family/caregiver in decisions related to nutrition  Outcome: Progressing

## 2019-11-02 NOTE — ASSESSMENT & PLAN NOTE
· There was a slight drop in hemoglobin during procedure    · Consider transfusion with dialysis prior to any additional procedure, if needed to get hemoglobin over 8   · Monitor counts - CBC in am

## 2019-11-02 NOTE — ASSESSMENT & PLAN NOTE
· Procedure - Debridement, removal of Eschar on 10/31/2019 by Dr Madi Hanley  · Podiatry consulted plastic surgery for possible graft vs  Flap procedure  Plastic surgery recommends additional debridement first, but then tentatively anticipates STSG 11/7/2019, pending evaluation by Dr Sherren Kitten  · Monitor for any signs of Infection (felt to be at risk) -   · Had hypotension and fever perioperatively  Per podiatry, no obvious infected tissue seen during procedure, but at high risk for development of infection  · Antibiotic - Continue Vancomycin / Cefepime (renal dosed)  · Follow up blood cultures from 10/31/2019  · Monitor temperatures and serial leg exams  · Pain Control -   · Oxycodone 5-10 mg  · Breakthrough pain - Dilaudid 1 mg IV  · Monitor pain levels    · Supportive care

## 2019-11-03 PROCEDURE — 94762 N-INVAS EAR/PLS OXIMTRY CONT: CPT

## 2019-11-03 PROCEDURE — 99232 SBSQ HOSP IP/OBS MODERATE 35: CPT | Performed by: INTERNAL MEDICINE

## 2019-11-03 PROCEDURE — 94760 N-INVAS EAR/PLS OXIMETRY 1: CPT

## 2019-11-03 RX ADMIN — DOCUSATE SODIUM 100 MG: 100 CAPSULE, LIQUID FILLED ORAL at 18:22

## 2019-11-03 RX ADMIN — CALCIUM ACETATE 1334 MG: 667 CAPSULE ORAL at 18:22

## 2019-11-03 RX ADMIN — ACETAMINOPHEN 650 MG: 325 TABLET, FILM COATED ORAL at 12:30

## 2019-11-03 RX ADMIN — METOPROLOL TARTRATE 25 MG: 25 TABLET, FILM COATED ORAL at 21:03

## 2019-11-03 RX ADMIN — SEVELAMER HYDROCHLORIDE 800 MG: 800 TABLET, FILM COATED PARENTERAL at 12:27

## 2019-11-03 RX ADMIN — ACETAMINOPHEN 650 MG: 325 TABLET, FILM COATED ORAL at 06:32

## 2019-11-03 RX ADMIN — METOPROLOL TARTRATE 25 MG: 25 TABLET, FILM COATED ORAL at 12:26

## 2019-11-03 RX ADMIN — SEVELAMER HYDROCHLORIDE 800 MG: 800 TABLET, FILM COATED PARENTERAL at 18:22

## 2019-11-03 RX ADMIN — OXYCODONE HYDROCHLORIDE 10 MG: 10 TABLET ORAL at 11:03

## 2019-11-03 RX ADMIN — Medication 1 CAPSULE: at 18:22

## 2019-11-03 RX ADMIN — SEVELAMER HYDROCHLORIDE 800 MG: 800 TABLET, FILM COATED PARENTERAL at 08:06

## 2019-11-03 RX ADMIN — OXYCODONE HYDROCHLORIDE 10 MG: 10 TABLET ORAL at 06:46

## 2019-11-03 RX ADMIN — DOCUSATE SODIUM 100 MG: 100 CAPSULE, LIQUID FILLED ORAL at 08:06

## 2019-11-03 RX ADMIN — ACETAMINOPHEN 650 MG: 325 TABLET, FILM COATED ORAL at 18:22

## 2019-11-03 RX ADMIN — GABAPENTIN 100 MG: 100 CAPSULE ORAL at 08:06

## 2019-11-03 RX ADMIN — HEPARIN SODIUM 5000 UNITS: 5000 INJECTION INTRAVENOUS; SUBCUTANEOUS at 06:31

## 2019-11-03 RX ADMIN — HEPARIN SODIUM 5000 UNITS: 5000 INJECTION INTRAVENOUS; SUBCUTANEOUS at 21:04

## 2019-11-03 RX ADMIN — CALCIUM ACETATE 1334 MG: 667 CAPSULE ORAL at 12:30

## 2019-11-03 RX ADMIN — HEPARIN SODIUM 5000 UNITS: 5000 INJECTION INTRAVENOUS; SUBCUTANEOUS at 14:49

## 2019-11-03 RX ADMIN — COLLAGENASE SANTYL 1 APPLICATION: 250 OINTMENT TOPICAL at 11:45

## 2019-11-03 RX ADMIN — CALCIUM ACETATE 1334 MG: 667 CAPSULE ORAL at 08:06

## 2019-11-03 RX ADMIN — OXYCODONE HYDROCHLORIDE 10 MG: 10 TABLET ORAL at 18:22

## 2019-11-03 RX ADMIN — PANTOPRAZOLE SODIUM 40 MG: 40 TABLET, DELAYED RELEASE ORAL at 06:31

## 2019-11-03 RX ADMIN — Medication 1000 MG: at 20:55

## 2019-11-03 NOTE — ASSESSMENT & PLAN NOTE
· Procedure - Debridement, removal of Eschar on 10/31/2019 by Dr Fozia Peña  · Podiatry consulted plastic surgery for possible graft vs  Flap procedure  Plastic surgery recommends additional debridement first, but then tentatively anticipates STSG 11/7/2019, pending evaluation by Dr Joana Olguin  · Monitor for any signs of Infection (felt to be at risk) -   · Had hypotension and fever perioperatively  Per podiatry, no obvious infected tissue seen during procedure, but at high risk for development of infection  · Antibiotic - Change cefepime to cefazolin  Continue on vancomycin  · Follow up final results of blood cultures from 10/31/2019  · Monitor temperatures and serial leg exams  · Pain Control -   · Oxycodone 5-10 mg  · Breakthrough pain - Dilaudid 1 mg IV  · Monitor pain levels    · Supportive care

## 2019-11-03 NOTE — PROGRESS NOTES
Vancomycin IV Pharmacy-to-Dose Consultation    Alana Kemp is a 64 y o  male who is currently receiving Vancomycin IV with management by the Pharmacy Consult service for the treatment of skin and soft tissue infection  Assessment/Plan:    The patient's chart was reviewed  There are no signs or symptoms of nephrotoxicity and/or infusion reactions documented  The following nephrotoxicity factors are present:  Medications: diuretics  Patient-Factors: None    Based on today's assessment, will continue current vancomycin (Day # 4) dosing of 750mg IV after each dialysis session, with a plan for level to be drawn prior to dialysis on 11/4  We will continue to follow the patient's culture results and clinical progress daily        Kyle Graham, PharmD   Pharmacist

## 2019-11-03 NOTE — PROGRESS NOTES
Progress Note - Shell Manzano 1960, 64 y o  male MRN: [id number]    Unit/Bed#: S -01 Encounter: [id number]    Primary Care Provider: Carrillo Kelley   Date and time admitted to hospital: 10/26/2019  5:10 PM        * Multiple and open wound of lower limb  Assessment & Plan  · Procedure - Debridement, removal of Eschar on 10/31/2019 by Dr Khushi Christianson  · Podiatry consulted plastic surgery for possible graft vs  Flap procedure  Plastic surgery recommends additional debridement first, but then tentatively anticipates STSG 11/7/2019, pending evaluation by Dr José Jeffery  · Monitor for any signs of Infection (felt to be at risk) -   · Had hypotension and fever perioperatively  Per podiatry, no obvious infected tissue seen during procedure, but at high risk for development of infection  · Antibiotic - Change cefepime to cefazolin  Continue on vancomycin  · Follow up final results of blood cultures from 10/31/2019  · Monitor temperatures and serial leg exams  · Pain Control -   · Oxycodone 5-10 mg  · Breakthrough pain - Dilaudid 1 mg IV  · Monitor pain levels  · Supportive care    Scrotal swelling  Assessment & Plan  · Ultrasound showed "Large complex right hydrocele containing numerous septations  Increased vascularity in the left testicle with respect to the right, although both testicles could not be imaged on the same plane limiting direct comparison   Correlate clinically for orchitis "  · Urology saw patient 10/29/2019 and recommends no surgical intervention acutely and to manage problems with legs and get therapy first   · AFP and LDH within normal limits  · Follow up beta HCG  · Supportive care  · Pain management  Monitor pain levels  Atrial fibrillation ([address] )  Assessment & Plan  · Rate / Rhythm control -   · Metoprolol increased to 25 mg bid on 10/29/2019  Not consistently getting medication due to blood pressure parameters  · Monitor on telemetry    Monitor HR and blood pressures  · Anticoagulation - None prehospital   Defer to outpatient team following discharge  ESRD (end stage renal disease) ([facility] [address] )  Assessment & Plan  · On routine dialysis  · Nephrology following  Anemia due to chronic kidney disease, on chronic dialysis and Acute Blood Loss Anemia (HCC)  Assessment & Plan  · There was a slight drop in hemoglobin during procedure  · Consider transfusion with dialysis prior to any additional procedure, if needed to get hemoglobin over 8   · Monitor counts - CBC monitoring  Pressure ulcer, right and left buttocks POA  Assessment & Plan  · Local wound care  · Turn / Reposition frequently  Constipation  Assessment & Plan  · Colace scheduled  · Senna BID PRN  · Additional adjustments as needed  · Monitor stools  VTE Pharmacologic Prophylaxis:   Pharmacologic: Heparin  Mechanical VTE Prophylaxis in Place: No    Patient Centered Rounds: I have performed bedside rounds with nursing staff today  Discussions with Specialists or Other Care Team Provider: None yet today  Education and Discussions with Family / Patient: Updated daughter at bedside  Time Spent for Care: 30 minutes  More than 50% of total time spent on counseling and coordination of care as described above  Current Length of Stay: 8 day(s)  Current Patient Status: Inpatient     Certification Statement: The patient will continue to require additional inpatient hospital stay due to evalaution and treatment of above medical conditions  Discharge Plan / Estimated Discharge Date: No discharge anticipated at least until next weekend  Code Status: Level 1 - Full Code      Subjective: The patient has no acute complaints today  He just had a very large bowel movement and also had another 1 previously last night  The patient is feeling much better in terms of his belly  The patient still has some scrotal pain but it is controlled overall    The patient does have lower extremity pain but overall this is also controlled  The patient is happy with the appearance of his legs compared to previous  I discussed the plan of care with the patient as well as his daughter who did show up at his room when I was visiting with the patient  Objective:     Vitals:   Temp (24hrs), Av 4 °F (36 9 °C), Min:96 5 °F (35 8 °C), Max:99 °F (37 2 °C)    Temp:  [96 5 °F (35 8 °C)-99 °F (37 2 °C)] 98 7 °F (37 1 °C)  HR:  [] 94  Resp:  [18] 18  BP: ()/(54-67) 114/62  SpO2:  [91 %-95 %] 91 %  Body mass index is 28 22 kg/m²  Input and Output Summary (last 24 hours): Intake/Output Summary (Last 24 hours) at 11/3/2019 0846  Last data filed at 2019 2227  Gross per 24 hour   Intake 720 ml   Output    Net 720 ml       Physical Exam:     Physical Exam   Constitutional: He is oriented to person, place, and time  No distress  HENT:   Mouth/Throat: Oropharynx is clear and moist  No oropharyngeal exudate  Eyes: Pupils are equal, round, and reactive to light  Cardiovascular: Normal rate and regular rhythm  Pulmonary/Chest: Effort normal  He has no wheezes  He has no rales  Abdominal: Soft  Bowel sounds are normal  He exhibits no distension  There is no tenderness  Genitourinary:   Genitourinary Comments: Scrotal edema is persistently large but not increased  Some tenderness still of the scrotum itself  Musculoskeletal: He exhibits no edema or tenderness  Lower extremities are wrapped with bandages  No significant drainage through the bandage is currently  Neurological: He is alert and oriented to person, place, and time  Vitals reviewed      Additional Data:     Labs:    Results from last 7 days   Lab Units 19  0346   WBC Thousand/uL 7 95   HEMOGLOBIN g/dL 7 3*   HEMATOCRIT % 24 3*   PLATELETS Thousands/uL 123*   NEUTROS PCT % 75   LYMPHS PCT % 7*   MONOS PCT % 9   EOS PCT % 7*     Results from last 7 days   Lab Units 19  0346   POTASSIUM mmol/L 4 7   CHLORIDE mmol/L 101   CO2 mmol/L 26   BUN mg/dL 27*   CREATININE mg/dL 4 23*   CALCIUM mg/dL 8 3   ALK PHOS U/L 216*   ALT U/L 6*   AST U/L 21           * I Have Reviewed All Lab Data Listed Above  * Additional Pertinent Lab Tests Reviewed: All Labs Within Last 24 Hours Reviewed    Imaging:    Imaging Reports Reviewed Today Include: No new imaging  Imaging Personally Reviewed by Myself Includes:  None    Recent Cultures (last 7 days):     Results from last 7 days   Lab Units 10/31/19  2306   BLOOD CULTURE  No Growth at 48 hrs  No Growth at 48 hrs         Last 24 Hours Medication List:     Current Facility-Administered Medications:  acetaminophen 650 mg Oral Q6H PRN No Causey DPM    acetaminophen 650 mg Oral Q6H [address] No Causey DPM    b complex-vitamin C-folic acid 1 capsule Oral Daily With Limmie Brunswick, NANO    calcium acetate 1,334 mg Oral TID With Meals No Causey DPM    cefepime 1,000 mg Intravenous Q24H Tori Bauman, DO Last Rate: 1,000 mg (11/02/19 2154)   collagenase  Topical Daily No Causey DPM    docusate sodium 100 mg Oral BID Tori Bauman, DO    epoetin bob 8,000 Units Intravenous After Dialysis No Causey DPM    gabapentin 100 mg Oral Daily No Causey DPM    heparin (porcine) 5,000 Units Subcutaneous [facility] No Causey DPM    HYDROmorphone 1 mg Intravenous Q4H PRN Tori Bauman, DO    metoprolol tartrate 25 mg Oral Q12H [address] No Causey DPM    ondansetron 4 mg Intravenous Q6H PRN No Causey DPM    oxyCODONE 10 mg Oral Q4H PRN No Causey DPM    oxyCODONE 5 mg Oral Q4H PRN No Causey DPM    pantoprazole 40 mg Oral Early Morning No Causey DPM    senna 2 tablet Oral BID PRN Tori Bauman, DO    sevelamer 800 mg Oral TID With Meals No Causey DPM    torsemide 40 mg Oral BID (diuretic) No Causey DPM    vancomycin 10 mg/kg Intravenous After Dialysis Tori Bauman,  Last Rate: 750 mg (11/01/19 1004)        Today, Patient Was Seen By: Violette Naylor Radha Comment, DO    ** Please Note: This note has been constructed using a voice recognition system   **

## 2019-11-04 ENCOUNTER — APPOINTMENT (INPATIENT)
Dept: DIALYSIS | Facility: HOSPITAL | Age: 59
DRG: 576 | End: 2019-11-04
Payer: MEDICARE

## 2019-11-04 LAB — VANCOMYCIN SERPL-MCNC: 18.3 UG/ML

## 2019-11-04 PROCEDURE — 90935 HEMODIALYSIS ONE EVALUATION: CPT | Performed by: INTERNAL MEDICINE

## 2019-11-04 PROCEDURE — 5A1D70Z PERFORMANCE OF URINARY FILTRATION, INTERMITTENT, LESS THAN 6 HOURS PER DAY: ICD-10-PCS | Performed by: INTERNAL MEDICINE

## 2019-11-04 PROCEDURE — 99232 SBSQ HOSP IP/OBS MODERATE 35: CPT | Performed by: INTERNAL MEDICINE

## 2019-11-04 PROCEDURE — 80202 ASSAY OF VANCOMYCIN: CPT | Performed by: INTERNAL MEDICINE

## 2019-11-04 PROCEDURE — P9016 RBC LEUKOCYTES REDUCED: HCPCS

## 2019-11-04 RX ORDER — VANCOMYCIN HYDROCHLORIDE 1 G/200ML
12.5 INJECTION, SOLUTION INTRAVENOUS
Status: DISCONTINUED | OUTPATIENT
Start: 2019-11-04 | End: 2019-11-08

## 2019-11-04 RX ADMIN — CALCIUM ACETATE 1334 MG: 667 CAPSULE ORAL at 09:48

## 2019-11-04 RX ADMIN — TORSEMIDE 40 MG: 20 TABLET ORAL at 09:50

## 2019-11-04 RX ADMIN — OXYCODONE HYDROCHLORIDE 10 MG: 10 TABLET ORAL at 09:51

## 2019-11-04 RX ADMIN — VANCOMYCIN HYDROCHLORIDE 1000 MG: 1 INJECTION, SOLUTION INTRAVENOUS at 17:58

## 2019-11-04 RX ADMIN — TORSEMIDE 40 MG: 20 TABLET ORAL at 17:59

## 2019-11-04 RX ADMIN — OXYCODONE HYDROCHLORIDE 10 MG: 10 TABLET ORAL at 22:21

## 2019-11-04 RX ADMIN — HYDROMORPHONE HYDROCHLORIDE 1 MG: 1 INJECTION, SOLUTION INTRAMUSCULAR; INTRAVENOUS; SUBCUTANEOUS at 17:59

## 2019-11-04 RX ADMIN — PANTOPRAZOLE SODIUM 40 MG: 40 TABLET, DELAYED RELEASE ORAL at 05:32

## 2019-11-04 RX ADMIN — DOCUSATE SODIUM 100 MG: 100 CAPSULE, LIQUID FILLED ORAL at 09:48

## 2019-11-04 RX ADMIN — METOPROLOL TARTRATE 25 MG: 25 TABLET, FILM COATED ORAL at 09:49

## 2019-11-04 RX ADMIN — CALCIUM ACETATE 1334 MG: 667 CAPSULE ORAL at 17:59

## 2019-11-04 RX ADMIN — COLLAGENASE SANTYL: 250 OINTMENT TOPICAL at 09:51

## 2019-11-04 RX ADMIN — ACETAMINOPHEN 650 MG: 325 TABLET, FILM COATED ORAL at 00:40

## 2019-11-04 RX ADMIN — DOCUSATE SODIUM 100 MG: 100 CAPSULE, LIQUID FILLED ORAL at 17:59

## 2019-11-04 RX ADMIN — SEVELAMER HYDROCHLORIDE 800 MG: 800 TABLET, FILM COATED PARENTERAL at 17:59

## 2019-11-04 RX ADMIN — Medication 1000 MG: at 20:59

## 2019-11-04 RX ADMIN — GABAPENTIN 100 MG: 100 CAPSULE ORAL at 09:48

## 2019-11-04 RX ADMIN — ACETAMINOPHEN 650 MG: 325 TABLET, FILM COATED ORAL at 17:58

## 2019-11-04 RX ADMIN — METOPROLOL TARTRATE 25 MG: 25 TABLET, FILM COATED ORAL at 21:03

## 2019-11-04 RX ADMIN — HEPARIN SODIUM 5000 UNITS: 5000 INJECTION INTRAVENOUS; SUBCUTANEOUS at 21:03

## 2019-11-04 RX ADMIN — SEVELAMER HYDROCHLORIDE 800 MG: 800 TABLET, FILM COATED PARENTERAL at 09:50

## 2019-11-04 RX ADMIN — HEPARIN SODIUM 5000 UNITS: 5000 INJECTION INTRAVENOUS; SUBCUTANEOUS at 05:31

## 2019-11-04 RX ADMIN — Medication 1 CAPSULE: at 17:59

## 2019-11-04 RX ADMIN — EPOETIN ALFA 10000 UNITS: 10000 SOLUTION INTRAVENOUS; SUBCUTANEOUS at 14:15

## 2019-11-04 NOTE — PROGRESS NOTES
Progress Note - Felicia Rivas 1960, 64 y o  male MRN: [id number]    Unit/Bed#: S -01 Encounter: [id number]    Primary Care Provider: Jason Campbell   Date and time admitted to hospital: 10/26/2019  5:10 PM        * Multiple and open wound of lower limb  Assessment & Plan  · Procedure - Debridement, removal of Eschar on 10/31/2019 by Dr Herrera Pavon  · Podiatry consulted plastic surgery for possible graft vs  Flap procedure  Plastic surgery recommends additional debridement first, but then tentatively anticipates STSG 11/7/2019, pending evaluation by Dr Chand Speaker (seen by his AP already)  · Monitor for any signs of Infection (felt to be at risk) -   · Had hypotension and fever perioperatively  Per podiatry, no obvious infected tissue seen during procedure, but at high risk for development of infection  · Antibiotic - Change cefepime to cefazolin  Continue on vancomycin  · Follow up final results of blood cultures from 10/31/2019  · Monitor temperatures and serial leg exams  · Pain Control -   · Oxycodone 5-10 mg  · Breakthrough pain - Dilaudid 1 mg IV  · Monitor pain levels  · Supportive care    Scrotal swelling  Assessment & Plan  · Ultrasound showed "Large complex right hydrocele containing numerous septations  Increased vascularity in the left testicle with respect to the right, although both testicles could not be imaged on the same plane limiting direct comparison   Correlate clinically for orchitis "  · Urology saw patient 10/29/2019 and recommends no surgical intervention acutely and to manage problems with legs and get therapy first   · AFP and LDH within normal limits  · Follow up beta HCG  · Supportive care  · Pain management  Monitor pain levels  Atrial fibrillation ([address] )  Assessment & Plan  · Rate / Rhythm control -   · Metoprolol increased to 25 mg bid on 10/29/2019  Not consistently getting medication due to blood pressure parameters  · Monitor on telemetry  Monitor HR and blood pressures  · Anticoagulation - None prehospital   Defer to outpatient team following discharge  ESRD (end stage renal disease) ([facility] [address] )  Assessment & Plan  · On routine dialysis  · Nephrology following  Anemia due to chronic kidney disease, on chronic dialysis and Acute Blood Loss Anemia (HCC)  Assessment & Plan  · There was a slight drop in hemoglobin during procedure  · Will transfuse unit of PRBC today on dialysis to improve counts in anticipation of procedure(s) this week  Discussed with nephrology  · Monitor counts - CBC monitoring  Pressure ulcer, right and left buttocks POA  Assessment & Plan  · Local wound care  · Turn / Reposition frequently  Constipation  Assessment & Plan  · Colace scheduled  · Senna BID PRN  · Additional adjustments as needed  · Monitor stools  VTE Pharmacologic Prophylaxis:   Pharmacologic: Heparin  Mechanical VTE Prophylaxis in Place: No    Patient Centered Rounds: I have performed bedside rounds with nursing staff today  Discussions with Specialists or Other Care Team Provider: Group tiger text being sent to podiatry and plastic surgery to coordinate plans  Also Aurea Menendez with Nephrology  Education and Discussions with Family / Patient: Patient only  No new updates since I spoke to daughter yesterday  Time Spent for Care: 30 minutes  More than 50% of total time spent on counseling and coordination of care as described above  Current Length of Stay: 9 day(s)  Current Patient Status: Inpatient     Certification Statement: The patient will continue to require additional inpatient hospital stay due to evaluation and treatment for lower extremity wounds which will likely require additional procedure(s) later this week  Discharge Plan / Estimated Discharge Date: Next weekend or early next week  Code Status: Level 1 - Full Code      Subjective: The patient has no acute complaints today  Pain is overall controlled  Patient continues to have bowel movements  No dizziness or lightheadedness  No nausea vomiting  The patient is patient waiting additional surgical procedures  The patient denies any shortness of breath or chest pain  Objective:     Vitals:   Temp (24hrs), Av 9 °F (37 2 °C), Min:98 7 °F (37 1 °C), Max:99 °F (37 2 °C)    Temp:  [98 7 °F (37 1 °C)-99 °F (37 2 °C)] 98 7 °F (37 1 °C)  HR:  [] 88  Resp:  [18] 18  BP: (106-118)/(54-70) 116/70  SpO2:  [94 %-95 %] 95 %  Body mass index is 28 22 kg/m²  Input and Output Summary (last 24 hours): Intake/Output Summary (Last 24 hours) at 2019 1005  Last data filed at 11/3/2019 1658  Gross per 24 hour   Intake 480 ml   Output    Net 480 ml       Physical Exam:     Physical Exam   Constitutional: He is oriented to person, place, and time  No distress  HENT:   Mouth/Throat: Oropharynx is clear and moist  No oropharyngeal exudate  Eyes: Pupils are equal, round, and reactive to light  Neck: Neck supple  No JVD present  Cardiovascular: Normal rate and regular rhythm  No murmur heard  Pulmonary/Chest: Effort normal  He has no wheezes  He has no rales  Abdominal: Soft  Bowel sounds are normal  There is no tenderness  Much less distention   Genitourinary:   Genitourinary Comments: Scrotal edema   Musculoskeletal: He exhibits no edema  The patient has bandages wrapping both of his lower extremities  He is without any obvious drainage into the bandages  No new swelling or erythema noted  Lymphadenopathy:     He has no cervical adenopathy  Neurological: He is alert and oriented to person, place, and time  Skin: Skin is warm and dry  He is not diaphoretic         Additional Data:     Labs:    Results from last 7 days   Lab Units 19  0346   WBC Thousand/uL 7 95   HEMOGLOBIN g/dL 7 3*   HEMATOCRIT % 24 3*   PLATELETS Thousands/uL 123*   NEUTROS PCT % 75   LYMPHS PCT % 7*   MONOS PCT % 9   EOS PCT % 7*     Results from last 7 days Lab Units 11/02/19  0346   POTASSIUM mmol/L 4 7   CHLORIDE mmol/L 101   CO2 mmol/L 26   BUN mg/dL 27*   CREATININE mg/dL 4 23*   CALCIUM mg/dL 8 3   ALK PHOS U/L 216*   ALT U/L 6*   AST U/L 21           * I Have Reviewed All Lab Data Listed Above  * Additional Pertinent Lab Tests Reviewed: No New Labs Available For Today    Imaging:    Imaging Reports Reviewed Today Include: No new imaging reports  Imaging Personally Reviewed by Myself Includes:NOne    Recent Cultures (last 7 days):     Results from last 7 days   Lab Units 10/31/19  2306   BLOOD CULTURE  No Growth at 72 hrs  No Growth at 72 hrs         Last 24 Hours Medication List:     Current Facility-Administered Medications:  acetaminophen 650 mg Oral Q6H PRN Susan Creamer, DPM    acetaminophen 650 mg Oral Q6H [address] Susan Creamer, DPM    b complex-vitamin C-folic acid 1 capsule Oral Daily With Jalaine Diya, DPM    calcium acetate 1,334 mg Oral TID With Meals Susan Creamer, DPM    cefepime 1,000 mg Intravenous Q24H Antwan Reeves, DO Last Rate: 1,000 mg (11/03/19 2055)   collagenase  Topical Daily Susan Creamer, DPM    docusate sodium 100 mg Oral BID Antwan Reeves DO    epoetin bob 8,000 Units Intravenous After Dialysis Susan Creamer, DPM    gabapentin 100 mg Oral Daily Susan Creamer, DPM    heparin (porcine) 5,000 Units Subcutaneous [facility] Susan Creamer, DPM    HYDROmorphone 1 mg Intravenous Q4H PRN Antwan Reeves, DO    metoprolol tartrate 25 mg Oral Q12H [address] Susan Creamer, DPM    ondansetron 4 mg Intravenous Q6H PRN Susan Creamer, DPM    oxyCODONE 10 mg Oral Q4H PRN Susan Creamer, DPM    oxyCODONE 5 mg Oral Q4H PRN Susan Creamer, DPM    pantoprazole 40 mg Oral Early Morning Susan Creamer, DPM    senna 2 tablet Oral BID PRN Antwan Reeves, DO    sevelamer 800 mg Oral TID With Meals Susan Creamer, DPM    torsemide 40 mg Oral BID (diuretic) Susan Creamer, DPM    vancomycin 10 mg/kg Intravenous After Dialysis Antwan Reeves,  Last Rate: 750 mg (11/01/19 1004)        Today, Patient Was Seen By: Lionel Jarvis DO    ** Please Note: This note has been constructed using a voice recognition system   **

## 2019-11-04 NOTE — ASSESSMENT & PLAN NOTE
· Procedure - Debridement, removal of Eschar on 10/31/2019 by Dr Marie Katz  · Podiatry consulted plastic surgery for possible graft vs  Flap procedure  Plastic surgery recommends additional debridement first, but then tentatively anticipates STSG 11/7/2019, pending evaluation by Dr Christal nAn (seen by his AP already)  · Monitor for any signs of Infection (felt to be at risk) -   · Had hypotension and fever perioperatively  Per podiatry, no obvious infected tissue seen during procedure, but at high risk for development of infection  · Antibiotic - Change cefepime to cefazolin  Continue on vancomycin  · Follow up final results of blood cultures from 10/31/2019  · Monitor temperatures and serial leg exams  · Pain Control -   · Oxycodone 5-10 mg  · Breakthrough pain - Dilaudid 1 mg IV  · Monitor pain levels    · Supportive care

## 2019-11-04 NOTE — PLAN OF CARE
Problem: Prexisting or High Potential for Compromised Skin Integrity  Goal: Skin integrity is maintained or improved  Description  INTERVENTIONS:  - Identify patients at risk for skin breakdown  - Assess and monitor skin integrity  - Assess and monitor nutrition and hydration status  - Monitor labs   - Assess for incontinence   - Turn and reposition patient  - Assist with mobility/ambulation  - Relieve pressure over bony prominences  - Avoid friction and shearing  - Provide appropriate hygiene as needed including keeping skin clean and dry  - Evaluate need for skin moisturizer/barrier cream  - Collaborate with interdisciplinary team   - Patient/family teaching  - Consider wound care consult   11/3/2019 2249 by Fawn Martinez RN  Outcome: Progressing  11/3/2019 2239 by Fawn Martinez, RN  Outcome: Progressing

## 2019-11-04 NOTE — PROGRESS NOTES
Progress Note - Podiatry  Jazmín Sylvester 64 y o  male MRN: [id number]  Unit/Bed#: S -01 Encounter: [id number]    Assessment/Plan:  Wounds of the b/l LE and feet    - Will plan for repeat debridement on 11/5 at 1700  - clear liquid breakfast after midnight, npo after 8AM  - long discussion with patient regarding plan and he is in agreement  - Plastics planning STSG for hopefully 11/7    Subjective/Objective   Chief Complaint:   Chief Complaint   Patient presents with    Cellulitis     Pt states that he has cellulitis in both lower extremities and a hernia  Pt states that he is afraid to move his legs due to the hernia  EMS reports that nursing home states that pts cellulitis is necrotic  Subjective: 64 y o  y/o male was seen and evaluated at bedside  Offers no overnight complaints  Is curious about the plan for his legs  Blood pressure 116/70, pulse 88, temperature 98 7 °F (37 1 °C), temperature source Tympanic, resp  rate 18, height 5' 8" (1 727 m), weight 84 2 kg (185 lb 10 oz), SpO2 95 %  ,Body mass index is 28 22 kg/m²  Invasive Devices     Peripheral Intravenous Line            Peripheral IV 11/01/19 Right;Ventral (anterior) Forearm 3 days          Line            Temporary HD Catheter -- days                Physical Exam:   General: Alert, cooperative and no distress  Lungs: Non labored breathing  Heart: Positive S1, S2  Abdomen: Soft, non-tender  Extremity:     Dsg to the b/l LE left c/d/i with no evidence of streikthrough  CFT to the digits is within nomral limits  Lab, Imaging and other studies:   I have personally reviewed pertinent lab results  Imaging: I have personally reviewed pertinent films in PACS  EKG, Pathology, and Other Studies: I have personally reviewed pertinent reports

## 2019-11-04 NOTE — HEMODIALYSIS
Stable 2 5hr dialysis treatment  Adequate flow from catheter  1 unit prbc given while on dialysis  Per communication with primary nurse, vancomycin will be given after the end of dialysis, not enough time in treatment to give both blood and vanco  900ml uf removed  Pre bed wt 82 9kg, post 82 8kg, -0 1kg  Epogen 58129 units given during treatment

## 2019-11-04 NOTE — ASSESSMENT & PLAN NOTE
· There was a slight drop in hemoglobin during procedure  · Consider transfusion with dialysis prior to any additional procedure, if needed to get hemoglobin over 8   · Monitor counts - CBC monitoring

## 2019-11-04 NOTE — PLAN OF CARE
Run even on dialysis, increase goal for volume of blood to be given on dialysis per physician      Problem: METABOLIC, FLUID AND ELECTROLYTES - ADULT  Goal: Electrolytes maintained within normal limits  Description  INTERVENTIONS:  - Monitor labs and assess patient for signs and symptoms of electrolyte imbalances  - Administer electrolyte replacement as ordered  - Monitor response to electrolyte replacements, including repeat lab results as appropriate  - Instruct patient on fluid and nutrition as appropriate  Outcome: Progressing     Problem: METABOLIC, FLUID AND ELECTROLYTES - ADULT  Goal: Electrolytes maintained within normal limits  Description  INTERVENTIONS:  - Monitor labs and assess patient for signs and symptoms of electrolyte imbalances  - Administer electrolyte replacement as ordered  - Monitor response to electrolyte replacements, including repeat lab results as appropriate  - Instruct patient on fluid and nutrition as appropriate  Outcome: Progressing

## 2019-11-04 NOTE — PROGRESS NOTES
[address] [address] [facility] NOTE   Sahra Cornell 64 y o  male MRN: [id number]  Unit/Bed#: S -01 Encounter: [id number]  Reason for Consult:  ESRD    ASSESSMENT and PLAN:  1  ESRD:    · Patient dialyzes 4 times a week at [facility]  Current schedule Monday, Tuesday, Thursday, Friday for 2 5 hours each treatment  · Continue current schedule and prescription  · Hemodialysis today  2  Access:  PermCath  3  Multiple wounds of the lower extremities bilaterally  · Status post debridement on 10/31/2019  · Plan for further debridement and possible skin graft  · Continue Nepro diet supplement for wound healing  4  Anemia due to chronic disease and acute blood loss:   · Hemoglobin 7 3- will need transfusion on hemodialysis in lieu of further debridement/anticipated blood loss  · Continue GRISELDA-increased to 24401 units with each hemodialysis treatment  · Discussed with primary service Dr Puneet Rodriguez for transfusion today on hemodialysis  5  Hypotension:    · On midodrine 10 mg t i d   6  Atrial fibrillation: On metoprolol 25 mg every 12 hours  7  CKD MBD:    · Phosphorus level 6 3 which is above goal for ESRD  Currently on sevelamer 800 mg t i d  and renal diet  · Will recheck and increase sevelamer if phosphorus remains elevated  8  Scrotal swelling:  Right hydrocele with numerous septation on imaging  Conservative management  DISPOSITION:  Hemodialysis today  Plan for transfusion of 1 unit packed cells on treatment    SUBJECTIVE / INTERVAL HISTORY:  Denies pain at this time  Asked many questions regarding diet, protein intake  He also had questions regarding his phosphorus level  Plan of care discussed with patient    He is hopeful for complete recovery and healing of leg wounds    OBJECTIVE:  Current Weight: Weight - Scale: 84 2 kg (185 lb 10 oz)  Vitals:    11/03/19 2102 11/03/19 2103 11/03/19 2305 11/04/19 0700   BP: 118/62 118/62 112/65 116/70   BP Location: Left arm  Left arm Left arm Pulse:  90 89 88   Resp:   18 18   Temp:   98 9 °F (37 2 °C) 98 7 °F (37 1 °C)   TempSrc:   Oral Tympanic   SpO2:   94% 95%   Weight:       Height:           Intake/Output Summary (Last 24 hours) at 11/4/2019 0954  Last data filed at 11/3/2019 1658  Gross per 24 hour   Intake 700 ml   Output    Net 700 ml     General: NAD, comfortably lying in bed  Skin: no rash, color sallow  Eyes: anicteric sclera  ENT: moist mucous membrane  Neck: supple, no JVD  Chest: CTA b/l, no ronchii, no wheeze, no rubs, no rales  CVS: s1s2, no murmur, no gallop, no rub  Irregular  Abdomen: soft, nontender, nl sounds  Extremities:  Legs are wrapped bilaterally  No edema noted in the thigh area  : no meeks    Scrotal edema  Neuro: AAOX3  Psych: normal affect  Medications:    Current Facility-Administered Medications:     acetaminophen (TYLENOL) tablet 650 mg, 650 mg, Oral, Q6H PRN, No Causey DPM, 650 mg at 11/02/19 1050    acetaminophen (TYLENOL) tablet 650 mg, 650 mg, Oral, Q6H [address], No Causey, DPM, 650 mg at 11/04/19 0040    b complex-vitamin C-folic acid (NEPHROCAPS) capsule 1 capsule, 1 capsule, Oral, Daily With Dinner, No Causey DPM, 1 capsule at 11/03/19 1822    calcium acetate (PHOSLO) capsule 1,334 mg, 1,334 mg, Oral, TID With Meals, No Causey DPM, 1,334 mg at 11/04/19 0948    cefepime (MAXIPIME) 1,000 mg in dextrose 5 % 50 mL IVPB, 1,000 mg, Intravenous, Q24H, Tori Bauman DO, Last Rate: 100 mL/hr at 11/03/19 2055, 1,000 mg at 11/03/19 2055    collagenase (SANTYL) ointment, , Topical, Daily, No Causey DPM    docusate sodium (COLACE) capsule 100 mg, 100 mg, Oral, BID, Tori Bauman DO, 100 mg at 11/04/19 1641    epoetin bob (EPOGEN,PROCRIT) injection 8,000 Units, 8,000 Units, Intravenous, After Dialysis, No Causey DPM, 8,000 Units at 11/01/19 1042    gabapentin (NEURONTIN) capsule 100 mg, 100 mg, Oral, Daily, No Causey DPM, 100 mg at 11/04/19 0948    heparin (porcine) subcutaneous injection 5,000 Units, 5,000 Units, Subcutaneous, Q8H [address], 5,000 Units at 11/04/19 0531 **AND** [COMPLETED] Platelet count, , , Once, Jesse Chun PA-C    HYDROmorphone (DILAUDID) injection 1 mg, 1 mg, Intravenous, Q4H PRN, Ada Se, DO, 1 mg at 11/01/19 1417    metoprolol tartrate (LOPRESSOR) tablet 25 mg, 25 mg, Oral, Q12H [address], Kelleen Crimes, DPM, 25 mg at 11/04/19 0949    ondansetron (ZOFRAN) injection 4 mg, 4 mg, Intravenous, Q6H PRN, Kelleen Crimes, DPM    oxyCODONE (ROXICODONE) IR tablet 10 mg, 10 mg, Oral, Q4H PRN, Kelleen Crimes, DPM, 10 mg at 11/04/19 0951    oxyCODONE (ROXICODONE) IR tablet 5 mg, 5 mg, Oral, Q4H PRN, Kelleen Crimes, DPM    pantoprazole (PROTONIX) EC tablet 40 mg, 40 mg, Oral, Early Morning, Kelleen Crimes, DPM, 40 mg at 11/04/19 0532    senna (SENOKOT) tablet 17 2 mg, 2 tablet, Oral, BID PRN, Ada Se, DO    sevelamer (RENAGEL) tablet 800 mg, 800 mg, Oral, TID With Meals, Kelleen Crimes, DPM, 800 mg at 11/04/19 0950    torsemide (DEMADEX) tablet 40 mg, 40 mg, Oral, BID (diuretic), Kelleen Crimes, DPM, 40 mg at 11/04/19 0950    vancomycin (VANCOCIN) IVPB (premix) 750 mg, 10 mg/kg, Intravenous, After Dialysis, Ada Se, DO, Last Rate: 150 mL/hr at 11/01/19 1004, 750 mg at 11/01/19 1004    Laboratory Results:  Results from last 7 days   Lab Units 11/02/19  0346 11/01/19  0629 10/31/19  2308 10/31/19  2307 10/30/19  0420 10/29/19  1105   WBC Thousand/uL 7 95 9 58 10 45*  --   --  6 55   HEMOGLOBIN g/dL 7 3* 7 7* 7 0*  --   --  9 4*   HEMATOCRIT % 24 3* 25 6* 22 7*  --   --  31 1*   PLATELETS Thousands/uL 123* 210 204  --   --  228   POTASSIUM mmol/L 4 7 4 7  --  4 5 4 6 5 0   CHLORIDE mmol/L 101 100  --  100 99* 100   CO2 mmol/L 26 27  --  29 29 29   BUN mg/dL 27* 35*  --  31* 39* 51*   CREATININE mg/dL 4 23* 5 20*  --  4 61* 5 01* 6 30*   CALCIUM mg/dL 8 3 8 2*  --  8 0* 8 7 8 4

## 2019-11-05 ENCOUNTER — APPOINTMENT (INPATIENT)
Dept: DIALYSIS | Facility: HOSPITAL | Age: 59
DRG: 576 | End: 2019-11-05
Payer: MEDICARE

## 2019-11-05 ENCOUNTER — ANESTHESIA (INPATIENT)
Dept: PERIOP | Facility: HOSPITAL | Age: 59
DRG: 576 | End: 2019-11-05
Payer: MEDICARE

## 2019-11-05 ENCOUNTER — ANESTHESIA EVENT (INPATIENT)
Dept: PERIOP | Facility: HOSPITAL | Age: 59
DRG: 576 | End: 2019-11-05
Payer: MEDICARE

## 2019-11-05 LAB
ABO GROUP BLD BPU: NORMAL
ABO GROUP BLD: NORMAL
BLD GP AB SCN SERPL QL: NEGATIVE
BPU ID: NORMAL
CROSSMATCH: NORMAL
ERYTHROCYTE [DISTWIDTH] IN BLOOD BY AUTOMATED COUNT: 16.5 % (ref 11.6–15.1)
GLUCOSE SERPL-MCNC: 85 MG/DL (ref 65–140)
HCT VFR BLD AUTO: 32.1 % (ref 36.5–49.3)
HGB BLD-MCNC: 9.8 G/DL (ref 12–17)
MCH RBC QN AUTO: 31.5 PG (ref 26.8–34.3)
MCHC RBC AUTO-ENTMCNC: 30.5 G/DL (ref 31.4–37.4)
MCV RBC AUTO: 103 FL (ref 82–98)
PLATELET # BLD AUTO: 206 THOUSANDS/UL (ref 149–390)
PMV BLD AUTO: 9.7 FL (ref 8.9–12.7)
PREALB SERPL-MCNC: 7.9 MG/DL (ref 18–40)
RBC # BLD AUTO: 3.11 MILLION/UL (ref 3.88–5.62)
RH BLD: POSITIVE
SPECIMEN EXPIRATION DATE: NORMAL
UNIT DISPENSE STATUS: NORMAL
UNIT PRODUCT CODE: NORMAL
UNIT RH: NORMAL
WBC # BLD AUTO: 8.61 THOUSAND/UL (ref 4.31–10.16)

## 2019-11-05 PROCEDURE — 86850 RBC ANTIBODY SCREEN: CPT | Performed by: INTERNAL MEDICINE

## 2019-11-05 PROCEDURE — 0JBP0ZZ EXCISION OF LEFT LOWER LEG SUBCUTANEOUS TISSUE AND FASCIA, OPEN APPROACH: ICD-10-PCS | Performed by: PODIATRIST

## 2019-11-05 PROCEDURE — 90935 HEMODIALYSIS ONE EVALUATION: CPT | Performed by: INTERNAL MEDICINE

## 2019-11-05 PROCEDURE — 85027 COMPLETE CBC AUTOMATED: CPT | Performed by: INTERNAL MEDICINE

## 2019-11-05 PROCEDURE — 0HBHXZZ EXCISION OF RIGHT UPPER LEG SKIN, EXTERNAL APPROACH: ICD-10-PCS | Performed by: PODIATRIST

## 2019-11-05 PROCEDURE — 86901 BLOOD TYPING SEROLOGIC RH(D): CPT | Performed by: INTERNAL MEDICINE

## 2019-11-05 PROCEDURE — 99232 SBSQ HOSP IP/OBS MODERATE 35: CPT | Performed by: HOSPITALIST

## 2019-11-05 PROCEDURE — 86900 BLOOD TYPING SEROLOGIC ABO: CPT | Performed by: INTERNAL MEDICINE

## 2019-11-05 PROCEDURE — 0HDNXZZ EXTRACTION OF LEFT FOOT SKIN, EXTERNAL APPROACH: ICD-10-PCS | Performed by: PODIATRIST

## 2019-11-05 PROCEDURE — 82948 REAGENT STRIP/BLOOD GLUCOSE: CPT

## 2019-11-05 PROCEDURE — 86923 COMPATIBILITY TEST ELECTRIC: CPT

## 2019-11-05 PROCEDURE — 0JBQ0ZZ EXCISION OF RIGHT FOOT SUBCUTANEOUS TISSUE AND FASCIA, OPEN APPROACH: ICD-10-PCS | Performed by: PODIATRIST

## 2019-11-05 PROCEDURE — 0HDMXZZ EXTRACTION OF RIGHT FOOT SKIN, EXTERNAL APPROACH: ICD-10-PCS | Performed by: PODIATRIST

## 2019-11-05 PROCEDURE — 0HDLXZZ EXTRACTION OF LEFT LOWER LEG SKIN, EXTERNAL APPROACH: ICD-10-PCS | Performed by: PODIATRIST

## 2019-11-05 PROCEDURE — 84134 ASSAY OF PREALBUMIN: CPT | Performed by: STUDENT IN AN ORGANIZED HEALTH CARE EDUCATION/TRAINING PROGRAM

## 2019-11-05 RX ORDER — LIDOCAINE HYDROCHLORIDE 10 MG/ML
INJECTION, SOLUTION EPIDURAL; INFILTRATION; INTRACAUDAL; PERINEURAL AS NEEDED
Status: DISCONTINUED | OUTPATIENT
Start: 2019-11-05 | End: 2019-11-05 | Stop reason: SURG

## 2019-11-05 RX ORDER — PROPOFOL 10 MG/ML
INJECTION, EMULSION INTRAVENOUS CONTINUOUS PRN
Status: DISCONTINUED | OUTPATIENT
Start: 2019-11-05 | End: 2019-11-05 | Stop reason: SURG

## 2019-11-05 RX ORDER — ONDANSETRON 2 MG/ML
4 INJECTION INTRAMUSCULAR; INTRAVENOUS ONCE AS NEEDED
Status: DISCONTINUED | OUTPATIENT
Start: 2019-11-05 | End: 2019-11-05 | Stop reason: HOSPADM

## 2019-11-05 RX ORDER — HYDROMORPHONE HCL/PF 1 MG/ML
0.5 SYRINGE (ML) INJECTION
Status: DISCONTINUED | OUTPATIENT
Start: 2019-11-05 | End: 2019-11-05 | Stop reason: HOSPADM

## 2019-11-05 RX ORDER — MAGNESIUM HYDROXIDE 1200 MG/15ML
LIQUID ORAL AS NEEDED
Status: DISCONTINUED | OUTPATIENT
Start: 2019-11-05 | End: 2019-11-05 | Stop reason: HOSPADM

## 2019-11-05 RX ORDER — PROPOFOL 10 MG/ML
INJECTION, EMULSION INTRAVENOUS AS NEEDED
Status: DISCONTINUED | OUTPATIENT
Start: 2019-11-05 | End: 2019-11-05 | Stop reason: SURG

## 2019-11-05 RX ORDER — FENTANYL CITRATE 50 UG/ML
INJECTION, SOLUTION INTRAMUSCULAR; INTRAVENOUS AS NEEDED
Status: DISCONTINUED | OUTPATIENT
Start: 2019-11-05 | End: 2019-11-05 | Stop reason: SURG

## 2019-11-05 RX ORDER — FENTANYL CITRATE/PF 50 MCG/ML
50 SYRINGE (ML) INJECTION
Status: DISCONTINUED | OUTPATIENT
Start: 2019-11-05 | End: 2019-11-05 | Stop reason: HOSPADM

## 2019-11-05 RX ORDER — SODIUM CHLORIDE, SODIUM LACTATE, POTASSIUM CHLORIDE, CALCIUM CHLORIDE 600; 310; 30; 20 MG/100ML; MG/100ML; MG/100ML; MG/100ML
INJECTION, SOLUTION INTRAVENOUS CONTINUOUS PRN
Status: DISCONTINUED | OUTPATIENT
Start: 2019-11-05 | End: 2019-11-05 | Stop reason: SURG

## 2019-11-05 RX ADMIN — FENTANYL CITRATE 25 MCG: 50 INJECTION INTRAMUSCULAR; INTRAVENOUS at 18:22

## 2019-11-05 RX ADMIN — PHENYLEPHRINE HYDROCHLORIDE 100 MCG: 10 INJECTION INTRAVENOUS at 18:44

## 2019-11-05 RX ADMIN — VANCOMYCIN HYDROCHLORIDE 1000 MG: 1 INJECTION, SOLUTION INTRAVENOUS at 15:43

## 2019-11-05 RX ADMIN — SENNOSIDES 17.2 MG: 8.6 TABLET, FILM COATED ORAL at 09:06

## 2019-11-05 RX ADMIN — SODIUM CHLORIDE, SODIUM LACTATE, POTASSIUM CHLORIDE, AND CALCIUM CHLORIDE: .6; .31; .03; .02 INJECTION, SOLUTION INTRAVENOUS at 18:05

## 2019-11-05 RX ADMIN — PHENYLEPHRINE HYDROCHLORIDE 200 MCG: 10 INJECTION INTRAVENOUS at 19:20

## 2019-11-05 RX ADMIN — OXYCODONE HYDROCHLORIDE 10 MG: 10 TABLET ORAL at 13:22

## 2019-11-05 RX ADMIN — PHENYLEPHRINE HYDROCHLORIDE 200 MCG: 10 INJECTION INTRAVENOUS at 18:58

## 2019-11-05 RX ADMIN — PROPOFOL 80 MCG/KG/MIN: 10 INJECTION, EMULSION INTRAVENOUS at 18:22

## 2019-11-05 RX ADMIN — Medication 1000 MG: at 22:07

## 2019-11-05 RX ADMIN — PHENYLEPHRINE HYDROCHLORIDE 200 MCG: 10 INJECTION INTRAVENOUS at 19:15

## 2019-11-05 RX ADMIN — HYDROMORPHONE HYDROCHLORIDE 0.5 MG: 1 INJECTION, SOLUTION INTRAMUSCULAR; INTRAVENOUS; SUBCUTANEOUS at 20:07

## 2019-11-05 RX ADMIN — PHENYLEPHRINE HYDROCHLORIDE 100 MCG: 10 INJECTION INTRAVENOUS at 18:40

## 2019-11-05 RX ADMIN — HEPARIN SODIUM 5000 UNITS: 5000 INJECTION INTRAVENOUS; SUBCUTANEOUS at 23:09

## 2019-11-05 RX ADMIN — PHENYLEPHRINE HYDROCHLORIDE 100 MCG: 10 INJECTION INTRAVENOUS at 19:26

## 2019-11-05 RX ADMIN — PANTOPRAZOLE SODIUM 40 MG: 40 TABLET, DELAYED RELEASE ORAL at 06:26

## 2019-11-05 RX ADMIN — ACETAMINOPHEN 650 MG: 325 TABLET, FILM COATED ORAL at 06:26

## 2019-11-05 RX ADMIN — ACETAMINOPHEN 650 MG: 325 TABLET, FILM COATED ORAL at 12:05

## 2019-11-05 RX ADMIN — OXYCODONE HYDROCHLORIDE 10 MG: 10 TABLET ORAL at 02:24

## 2019-11-05 RX ADMIN — PHENYLEPHRINE HYDROCHLORIDE 200 MCG: 10 INJECTION INTRAVENOUS at 18:53

## 2019-11-05 RX ADMIN — PROPOFOL 40 MG: 10 INJECTION, EMULSION INTRAVENOUS at 18:22

## 2019-11-05 RX ADMIN — FENTANYL CITRATE 25 MCG: 50 INJECTION INTRAMUSCULAR; INTRAVENOUS at 18:27

## 2019-11-05 RX ADMIN — OXYCODONE HYDROCHLORIDE 10 MG: 10 TABLET ORAL at 20:40

## 2019-11-05 RX ADMIN — PHENYLEPHRINE HYDROCHLORIDE 200 MCG: 10 INJECTION INTRAVENOUS at 19:33

## 2019-11-05 RX ADMIN — PHENYLEPHRINE HYDROCHLORIDE 200 MCG: 10 INJECTION INTRAVENOUS at 19:24

## 2019-11-05 RX ADMIN — FENTANYL CITRATE 50 MCG: 50 INJECTION INTRAMUSCULAR; INTRAVENOUS at 19:52

## 2019-11-05 RX ADMIN — PHENYLEPHRINE HYDROCHLORIDE 200 MCG: 10 INJECTION INTRAVENOUS at 19:08

## 2019-11-05 RX ADMIN — DOCUSATE SODIUM 100 MG: 100 CAPSULE, LIQUID FILLED ORAL at 09:06

## 2019-11-05 RX ADMIN — ACETAMINOPHEN 650 MG: 325 TABLET, FILM COATED ORAL at 23:09

## 2019-11-05 RX ADMIN — OXYCODONE HYDROCHLORIDE 5 MG: 5 TABLET ORAL at 09:05

## 2019-11-05 RX ADMIN — LIDOCAINE HYDROCHLORIDE 50 MG: 10 INJECTION, SOLUTION EPIDURAL; INFILTRATION; INTRACAUDAL; PERINEURAL at 18:22

## 2019-11-05 NOTE — INTERVAL H&P NOTE
H&P reviewed  After examining the patient I find no changes in the patients condition since the H&P had been written      Vitals:    11/05/19 1558   BP: 94/55   Pulse: 100   Resp: 18   Temp: 98 7 °F (37 1 °C)   SpO2: 93%

## 2019-11-05 NOTE — PROGRESS NOTES
[address] [facility] NOTE   Javier Martinez 64 y o  male MRN: [id number]  Unit/Bed#: S -01 Encounter: [id number]  Reason for Consult:  ESRD    ASSESSMENT and PLAN:  1  ESRD:    · Patient dialyzes 4 times a week at [facility]  Current schedule Monday, Tuesday, Thursday, Friday for 2 5 hours each treatment  · Due to hospital logistics will likely be switching to Monday, Wednesday, Friday with an additional treatment on Saturday if needed  · Hemodialysis today  2  Access:  PermCath  3  Multiple wounds of the lower extremities bilaterally  · Status post debridement on 10/31/2019  · Plan for further debridement and possible skin graft  · Continue Nepro diet supplement for wound healing  4  Anemia due to chronic disease and acute blood loss:   · Status post transfusion 11/4 hemoglobin up to 9 8  · GRISELDA dose increased to 29544 units with each hemodialysis treatment on 11/04  · Try to limit blood draws  5  Hypotension:    · On midodrine 10 mg t i d   6  Atrial fibrillation:    · On metoprolol 25 mg every 12 hours  7  CKD MBD:    · Phosphorus level 6 3 which is above goal for ESRD  · Currently on sevelamer 800 mg t i d  and renal diet  · Will recheck Thursday and increase sevelamer if phosphorus remains elevated  8  Scrotal swelling:  Right hydrocele with numerous septation on imaging  Conservative management per Urology       DISPOSITION:  Continue current hemodialysis schedule for times per week    SUBJECTIVE / INTERVAL HISTORY:  No complaints at this time    OBJECTIVE:  Current Weight: Weight - Scale: 84 2 kg (185 lb 10 oz)  Vitals:    11/05/19 0905 11/05/19 0930 11/05/19 1000 11/05/19 1030   BP: 117/69 118/71 97/58 99/59   BP Location: Left arm      Pulse: 84 80 88 87   Resp: 18 16 16 16   Temp: 98 8 °F (37 1 °C)      TempSrc: Oral      SpO2:       Weight:       Height:           Intake/Output Summary (Last 24 hours) at 11/5/2019 1100  Last data filed at 11/5/2019 0900  Gross per 24 hour Intake 1770 ml   Output 900 ml   Net 870 ml     General: NAD  Skin: no rash, color sallow  Eyes: anicteric sclera  ENT: moist mucous membrane  Neck: supple, no JVD  Chest: CTA b/l, no ronchii, no wheeze, no rubs, no rales  CVS: s1s2, no murmur, no gallop, no rub  Abdomen:  Distended, high-pitched bowel sounds  Extremities:  Lower extremities wrapped bilaterally    No evidence of edema above the wraps/thigh area  : no meeks, enlarged scrotum  Neuro: AAOX3  Psych: normal affect, forgetful  Medications:    Current Facility-Administered Medications:     acetaminophen (TYLENOL) tablet 650 mg, 650 mg, Oral, Q6H PRN, Yogesh Panchal DPM, 650 mg at 11/02/19 1050    acetaminophen (TYLENOL) tablet 650 mg, 650 mg, Oral, Q6H [address], Yogesh Panchal DPM, 650 mg at 11/05/19 3911    b complex-vitamin C-folic acid (NEPHROCAPS) capsule 1 capsule, 1 capsule, Oral, Daily With Dinner, Yogesh Panchal DPM, 1 capsule at 11/04/19 1759    calcium acetate (PHOSLO) capsule 1,334 mg, 1,334 mg, Oral, TID With Meals, Yogesh Panchal DPM, 1,334 mg at 11/04/19 1759    cefepime (MAXIPIME) 1,000 mg in dextrose 5 % 50 mL IVPB, 1,000 mg, Intravenous, Q24H, Matt Yuan DO, Last Rate: 100 mL/hr at 11/04/19 2059, 1,000 mg at 11/04/19 2059    collagenase (SANTYL) ointment, , Topical, Daily, Yogesh Panchal DPM, Stopped at 11/05/19 0915    docusate sodium (COLACE) capsule 100 mg, 100 mg, Oral, BID, Matt Yuan DO, 100 mg at 11/05/19 0906    [START ON 11/7/2019] epoetin bob (EPOGEN,PROCRIT) injection 10,000 Units, 10,000 Units, Intravenous, Once per day on Mon Thu Fri, SAM Mcfarland    gabapentin (NEURONTIN) capsule 100 mg, 100 mg, Oral, Daily, Yogesh Panchal DPM, 100 mg at 11/04/19 0948    heparin (porcine) subcutaneous injection 5,000 Units, 5,000 Units, Subcutaneous, Q8H [address], 5,000 Units at 11/04/19 2103 **AND** [COMPLETED] Platelet count, , , Once, Jesse Chun PA-C    HYDROmorphone (DILAUDID) injection 1 mg, 1 mg, Intravenous, Q4H PRN, Theresa Chamorro, DO, 1 mg at 11/04/19 1759    metoprolol tartrate (LOPRESSOR) tablet 25 mg, 25 mg, Oral, Q12H [facility], Teretha Vashti, DPM, 25 mg at 11/04/19 2103    ondansetron (ZOFRAN) injection 4 mg, 4 mg, Intravenous, Q6H PRN, Teretha Vashti, DPM    oxyCODONE (ROXICODONE) IR tablet 10 mg, 10 mg, Oral, Q4H PRN, Teretha Vashti, DPM, 10 mg at 11/05/19 0224    oxyCODONE (ROXICODONE) IR tablet 5 mg, 5 mg, Oral, Q4H PRN, Teretha Vashti, DPM, 5 mg at 11/05/19 0905    pantoprazole (PROTONIX) EC tablet 40 mg, 40 mg, Oral, Early Morning, Teretha Vashti, DPM, 40 mg at 11/05/19 0128    senna (SENOKOT) tablet 17 2 mg, 2 tablet, Oral, BID PRN, Theresa Chamoror, DO, 17 2 mg at 11/05/19 0906    sevelamer (RENAGEL) tablet 800 mg, 800 mg, Oral, TID With Meals, Teretha Vashti, DPM, 800 mg at 11/04/19 1759    torsemide (DEMADEX) tablet 40 mg, 40 mg, Oral, BID (diuretic), Teretha Vashti, DPM, 40 mg at 11/04/19 1759    vancomycin (VANCOCIN) IVPB (premix) 1,000 mg, 12 5 mg/kg, Intravenous, After Dialysis, Theresa Chamorro DO, Last Rate: 200 mL/hr at 11/04/19 1758, 1,000 mg at 11/04/19 1758    Laboratory Results:  Results from last 7 days   Lab Units 11/05/19  0250 11/02/19  0346 11/01/19  0629 10/31/19  2308 10/31/19  2307 10/30/19  0420   WBC Thousand/uL 8 61 7 95 9 58 10 45*  --   --    HEMOGLOBIN g/dL 9 8* 7 3* 7 7* 7 0*  --   --    HEMATOCRIT % 32 1* 24 3* 25 6* 22 7*  --   --    PLATELETS Thousands/uL 206 123* 210 204  --   --    POTASSIUM mmol/L  --  4 7 4 7  --  4 5 4 6   CHLORIDE mmol/L  --  101 100  --  100 99*   CO2 mmol/L  --  26 27  --  29 29   BUN mg/dL  --  27* 35*  --  31* 39*   CREATININE mg/dL  --  4 23* 5 20*  --  4 61* 5 01*   CALCIUM mg/dL  --  8 3 8 2*  --  8 0* 8 7

## 2019-11-05 NOTE — ASSESSMENT & PLAN NOTE
· Procedure - Debridement, removal of Eschar on 10/31/2019 by Dr Madi Hanley  · Podiatry consulted plastic surgery for possible graft vs  Flap procedure  Plastic surgery recommends additional debridement first, but then tentatively anticipates STSG 11/7/2019, pending evaluation by Dr Sherren Kitten (seen by his AP already)  · Monitor for any signs of Infection (felt to be at risk) -   · Had hypotension and fever perioperatively  Per podiatry, no obvious infected tissue seen during procedure, but at high risk for development of infection  · Antibiotic - Change cefepime to cefazolin  Continue on vancomycin  · Follow up final results of blood cultures from 10/31/2019  · NGTD  · Monitor temperatures and serial leg exams  · Pain Control -   · Oxycodone 5-10 mg  · Breakthrough pain - Dilaudid 1 mg IV  · Monitor pain levels    · Supportive care

## 2019-11-05 NOTE — PROGRESS NOTES
Progress Note - Emanuel Carlson 1960, 64 y o  male MRN: [id number]    Unit/Bed#: S -01 Encounter: [id number]    Primary Care Provider: Tonja Medina   Date and time admitted to hospital: 10/26/2019  5:10 PM        * Multiple and open wound of lower limb  Assessment & Plan  · Procedure - Debridement, removal of Eschar on 10/31/2019 by Dr Edin De La Torre  · Podiatry consulted plastic surgery for possible graft vs  Flap procedure  Plastic surgery recommends additional debridement first, but then tentatively anticipates STSG 11/7/2019, pending evaluation by Dr Ben Ogden (seen by his AP already)  · Monitor for any signs of Infection (felt to be at risk) -   · Had hypotension and fever perioperatively  Per podiatry, no obvious infected tissue seen during procedure, but at high risk for development of infection  · Antibiotic - Change cefepime to cefazolin  Continue on vancomycin  · Follow up final results of blood cultures from 10/31/2019  · NGTD  · Monitor temperatures and serial leg exams  · Pain Control -   · Oxycodone 5-10 mg  · Breakthrough pain - Dilaudid 1 mg IV  · Monitor pain levels  · Supportive care    Scrotal swelling  Assessment & Plan  · Ultrasound showed "Large complex right hydrocele containing numerous septations  Increased vascularity in the left testicle with respect to the right, although both testicles could not be imaged on the same plane limiting direct comparison   Correlate clinically for orchitis "  · Urology saw patient 10/29/2019 and recommends no surgical intervention acutely and to manage problems with legs and get therapy first   · AFP and LDH within normal limits  · Follow up beta HCG  · Supportive care  · Pain management  Monitor pain levels  Anemia due to chronic kidney disease, on chronic dialysis and Acute Blood Loss Anemia (HCC)  Assessment & Plan  · There was a slight drop in hemoglobin during procedure    · Consider transfusion with dialysis prior to any additional procedure, if needed to get hemoglobin over 8   · Monitor counts - CBC monitoring  Atrial fibrillation ([facility] )  Assessment & Plan  · Rate / Rhythm control -   · Metoprolol increased to 25 mg bid on 10/29/2019  Not consistently getting medication due to blood pressure parameters  · Monitor on telemetry  Monitor HR and blood pressures  · Anticoagulation - None prehospital   Defer to outpatient team following discharge  ESRD (end stage renal disease) ([facility] )  Assessment & Plan  · On routine dialysis  · Nephrology following  Constipation  Assessment & Plan  · Colace scheduled  · Senna BID PRN  · Additional adjustments as needed  · Monitor stools  Pressure ulcer, right and left buttocks POA  Assessment & Plan  · Local wound care  · Turn / Reposition frequently  VTE Pharmacologic Prophylaxis:   Pharmacologic: Heparin  Mechanical VTE Prophylaxis in Place: Yes    Patient Centered Rounds: I have performed bedside rounds with nursing staff today  Discussions with Specialists or Other Care Team Provider:     Education and Discussions with Family / Patient: patient     Time Spent for Care: 30 minutes  More than 50% of total time spent on counseling and coordination of care as described above  Current Length of Stay: 10 day(s)    Current Patient Status: Inpatient   Certification Statement: The patient will continue to require additional inpatient hospital stay due to multiple leg wounds requiring further management     Discharge Plan / Estimated Discharge Date: TBD based on clinical course    Code Status: Level 1 - Full Code    Subjective:   Feels well at time of my assessment  No new complaints       Objective:     Vitals:   Temp (24hrs), Av 7 °F (37 1 °C), Min:98 °F (36 7 °C), Max:99 2 °F (37 3 °C)    Temp:  [98 °F (36 7 °C)-99 2 °F (37 3 °C)] 98 5 °F (36 9 °C)  HR:  [] 86  Resp:  [18-20] 18  BP: (103-127)/(55-78) 117/67  SpO2:  [94 %-100 %] 100 %  Body mass index is 28 22 kg/m²  Input and Output Summary (last 24 hours): Intake/Output Summary (Last 24 hours) at 11/5/2019 0849  Last data filed at 11/4/2019 2128  Gross per 24 hour   Intake 1330 ml   Output 900 ml   Net 430 ml       Physical Exam:     Physical Exam   Constitutional: He is oriented to person, place, and time  No distress  HENT:   Head: Normocephalic and atraumatic  Mouth/Throat: Oropharynx is clear and moist    Cardiovascular: Normal rate and regular rhythm  No murmur heard  Pulmonary/Chest: Effort normal and breath sounds normal  No stridor  No respiratory distress  Abdominal: Soft  Bowel sounds are normal  He exhibits distension  Musculoskeletal:   B/l LE wrapped    Neurological: He is alert and oriented to person, place, and time  Skin: Skin is warm and dry  He is not diaphoretic  Psychiatric: He has a normal mood and affect  His behavior is normal    Nursing note and vitals reviewed  Additional Data:     Labs:    Results from last 7 days   Lab Units 11/05/19  0250 11/02/19  0346   WBC Thousand/uL 8 61 7 95   HEMOGLOBIN g/dL 9 8* 7 3*   HEMATOCRIT % 32 1* 24 3*   PLATELETS Thousands/uL 206 123*   NEUTROS PCT %  --  75   LYMPHS PCT %  --  7*   MONOS PCT %  --  9   EOS PCT %  --  7*     Results from last 7 days   Lab Units 11/02/19  0346   POTASSIUM mmol/L 4 7   CHLORIDE mmol/L 101   CO2 mmol/L 26   BUN mg/dL 27*   CREATININE mg/dL 4 23*   CALCIUM mg/dL 8 3   ALK PHOS U/L 216*   ALT U/L 6*   AST U/L 21           * I Have Reviewed All Lab Data Listed Above  * Additional Pertinent Lab Tests Reviewed: All Labs Within Last 24 Hours Reviewed    Imaging:    Imaging Reports Reviewed Today Include:   Imaging Personally Reviewed by Myself Includes:      Recent Cultures (last 7 days):     Results from last 7 days   Lab Units 10/31/19  2306   BLOOD CULTURE  No Growth After 4 Days  No Growth After 4 Days         Last 24 Hours Medication List:     Current Facility-Administered Medications:  acetaminophen 650 mg Oral Q6H PRN [facility], NANO    acetaminophen 650 mg Oral Q6H [facility] D[facility]    b complex-vitamin C-folic acid 1 capsule Oral Daily With Buffy Tricia, DPM    calcium acetate 1,334 mg Oral TID With Meals Leonela Gaemz, NANO    cefepime 1,000 mg Intravenous Q24H Orla Chimera, DO Last Rate: 1,000 mg (11/04/19 2059)   collagenase  Topical Daily Leonela Gamez DPM    docusate sodium 100 mg Oral BID Orla Chimera, DO    [START ON 11/7/2019] epoetin bob 10,000 Units Intravenous Once per day on Mon Thu Fri Richard Masker, CRNP    gabapentin 100 mg Oral Daily Leonela Gamez DPM    heparin (porcine) 5,000 Units Subcutaneous [facility] Leonela Gamez, NANO    HYDROmorphone 1 mg Intravenous Q4H PRN Orla Chimera, DO    metoprolol tartrate 25 mg Oral Q12H [facility], NANO    ondansetron 4 mg Intravenous Q6H PRN [facility], JUANM    oxyCODONE 10 mg Oral Q4H PRN Leonela Franco, NANO    oxyCODONE 5 mg Oral Q4H PRN Leonela Franco, JUANM    pantoprazole 40 mg Oral Early Morning [facility], JUANM    senna 2 tablet Oral BID PRN Orla Chimera, DO    sevelamer 800 mg Oral TID With Meals Leonela Gamez DPM    torsemide 40 mg Oral BID (diuretic) Leonela Gamez, NANO    vancomycin 12 5 mg/kg Intravenous After Dialysis Orla Chimera, DO Last Rate: 1,000 mg (11/04/19 1758)        Today, Patient Was Seen By: Caryle Deputy, MD    ** Please Note: This note has been constructed using a voice recognition system   **

## 2019-11-05 NOTE — HEMODIALYSIS
Stable hemodialysis treatment completed    Bed scale used for weights  Pre wt 84 8 kg  Post wt 83 4 kg  Loss -1 4 kg

## 2019-11-05 NOTE — PROGRESS NOTES
Vancomycin IV Pharmacy-to-Dose Consultation    Mahin Mora is a 64 y o  male who is currently receiving Vancomycin IV with management by the Pharmacy Consult service for the treatment of skin-soft tissue infection  Assessment/Plan:    The patient's chart was reviewed  Renal function is stable  There are no signs or symptoms of nephrotoxicity and/or infusion reactions documented  The following nephrotoxicity factors are present:  Medications: diuretics  Patient-Factors: renal dysfunction    Based on today's assessment, will continue current vancomycin (Day # 6) dosing of 1000mg IV after dialysis  Received dialysis earlier today  Will give 1000mg dose now post-dialysis  Dialysis schedule changing to M,W,F and possibly extra session on saturdays while inpatient per Nephro  Spoke with RN about plan for dialysis tomorrow  Will schedule level prior to dialysis on 11/6  We will continue to follow the patient's culture results and clinical progress daily        Vahid Portillo, PharmD   Pharmacist

## 2019-11-05 NOTE — PLAN OF CARE
Problem: Prexisting or High Potential for Compromised Skin Integrity  Goal: Skin integrity is maintained or improved  Description  INTERVENTIONS:  - Identify patients at risk for skin breakdown  - Assess and monitor skin integrity  - Assess and monitor nutrition and hydration status  - Monitor labs   - Assess for incontinence   - Turn and reposition patient  - Assist with mobility/ambulation  - Relieve pressure over bony prominences  - Avoid friction and shearing  - Provide appropriate hygiene as needed including keeping skin clean and dry  - Evaluate need for skin moisturizer/barrier cream  - Collaborate with interdisciplinary team   - Patient/family teaching  - Consider wound care consult   Outcome: Progressing     Problem: Potential for Falls  Goal: Patient will remain free of falls  Description  INTERVENTIONS:  - Assess patient frequently for physical needs  -  Identify cognitive and physical deficits and behaviors that affect risk of falls    -  [place] fall precautions as indicated by assessment   - Educate patient/family on patient safety including physical limitations  - Instruct patient to call for assistance with activity based on assessment  - Modify environment to reduce risk of injury  - Consider OT/PT consult to assist with strengthening/mobility  Outcome: Progressing     Problem: INFECTION - ADULT  Goal: Absence or prevention of progression during hospitalization  Description  INTERVENTIONS:  - Assess and monitor for signs and symptoms of infection  - Monitor lab/diagnostic results  - Monitor all insertion sites, i e  indwelling lines, tubes, and drains  - Monitor endotracheal if appropriate and nasal secretions for changes in amount and color  - [place] appropriate cooling/warming therapies per order  - Administer medications as ordered  - Instruct and encourage patient and family to use good hand hygiene technique  - Identify and instruct in appropriate isolation precautions for identified infection/condition  Outcome: Progressing     Problem: METABOLIC, FLUID AND ELECTROLYTES - ADULT  Goal: Electrolytes maintained within normal limits  Description  INTERVENTIONS:  - Monitor labs and assess patient for signs and symptoms of electrolyte imbalances  - Administer electrolyte replacement as ordered  - Monitor response to electrolyte replacements, including repeat lab results as appropriate  - Instruct patient on fluid and nutrition as appropriate  Outcome: Progressing  Goal: Fluid balance maintained  Description  INTERVENTIONS:  - Monitor labs   - Monitor I/O and WT  - Instruct patient on fluid and nutrition as appropriate  - Assess for signs & symptoms of volume excess or deficit  Outcome: Progressing     Problem: SKIN/TISSUE INTEGRITY - ADULT  Goal: Skin integrity remains intact  Description  INTERVENTIONS  - Identify patients at risk for skin breakdown  - Assess and monitor skin integrity  - Assess and monitor nutrition and hydration status  - Monitor labs (i e  albumin)  - Assess for incontinence   - Turn and reposition patient  - Assist with mobility/ambulation  - Relieve pressure over bony prominences  - Avoid friction and shearing  - Provide appropriate hygiene as needed including keeping skin clean and dry  - Evaluate need for skin moisturizer/barrier cream  - Collaborate with interdisciplinary team (i e  Nutrition, Rehabilitation, etc )   - Patient/family teaching  Outcome: Progressing  Goal: Incision(s), wounds(s) or drain site(s) healing without S/S of infection  Description  INTERVENTIONS  - Assess and document risk factors for skin impairment   - Assess and document dressing, incision, wound bed, drain sites and surrounding tissue  - Consider nutrition services referral as needed  - Oral mucous membranes remain intact  - Provide patient/ family education  Outcome: Progressing  Goal: Oral mucous membranes remain intact  Description  INTERVENTIONS  - Assess oral mucosa and hygiene practices  - Implement preventative oral hygiene regimen  - Implement oral medicated treatments as ordered  - Initiate Nutrition services referral as needed  Outcome: Progressing     Problem: Nutrition/Hydration-ADULT  Goal: Nutrient/Hydration intake appropriate for improving, restoring or maintaining nutritional needs  Description  Monitor and assess patient's nutrition/hydration status for malnutrition  Collaborate with interdisciplinary team and initiate plan and interventions as ordered  Monitor patient's weight and dietary intake as ordered or per policy  Utilize nutrition screening tool and intervene as necessary  Determine patient's food preferences and provide high-protein, high-caloric foods as appropriate       INTERVENTIONS:  - Monitor oral intake, urinary output, labs, and treatment plans  - Assess nutrition and hydration status and recommend course of action  - Evaluate amount of meals eaten  - Assist patient with eating if necessary   - Allow adequate time for meals  - Recommend/ encourage appropriate diets, oral nutritional supplements, and vitamin/mineral supplements  - Order, calculate, and assess calorie counts as needed  - Recommend, monitor, and adjust tube feedings and TPN/PPN based on assessed needs  - Assess need for intravenous fluids  - Provide specific nutrition/hydration education as appropriate  - Include patient/family/caregiver in decisions related to nutrition  Outcome: Progressing

## 2019-11-05 NOTE — ANESTHESIA PREPROCEDURE EVALUATION
Review of Systems/Medical History  Patient summary reviewed  Chart reviewed  No history of anesthetic complications     Cardiovascular  Hypertension , Dysrhythmias , atrial fibrillation,    Pulmonary  Negative pulmonary ROS        GI/Hepatic    Liver disease ,   Comment: Polycystic liver disease     Kidney disease CKD and ESRD, Chronic kidney disease , Dialysis ,   Comment: PKD     Endo/Other  Negative endo/other ROS      GYN       Hematology  Anemia ,  Thrombocytopenia,    Musculoskeletal    Comment: Multiple open wounds on both LE's      Neurology  Negative neurology ROS      Psychology   Negative psychology ROS              Physical Exam    Airway    Mallampati score: II  TM Distance: >3 FB  Neck ROM: full     Dental   No notable dental hx     Cardiovascular  Rhythm: regular, Rate: normal, Cardiovascular exam normal    Pulmonary  Pulmonary exam normal Breath sounds clear to auscultation,     Other Findings        Anesthesia Plan  ASA Score- 3     Anesthesia Type- IV sedation with anesthesia with ASA Monitors  Additional Monitors:   Airway Plan:         Plan Factors-    Induction- intravenous  Postoperative Plan- Plan for postoperative opioid use  Informed Consent- Anesthetic plan and risks discussed with patient  I personally reviewed this patient with the CRNA  Discussed and agreed on the Anesthesia Plan with the CRNA  Julius Segura Recent labs personally reviewed:  Lab Results   Component Value Date    WBC 8 61 11/05/2019    HGB 9 8 (L) 11/05/2019     11/05/2019     Lab Results   Component Value Date    K 4 7 11/02/2019    BUN 27 (H) 11/02/2019    CREATININE 4 23 (H) 11/02/2019     Lab Results   Component Value Date    PTT 41 (H) 10/26/2019      Lab Results   Component Value Date    INR 1 08 10/26/2019       Blood type O    No results found for: Abdulaziz Blake MD, have personally seen and evaluated the patient prior to anesthetic care    I have reviewed the pre-anesthetic record, and other medical records if appropriate to the anesthetic care  If a CRNA is involved in the case, I have reviewed the CRNA assessment, if present, and agree  Risks/benefits and alternatives discussed with patient including possible PONV, sore throat, and possibility of rare anesthetic and surgical emergencies

## 2019-11-06 ENCOUNTER — ANESTHESIA EVENT (INPATIENT)
Dept: PERIOP | Facility: HOSPITAL | Age: 59
DRG: 576 | End: 2019-11-06
Payer: MEDICARE

## 2019-11-06 LAB
ANION GAP SERPL CALCULATED.3IONS-SCNC: 8 MMOL/L (ref 4–13)
BACTERIA BLD CULT: NORMAL
BACTERIA BLD CULT: NORMAL
BASOPHILS # BLD AUTO: 0.11 THOUSANDS/ΜL (ref 0–0.1)
BASOPHILS NFR BLD AUTO: 1 % (ref 0–1)
BUN SERPL-MCNC: 28 MG/DL (ref 5–25)
CALCIUM SERPL-MCNC: 8.7 MG/DL (ref 8.3–10.1)
CHLORIDE SERPL-SCNC: 100 MMOL/L (ref 100–108)
CO2 SERPL-SCNC: 28 MMOL/L (ref 21–32)
CREAT SERPL-MCNC: 5.13 MG/DL (ref 0.6–1.3)
EOSINOPHIL # BLD AUTO: 0.43 THOUSAND/ΜL (ref 0–0.61)
EOSINOPHIL NFR BLD AUTO: 5 % (ref 0–6)
ERYTHROCYTE [DISTWIDTH] IN BLOOD BY AUTOMATED COUNT: 16.5 % (ref 11.6–15.1)
GFR SERPL CREATININE-BSD FRML MDRD: 12 ML/MIN/1.73SQ M
GLUCOSE SERPL-MCNC: 87 MG/DL (ref 65–140)
HCT VFR BLD AUTO: 25.1 % (ref 36.5–49.3)
HGB BLD-MCNC: 7.6 G/DL (ref 12–17)
IMM GRANULOCYTES # BLD AUTO: 0.03 THOUSAND/UL (ref 0–0.2)
IMM GRANULOCYTES NFR BLD AUTO: 0 % (ref 0–2)
LYMPHOCYTES # BLD AUTO: 0.78 THOUSANDS/ΜL (ref 0.6–4.47)
LYMPHOCYTES NFR BLD AUTO: 9 % (ref 14–44)
MCH RBC QN AUTO: 31 PG (ref 26.8–34.3)
MCHC RBC AUTO-ENTMCNC: 30.3 G/DL (ref 31.4–37.4)
MCV RBC AUTO: 102 FL (ref 82–98)
MONOCYTES # BLD AUTO: 1.15 THOUSAND/ΜL (ref 0.17–1.22)
MONOCYTES NFR BLD AUTO: 13 % (ref 4–12)
NEUTROPHILS # BLD AUTO: 6.2 THOUSANDS/ΜL (ref 1.85–7.62)
NEUTS SEG NFR BLD AUTO: 72 % (ref 43–75)
NRBC BLD AUTO-RTO: 0 /100 WBCS
PLATELET # BLD AUTO: 175 THOUSANDS/UL (ref 149–390)
PMV BLD AUTO: 9.8 FL (ref 8.9–12.7)
POTASSIUM SERPL-SCNC: 4.8 MMOL/L (ref 3.5–5.3)
RBC # BLD AUTO: 2.45 MILLION/UL (ref 3.88–5.62)
SODIUM SERPL-SCNC: 136 MMOL/L (ref 136–145)
VANCOMYCIN SERPL-MCNC: 34.7 UG/ML
WBC # BLD AUTO: 8.7 THOUSAND/UL (ref 4.31–10.16)

## 2019-11-06 PROCEDURE — 85025 COMPLETE CBC W/AUTO DIFF WBC: CPT | Performed by: STUDENT IN AN ORGANIZED HEALTH CARE EDUCATION/TRAINING PROGRAM

## 2019-11-06 PROCEDURE — 80048 BASIC METABOLIC PNL TOTAL CA: CPT | Performed by: STUDENT IN AN ORGANIZED HEALTH CARE EDUCATION/TRAINING PROGRAM

## 2019-11-06 PROCEDURE — 99232 SBSQ HOSP IP/OBS MODERATE 35: CPT | Performed by: INTERNAL MEDICINE

## 2019-11-06 PROCEDURE — 80202 ASSAY OF VANCOMYCIN: CPT | Performed by: STUDENT IN AN ORGANIZED HEALTH CARE EDUCATION/TRAINING PROGRAM

## 2019-11-06 PROCEDURE — 99232 SBSQ HOSP IP/OBS MODERATE 35: CPT | Performed by: HOSPITALIST

## 2019-11-06 RX ADMIN — DOCUSATE SODIUM 100 MG: 100 CAPSULE, LIQUID FILLED ORAL at 18:01

## 2019-11-06 RX ADMIN — HEPARIN SODIUM 5000 UNITS: 5000 INJECTION INTRAVENOUS; SUBCUTANEOUS at 20:18

## 2019-11-06 RX ADMIN — METOPROLOL TARTRATE 25 MG: 25 TABLET, FILM COATED ORAL at 08:48

## 2019-11-06 RX ADMIN — TORSEMIDE 40 MG: 20 TABLET ORAL at 08:50

## 2019-11-06 RX ADMIN — SEVELAMER HYDROCHLORIDE 800 MG: 800 TABLET, FILM COATED PARENTERAL at 18:00

## 2019-11-06 RX ADMIN — SEVELAMER HYDROCHLORIDE 800 MG: 800 TABLET, FILM COATED PARENTERAL at 13:25

## 2019-11-06 RX ADMIN — ACETAMINOPHEN 650 MG: 325 TABLET, FILM COATED ORAL at 23:07

## 2019-11-06 RX ADMIN — PANTOPRAZOLE SODIUM 40 MG: 40 TABLET, DELAYED RELEASE ORAL at 05:03

## 2019-11-06 RX ADMIN — CALCIUM ACETATE 1334 MG: 667 CAPSULE ORAL at 08:50

## 2019-11-06 RX ADMIN — SEVELAMER HYDROCHLORIDE 800 MG: 800 TABLET, FILM COATED PARENTERAL at 08:47

## 2019-11-06 RX ADMIN — METOPROLOL TARTRATE 25 MG: 25 TABLET, FILM COATED ORAL at 20:20

## 2019-11-06 RX ADMIN — TORSEMIDE 40 MG: 20 TABLET ORAL at 17:59

## 2019-11-06 RX ADMIN — Medication 1 CAPSULE: at 18:00

## 2019-11-06 RX ADMIN — OXYCODONE HYDROCHLORIDE 10 MG: 10 TABLET ORAL at 21:14

## 2019-11-06 RX ADMIN — CALCIUM ACETATE 1334 MG: 667 CAPSULE ORAL at 18:00

## 2019-11-06 RX ADMIN — DOCUSATE SODIUM 100 MG: 100 CAPSULE, LIQUID FILLED ORAL at 08:50

## 2019-11-06 RX ADMIN — GABAPENTIN 100 MG: 100 CAPSULE ORAL at 08:50

## 2019-11-06 RX ADMIN — ACETAMINOPHEN 650 MG: 325 TABLET, FILM COATED ORAL at 18:00

## 2019-11-06 RX ADMIN — HEPARIN SODIUM 5000 UNITS: 5000 INJECTION INTRAVENOUS; SUBCUTANEOUS at 13:25

## 2019-11-06 RX ADMIN — OXYCODONE HYDROCHLORIDE 10 MG: 10 TABLET ORAL at 13:25

## 2019-11-06 RX ADMIN — OXYCODONE HYDROCHLORIDE 10 MG: 10 TABLET ORAL at 02:24

## 2019-11-06 RX ADMIN — CALCIUM ACETATE 1334 MG: 667 CAPSULE ORAL at 13:24

## 2019-11-06 RX ADMIN — HEPARIN SODIUM 5000 UNITS: 5000 INJECTION INTRAVENOUS; SUBCUTANEOUS at 05:04

## 2019-11-06 RX ADMIN — Medication 1000 MG: at 20:17

## 2019-11-06 RX ADMIN — ACETAMINOPHEN 650 MG: 325 TABLET, FILM COATED ORAL at 05:04

## 2019-11-06 NOTE — PLAN OF CARE
Problem: Prexisting or High Potential for Compromised Skin Integrity  Goal: Skin integrity is maintained or improved  Description  INTERVENTIONS:  - Identify patients at risk for skin breakdown  - Assess and monitor skin integrity  - Assess and monitor nutrition and hydration status  - Monitor labs   - Assess for incontinence   - Turn and reposition patient  - Assist with mobility/ambulation  - Relieve pressure over bony prominences  - Avoid friction and shearing  - Provide appropriate hygiene as needed including keeping skin clean and dry  - Evaluate need for skin moisturizer/barrier cream  - Collaborate with interdisciplinary team   - Patient/family teaching  - Consider wound care consult   Outcome: Progressing     Problem: Potential for Falls  Goal: Patient will remain free of falls  Description  INTERVENTIONS:  - Assess patient frequently for physical needs  -  Identify cognitive and physical deficits and behaviors that affect risk of falls    -  [place] fall precautions as indicated by assessment   - Educate patient/family on patient safety including physical limitations  - Instruct patient to call for assistance with activity based on assessment  - Modify environment to reduce risk of injury  - Consider OT/PT consult to assist with strengthening/mobility  Outcome: Progressing     Problem: INFECTION - ADULT  Goal: Absence or prevention of progression during hospitalization  Description  INTERVENTIONS:  - Assess and monitor for signs and symptoms of infection  - Monitor lab/diagnostic results  - Monitor all insertion sites, i e  indwelling lines, tubes, and drains  - Monitor endotracheal if appropriate and nasal secretions for changes in amount and color  - [place] appropriate cooling/warming therapies per order  - Administer medications as ordered  - Instruct and encourage patient and family to use good hand hygiene technique  - Identify and instruct in appropriate isolation precautions for identified infection/condition  Outcome: Progressing

## 2019-11-06 NOTE — OP NOTE
OPERATIVE REPORT - Podiatry  PATIENT NAME: Franklin Vizcaino    :  1960  MRN: [id number]  Pt Location: AN OR ROOM 03    SURGERY DATE: 2019    Surgeon(s) and Role:     * Prachi Reyes DPM - Primary     * John Alejandro DPM - Assisting    Pre-op Diagnosis:  Wound of right lower extremity, subsequent encounter [S81 801D]  Wound of left lower extremity, subsequent encounter [S81 802D]    Post-Op Diagnosis Codes:     * Wound of right lower extremity, subsequent encounter [S81 801D]     * Wound of left lower extremity, subsequent encounter [S81 802D]    Procedure(s) (LRB):  DEBRIDEMENT WOUND ([address] OUT) (Bilateral)    Specimen(s):  * No specimens in log *    Estimated Blood Loss:   100 mL    Drains:  * No LDAs found *    Anesthesia Type:   Choice     Hemostasis:  Surgical dissection  Materials:  none    Operative Findings:  Extensive improving full-thickness bilateral lower extremity ulcerations  Ulcerations look significantly improved since last OR debridement  Ulcerations continue on to the distal medial thigh of the left extremity  Post debridement healthy bleeding granular tissue was noted of all wound beds  Some dorsal extensor tendons of the right foot were noted to be exposed due to depth of ulceration  Adequate bleeding was noted throughout the entire procedure  Complications:   None    Procedure and Technique:     Under mild sedation, the patient was brought into the operating  The patient was left on his stretcher in the supine position throughout the entire duration of the procedure  A time-out was performed confirm the correct patient, procedure, and site with all parties in agreement  Following IV sedation, bilateral legs were scrubbed, prepped, and draped in the usual aseptic manner and placed on the operating room table  Attention was then directed ulcerations of bilateral lower extremities    Utilizing a combination of curette, #15 blade, rongeur, and largely Misonix ultrasonic debridement device the wounds were excisionally debrided to remove all nonviable fibrotic tissue, slough, and necrotic tissue down to the level of healthy tendon, muscle, and healthy granular tissue  No muscle or tendon was debrided  The wound edges well demarcated with no deep sinus tracts noted  No acute clinical signs of infection were appreciated the wounds bilaterally  No malodor, or drainage was noted of the wounds  Post surgical debridement the wounds were irrigated with 3 L of normal sterile saline  Wounds were than measured as follows:    Right dorsal foot-10 5 cm x 11 cm x 0 4 cm  Right leg extending to foot- 40 cm x 21 cm x 0 2 cm  Left hallux- 3 5 cm x 3 cm x 0 2 cm  Left dorsal foot proximal-3 5 cm x 3 5 cm x 0 3 cm  Left distal foot-0 6 cm x 0 9 cm x 0 3 cm  Left posterior proximal-14 cm x 8 5 cm x 0 4 cm  Left medial proximal- 12 cm x 18 cm x 0 3 cm  Left lateral cluster- 26 5 cm x 6 5 cm x 0 4 cm  Left posterior distal leg- 5 cm x 7 cm x 0 3 cm    A total of 1521 04 sq cm was debrided  Left extending above the knee 15 cm x 3 cm x 0 2 cm  This area of the wound was not debrided, or addressed by Podiatry throughout the entire procedure  The wounds were then dressed with Xeroform applied to the wound bed, gauze 4x4s, ABDs, Kerlix, and a light Ace wrap bilaterally  The patient tolerated the procedure and anesthesia well and was transported to PACU with vital signs stable    [facility] was present during the entire procedure and participated in all key aspects  SIGNATURE: Madison Gutierrez DPM  DATE: November 5, 2019  TIME: 7:53 PM      Portions of the record may have been created with voice recognition software  Occasional wrong word or "sound a like" substitutions may have occurred due to the inherent limitations of voice recognition software  Read the chart carefully and recognize, using context, where substitutions have occurred

## 2019-11-06 NOTE — ASSESSMENT & PLAN NOTE
· Procedure - Debridement, removal of Eschar on 10/31/2019 by Dr Na Tate  · Podiatry consulted plastic surgery for possible graft vs  Flap procedure  · Plastic surgery recommends additional debridement first, but then tentatively anticipates STSG 11/7/2019, pending evaluation by Dr Holli Rod (seen by his AP already)  · Awaiting input from plastics re: timing  · Monitor for any signs of Infection (felt to be at risk) -   · Had hypotension and fever perioperatively  Per podiatry, no obvious infected tissue seen during procedure, but at high risk for development of infection  · Antibiotic - Change cefepime to cefazolin  Continue on vancomycin  · Follow up final results of blood cultures from 10/31/2019  · NGTD  · Monitor temperatures and serial leg exams  · Pain Control -   · Oxycodone 5-10 mg  · Breakthrough pain - Dilaudid 1 mg IV  · Monitor pain levels    · Supportive care

## 2019-11-06 NOTE — PROGRESS NOTES
Vancomycin IV Pharmacy-to-Dose Consultation    Sahra Cornell is a 64 y o  male who is currently receiving Vancomycin IV with management by the Pharmacy Consult service for the treatment of skin-soft tissue infection  Assessment/Plan:    The patient's chart was reviewed  There are no signs or symptoms of nephrotoxicity and/or infusion reactions documented  The following nephrotoxicity factors are present:  Medications: diuretics  Patient-Factors: renal dysfunction    Random level prior to planned dialysis today resulted at 34 7  Patient did not receive dialysis today so was not re-dosed with vancomycin  Based on today's assessment, will continue current vancomycin (Day # 7) dosing of 1000mg IV after diaylsis, with a plan for level to be drawn prior to dialysis on 11/8  We will continue to follow the patient's culture results and clinical progress daily        Alise Alfonso, PharmD   Pharmacist

## 2019-11-06 NOTE — PROGRESS NOTES
[address] [facility] NOTE   Jazmín Aw 64 y o  male MRN: [id number]  Unit/Bed#: S -01 Encounter: [id number]  Reason for Consult: ESRD on HD    ASSESSMENT and PLAN:  1  ESRD on HD (Alvah Felty):  · Outpatient HD schedule is 4 times weekly on M-T-Th-F    · Will change inpatient schedule to MWF + Saturday (if needed)  · Next HD is Friday  2  Access: Permcath  3  BP/volume:   · On Midodrine as an outpatient but not getting it in the hospital    · BP is acceptable  · Not fluid overloaded  4  Anemia:  · Hgb below goal - lower today  · Continue Epogen 72168 units 3x a week  5  Mineral and bone disease: Continue Sevelamer for hyperphos  6  LE wounds: per surgery  DISPOSITION:  · Change to MWF + Saturday schedule  · Next HD is Friday  SUBJECTIVE / INTERVAL HISTORY:  Feels washed out today  Had HD yesterday and wound debridement in the evening  OBJECTIVE:  Current Weight: Weight - Scale: 84 2 kg (185 lb 10 oz)  Vitals:    11/06/19 0212 11/06/19 0700 11/06/19 0847 11/06/19 1106   BP: 93/61 106/63 110/62 102/59   BP Location: Left arm Left arm  Left arm   Pulse: (!) 109 93 92 99   Resp: 16 16  16   Temp: 98 5 °F (36 9 °C) 98 4 °F (36 9 °C)  98 6 °F (37 °C)   TempSrc: Oral Oral  Oral   SpO2: 94% 99%  97%   Weight:       Height:           Intake/Output Summary (Last 24 hours) at 11/6/2019 1306  Last data filed at 11/6/2019 1015  Gross per 24 hour   Intake 710 ml   Output 100 ml   Net 610 ml     General: conscious, coherent, cooperative, no distress  Chest/Lungs: clear breath sounds  CVS: distinct heart sounds, normal rate  Abdomen: soft  Extremities: legs with ACE wraps  : no meeks catheter  Neuro: awake, alert       Medications:    Current Facility-Administered Medications:     acetaminophen (TYLENOL) tablet 650 mg, 650 mg, Oral, Q6H PRN, Beto Zeng DPM, 650 mg at 11/02/19 1050    acetaminophen (TYLENOL) tablet 650 mg, 650 mg, Oral, Q6H [facility] Dorene Paty Pulido, JUANM, 650 mg at 11/06/19 0504    b complex-vitamin C-folic acid (NEPHROCAPS) capsule 1 capsule, 1 capsule, Oral, Daily With Dinner, Jomar Quintero DPM, 1 capsule at 11/04/19 1759    calcium acetate (PHOSLO) capsule 1,334 mg, 1,334 mg, Oral, TID With Meals, Jomar Quintero DPM, 1,334 mg at 11/06/19 0850    cefepime (MAXIPIME) 1,000 mg in dextrose 5 % 50 mL IVPB, 1,000 mg, Intravenous, Q24H, Jomar Quintero DPM, Last Rate: 100 mL/hr at 11/05/19 2207, 1,000 mg at 11/05/19 2207    collagenase (SANTYL) ointment, , Topical, Daily, Jomar Quintero DPM, Stopped at 11/05/19 0915    docusate sodium (COLACE) capsule 100 mg, 100 mg, Oral, BID, Jomar Quintero DPM, 100 mg at 11/06/19 0850    [START ON 11/7/2019] epoetin bob (EPOGEN,PROCRIT) injection 10,000 Units, 10,000 Units, Intravenous, Once per day on Mon Thu Fri, Linwood Borden DPM    gabapentin (NEURONTIN) capsule 100 mg, 100 mg, Oral, Daily, Jomar Quintero DPM, 100 mg at 11/06/19 0850    heparin (porcine) subcutaneous injection 5,000 Units, 5,000 Units, Subcutaneous, Q8H [address], 5,000 Units at 11/06/19 0504 **AND** [COMPLETED] Platelet count, , , Once, Jesse Chun PA-C    HYDROmorphone (DILAUDID) injection 1 mg, 1 mg, Intravenous, Q4H PRN, Jomar Quintero DPM, 1 mg at 11/04/19 1759    metoprolol tartrate (LOPRESSOR) tablet 25 mg, 25 mg, Oral, Q12H [address], JUAN CharltonM, 25 mg at 11/06/19 0848    ondansetron (ZOFRAN) injection 4 mg, 4 mg, Intravenous, Q6H PRN, Jomar Lang, DPM    oxyCODONE (ROXICODONE) IR tablet 10 mg, 10 mg, Oral, Q4H PRN, Jomar Quintero, DPM, 10 mg at 11/06/19 0224    oxyCODONE (ROXICODONE) IR tablet 5 mg, 5 mg, Oral, Q4H PRN, Jomar Quintero, DPM, 5 mg at 11/05/19 0905    pantoprazole (PROTONIX) EC tablet 40 mg, 40 mg, Oral, Early Morning, Jomar Quintero, DPM, 40 mg at 11/06/19 0503    senna (SENOKOT) tablet 17 2 mg, 2 tablet, Oral, BID PRN, Jomar Quintero, DPM, 17 2 mg at 11/05/19 0906    sevelamer (RENAGEL) tablet 800 mg, 800 mg, Oral, TID With Meals, Leonela Franco, DPM, 800 mg at 11/06/19 0847    torsemide (DEMADEX) tablet 40 mg, 40 mg, Oral, BID (diuretic), Leonela Franco DPM, 40 mg at 11/06/19 0850    vancomycin (VANCOCIN) IVPB (premix) 1,000 mg, 12 5 mg/kg, Intravenous, After Dialysis, JUAN BeckerM, Last Rate: 200 mL/hr at 11/05/19 1543, 1,000 mg at 11/05/19 1543    Laboratory Results:  Results from last 7 days   Lab Units 11/06/19  0506 11/05/19  0250 11/02/19  0346 11/01/19  0629 10/31/19  2308 10/31/19  2307   WBC Thousand/uL 8 70 8 61 7 95 9 58 10 45*  --    HEMOGLOBIN g/dL 7 6* 9 8* 7 3* 7 7* 7 0*  --    HEMATOCRIT % 25 1* 32 1* 24 3* 25 6* 22 7*  --    PLATELETS Thousands/uL 175 206 123* 210 204  --    POTASSIUM mmol/L 4 8  --  4 7 4 7  --  4 5   CHLORIDE mmol/L 100  --  101 100  --  100   CO2 mmol/L 28  --  26 27  --  29   BUN mg/dL 28*  --  27* 35*  --  31*   CREATININE mg/dL 5 13*  --  4 23* 5 20*  --  4 61*   CALCIUM mg/dL 8 7  --  8 3 8 2*  --  8 0*

## 2019-11-06 NOTE — PROGRESS NOTES
Progress Note - Terri Patel 1960, 64 y o  male MRN: [id number]    Unit/Bed#: S -01 Encounter: [id number]    Primary Care Provider: Monique Youngblood   Date and time admitted to hospital: 10/26/2019  5:10 PM        * Multiple and open wound of lower limb  Assessment & Plan  · Procedure - Debridement, removal of Eschar on 10/31/2019 by Dr Fred Beltran  · Podiatry consulted plastic surgery for possible graft vs  Flap procedure  · Plastic surgery recommends additional debridement first, but then tentatively anticipates STSG 11/7/2019, pending evaluation by Dr Julio C Donnelly (seen by his AP already)  · Awaiting input from plastics re: timing  · Monitor for any signs of Infection (felt to be at risk) -   · Had hypotension and fever perioperatively  Per podiatry, no obvious infected tissue seen during procedure, but at high risk for development of infection  · Antibiotic - Change cefepime to cefazolin  Continue on vancomycin  · Follow up final results of blood cultures from 10/31/2019  · NGTD  · Monitor temperatures and serial leg exams  · Pain Control -   · Oxycodone 5-10 mg  · Breakthrough pain - Dilaudid 1 mg IV  · Monitor pain levels  · Supportive care    Scrotal swelling  Assessment & Plan  · Ultrasound showed "Large complex right hydrocele containing numerous septations  Increased vascularity in the left testicle with respect to the right, although both testicles could not be imaged on the same plane limiting direct comparison   Correlate clinically for orchitis "  · Urology saw patient 10/29/2019 and recommends no surgical intervention acutely and to manage problems with legs and get therapy first   · AFP and LDH within normal limits  · Follow up beta HCG  · Supportive care  · Pain management  Monitor pain levels      Anemia due to chronic kidney disease, on chronic dialysis and Acute Blood Loss Anemia (HCC)  Assessment & Plan  · There was a slight drop in hemoglobin during procedure  · Consider transfusion with dialysis prior to any additional procedure, if needed to get hemoglobin over 8   · Monitor counts - CBC monitoring  Atrial fibrillation ([facility] )  Assessment & Plan  · Rate / Rhythm control -   · Metoprolol increased to 25 mg bid on 10/29/2019  Not consistently getting medication due to blood pressure parameters  · Monitor on telemetry  Monitor HR and blood pressures  · Anticoagulation - None prehospital   Defer to outpatient team following discharge  ESRD (end stage renal disease) ([facility] )  Assessment & Plan  · On routine dialysis  · Nephrology following  Constipation  Assessment & Plan  · Colace scheduled  · Senna BID PRN  · Additional adjustments as needed  Pressure ulcer, right and left buttocks POA  Assessment & Plan  · Local wound care  · Turn / Reposition frequently  VTE Pharmacologic Prophylaxis:   Pharmacologic: Heparin  Mechanical VTE Prophylaxis in Place: No    Patient Centered Rounds: I have performed bedside rounds with nursing staff today  Discussions with Specialists or Other Care Team Provider:     Education and Discussions with Family / Patient: patient     Time Spent for Care: 30 minutes  More than 50% of total time spent on counseling and coordination of care as described above  Current Length of Stay: 11 day(s)    Current Patient Status: Inpatient   Certification Statement: The patient will continue to require additional inpatient hospital stay due to LE wounds requiring active managment and debridement     Discharge Plan / Estimated Discharge Date: TBD based on clinical course    Code Status: Level 1 - Full Code    Subjective:   Pt is frustrated today  Was hungry this am and did not get his food for sometime  Not sure if he is going to accept dialysis today       Objective:     Vitals:   Temp (24hrs), Av 5 °F (36 9 °C), Min:98 °F (36 7 °C), Max:99 2 °F (37 3 °C)    Temp:  [98 °F (36 7 °C)-99 2 °F (37 3 °C)] 98 4 °F (36 9 °C)  HR:  [] 93  Resp:  [12-20] 16  BP: ()/(48-71) 106/63  SpO2:  [91 %-100 %] 99 %  Body mass index is 28 22 kg/m²  Input and Output Summary (last 24 hours): Intake/Output Summary (Last 24 hours) at 11/6/2019 0850  Last data filed at 11/6/2019 0201  Gross per 24 hour   Intake 970 ml   Output 2446 ml   Net -1476 ml       Physical Exam:     Physical Exam   Constitutional: He is oriented to person, place, and time  No distress  HENT:   Head: Normocephalic and atraumatic  Cardiovascular: Normal rate and regular rhythm  Pulmonary/Chest: Effort normal and breath sounds normal  No stridor  No respiratory distress  Abdominal: He exhibits distension  There is no tenderness  There is no guarding  Musculoskeletal: He exhibits tenderness and deformity  Neurological: He is alert and oriented to person, place, and time  Skin: He is not diaphoretic  Nursing note and vitals reviewed  Additional Data:     Labs:    Results from last 7 days   Lab Units 11/06/19  0506   WBC Thousand/uL 8 70   HEMOGLOBIN g/dL 7 6*   HEMATOCRIT % 25 1*   PLATELETS Thousands/uL 175   NEUTROS PCT % 72   LYMPHS PCT % 9*   MONOS PCT % 13*   EOS PCT % 5     Results from last 7 days   Lab Units 11/06/19  0506 11/02/19  0346   POTASSIUM mmol/L 4 8 4 7   CHLORIDE mmol/L 100 101   CO2 mmol/L 28 26   BUN mg/dL 28* 27*   CREATININE mg/dL 5 13* 4 23*   CALCIUM mg/dL 8 7 8 3   ALK PHOS U/L  --  216*   ALT U/L  --  6*   AST U/L  --  21           * I Have Reviewed All Lab Data Listed Above  * Additional Pertinent Lab Tests Reviewed: All Labs Within Last 24 Hours Reviewed    Imaging:    Imaging Reports Reviewed Today Include:   Imaging Personally Reviewed by Myself Includes:      Recent Cultures (last 7 days):     Results from last 7 days   Lab Units 10/31/19  2306   BLOOD CULTURE  No Growth After 5 Days  No Growth After 5 Days         Last 24 Hours Medication List:     Current Facility-Administered Medications:  acetaminophen 650 mg Oral Q6H PRN Muriel Bunting, DPM    acetaminophen 650 mg Oral Q6H [address] Muriel Bunting, DPM    b complex-vitamin C-folic acid 1 capsule Oral Daily With Ruslan Furbish, DPM    calcium acetate 1,334 mg Oral TID With Meals Muriel Bunting, DPM    cefepime 1,000 mg Intravenous Q24H Muriel Bunting, DPM Last Rate: 1,000 mg (11/05/19 2207)   collagenase  Topical Daily Muriel Bunting, DPM    docusate sodium 100 mg Oral BID Muriel Bunting, DPM    [START ON 11/7/2019] epoetin bob 10,000 Units Intravenous Once per day on Mon Thu Fri Muriel Bunting, DPM    gabapentin 100 mg Oral Daily Muriel Bunting, DPM    heparin (porcine) 5,000 Units Subcutaneous [facility] Muriel Bunting, DPM    HYDROmorphone 1 mg Intravenous Q4H PRN Muriel Bunting, DPM    metoprolol tartrate 25 mg Oral Q12H [address] Muriel Bunting, DPM    ondansetron 4 mg Intravenous Q6H PRN Muriel Bunting, DPM    oxyCODONE 10 mg Oral Q4H PRN Muriel Bunting, DPM    oxyCODONE 5 mg Oral Q4H PRN Muriel Bunting, DPM    pantoprazole 40 mg Oral Early Morning Muriel Bunting, DPM    senna 2 tablet Oral BID PRN Muriel Bunting, DPM    sevelamer 800 mg Oral TID With Meals Muriel Bunting, DPM    torsemide 40 mg Oral BID (diuretic) Muriel Bunting, DPM    vancomycin 12 5 mg/kg Intravenous After Dialysis Muriel Bunting, DPM Last Rate: 1,000 mg (11/05/19 2383)        Today, Patient Was Seen By: Marielle Flores MD    ** Please Note: This note has been constructed using a voice recognition system   **

## 2019-11-07 ENCOUNTER — ANESTHESIA (INPATIENT)
Dept: PERIOP | Facility: HOSPITAL | Age: 59
DRG: 576 | End: 2019-11-07
Payer: MEDICARE

## 2019-11-07 PROBLEM — T14.8XXA MULTIPLE WOUNDS OF SKIN: Status: ACTIVE | Noted: 2019-10-26

## 2019-11-07 LAB
ANION GAP SERPL CALCULATED.3IONS-SCNC: 9 MMOL/L (ref 4–13)
BASOPHILS # BLD AUTO: 0.09 THOUSANDS/ΜL (ref 0–0.1)
BASOPHILS NFR BLD AUTO: 1 % (ref 0–1)
BUN SERPL-MCNC: 36 MG/DL (ref 5–25)
CALCIUM SERPL-MCNC: 9 MG/DL (ref 8.3–10.1)
CHLORIDE SERPL-SCNC: 100 MMOL/L (ref 100–108)
CO2 SERPL-SCNC: 26 MMOL/L (ref 21–32)
CREAT SERPL-MCNC: 6.27 MG/DL (ref 0.6–1.3)
EOSINOPHIL # BLD AUTO: 0.47 THOUSAND/ΜL (ref 0–0.61)
EOSINOPHIL NFR BLD AUTO: 6 % (ref 0–6)
ERYTHROCYTE [DISTWIDTH] IN BLOOD BY AUTOMATED COUNT: 16.6 % (ref 11.6–15.1)
GFR SERPL CREATININE-BSD FRML MDRD: 9 ML/MIN/1.73SQ M
GLUCOSE SERPL-MCNC: 107 MG/DL (ref 65–140)
HCT VFR BLD AUTO: 26.6 % (ref 36.5–49.3)
HGB BLD-MCNC: 8 G/DL (ref 12–17)
IMM GRANULOCYTES # BLD AUTO: 0.02 THOUSAND/UL (ref 0–0.2)
IMM GRANULOCYTES NFR BLD AUTO: 0 % (ref 0–2)
LYMPHOCYTES # BLD AUTO: 0.63 THOUSANDS/ΜL (ref 0.6–4.47)
LYMPHOCYTES NFR BLD AUTO: 9 % (ref 14–44)
MCH RBC QN AUTO: 30.7 PG (ref 26.8–34.3)
MCHC RBC AUTO-ENTMCNC: 30.1 G/DL (ref 31.4–37.4)
MCV RBC AUTO: 102 FL (ref 82–98)
MONOCYTES # BLD AUTO: 0.9 THOUSAND/ΜL (ref 0.17–1.22)
MONOCYTES NFR BLD AUTO: 12 % (ref 4–12)
NEUTROPHILS # BLD AUTO: 5.25 THOUSANDS/ΜL (ref 1.85–7.62)
NEUTS SEG NFR BLD AUTO: 72 % (ref 43–75)
NRBC BLD AUTO-RTO: 0 /100 WBCS
PLATELET # BLD AUTO: 195 THOUSANDS/UL (ref 149–390)
PMV BLD AUTO: 9.8 FL (ref 8.9–12.7)
POTASSIUM SERPL-SCNC: 5 MMOL/L (ref 3.5–5.3)
RBC # BLD AUTO: 2.61 MILLION/UL (ref 3.88–5.62)
SODIUM SERPL-SCNC: 135 MMOL/L (ref 136–145)
WBC # BLD AUTO: 7.36 THOUSAND/UL (ref 4.31–10.16)

## 2019-11-07 PROCEDURE — 15101 SPLT AGRFT T/A/L EA ADDL 100: CPT | Performed by: PLASTIC SURGERY

## 2019-11-07 PROCEDURE — 15002 WOUND PREP TRK/ARM/LEG: CPT | Performed by: PLASTIC SURGERY

## 2019-11-07 PROCEDURE — 15003 WOUND PREP ADDL 100 CM: CPT | Performed by: PLASTIC SURGERY

## 2019-11-07 PROCEDURE — 99232 SBSQ HOSP IP/OBS MODERATE 35: CPT | Performed by: INTERNAL MEDICINE

## 2019-11-07 PROCEDURE — 15100 SPLT AGRFT T/A/L 1ST 100SQCM: CPT | Performed by: PLASTIC SURGERY

## 2019-11-07 PROCEDURE — P9016 RBC LEUKOCYTES REDUCED: HCPCS

## 2019-11-07 PROCEDURE — 0HRKX74 REPLACEMENT OF RIGHT LOWER LEG SKIN WITH AUTOLOGOUS TISSUE SUBSTITUTE, PARTIAL THICKNESS, EXTERNAL APPROACH: ICD-10-PCS | Performed by: PLASTIC SURGERY

## 2019-11-07 PROCEDURE — 0HRMX74 REPLACEMENT OF RIGHT FOOT SKIN WITH AUTOLOGOUS TISSUE SUBSTITUTE, PARTIAL THICKNESS, EXTERNAL APPROACH: ICD-10-PCS | Performed by: PLASTIC SURGERY

## 2019-11-07 PROCEDURE — 0HBHXZZ EXCISION OF RIGHT UPPER LEG SKIN, EXTERNAL APPROACH: ICD-10-PCS | Performed by: PLASTIC SURGERY

## 2019-11-07 PROCEDURE — 0KBS0ZZ EXCISION OF RIGHT LOWER LEG MUSCLE, OPEN APPROACH: ICD-10-PCS | Performed by: PLASTIC SURGERY

## 2019-11-07 PROCEDURE — 99232 SBSQ HOSP IP/OBS MODERATE 35: CPT | Performed by: HOSPITALIST

## 2019-11-07 PROCEDURE — 80048 BASIC METABOLIC PNL TOTAL CA: CPT | Performed by: STUDENT IN AN ORGANIZED HEALTH CARE EDUCATION/TRAINING PROGRAM

## 2019-11-07 PROCEDURE — 85025 COMPLETE CBC W/AUTO DIFF WBC: CPT | Performed by: STUDENT IN AN ORGANIZED HEALTH CARE EDUCATION/TRAINING PROGRAM

## 2019-11-07 RX ORDER — MINERAL OIL
OIL (ML) MISCELLANEOUS AS NEEDED
Status: DISCONTINUED | OUTPATIENT
Start: 2019-11-07 | End: 2019-11-07 | Stop reason: HOSPADM

## 2019-11-07 RX ORDER — SUCCINYLCHOLINE/SOD CL,ISO/PF 100 MG/5ML
SYRINGE (ML) INTRAVENOUS AS NEEDED
Status: DISCONTINUED | OUTPATIENT
Start: 2019-11-07 | End: 2019-11-07 | Stop reason: SURG

## 2019-11-07 RX ORDER — MIDODRINE HYDROCHLORIDE 5 MG/1
5 TABLET ORAL
Status: DISCONTINUED | OUTPATIENT
Start: 2019-11-07 | End: 2019-11-21 | Stop reason: HOSPADM

## 2019-11-07 RX ORDER — PROPOFOL 10 MG/ML
INJECTION, EMULSION INTRAVENOUS AS NEEDED
Status: DISCONTINUED | OUTPATIENT
Start: 2019-11-07 | End: 2019-11-07 | Stop reason: SURG

## 2019-11-07 RX ORDER — SODIUM CHLORIDE 9 MG/ML
10 INJECTION, SOLUTION INTRAVENOUS CONTINUOUS
Status: DISCONTINUED | OUTPATIENT
Start: 2019-11-07 | End: 2019-11-08

## 2019-11-07 RX ORDER — ONDANSETRON 2 MG/ML
4 INJECTION INTRAMUSCULAR; INTRAVENOUS ONCE AS NEEDED
Status: DISCONTINUED | OUTPATIENT
Start: 2019-11-07 | End: 2019-11-07 | Stop reason: HOSPADM

## 2019-11-07 RX ORDER — ROCURONIUM BROMIDE 10 MG/ML
INJECTION, SOLUTION INTRAVENOUS AS NEEDED
Status: DISCONTINUED | OUTPATIENT
Start: 2019-11-07 | End: 2019-11-07 | Stop reason: SURG

## 2019-11-07 RX ORDER — FENTANYL CITRATE/PF 50 MCG/ML
50 SYRINGE (ML) INJECTION
Status: DISCONTINUED | OUTPATIENT
Start: 2019-11-07 | End: 2019-11-07 | Stop reason: HOSPADM

## 2019-11-07 RX ORDER — ALBUMIN, HUMAN INJ 5% 5 %
12.5 SOLUTION INTRAVENOUS ONCE
Status: COMPLETED | OUTPATIENT
Start: 2019-11-07 | End: 2019-11-07

## 2019-11-07 RX ORDER — GLYCOPYRROLATE 0.2 MG/ML
INJECTION INTRAMUSCULAR; INTRAVENOUS AS NEEDED
Status: DISCONTINUED | OUTPATIENT
Start: 2019-11-07 | End: 2019-11-07 | Stop reason: SURG

## 2019-11-07 RX ORDER — SODIUM CHLORIDE 9 MG/ML
INJECTION, SOLUTION INTRAVENOUS CONTINUOUS PRN
Status: DISCONTINUED | OUTPATIENT
Start: 2019-11-07 | End: 2019-11-07 | Stop reason: SURG

## 2019-11-07 RX ORDER — SODIUM CHLORIDE, SODIUM LACTATE, POTASSIUM CHLORIDE, CALCIUM CHLORIDE 600; 310; 30; 20 MG/100ML; MG/100ML; MG/100ML; MG/100ML
50 INJECTION, SOLUTION INTRAVENOUS CONTINUOUS
Status: DISCONTINUED | OUTPATIENT
Start: 2019-11-07 | End: 2019-11-08

## 2019-11-07 RX ORDER — KETAMINE HCL IN NACL, ISO-OSM 100MG/10ML
SYRINGE (ML) INJECTION AS NEEDED
Status: DISCONTINUED | OUTPATIENT
Start: 2019-11-07 | End: 2019-11-07 | Stop reason: SURG

## 2019-11-07 RX ORDER — ONDANSETRON 2 MG/ML
INJECTION INTRAMUSCULAR; INTRAVENOUS AS NEEDED
Status: DISCONTINUED | OUTPATIENT
Start: 2019-11-07 | End: 2019-11-07 | Stop reason: SURG

## 2019-11-07 RX ORDER — EPHEDRINE SULFATE 50 MG/ML
INJECTION INTRAVENOUS AS NEEDED
Status: DISCONTINUED | OUTPATIENT
Start: 2019-11-07 | End: 2019-11-07 | Stop reason: SURG

## 2019-11-07 RX ORDER — MAGNESIUM HYDROXIDE 1200 MG/15ML
LIQUID ORAL AS NEEDED
Status: DISCONTINUED | OUTPATIENT
Start: 2019-11-07 | End: 2019-11-07 | Stop reason: HOSPADM

## 2019-11-07 RX ORDER — HYDROMORPHONE HCL/PF 1 MG/ML
0.5 SYRINGE (ML) INJECTION
Status: DISCONTINUED | OUTPATIENT
Start: 2019-11-07 | End: 2019-11-07 | Stop reason: HOSPADM

## 2019-11-07 RX ORDER — NEOSTIGMINE METHYLSULFATE 1 MG/ML
INJECTION INTRAVENOUS AS NEEDED
Status: DISCONTINUED | OUTPATIENT
Start: 2019-11-07 | End: 2019-11-07 | Stop reason: SURG

## 2019-11-07 RX ADMIN — PHENYLEPHRINE HYDROCHLORIDE 200 MCG: 10 INJECTION INTRAVENOUS at 13:04

## 2019-11-07 RX ADMIN — NEOSTIGMINE METHYLSULFATE 2 MG: 1 INJECTION INTRAVENOUS at 13:50

## 2019-11-07 RX ADMIN — Medication 10 MG: at 12:50

## 2019-11-07 RX ADMIN — ACETAMINOPHEN 650 MG: 325 TABLET, FILM COATED ORAL at 18:04

## 2019-11-07 RX ADMIN — CALCIUM ACETATE 1334 MG: 667 CAPSULE ORAL at 16:09

## 2019-11-07 RX ADMIN — PROPOFOL 100 MG: 10 INJECTION, EMULSION INTRAVENOUS at 12:23

## 2019-11-07 RX ADMIN — PHENYLEPHRINE HYDROCHLORIDE 200 MCG: 10 INJECTION INTRAVENOUS at 12:34

## 2019-11-07 RX ADMIN — DOCUSATE SODIUM 100 MG: 100 CAPSULE, LIQUID FILLED ORAL at 18:04

## 2019-11-07 RX ADMIN — PHENYLEPHRINE HYDROCHLORIDE 100 MCG: 10 INJECTION INTRAVENOUS at 13:32

## 2019-11-07 RX ADMIN — PHENYLEPHRINE HYDROCHLORIDE 200 MCG: 10 INJECTION INTRAVENOUS at 13:56

## 2019-11-07 RX ADMIN — ACETAMINOPHEN 650 MG: 325 TABLET, FILM COATED ORAL at 05:22

## 2019-11-07 RX ADMIN — SEVELAMER HYDROCHLORIDE 800 MG: 800 TABLET, FILM COATED PARENTERAL at 16:14

## 2019-11-07 RX ADMIN — Medication 1 CAPSULE: at 16:13

## 2019-11-07 RX ADMIN — PHENYLEPHRINE HYDROCHLORIDE 200 MCG: 10 INJECTION INTRAVENOUS at 13:16

## 2019-11-07 RX ADMIN — ALBUMIN (HUMAN) 12.5 G: 12.5 SOLUTION INTRAVENOUS at 14:58

## 2019-11-07 RX ADMIN — ROCURONIUM BROMIDE 30 MG: 10 SOLUTION INTRAVENOUS at 12:43

## 2019-11-07 RX ADMIN — PHENYLEPHRINE HYDROCHLORIDE 200 MCG: 10 INJECTION INTRAVENOUS at 12:31

## 2019-11-07 RX ADMIN — GLYCOPYRROLATE 0.4 MG: 0.2 INJECTION, SOLUTION INTRAMUSCULAR; INTRAVENOUS at 13:50

## 2019-11-07 RX ADMIN — Medication 10 MG: at 13:45

## 2019-11-07 RX ADMIN — GLYCOPYRROLATE 0.1 MG: 0.2 INJECTION, SOLUTION INTRAMUSCULAR; INTRAVENOUS at 12:59

## 2019-11-07 RX ADMIN — SODIUM CHLORIDE: 9 INJECTION, SOLUTION INTRAVENOUS at 12:29

## 2019-11-07 RX ADMIN — SODIUM CHLORIDE, SODIUM LACTATE, POTASSIUM CHLORIDE, AND CALCIUM CHLORIDE 50 ML/HR: .6; .31; .03; .02 INJECTION, SOLUTION INTRAVENOUS at 16:37

## 2019-11-07 RX ADMIN — PHENYLEPHRINE HYDROCHLORIDE 200 MCG: 10 INJECTION INTRAVENOUS at 12:36

## 2019-11-07 RX ADMIN — HYDROMORPHONE HYDROCHLORIDE 1 MG: 1 INJECTION, SOLUTION INTRAMUSCULAR; INTRAVENOUS; SUBCUTANEOUS at 18:05

## 2019-11-07 RX ADMIN — EPHEDRINE SULFATE 5 MG: 50 INJECTION, SOLUTION INTRAVENOUS at 12:37

## 2019-11-07 RX ADMIN — Medication 20 MG: at 12:59

## 2019-11-07 RX ADMIN — PANTOPRAZOLE SODIUM 40 MG: 40 TABLET, DELAYED RELEASE ORAL at 05:23

## 2019-11-07 RX ADMIN — ONDANSETRON 4 MG: 2 INJECTION INTRAMUSCULAR; INTRAVENOUS at 13:58

## 2019-11-07 RX ADMIN — Medication 100 MG: at 12:23

## 2019-11-07 RX ADMIN — OXYCODONE HYDROCHLORIDE 10 MG: 10 TABLET ORAL at 16:08

## 2019-11-07 RX ADMIN — Medication 1000 MG: at 21:51

## 2019-11-07 RX ADMIN — Medication 10 MG: at 13:28

## 2019-11-07 RX ADMIN — LIDOCAINE HYDROCHLORIDE 50 MG: 20 INJECTION, SOLUTION INTRAVENOUS at 12:23

## 2019-11-07 RX ADMIN — HEPARIN SODIUM 5000 UNITS: 5000 INJECTION INTRAVENOUS; SUBCUTANEOUS at 21:52

## 2019-11-07 RX ADMIN — PHENYLEPHRINE HYDROCHLORIDE 50 MCG/MIN: 10 INJECTION INTRAVENOUS at 12:26

## 2019-11-07 NOTE — ANESTHESIA POSTPROCEDURE EVALUATION
Post-Op Assessment Note    CV Status:  Stable  Pain Score: 0    Pain management: adequate     Mental Status:  Awake and alert   Hydration Status:  Stable   PONV Controlled:  Controlled   Airway Patency:  Patent   Post Op Vitals Reviewed: Yes      Staff: CRNA           BP   112/56   Temp   97 4   Pulse  104   Resp   14   SpO2   97       Patient awake and responsive in PACU  States he is feeling foggy and verbally denies pain  All loose teeth intact and in original location in patient's mouth

## 2019-11-07 NOTE — ASSESSMENT & PLAN NOTE
· Procedure - Debridement, removal of Eschar on 10/31/2019 by Dr Aileen Palm  · Podiatry consulted plastic surgery for possible graft vs  Flap procedure  · Plastic surgery recommends additional debridement first, but then STSG 11/7/2019,   · Plan for today;   · Monitor for any signs of Infection (felt to be at risk) -   · Per podiatry, no obvious infected tissue seen during procedure, but at high risk for development of infection  · Antibiotic - Cont cefepime  Continue on vancomycin  · Follow up final results of blood cultures from 10/31/2019  · NGTD  · Monitor temperatures and serial leg exams  · Pain Control -   · Oxycodone 5-10 mg  · Breakthrough pain - Dilaudid 1 mg IV  · Monitor pain levels    · Supportive care

## 2019-11-07 NOTE — PROGRESS NOTES
Progress Note - Rose Best 1960, 64 y o  male MRN: [id number]    Unit/Bed#: S -01 Encounter: [id number]    Primary Care Provider: Manuelito Perry   Date and time admitted to hospital: 10/26/2019  5:10 PM      * Multiple and open wound of lower limb  Assessment & Plan  · Procedure - Debridement, removal of Eschar on 10/31/2019 by Dr Madi Hanley  · Podiatry consulted plastic surgery for possible graft vs  Flap procedure  · Plastic surgery recommends additional debridement first, but then STSG 11/7/2019,   · Plan for today;   · Monitor for any signs of Infection (felt to be at risk) -   · Per podiatry, no obvious infected tissue seen during procedure, but at high risk for development of infection  · Antibiotic - Cont cefepime  Continue on vancomycin  · Follow up final results of blood cultures from 10/31/2019  · NGTD  · Monitor temperatures and serial leg exams  · Pain Control -   · Oxycodone 5-10 mg  · Breakthrough pain - Dilaudid 1 mg IV  · Monitor pain levels  · Supportive care    Scrotal swelling  Assessment & Plan  · Ultrasound showed "Large complex right hydrocele containing numerous septations  Increased vascularity in the left testicle with respect to the right, although both testicles could not be imaged on the same plane limiting direct comparison   Correlate clinically for orchitis "  · Urology saw patient 10/29/2019 and recommends no surgical intervention acutely and to manage problems with legs and get therapy first   · beta HCG, AFP and LDH within normal limits  · Supportive care  · Pain management  Monitor pain levels  Anemia due to chronic kidney disease, on chronic dialysis and Acute Blood Loss Anemia (HCC)  Assessment & Plan  · There was a slight drop in hemoglobin during procedure  · Consider transfusion with dialysis prior to any additional procedure, if needed to get hemoglobin over 8   · Monitor counts - CBC monitoring      Atrial fibrillation [facility])  Assessment & Plan  · Rate / Rhythm control -   · Metoprolol increased to 25 mg bid on 10/29/2019  Not consistently getting medication due to blood pressure parameters  · Monitor on telemetry  Monitor HR and blood pressures  · Anticoagulation - None prehospital   Defer to outpatient team following discharge  ESRD (end stage renal disease) ([facility] )  Assessment & Plan  · On routine dialysis  · Nephrology following  Constipation  Assessment & Plan  · Colace scheduled  · Senna BID PRN  · Additional adjustments as needed  Pressure ulcer, right and left buttocks POA  Assessment & Plan  · Local wound care  · Turn / Reposition frequently  VTE Pharmacologic Prophylaxis:   Pharmacologic: Heparin  Mechanical VTE Prophylaxis in Place: Yes    Patient Centered Rounds: I have performed bedside rounds with nursing staff today  Discussions with Specialists or Other Care Team Provider: plastics     Education and Discussions with Family / Patient: patient    Time Spent for Care: 30 minutes  More than 50% of total time spent on counseling and coordination of care as described above  Current Length of Stay: 12 day(s)    Current Patient Status: Inpatient   Certification Statement: The patient will continue to require additional inpatient hospital stay due to LE wounds requiring debridement and skin grafting  Discharge Plan / Estimated Discharge Date: TBD based on clinical course      Code Status: Level 1 - Full Code    Subjective:   Pt feels well currently but is worried about the pain post surgery  No SOB currently      Objective:     Vitals:   Temp (24hrs), Av 4 °F (36 9 °C), Min:98 °F (36 7 °C), Max:98 7 °F (37 1 °C)    Temp:  [98 °F (36 7 °C)-98 7 °F (37 1 °C)] 98 4 °F (36 9 °C)  HR:  [88-99] 94  Resp:  [16-18] 18  BP: (100-127)/(52-72) 126/72  SpO2:  [90 %-98 %] 98 %  Body mass index is 28 22 kg/m²  Input and Output Summary (last 24 hours):        Intake/Output Summary (Last 24 hours) at 11/7/2019 0906  Last data filed at 11/6/2019 1015  Gross per 24 hour   Intake 240 ml   Output    Net 240 ml       Physical Exam:     Physical Exam   Constitutional: He is oriented to person, place, and time  No distress  HENT:   Head: Normocephalic and atraumatic  Cardiovascular: Normal rate and regular rhythm  Pulmonary/Chest: Effort normal and breath sounds normal  No stridor  No respiratory distress  Abdominal: He exhibits distension  There is no tenderness  There is no guarding  Musculoskeletal: Normal range of motion  He exhibits tenderness and deformity  Neurological: He is alert and oriented to person, place, and time  Skin: Skin is warm  He is not diaphoretic  Nursing note and vitals reviewed  Additional Data:     Labs:    Results from last 7 days   Lab Units 11/07/19  0546   WBC Thousand/uL 7 36   HEMOGLOBIN g/dL 8 0*   HEMATOCRIT % 26 6*   PLATELETS Thousands/uL 195   NEUTROS PCT % 72   LYMPHS PCT % 9*   MONOS PCT % 12   EOS PCT % 6     Results from last 7 days   Lab Units 11/07/19  0546  11/02/19  0346   POTASSIUM mmol/L 5 0   < > 4 7   CHLORIDE mmol/L 100   < > 101   CO2 mmol/L 26   < > 26   BUN mg/dL 36*   < > 27*   CREATININE mg/dL 6 27*   < > 4 23*   CALCIUM mg/dL 9 0   < > 8 3   ALK PHOS U/L  --   --  216*   ALT U/L  --   --  6*   AST U/L  --   --  21    < > = values in this interval not displayed  * I Have Reviewed All Lab Data Listed Above  * Additional Pertinent Lab Tests Reviewed: All Labs Within Last 24 Hours Reviewed    Imaging:    Imaging Reports Reviewed Today Include:   Imaging Personally Reviewed by Myself Includes:      Recent Cultures (last 7 days):     Results from last 7 days   Lab Units 10/31/19  2306   BLOOD CULTURE  No Growth After 5 Days  No Growth After 5 Days         Last 24 Hours Medication List:     Current Facility-Administered Medications:  acetaminophen 650 mg Oral Q6H PRN Cass Brian DPM    acetaminophen 650 mg Oral Q6H [facility] Cass Brian DPM    b complex-vitamin C-folic acid 1 capsule Oral Daily With Isela Pulliam, DPM    calcium acetate 1,334 mg Oral TID With Meals Leanna Muster, DPM    cefepime 1,000 mg Intravenous Q24H Leanna Muster, DPM Last Rate: 1,000 mg (11/06/19 2017)   collagenase  Topical Daily Leanna Muster, DPM    docusate sodium 100 mg Oral BID Leanna Muster, DPM    [START ON 11/8/2019] epoetin bob 10,000 Units Intravenous Once per day on Mon Wed Fri Geovanna Ramey MD    gabapentin 100 mg Oral Daily Leanna Muster, DPM    heparin (porcine) 5,000 Units Subcutaneous [facility] Leanna Muster, DPM    HYDROmorphone 1 mg Intravenous Q4H PRN Leanna Muster, DPM    lidocaine 1% 50 mL and epinephrine 1:1000 1 mL in lactated ringers 1000 mL  Irrigation Once Franki Moore MD    metoprolol tartrate 25 mg Oral Q12H [address] Leanna Muster, DPM    ondansetron 4 mg Intravenous Q6H PRN Leanna Muster, DPM    oxyCODONE 10 mg Oral Q4H PRN Leanna Muster, DPM    oxyCODONE 5 mg Oral Q4H PRN Leanna Muster, DPM    pantoprazole 40 mg Oral Early Morning Leanna Muster, DPM    senna 2 tablet Oral BID PRN Leanna Muster, DPM    sevelamer 800 mg Oral TID With Meals Leanna Muster, DPM    torsemide 40 mg Oral BID (diuretic) Leanna Muster, DPM    vancomycin 12 5 mg/kg Intravenous After Dialysis Leanna Muster, DPM Last Rate: 1,000 mg (11/05/19 1543)        Today, Patient Was Seen By: Stiven Jiang MD    ** Please Note: This note has been constructed using a voice recognition system   **

## 2019-11-07 NOTE — TREATMENT PLAN
Plastic surgery attending  Pt s/p Bilateral lower extremity debridement  With some areas of split-thickness skin graft  Plan repeat or on Tuesday November 12th afternoon for dressing change and further debridement of the contralateral leg and further skin so thickness skin grafted      [facility] [id number] [facility]   [address]   [address]   Office: [phone]

## 2019-11-07 NOTE — ASSESSMENT & PLAN NOTE
· Ultrasound showed "Large complex right hydrocele containing numerous septations  Increased vascularity in the left testicle with respect to the right, although both testicles could not be imaged on the same plane limiting direct comparison   Correlate clinically for orchitis "  · Urology saw patient 10/29/2019 and recommends no surgical intervention acutely and to manage problems with legs and get therapy first   · beta HCG, AFP and LDH within normal limits  · Supportive care  · Pain management  Monitor pain levels  (0) Absent

## 2019-11-07 NOTE — PROGRESS NOTES
Vancomycin IV Pharmacy-to-Dose Consultation    Carroll Wisdom is a 64 y o  male who is currently receiving Vancomycin IV with management by the Pharmacy Consult service for the treatment of skin-soft tissue infection  Assessment/Plan:    The patient's chart was reviewed  There are no signs or symptoms of nephrotoxicity and/or infusion reactions documented  The following nephrotoxicity factors are present:  Medications: diuretics  Patient-Factors: renal dysfunction    Patient initiated on dialysis schedule of M,W,F and possibly Saturday if needed  Based on today's assessment, will continue current vancomycin (Day # 8) dosing of 1000mg IV after dialysis  Will plan for level prior to dialysis on 11/8  We will continue to follow the patient's culture results and clinical progress daily        Nitin Bills, PharmD   Pharmacist

## 2019-11-07 NOTE — ANESTHESIA PREPROCEDURE EVALUATION
Review of Systems/Medical History          Cardiovascular  EKG reviewed, Hypertension , Dysrhythmias , atrial fibrillation,   Comment: EF-20%-9/19 when in Septic Shock, Pulmonary hypertension Pulmonary  Not a smoker ,        GI/Hepatic    Liver disease (POLYCYSTIC LIVER DISEASE) ,        Chronic kidney disease , Dialysis hemodialysis Date of last dialysis: Last Dialysed 2 days ago,   Comment: PKD  R Hydrocele     Endo/Other    Comment: MRSA    GYN       Hematology  Anemia ,  Thrombocytopenia,    Musculoskeletal       Neurology   Psychology           Physical Exam    Airway    Mallampati score: I  TM Distance: >3 FB  Neck ROM: full     Dental   Comment: POOR Dentition overall  Loose Teeth  Severe Peridontal Disease,     Cardiovascular      Pulmonary      Other Findings        Anesthesia Plan  ASA Score- 4     Anesthesia Type- general with ASA Monitors  Additional Monitors:   Airway Plan:     Comment: 2 units ordered  Spoke with Dr Jackelyn Tom makeda EF has improved since ECHO of 9/19  Will plan for Dialysis today since will need transfusion today during surgery  Plan Factors-Patient not instructed to abstain from smoking on day of procedure  Patient did not smoke on day of surgery  Induction- intravenous  Postoperative Plan-     Informed Consent- Anesthetic plan and risks discussed with patient  I personally reviewed this patient with the CRNA  Discussed and agreed on the Anesthesia Plan with the CRNA             Lab Results   Component Value Date    GLUC 107 11/07/2019    ALT 6 (L) 11/02/2019    AST 21 11/02/2019    BUN 36 (H) 11/07/2019    CALCIUM 9 0 11/07/2019     11/07/2019    CO2 26 11/07/2019    CREATININE 6 27 (H) 11/07/2019    INR 1 08 10/26/2019    HCT 26 6 (L) 11/07/2019    HGB 8 0 (L) 11/07/2019    MG 2 0 10/28/2019    PHOS 6 3 (H) 10/28/2019     11/07/2019    K 5 0 11/07/2019    WBC 7 36 11/07/2019

## 2019-11-07 NOTE — INTERIM OP NOTE
DEBRIDEMENT WOUND [address] [facility] OUT), SKIN GRAFT SPLIT THICKNESS (STSG)  EXTREMITY  Postoperative Note  PATIENT NAME: Danelle Cooney  : 1960  MRN: [id number]  AN OR ROOM 03    Surgery Date: 2019    Preop Diagnosis:  Wound of right lower extremity, subsequent encounter [S81 801D]  Wound of left lower extremity, subsequent encounter [S81 802D]    Post-Op Diagnosis Codes:     * Wound of right lower extremity, subsequent encounter [S81 801D]     * Wound of left lower extremity, subsequent encounter [S81 802D]    Procedure(s) (LRB):  DEBRIDEMENT WOUND ([address] OUT) (Bilateral)  SKIN GRAFT SPLIT THICKNESS (STSG)  EXTREMITY (Bilateral)    Surgeon(s) and Role:     * Diana Fine MD - Primary     * Walter Orellana PA-C - Andrey Yoon MD - Fellow    Specimens:  None    Estimated Blood Loss:   150 mL    Fluid:  2 units pRBC  400 ml crystalloid    Anesthesia Type:   General     Findings:   1  Right leg open wound (35 x 22 cm) and foot wound (10 5 x 13 5 cm), no obvious active infection, exposed tendon in right lateral calf and dorsal foot  2  Left leg open wound without obvious active infection  3   Right anterior thigh donor site 25 x 15 cm    Complications:   None    SIGNATURE: Benjamín Mcpherson MD   DATE: 2019   TIME: 2:09 PM

## 2019-11-07 NOTE — NURSING NOTE
Pt sleeping comfortably  No signs or symptoms of distress  Call bell and side table are within reach  Will continue to monitor

## 2019-11-07 NOTE — PROGRESS NOTES
[address] [facility] NOTE   Terri Patel 64 y o  male MRN: [id number]  Unit/Bed#: S -01 Encounter: [id number]  Reason for Consult: ESRD on HD    ASSESSMENT and PLAN:  1  ESRD on HD (Francy Gomez):  · Outpatient HD schedule is 4 times weekly on M-T-Th-F    · Inpatient sched: MWF + Saturday  · HD tomorrow  2  Access: Permcath  3  BP/volume:   · On Midodrine as an outpatient but not getting it in the hospital    · Will place on Midodrine 5 mg prior to HD  · On Metoprolol for cardiac disease  4  Anemia:  · Hgb below goal - Unclear if got PRBC in OR today  · Continue Epogen 33906 units 3x a week  5  Mineral and bone disease: Continue Sevelamer for hyperphos  6  LE wounds: per surgery  DISPOSITION:  · HD tomorrow  · Midodrine 5 mg prior to HD  · May need to decrease Metoprolol dose  SUBJECTIVE / INTERVAL HISTORY:  Had debridement and wash out done earlier today  OBJECTIVE:  Current Weight: Weight - Scale: 84 2 kg (185 lb 10 oz)  Vitals:    11/07/19 1500 11/07/19 1515 11/07/19 1545 11/07/19 1608   BP: (!) 86/55 91/64 (!) 96/48 90/60   BP Location:   Left arm Left arm   Pulse: (!) 112 (!) 109 66 70   Resp: 12 12 16 15   Temp:  97 6 °F (36 4 °C) 98 5 °F (36 9 °C) (!) 96 8 °F (36 °C)   TempSrc:   Oral Oral   SpO2: 98% 95% 95% 100%   Weight:       Height:           Intake/Output Summary (Last 24 hours) at 11/7/2019 1726  Last data filed at 11/7/2019 1418  Gross per 24 hour   Intake 1050 ml   Output 500 ml   Net 550 ml     General: conscious, coherent, cooperative, no distress  Chest/Lungs: clear breath sounds  CVS: distinct heart sounds, normal rate  Abdomen: soft  Extremities: no edema  : no meeks catheter  Neuro: awake, alert       Medications:    Current Facility-Administered Medications:     acetaminophen (TYLENOL) tablet 650 mg, 650 mg, Oral, Q6H PRN, Cyril Alexandre MD, 650 mg at 11/02/19 1050    acetaminophen (TYLENOL) tablet 650 mg, 650 mg, Oral, Q6H [facility], Vahid Burrell MD, 650 mg at 11/07/19 0522    b complex-vitamin C-folic acid (NEPHROCAPS) capsule 1 capsule, 1 capsule, Oral, Daily With Abel Yip MD, 1 capsule at 11/07/19 1613    calcium acetate (PHOSLO) capsule 1,334 mg, 1,334 mg, Oral, TID With Meals, Vahid Burrell MD, 1,334 mg at 11/07/19 1609    cefepime (MAXIPIME) 1,000 mg in dextrose 5 % 50 mL IVPB, 1,000 mg, Intravenous, Q24H, Tyrone Box MD, Last Rate: 100 mL/hr at 11/06/19 2017, 1,000 mg at 11/06/19 2017    collagenase (SANTYL) ointment, , Topical, Daily, Vahid Burrell MD, Stopped at 11/05/19 0915    docusate sodium (COLACE) capsule 100 mg, 100 mg, Oral, BID, Vahid Burrell MD, 100 mg at 11/06/19 1801    [START ON 11/8/2019] epoetin bob (EPOGEN,PROCRIT) injection 10,000 Units, 10,000 Units, Intravenous, Once per day on Mon Wed Fri, Vahid Burrell MD    gabapentin (NEURONTIN) capsule 100 mg, 100 mg, Oral, Daily, Vahid Burrell MD, 100 mg at 11/06/19 0850    heparin (porcine) subcutaneous injection 5,000 Units, 5,000 Units, Subcutaneous, Q8H [facility], 5,000 Units at 11/06/19 2018 **AND** [COMPLETED] Platelet count, , , Once, Jesse Chun PA-C    HYDROmorphone (DILAUDID) injection 1 mg, 1 mg, Intravenous, Q4H PRN, Vahid Burrell MD, 1 mg at 11/04/19 1759    lactated ringers infusion, 50 mL/hr, Intravenous, Continuous, Skip UMESH Andino, Last Rate: 50 mL/hr at 11/07/19 1637, 50 mL/hr at 11/07/19 1637    metoprolol tartrate (LOPRESSOR) tablet 25 mg, 25 mg, Oral, Q12H [facility], Tyrone Box MD, 25 mg at 11/06/19 2020    ondansetron (ZOFRAN) injection 4 mg, 4 mg, Intravenous, Q6H PRN, Vahid Burrell MD    oxyCODONE (ROXICODONE) IR tablet 10 mg, 10 mg, Oral, Q4H PRN, Vahid Burrell MD, 10 mg at 11/07/19 1608    oxyCODONE (ROXICODONE) IR tablet 5 mg, 5 mg, Oral, Q4H PRN, Vahid Burrell MD, 5 mg at 11/05/19 0905    pantoprazole (PROTONIX) EC tablet 40 mg, 40 mg, Oral, Early Morning, Vahid Burrell MD, 40 mg at 11/07/19 0523    senna (SENOKOT) tablet 17 2 mg, 2 tablet, Oral, BID PRN, Trisha Donohue MD, 17 2 mg at 11/05/19 0906    sevelamer (RENAGEL) tablet 800 mg, 800 mg, Oral, TID With Meals, Trisha Donohue MD, 800 mg at 11/07/19 1614    sodium chloride 0 9 % infusion, 10 mL/hr, Intravenous, Continuous, Trisha Donohue MD    torsemide [facility]) tablet 40 mg, 40 mg, Oral, BID (diuretic), Trisha Donohue MD, 40 mg at 11/06/19 1759    vancomycin (VANCOCIN) IVPB (premix) 1,000 mg, 12 5 mg/kg, Intravenous, After Dialysis, Trisha Donohue MD, Last Rate: 200 mL/hr at 11/05/19 1543, 1,000 mg at 11/05/19 1543    Laboratory Results:  Results from last 7 days   Lab Units 11/07/19  0546 11/06/19  0506 11/05/19  0250 11/02/19  0346 11/01/19  0629 10/31/19  2308 10/31/19  2307   WBC Thousand/uL 7 36 8 70 8 61 7 95 9 58 10 45*  --    HEMOGLOBIN g/dL 8 0* 7 6* 9 8* 7 3* 7 7* 7 0*  --    HEMATOCRIT % 26 6* 25 1* 32 1* 24 3* 25 6* 22 7*  --    PLATELETS Thousands/uL 195 175 206 123* 210 204  --    POTASSIUM mmol/L 5 0 4 8  --  4 7 4 7  --  4 5   CHLORIDE mmol/L 100 100  --  101 100  --  100   CO2 mmol/L 26 28  --  26 27  --  29   BUN mg/dL 36* 28*  --  27* 35*  --  31*   CREATININE mg/dL 6 27* 5 13*  --  4 23* 5 20*  --  4 61*   CALCIUM mg/dL 9 0 8 7  --  8 3 8 2*  --  8 0*

## 2019-11-07 NOTE — OP NOTE
OPERATIVE REPORT  PATIENT NAME: Andrés Alcala    :  1960  MRN: [id number]  Pt Location: AN OR ROOM 03    SURGERY DATE: 2019    Surgeon(s) and Role:     * Tal Mathew MD - Primary     * Haritha Lim PA-C - Joan Haynes MD - Fellow    Preop Diagnosis:  Wound of right lower extremity, subsequent encounter [S81 801D]  Wound of left lower extremity, subsequent encounter [S81 802D]    Post-Op Diagnosis Codes:     * Wound of right lower extremity, subsequent encounter [S81 801D]     * Wound of left lower extremity, subsequent encounter [S81 802D]    Procedure(s) (LRB):  DEBRIDEMENT WOUND ([address]) (Bilateral) IN PREPARATION FOR SKIN GRAFT  SKIN GRAFT SPLIT THICKNESS (STSG)  EXTREMITY (RIGHT)    Specimen(s):  * No specimens in log *    Estimated Blood Loss:   500 mL    Drains:  * No LDAs found *    Anesthesia Type:   General    Operative Indications:  Wound of right lower extremity, subsequent encounter [S81 801D]  Wound of left lower extremity, subsequent encounter [S81 802D]      Operative Findings:    Post debridement measurements    Right dorsal foot-10 5 cm x 11 cm x 0 4 cm  Right leg extending to foot- 40 cm x 21 cm x 0 2 cm  Left hallux- 3 5 cm x 3 cm x 0 2 cm  Left dorsal foot proximal-3 5 cm x 3 5 cm x 0 3 cm  Left distal foot-0 6 cm x 0 9 cm x 0 3 cm  Left posterior proximal-14 cm x 8 5 cm x 0 4 cm  Left medial proximal- 12 cm x 18 cm x 0 3 cm  Left lateral cluster- 26 5 cm x 6 5 cm x 0 4 cm  Left posterior distal leg- 5 cm x 7 cm x 0 3 cm    Complications:   None    Procedure and Technique:  Mr Ivan Brooks is a 45-year-old male who presents for an acute and chronic infected bilateral lower extremity infection with significant wounds that were debrided extensively by the podiatry team was asked to evaluate the patient provide any reconstruction options   All risks, benefits, and options, including but not limited to, pain, scarring, bleeding, infection, asymmetry, contour deformity, damage to adjacent nerves, vessels, and organs, hematoma, seroma, paresthesias, anesthesia (temporary versus permanent), skin necrosis, fat necrosis, flap necrosis, wound dehiscence with need for healing by secondary intention and postoperative dressing changes,need for prolonged mechanical ventilation and ICU stay, hypertrophic and/or keloid scar formation, deep venous thrombosis, pulmonary embolism, death, recurrence, and need for revision and/or reoperation were fully explained to the patient  Patient wide manifested reasonable understanding of this informed consent was then obtained  The patient was met in the preoperative holding area and all the last minute questions were properly answered  He was then taken to the operative theater placed in supine position  After smooth anesthesia was then induced the bilateral lower extremity was then prepped and draped in a sterile fashion  Surgical time-out was then performed an excisional surgical debridement was then performed along the all the surface areas described above using a combination of curette, sharp scissors and scalpel with some areas that were lysed tendon exposed on the anterolateral aspect and the dorsum of the foot the for the most part was muscle exposure  Acceptable wound bed was obtained with some areas remained with some tendon exposed without paratenon  Once hemostasis and irrigation with 3 L of normal saline was performed a decision was made to stage the reconstruction with split-thickness skin graft given the large surface areas, comorbidities and to decrease blood loss between dressing changes  The segment of 35 x 25 cm of split-thickness skin graft was harvested from the right thigh 16 of a 1000 inch thickness after infiltrated with tumescent solution (50 mL lidocaine 1%, 1 amp of epinephrine diluted 1 L of LR) using the Brielle dermatome    The graft was then meshed 3-1 ratio and approximately 80% of the right lower extremity was covered with the graft and secured with combination of staples and 3-0 chromic sutures  Tisseel was used on the graft site to minimize shearing forces  Total wound bed covered with a skin graft was approximately 35 x 25 by 0 5 cm  Bacitracin, Xeroform, ABDs, Kerlix and Ace wrap were applied to the wound bed and the graft site  Contralateral left lower extremity was dressed in the same fashion as well  Right dorsal foot-10 5 cm x 11 cm x 0 4 cm  Right leg extending to foot- 40 cm x 21 cm x 0 2 cm  Left hallux- 3 5 cm x 3 cm x 0 2 cm  Left dorsal foot proximal-3 5 cm x 3 5 cm x 0 3 cm  Left distal foot-0 6 cm x 0 9 cm x 0 3 cm  Left posterior proximal-14 cm x 8 5 cm x 0 4 cm  Left medial proximal- 12 cm x 18 cm x 0 3 cm  Left lateral cluster- 26 5 cm x 6 5 cm x 0 4 cm  Left posterior distal leg- 5 cm x 7 cm x 0 3 cm    Donor site was then covered with opted lock and dry dressing  I was present for the entire procedure and A physician assistant was required during the procedure for retraction tissue handling,dissection and suturing    Patient Disposition:  PACU , APU and patient will return to OR for planned surgery as a staged procedure further debridement and split-thickness skin grafting of the contralateral lower extremity given the extensive surface areas of injuries      SIGNATURE: Ivory Garcia MD  DATE: November 7, 2019  TIME: 5:16 PM

## 2019-11-08 ENCOUNTER — APPOINTMENT (INPATIENT)
Dept: DIALYSIS | Facility: HOSPITAL | Age: 59
DRG: 576 | End: 2019-11-08
Payer: MEDICARE

## 2019-11-08 LAB
ABO GROUP BLD BPU: NORMAL
ABO GROUP BLD BPU: NORMAL
ANION GAP SERPL CALCULATED.3IONS-SCNC: 9 MMOL/L (ref 4–13)
BASOPHILS # BLD AUTO: 0.08 THOUSANDS/ΜL (ref 0–0.1)
BASOPHILS NFR BLD AUTO: 1 % (ref 0–1)
BPU ID: NORMAL
BPU ID: NORMAL
BUN SERPL-MCNC: 43 MG/DL (ref 5–25)
CALCIUM SERPL-MCNC: 9 MG/DL (ref 8.3–10.1)
CHLORIDE SERPL-SCNC: 101 MMOL/L (ref 100–108)
CO2 SERPL-SCNC: 26 MMOL/L (ref 21–32)
CREAT SERPL-MCNC: 7.27 MG/DL (ref 0.6–1.3)
CROSSMATCH: NORMAL
CROSSMATCH: NORMAL
EOSINOPHIL # BLD AUTO: 0.45 THOUSAND/ΜL (ref 0–0.61)
EOSINOPHIL NFR BLD AUTO: 6 % (ref 0–6)
ERYTHROCYTE [DISTWIDTH] IN BLOOD BY AUTOMATED COUNT: 16.8 % (ref 11.6–15.1)
GFR SERPL CREATININE-BSD FRML MDRD: 8 ML/MIN/1.73SQ M
GLUCOSE SERPL-MCNC: 84 MG/DL (ref 65–140)
HCT VFR BLD AUTO: 28 % (ref 36.5–49.3)
HGB BLD-MCNC: 8.7 G/DL (ref 12–17)
IMM GRANULOCYTES # BLD AUTO: 0.04 THOUSAND/UL (ref 0–0.2)
IMM GRANULOCYTES NFR BLD AUTO: 1 % (ref 0–2)
LYMPHOCYTES # BLD AUTO: 0.78 THOUSANDS/ΜL (ref 0.6–4.47)
LYMPHOCYTES NFR BLD AUTO: 11 % (ref 14–44)
MCH RBC QN AUTO: 31.3 PG (ref 26.8–34.3)
MCHC RBC AUTO-ENTMCNC: 31.1 G/DL (ref 31.4–37.4)
MCV RBC AUTO: 101 FL (ref 82–98)
MONOCYTES # BLD AUTO: 0.82 THOUSAND/ΜL (ref 0.17–1.22)
MONOCYTES NFR BLD AUTO: 11 % (ref 4–12)
NEUTROPHILS # BLD AUTO: 5.25 THOUSANDS/ΜL (ref 1.85–7.62)
NEUTS SEG NFR BLD AUTO: 70 % (ref 43–75)
NRBC BLD AUTO-RTO: 0 /100 WBCS
PLATELET # BLD AUTO: 170 THOUSANDS/UL (ref 149–390)
PMV BLD AUTO: 9.8 FL (ref 8.9–12.7)
POTASSIUM SERPL-SCNC: 6.1 MMOL/L (ref 3.5–5.3)
RBC # BLD AUTO: 2.78 MILLION/UL (ref 3.88–5.62)
SODIUM SERPL-SCNC: 136 MMOL/L (ref 136–145)
UNIT DISPENSE STATUS: NORMAL
UNIT DISPENSE STATUS: NORMAL
UNIT PRODUCT CODE: NORMAL
UNIT PRODUCT CODE: NORMAL
UNIT RH: NORMAL
UNIT RH: NORMAL
WBC # BLD AUTO: 7.42 THOUSAND/UL (ref 4.31–10.16)

## 2019-11-08 PROCEDURE — 85025 COMPLETE CBC W/AUTO DIFF WBC: CPT | Performed by: SURGERY

## 2019-11-08 PROCEDURE — 80048 BASIC METABOLIC PNL TOTAL CA: CPT | Performed by: SURGERY

## 2019-11-08 PROCEDURE — 90935 HEMODIALYSIS ONE EVALUATION: CPT | Performed by: INTERNAL MEDICINE

## 2019-11-08 PROCEDURE — 99232 SBSQ HOSP IP/OBS MODERATE 35: CPT | Performed by: HOSPITALIST

## 2019-11-08 RX ORDER — ACETAMINOPHEN 325 MG/1
TABLET ORAL
Status: COMPLETED
Start: 2019-11-08 | End: 2019-11-08

## 2019-11-08 RX ORDER — HYDRALAZINE HYDROCHLORIDE 25 MG/1
TABLET, FILM COATED ORAL
Status: DISPENSED
Start: 2019-11-08 | End: 2019-11-09

## 2019-11-08 RX ORDER — SENNA PLUS 8.6 MG/1
TABLET ORAL
Status: COMPLETED
Start: 2019-11-08 | End: 2019-11-08

## 2019-11-08 RX ORDER — HEPARIN SODIUM 5000 [USP'U]/ML
INJECTION, SOLUTION INTRAVENOUS; SUBCUTANEOUS
Status: COMPLETED
Start: 2019-11-08 | End: 2019-11-08

## 2019-11-08 RX ORDER — SEVELAMER HYDROCHLORIDE 800 MG/1
TABLET, FILM COATED ORAL
Status: DISPENSED
Start: 2019-11-08 | End: 2019-11-09

## 2019-11-08 RX ADMIN — OXYCODONE HYDROCHLORIDE 10 MG: 10 TABLET ORAL at 10:58

## 2019-11-08 RX ADMIN — SENNOSIDES 17.2 MG: 8.6 TABLET, FILM COATED ORAL at 15:29

## 2019-11-08 RX ADMIN — HEPARIN SODIUM 5000 UNITS: 5000 INJECTION INTRAVENOUS; SUBCUTANEOUS at 13:23

## 2019-11-08 RX ADMIN — CALCIUM ACETATE 1334 MG: 667 CAPSULE ORAL at 08:56

## 2019-11-08 RX ADMIN — ACETAMINOPHEN 650 MG: 325 TABLET, FILM COATED ORAL at 13:21

## 2019-11-08 RX ADMIN — DOCUSATE SODIUM 100 MG: 100 CAPSULE, LIQUID FILLED ORAL at 08:56

## 2019-11-08 RX ADMIN — CALCIUM ACETATE 1334 MG: 667 CAPSULE ORAL at 17:52

## 2019-11-08 RX ADMIN — ACETAMINOPHEN 650 MG: 325 TABLET, FILM COATED ORAL at 01:57

## 2019-11-08 RX ADMIN — METOPROLOL TARTRATE 25 MG: 25 TABLET, FILM COATED ORAL at 08:56

## 2019-11-08 RX ADMIN — MIDODRINE HYDROCHLORIDE 5 MG: 5 TABLET ORAL at 14:51

## 2019-11-08 RX ADMIN — OXYCODONE HYDROCHLORIDE 10 MG: 10 TABLET ORAL at 22:08

## 2019-11-08 RX ADMIN — SEVELAMER HYDROCHLORIDE 800 MG: 800 TABLET, FILM COATED PARENTERAL at 17:52

## 2019-11-08 RX ADMIN — ACETAMINOPHEN 650 MG: 325 TABLET, FILM COATED ORAL at 05:02

## 2019-11-08 RX ADMIN — SEVELAMER HYDROCHLORIDE 800 MG: 800 TABLET, FILM COATED PARENTERAL at 08:56

## 2019-11-08 RX ADMIN — DOCUSATE SODIUM 100 MG: 100 CAPSULE, LIQUID FILLED ORAL at 17:50

## 2019-11-08 RX ADMIN — OXYCODONE HYDROCHLORIDE 10 MG: 10 TABLET ORAL at 06:09

## 2019-11-08 RX ADMIN — SEVELAMER HYDROCHLORIDE 800 MG: 800 TABLET, FILM COATED PARENTERAL at 15:30

## 2019-11-08 RX ADMIN — CALCIUM ACETATE 1334 MG: 667 CAPSULE ORAL at 13:22

## 2019-11-08 RX ADMIN — OXYCODONE HYDROCHLORIDE 10 MG: 10 TABLET ORAL at 02:02

## 2019-11-08 RX ADMIN — TORSEMIDE 40 MG: 20 TABLET ORAL at 08:56

## 2019-11-08 RX ADMIN — HEPARIN SODIUM 5000 UNITS: 5000 INJECTION INTRAVENOUS; SUBCUTANEOUS at 05:04

## 2019-11-08 RX ADMIN — ACETAMINOPHEN 650 MG: 325 TABLET, FILM COATED ORAL at 17:51

## 2019-11-08 RX ADMIN — OXYCODONE HYDROCHLORIDE 10 MG: 10 TABLET ORAL at 17:52

## 2019-11-08 RX ADMIN — GABAPENTIN 100 MG: 100 CAPSULE ORAL at 08:56

## 2019-11-08 RX ADMIN — Medication 1 CAPSULE: at 17:51

## 2019-11-08 RX ADMIN — HEPARIN SODIUM 5000 UNITS: 5000 INJECTION INTRAVENOUS; SUBCUTANEOUS at 21:15

## 2019-11-08 RX ADMIN — PANTOPRAZOLE SODIUM 40 MG: 40 TABLET, DELAYED RELEASE ORAL at 05:04

## 2019-11-08 RX ADMIN — EPOETIN ALFA 10000 UNITS: 10000 SOLUTION INTRAVENOUS; SUBCUTANEOUS at 16:13

## 2019-11-08 NOTE — PLAN OF CARE
Problem: Prexisting or High Potential for Compromised Skin Integrity  Goal: Skin integrity is maintained or improved  Description  INTERVENTIONS:  - Identify patients at risk for skin breakdown  - Assess and monitor skin integrity  - Assess and monitor nutrition and hydration status  - Monitor labs   - Assess for incontinence   - Turn and reposition patient  - Assist with mobility/ambulation  - Relieve pressure over bony prominences  - Avoid friction and shearing  - Provide appropriate hygiene as needed including keeping skin clean and dry  - Evaluate need for skin moisturizer/barrier cream  - Collaborate with interdisciplinary team   - Patient/family teaching  - Consider wound care consult   Outcome: Progressing     Problem: Potential for Falls  Goal: Patient will remain free of falls  Description  INTERVENTIONS:  - Assess patient frequently for physical needs  -  Identify cognitive and physical deficits and behaviors that affect risk of falls    -  [place] fall precautions as indicated by assessment   - Educate patient/family on patient safety including physical limitations  - Instruct patient to call for assistance with activity based on assessment  - Modify environment to reduce risk of injury  - Consider OT/PT consult to assist with strengthening/mobility  Outcome: Progressing     Problem: INFECTION - ADULT  Goal: Absence or prevention of progression during hospitalization  Description  INTERVENTIONS:  - Assess and monitor for signs and symptoms of infection  - Monitor lab/diagnostic results  - Monitor all insertion sites, i e  indwelling lines, tubes, and drains  - Monitor endotracheal if appropriate and nasal secretions for changes in amount and color  - [place] appropriate cooling/warming therapies per order  - Administer medications as ordered  - Instruct and encourage patient and family to use good hand hygiene technique  - Identify and instruct in appropriate isolation precautions for identified infection/condition  Outcome: Progressing     Problem: METABOLIC, FLUID AND ELECTROLYTES - ADULT  Goal: Electrolytes maintained within normal limits  Description  INTERVENTIONS:  - Monitor labs and assess patient for signs and symptoms of electrolyte imbalances  - Administer electrolyte replacement as ordered  - Monitor response to electrolyte replacements, including repeat lab results as appropriate  - Instruct patient on fluid and nutrition as appropriate  Outcome: Progressing  Goal: Fluid balance maintained  Description  INTERVENTIONS:  - Monitor labs   - Monitor I/O and WT  - Instruct patient on fluid and nutrition as appropriate  - Assess for signs & symptoms of volume excess or deficit  Outcome: Progressing

## 2019-11-08 NOTE — HEMODIALYSIS
Tx completed via catheter  BFR as prescribed  2 l removed  Vitals stable  Midodrine and epogen given    Post HD weight 82 8

## 2019-11-08 NOTE — PROGRESS NOTES
Vancomycin IV Pharmacy-to-Dose Consultation    Jazmín Sylvester is a 64 y o  male who was receiving Vancomycin IV with management by the Pharmacy Consult service for treatment of skin-soft tissue infection  The patient's Vancomycin therapy has been completed / discontinued  Thank you for allowing us to take part in this patient's care  Pharmacy will sign-off now; please call or re-consult if there are any questions          Vishal Asif, LichaD  Pharmacist

## 2019-11-08 NOTE — ANESTHESIA POSTPROCEDURE EVALUATION
Post-Op Assessment Note    CV Status:  Stable    Pain management: adequate     Mental Status:  Alert and awake   Hydration Status:  Euvolemic   PONV Controlled:  Controlled   Airway Patency:  Patent   Post Op Vitals Reviewed: Yes      Staff: Anesthesiologist, with CRNAs           BP      Temp      Pulse     Resp      SpO2

## 2019-11-08 NOTE — ASSESSMENT & PLAN NOTE
· Procedure - Debridement, removal of Eschar on 10/31/2019 by Dr Marco A Sy  · Podiatry consulted plastic surgery for possible graft vs  Flap procedure  ·  s/p STSG on 11/7 per plastic surgery   · Will need repeat procedure likely 11/12  · Monitor for any signs of Infection (felt to be at risk) -    · Per podiatry, no obvious infected tissue seen during procedure,  · Antibiotic - completed 7 days of vanc and cefepime--> will discontinue and monitor for infection   · Follow up final results of blood cultures from 10/31/2019  · NGTD  · Monitor temperatures and serial leg exams  · Pain Control -   · Oxycodone 5-10 mg  · Breakthrough pain - Dilaudid 1 mg IV  · Monitor pain levels    · Supportive care

## 2019-11-08 NOTE — PROGRESS NOTES
[address] [facility] NOTE   Danelle Cooney 64 y o  male MRN: [id number]  Unit/Bed#: S -01 Encounter: [id number]  Reason for Consult:  ESRD    ASSESSMENT and PLAN:  1  ESRD:    · Patient dialyzes 4 times a week at [facility]-  Monday, Tuesday, Thursday, Friday for 2 5 hours each treatment  · Due to hospital logistics HD switch to Monday, Wednesday, Friday with an additional treatment on Saturday if needed  · Hemodialysis today  · Remains on torsemide 40 mg b i d   2  Access:  PermCath  3  Hyperkalemia:    · Potassium 6 1- patient on diet restriction  · Low K bath on dialysis today  4  Multiple wounds of the lower extremities bilaterally     · Status post debridement on 10/31/2019    · Status post deprived min with split-thickness skin graft 11/7  · Continue Nepro diet supplement for wound healing  4  Anemia due to chronic disease and acute blood loss:   · Status post transfusion 11/4 hemoglobin up to 9 8  · GRISELDA dose increased to 19665 units with each hemodialysis treatment on 11/04  · Try to limit blood draws  5  Hypotension:    · On midodrine 5 mg predialysis (previously on 10 mg)  6  Atrial fibrillation:    · On metoprolol 25 mg every 12 hours  7  CKD MBD:    · Continue binder, renal diet  8  Scrotal swelling:  Right hydrocele with numerous septation on imaging   Conservative management per Urology       DISPOSITION:  Hemodialysis today  Eval for hemodialysis tomorrow    SUBJECTIVE / INTERVAL HISTORY:  Confused regarding time  Complains of leg pain  No other complaints  No shortness of breath      OBJECTIVE:  Current Weight: Weight - Scale: 84 2 kg (185 lb 10 oz)  Vitals:    11/08/19 0148 11/08/19 0300 11/08/19 0600 11/08/19 0730   BP: 114/67 111/56 113/68 118/78   BP Location: Left arm Left arm Left arm Left arm   Pulse: 95 93  84   Resp: 18 18  18   Temp: 98 1 °F (36 7 °C) 98 2 °F (36 8 °C)     TempSrc: Oral Oral     SpO2: 94% 97%     Weight:       Height:           Intake/Output Summary (Last 24 hours) at 11/8/2019 1351  Last data filed at 11/7/2019 1418  Gross per 24 hour   Intake 700 ml   Output 100 ml   Net 600 ml     General:  Frail, chronically ill-appearing gentleman in no acute distress but  Skin: no rash, color sallow  ENT: moist mucous membrane  Neck: supple, no JVD  Chest: CTA b/l, no ronchii, no wheeze, no rubs, no rales  Decreased breath sounds in the bases  CVS: s1s2, no murmur, no gallop, no rub  Abdomen: soft, nontender, nl sounds  Extremities:  Bilateral Ace wraps to legs  No thigh edema  : no meeks  Neuro:  Confused as to date and time    Psych: normal affect  Medications:    Current Facility-Administered Medications:     hydrALAZINE (APRESOLINE) 25 mg tablet **ADS Override Pull**, , , ,     senna (SENOKOT) 8 6 MG tablet **ADS Override Pull**, , , ,     sevelamer (RENAGEL) 800 mg tablet **ADS Override Pull**, , , ,     acetaminophen (TYLENOL) tablet 650 mg, 650 mg, Oral, Q6H PRN, Vahid Burrell MD, 650 mg at 11/08/19 1321    acetaminophen (TYLENOL) tablet 650 mg, 650 mg, Oral, Q6H [address], Vahid Burrell MD, 650 mg at 11/08/19 0502    b complex-vitamin C-folic acid (NEPHROCAPS) capsule 1 capsule, 1 capsule, Oral, Daily With Abel Yip MD, 1 capsule at 11/07/19 1613    calcium acetate (PHOSLO) capsule 1,334 mg, 1,334 mg, Oral, TID With Meals, Vahid Burrell MD, 1,334 mg at 11/08/19 1322    collagenase (SANTYL) ointment, , Topical, Daily, Vahid Burrell MD, Stopped at 11/05/19 0915    docusate sodium (COLACE) capsule 100 mg, 100 mg, Oral, BID, Vahid Burrell MD, 100 mg at 11/08/19 0856    epoetin bob (EPOGEN,PROCRIT) injection 10,000 Units, 10,000 Units, Intravenous, Once per day on Mon Wed Fri, Vahid Burrell MD    gabapentin (NEURONTIN) capsule 100 mg, 100 mg, Oral, Daily, Vahid Burrell MD, 100 mg at 11/08/19 0856    heparin (porcine) subcutaneous injection 5,000 Units, 5,000 Units, Subcutaneous, Q8H [address], 5,000 Units at 11/08/19 1323 **AND** [COMPLETED] Platelet count, , , Once, Jesse Chun PA-C    HYDROmorphone (DILAUDID) injection 1 mg, 1 mg, Intravenous, Q4H PRN, No Wynn MD, 1 mg at 11/07/19 1805    lactated ringers infusion, 50 mL/hr, Intravenous, Continuous, Rylie Abebe CRNA, Last Rate: 50 mL/hr at 11/07/19 1637, 50 mL/hr at 11/07/19 1637    metoprolol tartrate (LOPRESSOR) tablet 25 mg, 25 mg, Oral, Q12H [address], No Wynn MD, 25 mg at 11/08/19 0856    midodrine (PROAMATINE) tablet 5 mg, 5 mg, Oral, Before Dialysis, Vikki Hernandez MD    ondansetron [facility]) injection 4 mg, 4 mg, Intravenous, Q6H PRN, No Wynn MD    oxyCODONE (ROXICODONE) IR tablet 10 mg, 10 mg, Oral, Q4H PRN, No Wynn MD, 10 mg at 11/08/19 1058    oxyCODONE (ROXICODONE) IR tablet 5 mg, 5 mg, Oral, Q4H PRN, No Wynn MD, 5 mg at 11/05/19 0905    pantoprazole (PROTONIX) EC tablet 40 mg, 40 mg, Oral, Early Morning, No Wynn MD, 40 mg at 11/08/19 0504    senna (SENOKOT) tablet 17 2 mg, 2 tablet, Oral, BID PRN, No Wynn MD, 17 2 mg at 11/05/19 0906    sevelamer (RENAGEL) tablet 800 mg, 800 mg, Oral, TID With Meals, No Wynn MD, 800 mg at 11/08/19 0856    sodium chloride 0 9 % infusion, 10 mL/hr, Intravenous, Continuous, No Wynn MD    torsemide (DEMADEX) tablet 40 mg, 40 mg, Oral, BID (diuretic), No Wynn MD, 40 mg at 11/08/19 0856    Laboratory Results:  Results from last 7 days   Lab Units 11/08/19  0452 11/07/19  0546 11/06/19  0506 11/05/19  0250 11/02/19  0346   WBC Thousand/uL 7 42 7 36 8 70 8 61 7 95   HEMOGLOBIN g/dL 8 7* 8 0* 7 6* 9 8* 7 3*   HEMATOCRIT % 28 0* 26 6* 25 1* 32 1* 24 3*   PLATELETS Thousands/uL 170 195 175 206 123*   POTASSIUM mmol/L 6 1* 5 0 4 8  --  4 7   CHLORIDE mmol/L 101 100 100  --  101   CO2 mmol/L 26 26 28  --  26   BUN mg/dL 43* 36* 28*  --  27*   CREATININE mg/dL 7 27* 6 27* 5 13*  --  4 23*   CALCIUM mg/dL 9 0 9 0 8 7  --  8 3

## 2019-11-08 NOTE — PROGRESS NOTES
Progress Note - Shell Manzano 1960, 64 y o  male MRN: [id number]    Unit/Bed#: S -01 Encounter: [id number]    Primary Care Provider: Carrillo Kelley   Date and time admitted to hospital: 10/26/2019  5:10 PM    * Multiple and open wound of lower limb  Assessment & Plan  · Procedure - Debridement, removal of Eschar on 10/31/2019 by Dr Khushi Christianson  · Podiatry consulted plastic surgery for possible graft vs  Flap procedure  ·  s/p STSG on 11/7 per plastic surgery   · Will need repeat procedure likely 11/12  · Monitor for any signs of Infection (felt to be at risk) -    · Per podiatry, no obvious infected tissue seen during procedure,  · Antibiotic - completed 7 days of vanc and cefepime--> will discontinue and monitor for infection   · Follow up final results of blood cultures from 10/31/2019  · NGTD  · Monitor temperatures and serial leg exams  · Pain Control -   · Oxycodone 5-10 mg  · Breakthrough pain - Dilaudid 1 mg IV  · Monitor pain levels  · Supportive care    Scrotal swelling  Assessment & Plan  · Ultrasound showed "Large complex right hydrocele containing numerous septations  Increased vascularity in the left testicle with respect to the right, although both testicles could not be imaged on the same plane limiting direct comparison   Correlate clinically for orchitis "  · Urology saw patient 10/29/2019 and recommends no surgical intervention acutely and to manage problems with legs and get therapy first   · beta HCG, AFP and LDH within normal limits  · Supportive care  · Pain management  Monitor pain levels  Anemia due to chronic kidney disease, on chronic dialysis and Acute Blood Loss Anemia (HCC)  Assessment & Plan  · There was a slight drop in hemoglobin during procedure  · Consider transfusion with dialysis prior to any additional procedure, if needed to get hemoglobin over 8   · Monitor counts - CBC monitoring      Atrial fibrillation ([facility] [address] )  Assessment & Plan  · Rate / Rhythm control -   · Metoprolol increased to 25 mg bid on 10/29/2019  Not consistently getting medication due to blood pressure parameters  · Monitor on telemetry  Monitor HR and blood pressures  · Anticoagulation - None prehospital   Defer to outpatient team following discharge  ESRD (end stage renal disease) ([facility] [address] )  Assessment & Plan  · On routine dialysis  · Nephrology following  Constipation  Assessment & Plan  · Colace scheduled  · Senna BID PRN  · Additional adjustments as needed  Pressure ulcer, right and left buttocks POA  Assessment & Plan  · Local wound care  · Turn / Reposition frequently  VTE Pharmacologic Prophylaxis:   Pharmacologic: Heparin  Mechanical VTE Prophylaxis in Place: Yes    Patient Centered Rounds: I have performed bedside rounds with nursing staff today  Discussions with Specialists or Other Care Team Provider:     Education and Discussions with Family / Patient: patient     Time Spent for Care: 30 minutes  More than 50% of total time spent on counseling and coordination of care as described above  Current Length of Stay: 13 day(s)    Current Patient Status: Inpatient   Certification Statement: The patient will continue to require additional inpatient hospital stay due to lower extremity wounds requiring surgical management     Discharge Plan / Estimated Discharge Date: TBD based on clinical course    Code Status: Level 1 - Full Code    Subjective:   Feels well today  No new complaints  Pain under good control  Objective:     Vitals:   Temp (24hrs), Av 9 °F (36 6 °C), Min:96 8 °F (36 °C), Max:98 6 °F (37 °C)    Temp:  [96 8 °F (36 °C)-98 6 °F (37 °C)] 98 2 °F (36 8 °C)  HR:  [] 84  Resp:  [12-19] 18  BP: ()/(48-78) 118/78  SpO2:  [93 %-100 %] 97 %  Body mass index is 28 22 kg/m²  Input and Output Summary (last 24 hours):        Intake/Output Summary (Last 24 hours) at 2019 0942  Last data filed at 2019 1418  Gross per 24 hour Intake 1050 ml   Output 500 ml   Net 550 ml       Physical Exam:     Physical Exam   Constitutional: No distress  HENT:   Head: Normocephalic and atraumatic  Cardiovascular: Normal rate and regular rhythm  Exam reveals no friction rub  No murmur heard  Pulmonary/Chest: Effort normal and breath sounds normal  No stridor  No respiratory distress  Abdominal: Soft  He exhibits distension  He exhibits no mass  There is no tenderness  There is no rebound and no guarding  Musculoskeletal:   B/l LE wrapped    Neurological: He is alert  Skin: He is not diaphoretic  Psychiatric: He has a normal mood and affect  His behavior is normal    Nursing note and vitals reviewed  Additional Data:     Labs:    Results from last 7 days   Lab Units 11/08/19  0452   WBC Thousand/uL 7 42   HEMOGLOBIN g/dL 8 7*   HEMATOCRIT % 28 0*   PLATELETS Thousands/uL 170   NEUTROS PCT % 70   LYMPHS PCT % 11*   MONOS PCT % 11   EOS PCT % 6     Results from last 7 days   Lab Units 11/08/19  0452  11/02/19  0346   POTASSIUM mmol/L 6 1*   < > 4 7   CHLORIDE mmol/L 101   < > 101   CO2 mmol/L 26   < > 26   BUN mg/dL 43*   < > 27*   CREATININE mg/dL 7 27*   < > 4 23*   CALCIUM mg/dL 9 0   < > 8 3   ALK PHOS U/L  --   --  216*   ALT U/L  --   --  6*   AST U/L  --   --  21    < > = values in this interval not displayed  * I Have Reviewed All Lab Data Listed Above  * Additional Pertinent Lab Tests Reviewed:  All Labs Within Last 24 Hours Reviewed    Imaging:    Imaging Reports Reviewed Today Include:   Imaging Personally Reviewed by Myself Includes:      Recent Cultures (last 7 days):           Last 24 Hours Medication List:     Current Facility-Administered Medications:  acetaminophen 650 mg Oral Q6H PRN Yady Rutledge MD    acetaminophen 650 mg Oral Q6H Dilma Cat MD    b complex-vitamin C-folic acid 1 capsule Oral Daily With Barrington Ness MD    calcium acetate 1,334 mg Oral TID With Meals Yady Rutledge MD    collagenase Topical Daily Tyrone Box MD    docusate sodium 100 mg Oral BID Humberto Ambrosio MD    epoetin bob 10,000 Units Intravenous Once per day on Mon Wed Fri Tyrone Box MD    gabapentin 100 mg Oral Daily Tyrone Box MD    heparin (porcine) 5,000 Units Subcutaneous Q8H [facility] Humberto Ambrosio MD    HYDROmorphone 1 mg Intravenous Q4H PRN Humberto Ambrosio MD    lactated ringers 50 mL/hr Intravenous Continuous Vernal Barrio, CRNA Last Rate: 50 mL/hr (11/07/19 1637)   metoprolol tartrate 25 mg Oral Q12H [facility] Humberto Ambrosio MD    midodrine 5 mg Oral Before Dialysis Joselito Swenson MD    ondansetron 4 mg Intravenous Q6H PRN Humberto Ambrosio MD    oxyCODONE 10 mg Oral Q4H PRN Humberto Ambrosio MD    oxyCODONE 5 mg Oral Q4H PRN Humberto Ambrosio MD    pantoprazole 40 mg Oral Early Morning Humberto Ambrosio MD    senna 2 tablet Oral BID PRN Humberto Ambrosio MD    sevelamer 800 mg Oral TID With Meals Humberto Ambrosio MD    sodium chloride 10 mL/hr Intravenous Continuous Humberto Ambrosio MD    torsemide 40 mg Oral BID (diuretic) Humberto Ambrosio MD         Today, Patient Was Seen By: Lauren Crawford MD    ** Please Note: This note has been constructed using a voice recognition system   **

## 2019-11-08 NOTE — SOCIAL WORK
CM met with patient at bedside to verify discharge plan to [facility] as previously planned  Patient continues to choose [facility]  CM updated [place] in [employer]  Per Plastics, Plan to repeat debridement bilateral lower extremity/skin graft/dressing change on Tuesday November 12th    CM will continue to follow

## 2019-11-08 NOTE — PROGRESS NOTES
Patient refusing b/l leg dressing change at this time d/t pain in right thigh  Skin graft dressing reinforced d/t leakage

## 2019-11-09 LAB
ANION GAP SERPL CALCULATED.3IONS-SCNC: 10 MMOL/L (ref 4–13)
BUN SERPL-MCNC: 32 MG/DL (ref 5–25)
CALCIUM SERPL-MCNC: 9.4 MG/DL (ref 8.3–10.1)
CHLORIDE SERPL-SCNC: 104 MMOL/L (ref 100–108)
CO2 SERPL-SCNC: 29 MMOL/L (ref 21–32)
CREAT SERPL-MCNC: 6.5 MG/DL (ref 0.6–1.3)
GFR SERPL CREATININE-BSD FRML MDRD: 9 ML/MIN/1.73SQ M
GLUCOSE SERPL-MCNC: 108 MG/DL (ref 65–140)
POTASSIUM SERPL-SCNC: 5.7 MMOL/L (ref 3.5–5.3)
SODIUM SERPL-SCNC: 143 MMOL/L (ref 136–145)

## 2019-11-09 PROCEDURE — 99232 SBSQ HOSP IP/OBS MODERATE 35: CPT | Performed by: HOSPITALIST

## 2019-11-09 PROCEDURE — 99232 SBSQ HOSP IP/OBS MODERATE 35: CPT | Performed by: INTERNAL MEDICINE

## 2019-11-09 PROCEDURE — 80048 BASIC METABOLIC PNL TOTAL CA: CPT | Performed by: INTERNAL MEDICINE

## 2019-11-09 RX ORDER — BISACODYL 10 MG
10 SUPPOSITORY, RECTAL RECTAL DAILY PRN
Status: DISCONTINUED | OUTPATIENT
Start: 2019-11-09 | End: 2019-11-21 | Stop reason: HOSPADM

## 2019-11-09 RX ORDER — XYLITOL/YERBA SANTA
5 AEROSOL, SPRAY WITH PUMP (ML) MUCOUS MEMBRANE AS NEEDED
Status: DISCONTINUED | OUTPATIENT
Start: 2019-11-09 | End: 2019-11-21 | Stop reason: HOSPADM

## 2019-11-09 RX ORDER — SENNOSIDES 8.6 MG
2 TABLET ORAL 2 TIMES DAILY
Status: DISCONTINUED | OUTPATIENT
Start: 2019-11-09 | End: 2019-11-12

## 2019-11-09 RX ADMIN — TORSEMIDE 40 MG: 20 TABLET ORAL at 08:35

## 2019-11-09 RX ADMIN — SEVELAMER HYDROCHLORIDE 800 MG: 800 TABLET, FILM COATED PARENTERAL at 08:36

## 2019-11-09 RX ADMIN — TORSEMIDE 40 MG: 20 TABLET ORAL at 17:20

## 2019-11-09 RX ADMIN — SENNOSIDES 17.2 MG: 8.6 TABLET, FILM COATED ORAL at 17:20

## 2019-11-09 RX ADMIN — ACETAMINOPHEN 650 MG: 325 TABLET, FILM COATED ORAL at 17:19

## 2019-11-09 RX ADMIN — CALCIUM ACETATE 1334 MG: 667 CAPSULE ORAL at 17:20

## 2019-11-09 RX ADMIN — SENNOSIDES 17.2 MG: 8.6 TABLET, FILM COATED ORAL at 09:10

## 2019-11-09 RX ADMIN — PANTOPRAZOLE SODIUM 40 MG: 40 TABLET, DELAYED RELEASE ORAL at 05:52

## 2019-11-09 RX ADMIN — DOCUSATE SODIUM 100 MG: 100 CAPSULE, LIQUID FILLED ORAL at 08:35

## 2019-11-09 RX ADMIN — METOPROLOL TARTRATE 25 MG: 25 TABLET, FILM COATED ORAL at 08:35

## 2019-11-09 RX ADMIN — CALCIUM ACETATE 1334 MG: 667 CAPSULE ORAL at 12:09

## 2019-11-09 RX ADMIN — OXYCODONE HYDROCHLORIDE 10 MG: 10 TABLET ORAL at 10:28

## 2019-11-09 RX ADMIN — Medication 5 SPRAY: at 15:17

## 2019-11-09 RX ADMIN — SEVELAMER HYDROCHLORIDE 800 MG: 800 TABLET, FILM COATED PARENTERAL at 12:10

## 2019-11-09 RX ADMIN — HEPARIN SODIUM 5000 UNITS: 5000 INJECTION INTRAVENOUS; SUBCUTANEOUS at 05:52

## 2019-11-09 RX ADMIN — METOPROLOL TARTRATE 25 MG: 25 TABLET, FILM COATED ORAL at 21:12

## 2019-11-09 RX ADMIN — GABAPENTIN 100 MG: 100 CAPSULE ORAL at 08:36

## 2019-11-09 RX ADMIN — ACETAMINOPHEN 650 MG: 325 TABLET, FILM COATED ORAL at 05:52

## 2019-11-09 RX ADMIN — SENNOSIDES 17.2 MG: 8.6 TABLET, FILM COATED ORAL at 03:06

## 2019-11-09 RX ADMIN — DOCUSATE SODIUM 100 MG: 100 CAPSULE, LIQUID FILLED ORAL at 17:18

## 2019-11-09 RX ADMIN — HEPARIN SODIUM 5000 UNITS: 5000 INJECTION INTRAVENOUS; SUBCUTANEOUS at 21:11

## 2019-11-09 RX ADMIN — ACETAMINOPHEN 650 MG: 325 TABLET, FILM COATED ORAL at 12:09

## 2019-11-09 RX ADMIN — Medication 5 SPRAY: at 09:10

## 2019-11-09 RX ADMIN — COLLAGENASE SANTYL: 250 OINTMENT TOPICAL at 08:36

## 2019-11-09 RX ADMIN — OXYCODONE HYDROCHLORIDE 10 MG: 10 TABLET ORAL at 17:25

## 2019-11-09 RX ADMIN — Medication 1 CAPSULE: at 17:20

## 2019-11-09 RX ADMIN — CALCIUM ACETATE 1334 MG: 667 CAPSULE ORAL at 08:34

## 2019-11-09 RX ADMIN — SEVELAMER HYDROCHLORIDE 800 MG: 800 TABLET, FILM COATED PARENTERAL at 17:20

## 2019-11-09 NOTE — ASSESSMENT & PLAN NOTE
· Procedure - Debridement, removal of Eschar on 10/31/2019 by Dr Catherine Reyes  · Podiatry consulted plastic surgery for possible graft vs  Flap procedure  ·  s/p STSG on 11/7 per plastic surgery   · Will need repeat procedure likely 11/12  · Monitor for any signs of Infection (felt to be at risk) -    · Per podiatry, no obvious infected tissue seen during procedure,  · Antibiotic - completed 7 days of vanc and cefepime--> will discontinue and monitor for infection   · Follow up final results of blood cultures from 10/31/2019  · NGTD  · Monitor temperatures and serial leg exams  · Pain Control -   · Oxycodone 5-10 mg  · Breakthrough pain - Dilaudid 1 mg IV  · Monitor pain levels    · Supportive care

## 2019-11-09 NOTE — ASSESSMENT & PLAN NOTE
· Ultrasound showed "Large complex right hydrocele containing numerous septations  Increased vascularity in the left testicle with respect to the right, although both testicles could not be imaged on the same plane limiting direct comparison   Correlate clinically for orchitis "  · Urology saw patient 10/29/2019 and recommends no surgical intervention acutely and to manage problems with legs and get therapy first   · beta HCG, AFP and LDH within normal limits  · Supportive care  · Pain management  Monitor pain levels

## 2019-11-09 NOTE — PROGRESS NOTES
Progress Note - Taylor Connors 1960, 64 y o  male MRN: [id number]    Unit/Bed#: S -01 Encounter: [id number]    Primary Care Provider: Marc Ko   Date and time admitted to hospital: 10/26/2019  5:10 PM    * Multiple and open wound of lower limb  Assessment & Plan  · Procedure - Debridement, removal of Eschar on 10/31/2019 by Dr Malgorzata Paige  · Podiatry consulted plastic surgery for possible graft vs  Flap procedure  ·  s/p STSG on 11/7 per plastic surgery   · Will need repeat procedure likely 11/12  · Monitor for any signs of Infection (felt to be at risk) -    · Per podiatry, no obvious infected tissue seen during procedure,  · Antibiotic - completed 7 days of vanc and cefepime--> will discontinue and monitor for infection   · Follow up final results of blood cultures from 10/31/2019  · NGTD  · Monitor temperatures and serial leg exams  · Pain Control -   · Oxycodone 5-10 mg  · Breakthrough pain - Dilaudid 1 mg IV  · Monitor pain levels  · Supportive care    Scrotal swelling  Assessment & Plan  · Ultrasound showed "Large complex right hydrocele containing numerous septations  Increased vascularity in the left testicle with respect to the right, although both testicles could not be imaged on the same plane limiting direct comparison   Correlate clinically for orchitis "  · Urology saw patient 10/29/2019 and recommends no surgical intervention acutely and to manage problems with legs and get therapy first   · beta HCG, AFP and LDH within normal limits  · Supportive care  · Pain management  Monitor pain levels  Anemia due to chronic kidney disease, on chronic dialysis and Acute Blood Loss Anemia (HCC)  Assessment & Plan  · There was a slight drop in hemoglobin during procedure  · Consider transfusion with dialysis prior to any additional procedure, if needed to get hemoglobin over 8   · Monitor counts - CBC monitoring      Atrial fibrillation ([facility] [address] )  Assessment & Plan  · Rate / Rhythm control -   · Metoprolol increased to 25 mg bid on 10/29/2019  Not consistently getting medication due to blood pressure parameters  · Monitor on telemetry  Monitor HR and blood pressures  · Anticoagulation - None prehospital   Defer to outpatient team following discharge  ESRD (end stage renal disease) ([facility] [address] )  Assessment & Plan  · On routine dialysis  · Nephrology following  Constipation  Assessment & Plan  · Colace scheduled  · Senna BID PRN  · Additional adjustments as needed  Pressure ulcer, right and left buttocks POA  Assessment & Plan  · Local wound care  · Turn / Reposition frequently  VTE Pharmacologic Prophylaxis:   Pharmacologic: Heparin  Mechanical VTE Prophylaxis in Place: Yes    Patient Centered Rounds: I have performed bedside rounds with nursing staff today  Discussions with Specialists or Other Care Team Provider:     Education and Discussions with Family / Patient: patient     Time Spent for Care: 30 minutes  More than 50% of total time spent on counseling and coordination of care as described above  Current Length of Stay: 14 day(s)    Current Patient Status: Inpatient   Certification Statement: The patient will continue to require additional inpatient hospital stay due to lower wounds requiring further management     Discharge Plan / Estimated Discharge Date: TBD based on clinical course    Code Status: Level 1 - Full Code    Subjective:   Feels good today  C/o a little constipation and dry mouth  Has pain in the legs but fairly well controlled on current regimen  Objective:     Vitals:   Temp (24hrs), Av 1 °F (36 7 °C), Min:98 °F (36 7 °C), Max:98 1 °F (36 7 °C)    Temp:  [98 °F (36 7 °C)-98 1 °F (36 7 °C)] 98 1 °F (36 7 °C)  HR:  [85-97] 85  Resp:  [18] 18  BP: ()/(62-76) 92/62  Body mass index is 28 22 kg/m²  Input and Output Summary (last 24 hours):        Intake/Output Summary (Last 24 hours) at 2019 0820  Last data filed at 11/8/2019 1700  Gross per 24 hour   Intake 1440 ml   Output 2200 ml   Net -760 ml       Physical Exam:     Physical Exam   Constitutional: He is oriented to person, place, and time  No distress  HENT:   Head: Normocephalic and atraumatic  Cardiovascular: Normal rate and regular rhythm  Pulmonary/Chest: Effort normal and breath sounds normal  No stridor  No respiratory distress  Abdominal: Soft  He exhibits distension  There is no tenderness  There is no guarding  Musculoskeletal: Normal range of motion  He exhibits no edema  B/l LE wrapped    Neurological: He is alert and oriented to person, place, and time  Skin: He is not diaphoretic  Psychiatric: He has a normal mood and affect  His behavior is normal    Nursing note and vitals reviewed  Additional Data:     Labs:    Results from last 7 days   Lab Units 11/08/19  0452   WBC Thousand/uL 7 42   HEMOGLOBIN g/dL 8 7*   HEMATOCRIT % 28 0*   PLATELETS Thousands/uL 170   NEUTROS PCT % 70   LYMPHS PCT % 11*   MONOS PCT % 11   EOS PCT % 6     Results from last 7 days   Lab Units 11/08/19  0452   POTASSIUM mmol/L 6 1*   CHLORIDE mmol/L 101   CO2 mmol/L 26   BUN mg/dL 43*   CREATININE mg/dL 7 27*   CALCIUM mg/dL 9 0           * I Have Reviewed All Lab Data Listed Above  * Additional Pertinent Lab Tests Reviewed:  All Labs Within Last 24 Hours Reviewed    Imaging:    Imaging Reports Reviewed Today Include:   Imaging Personally Reviewed by Myself Includes:      Recent Cultures (last 7 days):           Last 24 Hours Medication List:     Current Facility-Administered Medications:  acetaminophen 650 mg Oral Q6H PRN Maya Mares MD   acetaminophen 650 mg Oral Q6H Clyde Titus MD   b complex-vitamin C-folic acid 1 capsule Oral Daily With Brandon Harada, MD   calcium acetate 1,334 mg Oral TID With Meals Maya Mares MD   collagenase  Topical Daily Maya Mares MD   docusate sodium 100 mg Oral BID Maya Mares MD   epoetin bob 10,000 Units Intravenous Once per day on Mon Wed Fri Tyrone Box MD   gabapentin 100 mg Oral Daily Denis Jerry MD   heparin (porcine) 5,000 Units Subcutaneous Q8H [facility] Denis Jerry MD   HYDROmorphone 1 mg Intravenous Q4H PRN Denis Jerry MD   metoprolol tartrate 25 mg Oral Q12H Simran Oakley MD   midodrine 5 mg Oral Before Dialysis Fanny Bella MD   ondansetron 4 mg Intravenous Q6H PRN Denis Jerry MD   oxyCODONE 10 mg Oral Q4H PRN Denis Jerry MD   oxyCODONE 5 mg Oral Q4H PRN Denis Jerry MD   pantoprazole 40 mg Oral Early Morning Denis Jerry MD   senna 2 tablet Oral BID PRN Denis Jerry MD   sevelamer 800 mg Oral TID With Meals Denis Jerry MD   torsemide 40 mg Oral BID (diuretic) Denis Jerry MD        Today, Patient Was Seen By: Marcos Julien MD    ** Please Note: This note has been constructed using a voice recognition system   **

## 2019-11-09 NOTE — PROGRESS NOTES
[address] [facility] NOTE   Kathya Teixeira 64 y o  male MRN: [id number]  Unit/Bed#: S -01 Encounter: [id number]  Reason for Consult: ESRD on HD    ASSESSMENT and PLAN:  1  ESRD on HD (Stacey Fernandez):  · Outpatient HD schedule is 4 times weekly on M-T-Th-F    · Inpatient schedule: MWF + Saturday if necessary  · Had HD yesterday  · No acute indication for HD today  2  Access: Permcath  3  BP/volume:   · Continue Midodrine 5 mg PO prior to HD  · On Metoprolol for cardiac disease  · BP controlled  Appears euvolemic    4  Anemia:  · Hgb below goal but stable  · Continue Epogen 14981 units 3x a week with HD      5  Mineral and bone disease:  · Continue Phoslo and  Sevelamer for hyperphos  6  LE wounds: per surgery  Completed abx  DISPOSITION:  · No need for HD today  SUBJECTIVE / INTERVAL HISTORY:  Feels good today  Tolerated HD yesterday  OBJECTIVE:  Current Weight: Weight - Scale: 84 2 kg (185 lb 10 oz)  Vitals:    11/08/19 1630 11/08/19 1700 11/08/19 2111 11/09/19 0737   BP: 99/70 99/66 92/62 112/65   BP Location:  Left arm Left arm Left arm   Pulse: 97 85  97   Resp: 18 18  18   Temp:  98 1 °F (36 7 °C)  98 2 °F (36 8 °C)   TempSrc:  Oral  Temporal   SpO2:       Weight:       Height:           Intake/Output Summary (Last 24 hours) at 11/9/2019 1245  Last data filed at 11/9/2019 0737  Gross per 24 hour   Intake 1460 ml   Output 2200 ml   Net -740 ml     General: conscious, coherent, cooperative, no distress  Chest/Lungs: clear breath sounds  CVS: distinct heart sounds, normal rate  Abdomen: soft  Extremities: (+) dressing in both legs  : no meeks catheter  Neuro: awake, alert       Medications:    Current Facility-Administered Medications:     acetaminophen (TYLENOL) tablet 650 mg, 650 mg, Oral, Q6H PRN, Gemma Noble MD, 650 mg at 11/08/19 1321    acetaminophen (TYLENOL) tablet 650 mg, 650 mg, Oral, Q6H [address], Gemma Noble MD, 650 mg at 11/09/19 1209   b complex-vitamin C-folic acid (NEPHROCAPS) capsule 1 capsule, 1 capsule, Oral, Daily With Michael Sanderson MD, 1 capsule at 11/08/19 1751    bisacodyl (DULCOLAX) rectal suppository 10 mg, 10 mg, Rectal, Daily PRN, Ru Smith MD    calcium acetate (PHOSLO) capsule 1,334 mg, 1,334 mg, Oral, TID With Meals, Sukhdev García MD, 1,334 mg at 11/09/19 1209    collagenase (SANTYL) ointment, , Topical, Daily, Sukhdev García MD    docusate sodium (COLACE) capsule 100 mg, 100 mg, Oral, BID, Sukhdev García MD, 100 mg at 11/09/19 0835    epoetin bob (EPOGEN,PROCRIT) injection 10,000 Units, 10,000 Units, Intravenous, Once per day on Mon Wed Fri, Sukhdev García MD, 10,000 Units at 11/08/19 1613    gabapentin (NEURONTIN) capsule 100 mg, 100 mg, Oral, Daily, Sukhdev García MD, 100 mg at 11/09/19 0836    heparin (porcine) subcutaneous injection 5,000 Units, 5,000 Units, Subcutaneous, Q8H [address], 5,000 Units at 11/09/19 0552 **AND** [COMPLETED] Platelet count, , , Once, Jesse Chun PA-C    HYDROmorphone (DILAUDID) injection 1 mg, 1 mg, Intravenous, Q4H PRN, Sukhdev García MD, 1 mg at 11/07/19 1805    metoprolol tartrate (LOPRESSOR) tablet 25 mg, 25 mg, Oral, Q12H [address], Sukhdev García MD, 25 mg at 11/09/19 0835    midodrine (PROAMATINE) tablet 5 mg, 5 mg, Oral, Before Dialysis, Dayanara Escobar MD, 5 mg at 11/08/19 1451    ondansetron (ZOFRAN) injection 4 mg, 4 mg, Intravenous, Q6H PRN, Sukhdev García MD    oxyCODONE (ROXICODONE) IR tablet 10 mg, 10 mg, Oral, Q4H PRN, Sukhdev García MD, 10 mg at 11/09/19 1028    oxyCODONE (ROXICODONE) IR tablet 5 mg, 5 mg, Oral, Q4H PRN, Sukhdev García MD, 5 mg at 11/05/19 0905    pantoprazole (PROTONIX) EC tablet 40 mg, 40 mg, Oral, Early Morning, Tyrone Box MD, 40 mg at 11/09/19 0552    saliva substitute (MOUTH KOTE) mucosal solution 5 spray, 5 spray, Mouth/Throat, PRN, Ru Smith MD, 5 spray at 11/09/19 0910    senna (SENOKOT) tablet 17 2 mg, 2 tablet, Oral, BID, Ru Smith MD, 17 2 mg at 11/09/19 0910    sevelamer (RENAGEL) tablet 800 mg, 800 mg, Oral, TID With Meals, Nancy Blanco MD, 800 mg at 11/09/19 1210    torsemide (DEMADEX) tablet 40 mg, 40 mg, Oral, BID (diuretic), Nancy Blanco MD, 40 mg at 11/09/19 0835    Laboratory Results:  Results from last 7 days   Lab Units 11/08/19  0452 11/07/19  0546 11/06/19  0506 11/05/19  0250   WBC Thousand/uL 7 42 7 36 8 70 8 61   HEMOGLOBIN g/dL 8 7* 8 0* 7 6* 9 8*   HEMATOCRIT % 28 0* 26 6* 25 1* 32 1*   PLATELETS Thousands/uL 170 195 175 206   POTASSIUM mmol/L 6 1* 5 0 4 8  --    CHLORIDE mmol/L 101 100 100  --    CO2 mmol/L 26 26 28  --    BUN mg/dL 43* 36* 28*  --    CREATININE mg/dL 7 27* 6 27* 5 13*  --    CALCIUM mg/dL 9 0 9 0 8 7  --

## 2019-11-09 NOTE — PLAN OF CARE
Problem: Prexisting or High Potential for Compromised Skin Integrity  Goal: Skin integrity is maintained or improved  Description  INTERVENTIONS:  - Identify patients at risk for skin breakdown  - Assess and monitor skin integrity  - Assess and monitor nutrition and hydration status  - Monitor labs   - Assess for incontinence   - Turn and reposition patient  - Assist with mobility/ambulation  - Relieve pressure over bony prominences  - Avoid friction and shearing  - Provide appropriate hygiene as needed including keeping skin clean and dry  - Evaluate need for skin moisturizer/barrier cream  - Collaborate with interdisciplinary team   - Patient/family teaching  - Consider wound care consult   Outcome: Progressing     Problem: Potential for Falls  Goal: Patient will remain free of falls  Description  INTERVENTIONS:  - Assess patient frequently for physical needs  -  Identify cognitive and physical deficits and behaviors that affect risk of falls    -  [place] fall precautions as indicated by assessment   - Educate patient/family on patient safety including physical limitations  - Instruct patient to call for assistance with activity based on assessment  - Modify environment to reduce risk of injury  - Consider OT/PT consult to assist with strengthening/mobility  Outcome: Progressing     Problem: INFECTION - ADULT  Goal: Absence or prevention of progression during hospitalization  Description  INTERVENTIONS:  - Assess and monitor for signs and symptoms of infection  - Monitor lab/diagnostic results  - Monitor all insertion sites, i e  indwelling lines, tubes, and drains  - Monitor endotracheal if appropriate and nasal secretions for changes in amount and color  - [place] appropriate cooling/warming therapies per order  - Administer medications as ordered  - Instruct and encourage patient and family to use good hand hygiene technique  - Identify and instruct in appropriate isolation precautions for identified infection/condition  Outcome: Progressing

## 2019-11-10 LAB
ANION GAP SERPL CALCULATED.3IONS-SCNC: 8 MMOL/L (ref 4–13)
BASOPHILS # BLD AUTO: 0.06 THOUSANDS/ΜL (ref 0–0.1)
BASOPHILS NFR BLD AUTO: 1 % (ref 0–1)
BUN SERPL-MCNC: 36 MG/DL (ref 5–25)
CALCIUM SERPL-MCNC: 9.7 MG/DL (ref 8.3–10.1)
CHLORIDE SERPL-SCNC: 97 MMOL/L (ref 100–108)
CO2 SERPL-SCNC: 30 MMOL/L (ref 21–32)
CREAT SERPL-MCNC: 7.22 MG/DL (ref 0.6–1.3)
EOSINOPHIL # BLD AUTO: 0.43 THOUSAND/ΜL (ref 0–0.61)
EOSINOPHIL NFR BLD AUTO: 6 % (ref 0–6)
ERYTHROCYTE [DISTWIDTH] IN BLOOD BY AUTOMATED COUNT: 17.2 % (ref 11.6–15.1)
GFR SERPL CREATININE-BSD FRML MDRD: 8 ML/MIN/1.73SQ M
GLUCOSE SERPL-MCNC: 90 MG/DL (ref 65–140)
HCT VFR BLD AUTO: 31.6 % (ref 36.5–49.3)
HGB BLD-MCNC: 9.6 G/DL (ref 12–17)
IMM GRANULOCYTES # BLD AUTO: 0.03 THOUSAND/UL (ref 0–0.2)
IMM GRANULOCYTES NFR BLD AUTO: 0 % (ref 0–2)
LYMPHOCYTES # BLD AUTO: 0.65 THOUSANDS/ΜL (ref 0.6–4.47)
LYMPHOCYTES NFR BLD AUTO: 8 % (ref 14–44)
MCH RBC QN AUTO: 31 PG (ref 26.8–34.3)
MCHC RBC AUTO-ENTMCNC: 30.4 G/DL (ref 31.4–37.4)
MCV RBC AUTO: 102 FL (ref 82–98)
MONOCYTES # BLD AUTO: 0.79 THOUSAND/ΜL (ref 0.17–1.22)
MONOCYTES NFR BLD AUTO: 10 % (ref 4–12)
NEUTROPHILS # BLD AUTO: 5.78 THOUSANDS/ΜL (ref 1.85–7.62)
NEUTS SEG NFR BLD AUTO: 75 % (ref 43–75)
NRBC BLD AUTO-RTO: 0 /100 WBCS
PLATELET # BLD AUTO: 186 THOUSANDS/UL (ref 149–390)
PMV BLD AUTO: 9.7 FL (ref 8.9–12.7)
POTASSIUM SERPL-SCNC: 5.4 MMOL/L (ref 3.5–5.3)
RBC # BLD AUTO: 3.1 MILLION/UL (ref 3.88–5.62)
SODIUM SERPL-SCNC: 135 MMOL/L (ref 136–145)
WBC # BLD AUTO: 7.74 THOUSAND/UL (ref 4.31–10.16)

## 2019-11-10 PROCEDURE — 80048 BASIC METABOLIC PNL TOTAL CA: CPT | Performed by: HOSPITALIST

## 2019-11-10 PROCEDURE — 99232 SBSQ HOSP IP/OBS MODERATE 35: CPT | Performed by: HOSPITALIST

## 2019-11-10 PROCEDURE — 85025 COMPLETE CBC W/AUTO DIFF WBC: CPT | Performed by: HOSPITALIST

## 2019-11-10 PROCEDURE — 99232 SBSQ HOSP IP/OBS MODERATE 35: CPT | Performed by: INTERNAL MEDICINE

## 2019-11-10 RX ORDER — SODIUM POLYSTYRENE SULFONATE 4.1 MEQ/G
30 POWDER, FOR SUSPENSION ORAL; RECTAL ONCE
Status: COMPLETED | OUTPATIENT
Start: 2019-11-10 | End: 2019-11-10

## 2019-11-10 RX ADMIN — CALCIUM ACETATE 1334 MG: 667 CAPSULE ORAL at 10:30

## 2019-11-10 RX ADMIN — HEPARIN SODIUM 5000 UNITS: 5000 INJECTION INTRAVENOUS; SUBCUTANEOUS at 14:39

## 2019-11-10 RX ADMIN — GABAPENTIN 100 MG: 100 CAPSULE ORAL at 10:31

## 2019-11-10 RX ADMIN — METOPROLOL TARTRATE 25 MG: 25 TABLET, FILM COATED ORAL at 10:31

## 2019-11-10 RX ADMIN — CALCIUM ACETATE 1334 MG: 667 CAPSULE ORAL at 14:38

## 2019-11-10 RX ADMIN — ACETAMINOPHEN 650 MG: 325 TABLET, FILM COATED ORAL at 14:38

## 2019-11-10 RX ADMIN — METOPROLOL TARTRATE 25 MG: 25 TABLET, FILM COATED ORAL at 21:04

## 2019-11-10 RX ADMIN — SEVELAMER HYDROCHLORIDE 800 MG: 800 TABLET, FILM COATED PARENTERAL at 14:39

## 2019-11-10 RX ADMIN — ACETAMINOPHEN 650 MG: 325 TABLET, FILM COATED ORAL at 17:12

## 2019-11-10 RX ADMIN — TORSEMIDE 40 MG: 20 TABLET ORAL at 10:31

## 2019-11-10 RX ADMIN — PANTOPRAZOLE SODIUM 40 MG: 40 TABLET, DELAYED RELEASE ORAL at 05:31

## 2019-11-10 RX ADMIN — SODIUM POLYSTYRENE SULFONATE 30 G: 1 POWDER ORAL; RECTAL at 10:32

## 2019-11-10 RX ADMIN — DOCUSATE SODIUM 100 MG: 100 CAPSULE, LIQUID FILLED ORAL at 17:13

## 2019-11-10 RX ADMIN — OXYCODONE HYDROCHLORIDE 10 MG: 10 TABLET ORAL at 10:58

## 2019-11-10 RX ADMIN — DOCUSATE SODIUM 100 MG: 100 CAPSULE, LIQUID FILLED ORAL at 10:31

## 2019-11-10 RX ADMIN — SENNOSIDES 17.2 MG: 8.6 TABLET, FILM COATED ORAL at 17:12

## 2019-11-10 RX ADMIN — OXYCODONE HYDROCHLORIDE 10 MG: 10 TABLET ORAL at 21:04

## 2019-11-10 RX ADMIN — Medication 1 CAPSULE: at 17:12

## 2019-11-10 RX ADMIN — SENNOSIDES 17.2 MG: 8.6 TABLET, FILM COATED ORAL at 10:30

## 2019-11-10 RX ADMIN — SEVELAMER HYDROCHLORIDE 800 MG: 800 TABLET, FILM COATED PARENTERAL at 10:31

## 2019-11-10 RX ADMIN — HEPARIN SODIUM 5000 UNITS: 5000 INJECTION INTRAVENOUS; SUBCUTANEOUS at 05:31

## 2019-11-10 RX ADMIN — COLLAGENASE SANTYL: 250 OINTMENT TOPICAL at 10:32

## 2019-11-10 RX ADMIN — CALCIUM ACETATE 1334 MG: 667 CAPSULE ORAL at 17:13

## 2019-11-10 RX ADMIN — SEVELAMER HYDROCHLORIDE 800 MG: 800 TABLET, FILM COATED PARENTERAL at 17:13

## 2019-11-10 RX ADMIN — TORSEMIDE 40 MG: 20 TABLET ORAL at 17:12

## 2019-11-10 RX ADMIN — ACETAMINOPHEN 650 MG: 325 TABLET, FILM COATED ORAL at 05:31

## 2019-11-10 RX ADMIN — OXYCODONE HYDROCHLORIDE 5 MG: 5 TABLET ORAL at 17:12

## 2019-11-10 RX ADMIN — HEPARIN SODIUM 5000 UNITS: 5000 INJECTION INTRAVENOUS; SUBCUTANEOUS at 21:04

## 2019-11-10 RX ADMIN — BISACODYL 10 MG: 10 SUPPOSITORY RECTAL at 21:04

## 2019-11-10 NOTE — PROGRESS NOTES
Pt in bed upon arrival with call bell within reach and bed in lowest position  Pt did not report pain or discomfort at the time  Pt VSS

## 2019-11-10 NOTE — PROGRESS NOTES
Progress Note - Charlotte Memos 1960, 64 y o  male MRN: [id number]    Unit/Bed#: S -01 Encounter: [id number]    Primary Care Provider: Polly Farr   Date and time admitted to hospital: 10/26/2019  5:10 PM    * Multiple and open wound of lower limb  Assessment & Plan  · Procedure - Debridement, removal of Eschar on 10/31/2019 by Dr David Humphrey  · Podiatry consulted plastic surgery for possible graft vs  Flap procedure  ·  s/p STSG on 11/7 per plastic surgery   · Will need repeat procedure likely 11/12  · Antibiotic - completed 7 days of vanc and cefepime--> will discontinue and monitor for infection   · blood cultures from 10/31/2019: NGTD  · Monitor temperatures and serial leg exams  · Pain Control -   · Oxycodone 5-10 mg  · Breakthrough pain - Dilaudid 1 mg IV  · Monitor pain levels  · Supportive care    Scrotal swelling  Assessment & Plan  · Ultrasound showed "Large complex right hydrocele containing numerous septations  Increased vascularity in the left testicle with respect to the right, although both testicles could not be imaged on the same plane limiting direct comparison   Correlate clinically for orchitis "  · Urology saw patient 10/29/2019 and recommends no surgical intervention acutely and to manage problems with legs and get therapy first   · beta HCG, AFP and LDH within normal limits  · Supportive care  Anemia due to chronic kidney disease, on chronic dialysis and Acute Blood Loss Anemia (HCC)  Assessment & Plan  · There was a slight drop in hemoglobin during procedure  · Consider transfusion with dialysis prior to any additional procedure, if needed to get hemoglobin over 8   · Monitor counts - CBC monitoring  Atrial fibrillation ([facility] [address] )  Assessment & Plan  · Rate / Rhythm control -   · Metoprolol increased to 25 mg bid on 10/29/2019  Not consistently getting medication due to blood pressure parameters  · Monitor on telemetry    Monitor HR and blood pressures  · Anticoagulation - None prehospital   Defer to outpatient team following discharge  ESRD (end stage renal disease) ([facility] [address] )  Assessment & Plan  · On routine dialysis  · Nephrology following  Constipation  Assessment & Plan  · Colace scheduled  · Senna BID PRN  · Additional adjustments as needed  Pressure ulcer, right and left buttocks POA  Assessment & Plan  · Local wound care  · Turn / Reposition frequently  VTE Pharmacologic Prophylaxis:   Pharmacologic: Heparin  Mechanical VTE Prophylaxis in Place: Yes    Patient Centered Rounds: I have performed bedside rounds with nursing staff today  Discussions with Specialists or Other Care Team Provider:     Education and Discussions with Family / Patient: patient     Time Spent for Care: 30 minutes  More than 50% of total time spent on counseling and coordination of care as described above  Current Length of Stay: 15 day(s)    Current Patient Status: Inpatient   Certification Statement: The patient will continue to require additional inpatient hospital stay due to severe lower extremity wounds  Discharge Plan / Estimated Discharge Date: TBD based on clinical course    Code Status: Level 1 - Full Code    Subjective:   Pt feels okay overall  C/o skin "leaking"     Objective:     Vitals:   Temp (24hrs), Av 5 °F (36 9 °C), Min:98 3 °F (36 8 °C), Max:98 7 °F (37 1 °C)    Temp:  [98 3 °F (36 8 °C)-98 7 °F (37 1 °C)] 98 3 °F (36 8 °C)  HR:  [78-91] 78  Resp:  [16-18] 16  BP: (109-121)/(62-71) 114/63  SpO2:  [94 %-96 %] 94 %  Body mass index is 28 22 kg/m²  Input and Output Summary (last 24 hours): Intake/Output Summary (Last 24 hours) at 11/10/2019 0831  Last data filed at 11/10/2019 0501  Gross per 24 hour   Intake 1120 ml   Output    Net 1120 ml       Physical Exam:     Physical Exam   Constitutional: He is oriented to person, place, and time  No distress  HENT:   Head: Normocephalic and atraumatic     Cardiovascular: Normal rate and regular rhythm  Pulmonary/Chest: Effort normal and breath sounds normal  No stridor  No respiratory distress  Abdominal: Soft  Bowel sounds are normal  He exhibits no distension  There is no tenderness  Musculoskeletal: Normal range of motion  He exhibits no edema  Neurological: He is alert and oriented to person, place, and time  No cranial nerve deficit  Skin: Skin is warm and dry  He is not diaphoretic  Nursing note and vitals reviewed  Additional Data:     Labs:    Results from last 7 days   Lab Units 11/08/19  0452   WBC Thousand/uL 7 42   HEMOGLOBIN g/dL 8 7*   HEMATOCRIT % 28 0*   PLATELETS Thousands/uL 170   NEUTROS PCT % 70   LYMPHS PCT % 11*   MONOS PCT % 11   EOS PCT % 6     Results from last 7 days   Lab Units 11/09/19  1515   POTASSIUM mmol/L 5 7*   CHLORIDE mmol/L 104   CO2 mmol/L 29   BUN mg/dL 32*   CREATININE mg/dL 6 50*   CALCIUM mg/dL 9 4           * I Have Reviewed All Lab Data Listed Above  * Additional Pertinent Lab Tests Reviewed:  All Labs Within Last 24 Hours Reviewed    Imaging:    Imaging Reports Reviewed Today Include:   Imaging Personally Reviewed by Myself Includes:      Recent Cultures (last 7 days):           Last 24 Hours Medication List:     Current Facility-Administered Medications:  acetaminophen 650 mg Oral Q6H PRN Walter Mariscal MD   acetaminophen 650 mg Oral Q6H Tonja Mcintyre MD   b complex-vitamin C-folic acid 1 capsule Oral Daily With Brigid Lee MD   bisacodyl 10 mg Rectal Daily PRN Gemma Conti MD   calcium acetate 1,334 mg Oral TID With Meals Walter Mariscal MD   collagenase  Topical Daily Walter Mariscal MD   docusate sodium 100 mg Oral BID Walter Mariscal MD   epoetin bob 10,000 Units Intravenous Once per day on Mon Wed Fri Tyrone Box MD   gabapentin 100 mg Oral Daily Tyrone Box MD   heparin (porcine) 5,000 Units Subcutaneous Q8H [address] Walter Mariscal MD   HYDROmorphone 1 mg Intravenous Q4H PRN Walter Mariscal MD   metoprolol tartrate 25 mg Oral Q12H Samira Martin MD   midodrine 5 mg Oral Before Dialysis Eduar Camargo, MD   ondansetron 4 mg Intravenous Q6H PRN Rubia Castaneda MD   oxyCODONE 10 mg Oral Q4H PRN Rubia Castaneda MD   oxyCODONE 5 mg Oral Q4H PRN Rubia Castaneda MD   pantoprazole 40 mg Oral Early Morning Rubia Castaneda MD   saliva substitute 5 spray Mouth/Throat PRN Herson Olivera MD   senna 2 tablet Oral BID Herson Olivera MD   sevelamer 800 mg Oral TID With Meals Rubia Castaneda MD   sodium polystyrene 30 g Oral Once Eduar Camargo MD   torsemide 40 mg Oral BID (diuretic) Rubia Castaneda MD        Today, Patient Was Seen By: Herson Olivera MD    ** Please Note: This note has been constructed using a voice recognition system   **

## 2019-11-10 NOTE — PROGRESS NOTES
Pt A+Ox4 sleeping in bed, no complaints of pain, bed low and locked, VS stable, will continue to monitor

## 2019-11-10 NOTE — ASSESSMENT & PLAN NOTE
· Ultrasound showed "Large complex right hydrocele containing numerous septations  Increased vascularity in the left testicle with respect to the right, although both testicles could not be imaged on the same plane limiting direct comparison   Correlate clinically for orchitis "  · Urology saw patient 10/29/2019 and recommends no surgical intervention acutely and to manage problems with legs and get therapy first   · beta HCG, AFP and LDH within normal limits  · Supportive care

## 2019-11-10 NOTE — PROGRESS NOTES
[address] [facility] NOTE   Vanda Sharma 64 y o  male MRN: [id number]  Unit/Bed#: S -01 Encounter: [id number]  Reason for Consult: ESRD on HD    ASSESSMENT and PLAN:  1  ESRD on HD (Tamiko Aid):  · Outpatient HD schedule is 4 times weekly on M-T-Th-F    · Inpatient schedule: MWF + Saturday if necessary  · Completed HD on Friday, 11/8/19  · Next HD is tomorrow  · Mildly hyperkalemic but no urgent need for HD  2  Access: R IJ permcath  3  Hyperkalemia: Mild  Will place on low K diet and give 1 dose of Kayexelate  4  BP/volume:   · Continue Midodrine 5 mg PO prior to HD  · On Metoprolol for cardiac disease  · BP controlled  Appears euvolemic    5  Anemia:  · Hgb below goal but stable and better  · Continue Epogen 90121 units 3x a week with HD      6  Mineral and bone disease:  · Continue Phoslo and  Sevelamer for hyperphos  7  LE wounds: per surgery  Completed abx  DISPOSITION:  · Kayexelate 30 gm x 1 dose today  · Low K diet  · HD tomorrow  SUBJECTIVE / INTERVAL HISTORY:  Complaining about the food being dry  No CP or SOB  OBJECTIVE:  Current Weight: Weight - Scale: 84 2 kg (185 lb 10 oz)  Vitals:    11/09/19 2112 11/09/19 2224 11/10/19 0300 11/10/19 0940   BP: 121/71 109/62 114/63 126/72   BP Location:  Left arm Left arm Left arm   Pulse: 91 87 78 82   Resp:  18 16 18   Temp:  98 3 °F (36 8 °C)  99 4 °F (37 4 °C)   TempSrc:  Oral  Oral   SpO2:  95% 94% 97%   Weight:       Height:           Intake/Output Summary (Last 24 hours) at 11/10/2019 0948  Last data filed at 11/10/2019 0501  Gross per 24 hour   Intake 1120 ml   Output    Net 1120 ml     General: conscious, coherent, cooperative, no distress  Chest/Lungs: clear breath sounds  CVS: distinct heart sounds, normal rate  Abdomen: soft  Extremities: (+) dressing in legs  : no meeks catheter  Neuro: awake, alert       Medications:    Current Facility-Administered Medications:    acetaminophen (TYLENOL) tablet 650 mg, 650 mg, Oral, Q6H PRN, James Yoder MD, 650 mg at 11/08/19 1321    acetaminophen (TYLENOL) tablet 650 mg, 650 mg, Oral, Q6H [address], James Yoder MD, 650 mg at 11/10/19 0531    b complex-vitamin C-folic acid (NEPHROCAPS) capsule 1 capsule, 1 capsule, Oral, Daily With Chiki Adamson MD, 1 capsule at 11/09/19 1720    bisacodyl (DULCOLAX) rectal suppository 10 mg, 10 mg, Rectal, Daily PRN, Mindy Gross MD    calcium acetate (PHOSLO) capsule 1,334 mg, 1,334 mg, Oral, TID With Meals, James Yoder MD, 1,334 mg at 11/09/19 1720    collagenase (SANTYL) ointment, , Topical, Daily, James Yoder MD    docusate sodium (COLACE) capsule 100 mg, 100 mg, Oral, BID, James Yoder MD, 100 mg at 11/09/19 1718    epoetin bob (EPOGEN,PROCRIT) injection 10,000 Units, 10,000 Units, Intravenous, Once per day on Mon Wed Fri, James Yoder MD, 10,000 Units at 11/08/19 1613    gabapentin (NEURONTIN) capsule 100 mg, 100 mg, Oral, Daily, James Yoder MD, 100 mg at 11/09/19 0836    heparin (porcine) subcutaneous injection 5,000 Units, 5,000 Units, Subcutaneous, Q8H [address], 5,000 Units at 11/10/19 0531 **AND** [COMPLETED] Platelet count, , , Once, Jesse Chun PA-C    HYDROmorphone (DILAUDID) injection 1 mg, 1 mg, Intravenous, Q4H PRN, James Yoder MD, 1 mg at 11/07/19 1805    metoprolol tartrate (LOPRESSOR) tablet 25 mg, 25 mg, Oral, Q12H [address], Tyrone Box MD, 25 mg at 11/09/19 2112    midodrine (PROAMATINE) tablet 5 mg, 5 mg, Oral, Before Dialysis, Paty Valverde MD, 5 mg at 11/08/19 1451    ondansetron (ZOFRAN) injection 4 mg, 4 mg, Intravenous, Q6H PRN, James Yoder MD    oxyCODONE (ROXICODONE) IR tablet 10 mg, 10 mg, Oral, Q4H PRN, James Yoder MD, 10 mg at 11/09/19 1725    oxyCODONE (ROXICODONE) IR tablet 5 mg, 5 mg, Oral, Q4H PRN, James Yoder MD, 5 mg at 11/05/19 0905    pantoprazole (PROTONIX) EC tablet 40 mg, 40 mg, Oral, Early Morning, James Yoder MD, 40 mg at 11/10/19 5076   saliva substitute (MOUTH KOTE) mucosal solution 5 spray, 5 spray, Mouth/Throat, PRN, Mindy Gross MD, 5 spray at 11/09/19 1517    senna (SENOKOT) tablet 17 2 mg, 2 tablet, Oral, BID, Mindy Gross MD, 17 2 mg at 11/09/19 1720    sevelamer (RENAGEL) tablet 800 mg, 800 mg, Oral, TID With Meals, James Yoder MD, 800 mg at 11/09/19 1720    sodium polystyrene (KAYEXALATE) powder 30 g, 30 g, Oral, Once, Paty Valverde MD    torsemide [facility]) tablet 40 mg, 40 mg, Oral, BID (diuretic), James Yoder MD, 40 mg at 11/09/19 1720    Laboratory Results:  Results from last 7 days   Lab Units 11/10/19  0837 11/09/19  1515 11/08/19  0452 11/07/19  0546 11/06/19  0506 11/05/19  0250   WBC Thousand/uL 7 74  --  7 42 7 36 8 70 8 61   HEMOGLOBIN g/dL 9 6*  --  8 7* 8 0* 7 6* 9 8*   HEMATOCRIT % 31 6*  --  28 0* 26 6* 25 1* 32 1*   PLATELETS Thousands/uL 186  --  170 195 175 206   POTASSIUM mmol/L 5 4* 5 7* 6 1* 5 0 4 8  --    CHLORIDE mmol/L 97* 104 101 100 100  --    CO2 mmol/L 30 29 26 26 28  --    BUN mg/dL 36* 32* 43* 36* 28*  --    CREATININE mg/dL 7 22* 6 50* 7 27* 6 27* 5 13*  --    CALCIUM mg/dL 9 7 9 4 9 0 9 0 8 7  --

## 2019-11-11 ENCOUNTER — APPOINTMENT (INPATIENT)
Dept: DIALYSIS | Facility: HOSPITAL | Age: 59
DRG: 576 | End: 2019-11-11
Payer: MEDICARE

## 2019-11-11 ENCOUNTER — ANESTHESIA EVENT (INPATIENT)
Dept: PERIOP | Facility: HOSPITAL | Age: 59
DRG: 576 | End: 2019-11-11
Payer: MEDICARE

## 2019-11-11 LAB
ANION GAP SERPL CALCULATED.3IONS-SCNC: 9 MMOL/L (ref 4–13)
BASOPHILS # BLD AUTO: 0.07 THOUSANDS/ΜL (ref 0–0.1)
BASOPHILS NFR BLD AUTO: 1 % (ref 0–1)
BUN SERPL-MCNC: 43 MG/DL (ref 5–25)
CALCIUM SERPL-MCNC: 9.4 MG/DL (ref 8.3–10.1)
CHLORIDE SERPL-SCNC: 99 MMOL/L (ref 100–108)
CO2 SERPL-SCNC: 29 MMOL/L (ref 21–32)
CREAT SERPL-MCNC: 8.08 MG/DL (ref 0.6–1.3)
EOSINOPHIL # BLD AUTO: 0.41 THOUSAND/ΜL (ref 0–0.61)
EOSINOPHIL NFR BLD AUTO: 5 % (ref 0–6)
ERYTHROCYTE [DISTWIDTH] IN BLOOD BY AUTOMATED COUNT: 17.2 % (ref 11.6–15.1)
GFR SERPL CREATININE-BSD FRML MDRD: 7 ML/MIN/1.73SQ M
GLUCOSE SERPL-MCNC: 106 MG/DL (ref 65–140)
HCT VFR BLD AUTO: 30.9 % (ref 36.5–49.3)
HGB BLD-MCNC: 9.4 G/DL (ref 12–17)
IMM GRANULOCYTES # BLD AUTO: 0.04 THOUSAND/UL (ref 0–0.2)
IMM GRANULOCYTES NFR BLD AUTO: 1 % (ref 0–2)
LYMPHOCYTES # BLD AUTO: 0.6 THOUSANDS/ΜL (ref 0.6–4.47)
LYMPHOCYTES NFR BLD AUTO: 7 % (ref 14–44)
MCH RBC QN AUTO: 31 PG (ref 26.8–34.3)
MCHC RBC AUTO-ENTMCNC: 30.4 G/DL (ref 31.4–37.4)
MCV RBC AUTO: 102 FL (ref 82–98)
MONOCYTES # BLD AUTO: 0.84 THOUSAND/ΜL (ref 0.17–1.22)
MONOCYTES NFR BLD AUTO: 10 % (ref 4–12)
NEUTROPHILS # BLD AUTO: 6.25 THOUSANDS/ΜL (ref 1.85–7.62)
NEUTS SEG NFR BLD AUTO: 76 % (ref 43–75)
NRBC BLD AUTO-RTO: 0 /100 WBCS
PLATELET # BLD AUTO: 202 THOUSANDS/UL (ref 149–390)
PMV BLD AUTO: 9.8 FL (ref 8.9–12.7)
POTASSIUM SERPL-SCNC: 5.6 MMOL/L (ref 3.5–5.3)
RBC # BLD AUTO: 3.03 MILLION/UL (ref 3.88–5.62)
SODIUM SERPL-SCNC: 137 MMOL/L (ref 136–145)
WBC # BLD AUTO: 8.21 THOUSAND/UL (ref 4.31–10.16)

## 2019-11-11 PROCEDURE — 99232 SBSQ HOSP IP/OBS MODERATE 35: CPT | Performed by: HOSPITALIST

## 2019-11-11 PROCEDURE — 85025 COMPLETE CBC W/AUTO DIFF WBC: CPT | Performed by: HOSPITALIST

## 2019-11-11 PROCEDURE — 92610 EVALUATE SWALLOWING FUNCTION: CPT

## 2019-11-11 PROCEDURE — 80048 BASIC METABOLIC PNL TOTAL CA: CPT | Performed by: HOSPITALIST

## 2019-11-11 PROCEDURE — 90935 HEMODIALYSIS ONE EVALUATION: CPT | Performed by: INTERNAL MEDICINE

## 2019-11-11 PROCEDURE — G8996 SWALLOW CURRENT STATUS: HCPCS

## 2019-11-11 PROCEDURE — 99024 POSTOP FOLLOW-UP VISIT: CPT | Performed by: PHYSICIAN ASSISTANT

## 2019-11-11 PROCEDURE — G8997 SWALLOW GOAL STATUS: HCPCS

## 2019-11-11 RX ORDER — MAGNESIUM HYDROXIDE/ALUMINUM HYDROXICE/SIMETHICONE 120; 1200; 1200 MG/30ML; MG/30ML; MG/30ML
15 SUSPENSION ORAL EVERY 4 HOURS PRN
Status: DISCONTINUED | OUTPATIENT
Start: 2019-11-11 | End: 2019-11-21 | Stop reason: HOSPADM

## 2019-11-11 RX ORDER — CALCIUM CARBONATE 200(500)MG
1000 TABLET,CHEWABLE ORAL 3 TIMES DAILY PRN
Status: DISCONTINUED | OUTPATIENT
Start: 2019-11-11 | End: 2019-11-21 | Stop reason: HOSPADM

## 2019-11-11 RX ORDER — GABAPENTIN 300 MG/1
300 CAPSULE ORAL ONCE
Status: DISCONTINUED | OUTPATIENT
Start: 2019-11-11 | End: 2019-11-12

## 2019-11-11 RX ORDER — SODIUM CHLORIDE, SODIUM LACTATE, POTASSIUM CHLORIDE, CALCIUM CHLORIDE 600; 310; 30; 20 MG/100ML; MG/100ML; MG/100ML; MG/100ML
50 INJECTION, SOLUTION INTRAVENOUS CONTINUOUS
Status: DISCONTINUED | OUTPATIENT
Start: 2019-11-11 | End: 2019-11-12

## 2019-11-11 RX ORDER — ACETAMINOPHEN 325 MG/1
975 TABLET ORAL ONCE
Status: DISCONTINUED | OUTPATIENT
Start: 2019-11-11 | End: 2019-11-12

## 2019-11-11 RX ORDER — SACCHAROMYCES BOULARDII 250 MG
250 CAPSULE ORAL 2 TIMES DAILY
Status: DISCONTINUED | OUTPATIENT
Start: 2019-11-11 | End: 2019-11-21 | Stop reason: HOSPADM

## 2019-11-11 RX ADMIN — TORSEMIDE 40 MG: 20 TABLET ORAL at 17:27

## 2019-11-11 RX ADMIN — ACETAMINOPHEN 650 MG: 325 TABLET, FILM COATED ORAL at 13:13

## 2019-11-11 RX ADMIN — CALCIUM ACETATE 1334 MG: 667 CAPSULE ORAL at 17:41

## 2019-11-11 RX ADMIN — OXYCODONE HYDROCHLORIDE 5 MG: 5 TABLET ORAL at 06:17

## 2019-11-11 RX ADMIN — CALCIUM CARBONATE (ANTACID) CHEW TAB 500 MG 1000 MG: 500 CHEW TAB at 10:06

## 2019-11-11 RX ADMIN — HEPARIN SODIUM 5000 UNITS: 5000 INJECTION INTRAVENOUS; SUBCUTANEOUS at 06:16

## 2019-11-11 RX ADMIN — DOCUSATE SODIUM 100 MG: 100 CAPSULE, LIQUID FILLED ORAL at 08:11

## 2019-11-11 RX ADMIN — SENNOSIDES 17.2 MG: 8.6 TABLET, FILM COATED ORAL at 17:27

## 2019-11-11 RX ADMIN — OXYCODONE HYDROCHLORIDE 10 MG: 10 TABLET ORAL at 17:34

## 2019-11-11 RX ADMIN — TORSEMIDE 40 MG: 20 TABLET ORAL at 08:06

## 2019-11-11 RX ADMIN — SEVELAMER HYDROCHLORIDE 800 MG: 800 TABLET, FILM COATED PARENTERAL at 08:06

## 2019-11-11 RX ADMIN — OXYCODONE HYDROCHLORIDE 10 MG: 10 TABLET ORAL at 21:42

## 2019-11-11 RX ADMIN — HEPARIN SODIUM 5000 UNITS: 5000 INJECTION INTRAVENOUS; SUBCUTANEOUS at 13:14

## 2019-11-11 RX ADMIN — METOPROLOL TARTRATE 25 MG: 25 TABLET, FILM COATED ORAL at 08:05

## 2019-11-11 RX ADMIN — SEVELAMER HYDROCHLORIDE 800 MG: 800 TABLET, FILM COATED PARENTERAL at 17:27

## 2019-11-11 RX ADMIN — SEVELAMER HYDROCHLORIDE 800 MG: 800 TABLET, FILM COATED PARENTERAL at 13:13

## 2019-11-11 RX ADMIN — PANTOPRAZOLE SODIUM 40 MG: 40 TABLET, DELAYED RELEASE ORAL at 06:16

## 2019-11-11 RX ADMIN — SENNOSIDES 17.2 MG: 8.6 TABLET, FILM COATED ORAL at 08:11

## 2019-11-11 RX ADMIN — ACETAMINOPHEN 650 MG: 325 TABLET, FILM COATED ORAL at 06:16

## 2019-11-11 RX ADMIN — HEPARIN SODIUM 5000 UNITS: 5000 INJECTION INTRAVENOUS; SUBCUTANEOUS at 21:43

## 2019-11-11 RX ADMIN — ACETAMINOPHEN 650 MG: 325 TABLET, FILM COATED ORAL at 23:58

## 2019-11-11 RX ADMIN — GABAPENTIN 100 MG: 100 CAPSULE ORAL at 08:06

## 2019-11-11 RX ADMIN — COLLAGENASE SANTYL: 250 OINTMENT TOPICAL at 08:06

## 2019-11-11 RX ADMIN — CALCIUM ACETATE 1334 MG: 667 CAPSULE ORAL at 08:05

## 2019-11-11 RX ADMIN — DOCUSATE SODIUM 100 MG: 100 CAPSULE, LIQUID FILLED ORAL at 17:27

## 2019-11-11 RX ADMIN — Medication 1 CAPSULE: at 17:27

## 2019-11-11 RX ADMIN — Medication 250 MG: at 20:19

## 2019-11-11 RX ADMIN — EPOETIN ALFA 10000 UNITS: 10000 SOLUTION INTRAVENOUS; SUBCUTANEOUS at 15:00

## 2019-11-11 RX ADMIN — SODIUM CHLORIDE, SODIUM LACTATE, POTASSIUM CHLORIDE, AND CALCIUM CHLORIDE 50 ML/HR: .6; .31; .03; .02 INJECTION, SOLUTION INTRAVENOUS at 20:13

## 2019-11-11 RX ADMIN — ACETAMINOPHEN 650 MG: 325 TABLET, FILM COATED ORAL at 17:26

## 2019-11-11 RX ADMIN — CALCIUM ACETATE 1334 MG: 667 CAPSULE ORAL at 13:12

## 2019-11-11 NOTE — PROGRESS NOTES
- Please continue current [facility] for now  - Will defer continued management of b/l LE to plastics for now  - Please reconsult me as necessary for further follow up and skin substitute application if needed

## 2019-11-11 NOTE — DISCHARGE INSTR - DIET
Dysphagia level 3 (soft/chopped) w/ thin liquids  Meds as tolerated (prefers whole in puree)   Aspiration precautions, reflux precautions

## 2019-11-11 NOTE — PLAN OF CARE
Rx dysphagia level 3 w/ thin liquids (use extra gravy/sauces w/ meals)   Meds as tolerated  Aspiration precautions, intermittent supervision   Will cont to follow

## 2019-11-11 NOTE — SPEECH THERAPY NOTE
Speech-Language Pathology Bedside Swallow Evaluation        Patient Name: Vanda Sharma    UJLTA'O Date: 11/11/2019     Problem List  Principal Problem:    Multiple and open wound of lower limb  Active Problems:    Atrial fibrillation ([address] )    Anemia due to chronic kidney disease, on chronic dialysis and Acute Blood Loss Anemia (HCC)    Scrotal swelling    ESRD (end stage renal disease) (HCC)    Pressure ulcer, right and left buttocks POA    Wound of right leg    Wound of left leg    Constipation    Multiple wounds of skin         Past Medical History  Past Medical History:   Diagnosis Date    Complication of renal dialysis     Polycystic kidney disease        Past Surgical History  Past Surgical History:   Procedure Laterality Date    IR CATHETERGRAM  10/17/2019    IR IMAGE GUIDED ASPIRATION / DRAINAGE  9/23/2019    IR PARACENTESIS  10/9/2019    IR [address] EXCHANGE  10/17/2019    IR [address] PLACEMENT  9/30/2019    SPLIT THICKNESS SKIN GRAFT Bilateral 11/7/2019    Procedure: SKIN GRAFT SPLIT THICKNESS (STSG)  EXTREMITY;  Surgeon: Bernice Valdes MD;  [facility];  Service: Plastics    WOUND DEBRIDEMENT Bilateral 10/31/2019    Procedure: DEBRIDEMENT WOUND [facility] OUT); Surgeon: Lei Ford DPM;  [facility];  Service: Podiatry    WOUND DEBRIDEMENT Bilateral 11/5/2019    Procedure: DEBRIDEMENT WOUND [facility] OUT); Surgeon: Lei Ford DPM;  [facility];  Service: Podiatry    WOUND DEBRIDEMENT Bilateral 11/7/2019    Procedure: DEBRIDEMENT WOUND [facility] OUT); Surgeon: Bernice Valdes MD;  [facility];  Service: Plastics       Summary   Mild oral and functional pharyngeal stages of swallow  Prolonged mastication of solid foods  Pt reports he eats moist/chopped foods as it is easier to chew  No overt s/s aspiration across all consistencies   Rec dysphagia level 3 (using extra gravy/sauce) w/ thin liquids - assist w/ set up/intermittent supervision  Recommendations:   Diet: soft/level 3 diet and thin liquids  (rec extra gravy/sauces w/ meals)   Meds: as tolerated   Frequent Oral care: 3x/day  Aspiration precautions and compensatory swallowing strategies: upright posture, only feed when fully alert, slow rate of feeding and small bites/sips, reduce distractions in room,  reflux precautions, alternate food/liquid   Other Recommendations/ considerations: ST will cont to follow to assess tolerance of diet and upgrade as able       Current Medical Status  Pt is a 64 y o  male who presented to [facility] with multiple and open wound of lower limb  history of PKD, ESRD on HD, and Atrial Fibrillation who presents with leg wounds  As per patient he apparently had a fever of 100*F at his nursing home so he was sent in to the hospital  Overall the patient denies feeling feverish  The patient states that he does have chronic leg wounds, but overall reports that he feels that they are improving  He denies any new pain, increased drainage, or increased redness at the area  The patient's main complaint is an inguinal hernia, however this is not a new complaint and was evaluated prior to not be incarcerated  He denies any chest pain or palpitations  He denies any shortness of breath  The patient's wife expressed concerns that his abdomen retains fluid, however the patient denies he feels his abdomen is filling up with fluid and does not know any change         Past medical history:   Please see H&P for details    Social/Education/Vocational Hx:  Pt lives at [address] [place] [address]   Current Risks for Dysphagia & Aspiration: hx dysphagia  Current Symptoms/Concerns: Per RN, pt c/o dry mouth and trouble swallowing, though RN reports taking pills w/o difficulty   Current Diet: regular diet and thin liquids   Baseline Diet: unknown at nursing home; per pt, his family helps chop his food and he likes to coat food in gravy to make easier to chew   Takes pills-whole w/ liquid      Baseline Assessment   Behavior/Cognition: alert, impulsive, distractible, tangential   Speech/Language Status: able to participate in conversation and able to follow commands  Patient Positioning: upright in bed     Swallow Mechanism Exam   Facial: symmetrical  Labial: WFL  Lingual: WFL  Velum: unable to visualize  Mandible: adequate ROM  Dentition: poor oral hygiene  Vocal quality:clear/adequate   Volitional Cough: strong/productive   Respiratory: RA    Consistencies Assessed and Performance   Consistencies Administered: seen during lunch with puree, macaroni and cheese, grahm cracker, thin liquids via straw/cup     Oral Stage: Pt able to self-feed; required assistance w/ set up  Adequate bolus retrieval, rapid rate of intake noted w/ liquids; SLP encouraged slow rate  Prolonged mastication of solid food  No oral residue  Suspect adequate oral control of thin liquids via straw/cup  Pharyngeal Stage: Swallow initiation appeared timely  Laryngeal rise present to palpation  No overt s/s aspiration across all consistencies  Clear vocal quality  C/o pharyngeal stasis, cleared w/ liquid wash         Esophageal Concerns: c/o heartburn      Results Reviewed with: patient and MD   Dysphagia Goals: pt will tolerate dysphagia level 3  with thin liquids without s/s of aspiration x48 hours

## 2019-11-11 NOTE — ASSESSMENT & PLAN NOTE
· Procedure - Debridement, removal of Eschar on 10/31/2019 by Dr Prem Rosado  · Podiatry consulted plastic surgery for possible graft vs  Flap procedure  ·  s/p STSG on 11/7 per plastic surgery   · Will need repeat procedure likely 11/12  · Antibiotic - completed 7 days of vanc and cefepime--> will discontinue and monitor for infection   · blood cultures from 10/31/2019: NGTD  · Monitor temperatures and serial leg exams  · Pain Control -   · Oxycodone 5/10 mg  · Breakthrough pain - Dilaudid 1 mg IV  · Monitor pain levels    · Supportive care

## 2019-11-11 NOTE — HEMODIALYSIS
Uneventful hemodialysis treatment lasting 3 hours as planned    Right permacath maintains 375 bfr  Given epogen 43955 units     -volume removal 1000 ml  -pre weight 83 3 kg  -post weight 82 kg

## 2019-11-11 NOTE — H&P
H&P - Plastic Surgery   Andrés Alcala 64 y o  male MRN: [id number]  Unit/Bed#: S -01 Encounter: [id number]        11/12/19 1330         Procedures:       DEBRIDEMENT LOWER EXTREMITY ([address] OUT) (Bilateral Leg Lower)      SKIN GRAFT SPLIT THICKNESS (STSG)  EXTREMITY (Bilateral Leg Lower)   Anesthesia type: General   Diagnosis: Multiple wounds of skin [[address]]           HPI:   Andrés Alcala is a 64y o  year old male who presents with  an acute and chronic infected bilateral lower extremity infection with significant wounds that were debrided extensively by the podiatry team was asked to evaluate the patient provide any reconstruction options  He underwent debridement and placement of STSG on 10/07/19  He is now scheduled for the same on his contralateral side  REVIEW OF SYSTEMS    GENERAL/CONSTITUTIONAL: The patient denies fever, fatigue, weakness, weight gain or weight loss  HEAD, EYES, EARS, NOSE AND THROAT: Eyes  The patient denies pain, redness, loss of vision, double or blurred vision and denies wearing glasses  The patient denies ringing in the ears, nosebleeds sinusitis, post nasal drip  Also denies frequent sore throats, hoarseness, painful swallowing  CARDIOVASCULAR: The patient denies chest pain, irregular heartbeats, palpitations, shortness of breath, heart murmurs, high blood pressure, cramps in his legs with walking, pain in his feet or toes at night or varicose veins  RESPIRATORY: The patient denies chronic cough, wheezing or night sweats  GASTROINTESTINAL: The patient denies decreased appetite, nausea, vomiting, diarrhea, constipation, blood in the stools  GENITOURINARY: esrd The patient denies difficult urination, pain or burning with urination, blood in the urine  MUSCULOSKELETAL: The patient denies arm, thigh or calf cramps  No joint or muscle pain  No muscle weakness or tenderness  No joint swelling, neck pain, back pain or major orthopedic injuries    SKIN AND BREASTS: see hpi The patient denies easy bruising, skin redness, skin rash, hives  NEUROLOGIC: The patient denies headache, dizziness, fainting, memory loss  PSYCHIATRIC: The patient denies depression anxiety  ENDOCRINE: The patient denies intolerance to hot or cold temperature, flushing, fingernail changes, increased thirst, increased salt intake or decreased sexual desire  HEMATOLOGIC/LYMPHATIC: The patient denies anemia, bleeding tendency or clotting tendency  ALLERGIC/IMMUNOLOGIC: The patient denies rhinitis, asthma, skin sensitivity, latex allergies or sensitivity  Historical Information   Past Medical History:   Diagnosis Date    Complication of renal dialysis     Polycystic kidney disease      Past Surgical History:   Procedure Laterality Date    IR CATHETERGRAM  10/17/2019    IR IMAGE GUIDED ASPIRATION / DRAINAGE  9/23/2019    IR PARACENTESIS  10/9/2019    IR [address] EXCHANGE  10/17/2019    IR [address] PLACEMENT  9/30/2019    SPLIT THICKNESS SKIN GRAFT Bilateral 11/7/2019    Procedure: SKIN GRAFT SPLIT THICKNESS (STSG)  EXTREMITY;  Surgeon: Delmi Garcia MD;  [facility];  Service: Plastics    WOUND DEBRIDEMENT Bilateral 10/31/2019    Procedure: DEBRIDEMENT WOUND [facility] OUT); Surgeon: Mikael Kaminski DPM;  [facility];  Service: Podiatry    WOUND DEBRIDEMENT Bilateral 11/5/2019    Procedure: DEBRIDEMENT WOUND [facility] OUT); Surgeon: Mikael Kaminski DPM;  [facility];  Service: Podiatry    WOUND DEBRIDEMENT Bilateral 11/7/2019    Procedure: DEBRIDEMENT WOUND [facility] OUT);   Surgeon: Delmi Garcai MD;  [facility];  Service: Plastics     Social History   Social History     Substance and Sexual Activity   Alcohol Use Not Currently     Social History     Substance and Sexual Activity   Drug Use Not Currently     Social History     Tobacco Use   Smoking Status Never Smoker   Smokeless Tobacco Never Used     Family History: History reviewed  No pertinent family history      Meds/Allergies     Current Facility-Administered Medications:     acetaminophen (TYLENOL) tablet 650 mg, 650 mg, Oral, Q6H PRN, Vahid Burrell MD, 650 mg at 11/08/19 1321    acetaminophen (TYLENOL) tablet 650 mg, 650 mg, Oral, Q6H [address], Tyrone Box MD, 650 mg at 11/11/19 1313    aluminum-magnesium hydroxide-simethicone (MYLANTA) 200-200-20 mg/5 mL oral suspension 15 mL, 15 mL, Oral, Q4H PRN, Stiven Jiang MD    b complex-vitamin C-folic acid (NEPHROCAPS) capsule 1 capsule, 1 capsule, Oral, Daily With Dinner, Vahid Burrell MD, 1 capsule at 11/10/19 1712    bisacodyl (DULCOLAX) rectal suppository 10 mg, 10 mg, Rectal, Daily PRN, Stiven Jiang MD, 10 mg at 11/10/19 2104    calcium acetate (PHOSLO) capsule 1,334 mg, 1,334 mg, Oral, TID With Meals, Vahid Burrell MD, 1,334 mg at 11/11/19 1312    calcium carbonate (TUMS) chewable tablet 1,000 mg, 1,000 mg, Oral, TID PRN, Stiven Jiang MD, 1,000 mg at 11/11/19 1006    collagenase (SANTYL) ointment, , Topical, Daily, Vahid Burrell MD    docusate sodium (COLACE) capsule 100 mg, 100 mg, Oral, BID, Vahid Burrell MD, 100 mg at 11/11/19 0811    epoetin bob (EPOGEN,PROCRIT) injection 10,000 Units, 10,000 Units, Intravenous, Once per day on Mon Wed Fri, Vahid Burrell MD, 10,000 Units at 11/11/19 1500    gabapentin (NEURONTIN) capsule 100 mg, 100 mg, Oral, Daily, Vahid Burrell MD, 100 mg at 11/11/19 0806    heparin (porcine) subcutaneous injection 5,000 Units, 5,000 Units, Subcutaneous, Q8H [address], 5,000 Units at 11/11/19 1314 **AND** [COMPLETED] Platelet count, , , Once, Jesse Chun PA-C    HYDROmorphone (DILAUDID) injection 1 mg, 1 mg, Intravenous, Q4H PRN, Vahid Burrell MD, 1 mg at 11/07/19 1805    metoprolol tartrate (LOPRESSOR) tablet 25 mg, 25 mg, Oral, Q12H [address], Vahid Burrell MD, 25 mg at 11/11/19 0805    midodrine (PROAMATINE) tablet 5 mg, 5 mg, Oral, Before Dialysis, Geovanna Ramey MD, 5 mg at 11/08/19 1451    ondansetron (ZOFRAN) injection 4 mg, 4 mg, Intravenous, Q6H PRN, Nancy Blanco MD    oxyCODONE (ROXICODONE) IR tablet 10 mg, 10 mg, Oral, Q4H PRN, Nancy Blanco MD, 10 mg at 11/10/19 2104    oxyCODONE (ROXICODONE) IR tablet 5 mg, 5 mg, Oral, Q4H PRN, Nancy Blanco MD, 5 mg at 11/11/19 0617    pantoprazole (PROTONIX) EC tablet 40 mg, 40 mg, Oral, Early Morning, Nancy Blanco MD, 40 mg at 11/11/19 0616    saliva substitute (MOUTH KOTE) mucosal solution 5 spray, 5 spray, Mouth/Throat, PRN, Ron Sumner MD, 5 spray at 11/09/19 1517    senna (SENOKOT) tablet 17 2 mg, 2 tablet, Oral, BID, Ron Sumner MD, 17 2 mg at 11/11/19 0811    sevelamer (RENAGEL) tablet 800 mg, 800 mg, Oral, TID With Meals, Nancy Blanco MD, 800 mg at 11/11/19 1313    torsemide (DEMADEX) tablet 40 mg, 40 mg, Oral, BID (diuretic), Nancy Blanco MD, 40 mg at 11/11/19 0806      Objective     /75   Pulse 96   Temp 98 5 °F (36 9 °C) (Oral)   Resp 18   Ht 5' 8" (1 727 m)   Wt 82 3 kg (181 lb 7 oz)   SpO2 99%   BMI 27 59 kg/m²   General appearance: alert and oriented, in no acute distress  Head: Normocephalic, without obvious abnormality, atraumatic  Lungs: Normal respiratory effort   No wheezes rales rhonchi  Chest wall: no tenderness  Abdomen: soft, non-tender; bowel sounds normal; no masses,  no organomegaly  Extremities: Right dorsal foot-10 5 cm x 11 cm x 0 4 cm  Right leg extending to foot- 40 cm x 21 cm x 0 2 cm  Left hallux- 3 5 cm x 3 cm x 0 2 cm  Left dorsal foot proximal-3 5 cm x 3 5 cm x 0 3 cm  Left distal foot-0 6 cm x 0 9 cm x 0 3 cm  Left posterior proximal-14 cm x 8 5 cm x 0 4 cm  Left medial proximal- 12 cm x 18 cm x 0 3 cm  Left lateral cluster- 26 5 cm x 6 5 cm x 0 4 cm  Left posterior distal leg- 5 cm x 7 cm x 0 3 cm      Lab Results:   Lab Results   Component Value Date    WBC 8 21 11/11/2019    HGB 9 4 (L) 11/11/2019    HCT 30 9 (L) 11/11/2019     (H) 11/11/2019     11/11/2019          Lab Results   Component Value Date/Time    WOUNDCULT 2+ Growth of Proteus mirabilis (A) 10/26/2019 08:23 PM    WOUNDCULT (A) 10/26/2019 08:23 PM     2+ Growth of Methicillin Resistant Staphylococcus aureus    WOUNDCULT 4+ Growth of  10/26/2019 08:23 PM         Imaging Studies:   No results found  EKG, Pathology, and Other Studies:   Lab Results   Component Value Date/Time    [facility]  10/10/2019 07:54 PM     A  Skin, left thigh, excisional biopsy:  - Ulceration with superficial and deep vascular proliferation, regenerative squamous atypia, early     scar, and fat necrosis, associated with fibrinopurulent debris and acute and chronic inflammation  See Note  -- Single blood vessel with intimal vascular calcifications and rare superficial ulcer bed       calcifications; not diagnostic for calciphylaxis  -- Favor ulcer bed vascular changes; no definitive vasculitis  -- Hyaline arteriolosclerosis and intraluminal foamy macrophages suggestive of atherosclerosis;       atherosclerotic thromboemboli cannot be excluded  -- At least superficial fungal yeast and bacteria; confirming special stains for fungus (PAS),      bacteria (B&H) and acid fast bacilli ([place]) pending; results will be reported in an addendum    - Negative for malignancy on multiple examined levels  Interpretation performed at [facility], [address] [place] [id number]               Intake/Output Summary (Last 24 hours) at 11/11/2019 1600  Last data filed at 11/11/2019 1500  Gross per 24 hour   Intake 300 ml   Output    Net 300 ml       Invasive Devices     Peripheral Intravenous Line            Peripheral IV 11/11/19 Right;Ventral (anterior) Forearm less than 1 day          Line            Temporary HD Catheter -- days                VTE Prophylaxis: Sequential compression device Era Bane)     Code Status: Level 1 - Full Code  Advance Directive and Living Will:      Power of :

## 2019-11-11 NOTE — PLAN OF CARE
Problem: Prexisting or High Potential for Compromised Skin Integrity  Goal: Skin integrity is maintained or improved  Description  INTERVENTIONS:  - Identify patients at risk for skin breakdown  - Assess and monitor skin integrity  - Assess and monitor nutrition and hydration status  - Monitor labs   - Assess for incontinence   - Turn and reposition patient  - Assist with mobility/ambulation  - Relieve pressure over bony prominences  - Avoid friction and shearing  - Provide appropriate hygiene as needed including keeping skin clean and dry  - Evaluate need for skin moisturizer/barrier cream  - Collaborate with interdisciplinary team   - Patient/family teaching  - Consider wound care consult   Outcome: Progressing     Problem: Potential for Falls  Goal: Patient will remain free of falls  Description  INTERVENTIONS:  - Assess patient frequently for physical needs  -  Identify cognitive and physical deficits and behaviors that affect risk of falls    -  [place] fall precautions as indicated by assessment   - Educate patient/family on patient safety including physical limitations  - Instruct patient to call for assistance with activity based on assessment  - Modify environment to reduce risk of injury  - Consider OT/PT consult to assist with strengthening/mobility  Outcome: Progressing     Problem: INFECTION - ADULT  Goal: Absence or prevention of progression during hospitalization  Description  INTERVENTIONS:  - Assess and monitor for signs and symptoms of infection  - Monitor lab/diagnostic results  - Monitor all insertion sites, i e  indwelling lines, tubes, and drains  - Monitor endotracheal if appropriate and nasal secretions for changes in amount and color  - [place] appropriate cooling/warming therapies per order  - Administer medications as ordered  - Instruct and encourage patient and family to use good hand hygiene technique  - Identify and instruct in appropriate isolation precautions for identified infection/condition  Outcome: Progressing

## 2019-11-11 NOTE — PLAN OF CARE
Chronic hemodialysis treatment ordered for 3 hours  Using 2 mEq/L dialysate solution for serum potassium 5 6 this morning  Fluid goal 1500 ml as ordered  Treatment plan discussed with Dr Blackman President at bedside

## 2019-11-11 NOTE — PROGRESS NOTES
Progress Note - Shell Manzano 1960, 64 y o  male MRN: [id number]    Unit/Bed#: S -01 Encounter: [id number]    Primary Care Provider: Carrillo Kelley   Date and time admitted to hospital: 10/26/2019  5:10 PM      * Multiple and open wound of lower limb  Assessment & Plan  · Procedure - Debridement, removal of Eschar on 10/31/2019 by Dr Khushi Christianson  · Podiatry consulted plastic surgery for possible graft vs  Flap procedure  ·  s/p STSG on 11/7 per plastic surgery   · Will need repeat procedure likely 11/12  · Antibiotic - completed 7 days of vanc and cefepime--> will discontinue and monitor for infection   · blood cultures from 10/31/2019: NGTD  · Monitor temperatures and serial leg exams  · Pain Control -   · Oxycodone 5/10 mg  · Breakthrough pain - Dilaudid 1 mg IV  · Monitor pain levels  · Supportive care    Anemia due to chronic kidney disease, on chronic dialysis and Acute Blood Loss Anemia (HCC)  Assessment & Plan  · There was a slight drop in hemoglobin during procedure  · Consider transfusion with dialysis prior to any additional procedure, if needed to get hemoglobin over 8   · Monitor counts - CBC monitoring  Atrial fibrillation ([facility] )  Assessment & Plan  · Rate / Rhythm control -   · Metoprolol increased to 25 mg bid on 10/29/2019  Not consistently getting medication due to blood pressure parameters  · Monitor on telemetry  Monitor HR and blood pressures  · Anticoagulation - None prehospital   Defer to outpatient team following discharge  ESRD (end stage renal disease) ([facility] )  Assessment & Plan  · On routine dialysis  · Nephrology following  Scrotal swelling  Assessment & Plan  · Ultrasound showed "Large complex right hydrocele containing numerous septations    Increased vascularity in the left testicle with respect to the right, although both testicles could not be imaged on the same plane limiting direct comparison   Correlate clinically for orchitis "  · Urology saw patient 10/29/2019 and recommends no surgical intervention acutely and to manage problems with legs and get therapy first   · beta HCG, AFP and LDH within normal limits  · Supportive care  Constipation  Assessment & Plan  · Colace scheduled  · Senna BID PRN  · Additional adjustments as needed  VTE Pharmacologic Prophylaxis:   Pharmacologic: Heparin  Mechanical VTE Prophylaxis in Place: No    Patient Centered Rounds: I have performed bedside rounds with nursing staff today  Discussions with Specialists or Other Care Team Provider:     Education and Discussions with Family / Patient: patient     Time Spent for Care: 30 minutes  More than 50% of total time spent on counseling and coordination of care as described above  Current Length of Stay: 16 day(s)    Current Patient Status: Inpatient   Certification Statement: The patient will continue to require additional inpatient hospital stay due to severe lower extremity wounds requiring surgical management  Discharge Plan / Estimated Discharge Date: TBD based on clinical course    Code Status: Level 1 - Full Code      Subjective:   C/o heartburn mainly  No other complaints at this time  Objective:     Vitals:   Temp (24hrs), Av 6 °F (37 °C), Min:98 2 °F (36 8 °C), Max:98 9 °F (37 2 °C)    Temp:  [98 2 °F (36 8 °C)-98 9 °F (37 2 °C)] 98 2 °F (36 8 °C)  HR:  [78-90] 78  Resp:  [18] 18  BP: (112-131)/(66-72) 131/71  SpO2:  [93 %-99 %] 99 %  Body mass index is 27 59 kg/m²  Input and Output Summary (last 24 hours):     No intake or output data in the 24 hours ending 19 1039    Physical Exam:     Physical Exam   Constitutional: No distress  HENT:   Head: Normocephalic and atraumatic  Cardiovascular: Normal rate and regular rhythm  Exam reveals no friction rub  No murmur heard  Pulmonary/Chest: Effort normal and breath sounds normal  No stridor  No respiratory distress  He has no wheezes  Abdominal: Soft   He exhibits distension  There is no tenderness  There is no guarding  Musculoskeletal: He exhibits deformity  B/l LE wrapped    Skin: He is not diaphoretic  Psychiatric: He has a normal mood and affect  His behavior is normal    Nursing note and vitals reviewed  Additional Data:     Labs:    Results from last 7 days   Lab Units 11/11/19  0341   WBC Thousand/uL 8 21   HEMOGLOBIN g/dL 9 4*   HEMATOCRIT % 30 9*   PLATELETS Thousands/uL 202   NEUTROS PCT % 76*   LYMPHS PCT % 7*   MONOS PCT % 10   EOS PCT % 5     Results from last 7 days   Lab Units 11/11/19  0341   POTASSIUM mmol/L 5 6*   CHLORIDE mmol/L 99*   CO2 mmol/L 29   BUN mg/dL 43*   CREATININE mg/dL 8 08*   CALCIUM mg/dL 9 4           * I Have Reviewed All Lab Data Listed Above  * Additional Pertinent Lab Tests Reviewed:  All Labs Within Last 24 Hours Reviewed    Imaging:    Imaging Reports Reviewed Today Include:   Imaging Personally Reviewed by Myself Includes:      Recent Cultures (last 7 days):           Last 24 Hours Medication List:     Current Facility-Administered Medications:  acetaminophen 650 mg Oral Q6H PRN Ash Cat MD   acetaminophen 650 mg Oral Q6H [address] Tyrone Box MD   aluminum-magnesium hydroxide-simethicone 15 mL Oral Q4H PRN Merna Harper MD   b complex-vitamin C-folic acid 1 capsule Oral Daily With Vashti Vivas MD   bisacodyl 10 mg Rectal Daily PRN Merna Harper MD   calcium acetate 1,334 mg Oral TID With Meals Ash Cat MD   calcium carbonate 1,000 mg Oral TID PRN Merna Harper MD   collagenase  Topical Daily Ash Cat MD   docusate sodium 100 mg Oral BID Ash Cat MD   epoetin bob 10,000 Units Intravenous Once per day on Mon Wed Fri Tyrone Box MD   gabapentin 100 mg Oral Daily Tyrone Box MD   heparin (porcine) 5,000 Units Subcutaneous Q8H [address] Ash Cat MD   HYDROmorphone 1 mg Intravenous Q4H PRN Ash Cat MD   metoprolol tartrate 25 mg Oral Q12H [address] Ash Cat MD   midodrine 5 mg Oral Before Dialysis [facility] Twila Rodrigez MD   ondansetron 4 mg Intravenous Q6H PRN Liza Lockwood MD   oxyCODONE 10 mg Oral Q4H PRN Liza Lockwood MD   oxyCODONE 5 mg Oral Q4H PRN Liza Lockwood MD   pantoprazole 40 mg Oral Early Morning Liza Lockwood MD   saliva substitute 5 spray Mouth/Throat PRN Janet eJronimo MD   senna 2 tablet Oral BID Janet Jeronimo MD   sevelamer 800 mg Oral TID With Meals Liza Lockwood MD   torsemide 40 mg Oral BID (diuretic) Liza Lockwood MD        Today, Patient Was Seen By: Janet Jeronimo MD    ** Please Note: This note has been constructed using a voice recognition system   **

## 2019-11-11 NOTE — CONSULTS
Consult Note - Wound   Shell Manzano 64 y o  male MRN: [id number]  Unit/Bed#: S -01 Encounter: [id number]      Assessment:   Staff reporting MASD to buttocks/sacrum  Hemodialysis in progress  Patient turned to right side due to dialysis lines  Hemodialysis RN assisted with turning patient  Buttocks with blanchable redness  Skin is intact  Patient complaining of his bottom hurting  Very little spontaneous movement in the bed  Patient insists that the plastic surgeon told him not to move- at all  Bobbi Stable will clarify with plastics what his mobility/activity level is  Wound/Skin Care Plan:   1  Hydraguard twice a day to buttocks/sacrum  2  Will order low air loss air mattress for functional paraplegia  Hemodialysis nurse said that would be good because a scale is included with that bed, and dialysis needs to get a weight  Discuss[facility] with Luanne Smiley, [facility], hemodialysis RN  Vitals: Blood pressure 104/70, pulse 95, temperature 98 5 °F (36 9 °C), temperature source Oral, resp  rate 18, height 5' 8" (1 727 m), weight 82 3 kg (181 lb 7 oz), SpO2 99 %  ,Body mass index is 27 59 kg/m²  Wound 09/23/19 Incision Catheter entry/exit site Chest Right;Upper (Active)       Wound 09/21/19 Traumatic Other (Comment) Leg Right; Lower (Active)       Wound 09/21/19 Traumatic Other (Comment) Leg Left; Lower (Active)       Wound 09/27/19 Traumatic Other (Comment) Thigh Inner;Medial;Left (Active)       Wound 10/26/19 (Active)   Wound Description MISHA 11/11/2019  3:00 PM   Dressing Dry dressing 11/11/2019 12:00 AM   Dressing Changed Other (Comment) 11/8/2019  5:52 PM   Patient Tolerance Tolerated well 11/8/2019  9:00 AM   Dressing Status Clean;Dry; Intact 11/9/2019  7:37 AM       Wound 10/26/19 Moisture associated skin damage Buttocks Right (Active)   Wound Image   11/11/2019  3:22 PM   Wound Description Fragile 11/11/2019  3:00 PM   Staging Stage II 11/11/2019  3:00 PM   Drainage Amount None 11/11/2019 12:00 AM   Dressing Moisture barrier 11/11/2019 12:00 AM   Dressing Changed Other (Comment) 11/9/2019 11:00 AM   Patient Tolerance Tolerated well 11/3/2019 12:00 PM   Dressing Status Clean;Dry; Intact 11/9/2019 11:00 AM       Wound 10/26/19 Other (Comment) Leg Left;Right (Active)   Wound Description Beefy red;Fragile 11/11/2019  3:00 PM   [place]-wound Assessment Fragile 11/11/2019  3:00 PM   Closure MISHA 11/9/2019  1:00 AM   Drainage Amount Small 11/11/2019  3:00 PM   Drainage Description Serosanguineous 11/11/2019  3:00 PM   Non-staged Wound Description Full thickness 11/11/2019  3:00 PM   Treatments Cleansed;Site care;Elevated 11/11/2019  3:00 PM   Dressing Dry dressing;Xeroform 11/11/2019  3:00 PM   Dressing Changed Changed 11/11/2019  3:00 PM   Patient Tolerance Tolerated well 11/11/2019  3:00 PM   Dressing Status Clean;Dry; Intact 11/11/2019  3:00 PM       Wound 10/31/19 Incision Leg Bilateral (Active)   Wound Description Beefy red;Fragile 11/11/2019  3:00 PM   [place]-wound Assessment Fragile 11/11/2019  3:00 PM   Closure MISHA 11/9/2019  1:00 AM   Drainage Amount Small 11/11/2019  3:00 PM   Drainage Description Serosanguineous 11/11/2019  3:00 PM   Non-staged Wound Description Full thickness 11/11/2019  3:00 PM   Treatments Cleansed;Site care;Elevated 11/11/2019  3:00 PM   Dressing Xeroform;Dry dressing 11/11/2019  3:00 PM   Wound packed? No 11/11/2019  3:00 PM   Dressing Changed Changed 11/11/2019  3:00 PM   Patient Tolerance Tolerated well 11/11/2019  3:00 PM   Dressing Status Clean;Dry; Intact 11/11/2019  3:00 PM       Wound 11/05/19 Incision Foot Bilateral (Active)   Wound Description Beefy red;Fragile 11/11/2019  3:00 PM   [place]-wound Assessment Fragile 11/11/2019  3:00 PM   Closure MISHA 11/9/2019  1:00 AM   Drainage Amount Scant 11/11/2019  3:00 PM   Treatments Cleansed;Site care;Elevated 11/11/2019  3:00 PM   Dressing Xeroform;Dry dressing 11/11/2019  3:00 PM   Dressing Changed Changed 11/11/2019  3:00 PM   Patient Tolerance Tolerated well 11/11/2019  3:00 PM   Dressing Status Clean;Dry; Intact 11/11/2019  3:00 PM       Wound 11/05/19 Incision Leg Bilateral (Active)   Wound Description Beefy red;Fragile 11/11/2019  3:00 PM   [place]-wound Assessment Fragile 11/11/2019  3:00 PM   Closure MISHA 11/7/2019  8:30 AM   Drainage Amount None 11/11/2019  3:00 PM   Treatments Irrigation with NSS 11/11/2019  3:00 PM   Dressing Xeroform;Dry dressing 11/11/2019  3:00 PM   Dressing Changed Changed 11/11/2019  3:00 PM   Patient Tolerance Tolerated well 11/11/2019  3:00 PM   Dressing Status Clean;Dry; Intact 11/11/2019  3:00 PM       Wound 11/07/19 Incision Leg Bilateral (Active)   Wound Description Beefy red;Fragile 11/11/2019  3:00 PM   [place]-wound Assessment Fragile 11/11/2019  3:00 PM   Closure MISHA 11/9/2019  1:00 AM   Drainage Amount Scant 11/11/2019  3:00 PM   Treatments Cleansed;Site care;Elevated 11/11/2019  3:00 PM   Dressing Xeroform;Dry dressing 11/11/2019  3:00 PM   Dressing Changed Changed 11/11/2019  3:00 PM   Dressing Status Clean;Dry; Intact 11/11/2019  3:00 PM       Wound 11/07/19 Incision Thigh Bilateral (Active)   Wound Description Pale 11/11/2019 12:00 AM   [place]-wound Assessment Fragile 11/9/2019 11:00 AM   Closure MISHA 11/11/2019  3:00 PM   Drainage Amount Copious 11/11/2019  3:00 PM   Drainage Description Serous 11/11/2019  3:00 PM   Treatments Site care 11/11/2019  3:00 PM   Dressing Xeroform;Dry dressing 11/11/2019 12:00 AM   Dressing Changed Reinforced 11/11/2019  3:00 PM   Patient Tolerance Tolerated well 11/11/2019  3:00 PM   Dressing Status Clean; Intact; Reinforced;Leaking (Comment) 11/11/2019  3:00 PM

## 2019-11-11 NOTE — PROGRESS NOTES
NEPHROLOGY PROGRESS NOTE   Luisa Yates 64 y o  male MRN: [id number]  Unit/Bed#: S -01 Encounter: [id number]  Reason for Consult: ESRD    ASSESSMENT/PLAN:  1  ESRD on HD ([facility]uel Rubio on M-T-Th-F)  - inpatient schedule is MWF x3 hours  - can add Saturday treatments as needed  - for HD today, patient wants weight to be challenged  - EDW is 83kg, consider decreasing EDW to 82kg if BP tolerates  2  Access- right IJ perm cath  3  Hyperkalemia- low K diet, 2K potassium bath with HD  4  Hypotension- receives midodrine 5mg prior to HD as needed  5  Volume- he takes torsemide 40mg twice a day   6  Anemia- continue epogen 03071 units with HD (3x/week)  7  Hyperphosphatemia- continue phoslo and sevelamer  8  LE wounds- per surgery    Disposition:  HD today, challenge EDW as tolerated    SUBJECTIVE:  Patient upset with dietary restrictions and that he is not able to have ketchup due to low K diet  Patient feels he is gaining fluid weight and not body weight as he is unable to eat due to constipation      OBJECTIVE:  Current Weight: Weight - Scale: 82 3 kg (181 lb 7 oz)  Vitals:    11/10/19 1458 11/10/19 2104 11/10/19 2231 11/11/19 0700   BP: 128/70 112/72 113/66 131/71   BP Location: Left arm  Left arm Left arm   Pulse: 89 88 90 78   Resp: 18  18 18   Temp: 98 7 °F (37 1 °C)  98 9 °F (37 2 °C) 98 2 °F (36 8 °C)   TempSrc: Oral  Oral Oral   SpO2: 96%  93% 99%   Weight:    82 3 kg (181 lb 7 oz)   Height:         No intake or output data in the 24 hours ending 11/11/19 0951  General: NAD  Skin: LE lesions wrapped  Eyes: anicteric  ENMT: mm moist  Neck: no masses  Respiratory: slightly coarse  Cardiac: RRR  Extremities: LE wrapped  GI: soft nt nd  Neuro: alert awake  Psych: mood and affect appropriate    Medications:    Current Facility-Administered Medications:     acetaminophen (TYLENOL) tablet 650 mg, 650 mg, Oral, Q6H PRN, Cherrie Bravo MD, 650 mg at 11/08/19 1321    acetaminophen (TYLENOL) tablet 650 mg, 650 mg, Oral, Q6H [facility], James Yoder MD, 650 mg at 11/11/19 0616    b complex-vitamin C-folic acid (NEPHROCAPS) capsule 1 capsule, 1 capsule, Oral, Daily With Dinner, James Yoder MD, 1 capsule at 11/10/19 1712    bisacodyl (DULCOLAX) rectal suppository 10 mg, 10 mg, Rectal, Daily PRN, Mindy Gross MD, 10 mg at 11/10/19 2104    calcium acetate (PHOSLO) capsule 1,334 mg, 1,334 mg, Oral, TID With Meals, James Yoder MD, 1,334 mg at 11/11/19 0805    calcium carbonate (TUMS) chewable tablet 1,000 mg, 1,000 mg, Oral, TID PRN, Mindy Gross MD    collagenase (SANTYL) ointment, , Topical, Daily, James Yoder MD    docusate sodium (COLACE) capsule 100 mg, 100 mg, Oral, BID, James Yoder MD, 100 mg at 11/11/19 0811    epoetin bob (EPOGEN,PROCRIT) injection 10,000 Units, 10,000 Units, Intravenous, Once per day on Mon Wed Fri, James Yoder MD, 10,000 Units at 11/08/19 1613    gabapentin (NEURONTIN) capsule 100 mg, 100 mg, Oral, Daily, James Yoder MD, 100 mg at 11/11/19 0806    heparin (porcine) subcutaneous injection 5,000 Units, 5,000 Units, Subcutaneous, Q8H [facility], 5,000 Units at 11/11/19 0616 **AND** [COMPLETED] Platelet count, , , Once, Jesse Chun PA-C    HYDROmorphone (DILAUDID) injection 1 mg, 1 mg, Intravenous, Q4H PRN, James Yoder MD, 1 mg at 11/07/19 1805    metoprolol tartrate (LOPRESSOR) tablet 25 mg, 25 mg, Oral, Q12H [facility], Tyrone Box MD, 25 mg at 11/11/19 0805    midodrine (PROAMATINE) tablet 5 mg, 5 mg, Oral, Before Dialysis, Paty Valverde MD, 5 mg at 11/08/19 1451    ondansetron (ZOFRAN) injection 4 mg, 4 mg, Intravenous, Q6H PRN, James Yoder MD    oxyCODONE (ROXICODONE) IR tablet 10 mg, 10 mg, Oral, Q4H PRN, James Yoder MD, 10 mg at 11/10/19 2104    oxyCODONE (ROXICODONE) IR tablet 5 mg, 5 mg, Oral, Q4H PRN, James Yoder MD, 5 mg at 11/11/19 0617    pantoprazole (PROTONIX) EC tablet 40 mg, 40 mg, Oral, Early Morning, James Yoder MD, 40 mg at 11/11/19 0616    saliva substitute (MOUTH KOTE) mucosal solution 5 spray, 5 spray, Mouth/Throat, PRN, Zeenat Charles MD, 5 spray at 11/09/19 1517    senna (SENOKOT) tablet 17 2 mg, 2 tablet, Oral, BID, Zeenat Charles MD, 17 2 mg at 11/11/19 0811    sevelamer (RENAGEL) tablet 800 mg, 800 mg, Oral, TID With Meals, Courtney Durham MD, 800 mg at 11/11/19 0806    torsemide (DEMADEX) tablet 40 mg, 40 mg, Oral, BID (diuretic), Courtney Durham MD, 40 mg at 11/11/19 0806    Laboratory Results:  Results from last 7 days   Lab Units 11/11/19  0341 11/10/19  0837 11/09/19  1515 11/08/19  0452 11/07/19  0546 11/06/19  0506 11/05/19  0250   WBC Thousand/uL 8 21 7 74  --  7 42 7 36 8 70 8 61   HEMOGLOBIN g/dL 9 4* 9 6*  --  8 7* 8 0* 7 6* 9 8*   HEMATOCRIT % 30 9* 31 6*  --  28 0* 26 6* 25 1* 32 1*   PLATELETS Thousands/uL 202 186  --  170 195 175 206   POTASSIUM mmol/L 5 6* 5 4* 5 7* 6 1* 5 0 4 8  --    CHLORIDE mmol/L 99* 97* 104 101 100 100  --    CO2 mmol/L 29 30 29 26 26 28  --    BUN mg/dL 43* 36* 32* 43* 36* 28*  --    CREATININE mg/dL 8 08* 7 22* 6 50* 7 27* 6 27* 5 13*  --    CALCIUM mg/dL 9 4 9 7 9 4 9 0 9 0 8 7  --

## 2019-11-12 ENCOUNTER — ANESTHESIA (INPATIENT)
Dept: PERIOP | Facility: HOSPITAL | Age: 59
DRG: 576 | End: 2019-11-12
Payer: MEDICARE

## 2019-11-12 PROBLEM — L03.90 CELLULITIS: Status: ACTIVE | Noted: 2019-10-26

## 2019-11-12 PROBLEM — R13.12 OROPHARYNGEAL DYSPHAGIA: Status: ACTIVE | Noted: 2019-11-12

## 2019-11-12 LAB
ABO GROUP BLD: NORMAL
ANION GAP SERPL CALCULATED.3IONS-SCNC: 7 MMOL/L (ref 4–13)
BLD GP AB SCN SERPL QL: NEGATIVE
BUN SERPL-MCNC: 27 MG/DL (ref 5–25)
CALCIUM SERPL-MCNC: 8.8 MG/DL (ref 8.3–10.1)
CHLORIDE SERPL-SCNC: 98 MMOL/L (ref 100–108)
CO2 SERPL-SCNC: 31 MMOL/L (ref 21–32)
CREAT SERPL-MCNC: 5.84 MG/DL (ref 0.6–1.3)
GFR SERPL CREATININE-BSD FRML MDRD: 10 ML/MIN/1.73SQ M
GLUCOSE SERPL-MCNC: 97 MG/DL (ref 65–140)
PHOSPHATE SERPL-MCNC: 4.4 MG/DL (ref 2.7–4.5)
POTASSIUM SERPL-SCNC: 4.6 MMOL/L (ref 3.5–5.3)
RH BLD: POSITIVE
SODIUM SERPL-SCNC: 136 MMOL/L (ref 136–145)
SPECIMEN EXPIRATION DATE: NORMAL

## 2019-11-12 PROCEDURE — 84100 ASSAY OF PHOSPHORUS: CPT | Performed by: HOSPITALIST

## 2019-11-12 PROCEDURE — 99232 SBSQ HOSP IP/OBS MODERATE 35: CPT | Performed by: INTERNAL MEDICINE

## 2019-11-12 PROCEDURE — 15100 SPLT AGRFT T/A/L 1ST 100SQCM: CPT | Performed by: PLASTIC SURGERY

## 2019-11-12 PROCEDURE — 0KBT0ZZ EXCISION OF LEFT LOWER LEG MUSCLE, OPEN APPROACH: ICD-10-PCS | Performed by: PLASTIC SURGERY

## 2019-11-12 PROCEDURE — 86923 COMPATIBILITY TEST ELECTRIC: CPT

## 2019-11-12 PROCEDURE — 15004 WOUND PREP F/N/HF/G: CPT | Performed by: PLASTIC SURGERY

## 2019-11-12 PROCEDURE — 15101 SPLT AGRFT T/A/L EA ADDL 100: CPT | Performed by: PLASTIC SURGERY

## 2019-11-12 PROCEDURE — 86850 RBC ANTIBODY SCREEN: CPT | Performed by: STUDENT IN AN ORGANIZED HEALTH CARE EDUCATION/TRAINING PROGRAM

## 2019-11-12 PROCEDURE — 15121 SPLT AGRFT F/S/N/H/F/G/M EA: CPT | Performed by: PLASTIC SURGERY

## 2019-11-12 PROCEDURE — 80048 BASIC METABOLIC PNL TOTAL CA: CPT | Performed by: HOSPITALIST

## 2019-11-12 PROCEDURE — 15002 WOUND PREP TRK/ARM/LEG: CPT | Performed by: PLASTIC SURGERY

## 2019-11-12 PROCEDURE — 86900 BLOOD TYPING SEROLOGIC ABO: CPT | Performed by: STUDENT IN AN ORGANIZED HEALTH CARE EDUCATION/TRAINING PROGRAM

## 2019-11-12 PROCEDURE — 0HRNX74 REPLACEMENT OF LEFT FOOT SKIN WITH AUTOLOGOUS TISSUE SUBSTITUTE, PARTIAL THICKNESS, EXTERNAL APPROACH: ICD-10-PCS | Performed by: PLASTIC SURGERY

## 2019-11-12 PROCEDURE — 15120 SPLT AGRFT F/S/N/H/F/G/M 1ST: CPT | Performed by: PLASTIC SURGERY

## 2019-11-12 PROCEDURE — 15005 WND PREP F/N/HF/G ADDL CM: CPT | Performed by: PLASTIC SURGERY

## 2019-11-12 PROCEDURE — 0HRKX74 REPLACEMENT OF RIGHT LOWER LEG SKIN WITH AUTOLOGOUS TISSUE SUBSTITUTE, PARTIAL THICKNESS, EXTERNAL APPROACH: ICD-10-PCS | Performed by: PLASTIC SURGERY

## 2019-11-12 PROCEDURE — 15003 WOUND PREP ADDL 100 CM: CPT | Performed by: PLASTIC SURGERY

## 2019-11-12 PROCEDURE — 0HRLX74 REPLACEMENT OF LEFT LOWER LEG SKIN WITH AUTOLOGOUS TISSUE SUBSTITUTE, PARTIAL THICKNESS, EXTERNAL APPROACH: ICD-10-PCS | Performed by: PLASTIC SURGERY

## 2019-11-12 PROCEDURE — 86901 BLOOD TYPING SEROLOGIC RH(D): CPT | Performed by: STUDENT IN AN ORGANIZED HEALTH CARE EDUCATION/TRAINING PROGRAM

## 2019-11-12 RX ORDER — ONDANSETRON 2 MG/ML
4 INJECTION INTRAMUSCULAR; INTRAVENOUS ONCE AS NEEDED
Status: DISCONTINUED | OUTPATIENT
Start: 2019-11-12 | End: 2019-11-12 | Stop reason: HOSPADM

## 2019-11-12 RX ORDER — SODIUM CHLORIDE, SODIUM LACTATE, POTASSIUM CHLORIDE, CALCIUM CHLORIDE 600; 310; 30; 20 MG/100ML; MG/100ML; MG/100ML; MG/100ML
INJECTION, SOLUTION INTRAVENOUS CONTINUOUS PRN
Status: DISCONTINUED | OUTPATIENT
Start: 2019-11-12 | End: 2019-11-12 | Stop reason: SURG

## 2019-11-12 RX ORDER — ONDANSETRON 2 MG/ML
INJECTION INTRAMUSCULAR; INTRAVENOUS AS NEEDED
Status: DISCONTINUED | OUTPATIENT
Start: 2019-11-12 | End: 2019-11-12 | Stop reason: SURG

## 2019-11-12 RX ORDER — AMOXICILLIN 250 MG
2 CAPSULE ORAL 2 TIMES DAILY
Status: DISCONTINUED | OUTPATIENT
Start: 2019-11-12 | End: 2019-11-21 | Stop reason: HOSPADM

## 2019-11-12 RX ORDER — FENTANYL CITRATE 50 UG/ML
INJECTION, SOLUTION INTRAMUSCULAR; INTRAVENOUS AS NEEDED
Status: DISCONTINUED | OUTPATIENT
Start: 2019-11-12 | End: 2019-11-12 | Stop reason: SURG

## 2019-11-12 RX ORDER — SODIUM CHLORIDE 9 MG/ML
INJECTION, SOLUTION INTRAVENOUS CONTINUOUS PRN
Status: DISCONTINUED | OUTPATIENT
Start: 2019-11-12 | End: 2019-11-12 | Stop reason: SURG

## 2019-11-12 RX ORDER — HYDROMORPHONE HCL/PF 1 MG/ML
0.2 SYRINGE (ML) INJECTION
Status: DISCONTINUED | OUTPATIENT
Start: 2019-11-12 | End: 2019-11-12 | Stop reason: HOSPADM

## 2019-11-12 RX ORDER — POLYETHYLENE GLYCOL 3350 17 G/17G
17 POWDER, FOR SOLUTION ORAL DAILY
Status: DISCONTINUED | OUTPATIENT
Start: 2019-11-13 | End: 2019-11-21 | Stop reason: HOSPADM

## 2019-11-12 RX ORDER — MINERAL OIL
OIL (ML) MISCELLANEOUS AS NEEDED
Status: DISCONTINUED | OUTPATIENT
Start: 2019-11-12 | End: 2019-11-12 | Stop reason: HOSPADM

## 2019-11-12 RX ORDER — FENTANYL CITRATE/PF 50 MCG/ML
25 SYRINGE (ML) INJECTION
Status: DISCONTINUED | OUTPATIENT
Start: 2019-11-12 | End: 2019-11-12 | Stop reason: HOSPADM

## 2019-11-12 RX ORDER — LIDOCAINE HYDROCHLORIDE 10 MG/ML
INJECTION, SOLUTION INFILTRATION; PERINEURAL AS NEEDED
Status: DISCONTINUED | OUTPATIENT
Start: 2019-11-12 | End: 2019-11-12 | Stop reason: SURG

## 2019-11-12 RX ORDER — PROPOFOL 10 MG/ML
INJECTION, EMULSION INTRAVENOUS AS NEEDED
Status: DISCONTINUED | OUTPATIENT
Start: 2019-11-12 | End: 2019-11-12 | Stop reason: SURG

## 2019-11-12 RX ORDER — PANTOPRAZOLE SODIUM 40 MG/1
40 TABLET, DELAYED RELEASE ORAL
Status: DISCONTINUED | OUTPATIENT
Start: 2019-11-13 | End: 2019-11-12

## 2019-11-12 RX ADMIN — FENTANYL CITRATE 50 MCG: 50 INJECTION INTRAMUSCULAR; INTRAVENOUS at 13:55

## 2019-11-12 RX ADMIN — FENTANYL CITRATE 25 MCG: 50 INJECTION, SOLUTION INTRAMUSCULAR; INTRAVENOUS at 16:53

## 2019-11-12 RX ADMIN — HEPARIN SODIUM 5000 UNITS: 5000 INJECTION INTRAVENOUS; SUBCUTANEOUS at 21:44

## 2019-11-12 RX ADMIN — FENTANYL CITRATE 50 MCG: 50 INJECTION INTRAMUSCULAR; INTRAVENOUS at 14:38

## 2019-11-12 RX ADMIN — Medication 250 MG: at 08:40

## 2019-11-12 RX ADMIN — FENTANYL CITRATE 25 MCG: 50 INJECTION, SOLUTION INTRAMUSCULAR; INTRAVENOUS at 17:03

## 2019-11-12 RX ADMIN — PROPOFOL 120 MG: 10 INJECTION, EMULSION INTRAVENOUS at 13:55

## 2019-11-12 RX ADMIN — SENNOSIDES AND DOCUSATE SODIUM 2 TABLET: 8.6; 5 TABLET ORAL at 18:37

## 2019-11-12 RX ADMIN — FENTANYL CITRATE 25 MCG: 50 INJECTION, SOLUTION INTRAMUSCULAR; INTRAVENOUS at 16:57

## 2019-11-12 RX ADMIN — PHENYLEPHRINE HYDROCHLORIDE 400 MCG: 10 INJECTION INTRAVENOUS at 13:55

## 2019-11-12 RX ADMIN — Medication 250 MG: at 18:37

## 2019-11-12 RX ADMIN — SODIUM CHLORIDE: 0.9 INJECTION, SOLUTION INTRAVENOUS at 14:03

## 2019-11-12 RX ADMIN — SENNOSIDES 17.2 MG: 8.6 TABLET, FILM COATED ORAL at 08:40

## 2019-11-12 RX ADMIN — HYDROMORPHONE HYDROCHLORIDE 0.2 MG: 1 INJECTION, SOLUTION INTRAMUSCULAR; INTRAVENOUS; SUBCUTANEOUS at 17:30

## 2019-11-12 RX ADMIN — OXYCODONE HYDROCHLORIDE 10 MG: 10 TABLET ORAL at 18:37

## 2019-11-12 RX ADMIN — GABAPENTIN 100 MG: 100 CAPSULE ORAL at 08:40

## 2019-11-12 RX ADMIN — PANTOPRAZOLE SODIUM 40 MG: 40 TABLET, DELAYED RELEASE ORAL at 05:52

## 2019-11-12 RX ADMIN — SODIUM CHLORIDE, SODIUM LACTATE, POTASSIUM CHLORIDE, AND CALCIUM CHLORIDE: .6; .31; .03; .02 INJECTION, SOLUTION INTRAVENOUS at 13:42

## 2019-11-12 RX ADMIN — LIDOCAINE HYDROCHLORIDE 50 MG: 10 INJECTION, SOLUTION INFILTRATION; PERINEURAL at 13:55

## 2019-11-12 RX ADMIN — ONDANSETRON 4 MG: 2 INJECTION INTRAMUSCULAR; INTRAVENOUS at 15:09

## 2019-11-12 RX ADMIN — ACETAMINOPHEN 650 MG: 325 TABLET, FILM COATED ORAL at 23:03

## 2019-11-12 RX ADMIN — ACETAMINOPHEN 650 MG: 325 TABLET, FILM COATED ORAL at 05:52

## 2019-11-12 RX ADMIN — METOPROLOL TARTRATE 25 MG: 25 TABLET, FILM COATED ORAL at 08:40

## 2019-11-12 RX ADMIN — FENTANYL CITRATE 25 MCG: 50 INJECTION, SOLUTION INTRAMUSCULAR; INTRAVENOUS at 17:07

## 2019-11-12 RX ADMIN — OXYCODONE HYDROCHLORIDE 10 MG: 10 TABLET ORAL at 10:05

## 2019-11-12 RX ADMIN — DOCUSATE SODIUM 100 MG: 100 CAPSULE, LIQUID FILLED ORAL at 08:40

## 2019-11-12 RX ADMIN — PHENYLEPHRINE HYDROCHLORIDE 100 MCG/MIN: 10 INJECTION INTRAVENOUS at 14:00

## 2019-11-12 RX ADMIN — SUGAMMADEX 165 MG: 100 INJECTION, SOLUTION INTRAVENOUS at 15:54

## 2019-11-12 RX ADMIN — TORSEMIDE 40 MG: 20 TABLET ORAL at 08:40

## 2019-11-12 NOTE — ASSESSMENT & PLAN NOTE
· Changed to senna/docusate b i d  · Change MiraLax to at daily scheduled dosing  · Consider Relistor if no bowel movement  · Additional adjustments as needed  · Monitor for BMs

## 2019-11-12 NOTE — SOCIAL WORK
Patient remains not medically stable for discharge at this time  Patient was schedule to return to the OR today with plastic surgery for washout with additional STSG  Per discussion with SLIM physician CM reached out to [facility] the STR where patient will be going at discharge to inquire if patient was on a speciality mattress and if not if one can be ordered upon his return  CM department will continue to follow to assist with discharge coordination needs

## 2019-11-12 NOTE — SPEECH THERAPY NOTE
Speech Language/Pathology    Speech/Language Pathology Cancel Note    Patient Name: Shell Manzano  MKRKK'O Date: 11/12/2019       Chart reviewed  Pt is NPO for OR procedure today  ST will cont to follow for ongoing diagnostic dysphagia tx as medically appropriate         Mahsa Keenan, CF-SLP

## 2019-11-12 NOTE — ANESTHESIA POSTPROCEDURE EVALUATION
Post-Op Assessment Note    CV Status:  Stable  Pain Score: 0    Pain management: adequate     Mental Status:  Awake and sleepy   Hydration Status:  Euvolemic and stable   PONV Controlled:  Controlled   Airway Patency:  Patent   Post Op Vitals Reviewed: Yes      Staff: Anesthesiologist           /70 (11/12/19 1629)    Temp 97 7 °F (36 5 °C) (11/12/19 1629)    Pulse 87 (11/12/19 1629)   Resp 18 (11/12/19 1629)    SpO2 100 % (11/12/19 1629)

## 2019-11-12 NOTE — ASSESSMENT & PLAN NOTE
· Local wound care  · Bed / mattress changed today to help with wounds  · Turn / Reposition frequently

## 2019-11-12 NOTE — ANESTHESIA PREPROCEDURE EVALUATION
Review of Systems/Medical History  Patient summary reviewed  Chart reviewed  No history of anesthetic complications     Cardiovascular  EKG reviewed, Hypertension , No past MI , Dysrhythmias , atrial fibrillation, No angina , CHF (TTE 9/2019 - LVEF 20% in setting of sepsis; per primary team function is likely improved since then) ,    Pulmonary  Negative pulmonary ROS Not a smoker ,        GI/Hepatic    GERD , Liver disease (polycystic liver disease) ,        Kidney disease (polycystic kidney disease) ESRD, Dialysis hemodialysis Date of last dialysis: 11/11/19,        Endo/Other  Negative endo/other ROS      GYN       Hematology  Anemia anemia of chronic disease,     Musculoskeletal    Comment: Bilateral lower extremity chronic non-healing wounds      Neurology  Negative neurology ROS      Psychology       EKG 10/26/19:  Sinus tachycardia, rate 105  Nonspecific T wave abnormality    TTE 9/23/19:  LEFT VENTRICLE:  Systolic function was markedly reduced  Ejection fraction was estimated to be 20 %  There was severe diffuse hypokinesis with regional variations  Left ventricular diastolic function parameters were abnormal      RIGHT VENTRICLE:  The ventricle was mildly to moderately dilated  Systolic function was moderately reduced  MITRAL VALVE:  There was mild regurgitation      TRICUSPID VALVE:  There was moderate regurgitation  Estimated peak PA pressure was 46 mmHg  The findings suggest mild pulmonary hypertension      PULMONIC VALVE:  There was mild regurgitation      Lab Results   Component Value Date    WBC 8 21 11/11/2019    HGB 9 4 (L) 11/11/2019    HCT 30 9 (L) 11/11/2019     (H) 11/11/2019     11/11/2019     Lab Results   Component Value Date    SODIUM 136 11/12/2019    K 4 6 11/12/2019    CL 98 (L) 11/12/2019    CO2 31 11/12/2019    BUN 27 (H) 11/12/2019    CREATININE 5 84 (H) 11/12/2019    GLUC 97 11/12/2019    CALCIUM 8 8 11/12/2019     Lab Results   Component Value Date    INR 1 08 10/26/2019    INR 1 34 (H) 10/06/2019    INR 1 42 (H) 10/03/2019    PROTIME 13 4 10/26/2019    PROTIME 15 9 (H) 10/06/2019    PROTIME 16 6 (H) 10/03/2019             Physical Exam    Airway    Mallampati score: II  TM Distance: >3 FB  Neck ROM: full     Dental       Cardiovascular  Cardiovascular exam normal    Pulmonary  Pulmonary exam normal     Other Findings        Anesthesia Plan  ASA Score- 3     Anesthesia Type- general with ASA Monitors  Additional Monitors:   Airway Plan: LMA  Plan Factors-    Induction- intravenous  Postoperative Plan- Plan for postoperative opioid use   Planned trial extubation    Informed Consent-

## 2019-11-12 NOTE — PROGRESS NOTES
NEPHROLOGY PROGRESS NOTE   Romy Esteban 64 y o  male MRN: [id number]  Unit/Bed#: S -01 Encounter: [id number]  Reason for Consult: ESRD    ASSESSMENT/PLAN:  1  ESRD on HD ([facility] on M-T-Th-F)  - inpatient schedule is MWF x3 hours  - can add Saturday treatments as needed  - patient wants weight to be challenged as he feels he is not eating much and losing body weight  - decrease EDW to 82kg and consider decreasing further as tolerated  - AVOID IVF PLEASE  2  Access- right IJ perm cath  3  Hyperkalemia- low K diet, 2K potassium bath with HD  - avoid lactated ringers IVF  4  Hypotension- receives midodrine 5mg prior to HD as needed  5  Volume- he takes torsemide 40mg twice a day  6  Anemia- continue epogen 91964 units with HD (3x/week)  7  Hyperphosphatemia- continue phoslo and sevelamer  8  LE wounds- per surgery    Disposition:  PLEASE AVOID IVF (especially lactated ringers with potassium component)  Next dialysis is Wednesday    SUBJECTIVE:  Patient feeling good after dialysis yesterday  He is glad we were able to decrease his dry weight  Discussed dialysis schedule with him inpatient and outpatient  He denies acute shortness of breath      OBJECTIVE:  Current Weight: Weight - Scale: 82 3 kg (181 lb 7 oz)  Vitals:    11/11/19 2109 11/11/19 2232 11/12/19 0300 11/12/19 0805   BP: 104/67 103/65 102/60 117/73   BP Location: Left arm Left arm  Left arm   Pulse: 96 84  84   Resp: 18 18  18   Temp: 99 2 °F (37 3 °C) 98 6 °F (37 °C)  98 2 °F (36 8 °C)   TempSrc: Oral Oral  Oral   SpO2: 96% 94% 96% 95%   Weight:       Height:           Intake/Output Summary (Last 24 hours) at 11/12/2019 1017  Last data filed at 11/11/2019 1900  Gross per 24 hour   Intake 700 ml   Output 1000 ml   Net -300 ml     General: NAD  Skin: no rash  Eyes: anicteric  ENMT: mm moist  Neck: no masses  Respiratory: CTAB  Cardiac: RRR  Extremities: LE wrapped  GI: soft nt distended  Neuro: alert awake  Psych: mood and affect appropriate    Medications:    Current Facility-Administered Medications:     acetaminophen (TYLENOL) tablet 650 mg, 650 mg, Oral, Q6H PRN, Denis Jerry MD, 650 mg at 11/08/19 1321    acetaminophen (TYLENOL) tablet 650 mg, 650 mg, Oral, Q6H [address], Denis Jerry MD, 650 mg at 11/12/19 0552    acetaminophen (TYLENOL) tablet 975 mg, 975 mg, Oral, Once, Codie Dewitt MD, Stopped at 11/11/19 2018    aluminum-magnesium hydroxide-simethicone (MYLANTA) 200-200-20 mg/5 mL oral suspension 15 mL, 15 mL, Oral, Q4H PRN, Marcos Julien MD    b complex-vitamin C-folic acid (NEPHROCAPS) capsule 1 capsule, 1 capsule, Oral, Daily With Dinner, Denis Jerry MD, 1 capsule at 11/11/19 1727    bisacodyl (DULCOLAX) rectal suppository 10 mg, 10 mg, Rectal, Daily PRN, Marcos Julien MD, 10 mg at 11/10/19 2104    calcium acetate (PHOSLO) capsule 1,334 mg, 1,334 mg, Oral, TID With Meals, Denis Jerry MD, 1,334 mg at 11/11/19 1741    calcium carbonate (TUMS) chewable tablet 1,000 mg, 1,000 mg, Oral, TID PRN, Marcos Julien MD, 1,000 mg at 11/11/19 1006    collagenase (SANTYL) ointment, , Topical, Daily, Denis Jerry MD    docusate sodium (COLACE) capsule 100 mg, 100 mg, Oral, BID, Denis Jerry MD, 100 mg at 11/12/19 0840    epoetin bob (EPOGEN,PROCRIT) injection 10,000 Units, 10,000 Units, Intravenous, Once per day on Mon Wed Fri, Denis Jerry MD, 10,000 Units at 11/11/19 1500    gabapentin (NEURONTIN) capsule 100 mg, 100 mg, Oral, Daily, Denis Jerry MD, 100 mg at 11/12/19 0840    gabapentin (NEURONTIN) capsule 300 mg, 300 mg, Oral, Once, Codie Dewitt MD, Stopped at 11/11/19 2018    heparin (porcine) subcutaneous injection 5,000 Units, 5,000 Units, Subcutaneous, Q8H [address], 5,000 Units at 11/11/19 2143 **AND** [COMPLETED] Platelet count, , , Once, Jesse Chun PA-C    HYDROmorphone (DILAUDID) injection 1 mg, 1 mg, Intravenous, Q4H PRN, Denis Jerry MD, 1 mg at 11/07/19 1805    lidocaine (PF) (XYLOCAINE-MPF) 1 % 50 mL, EPINEPHrine PF (ADRENALIN) 1 mL in lactated ringers 1,000 mL irrigation, , Irrigation, Once, Verenice Leroy MD    metoprolol tartrate (LOPRESSOR) tablet 25 mg, 25 mg, Oral, Q12H [facility], [place] MD, 25 mg at 11/12/19 0840    midodrine (PROAMATINE) tablet 5 mg, 5 mg, Oral, Before Dialysis, Wilfrido Polk MD, 5 mg at 11/08/19 1451    ondansetron (ZOFRAN) injection 4 mg, 4 mg, Intravenous, Q6H PRN, [place] MD    oxyCODONE (ROXICODONE) IR tablet 10 mg, 10 mg, Oral, Q4H PRN, [place] MD, 10 mg at 11/12/19 1005    oxyCODONE (ROXICODONE) IR tablet 5 mg, 5 mg, Oral, Q4H PRN, [place] MD, 5 mg at 11/11/19 0617    pantoprazole (PROTONIX) EC tablet 40 mg, 40 mg, Oral, Early Morning, [place] MD, 40 mg at 11/12/19 9745    saccharomyces boulardii (FLORASTOR) capsule 250 mg, 250 mg, Oral, BID, Damaris S Na, CRNP, 250 mg at 11/12/19 0840    saliva substitute (MOUTH KOTE) mucosal solution 5 spray, 5 spray, Mouth/Throat, PRN, Irma Wang MD, 5 spray at 11/09/19 1517    senna (SENOKOT) tablet 17 2 mg, 2 tablet, Oral, BID, Irma Wang MD, 17 2 mg at 11/12/19 0840    sevelamer (RENAGEL) tablet 800 mg, 800 mg, Oral, TID With Meals, [place] MD, 800 mg at 11/11/19 1727    torsemide (DEMADEX) tablet 40 mg, 40 mg, Oral, BID (diuretic), [place] MD, 40 mg at 11/12/19 0840    Laboratory Results:  Results from last 7 days   Lab Units 11/12/19  0325 11/11/19  0341 11/10/19  0837 11/09/19  1515 11/08/19  0452 11/07/19  0546 11/06/19  0506   WBC Thousand/uL  --  8 21 7 74  --  7 42 7 36 8 70   HEMOGLOBIN g/dL  --  9 4* 9 6*  --  8 7* 8 0* 7 6*   HEMATOCRIT %  --  30 9* 31 6*  --  28 0* 26 6* 25 1*   PLATELETS Thousands/uL  --  202 186  --  170 195 175   POTASSIUM mmol/L 4 6 5 6* 5 4* 5 7* 6 1* 5 0 4 8   CHLORIDE mmol/L 98* 99* 97* 104 101 100 100   CO2 mmol/L 31 29 30 29 26 26 28   BUN mg/dL 27* 43* 36* 32* 43* 36* 28*   CREATININE mg/dL 5 84* 8 08* 7 22* 6 50* 7 27* 6 27* 5 13*   CALCIUM mg/dL 8 8 9 4 9 7 9 4 9 0 9 0 8 7   PHOSPHORUS mg/dL 4 4  --   --   --   --   --   --

## 2019-11-12 NOTE — ASSESSMENT & PLAN NOTE
· Procedure - Debridement, removal of Eschar on 10/31/2019 by Dr Dionicio Barraza  · Podiatry consulted plastic surgery for possible graft vs  Flap procedure  · Procedure - STSG on 11/7 per plastic surgery   · Procedure - washout with additional STSG by plastic surgery team today  · Monitor for Infection -   · Antibiotic - completed 7 days of vanc and cefepime  Now monitoring off antibiotics  · Blood cultures from 10/31/2019: NGTD  · Monitor temperatures and serial leg exams  · Pain Control -   · Oxycodone 5/10 mg  · Breakthrough pain - Dilaudid 1 mg IV  · Monitor pain levels    · Supportive care

## 2019-11-12 NOTE — PLAN OF CARE
Problem: Prexisting or High Potential for Compromised Skin Integrity  Goal: Skin integrity is maintained or improved  Description  INTERVENTIONS:  - Identify patients at risk for skin breakdown  - Assess and monitor skin integrity  - Assess and monitor nutrition and hydration status  - Monitor labs   - Assess for incontinence   - Turn and reposition patient  - Assist with mobility/ambulation  - Relieve pressure over bony prominences  - Avoid friction and shearing  - Provide appropriate hygiene as needed including keeping skin clean and dry  - Evaluate need for skin moisturizer/barrier cream  - Collaborate with interdisciplinary team   - Patient/family teaching  - Consider wound care consult   Outcome: Progressing     Problem: Potential for Falls  Goal: Patient will remain free of falls  Description  INTERVENTIONS:  - Assess patient frequently for physical needs  -  Identify cognitive and physical deficits and behaviors that affect risk of falls    -  [place] fall precautions as indicated by assessment   - Educate patient/family on patient safety including physical limitations  - Instruct patient to call for assistance with activity based on assessment  - Modify environment to reduce risk of injury  - Consider OT/PT consult to assist with strengthening/mobility  Outcome: Progressing     Problem: INFECTION - ADULT  Goal: Absence or prevention of progression during hospitalization  Description  INTERVENTIONS:  - Assess and monitor for signs and symptoms of infection  - Monitor lab/diagnostic results  - Monitor all insertion sites, i e  indwelling lines, tubes, and drains  - Monitor endotracheal if appropriate and nasal secretions for changes in amount and color  - [place] appropriate cooling/warming therapies per order  - Administer medications as ordered  - Instruct and encourage patient and family to use good hand hygiene technique  - Identify and instruct in appropriate isolation precautions for identified infection/condition  Outcome: Progressing     Problem: METABOLIC, FLUID AND ELECTROLYTES - ADULT  Goal: Electrolytes maintained within normal limits  Description  INTERVENTIONS:  - Monitor labs and assess patient for signs and symptoms of electrolyte imbalances  - Administer electrolyte replacement as ordered  - Monitor response to electrolyte replacements, including repeat lab results as appropriate  - Instruct patient on fluid and nutrition as appropriate  Outcome: Progressing  Goal: Fluid balance maintained  Description  INTERVENTIONS:  - Monitor labs   - Monitor I/O and WT  - Instruct patient on fluid and nutrition as appropriate  - Assess for signs & symptoms of volume excess or deficit  Outcome: Progressing     Problem: SKIN/TISSUE INTEGRITY - ADULT  Goal: Skin integrity remains intact  Description  INTERVENTIONS  - Identify patients at risk for skin breakdown  - Assess and monitor skin integrity  - Assess and monitor nutrition and hydration status  - Monitor labs (i e  albumin)  - Assess for incontinence   - Turn and reposition patient  - Assist with mobility/ambulation  - Relieve pressure over bony prominences  - Avoid friction and shearing  - Provide appropriate hygiene as needed including keeping skin clean and dry  - Evaluate need for skin moisturizer/barrier cream  - Collaborate with interdisciplinary team (i e  Nutrition, Rehabilitation, etc )   - Patient/family teaching  Outcome: Progressing  Goal: Incision(s), wounds(s) or drain site(s) healing without S/S of infection  Description  INTERVENTIONS  - Assess and document risk factors for skin impairment   - Assess and document dressing, incision, wound bed, drain sites and surrounding tissue  - Consider nutrition services referral as needed  - Oral mucous membranes remain intact  - Provide patient/ family education  Outcome: Progressing  Goal: Oral mucous membranes remain intact  Description  INTERVENTIONS  - Assess oral mucosa and hygiene practices  - Implement preventative oral hygiene regimen  - Implement oral medicated treatments as ordered  - Initiate Nutrition services referral as needed  Outcome: Progressing     Problem: Nutrition/Hydration-ADULT  Goal: Nutrient/Hydration intake appropriate for improving, restoring or maintaining nutritional needs  Description  Monitor and assess patient's nutrition/hydration status for malnutrition  Collaborate with interdisciplinary team and initiate plan and interventions as ordered  Monitor patient's weight and dietary intake as ordered or per policy  Utilize nutrition screening tool and intervene as necessary  Determine patient's food preferences and provide high-protein, high-caloric foods as appropriate       INTERVENTIONS:  - Monitor oral intake, urinary output, labs, and treatment plans  - Assess nutrition and hydration status and recommend course of action  - Evaluate amount of meals eaten  - Assist patient with eating if necessary   - Allow adequate time for meals  - Recommend/ encourage appropriate diets, oral nutritional supplements, and vitamin/mineral supplements  - Order, calculate, and assess calorie counts as needed  - Recommend, monitor, and adjust tube feedings and TPN/PPN based on assessed needs  - Assess need for intravenous fluids  - Provide specific nutrition/hydration education as appropriate  - Include patient/family/caregiver in decisions related to nutrition  Outcome: Progressing

## 2019-11-12 NOTE — PROGRESS NOTES
Progress Note - Javier Martinez 1960, 64 y o  male MRN: [id number]    Unit/Bed#: S -01 Encounter: [id number]    Primary Care Provider: Alan De Anda   Date and time admitted to hospital: 10/26/2019  5:10 PM        * Multiple and open wound of lower limb  Assessment & Plan  · Procedure - Debridement, removal of Eschar on 10/31/2019 by Dr Fozia Peña  · Podiatry consulted plastic surgery for possible graft vs  Flap procedure  · Procedure - STSG on 11/7 per plastic surgery   · Procedure - washout with additional STSG by plastic surgery team today  · Monitor for Infection -   · Antibiotic - completed 7 days of vanc and cefepime  Now monitoring off antibiotics  · Blood cultures from 10/31/2019: NGTD  · Monitor temperatures and serial leg exams  · Pain Control -   · Oxycodone 5/10 mg  · Breakthrough pain - Dilaudid 1 mg IV  · Monitor pain levels  · Supportive care    Scrotal swelling  Assessment & Plan  · Ultrasound showed "Large complex right hydrocele containing numerous septations  Increased vascularity in the left testicle with respect to the right, although both testicles could not be imaged on the same plane limiting direct comparison   Correlate clinically for orchitis "  · Urology saw patient 10/29/2019 and recommends no surgical intervention acutely and to manage problems with legs and get therapy first   · beta HCG, AFP and LDH within normal limits  · Supportive care  Atrial fibrillation ([address] )  Assessment & Plan  · Rate / Rhythm control -   · Metoprolol increased to 25 mg bid on 10/29/2019  Not consistently getting medication due to blood pressure parameters  · Monitor on telemetry  Monitor HR and blood pressures  · Anticoagulation - None prehospital   Defer to outpatient team following discharge  ESRD (end stage renal disease) ([address] )  Assessment & Plan  · On routine dialysis  · Nephrology following      Anemia due to chronic kidney disease, on chronic dialysis and Acute Blood Loss Anemia (HCC)  Assessment & Plan  · There was a slight drop in hemoglobin during procedure  · Consider transfusion with dialysis prior to any additional procedure, if needed to get hemoglobin over 8   · Monitor counts - CBC monitoring  Pressure ulcer, right and left buttocks POA  Assessment & Plan  · Local wound care  · Bed / mattress changed today to help with wounds  · Turn / Reposition frequently  Oropharyngeal dysphagia  Assessment & Plan  · Dysphagia 3 diet with extra gravy / sauce  · Thin liquids ok  · Biotene / Mouth Kote  · Speech pathology following  Constipation  Assessment & Plan  · Changed to senna/docusate b i d  · Change MiraLax to at daily scheduled dosing  · Consider Relistor if no bowel movement  · Additional adjustments as needed  · Monitor for BMs  VTE Pharmacologic Prophylaxis:   Pharmacologic: Heparin  Mechanical VTE Prophylaxis in Place: No (ulcers / STSG and pain)    Patient Centered Rounds: I have performed bedside rounds with nursing staff today  Discussions with Specialists or Other Care Team Provider: None today  Education and Discussions with Family / Patient:  Patient only    Time Spent for Care: 30 minutes  More than 50% of total time spent on counseling and coordination of care as described above  Current Length of Stay: 17 day(s)  Current Patient Status: Inpatient     Certification Statement: The patient will continue to require additional inpatient hospital stay due to evaluation and treatment for above medical conditions  Discharge Plan / Estimated Discharge Date: to be determined once cleared by plastic surgery from surgical standpoint  Code Status: Level 1 - Full Code      Subjective: The patient feels that his lower extremity pain is controlled currently  The patient does complain mainly of some difficulty swallowing and states that the speech pathology had requested that he has more liquids with his food    The patient also complains of constipation stating that he has not had a bowel movement in 2 days and his belly is getting bigger to the point that it makes it more difficult to breathe  Objective:     Vitals:   Temp (24hrs), Av 7 °F (37 1 °C), Min:98 2 °F (36 8 °C), Max:99 2 °F (37 3 °C)    Temp:  [98 2 °F (36 8 °C)-99 2 °F (37 3 °C)] 98 2 °F (36 8 °C)  HR:  [83-98] 84  Resp:  [16-18] 18  BP: (102-137)/(60-93) 117/73  SpO2:  [94 %-96 %] 95 %  Body mass index is 27 59 kg/m²  Input and Output Summary (last 24 hours): Intake/Output Summary (Last 24 hours) at 2019 1042  Last data filed at 2019 1900  Gross per 24 hour   Intake 700 ml   Output 1000 ml   Net -300 ml       Physical Exam:     Physical Exam   Constitutional: He is oriented to person, place, and time  No distress  HENT:   Slightly dry oral mucosa  Eyes: Pupils are equal, round, and reactive to light  Cardiovascular: Normal rate and regular rhythm  No murmur heard  Pulmonary/Chest: Effort normal  He has no wheezes  He has no rales  Decreased slightly toward base  Abdominal: Soft  Bowel sounds are normal  He exhibits distension (with hyperresonance  )  There is no tenderness  Musculoskeletal:   Some degree of ulceration seen below the bandages at proximal toes on the right foot  Able to move toes and sensation intact to toes  Did not take down bandages at present time  Will be evaluated by surgery team in [address] [place] [address] today  Neurological: He is alert and oriented to person, place, and time  No cranial nerve deficit  Vitals reviewed      Additional Data:     Labs:    Results from last 7 days   Lab Units 19  0341   WBC Thousand/uL 8 21   HEMOGLOBIN g/dL 9 4*   HEMATOCRIT % 30 9*   PLATELETS Thousands/uL 202   NEUTROS PCT % 76*   LYMPHS PCT % 7*   MONOS PCT % 10   EOS PCT % 5     Results from last 7 days   Lab Units 19  0325   POTASSIUM mmol/L 4 6   CHLORIDE mmol/L 98*   CO2 mmol/L 31   BUN mg/dL 27*   CREATININE mg/dL 5 84* CALCIUM mg/dL 8 8           * I Have Reviewed All Lab Data Listed Above  * Additional Pertinent Lab Tests Reviewed: all labs since my last visit 1 week ago  Imaging:    Imaging Reports Reviewed Today Include: Re-reviewed all imaging    Imaging Personally Reviewed by Myself Includes:  None    Recent Cultures (last 7 days):           Last 24 Hours Medication List:     Current Facility-Administered Medications:  acetaminophen 650 mg Oral Q6H PRN Maya Mares MD   acetaminophen 650 mg Oral Q6H [facility] Maya Mares MD   acetaminophen 975 mg Oral Once Cruzito Kohli MD   aluminum-magnesium hydroxide-simethicone 15 mL Oral Q4H PRN Mahin Mendoza MD   b complex-vitamin C-folic acid 1 capsule Oral Daily With Brandon Harada, MD   bisacodyl 10 mg Rectal Daily PRN Mahin Mendoza MD   calcium acetate 1,334 mg Oral TID With Meals Maya Mares MD   calcium carbonate 1,000 mg Oral TID PRN Mahin Mendoza MD   collagenase  Topical Daily Era MD Vishnu   docusate sodium 100 mg Oral BID Maya Mares MD   epoetin bob 10,000 Units Intravenous Once per day on Mon Wed Fri Tyrone Box MD   gabapentin 100 mg Oral Daily Maya Mares MD   gabapentin 300 mg Oral Once Cruzito Kohli MD   heparin (porcine) 5,000 Units Subcutaneous Q8H [facility] Maya Mares MD   HYDROmorphone 1 mg Intravenous Q4H PRN Maya Mares MD   lidocaine 1% 50 mL and epinephrine 1:1000 1 mL in lactated ringers 1000 mL  Irrigation Once Cruzito Kohli MD   metoprolol tartrate 25 mg Oral Q12H [facility] Maya Mares MD   midodrine 5 mg Oral Before Dialysis Priya Barrios MD   ondansetron 4 mg Intravenous Q6H PRN Maya Mares MD   oxyCODONE 10 mg Oral Q4H PRN Maya Mares MD   oxyCODONE 5 mg Oral Q4H PRN Maya Mares MD   pantoprazole 40 mg Oral Early Morning Era MD Vishnu   saccharomyces boulardii 250 mg Oral BID Damaris S Na, CRNP   saliva substitute 5 spray Mouth/Throat PRN Mahin Mendoza MD   senna 2 tablet Oral BID Mahin Mendoza MD   sevelamer 800 mg Oral TID With Meals Asia Zavala MD   torsemide 40 mg Oral BID (diuretic) Asia Zavala MD        Today, Patient Was Seen By: Zenobia Trimble DO    ** Please Note: This note has been constructed using a voice recognition system   **

## 2019-11-13 ENCOUNTER — APPOINTMENT (INPATIENT)
Dept: DIALYSIS | Facility: HOSPITAL | Age: 59
DRG: 576 | End: 2019-11-13
Payer: MEDICARE

## 2019-11-13 LAB
ANION GAP SERPL CALCULATED.3IONS-SCNC: 12 MMOL/L (ref 4–13)
BASOPHILS # BLD AUTO: 0.07 THOUSANDS/ΜL (ref 0–0.1)
BASOPHILS NFR BLD AUTO: 1 % (ref 0–1)
BUN SERPL-MCNC: 34 MG/DL (ref 5–25)
CALCIUM SERPL-MCNC: 8.7 MG/DL (ref 8.3–10.1)
CHLORIDE SERPL-SCNC: 99 MMOL/L (ref 100–108)
CO2 SERPL-SCNC: 24 MMOL/L (ref 21–32)
CREAT SERPL-MCNC: 7.18 MG/DL (ref 0.6–1.3)
EOSINOPHIL # BLD AUTO: 0.35 THOUSAND/ΜL (ref 0–0.61)
EOSINOPHIL NFR BLD AUTO: 5 % (ref 0–6)
ERYTHROCYTE [DISTWIDTH] IN BLOOD BY AUTOMATED COUNT: 18.2 % (ref 11.6–15.1)
GFR SERPL CREATININE-BSD FRML MDRD: 8 ML/MIN/1.73SQ M
GLUCOSE SERPL-MCNC: 96 MG/DL (ref 65–140)
HCT VFR BLD AUTO: 26.1 % (ref 36.5–49.3)
HGB BLD-MCNC: 8 G/DL (ref 12–17)
IMM GRANULOCYTES # BLD AUTO: 0.04 THOUSAND/UL (ref 0–0.2)
IMM GRANULOCYTES NFR BLD AUTO: 1 % (ref 0–2)
LYMPHOCYTES # BLD AUTO: 0.84 THOUSANDS/ΜL (ref 0.6–4.47)
LYMPHOCYTES NFR BLD AUTO: 11 % (ref 14–44)
MCH RBC QN AUTO: 31.5 PG (ref 26.8–34.3)
MCHC RBC AUTO-ENTMCNC: 30.7 G/DL (ref 31.4–37.4)
MCV RBC AUTO: 103 FL (ref 82–98)
MONOCYTES # BLD AUTO: 0.8 THOUSAND/ΜL (ref 0.17–1.22)
MONOCYTES NFR BLD AUTO: 11 % (ref 4–12)
NEUTROPHILS # BLD AUTO: 5.37 THOUSANDS/ΜL (ref 1.85–7.62)
NEUTS SEG NFR BLD AUTO: 71 % (ref 43–75)
NRBC BLD AUTO-RTO: 0 /100 WBCS
PLATELET # BLD AUTO: 200 THOUSANDS/UL (ref 149–390)
PMV BLD AUTO: 9.9 FL (ref 8.9–12.7)
POTASSIUM SERPL-SCNC: 5.5 MMOL/L (ref 3.5–5.3)
RBC # BLD AUTO: 2.54 MILLION/UL (ref 3.88–5.62)
SODIUM SERPL-SCNC: 135 MMOL/L (ref 136–145)
WBC # BLD AUTO: 7.47 THOUSAND/UL (ref 4.31–10.16)

## 2019-11-13 PROCEDURE — 99232 SBSQ HOSP IP/OBS MODERATE 35: CPT | Performed by: INTERNAL MEDICINE

## 2019-11-13 PROCEDURE — 92526 ORAL FUNCTION THERAPY: CPT

## 2019-11-13 PROCEDURE — 85025 COMPLETE CBC W/AUTO DIFF WBC: CPT | Performed by: PLASTIC SURGERY

## 2019-11-13 PROCEDURE — 80048 BASIC METABOLIC PNL TOTAL CA: CPT | Performed by: PLASTIC SURGERY

## 2019-11-13 RX ORDER — GABAPENTIN 100 MG/1
100 CAPSULE ORAL 2 TIMES DAILY
Status: DISCONTINUED | OUTPATIENT
Start: 2019-11-13 | End: 2019-11-21 | Stop reason: HOSPADM

## 2019-11-13 RX ORDER — SODIUM CHLORIDE 9 MG/ML
20 INJECTION, SOLUTION INTRAVENOUS CONTINUOUS
Status: DISCONTINUED | OUTPATIENT
Start: 2019-11-13 | End: 2019-11-13

## 2019-11-13 RX ADMIN — METHYLNALTREXONE BROMIDE 6 MG: 12 INJECTION, SOLUTION SUBCUTANEOUS at 17:29

## 2019-11-13 RX ADMIN — PANTOPRAZOLE SODIUM 40 MG: 40 TABLET, DELAYED RELEASE ORAL at 05:24

## 2019-11-13 RX ADMIN — METOPROLOL TARTRATE 25 MG: 25 TABLET, FILM COATED ORAL at 22:13

## 2019-11-13 RX ADMIN — SEVELAMER HYDROCHLORIDE 800 MG: 800 TABLET, FILM COATED PARENTERAL at 08:04

## 2019-11-13 RX ADMIN — SENNOSIDES AND DOCUSATE SODIUM 2 TABLET: 8.6; 5 TABLET ORAL at 17:29

## 2019-11-13 RX ADMIN — ACETAMINOPHEN 650 MG: 325 TABLET, FILM COATED ORAL at 05:24

## 2019-11-13 RX ADMIN — CALCIUM ACETATE 1334 MG: 667 CAPSULE ORAL at 17:29

## 2019-11-13 RX ADMIN — Medication 250 MG: at 17:25

## 2019-11-13 RX ADMIN — EPOETIN ALFA 10000 UNITS: 10000 SOLUTION INTRAVENOUS; SUBCUTANEOUS at 11:13

## 2019-11-13 RX ADMIN — OXYCODONE HYDROCHLORIDE 10 MG: 10 TABLET ORAL at 11:12

## 2019-11-13 RX ADMIN — ACETAMINOPHEN 650 MG: 325 TABLET, FILM COATED ORAL at 22:14

## 2019-11-13 RX ADMIN — SEVELAMER HYDROCHLORIDE 800 MG: 800 TABLET, FILM COATED PARENTERAL at 17:26

## 2019-11-13 RX ADMIN — Medication 1 CAPSULE: at 17:26

## 2019-11-13 RX ADMIN — SEVELAMER HYDROCHLORIDE 800 MG: 800 TABLET, FILM COATED PARENTERAL at 13:03

## 2019-11-13 RX ADMIN — HYDROMORPHONE HYDROCHLORIDE 1 MG: 1 INJECTION, SOLUTION INTRAMUSCULAR; INTRAVENOUS; SUBCUTANEOUS at 08:04

## 2019-11-13 RX ADMIN — GABAPENTIN 100 MG: 100 CAPSULE ORAL at 17:26

## 2019-11-13 RX ADMIN — ACETAMINOPHEN 650 MG: 325 TABLET, FILM COATED ORAL at 13:02

## 2019-11-13 RX ADMIN — OXYCODONE HYDROCHLORIDE 10 MG: 10 TABLET ORAL at 04:29

## 2019-11-13 RX ADMIN — MIDODRINE HYDROCHLORIDE 5 MG: 5 TABLET ORAL at 09:48

## 2019-11-13 RX ADMIN — POLYETHYLENE GLYCOL 3350 17 G: 17 POWDER, FOR SOLUTION ORAL at 08:04

## 2019-11-13 RX ADMIN — HEPARIN SODIUM 5000 UNITS: 5000 INJECTION INTRAVENOUS; SUBCUTANEOUS at 05:24

## 2019-11-13 RX ADMIN — CALCIUM ACETATE 1334 MG: 667 CAPSULE ORAL at 08:04

## 2019-11-13 RX ADMIN — HEPARIN SODIUM 5000 UNITS: 5000 INJECTION INTRAVENOUS; SUBCUTANEOUS at 13:03

## 2019-11-13 RX ADMIN — ACETAMINOPHEN 650 MG: 325 TABLET, FILM COATED ORAL at 17:27

## 2019-11-13 RX ADMIN — CALCIUM ACETATE 1334 MG: 667 CAPSULE ORAL at 13:03

## 2019-11-13 RX ADMIN — HEPARIN SODIUM 5000 UNITS: 5000 INJECTION INTRAVENOUS; SUBCUTANEOUS at 22:14

## 2019-11-13 NOTE — OP NOTE
OPERATIVE REPORT  PATIENT NAME: Shell Manzano    :  1960  MRN: [id number]  Pt Location: AN OR ROOM 03    SURGERY DATE: 2019    Surgeon(s) and Role:     * Nico Love MD - Primary     * Rosario Cohen PA-C - Assisting     * Eric Bowser MD - Assisting    Preop Diagnosis:  Multiple wounds of skin [R23 8]    Post-Op Diagnosis Codes:     * Multiple wounds of skin [R23 8]    Procedure(s) (LRB):  DEBRIDEMENT LOWER EXTREMITY ([facility]) (Bilateral)  SKIN GRAFT SPLIT THICKNESS ([facility])  EXTREMITY (Bilateral)    Specimen(s):  * No specimens in log *    Estimated Blood Loss:   Minimal    Drains:  * No LDAs found *    Anesthesia Type:   General    Operative Indications:  Multiple wounds of skin [R23 8]      Operative Findings:  90 % take of right lower extremity previous graft  Complications:   None    Procedure and Technique:  Mr Jw Beard is a 51-year-old male who presents for an acute and chronic infected bilateral lower extremity infection with significant wounds that were debrided extensively by the podiatry team was asked to evaluate the patient provide any reconstruction options  patient recently underwent excisional debridement preparation for grafting, and underwent split-thickness skin graft of the right lower extremity only  Today the patient presents for dressing change the previous graft site and further debridement and possible split-thickness skin graft if the wound conditions are resectable for this    All risks, benefits, and options, including but not limited to, pain, scarring, bleeding, infection, asymmetry, contour deformity, damage to adjacent nerves, vessels, and organs, hematoma, seroma, paresthesias, anesthesia (temporary versus permanent), skin necrosis, fat necrosis, flap necrosis, wound dehiscence with need for healing by secondary intention and postoperative dressing changes,need for prolonged mechanical ventilation and ICU stay, hypertrophic and/or keloid scar formation, deep venous thrombosis, pulmonary embolism, death, recurrence, and need for revision and/or reoperation were fully explained to the patient  Patient wide manifested reasonable understanding of this informed consent was then obtained  Patient was met in the preoperative holding area and all the last minute questions were properly answered  He was then taken to the operative theater placed in supine position  After smooth general anesthesia was then induced all the dressings were removed  Approximately 90% take of the previous split-thickness skin graft along the right thigh was observed  Bilateral lower extremity was then prepped and draped in usual sterile fashion and a surgical time-out was then performed  None excisional debridement was performed by scraping of the remaining wound areas along the right foot and the left lower extremity until acceptable wound bed was obtained  Irrigation with normal saline was then performed and the wounds were soaked tumescent impregnated gauze for temporary hemostasis (50 mL lidocaine 1%, 1 amp of epinephrine diluted 1 L of LR)  This wound split-thickness skin graft was harvested from the right thigh, 16 of a 1000 inch thickness using the Brielle dermatome and was meshed 3-1 ratio  All the graft was then inset to the defect sequentially using 3-0 chromic sutures and staples to accommodate all the surface areas described below  Tisseel glue was then used along all the grafted surface to avoid shearing of the graft and adherence  All the wounds were then dressed with Adaptic, Xeroform impregnated with bacitracin followed by ABDs and Ace wraps  Donor sites were covered with Jolinda Mortimer and optilock satisfactorily  Nonexcisional debridement of the following areas       Right dorsal foot-10 5 cm x 11 cm x 0 4 cm  Left hallux- 3 5 cm x 3 cm x 0 2 cm  Left dorsal foot proximal-3 5 cm x 3 5 cm x 0 3 cm  Left distal foot-0 6 cm x 0 9 cm x 0 3 cm  Left posterior proximal-14 cm x 8 5 cm x 0 4 cm  Left medial proximal- 12 cm x 18 cm x 0 3 cm  Left lateral cluster- 26 5 cm x 6 5 cm x 0 4 cm  Left posterior distal leg- 5 cm x 7 cm x 0 3 cm    Skin grafting performing of the following areas:     Right dorsal foot-10 5 cm x 11 cm   Left dorsal foot proximal-3 5 cm x 3 5 cm   Left posterior proximal-14 cm x 8 5 cm   Left medial proximal- 12 cm x 18 cm   Left lateral cluster- 26 5 cm x 6 5 cm   Left posterior distal leg- 5 cm x 7 cm      I was present for the entire procedure and A physician assistant was required during the procedure for retraction tissue handling,dissection and suturing    Patient Disposition:  PACU , hemodynamically stable and patient will return to OR for planned surgery as a staged procedure dressing change under anesthesia within 1 week      SIGNATURE: Angelica Cardozo MD  DATE: November 13, 2019  TIME: 11:31 AM

## 2019-11-13 NOTE — UTILIZATION REVIEW
Continued Stay Review    Date: 11/13/2109                       Current Patient Class: inpatient  Current Level of Care: med surg     HPI:59 y o  male initially admitted on 10/26/2019 inpatient due to multiple wounds of skin  History of ESRD on hemodialysis  10/31/2019 Procedure - DEBRIDEMENT WOUND ([address]) (Bilateral)  11/5/2019 Procedure DEBRIDEMENT WOUND [facility] OUT) (Bilateral)  11/7/2019 Procedure DEBRIDEMENT WOUND ([address] OUT) (Bilateral) IN PREPARATION FOR SKIN GRAFT  SKIN GRAFT SPLIT THICKNESS ([facility])  EXTREMITY (RIGHT  11/12/2109 Procedure - DEBRIDEMENT LOWER EXTREMITY ([address] OUT) (Bilateral)  SKIN GRAFT SPLIT THICKNESS ([facility])  EXTREMITY (Bilateral    Assessment/Plan: Multiple and open wound of lower limb  Assessment & Plan  · Procedure - Debridement, removal of Eschar on 10/31/2019 by Dr Edin De La Torre  · Podiatry consulted plastic surgery for possible graft vs  Flap procedure  ? Procedure - STSG on 11/7 per plastic surgery   ? Procedure - Washout with additional STSG by plastic surgery team 11/12/2019 - OP Note for details pending  · Monitor for Infection -   ? Antibiotic - completed 7 days of vanc and cefepime  Now monitoring off antibiotics  ? Blood cultures from 10/31/2019: NGTD  ? Monitor temperatures and serial leg exams  · Pain Control -   ? Oxycodone 5/10 mg  ? Breakthrough pain - Dilaudid 1 mg IV  ? Monitor pain levels  · Supportive care     Scrotal swelling  Assessment & Plan  · Ultrasound showed "Large complex right hydrocele containing numerous septations    Increased vascularity in the left testicle with respect to the right, although both testicles could not be imaged on the same plane limiting direct comparison   Correlate clinically for orchitis "  · Urology saw patient 10/29/2019 and recommends no surgical intervention acutely and to manage problems with legs and get therapy first   · beta HCG, AFP and LDH within normal limits  · Supportive care         Atrial fibrillation [facility])  Assessment & Plan  ? Rate / Rhythm control -   § Metoprolol increased to 25 mg bid on 10/29/2019  § Monitor on telemetry  Monitor HR and blood pressures  ? Anticoagulation - None prehospital   Defer to outpatient team following discharge      ESRD (end stage renal disease) ([facility] [address] )  Assessment & Plan  · On routine dialysis  · Nephrology following      Anemia due to chronic kidney disease, on chronic dialysis and Acute Blood Loss Anemia (HCC)  Assessment & Plan  · Transfuse as needed  · Monitor counts - Intermittent CBC monitoring      Pressure ulcer, right and left buttocks POA  Assessment & Plan  · Local wound care  · Bed / mattress changed 11/12/2019 to help with wounds  · Turn / Reposition frequently      Oropharyngeal dysphagia  Assessment & Plan  · Dysphagia 3 diet with extra gravy / sauce  · Thin liquids ok  · Biotene / Mouth Kote  · Speech pathology following      Constipation  Assessment & Plan  · Senna/docusate b i d  Also, MiraLax daily scheduled dosing  · PRN Relistor if no bowel movement  · Additional adjustments as needed    Monitor for BMs    Pertinent Labs/Diagnostic Results:   Results from last 7 days   Lab Units 11/13/19  0937 11/11/19  0341 11/10/19  0837 11/08/19  0452 11/07/19  0546   WBC Thousand/uL 7 47 8 21 7 74 7 42 7 36   HEMOGLOBIN g/dL 8 0* 9 4* 9 6* 8 7* 8 0*   HEMATOCRIT % 26 1* 30 9* 31 6* 28 0* 26 6*   PLATELETS Thousands/uL 200 202 186 170 195   NEUTROS ABS Thousands/µL 5 37 6 25 5 78 5 25 5 25     Results from last 7 days   Lab Units 11/13/19  0619 11/12/19  0325 11/11/19  0341 11/10/19  0837 11/09/19  1515   SODIUM mmol/L 135* 136 137 135* 143   POTASSIUM mmol/L 5 5* 4 6 5 6* 5 4* 5 7*   CHLORIDE mmol/L 99* 98* 99* 97* 104   CO2 mmol/L 24 31 29 30 29   ANION GAP mmol/L 12 7 9 8 10   BUN mg/dL 34* 27* 43* 36* 32*   CREATININE mg/dL 7 18* 5 84* 8 08* 7 22* 6 50*   EGFR ml/min/1 73sq m 8 10 7 8 9   CALCIUM mg/dL 8 7 8 8 9 4 9 7 9 4   PHOSPHORUS mg/dL  --  4 4  --   -- --      Results from last 7 days   Lab Units 11/13/19  0619 11/12/19  0325 11/11/19  0341 11/10/19  0837 11/09/19  1515 11/08/19  0452 11/07/19  0546   GLUCOSE RANDOM mg/dL 96 97 106 90 108 84 107     Vital Signs:   11/13/19 0801  98 °F (36 7 °C)  74  18  105/55    95 %  None (Room air)  Lying   11/12/19 2241  98 9 °F (37 2 °C)  106Abnormal   18  96/52    94 %  None (Room air)         Medications:   Scheduled Medications:    Medications:  acetaminophen 650 mg Oral Q6H [address]   b complex-vitamin C-folic acid 1 capsule Oral Daily With Dinner   calcium acetate 1,334 mg Oral TID With Meals   epoetin bob 10,000 Units Intravenous Once per day on Mon Wed Fri   gabapentin 100 mg Oral BID   heparin (porcine) 5,000 Units Subcutaneous Q8H [address]   metoprolol tartrate 25 mg Oral Q12H [place]   pantoprazole 40 mg Oral Early Morning   polyethylene glycol 17 g Oral Daily   saccharomyces boulardii 250 mg Oral BID   senna-docusate sodium 2 tablet Oral BID   sevelamer 800 mg Oral TID With Meals   torsemide 40 mg Oral BID (diuretic)        PRN Meds: not used  acetaminophen 650 mg Oral Q6H PRN   aluminum-magnesium hydroxide-simethicone 15 mL Oral Q4H PRN   bisacodyl 10 mg Rectal Daily PRN   calcium carbonate 1,000 mg Oral TID PRN   HYDROmorphone 1 mg Intravenous Q4H PRN   methylnaltrexone bromide 6 mg Subcutaneous Q48H PRN   midodrine 5 mg Oral Before Dialysis   ondansetron 4 mg Intravenous Q6H PRN   oxyCODONE 10 mg Oral Q4H PRN   oxyCODONE 5 mg Oral Q4H PRN   saliva substitute 5 spray Mouth/Throat PRN       Discharge Plan: To be determined,  Short term rehab at [facility] planned    Network Utilization Review Department  [email or web address]  ATTENTION: Please call with any questions or concerns to [phone] and carefully listen to the prompts so that you are directed to the right person   All voicemails are confidential   [facility] all requests for admission clinical reviews, approved or denied determinations and any other requests to dedicated fax number below belonging to the campus where the patient is receiving treatment    FACILITY NAME UR FAX NUMBER   ADMISSION DENIALS (Administrative/Medical Necessity) [address] [facility] (Maternity/NICU/Pediatrics) [phone]   [facility] [address] [facility] [address] [facility] [phone]   [address] [facility] [address] [facility] [phone]   [address] [place] [address] [phone]

## 2019-11-13 NOTE — SPEECH THERAPY NOTE
Speech Language/Pathology    Speech/Language Pathology Progress Note    Patient Name: Mahin Mora  [id number] Date: 11/13/2019       Subjective:  Pt semi-reclined in bed eating breakfast upon arrival  SLP repositioned upright and educated on importance of sitting fully upright while eating, especially given pt's c/o heartburn and to reduce aspiration risk  Pt reported he prefers the dysphagia level 3 diet (soft/chopped foods)  Objective:  Pt seen for ongoing diagnostic dysphagia tx  Assessed tolerance of current diet at breakfast w/ eggs and cheese, chopped sausage (pt independently coated w/ ketchup) and thin liquids via straw  Pt able to self-feed, noted to take large bites  Slow but functional mastication of solid foods  Once initiated, timely transfer  No oral residue  Suspect good oral control of thin liquids via straw  Swallow initiation appeared timely and complete  No overt s/s aspiration across all po intake  Assessment:  Pt appears to be tolerating dysphagia level 3 w/ thin liquids w/o overt s/s aspiration  Plan/Recommendations:  Cont dysphagia level 3 w/ thin liquids  Meds as tolerated (prefers whole in puree)  Aspiration precautions - ensure pt is fully upright, small bites/sips, slow rate, alternate food/liquid, stay upright 30-60 min after meals, reduce distractions   No skilled ST services warranted at this time  Will sign off  Reconsult if needed

## 2019-11-13 NOTE — PROGRESS NOTES
Paged plastic surgery in regards to BL LE dressing change  RN instructed not to change any dressings until Monday  Dressing is clean, dry, and intact  Will continue to monitor

## 2019-11-13 NOTE — PLAN OF CARE
Cont dysphagia level 3 w/ thin liquids  Meds as tolerated (prefers whole in puree)  Aspiration precautions - ensure pt is fully upright, small bites/sips, slow rate, stay upright 30-60 min after meals, reduce distractions   ST will sign off  Reconsult if needed

## 2019-11-13 NOTE — PLAN OF CARE
Problem: Prexisting or High Potential for Compromised Skin Integrity  Goal: Skin integrity is maintained or improved  Description  INTERVENTIONS:  - Identify patients at risk for skin breakdown  - Assess and monitor skin integrity  - Assess and monitor nutrition and hydration status  - Monitor labs   - Assess for incontinence   - Turn and reposition patient  - Assist with mobility/ambulation  - Relieve pressure over bony prominences  - Avoid friction and shearing  - Provide appropriate hygiene as needed including keeping skin clean and dry  - Evaluate need for skin moisturizer/barrier cream  - Collaborate with interdisciplinary team   - Patient/family teaching  - Consider wound care consult   Outcome: Progressing     Problem: Potential for Falls  Goal: Patient will remain free of falls  Description  INTERVENTIONS:  - Assess patient frequently for physical needs  -  Identify cognitive and physical deficits and behaviors that affect risk of falls    -  [place] fall precautions as indicated by assessment   - Educate patient/family on patient safety including physical limitations  - Instruct patient to call for assistance with activity based on assessment  - Modify environment to reduce risk of injury  - Consider OT/PT consult to assist with strengthening/mobility  Outcome: Progressing     Problem: INFECTION - ADULT  Goal: Absence or prevention of progression during hospitalization  Description  INTERVENTIONS:  - Assess and monitor for signs and symptoms of infection  - Monitor lab/diagnostic results  - Monitor all insertion sites, i e  indwelling lines, tubes, and drains  - Monitor endotracheal if appropriate and nasal secretions for changes in amount and color  - [place] appropriate cooling/warming therapies per order  - Administer medications as ordered  - Instruct and encourage patient and family to use good hand hygiene technique  - Identify and instruct in appropriate isolation precautions for identified infection/condition  Outcome: Progressing     Problem: METABOLIC, FLUID AND ELECTROLYTES - ADULT  Goal: Electrolytes maintained within normal limits  Description  INTERVENTIONS:  - Monitor labs and assess patient for signs and symptoms of electrolyte imbalances  - Administer electrolyte replacement as ordered  - Monitor response to electrolyte replacements, including repeat lab results as appropriate  - Instruct patient on fluid and nutrition as appropriate  Outcome: Progressing  Goal: Fluid balance maintained  Description  INTERVENTIONS:  - Monitor labs   - Monitor I/O and WT  - Instruct patient on fluid and nutrition as appropriate  - Assess for signs & symptoms of volume excess or deficit  Outcome: Progressing     Problem: SKIN/TISSUE INTEGRITY - ADULT  Goal: Skin integrity remains intact  Description  INTERVENTIONS  - Identify patients at risk for skin breakdown  - Assess and monitor skin integrity  - Assess and monitor nutrition and hydration status  - Monitor labs (i e  albumin)  - Assess for incontinence   - Turn and reposition patient  - Assist with mobility/ambulation  - Relieve pressure over bony prominences  - Avoid friction and shearing  - Provide appropriate hygiene as needed including keeping skin clean and dry  - Evaluate need for skin moisturizer/barrier cream  - Collaborate with interdisciplinary team (i e  Nutrition, Rehabilitation, etc )   - Patient/family teaching  Outcome: Progressing  Goal: Incision(s), wounds(s) or drain site(s) healing without S/S of infection  Description  INTERVENTIONS  - Assess and document risk factors for skin impairment   - Assess and document dressing, incision, wound bed, drain sites and surrounding tissue  - Consider nutrition services referral as needed  - Oral mucous membranes remain intact  - Provide patient/ family education  Outcome: Progressing  Goal: Oral mucous membranes remain intact  Description  INTERVENTIONS  - Assess oral mucosa and hygiene practices  - Implement preventative oral hygiene regimen  - Implement oral medicated treatments as ordered  - Initiate Nutrition services referral as needed  Outcome: Progressing     Problem: Nutrition/Hydration-ADULT  Goal: Nutrient/Hydration intake appropriate for improving, restoring or maintaining nutritional needs  Description  Monitor and assess patient's nutrition/hydration status for malnutrition  Collaborate with interdisciplinary team and initiate plan and interventions as ordered  Monitor patient's weight and dietary intake as ordered or per policy  Utilize nutrition screening tool and intervene as necessary  Determine patient's food preferences and provide high-protein, high-caloric foods as appropriate       INTERVENTIONS:  - Monitor oral intake, urinary output, labs, and treatment plans  - Assess nutrition and hydration status and recommend course of action  - Evaluate amount of meals eaten  - Assist patient with eating if necessary   - Allow adequate time for meals  - Recommend/ encourage appropriate diets, oral nutritional supplements, and vitamin/mineral supplements  - Order, calculate, and assess calorie counts as needed  - Recommend, monitor, and adjust tube feedings and TPN/PPN based on assessed needs  - Assess need for intravenous fluids  - Provide specific nutrition/hydration education as appropriate  - Include patient/family/caregiver in decisions related to nutrition  Outcome: Progressing

## 2019-11-13 NOTE — PHYSICIAN ADVISOR
Current patient class: Inpatient  The patient is currently on Hospital Day: 19 at [facility]      The patient was admitted to the hospital at [facility] on 10/26/19 for the following diagnosis:  Hyperkalemia [E87 5]  Lactic acidosis [E87 2]  Cellulitis [L03 90]       There is documentation in the medical record of an expected length of stay of at least 2 midnights  The patient is therefore expected to satisfy the 2 midnight benchmark and given the 2 midnight presumption is appropriate for INPATIENT ADMISSION  Given this expectation of a satisfying stay, CMS instructs us that the patient is most often appropriate for inpatient admission under part A provided medical necessity is documented in the chart  After review of the relevant documentation, labs, vital signs and test results, the patient is appropriate for INPATIENT ADMISSION  Admission to the hospital as an inpatient is a complex decision making process which requires the practitioner to consider the patients presenting complaint, history and physical examination and all relevant testing  With this in mind, in this case, the patient was deemed appropriate for INPATIENT ADMISSION  After review of the documentation and testing available at the time of the admission I concur with this clinical determination of medical necessity  Rationale is as follows: The patient is a 64 yrs old Male who presented to the ED at 10/26/2019  5:10 PM with a chief complaint of acute and chronic lower ext wound infections and reconstruction  * Multiple and open wound of lower limb  Assessment & Plan  · Procedure - Debridement, removal of Eschar on 10/31/2019 by Dr MARRERO [facility]  · Podiatry consulted plastic surgery for possible graft vs  Flap procedure  ? Procedure - STSG on 11/7 per plastic surgery   ? Procedure - Washout with additional STSG by plastic surgery team 11/12/2019 - OP Note for details pending    · Monitor for Infection - ? Antibiotic - completed 7 days of vanc and cefepime  Now monitoring off antibiotics  ? Blood cultures from 10/31/2019: NGTD  ? Monitor temperatures and serial leg exams  · Pain Control -   ? Oxycodone 5/10 mg  ? Breakthrough pain - Dilaudid 1 mg IV  ? Monitor pain levels  · Supportive care     Scrotal swelling  Assessment & Plan  · Ultrasound showed "Large complex right hydrocele containing numerous septations  Increased vascularity in the left testicle with respect to the right, although both testicles could not be imaged on the same plane limiting direct comparison   Correlate clinically for orchitis "  · Urology saw patient 10/29/2019 and recommends no surgical intervention acutely and to manage problems with legs and get therapy first   · beta HCG, AFP and LDH within normal limits  · Supportive care         Atrial fibrillation [facility])  Assessment & Plan  ? Rate / Rhythm control -   § Metoprolol increased to 25 mg bid on 10/29/2019  § Monitor on telemetry  Monitor HR and blood pressures  ? Anticoagulation - None prehospital   Defer to outpatient team following discharge      ESRD (end stage renal disease) ([facility] [address] )  Assessment & Plan  · On routine dialysis  · Nephrology following      Anemia due to chronic kidney disease, on chronic dialysis and Acute Blood Loss Anemia (HCC)  Assessment & Plan  · Transfuse as needed  · Monitor counts - Intermittent CBC monitoring      Pressure ulcer, right and left buttocks POA  Assessment & Plan  · Local wound care  · Bed / mattress changed 11/12/2019 to help with wounds  · Turn / Reposition frequently      Oropharyngeal dysphagia  Assessment & Plan  · Dysphagia 3 diet with extra gravy / sauce  · Thin liquids ok  · Biotene / Mouth Kote  · Speech pathology following      Constipation  Assessment & Plan  · Senna/docusate b i d  Also, MiraLax daily scheduled dosing  · PRN Relistor if no bowel movement  · Additional adjustments as needed  · Monitor for BMs        The patients vitals on arrival were ED Triage Vitals   Temperature Pulse Respirations Blood Pressure SpO2   10/26/19 1720 10/26/19 1716 10/26/19 1716 10/26/19 1716 10/26/19 1716   99 1 °F (37 3 °C) (!) 107 16 114/73 100 %      Temp Source Heart Rate Source Patient Position - Orthostatic VS BP Location FiO2 (%)   10/26/19 1720 10/26/19 1716 10/26/19 1716 10/26/19 1716 --   Oral Monitor Lying Right arm       Pain Score       10/26/19 1716       Worst Possible Pain           Past Medical History:   Diagnosis Date    Complication of renal dialysis     Polycystic kidney disease      Past Surgical History:   Procedure Laterality Date    IR CATHETERGRAM  10/17/2019    IR IMAGE GUIDED ASPIRATION / DRAINAGE  9/23/2019    IR PARACENTESIS  10/9/2019    IR [facility] EXCHANGE  10/17/2019    IR [facility] PLACEMENT  9/30/2019    SPLIT THICKNESS SKIN GRAFT Bilateral 11/7/2019    Procedure: SKIN GRAFT SPLIT THICKNESS (STSG)  EXTREMITY;  Surgeon: Nidia Avilez MD;  [facility];  Service: Plastics    SPLIT THICKNESS SKIN GRAFT Bilateral 11/12/2019    Procedure: SKIN GRAFT SPLIT THICKNESS (STSG)  EXTREMITY;  Surgeon: Nidia Avilez MD;  [facility];  Service: Plastics    WOUND DEBRIDEMENT Bilateral 10/31/2019    Procedure: DEBRIDEMENT WOUND [facility] OUT); Surgeon: Bull Gomez DPM;  [facility];  Service: Podiatry    WOUND DEBRIDEMENT Bilateral 11/5/2019    Procedure: DEBRIDEMENT WOUND [facility]); Surgeon: Bull Gomez DPM;  [facility];  Service: Podiatry    WOUND DEBRIDEMENT Bilateral 11/7/2019    Procedure: DEBRIDEMENT WOUND [facility]); Surgeon: Nidia Avilez MD;  [facility];  Service: Plastics    WOUND DEBRIDEMENT Bilateral 11/12/2019    Procedure: DEBRIDEMENT LOWER EXTREMITY [facility] OUT);   Surgeon: Nidia Avilez MD;  [facility];  Service: Plastics           Consults have been placed to:   IP CONSULT TO NEPHROLOGY  IP CONSULT TO ACUTE CARE SURGERY  IP CONSULT TO UROLOGY  IP CONSULT TO UROLOGY  IP CONSULT TO PODIATRY  IP CONSULT TO PLASTIC SURGERY  IP CONSULT TO WOUND CARE    Vitals:    11/13/19 1130 11/13/19 1200 11/13/19 1230 11/13/19 1431   BP: 101/64 96/60 102/66 99/55   BP Location:   Left arm Left arm   Pulse: 90 89 93 81   Resp: 16 18 18 18   Temp:   98 °F (36 7 °C) 98 3 °F (36 8 °C)   TempSrc:   Oral Oral   SpO2:    94%   Weight:       Height:           Most recent labs:    Recent Labs     11/13/19  0619 11/13/19  0937   WBC  --  7 47   HGB  --  8 0*   HCT  --  26 1*   PLT  --  200   K 5 5*  --    CALCIUM 8 7  --    BUN 34*  --    CREATININE 7 18*  --        Scheduled Meds:  Current Facility-Administered Medications:  acetaminophen 650 mg Oral Q6H PRN Verenice Leroy MD   acetaminophen 650 mg Oral Q6H [facility] Verenice Leroy MD   aluminum-magnesium hydroxide-simethicone 15 mL Oral Q4H PRN Verenice Leroy MD   b complex-vitamin C-folic acid 1 capsule Oral Daily With Mary Leal MD   bisacodyl 10 mg Rectal Daily PRN Verenice Leroy MD   calcium acetate 1,334 mg Oral TID With Meals Verenice Leroy MD   calcium carbonate 1,000 mg Oral TID PRN Verenice Leroy MD   epoetin bob 10,000 Units Intravenous Once per day on Mon Wed Fri Verenice Leroy MD   gabapentin 100 mg Oral BID Angela Donaldson DO   heparin (porcine) 5,000 Units Subcutaneous [facility] Verenice Leroy MD   HYDROmorphone 1 mg Intravenous Q4H PRN Verenice Leroy MD   methylnaltrexone bromide 6 mg Subcutaneous Q48H PRN Angela Donaldson DO   metoprolol tartrate 25 mg Oral Q12H [facility] Verenice Leroy MD   midodrine 5 mg Oral Before Dialysis Verenice Leroy MD   ondansetron 4 mg Intravenous Q6H PRN Verenice Leroy MD   oxyCODONE 10 mg Oral Q4H PRN Verenice Leroy MD   oxyCODONE 5 mg Oral Q4H PRN Verenice Leroy MD pantoprazole 40 mg Oral Early Morning Nidia Avilez, MD   polyethylene glycol 17 g Oral Daily Nidia Avilez, MD chau boulardii 250 mg Oral BID Nidia Avilez MD   saliva substitute 5 spray Mouth/Throat PRN Nidia Avilez, MD   senna-docusate sodium 2 tablet Oral BID Nidia Avilez MD   sevelamer 800 mg Oral TID With Meals Nidia Avilez MD   torsemide 40 mg Oral BID (diuretic) Nidia Avilez MD     Continuous Infusions:   PRN Meds:   acetaminophen    aluminum-magnesium hydroxide-simethicone    bisacodyl    calcium carbonate    HYDROmorphone    methylnaltrexone bromide    midodrine    ondansetron    oxyCODONE    oxyCODONE    saliva substitute    Surgical procedures (if appropriate):  Procedure(s):  DEBRIDEMENT LOWER EXTREMITY ([address])  SKIN GRAFT SPLIT THICKNESS (STSG)  EXTREMITY

## 2019-11-13 NOTE — PROGRESS NOTES
NEPHROLOGY PROGRESS NOTE   Luisa Yates 64 y o  male MRN: [id number]  Unit/Bed#: S -01 Encounter: [id number]  Reason for Consult: ESRD    ASSESSMENT/PLAN:  1  ESRD on HD ([facility] on M-T-Th-F)  - inpatient schedule is MWF x3 hours  - can add Saturday treatments as needed  - patient wants weight to be challenged as he feels he is not eating much and losing body weight  - decrease EDW to 82kg and consider decreasing further as tolerated  - AVOID IVF PLEASE  - patient's weight has been fluctuating due to IVF  2  Access- right IJ perm cath  3  Hyperkalemia- low K diet, 2K potassium bath with HD  - avoid lactated ringers IVF  4  Hypotension- receives midodrine 5mg prior to HD as needed  5  Volume- he takes torsemide 40mg twice a day  6  Anemia- continue epogen 96971 units with HD (3x/week)  7  Hyperphosphatemia- continue phoslo and sevelamer  8  LE wounds- per surgery    Disposition:  Please avoid IVF    SUBJECTIVE:  Patient feeling well without acute complaints      OBJECTIVE:  Current Weight: Weight - Scale: 82 3 kg (181 lb 7 oz)  Vitals:    11/12/19 2241 11/13/19 0801 11/13/19 0930 11/13/19 1000   BP: 96/52 105/55 103/62 105/72   BP Location: Left arm Left arm Left arm    Pulse: (!) 106 74 79 88   Resp: 18 18 18 18   Temp: 98 9 °F (37 2 °C) 98 °F (36 7 °C) 97 8 °F (36 6 °C)    TempSrc: Oral Oral Oral    SpO2: 94% 95%     Weight:       Height:           Intake/Output Summary (Last 24 hours) at 11/13/2019 1031  Last data filed at 11/13/2019 0930  Gross per 24 hour   Intake 920 ml   Output    Net 920 ml     General: NAD  Skin: no rash  Eyes: anicteric  ENMT: mm moist  Neck: no masses  Respiratory: CTAB  Cardiac: RRR  Extremities: LE wrapped  GI: soft nt round  Neuro: alert awake  Psych: mood and affect appropriate    Medications:    Current Facility-Administered Medications:     acetaminophen (TYLENOL) tablet 650 mg, 650 mg, Oral, Q6H PRN, Steven Galarza MD, 650 mg at 11/08/19 1321   acetaminophen (TYLENOL) tablet 650 mg, 650 mg, Oral, Q6H [facility], Adali Bernal MD, 650 mg at 11/13/19 0524    aluminum-magnesium hydroxide-simethicone (MYLANTA) 200-200-20 mg/5 mL oral suspension 15 mL, 15 mL, Oral, Q4H PRN, Adali Bernal MD    b complex-vitamin C-folic acid (NEPHROCAPS) capsule 1 capsule, 1 capsule, Oral, Daily With Clarisa Knight MD, 1 capsule at 11/11/19 1727    bisacodyl (DULCOLAX) rectal suppository 10 mg, 10 mg, Rectal, Daily PRN, Adali Bernal MD, 10 mg at 11/10/19 2104    calcium acetate (PHOSLO) capsule 1,334 mg, 1,334 mg, Oral, TID With Meals, Adali Bernal MD, 1,334 mg at 11/13/19 0804    calcium carbonate (TUMS) chewable tablet 1,000 mg, 1,000 mg, Oral, TID PRN, Adali Bernal MD, 1,000 mg at 11/11/19 1006    epoetin bob (EPOGEN,PROCRIT) injection 10,000 Units, 10,000 Units, Intravenous, Once per day on Mon Wed Fri, Adali Bernal MD, 10,000 Units at 11/11/19 1500    gabapentin (NEURONTIN) capsule 100 mg, 100 mg, Oral, Daily, Adali Bernal MD, Stopped at 11/13/19 0920    heparin (porcine) subcutaneous injection 5,000 Units, 5,000 Units, Subcutaneous, Q8H [facility], 5,000 Units at 11/13/19 0524 **AND** [COMPLETED] Platelet count, , , Once, Jesse Chun PA-C    HYDROmorphone (DILAUDID) injection 1 mg, 1 mg, Intravenous, Q4H PRN, Adali Bernal MD, 1 mg at 11/13/19 0804    metoprolol tartrate (LOPRESSOR) tablet 25 mg, 25 mg, Oral, Q12H [facility], Adali Bernal MD, 25 mg at 11/12/19 0840    midodrine (PROAMATINE) tablet 5 mg, 5 mg, Oral, Before Dialysis, Adali Bernal MD, 5 mg at 11/13/19 0948    ondansetron (ZOFRAN) injection 4 mg, 4 mg, Intravenous, Q6H PRN, Adali Bernal MD    oxyCODONE (ROXICODONE) IR tablet 10 mg, 10 mg, Oral, Q4H PRN, Adali Bernal MD, 10 mg at 11/13/19 0429    oxyCODONE (ROXICODONE) IR tablet 5 mg, 5 mg, Oral, Q4H PRN, Nico Love MD, 5 mg at 11/11/19 2007    pantoprazole (PROTONIX) EC tablet 40 mg, 40 mg, Oral, Early Morning, Nico Love MD, 40 mg at 11/13/19 0524    polyethylene glycol (MIRALAX) packet 17 g, 17 g, Oral, Daily, Nico Love MD, 17 g at 11/13/19 5305    saccharomyces boulardii (FLORASTOR) capsule 250 mg, 250 mg, Oral, BID, Nico Love MD, 250 mg at 11/12/19 1837    saliva substitute (MOUTH KOTE) mucosal solution 5 spray, 5 spray, Mouth/Throat, PRN, Nico Love MD, 5 spray at 11/09/19 1517    senna-docusate sodium (SENOKOT S) 8 6-50 mg per tablet 2 tablet, 2 tablet, Oral, BID, Nico Love MD, 2 tablet at 11/12/19 1837    sevelamer (RENAGEL) tablet 800 mg, 800 mg, Oral, TID With Meals, Nico Love MD, 800 mg at 11/13/19 0804    torsemide (DEMADEX) tablet 40 mg, 40 mg, Oral, BID (diuretic), Nico Love MD, 40 mg at 11/12/19 0840    Laboratory Results:  Results from last 7 days   Lab Units 11/13/19  0937 11/13/19  0619 11/12/19  0325 11/11/19  0341 11/10/19  0837 11/09/19  1515 11/08/19  0452 11/07/19  0546   WBC Thousand/uL 7 47  --   --  8 21 7 74  --  7 42 7 36   HEMOGLOBIN g/dL 8 0*  --   --  9 4* 9 6*  --  8 7* 8 0*   HEMATOCRIT % 26 1*  --   --  30 9* 31 6*  --  28 0* 26 6*   PLATELETS Thousands/uL 200  --   --  202 186  --  170 195   POTASSIUM mmol/L  --  5 5* 4 6 5 6* 5 4* 5 7* 6 1* 5 0   CHLORIDE mmol/L  --  99* 98* 99* 97* 104 101 100   CO2 mmol/L  --  24 31 29 30 29 26 26   BUN mg/dL  --  34* 27* 43* 36* 32* 43* 36*   CREATININE mg/dL  --  7 18* 5 84* 8 08* 7 22* 6 50* 7 27* 6 27*   CALCIUM mg/dL  --  8 7 8 8 9 4 9 7 9 4 9 0 9 0   PHOSPHORUS mg/dL  --   --  4 4  --   --   --   --   --

## 2019-11-13 NOTE — ASSESSMENT & PLAN NOTE
· Procedure - Debridement, removal of Eschar on 10/31/2019 by Dr Gallagher Counter  · Podiatry consulted plastic surgery for possible graft vs  Flap procedure  · Procedure - STSG on 11/7 per plastic surgery   · Procedure - Washout with additional STSG by plastic surgery team 11/12/2019 - OP Note for details pending  · Monitor for Infection -   · Antibiotic - completed 7 days of vanc and cefepime  Now monitoring off antibiotics  · Blood cultures from 10/31/2019: NGTD  · Monitor temperatures and serial leg exams  · Pain Control -   · Oxycodone 5/10 mg  · Breakthrough pain - Dilaudid 1 mg IV  · Monitor pain levels    · Supportive care

## 2019-11-13 NOTE — ASSESSMENT & PLAN NOTE
· Senna/docusate b i d  Also, MiraLax daily scheduled dosing  · Consider Relistor if no bowel movement  · Additional adjustments as needed  · Monitor for BMs

## 2019-11-13 NOTE — PROGRESS NOTES
Progress Note - Lakisha Garcia 1960, 64 y o  male MRN: [id number]    Unit/Bed#: S -01 Encounter: [id number]    Primary Care Provider: Paty Madrid   Date and time admitted to hospital: 10/26/2019  5:10 PM        * Multiple and open wound of lower limb  Assessment & Plan  · Procedure - Debridement, removal of Eschar on 10/31/2019 by Dr Josue Benavides  · Podiatry consulted plastic surgery for possible graft vs  Flap procedure  · Procedure - STSG on 11/7 per plastic surgery   · Procedure - Washout with additional STSG by plastic surgery team 11/12/2019 - OP Note for details pending  · Monitor for Infection -   · Antibiotic - completed 7 days of vanc and cefepime  Now monitoring off antibiotics  · Blood cultures from 10/31/2019: NGTD  · Monitor temperatures and serial leg exams  · Pain Control -   · Oxycodone 5/10 mg  · Breakthrough pain - Dilaudid 1 mg IV  · Monitor pain levels  · Supportive care    Scrotal swelling  Assessment & Plan  · Ultrasound showed "Large complex right hydrocele containing numerous septations  Increased vascularity in the left testicle with respect to the right, although both testicles could not be imaged on the same plane limiting direct comparison   Correlate clinically for orchitis "  · Urology saw patient 10/29/2019 and recommends no surgical intervention acutely and to manage problems with legs and get therapy first   · beta HCG, AFP and LDH within normal limits  · Supportive care  [facility]  Assessment & Plan  · Rate / Rhythm control -   · Metoprolol increased to 25 mg bid on 10/29/2019  · Monitor on telemetry  Monitor HR and blood pressures  · Anticoagulation - None prehospital   Defer to outpatient team following discharge  ESRD (end stage renal disease) ([facility] [address] )  Assessment & Plan  · On routine dialysis  · Nephrology following      Anemia due to chronic kidney disease, on chronic dialysis and Acute Blood Loss Anemia (HCC)  Assessment & Plan  · Transfuse as needed  · Monitor counts - Intermittent CBC monitoring  Pressure ulcer, right and left buttocks POA  Assessment & Plan  · Local wound care  · Bed / mattress changed 2019 to help with wounds  · Turn / Reposition frequently  Oropharyngeal dysphagia  Assessment & Plan  · Dysphagia 3 diet with extra gravy / sauce  · Thin liquids ok  · Biotene / Mouth Kote  · Speech pathology following  Constipation  Assessment & Plan  · Senna/docusate b i d  Also, MiraLax daily scheduled dosing  · PRN Relistor if no bowel movement  · Additional adjustments as needed  · Monitor for BMs  VTE Pharmacologic Prophylaxis:   Pharmacologic: Heparin  Mechanical VTE Prophylaxis in Place: No    Patient Centered Rounds: I have performed bedside rounds with nursing staff today  Discussions with Specialists or Other Care Team Provider: None yet today  Education and Discussions with Family / Patient: updated daughter at bedside  Time Spent for Care: 30 minutes  More than 50% of total time spent on counseling and coordination of care as described above  Current Length of Stay: 18 day(s)  Current Patient Status: Inpatient     Certification Statement: The patient will continue to require additional inpatient hospital stay due to evaluation and treatment for leg wounds  Discharge Plan / Estimated Discharge Date: Once cleared by plastic surgery team     Code Status: Level 1 - Full Code      Subjective: The patient has no acute complaints today  He does, however, have some increased pain in his legs since the procedure yesterday  The patient describes the pain as a tingling type of pain  Objective:     Vitals:   Temp (24hrs), Av 1 °F (36 7 °C), Min:97 °F (36 1 °C), Max:99 °F (37 2 °C)    Temp:  [97 °F (36 1 °C)-99 °F (37 2 °C)] 98 °F (36 7 °C)  HR:  [] 74  Resp:  [9-20] 18  BP: ()/(51-71) 105/55  SpO2:  [92 %-100 %] 95 %  Body mass index is 27 59 kg/m²       Input and Output Summary (last 24 hours): Intake/Output Summary (Last 24 hours) at 11/13/2019 0932  Last data filed at 11/12/2019 2016  Gross per 24 hour   Intake 720 ml   Output    Net 720 ml       Physical Exam:     Physical Exam   Constitutional: He is oriented to person, place, and time  No distress  HENT:   Mouth/Throat: Oropharynx is clear and moist  No oropharyngeal exudate  Eyes: Pupils are equal, round, and reactive to light  Cardiovascular: Normal rate  Exam reveals no friction rub  No murmur heard  Pulmonary/Chest: Effort normal and breath sounds normal  He has no wheezes  Abdominal: Soft  Bowel sounds are normal  He exhibits no distension  There is no tenderness  Musculoskeletal:   Increased tenderness of the bilateral lower extremities  The patient has pain even with milder movements at the ankle or the knee  The patient has no drainage through the bandages  Neurovascularly intact distally in the toes  Bandages were not unwrapped and defer to the plastic surgery service  Neurological: He is alert and oriented to person, place, and time  Vitals reviewed  Additional Data:     Labs:    Results from last 7 days   Lab Units 11/11/19  0341   WBC Thousand/uL 8 21   HEMOGLOBIN g/dL 9 4*   HEMATOCRIT % 30 9*   PLATELETS Thousands/uL 202   NEUTROS PCT % 76*   LYMPHS PCT % 7*   MONOS PCT % 10   EOS PCT % 5     Results from last 7 days   Lab Units 11/13/19  0619   POTASSIUM mmol/L 5 5*   CHLORIDE mmol/L 99*   CO2 mmol/L 24   BUN mg/dL 34*   CREATININE mg/dL 7 18*   CALCIUM mg/dL 8 7           * I Have Reviewed All Lab Data Listed Above  * Additional Pertinent Lab Tests Reviewed:  All Labs Within Last 24 Hours Reviewed    Imaging:    Imaging Reports Reviewed Today Include: No new imaging  Imaging Personally Reviewed by Myself Includes:  None    Recent Cultures (last 7 days):           Last 24 Hours Medication List:     Current Facility-Administered Medications:  acetaminophen 650 mg Oral Q6H PRN John Poe MD   acetaminophen 650 mg Oral [facility] [place] [facility] John Poe MD   aluminum-magnesium hydroxide-simethicone 15 mL Oral Q4H PRN John Poe MD   b complex-vitamin C-folic acid 1 capsule Oral Daily With [place] Tirso Lester MD   bisacodyl 10 mg Rectal Daily PRN John Poe MD   calcium acetate 1,334 mg Oral TID With Meals John Poe MD   calcium carbonate 1,000 mg Oral TID PRN John Poe MD   epoetin bob 10,000 Units Intravenous Once per day on Mon Wed Fri John Poe MD   gabapentin 100 mg Oral Daily John Poe MD   heparin (porcine) 5,000 Units Subcutaneous [facility] John Poe MD   HYDROmorphone 1 mg Intravenous Q4H PRN John Poe MD   metoprolol tartrate 25 mg Oral Q12H [facility] John Poe MD   midodrine 5 mg Oral Before Dialysis John Poe MD   ondansetron 4 mg Intravenous Q6H PRN John Poe MD   oxyCODONE 10 mg Oral Q4H PRN John Poe MD   oxyCODONE 5 mg Oral Q4H PRN John Poe MD   pantoprazole 40 mg Oral Early Morning John Poe MD   polyethylene glycol 17 g Oral Daily John Peo MD   saccharomyces boulardii 250 mg Oral BID John oPe MD   saliva substitute 5 spray Mouth/Throat PRN John Poe MD   senna-docusate sodium 2 tablet Oral BID John Poe MD   sevelamer 800 mg Oral TID With Meals John Poe MD   torsemide 40 mg Oral BID (diuretic) John Poe MD        Today, Patient Was Seen By: Sky Mckeon DO    ** Please Note: This note has been constructed using a voice recognition system   **

## 2019-11-13 NOTE — PLAN OF CARE
Problem: Prexisting or High Potential for Compromised Skin Integrity  Goal: Skin integrity is maintained or improved  Description  INTERVENTIONS:  - Identify patients at risk for skin breakdown  - Assess and monitor skin integrity  - Assess and monitor nutrition and hydration status  - Monitor labs   - Assess for incontinence   - Turn and reposition patient  - Assist with mobility/ambulation  - Relieve pressure over bony prominences  - Avoid friction and shearing  - Provide appropriate hygiene as needed including keeping skin clean and dry  - Evaluate need for skin moisturizer/barrier cream  - Collaborate with interdisciplinary team   - Patient/family teaching  - Consider wound care consult   Outcome: Progressing     Problem: Potential for Falls  Goal: Patient will remain free of falls  Description  INTERVENTIONS:  - Assess patient frequently for physical needs  -  Identify cognitive and physical deficits and behaviors that affect risk of falls    -  [place] fall precautions as indicated by assessment   - Educate patient/family on patient safety including physical limitations  - Instruct patient to call for assistance with activity based on assessment  - Modify environment to reduce risk of injury  - Consider OT/PT consult to assist with strengthening/mobility  Outcome: Progressing     Problem: INFECTION - ADULT  Goal: Absence or prevention of progression during hospitalization  Description  INTERVENTIONS:  - Assess and monitor for signs and symptoms of infection  - Monitor lab/diagnostic results  - Monitor all insertion sites, i e  indwelling lines, tubes, and drains  - Monitor endotracheal if appropriate and nasal secretions for changes in amount and color  - [place] appropriate cooling/warming therapies per order  - Administer medications as ordered  - Instruct and encourage patient and family to use good hand hygiene technique  - Identify and instruct in appropriate isolation precautions for identified infection/condition  Outcome: Progressing     Problem: METABOLIC, FLUID AND ELECTROLYTES - ADULT  Goal: Electrolytes maintained within normal limits  Description  INTERVENTIONS:  - Monitor labs and assess patient for signs and symptoms of electrolyte imbalances  - Administer electrolyte replacement as ordered  - Monitor response to electrolyte replacements, including repeat lab results as appropriate  - Instruct patient on fluid and nutrition as appropriate  Outcome: Progressing  Goal: Fluid balance maintained  Description  INTERVENTIONS:  - Monitor labs   - Monitor I/O and WT  - Instruct patient on fluid and nutrition as appropriate  - Assess for signs & symptoms of volume excess or deficit  Outcome: Progressing

## 2019-11-13 NOTE — ASSESSMENT & PLAN NOTE
· Dysphagia 3 diet with extra gravy / sauce  · Thin liquids ok  · Biotene / Mouth Kote  · Speech pathology following

## 2019-11-14 PROBLEM — K21.9 GERD (GASTROESOPHAGEAL REFLUX DISEASE): Status: ACTIVE | Noted: 2019-11-14

## 2019-11-14 LAB
ANION GAP SERPL CALCULATED.3IONS-SCNC: 9 MMOL/L (ref 4–13)
BUN SERPL-MCNC: 23 MG/DL (ref 5–25)
CALCIUM SERPL-MCNC: 9 MG/DL (ref 8.3–10.1)
CHLORIDE SERPL-SCNC: 99 MMOL/L (ref 100–108)
CO2 SERPL-SCNC: 31 MMOL/L (ref 21–32)
CREAT SERPL-MCNC: 5.4 MG/DL (ref 0.6–1.3)
GFR SERPL CREATININE-BSD FRML MDRD: 11 ML/MIN/1.73SQ M
GLUCOSE SERPL-MCNC: 103 MG/DL (ref 65–140)
POTASSIUM SERPL-SCNC: 4.3 MMOL/L (ref 3.5–5.3)
SODIUM SERPL-SCNC: 139 MMOL/L (ref 136–145)

## 2019-11-14 PROCEDURE — 99232 SBSQ HOSP IP/OBS MODERATE 35: CPT | Performed by: INTERNAL MEDICINE

## 2019-11-14 PROCEDURE — 97530 THERAPEUTIC ACTIVITIES: CPT

## 2019-11-14 PROCEDURE — G8981 BODY POS CURRENT STATUS: HCPCS

## 2019-11-14 PROCEDURE — 97163 PT EVAL HIGH COMPLEX 45 MIN: CPT

## 2019-11-14 PROCEDURE — G8982 BODY POS GOAL STATUS: HCPCS

## 2019-11-14 PROCEDURE — 97110 THERAPEUTIC EXERCISES: CPT

## 2019-11-14 PROCEDURE — 80048 BASIC METABOLIC PNL TOTAL CA: CPT | Performed by: PHYSICIAN ASSISTANT

## 2019-11-14 RX ADMIN — CALCIUM ACETATE 1334 MG: 667 CAPSULE ORAL at 12:50

## 2019-11-14 RX ADMIN — HEPARIN SODIUM 5000 UNITS: 5000 INJECTION INTRAVENOUS; SUBCUTANEOUS at 05:07

## 2019-11-14 RX ADMIN — ACETAMINOPHEN 650 MG: 325 TABLET, FILM COATED ORAL at 12:51

## 2019-11-14 RX ADMIN — OXYCODONE HYDROCHLORIDE 10 MG: 10 TABLET ORAL at 10:42

## 2019-11-14 RX ADMIN — HEPARIN SODIUM 5000 UNITS: 5000 INJECTION INTRAVENOUS; SUBCUTANEOUS at 12:49

## 2019-11-14 RX ADMIN — TORSEMIDE 40 MG: 20 TABLET ORAL at 17:58

## 2019-11-14 RX ADMIN — CALCIUM ACETATE 1334 MG: 667 CAPSULE ORAL at 17:57

## 2019-11-14 RX ADMIN — HEPARIN SODIUM 5000 UNITS: 5000 INJECTION INTRAVENOUS; SUBCUTANEOUS at 21:08

## 2019-11-14 RX ADMIN — SEVELAMER HYDROCHLORIDE 800 MG: 800 TABLET, FILM COATED PARENTERAL at 17:59

## 2019-11-14 RX ADMIN — ACETAMINOPHEN 650 MG: 325 TABLET, FILM COATED ORAL at 05:07

## 2019-11-14 RX ADMIN — Medication 1 CAPSULE: at 17:56

## 2019-11-14 RX ADMIN — SENNOSIDES AND DOCUSATE SODIUM 2 TABLET: 8.6; 5 TABLET ORAL at 17:58

## 2019-11-14 RX ADMIN — OXYCODONE HYDROCHLORIDE 10 MG: 10 TABLET ORAL at 17:57

## 2019-11-14 RX ADMIN — CALCIUM ACETATE 1334 MG: 667 CAPSULE ORAL at 08:03

## 2019-11-14 RX ADMIN — METOPROLOL TARTRATE 25 MG: 25 TABLET, FILM COATED ORAL at 21:07

## 2019-11-14 RX ADMIN — GABAPENTIN 100 MG: 100 CAPSULE ORAL at 08:04

## 2019-11-14 RX ADMIN — OXYCODONE HYDROCHLORIDE 10 MG: 10 TABLET ORAL at 04:29

## 2019-11-14 RX ADMIN — SEVELAMER HYDROCHLORIDE 800 MG: 800 TABLET, FILM COATED PARENTERAL at 08:03

## 2019-11-14 RX ADMIN — Medication 250 MG: at 08:04

## 2019-11-14 RX ADMIN — Medication 250 MG: at 17:57

## 2019-11-14 RX ADMIN — SENNOSIDES AND DOCUSATE SODIUM 2 TABLET: 8.6; 5 TABLET ORAL at 08:03

## 2019-11-14 RX ADMIN — POLYETHYLENE GLYCOL 3350 17 G: 17 POWDER, FOR SOLUTION ORAL at 08:05

## 2019-11-14 RX ADMIN — HYDROMORPHONE HYDROCHLORIDE 1 MG: 1 INJECTION, SOLUTION INTRAMUSCULAR; INTRAVENOUS; SUBCUTANEOUS at 12:48

## 2019-11-14 RX ADMIN — OXYCODONE HYDROCHLORIDE 10 MG: 10 TABLET ORAL at 00:04

## 2019-11-14 RX ADMIN — GABAPENTIN 100 MG: 100 CAPSULE ORAL at 17:59

## 2019-11-14 RX ADMIN — ACETAMINOPHEN 650 MG: 325 TABLET, FILM COATED ORAL at 17:58

## 2019-11-14 RX ADMIN — PANTOPRAZOLE SODIUM 40 MG: 40 TABLET, DELAYED RELEASE ORAL at 05:07

## 2019-11-14 RX ADMIN — SEVELAMER HYDROCHLORIDE 800 MG: 800 TABLET, FILM COATED PARENTERAL at 12:50

## 2019-11-14 NOTE — PLAN OF CARE
Problem: Prexisting or High Potential for Compromised Skin Integrity  Goal: Skin integrity is maintained or improved  Description  INTERVENTIONS:  - Identify patients at risk for skin breakdown  - Assess and monitor skin integrity  - Assess and monitor nutrition and hydration status  - Monitor labs   - Assess for incontinence   - Turn and reposition patient  - Assist with mobility/ambulation  - Relieve pressure over bony prominences  - Avoid friction and shearing  - Provide appropriate hygiene as needed including keeping skin clean and dry  - Evaluate need for skin moisturizer/barrier cream  - Collaborate with interdisciplinary team   - Patient/family teaching  - Consider wound care consult   Outcome: Progressing     Problem: Potential for Falls  Goal: Patient will remain free of falls  Description  INTERVENTIONS:  - Assess patient frequently for physical needs  -  Identify cognitive and physical deficits and behaviors that affect risk of falls    -  [place] fall precautions as indicated by assessment   - Educate patient/family on patient safety including physical limitations  - Instruct patient to call for assistance with activity based on assessment  - Modify environment to reduce risk of injury  - Consider OT/PT consult to assist with strengthening/mobility  Outcome: Progressing     Problem: INFECTION - ADULT  Goal: Absence or prevention of progression during hospitalization  Description  INTERVENTIONS:  - Assess and monitor for signs and symptoms of infection  - Monitor lab/diagnostic results  - Monitor all insertion sites, i e  indwelling lines, tubes, and drains  - Monitor endotracheal if appropriate and nasal secretions for changes in amount and color  - [place] appropriate cooling/warming therapies per order  - Administer medications as ordered  - Instruct and encourage patient and family to use good hand hygiene technique  - Identify and instruct in appropriate isolation precautions for identified infection/condition  Outcome: Progressing     Problem: METABOLIC, FLUID AND ELECTROLYTES - ADULT  Goal: Electrolytes maintained within normal limits  Description  INTERVENTIONS:  - Monitor labs and assess patient for signs and symptoms of electrolyte imbalances  - Administer electrolyte replacement as ordered  - Monitor response to electrolyte replacements, including repeat lab results as appropriate  - Instruct patient on fluid and nutrition as appropriate  Outcome: Progressing  Goal: Fluid balance maintained  Description  INTERVENTIONS:  - Monitor labs   - Monitor I/O and WT  - Instruct patient on fluid and nutrition as appropriate  - Assess for signs & symptoms of volume excess or deficit  Outcome: Progressing     Problem: SKIN/TISSUE INTEGRITY - ADULT  Goal: Skin integrity remains intact  Description  INTERVENTIONS  - Identify patients at risk for skin breakdown  - Assess and monitor skin integrity  - Assess and monitor nutrition and hydration status  - Monitor labs (i e  albumin)  - Assess for incontinence   - Turn and reposition patient  - Assist with mobility/ambulation  - Relieve pressure over bony prominences  - Avoid friction and shearing  - Provide appropriate hygiene as needed including keeping skin clean and dry  - Evaluate need for skin moisturizer/barrier cream  - Collaborate with interdisciplinary team (i e  Nutrition, Rehabilitation, etc )   - Patient/family teaching  Outcome: Progressing  Goal: Incision(s), wounds(s) or drain site(s) healing without S/S of infection  Description  INTERVENTIONS  - Assess and document risk factors for skin impairment   - Assess and document dressing, incision, wound bed, drain sites and surrounding tissue  - Consider nutrition services referral as needed  - Oral mucous membranes remain intact  - Provide patient/ family education  Outcome: Progressing  Goal: Oral mucous membranes remain intact  Description  INTERVENTIONS  - Assess oral mucosa and hygiene practices  - Implement preventative oral hygiene regimen  - Implement oral medicated treatments as ordered  - Initiate Nutrition services referral as needed  Outcome: Progressing     Problem: Nutrition/Hydration-ADULT  Goal: Nutrient/Hydration intake appropriate for improving, restoring or maintaining nutritional needs  Description  Monitor and assess patient's nutrition/hydration status for malnutrition  Collaborate with interdisciplinary team and initiate plan and interventions as ordered  Monitor patient's weight and dietary intake as ordered or per policy  Utilize nutrition screening tool and intervene as necessary  Determine patient's food preferences and provide high-protein, high-caloric foods as appropriate       INTERVENTIONS:  - Monitor oral intake, urinary output, labs, and treatment plans  - Assess nutrition and hydration status and recommend course of action  - Evaluate amount of meals eaten  - Assist patient with eating if necessary   - Allow adequate time for meals  - Recommend/ encourage appropriate diets, oral nutritional supplements, and vitamin/mineral supplements  - Order, calculate, and assess calorie counts as needed  - Recommend, monitor, and adjust tube feedings and TPN/PPN based on assessed needs  - Assess need for intravenous fluids  - Provide specific nutrition/hydration education as appropriate  - Include patient/family/caregiver in decisions related to nutrition  Outcome: Progressing

## 2019-11-14 NOTE — PROGRESS NOTES
NEPHROLOGY PROGRESS NOTE   Vanda Sharma 64 y o  male MRN: [id number]  Unit/Bed#: S -01 Encounter: [id number]  Reason for Consult: ESRD    ASSESSMENT/PLAN:  1  ESRD on HD ([facility] on M-T-Th-F)  - inpatient schedule is MWF x3 hours  - can add Saturday treatments as needed  - patient wants weight to be challenged as he feels he is not eating much and losing body weight  - decrease EDW to 82kg  - AVOID IVF PLEASE  - patient's weight has been fluctuating due to IVF  - up to 86kg after dialysis yesterday, continue to challenge weight back down to normal dry weight  2  Access- right IJ perm cath  3  Hyperkalemia- low K diet, 2K potassium bath with HD  - avoid lactated ringers IVF  4  Hypotension- receives midodrine 5mg prior to HD as needed  5  Volume- he takes torsemide 40mg twice a day  6  Anemia- continue epogen 75024 units with HD (3x/week)  7  Hyperphosphatemia- continue phoslo and sevelamer  8  LE wounds- per surgery    Disposition:  Consider dialysis on Saturday this week to get EDW back to 82kg    SUBJECTIVE:  Patient denies sob  Worried about getting up and doing PT with his legs      OBJECTIVE:  Current Weight: Weight - Scale: 82 3 kg (181 lb 7 oz)  Vitals:    11/13/19 2229 11/14/19 0424 11/14/19 0627 11/14/19 0757   BP: 100/57 119/68 112/62 108/56   BP Location: Left arm Left arm Left arm Left arm   Pulse: 92  90    Resp: 18  18    Temp: 98 5 °F (36 9 °C)  98 2 °F (36 8 °C)    TempSrc: Oral  Oral    SpO2: 93%  94%    Weight:       Height:           Intake/Output Summary (Last 24 hours) at 11/14/2019 1059  Last data filed at 11/13/2019 2138  Gross per 24 hour   Intake 420 ml   Output 2500 ml   Net -2080 ml     General: NAD  Skin: no rash  Eyes: anicteric  ENMT: mm moist  Neck: no masses  Respiratory: CTAB  Cardiac: RRR  Extremities: LE wrapped  GI: soft nt nd  Neuro: alert awake  Psych: mood and affect appropriate    Medications:    Current Facility-Administered Medications:    acetaminophen (TYLENOL) tablet 650 mg, 650 mg, Oral, Q6H PRN, Steven Galarza MD, 650 mg at 11/08/19 1321    acetaminophen (TYLENOL) tablet 650 mg, 650 mg, Oral, Q6H [address], Steven Galarza MD, 650 mg at 11/14/19 0507    aluminum-magnesium hydroxide-simethicone (MYLANTA) 200-200-20 mg/5 mL oral suspension 15 mL, 15 mL, Oral, Q4H PRN, Steven Galarza MD    b complex-vitamin C-folic acid (NEPHROCAPS) capsule 1 capsule, 1 capsule, Oral, Daily With Robina Tee MD, 1 capsule at 11/13/19 1726    bisacodyl (DULCOLAX) rectal suppository 10 mg, 10 mg, Rectal, Daily PRN, Steven Galarza MD, 10 mg at 11/10/19 2104    calcium acetate (PHOSLO) capsule 1,334 mg, 1,334 mg, Oral, TID With Meals, Steven Galarza MD, 1,334 mg at 11/14/19 0803    calcium carbonate (TUMS) chewable tablet 1,000 mg, 1,000 mg, Oral, TID PRN, Steven Galarza MD, 1,000 mg at 11/11/19 1006    epoetin bob (EPOGEN,PROCRIT) injection 10,000 Units, 10,000 Units, Intravenous, Once per day on Mon Wed Fri, Steven Galarza MD, 10,000 Units at 11/13/19 1113    gabapentin (NEURONTIN) capsule 100 mg, 100 mg, Oral, BID, Jose Rafaeleda John, DO, 100 mg at 11/14/19 0804    heparin (porcine) subcutaneous injection 5,000 Units, 5,000 Units, Subcutaneous, Q8H [address], 5,000 Units at 11/14/19 0507 **AND** [COMPLETED] Platelet count, , , Once, Jesse Chun PA-C    HYDROmorphone (DILAUDID) injection 1 mg, 1 mg, Intravenous, Q4H PRN, Steven Galarza MD, 1 mg at 11/13/19 0804    methylnaltrexone (RELISTOR) subcutaneous injection 6 mg, 6 mg, Subcutaneous, Q48H PRN, Leonardo Lopez DO, 6 mg at 11/13/19 1729    metoprolol tartrate (LOPRESSOR) tablet 25 mg, 25 mg, Oral, Q12H [address], Steven Galarza MD, 25 mg at 11/13/19 2213    midodrine (PROAMATINE) tablet 5 mg, 5 mg, Oral, Before Dialysis, Steven Galarza MD, 5 mg at 11/13/19 0948    ondansetron (ZOFRAN) injection 4 mg, 4 mg, Intravenous, Q6H PRN, John Poe MD    oxyCODONE (ROXICODONE) IR tablet 10 mg, 10 mg, Oral, Q4H PRN, John Poe MD, 10 mg at 11/14/19 1042    oxyCODONE (ROXICODONE) IR tablet 5 mg, 5 mg, Oral, Q4H PRN, John Poe MD, 5 mg at 11/11/19 0617    pantoprazole (PROTONIX) EC tablet 40 mg, 40 mg, Oral, Early Morning, John Poe MD, 40 mg at 11/14/19 0507    polyethylene glycol (MIRALAX) packet 17 g, 17 g, Oral, Daily, John Poe MD, 17 g at 11/14/19 0805    saccharomyces boulardii (FLORASTOR) capsule 250 mg, 250 mg, Oral, BID, John Poe MD, 250 mg at 11/14/19 0804    saliva substitute (MOUTH KOTE) mucosal solution 5 spray, 5 spray, Mouth/Throat, PRN, John Poe MD, 5 spray at 11/09/19 1517    senna-docusate sodium (SENOKOT S) 8 6-50 mg per tablet 2 tablet, 2 tablet, Oral, BID, John Poe MD, 2 tablet at 11/14/19 0803    sevelamer (RENAGEL) tablet 800 mg, 800 mg, Oral, TID With Meals, John Poe MD, 800 mg at 11/14/19 0803    torsemide (DEMADEX) tablet 40 mg, 40 mg, Oral, BID (diuretic), John Poe MD, 40 mg at 11/12/19 0840    Laboratory Results:  Results from last 7 days   Lab Units 11/14/19  0450 11/13/19  0937 11/13/19  0619 11/12/19  0325 11/11/19  0341 11/10/19  0837 11/09/19  1515 11/08/19  0452   WBC Thousand/uL  --  7 47  --   --  8 21 7 74  --  7 42   HEMOGLOBIN g/dL  --  8 0*  --   --  9 4* 9 6*  --  8 7*   HEMATOCRIT %  --  26 1*  --   --  30 9* 31 6*  --  28 0*   PLATELETS Thousands/uL  --  200  --   --  202 186  --  170   POTASSIUM mmol/L 4 3  --  5 5* 4 6 5 6* 5 4* 5 7* 6 1*   CHLORIDE mmol/L 99*  --  99* 98* 99* 97* 104 101   CO2 mmol/L 31  --  24 31 29 30 29 26   BUN mg/dL 23  --  34* 27* 43* 36* 32* 43*   CREATININE mg/dL 5 40*  --  7 18* 5 84* 8 08* 7 22* 6 50* 7 27*   CALCIUM mg/dL 9 0  --  8 7 8 8 9 4 9 7 9 4 9 0   PHOSPHORUS mg/dL  --   --   -- 4 4  --   --   --   --

## 2019-11-14 NOTE — ASSESSMENT & PLAN NOTE
· Local skin care  · Low Air loss mattress changed 11/12/2019 to help with prevention of wounds  · Turn / Reposition frequently

## 2019-11-14 NOTE — PLAN OF CARE
Problem: PHYSICAL THERAPY ADULT  Goal: Performs mobility at highest level of function for planned discharge setting  See evaluation for individualized goals  Description  Treatment/Interventions: Spoke to nursing, Spoke to case management, Spoke to MD, Continued evaluation, Bed mobility, Endurance training, Therapeutic exercise, LE strengthening/ROM  Equipment Recommended: (WILL CONT TO ASSESS)       See flowsheet documentation for full assessment, interventions and recommendations  Note:   Prognosis: Fair  Problem List: Decreased strength, Decreased range of motion, Decreased endurance, Impaired balance, Decreased mobility, Pain, Decreased skin integrity, Decreased cognition  Assessment: PT COMPLETED EVALUATION OF 64YEAR OLD MALE (SELF REPORTED PREFERRED NAME "JOSEPH") WHO PRESENTED TO DEB ON 10/26/19 FROM [facility] WITH FEVER  OF NOTE PATIENT HAS H/O CHRONIC LE WOUNDS  CURRENT DIAGNOSES INCLUDE MULTIPLE OPEN WOUNDS OF B/L LE, SCROTAL SWELLING, AND AFIB  PATIENT HAS UNDERGONE THE FOLLOWING PROCEDURES WITH PLASTIC SURGERY: 11/7/19 B/L LE DEBRIDEMENT, STSG HARVESTED FROM R THIGH AND COVERED 80% OF R LE AND ON 11/12/19 STSG WAS HARVESTED FROM RIGHT THIGH AND COVERED R DORSAL FOOT AND L LE  CURRENT STATUS INSTABILITIES INCLUDE PAIN, RESTRICTIONS POST-OP, CONTACT PRECAUTIONS, FALLS RISK, AND A REGRESSION IN FUNCTIONAL STATUS FROM BASELINE  PMH IS SIGNIFICANT FOR ESRD-HD, AFIB, POLYCYSTIC KIDNEY DISEASE, AND PARACENTESIS  PER PATIENT AND CHART REVIEW, PATIENT WAS ADMITTED FROM [facility] WHERE HE HAS BEEN SINCE 10/3/19  NORMALLY THIS PATIENT RESIDES WITH HIS SPOUSE (ALTHOUGH FREQUENTLY HOME ALONE WHILE SHE WORKS) IN A  TWO STORY HOME ([address]) WHERE AT BASELINE HE WAS I WITH MOBILITY (NO AD) AND ADLS  CURRENT IMPAIRMENTS INCLUDE PAIN, DECREASED ACTIVITY TOLERANCE AND GROSS STRENGTH, AND LIMITED PARTICIPATION IN FUNCTIONAL MOBILITY   DURING PT EVALUATION PATIENT REPORTING PAIN IN RIGHT THIGH AND DECLINING PARTICIPATION IN FUNCTIONAL MOBILITY  HE REPORTS ROLLING BY HIMSELF WITH USE OF BED RAIL WITH MUCH PAIN  HE WAS ABLE TO DEMONSTRATE LONG SITTING BY USING B/L BED RAILS AND PULLING TRUNK FROM BACK OF BED WITH B/L UE  PT OBSERVED ACTIVE LE ROM BED LEVEL  PATIENT DEMONSTRATED B/L ANKLE DORSIFLEXION AND PLANTARFLEXION, B/L KNEE EXTENSION AND FLEXION, AND B/L HIP FLEXION AND ABDUCTION THAT IS WFL (SOME RESTRICTIONS FROM ACE WRAP)  IN FUTURE SESSIONS WILL CONTINUE TO ASSIST PATIENT MOBILIZING B/L LE BEDLEVEL TO PREVENT CONTRACTURES AND ALSO TRIAL FUNCTIONAL MOBILITY (SITTING AND TRANSFERS)  ANTICIPATE THIS PATIENT WILL REQUIRE STR UPON D/C  PATIENT WILL BENEFIT FROM CONTINUED SKILLED PT THIS ADMISSION TO ACHIEVE MAXIMAL FUNCTION AND SAFETY  Recommendation: Short-term skilled PT     PT - OK to Discharge: No(PER MD, PENDING ANOTHER PROCEDURE MON)    See flowsheet documentation for full assessment

## 2019-11-14 NOTE — ASSESSMENT & PLAN NOTE
· Senna/docusate b i d  Also, MiraLax daily scheduled dosing  · Relistor q48h PRN - dose given PM of 11/13/2019  · PRN suppository  · Obstruction series xray if no BM today  · Additional adjustments as needed  · Monitor for BMs

## 2019-11-14 NOTE — ASSESSMENT & PLAN NOTE
· Procedure - Debridement, removal of Eschar on 10/31/2019 by Dr Fred Beltran  · Split Thickness Skin Grafting by Plastic Surgery -      · Procedure - STSG on 11/7 per plastic surgery   · Procedure - Washout with additional STSG by plastic surgery team 11/12/2019  · Procedure - will return to OR for planned surgery as a staged procedure dressing change under anesthesia within 1 week of the prior procedure  · No dressing changes per plastic surgery  · Activity - will clarify with plastic surgery  Suspect will be WBAT  · Monitor for Infection -   · Antibiotic - completed 7 days of vanc and cefepime  Now monitoring off antibiotics  · Blood cultures from 10/31/2019: NGTD  · Monitor temperatures and serial leg exams  · Pain Control -   · Oxycodone 5/10 mg  · Breakthrough pain - Dilaudid 1 mg IV  · Monitor pain levels    · Supportive care

## 2019-11-14 NOTE — PROGRESS NOTES
Progress Note - Zuleika Vallecillo 1960, 64 y o  male MRN: [id number]    Unit/Bed#: S -01 Encounter: [id number]    Primary Care Provider: Melly Altamirano   Date and time admitted to hospital: 10/26/2019  5:10 PM        * Multiple and open wound of lower limb  Assessment & Plan  · Procedure - Debridement, removal of Eschar on 10/31/2019 by Dr Xochitl Mukherjee  · Split Thickness Skin Grafting by Plastic Surgery -      · Procedure - STSG on 11/7 per plastic surgery   · Procedure - Washout with additional STSG by plastic surgery team 11/12/2019  · Procedure - will return to OR for planned surgery as a staged procedure dressing change under anesthesia within 1 week of the prior procedure  · No dressing changes per plastic surgery  · Activity - will clarify with plastic surgery  Suspect will be WBAT  · Monitor for Infection -   · Antibiotic - completed 7 days of vanc and cefepime  Now monitoring off antibiotics  · Blood cultures from 10/31/2019: NGTD  · Monitor temperatures and serial leg exams  · Pain Control -   · Oxycodone 5/10 mg  · Breakthrough pain - Dilaudid 1 mg IV  · Monitor pain levels  · Supportive care    Scrotal swelling  Assessment & Plan  · Ultrasound showed "Large complex right hydrocele containing numerous septations  Increased vascularity in the left testicle with respect to the right, although both testicles could not be imaged on the same plane limiting direct comparison   Correlate clinically for orchitis "  · Urology saw patient 10/29/2019 and recommends no surgical intervention acutely and to manage problems with legs and get therapy first   · beta HCG, AFP and LDH within normal limits  · Supportive care  Atrial fibrillation ([address] )  Assessment & Plan  · Rate / Rhythm control -   · Metoprolol increased to 25 mg bid on 10/29/2019  · Monitor on telemetry  Monitor HR and blood pressures    · Anticoagulation - None prehospital   Defer to outpatient team following discharge  ESRD (end stage renal disease) ([facility] [address] )  Assessment & Plan  · On routine dialysis  · Nephrology following  Anemia due to chronic kidney disease, on chronic dialysis and Acute Blood Loss Anemia (HCC)  Assessment & Plan  · Transfuse as needed  · Monitor counts - Intermittent CBC monitoring  At Risk for Pressure ulcer, buttock  Assessment & Plan  · Local skin care  · Low Air loss mattress changed 11/12/2019 to help with prevention of wounds  · Turn / Reposition frequently  GERD (gastroesophageal reflux disease)  Assessment & Plan  · Protonix started 11/13/2019  Oropharyngeal dysphagia  Assessment & Plan  · Dysphagia 3 diet with extra gravy / sauce  · Thin liquids ok  · Biotene / Mouth Kote  · Speech pathology following  Constipation  Assessment & Plan  · Senna/docusate b i d  Also, MiraLax daily scheduled dosing  · Relistor q48h PRN - dose given PM of 11/13/2019  · PRN suppository  · Additional adjustments as needed  · Monitor for BMs  VTE Pharmacologic Prophylaxis:   Pharmacologic: Heparin  Mechanical VTE Prophylaxis in Place: Yes    Patient Centered Rounds: I have performed bedside rounds with nursing staff today  Discussions with Specialists or Other Care Team Provider:  [facility] text message sent to plastic surgery (Dr Wayne Robledo)    Education and Discussions with Family / Patient:  Patient only    Time Spent for Care: 30 minutes  More than 50% of total time spent on counseling and coordination of care as described above  Current Length of Stay: 19 day(s)  Current Patient Status: Inpatient     Certification Statement: The patient will continue to require additional inpatient hospital stay due to Continued evaluation and treatment for complex leg ulcers  Discharge Plan / Estimated Discharge Date: Will be here until later next week, per discussion with plastic surgeon  Code Status: Level 1 - Full Code      Subjective:    The patient has a burning pain feeling just proximal to the bandaging on his right lower extremity  The patient has aching pain in bone type pain in his right lower extremity more so than the left lower extremity but the pain medication is currently keeping things reasonably controlled  The patient did have 2 very good bowel movements after receiving Relistor last night  He states that his abdomen feels much better today  He feels that his legs are stiffening and when I did see him today, physical therapy was in the room working with him  Objective:     Vitals:   Temp (24hrs), Av 3 °F (36 8 °C), Min:98 °F (36 7 °C), Max:98 5 °F (36 9 °C)    Temp:  [98 °F (36 7 °C)-98 5 °F (36 9 °C)] 98 2 °F (36 8 °C)  HR:  [81-95] 90  Resp:  [16-18] 18  BP: ()/(52-72) 108/56  SpO2:  [93 %-94 %] 94 %  Body mass index is 27 59 kg/m²  Input and Output Summary (last 24 hours): Intake/Output Summary (Last 24 hours) at 2019 0944  Last data filed at 2019 2138  Gross per 24 hour   Intake 420 ml   Output 2500 ml   Net -2080 ml       Physical Exam:     Physical Exam   Constitutional: He is oriented to person, place, and time  No distress  HENT:   Mouth/Throat: Oropharynx is clear and moist  No oropharyngeal exudate  Eyes: Pupils are equal, round, and reactive to light  Cardiovascular: Normal rate and regular rhythm  Pulmonary/Chest: Effort normal  He has no wheezes  He has no rales  Abdominal: Bowel sounds are normal  He exhibits distension  There is no tenderness  Less hyper-resonance on percussion  Small reducible umbilical hernia  Genitourinary:   Genitourinary Comments: Scrotal edema is less than the last week that I had seen him and stable compared to yesterday  Musculoskeletal: He exhibits tenderness (On palpation over the bandages  One small area of serosanguineous type drainage on to the bandage on the right lower extremity but this appears to be older  )  He exhibits no edema     Slightly decreased sensation but equal/symmetric bilaterally in the toes  Toes does appear to be well perfused  Bandages were not taken down at the request of plastic surgeon  Neurological: He is alert and oriented to person, place, and time  Vitals reviewed  Additional Data:     Labs:    Results from last 7 days   Lab Units 11/13/19  0937   WBC Thousand/uL 7 47   HEMOGLOBIN g/dL 8 0*   HEMATOCRIT % 26 1*   PLATELETS Thousands/uL 200   NEUTROS PCT % 71   LYMPHS PCT % 11*   MONOS PCT % 11   EOS PCT % 5     Results from last 7 days   Lab Units 11/14/19  0450   POTASSIUM mmol/L 4 3   CHLORIDE mmol/L 99*   CO2 mmol/L 31   BUN mg/dL 23   CREATININE mg/dL 5 40*   CALCIUM mg/dL 9 0           * I Have Reviewed All Lab Data Listed Above  * Additional Pertinent Lab Tests Reviewed:  All Labs Within Last 24 Hours Reviewed    Imaging:    Imaging Reports Reviewed Today Include:  No new imaging results  Imaging Personally Reviewed by Myself Includes:  None    Recent Cultures (last 7 days):           Last 24 Hours Medication List:     Current Facility-Administered Medications:  acetaminophen 650 mg Oral Q6H PRN Codie Dewitt MD   acetaminophen 650 mg Oral Q6H [address] Codie Dewitt MD   aluminum-magnesium hydroxide-simethicone 15 mL Oral Q4H PRN Codie Dewitt MD   b complex-vitamin C-folic acid 1 capsule Oral Daily With Rina Gaytan MD   bisacodyl 10 mg Rectal Daily PRN Codie Dewitt MD   calcium acetate 1,334 mg Oral TID With Meals Codie Dewitt MD   calcium carbonate 1,000 mg Oral TID PRN Codie Dewitt MD   epoetin bob 10,000 Units Intravenous Once per day on Mon Wed Fri Codie Dewitt MD   gabapentin 100 mg Oral BID Guille Bowens DO   heparin (porcine) 5,000 Units Subcutaneous [facility] Codie Dewitt MD   HYDROmorphone 1 mg Intravenous Q4H PRN Codie Dewitt MD   methylnaltrexone bromide 6 mg Subcutaneous Q48H PRN Guille Bowens DO metoprolol tartrate 25 mg Oral Q12H [facility]  [facility] Saintair Leigh MD   midodrine 5 mg Oral Before Dialysis Travis Tesfaye, MD   ondansetron 4 mg Intravenous Q6H PRN Travis Tesfaye, MD   oxyCODONE 10 mg Oral Q4H PRN Travis Tesfaye, MD   oxyCODONE 5 mg Oral Q4H PRN Travis Tesfaye, MD   pantoprazole 40 mg Oral Early Morning Travis Tesfaye, MD   polyethylene glycol 17 g Oral Daily Travis Tesfaye, MD   saccharomyces boulardii 250 mg Oral BID Travis Tesfaye, MD   saliva substitute 5 spray Mouth/Throat PRN Travis Tesfaye, MD   senna-docusate sodium 2 tablet Oral BID Travis Tesfaye, MD   sevelamer 800 mg Oral TID With Meals Travis Tesfaye, MD   torsemide 40 mg Oral BID (diuretic) Travis Tesfaye MD        Today, Patient Was Seen By: Cleveland Yates DO    ** Please Note: This note has been constructed using a voice recognition system   **

## 2019-11-14 NOTE — SOCIAL WORK
Patient remains not medically stable for discharge  Plan is for patient to return to OR on 11/18/19 for a planned surgery as a staged procedure dressing change under anesthesia  OSCAR spoke with Leigh the liaison for [employer] today who reported they are working on getting him a speciality mattress for discharge and this will be available next week  OSCAR also reviewed with Leigh the plan for OR procedure with expected discharge after that once cleared by plastics  [employer] will have the bed available for him for STR with the bedside HD  CM department will continue to follow to assist with discharge coordination

## 2019-11-14 NOTE — PHYSICAL THERAPY NOTE
Physical Therapy Evaluation AND TREATMENT    Patient's Name: Amber Milian    Admitting Diagnosis  Hyperkalemia [E87 5]  Lactic acidosis [E87 2]  Cellulitis [L03 90]    Problem List  Patient Active Problem List   Diagnosis    Chronic kidney disease with end stage renal failure on dialysis ([facility] [address] )    Hyponatremia    Multiple and open wound of lower limb    Polycystic liver disease    Total bilirubin, elevated    Thrombocytopenia (HCC)    Gram-negative bacteremia    Polycystic kidney disease    ADPKD (autosomal dominant polycystic kidney)    Benign essential hypertension    Peripheral neuropathy    Atrial fibrillation (HCC)    Hypotension    Anemia due to chronic kidney disease, on chronic dialysis and Acute Blood Loss Anemia (HCC)    Ileus (HCC)    Ascites    Elevated d-dimer    ESRD (end stage renal disease) on dialysis ([facility] [address] )    Hyperkalemia    Scrotal swelling    ESRD (end stage renal disease) ([facility] [address] )    At Risk for Pressure ulcer, buttock    Wound of right leg    Wound of left leg    Constipation    Multiple wounds of skin    Cellulitis    Oropharyngeal dysphagia    GERD (gastroesophageal reflux disease)       Past Medical History  Past Medical History:   Diagnosis Date    Complication of renal dialysis     Polycystic kidney disease        Past Surgical History  Past Surgical History:   Procedure Laterality Date    IR CATHETERGRAM  10/17/2019    IR IMAGE GUIDED ASPIRATION / DRAINAGE  9/23/2019    IR PARACENTESIS  10/9/2019    IR [address] EXCHANGE  10/17/2019    IR [address] PLACEMENT  9/30/2019    SPLIT THICKNESS SKIN GRAFT Bilateral 11/7/2019    Procedure: SKIN GRAFT SPLIT THICKNESS (STSG)  EXTREMITY;  Surgeon: Zeina Howe MD;  [facility];  Service: Plastics    SPLIT THICKNESS SKIN GRAFT Bilateral 11/12/2019    Procedure: SKIN GRAFT SPLIT THICKNESS (STSG)  EXTREMITY;  Surgeon: Zeina Howe MD;  [facility];  Service: Plastics  WOUND DEBRIDEMENT Bilateral 10/31/2019    Procedure: DEBRIDEMENT WOUND [facility] OUT); Surgeon: Bronwyn Power DPM;  [facility];  Service: Podiatry    WOUND DEBRIDEMENT Bilateral 11/5/2019    Procedure: DEBRIDEMENT WOUND [facility] OUT); Surgeon: Bronwyn Power DPM;  [facility];  Service: Podiatry    WOUND DEBRIDEMENT Bilateral 11/7/2019    Procedure: DEBRIDEMENT WOUND [facility] OUT); Surgeon: Josh Carpenter MD;  [facility];  Service: Plastics    WOUND DEBRIDEMENT Bilateral 11/12/2019    Procedure: DEBRIDEMENT LOWER EXTREMITY [facility] OUT); Surgeon: Josh Carpenter MD;  [facility];  Service: Plastics        11/14/19 1100   Note Type   Note type Eval only   Pain Assessment   Pain Assessment 0-10   Pain Score 6   Pain Type Acute pain;Surgical pain   Pain Location Leg   Pain Orientation Right;Proximal   Effect of Pain on Daily Activities LIMITS MOBILTY    Patient's Stated Pain Goal No pain   Hospital Pain Intervention(s) Medication (See MAR); Repositioned  (RN PROVIDED WITH MED)   Response to Interventions TOLERATED THER-EX/REPOSITIONING   Home Living   Type of [address] [place] [address] Two level  (6 +1 [address])   Home Equipment   (DENIES)   Additional Comments  PER PATIENT AND CHART REVIEW, PATIENT WAS ADMITTED FROM [facility] WHERE HE HAS BEEN SINCE 10/3/19  NORMALLY THIS PATIENT RESIDES WITH HIS SPOUSE (ALTHOUGH FREQUENTLY HOME ALONE WHILE SHE WORKS) IN A  TWO STORY HOME (6+1 [address]) WHERE AT BASELINE HE WAS I WITH MOBILITY (NO AD) AND ADLS  Prior Function   Level of [place] Needs assistance with IADLs; Independent with ADLs and functional mobility   Lives With Spouse   Receives Help From Family   ADL Assistance Independent   IADLs Needs assistance   Falls in the last 6 months   (DID NOT REPORT )   Restrictions/Precautions   Weight Bearing Precautions Per Order Yes   RLE Weight Bearing Per Order WBAT  (PER TT BY DR ISLAS: "CAN DO ANYTHING WITH THE RIGHT")   LLE Weight Bearing Per Order NWB  (PER TT FROM DR ISLAS "NO WALKING ON THE LEFT")   Braces or Orthoses Other (Comment)  (B/L ACE WRAP BANDAGES)   Other Precautions Pain; Fall Risk;Contact/isolation;WBS  ("KEEP B/L LE ELEVATED RESTING")   General   Family/Caregiver Present No   Cognition   Arousal/Participation Alert   Orientation Level Oriented X4   Memory Decreased recall of recent events   Following Commands Follows one step commands with increased time or repetition   Comments PLEASANT/COOPERATIVE; ADMITTING SHORT TERM MEMORY DEFICITS AND WRITING INFORMATION DOWN    RUE Assessment   RUE Assessment WFL  (4/5 GROSSLY )   LUE Assessment   LUE Assessment WFL  (4/5 GROSSLY )   RLE Assessment   RLE Assessment X   Strength RLE   RLE Overall Strength 3-/5   LLE Assessment   LLE Assessment X   Strength LLE   LLE Overall Strength 3-/5   Bed Mobility   Rolling R Unable to assess  (PATIENT DECLINING PARTICIPATION 2* PAIN)   Rolling L Unable to assess  (PATIENT DECLINING PARTICIPATION 2* PAIN)   Supine to Sit Unable to assess  (PATIENT DECLINING PARTICIPATION 2* PAIN)   Sit to Supine Unable to assess  (PATIENT DECLINING PARTICIPATION 2* PAIN)   Transfers   Sit to Stand Unable to assess  (PATIENT DECLINING PARTICIPATION 2* PAIN)   Stand to Sit Unable to assess  (PATIENT DECLINING PARTICIPATION 2* PAIN)   Ambulation/Elevation   Gait pattern Not tested; Not appropriate   Endurance Deficit   Endurance Deficit Yes   Endurance Deficit Description HAS BEEN IN BED X APPROX 3 WEEKS; PAIN; GROSS WEAKNESS   Activity Tolerance   Activity Tolerance Patient limited by pain;Treatment limited secondary to medical complications (Comment)  (RESTRICTIONS POST-OP)   Medical Staff Made Aware PT SPOKE WITH DR ISLAS VIA TIGER TEXT  AM ON 11/14/19 WHO REPORTED THE FOLLOWING INFORMATION WHEN PT QUESTIONED WHAT PATIENT WAS CURRENTLY ALLOWED TO DO WITH THERAPY POST-OP: "HE CAN DO ANYTHING WITH RIGHT LEG BUT NO WALKING ON LEFT, JUST HAD A RECENT GRAFT  I WILL START WORKING MORE ON JOINTS ROM EXERCISES TO AVOID CONTRACTURE", "TRANSFER BOTH BUT ONLY WEIGHT BEARING RIGHT UNTIL MONDAY ACTIVE ROM BOTH"   Nurse Made Aware TREVOR TO SEE PER RN RONAL    Assessment   Prognosis Fair   Problem List Decreased strength;Decreased range of motion;Decreased endurance; Impaired balance;Decreased mobility;Pain;Decreased skin integrity; Decreased cognition   Assessment PT COMPLETED EVALUATION OF 64YEAR OLD MALE (SELF REPORTED PREFERRED NAME "JOSEPH") WHO PRESENTED TO DEB ON 10/26/19 FROM [facility] WITH FEVER  OF NOTE PATIENT HAS H/O CHRONIC LE WOUNDS  CURRENT DIAGNOSES INCLUDE MULTIPLE OPEN WOUNDS OF B/L LE, SCROTAL SWELLING, AND AFIB  PATIENT HAS UNDERGONE THE FOLLOWING PROCEDURES WITH PLASTIC SURGERY: 11/7/19 B/L LE DEBRIDEMENT, STSG HARVESTED FROM R THIGH AND COVERED 80% OF R LE AND ON 11/12/19 STSG WAS HARVESTED FROM RIGHT THIGH AND COVERED R DORSAL FOOT AND L LE  CURRENT STATUS INSTABILITIES INCLUDE PAIN, RESTRICTIONS POST-OP, CONTACT PRECAUTIONS, FALLS RISK, AND A REGRESSION IN FUNCTIONAL STATUS FROM BASELINE  PMH IS SIGNIFICANT FOR ESRD-HD, AFIB, POLYCYSTIC KIDNEY DISEASE, AND PARACENTESIS  PER PATIENT AND CHART REVIEW, PATIENT WAS ADMITTED FROM [facility] WHERE HE HAS BEEN SINCE 10/3/19  NORMALLY THIS PATIENT RESIDES WITH HIS SPOUSE (ALTHOUGH FREQUENTLY HOME ALONE WHILE SHE WORKS) IN A  TWO STORY HOME (6+1 [address]) WHERE AT BASELINE HE WAS I WITH MOBILITY (NO AD) AND ADLS  CURRENT IMPAIRMENTS INCLUDE PAIN, DECREASED ACTIVITY TOLERANCE AND GROSS STRENGTH, AND LIMITED PARTICIPATION IN FUNCTIONAL MOBILITY  DURING PT EVALUATION PATIENT REPORTING PAIN IN RIGHT THIGH AND DECLINING PARTICIPATION IN FUNCTIONAL MOBILITY  HE REPORTS ROLLING BY HIMSELF WITH USE OF BED RAIL WITH MUCH PAIN  HE WAS ABLE TO DEMONSTRATE LONG SITTING BY USING B/L BED RAILS AND PULLING TRUNK FROM BACK OF BED WITH B/L UE  PT OBSERVED ACTIVE LE ROM BED LEVEL   PATIENT DEMONSTRATED B/L ANKLE DORSIFLEXION AND PLANTARFLEXION, B/L KNEE EXTENSION AND FLEXION, AND B/L HIP FLEXION AND ABDUCTION THAT IS WFL (SOME RESTRICTIONS FROM ACE WRAP)  IN FUTURE SESSIONS WILL CONTINUE TO ASSIST PATIENT MOBILIZING B/L LE BEDLEVEL TO PREVENT CONTRACTURES AND ALSO TRIAL FUNCTIONAL MOBILITY (SITTING AND TRANSFERS)  ANTICIPATE THIS PATIENT WILL REQUIRE STR UPON D/C  PATIENT WILL BENEFIT FROM CONTINUED SKILLED PT THIS ADMISSION TO ACHIEVE MAXIMAL FUNCTION AND SAFETY  Goals   Patient Goals TO GET BETTER   LTG Expiration Date 11/28/19   Long Term Goal #1 1) PERFORM ROLLING IN BED MOD-I W/ USE OF BED RAILS TO PARTICIPATE IN FREQUENT REPOSITIONING AND IMPROVE SKIN INTEGRITY; 2) UTILIZE B/L UE TO PARTICIPATE IN LONG SITTING REPETITIONS IN BED TO IMPROVE B/L UE AND CORE STRENGTH; 3) IMPROVE B/L LE AROM TO WNL IN ORDER TO PREVENT CONTRACTURES POST-OP; 4) IMPROVE B/L LE STRENGTH BY 1/2 GRADE TO IMPROVE ABILITY TO PARTICIPATE IN FUTURE TRANSFERS; 5) DEMONSTRATE INDEPENDENCE WITH HEP (PT TO SEE IN FUTURE SESSION TO ASSESS TRANFERS EOB AND OOB AS APPROPRIATE)  PT Treatment Day 0   Plan   Treatment/Interventions Spoke to nursing;Spoke to case management;Spoke to MD;Continued evaluation; Bed mobility; Endurance training; Therapeutic exercise;LE strengthening/ROM   PT Frequency Other (Comment)  (3-5X/WK)   Recommendation   Recommendation Short-term skilled PT   Equipment Recommended   (WILL CONT TO ASSESS)   PT - OK to Discharge No  (PER MD, PENDING ANOTHER PROCEDURE MON)   Barthel Index   Feeding 10   Bathing 0   Grooming Score 5   Dressing Score 5   Bladder Score 10   Bowels Score 10   Toilet Use Score 0   Transfers (Bed/Chair) Score 0   Mobility (Level Surface) Score 0   Stairs Score 0   Barthel Index Score 40     ADDITIONAL TREATMENT SESSION PERFORMED TODAY BELOW:    SUBJECTIVE: "AM I DOING IT RIGHT" - REFERRING TO THERE-EX     OBJECTIVE: PATIENT PARTICIPATED IN ACTIVE B/L LE THERE-EX AND EDUCATION REGARDING CURRENT MOBILITY PARAMETERS AS DESCRIBED BY PHYSICIAN  PATIENT EXPRESSES FEAR OF MOVING AND WAS WRITING NOTES AS PT WAS SPEAKING  EDUCATED HIM ON THE FOLLOWING INFORMATION REPORTED BY PLASTIC SURGEON DR Connie Lira: "HE CAN DO ANYTHING WITH RIGHT LEG BUT NO WALKING ON LEFT, JUST HAD A RECENT GRAFT  I WILL START WORKING ON MORE JOINTS ROM EXERCISES TO AVOID CONTRACTURE", "TRANSFER BOTH BUT ONLY WEIGHTBEARING RIGHT UNTIL Monday  ACTIVE ROM BOTH"  PATIENT PARTICIPATED IN THE FOLLOWING THERE-EX ACTIVITIES: B/L ANKLE PUMPS X 10 REPS; B/L HEEL SLIDES X 5 REPS, B/L QUAD SETS X 5 REPS, B/L STRAIGHT LEG RAISES X 5 REPS, AND B/ L HIP ABDUCTION X 5 REPS ALL IN SUPINE POSITION  HE DEMONSTRATED GOOD TOLERANCE, REPORTING NO INCREASE IN PAIN AND "IT FEELS GOOD TO MOVE MY LEGS"  PROVIDED WITH PAPER HEP SHEET AND ENCOURAGED PATIENT TO CHOOSE AT LEAST ONE EXERCISE EVERY HOUR TO PERFORM, STOPPING IF PAIN INCREASES  ASSESSMENT: THIS PATIENT'S FUNCTIONAL MOBILITY CONTINUES TO BE LIMITED BY DECREASED OVERALL ACTIVITY TOLERANCE, PAIN, AND RESTRICTIONS POST-OP  HE IS EAGER TO GET BETTER AND PARTICIPATE IN THERAPY AND DEMONSTRATED GOOD TOLERANCE FOR THE THERE-EX DESCRIBED ABOVE  THIS PATIENT CONTINUES TO FUNCTION BELOW THEIR BASELINE AND REMAINS APPROPRIATE FOR PT D/C RECOMMENDATION FOR SHORT TERM REHAB  PATIENT WILL BENEFIT FROM CONTINUED SKILLED PT THIS ADMISSION TO ACHIEVE MAXIMAL FUNCTION AND SAFETY  PLAN: NEXT SESSION PLAN TO PROGRESS B/L HEP AS TOLERATED AND APPROPRIATE AND TRIAL SITTING EOB AS APPROPRIATE           Jer Badillo, PT

## 2019-11-15 ENCOUNTER — APPOINTMENT (INPATIENT)
Dept: DIALYSIS | Facility: HOSPITAL | Age: 59
DRG: 576 | End: 2019-11-15
Payer: MEDICARE

## 2019-11-15 LAB
ANION GAP SERPL CALCULATED.3IONS-SCNC: 7 MMOL/L (ref 4–13)
BUN SERPL-MCNC: 30 MG/DL (ref 5–25)
CALCIUM SERPL-MCNC: 9.2 MG/DL (ref 8.3–10.1)
CHLORIDE SERPL-SCNC: 99 MMOL/L (ref 100–108)
CO2 SERPL-SCNC: 32 MMOL/L (ref 21–32)
CREAT SERPL-MCNC: 6.54 MG/DL (ref 0.6–1.3)
GFR SERPL CREATININE-BSD FRML MDRD: 9 ML/MIN/1.73SQ M
GLUCOSE SERPL-MCNC: 96 MG/DL (ref 65–140)
POTASSIUM SERPL-SCNC: 4.9 MMOL/L (ref 3.5–5.3)
SODIUM SERPL-SCNC: 138 MMOL/L (ref 136–145)

## 2019-11-15 PROCEDURE — 80048 BASIC METABOLIC PNL TOTAL CA: CPT | Performed by: PHYSICIAN ASSISTANT

## 2019-11-15 PROCEDURE — 99232 SBSQ HOSP IP/OBS MODERATE 35: CPT | Performed by: INTERNAL MEDICINE

## 2019-11-15 PROCEDURE — 90935 HEMODIALYSIS ONE EVALUATION: CPT | Performed by: INTERNAL MEDICINE

## 2019-11-15 RX ADMIN — PANTOPRAZOLE SODIUM 40 MG: 40 TABLET, DELAYED RELEASE ORAL at 06:08

## 2019-11-15 RX ADMIN — HEPARIN SODIUM 5000 UNITS: 5000 INJECTION INTRAVENOUS; SUBCUTANEOUS at 13:41

## 2019-11-15 RX ADMIN — CALCIUM ACETATE 1334 MG: 667 CAPSULE ORAL at 08:38

## 2019-11-15 RX ADMIN — OXYCODONE HYDROCHLORIDE 10 MG: 10 TABLET ORAL at 12:24

## 2019-11-15 RX ADMIN — SENNOSIDES AND DOCUSATE SODIUM 2 TABLET: 8.6; 5 TABLET ORAL at 18:14

## 2019-11-15 RX ADMIN — HEPARIN SODIUM 5000 UNITS: 5000 INJECTION INTRAVENOUS; SUBCUTANEOUS at 21:17

## 2019-11-15 RX ADMIN — ACETAMINOPHEN 650 MG: 325 TABLET, FILM COATED ORAL at 06:08

## 2019-11-15 RX ADMIN — ACETAMINOPHEN 650 MG: 325 TABLET, FILM COATED ORAL at 18:14

## 2019-11-15 RX ADMIN — ACETAMINOPHEN 650 MG: 325 TABLET, FILM COATED ORAL at 23:54

## 2019-11-15 RX ADMIN — Medication 250 MG: at 18:14

## 2019-11-15 RX ADMIN — OXYCODONE HYDROCHLORIDE 10 MG: 10 TABLET ORAL at 18:10

## 2019-11-15 RX ADMIN — MIDODRINE HYDROCHLORIDE 5 MG: 5 TABLET ORAL at 08:38

## 2019-11-15 RX ADMIN — Medication 1 CAPSULE: at 18:15

## 2019-11-15 RX ADMIN — SEVELAMER HYDROCHLORIDE 800 MG: 800 TABLET, FILM COATED PARENTERAL at 12:25

## 2019-11-15 RX ADMIN — ACETAMINOPHEN 650 MG: 325 TABLET, FILM COATED ORAL at 12:25

## 2019-11-15 RX ADMIN — POLYETHYLENE GLYCOL 3350 17 G: 17 POWDER, FOR SOLUTION ORAL at 12:24

## 2019-11-15 RX ADMIN — OXYCODONE HYDROCHLORIDE 10 MG: 10 TABLET ORAL at 06:21

## 2019-11-15 RX ADMIN — HYDROMORPHONE HYDROCHLORIDE 1 MG: 1 INJECTION, SOLUTION INTRAMUSCULAR; INTRAVENOUS; SUBCUTANEOUS at 13:41

## 2019-11-15 RX ADMIN — METOPROLOL TARTRATE 25 MG: 25 TABLET, FILM COATED ORAL at 21:16

## 2019-11-15 RX ADMIN — HEPARIN SODIUM 5000 UNITS: 5000 INJECTION INTRAVENOUS; SUBCUTANEOUS at 06:08

## 2019-11-15 RX ADMIN — CALCIUM ACETATE 1334 MG: 667 CAPSULE ORAL at 18:13

## 2019-11-15 RX ADMIN — GABAPENTIN 100 MG: 100 CAPSULE ORAL at 18:14

## 2019-11-15 RX ADMIN — SEVELAMER HYDROCHLORIDE 800 MG: 800 TABLET, FILM COATED PARENTERAL at 08:38

## 2019-11-15 RX ADMIN — TORSEMIDE 40 MG: 20 TABLET ORAL at 18:14

## 2019-11-15 RX ADMIN — CALCIUM ACETATE 1334 MG: 667 CAPSULE ORAL at 12:25

## 2019-11-15 RX ADMIN — EPOETIN ALFA 10000 UNITS: 10000 SOLUTION INTRAVENOUS; SUBCUTANEOUS at 09:54

## 2019-11-15 RX ADMIN — SEVELAMER HYDROCHLORIDE 800 MG: 800 TABLET, FILM COATED PARENTERAL at 18:14

## 2019-11-15 NOTE — ASSESSMENT & PLAN NOTE
· Senna/docusate b i d  Also, MiraLax daily scheduled dosing  · Relistor q48h PRN  · PRN dulcolax suppository  · Additional adjustments as needed  · Monitor for BMs

## 2019-11-15 NOTE — ASSESSMENT & PLAN NOTE
· Procedure - Debridement, removal of Eschar on 10/31/2019 by Dr Fozia Peña  · Split Thickness Skin Grafting by Plastic Surgery -      · Procedure - STSG on 11/7 per plastic surgery   · Procedure - Washout with additional STSG by plastic surgery team 11/12/2019  · Procedure - will return to OR for planned surgery as a staged procedure dressing change under anesthesia within 1 week of the prior procedure  · No dressing changes per plastic surgery  · Activity - WBAT  Range of motion exercises recommended to avoid contractures  · Monitor for Infection -   · Antibiotic - completed 7 days of vanc and cefepime  Now monitoring off antibiotics  · Blood cultures from 10/31/2019: NGTD  · Monitor temperatures and serial leg exams  · Pain Control -   · Oxycodone 5/10 mg  · Breakthrough pain - Dilaudid 1 mg IV  · Monitor pain levels    · Supportive care

## 2019-11-15 NOTE — HEMODIALYSIS
Tx completed via catheter  BFR decreased towards the end of tx due to high arterial pressure  2 5 l removed  Vitals stable    Post HD weight 84 kg on bed scale

## 2019-11-15 NOTE — PROGRESS NOTES
[address] [facility] NOTE   Felicia Rivas 64 y o  male MRN: [id number]  Unit/Bed#: S -01 Encounter: [id number]  Reason for Consult:  ESRD       ASSESSMENT and PLAN:  1  ESRD:    · Patient dialyzes 4 times a week at [facility]  Current schedule Monday, Tuesday, Thursday, Friday for 2 5 hours each treatment  · Treatment schedule adjusted for inpatient care  Currently treatment performed on Monday, Wednesday, Friday with an additional treatment on Saturday per eval   · Hemodialysis today-seen on treatment  · Blood pressure acceptable  Weight up  Going for additional UF  Increasing weight may also be related to ascites  2  Access:  PermCath  3  Multiple wounds of the lower extremities bilaterally  · Status post debridement on 10/31/2019  · Status post skin graft 11/7  Subsequent treatment washout on 11/12  Plan to return to OR 11/18  · Continue diet supplement for wound healing  4  Anemia due to chronic disease and acute blood loss:   · Last hemoglobin 8 0   · Continue GRISELDA  Recent increase in dosage during hospitalization, currently 65768 units with each treatment, 3 times a week  5  Hypotension:    · On midodrine 10 mg t i d   6  Atrial fibrillation: On metoprolol 25 mg every 12 hours  No anticoagulation  7  CKD MBD:    · Continue binder, renal diet  8  Abdominal distension:  Status post paracentesis 10/09/2019 for 1800 mL  9  Scrotal swelling:  Right hydrocele with numerous septation on imaging  Conservative management  HEMODIALYSIS PROCEDURE NOTE  The patient was seen and examined on hemodialysis  Time:  3 hours  Sodium:  138 Blood flow:  450   Dialyzer: F160 Potassium:  2 Dialysate flow:  Standard   Access:  PermCath Bicarbonate:  35 Ultrafiltration goal:  3 L   Epogen on treatment     DISPOSITION:  No change to current plan of care    SUBJECTIVE / INTERVAL HISTORY:  Asking about exercises  Patient trying to get stronger      OBJECTIVE:  Current Weight: Weight - Scale: 82 3 kg (181 lb 7 oz)  Vitals:    11/15/19 0930 11/15/19 1000 11/15/19 1030 11/15/19 1100   BP: 151/85 135/87 121/66 134/70   BP Location:       Pulse: 87 94 90 (!) 106   Resp: 18 18 18 16   Temp:       TempSrc:       SpO2:       Weight:       Height:           Intake/Output Summary (Last 24 hours) at 11/15/2019 1106  Last data filed at 11/15/2019 0845  Gross per 24 hour   Intake 200 ml   Output    Net 200 ml     General: NAD, comfortably lying in bed  Skin: no rash, color poor  Eyes: anicteric sclera  ENT: moist mucous membrane  Neck: supple  Chest: CTA b/l, no ronchii, no wheeze, no rubs, no rales  CVS: s1s2, no murmur, no gallop, no rub  Irregular rhythm  Abdomen:  Firm, distended, nontender, nl sounds  Extremities:  Bilateral Ace bandages to lower extremities    No thigh edema  : no meeks  Neuro: AAOX3  Psych: normal affect  Medications:    Current Facility-Administered Medications:     acetaminophen (TYLENOL) tablet 650 mg, 650 mg, Oral, Q6H PRN, Popeye Somers MD, 650 mg at 11/08/19 1321    acetaminophen (TYLENOL) tablet 650 mg, 650 mg, Oral, Q6H [facility], Popeye Somers MD, 650 mg at 11/15/19 0608    aluminum-magnesium hydroxide-simethicone (MYLANTA) 200-200-20 mg/5 mL oral suspension 15 mL, 15 mL, Oral, Q4H PRN, Popeye Somers MD    b complex-vitamin C-folic acid (NEPHROCAPS) capsule 1 capsule, 1 capsule, Oral, Daily With Jared Yoder MD, 1 capsule at 11/14/19 1756    bisacodyl (DULCOLAX) rectal suppository 10 mg, 10 mg, Rectal, Daily PRN, Popeye Somers MD, 10 mg at 11/10/19 2104    calcium acetate (PHOSLO) capsule 1,334 mg, 1,334 mg, Oral, TID With Meals, Popeye Somers MD, 1,334 mg at 11/15/19 0511    calcium carbonate (TUMS) chewable tablet 1,000 mg, 1,000 mg, Oral, TID PRN, Popeye Somers MD, 1,000 mg at 11/11/19 1006    epoetin bob (EPOGEN,PROCRIT) injection 10,000 Units, 10,000 Units, Intravenous, Once per day on Mon Wed Geeta Kulkarni MD, 10,000 Units at 11/15/19 0144    gabapentin (NEURONTIN) capsule 100 mg, 100 mg, Oral, BID, Gume Junior DO, 100 mg at 11/14/19 1759    heparin (porcine) subcutaneous injection 5,000 Units, 5,000 Units, Subcutaneous, Q8H [address], 5,000 Units at 11/15/19 0608 **AND** [COMPLETED] Platelet count, , , Once, Jesse Chun PA-C    HYDROmorphone (DILAUDID) injection 1 mg, 1 mg, Intravenous, Q4H PRN, Onesimo Bernal MD, 1 mg at 11/14/19 1248    methylnaltrexone (RELISTOR) subcutaneous injection 6 mg, 6 mg, Subcutaneous, Q48H PRN, Gume Junior DO, 6 mg at 11/13/19 1729    metoprolol tartrate (LOPRESSOR) tablet 25 mg, 25 mg, Oral, Q12H [address], Onesimo Bernal MD, 25 mg at 11/14/19 2107    midodrine (PROAMATINE) tablet 5 mg, 5 mg, Oral, Before Dialysis, Onesimo Bernal MD, 5 mg at 11/15/19 0838    ondansetron (ZOFRAN) injection 4 mg, 4 mg, Intravenous, Q6H PRN, Onesimo Bernal MD    oxyCODONE (ROXICODONE) IR tablet 10 mg, 10 mg, Oral, Q4H PRN, Onesimo Bernal MD, 10 mg at 11/15/19 0722    oxyCODONE (ROXICODONE) IR tablet 5 mg, 5 mg, Oral, Q4H PRN, Onesimo Bernal MD, 5 mg at 11/11/19 0617    pantoprazole (PROTONIX) EC tablet 40 mg, 40 mg, Oral, Early Morning, Onesimo Bernal MD, 40 mg at 11/15/19 0608    polyethylene glycol (MIRALAX) packet 17 g, 17 g, Oral, Daily, Onesimo Bernal MD, Stopped at 11/15/19 8946    saccharomyces boulardii (FLORASTOR) capsule 250 mg, 250 mg, Oral, BID, Onesimo Bernal MD, 250 mg at 11/14/19 1757    saliva substitute (MOUTH KOTE) mucosal solution 5 spray, 5 spray, Mouth/Throat, PRN, Onesimo Bernal MD, 5 spray at 11/09/19 1517    senna-docusate sodium (SENOKOT S) 8 6-50 mg per tablet 2 tablet, 2 tablet, Oral, BID, Onesimo Bernal MD, 2 tablet at 11/14/19 1758    sevelamer (RENAGEL) tablet 800 mg, 800 mg, Oral, TID With Meals, Onesimo Bernal, MD, 800 mg at 11/15/19 0838    torsemide (DEMADEX) tablet 40 mg, 40 mg, Oral, BID (diuretic), Nixon Gaytan MD, 40 mg at 11/14/19 1758    Laboratory Results:  Results from last 7 days   Lab Units 11/15/19  0653 11/14/19  0450 11/13/19  0937 11/13/19  0619 11/12/19  0325 11/11/19  0341 11/10/19  0837 11/09/19  1515   WBC Thousand/uL  --   --  7 47  --   --  8 21 7 74  --    HEMOGLOBIN g/dL  --   --  8 0*  --   --  9 4* 9 6*  --    HEMATOCRIT %  --   --  26 1*  --   --  30 9* 31 6*  --    PLATELETS Thousands/uL  --   --  200  --   --  202 186  --    POTASSIUM mmol/L 4 9 4 3  --  5 5* 4 6 5 6* 5 4* 5 7*   CHLORIDE mmol/L 99* 99*  --  99* 98* 99* 97* 104   CO2 mmol/L 32 31  --  24 31 29 30 29   BUN mg/dL 30* 23  --  34* 27* 43* 36* 32*   CREATININE mg/dL 6 54* 5 40*  --  7 18* 5 84* 8 08* 7 22* 6 50*   CALCIUM mg/dL 9 2 9 0  --  8 7 8 8 9 4 9 7 9 4   PHOSPHORUS mg/dL  --   --   --   --  4 4  --   --   --

## 2019-11-15 NOTE — PROGRESS NOTES
Progress Note - Emanuel Carlson 1960, 64 y o  male MRN: [id number]    Unit/Bed#: S -01 Encounter: [id number]    Primary Care Provider: Tonja Medina   Date and time admitted to hospital: 10/26/2019  5:10 PM        * Multiple and open wound of lower limb  Assessment & Plan  · Procedure - Debridement, removal of Eschar on 10/31/2019 by Dr Edin De La Torre  · Split Thickness Skin Grafting by Plastic Surgery -      · Procedure - STSG on 11/7 per plastic surgery   · Procedure - Washout with additional STSG by plastic surgery team 11/12/2019  · Procedure - will return to OR for planned surgery as a staged procedure dressing change under anesthesia within 1 week of the prior procedure  · No dressing changes per plastic surgery  · Activity - WBAT  Range of motion exercises recommended to avoid contractures  · Monitor for Infection -   · Antibiotic - completed 7 days of vanc and cefepime  Now monitoring off antibiotics  · Blood cultures from 10/31/2019: NGTD  · Monitor temperatures and serial leg exams  · Pain Control -   · Oxycodone 5/10 mg  · Breakthrough pain - Dilaudid 1 mg IV  · Monitor pain levels  · Supportive care    Scrotal swelling  Assessment & Plan  · Ultrasound showed "Large complex right hydrocele containing numerous septations  Increased vascularity in the left testicle with respect to the right, although both testicles could not be imaged on the same plane limiting direct comparison   Correlate clinically for orchitis "  · Urology saw patient 10/29/2019 and recommends no surgical intervention acutely and to manage problems with legs and get therapy first   · beta HCG, AFP and LDH within normal limits  · Supportive care  Atrial fibrillation ([address] )  Assessment & Plan  · Rate / Rhythm control -   · Metoprolol increased to 25 mg bid on 10/29/2019  · Monitor on telemetry  Monitor HR and blood pressures    · Anticoagulation - None prehospital   Defer to outpatient team following discharge  ESRD (end stage renal disease) ([facility] [address] )  Assessment & Plan  · On routine dialysis  · Nephrology following  Anemia due to chronic kidney disease, on chronic dialysis and Acute Blood Loss Anemia (HCC)  Assessment & Plan  · Transfuse as needed  · Monitor counts - Intermittent CBC monitoring  At Risk for Pressure ulcer, buttock  Assessment & Plan  · Local skin care  · Low Air loss mattress changed 11/12/2019 to help with prevention of wounds  · Turn / Reposition frequently  GERD (gastroesophageal reflux disease)  Assessment & Plan  · Protonix started 11/13/2019  Oropharyngeal dysphagia  Assessment & Plan  · Dysphagia 3 diet with extra gravy / sauce  · Thin liquids ok  · Biotene / Mouth Kote  · Speech pathology following  Constipation  Assessment & Plan  · Senna/docusate b i d  Also, MiraLax daily scheduled dosing  · Relistor q48h PRN  · PRN dulcolax suppository  · Additional adjustments as needed  · Monitor for BMs  VTE Pharmacologic Prophylaxis:   Pharmacologic: Heparin  Mechanical VTE Prophylaxis in Place: No    Patient Centered Rounds: I have performed bedside rounds with nursing staff today  Discussions with Specialists or Other Care Team Provider: Spoke with Dr Vedia Dakin later yesterday  None today  Education and Discussions with Family / Patient: Updated wife, Rohan Her, at bedside  Time Spent for Care: 30 minutes  More than 50% of total time spent on counseling and coordination of care as described above  Current Length of Stay: 20 day(s)  Current Patient Status: Inpatient     Certification Statement: The patient will continue to require additional inpatient hospital stay due to evaluation and treatment for above medical concerns  Discharge Plan / Estimated Discharge Date: Most likely discharge date will be sometime later next week  Code Status: Level 1 - Full Code      Subjective:   No acute complaints currently    Patient did have bowel movement today   The patient has no nausea  No fevers chills  No shortness of breath  Reflux symptoms are improved  Objective:     Vitals:   Temp (24hrs), Av 5 °F (36 9 °C), Min:98 °F (36 7 °C), Max:99 °F (37 2 °C)    Temp:  [98 °F (36 7 °C)-99 °F (37 2 °C)] 98 °F (36 7 °C)  HR:  [76-91] 79  Resp:  [16-18] 18  BP: (109-135)/(60-81) 119/70  SpO2:  [91 %-98 %] 91 %  Body mass index is 27 59 kg/m²  Input and Output Summary (last 24 hours):     No intake or output data in the 24 hours ending 11/15/19 0915    Physical Exam:     Physical Exam   Constitutional: He is oriented to person, place, and time  HENT:   Slightly dry oral mucosa   Neck: No JVD present  Cardiovascular: Normal rate and regular rhythm  Pulmonary/Chest: Effort normal  He has no wheezes  He has no rales  Decreased breath sounds towards bases   Abdominal: Soft  Bowel sounds are normal  He exhibits distension (but decreased from prior 2 -3 days  )  There is no tenderness  Musculoskeletal: He exhibits no edema or tenderness  No drainage to the bandages  Neurovascular intact to the toes  Neurological: He is alert and oriented to person, place, and time  Sometimes test think about what his thoughts are  Vitals reviewed  Additional Data:     Labs:    Results from last 7 days   Lab Units 19  0937   WBC Thousand/uL 7 47   HEMOGLOBIN g/dL 8 0*   HEMATOCRIT % 26 1*   PLATELETS Thousands/uL 200   NEUTROS PCT % 71   LYMPHS PCT % 11*   MONOS PCT % 11   EOS PCT % 5     Results from last 7 days   Lab Units 11/15/19  0653   POTASSIUM mmol/L 4 9   CHLORIDE mmol/L 99*   CO2 mmol/L 32   BUN mg/dL 30*   CREATININE mg/dL 6 54*   CALCIUM mg/dL 9 2           * I Have Reviewed All Lab Data Listed Above  * Additional Pertinent Lab Tests Reviewed:  All Labs Within Last 24 Hours Reviewed    Imaging:    Imaging Reports Reviewed Today Include:  No new imaging  Imaging Personally Reviewed by Myself Includes:  None    Recent Cultures (last 7 days): Last 24 Hours Medication List:     Current Facility-Administered Medications:  acetaminophen 650 mg Oral Q6H PRN Zeina Howe MD   acetaminophen 650 mg Oral Q6H [facility] Zeina Howe MD   aluminum-magnesium hydroxide-simethicone 15 mL Oral Q4H PRN Zeina Howe MD   b complex-vitamin C-folic acid 1 capsule Oral Daily With [place] Danita Apley, MD   bisacodyl 10 mg Rectal Daily PRN Zeina Howe MD   calcium acetate 1,334 mg Oral TID With Meals Zeina Howe MD   calcium carbonate 1,000 mg Oral TID PRN Zeina Howe MD   epoetin bob 10,000 Units Intravenous Once per day on Mon Wed Fri Zeina Howe MD   gabapentin 100 mg Oral BID Lionel Jarvis DO   heparin (porcine) 5,000 Units Subcutaneous [facility] Zeina Howe MD   HYDROmorphone 1 mg Intravenous Q4H PRN Zeina Howe MD   methylnaltrexone bromide 6 mg Subcutaneous Q48H PRN Lionel Jarvis DO   metoprolol tartrate 25 mg Oral Q12H [facility] Zeina Howe MD   midodrine 5 mg Oral Before Dialysis Zeina Howe MD   ondansetron 4 mg Intravenous Q6H PRN Zeina oHwe MD   oxyCODONE 10 mg Oral Q4H PRN Zeina Howe MD   oxyCODONE 5 mg Oral Q4H PRN Zeina Howe MD   pantoprazole 40 mg Oral Early Morning Zeina Howe MD   polyethylene glycol 17 g Oral Daily Zeina Howe MD   saccharomyces boulardii 250 mg Oral BID Zeina Howe MD   saliva substitute 5 spray Mouth/Throat PRN Zeina Howe MD   senna-docusate sodium 2 tablet Oral BID Zeina Howe MD   sevelamer 800 mg Oral TID With Meals Zeina Howe MD   torsemide 40 mg Oral BID (diuretic) Zeina Howe MD        Today, Patient Was Seen By: Lionel Jarvis DO    ** Please Note: This note has been constructed using a voice recognition system   **

## 2019-11-15 NOTE — PLAN OF CARE
Problem: Prexisting or High Potential for Compromised Skin Integrity  Goal: Skin integrity is maintained or improved  Description  INTERVENTIONS:  - Identify patients at risk for skin breakdown  - Assess and monitor skin integrity  - Assess and monitor nutrition and hydration status  - Monitor labs   - Assess for incontinence   - Turn and reposition patient  - Assist with mobility/ambulation  - Relieve pressure over bony prominences  - Avoid friction and shearing  - Provide appropriate hygiene as needed including keeping skin clean and dry  - Evaluate need for skin moisturizer/barrier cream  - Collaborate with interdisciplinary team   - Patient/family teaching  - Consider wound care consult   Outcome: Progressing     Problem: Potential for Falls  Goal: Patient will remain free of falls  Description  INTERVENTIONS:  - Assess patient frequently for physical needs  -  Identify cognitive and physical deficits and behaviors that affect risk of falls    -  [place] fall precautions as indicated by assessment   - Educate patient/family on patient safety including physical limitations  - Instruct patient to call for assistance with activity based on assessment  - Modify environment to reduce risk of injury  - Consider OT/PT consult to assist with strengthening/mobility  Outcome: Progressing     Problem: INFECTION - ADULT  Goal: Absence or prevention of progression during hospitalization  Description  INTERVENTIONS:  - Assess and monitor for signs and symptoms of infection  - Monitor lab/diagnostic results  - Monitor all insertion sites, i e  indwelling lines, tubes, and drains  - Monitor endotracheal if appropriate and nasal secretions for changes in amount and color  - [place] appropriate cooling/warming therapies per order  - Administer medications as ordered  - Instruct and encourage patient and family to use good hand hygiene technique  - Identify and instruct in appropriate isolation precautions for identified infection/condition  Outcome: Progressing     Problem: METABOLIC, FLUID AND ELECTROLYTES - ADULT  Goal: Electrolytes maintained within normal limits  Description  INTERVENTIONS:  - Monitor labs and assess patient for signs and symptoms of electrolyte imbalances  - Administer electrolyte replacement as ordered  - Monitor response to electrolyte replacements, including repeat lab results as appropriate  - Instruct patient on fluid and nutrition as appropriate  Outcome: Progressing  Goal: Fluid balance maintained  Description  INTERVENTIONS:  - Monitor labs   - Monitor I/O and WT  - Instruct patient on fluid and nutrition as appropriate  - Assess for signs & symptoms of volume excess or deficit  Outcome: Progressing     Problem: SKIN/TISSUE INTEGRITY - ADULT  Goal: Skin integrity remains intact  Description  INTERVENTIONS  - Identify patients at risk for skin breakdown  - Assess and monitor skin integrity  - Assess and monitor nutrition and hydration status  - Monitor labs (i e  albumin)  - Assess for incontinence   - Turn and reposition patient  - Assist with mobility/ambulation  - Relieve pressure over bony prominences  - Avoid friction and shearing  - Provide appropriate hygiene as needed including keeping skin clean and dry  - Evaluate need for skin moisturizer/barrier cream  - Collaborate with interdisciplinary team (i e  Nutrition, Rehabilitation, etc )   - Patient/family teaching  Outcome: Progressing  Goal: Incision(s), wounds(s) or drain site(s) healing without S/S of infection  Description  INTERVENTIONS  - Assess and document risk factors for skin impairment   - Assess and document dressing, incision, wound bed, drain sites and surrounding tissue  - Consider nutrition services referral as needed  - Oral mucous membranes remain intact  - Provide patient/ family education  Outcome: Progressing  Goal: Oral mucous membranes remain intact  Description  INTERVENTIONS  - Assess oral mucosa and hygiene practices  - Implement preventative oral hygiene regimen  - Implement oral medicated treatments as ordered  - Initiate Nutrition services referral as needed  Outcome: Progressing     Problem: Nutrition/Hydration-ADULT  Goal: Nutrient/Hydration intake appropriate for improving, restoring or maintaining nutritional needs  Description  Monitor and assess patient's nutrition/hydration status for malnutrition  Collaborate with interdisciplinary team and initiate plan and interventions as ordered  Monitor patient's weight and dietary intake as ordered or per policy  Utilize nutrition screening tool and intervene as necessary  Determine patient's food preferences and provide high-protein, high-caloric foods as appropriate       INTERVENTIONS:  - Monitor oral intake, urinary output, labs, and treatment plans  - Assess nutrition and hydration status and recommend course of action  - Evaluate amount of meals eaten  - Assist patient with eating if necessary   - Allow adequate time for meals  - Recommend/ encourage appropriate diets, oral nutritional supplements, and vitamin/mineral supplements  - Order, calculate, and assess calorie counts as needed  - Recommend, monitor, and adjust tube feedings and TPN/PPN based on assessed needs  - Assess need for intravenous fluids  - Provide specific nutrition/hydration education as appropriate  - Include patient/family/caregiver in decisions related to nutrition  Outcome: Progressing

## 2019-11-16 LAB
ABO GROUP BLD BPU: NORMAL
BPU ID: NORMAL
CROSSMATCH: NORMAL
UNIT DISPENSE STATUS: NORMAL
UNIT PRODUCT CODE: NORMAL
UNIT RH: NORMAL

## 2019-11-16 PROCEDURE — 99232 SBSQ HOSP IP/OBS MODERATE 35: CPT | Performed by: INTERNAL MEDICINE

## 2019-11-16 RX ADMIN — Medication 250 MG: at 09:40

## 2019-11-16 RX ADMIN — HEPARIN SODIUM 5000 UNITS: 5000 INJECTION INTRAVENOUS; SUBCUTANEOUS at 13:22

## 2019-11-16 RX ADMIN — OXYCODONE HYDROCHLORIDE 10 MG: 10 TABLET ORAL at 18:37

## 2019-11-16 RX ADMIN — HEPARIN SODIUM 5000 UNITS: 5000 INJECTION INTRAVENOUS; SUBCUTANEOUS at 06:30

## 2019-11-16 RX ADMIN — CALCIUM ACETATE 1334 MG: 667 CAPSULE ORAL at 18:30

## 2019-11-16 RX ADMIN — METOPROLOL TARTRATE 25 MG: 25 TABLET, FILM COATED ORAL at 23:08

## 2019-11-16 RX ADMIN — SEVELAMER HYDROCHLORIDE 800 MG: 800 TABLET, FILM COATED PARENTERAL at 13:21

## 2019-11-16 RX ADMIN — METOPROLOL TARTRATE 25 MG: 25 TABLET, FILM COATED ORAL at 09:40

## 2019-11-16 RX ADMIN — ACETAMINOPHEN 650 MG: 325 TABLET, FILM COATED ORAL at 13:22

## 2019-11-16 RX ADMIN — POLYETHYLENE GLYCOL 3350 17 G: 17 POWDER, FOR SOLUTION ORAL at 09:40

## 2019-11-16 RX ADMIN — OXYCODONE HYDROCHLORIDE 10 MG: 10 TABLET ORAL at 00:07

## 2019-11-16 RX ADMIN — TORSEMIDE 40 MG: 20 TABLET ORAL at 09:39

## 2019-11-16 RX ADMIN — ACETAMINOPHEN 650 MG: 325 TABLET, FILM COATED ORAL at 06:30

## 2019-11-16 RX ADMIN — HEPARIN SODIUM 5000 UNITS: 5000 INJECTION INTRAVENOUS; SUBCUTANEOUS at 23:09

## 2019-11-16 RX ADMIN — OXYCODONE HYDROCHLORIDE 10 MG: 10 TABLET ORAL at 09:52

## 2019-11-16 RX ADMIN — CALCIUM ACETATE 1334 MG: 667 CAPSULE ORAL at 09:39

## 2019-11-16 RX ADMIN — SEVELAMER HYDROCHLORIDE 800 MG: 800 TABLET, FILM COATED PARENTERAL at 18:30

## 2019-11-16 RX ADMIN — Medication 1 CAPSULE: at 18:31

## 2019-11-16 RX ADMIN — SEVELAMER HYDROCHLORIDE 800 MG: 800 TABLET, FILM COATED PARENTERAL at 09:40

## 2019-11-16 RX ADMIN — GABAPENTIN 100 MG: 100 CAPSULE ORAL at 09:40

## 2019-11-16 RX ADMIN — ACETAMINOPHEN 650 MG: 325 TABLET, FILM COATED ORAL at 18:30

## 2019-11-16 RX ADMIN — ACETAMINOPHEN 650 MG: 325 TABLET, FILM COATED ORAL at 23:08

## 2019-11-16 RX ADMIN — PANTOPRAZOLE SODIUM 40 MG: 40 TABLET, DELAYED RELEASE ORAL at 06:30

## 2019-11-16 RX ADMIN — Medication 250 MG: at 18:30

## 2019-11-16 RX ADMIN — SENNOSIDES AND DOCUSATE SODIUM 2 TABLET: 8.6; 5 TABLET ORAL at 18:30

## 2019-11-16 RX ADMIN — SENNOSIDES AND DOCUSATE SODIUM 2 TABLET: 8.6; 5 TABLET ORAL at 09:40

## 2019-11-16 RX ADMIN — TORSEMIDE 40 MG: 20 TABLET ORAL at 18:30

## 2019-11-16 RX ADMIN — CALCIUM ACETATE 1334 MG: 667 CAPSULE ORAL at 13:21

## 2019-11-16 RX ADMIN — GABAPENTIN 100 MG: 100 CAPSULE ORAL at 18:30

## 2019-11-16 NOTE — PHYSICAL THERAPY NOTE
Physical Therapy Cancellation Note        Attempted to see pt for PT intervention but pt refused all participation  Therapist provided motivation and education regarding benefits of participation/risks of long term immobility but pt continued to refuse  Therapist offered to return at time of pt's choosing but he continued to refuse  Will follow and continue PT as appropriate   Francesied Charly Vargas of refusal     Agustin Islas, PT

## 2019-11-16 NOTE — ASSESSMENT & PLAN NOTE
· Procedure - Debridement, removal of Eschar on 10/31/2019 by Dr Marie Katz  · Split Thickness Skin Grafting by Plastic Surgery -      · Procedure - STSG on 11/7 per plastic surgery   · Procedure - Washout with additional STSG by plastic surgery team 11/12/2019  · Procedure - will return to OR for planned surgery as a staged procedure dressing change under anesthesia Monday  · No dressing changes per plastic surgery  · Activity - WBAT  Range of motion exercises recommended to avoid contractures  · Monitor for Infection -   · Antibiotic - completed 7 days of vanc and cefepime  Now monitoring off antibiotics  · Blood cultures from 10/31/2019: NGTD  · Monitor temperatures and serial leg exams  · Pain Control -   · Oxycodone 5/10 mg  · Gabapentin increased to 100 mg bid from daily dosing  · Breakthrough pain - Dilaudid 1 mg IV  · Monitor pain levels    · Supportive care

## 2019-11-16 NOTE — PROGRESS NOTES
Progress Note - Franklin Vizcaino 1960, 64 y o  male MRN: [id number]    Unit/Bed#: S -01 Encounter: [id number]    Primary Care Provider: Reema Current   Date and time admitted to hospital: 10/26/2019  5:10 PM        * Multiple and open wound of lower limb  Assessment & Plan  · Procedure - Debridement, removal of Eschar on 10/31/2019 by Dr Pranay Martin  · Split Thickness Skin Grafting by Plastic Surgery -      · Procedure - STSG on 11/7 per plastic surgery   · Procedure - Washout with additional STSG by plastic surgery team 11/12/2019  · Procedure - will return to OR for planned surgery as a staged procedure dressing change under anesthesia Monday  · No dressing changes per plastic surgery  · Activity - WBAT  Range of motion exercises recommended to avoid contractures  · Monitor for Infection -   · Antibiotic - completed 7 days of vanc and cefepime  Now monitoring off antibiotics  · Blood cultures from 10/31/2019: NGTD  · Monitor temperatures and serial leg exams  · Pain Control -   · Oxycodone 5/10 mg  · Gabapentin increased to 100 mg bid from daily dosing  · Breakthrough pain - Dilaudid 1 mg IV  · Monitor pain levels  · Supportive care    Scrotal swelling  Assessment & Plan  · Ultrasound showed "Large complex right hydrocele containing numerous septations  Increased vascularity in the left testicle with respect to the right, although both testicles could not be imaged on the same plane limiting direct comparison   Correlate clinically for orchitis "  · Urology saw patient 10/29/2019 and recommends no surgical intervention acutely and to manage problems with legs and get therapy first   · beta HCG, AFP and LDH within normal limits  · Supportive care  Atrial fibrillation ([address] )  Assessment & Plan  · Rate / Rhythm control -   · Metoprolol increased to 25 mg bid on 10/29/2019  · Monitor on telemetry  Monitor HR and blood pressures    · Anticoagulation - None prehospital   Defer to outpatient team following discharge  ESRD (end stage renal disease) ([facility] [address] )  Assessment & Plan  · On routine dialysis  · Nephrology following  Anemia due to chronic kidney disease, on chronic dialysis and Acute Blood Loss Anemia (HCC)  Assessment & Plan  · Transfuse as needed  · Monitor counts - Intermittent CBC monitoring  At Risk for Pressure ulcer, buttock  Assessment & Plan  · Local skin care  · Low Air loss mattress changed 11/12/2019 to help with prevention of wounds  · Turn / Reposition frequently  GERD (gastroesophageal reflux disease)  Assessment & Plan  · Protonix started 11/13/2019  Oropharyngeal dysphagia  Assessment & Plan  · Dysphagia 3 diet with extra gravy / sauce  · Thin liquids ok  · Biotene / Mouth Kote  · Speech pathology following  Constipation  Assessment & Plan  · Senna/docusate b i d  Also, MiraLax daily scheduled dosing  · Relistor q48h PRN  · PRN dulcolax suppository  · Additional adjustments as needed  · Monitor for BMs  VTE Pharmacologic Prophylaxis:   Pharmacologic: Heparin  Mechanical VTE Prophylaxis in Place: No    Patient Centered Rounds: I have performed bedside rounds with nursing staff today  Discussions with Specialists or Other Care Team Provider:  None today    Education and Discussions with Family / Patient:  Patient only    Time Spent for Care: 30 minutes  More than 50% of total time spent on counseling and coordination of care as described above  Current Length of Stay: 21 day(s)  Current Patient Status: Inpatient     Certification Statement: The patient will continue to require additional inpatient hospital stay due to Evaluation and treatment for lower extremity wounds  Discharge Plan / Estimated Discharge Date:  Sometime late next week    Code Status: Level 1 - Full Code      Subjective: The patient has no acute complaints today    His main concern was that he did not get hash browns with his breakfast   The patient states the pain is doing much better  He is still passing a lot of gas and denies any abdominal pain currently  He still has no acid reflux symptoms  No nausea or vomiting  No concerns by nursing overnight  Objective:     Vitals:   Temp (24hrs), Av 4 °F (36 9 °C), Min:98 °F (36 7 °C), Max:98 7 °F (37 1 °C)    Temp:  [98 °F (36 7 °C)-98 7 °F (37 1 °C)] 98 5 °F (36 9 °C)  HR:  [75-88] 75  Resp:  [18] 18  BP: (100-131)/(60-72) 131/72  SpO2:  [96 %-98 %] 98 %  Body mass index is 27 59 kg/m²  Input and Output Summary (last 24 hours): Intake/Output Summary (Last 24 hours) at 2019 1206  Last data filed at 11/15/2019 1647  Gross per 24 hour   Intake 200 ml   Output 1 ml   Net 199 ml       Physical Exam:     Physical Exam   Constitutional: He is oriented to person, place, and time  No distress  HENT:   Slightly dry oral mucosa   Eyes: Pupils are equal, round, and reactive to light  Cardiovascular: Normal rate and regular rhythm  Pulmonary/Chest: He has no wheezes  He has no rales  Decreased breath sounds  Abdominal: Soft  Bowel sounds are normal  He exhibits distension (With some hyper-resonance)  There is no tenderness  Musculoskeletal: He exhibits no edema  Bilateral lower extremities wrapped without any new drainage  The toes appear to have normal sensation currently  No significant edema there  The skin is still fairly rough/red at the proximal margin of the bandaging on the right leg  Improved range of motion in the bilateral lower extremities with the right leg still lagging behind the left  Neurological: He is alert and oriented to person, place, and time  Thought patterns remain somewhat slow but intact  Skin: Skin is warm and dry  Vitals reviewed        Additional Data:     Labs:    Results from last 7 days   Lab Units 19  0937   WBC Thousand/uL 7 47   HEMOGLOBIN g/dL 8 0*   HEMATOCRIT % 26 1*   PLATELETS Thousands/uL 200   NEUTROS PCT % 71   LYMPHS PCT % 11*   MONOS PCT % 11   EOS PCT % 5     Results from last 7 days   Lab Units 11/15/19  0653   POTASSIUM mmol/L 4 9   CHLORIDE mmol/L 99*   CO2 mmol/L 32   BUN mg/dL 30*   CREATININE mg/dL 6 54*   CALCIUM mg/dL 9 2           * I Have Reviewed All Lab Data Listed Above    * Additional Pertinent Lab Tests Reviewed: No New Labs Available For Today    Imaging:    Imaging Reports Reviewed Today Include:  No new imaging studies  Imaging Personally Reviewed by Myself Includes:  None    Recent Cultures (last 7 days):           Last 24 Hours Medication List:     Current Facility-Administered Medications:  acetaminophen 650 mg Oral Q6H PRN Will MD Zac   acetaminophen 650 mg Oral Q6H [address] Will MD Zac   aluminum-magnesium hydroxide-simethicone 15 mL Oral Q4H PRN Will MD Zac   b complex-vitamin C-folic acid 1 capsule Oral Daily With Rina Campos MD   bisacodyl 10 mg Rectal Daily PRN Will MD Zac   calcium acetate 1,334 mg Oral TID With Meals Will MD Zac   calcium carbonate 1,000 mg Oral TID PRN Will MD Zac   epoetin bob 10,000 Units Intravenous Once per day on Mon Wed Fri Will MD Zac   gabapentin 100 mg Oral BID Chastity Meyer DO   heparin (porcine) 5,000 Units Subcutaneous [facility] Will MD Zac   HYDROmorphone 1 mg Intravenous Q4H PRN Will MD Zac   methylnaltrexone bromide 6 mg Subcutaneous Q48H PRN Chastity Meyer DO   metoprolol tartrate 25 mg Oral Q12H [address] Will MD Zac   midodrine 5 mg Oral Before Dialysis Will MD Zac   ondansetron 4 mg Intravenous Q6H PRN Will MD Zac   oxyCODONE 10 mg Oral Q4H PRN Will MD Zac   oxyCODONE 5 mg Oral Q4H PRN Will MD Zac   pantoprazole 40 mg Oral Early Morning Will MD Zac   polyethylene glycol 17 g Oral Daily Will MD Zac   saccharomyces boulardii 250 mg Oral BID Vickey Polanco MD   saliva substitute 5 spray Mouth/Throat PRN Vickey Polanco MD   senna-docusate sodium 2 tablet Oral BID Vickey Polanco MD   sevelamer 800 mg Oral TID With Meals Vickey Polanco MD   torsemide 40 mg Oral BID (diuretic) Vickey Polanco MD        Today, Patient Was Seen By: Matt Yuan DO    ** Please Note: This note has been constructed using a voice recognition system   **

## 2019-11-17 ENCOUNTER — ANESTHESIA EVENT (INPATIENT)
Dept: PERIOP | Facility: HOSPITAL | Age: 59
DRG: 576 | End: 2019-11-17
Payer: MEDICARE

## 2019-11-17 PROCEDURE — 99232 SBSQ HOSP IP/OBS MODERATE 35: CPT | Performed by: INTERNAL MEDICINE

## 2019-11-17 RX ADMIN — SEVELAMER HYDROCHLORIDE 800 MG: 800 TABLET, FILM COATED PARENTERAL at 17:08

## 2019-11-17 RX ADMIN — GABAPENTIN 100 MG: 100 CAPSULE ORAL at 17:08

## 2019-11-17 RX ADMIN — ACETAMINOPHEN 650 MG: 325 TABLET, FILM COATED ORAL at 05:43

## 2019-11-17 RX ADMIN — Medication 250 MG: at 09:37

## 2019-11-17 RX ADMIN — Medication 1 CAPSULE: at 17:08

## 2019-11-17 RX ADMIN — OXYCODONE HYDROCHLORIDE 10 MG: 10 TABLET ORAL at 09:48

## 2019-11-17 RX ADMIN — CALCIUM ACETATE 1334 MG: 667 CAPSULE ORAL at 12:11

## 2019-11-17 RX ADMIN — CALCIUM ACETATE 1334 MG: 667 CAPSULE ORAL at 09:37

## 2019-11-17 RX ADMIN — ACETAMINOPHEN 650 MG: 325 TABLET, FILM COATED ORAL at 17:08

## 2019-11-17 RX ADMIN — GABAPENTIN 100 MG: 100 CAPSULE ORAL at 09:37

## 2019-11-17 RX ADMIN — HEPARIN SODIUM 5000 UNITS: 5000 INJECTION INTRAVENOUS; SUBCUTANEOUS at 05:44

## 2019-11-17 RX ADMIN — PANTOPRAZOLE SODIUM 40 MG: 40 TABLET, DELAYED RELEASE ORAL at 05:43

## 2019-11-17 RX ADMIN — ACETAMINOPHEN 650 MG: 325 TABLET, FILM COATED ORAL at 12:11

## 2019-11-17 RX ADMIN — SEVELAMER HYDROCHLORIDE 800 MG: 800 TABLET, FILM COATED PARENTERAL at 09:37

## 2019-11-17 RX ADMIN — SEVELAMER HYDROCHLORIDE 800 MG: 800 TABLET, FILM COATED PARENTERAL at 12:11

## 2019-11-17 RX ADMIN — SENNOSIDES AND DOCUSATE SODIUM 2 TABLET: 8.6; 5 TABLET ORAL at 09:37

## 2019-11-17 RX ADMIN — TORSEMIDE 40 MG: 20 TABLET ORAL at 17:08

## 2019-11-17 RX ADMIN — Medication 250 MG: at 17:08

## 2019-11-17 RX ADMIN — HEPARIN SODIUM 5000 UNITS: 5000 INJECTION INTRAVENOUS; SUBCUTANEOUS at 17:07

## 2019-11-17 RX ADMIN — CALCIUM ACETATE 1334 MG: 667 CAPSULE ORAL at 17:08

## 2019-11-17 RX ADMIN — METOPROLOL TARTRATE 25 MG: 25 TABLET, FILM COATED ORAL at 09:37

## 2019-11-17 RX ADMIN — TORSEMIDE 40 MG: 20 TABLET ORAL at 09:37

## 2019-11-17 RX ADMIN — POLYETHYLENE GLYCOL 3350 17 G: 17 POWDER, FOR SOLUTION ORAL at 09:37

## 2019-11-17 RX ADMIN — SENNOSIDES AND DOCUSATE SODIUM 2 TABLET: 8.6; 5 TABLET ORAL at 17:08

## 2019-11-17 RX ADMIN — OXYCODONE HYDROCHLORIDE 10 MG: 10 TABLET ORAL at 20:26

## 2019-11-17 NOTE — ANESTHESIA PREPROCEDURE EVALUATION
Review of Systems/Medical History  Patient summary reviewed  Chart reviewed  No history of anesthetic complications     Cardiovascular  EKG reviewed, Hypertension , No past MI , Dysrhythmias , atrial fibrillation, No angina , CHF (TTE 9/2019 - LVEF 20% in setting of sepsis; per primary team function is likely improved since then) ,    Pulmonary  Negative pulmonary ROS        GI/Hepatic  Dysphagia,   GERD , Liver disease (polycystic liver disease) ,        Kidney disease (polycystic kidney disease) ESRD, Dialysis hemodialysis Date of last dialysis: 11/11/19,        Endo/Other  Negative endo/other ROS      GYN       Hematology  Anemia anemia of chronic disease,     Musculoskeletal    Comment: Bilateral lower extremity chronic non-healing wounds      Neurology  Negative neurology ROS      Psychology       EKG 10/26/19:  Sinus tachycardia, rate 105  Nonspecific T wave abnormality    TTE 9/23/19:  LEFT VENTRICLE:  Systolic function was markedly reduced  Ejection fraction was estimated to be 20 %  There was severe diffuse hypokinesis with regional variations  Left ventricular diastolic function parameters were abnormal      RIGHT VENTRICLE:  The ventricle was mildly to moderately dilated  Systolic function was moderately reduced  MITRAL VALVE:  There was mild regurgitation      TRICUSPID VALVE:  There was moderate regurgitation  Estimated peak PA pressure was 46 mmHg  The findings suggest mild pulmonary hypertension      PULMONIC VALVE:  There was mild regurgitation      Lab Results   Component Value Date    WBC 7 47 11/13/2019    HGB 8 0 (L) 11/13/2019    HCT 26 1 (L) 11/13/2019     (H) 11/13/2019     11/13/2019     Lab Results   Component Value Date    SODIUM 138 11/15/2019    K 4 9 11/15/2019    CL 99 (L) 11/15/2019    CO2 32 11/15/2019    BUN 30 (H) 11/15/2019    CREATININE 6 54 (H) 11/15/2019    GLUC 96 11/15/2019    CALCIUM 9 2 11/15/2019     Lab Results   Component Value Date    INR 1 08 10/26/2019    INR 1 34 (H) 10/06/2019    INR 1 42 (H) 10/03/2019    PROTIME 13 4 10/26/2019    PROTIME 15 9 (H) 10/06/2019    PROTIME 16 6 (H) 10/03/2019             Physical Exam    Airway    Mallampati score: II  TM Distance: >3 FB  Neck ROM: full     Dental   Comment: Very poor dentition with one obviously loose,     Cardiovascular  Rhythm: regular, Cardiovascular exam normal    Pulmonary  Pulmonary exam normal Breath sounds clear to auscultation,     Other Findings        Anesthesia Plan  ASA Score- 4     Anesthesia Type- IV sedation with anesthesia with ASA Monitors  Additional Monitors:   Airway Plan:         Plan Factors-    Induction- intravenous  Postoperative Plan-     Informed Consent- Anesthetic plan and risks discussed with patient  I personally reviewed this patient with the CRNA  Discussed and agreed on the Anesthesia Plan with the CRNA  Andrew Navarro

## 2019-11-17 NOTE — ASSESSMENT & PLAN NOTE
· Procedure - Debridement, removal of Eschar on 10/31/2019 by Dr Sowmya Rosenberg  · Split Thickness Skin Grafting by Plastic Surgery -      · Procedure - STSG on 11/7 per plastic surgery   · Procedure - Washout with additional STSG by plastic surgery team 11/12/2019  · Procedure - will return to OR for planned surgery as a staged procedure dressing change under anesthesia tomorrow  · No dressing changes per plastic surgery  · Activity - WBAT  Range of motion exercises recommended to avoid contractures  · Monitor for Infection -   · Antibiotic - completed 7 days of vanc and cefepime  Now monitoring off antibiotics  · Blood cultures from 10/31/2019: NGTD  · Monitor temperatures and serial leg exams  · Pain Control -   · Oxycodone 5/10 mg  · Gabapentin increased to 100 mg bid from daily dosing  · Breakthrough pain - Dilaudid 1 mg IV  · Monitor pain levels  · Supportive care  · Xeroform to right proximal thigh skin

## 2019-11-17 NOTE — PROGRESS NOTES
Progress Note - Carlos Mojica 1960, 64 y o  male MRN: [id number]    Unit/Bed#: S -01 Encounter: [id number]    Primary Care Provider: Rahul Peter   Date and time admitted to hospital: 10/26/2019  5:10 PM        * Multiple and open wound of lower limb  Assessment & Plan  · Procedure - Debridement, removal of Eschar on 10/31/2019 by Dr Neo Meehan  · Split Thickness Skin Grafting by Plastic Surgery -      · Procedure - STSG on 11/7 per plastic surgery   · Procedure - Washout with additional STSG by plastic surgery team 11/12/2019  · Procedure - will return to OR for planned surgery as a staged procedure dressing change under anesthesia tomorrow  · No dressing changes per plastic surgery  · Activity - WBAT  Range of motion exercises recommended to avoid contractures  · Monitor for Infection -   · Antibiotic - completed 7 days of vanc and cefepime  Now monitoring off antibiotics  · Blood cultures from 10/31/2019: NGTD  · Monitor temperatures and serial leg exams  · Pain Control -   · Oxycodone 5/10 mg  · Gabapentin increased to 100 mg bid from daily dosing  · Breakthrough pain - Dilaudid 1 mg IV  · Monitor pain levels  · Supportive care  · Xeroform to right proximal thigh skin  Scrotal swelling  Assessment & Plan  · Ultrasound showed "Large complex right hydrocele containing numerous septations  Increased vascularity in the left testicle with respect to the right, although both testicles could not be imaged on the same plane limiting direct comparison   Correlate clinically for orchitis "  · Urology saw patient 10/29/2019 and recommends no surgical intervention acutely and to manage problems with legs and get therapy first   · beta HCG, AFP and LDH within normal limits  · Supportive care  Atrial fibrillation ([address] )  Assessment & Plan  · Rate / Rhythm control -   · Metoprolol increased to 25 mg bid on 10/29/2019  · Monitor on telemetry    Monitor HR and blood pressures  · Anticoagulation - None prehospital   Defer to outpatient team following discharge  ESRD (end stage renal disease) ([facility] [address] )  Assessment & Plan  · On routine dialysis  · Nephrology following  Anemia due to chronic kidney disease, on chronic dialysis and Acute Blood Loss Anemia (HCC)  Assessment & Plan  · Transfuse as needed  · Monitor counts - Intermittent CBC monitoring  At Risk for Pressure ulcer, buttock  Assessment & Plan  · Local skin care  · Low Air loss mattress changed 11/12/2019 to help with prevention of wounds  · Turn / Reposition frequently  GERD (gastroesophageal reflux disease)  Assessment & Plan  · Protonix started 11/13/2019  Oropharyngeal dysphagia  Assessment & Plan  · Dysphagia 3 diet with extra gravy / sauce  · Thin liquids ok  · Biotene / Mouth Kote  · Speech pathology following  Constipation  Assessment & Plan  · Senna/docusate b i d  Also, MiraLax daily scheduled dosing  · Relistor q48h PRN  · PRN dulcolax suppository  · Additional adjustments as needed  · Monitor for BMs  VTE Pharmacologic Prophylaxis:   Pharmacologic: Heparin  Mechanical VTE Prophylaxis in Place: No    Patient Centered Rounds: I have performed bedside rounds with nursing staff today  Discussions with Specialists or Other Care Team Provider:  None    Education and Discussions with Family / Patient:  Patient only    Time Spent for Care: 30 minutes  More than 50% of total time spent on counseling and coordination of care as described above  Current Length of Stay: 22 day(s)  Current Patient Status: Inpatient     Certification Statement: The patient will continue to require additional inpatient hospital stay due to Continued evaluation and treatment for lower extremity wounds with additional procedure tomorrow  Discharge Plan / Estimated Discharge Date:  Hopefully later this week patient will be cleared by Plastic surgery for discharge      Code Status: Level 1 - Full Code      Subjective: The patient had a decent sized bowel movement this morning very early in the morning  But no other since  The patient denies any abdominal pain  He denies any shortness of breath  The patient does have some pain in the legs but mainly is complaining of burning just proximal to the right dressing site  Objective:     Vitals:   Temp (24hrs), Av 3 °F (36 8 °C), Min:97 7 °F (36 5 °C), Max:98 8 °F (37 1 °C)    Temp:  [97 7 °F (36 5 °C)-98 8 °F (37 1 °C)] 98 8 °F (37 1 °C)  HR:  [76-78] 76  Resp:  [18] 18  BP: (122-132)/(67-73) 124/73  SpO2:  [90 %-99 %] 90 %  Body mass index is 27 59 kg/m²  Input and Output Summary (last 24 hours): Intake/Output Summary (Last 24 hours) at 2019 1705  Last data filed at 2019 0501  Gross per 24 hour   Intake 120 ml   Output 1 ml   Net 119 ml       Physical Exam:     Physical Exam   Constitutional: No distress  HENT:   Slightly dry oral mucosa   Cardiovascular: Normal rate and regular rhythm  Pulmonary/Chest: Effort normal    Decreased breath sounds towards the bases  Abdominal: Soft  Bowel sounds are normal  He exhibits distension  There is no tenderness  Musculoskeletal: He exhibits no edema  Lower extremities are wrapped without any new drainage to the bandaging  Bandages not taken down per Plastic surgery request   Stable region of skin proximal to the right lower extremity bandages  Neurological:   Patient did not realize that it was  when I told him  He does know the month and the year though  The patient is somewhat forgetful but is grossly oriented otherwise  Motor exam is stable today  No cranial nerve deficits or other new neurological findings         Additional Data:     Labs:    Results from last 7 days   Lab Units 19  0937   WBC Thousand/uL 7 47   HEMOGLOBIN g/dL 8 0*   HEMATOCRIT % 26 1*   PLATELETS Thousands/uL 200   NEUTROS PCT % 71   LYMPHS PCT % 11*   MONOS PCT % 11   EOS PCT % 5 Results from last 7 days   Lab Units 11/15/19  0653   POTASSIUM mmol/L 4 9   CHLORIDE mmol/L 99*   CO2 mmol/L 32   BUN mg/dL 30*   CREATININE mg/dL 6 54*   CALCIUM mg/dL 9 2           * I Have Reviewed All Lab Data Listed Above    * Additional Pertinent Lab Tests Reviewed: No New Labs Available For Today    Imaging:    Imaging Reports Reviewed Today Include: No new imaging  Imaging Personally Reviewed by Myself Includes:  None    Recent Cultures (last 7 days):           Last 24 Hours Medication List:     Current Facility-Administered Medications:  acetaminophen 650 mg Oral Q6H PRN Delmi Garcia MD   acetaminophen 650 mg Oral Q6H [address] Delmi Garcia MD   aluminum-magnesium hydroxide-simethicone 15 mL Oral Q4H PRN Delmi Garcia MD   b complex-vitamin C-folic acid 1 capsule Oral Daily With Rina Puente MD   bisacodyl 10 mg Rectal Daily PRN Delmi Garcia MD   calcium acetate 1,334 mg Oral TID With Meals Delmi Garcia MD   calcium carbonate 1,000 mg Oral TID PRN Delmi Garcia MD   epoetin bob 10,000 Units Intravenous Once per day on Mon Wed Fri Delmi Garcia MD   gabapentin 100 mg Oral BID Aileen Rodriguez DO   heparin (porcine) 5,000 Units Subcutaneous [facility] Delmi Garcia MD   HYDROmorphone 1 mg Intravenous Q4H PRN Delmi Garcia MD   methylnaltrexone bromide 6 mg Subcutaneous Q48H PRN Aileen Rodriguez DO   metoprolol tartrate 25 mg Oral Q12H [address] Delmi Garcia MD   midodrine 5 mg Oral Before Dialysis Delmi Garcia MD   ondansetron 4 mg Intravenous Q6H PRN Delmi Garcia MD   oxyCODONE 10 mg Oral Q4H PRN Delmi Garcia MD   oxyCODONE 5 mg Oral Q4H PRN Delmi Garcia MD   pantoprazole 40 mg Oral Early Morning Delmi Garcia MD   polyethylene glycol 17 g Oral Daily Delmi Garcia MD   saccharomyces boulardii 250 mg Oral BID Hunter SANTIZO Alicia Drummond MD   saliva substitute 5 spray Mouth/Throat PRN Nidia Avilez MD   senna-docusate sodium 2 tablet Oral BID Nidia Avilez MD   sevelamer 800 mg Oral TID With Meals Nidia Avilez MD   torsemide 40 mg Oral BID (diuretic) Nidia Avilez MD        Today, Patient Was Seen By: Tatianna Duran DO    ** Please Note: This note has been constructed using a voice recognition system   **

## 2019-11-18 ENCOUNTER — ANESTHESIA (INPATIENT)
Dept: PERIOP | Facility: HOSPITAL | Age: 59
DRG: 576 | End: 2019-11-18
Payer: MEDICARE

## 2019-11-18 ENCOUNTER — APPOINTMENT (INPATIENT)
Dept: DIALYSIS | Facility: HOSPITAL | Age: 59
DRG: 576 | End: 2019-11-18
Payer: MEDICARE

## 2019-11-18 LAB
ALBUMIN SERPL BCP-MCNC: 1.3 G/DL (ref 3.5–5)
ALP SERPL-CCNC: 187 U/L (ref 46–116)
ALT SERPL W P-5'-P-CCNC: <6 U/L (ref 12–78)
ANION GAP SERPL CALCULATED.3IONS-SCNC: 9 MMOL/L (ref 4–13)
AST SERPL W P-5'-P-CCNC: 14 U/L (ref 5–45)
BILIRUB SERPL-MCNC: 0.6 MG/DL (ref 0.2–1)
BUN SERPL-MCNC: 41 MG/DL (ref 5–25)
CALCIUM SERPL-MCNC: 9.3 MG/DL (ref 8.3–10.1)
CHLORIDE SERPL-SCNC: 99 MMOL/L (ref 100–108)
CO2 SERPL-SCNC: 28 MMOL/L (ref 21–32)
CREAT SERPL-MCNC: 7.76 MG/DL (ref 0.6–1.3)
ERYTHROCYTE [DISTWIDTH] IN BLOOD BY AUTOMATED COUNT: 19.1 % (ref 11.6–15.1)
GFR SERPL CREATININE-BSD FRML MDRD: 7 ML/MIN/1.73SQ M
GLUCOSE SERPL-MCNC: 90 MG/DL (ref 65–140)
HCT VFR BLD AUTO: 28.4 % (ref 36.5–49.3)
HGB BLD-MCNC: 8.6 G/DL (ref 12–17)
MCH RBC QN AUTO: 31.7 PG (ref 26.8–34.3)
MCHC RBC AUTO-ENTMCNC: 30.3 G/DL (ref 31.4–37.4)
MCV RBC AUTO: 105 FL (ref 82–98)
PLATELET # BLD AUTO: 196 THOUSANDS/UL (ref 149–390)
PMV BLD AUTO: 9.3 FL (ref 8.9–12.7)
POTASSIUM SERPL-SCNC: 6.1 MMOL/L (ref 3.5–5.3)
PROT SERPL-MCNC: 6.2 G/DL (ref 6.4–8.2)
RBC # BLD AUTO: 2.71 MILLION/UL (ref 3.88–5.62)
SODIUM SERPL-SCNC: 136 MMOL/L (ref 136–145)
WBC # BLD AUTO: 9.9 THOUSAND/UL (ref 4.31–10.16)

## 2019-11-18 PROCEDURE — 90935 HEMODIALYSIS ONE EVALUATION: CPT | Performed by: INTERNAL MEDICINE

## 2019-11-18 PROCEDURE — 2W0RX4Z CHANGE BANDAGE ON LEFT LOWER LEG: ICD-10-PCS | Performed by: PLASTIC SURGERY

## 2019-11-18 PROCEDURE — 80053 COMPREHEN METABOLIC PANEL: CPT | Performed by: INTERNAL MEDICINE

## 2019-11-18 PROCEDURE — 2W0QX4Z CHANGE BANDAGE ON RIGHT LOWER LEG: ICD-10-PCS | Performed by: PLASTIC SURGERY

## 2019-11-18 PROCEDURE — 99232 SBSQ HOSP IP/OBS MODERATE 35: CPT | Performed by: INTERNAL MEDICINE

## 2019-11-18 PROCEDURE — 85027 COMPLETE CBC AUTOMATED: CPT | Performed by: INTERNAL MEDICINE

## 2019-11-18 PROCEDURE — 15852 DRESSING CHANGE NOT FOR BURN: CPT | Performed by: PLASTIC SURGERY

## 2019-11-18 RX ORDER — ALBUMIN (HUMAN) 12.5 G/50ML
25 SOLUTION INTRAVENOUS ONCE
Status: COMPLETED | OUTPATIENT
Start: 2019-11-18 | End: 2019-11-18

## 2019-11-18 RX ORDER — FENTANYL CITRATE/PF 50 MCG/ML
25 SYRINGE (ML) INJECTION
Status: DISCONTINUED | OUTPATIENT
Start: 2019-11-18 | End: 2019-11-18 | Stop reason: HOSPADM

## 2019-11-18 RX ORDER — GINSENG 100 MG
CAPSULE ORAL AS NEEDED
Status: DISCONTINUED | OUTPATIENT
Start: 2019-11-18 | End: 2019-11-18 | Stop reason: HOSPADM

## 2019-11-18 RX ORDER — LIDOCAINE HYDROCHLORIDE 10 MG/ML
INJECTION, SOLUTION INFILTRATION; PERINEURAL AS NEEDED
Status: DISCONTINUED | OUTPATIENT
Start: 2019-11-18 | End: 2019-11-18 | Stop reason: SURG

## 2019-11-18 RX ORDER — MAGNESIUM HYDROXIDE 1200 MG/15ML
LIQUID ORAL AS NEEDED
Status: DISCONTINUED | OUTPATIENT
Start: 2019-11-18 | End: 2019-11-18 | Stop reason: HOSPADM

## 2019-11-18 RX ORDER — PROPOFOL 10 MG/ML
INJECTION, EMULSION INTRAVENOUS AS NEEDED
Status: DISCONTINUED | OUTPATIENT
Start: 2019-11-18 | End: 2019-11-18 | Stop reason: SURG

## 2019-11-18 RX ORDER — ONDANSETRON 2 MG/ML
4 INJECTION INTRAMUSCULAR; INTRAVENOUS ONCE AS NEEDED
Status: DISCONTINUED | OUTPATIENT
Start: 2019-11-18 | End: 2019-11-18 | Stop reason: HOSPADM

## 2019-11-18 RX ORDER — SODIUM CHLORIDE 9 MG/ML
INJECTION, SOLUTION INTRAVENOUS CONTINUOUS PRN
Status: DISCONTINUED | OUTPATIENT
Start: 2019-11-18 | End: 2019-11-18 | Stop reason: SURG

## 2019-11-18 RX ORDER — PROPOFOL 10 MG/ML
INJECTION, EMULSION INTRAVENOUS CONTINUOUS PRN
Status: DISCONTINUED | OUTPATIENT
Start: 2019-11-18 | End: 2019-11-18 | Stop reason: SURG

## 2019-11-18 RX ORDER — HYDROMORPHONE HCL/PF 1 MG/ML
0.2 SYRINGE (ML) INJECTION
Status: DISCONTINUED | OUTPATIENT
Start: 2019-11-18 | End: 2019-11-18 | Stop reason: HOSPADM

## 2019-11-18 RX ADMIN — OXYCODONE HYDROCHLORIDE 10 MG: 10 TABLET ORAL at 20:18

## 2019-11-18 RX ADMIN — HEPARIN SODIUM 5000 UNITS: 5000 INJECTION INTRAVENOUS; SUBCUTANEOUS at 22:32

## 2019-11-18 RX ADMIN — PROPOFOL 50 MG: 10 INJECTION, EMULSION INTRAVENOUS at 17:10

## 2019-11-18 RX ADMIN — PHENYLEPHRINE HYDROCHLORIDE 200 MCG: 10 INJECTION INTRAVENOUS at 17:07

## 2019-11-18 RX ADMIN — ALBUMIN (HUMAN) 25 G: 0.25 INJECTION, SOLUTION INTRAVENOUS at 08:50

## 2019-11-18 RX ADMIN — OXYCODONE HYDROCHLORIDE 10 MG: 10 TABLET ORAL at 02:42

## 2019-11-18 RX ADMIN — ACETAMINOPHEN 650 MG: 325 TABLET, FILM COATED ORAL at 05:09

## 2019-11-18 RX ADMIN — PANTOPRAZOLE SODIUM 40 MG: 40 TABLET, DELAYED RELEASE ORAL at 05:09

## 2019-11-18 RX ADMIN — METOPROLOL TARTRATE 25 MG: 25 TABLET, FILM COATED ORAL at 22:32

## 2019-11-18 RX ADMIN — LIDOCAINE HYDROCHLORIDE 50 MG: 10 INJECTION, SOLUTION INFILTRATION; PERINEURAL at 17:07

## 2019-11-18 RX ADMIN — HYDROMORPHONE HYDROCHLORIDE 1 MG: 1 INJECTION, SOLUTION INTRAMUSCULAR; INTRAVENOUS; SUBCUTANEOUS at 22:32

## 2019-11-18 RX ADMIN — SODIUM CHLORIDE: 9 INJECTION, SOLUTION INTRAVENOUS at 17:02

## 2019-11-18 RX ADMIN — PROPOFOL 50 MCG/KG/MIN: 10 INJECTION, EMULSION INTRAVENOUS at 17:13

## 2019-11-18 RX ADMIN — PROPOFOL 50 MG: 10 INJECTION, EMULSION INTRAVENOUS at 17:13

## 2019-11-18 RX ADMIN — PROPOFOL 50 MG: 10 INJECTION, EMULSION INTRAVENOUS at 17:07

## 2019-11-18 RX ADMIN — PHENYLEPHRINE HYDROCHLORIDE 100 MCG: 10 INJECTION INTRAVENOUS at 17:15

## 2019-11-18 RX ADMIN — OXYCODONE HYDROCHLORIDE 10 MG: 10 TABLET ORAL at 12:59

## 2019-11-18 RX ADMIN — MIDODRINE HYDROCHLORIDE 5 MG: 5 TABLET ORAL at 08:51

## 2019-11-18 RX ADMIN — EPOETIN ALFA 10000 UNITS: 10000 SOLUTION INTRAVENOUS; SUBCUTANEOUS at 10:06

## 2019-11-18 RX ADMIN — ACETAMINOPHEN 650 MG: 325 TABLET, FILM COATED ORAL at 09:51

## 2019-11-18 RX ADMIN — PHENYLEPHRINE HYDROCHLORIDE 100 MCG: 10 INJECTION INTRAVENOUS at 17:11

## 2019-11-18 NOTE — ANESTHESIA POSTPROCEDURE EVALUATION
Post-Op Assessment Note    CV Status:  Stable  Pain Score: 0    Pain management: adequate     Mental Status:  Alert and awake   Hydration Status:  Euvolemic   PONV Controlled:  Controlled   Airway Patency:  Patent   Post Op Vitals Reviewed: Yes      Staff: Anesthesiologist, CRNA           BP   96/56   Temp     Pulse  88   Resp   14   SpO2   98

## 2019-11-18 NOTE — PHYSICAL THERAPY NOTE
Physical Therapy Cancellation Note:    Per CM, pt going to OR today for dressing change  Cancel PT services for today and will cont to follow as able postop

## 2019-11-18 NOTE — PROGRESS NOTES
Progress Note - Charlotte Memos 1960, 64 y o  male MRN: [id number]    Unit/Bed#: S -01 Encounter: [id number]    Primary Care Provider: Polly Farr   Date and time admitted to hospital: 10/26/2019  5:10 PM        * Multiple and open wound of lower limb  Assessment & Plan  · Procedure - Debridement, removal of Eschar on 10/31/2019 by Dr David Humphrey  · Split Thickness Skin Grafting by Plastic Surgery -      · Procedure - STSG on 11/7 per plastic surgery   · Procedure - Washout with additional STSG by plastic surgery team 11/12/2019  · Procedure - will return to OR for planned surgery as a staged procedure dressing change under anesthesia today  · No dressing changes per plastic surgery  · Activity - WBAT  Range of motion exercises recommended to avoid contractures  · Monitor for Infection -   · Antibiotic - completed 7 days of vanc and cefepime  Now monitoring off antibiotics  · Blood cultures from 10/31/2019: NGTD  · Monitor temperatures and serial leg exams  · Pain Control -   · Oxycodone 5/10 mg  · Gabapentin increased to 100 mg bid from daily dosing  · Breakthrough pain - Dilaudid 1 mg IV  · Monitor pain levels  · Supportive care  · Need to improve PO intake / protein intake in diet  Already getting ensure  · Will give a dose of albumin today to help reduce risk of post op edema  · Check prealbumin  · Xeroform to right proximal thigh skin  Scrotal swelling  Assessment & Plan  · Ultrasound showed "Large complex right hydrocele containing numerous septations  Increased vascularity in the left testicle with respect to the right, although both testicles could not be imaged on the same plane limiting direct comparison   Correlate clinically for orchitis "  · Urology saw patient 10/29/2019 and recommends no surgical intervention acutely and to manage problems with legs and get therapy first   · beta HCG, AFP and LDH within normal limits  · Supportive care        Atrial fibrillation [facility])  Assessment & Plan  · Rate / Rhythm control -   · Metoprolol increased to 25 mg bid on 10/29/2019  · Monitor on telemetry  Monitor HR and blood pressures  · Anticoagulation - None prehospital   Defer to outpatient team following discharge  ESRD (end stage renal disease) ([facility] [address] )  Assessment & Plan  · On routine dialysis  · Nephrology following  Anemia due to chronic kidney disease, on chronic dialysis and Acute Blood Loss Anemia (HCC)  Assessment & Plan  · Transfuse as needed  · Monitor counts - Intermittent CBC monitoring  At Risk for Pressure ulcer, buttock  Assessment & Plan  · Local skin care  · Low Air loss mattress changed 11/12/2019 to help with prevention of wounds  · Turn / Reposition frequently  GERD (gastroesophageal reflux disease)  Assessment & Plan  · Protonix started 11/13/2019  Oropharyngeal dysphagia  Assessment & Plan  · Dysphagia 3 diet with extra gravy / sauce  · Thin liquids ok  · Biotene / Mouth Kote  · Speech pathology following  Constipation  Assessment & Plan  · Senna/docusate b i d  Also, MiraLax daily scheduled dosing  · Relistor q48h PRN  · PRN dulcolax suppository  · Additional adjustments as needed  · Monitor for BMs  VTE Pharmacologic Prophylaxis:   Pharmacologic: Heparin  Mechanical VTE Prophylaxis in Place: No    Patient Centered Rounds: I have performed bedside rounds with nursing staff today  Discussions with Specialists or Other Care Team Provider:  None today    Education and Discussions with Family / Patient: Patient only  Time Spent for Care: 30 minutes  More than 50% of total time spent on counseling and coordination of care as described above      Current Length of Stay: 23 day(s)  Current Patient Status: Inpatient     Certification Statement: The patient will continue to require additional inpatient hospital stay due to Additional procedure today with follow-up/monitoring    Discharge Plan / Estimated Discharge Date: Discharge most likely at the end of this week per previous discussion with Plastic surgery    Code Status: Level 1 - Full Code      Subjective:   Lengthy discussion today regarding patient's nutrition and the need for improved p o  Intake especially the supplements that he is receiving  The patient states that currently he feels hungry but many times does not eat much of his meals  He does initially ask about food from outside but as his potassium was high today and his diet is somewhat restricted, did not feel appropriate to approve that at the present time especially without Nephrology improved  Postprocedure, as long as activity or stone change, we do need to continue to mobilize the patient as well  I did talk to the patient about this and he is well for increasing mobility, he states  Objective:     Vitals:   Temp (24hrs), Av 5 °F (36 9 °C), Min:98 1 °F (36 7 °C), Max:98 8 °F (37 1 °C)    Temp:  [98 1 °F (36 7 °C)-98 8 °F (37 1 °C)] 98 1 °F (36 7 °C)  HR:  [76-86] 86  Resp:  [18] 18  BP: (115-124)/(71-73) 119/71  SpO2:  [90 %-98 %] 98 %  Body mass index is 27 59 kg/m²  Input and Output Summary (last 24 hours): Intake/Output Summary (Last 24 hours) at 2019 0800  Last data filed at 2019 1948  Gross per 24 hour   Intake 120 ml   Output    Net 120 ml       Physical Exam:     Physical Exam   Constitutional: He is oriented to person, place, and time  No distress  HENT:   Dry oral mucosa   Cardiovascular: Normal rate and regular rhythm  Pulmonary/Chest: Effort normal  He has no wheezes  He has no rales  Abdominal: He exhibits distension (but decreased distention and softer to palpation  still with hyperresonance  )  There is no tenderness  Musculoskeletal: He exhibits no edema  Still some tenderness on palpation of right lower extremity but not really the left at all  The patient's lower extremities are wrapped bilaterally without any obvious drainage     Neurological: He is alert and oriented to person, place, and time  Vitals reviewed  Additional Data:     Labs:    Results from last 7 days   Lab Units 11/18/19  0526 11/13/19  0937   WBC Thousand/uL 9 90 7 47   HEMOGLOBIN g/dL 8 6* 8 0*   HEMATOCRIT % 28 4* 26 1*   PLATELETS Thousands/uL 196 200   NEUTROS PCT %  --  71   LYMPHS PCT %  --  11*   MONOS PCT %  --  11   EOS PCT %  --  5     Results from last 7 days   Lab Units 11/18/19  0526   POTASSIUM mmol/L 6 1*   CHLORIDE mmol/L 99*   CO2 mmol/L 28   BUN mg/dL 41*   CREATININE mg/dL 7 76*   CALCIUM mg/dL 9 3   ALK PHOS U/L 187*   ALT U/L <6*   AST U/L 14           * I Have Reviewed All Lab Data Listed Above  * Additional Pertinent Lab Tests Reviewed:  All Labs Within Last 24 Hours Reviewed    Imaging:    Imaging Reports Reviewed Today Include:  No new imaging  Imaging Personally Reviewed by Myself Includes:  None    Recent Cultures (last 7 days):           Last 24 Hours Medication List:     Current Facility-Administered Medications:  acetaminophen 650 mg Oral Q6H PRN Nico Love MD   acetaminophen 650 mg Oral Q6H [address] Nico Love MD   albumin human 25 g Intravenous Once Theresa Chamorro DO   aluminum-magnesium hydroxide-simethicone 15 mL Oral Q4H PRN Nico Love MD   b complex-vitamin C-folic acid 1 capsule Oral Daily With [place] Garret Carrel, MD   bisacodyl 10 mg Rectal Daily PRN Nico Love MD   calcium acetate 1,334 mg Oral TID With Meals Nico Love MD   calcium carbonate 1,000 mg Oral TID PRN Nico Love MD   epoetin bob 10,000 Units Intravenous Once per day on Mon Wed Fri Nico Love MD   gabapentin 100 mg Oral BID Theresa Chamorro DO   heparin (porcine) 5,000 Units Subcutaneous [facility] Nico Love MD   HYDROmorphone 1 mg Intravenous Q4H PRN Nico Love MD   lidocaine 1% 50 mL and epinephrine 1:1000 1 mL in lactated ringers 1000 mL  Irrigation Once Codie Dewitt MD   methylnaltrexone bromide 6 mg Subcutaneous Q48H PRN Guille Bowens DO   metoprolol tartrate 25 mg Oral Q12H [address] Codie Dewitt MD   midodrine 5 mg Oral Before Dialysis Codie Dewitt MD   ondansetron 4 mg Intravenous Q6H PRN Codie Dewitt MD   oxyCODONE 10 mg Oral Q4H PRN Codie Dewitt MD   oxyCODONE 5 mg Oral Q4H PRN Codie Dewitt MD   pantoprazole 40 mg Oral Early Morning Codie Dewitt MD   polyethylene glycol 17 g Oral Daily Codie Dewitt MD   saccharomyces boulardii 250 mg Oral BID Codie Dewitt MD   saliva substitute 5 spray Mouth/Throat PRN Codie Dewitt MD   senna-docusate sodium 2 tablet Oral BID Codie Dewitt MD   sevelamer 800 mg Oral TID With Meals Codie Dewitt MD   torsemide 40 mg Oral BID (diuretic) Codie Dewitt MD        Today, Patient Was Seen By: Guille Bowens DO    ** Please Note: This note has been constructed using a voice recognition system   **

## 2019-11-18 NOTE — HEMODIALYSIS
Pt received 3 hr HD treatment  Today  Right temp catheter working well with BFR at 400  Pt BP at start was 97/69  After pt was given Albumin and Midodrine, pt's bp was 116/65  Initial UF target was 2 5 liter removal but pt's bp began to drop again so target was reduced to 1 5 liters    Pre weight: 84 5kg  Post weight: 83kg (Bed scale)

## 2019-11-18 NOTE — PROGRESS NOTES
[address] [facility] NOTE   Donnie Benavides 64 y o  male MRN: [id number]  Unit/Bed#: S -01 Encounter: [id number]  Reason for Consult:  ESRD    ASSESSMENT and PLAN:  1  ESRD:    · Patient dialyzes 4 times a week at [facility]   Current schedule Monday, Tuesday, Thursday, Friday for 2 5 hours each treatment  · Treatment schedule adjusted for inpatient care  Currently treatment performed on Monday, Wednesday, Friday with an additional treatment on Saturday per eval   · Hemodialysis today-seen on treatment  ? Blood pressure acceptable  Weight up  Going for additional UF  Increasing weight may also be related to ascites  2  Access:  PermCath  3  Hyperkalemia:  Potassium 6 1   2 K bath on hemodialysis  Recheck level in the a m  Ensure patient on a potassium restricted diet when taking p o  Currently on dysphagia diet  Taking Nepro for supplement  4  Multiple wounds of the lower extremities bilaterally     · Status post debridement on 10/31/2019    · Status post skin graft 11/7  Subsequent treatment washout on 11/12  · Plan to return to OR today for dressing change under anesthesia  · Continue diet supplement for wound healing  4  Anemia due to chronic disease and acute blood loss:   · Last hemoglobin 8 6  · Continue GRISELDA  · Recent increase in dosage during hospitalization, currently 10,000 units with each treatment, 3 times a week  5  Hypotension:    · On midodrine 10 mg t i d   6  Atrial fibrillation:  On metoprolol 25 mg every 12 hours  No anticoagulation  7  CKD MBD:    · Continue binder, renal diet  8  Abdominal distension/constipation:  Status post paracentesis 10/09/2019 for 1800 mL  On bowel regime  9  Scrotal swelling:  Right hydrocele with numerous septation on imaging   Conservative management  HEMODIALYSIS PROCEDURE NOTE  The patient was seen and examined on hemodialysis    Time:  3 hours Sodium:  130 Blood flow:  400   Dialyzer: F180 Potassium:  2 Dialysate flow:  Standard Access:  PermCath Bicarbonate:  35 Ultrafiltration goal:  2 5 L   Medications on HD:  Epogen     DISPOSITION:  Hemodialysis today    SUBJECTIVE / INTERVAL HISTORY:  No complaints at this time  Patient doing lower extremity exercises    OBJECTIVE:  Current Weight: Weight - Scale: 82 3 kg (181 lb 7 oz)  Vitals:    11/18/19 0757 11/18/19 0845 11/18/19 0900 11/18/19 0930   BP: 119/71 97/69 116/65 115/77   BP Location: Left arm Right arm Right arm    Pulse: 86 88 77 100   Resp: 18 18 20 20   Temp: 98 1 °F (36 7 °C)  98 °F (36 7 °C)    TempSrc: Oral Oral Oral    SpO2: 98%      Weight:       Height:           Intake/Output Summary (Last 24 hours) at 11/18/2019 0957  Last data filed at 11/18/2019 0845  Gross per 24 hour   Intake 320 ml   Output    Net 320 ml     General: NAD, comfortably lying in bed  Skin: no rash  Eyes: anicteric sclera  ENT: moist mucous membrane  Neck: supple, no JVD  Chest: CTA b/l, no ronchii, no wheeze, no rubs, no rales  Normal effort  CVS: s1s2, no murmur, no gallop, no rub  Abdomen: soft, nontender, nl sounds  Extremities:  Bilateral Ace bandages to lower extremities    No significant edema noted  : no meeks  Neuro: AAOX3  Psych: normal affect  Medications:    Current Facility-Administered Medications:     acetaminophen (TYLENOL) tablet 650 mg, 650 mg, Oral, Q6H PRN, Linda Frank MD, 650 mg at 11/18/19 0951    acetaminophen (TYLENOL) tablet 650 mg, 650 mg, Oral, Q6H [address], Linda Frank MD, 650 mg at 11/18/19 0509    aluminum-magnesium hydroxide-simethicone (MYLANTA) 200-200-20 mg/5 mL oral suspension 15 mL, 15 mL, Oral, Q4H PRN, Linda Frank MD    b complex-vitamin C-folic acid (NEPHROCAPS) capsule 1 capsule, 1 capsule, Oral, Daily With Darryle Sis, MD, 1 capsule at 11/17/19 5266    bisacodyl (DULCOLAX) rectal suppository 10 mg, 10 mg, Rectal, Daily PRN, Linda Frank MD, 10 mg at 11/10/19 2104    calcium acetate (PHOSLO) capsule 1,334 mg, 1,334 mg, Oral, TID With Meals, Elmira Navarrete MD, 1,334 mg at 11/17/19 1708    calcium carbonate (TUMS) chewable tablet 1,000 mg, 1,000 mg, Oral, TID PRN, Elmira Navarrete MD, 1,000 mg at 11/11/19 1006    epoetin bob (EPOGEN,PROCRIT) injection 10,000 Units, 10,000 Units, Intravenous, Once per day on Mon Wed Fri, Elmira Navarrete MD, 10,000 Units at 11/15/19 7106    gabapentin (NEURONTIN) capsule 100 mg, 100 mg, Oral, BID, Twylla , DO, 100 mg at 11/17/19 1708    heparin (porcine) subcutaneous injection 5,000 Units, 5,000 Units, Subcutaneous, Q8H [address], 5,000 Units at 11/17/19 1707 **AND** [COMPLETED] Platelet count, , , Once, Jesse Chun PA-C    HYDROmorphone (DILAUDID) injection 1 mg, 1 mg, Intravenous, Q4H PRN, Elmira Navarrete MD, 1 mg at 11/15/19 1341    lidocaine (PF) (XYLOCAINE-MPF) 1 % 50 mL, EPINEPHrine PF (ADRENALIN) 1 mL in lactated ringers 1,000 mL irrigation, , Irrigation, Once, Elmira Navarrete MD    methylnaltrexone (RELISTOR) subcutaneous injection 6 mg, 6 mg, Subcutaneous, Q48H PRN, Twylla , DO, 6 mg at 11/13/19 1729    metoprolol tartrate (LOPRESSOR) tablet 25 mg, 25 mg, Oral, Q12H [address], Elmira Navarrete MD, 25 mg at 11/17/19 2895    midodrine (PROAMATINE) tablet 5 mg, 5 mg, Oral, Before Dialysis, Elmira Navarrete MD, 5 mg at 11/18/19 0851    ondansetron (ZOFRAN) injection 4 mg, 4 mg, Intravenous, Q6H PRN, Elmira Navarrete MD    oxyCODONE (ROXICODONE) IR tablet 10 mg, 10 mg, Oral, Q4H PRN, Elmira Navarrete MD, 10 mg at 11/18/19 0242    oxyCODONE (ROXICODONE) IR tablet 5 mg, 5 mg, Oral, Q4H PRN, Elmira Navarrete MD, 5 mg at 11/11/19 0617    pantoprazole (PROTONIX) EC tablet 40 mg, 40 mg, Oral, Early Morning, Elmira Navarrete MD, 40 mg at 11/18/19 0509    polyethylene glycol (MIRALAX) packet 17 g, 17 g, Oral, Daily, Elmira Navarrete MD, 17 g at 11/17/19 [place] bejaranorandolphi (FLORASTOR) capsule 250 mg, 250 mg, Oral, BID, Verenice Leroy MD, 250 mg at 11/17/19 1708    saliva substitute (MOUTH KOTE) mucosal solution 5 spray, 5 spray, Mouth/Throat, PRN, Verenice Leroy MD, 5 spray at 11/09/19 1517    senna-docusate sodium (SENOKOT S) 8 6-50 mg per tablet 2 tablet, 2 tablet, Oral, BID, Verenice Leroy MD, 2 tablet at 11/17/19 1708    sevelamer (RENAGEL) tablet 800 mg, 800 mg, Oral, TID With Meals, Verenice Leroy MD, 800 mg at 11/17/19 1708    torsemide (DEMADEX) tablet 40 mg, 40 mg, Oral, BID (diuretic), Verenice Leroy MD, 40 mg at 11/17/19 1708    Laboratory Results:  Results from last 7 days   Lab Units 11/18/19  0526 11/15/19  0653 11/14/19  0450 11/13/19  0937 11/13/19  0619 11/12/19  0325   WBC Thousand/uL 9 90  --   --  7 47  --   --    HEMOGLOBIN g/dL 8 6*  --   --  8 0*  --   --    HEMATOCRIT % 28 4*  --   --  26 1*  --   --    PLATELETS Thousands/uL 196  --   --  200  --   --    POTASSIUM mmol/L 6 1* 4 9 4 3  --  5 5* 4 6   CHLORIDE mmol/L 99* 99* 99*  --  99* 98*   CO2 mmol/L 28 32 31  --  24 31   BUN mg/dL 41* 30* 23  --  34* 27*   CREATININE mg/dL 7 76* 6 54* 5 40*  --  7 18* 5 84*   CALCIUM mg/dL 9 3 9 2 9 0  --  8 7 8 8   PHOSPHORUS mg/dL  --   --   --   --   --  4 4

## 2019-11-18 NOTE — ASSESSMENT & PLAN NOTE
· Procedure - Debridement, removal of Eschar on 10/31/2019 by Dr Sowmya Rosenberg  · Split Thickness Skin Grafting by Plastic Surgery -      · Procedure - STSG on 11/7 per plastic surgery   · Procedure - Washout with additional STSG by plastic surgery team 11/12/2019  · Procedure - will return to OR for planned surgery as a staged procedure dressing change under anesthesia today  · No dressing changes per plastic surgery  · Activity - WBAT  Range of motion exercises recommended to avoid contractures  · Monitor for Infection -   · Antibiotic - completed 7 days of vanc and cefepime  Now monitoring off antibiotics  · Blood cultures from 10/31/2019: NGTD  · Monitor temperatures and serial leg exams  · Pain Control -   · Oxycodone 5/10 mg  · Gabapentin increased to 100 mg bid from daily dosing  · Breakthrough pain - Dilaudid 1 mg IV  · Monitor pain levels  · Supportive care  · Need to improve PO intake / protein intake in diet  Already getting ensure  · Will give a dose of albumin today to help reduce risk of post op edema  · Check prealbumin  · Xeroform to right proximal thigh skin

## 2019-11-18 NOTE — CONSULTS
Pt reports he is drinking up to 3 chocolate milks per day (  >400 mg potassium per 8 oz milk) and at least 1 chocolate pudding per day (`150 mg per 4 oz) likely contributing to elevated Potassium  Counseled patient on limiting to 1 chocolate choice per day  Suggest check phosphorus levels related to large dairy intake  Adjusted flavors of Nepro to vanilla    Counseled patient on importance of consuming Nepro

## 2019-11-18 NOTE — PLAN OF CARE
UF goal today is the removal of 2 5 liters  Pt is 2 5 liters above EDW    Problem: METABOLIC, FLUID AND ELECTROLYTES - ADULT  Goal: Electrolytes maintained within normal limits  Description  INTERVENTIONS:  - Monitor labs and assess patient for signs and symptoms of electrolyte imbalances  - Administer electrolyte replacement as ordered  - Monitor response to electrolyte replacements, including repeat lab results as appropriate  - Instruct patient on fluid and nutrition as appropriate  Outcome: Progressing     Problem: METABOLIC, FLUID AND ELECTROLYTES - ADULT  Goal: Fluid balance maintained  Description  INTERVENTIONS:  - Monitor labs   - Monitor I/O and WT  - Instruct patient on fluid and nutrition as appropriate  - Assess for signs & symptoms of volume excess or deficit  Outcome: Progressing

## 2019-11-19 LAB
ALBUMIN SERPL BCP-MCNC: 1.6 G/DL (ref 3.5–5)
ALP SERPL-CCNC: 182 U/L (ref 46–116)
ALT SERPL W P-5'-P-CCNC: <6 U/L (ref 12–78)
ANION GAP SERPL CALCULATED.3IONS-SCNC: 2 MMOL/L (ref 4–13)
AST SERPL W P-5'-P-CCNC: 17 U/L (ref 5–45)
BILIRUB SERPL-MCNC: 0.6 MG/DL (ref 0.2–1)
BUN SERPL-MCNC: 28 MG/DL (ref 5–25)
CALCIUM SERPL-MCNC: 9.1 MG/DL (ref 8.3–10.1)
CHLORIDE SERPL-SCNC: 97 MMOL/L (ref 100–108)
CO2 SERPL-SCNC: 31 MMOL/L (ref 21–32)
CREAT SERPL-MCNC: 5.61 MG/DL (ref 0.6–1.3)
GFR SERPL CREATININE-BSD FRML MDRD: 10 ML/MIN/1.73SQ M
GLUCOSE SERPL-MCNC: 87 MG/DL (ref 65–140)
PHOSPHATE SERPL-MCNC: 3.8 MG/DL (ref 2.7–4.5)
POTASSIUM SERPL-SCNC: 5.2 MMOL/L (ref 3.5–5.3)
PREALB SERPL-MCNC: 6.7 MG/DL (ref 18–40)
PROT SERPL-MCNC: 6.6 G/DL (ref 6.4–8.2)
SODIUM SERPL-SCNC: 130 MMOL/L (ref 136–145)

## 2019-11-19 PROCEDURE — 99232 SBSQ HOSP IP/OBS MODERATE 35: CPT | Performed by: HOSPITALIST

## 2019-11-19 PROCEDURE — 80053 COMPREHEN METABOLIC PANEL: CPT | Performed by: HOSPITALIST

## 2019-11-19 PROCEDURE — 99232 SBSQ HOSP IP/OBS MODERATE 35: CPT | Performed by: INTERNAL MEDICINE

## 2019-11-19 PROCEDURE — 84134 ASSAY OF PREALBUMIN: CPT | Performed by: HOSPITALIST

## 2019-11-19 PROCEDURE — 84100 ASSAY OF PHOSPHORUS: CPT | Performed by: HOSPITALIST

## 2019-11-19 RX ADMIN — SENNOSIDES AND DOCUSATE SODIUM 2 TABLET: 8.6; 5 TABLET ORAL at 08:01

## 2019-11-19 RX ADMIN — GABAPENTIN 100 MG: 100 CAPSULE ORAL at 17:39

## 2019-11-19 RX ADMIN — ACETAMINOPHEN 650 MG: 325 TABLET, FILM COATED ORAL at 11:51

## 2019-11-19 RX ADMIN — CALCIUM ACETATE 1334 MG: 667 CAPSULE ORAL at 08:01

## 2019-11-19 RX ADMIN — METOPROLOL TARTRATE 25 MG: 25 TABLET, FILM COATED ORAL at 20:56

## 2019-11-19 RX ADMIN — POLYETHYLENE GLYCOL 3350 17 G: 17 POWDER, FOR SOLUTION ORAL at 08:00

## 2019-11-19 RX ADMIN — ACETAMINOPHEN 650 MG: 325 TABLET, FILM COATED ORAL at 05:11

## 2019-11-19 RX ADMIN — SEVELAMER HYDROCHLORIDE 800 MG: 800 TABLET, FILM COATED PARENTERAL at 11:51

## 2019-11-19 RX ADMIN — SEVELAMER HYDROCHLORIDE 800 MG: 800 TABLET, FILM COATED PARENTERAL at 17:39

## 2019-11-19 RX ADMIN — ACETAMINOPHEN 650 MG: 325 TABLET, FILM COATED ORAL at 17:38

## 2019-11-19 RX ADMIN — OXYCODONE HYDROCHLORIDE 10 MG: 10 TABLET ORAL at 13:29

## 2019-11-19 RX ADMIN — SEVELAMER HYDROCHLORIDE 800 MG: 800 TABLET, FILM COATED PARENTERAL at 08:01

## 2019-11-19 RX ADMIN — ACETAMINOPHEN 650 MG: 325 TABLET, FILM COATED ORAL at 23:09

## 2019-11-19 RX ADMIN — Medication 250 MG: at 17:39

## 2019-11-19 RX ADMIN — Medication 1 CAPSULE: at 17:39

## 2019-11-19 RX ADMIN — PANTOPRAZOLE SODIUM 40 MG: 40 TABLET, DELAYED RELEASE ORAL at 05:11

## 2019-11-19 RX ADMIN — HEPARIN SODIUM 5000 UNITS: 5000 INJECTION INTRAVENOUS; SUBCUTANEOUS at 20:56

## 2019-11-19 RX ADMIN — HEPARIN SODIUM 5000 UNITS: 5000 INJECTION INTRAVENOUS; SUBCUTANEOUS at 13:30

## 2019-11-19 RX ADMIN — OXYCODONE HYDROCHLORIDE 10 MG: 10 TABLET ORAL at 08:03

## 2019-11-19 RX ADMIN — HEPARIN SODIUM 5000 UNITS: 5000 INJECTION INTRAVENOUS; SUBCUTANEOUS at 05:11

## 2019-11-19 RX ADMIN — METOPROLOL TARTRATE 25 MG: 25 TABLET, FILM COATED ORAL at 08:01

## 2019-11-19 RX ADMIN — TORSEMIDE 40 MG: 20 TABLET ORAL at 17:39

## 2019-11-19 RX ADMIN — TORSEMIDE 40 MG: 20 TABLET ORAL at 08:01

## 2019-11-19 RX ADMIN — GABAPENTIN 100 MG: 100 CAPSULE ORAL at 08:01

## 2019-11-19 RX ADMIN — CALCIUM ACETATE 1334 MG: 667 CAPSULE ORAL at 11:51

## 2019-11-19 RX ADMIN — Medication 250 MG: at 08:01

## 2019-11-19 NOTE — PROGRESS NOTES
[address] [address] [facility] NOTE   Yanira Cos 64 y o  male MRN: [id number]  Unit/Bed#: S -01 Encounter: [id number]  Reason for Consult:  ESRD    ASSESSMENT and PLAN:  1  ESRD:    · Patient dialyzes 4 times a week at [facility]   Current schedule Monday, Tuesday, Thursday, Friday for 2 5 hours each treatment  · Treatment schedule adjusted for inpatient care   Currently treatment performed on Monday, Wednesday, Friday with an additional treatment on Saturday per eval   · Last hemodialysis treatment 11/18  ? Patient unable to tolerate additional ultrafiltration, blood pressure declined  ? Patient was 1 kg above dry weight at the end of treatment on 11/18  2  Access:  PermCath  No current issues  3  Hyperkalemia:    · Potassium 6 1 on 11/18   2 K bath on hemodialysis  · Potassium 5 2  Continue to monitor  4  Multiple wounds of the lower extremities bilaterally     · Status post debridement/skin graft  · Ongoing wound care treatment- last dressing change in the OR on 11/18  · Continue diet supplement for wound healing  4  Anemia due to chronic disease and acute blood loss:   · Last hemoglobin 8 6  · Continue GRISELDA      · Recent increase in dosage- currently 10,000 units with each treatment, 3 times a week  · Last iron studies 10/28/2019:  Iron saturation 21%  5  Hypotension:    · On midodrine 10 mg t i d  · Blood pressure acceptable  6  Atrial fibrillation:    · On metoprolol 25 mg every 12 hours   No anticoagulation  7  CKD MBD:    · Continue binder, renal diet  8  Abdominal distension/constipation:    · Status post paracentesis 10/09/2019 for 1800 mL  · On bowel regime  9  Scrotal swelling:  Right hydrocele with numerous septation on imaging   Conservative management      DISPOSITION:  Next hemodialysis treatment 11/20    SUBJECTIVE / INTERVAL HISTORY:  No complaints at this time  At leg/foot pain earlier relieved with pain medication      OBJECTIVE:  Current Weight: Weight - Scale: 82 3 kg (181 lb 7 oz)  Vitals:    11/18/19 1800 11/18/19 1815 11/18/19 2215 11/19/19 0742   BP: 110/65 120/67 116/61 111/61   BP Location:  Left arm Left arm Left arm   Pulse: 87 88 104 86   Resp: 18 18 18 18   Temp: 98 8 °F (37 1 °C) 99 1 °F (37 3 °C) 99 2 °F (37 3 °C) 99 °F (37 2 °C)   TempSrc:  Oral Oral Oral   SpO2: 98% 90% 91% 94%   Weight:       Height:           Intake/Output Summary (Last 24 hours) at 11/19/2019 1100  Last data filed at 11/18/2019 2215  Gross per 24 hour   Intake 580 ml   Output 2000 ml   Net -1420 ml     General: NAD  Skin: no rash, pale  Extremity slightly cool  Eyes: anicteric sclera  ENT: moist mucous membrane  Neck: supple, no JVD  Chest: CTA b/l, no ronchii, no wheeze, no rubs, no rales  Normal effort  CVS: s1s2, no murmur, no gallop, no rub    Regular rhythm  Abdomen:  Rounded, tympanic  Extremities:  Bilateral Ace bandages  : no meeks  Neuro: AAOX3  Psych: normal affect  Medications:    Current Facility-Administered Medications:     acetaminophen (TYLENOL) tablet 650 mg, 650 mg, Oral, Q6H PRN, Codie Dewitt MD, 650 mg at 11/18/19 0951    acetaminophen (TYLENOL) tablet 650 mg, 650 mg, Oral, Q6H [address], Codie Dewitt MD, 650 mg at 11/19/19 0511    aluminum-magnesium hydroxide-simethicone (MYLANTA) 200-200-20 mg/5 mL oral suspension 15 mL, 15 mL, Oral, Q4H PRN, Codie Dewitt MD    b complex-vitamin C-folic acid (NEPHROCAPS) capsule 1 capsule, 1 capsule, Oral, Daily With Fabrizio Beauchamp MD, 1 capsule at 11/17/19 1708    bisacodyl (DULCOLAX) rectal suppository 10 mg, 10 mg, Rectal, Daily PRN, Codie Dewitt MD, 10 mg at 11/10/19 2104    calcium acetate (PHOSLO) capsule 1,334 mg, 1,334 mg, Oral, TID With Meals, Codie Dewitt MD, 1,334 mg at 11/19/19 0801    calcium carbonate (TUMS) chewable tablet 1,000 mg, 1,000 mg, Oral, TID PRN, Codie Dewitt MD, 1,000 mg at 11/11/19 1006    epoetin bob (EPOGEN,PROCRIT) injection 10,000 Units, 10,000 Units, Intravenous, Once per day on Mon Wed Fri, Ulices Buck MD, 10,000 Units at 11/18/19 1006    gabapentin (NEURONTIN) capsule 100 mg, 100 mg, Oral, BID, Ulices Buck MD, 100 mg at 11/19/19 0801    heparin (porcine) subcutaneous injection 5,000 Units, 5,000 Units, Subcutaneous, Q8H [address], 5,000 Units at 11/19/19 0511 **AND** [COMPLETED] Platelet count, , , Once, Jesse Chun PA-C    HYDROmorphone (DILAUDID) injection 1 mg, 1 mg, Intravenous, Q4H PRN, Ulices Buck MD, 1 mg at 11/18/19 2232    methylnaltrexone (RELISTOR) subcutaneous injection 6 mg, 6 mg, Subcutaneous, Q48H PRN, Ulices Buck MD, 6 mg at 11/13/19 1729    metoprolol tartrate (LOPRESSOR) tablet 25 mg, 25 mg, Oral, Q12H [address], Ulices Buck MD, 25 mg at 11/19/19 0801    midodrine (PROAMATINE) tablet 5 mg, 5 mg, Oral, Before Dialysis, Ulices Buck MD, 5 mg at 11/18/19 0851    ondansetron (ZOFRAN) injection 4 mg, 4 mg, Intravenous, Q6H PRN, Ulices Buck MD    oxyCODONE (ROXICODONE) IR tablet 10 mg, 10 mg, Oral, Q4H PRN, Ulices Buck MD, 10 mg at 11/19/19 0803    oxyCODONE (ROXICODONE) IR tablet 5 mg, 5 mg, Oral, Q4H PRN, Ulices Buck MD, 5 mg at 11/11/19 0617    pantoprazole (PROTONIX) EC tablet 40 mg, 40 mg, Oral, Early Morning, Ulices Buck MD, 40 mg at 11/19/19 0511    polyethylene glycol (MIRALAX) packet 17 g, 17 g, Oral, Daily, Ulices Buck MD, 17 g at 11/19/19 0800    saccharomyces boulardii (FLORASTOR) capsule 250 mg, 250 mg, Oral, BID, Ulices Buck MD, 250 mg at 11/19/19 0801    saliva substitute (MOUTH KOTE) mucosal solution 5 spray, 5 spray, Mouth/Throat, PRN, Ulices Buck MD, 5 spray at 11/09/19 1517    senna-docusate sodium (SENOKOT S) 8 6-50 mg per tablet 2 tablet, 2 tablet, Oral, BID, Ulices Buck MD, 2 tablet at 11/19/19 0801   sevelamer (RENAGEL) tablet 800 mg, 800 mg, Oral, TID With Meals, Travis Tesfaye MD, 800 mg at 11/19/19 0801    torsemide (DEMADEX) tablet 40 mg, 40 mg, Oral, BID (diuretic), Travis Tesfaye MD, 40 mg at 11/19/19 0801    Laboratory Results:  Results from last 7 days   Lab Units 11/19/19  0306 11/18/19  0526 11/15/19  0653 11/14/19  0450 11/13/19  0937 11/13/19  0619   WBC Thousand/uL  --  9 90  --   --  7 47  --    HEMOGLOBIN g/dL  --  8 6*  --   --  8 0*  --    HEMATOCRIT %  --  28 4*  --   --  26 1*  --    PLATELETS Thousands/uL  --  196  --   --  200  --    POTASSIUM mmol/L 5 2 6 1* 4 9 4 3  --  5 5*   CHLORIDE mmol/L 97* 99* 99* 99*  --  99*   CO2 mmol/L 31 28 32 31  --  24   BUN mg/dL 28* 41* 30* 23  --  34*   CREATININE mg/dL 5 61* 7 76* 6 54* 5 40*  --  7 18*   CALCIUM mg/dL 9 1 9 3 9 2 9 0  --  8 7   PHOSPHORUS mg/dL 3 8  --   --   --   --   --

## 2019-11-19 NOTE — ASSESSMENT & PLAN NOTE
· Procedure - Debridement, removal of Eschar on 10/31/2019 by Dr David Humphrey  · Split Thickness Skin Grafting by Plastic Surgery -      · Procedure - STSG on 11/7 per plastic surgery   · Procedure - Washout with additional STSG by plastic surgery team 11/12/2019  · Procedure -OR (11/18) for staged procedure dressing change under anesthesia today  · No dressing changes per plastic surgery  · Activity - WBAT  Range of motion exercises recommended to avoid contractures  · Monitor for Infection -   · Antibiotic - completed 7 days of vanc and cefepime  Now monitoring off antibiotics  · Blood cultures from 10/31/2019: NGTD  · Monitor temperatures and serial leg exams  · Pain Control -   · Oxycodone 5/10 mg  · Gabapentin increased to 100 mg bid from daily dosing  · Breakthrough pain - Dilaudid 1 mg IV  · Monitor pain levels  · Supportive care  · Need to improve PO intake / protein intake in diet  Already getting ensure  · f/u prealbumin  · Xeroform to right proximal thigh skin

## 2019-11-19 NOTE — PROGRESS NOTES
Progress Note - Emanuel Carlson 1960, 64 y o  male MRN: [id number]    Unit/Bed#: S -01 Encounter: [id number]    Primary Care Provider: Tonja Medina   Date and time admitted to hospital: 10/26/2019  5:10 PM      * Multiple and open wound of lower limb  Assessment & Plan  · Procedure - Debridement, removal of Eschar on 10/31/2019 by Dr Edin De La Torre  · Split Thickness Skin Grafting by Plastic Surgery -      · Procedure - STSG on 11/7 per plastic surgery   · Procedure - Washout with additional STSG by plastic surgery team 11/12/2019  · Procedure -OR (11/18) for staged procedure dressing change under anesthesia today  · No dressing changes per plastic surgery  · Activity - WBAT  Range of motion exercises recommended to avoid contractures  · Monitor for Infection -   · Antibiotic - completed 7 days of vanc and cefepime  Now monitoring off antibiotics  · Blood cultures from 10/31/2019: NGTD  · Monitor temperatures and serial leg exams  · Pain Control -   · Oxycodone 5/10 mg  · Gabapentin increased to 100 mg bid from daily dosing  · Breakthrough pain - Dilaudid 1 mg IV  · Monitor pain levels  · Supportive care  · Need to improve PO intake / protein intake in diet  Already getting ensure  · f/u prealbumin  · Xeroform to right proximal thigh skin  Anemia due to chronic kidney disease, on chronic dialysis and Acute Blood Loss Anemia (HCC)  Assessment & Plan  · Transfuse as needed  · Monitor counts - Intermittent CBC monitoring  Atrial fibrillation ([facility] )  Assessment & Plan  · Rate / Rhythm control -   · Metoprolol increased to 25 mg bid on 10/29/2019  · Monitor on telemetry  Monitor HR and blood pressures  · Anticoagulation - None prehospital   Defer to outpatient team following discharge  ESRD (end stage renal disease) ([facility] )  Assessment & Plan  · On routine dialysis  · Nephrology following      Scrotal swelling  Assessment & Plan  · Ultrasound showed "Large complex right hydrocele containing numerous septations  Increased vascularity in the left testicle with respect to the right, although both testicles could not be imaged on the same plane limiting direct comparison   Correlate clinically for orchitis "  · Urology saw patient 10/29/2019 and recommends no surgical intervention acutely and to manage problems with legs and get therapy first   · beta HCG, AFP and LDH within normal limits  · Supportive care  Constipation  Assessment & Plan  · Senna/docusate b i d  Also, MiraLax daily scheduled dosing  · Relistor q48h PRN  · PRN dulcolax suppository  · Additional adjustments as needed  · Monitor for BMs  At Risk for Pressure ulcer, buttock  Assessment & Plan  · Local skin care  · Low Air loss mattress changed 2019 to help with prevention of wounds  · Turn / Reposition frequently  VTE Pharmacologic Prophylaxis:   Pharmacologic: Heparin  Mechanical VTE Prophylaxis in Place: Yes    Patient Centered Rounds: I have performed bedside rounds with nursing staff today  Discussions with Specialists or Other Care Team Provider:     Education and Discussions with Family / Patient: patient     Time Spent for Care: 30 minutes  More than 50% of total time spent on counseling and coordination of care as described above  Current Length of Stay: 24 day(s)    Current Patient Status: Inpatient   Certification Statement: The patient will continue to require additional inpatient hospital stay due to b/l leg wounds requiring management  Discharge Plan / Estimated Discharge Date: TBD based on clinical course    Code Status: Level 1 - Full Code    Subjective:   Feels well  Still having pain but fairly well-controlled on current regimen  Appetite is intact       Objective:     Vitals:   Temp (24hrs), Av °F (37 2 °C), Min:97 7 °F (36 5 °C), Max:100 8 °F (38 2 °C)    Temp:  [97 7 °F (36 5 °C)-100 8 °F (38 2 °C)] 99 °F (37 2 °C)  HR:  [] 86  Resp:  [13-20] 18  BP: ()/(40-77) 111/61  SpO2:  [90 %-98 %] 94 %  Body mass index is 27 59 kg/m²  Input and Output Summary (last 24 hours): Intake/Output Summary (Last 24 hours) at 11/19/2019 0918  Last data filed at 11/18/2019 2215  Gross per 24 hour   Intake 580 ml   Output 2000 ml   Net -1420 ml       Physical Exam:     Physical Exam   Constitutional: He is oriented to person, place, and time  No distress  HENT:   Head: Normocephalic and atraumatic  Cardiovascular: Normal rate  No murmur heard  Pulmonary/Chest: Effort normal  No stridor  No respiratory distress  Abdominal: Soft  He exhibits distension  There is no tenderness  There is no guarding  Musculoskeletal: Normal range of motion  He exhibits tenderness and deformity  Neurological: He is alert and oriented to person, place, and time  Skin: He is not diaphoretic  Nursing note and vitals reviewed  Additional Data:     Labs:    Results from last 7 days   Lab Units 11/18/19  0526 11/13/19  0937   WBC Thousand/uL 9 90 7 47   HEMOGLOBIN g/dL 8 6* 8 0*   HEMATOCRIT % 28 4* 26 1*   PLATELETS Thousands/uL 196 200   NEUTROS PCT %  --  71   LYMPHS PCT %  --  11*   MONOS PCT %  --  11   EOS PCT %  --  5     Results from last 7 days   Lab Units 11/19/19  0306   POTASSIUM mmol/L 5 2   CHLORIDE mmol/L 97*   CO2 mmol/L 31   BUN mg/dL 28*   CREATININE mg/dL 5 61*   CALCIUM mg/dL 9 1   ALK PHOS U/L 182*   ALT U/L <6*   AST U/L 17           * I Have Reviewed All Lab Data Listed Above  * Additional Pertinent Lab Tests Reviewed:  All Labs Within Last 24 Hours Reviewed    Imaging:    Imaging Reports Reviewed Today Include:   Imaging Personally Reviewed by Myself Includes:      Recent Cultures (last 7 days):           Last 24 Hours Medication List:     Current Facility-Administered Medications:  acetaminophen 650 mg Oral Q6H PRN Josselin Pulido MD   acetaminophen 650 mg Oral Q6H [address] Josselin Pulido MD   aluminum-magnesium hydroxide-simethicone 15 mL Oral Q4H PRN Popeye Somers MD   b complex-vitamin C-folic acid 1 capsule Oral Daily With Rina Bah MD   bisacodyl 10 mg Rectal Daily PRN Popeye Somers MD   calcium acetate 1,334 mg Oral TID With Meals Popeye Somers MD   calcium carbonate 1,000 mg Oral TID PRN Popeye Somers MD   epoetin bob 10,000 Units Intravenous Once per day on Mon Wed Fri Popeye Somers MD   gabapentin 100 mg Oral BID Popeye Somers MD   heparin (porcine) 5,000 Units Subcutaneous [facility] Popeye Somers MD   HYDROmorphone 1 mg Intravenous Q4H PRN Popeye Somers MD   methylnaltrexone bromide 6 mg Subcutaneous Q48H PRN Popeye Somers MD   metoprolol tartrate 25 mg Oral Q12H [facility] Popeye Somers MD   midodrine 5 mg Oral Before Dialysis Popeye Somers MD   ondansetron 4 mg Intravenous Q6H PRN Popeye Somers MD   oxyCODONE 10 mg Oral Q4H PRN Popeye Somers MD   oxyCODONE 5 mg Oral Q4H PRN Popeye Somers MD   pantoprazole 40 mg Oral Early Morning Popeye Somers MD   polyethylene glycol 17 g Oral Daily Popeye Somers MD   saccharomyces boulardii 250 mg Oral BID Popeye Somers MD   saliva substitute 5 spray Mouth/Throat PRN Popeye Somers MD   senna-docusate sodium 2 tablet Oral BID Popeye Somers MD   sevelamer 800 mg Oral TID With Meals Popeye Somers MD   torsemide 40 mg Oral BID (diuretic) Popeye Somers MD        Today, Patient Was Seen By: Susi Dawkins MD    ** Please Note: This note has been constructed using a voice recognition system   **

## 2019-11-20 ENCOUNTER — APPOINTMENT (INPATIENT)
Dept: DIALYSIS | Facility: HOSPITAL | Age: 59
DRG: 576 | End: 2019-11-20
Payer: MEDICARE

## 2019-11-20 LAB
ANION GAP SERPL CALCULATED.3IONS-SCNC: 6 MMOL/L (ref 4–13)
BUN SERPL-MCNC: 35 MG/DL (ref 5–25)
CALCIUM SERPL-MCNC: 9.2 MG/DL (ref 8.3–10.1)
CHLORIDE SERPL-SCNC: 100 MMOL/L (ref 100–108)
CO2 SERPL-SCNC: 31 MMOL/L (ref 21–32)
CREAT SERPL-MCNC: 6.81 MG/DL (ref 0.6–1.3)
ERYTHROCYTE [DISTWIDTH] IN BLOOD BY AUTOMATED COUNT: 19.4 % (ref 11.6–15.1)
GFR SERPL CREATININE-BSD FRML MDRD: 8 ML/MIN/1.73SQ M
GLUCOSE SERPL-MCNC: 103 MG/DL (ref 65–140)
HCT VFR BLD AUTO: 28.7 % (ref 36.5–49.3)
HGB BLD-MCNC: 8.7 G/DL (ref 12–17)
MCH RBC QN AUTO: 31.6 PG (ref 26.8–34.3)
MCHC RBC AUTO-ENTMCNC: 30.3 G/DL (ref 31.4–37.4)
MCV RBC AUTO: 104 FL (ref 82–98)
PLATELET # BLD AUTO: 194 THOUSANDS/UL (ref 149–390)
PMV BLD AUTO: 9.1 FL (ref 8.9–12.7)
POTASSIUM SERPL-SCNC: 5.3 MMOL/L (ref 3.5–5.3)
RBC # BLD AUTO: 2.75 MILLION/UL (ref 3.88–5.62)
SODIUM SERPL-SCNC: 137 MMOL/L (ref 136–145)
WBC # BLD AUTO: 7.91 THOUSAND/UL (ref 4.31–10.16)

## 2019-11-20 PROCEDURE — 99024 POSTOP FOLLOW-UP VISIT: CPT | Performed by: PLASTIC SURGERY

## 2019-11-20 PROCEDURE — 80048 BASIC METABOLIC PNL TOTAL CA: CPT | Performed by: INTERNAL MEDICINE

## 2019-11-20 PROCEDURE — 85027 COMPLETE CBC AUTOMATED: CPT | Performed by: INTERNAL MEDICINE

## 2019-11-20 PROCEDURE — 99232 SBSQ HOSP IP/OBS MODERATE 35: CPT | Performed by: INTERNAL MEDICINE

## 2019-11-20 PROCEDURE — 99232 SBSQ HOSP IP/OBS MODERATE 35: CPT | Performed by: HOSPITALIST

## 2019-11-20 RX ADMIN — SEVELAMER HYDROCHLORIDE 800 MG: 800 TABLET, FILM COATED PARENTERAL at 17:39

## 2019-11-20 RX ADMIN — Medication 250 MG: at 08:53

## 2019-11-20 RX ADMIN — ACETAMINOPHEN 650 MG: 325 TABLET, FILM COATED ORAL at 05:42

## 2019-11-20 RX ADMIN — HEPARIN SODIUM 5000 UNITS: 5000 INJECTION INTRAVENOUS; SUBCUTANEOUS at 05:43

## 2019-11-20 RX ADMIN — METOPROLOL TARTRATE 25 MG: 25 TABLET, FILM COATED ORAL at 22:27

## 2019-11-20 RX ADMIN — PANTOPRAZOLE SODIUM 40 MG: 40 TABLET, DELAYED RELEASE ORAL at 05:43

## 2019-11-20 RX ADMIN — HEPARIN SODIUM 5000 UNITS: 5000 INJECTION INTRAVENOUS; SUBCUTANEOUS at 14:52

## 2019-11-20 RX ADMIN — SENNOSIDES AND DOCUSATE SODIUM 2 TABLET: 8.6; 5 TABLET ORAL at 08:53

## 2019-11-20 RX ADMIN — GABAPENTIN 100 MG: 100 CAPSULE ORAL at 08:53

## 2019-11-20 RX ADMIN — OXYCODONE HYDROCHLORIDE 5 MG: 5 TABLET ORAL at 13:57

## 2019-11-20 RX ADMIN — OXYCODONE HYDROCHLORIDE 10 MG: 10 TABLET ORAL at 17:39

## 2019-11-20 RX ADMIN — EPOETIN ALFA 10000 UNITS: 10000 SOLUTION INTRAVENOUS; SUBCUTANEOUS at 11:04

## 2019-11-20 RX ADMIN — SENNOSIDES AND DOCUSATE SODIUM 2 TABLET: 8.6; 5 TABLET ORAL at 17:40

## 2019-11-20 RX ADMIN — OXYCODONE HYDROCHLORIDE 10 MG: 10 TABLET ORAL at 22:39

## 2019-11-20 RX ADMIN — ACETAMINOPHEN 650 MG: 325 TABLET, FILM COATED ORAL at 17:39

## 2019-11-20 RX ADMIN — SEVELAMER HYDROCHLORIDE 800 MG: 800 TABLET, FILM COATED PARENTERAL at 12:59

## 2019-11-20 RX ADMIN — Medication 1 CAPSULE: at 17:40

## 2019-11-20 RX ADMIN — GABAPENTIN 100 MG: 100 CAPSULE ORAL at 17:40

## 2019-11-20 RX ADMIN — Medication 250 MG: at 17:39

## 2019-11-20 RX ADMIN — HEPARIN SODIUM 5000 UNITS: 5000 INJECTION INTRAVENOUS; SUBCUTANEOUS at 22:27

## 2019-11-20 RX ADMIN — SEVELAMER HYDROCHLORIDE 800 MG: 800 TABLET, FILM COATED PARENTERAL at 08:52

## 2019-11-20 RX ADMIN — TORSEMIDE 40 MG: 20 TABLET ORAL at 17:39

## 2019-11-20 NOTE — PROGRESS NOTES
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, who presents on 10/6 with abd pain and LE edema and there is concern for ileus on this admission along with abdominal distention with ascites and patient underwent a paracentesis also  Patient continue with his wound care  He was eventually discharged on October 16th  There is also concern for calciphylaxis of lower extremity wounds and patient underwent a skin biopsy that was unrevealing  Patient now presents on 10/26 with lower extremity wounds from rehab center and fevers  Nephrology was consulted for ESRD  Patient underwent debridement with Podiatry team on 10/31  Had perioperative fevers and hypotension  Antibiotics were expanded     1) ESRD      - pt was at [facility] prior to admission where he was on 4 days a week dialysis treatments (home hemo schedule)   - access was tunneled dialysis catheter that was recently exchanged on October 17th  - pt has strong history of non compliance with dialysis when reviewed prior to last admission records, but was more compliant with dialysis while inpatient last admission   - currently on MWF schedule   - HD 11/20 - seen and examined on HD   Na 138, Bicarb 35, F180, , Time 4 hours  - increase time to 4 hours due to hyperkalemia and poor clearance likely  - cont sevelamer but recheck phos in one week  - low K diet     2) LE wounds      - underwent debridement on 10/31/19 with Podatry   - plastic surgery team was also brought on board for possible skin grafting    -patient was on broad-spectrum antibiotics with vancomycin [facility] pt completed   - back to OR on 11/5/19   - patient back to the OR on 11/12 for debridement and skin grafting    - back to OR 11/18     3) cardiomyopathy      -prior ejection fraction 20%     4) electrolytes      - hyperkalemia-monitor with dialysis  Has required Kayexalate this stay   also  - K elevated today - but improving  - recheck in AM  - see dialysis change above     5) acid/base - bicarb stable     6) MBD      - on two phos binders --> can hold ca acetate  Pt was on aggressive therapy due to concern for calcyphylaxis, but Phos is 3 8, and biopsy unrevealing for such  - cont sevelamer  - recheck phos next week     7) anemia      - on GRISELDA   - received transfusion also      8) scrotal swelling-chronic inguinal hernia  Surgery team on board       -ultrasound large complex right hydrocele with numerous septations  Increased vascularity of the left testicle  - Urology team also on board   - serological markers were being sent per Urology with AFP, beta HCG, LDH   - AFP ok, LDH ok   - beta HCG also within nl limits      9) chronic hypotension      - on midodrine   - but also monitor for shock/sepsis      10) abd distention      - required paracentesis prior admission  - monitor closely for now    SUBJECTIVE / INTERVAL HISTORY:    Pt seen and examined on dialysis  Denies complaints  No SOB       OBJECTIVE:  Current Weight: Weight - Scale: 82 3 kg (181 lb 7 oz)  Vitals:    11/20/19 0930 11/20/19 1000 11/20/19 1030 11/20/19 1100   BP: 124/71 143/72 111/70 132/78   BP Location:       Pulse: 87 76 77 86   Resp: 16 16 16 16   Temp:       TempSrc:       SpO2:       Weight:       Height:           Intake/Output Summary (Last 24 hours) at 11/20/2019 1105  Last data filed at 11/20/2019 0830  Gross per 24 hour   Intake 440 ml   Output    Net 440 ml     General: NAD  Skin: no rash  Eyes: anicteric sclera  ENT: moist mucous membrane  Neck: supple  Chest: CTA b/l, no ronchii, no wheeze, no rubs, no rales, limited exam  CVS: s1s2, no murmur, no gallop, no rub  Abdomen: soft, nontender, nl sounds  Extremities: no edema LE b/l, wrapped  : no meeks  Neuro: AAOX3  Psych: normal affect      Medications:    Current Facility-Administered Medications:     acetaminophen (TYLENOL) tablet 650 mg, 650 mg, Oral, Q6H PRN, Ulices Buck MD, 650 mg at 11/18/19 0951    acetaminophen (TYLENOL) tablet 650 mg, 650 mg, Oral, Q6H [facility], Ulices Buck MD, 650 mg at 11/20/19 0542    aluminum-magnesium hydroxide-simethicone (MYLANTA) 200-200-20 mg/5 mL oral suspension 15 mL, 15 mL, Oral, Q4H PRN, Ulices Buck MD    b complex-vitamin C-folic acid (NEPHROCAPS) capsule 1 capsule, 1 capsule, Oral, Daily With Justo Sumner MD, 1 capsule at 11/19/19 1739    bisacodyl (DULCOLAX) rectal suppository 10 mg, 10 mg, Rectal, Daily PRN, Ulices Buck MD, 10 mg at 11/10/19 2104    calcium carbonate (TUMS) chewable tablet 1,000 mg, 1,000 mg, Oral, TID PRN, Ulices Buck MD, 1,000 mg at 11/11/19 1006    epoetin bob (EPOGEN,PROCRIT) injection 10,000 Units, 10,000 Units, Intravenous, Once per day on Mon Wed Fri, Ulices Buck MD, 10,000 Units at 11/18/19 1006    gabapentin (NEURONTIN) capsule 100 mg, 100 mg, Oral, BID, Ulices Buck MD, 100 mg at 11/20/19 8009    heparin (porcine) subcutaneous injection 5,000 Units, 5,000 Units, Subcutaneous, Q8H [facility], 5,000 Units at 11/20/19 0543 **AND** [COMPLETED] Platelet count, , , Once, Jesse Chun PA-C    HYDROmorphone (DILAUDID) injection 1 mg, 1 mg, Intravenous, Q4H PRN, Ulices Buck MD, 1 mg at 11/18/19 2232    methylnaltrexone (RELISTOR) subcutaneous injection 6 mg, 6 mg, Subcutaneous, Q48H PRN, Ulices Buck MD, 6 mg at 11/13/19 1729    metoprolol tartrate (LOPRESSOR) tablet 25 mg, 25 mg, Oral, Q12H [facility], Ulices Buck MD, 25 mg at 11/19/19 2056    midodrine (PROAMATINE) tablet 5 mg, 5 mg, Oral, Before Dialysis, Ulices Buck MD, 5 mg at 11/18/19 0851    ondansetron (ZOFRAN) injection 4 mg, 4 mg, Intravenous, Q6H PRN, Cruzito Kohli MD    oxyCODONE (ROXICODONE) IR tablet 10 mg, 10 mg, Oral, Q4H PRN, Cruzito Kohli MD, 10 mg at 11/19/19 1329    oxyCODONE (ROXICODONE) IR tablet 5 mg, 5 mg, Oral, Q4H PRN, Cruzito Kohli MD, 5 mg at 11/11/19 0617    pantoprazole (PROTONIX) EC tablet 40 mg, 40 mg, Oral, Early Morning, Cruzito Kohli MD, 40 mg at 11/20/19 0543    polyethylene glycol (MIRALAX) packet 17 g, 17 g, Oral, Daily, Cruzito Kohli MD, 17 g at 11/19/19 0800    saccharomyces boulardii (FLORASTOR) capsule 250 mg, 250 mg, Oral, BID, Cruzito Kohli MD, 250 mg at 11/20/19 0853    saliva substitute (MOUTH KOTE) mucosal solution 5 spray, 5 spray, Mouth/Throat, PRN, Cruzito Kohli MD, 5 spray at 11/09/19 1517    senna-docusate sodium (SENOKOT S) 8 6-50 mg per tablet 2 tablet, 2 tablet, Oral, BID, Cruzito Kohli MD, 2 tablet at 11/20/19 6716    sevelamer (RENAGEL) tablet 800 mg, 800 mg, Oral, TID With Meals, Cruzito Kohli MD, 800 mg at 11/20/19 0852    torsemide (DEMADEX) tablet 40 mg, 40 mg, Oral, BID (diuretic), Cruzito Kohli MD, 40 mg at 11/19/19 1739    Laboratory Results:  Results from last 7 days   Lab Units 11/20/19  0518 11/19/19  0306 11/18/19  0526 11/15/19  0653 11/14/19  0450   WBC Thousand/uL 7 91  --  9 90  --   --    HEMOGLOBIN g/dL 8 7*  --  8 6*  --   --    HEMATOCRIT % 28 7*  --  28 4*  --   --    PLATELETS Thousands/uL 194  --  196  --   --    POTASSIUM mmol/L 5 3 5 2 6 1* 4 9 4 3   CHLORIDE mmol/L 100 97* 99* 99* 99*   CO2 mmol/L 31 31 28 32 31   BUN mg/dL 35* 28* 41* 30* 23   CREATININE mg/dL 6 81* 5 61* 7 76* 6 54* 5 40*   CALCIUM mg/dL 9 2 9 1 9 3 9 2 9 0   PHOSPHORUS mg/dL  --  3 8  --   --   --

## 2019-11-20 NOTE — PROGRESS NOTES
Progress Note - Martínez Nose 1960, 64 y o  male MRN: [id number]    Unit/Bed#: S -01 Encounter: [id number]    Primary Care Provider: Mindy Herrera   Date and time admitted to hospital: 10/26/2019  5:10 PM      * Multiple and open wound of lower limb  Assessment & Plan  · Procedure - Debridement, removal of Eschar on 10/31/2019 by Dr Reyna Lopez  · Split Thickness Skin Grafting by Plastic Surgery -      · Procedure - STSG on 11/7 per plastic surgery   · Procedure - Washout with additional STSG by plastic surgery team 11/12/2019  · Procedure -OR (11/18) for staged procedure dressing change under anesthesia today  · Dressing changes per plastic surgery   · Dressing changes every other day per nursing  · Adaptic on Skin Graft sites then wrap with Kerlix and ACE  · Skin graft donor site on R thigh only needs superficial dressing change  · Can leave adaptic in place and change the ABD pads on outside  · Activity - WBAT  Range of motion exercises recommended to avoid contractures  · Monitor for Infection -   · Antibiotic - completed 7 days of vanc and cefepime  Now monitoring off antibiotics  · Blood cultures from 10/31/2019: NGTD  · Monitor temperatures and serial leg exams  · Pain Control -   · Oxycodone 5/10 mg  · Gabapentin increased to 100 mg bid from daily dosing  · Breakthrough pain - Dilaudid 1 mg IV  · Monitor pain levels  · Supportive care  · Need to improve PO intake / protein intake in diet  Already getting ensure  · Prealbumin: 6    · Xeroform to right proximal thigh skin  Anemia due to chronic kidney disease, on chronic dialysis and Acute Blood Loss Anemia (HCC)  Assessment & Plan  · Transfuse as needed  · Monitor counts - Intermittent CBC monitoring  Atrial fibrillation ([facility] [address] )  Assessment & Plan  · Rate / Rhythm control -   · Metoprolol increased to 25 mg bid on 10/29/2019  · Monitor on telemetry  Monitor HR and blood pressures    · Anticoagulation - None prehospital   Defer to outpatient team following discharge  ESRD (end stage renal disease) ([facility] [address] )  Assessment & Plan  · On routine dialysis  · Nephrology following  Scrotal swelling  Assessment & Plan  · Ultrasound showed "Large complex right hydrocele containing numerous septations  Increased vascularity in the left testicle with respect to the right, although both testicles could not be imaged on the same plane limiting direct comparison   Correlate clinically for orchitis "  · Urology saw patient 10/29/2019 and recommends no surgical intervention acutely and to manage problems with legs and get therapy first   · beta HCG, AFP and LDH within normal limits  · Supportive care  Constipation  Assessment & Plan  · Senna/docusate b i d  Also, MiraLax daily scheduled dosing  · Relistor q48h PRN  · PRN dulcolax suppository  · Additional adjustments as needed  · Monitor for BMs  At Risk for Pressure ulcer, buttock  Assessment & Plan  · Local skin care  · Low Air loss mattress changed 11/12/2019 to help with prevention of wounds  · Turn / Reposition frequently  VTE Pharmacologic Prophylaxis:   Pharmacologic: Heparin  Mechanical VTE Prophylaxis in Place: Yes    Patient Centered Rounds: I have performed bedside rounds with nursing staff today  Discussions with Specialists or Other Care Team Provider:     Education and Discussions with Family / Patient: patient     Time Spent for Care: 30 minutes  More than 50% of total time spent on counseling and coordination of care as described above  Current Length of Stay: 25 day(s)    Current Patient Status: Inpatient   Certification Statement: The patient will continue to require additional inpatient hospital stay due to LE wounds requiring active management    Discharge Plan / Estimated Discharge Date: hopeful dc tomorrow  Code Status: Level 1 - Full Code    Subjective:   Feels well overall  No nausea  Appetite is still intermittent       Objective: Vitals:   Temp (24hrs), Av 2 °F (36 8 °C), Min:97 9 °F (36 6 °C), Max:98 4 °F (36 9 °C)    Temp:  [97 9 °F (36 6 °C)-98 4 °F (36 9 °C)] 98 2 °F (36 8 °C)  HR:  [76-93] 82  Resp:  [16-18] 16  BP: (111-143)/(64-89) 124/64  SpO2:  [92 %-95 %] 95 %  Body mass index is 27 59 kg/m²  Input and Output Summary (last 24 hours): Intake/Output Summary (Last 24 hours) at 2019 1521  Last data filed at 2019 1230  Gross per 24 hour   Intake 500 ml   Output 1000 ml   Net -500 ml       Physical Exam:     Physical Exam   Constitutional: No distress  HENT:   Head: Normocephalic and atraumatic  Cardiovascular: Normal rate and regular rhythm  Exam reveals no friction rub  No murmur heard  Pulmonary/Chest: Effort normal and breath sounds normal  No stridor  No respiratory distress  Abdominal: Soft  He exhibits distension  There is no tenderness  There is no guarding  Musculoskeletal: He exhibits tenderness and deformity  Skin: He is not diaphoretic  Nursing note and vitals reviewed  Additional Data:     Labs:    Results from last 7 days   Lab Units 19  0518   WBC Thousand/uL 7 91   HEMOGLOBIN g/dL 8 7*   HEMATOCRIT % 28 7*   PLATELETS Thousands/uL 194     Results from last 7 days   Lab Units 19  0518 19  0306   POTASSIUM mmol/L 5 3 5 2   CHLORIDE mmol/L 100 97*   CO2 mmol/L 31 31   BUN mg/dL 35* 28*   CREATININE mg/dL 6 81* 5 61*   CALCIUM mg/dL 9 2 9 1   ALK PHOS U/L  --  182*   ALT U/L  --  <6*   AST U/L  --  17           * I Have Reviewed All Lab Data Listed Above  * Additional Pertinent Lab Tests Reviewed:  All Labs Within Last 24 Hours Reviewed    Imaging:    Imaging Reports Reviewed Today Include:   Imaging Personally Reviewed by Myself Includes:      Recent Cultures (last 7 days):           Last 24 Hours Medication List:     Current Facility-Administered Medications:  acetaminophen 650 mg Oral Q6H PRN Will Cohen MD   acetaminophen 650 mg Oral Q6H [address] Diana Fine MD   aluminum-magnesium hydroxide-simethicone 15 mL Oral Q4H PRN Diana Fine MD   b complex-vitamin C-folic acid 1 capsule Oral Daily With [place] Sandie Sanchez MD   bisacodyl 10 mg Rectal Daily PRN Diana Fine MD   calcium carbonate 1,000 mg Oral TID PRN Diana Fine MD   epoetin bob 10,000 Units Intravenous Once per day on Mon Wed Fri Diana Fine MD   gabapentin 100 mg Oral BID Diana Fine MD   heparin (porcine) 5,000 Units Subcutaneous [facility] Diana iFne MD   HYDROmorphone 1 mg Intravenous Q4H PRN Diana Fine MD   methylnaltrexone bromide 6 mg Subcutaneous Q48H PRN Diana Fine MD   metoprolol tartrate 25 mg Oral Q12H [address] Diana Fine MD   midodrine 5 mg Oral Before Dialysis Diana Fine MD   ondansetron 4 mg Intravenous Q6H PRN Diana Fine MD   oxyCODONE 10 mg Oral Q4H PRN Diana Fine MD   oxyCODONE 5 mg Oral Q4H PRN Diana Fine MD   pantoprazole 40 mg Oral Early Morning Diana Fine MD   polyethylene glycol 17 g Oral Daily Diana Fine MD   saccharomyces boulardii 250 mg Oral BID Diana Fine MD   saliva substitute 5 spray Mouth/Throat PRN Diana Fine MD   senna-docusate sodium 2 tablet Oral BID Diana Fine MD   sevelamer 800 mg Oral TID With Meals Diana Fine MD   torsemide 40 mg Oral BID (diuretic) Diana Fine MD        Today, Patient Was Seen By: Freya Pruitt MD    ** Please Note: This note has been constructed using a voice recognition system   **

## 2019-11-20 NOTE — PROGRESS NOTES
Progress Note - Plastic Surgery   Donnie Benavides 64 y o  male MRN: [id number]  Unit/Bed#: S -01 Encounter: [id number]    Assessment:  65M w/ acute on chronic bilateral lower extremity wounds, now s/p debridement and washout with SPTSG on 11/12/19    Plan:  Continue medical care per primary  Dressing changes every other day per nursing   - Adaptic on Skin Graft sites then wrap with Kerlix and ACE   - Skin graft donor site on R thigh only needs superficial dressing change  Can leave adaptic in place and change the ABD pads on outside  [id number] Samantha Emmanuel for discharge to facility per primary      Subjective/Objective   Chief Complaint:     Subjective: No acute events  Objective:     Blood pressure 124/71, pulse 87, temperature 98 3 °F (36 8 °C), temperature source Oral, resp  rate 16, height 5' 8" (1 727 m), weight 82 3 kg (181 lb 7 oz), SpO2 95 %  ,Body mass index is 27 59 kg/m²  Intake/Output Summary (Last 24 hours) at 11/20/2019 1004  Last data filed at 11/20/2019 0830  Gross per 24 hour   Intake 440 ml   Output    Net 440 ml       Invasive Devices     Peripheral Intravenous Line            Peripheral IV 11/19/19 Right;Ventral (anterior) Forearm 1 day          Line            Temporary HD Catheter -- days              Physical Exam: General: AAOx3  Head: normocephalic, atraumatic  Neck: supple, trachea midline  Respiratory: BS b/l  Abdomen: Soft, NT, no rebound/guarding  Heart: RRR, S1s2  Ext: Right thigh with skin graft donor site, covered  Right Lower Extremity:      LLE:             Lab, Imaging and other studies:  I have personally reviewed pertinent lab results    , CBC:   Lab Results   Component Value Date    WBC 7 91 11/20/2019    HGB 8 7 (L) 11/20/2019    HCT 28 7 (L) 11/20/2019     (H) 11/20/2019     11/20/2019    MCH 31 6 11/20/2019    MCHC 30 3 (L) 11/20/2019    RDW 19 4 (H) 11/20/2019    MPV 9 1 11/20/2019   , CMP:   Lab Results   Component Value Date    SODIUM 137 11/20/2019    K 5 3 11/20/2019     11/20/2019    CO2 31 11/20/2019    BUN 35 (H) 11/20/2019    CREATININE 6 81 (H) 11/20/2019    CALCIUM 9 2 11/20/2019    EGFR 8 11/20/2019     VTE Pharmacologic Prophylaxis: Heparin  VTE Mechanical Prophylaxis: sequential compression device

## 2019-11-20 NOTE — HEMODIALYSIS
Pt stable on 4 hr HD tx  Pre tx wt- 82 5 kg, post wt- 82 kg  Total UF removal- 500 ml  Increased tx time to 4 hr per Dr Annel Isidro  Reversed lines  No issues noted

## 2019-11-20 NOTE — SOCIAL WORK
Patient is not medically stable for discharge today however will likely be ready for discharge tomorrow to return to [facility]  Plastic surgery met with patient today and completed a bedside dressing change of his leg dressings  CM sent an update to [facility] in [place] and also called and left a message for liaison Leigh FRANCISCO provider was going to contact patient's wife to provide update with expected DC for tomorrow  CM met with patient and bedside and reviewed IMM notice with patient  CM department will continue to follow to assist with discharge coordination

## 2019-11-20 NOTE — TREATMENT PLAN
Plastic Surgery     I supervised the Advanced Practitioner  I reviewed the Advanced Practitioner findings, note and agree  I was readily available for any questions or concerns with regards to this patient's care  Patient to have dressing changes at the bedside every other day starting today  Bacitracin, adaptive, ABD pads, kerlex and ACE wrap   If patient tolerates dressing change, ok to discharge to facility   [address] Samantha Emmanuel to start aggressive physical therapy to prevent joint contracture as much as possible       [facility] [address] [facility]   [address]   [address]   [address], [address]   Office: [phone]

## 2019-11-20 NOTE — PLAN OF CARE
Goal- remove 500 ml as tolerated-to dry weight       Problem: METABOLIC, FLUID AND ELECTROLYTES - ADULT  Goal: Electrolytes maintained within normal limits  Description  INTERVENTIONS:  - Monitor labs and assess patient for signs and symptoms of electrolyte imbalances  - Administer electrolyte replacement as ordered  - Monitor response to electrolyte replacements, including repeat lab results as appropriate  - Instruct patient on fluid and nutrition as appropriate  Outcome: Progressing

## 2019-11-20 NOTE — ASSESSMENT & PLAN NOTE
· Procedure - Debridement, removal of Eschar on 10/31/2019 by Dr Ragsdale Colon  · Split Thickness Skin Grafting by Plastic Surgery -      · Procedure - STSG on 11/7 per plastic surgery   · Procedure - Washout with additional STSG by plastic surgery team 11/12/2019  · Procedure -OR (11/18) for staged procedure dressing change under anesthesia today  · Dressing changes per plastic surgery   · Dressing changes every other day per nursing  · Adaptic on Skin Graft sites then wrap with Kerlix and ACE  · Skin graft donor site on R thigh only needs superficial dressing change  · Can leave adaptic in place and change the ABD pads on outside  · Activity - WBAT  Range of motion exercises recommended to avoid contractures  · Monitor for Infection -   · Antibiotic - completed 7 days of vanc and cefepime  Now monitoring off antibiotics  · Blood cultures from 10/31/2019: NGTD  · Monitor temperatures and serial leg exams  · Pain Control -   · Oxycodone 5/10 mg  · Gabapentin increased to 100 mg bid from daily dosing  · Breakthrough pain - Dilaudid 1 mg IV  · Monitor pain levels  · Supportive care  · Need to improve PO intake / protein intake in diet  Already getting ensure  · Prealbumin: 6    · Xeroform to right proximal thigh skin

## 2019-11-21 VITALS
OXYGEN SATURATION: 98 % | SYSTOLIC BLOOD PRESSURE: 122 MMHG | HEART RATE: 77 BPM | RESPIRATION RATE: 18 BRPM | HEIGHT: 68 IN | WEIGHT: 181.44 LBS | DIASTOLIC BLOOD PRESSURE: 67 MMHG | BODY MASS INDEX: 27.5 KG/M2 | TEMPERATURE: 97.6 F

## 2019-11-21 PROCEDURE — 99239 HOSP IP/OBS DSCHRG MGMT >30: CPT | Performed by: HOSPITALIST

## 2019-11-21 PROCEDURE — 99232 SBSQ HOSP IP/OBS MODERATE 35: CPT | Performed by: INTERNAL MEDICINE

## 2019-11-21 RX ORDER — AMOXICILLIN 250 MG
2 CAPSULE ORAL 2 TIMES DAILY
Qty: 40 TABLET | Refills: 0 | Status: SHIPPED | OUTPATIENT
Start: 2019-11-21 | End: 2019-12-01

## 2019-11-21 RX ORDER — GABAPENTIN 100 MG/1
100 CAPSULE ORAL 2 TIMES DAILY
Qty: 60 CAPSULE | Refills: 0 | Status: SHIPPED | OUTPATIENT
Start: 2019-11-21 | End: 2019-11-21

## 2019-11-21 RX ORDER — MIDODRINE HYDROCHLORIDE 10 MG/1
5 TABLET ORAL
Qty: 12 TABLET | Refills: 0 | Status: SHIPPED | OUTPATIENT
Start: 2019-11-21 | End: 2020-01-10

## 2019-11-21 RX ORDER — OXYCODONE HYDROCHLORIDE 5 MG/1
5 TABLET ORAL EVERY 4 HOURS PRN
Qty: 30 TABLET | Refills: 0 | Status: SHIPPED | OUTPATIENT
Start: 2019-11-21 | End: 2019-12-01

## 2019-11-21 RX ORDER — POLYETHYLENE GLYCOL 3350 17 G/17G
17 POWDER, FOR SOLUTION ORAL 2 TIMES DAILY
Qty: 14 EACH | Refills: 0 | Status: SHIPPED | OUTPATIENT
Start: 2019-11-21

## 2019-11-21 RX ORDER — TORSEMIDE 20 MG/1
40 TABLET ORAL 2 TIMES DAILY
Qty: 120 TABLET | Refills: 0 | Status: SHIPPED | OUTPATIENT
Start: 2019-11-21 | End: 2019-12-21

## 2019-11-21 RX ORDER — GABAPENTIN 100 MG/1
100 CAPSULE ORAL 2 TIMES DAILY
Qty: 60 CAPSULE | Refills: 0 | Status: SHIPPED | OUTPATIENT
Start: 2019-11-21 | End: 2020-05-08

## 2019-11-21 RX ADMIN — ACETAMINOPHEN 650 MG: 325 TABLET, FILM COATED ORAL at 06:28

## 2019-11-21 RX ADMIN — ACETAMINOPHEN 650 MG: 325 TABLET, FILM COATED ORAL at 12:17

## 2019-11-21 RX ADMIN — SEVELAMER HYDROCHLORIDE 800 MG: 800 TABLET, FILM COATED PARENTERAL at 12:17

## 2019-11-21 RX ADMIN — METOPROLOL TARTRATE 25 MG: 25 TABLET, FILM COATED ORAL at 08:40

## 2019-11-21 RX ADMIN — TORSEMIDE 40 MG: 20 TABLET ORAL at 08:40

## 2019-11-21 RX ADMIN — OXYCODONE HYDROCHLORIDE 10 MG: 10 TABLET ORAL at 12:17

## 2019-11-21 RX ADMIN — SEVELAMER HYDROCHLORIDE 800 MG: 800 TABLET, FILM COATED PARENTERAL at 08:40

## 2019-11-21 RX ADMIN — Medication 250 MG: at 08:40

## 2019-11-21 RX ADMIN — POLYETHYLENE GLYCOL 3350 17 G: 17 POWDER, FOR SOLUTION ORAL at 08:40

## 2019-11-21 RX ADMIN — GABAPENTIN 100 MG: 100 CAPSULE ORAL at 08:44

## 2019-11-21 RX ADMIN — PANTOPRAZOLE SODIUM 40 MG: 40 TABLET, DELAYED RELEASE ORAL at 06:28

## 2019-11-21 RX ADMIN — SENNOSIDES AND DOCUSATE SODIUM 2 TABLET: 8.6; 5 TABLET ORAL at 08:40

## 2019-11-21 RX ADMIN — HEPARIN SODIUM 5000 UNITS: 5000 INJECTION INTRAVENOUS; SUBCUTANEOUS at 06:28

## 2019-11-21 NOTE — SOCIAL WORK
Patient is medically stable for discharge to [address] [place] [address] today  CM spoke with liaison from [address] [place] [address] and they have everything they need for patient and are expecting him  CM contacted [facility] and spoke to Caralee Lefort to request BLS transport  CM received a call back about a 30 minutes later from [facility] who reported that [facility] would be providing the transport at 2:30 PM     CM completed the medical necessity and placed a copy in the label book for primary nurse and a copy in medical records for scanning  IMM was reviewed with patient yesterday  Patient, patient's wife, Josseline Bone, and [address] [place] [address] were all updated with discharge plan and transport time  CM reviewed the availability of treatment team to discuss questions or concerns patient and/or family may have regarding  understanding medications and recognizing signs and symptoms once discharged  CM also encouraged patient to follow up with all recommended appointments after discharge  Patient advised of importance for patient and family to participate in managing patients medical well being

## 2019-11-21 NOTE — ASSESSMENT & PLAN NOTE
· On routine dialysis      · Nephrology has been following  · Will be resumed on 4 times weekly HD as before

## 2019-11-21 NOTE — PLAN OF CARE
Problem: Prexisting or High Potential for Compromised Skin Integrity  Goal: Skin integrity is maintained or improved  Description  INTERVENTIONS:  - Identify patients at risk for skin breakdown  - Assess and monitor skin integrity  - Assess and monitor nutrition and hydration status  - Monitor labs   - Assess for incontinence   - Turn and reposition patient  - Assist with mobility/ambulation  - Relieve pressure over bony prominences  - Avoid friction and shearing  - Provide appropriate hygiene as needed including keeping skin clean and dry  - Evaluate need for skin moisturizer/barrier cream  - Collaborate with interdisciplinary team   - Patient/family teaching  - Consider wound care consult   Outcome: Progressing     Problem: Potential for Falls  Goal: Patient will remain free of falls  Description  INTERVENTIONS:  - Assess patient frequently for physical needs  -  Identify cognitive and physical deficits and behaviors that affect risk of falls    -  [place] fall precautions as indicated by assessment   - Educate patient/family on patient safety including physical limitations  - Instruct patient to call for assistance with activity based on assessment  - Modify environment to reduce risk of injury  - Consider OT/PT consult to assist with strengthening/mobility  Outcome: Progressing

## 2019-11-21 NOTE — ASSESSMENT & PLAN NOTE
· Rate / Rhythm control -   · Metoprolol increased to 25 mg bid on 10/29/2019  · Monitor on telemetry  Monitor HR and blood pressures    · Anticoagulation - None prehospital

## 2019-11-21 NOTE — ASSESSMENT & PLAN NOTE
· Procedure - Debridement, removal of Eschar on 10/31/2019 by Dr Chito Mcfadden (podiatry)   · Split Thickness Skin Grafting by Plastic Surgery -      · Procedure - STSG on 11/7 per plastic surgery   · Procedure - Washout with additional STSG by plastic surgery team 11/12/2019  · Procedure -OR (11/18) for staged procedure dressing change under anesthesia today  · Dressing changes per plastic surgery   · Dressing changes every other day per nursing  · Adaptic on Skin Graft sites then wrap with Kerlix and ACE  · Skin graft donor site on R thigh only needs superficial dressing change  · Can leave adaptic in place and change the ABD pads on outside  · Activity - WBAT  Range of motion exercises recommended to avoid contractures  · Monitor for Infection -   · Antibiotic - completed 7 days of vanc and cefepime  Now monitoring off antibiotics  · Blood cultures from 10/31/2019: NGTD  · Monitor temperatures and serial leg exams  · Pain Control -   · Oxycodone 5/10 mg  · Gabapentin increased to 100 mg bid from daily dosing  · Breakthrough pain - Dilaudid 1 mg IV  · Monitor pain levels  · Supportive care  · Need to improve PO intake / protein intake in diet  Already getting ensure  · Prealbumin: 6    · Xeroform to right proximal thigh skin

## 2019-11-21 NOTE — TRANSPORTATION MEDICAL NECESSITY
Section I - General Information    Name of Patient: Gualberto Craft                 : 1960    Medicare #: [id number]  Transport Date: 19 (PCS is valid for round trips on this date and for all repetitive trips in the 60-day range as noted below )  Origin: [address]: [place] Hollins  Is the pt's stay covered under Medicare Part A (PPS/DRG)   []     Closest appropriate facility? If no, why is transport to more distant facility required? Yes  If hospice pt, is this transport related to pt's terminal illness? NA       Section II - Medical Necessity Questionnaire  Ambulance transportation is medically necessary only if other means of transport are contraindicated or would be potentially harmful to the patient  To meet this requirement, the patient must either be "bed confined" or suffer from a condition such that transport by means other than ambulance is contraindicated by the patient's condition  The following questions must be answered by the medical professional signing below for this form to be valid:    1)  Describe the MEDICAL CONDITION (physical and/or mental) of this patient AT [address] [place] [address] that requires the patient to be transported in an ambulance and why transport by other means is contraindicated by the patient's condition: Multiple and open wound of lower limb; Atrial fibrillation; Cellulitis; MRSA; Scrotal swelling; Decreased strength;Decreased range of motion;Decreased endurance; Impaired balance;Decreased mobility;Pain;Decreased skin integrity; Decreased cognition    2) Is the patient "bed confined" as defined below? No  To be "be confined" the patient must satisfy all three of the following conditions: (1) unable to get up from bed without Assistance; AND (2) unable to ambulate; AND (3) unable to sit in a chair or wheelchair      3) Can this patient safely be transported by car or wheelchair van (i e , seated during transport without a medical attendant or monitoring)? No    4) In addition to completing questions 1-3 above, please check any of the following conditions that apply*:   *Note: supporting documentation for any boxes checked must be maintained in the patient's medical records  If hosp-hosp transfer, describe services needed at 2nd facility not available at 1st facility? Patient is confused  Moderate/severe pain on movement   Medical attendant required   Special handling/isolation/infection control precautions required   Unable to tolerate seated position for time needed to transport   Unable to sit in a chair or wheelchair due to decubitus ulcers or other wounds   Other(specify) Aaliyah Wallisderic high fall risk      Section III - Signature of Physician or Healthcare Professional  I certify that the above information is true and correct based on my evaluation of this patient, and represent that the patient requires transport by ambulance and that other forms of transport are contraindicated  I understand that this information will be used by the Centers for Medicare and Medicaid Services (CMS) to support the determination of medical necessity for ambulance services, and I represent that I have personal knowledge of the patient's condition at time of transport  [x]  If this box is checked, I also certify that the patient is physically or mentally incapable of signing the ambulance service's claim and that the institution with which I am affiliated has furnished care, services, or assistance to the patient  My signature below is made on behalf of the patient pursuant to 42 CFR §424 36(b)(4)   In accordance with 42 CFR §424 37, the specific reason(s) that the patient is physically or mentally incapable of signing the claim form is as follows:       Signature of Physician* or Healthcare Professional______________________________________________________________  Signature Date 11/21/19 (For scheduled repetitive transports, this form is not valid for transports performed more than 60 days after this date)    Printed Name & Credentials of Physician or Healthcare Professional (MD, DO, RN, etc )________________________________  *Form must be signed by patient's attending physician for scheduled, repetitive transports   For non-repetitive, unscheduled ambulance transports, if unable to obtain the signature of the attending physician, any of the following may sign (choose appropriate option below)  [] Physician Assistant []  Clinical Nurse Specialist []  Registered Nurse  []  Nurse Practitioner  [] Discharge Planner

## 2019-11-21 NOTE — DISCHARGE SUMMARY
Discharge- Sahra Cornell 1960, 64 y o  male MRN: [id number]    Unit/Bed#: S -01 Encounter: [id number]    Primary Care Provider: Steffen Kim   Date and time admitted to hospital: 10/26/2019  5:10 PM    * Multiple and open wound of lower limb  Assessment & Plan  · Procedure - Debridement, removal of Eschar on 10/31/2019 by Dr Xiao Arteaga (podiatry)   · Split Thickness Skin Grafting by Plastic Surgery -      · Procedure - STSG on 11/7 per plastic surgery   · Procedure - Washout with additional STSG by plastic surgery team 11/12/2019  · Procedure -OR (11/18) for staged procedure dressing change under anesthesia today  · Dressing changes per plastic surgery   · Dressing changes every other day per nursing  · Adaptic on Skin Graft sites then wrap with Kerlix and ACE  · Skin graft donor site on R thigh only needs superficial dressing change  · Can leave adaptic in place and change the ABD pads on outside  · Activity - WBAT  Range of motion exercises recommended to avoid contractures  · Monitor for Infection -   · Antibiotic - completed 7 days of vanc and cefepime  Now monitoring off antibiotics  · Blood cultures from 10/31/2019: NGTD  · Monitor temperatures and serial leg exams  · Pain Control -   · Oxycodone 5/10 mg  · Gabapentin increased to 100 mg bid from daily dosing  · Breakthrough pain - Dilaudid 1 mg IV  · Monitor pain levels  · Supportive care  · Need to improve PO intake / protein intake in diet  Already getting ensure  · Prealbumin: 6    · Xeroform to right proximal thigh skin  Anemia due to chronic kidney disease, on chronic dialysis and Acute Blood Loss Anemia (HCC)  Assessment & Plan  · Transfuse as needed  Atrial fibrillation ([address] )  Assessment & Plan  · Rate / Rhythm control -   · Metoprolol increased to 25 mg bid on 10/29/2019  · Monitor on telemetry  Monitor HR and blood pressures    · Anticoagulation - None prehospital      ESRD (end stage renal disease) [facility])  Assessment & Plan  · On routine dialysis  · Nephrology has been following  · Will be resumed on 4 times weekly HD as before     Scrotal swelling  Assessment & Plan  · Ultrasound showed "Large complex right hydrocele containing numerous septations  Increased vascularity in the left testicle with respect to the right, although both testicles could not be imaged on the same plane limiting direct comparison   Correlate clinically for orchitis "  · Urology saw patient 10/29/2019 and recommends no surgical intervention acutely and to manage problems with legs and get therapy first   · beta HCG, AFP and LDH within normal limits  · Supportive care  Constipation  Assessment & Plan  · Senna/docusate band miralax ordered  · Has required Relistor q48h PRN  · Adjust as needed       Discharging Physician / Practitioner: Marcos Julien MD  PCP: Alexi Garrison  Admission Date:   Admission Orders (From admission, onward)     Ordered        10/26/19 1941  Inpatient Admission  Once                   Discharge Date: 11/21/19    Resolved Problems  Date Reviewed: 11/18/2019    None          Consultations During Hospital Stay:  · Podiatry  · Plastic surgery   · Nephrology     Procedures Performed:   · Split thickness skin grafting x2  · debridement under anesthesia     Significant Findings / Test Results:   US scrotum: (10/27/19)  IMPRESSION:  1  Large complex right hydrocele containing numerous septations      2  Increased vascularity in the left testicle with respect to the right, although both testicles could not be imaged on the same plane limiting direct comparison  Correlate clinically for orchitis      Incidental Findings:   · None      Test Results Pending at Discharge (will require follow up):   · none     Outpatient Tests Requested:  · F/u plastic surgery     Complications:  None     Reason for Admission: wound infection and enlarged scrotum     Hospital Course:     Redd Richards is a 64 y o  male patient history of polycystic kidney disease on dialysis, recent history of septic shock complicated by necrotizing soft tissue infection of the legs bilaterally who originally presented to the hospital on 10/26/2019 due to concern for wound infection  Reportedly had fevers at his skilled nursing facility knows concerned that was could be infected  Podiatry initially consulted on admission and patient underwent debridement  He developed fevers and hypotension around time of procedure so antibiotics were started on  He received a total 7 day course of antibiotics and these were discontinued without further fevers or evidence of ongoing infection  After podiatry was able to debride the tissue he did think there was a role for additional skin grafting, so plastic surgery was consulted  Patient underwent two grafting procedures as indicated as above  Wounds were assessed periodically by plastic surgeon afterwards and showed healthy appearance of tissue  Plan to continue aggressive wound care and following up with Plastic surgery  Of note, patient complained of increased scrotal swelling which had been a chronic problem initially but had been acutely worse  Was seen by both General surgery team and then urology  Patient is known to General surgery team for history of inguinal hernia and there was concern the large scrotum could be due to hernia  Ultrasound of the testicles and scrotum showed right-sided complex hydrocele  The urology team did not think he would be a good surgical candidate for resection of hydrocele at this time given other comorbidities  They also did not think aspiration was indicated as the fluid would likely reaccumulate  While in the hospital patient was switched to a 3 times weekly dialysis schedule due to staffing issues    On discharge nephrology recommends going back to his home hemodialysis scheduled 4 times weekly    Of note, patient has had periodic constipation while hospitalized  Did require methylnaltrexone on occasion  Is receiving schedule senna and miralax  Please see above list of diagnoses and related plan for additional information  Condition at Discharge: fair     Discharge Day Visit / Exam:     Subjective:  Feels okay  No new complaints  Concerned about the dressing change schedule   Vitals: Blood Pressure: 122/67 (11/21/19 0621)  Pulse: 77 (11/21/19 0621)  Temperature: 97 6 °F (36 4 °C) (11/21/19 0621)  Temp Source: Oral (11/21/19 9542)  Respirations: 18 (11/21/19 5894)  Height: 5' 8" (172 7 cm) (11/03/19 0859)  Weight - Scale: 82 3 kg (181 lb 7 oz) (11/11/19 0700)  SpO2: 98 % (11/21/19 8075)  Exam:   Physical Exam   Constitutional: He is oriented to person, place, and time  No distress  Frail, chronically ill appearing    HENT:   Head: Normocephalic and atraumatic  Cardiovascular: Normal rate and regular rhythm  Pulmonary/Chest: Effort normal and breath sounds normal  No stridor  No respiratory distress  He has no wheezes  Abdominal: Soft  Bowel sounds are normal  He exhibits distension  There is no tenderness  There is no guarding  Musculoskeletal: He exhibits tenderness and deformity  Chronic muscle atrophy    Neurological: He is alert and oriented to person, place, and time  Skin: He is not diaphoretic  Nursing note and vitals reviewed  Discussion with Family: wife, Arpit Barr     Discharge instructions/Information to patient and family:   See after visit summary for information provided to patient and family  Provisions for Follow-Up Care:  See after visit summary for information related to follow-up care and any pertinent home health orders  Disposition:     [facility]         Planned Readmission: none; high risk for readmission d/t extensive wounds      Discharge Statement:  I spent 60 minutes discharging the patient  This time was spent on the day of discharge   I had direct contact with the patient on the day of discharge  Greater than 50% of the total time was spent examining patient, answering all patient questions, arranging and discussing plan of care with patient as well as directly providing post-discharge instructions  Additional time then spent on discharge activities  Discharge Medications:  See after visit summary for reconciled discharge medications provided to patient and family        ** Please Note: This note has been constructed using a voice recognition system **

## 2019-11-21 NOTE — PROGRESS NOTES
[address] [facility] NOTE   Franklin Vizcaino 64 y o  male MRN: [id number]  Unit/Bed#: S -01 Encounter: [id number]  Reason for Consult:  ESRD    ASSESSMENT and PLAN:  1  ESRD:    · Patient dialyzes 4 times a week at [facility]: Monday, Tuesday, Thursday, Friday   · Treatment schedule adjusted for inpatient care   Currently treatment performed on Monday, Wednesday, Friday with an additional treatment on Saturday per eval   · Last hemodialysis treatment 11/20  ? Patient had a longer treatment yesterday  Instead of 3 hours we dialyzed patient 4 hour since potassium level was elevated and patient may be slightly under dialyzed  Patient was 0 5 kg below dry weight at the end of treatment  No issues  ? Discharge planned for Tuesday  Patient will be returning to [facility]  ? 4 day/ week treatment plan outpatient  2  Access:  PermCath exchange 10/17/2019  No current issues  3  Hyperkalemia:    · Treatment time increased due to elevated potassium  · Continue to monitor  4  Multiple wounds of the lower extremities bilaterally     · Status post debridement/skin graft  · Ongoing wound care being managed by surgery/plastic surgery  · Continue diet supplement for wound healing  4  Anemia due to chronic disease and acute blood loss:   · Hemoglobin low but stable  · Last transfusion 11/7  · Continue GRISELDA      · Recent increase in dosage- currently 10,000 units with each treatment, 3 times a week  · Iron levels acceptable -Last iron studies 10/28/2019:  Iron saturation 21%  5  Hypotension:    · On midodrine 10 mg t i d  · Blood pressure acceptable  6  Atrial fibrillation:    · On metoprolol 25 mg every 12 hours   No anticoagulation  7  CKD MBD:    · Continue binder, renal diet  · Phosphorus level acceptable 3 8  8  Abdominal distension/constipation:    · Status post paracentesis 10/09/2019 for 1800 mL     · On bowel regime  9   Scrotal swelling:  Ultrasound 10/27/2019:  Right hydrocele with numerous septation on imaging   Conservative management  10  Hyponatremia:  Mild, intermittent  Continue to monitor     DISPOSITION:  Stable from a renal standpoint    SUBJECTIVE / INTERVAL HISTORY:  No acute issues or current complains    OBJECTIVE:  Current Weight: Weight - Scale: 82 3 kg (181 lb 7 oz)  Vitals:    11/20/19 1600 11/20/19 2225 11/20/19 2300 11/21/19 0621   BP: 120/69 120/76 126/67 122/67   BP Location: Left arm Left arm Left arm Left arm   Pulse: 95 84 83 77   Resp: 16 18 18   Temp: 99 °F (37 2 °C)  98 4 °F (36 9 °C) 97 6 °F (36 4 °C)   TempSrc: Oral  Oral Oral   SpO2: 95%  94% 98%   Weight:       Height:           Intake/Output Summary (Last 24 hours) at 11/21/2019 1110  Last data filed at 11/20/2019 1230  Gross per 24 hour   Intake 300 ml   Output 1000 ml   Net -700 ml     General: NAD, comfortably lying in bed  Skin: no rash  ENT: moist mucous membrane  Neck: supple, no JVD  Chest: CTA b/l, no ronchii, no wheeze, no rubs, no rales  Normal effort  CVS: s1s2, no murmur, no gallop, no rub  Regular rhythm  Abdomen: soft, nontender, nl sounds  Extremities:  Lower extremities wrapped bilaterally    No thigh edema  : no meeks  Neuro: AAOX3  Psych: normal affect  Medications:    Current Facility-Administered Medications:     acetaminophen (TYLENOL) tablet 650 mg, 650 mg, Oral, Q6H PRN, Will Cohen MD, 650 mg at 11/18/19 0951    acetaminophen (TYLENOL) tablet 650 mg, 650 mg, Oral, Q6H [facility], Will Cohen MD, 650 mg at 11/21/19 2219    aluminum-magnesium hydroxide-simethicone (MYLANTA) 200-200-20 mg/5 mL oral suspension 15 mL, 15 mL, Oral, Q4H PRN, Will Cohen MD    b complex-vitamin C-folic acid (NEPHROCAPS) capsule 1 capsule, 1 capsule, Oral, Daily With Sandro Bernal MD, 1 capsule at 11/20/19 1740    bisacodyl (DULCOLAX) rectal suppository 10 mg, 10 mg, Rectal, Daily PRN, Will Cohen MD, 10 mg at 11/10/19 210    calcium carbonate (TUMS) chewable tablet 1,000 mg, 1,000 mg, Oral, TID PRN, Will MD Zac, 1,000 mg at 11/11/19 1006    epoetin bob (EPOGEN,PROCRIT) injection 10,000 Units, 10,000 Units, Intravenous, Once per day on Mon Wed Fri, Will MD Zac, 10,000 Units at 11/20/19 1104    gabapentin (NEURONTIN) capsule 100 mg, 100 mg, Oral, BID, Will MD Zac, 100 mg at 11/21/19 0844    heparin (porcine) subcutaneous injection 5,000 Units, 5,000 Units, Subcutaneous, Q8H [address], 5,000 Units at 11/21/19 0628 **AND** [COMPLETED] Platelet count, , , Once, Jesse Chun PA-C    HYDROmorphone (DILAUDID) injection 1 mg, 1 mg, Intravenous, Q4H PRN, Will MD Zac, 1 mg at 11/18/19 2232    methylnaltrexone (RELISTOR) subcutaneous injection 6 mg, 6 mg, Subcutaneous, Q48H PRN, Will MD Zac, 6 mg at 11/13/19 1729    metoprolol tartrate (LOPRESSOR) tablet 25 mg, 25 mg, Oral, Q12H [address], Will MD Zac, 25 mg at 11/21/19 0840    midodrine (PROAMATINE) tablet 5 mg, 5 mg, Oral, Before Dialysis, Will MD Zac, 5 mg at 11/18/19 0851    ondansetron (ZOFRAN) injection 4 mg, 4 mg, Intravenous, Q6H PRN, Will MD Zac    oxyCODONE (ROXICODONE) IR tablet 10 mg, 10 mg, Oral, Q4H PRN, Will MD Zac, 10 mg at 11/20/19 2239    oxyCODONE (ROXICODONE) IR tablet 5 mg, 5 mg, Oral, Q4H PRN, Will MD Zac, 5 mg at 11/20/19 1357    pantoprazole (PROTONIX) EC tablet 40 mg, 40 mg, Oral, Early Morning, Will MD Zac, 40 mg at 11/21/19 7341    polyethylene glycol (MIRALAX) packet 17 g, 17 g, Oral, Daily, Will MD Zac, 17 g at 11/21/19 0840    saccharomyces boulardii (FLORASTOR) capsule 250 mg, 250 mg, Oral, BID, Will MD Zac, 250 mg at 11/21/19 0840    saliva substitute (MOUTH KOTE) mucosal solution 5 spray, 5 spray, Mouth/Throat, PRN, Will MD Zac, 5 spray at 11/09/19 3111   senna-docusate sodium (SENOKOT S) 8 6-50 mg per tablet 2 tablet, 2 tablet, Oral, BID, Zeina Howe MD, 2 tablet at 11/21/19 0840    sevelamer (RENAGEL) tablet 800 mg, 800 mg, Oral, TID With Meals, Zeina Howe MD, 800 mg at 11/21/19 0840    torsemide (DEMADEX) tablet 40 mg, 40 mg, Oral, BID (diuretic), Zeina Howe MD, 40 mg at 11/21/19 0840    Laboratory Results:  Results from last 7 days   Lab Units 11/20/19  0518 11/19/19  0306 11/18/19  0526 11/15/19  0653   WBC Thousand/uL 7 91  --  9 90  --    HEMOGLOBIN g/dL 8 7*  --  8 6*  --    HEMATOCRIT % 28 7*  --  28 4*  --    PLATELETS Thousands/uL 194  --  196  --    POTASSIUM mmol/L 5 3 5 2 6 1* 4 9   CHLORIDE mmol/L 100 97* 99* 99*   CO2 mmol/L 31 31 28 32   BUN mg/dL 35* 28* 41* 30*   CREATININE mg/dL 6 81* 5 61* 7 76* 6 54*   CALCIUM mg/dL 9 2 9 1 9 3 9 2   PHOSPHORUS mg/dL  --  3 8  --   --

## 2019-11-21 NOTE — NURSING NOTE
Patient discharged to [facility] with transport team   Report given to Elizabeth at [facility]    Discharge paperwork given to transport team

## 2019-11-22 ENCOUNTER — TELEPHONE (OUTPATIENT)
Dept: PLASTIC SURGERY | Facility: CLINIC | Age: 59
End: 2019-11-22

## 2019-11-22 NOTE — TELEPHONE ENCOUNTER
Bhupinder Alicia, called and asked if patient had any restrictions  S/W Dr Ben [place] and stated that patient had no restriction  Called Dex Bone back and informed him

## 2019-11-27 ENCOUNTER — TELEPHONE (OUTPATIENT)
Dept: PLASTIC SURGERY | Facility: CLINIC | Age: 59
End: 2019-11-27

## 2019-11-27 NOTE — TELEPHONE ENCOUNTER
Unable to leave message for patient, mailbox full  Per Dr Sherren Kitten patient is to be scheduled in 3 weeks  Will attempt to call patient at later time

## 2019-12-03 ENCOUNTER — TELEPHONE (OUTPATIENT)
Dept: PLASTIC SURGERY | Facility: CLINIC | Age: 59
End: 2019-12-03

## 2019-12-03 NOTE — TELEPHONE ENCOUNTER
S/W Patient wife regarding patient's appointment on Friday  She informed me that patient's rehab facility made the appointment  She stated [place] [employer]

## 2019-12-06 ENCOUNTER — OFFICE VISIT (OUTPATIENT)
Dept: PLASTIC SURGERY | Facility: CLINIC | Age: 59
End: 2019-12-06

## 2019-12-06 DIAGNOSIS — S81.801D WOUND OF RIGHT LOWER EXTREMITY, SUBSEQUENT ENCOUNTER: Primary | ICD-10-CM

## 2019-12-06 DIAGNOSIS — S81.802D WOUND OF LEFT LOWER EXTREMITY, SUBSEQUENT ENCOUNTER: ICD-10-CM

## 2019-12-06 PROCEDURE — 99024 POSTOP FOLLOW-UP VISIT: CPT | Performed by: PHYSICIAN ASSISTANT

## 2019-12-06 NOTE — PROGRESS NOTES
11/18/19 1701         Procedure: CHANGE DRESSING (Bilateral Leg Lower)   Anesthesia type: IV sedation with anesthesia     11/12/19 1342         Procedures:       DEBRIDEMENT LOWER EXTREMITY [facility] OUT) (Bilateral Leg Lower)      SKIN GRAFT SPLIT THICKNESS (STSG)  EXTREMITY (Bilateral Leg Lower)       11/07/19 1215         Procedures:       DEBRIDEMENT WOUND ([address] OUT) (Bilateral Leg Lower)      SKIN GRAFT SPLIT THICKNESS (STSG)  EXTREMITY (Bilateral Abdomen)     S: Pt feels well  Minimal pain  O: Skin grafts well established  Minimal dehiscence  Some scab and dryness  No signs of infection  A: S/P debridement and STSG    P:   1  Wash gently with mild soap every other day  2  Aquaphor to grafts bilaterally after wash  3  Adaptic to grafts bilaterally after Aquaphor  3  Kerlix, then ACE over both legs  Followup in 1 month

## 2020-01-09 ENCOUNTER — OFFICE VISIT (OUTPATIENT)
Dept: INTERNAL MEDICINE CLINIC | Facility: CLINIC | Age: 60
End: 2020-01-09
Payer: MEDICARE

## 2020-01-09 VITALS
BODY MASS INDEX: 25.28 KG/M2 | DIASTOLIC BLOOD PRESSURE: 84 MMHG | WEIGHT: 166.8 LBS | OXYGEN SATURATION: 100 % | RESPIRATION RATE: 20 BRPM | SYSTOLIC BLOOD PRESSURE: 122 MMHG | HEART RATE: 102 BPM | HEIGHT: 68 IN | TEMPERATURE: 99.1 F

## 2020-01-09 DIAGNOSIS — I51.9 SYSTOLIC DYSFUNCTION: ICD-10-CM

## 2020-01-09 DIAGNOSIS — Q44.6 POLYCYSTIC LIVER DISEASE: Chronic | ICD-10-CM

## 2020-01-09 DIAGNOSIS — Q61.2 ADPKD (AUTOSOMAL DOMINANT POLYCYSTIC KIDNEY): Chronic | ICD-10-CM

## 2020-01-09 DIAGNOSIS — Z99.2 ESRD (END STAGE RENAL DISEASE) ON DIALYSIS (HCC): ICD-10-CM

## 2020-01-09 DIAGNOSIS — N18.6 ESRD (END STAGE RENAL DISEASE) ON DIALYSIS (HCC): ICD-10-CM

## 2020-01-09 DIAGNOSIS — I48.91 ATRIAL FIBRILLATION, UNSPECIFIED TYPE (HCC): ICD-10-CM

## 2020-01-09 DIAGNOSIS — N18.6 CHRONIC KIDNEY DISEASE WITH END STAGE RENAL FAILURE ON DIALYSIS (HCC): Primary | Chronic | ICD-10-CM

## 2020-01-09 DIAGNOSIS — Z99.2 CHRONIC KIDNEY DISEASE WITH END STAGE RENAL FAILURE ON DIALYSIS (HCC): Primary | Chronic | ICD-10-CM

## 2020-01-09 DIAGNOSIS — K21.9 GERD (GASTROESOPHAGEAL REFLUX DISEASE): ICD-10-CM

## 2020-01-09 PROCEDURE — 99205 OFFICE O/P NEW HI 60 MIN: CPT | Performed by: INTERNAL MEDICINE

## 2020-01-09 NOTE — ASSESSMENT & PLAN NOTE
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

## 2020-01-09 NOTE — PATIENT INSTRUCTIONS
· Follow up with Cardiology on 02/26/202  · Echocardiogram has been ordered  This is an echo of the heart  Call to schedule an appointment  It should be done BEFORE your appointment with the cardiologist   · Take Metoprolol 12 5 mg twice a day for heart rate control  Do not take it if your systolic Blood Pressure is lower than 100 mmHg, or if your heart rate is lower than 55  On Dialysis days, take it after the dialysis treatment  · Follow up with Plastic Surgery tomorrow  · Take Omeprazol as needed for acid reflux  · Continue your multi-vitamin and Binders  · Return to the office after your cardiology appointment  Autosomal Dominant Polycystic Kidney Disease   WHAT YOU NEED TO KNOW:   Autosomal dominant polycystic kidney disease (ADPKD) is a condition that causes many cysts to grow in your kidneys  ADPKD is most often caused by damaged genes, which are inherited from a parent  The cysts may become large and damage your kidneys  When damage occurs, your kidneys may not work properly, or may stop working completely  DISCHARGE INSTRUCTIONS:   Medicines:   · You may need medicines  to keep your blood pressure normal or to lower the amount of cholesterol in your blood  You may need antibiotics to treat an infection caused by bacteria if you have a UTI  You may also need over-the-counter or prescription pain medicine  Ask your healthcare provider which pain medicine is best for you  Some pain medicines may be harmful to your kidneys  · Take your medicine as directed  Contact your healthcare provider if you think your medicine is not helping or if you have side effects  Tell him or her if you are allergic to any medicine  Keep a list of the medicines, vitamins, and herbs you take  Include the amounts, and when and why you take them  Bring the list or the pill bottles to follow-up visits  Carry your medicine list with you in case of an emergency  Follow up with your healthcare provider as directed:   You may be referred to other specialists  Write down your questions so you remember to ask them during your visits  Nutrition:  Your healthcare provider may be recommend a special diet to help slow the progress of your disease  You may need to limit protein and sodium (salt)  Ask your healthcare provider about these and any other diet changes you need to make  Genetic counseling:  During genetic counseling, you and your family will learn about genetic diseases  This information may help you make important decisions, such as planning a family  Contact your healthcare provider if:   · You are female and know or think you are pregnant  · You feel full after eating only a small amount of food, or you have a decreased appetite  · You have a fever  · You have pain in your back and sides  · You have pain when you urinate  · You have questions or concerns about your condition or care  Return to the emergency department if:   · You have any of the following signs of a stroke:      ¨ Numbness or drooping on one side of your face     ¨ Weakness in an arm or leg    ¨ Confusion or difficulty speaking    ¨ Dizziness, a severe headache, or vision loss    · You have chest pain or shortness of breath  · You have a very bad headache, or you have frequent headaches  · You have swelling in any part of your body  · You have pain in your abdomen  · You have yellowing of your eyes or skin  · You see blood in your urine  © 2017 [employer] Information is for End User's use only and may not be sold, redistributed or otherwise used for commercial purposes  All illustrations and images included in CareNotes® are the copyrighted property of A [employer]  or [employer]  The above information is an  only  It is not intended as medical advice for individual conditions or treatments   Talk to your doctor, nurse or pharmacist before following any medical regimen to see if it is safe and effective for you

## 2020-01-09 NOTE — PROGRESS NOTES
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  · Recent abdominal ultrasound from September 2019 shows Hepatomegaly as well as innumerable hepatic cysts  · Latest INR/PT/PTT and LFTs within normal limits  · Patient was advised on the possible complications of Polycystic Liver disease, including but not limited to the possibility of cyst rupture as well as decrease liver function  · Plan:  · Monitor LFTs and coagulation studies  GERD (gastroesophageal reflux disease)  · Patient has a history of GERD  However, he states he did not require any medication prior to being admitted to the ICU in 09/23/2019  On this admission, patient was started on Omeprazol 20 mg daily  · Patient states lately he has only had occasional episodes of acid reflux and has not been taking the omeprazole as originally prescribed for the last several weeks  · Plan:  · He was advised on the benefits of this medication in his case  However, he prefers to hold off for now  · Patient will monitor symptoms of acid reflux in the next four weeks and how many times he requires to take omeprazole during this time  Atrial fibrillation [facility])  · Patient was admitted in the ICU at [facility] on 09/23/2019 due to severe sepsis secondary to Gram-negative bacteremia  During this hospitalization, he developed a brief episode of Atrial Fibrillation  Additionally, an echocardiogram revealed Systolic dysfunction with EF 20%  Cardiology was consulted and patient was seen by Dr Michelle Drummond  He was started on Metoprolol tartrate 12 5 mg daily, which was then increased to BID  He was not started on anticoagulation at the moment  · EKG was done in the office today which showed sinus tachycardia  Chart review of prior EKGs show normal sinus rhythm  · Patient has not been taking metoprolol for several weeks  · Patient denies any current symptoms of atrial fibrillation  · Atrial fibrillation could have been secondary to severe sepsis  However, due to the possibility of Paroxysmal Atrial Fibrillation, patient has been referred to cardiology for further workup and management  This was explained extensively to the patient and he appears to understand and agrees with the plan  · Plan:  · Patient will follow up with cardiology  They have an appointment with Dr Pancho Mtz set up for 02/26/2020  · Patient will be restarted on Metoprolol tartrate at a dose of 12 5 mg BID for rate control since the sinus tachycardia could potentially worsen his preexisting cardiomyopathy  He was also advised to not take the medication on the morning of hemodialysis and to wait to take it afterwards instead  · If patient requires anticoagulation in the future, he might need to be screened for [facility] DISTRICT Aneurysm which is a possible, although infrequent, complication of ADPKD  Chronic kidney disease with end stage renal failure on dialysis [facility])  · Patient has a past medical history of Autosomal Dominant Polycystic Kidney Disease with end-stage renal disease currently on Hemodialysis three times a week (T-W-S)  He currently has a Permanent Jhon catheter in the right IJ area changed on 10/17/19  · He used to be on peritoneal dialysis, however he states he developed peritonitis and was transitioned to hemodialysis four times a week until this Monday when he was started on 3 times weekly instead  · Patient has developed episodes of hypotension during dialysis in the past requiring Midodrine  · Latest hemoglobin is 9 5 gr/dl  He is not currently on GRISELDA  Dialysis records have been requested  He does take Velphoro 600 mg    · Patient states he "produces very little urine"  · Hypoalbuminemia is noted on recent exams  Likely secondary to a combination of dialysis, protein-calorie malnutrition and inflammatory state   He was advised to maintain good nutrition with high protein intake  · Plan:  · Dialysis records have been requested  · Continue Hemodialysis  · Renal diet with low potassium  · Magnesium, Vit B12 and folate pending  ADPKD (autosomal dominant polycystic kidney)  · Patient has a past history of ADPKD  Currently with ESRD and hemodialysis 3 times a week (T-W-S)  · Addtionally, he was recently diagnosed with Polycistic Liver disease which is usually seen in patients with ADPKD  · He denies any family history of aneurysms and has never had any brain imaging to rule out the possibility of Berry aneurysm  · Echocardiogram done in September 2019 shows EF 20% with severe LV hypokinesis and mild regurgitation of Mitral valve  There is no evidence of Mitral Valve Prolapse  · Currently asymptomatic  · Plan:  · See above for Assessment and plan of ESRD  · Echocardiogram has been ordered  · Patient might need to be screened for Berry aneurysm if anticoagulation is ever required  Systolic dysfunction  · Patient is a 64year old male with several recent admissions at [facility]  On September 2019, he was admitted due to severe sepsis secondary to gram-negative bacteremia  · Echocardiogram done during this hospitalization shows markedly decrease LV systolic function with EF 20% and severe diffuse hypokinesis  Inpatient consult to cardiology was requested and patient was started on Metoprolol tartrate 12 5 mg daily, which was then increased to BID  · Per cardiology note, Stress test and echocardiogram done in 2016 showed preserved LV systolic function  He then had an echocardiogram in 2017 ordered by the transplamt surgeon which revealed EF 40%  He did not follow up with a cardiologist after that event  · Plan:  · Restart Metoprolol tartrate 12 5 mg BID  Patient has been advised to not take this medication the morning of hemodialysis and to wait until after hemodialysis instead  · Echocardiogram has been ordered     · Follow up with cardiology  Patient has an appointment for 02/26/2020  Diagnoses and all orders for this visit:    Chronic kidney disease with end stage renal failure on dialysis [facility])    Atrial fibrillation, unspecified type ([facility] )  -     Echo complete with contrast if indicated; Future  -     Discontinue: metoprolol tartrate (LOPRESSOR) 25 mg tablet; Take 0 5 tablets (12 5 mg total) by mouth every 12 (twelve) hours  -     Lipid panel; Future  -     metoprolol tartrate (LOPRESSOR) 25 mg tablet; Take 0 5 tablets (12 5 mg total) by mouth every 12 (twelve) hours    GERD (gastroesophageal reflux disease)    Systolic dysfunction  -     Echo complete with contrast if indicated; Future  -     Lipid panel; Future    ESRD (end stage renal disease) on dialysis ([facility] )  -     Magnesium; Future  -     Vitamin B12; Future  -     Folate; Future    Polycystic liver disease    ADPKD (autosomal dominant polycystic kidney)    Other orders  -     calcium acetate (PHOSLO) 667 mg capsule; Take 1,334 mg by mouth 3 (three) times a day with meals          Subjective:      Patient ID: Natividad Adames is a 64 y o  male  HPI  Patient is a 64year old male with a past medical history of Autosomal Dominant Polycystic Disease with End-Stage Renal disease currently on hemodialysis  He comes to the clinic today to establish care with a primary care physician  Patient has several recent admissions at [address] [place] [address]  On 09/21/2019 he was admitted due to severe sepsis secondary to gram-negative bacteremia  He was treated with Ciprofloxacin  During this hospitalization, he was noted to have brief episodes of atrial fibrillation  An echocardiogram was done and showed marked decrease in LV systolic function with EF 20%  On chart review, prior echocardiogram done on 2017 showed EF 40% and on 2016 it showed preserved EF  Patient did not follow with cardiology prior to this hospitalization   Cardiology was consulted as inpatient and he was started on metoprolol 12 5 mg daily, which was then increased to BID  He was not started on anticoagulation  He received hemodialysis as inpatient four times a week  Ultrasound of the RUQ showed hepatomegaly as well as innumerable hepatic cysts and patient was diagnosed with Polycystic Liver Disease  He was discharged to [facility] after 12 days  On 10/06/2019, he was readmitted due to abdominal pain and hypotension  The abdominal pain was thought to be secondary to constipation which resolved with treatment  Patient was then discharged to [facility] for rehab  On 10/26/2019, he was readmitted due to multiple open wounds in the lower extremities bilaterally requiring debridement and removal of Eschar done by podiatry, followed by Skin grafts done by plastic surgery  He was treated with antibiotics including Vancomycin and Cefepime for 7 days  During this admission, patient developed scrotal swelling  Ultrasound showed large right hydrocele  Patient was seen by urology and it was decided that surgical intervention was not required and he would be monitored instead  He was discharged 26 days later on 11/21/2019  Patient has not followed with a primary care physician since the latest discharge  He has been receiving hemodialysis 4 times a week until this week, when it was changed to 3 times (T-W-S)  He has been following with plastic surgery for management of lower extremities wounds and skin grafts  Dialysis center has been contacted for medical records  Today, patient states his main concern is his heart and his legs, which are being managed by plastic surgery  He Denies CP, SOB, headache, dizziness, vision changes, N/V, abdominal pain, weakness or numbness         The following portions of the patient's history were reviewed and updated as appropriate: allergies, current medications, past family history, past medical history, past social history, past surgical history and problem list     Review of Systems   Constitutional: Positive for activity change  HENT: Positive for dental problem  Negative for congestion and rhinorrhea  Eyes: Negative  Respiratory: Negative for apnea, cough, chest tightness, shortness of breath, wheezing and stridor  Cardiovascular: Negative for chest pain and palpitations  Gastrointestinal: Positive for abdominal distention  Negative for abdominal pain, diarrhea, nausea and vomiting  Endocrine: Negative  Genitourinary: Positive for decreased urine volume and scrotal swelling (improved since recent hospital discharge  )  Negative for dysuria and hematuria  Musculoskeletal: Positive for gait problem and myalgias  Neurological: Negative for seizures, syncope, facial asymmetry, speech difficulty, weakness and light-headedness  Psychiatric/Behavioral: Negative  Objective:      /84 (BP Location: Right arm, Patient Position: Sitting, Cuff Size: Large)   Pulse 102   Temp 99 1 °F (37 3 °C)   Resp 20   Ht 5' 8" (1 727 m)   Wt 75 7 kg (166 lb 12 8 oz)   SpO2 100%   BMI 25 36 kg/m²          Physical Exam   Constitutional: He appears well-developed  He is active  Non-toxic appearance  No distress  HENT:   Head: Normocephalic and atraumatic  Mouth/Throat: No oropharyngeal exudate  Eyes: Pupils are equal, round, and reactive to light  Conjunctivae and EOM are normal  Right eye exhibits no discharge  Left eye exhibits no discharge  No scleral icterus  Neck: Normal range of motion  Neck supple  No JVD present  No tracheal deviation present  Cardiovascular: Regular rhythm, normal heart sounds and normal pulses  No extrasystoles are present  Tachycardia present  Exam reveals no gallop and no friction rub  No murmur heard  Pulmonary/Chest: Effort normal and breath sounds normal  No stridor  No respiratory distress  He has no wheezes  He has no rales  He exhibits no tenderness  Abdominal: He exhibits distension  There is no tenderness  There is no guarding  Musculoskeletal: He exhibits no edema  Neurological: He is alert  Skin: He is not diaphoretic  Nursing note and vitals reviewed

## 2020-01-09 NOTE — ASSESSMENT & PLAN NOTE
· Patient was admitted in the ICU at [facility] on 09/23/2019 due to severe sepsis secondary to Gram-negative bacteremia  During this hospitalization, he developed a brief episode of Atrial Fibrillation  Additionally, an echocardiogram revealed Systolic dysfunction with EF 20%  Cardiology was consulted and patient was seen by Dr Boaz Urias  He was started on Metoprolol tartrate 12 5 mg daily, which was then increased to BID  He was not started on anticoagulation at the moment  · EKG was done in the office today which showed sinus tachycardia  Chart review of prior EKGs show normal sinus rhythm  · Patient has not been taking metoprolol for several weeks  · Patient denies any current symptoms of atrial fibrillation  · Atrial fibrillation could have been secondary to severe sepsis  However, due to the possibility of Paroxysmal Atrial Fibrillation, patient has been referred to cardiology for further workup and management  This was explained extensively to the patient and he appears to understand and agrees with the plan  · Plan:  · Patient will follow up with cardiology  They have an appointment with Dr Boaz Urias set up for 02/26/2020  · Patient will be restarted on Metoprolol tartrate at a dose of 12 5 mg BID for rate control since the sinus tachycardia could potentially worsen his preexisting cardiomyopathy  He was also advised to not take the medication on the morning of hemodialysis and to wait to take it afterwards instead  · If patient requires anticoagulation in the future, he might need to be screened for [facility] DISTRICT Aneurysm which is a possible, although infrequent, complication of ADPKD

## 2020-01-09 NOTE — ASSESSMENT & PLAN NOTE
· Patient has a past history of ADPKD  Currently with ESRD and hemodialysis 3 times a week (T-W-S)  · Addtionally, he was recently diagnosed with Polycistic Liver disease which is usually seen in patients with ADPKD  · He denies any family history of aneurysms and has never had any brain imaging to rule out the possibility of Berry aneurysm  · Echocardiogram done in September 2019 shows EF 20% with severe LV hypokinesis and mild regurgitation of Mitral valve  There is no evidence of Mitral Valve Prolapse  · Currently asymptomatic  · Plan:  · See above for Assessment and plan of ESRD  · Echocardiogram has been ordered  · Patient might need to be screened for Berry aneurysm if anticoagulation is ever required

## 2020-01-09 NOTE — ASSESSMENT & PLAN NOTE
· Patient has a past medical history of Autosomal Dominant Polycystic Kidney Disease with end-stage renal disease currently on Hemodialysis three times a week (T-W-S)  He currently has a Permanent Jhon catheter in the right IJ area changed on 10/17/19  · He used to be on peritoneal dialysis, however he states he developed peritonitis and was transitioned to hemodialysis four times a week until this Monday when he was started on 3 times weekly instead  · Patient has developed episodes of hypotension during dialysis in the past requiring Midodrine  · Latest hemoglobin is 9 5 gr/dl  He is not currently on GRISELDA  Dialysis records have been requested  He does take Velphoro 600 mg    · Patient states he "produces very little urine"  · Hypoalbuminemia is noted on recent exams  Likely secondary to a combination of dialysis, protein-calorie malnutrition and inflammatory state  He was advised to maintain good nutrition with high protein intake  · Plan:  · Dialysis records have been requested  · Continue Hemodialysis  · Renal diet with low potassium  · Magnesium, Vit B12 and folate pending

## 2020-01-09 NOTE — ASSESSMENT & PLAN NOTE
· Patient is a 64year old male with several recent admissions at [facility]  On September 2019, he was admitted due to severe sepsis secondary to gram-negative bacteremia  · Echocardiogram done during this hospitalization shows markedly decrease LV systolic function with EF 20% and severe diffuse hypokinesis  Inpatient consult to cardiology was requested and patient was started on Metoprolol tartrate 12 5 mg daily, which was then increased to BID  · Per cardiology note, Stress test and echocardiogram done in 2016 showed preserved LV systolic function  He then [facility] which revealed EF 40%  He did not follow up with a cardiologist after that event  · Plan:  · Restart Metoprolol tartrate 12 5 mg BID  Patient has been advised to not take this medication the morning of hemodialysis and to wait until after hemodialysis instead  · Echocardiogram has been ordered  · Follow up with cardiology  Patient has an appointment for 02/26/2020

## 2020-01-09 NOTE — ASSESSMENT & PLAN NOTE
· Patient has a history of GERD  However, he states he did not require any medication prior to being admitted to the ICU in 09/23/2019  On this admission, patient was started on Omeprazol 20 mg daily  · Patient states lately he has only had occasional episodes of acid reflux and has not been taking the omeprazole as originally prescribed for the last several weeks  · Plan:  · He was advised on the benefits of this medication in his case  However, he prefers to hold off for now  · Patient will monitor symptoms of acid reflux in the next four weeks and how many times he requires to take omeprazole during this time

## 2020-01-10 ENCOUNTER — OFFICE VISIT (OUTPATIENT)
Dept: PLASTIC SURGERY | Facility: CLINIC | Age: 60
End: 2020-01-10

## 2020-01-10 VITALS — WEIGHT: 165 LBS | HEIGHT: 68 IN | BODY MASS INDEX: 25.01 KG/M2

## 2020-01-10 DIAGNOSIS — S81.802D WOUND OF LEFT LOWER EXTREMITY, SUBSEQUENT ENCOUNTER: Primary | ICD-10-CM

## 2020-01-10 DIAGNOSIS — S81.801D WOUND OF RIGHT LOWER EXTREMITY, SUBSEQUENT ENCOUNTER: ICD-10-CM

## 2020-01-10 PROCEDURE — 99024 POSTOP FOLLOW-UP VISIT: CPT | Performed by: PHYSICIAN ASSISTANT

## 2020-01-10 RX ORDER — B-COMPLEX WITH VITAMIN C
TABLET ORAL
COMMUNITY
Start: 2020-01-03 | End: 2020-01-10 | Stop reason: ALTCHOICE

## 2020-01-10 RX ORDER — NYSTATIN 100000 [USP'U]/G
POWDER TOPICAL
COMMUNITY

## 2020-01-10 RX ORDER — LANTHANUM CARBONATE 500 MG/1
TABLET, CHEWABLE ORAL
COMMUNITY

## 2020-01-10 RX ORDER — CHOLECALCIFEROL (VITAMIN D3) 10 MCG
1 TABLET ORAL
COMMUNITY
Start: 2014-04-15

## 2020-01-10 RX ORDER — POLYETHYLENE GLYCOL 3350 17 G/17G
POWDER, FOR SOLUTION ORAL
COMMUNITY
Start: 2020-01-03

## 2020-01-10 RX ORDER — OXYCODONE HYDROCHLORIDE 5 MG/1
CAPSULE ORAL EVERY 6 HOURS
COMMUNITY
End: 2020-02-14 | Stop reason: SDUPTHER

## 2020-01-10 RX ORDER — CEFPODOXIME PROXETIL 200 MG/1
TABLET, FILM COATED ORAL EVERY 12 HOURS
COMMUNITY
End: 2020-01-10 | Stop reason: ALTCHOICE

## 2020-01-10 RX ORDER — MIDODRINE HYDROCHLORIDE 5 MG/1
TABLET ORAL
COMMUNITY

## 2020-01-10 RX ORDER — SEVELAMER CARBONATE 800 MG/1
TABLET, FILM COATED ORAL
COMMUNITY

## 2020-01-10 NOTE — PROGRESS NOTES
11/18/19 1701               Procedure: CHANGE DRESSING (Bilateral Leg Lower)   Anesthesia type: IV sedation with anesthesia      11/12/19 1342               Procedures:       DEBRIDEMENT LOWER EXTREMITY ([facility]) (Bilateral Leg Lower)      SKIN GRAFT SPLIT THICKNESS ([facility])  EXTREMITY (Bilateral Leg Lower)         11/07/19 1215               Procedures:       DEBRIDEMENT WOUND ([facility] OUT) (Bilateral Leg Lower)      SKIN GRAFT SPLIT THICKNESS ([facility])  EXTREMITY (Bilateral Abdomen)     S: Pt feels great  No complaints  His healing is progressing well  O: Skin grafts are 90+ percent established  There is an area of the right anterior leg that is open with fibrinous exudate, which was sharply debrided today, then silver nitrate was applied  Aquaphor was then generously applied to the feet and lower legs  They were lightly wrapped with Kerlix and ACE bandage  He is to gently wash daily with mild soap  Pat dry, then apply Aquaphor and then wrap as above  A: Pt is healing well  One area of anterior right leg open with granulation tissue  P: Monitor for signs of infection  Followup in 1 month

## 2020-01-15 ENCOUNTER — TELEPHONE (OUTPATIENT)
Dept: INTERNAL MEDICINE CLINIC | Facility: CLINIC | Age: 60
End: 2020-01-15

## 2020-01-15 NOTE — TELEPHONE ENCOUNTER
2 x weeks starting today for next 4 weeks, per home care, mattie beauchamp [facility]  [phone]

## 2020-01-16 NOTE — TELEPHONE ENCOUNTER
Can you kindly clarify if this is an FYI or is there additional work needed for this message   Thank you

## 2020-02-10 RX ORDER — OXYCODONE HYDROCHLORIDE 5 MG/1
5 CAPSULE ORAL EVERY 6 HOURS
Qty: 30 CAPSULE | Status: CANCELLED | OUTPATIENT
Start: 2020-02-10

## 2020-02-10 NOTE — TELEPHONE ENCOUNTER
Patient is asking for refill on oxycodone, says he called couple of week ago, he states he was given this for his leg wounds  dr Marcia Banks is doing grafts on his legs at [facility] wound care

## 2020-02-14 ENCOUNTER — OFFICE VISIT (OUTPATIENT)
Dept: PLASTIC SURGERY | Facility: CLINIC | Age: 60
End: 2020-02-14

## 2020-02-14 ENCOUNTER — OFFICE VISIT (OUTPATIENT)
Dept: INTERNAL MEDICINE CLINIC | Facility: CLINIC | Age: 60
End: 2020-02-14
Payer: MEDICARE

## 2020-02-14 VITALS
TEMPERATURE: 98.4 F | RESPIRATION RATE: 20 BRPM | HEIGHT: 68 IN | HEART RATE: 96 BPM | OXYGEN SATURATION: 100 % | DIASTOLIC BLOOD PRESSURE: 80 MMHG | BODY MASS INDEX: 25.49 KG/M2 | SYSTOLIC BLOOD PRESSURE: 124 MMHG | WEIGHT: 168.2 LBS

## 2020-02-14 VITALS — HEIGHT: 68 IN | BODY MASS INDEX: 25.46 KG/M2 | WEIGHT: 168 LBS

## 2020-02-14 DIAGNOSIS — Z11.4 SCREENING FOR HIV WITHOUT PRESENCE OF RISK FACTORS: ICD-10-CM

## 2020-02-14 DIAGNOSIS — I51.9 SYSTOLIC DYSFUNCTION: ICD-10-CM

## 2020-02-14 DIAGNOSIS — T14.8XXA MULTIPLE WOUNDS OF SKIN: ICD-10-CM

## 2020-02-14 DIAGNOSIS — D69.6 THROMBOCYTOPENIA, UNSPECIFIED (HCC): ICD-10-CM

## 2020-02-14 DIAGNOSIS — D69.6 THROMBOCYTOPENIA (HCC): ICD-10-CM

## 2020-02-14 DIAGNOSIS — N18.6 ESRD (END STAGE RENAL DISEASE) (HCC): ICD-10-CM

## 2020-02-14 DIAGNOSIS — E77.8 HYPOPROTEINEMIA (HCC): ICD-10-CM

## 2020-02-14 DIAGNOSIS — T14.8XXA MULTIPLE WOUNDS OF SKIN: Primary | ICD-10-CM

## 2020-02-14 DIAGNOSIS — L29.9 PRURITUS: Primary | ICD-10-CM

## 2020-02-14 DIAGNOSIS — G62.9 PERIPHERAL POLYNEUROPATHY: Chronic | ICD-10-CM

## 2020-02-14 DIAGNOSIS — G47.09 OTHER INSOMNIA: ICD-10-CM

## 2020-02-14 PROBLEM — G83.9 PARESIS (HCC): Status: ACTIVE | Noted: 2020-02-14

## 2020-02-14 PROBLEM — K72.10 CHRONIC HEPATIC FAILURE WITHOUT COMA (HCC): Status: ACTIVE | Noted: 2020-02-14

## 2020-02-14 PROBLEM — G47.00 INSOMNIA: Status: ACTIVE | Noted: 2020-02-14

## 2020-02-14 PROCEDURE — 3008F BODY MASS INDEX DOCD: CPT | Performed by: PHYSICIAN ASSISTANT

## 2020-02-14 PROCEDURE — 1036F TOBACCO NON-USER: CPT | Performed by: INTERNAL MEDICINE

## 2020-02-14 PROCEDURE — 3008F BODY MASS INDEX DOCD: CPT | Performed by: INTERNAL MEDICINE

## 2020-02-14 PROCEDURE — 99024 POSTOP FOLLOW-UP VISIT: CPT | Performed by: PHYSICIAN ASSISTANT

## 2020-02-14 PROCEDURE — 99214 OFFICE O/P EST MOD 30 MIN: CPT | Performed by: INTERNAL MEDICINE

## 2020-02-14 RX ORDER — OXYCODONE HYDROCHLORIDE 5 MG/1
5 CAPSULE ORAL EVERY 12 HOURS PRN
Qty: 28 CAPSULE | Refills: 0 | Status: SHIPPED | OUTPATIENT
Start: 2020-02-14 | End: 2020-02-28

## 2020-02-14 NOTE — PROGRESS NOTES
11/18/19 1701               Procedure: CHANGE DRESSING (Bilateral Leg Lower)   Anesthesia type: IV sedation with anesthesia      11/12/19 1342               Procedures:       DEBRIDEMENT LOWER EXTREMITY ([address] OUT) (Bilateral Leg Lower)      SKIN GRAFT SPLIT THICKNESS ([facility])  EXTREMITY (Bilateral Leg Lower)         11/07/19 1215               Procedures:       DEBRIDEMENT WOUND ([address] OUT) (Bilateral Leg Lower)      SKIN GRAFT SPLIT THICKNESS ([facility])  EXTREMITY (Bilateral Abdomen)     HPI:  Taylor Connors is a 64 y o  male patient history of polycystic kidney disease on dialysis, recent history of septic shock complicated by necrotizing soft tissue infection of the legs bilaterally   S: Pt feels well today  He states his ambulation is improving  He has been seen at wound clinic, where he has been receiving treatment for a lateral right lower leg wound and a right foot wound  O: Skin grafts are mostly completely established  He has the areas on his right leg and foot that are being treated by wound care  He says he has been getting "skin grafts" to these areas  Not sure if this is a skin substitue  Otherwise he has some adhering scabs  A: Well healing extensive skin grafting of LE, bilaterally  P: Continue with wound care treatment  Aquaphor to dry scabs  Followup in 3 months, or if a concern develops

## 2020-02-14 NOTE — ASSESSMENT & PLAN NOTE
There is no evidence of any B12 or folate deficiency in reviewing his labs his folate is 14 3 and B12 is 1188  Check TSH before next visit  He seems to be doing better or further follow-up before next visit  Questionable dialysis induced

## 2020-02-14 NOTE — ASSESSMENT & PLAN NOTE
I think that this is multifactorial   I think that some issue with pain in his legs may play a wall which is where trying to initiate turmeric and wean down his oxycodone  In addition, sleeping during dialysis on the days he is on dialysis likely upsets circadian rhythm  There were to be his circadian rhythms affected by dialysis as well  Were going to try and start melatonin at least 2 mg at night  He denies any significant caffeine intake  Will continue to follow

## 2020-02-14 NOTE — ASSESSMENT & PLAN NOTE
With regards to possible etiologies of pruritus, his phosphorus most recent was 3 0 in reviewing dialysis labs, his total bilirubin was not elevated  One diagnostic consideration could be dialysis induced pruritus  The treatment for that would be making sure that his clearances are okay which they are with an excellent urea clearance, the use of low-dose Neurontin which she is not taking or dermatology consultation for photo dynamic therapy  He thinks that this may be secondary to something he is exposed to home he wishes to look into this further  I did caution him about utilizing significant doses of Benadryl as the goal would be to try to decrease this  He did not wish to consider Neurontin he is not taking it at this time  Will follow up with regards to this  It seems to be intermittent  There does not seem to be any new medications I reviewed his medication list he is only taking 2 phosphorus binders he is taking just as needed oxycodone and a renal vitamin    He is not taking the beta-blocker anymore either

## 2020-02-14 NOTE — ASSESSMENT & PLAN NOTE
This has improved I reviewed his last dialysis monthly lab review his albumin has improved to 3 8  He is on a protein supplement call reva  He takes it on dialysis days as well as at home  He is on a higher protein intake  Continue to follow

## 2020-02-14 NOTE — ASSESSMENT & PLAN NOTE
He is on hemodialysis Tuesday Thursday Saturday  I had called and spoke with the patient's dialysis nurse at [address] yesterday February 13th for continuity of care  He is doing well with hemodialysis  His clearance seemed to have been improved from before he is tolerating it better  He is ambulating with a cane no longer requiring a walker  There is no significant hypotension on dialysis and he is not having any significant fluid gains

## 2020-02-14 NOTE — ASSESSMENT & PLAN NOTE
While he was in the hospital during his long hospitalization he was found to have an ejection fraction of 20%  He is due for repeat echocardiogram this coming Monday and will be seeing Cardiology in follow-up February 26  He examines to be euvolemic no evidence of any heart failure no evidence of any hypotension or significant fluid overload and he seems to be tolerating his dialysis treatments without developing any sort of hypotension  At this time will get a follow-up echocardiogram he does not wish to pursue being on the beta-blocker is he did not tolerated he said I did not feel right    He is hemodynamically stable he examines to have a regular rate and rhythm his pulses in the 80s and his blood pressure is stable  He will follow up with Cardiology for further evaluation regarding the above  Check TSH before next visit

## 2020-02-14 NOTE — PROGRESS NOTES
Assessment/Plan:    Pruritus  With regards to possible etiologies of pruritus, his phosphorus most recent was 3 0 in reviewing dialysis labs, his total bilirubin was not elevated  One diagnostic consideration could be dialysis induced pruritus  The treatment for that would be making sure that his clearances are okay which they are with an excellent urea clearance, the use of low-dose Neurontin which she is not taking or dermatology consultation for photo dynamic therapy  He thinks that this may be secondary to something he is exposed to home he wishes to look into this further  I did caution him about utilizing significant doses of Benadryl as the goal would be to try to decrease this  He did not wish to consider Neurontin he is not taking it at this time  Will follow up with regards to this  It seems to be intermittent  There does not seem to be any new medications I reviewed his medication list he is only taking 2 phosphorus binders he is taking just as needed oxycodone and a renal vitamin  He is not taking the beta-blocker anymore either    Systolic dysfunction  While he was in the hospital during his long hospitalization he was found to have an ejection fraction of 20%  He is due for repeat echocardiogram this coming Monday and will be seeing Cardiology in follow-up February 26  He examines to be euvolemic no evidence of any heart failure no evidence of any hypotension or significant fluid overload and he seems to be tolerating his dialysis treatments without developing any sort of hypotension  At this time will get a follow-up echocardiogram he does not wish to pursue being on the beta-blocker is he did not tolerated he said I did not feel right    He is hemodynamically stable he examines to have a regular rate and rhythm his pulses in the 80s and his blood pressure is stable  He will follow up with Cardiology for further evaluation regarding the above  Check TSH before next visit  Thrombocytopenia ([facility] )  This has resolved most recent platelet count is 944  Multiple wounds of skin  The patient follows with plastic surgery podiatry and he sees Podiatry on a regular basis  The plan is to try to wean down the oxycodone  The last time when we checked with PD [facility] that he had oxycodone field was in October 2019  He is only taking it occasionally  He asked and I agree with only giving medication for a 2 week dose checking it every 12 hours he was given 28 pills with no refills and I stated he was only to use it on an as-needed basis  This was placed through electronically to the pharmacy  I also started him on turmeric 800 mg every other day as I think this can help with his pain and will see how he does  Insomnia  I think that this is multifactorial   I think that some issue with pain in his legs may play a wall which is where trying to initiate turmeric and wean down his oxycodone  In addition, sleeping during dialysis on the days he is on dialysis likely upsets circadian rhythm  There were to be his circadian rhythms affected by dialysis as well  Were going to try and start melatonin at least 2 mg at night  He denies any significant caffeine intake  Will continue to follow  ESRD (end stage renal disease) [facility])  He is on hemodialysis Tuesday Thursday Saturday  I had called and spoke with the patient's dialysis nurse at [address] yesterday February 13th for continuity of care  He is doing well with hemodialysis  His clearance seemed to have been improved from before he is tolerating it better  He is ambulating with a cane no longer requiring a walker  There is no significant hypotension on dialysis and he is not having any significant fluid gains  Hypoproteinemia ([address] )  This has improved I reviewed his last dialysis monthly lab review his albumin has improved to 3 8  He is on a protein supplement call liquicell  He takes it on dialysis days as well as at home    He is on a higher protein intake  Continue to follow  Peripheral neuropathy  There is no evidence of any B12 or folate deficiency in reviewing his labs his folate is 14 3 and B12 is 1188  Check TSH before next visit  He seems to be doing better or further follow-up before next visit  Questionable dialysis induced  Screening for HIV without presence of risk factors  Patient was agreeable to getting HIV screen he will do so before next visit in 3 months which will be for the Medicare annual Wellness visit  Hepatitis C has been negative from prior testing in reviewing dialysis unit notes    Regarding review of other health screening:  I reviewed his cholesterol profile which is fatty less  LDL is 48, triglycerides 65 HDL 43 total cholesterol 105 this is in the setting of an albumin 3 8  Recheck TSH before next visit  Will see him in 3 months for follow-up annual Medicare health visit        Subjective:      Patient ID: Carlos Mojica is a 64 y o  male  HPI    Patient seen in follow-up with regards to dialysis and here for primary care follow-up  I had the opportunity called dialysis unit yesterday on February 13th that he is otherwise has been doing well  He is at his dry weight he is ambulating with a cane is no longer ambulating with a walker  His neuropathy seems to be better  He is following up Podiatry on a weekly basis and Plastic surgery with regards was bilateral lower extremity wounds  He is due for follow-up with Cardiology again when he was in the hospital he had an ejection fraction of 20% in the setting of severe sepsis and PAF  We had started him on a low-dose beta-blocker on the last visit he decided to stop it as he stated he did not feel right  He denies any other acute symptoms  Medications and labs reviewed during this visit  Review of Systems  decreased bilateral leg pain over still present, he has been trying to decrease the amount of oxycodone he is taking    Denies any exertional chest the understanding pain, shortness of breath, no dizziness no lightheadedness no nausea vomiting diarrhea no abdominal pain or back pain no urgency frequency  Objective:      /80 (BP Location: Right arm, Patient Position: Sitting, Cuff Size: Standard)   Pulse 96   Temp 98 4 °F (36 9 °C)   Resp 20   Ht 5' 8" (1 727 m) Comment: pt states weight  Wt 76 3 kg (168 lb 3 2 oz)   SpO2 100%   BMI 25 57 kg/m²     Repeat blood pressure is 122/80 in the right arm with the patient sitting in the chair with appropriate size cuff     Physical Exam   Constitutional: He appears well-nourished  Eyes: No scleral icterus  Neck: Neck supple  Cardiovascular: Normal rate and regular rhythm  Exam reveals no friction rub  Pulmonary/Chest: Effort normal and breath sounds normal    Abdominal: Soft  Bowel sounds are normal  There is no tenderness  Musculoskeletal: He exhibits no edema  Bilateral leg wounds in different stages of healing granulation tissue noted no acute erythema no discharge no drainage no significant edema noted  He does have a PermCath without any erythema or discharge   Lymphadenopathy:     He has no cervical adenopathy  Neurological:   Nonfocal   Skin: Skin is warm and dry     Psychiatric: His behavior is normal

## 2020-02-14 NOTE — ASSESSMENT & PLAN NOTE
Patient was agreeable to getting HIV screen he will do so before next visit in 3 months which will be for the Medicare annual Wellness visit    Hepatitis C has been negative from prior testing in reviewing dialysis unit notes

## 2020-02-14 NOTE — PATIENT INSTRUCTIONS
1  Thank you for coming in to see me today      2  Please start Turmeric 800 mg every other day  You can get this at www [facility]      3   Start Melatonin 3 mg at night to help with sleeping

## 2020-02-14 NOTE — ASSESSMENT & PLAN NOTE
The patient follows with plastic surgery podiatry and he sees Podiatry on a regular basis  The plan is to try to wean down the oxycodone  The last time when we checked with EDEL QUEZADA that he had oxycodone field was in October 2019  He is only taking it occasionally  He asked and I agree with only giving medication for a 2 week dose checking it every 12 hours he was given 28 pills with no refills and I stated he was only to use it on an as-needed basis  This was placed through electronically to the pharmacy  I also started him on turmeric 800 mg every other day as I think this can help with his pain and will see how he does

## 2020-02-17 ENCOUNTER — HOSPITAL ENCOUNTER (OUTPATIENT)
Dept: NON INVASIVE DIAGNOSTICS | Facility: CLINIC | Age: 60
Discharge: HOME | End: 2020-02-17
Payer: MEDICARE

## 2020-02-17 DIAGNOSIS — I51.9 SYSTOLIC DYSFUNCTION: ICD-10-CM

## 2020-02-17 DIAGNOSIS — I48.91 ATRIAL FIBRILLATION, UNSPECIFIED TYPE (HCC): ICD-10-CM

## 2020-02-17 PROCEDURE — 93306 TTE W/DOPPLER COMPLETE: CPT

## 2020-02-17 PROCEDURE — 93306 TTE W/DOPPLER COMPLETE: CPT | Performed by: INTERNAL MEDICINE

## 2020-02-26 ENCOUNTER — OFFICE VISIT (OUTPATIENT)
Dept: CARDIOLOGY CLINIC | Facility: CLINIC | Age: 60
End: 2020-02-26
Payer: MEDICARE

## 2020-02-26 VITALS
SYSTOLIC BLOOD PRESSURE: 130 MMHG | OXYGEN SATURATION: 95 % | WEIGHT: 170 LBS | BODY MASS INDEX: 25.76 KG/M2 | HEIGHT: 68 IN | HEART RATE: 99 BPM | DIASTOLIC BLOOD PRESSURE: 90 MMHG

## 2020-02-26 DIAGNOSIS — I48.0 PAROXYSMAL ATRIAL FIBRILLATION (HCC): ICD-10-CM

## 2020-02-26 DIAGNOSIS — I10 BENIGN ESSENTIAL HYPERTENSION: Chronic | ICD-10-CM

## 2020-02-26 DIAGNOSIS — I42.0 DILATED CARDIOMYOPATHY (HCC): Primary | ICD-10-CM

## 2020-02-26 PROCEDURE — 1036F TOBACCO NON-USER: CPT | Performed by: INTERNAL MEDICINE

## 2020-02-26 PROCEDURE — 99214 OFFICE O/P EST MOD 30 MIN: CPT | Performed by: INTERNAL MEDICINE

## 2020-02-26 PROCEDURE — 93000 ELECTROCARDIOGRAM COMPLETE: CPT | Performed by: INTERNAL MEDICINE

## 2020-02-26 PROCEDURE — 3008F BODY MASS INDEX DOCD: CPT | Performed by: INTERNAL MEDICINE

## 2020-02-26 RX ORDER — METOPROLOL SUCCINATE 25 MG/1
25 TABLET, EXTENDED RELEASE ORAL DAILY
Qty: 30 TABLET | Refills: 6 | Status: SHIPPED | OUTPATIENT
Start: 2020-02-26

## 2020-02-26 RX ORDER — MELATONIN
1000 DAILY
COMMUNITY

## 2020-02-26 NOTE — PROGRESS NOTES
Cardiology Follow Up    Emanuel Carlson  1960  [id number]  [facility] [address]  FRANCISCO JAVIER [address] [id number] [phone]    1  Dilated cardiomyopathy (HCC)  metoprolol succinate (TOPROL-XL) 25 mg 24 hr tablet    Ambulatory  Referral to Cardiac Rehabilitation    POCT ECG   2  Paroxysmal atrial fibrillation (HCC)     3  Benign essential hypertension         Discussion/Summary:   Mr Yuri Mane is a 65-year-old gentleman who presents to the office today for follow-up  He was found to have a cardiomyopathy with an ejection fraction of 20% during his hospital stay for sepsis and bacteremia in the setting of lower extremity wounds  A repeat echocardiogram reveals his ejection fraction is again 20%  He had an echocardiogram in 2014 revealing preserved LV function  He also underwent a stress test at that time which was unremarkable  A follow-up echocardiogram in 2017 revealed an ejection fraction of 40%  He recalls none of this testing  Nonetheless we did discuss potential etiologies for his cardiomyopathy  We discussed a left heart catheterization to rule out obstructive CAD and at this point he declines  We discussed the indication for medical therapy in the form of an ACE-inhibitor and beta-blocker  He would be agreeable to at least re-attempting low-dose metoprolol succinate a few times a week  However he states he thinks if be begins to become more active and exercise his LV function will improve  We discussed that this likely will not be the case  I have asked him to initiate 12 5 mg on days he does not attend dialysis and he will contemplate initiation  Nonetheless a prescription was provided  His volume status is managed with hemodialysis  We also discussed cardiac rehabilitation which he would consider  A prescription was provided    He understands the risks of sudden cardiac death in the setting of his profound and untreated cardiomyopathy  Regarding his atrial fibrillation which was a brief self-limiting event in the setting of sepsis he is not on systemic anticoagulation  He will follow-up in a few months for ongoing discussions regarding further testing and medications for his cardiomyopathy  Interval History:  Mr Josué Mart is a 70-year-old gentleman who presents to the office today for hospital follow-up  I had met him when he was admitted with multiple open wounds of his lower extremities and Shewanella bacteremia  He underwent IV antibiotics and debridement with eventual skin grafting by plastic surgery  As part of his workup he underwent an echocardiogram   This revealed his ejection fraction was 20%  In this setting he was also noted to have a brief episode of atrial fibrillation  Due to melanotic stool, thrombocytopenia and a one time event in the setting of sepsis it was not recommended anticoagulation be started  He was intolerant of medications for his cardiomyopathy due to hypotension as multiple doses of a beta-blocker were held due to hypotension  He was on midodrine during his hospital stay  He apparently was transferred to rehabilitation facility after his hospital stay and eventually home  He has been ambulating with a cane  He is under the care of a wound center for ongoing treatment of his lower extremity wounds  He was seen by his primary care provider and a repeat echocardiogram requested  This revealed his ejection fraction remains at 20% and he was sent to the office for further evaluation  He claims he feels well when he walks  He denies any exertional chest pain or shortness of breath  His volume status is managed with hemodialysis which he attends three days a week  He denies any paroxysmal nocturnal dyspnea, orthopnea, acute weight gain or increasing abdominal girth  He denies lightheadedness, syncope or presyncope    He denies palpitations or symptoms of claudication         Problem List     Chronic kidney disease with end stage renal failure on dialysis ([address] ) (Chronic)    Hyponatremia    Multiple and open wound of lower limb    Polycystic liver disease (Chronic)    Total bilirubin, elevated    Thrombocytopenia (HCC)    Gram-negative bacteremia    Polycystic kidney disease (Chronic)    ADPKD (autosomal dominant polycystic kidney) (Chronic)    Benign essential hypertension (Chronic)    Peripheral neuropathy (Chronic)    Atrial fibrillation (HCC)    Hypotension (Chronic)    Anemia due to chronic kidney disease, on chronic dialysis and Acute Blood Loss Anemia (HCC)    Ileus (HCC)    Ascites    Elevated d-dimer    ESRD (end stage renal disease) on dialysis (HCC)    Hyperkalemia    Scrotal swelling    ESRD (end stage renal disease) ([address] )    At Risk for Pressure ulcer, buttock    Wound of right leg    Overview Signed 10/30/2019 12:48 PM by Sandy Paula DPM     Added automatically from request for surgery [id number]         Wound of left leg    Overview Signed 10/30/2019 12:48 PM by Sandy Paula DPM     Added automatically from request for surgery [id number]         Constipation    Multiple wounds of skin    Overview Signed 11/7/2019  2:49 PM by Elmira Navarrete MD     Added automatically from request for surgery [id number]         Cellulitis    Overview Signed 11/12/2019  9:06 AM by Elmira Navarrete MD     Added automatically from request for surgery [id number]         Oropharyngeal dysphagia    GERD (gastroesophageal reflux disease)    Systolic dysfunction    Pruritus    Insomnia    Chronic hepatic failure without coma ([address] )    Paresis ([address] )    Hypoproteinemia ([address] )    Screening for HIV without presence of risk factors        Past Medical History:   Diagnosis Date    Complication of renal dialysis     Polycystic kidney disease      Social History     Socioeconomic History    Marital status: /Civil Union     Spouse name: Not on file  Number of children: 3    Years of education: Not on file    Highest education level: Associate degree: occupational, technical, or vocational program   Occupational History    Not on file   Social Needs    Financial resource strain: Somewhat hard    Food insecurity:     Worry: Never true     Inability: Never true   KnowledgeVision needs:     Medical: Yes     Non-medical: Yes   Tobacco Use    Smoking status: Never Smoker    Smokeless tobacco: Never Used   Substance and Sexual Activity    Alcohol use: Not Currently    Drug use: Not Currently    Sexual activity: Not on file   Lifestyle    Physical activity:     Days per week: Not on file     Minutes per session: Not on file    Stress: Not at all   Relationships    Social connections:     Talks on phone: Not on file     Gets together: Not on file     Attends Hinduism service: Not on file     Active member of club or organization: Not on file     Attends meetings of clubs or organizations: Not on file     Relationship status:     Intimate partner violence:     Fear of current or ex partner: No     Emotionally abused: No     Physically abused: No     Forced sexual activity: No   Other Topics Concern    Not on file   Social History Narrative    Not on file      History reviewed  No pertinent family history    Past Surgical History:   Procedure Laterality Date    IR CATHETERGRAM  10/17/2019    IR IMAGE GUIDED ASPIRATION / DRAINAGE  9/23/2019    IR PARACENTESIS  10/9/2019    IR [facility] EXCHANGE  10/17/2019    IR [facility] PLACEMENT  9/30/2019    SPLIT THICKNESS SKIN GRAFT Bilateral 11/7/2019    Procedure: SKIN GRAFT SPLIT THICKNESS (STSG)  EXTREMITY;  Surgeon: Delmi Garcia MD;  [facility];  Service: Plastics    SPLIT THICKNESS SKIN GRAFT Bilateral 11/12/2019    Procedure: SKIN GRAFT SPLIT THICKNESS (STSG)  EXTREMITY;  Surgeon: Delmi Garcia MD;  [facility];  Service: Plastics    [address] [place] [address] APPLICATION Bilateral 82/59/4095    Procedure: CHANGE DRESSING;  Surgeon: Marybeth Funes MD;  [facility];  Service: Plastics    WOUND DEBRIDEMENT Bilateral 10/31/2019    Procedure: DEBRIDEMENT WOUND [facility]); Surgeon: Harlan Vidal DPM;  [facility];  Service: Podiatry    WOUND DEBRIDEMENT Bilateral 11/5/2019    Procedure: DEBRIDEMENT WOUND [facility]); Surgeon: Harlan Vidal DPM;  [facility];  Service: Podiatry    WOUND DEBRIDEMENT Bilateral 11/7/2019    Procedure: DEBRIDEMENT WOUND [facility]); Surgeon: Marybeth Funes MD;  [facility];  Service: Plastics    WOUND DEBRIDEMENT Bilateral 11/12/2019    Procedure: DEBRIDEMENT LOWER EXTREMITY [facility]);   Surgeon: Marybeth Funes MD;  [facility];  Service: Plastics       Current Outpatient Medications:     calcium acetate (PHOSLO) 667 mg capsule, Take 1,334 mg by mouth 3 (three) times a day with meals, Disp: , Rfl:     cholecalciferol (VITAMIN D3) 1,000 units tablet, Take 1,000 Units by mouth daily, Disp: , Rfl:     Multiple Vitamin (MULTIVITAMIN) tablet, Take 1 tablet by mouth daily, Disp: , Rfl:     NON FORMULARY, Hemodialysis Monday, Wednesday, Friday, Disp: , Rfl:     oxyCODONE (OXY-IR) 5 MG capsule, Take 1 capsule (5 mg total) by mouth every 12 (twelve) hours as needed for moderate pain for up to 14 daysMax Daily Amount: 10 mg, Disp: 28 capsule, Rfl: 0    Sucroferric Oxyhydroxide (VELPHORO) 500 MG CHEW, Velphoro 500 mg chewable tablet, Disp: , Rfl:     B Complex-C-Folic Acid (TRIPHROCAPS PO), Take 1 capsule by mouth daily, Disp: , Rfl:     b complex-vitamin C-folic acid (NEPHROCAPS) 1 mg capsule, Take 1 mg by mouth, Disp: , Rfl:     gabapentin (NEURONTIN) 100 mg capsule, Take 1 capsule (100 mg total) by mouth 2 (two) times a day (Patient not taking: Reported on 2/26/2020), Disp: 60 capsule, Rfl: 0    lanthanum (FOSRENOL) 500 mg chewable tablet, Chew, Disp: , Rfl:   metoprolol succinate (TOPROL-XL) 25 mg 24 hr tablet, Take 1 tablet (25 mg total) by mouth daily, Disp: 30 tablet, Rfl: 6    metoprolol tartrate (LOPRESSOR) 25 mg tablet, Take 0 5 tablets (12 5 mg total) by mouth every 12 (twelve) hours (Patient not taking: Reported on 2/14/2020), Disp: 30 tablet, Rfl: 0    midodrine (PROAMATINE) 5 mg tablet, midodrine 5 mg tablet, Disp: , Rfl:     nystatin (nystatin) powder, Nyamyc 100,000 unit/gram topical powder, Disp: , Rfl:     omeprazole (PriLOSEC OTC) 20 MG tablet, Take 1 tablet (20 mg total) by mouth daily (Patient not taking: Reported on 2/14/2020), Disp: 30 tablet, Rfl: 0    polyethylene glycol (GLYCOLAX) powder, MIX AND DRINK 17 GRAMS BY MOUTH ONCE A DAY FOR CONSTIPATION, Disp: , Rfl:     polyethylene glycol (MIRALAX) 17 g packet, Take 17 g by mouth 2 (two) times a day (Patient not taking: Reported on 2/14/2020), Disp: 14 each, Rfl: 0    senna-docusate sodium (SENOKOT S) 8 6-50 mg per tablet, Take 2 tablets by mouth 2 (two) times a day for 10 days (Patient not taking: Reported on 1/9/2020), Disp: 40 tablet, Rfl: 0    sevelamer (RENAGEL) 800 mg tablet, Take 1 tablet (800 mg total) by mouth 3 (three) times a day with meals for 10 days (Patient not taking: Reported on 1/9/2020), Disp: 30 tablet, Rfl: 0    sevelamer carbonate (RENVELA) 800 mg tablet, sevelamer carbonate 800 mg tablet, Disp: , Rfl:     simethicone (MYLICON) 80 mg chewable tablet, Chew 1 tablet (80 mg total) every 6 (six) hours as needed for flatulence (Patient not taking: Reported on 2/14/2020), Disp: 30 tablet, Rfl: 0    torsemide (DEMADEX) 20 mg tablet, Take 2 tablets (40 mg total) by mouth 2 (two) times a day (Patient not taking: Reported on 1/9/2020), Disp: 120 tablet, Rfl: 0    vitamin E, tocopherol, 200 units capsule, Take by mouth, Disp: , Rfl:   No Known Allergies    Labs:     Chemistry        Component Value Date/Time    K 5 3 11/20/2019 0518     11/20/2019 0518    CO2 31 11/20/2019 0518    BUN 35 (H) 11/20/2019 0518    CREATININE 6 81 (H) 11/20/2019 0518        Component Value Date/Time    CALCIUM 9 2 11/20/2019 0518    ALKPHOS 182 (H) 11/19/2019 0306    AST 17 11/19/2019 0306    ALT <6 (L) 11/19/2019 0306            No results found for: CHOL  No results found for: HDL  No results found for: LDLCALC  No results found for: TRIG  No results found for: CHOLHDL    Imaging: No results found  ECG:  Sinus tachycardia with nonspecific ST/T wave abnormalities      Review of Systems   Cardiovascular: Negative for chest pain, claudication, cyanosis, dyspnea on exertion, leg swelling and palpitations  Skin: Positive for poor wound healing  All other systems reviewed and are negative  Vitals:    02/26/20 1559   BP: 130/90   Pulse: 99   SpO2: 95%     Vitals:    02/26/20 1559   Weight: 77 1 kg (170 lb)     Height: 5' 8" (172 7 cm)   Body mass index is 25 85 kg/m²      Physical Exam:   General appearance:  Appears stated age, alert, well appearing and in no distress  HEENT:  PERRLA, EOMI, no scleral icterus, no conjunctival pallor  NECK:  Supple, No elevated JVP, no thyromegaly, no carotid bruits  HEART:  Regular rate and rhythm, normal S1/S2, no S3/S4, no murmur or rub  LUNGS:  Clear to auscultation bilaterally  ABDOMEN:  Soft, non-tender, positive bowel sounds, no rebound or guarding, no organomegaly   EXTREMITIES:  No edema with bilateral lower extremity dressings intact  SKIN: No lesions or rashes on exposed skin  NEURO:  CN II-XII intact, no focal deficits

## 2020-03-11 ENCOUNTER — OFFICE VISIT (OUTPATIENT)
Dept: INTERNAL MEDICINE CLINIC | Facility: CLINIC | Age: 60
End: 2020-03-11
Payer: MEDICARE

## 2020-03-11 VITALS
HEART RATE: 106 BPM | BODY MASS INDEX: 26.22 KG/M2 | HEIGHT: 68 IN | OXYGEN SATURATION: 100 % | TEMPERATURE: 98.8 F | WEIGHT: 173 LBS | SYSTOLIC BLOOD PRESSURE: 128 MMHG | DIASTOLIC BLOOD PRESSURE: 84 MMHG

## 2020-03-11 DIAGNOSIS — B02.9 HERPES ZOSTER WITHOUT COMPLICATION: Primary | ICD-10-CM

## 2020-03-11 DIAGNOSIS — D84.9 IMMUNOSUPPRESSED STATUS (HCC): ICD-10-CM

## 2020-03-11 PROCEDURE — 99214 OFFICE O/P EST MOD 30 MIN: CPT | Performed by: HOSPITALIST

## 2020-03-11 PROCEDURE — 1036F TOBACCO NON-USER: CPT | Performed by: HOSPITALIST

## 2020-03-11 PROCEDURE — 3008F BODY MASS INDEX DOCD: CPT | Performed by: HOSPITALIST

## 2020-03-11 RX ORDER — VALACYCLOVIR HYDROCHLORIDE 500 MG/1
500 TABLET, FILM COATED ORAL DAILY
Qty: 10 TABLET | Refills: 0 | Status: SHIPPED | OUTPATIENT
Start: 2020-03-11 | End: 2020-03-21

## 2020-03-11 NOTE — PROGRESS NOTES
INTERNAL MEDICINE FOLLOW-UP OFFICE VISIT  [facility] - [facility] INTERNAL MEDICINE [place]    NAME: Jazmín Sylvester  AGE: 64 y o  SEX: male    DATE OF ENCOUNTER: 3/11/2020   Assessment and Plan:     1  Herpes zoster involving the left T3, T4 dermatome with areas of erythematous rash with blistering from the sternum running across the thorax to the T4 spine  Given that the patient is immuno suppressed and has end-stage renal disease on hemodialysis will adjust Valtrex dose to 500 mg once a day for 10 days  On hemodialysis days medication to be taken after dialysis  Currently patient remains stable with no evidence of any spread  No evidenc of severe no pain  Would hold off on gabapentin for now  Patient advised to avoid rupturing the blisters or touching the area  Avoid contact with children, grandchildren and other members of the family till crusting arcus  To let the hemodialysis as well has his podiatrist(he has an appointment with them tomorrow) to know that he has herpes zoster  To go to the ER ASAP if he develops any headaches, nausea vomiting or high-grade fever  Wife instructed not to share towels  Avoid other at-risk people     - valACYclovir (VALTREX) 500 mg tablet; Take 1 tablet (500 mg total) by mouth daily for 10 days  Dispense: 10 tablet; Refill: 0    2  Immunosuppressed status ([address] )  Secondary to end-stage renal disease  In addition patient also has other comorbid conditions including dilated cardiomyopathy, paroxysmal atrial fibrillation, essential hypertension   - valACYclovir (VALTREX) 500 mg tablet; Take 1 tablet (500 mg total) by mouth daily for 10 days  Dispense: 10 tablet; Refill: 0    Call the office in 5 days time if no improvement in symptoms       Counseling:     · Medication Side Effects - Adverse side effects of medications were reviewed with the patient/guardian today: Yes  · Counseling was given regarding: Prognosis, Risks and benefits of tx options, room air Intructions for management, Patient and family education and Importance of tx compliance  · Barriers to treatment include: No identified barriers      Chief Complaint:     Chief Complaint   Patient presents with    Rash        History of Present Illness:     HPI   Patient presents with with an erythematous, burning, erythematous rash with blisters involving the left T3/T4 dermatome  Patient is in no suppressed with end-stage renal disease on hemodialysis on Tuesdays Thursdays Saturdays but now is transitioning to a Monday Wednesday Friday schedule per the wife  Patient denies any fevers but has had grandchildren visit him who have had URI symptoms  No headache, no ocular, genital  involvement  Patient has had no prior zoster vaccines  No prior history of shingles or chickenpox  In addition to end-stage renal disease he also has dilated cardiomyopathy and paroxysmal atrial fibrillation  Patient denies any chest pain, palpitations or shortness of breath  Patient has had a prior hospitalization for sepsis secondary to multiple open wounds of his lower extremity       The following portions of the patient's history were reviewed and updated as appropriate: allergies, current medications, past family history, past medical history, past social history, past surgical history and problem list      Review of Systems:     Review of Systems     Problem List:     Patient Active Problem List   Diagnosis    Chronic kidney disease with end stage renal failure on dialysis ([facility] )    Hyponatremia    Multiple and open wound of lower limb    Polycystic liver disease    Total bilirubin, elevated    Thrombocytopenia (HCC)    Gram-negative bacteremia    Polycystic kidney disease    ADPKD (autosomal dominant polycystic kidney)    Benign essential hypertension    Peripheral neuropathy    Paroxysmal atrial fibrillation ([facility] )    Hypotension    Anemia due to chronic kidney disease, on chronic dialysis and Acute Blood Loss Anemia (HCC)    Ileus (HCC)    Ascites    Elevated d-dimer    ESRD (end stage renal disease) on dialysis (HCC)    Hyperkalemia    Scrotal swelling    ESRD (end stage renal disease) ([facility] )    At Risk for Pressure ulcer, buttock    Wound of right leg    Wound of left leg    Constipation    Multiple wounds of skin    Cellulitis    Oropharyngeal dysphagia    GERD (gastroesophageal reflux disease)    Systolic dysfunction    Pruritus    Insomnia    Chronic hepatic failure without coma (HCC)    Paresis (HCC)    Hypoproteinemia (HCC)    Screening for HIV without presence of risk factors        Objective:     /84 (BP Location: Right arm, Patient Position: Sitting, Cuff Size: Large)   Pulse (!) 106   Temp 98 8 °F (37 1 °C)   Ht 5' 8" (1 727 m)   Wt 78 5 kg (173 lb)   SpO2 100%   BMI 26 30 kg/m²     Physical Exam  General Appearance:    Alert, cooperative, no distress   Head:    Normocephalic, without obvious abnormality, atraumatic   Eyes:    PERRL, conjunctiva/corneas clear, EOM's intact, fundi     benign, both eyes        Neck:   Supple, no adenopathy, no JVD   Back:     Symmetric, no curvature, ROM normal, no CVA tenderness   Lungs:     Clear to auscultation bilaterally, no wheezing or rhonchi   Heart:    Regular rate and rhythm, S1 and S2 normal, no murmur, rub   or gallop   Abdomen:     Soft, non-tender, bowel sounds active    Extremities:   Extremities normal, atraumatic, no cyanosis or edema   Psych:   Normal Affect   Neurologic:   CNII-XII intact   Normal strength, sensation and reflexes       Throughout         Pertinent Laboratory/Diagnostic Studies:         Current Medications:     Current Outpatient Medications   Medication Sig Dispense Refill    B Complex-C-Folic Acid (TRIPHROCAPS PO) Take 1 capsule by mouth daily      b complex-vitamin C-folic acid (NEPHROCAPS) 1 mg capsule Take 1 mg by mouth      calcium acetate (PHOSLO) 667 mg capsule Take 1,334 mg by mouth 3 (three) times a day with meals      cholecalciferol (VITAMIN D3) 1,000 units tablet Take 1,000 Units by mouth daily      lanthanum (FOSRENOL) 500 mg chewable tablet Chew      metoprolol succinate (TOPROL-XL) 25 mg 24 hr tablet Take 1 tablet (25 mg total) by mouth daily 30 tablet 6    midodrine (PROAMATINE) 5 mg tablet midodrine 5 mg tablet      Multiple Vitamin (MULTIVITAMIN) tablet Take 1 tablet by mouth daily      NON FORMULARY Hemodialysis Monday, Wednesday, Friday      polyethylene glycol (GLYCOLAX) powder MIX AND DRINK 17 GRAMS BY MOUTH ONCE A DAY FOR CONSTIPATION      sevelamer carbonate (RENVELA) 800 mg tablet sevelamer carbonate 800 mg tablet      Sucroferric Oxyhydroxide (VELPHORO) 500 MG CHEW Velphoro 500 mg chewable tablet      gabapentin (NEURONTIN) 100 mg capsule Take 1 capsule (100 mg total) by mouth 2 (two) times a day (Patient not taking: Reported on 2/26/2020) 60 capsule 0    metoprolol tartrate (LOPRESSOR) 25 mg tablet Take 0 5 tablets (12 5 mg total) by mouth every 12 (twelve) hours (Patient not taking: Reported on 2/14/2020) 30 tablet 0    nystatin (nystatin) powder Nyamyc 100,000 unit/gram topical powder      omeprazole (PriLOSEC OTC) 20 MG tablet Take 1 tablet (20 mg total) by mouth daily (Patient not taking: Reported on 2/14/2020) 30 tablet 0    polyethylene glycol (MIRALAX) 17 g packet Take 17 g by mouth 2 (two) times a day (Patient not taking: Reported on 2/14/2020) 14 each 0    senna-docusate sodium (SENOKOT S) 8 6-50 mg per tablet Take 2 tablets by mouth 2 (two) times a day for 10 days (Patient not taking: Reported on 1/9/2020) 40 tablet 0    sevelamer (RENAGEL) 800 mg tablet Take 1 tablet (800 mg total) by mouth 3 (three) times a day with meals for 10 days (Patient not taking: Reported on 1/9/2020) 30 tablet 0    simethicone (MYLICON) 80 mg chewable tablet Chew 1 tablet (80 mg total) every 6 (six) hours as needed for flatulence (Patient not taking: Reported on 2/14/2020) 30 tablet 0    torsemide (DEMADEX) 20 mg tablet Take 2 tablets (40 mg total) by mouth 2 (two) times a day (Patient not taking: Reported on 1/9/2020) 120 tablet 0    valACYclovir (VALTREX) 500 mg tablet Take 1 tablet (500 mg total) by mouth daily for 10 days 10 tablet 0    vitamin E, tocopherol, 200 units capsule Take by mouth       No current facility-administered medications for this visit          Aldo Ballard MD  [facility] INTERNAL MEDICINE [place]

## 2020-03-13 DIAGNOSIS — I50.9 HEART FAILURE, UNSPECIFIED HF CHRONICITY, UNSPECIFIED HEART FAILURE TYPE (HCC): Primary | ICD-10-CM

## 2020-03-19 ENCOUNTER — TELEPHONE (OUTPATIENT)
Dept: INTERNAL MEDICINE CLINIC | Facility: CLINIC | Age: 60
End: 2020-03-19

## 2020-03-19 NOTE — TELEPHONE ENCOUNTER
Pt called and is asking for refill on oxycodone, he states he needs a 10 day supply   Sent to [facility] in [place]

## 2020-03-20 ENCOUNTER — TREATMENT (OUTPATIENT)
Dept: OTHER | Facility: HOSPITAL | Age: 60
End: 2020-03-20

## 2020-03-20 DIAGNOSIS — G62.9 PERIPHERAL POLYNEUROPATHY: Primary | Chronic | ICD-10-CM

## 2020-03-20 RX ORDER — OXYCODONE HYDROCHLORIDE 5 MG/1
5 TABLET ORAL EVERY 12 HOURS PRN
Qty: 12 TABLET | Refills: 0 | Status: SHIPPED | OUTPATIENT
Start: 2020-03-20 | End: 2020-05-08

## 2020-03-20 NOTE — PROGRESS NOTES
I refilled the Oxycodone 5 mg IR every 12 hours as needed for pain  12 pills only  The PDMP was checked by the office staff; the last fill in [place] was on 2/14 and he received 10  Prior to that was Oct 3 and he received 15 from the hospital on discharge

## 2020-03-27 ENCOUNTER — TELEPHONE (OUTPATIENT)
Dept: CARDIAC REHAB | Facility: CLINIC | Age: 60
End: 2020-03-27

## 2020-05-06 ENCOUNTER — TELEPHONE (OUTPATIENT)
Dept: INTERNAL MEDICINE CLINIC | Facility: CLINIC | Age: 60
End: 2020-05-06

## 2020-05-08 ENCOUNTER — TELEMEDICINE (OUTPATIENT)
Dept: INTERNAL MEDICINE CLINIC | Facility: CLINIC | Age: 60
End: 2020-05-08
Payer: MEDICARE

## 2020-05-08 DIAGNOSIS — I42.0 DILATED CARDIOMYOPATHY (HCC): ICD-10-CM

## 2020-05-08 DIAGNOSIS — Z99.2 ESRD (END STAGE RENAL DISEASE) ON DIALYSIS (HCC): ICD-10-CM

## 2020-05-08 DIAGNOSIS — N18.6 ESRD (END STAGE RENAL DISEASE) ON DIALYSIS (HCC): ICD-10-CM

## 2020-05-08 DIAGNOSIS — G62.9 PERIPHERAL POLYNEUROPATHY: Primary | Chronic | ICD-10-CM

## 2020-05-08 PROBLEM — I50.20 HEART FAILURE WITH REDUCED EJECTION FRACTION DUE TO CARDIOMYOPATHY (HCC): Status: ACTIVE | Noted: 2020-05-08

## 2020-05-08 PROBLEM — I42.9 HEART FAILURE WITH REDUCED EJECTION FRACTION DUE TO CARDIOMYOPATHY (HCC): Status: ACTIVE | Noted: 2020-05-08

## 2020-05-08 PROCEDURE — 99214 OFFICE O/P EST MOD 30 MIN: CPT | Performed by: INTERNAL MEDICINE

## 2020-05-13 ENCOUNTER — TELEMEDICINE (OUTPATIENT)
Dept: PLASTIC SURGERY | Facility: CLINIC | Age: 60
End: 2020-05-13

## 2020-05-13 DIAGNOSIS — S81.801D WOUND OF RIGHT LOWER EXTREMITY, SUBSEQUENT ENCOUNTER: ICD-10-CM

## 2020-05-13 DIAGNOSIS — T14.8XXA MULTIPLE WOUNDS OF SKIN: Primary | ICD-10-CM

## 2020-05-13 DIAGNOSIS — S81.802D WOUND OF LEFT LOWER EXTREMITY, SUBSEQUENT ENCOUNTER: ICD-10-CM

## 2020-05-13 PROCEDURE — 99024 POSTOP FOLLOW-UP VISIT: CPT | Performed by: PHYSICIAN ASSISTANT

## 2020-07-06 NOTE — ASSESSMENT & PLAN NOTE
· Dysphagia 3 diet with extra gravy / sauce  · Thin liquids ok  · Biotene / Mouth Kote  · Speech pathology following  suture/staple removal

## 2020-07-10 ENCOUNTER — TELEPHONE (OUTPATIENT)
Dept: FAMILY MEDICINE CLINIC | Facility: CLINIC | Age: 60
End: 2020-07-10

## 2020-07-10 NOTE — TELEPHONE ENCOUNTER
Pt was requesting a my chart communication regarding vascular surgery consult and who to go to for dentist  I can send for Dentist do you want him to see vascular

## 2020-09-08 ENCOUNTER — HOSPITAL ENCOUNTER (EMERGENCY)
Facility: HOSPITAL | Age: 60
Discharge: HOME | End: 2020-09-08
Attending: INTERNAL MEDICINE
Payer: MEDICARE

## 2020-09-08 VITALS
TEMPERATURE: 98.7 F | BODY MASS INDEX: 29.47 KG/M2 | WEIGHT: 193.8 LBS | DIASTOLIC BLOOD PRESSURE: 96 MMHG | OXYGEN SATURATION: 99 % | RESPIRATION RATE: 18 BRPM | HEART RATE: 100 BPM | SYSTOLIC BLOOD PRESSURE: 197 MMHG

## 2020-09-08 DIAGNOSIS — Z78.9 PROBLEM WITH VASCULAR ACCESS: Primary | ICD-10-CM

## 2020-09-08 PROCEDURE — 99282 EMERGENCY DEPT VISIT SF MDM: CPT | Performed by: PHYSICIAN ASSISTANT

## 2020-09-08 PROCEDURE — 99283 EMERGENCY DEPT VISIT LOW MDM: CPT

## 2020-09-08 NOTE — ED PROVIDER NOTES
History  Chief Complaint   Patient presents with    Vascular Access Problem     PT presents to ED with a "broken clip on dialysis catheter, that needs to be fixed or I cant get dialysis, and that is scheduled for tomorrow am "     Patient presents emergency room to have the clamp repaired on his dialysis catheter  He has no complaints  He needs it for dialysis tomorrow  They were able to finish his dialysis today with no issues  IR is aware of the patient  They will be down to see the patient to fix the catheter clip      History provided by:  Patient      Prior to Admission Medications   Prescriptions Last Dose Informant Patient Reported? Taking?    B Complex-C-Folic Acid (TRIPHROCAPS PO)  Spouse/Significant Other Yes No   Sig: Take 1 capsule by mouth daily   Multiple Vitamin (MULTIVITAMIN) tablet  Spouse/Significant Other Yes No   Sig: Take 1 tablet by mouth daily   NON FORMULARY  Spouse/Significant Other Yes No   Sig: Hemodialysis Monday, Wednesday, Friday   Sucroferric Oxyhydroxide (VELPHORO) 500 MG CHEW  Spouse/Significant Other Yes No   Sig: Velphoro 500 mg chewable tablet   b complex-vitamin C-folic acid (NEPHROCAPS) 1 mg capsule  Spouse/Significant Other Yes No   Sig: Take 1 mg by mouth   calcium acetate (PHOSLO) 667 mg capsule  Spouse/Significant Other Yes No   Sig: Take 1,334 mg by mouth 3 (three) times a day with meals   cholecalciferol (VITAMIN D3) 1,000 units tablet  Spouse/Significant Other Yes No   Sig: Take 1,000 Units by mouth daily   lanthanum (FOSRENOL) 500 mg chewable tablet  Spouse/Significant Other Yes No   Sig: Chew   metoprolol succinate (TOPROL-XL) 25 mg 24 hr tablet  Spouse/Significant Other No No   Sig: Take 1 tablet (25 mg total) by mouth daily   metoprolol tartrate (LOPRESSOR) 25 mg tablet  Spouse/Significant Other No No   Sig: Take 0 5 tablets (12 5 mg total) by mouth every 12 (twelve) hours   Patient not taking: Reported on 2/14/2020   midodrine (PROAMATINE) 5 mg tablet Spouse/Significant Other Yes No   Sig: midodrine 5 mg tablet   nystatin (nystatin) powder  Spouse/Significant Other Yes No   Sig: Nyamyc 100,000 unit/gram topical powder   omeprazole (PriLOSEC OTC) 20 MG tablet  Spouse/Significant Other No No   Sig: Take 1 tablet (20 mg total) by mouth daily   Patient not taking: Reported on 2/14/2020   polyethylene glycol (GLYCOLAX) powder  Spouse/Significant Other Yes No   Sig: MIX AND DRINK 17 GRAMS BY MOUTH ONCE A DAY FOR CONSTIPATION   polyethylene glycol (MIRALAX) 17 g packet  Spouse/Significant Other No No   Sig: Take 17 g by mouth 2 (two) times a day   Patient not taking: Reported on 2/14/2020   senna-docusate sodium (SENOKOT S) 8 6-50 mg per tablet   No No   Sig: Take 2 tablets by mouth 2 (two) times a day for 10 days   Patient not taking: Reported on 1/9/2020   sevelamer (RENAGEL) 800 mg tablet   No No   Sig: Take 1 tablet (800 mg total) by mouth 3 (three) times a day with meals for 10 days   Patient not taking: Reported on 1/9/2020   sevelamer carbonate (RENVELA) 800 mg tablet  Spouse/Significant Other Yes No   Sig: sevelamer carbonate 800 mg tablet   simethicone (MYLICON) 80 mg chewable tablet  Spouse/Significant Other No No   Sig: Chew 1 tablet (80 mg total) every 6 (six) hours as needed for flatulence   Patient not taking: Reported on 2/14/2020   torsemide (DEMADEX) 20 mg tablet   No No   Sig: Take 2 tablets (40 mg total) by mouth 2 (two) times a day   Patient not taking: Reported on 1/9/2020   valACYclovir (VALTREX) 500 mg tablet   No No   Sig: Take 1 tablet (500 mg total) by mouth daily for 10 days   vitamin E, tocopherol, 200 units capsule  Spouse/Significant Other Yes No   Sig: Take by mouth      Facility-Administered Medications: None       Past Medical History:   Diagnosis Date    Complication of renal dialysis     Polycystic kidney disease        Past Surgical History:   Procedure Laterality Date    IR CATHETERGRAM  10/17/2019    IR IMAGE GUIDED ASPIRATION / DRAINAGE  9/23/2019    IR PARACENTESIS  10/9/2019    IR TUNNELED DIALYSIS CATHETER CHECK/CHANGE/REPOSITION/ANGIOPLASTY  10/17/2019    IR TUNNELED DIALYSIS CATHETER PLACEMENT  9/30/2019    SPLIT THICKNESS SKIN GRAFT Bilateral 11/7/2019    Procedure: SKIN GRAFT SPLIT THICKNESS (STSG)  EXTREMITY;  Surgeon: Dima Moore MD;  [facility];  Service: Plastics    SPLIT THICKNESS SKIN GRAFT Bilateral 11/12/2019    Procedure: SKIN GRAFT SPLIT THICKNESS (STSG)  EXTREMITY;  Surgeon: Dima Moore MD;  [facility];  Service: Plastics    VAC DRESSING APPLICATION Bilateral 38/[id number]/4929    Procedure: CHANGE DRESSING;  Surgeon: Dima Moore MD;  [facility];  Service: Plastics    WOUND DEBRIDEMENT Bilateral 10/31/2019    Procedure: DEBRIDEMENT WOUND [facility]); Surgeon: Jen Box DPM;  [facility];  Service: Podiatry    WOUND DEBRIDEMENT Bilateral 11/5/2019    Procedure: DEBRIDEMENT WOUND [facility]); Surgeon: Jen Box DPM;  [facility];  Service: Podiatry    WOUND DEBRIDEMENT Bilateral 11/7/2019    Procedure: DEBRIDEMENT WOUND [facility]); Surgeon: Dima Moore MD;  [facility];  Service: Plastics    WOUND DEBRIDEMENT Bilateral 11/12/2019    Procedure: DEBRIDEMENT LOWER EXTREMITY [facility]); Surgeon: Dima Moore MD;  [facility];  Service: Plastics       History reviewed  No pertinent family history  I have reviewed and agree with the history as documented  E-Cigarette/Vaping    E-Cigarette Use Never User      E-Cigarette/Vaping Substances     Social History     Tobacco Use    Smoking status: Never Smoker    Smokeless tobacco: Never Used   Substance Use Topics    Alcohol use: Not Currently    Drug use: Not Currently       Review of Systems   Constitutional: Negative for chills and fever  Skin: Negative for color change     All other systems reviewed and are negative  Physical Exam  Physical Exam  Vitals signs and nursing note reviewed  Constitutional:       General: He is not in acute distress  Appearance: Normal appearance  He is not ill-appearing  HENT:      Head: Normocephalic and atraumatic  Eyes:      General:         Right eye: No discharge  Left eye: No discharge  Conjunctiva/sclera: Conjunctivae normal    Cardiovascular:      Rate and Rhythm: Normal rate and regular rhythm  Heart sounds: No murmur  Skin:     General: Skin is warm  Capillary Refill: Capillary refill takes less than 2 seconds  Comments: Site is dry and intact   Neurological:      General: No focal deficit present  Mental Status: He is alert and oriented to person, place, and time  Psychiatric:         Mood and Affect: Mood normal          Behavior: Behavior normal          Thought Content:  Thought content normal          Judgment: Judgment normal          Vital Signs  ED Triage Vitals [09/08/20 1309]   Temperature Pulse Respirations Blood Pressure SpO2   98 7 °F (37 1 °C) 100 18 (!) 197/96 99 %      Temp Source Heart Rate Source Patient Position - Orthostatic VS BP Location FiO2 (%)   Oral Monitor Sitting Left arm --      Pain Score       --           Vitals:    09/08/20 1309   BP: (!) 197/96   Pulse: 100   Patient Position - Orthostatic VS: Sitting         Visual Acuity      ED Medications  Medications - No data to display    Diagnostic Studies  Results Reviewed     None                 No orders to display              Procedures  Procedures         ED Course                                       MDM  Number of Diagnoses or Management Options  Problem with vascular access: new and does not require workup  Risk of Complications, Morbidity, and/or Mortality  Presenting problems: low  Diagnostic procedures: low  Management options: low  General comments: Patient presents to the emergency room because a clamp on his vascular access for dialysis yady   He was seen and evaluated  A  call was placed to IR  They repaired his dialysis access  He was discharged home  He will continue with his dialysis tomorrow  Patient Progress  Patient progress: stable      Disposition  Final diagnoses:   Problem with vascular access     Time reflects when diagnosis was documented in both MDM as applicable and the Disposition within this note     Time User Action Codes Description Comment    9/8/2020  2:06 PM Adrian Franco Add [Z78 9] Problem with vascular access       ED Disposition     ED Disposition Condition Date/Time Comment    Discharge Stable Tue Sep 8, 2020  2:06 PM Ree Monahan discharge to home/self care  Follow-up Information    None         Discharge Medication List as of 9/8/2020  2:06 PM      CONTINUE these medications which have NOT CHANGED    Details   ! ! B Complex-C-Folic Acid (TRIPHROCAPS PO) Take 1 capsule by mouth daily, Historical Med      !! b complex-vitamin C-folic acid (NEPHROCAPS) 1 mg capsule Take 1 mg by mouth, Starting Tue 4/15/2014, Historical Med      calcium acetate (PHOSLO) 667 mg capsule Take 1,334 mg by mouth 3 (three) times a day with meals, Historical Med      cholecalciferol (VITAMIN D3) 1,000 units tablet Take 1,000 Units by mouth daily, Historical Med      lanthanum (FOSRENOL) 500 mg chewable tablet Chew, Historical Med      metoprolol succinate (TOPROL-XL) 25 mg 24 hr tablet Take 1 tablet (25 mg total) by mouth daily, Starting Wed 2/26/2020, Normal      metoprolol tartrate (LOPRESSOR) 25 mg tablet Take 0 5 tablets (12 5 mg total) by mouth every 12 (twelve) hours, Starting Thu 1/9/2020, Until Sat 2/8/2020, Normal      midodrine (PROAMATINE) 5 mg tablet midodrine 5 mg tablet, Historical Med      Multiple Vitamin (MULTIVITAMIN) tablet Take 1 tablet by mouth daily, Historical Med      NON FORMULARY Hemodialysis Monday, Wednesday, Friday, Historical Med      nystatin (nystatin) powder Nyamyc 100,000 unit/gram topical powder, Historical Med      omeprazole (PriLOSEC OTC) 20 MG tablet Take 1 tablet (20 mg total) by mouth daily, Starting Thu 10/3/2019, Print      polyethylene glycol (GLYCOLAX) powder MIX AND DRINK 17 GRAMS BY MOUTH ONCE A DAY FOR CONSTIPATION, Historical Med      polyethylene glycol (MIRALAX) 17 g packet Take 17 g by mouth 2 (two) times a day, Starting Thu 11/21/2019, Normal      senna-docusate sodium (SENOKOT S) 8 6-50 mg per tablet Take 2 tablets by mouth 2 (two) times a day for 10 days, Starting Thu 11/21/2019, Until Sun 12/1/2019, Print      sevelamer (RENAGEL) 800 mg tablet Take 1 tablet (800 mg total) by mouth 3 (three) times a day with meals for 10 days, Starting Tue 10/15/2019, Until Fri 10/25/2019, Normal      sevelamer carbonate (RENVELA) 800 mg tablet sevelamer carbonate 800 mg tablet, Historical Med      simethicone (MYLICON) 80 mg chewable tablet Chew 1 tablet (80 mg total) every 6 (six) hours as needed for flatulence, Starting Tue 10/15/2019, Normal      Sucroferric Oxyhydroxide (VELPHORO) 500 MG CHEW Velphoro 500 mg chewable tablet, Historical Med      torsemide (DEMADEX) 20 mg tablet Take 2 tablets (40 mg total) by mouth 2 (two) times a day, Starting Thu 11/21/2019, Until Sat 12/21/2019, Print      valACYclovir (VALTREX) 500 mg tablet Take 1 tablet (500 mg total) by mouth daily for 10 days, Starting Wed 3/11/2020, Until Sat 3/21/2020, Normal      vitamin E, tocopherol, 200 units capsule Take by mouth, Historical Med       !! - Potential duplicate medications found  Please discuss with provider  No discharge procedures on file      PDMP Review     None          ED Provider  Electronically Signed by           Jerrod Pearce PA-C  09/08/20 3403

## 2021-02-01 NOTE — SOCIAL WORK
CM received a voicemail from patient's wife requesting a call back  She did vocalize in the message that they had decided to go with [facility] for [address]  Spouse wanted to see if it was possible for patient to go to DEPARTMENT [facility]  CM contacted [facility] to update them on the change of facility from [facility] to [place] and then get on the wait list for when something opens up at [facility]  CM sent all clinical documentation still needed Hep Panel and flow sheets  Once admissions has all info to review facility has 24 hours to make a decision  CM was able to confirm with [facility] that they do have a bed for patient once he is medically stable  IMM was reviewed with patient's wife  CM will follow up with [facility] admissions and DEPARTMENT [facility] in the morning to confirm chair time  OO updated on tentative DC to them tomorrow  5

## 2021-03-11 NOTE — ASSESSMENT & PLAN NOTE
· Continue antibiotics per infectious disease recommendations  · Continue wound care per recommendations above  · Gabapentin 100 mg daily initiated for lower extremity pain  Detail Level: Zone

## 2021-08-03 ENCOUNTER — TELEPHONE (OUTPATIENT)
Dept: INTERNAL MEDICINE CLINIC | Facility: CLINIC | Age: 61
End: 2021-08-03

## 2021-08-03 NOTE — TELEPHONE ENCOUNTER
Can someone review patients ECHO result  His wife called and is requesting the results  She said he is not feeling well, has to keep stopping every couple of steps to catch his breath

## 2021-08-03 NOTE — TELEPHONE ENCOUNTER
Called patients wife back and advised her we only have an echo from 2/20  She said he just had one done this year, which we do not have   She said she will try to track down the dr with the results and call us when wanting to schedule an appt

## 2021-08-03 NOTE — TELEPHONE ENCOUNTER
Patient's echo was done in 2/20, over a year ago, and he was seen by Cardiology thereafter and the results were reviewed with patient  It was discussed at that time that patients EF is still very low at 20%  Dr Parham Mention with Cardiology recommended [facility] for further eval for coronary artery disease  Patient declined at that time  I am not sure if he is on the appropriate goal directed medical therapies for systolic heart failure  Can he come into the office tomorrow to be seen  He needs a visit  Thank you

## 2021-09-13 ENCOUNTER — TRANSCRIBE ORDERS (OUTPATIENT)
Dept: VASCULAR SURGERY | Facility: CLINIC | Age: 61
End: 2021-09-13

## 2021-09-13 DIAGNOSIS — N18.6 CHRONIC KIDNEY DISEASE WITH END STAGE RENAL FAILURE ON DIALYSIS (HCC): Primary | ICD-10-CM

## 2021-09-13 DIAGNOSIS — Z99.2 CHRONIC KIDNEY DISEASE WITH END STAGE RENAL FAILURE ON DIALYSIS (HCC): Primary | ICD-10-CM

## 2021-09-16 ENCOUNTER — HOSPITAL ENCOUNTER (OUTPATIENT)
Dept: NON INVASIVE DIAGNOSTICS | Facility: CLINIC | Age: 61
Discharge: HOME | End: 2021-09-16
Payer: MEDICARE

## 2021-09-16 DIAGNOSIS — Z99.2 CHRONIC KIDNEY DISEASE WITH END STAGE RENAL FAILURE ON DIALYSIS (HCC): ICD-10-CM

## 2021-09-16 DIAGNOSIS — N18.6 CHRONIC KIDNEY DISEASE WITH END STAGE RENAL FAILURE ON DIALYSIS (HCC): ICD-10-CM

## 2021-09-16 PROCEDURE — 93985 DUP-SCAN HEMO COMPL BI STD: CPT

## 2021-09-17 PROCEDURE — 93985 DUP-SCAN HEMO COMPL BI STD: CPT | Performed by: SURGERY

## 2021-09-27 ENCOUNTER — TELEPHONE (OUTPATIENT)
Dept: ADMINISTRATIVE | Facility: HOSPITAL | Age: 61
End: 2021-09-27

## 2021-09-27 ENCOUNTER — CONSULT (OUTPATIENT)
Dept: VASCULAR SURGERY | Facility: CLINIC | Age: 61
End: 2021-09-27
Payer: MEDICARE

## 2021-09-27 VITALS
HEART RATE: 110 BPM | SYSTOLIC BLOOD PRESSURE: 156 MMHG | DIASTOLIC BLOOD PRESSURE: 102 MMHG | WEIGHT: 214 LBS | HEIGHT: 68 IN | BODY MASS INDEX: 32.43 KG/M2

## 2021-09-27 DIAGNOSIS — N18.6 CHRONIC KIDNEY DISEASE WITH END STAGE RENAL FAILURE ON DIALYSIS (HCC): Primary | ICD-10-CM

## 2021-09-27 DIAGNOSIS — Z79.4 TYPE 2 DIABETES MELLITUS WITH STAGE 5 CHRONIC KIDNEY DISEASE NOT ON CHRONIC DIALYSIS, WITH LONG-TERM CURRENT USE OF INSULIN (HCC): ICD-10-CM

## 2021-09-27 DIAGNOSIS — E11.22 TYPE 2 DIABETES MELLITUS WITH STAGE 5 CHRONIC KIDNEY DISEASE NOT ON CHRONIC DIALYSIS, WITH LONG-TERM CURRENT USE OF INSULIN (HCC): ICD-10-CM

## 2021-09-27 DIAGNOSIS — Z99.2 ESRD (END STAGE RENAL DISEASE) ON DIALYSIS (HCC): ICD-10-CM

## 2021-09-27 DIAGNOSIS — Z99.2 CHRONIC KIDNEY DISEASE WITH END STAGE RENAL FAILURE ON DIALYSIS (HCC): Primary | ICD-10-CM

## 2021-09-27 DIAGNOSIS — N18.6 ESRD (END STAGE RENAL DISEASE) ON DIALYSIS (HCC): ICD-10-CM

## 2021-09-27 DIAGNOSIS — N18.5 TYPE 2 DIABETES MELLITUS WITH STAGE 5 CHRONIC KIDNEY DISEASE NOT ON CHRONIC DIALYSIS, WITH LONG-TERM CURRENT USE OF INSULIN (HCC): ICD-10-CM

## 2021-09-27 PROCEDURE — 99214 OFFICE O/P EST MOD 30 MIN: CPT | Performed by: SURGERY

## 2021-09-27 RX ORDER — CEFAZOLIN SODIUM 2 G/50ML
2000 SOLUTION INTRAVENOUS ONCE
OUTPATIENT
Start: 2021-09-27 | End: 2021-09-27

## 2021-09-27 NOTE — PROGRESS NOTES
Assessment/Plan:    ESRD (end stage renal disease) on dialysis ([facility] [address] )  ESRD on HD via permacath x 6 years  Has been hesitant to proceed with any access, partly due to decreased cardiac function after leg sepsis back in 2019  Previously tried PD for several years  On transplant list but on hold due to increased cardiac risk  Recent echo 4/2021 at [address] revealed improved cardiac function with EF 55-60%  -Discussed options for dialysis access, including arteriovenous fistula vs  Graft creation  Typically recommend proceeding in the nondominant arm first for dialysis access creation  His vein mapping suggests he would be a candidate for a brachiocephalic AVF with possible graft placement if superficial veins appear inadequate intraoperatively  -Discussed all risks and benefits of the procedure including bleeding, infection, injury to surrounding structures, wound healing issues, steal syndrome, arm swelling, need for revision/further intervention to assist with maturity  Diagnoses and all orders for this visit:    Chronic kidney disease with end stage renal failure on dialysis [facility])  -     Ambulatory referral to Vascular Surgery  -     Case request operating room: CREATION FISTULA ARTERIOVENOUS (AV) VS  GRAFT LEFT UPPER EXTREMITY; Standing  -     Type and screen; Future  -     CBC and Platelet; Future  -     Comprehensive metabolic panel; Future  -     EKG 12 lead; Future  -     Case request operating room: CREATION FISTULA ARTERIOVENOUS (AV) VS  GRAFT LEFT UPPER EXTREMITY    Type 2 diabetes mellitus with stage 5 chronic kidney disease not on chronic dialysis, with long-term current use of insulin (HCC)    ESRD (end stage renal disease) on dialysis ([facility] [address] )    Other orders  -     Diet NPO; Sips with meds; Standing  -     Void on call to OR; Standing  -     Insert peripheral IV; Standing  -     Nursing Communication CHG bath, have staff wash entire body (neck down) per pre op bathing protocol   Routine, evening prior to, and day of surgery ; Standing  -     Nursing Communication Swab both nares with Povidone-Iodine solution, EXCLUDE if patient has shellfish/Iodine allergy  Routine, day of surgery, on call to OR ; Standing  -     Place sequential compression device; Standing  -     ceFAZolin (ANCEF) IVPB (premix in dextrose) 2,000 mg 50 mL        Operative Scheduling Information:    Hospital:  [facility]    Physician:  Michelle Holm    Surgery: Left upper extremity arteriovenous fistula vs  graft    Urgency:  Urgent: 2 weeks    Level:  Level 2: Outpatients to be scheduled for surgery with time dependent medical necessity within 2 weeks    Case Length:  Normal    Post-op Bed:  Outpatient    OR Table:  Standard    Equipment Needs:  None    Medication Instructions:  None    Hydration:  No    I have spent 45 minutes with Patient  today in which greater than 50% of this time was spent in counseling/coordination of care regarding Intructions for management, Importance of tx compliance and Impressions  Subjective:      Patient ID: Moustapha Castellanos is a 63 y o  male  Patient presents today for review of vein mapping done 9/16/21  Patient is on HD at [facility] on M/W/F  Patient is right hand dominant  Patient is a nonsmoker  HPI  Mr Shakila Lopez is a 67yo male with HTN, Afib, CHF, dilated cardiomyopathy, polycystic kidney disease, GERD, ESRD on HD via permacath, previous PD, who presents for initial evaluation for dialysis access  He had previously dialyzed through PD but his dialysis sessions became longer and longer that he could not longer continue PD  He has been dialyzing via permacath for 6 years due to hesitancy to proceed with AVF/AVG creation  He felt there were too many risks and was hoping he could get a kidney transplant  He has been on the transplant list for 8 years  He had severe lower extremity infections and sepsis back in 2019 with severe decline in his cardiac function which prevented him from transplant  He has since recovered and his cardiac function has improved  He denies any chest pain or shortness of breath  He is able to perform his normal activities and climb a flight of stairs without issues  He is right handed  He has had increasing issues with permacath  He has had a total of 3 exchanges and denies any history of permacath infection  He is now ready to pursue AVF/AVG creation and is hoping he will be able to get a kidney transplant in the near future  I have reviewed and made appropriate changes to the review of systems input by the medical assistant      Vitals:    09/27/21 1029   BP: (!) 156/102   BP Location: Right arm   Patient Position: Sitting   Cuff Size: Standard   Pulse: (!) 110   Weight: 97 1 kg (214 lb)   Height: 5' 8" (1 727 m)       Patient Active Problem List   Diagnosis    Chronic kidney disease with end stage renal failure on dialysis ([facility] [address] )    Hyponatremia    Multiple and open wound of lower limb    Polycystic liver disease    Total bilirubin, elevated    Thrombocytopenia (HCC)    Gram-negative bacteremia    Polycystic kidney disease    ADPKD (autosomal dominant polycystic kidney)    Benign essential hypertension    Peripheral neuropathy    Paroxysmal atrial fibrillation (HCC)    Hypotension    Anemia due to chronic kidney disease, on chronic dialysis and Acute Blood Loss Anemia (HCC)    Ileus (HCC)    Ascites    Elevated d-dimer    ESRD (end stage renal disease) on dialysis (HCC)    Hyperkalemia    Scrotal swelling    ESRD (end stage renal disease) ([facility] [address] )    At Risk for Pressure ulcer, buttock    Wound of right leg    Wound of left leg    Constipation    Multiple wounds of skin    Cellulitis    Oropharyngeal dysphagia    GERD (gastroesophageal reflux disease)    Systolic dysfunction    Pruritus    Insomnia    Chronic hepatic failure without coma (HCC)    Paresis (HCC)    Hypoproteinemia ([facility] [address] )    Screening for HIV without presence of risk factors    Heart failure with reduced ejection fraction due to cardiomyopathy ([facility] [address] )    Dilated cardiomyopathy (HCC)    Type 2 diabetes mellitus with stage 5 chronic kidney disease not on chronic dialysis, with long-term current use of insulin [facility])       Past Surgical History:   Procedure Laterality Date    IR CATHETERGRAM  10/17/2019    IR IMAGE GUIDED ASPIRATION / DRAINAGE  9/23/2019    IR PARACENTESIS  10/9/2019    IR TUNNELED DIALYSIS CATHETER CHECK/CHANGE/REPOSITION/ANGIOPLASTY  10/17/2019    IR TUNNELED DIALYSIS CATHETER PLACEMENT  9/30/2019    SPLIT THICKNESS SKIN GRAFT Bilateral 11/7/2019    Procedure: SKIN GRAFT SPLIT THICKNESS (STSG)  EXTREMITY;  Surgeon: Yohan Cline MD;  [facility];  Service: Plastics    SPLIT THICKNESS SKIN GRAFT Bilateral 11/12/2019    Procedure: SKIN GRAFT SPLIT THICKNESS (STSG)  EXTREMITY;  Surgeon: Yohan Cline MD;  [facility];  Service: Plastics    VAC DRESSING APPLICATION Bilateral 42/50/7674    Procedure: CHANGE DRESSING;  Surgeon: Yohan Cline MD;  [facility];  Service: Plastics    WOUND DEBRIDEMENT Bilateral 10/31/2019    Procedure: DEBRIDEMENT WOUND [facility]); Surgeon: Francisco Garcia DPM;  [facility];  Service: Podiatry    WOUND DEBRIDEMENT Bilateral 11/5/2019    Procedure: DEBRIDEMENT WOUND [facility]); Surgeon: Francisco Garcia DPM;  [facility];  Service: Podiatry    WOUND DEBRIDEMENT Bilateral 11/7/2019    Procedure: DEBRIDEMENT WOUND [facility]); Surgeon: Yohan Cline MD;  [facility];  Service: Plastics    WOUND DEBRIDEMENT Bilateral 11/12/2019    Procedure: DEBRIDEMENT LOWER EXTREMITY [facility]); Surgeon: Yohan Cline MD;  [facility];  Service: Plastics       History reviewed  No pertinent family history      Social History     Socioeconomic History    Marital status: /Civil Union     Spouse name: Not on file    Number of children: 3    Years of education: Not on file    Highest education level: Associate degree: occupational, technical, or vocational program   Occupational History    Not on file   Tobacco Use    Smoking status: Never Smoker    Smokeless tobacco: Never Used   Vaping Use    Vaping Use: Never used   Substance and Sexual Activity    Alcohol use: Not Currently    Drug use: Not Currently    Sexual activity: Not on file   Other Topics Concern    Not on file   Social History Narrative    Not on file     Social Determinants of Health     Financial Resource Strain:     Difficulty of Paying Living Expenses:    Food Insecurity:     Worried About Running Out of Food in the Last Year:     [address] in the Last Year:    Transportation Needs:     Lack of Transportation (Medical):  Lack of Transportation (Non-Medical):    Physical Activity:     Days of Exercise per Week:     Minutes of Exercise per Session:    Stress:     Feeling of Stress :    Social Connections:     Frequency of Communication with Friends and Family:     Frequency of Social Gatherings with Friends and Family:     Attends Mosque Services:     Active Member of Clubs or Organizations:     Attends Club or Organization Meetings:     Marital Status:    Intimate Partner Violence:     Fear of Current or Ex-Partner:     Emotionally Abused:     Physically Abused:     Sexually Abused:         Allergies   Allergen Reactions    Sucroferric Oxyhydroxide Other (See Comments)    Chlorhexidine Other (See Comments)    Other Other (See Comments)         Current Outpatient Medications:     calcium acetate (PHOSLO) 667 mg capsule, Take 1,334 mg by mouth 3 (three) times a day with meals, Disp: , Rfl:     cholecalciferol (VITAMIN D3) 1,000 units tablet, Take 1,000 Units by mouth daily, Disp: , Rfl:     lanthanum (FOSRENOL) 500 mg chewable tablet, Chew, Disp: , Rfl:     Multiple Vitamin (MULTIVITAMIN) tablet, Take 1 tablet by mouth daily, Disp: , Rfl:     NON FORMULARY, Hemodialysis Monday, Wednesday, Friday, Disp: , Rfl:     B Complex-C-Folic Acid (TRIPHROCAPS PO), Take 1 capsule by mouth daily (Patient not taking: Reported on 9/27/2021), Disp: , Rfl:     b complex-vitamin C-folic acid (NEPHROCAPS) 1 mg capsule, Take 1 mg by mouth (Patient not taking: Reported on 9/27/2021), Disp: , Rfl:     metoprolol succinate (TOPROL-XL) 25 mg 24 hr tablet, Take 1 tablet (25 mg total) by mouth daily (Patient not taking: Reported on 9/27/2021), Disp: 30 tablet, Rfl: 6    metoprolol tartrate (LOPRESSOR) 25 mg tablet, Take 0 5 tablets (12 5 mg total) by mouth every 12 (twelve) hours (Patient not taking: Reported on 2/14/2020), Disp: 30 tablet, Rfl: 0    midodrine (PROAMATINE) 5 mg tablet, midodrine 5 mg tablet (Patient not taking: Reported on 9/27/2021), Disp: , Rfl:     nystatin (nystatin) powder, Nyamyc 100,000 unit/gram topical powder (Patient not taking: Reported on 9/27/2021), Disp: , Rfl:     omeprazole (PriLOSEC OTC) 20 MG tablet, Take 1 tablet (20 mg total) by mouth daily (Patient not taking: Reported on 2/14/2020), Disp: 30 tablet, Rfl: 0    polyethylene glycol (GLYCOLAX) powder, MIX AND DRINK 17 GRAMS BY MOUTH ONCE A DAY FOR CONSTIPATION (Patient not taking: Reported on 9/27/2021), Disp: , Rfl:     polyethylene glycol (MIRALAX) 17 g packet, Take 17 g by mouth 2 (two) times a day (Patient not taking: Reported on 2/14/2020), Disp: 14 each, Rfl: 0    senna-docusate sodium (SENOKOT S) 8 6-50 mg per tablet, Take 2 tablets by mouth 2 (two) times a day for 10 days (Patient not taking: Reported on 1/9/2020), Disp: 40 tablet, Rfl: 0    sevelamer (RENAGEL) 800 mg tablet, Take 1 tablet (800 mg total) by mouth 3 (three) times a day with meals for 10 days (Patient not taking: Reported on 1/9/2020), Disp: 30 tablet, Rfl: 0    sevelamer carbonate (RENVELA) 800 mg tablet, sevelamer carbonate 800 mg tablet (Patient not taking: Reported on 9/27/2021), Disp: , Rfl:     simethicone (MYLICON) 80 mg chewable tablet, Chew 1 tablet (80 mg total) every 6 (six) hours as needed for flatulence (Patient not taking: Reported on 2/14/2020), Disp: 30 tablet, Rfl: 0    Sucroferric Oxyhydroxide (VELPHORO) 500 MG CHEW, Velphoro 500 mg chewable tablet (Patient not taking: Reported on 9/27/2021), Disp: , Rfl:     torsemide (DEMADEX) 20 mg tablet, Take 2 tablets (40 mg total) by mouth 2 (two) times a day (Patient not taking: Reported on 1/9/2020), Disp: 120 tablet, Rfl: 0    valACYclovir (VALTREX) 500 mg tablet, Take 1 tablet (500 mg total) by mouth daily for 10 days (Patient not taking: Reported on 9/27/2021), Disp: 10 tablet, Rfl: 0    vitamin E, tocopherol, 200 units capsule, Take by mouth (Patient not taking: Reported on 9/27/2021), Disp: , Rfl:       The following portions of the patient's history were reviewed and updated as appropriate: allergies, current medications, past family history, past medical history, past social history, past surgical history and problem list     Review of Systems   Constitutional: Negative  HENT: Negative  Eyes: Negative  Respiratory: Negative  Cardiovascular: Negative  Gastrointestinal: Negative  Endocrine: Negative  Genitourinary: Negative  Musculoskeletal: Negative  Skin: Negative  Allergic/Immunologic: Negative  Neurological: Negative  Hematological: Negative  Psychiatric/Behavioral: Negative  I have personally reviewed the ROS entered by MA and agree as documented  Objective:      BP (!) 156/102 (BP Location: Right arm, Patient Position: Sitting, Cuff Size: Standard)   Pulse (!) 110   Ht 5' 8" (1 727 m)   Wt 97 1 kg (214 lb)   BMI 32 54 kg/m²          Physical Exam  Constitutional:       Appearance: Normal appearance  HENT:      Head: Normocephalic and atraumatic  Cardiovascular:      Rate and Rhythm: Normal rate        Pulses:           Radial pulses are 2+ on the right side and 2+ on the left side  Pulmonary:      Effort: Pulmonary effort is normal    Abdominal:      Palpations: Abdomen is soft  Musculoskeletal:         General: Normal range of motion  Cervical back: Normal range of motion and neck supple  Skin:     General: Skin is warm and dry  Capillary Refill: Capillary refill takes less than 2 seconds  Comments: Chronic bilateral lower extremity scarring   Neurological:      General: No focal deficit present  Mental Status: He is alert and oriented to person, place, and time  Psychiatric:         Mood and Affect: Mood normal          Behavior: Behavior normal          Thought Content:  Thought content normal          Judgment: Judgment normal

## 2021-09-27 NOTE — TELEPHONE ENCOUNTER
REMINDER: Under Reason For Call, comments MUST be formatted as:   (Surgeon's Initials) / (Procedure)    Physician / Hospital: Severa Mari // Callie Cardoso (NPI: [id number]) / [facility] (Tax: [id number] / NPI: [id number])   [place] [facility]    Procedure: LEFT UPPER EXTREMITY ARTERIOVENOUS FISTULA VS  GRAFT    Level: 2 - Route clearance(s) to [facility] Clearance Pool     Equipment / Rep Needs: No    Assistant Surgeon: No    Allergies: Sucroferric oxyhydroxide, Chlorhexidine, and Other    Instructions Given: NO Bowel Prep General Instructions     Blood Thinners / Medication Hold: Patient is not taking any blood thinners  Hydration Required: Patient does not require hydration  Dialysis:  Patient is on dialysis  [facility], Monday - Wednesday - Friday  Consent: I certify that patient has signed, printed, timed, and dated their surgery consent  I certify that BOTH sides of the completed surgery consent have been scanned into the patient's Epic chart by myself on 9/27/2021  Yes, I have LABELED the consent in Epic as Consent for Vascular Procedure  Clearances     Levels   1-3 ROUTE this encounter to [facility] Clearance Pool   AND   SEND Clearance Form(s) to Vascular Nursing e-mail group   Level   4 ROUTE this encounter to [facility] Surgery Coordinator Pool  AND   SEND Clearance Form(s) to Vascular Surgery Schedulers e-mail group     Patient does not require any pre operative clearance  Yes, I have ROUTED this encounter to [facility] Surgery Coordinator and/or [facility] Clearance Pool

## 2021-09-27 NOTE — PATIENT INSTRUCTIONS
ESRD (end stage renal disease) on dialysis ([facility] [address] )  ESRD on HD via permacath x 6 years  Has been hesitant to proceed with any access, partly due to decreased cardiac function after leg sepsis back in 2019  Previously tried PD for several years  On transplant list but on hold due to increased cardiac risk  Recent echo 4/2021 at [facility] revealed improved cardiac function with EF 55-60%     -Discussed options for dialysis access, including arteriovenous fistula vs  Graft creation  Typically recommend proceeding in the nondominant arm first for dialysis access creation  His vein mapping suggests he would be a candidate for a brachiocephalic AVF with possible graft placement if superficial veins appear inadequate intraoperatively  -Discussed all risks and benefits of the procedure including bleeding, infection, injury to surrounding structures, wound healing issues, steal syndrome, arm swelling, need for revision/further intervention to assist with maturity

## 2021-09-27 NOTE — ASSESSMENT & PLAN NOTE
ESRD on HD via permacath x 6 years  Has been hesitant to proceed with any access, partly due to decreased cardiac function after leg sepsis back in 2019  Previously tried PD for several years  On transplant list but on hold due to increased cardiac risk  Recent echo 4/2021 at [facility] revealed improved cardiac function with EF 55-60%  -Discussed options for dialysis access, including arteriovenous fistula vs  Graft creation  Typically recommend proceeding in the nondominant arm first for dialysis access creation  His vein mapping suggests he would be a candidate for a brachiocephalic AVF with possible graft placement if superficial veins appear inadequate intraoperatively  -Discussed all risks and benefits of the procedure including bleeding, infection, injury to surrounding structures, wound healing issues, steal syndrome, arm swelling, need for revision/further intervention to assist with maturity

## 2021-09-27 NOTE — TELEPHONE ENCOUNTER
Left message on patients cell phone to call us back so that we can schedule his surgery with Dr Satnam Hurd [facility]

## 2021-09-27 NOTE — TELEPHONE ENCOUNTER
Patient called back and I offered him the date of 10-12-21 at B/OR patient said that this is to soon for him I told patient that Dr Tracie Merrill wanted surgery scheduled within 2 weeks Patient stated he is not sure if he wants to go through with this and said he will call us back when he is ready   LLF

## 2021-10-05 NOTE — ASSESSMENT & PLAN NOTE
GNR bacteremia with septic shock  -continue to follow cultures  -continue antibiotics per ID  -supportive care per critical care service  -see plan as outlined Admit to Orthopedic Service for elective left total hip replacement  Medically cleared and optimized for surgery by Dr. Gallegos

## 2021-11-02 ENCOUNTER — OFFICE VISIT (OUTPATIENT)
Dept: CARDIOLOGY CLINIC | Facility: CLINIC | Age: 61
End: 2021-11-02
Payer: MEDICARE

## 2021-11-02 VITALS
RESPIRATION RATE: 18 BRPM | DIASTOLIC BLOOD PRESSURE: 98 MMHG | HEIGHT: 68 IN | WEIGHT: 220.38 LBS | SYSTOLIC BLOOD PRESSURE: 140 MMHG | OXYGEN SATURATION: 100 % | HEART RATE: 98 BPM | BODY MASS INDEX: 33.4 KG/M2

## 2021-11-02 DIAGNOSIS — N18.6 CHRONIC KIDNEY DISEASE WITH END STAGE RENAL FAILURE ON DIALYSIS (HCC): Chronic | ICD-10-CM

## 2021-11-02 DIAGNOSIS — I42.0 DILATED CARDIOMYOPATHY (HCC): ICD-10-CM

## 2021-11-02 DIAGNOSIS — I42.9 HEART FAILURE WITH REDUCED EJECTION FRACTION DUE TO CARDIOMYOPATHY (HCC): Primary | ICD-10-CM

## 2021-11-02 DIAGNOSIS — Q61.3 POLYCYSTIC KIDNEY DISEASE: Chronic | ICD-10-CM

## 2021-11-02 DIAGNOSIS — I48.0 PAROXYSMAL ATRIAL FIBRILLATION (HCC): ICD-10-CM

## 2021-11-02 DIAGNOSIS — I50.20 HEART FAILURE WITH REDUCED EJECTION FRACTION DUE TO CARDIOMYOPATHY (HCC): Primary | ICD-10-CM

## 2021-11-02 DIAGNOSIS — Z99.2 CHRONIC KIDNEY DISEASE WITH END STAGE RENAL FAILURE ON DIALYSIS (HCC): Chronic | ICD-10-CM

## 2021-11-02 DIAGNOSIS — R94.39 ABNORMAL NUCLEAR STRESS TEST: ICD-10-CM

## 2021-11-02 PROCEDURE — 99214 OFFICE O/P EST MOD 30 MIN: CPT | Performed by: INTERNAL MEDICINE

## 2021-12-13 ENCOUNTER — PREP FOR PROCEDURE (OUTPATIENT)
Dept: INTERVENTIONAL RADIOLOGY/VASCULAR | Facility: CLINIC | Age: 61
End: 2021-12-13

## 2021-12-13 DIAGNOSIS — N18.6 CHRONIC KIDNEY DISEASE WITH END STAGE RENAL FAILURE ON DIALYSIS (HCC): Primary | ICD-10-CM

## 2021-12-13 DIAGNOSIS — Z99.2 CHRONIC KIDNEY DISEASE WITH END STAGE RENAL FAILURE ON DIALYSIS (HCC): Primary | ICD-10-CM

## 2021-12-14 ENCOUNTER — HOSPITAL ENCOUNTER (OUTPATIENT)
Dept: NON INVASIVE DIAGNOSTICS | Facility: HOSPITAL | Age: 61
Discharge: HOME | End: 2021-12-14
Attending: STUDENT IN AN ORGANIZED HEALTH CARE EDUCATION/TRAINING PROGRAM
Payer: MEDICARE

## 2021-12-14 DIAGNOSIS — N18.6 CHRONIC KIDNEY DISEASE WITH END STAGE RENAL FAILURE ON DIALYSIS (HCC): ICD-10-CM

## 2021-12-14 DIAGNOSIS — Z99.2 CHRONIC KIDNEY DISEASE WITH END STAGE RENAL FAILURE ON DIALYSIS (HCC): ICD-10-CM

## 2021-12-14 PROCEDURE — C1757 CATH, THROMBECTOMY/EMBOLECT: HCPCS

## 2021-12-14 PROCEDURE — C1750 CATH, HEMODIALYSIS,LONG-TERM: HCPCS

## 2021-12-14 PROCEDURE — 36581 REPLACE TUNNELED CV CATH: CPT | Performed by: RADIOLOGY

## 2021-12-14 PROCEDURE — 37799 UNLISTED PX VASCULAR SURGERY: CPT | Performed by: RADIOLOGY

## 2021-12-14 PROCEDURE — 77001 FLUOROGUIDE FOR VEIN DEVICE: CPT

## 2021-12-14 PROCEDURE — C1769 GUIDE WIRE: HCPCS

## 2021-12-14 PROCEDURE — 36581 REPLACE TUNNELED CV CATH: CPT

## 2021-12-14 PROCEDURE — 36595 MECH REMOV TUNNELED CV CATH: CPT

## 2021-12-14 PROCEDURE — C1725 CATH, TRANSLUMIN NON-LASER: HCPCS

## 2021-12-14 RX ORDER — LIDOCAINE WITH 8.4% SOD BICARB 0.9%(10ML)
SYRINGE (ML) INJECTION CODE/TRAUMA/SEDATION MEDICATION
Status: COMPLETED | OUTPATIENT
Start: 2021-12-14 | End: 2021-12-14

## 2021-12-14 RX ORDER — CEFAZOLIN SODIUM 1 G/3ML
INJECTION, POWDER, FOR SOLUTION INTRAMUSCULAR; INTRAVENOUS CODE/TRAUMA/SEDATION MEDICATION
Status: COMPLETED | OUTPATIENT
Start: 2021-12-14 | End: 2021-12-14

## 2021-12-14 RX ADMIN — CEFAZOLIN SODIUM 1000 MG: 1 INJECTION, POWDER, FOR SOLUTION INTRAMUSCULAR; INTRAVENOUS at 13:34

## 2021-12-14 RX ADMIN — Medication 8 ML: at 13:41

## 2022-03-10 ENCOUNTER — TELEPHONE (OUTPATIENT)
Dept: VASCULAR SURGERY | Facility: CLINIC | Age: 62
End: 2022-03-10

## 2022-03-10 ENCOUNTER — OFFICE VISIT (OUTPATIENT)
Dept: VASCULAR SURGERY | Facility: CLINIC | Age: 62
End: 2022-03-10
Payer: MEDICARE

## 2022-03-10 ENCOUNTER — PREP FOR PROCEDURE (OUTPATIENT)
Dept: VASCULAR SURGERY | Facility: CLINIC | Age: 62
End: 2022-03-10

## 2022-03-10 VITALS
DIASTOLIC BLOOD PRESSURE: 70 MMHG | SYSTOLIC BLOOD PRESSURE: 160 MMHG | BODY MASS INDEX: 30.07 KG/M2 | WEIGHT: 198.41 LBS | HEIGHT: 68 IN | HEART RATE: 88 BPM

## 2022-03-10 DIAGNOSIS — Z99.2 ESRD (END STAGE RENAL DISEASE) ON DIALYSIS (HCC): Primary | ICD-10-CM

## 2022-03-10 DIAGNOSIS — N18.6 ESRD (END STAGE RENAL DISEASE) ON DIALYSIS (HCC): Primary | ICD-10-CM

## 2022-03-10 PROCEDURE — 99213 OFFICE O/P EST LOW 20 MIN: CPT | Performed by: SURGERY

## 2022-03-10 NOTE — LETTER
March 10, 2022     Jovan Lopez DO  [address] [address]    Patient: Caesar Choi   YOB: 1960   Date of Visit: 3/10/2022       Dear Dr Rich Orellana:    Below are the relevant portions of my assessment and plan of care  Diagnoses and all orders for this visit:    ESRD (end stage renal disease) on dialysis ([address] )  ESRD on HD via permacath for 5 years  Previously attempted to schedule AVF creation in September of last year but he was deemed high risk by his cardiologist at [address] and he did not want to proceed  He was found to have an EF of 19% on nuclear stress test  However, from a quality of life standpoint, the patient now feels that he would like to proceed with AVF creation in an attempt to stop using a permacath and eventually transition to home dialysis  He does have chronic paresthesias in both hands which is stable  He is right handed      Palpable left brachial pulse and 1+ radial pulse  Previous vein mapping had demonstrated adequate cephalic and basilic veins in the left upper extremity with adequate size brachial artery with bifurcation below the antecubitum  There is thrombus within the mid forearm cephalic vein      -Discussed options for dialysis access, including arteriovenous fistula vs  Graft creation  Typically recommend proceeding in the nondominant arm first for dialysis access creation  His vein mapping suggests he would be a candidate for a brachiocephalic AVF or brachiobasilic AVF with possible graft placement if veins appear inadequate intra-operatively  Discussed all risks and benefits of the procedure including bleeding, infection, injury to surrounding structures, wound healing issues, steal syndrome, arm swelling, need for revision/further intervention to assist with maturity, need for staged procedure for basilic vein transposition, heart failure, heart attack, stroke and death  Informed consent was obtained    -Recommend regional anesthesia with nerve block, minimal to no sedation or local anesthesia and minimal sedation given that he is high risk for any surgery  He is comfortable proceeding as described  If you have questions, please do not hesitate to call me  I look forward to following [facility] along with you           Sincerely,        Shanelle Maradiaga MD        CC: No Recipients

## 2022-03-10 NOTE — PATIENT INSTRUCTIONS
ESRD (end stage renal disease) on dialysis ([facility] [address] )  ESRD on HD via permacath for 5 years  Previously attempted to schedule AVF creation in September of last year but he was deemed high risk by his cardiologist at [address] [place] [address] [address] and he did not want to proceed  He was found to have an EF of 19% on nuclear stress test  However, from a quality of life standpoint, the patient now feels that he would like to proceed with AVF creation in an attempt to stop using a permacath and eventually transition to home dialysis  He does have chronic paresthesias in both hands which is stable  He is right handed      Palpable left brachial pulse and 1+ radial pulse  Previous vein mapping had demonstrated adequate cephalic and basilic veins in the left upper extremity with adequate size brachial artery with bifurcation below the antecubitum  There is thrombus within the mid forearm cephalic vein      -Discussed options for dialysis access, including arteriovenous fistula vs  Graft creation  Typically recommend proceeding in the nondominant arm first for dialysis access creation  His vein mapping suggests he would be a candidate for a brachiocephalic AVF or brachiobasilic AVF with possible graft placement if veins appear inadequate intra-operatively  Discussed all risks and benefits of the procedure including bleeding, infection, injury to surrounding structures, wound healing issues, steal syndrome, arm swelling, need for revision/further intervention to assist with maturity, need for staged procedure for basilic vein transposition, heart failure, heart attack, stroke and death  Informed consent was obtained  -Recommend regional anesthesia with nerve block, minimal to no sedation or local anesthesia and minimal sedation given that he is high risk for any surgery  He is comfortable proceeding as described

## 2022-03-10 NOTE — TELEPHONE ENCOUNTER
Verified patient's insurance   CONFIRMED - Patient's insurance is Medicare  Is patient requesting a call when authorization has been obtained? Patient did not request a call  Surgery Date: 3/22/22  Primary Surgeon: Patricia Zhao // Julee Sacks (NPI: [id number])  Assisting Surgeon: Not Applicable (N/A)  Facility: [facility] (Tax: [id number] / NPI: [id number])  Inpatient / Outpatient: Outpatient  Level: 2    Clearance Received: No clearance ordered  Consent Received: Yes, scanned into Epic on 3/10/22  Medication Hold / Last Dose: Not Applicable (N/A)  VQI Spreadsheet: 3/10/22  IR Notified: Not Applicable (N/A)  Rep   Notified: Not Applicable (N/A)  Equipment Needs: Not Applicable (N/A)  Vas Lab Requested: Not Applicable (N/A)  Patient Contacted: 3/10/22    Diagnosis: N18 6, Z99 2  Procedure/ CPT Code(s): (AV) Arteriovenous Fistula // CPT: 67539    For varicose vein related procedures:   Last LEVDR: Not Applicable (N/A)  CEAP Classification: Not Applicable (N/A)  VCSS: Not Applicable (N/A)    Post Operative Date/ Time: 4/12/22 , 10:00am Sanam Cespedes) with Julee Sacks (NPI: [id number])     Pt will have blood work done at [facility])

## 2022-03-10 NOTE — TELEPHONE ENCOUNTER
REMINDER: Under Reason For Call, comments MUST be formatted as:   (Surgeon's Initials) / (Procedure)    Physician / MARTY WARD: Leanna Anderson // Mckayla Mitchell (NPI: [id number]) / [facility] (Tax: [id number] / NPI: [id number])    Procedure: LLE Arteriovenous fistula vs  graft    Level: 2 - Route clearance(s) to [facility] Clearance Pool     Equipment / Rep Needs: No    Assistant Surgeon: No    Allergies: Sucroferric oxyhydroxide, Chlorhexidine, Heparin, and Other    Instructions Given: NO Bowel Prep General Instructions     Blood Thinners / Medication Hold: Patient is not taking any blood thinners  Hydration Required: Patient does not require hydration  Dialysis: Yes  Where: [facility] // Days: Monday, Wednesday, and Friday    Consent: I certify that patient has signed, printed, timed, and dated their surgery consent  I certify that the patient's LEGAL NAME and DATE OF BIRTH are written in the upper left corner on BOTH sides of the consent  I certify that BOTH sides of the completed surgery consent have been scanned into the patient's Epic chart by myself on 3/10/2022  Yes, I have LABELED the consent in Epic as Consent for Vascular Procedure  Yes, I have LABELED the consent in Epic as Consent for Vascular Procedure  Clearances     Levels   1-3 ROUTE this encounter to [facility] Clearance Pool   AND   SEND Clearance Form(s) to Vascular Nursing e-mail group   Level   4 ROUTE this encounter to [facility] Surgery Coordinator Pool  AND   SEND Clearance Form(s) to Vascular Surgery Schedulers e-mail group     Patient does not require any pre operative clearance  Yes, I have ROUTED this encounter to [facility] Surgery Coordinator and/or [facility] Clearance Pool

## 2022-03-10 NOTE — TELEPHONE ENCOUNTER
Authorization requirements reviewed  Please refer to Piyush Garcia / Chuyita Prows number [id number] for case updates

## 2022-03-10 NOTE — ASSESSMENT & PLAN NOTE
ESRD on HD via permacath for 5 years  Previously attempted to schedule AVF creation in September of last year but he was deemed high risk by his cardiologist at [facility] and he did not want to proceed  He was found to have an EF of 19% on nuclear stress test  However, from a quality of life standpoint, the patient now feels that he would like to proceed with AVF creation in an attempt to stop using a permacath and eventually transition to home dialysis  He does have chronic paresthesias in both hands which is stable  He is right handed  Palpable left brachial pulse and 1+ radial pulse  Previous vein mapping had demonstrated adequate cephalic and basilic veins in the left upper extremity with adequate size brachial artery with bifurcation below the antecubitum  There is thrombus within the mid forearm cephalic vein     -Discussed options for dialysis access, including arteriovenous fistula vs  Graft creation  Typically recommend proceeding in the nondominant arm first for dialysis access creation  His vein mapping suggests he would be a candidate for a brachiocephalic AVF or brachiobasilic AVF with possible graft placement if veins appear inadequate intra-operatively  Discussed all risks and benefits of the procedure including bleeding, infection, injury to surrounding structures, wound healing issues, steal syndrome, arm swelling, need for revision/further intervention to assist with maturity, need for staged procedure for basilic vein transposition, heart failure, heart attack, stroke and death  Informed consent was obtained  -Recommend regional anesthesia with nerve block, minimal to no sedation or local anesthesia and minimal sedation given that he is high risk for any surgery  He is comfortable proceeding as described

## 2022-03-10 NOTE — PROGRESS NOTES
Assessment/Plan:    ESRD (end stage renal disease) on dialysis ([address] )  ESRD on HD via permacath for 5 years  Previously attempted to schedule AVF creation in September of last year but he was deemed high risk by his cardiologist at [facility] and he did not want to proceed  He was found to have an EF of 19% on nuclear stress test  However, from a quality of life standpoint, the patient now feels that he would like to proceed with AVF creation in an attempt to stop using a permacath and eventually transition to home dialysis  He does have chronic paresthesias in both hands which is stable  He is right handed  Palpable left brachial pulse and 1+ radial pulse  Previous vein mapping had demonstrated adequate cephalic and basilic veins in the left upper extremity with adequate size brachial artery with bifurcation below the antecubitum  There is thrombus within the mid forearm cephalic vein     -Discussed options for dialysis access, including arteriovenous fistula vs  Graft creation  Typically recommend proceeding in the nondominant arm first for dialysis access creation  His vein mapping suggests he would be a candidate for a brachiocephalic AVF or brachiobasilic AVF with possible graft placement if veins appear inadequate intra-operatively  Discussed all risks and benefits of the procedure including bleeding, infection, injury to surrounding structures, wound healing issues, steal syndrome, arm swelling, need for revision/further intervention to assist with maturity, need for staged procedure for basilic vein transposition, heart failure, heart attack, stroke and death  Informed consent was obtained  -Recommend regional anesthesia with nerve block, minimal to no sedation or local anesthesia and minimal sedation given that he is high risk for any surgery  He is comfortable proceeding as described           Diagnoses and all orders for this visit:    ESRD (end stage renal disease) on dialysis [facility])        Operative Scheduling Information:    Hospital:  [facility]    Physician:  Yasmine Jin    Surgery: Left upper extremity AVF vs  AVG creation    Urgency:  Urgent: 2 weeks    Level:  Level 2: Outpatients to be scheduled for surgery with time dependent medical necessity within 2 weeks    Case Length:  Normal    Post-op Bed:  Outpatient    OR Table:  Standard    Equipment Needs:  None    Medication Instructions:  None    Hydration:  No    I have spent 25 minutes with Patient  today in which greater than 50% of this time was spent in counseling/coordination of care regarding Intructions for management, Importance of tx compliance and Impressions  Subjective:      Patient ID: Kishan Knowles is a 63 y o  male  Pt is here to re-discuss dialysis access surgery  [facility]-  Pt is right handed    HPI  Mr Leopold Sizer is a 67yo male with HTN, Afib, CHF, dilated cardiomyopathy, polycystic kidney disease, GERD, ESRD on HD via permacath, previous PD, high risk candidate for surgery with EF19% who presents for follow-up to discuss dialysis access creation  He continues to dialyze via permacath and had been hesitant to proceed with AVF/AVG creation due to his poor heart function  He felt there were too many risks but now feels that he wants to proceed with AVF creation for quality of life reasons  He has stable dyspnea which is improving  He is pushing himself further to ambulate and increase his activity/stamina  He was deemed high risk for surgery by his cardiologist but is willing to proceed with surgery even with known risks  There has been no change with regards to chronic paresthesias in the hand  The following portions of the patient's history were reviewed and updated as appropriate: allergies, current medications, past family history, past medical history, past social history, past surgical history and problem list     Review of Systems   Constitutional: Negative  HENT: Negative  Eyes: Negative  Respiratory: Negative  Cardiovascular: Negative  Gastrointestinal: Negative  Endocrine: Negative  Genitourinary: Negative  Musculoskeletal: Negative  Skin: Negative  Allergic/Immunologic: Negative  Neurological: Negative  Hematological: Negative  Psychiatric/Behavioral: Negative  I have personally reviewed the ROS entered by MA and agree as documented  Objective:      /70 (BP Location: Right arm, Patient Position: Sitting, Cuff Size: Standard)   Pulse 88   Ht 5' 8" (1 727 m)   Wt 90 kg (198 lb 6 6 oz)   BMI 30 17 kg/m²          Physical Exam  Constitutional:       Appearance: Normal appearance  HENT:      Head: Normocephalic and atraumatic  Cardiovascular:      Rate and Rhythm: Normal rate  Pulses:           Radial pulses are 1+ on the left side  Comments: Palpable left brachial pulse 2+  Pulmonary:      Effort: Pulmonary effort is normal    Abdominal:      Palpations: Abdomen is soft  Musculoskeletal:         General: Normal range of motion  Cervical back: Normal range of motion and neck supple  Skin:     General: Skin is warm and dry  Capillary Refill: Capillary refill takes less than 2 seconds  Comments: Chronic scarring/venous stasis skin change of bilateral lower extremities   Neurological:      General: No focal deficit present  Mental Status: He is alert and oriented to person, place, and time  Psychiatric:         Mood and Affect: Mood normal          Behavior: Behavior normal          Thought Content:  Thought content normal          Judgment: Judgment normal

## 2022-03-18 NOTE — TELEPHONE ENCOUNTER
Mary Stratton / [facility] number [id number] has been updated to reflect date of service change

## 2022-03-18 NOTE — TELEPHONE ENCOUNTER
Pt called and would like to postpone his surgery  He has been rescheduled for 4/14/22 in [facility] with Dr Shant Villa

## 2022-04-04 NOTE — TELEPHONE ENCOUNTER
Pt called and requested his surgery be moved to the end of May/beginning of June  He has been rescheduled to 6/3/22 in [facility] with Dr Aaron Mathew

## 2022-05-23 NOTE — TELEPHONE ENCOUNTER
Michelle Mcclendon / Judson Spurling number [id number] has been updated to reflect date of service change      Authorization not required

## 2022-05-23 NOTE — TELEPHONE ENCOUNTER
Patient called and asked if he can reschedule his surgery from 6-3-22 [facility] to the 2nd week of September patient was given the new date of 9-16-22 at [facility] with Dr Sonam Hurtado   [facility]

## 2022-08-24 NOTE — ED NOTES
Mother is returning a call. Please call back.    PAC aware patient requires a dialysis room       Lewis Chapman RN  09/21/19 [id number]

## 2023-04-12 PROBLEM — I83.892 VENOUS STASIS ULCER OF LEFT LOWER LEG WITH EDEMA OF LEFT LOWER LEG (HCC): Status: ACTIVE | Noted: 2023-04-12

## 2023-04-12 PROBLEM — R60.0 VENOUS STASIS ULCER OF LEFT LOWER LEG WITH EDEMA OF LEFT LOWER LEG (HCC): Status: ACTIVE | Noted: 2023-04-12

## 2023-04-12 PROBLEM — L97.929 VENOUS STASIS ULCER OF LEFT LOWER LEG WITH EDEMA OF LEFT LOWER LEG (HCC): Status: ACTIVE | Noted: 2023-04-12

## 2023-04-12 PROBLEM — I83.029 VENOUS STASIS ULCER OF LEFT LOWER LEG WITH EDEMA OF LEFT LOWER LEG (HCC): Status: ACTIVE | Noted: 2023-04-12

## 2023-07-28 ENCOUNTER — APPOINTMENT (INPATIENT)
Dept: NON INVASIVE DIAGNOSTICS | Facility: HOSPITAL | Age: 63
DRG: 314 | End: 2023-07-28
Payer: MEDICARE

## 2023-07-28 ENCOUNTER — HOSPITAL ENCOUNTER (INPATIENT)
Facility: HOSPITAL | Age: 63
LOS: 4 days | Discharge: HOME | DRG: 314 | End: 2023-08-01
Attending: EMERGENCY MEDICINE | Admitting: INTERNAL MEDICINE
Payer: MEDICARE

## 2023-07-28 ENCOUNTER — APPOINTMENT (INPATIENT)
Dept: ULTRASOUND IMAGING | Facility: HOSPITAL | Age: 63
DRG: 314 | End: 2023-07-28
Payer: MEDICARE

## 2023-07-28 ENCOUNTER — APPOINTMENT (INPATIENT)
Dept: RADIOLOGY | Facility: HOSPITAL | Age: 63
DRG: 314 | End: 2023-07-28
Attending: RADIOLOGY
Payer: MEDICARE

## 2023-07-28 DIAGNOSIS — T82.7XXA INFECTION OF HEMODIALYSIS CATHETER, INITIAL ENCOUNTER (HCC): Primary | ICD-10-CM

## 2023-07-28 DIAGNOSIS — T82.7XXA: ICD-10-CM

## 2023-07-28 DIAGNOSIS — Z99.2 CHRONIC KIDNEY DISEASE WITH END STAGE RENAL FAILURE ON DIALYSIS (HCC): Chronic | ICD-10-CM

## 2023-07-28 DIAGNOSIS — K21.9 GERD (GASTROESOPHAGEAL REFLUX DISEASE): ICD-10-CM

## 2023-07-28 DIAGNOSIS — I42.0 DILATED CARDIOMYOPATHY (HCC): ICD-10-CM

## 2023-07-28 DIAGNOSIS — L08.9 SKIN INFECTION: ICD-10-CM

## 2023-07-28 DIAGNOSIS — N18.6 CHRONIC KIDNEY DISEASE WITH END STAGE RENAL FAILURE ON DIALYSIS (HCC): Chronic | ICD-10-CM

## 2023-07-28 PROBLEM — I87.2 CHRONIC VENOUS STASIS DERMATITIS OF BOTH LOWER EXTREMITIES: Status: ACTIVE | Noted: 2023-07-28

## 2023-07-28 LAB
ALBUMIN SERPL BCP-MCNC: 4.4 G/DL (ref 3.5–5)
ALP SERPL-CCNC: 108 U/L (ref 34–104)
ALT SERPL W P-5'-P-CCNC: 8 U/L (ref 7–52)
ANION GAP SERPL CALCULATED.3IONS-SCNC: 16 MMOL/L
AORTIC ROOT: 3.4 CM
APICAL FOUR CHAMBER EJECTION FRACTION: 22 %
ASCENDING AORTA: 3.2 CM
AST SERPL W P-5'-P-CCNC: 5 U/L (ref 13–39)
BASOPHILS # BLD AUTO: 0.05 THOUSANDS/ÂΜL (ref 0–0.1)
BASOPHILS NFR BLD AUTO: 1 % (ref 0–1)
BILIRUB SERPL-MCNC: 1.11 MG/DL (ref 0.2–1)
BUN SERPL-MCNC: 81 MG/DL (ref 5–25)
CALCIUM SERPL-MCNC: 10.4 MG/DL (ref 8.4–10.2)
CHLORIDE SERPL-SCNC: 92 MMOL/L (ref 96–108)
CO2 SERPL-SCNC: 29 MMOL/L (ref 21–32)
CREAT SERPL-MCNC: 8.97 MG/DL (ref 0.6–1.3)
E WAVE DECELERATION TIME: 153 MS
EOSINOPHIL # BLD AUTO: 0.13 THOUSAND/ÂΜL (ref 0–0.61)
EOSINOPHIL NFR BLD AUTO: 2 % (ref 0–6)
ERYTHROCYTE [DISTWIDTH] IN BLOOD BY AUTOMATED COUNT: 14.7 % (ref 11.6–15.1)
FRACTIONAL SHORTENING: 14 % (ref 28–44)
GFR SERPL CREATININE-BSD FRML MDRD: 5 ML/MIN/1.73SQ M
GLUCOSE SERPL-MCNC: 107 MG/DL (ref 65–140)
HCT VFR BLD AUTO: 40.2 % (ref 36.5–49.3)
HGB BLD-MCNC: 12.7 G/DL (ref 12–17)
IMM GRANULOCYTES # BLD AUTO: 0.03 THOUSAND/UL (ref 0–0.2)
IMM GRANULOCYTES NFR BLD AUTO: 1 % (ref 0–2)
INR PPP: 1.27 (ref 0.84–1.19)
INTERVENTRICULAR SEPTUM IN DIASTOLE (PARASTERNAL SHORT AXIS VIEW): 1.2 CM
INTERVENTRICULAR SEPTUM: 1.2 CM (ref 0.6–1.1)
LAAS-AP2: 28.4 CM2
LAAS-AP4: 29.7 CM2
LEFT ATRIUM SIZE: 4.8 CM
LEFT ATRIUM VOLUME (MOD BIPLANE): 100 ML
LEFT INTERNAL DIMENSION IN SYSTOLE: 5 CM (ref 2.1–4)
LEFT VENTRICULAR INTERNAL DIMENSION IN DIASTOLE: 5.8 CM (ref 3.5–6)
LEFT VENTRICULAR POSTERIOR WALL IN END DIASTOLE: 1.2 CM
LEFT VENTRICULAR STROKE VOLUME: 50 ML
LVSV (TEICH): 50 ML
LYMPHOCYTES # BLD AUTO: 0.57 THOUSANDS/ÂΜL (ref 0.6–4.47)
LYMPHOCYTES NFR BLD AUTO: 10 % (ref 14–44)
MAGNESIUM SERPL-MCNC: 2.5 MG/DL (ref 1.9–2.7)
MCH RBC QN AUTO: 31.1 PG (ref 26.8–34.3)
MCHC RBC AUTO-ENTMCNC: 31.6 G/DL (ref 31.4–37.4)
MCV RBC AUTO: 99 FL (ref 82–98)
MONOCYTES # BLD AUTO: 0.47 THOUSAND/ÂΜL (ref 0.17–1.22)
MONOCYTES NFR BLD AUTO: 8 % (ref 4–12)
MV E'TISSUE VEL-SEP: 6 CM/S
MV PEAK A VEL: 0.34 M/S
MV PEAK E VEL: 74 CM/S
MV STENOSIS PRESSURE HALF TIME: 44 MS
MV VALVE AREA P 1/2 METHOD: 5 CM2
NEUTROPHILS # BLD AUTO: 4.43 THOUSANDS/ÂΜL (ref 1.85–7.62)
NEUTS SEG NFR BLD AUTO: 78 % (ref 43–75)
NRBC BLD AUTO-RTO: 0 /100 WBCS
PHOSPHATE SERPL-MCNC: 9.2 MG/DL (ref 2.3–4.1)
PLATELET # BLD AUTO: 104 THOUSANDS/UL (ref 149–390)
PMV BLD AUTO: 10.4 FL (ref 8.9–12.7)
POTASSIUM SERPL-SCNC: 5.4 MMOL/L (ref 3.5–5.3)
PROT SERPL-MCNC: 7.9 G/DL (ref 6.4–8.4)
PROTHROMBIN TIME: 16.1 SECONDS (ref 11.6–14.5)
RA PRESSURE ESTIMATED: 5 MMHG
RBC # BLD AUTO: 4.08 MILLION/UL (ref 3.88–5.62)
RIGHT ATRIUM AREA SYSTOLE A4C: 22.1 CM2
RIGHT VENTRICLE ID DIMENSION: 4.7 CM
RV PSP: 48 MMHG
SL CV LEFT ATRIUM LENGTH A2C: 6.8 CM
SL CV LV EF: 20
SL CV PED ECHO LEFT VENTRICLE DIASTOLIC VOLUME (MOD BIPLANE) 2D: 166 ML
SL CV PED ECHO LEFT VENTRICLE SYSTOLIC VOLUME (MOD BIPLANE) 2D: 116 ML
SODIUM SERPL-SCNC: 137 MMOL/L (ref 135–147)
TR MAX PG: 43 MMHG
TR PEAK VELOCITY: 3.3 M/S
TRICUSPID ANNULAR PLANE SYSTOLIC EXCURSION: 0.8 CM
TRICUSPID VALVE PEAK REGURGITATION VELOCITY: 3.29 M/S
WBC # BLD AUTO: 5.68 THOUSAND/UL (ref 4.31–10.16)

## 2023-07-28 PROCEDURE — 76937 US GUIDE VASCULAR ACCESS: CPT | Performed by: RADIOLOGY

## 2023-07-28 PROCEDURE — 36556 INSERT NON-TUNNEL CV CATH: CPT

## 2023-07-28 PROCEDURE — 10061 I&D ABSCESS COMP/MULTIPLE: CPT | Performed by: PHYSICIAN ASSISTANT

## 2023-07-28 PROCEDURE — 76536 US EXAM OF HEAD AND NECK: CPT

## 2023-07-28 PROCEDURE — C1752 CATH,HEMODIALYSIS,SHORT-TERM: HCPCS

## 2023-07-28 PROCEDURE — C8929 TTE W OR WO FOL WCON,DOPPLER: HCPCS

## 2023-07-28 PROCEDURE — 87077 CULTURE AEROBIC IDENTIFY: CPT | Performed by: INTERNAL MEDICINE

## 2023-07-28 PROCEDURE — 77001 FLUOROGUIDE FOR VEIN DEVICE: CPT

## 2023-07-28 PROCEDURE — 0JPT3XZ REMOVAL OF TUNNELED VASCULAR ACCESS DEVICE FROM TRUNK SUBCUTANEOUS TISSUE AND FASCIA, PERCUTANEOUS APPROACH: ICD-10-PCS | Performed by: HOSPITALIST

## 2023-07-28 PROCEDURE — 75860 VEIN X-RAY NECK: CPT

## 2023-07-28 PROCEDURE — 99284 EMERGENCY DEPT VISIT MOD MDM: CPT

## 2023-07-28 PROCEDURE — 99285 EMERGENCY DEPT VISIT HI MDM: CPT | Performed by: EMERGENCY MEDICINE

## 2023-07-28 PROCEDURE — C1894 INTRO/SHEATH, NON-LASER: HCPCS

## 2023-07-28 PROCEDURE — 83735 ASSAY OF MAGNESIUM: CPT | Performed by: EMERGENCY MEDICINE

## 2023-07-28 PROCEDURE — NC001 PR NO CHARGE: Performed by: RADIOLOGY

## 2023-07-28 PROCEDURE — 85610 PROTHROMBIN TIME: CPT | Performed by: RADIOLOGY

## 2023-07-28 PROCEDURE — 77001 FLUOROGUIDE FOR VEIN DEVICE: CPT | Performed by: RADIOLOGY

## 2023-07-28 PROCEDURE — 36415 COLL VENOUS BLD VENIPUNCTURE: CPT | Performed by: EMERGENCY MEDICINE

## 2023-07-28 PROCEDURE — C1769 GUIDE WIRE: HCPCS

## 2023-07-28 PROCEDURE — 36589 REMOVAL TUNNELED CV CATH: CPT | Performed by: RADIOLOGY

## 2023-07-28 PROCEDURE — 85025 COMPLETE CBC W/AUTO DIFF WBC: CPT | Performed by: EMERGENCY MEDICINE

## 2023-07-28 PROCEDURE — 99223 1ST HOSP IP/OBS HIGH 75: CPT | Performed by: GENERAL PRACTICE

## 2023-07-28 PROCEDURE — 76937 US GUIDE VASCULAR ACCESS: CPT

## 2023-07-28 PROCEDURE — 96365 THER/PROPH/DIAG IV INF INIT: CPT

## 2023-07-28 PROCEDURE — NC001 PR NO CHARGE: Performed by: INTERNAL MEDICINE

## 2023-07-28 PROCEDURE — 99223 1ST HOSP IP/OBS HIGH 75: CPT | Performed by: PHYSICIAN ASSISTANT

## 2023-07-28 PROCEDURE — 84100 ASSAY OF PHOSPHORUS: CPT

## 2023-07-28 PROCEDURE — 87070 CULTURE OTHR SPECIMN AEROBIC: CPT | Performed by: INTERNAL MEDICINE

## 2023-07-28 PROCEDURE — 80053 COMPREHEN METABOLIC PANEL: CPT | Performed by: EMERGENCY MEDICINE

## 2023-07-28 PROCEDURE — 87186 SC STD MICRODIL/AGAR DIL: CPT | Performed by: INTERNAL MEDICINE

## 2023-07-28 PROCEDURE — 02PYX3Z REMOVAL OF INFUSION DEVICE FROM GREAT VESSEL, EXTERNAL APPROACH: ICD-10-PCS | Performed by: HOSPITALIST

## 2023-07-28 PROCEDURE — 93306 TTE W/DOPPLER COMPLETE: CPT | Performed by: INTERNAL MEDICINE

## 2023-07-28 PROCEDURE — 99223 1ST HOSP IP/OBS HIGH 75: CPT | Performed by: INTERNAL MEDICINE

## 2023-07-28 PROCEDURE — 02H633Z INSERTION OF INFUSION DEVICE INTO RIGHT ATRIUM, PERCUTANEOUS APPROACH: ICD-10-PCS | Performed by: HOSPITALIST

## 2023-07-28 PROCEDURE — 36589 REMOVAL TUNNELED CV CATH: CPT

## 2023-07-28 PROCEDURE — 36556 INSERT NON-TUNNEL CV CATH: CPT | Performed by: RADIOLOGY

## 2023-07-28 PROCEDURE — 87040 BLOOD CULTURE FOR BACTERIA: CPT | Performed by: EMERGENCY MEDICINE

## 2023-07-28 RX ORDER — SEVELAMER HYDROCHLORIDE 800 MG/1
800 TABLET, FILM COATED ORAL
Status: DISCONTINUED | OUTPATIENT
Start: 2023-07-28 | End: 2023-08-01

## 2023-07-28 RX ORDER — OXYCODONE HYDROCHLORIDE 5 MG/1
5 TABLET ORAL EVERY 4 HOURS PRN
Status: DISCONTINUED | OUTPATIENT
Start: 2023-07-28 | End: 2023-08-01 | Stop reason: HOSPADM

## 2023-07-28 RX ORDER — CALCITRIOL 0.25 UG/1
0.75 CAPSULE, LIQUID FILLED ORAL 3 TIMES WEEKLY
Status: DISCONTINUED | OUTPATIENT
Start: 2023-07-31 | End: 2023-08-01 | Stop reason: HOSPADM

## 2023-07-28 RX ORDER — AMOXICILLIN 250 MG
1 CAPSULE ORAL
Status: DISCONTINUED | OUTPATIENT
Start: 2023-07-28 | End: 2023-08-01 | Stop reason: HOSPADM

## 2023-07-28 RX ORDER — CALCITRIOL 0.25 UG/1
0.75 CAPSULE, LIQUID FILLED ORAL 3 TIMES WEEKLY
Status: DISCONTINUED | OUTPATIENT
Start: 2023-07-28 | End: 2023-07-28

## 2023-07-28 RX ORDER — METOPROLOL SUCCINATE 25 MG/1
25 TABLET, EXTENDED RELEASE ORAL DAILY
Status: DISCONTINUED | OUTPATIENT
Start: 2023-07-28 | End: 2023-08-01 | Stop reason: HOSPADM

## 2023-07-28 RX ORDER — ASCORBIC ACID, THIAMINE MONONITRATE,RIBOFLAVIN, NIACINAMIDE, PYRIDOXINE HYDROCHLORIDE, FOLIC ACID, CYANOCOBALAMIN, BIOTIN, CALCIUM PANTOTHENATE, 100; 1.5; 1.7; 20; 10; 1; 6000; 150000; 5 MG/1; MG/1; MG/1; MG/1; MG/1; MG/1; UG/1; UG/1; MG/1
1 CAPSULE, LIQUID FILLED ORAL
Status: DISCONTINUED | OUTPATIENT
Start: 2023-07-28 | End: 2023-08-01 | Stop reason: HOSPADM

## 2023-07-28 RX ORDER — ACETAMINOPHEN 325 MG/1
650 TABLET ORAL EVERY 6 HOURS PRN
Status: DISCONTINUED | OUTPATIENT
Start: 2023-07-28 | End: 2023-07-28

## 2023-07-28 RX ORDER — SIMETHICONE 80 MG
80 TABLET,CHEWABLE ORAL EVERY 6 HOURS PRN
Status: DISCONTINUED | OUTPATIENT
Start: 2023-07-28 | End: 2023-08-01 | Stop reason: HOSPADM

## 2023-07-28 RX ORDER — HYDRALAZINE HYDROCHLORIDE 20 MG/ML
10 INJECTION INTRAMUSCULAR; INTRAVENOUS EVERY 4 HOURS PRN
Status: DISCONTINUED | OUTPATIENT
Start: 2023-07-28 | End: 2023-08-01 | Stop reason: HOSPADM

## 2023-07-28 RX ORDER — VANCOMYCIN HYDROCHLORIDE 1 G/200ML
1000 INJECTION, SOLUTION INTRAVENOUS EVERY 24 HOURS
Status: CANCELLED | OUTPATIENT
Start: 2023-07-28

## 2023-07-28 RX ORDER — ACETAMINOPHEN 325 MG/1
975 TABLET ORAL EVERY 8 HOURS SCHEDULED
Status: DISCONTINUED | OUTPATIENT
Start: 2023-07-28 | End: 2023-08-01 | Stop reason: HOSPADM

## 2023-07-28 RX ORDER — HEPARIN SODIUM 5000 [USP'U]/ML
5000 INJECTION, SOLUTION INTRAVENOUS; SUBCUTANEOUS EVERY 8 HOURS SCHEDULED
Status: DISCONTINUED | OUTPATIENT
Start: 2023-07-28 | End: 2023-07-28

## 2023-07-28 RX ORDER — CALCIUM ACETATE 667 MG/1
1334 CAPSULE ORAL
Status: DISCONTINUED | OUTPATIENT
Start: 2023-07-28 | End: 2023-08-01 | Stop reason: HOSPADM

## 2023-07-28 RX ORDER — MELATONIN
1000 DAILY
Status: DISCONTINUED | OUTPATIENT
Start: 2023-07-28 | End: 2023-08-01 | Stop reason: HOSPADM

## 2023-07-28 RX ORDER — LIDOCAINE HYDROCHLORIDE 10 MG/ML
10 INJECTION, SOLUTION EPIDURAL; INFILTRATION; INTRACAUDAL; PERINEURAL ONCE
Status: COMPLETED | OUTPATIENT
Start: 2023-07-28 | End: 2023-07-28

## 2023-07-28 RX ORDER — TORSEMIDE 20 MG/1
40 TABLET ORAL 2 TIMES DAILY
Status: DISCONTINUED | OUTPATIENT
Start: 2023-07-28 | End: 2023-08-01 | Stop reason: HOSPADM

## 2023-07-28 RX ADMIN — METOPROLOL SUCCINATE 25 MG: 25 TABLET, EXTENDED RELEASE ORAL at 15:51

## 2023-07-28 RX ADMIN — TORSEMIDE 40 MG: 20 TABLET ORAL at 17:52

## 2023-07-28 RX ADMIN — B-COMPLEX W/ C & FOLIC ACID TAB 1 TABLET: TAB at 15:46

## 2023-07-28 RX ADMIN — IOHEXOL 10 ML: 350 INJECTION, SOLUTION INTRAVENOUS at 15:35

## 2023-07-28 RX ADMIN — Medication 1 CAPSULE: at 15:46

## 2023-07-28 RX ADMIN — SEVELAMER HYDROCHLORIDE 800 MG: 800 TABLET ORAL at 15:45

## 2023-07-28 RX ADMIN — Medication 5 ML: at 14:51

## 2023-07-28 RX ADMIN — VANCOMYCIN HYDROCHLORIDE 2000 MG: 5 INJECTION, POWDER, LYOPHILIZED, FOR SOLUTION INTRAVENOUS at 15:51

## 2023-07-28 RX ADMIN — ACETAMINOPHEN 975 MG: 325 TABLET, FILM COATED ORAL at 15:45

## 2023-07-28 RX ADMIN — PIPERACILLIN AND TAZOBACTAM 3.38 G: 36; 4.5 INJECTION, POWDER, FOR SOLUTION INTRAVENOUS at 07:02

## 2023-07-28 RX ADMIN — CALCIUM ACETATE 1334 MG: 667 CAPSULE ORAL at 15:45

## 2023-07-28 RX ADMIN — Medication 1000 UNITS: at 15:45

## 2023-07-28 RX ADMIN — LIDOCAINE HYDROCHLORIDE 10 ML: 10 INJECTION, SOLUTION EPIDURAL; INFILTRATION; INTRACAUDAL; PERINEURAL at 16:30

## 2023-07-28 RX ADMIN — OXYCODONE HYDROCHLORIDE 5 MG: 5 TABLET ORAL at 15:46

## 2023-07-28 RX ADMIN — PERFLUTREN 0.5 ML/MIN: 6.52 INJECTION, SUSPENSION INTRAVENOUS at 14:36

## 2023-07-28 RX ADMIN — Medication 4 ML: at 14:28

## 2023-07-28 RX ADMIN — SODIUM ZIRCONIUM CYCLOSILICATE 10 G: 10 POWDER, FOR SUSPENSION ORAL at 15:47

## 2023-07-28 NOTE — H&P
[address] [facility]  H&P  Name: Radha Bennett 66 y.o. male I MRN: [id number]  Unit/Bed#: S -16 I Date of Admission: 7/28/2023   Date of Service: 7/28/2023 I Hospital Day: 0      Assessment/Plan   * Hemodialysis catheter infection ([address])  Assessment & Plan  · POA 2 day hx of purulent discharge, edema along HD cath site. No fevers, chills however pain and discomfort. · Hemodynamically stable, afebrile no leucocytosis. · At the Ed receive on dose of Zosyn  · Upon exam edma present, no erythema appreciated. Purulent discharge somewhat foul smelling  · Plan  · IR consulted by ED possible removal of cath  · Nepro consulted HD today prior to removal  · ID consulted appreciate recommendations  · Continue with Vancomycin renally dose after HD  · Pharmacy Consult  · Continue Tylenol 975mg fevers and pain  · F/U Blood Cultures  · CBC am    ESRD (end stage renal disease) on dialysis [facility])  Assessment & Plan  Lab Results   Component Value Date    EGFR 5 07/28/2023    EGFR 8 11/20/2019    EGFR 10 11/19/2019    CREATININE 8.97 (H) 07/28/2023    CREATININE 6.81 (H) 11/20/2019    CREATININE 5.61 (H) 11/19/2019   · Hx ESRD due to Polycystic kydney disease on HD. Previously PD for 2 years however recurrent infections for which later transitioned to HD. · Dialysis over [facility] MWF schedule with per chart review hx of non compliance, as per patient only handling less than 3 hours due to anxiety. · Baseline creatine per chart reviewed variable 6-9    · Plan  · Nephrology consulted appreciate recommendations  · Most likely will get HD dialysis today, given HD cath likely will discontinue HD holiday throughout the weekend until permacath versus temp pleural cath will place afterwards pending clinical course.   · Outpatient binders sevelamer 800 mg 3 times daily, will continue while inpatient  · Continue calcitriol 0.75 mcg 3 times daily as per nephrology recommendations  · Renal function panel am  · Phosphorus    Hypertension  Assessment & Plan  · History hypertension  · Home regimen torsemide 40 mg twice daily, Toprol-XL 25 mg once a day, midodrine  · Patient has been off medications for months now given inaccessibility on the outpatient setting. · Blood Pressure: (!) 160/104     · Plan  · We will resume torsemide 40 mg twice daily, Toprol-XL  · Hydralazine 10 mg every 4 hours as needed for SBP >180, DBP >120      Chronic venous stasis dermatitis of both lower extremities  Assessment & Plan  · History of chronic venostasis, necrotizing fasciitis  · Follows podiatry and wound care in the outpatient setting  · No active infection at this time      HFrEF Dilated cardiomyopathy [facility])  Assessment & Plan  · History dilated cardiomyopathy  · Last echo during nuclear stress test October 2021 significant for:  · LVEF 19%, cardiomyopathy with wall motion abnormalities. · Repeat echo  · Resume previous outpatient medications Toprol-XL 25 mg once a day, torsemide 40 mg twice daily         VTE Pharmacologic Prophylaxis: VTE Score: 7 High Risk (Score >/= 5) - Pharmacological DVT Prophylaxis Ordered: heparin. Sequential Compression Devices Ordered. Code Status: Level 1 - Full Code patient  Discussion with family: Patient declined call to . Anticipated Length of Stay: Patient will be admitted on an inpatient basis with an anticipated length of stay of greater than 2 midnights secondary to HD catheter infection. Chief Complaint: Swelling, purulent discharge, malfunctioning HD catheter. History of Present Illness:  Anay Vargas is a 66 y.o. male with a PMH of end-stage renal disease on hemodialysis, hypertension, cardiomyopathy, A-fib not on anticoagulation presents with purulent drainage from around PermCath site.   Patient endorsed that gets dialysis at [facility] on MWF scheduled, last hemodialysis Wednesday there were some drainage around the catheter site but thought to be as normal.  Today catheter side has been actively draining purulent discharge with foul-smelling. Patient denies any fevers, chills although does endorse discomfort around catheter site and swelling although denied any worsening. Patient endorsed that was previously worked up over the past 2 to 3 years for blood possibility on kidney transplant list however due to worsening lower extremity ulcers was unable to continue with work-up. Patient endorsed that his lower extremity blisters have been healing appropriately no current open wounds and follows with podiatry and wound care on the outpatient setting. Patient endorsed that has been on dialysis over the past 8 years, previously on PD for which had difficulties in the past due to infection and subsequently was started on permacath since then. Has been off for multiple occasions vascular access placed however he has refused due to felt to be unnecessarily and feels that his permacath is sufficient enough to continue dialysis. Patient currently denies any worsening shortness of breath although does endorse orthopnea, shortness of breath on exertion however denies any lower extremity edema. Patient does endorse that his abdomen gets distended on and off especially on the days he missed dialysis however not worsening recently. Currently denies any lightheadedness, dizziness, chest pain, palpitations, shortness of breath as well as no abdominal pain or urinary symptoms. Of note patient was last seen by cardiology on the outpatient setting back in 2021 for possibility to get into kidney transplant list.  At the time had a nuclear stress test which did showed severe cardiomyopathy, reduced ejection fraction. Last seen by Dr. Dorian Hernandez on the outpatient setting as per notes patient was refusing actively treatment at the time.     During my encounter patient endorsed that currently does not have Medicare part D insurance for medications and has been off outpatient medications for months in 2 years now. Currently only taking acetoacetate and vitamin D. Review of Systems:  Review of Systems   Constitutional: Negative for chills and fever. HENT: Negative for ear pain and sore throat. Eyes: Negative for pain and visual disturbance. Respiratory: Positive for shortness of breath. Negative for cough. Shortness of breath on exertion. Orthopnea   Cardiovascular: Negative for chest pain and palpitations. Gastrointestinal: Positive for abdominal distention. Negative for abdominal pain, constipation, diarrhea, nausea and vomiting. Genitourinary: Negative for dysuria and hematuria. Musculoskeletal: Negative for arthralgias and back pain. Skin: Negative for color change and rash. Swelling, purulent discharge from HD cath. Neurological: Negative for dizziness, seizures, syncope and light-headedness. All other systems reviewed and are negative.       Past Medical and Surgical History:   Past Medical History:   Diagnosis Date   • Complication of renal dialysis    • Polycystic kidney disease        Past Surgical History:   Procedure Laterality Date   • IR CATHETERGRAM  10/17/2019   • IR IMAGE GUIDED ASPIRATION / DRAINAGE  9/23/2019   • IR PARACENTESIS  10/9/2019   • IR TUNNELED CENTRAL LINE CHECK/CHANGE/REPOSITION  12/14/2021   • IR TUNNELED DIALYSIS CATHETER CHECK/CHANGE/REPOSITION/ANGIOPLASTY  10/17/2019   • IR TUNNELED DIALYSIS CATHETER PLACEMENT  9/30/2019   • SPLIT THICKNESS SKIN GRAFT Bilateral 11/7/2019    Procedure: SKIN GRAFT SPLIT THICKNESS (STSG)  EXTREMITY;  Surgeon: Yana Paniagua MD;  [facility];  Service: Plastics   • SPLIT THICKNESS SKIN GRAFT Bilateral 11/12/2019    Procedure: SKIN GRAFT SPLIT THICKNESS (STSG)  EXTREMITY;  Surgeon: Yana Paniagua MD;  [facility];  Service: Plastics   • VAC DRESSING APPLICATION Bilateral 46[birth year]02    Procedure: CHANGE DRESSING;  Surgeon: Yana Paniagua MD; [facility];  Service: Plastics   • WOUND DEBRIDEMENT Bilateral 10/31/2019    Procedure: DEBRIDEMENT WOUND [facility]); Surgeon: Kari Burrell DPM;  [facility];  Service: Podiatry   • WOUND DEBRIDEMENT Bilateral 11/5/2019    Procedure: DEBRIDEMENT WOUND [facility]); Surgeon: Kari Burrell DPM;  [facility];  Service: Podiatry   • WOUND DEBRIDEMENT Bilateral 11/7/2019    Procedure: DEBRIDEMENT WOUND [facility]); Surgeon: Katherine Garcia MD;  [facility];  Service: Plastics   • WOUND DEBRIDEMENT Bilateral 11/12/2019    Procedure: DEBRIDEMENT LOWER EXTREMITY [facility]); Surgeon: Katherine Garcia MD;  [facility];  Service: Plastics       Meds/Allergies:  Prior to Admission medications    Medication Sig Start Date End Date Taking?  Authorizing Provider   calcium acetate (PHOSLO) 667 mg capsule Take 1,334 mg by mouth 3 (three) times a day with meals   Yes Historical Provider, MD   cholecalciferol (VITAMIN D3) 1,000 units tablet Take 1,000 Units by mouth daily   Yes Historical Provider, MD   B Complex-C-Folic Acid (TRIPHROCAPS PO) Take 1 capsule by mouth daily  Patient not taking: Reported on 9/27/2021    Historical Provider, MD   b complex-vitamin C-folic acid (NEPHROCAPS) 1 mg capsule Take 1 mg by mouth   Patient not taking: Reported on 7/28/2023 4/15/14   Historical Provider, MD   lanthanum (FOSRENOL) 500 mg chewable tablet Chew  Patient not taking: Reported on 7/28/2023    Historical Provider, MD   metoprolol succinate (TOPROL-XL) 25 mg 24 hr tablet Take 1 tablet (25 mg total) by mouth daily  Patient not taking: Reported on 9/27/2021 2/26/20   Shauna Ramirez DO   metoprolol tartrate (LOPRESSOR) 25 mg tablet Take 0.5 tablets (12.5 mg total) by mouth every 12 (twelve) hours  Patient not taking: Reported on 2/14/2020 1/9/20 2/8/20  Marly Vargas MD   midodrine (PROAMATINE) 5 mg tablet midodrine 5 mg tablet  Patient not taking: No sig reported Historical Provider, MD   Multiple Vitamin (MULTIVITAMIN) tablet Take 1 tablet by mouth daily  Patient not taking: Reported on 7/28/2023    Historical Provider, MD   NON FORMULARY Hemodialysis Monday, Wednesday, Friday    Historical Provider, MD   nystatin (MYCOSTATIN) powder Nyamyc 100,000 unit/gram topical powder  Patient not taking: Reported on 7/28/2023    Historical Provider, MD   omeprazole (PriLOSEC OTC) 20 MG tablet Take 1 tablet (20 mg total) by mouth daily  Patient not taking: Reported on 2/14/2020 10/3/19   Ronnie Kulkarni MD   polyethylene glycol (GLYCOLAX) powder MIX AND DRINK 17 GRAMS BY MOUTH ONCE A DAY FOR CONSTIPATION  Patient not taking: No sig reported 1/3/20   Historical Provider, MD   polyethylene glycol (MIRALAX) 17 g packet Take 17 g by mouth 2 (two) times a day  Patient not taking: Reported on 2/14/2020 11/21/19   Bailey Villalobos MD   senna-docusate sodium (SENOKOT S) 8.6-50 mg per tablet Take 2 tablets by mouth 2 (two) times a day for 10 days  Patient not taking: Reported on 1/9/2020 11/21/19 12/1/19  Bailey Villalobos MD   sevelamer (RENAGEL) 800 mg tablet Take 1 tablet (800 mg total) by mouth 3 (three) times a day with meals for 10 days  Patient not taking: Reported on 1/9/2020 10/15/19 10/25/19  Gaviota Brown MD   sevelamer carbonate (RENVELA) 800 mg tablet sevelamer carbonate 800 mg tablet  Patient not taking: No sig reported    Historical Provider, MD   simethicone (MYLICON) 80 mg chewable tablet Chew 1 tablet (80 mg total) every 6 (six) hours as needed for flatulence  Patient not taking: Reported on 2/14/2020 10/15/19   Gaviota Brown MD   Sucroferric Oxyhydroxide 500 MG CHEW Velphoro 500 mg chewable tablet  Patient not taking: No sig reported    Historical Provider, MD   torsemide (DEMADEX) 20 mg tablet Take 2 tablets (40 mg total) by mouth 2 (two) times a day  Patient not taking: Reported on 1/9/2020 11/21/19 12/21/19  Bailey Villalobos MD   valACYclovir (VALTREX) 500 mg tablet Take 1 tablet (500 mg total) by mouth daily for 10 days  Patient not taking: Reported on 9/27/2021 3/11/20 3/21/20  Alan Stanley MD   vitamin E, tocopherol, 200 units capsule Take by mouth  Patient not taking: Reported on 9/27/2021    Historical Provider, MD     I have reviewed home medications with patient personally. Allergies: Allergies   Allergen Reactions   • Sucroferric Oxyhydroxide Other (See Comments)   • Chlorhexidine Other (See Comments)   • Heparin Other (See Comments)     Brings plaletes down   • Other Other (See Comments)       Social History:  Marital Status: /Civil Union   Occupation: Disabled  Patient Pre-hospital Living Situation: With spouse  Patient Pre-hospital Level of Mobility: walks  Patient Pre-hospital Diet Restrictions: Renal restricted. Substance Use History:   Social History     Substance and Sexual Activity   Alcohol Use Not Currently     Social History     Tobacco Use   Smoking Status Never   Smokeless Tobacco Never     Social History     Substance and Sexual Activity   Drug Use Not Currently       Family History:  History reviewed. No pertinent family history. Physical Exam:     Vitals:   Blood Pressure: (!) 160/104 (07/28/23 1209)  Pulse: 101 (07/28/23 0627)  Temperature: 98.2 °F (36.8 °C) (07/28/23 1209)  Temp Source: Oral (07/28/23 4883)  Respirations: 18 (07/28/23 1209)  Height: 5' 8" (172.7 cm) (07/28/23 1209)  Weight - Scale: 81.2 kg (179 lb 0.2 oz) (07/28/23 1201)  SpO2: 99 % (07/28/23 9306)    Physical Exam  Vitals and nursing note reviewed. Constitutional:       General: He is not in acute distress. Appearance: He is well-developed. He is not ill-appearing. Comments: Frail, temporal muscle wasting. HENT:      Head: Normocephalic and atraumatic. Right Ear: External ear normal.      Left Ear: External ear normal.      Nose: Nose normal.      Mouth/Throat:      Mouth: Mucous membranes are moist.   Eyes:      General: No scleral icterus. Extraocular Movements: Extraocular movements intact. Conjunctiva/sclera: Conjunctivae normal.      Pupils: Pupils are equal, round, and reactive to light. Cardiovascular:      Rate and Rhythm: Normal rate and regular rhythm. Pulses: Normal pulses. Heart sounds: Normal heart sounds. No murmur heard. Pulmonary:      Effort: Pulmonary effort is normal. No respiratory distress. Breath sounds: Normal breath sounds. Chest:      Chest wall: No tenderness. Abdominal:      General: Bowel sounds are normal. There is distension. Palpations: Abdomen is soft. Tenderness: There is no abdominal tenderness. There is no right CVA tenderness or left CVA tenderness. Musculoskeletal:         General: No swelling. Cervical back: Neck supple. Right lower leg: No edema. Left lower leg: No edema. Comments: Bilateral lower extremities chronic venous stasis changes with scarring, no significant active wounds. Skin:     General: Skin is warm and dry. Capillary Refill: Capillary refill takes less than 2 seconds. Neurological:      Mental Status: He is alert.    Psychiatric:         Mood and Affect: Mood normal.          Additional Data:     Lab Results:  Results from last 7 days   Lab Units 07/28/23  0655   WBC Thousand/uL 5.68   HEMOGLOBIN g/dL 12.7   HEMATOCRIT % 40.2   PLATELETS Thousands/uL 104*   NEUTROS PCT % 78*   LYMPHS PCT % 10*   MONOS PCT % 8   EOS PCT % 2     Results from last 7 days   Lab Units 07/28/23  0655   SODIUM mmol/L 137   POTASSIUM mmol/L 5.4*   CHLORIDE mmol/L 92*   CO2 mmol/L 29   BUN mg/dL 81*   CREATININE mg/dL 8.97*   ANION GAP mmol/L 16   CALCIUM mg/dL 10.4*   ALBUMIN g/dL 4.4   TOTAL BILIRUBIN mg/dL 1.11*   ALK PHOS U/L 108*   ALT U/L 8   AST U/L 5*   GLUCOSE RANDOM mg/dL 107                       Lines/Drains:  Invasive Devices     Peripheral Intravenous Line  Duration           Peripheral IV 07/28/23 Right Antecubital <1 day Hemodialysis Catheter  Duration           HD Permanent Double Catheter 590 days                    Imaging: No pertinent imaging reviewed. IR tunneled dialysis catheter removal    (Results Pending)   IR temporary dialysis catheter placement    (Results Pending)       EKG and Other Studies Reviewed on Admission:   · EKG: No EKG obtained. ** Please Note: This note has been constructed using a voice recognition system.  **

## 2023-07-28 NOTE — PLAN OF CARE
Problem: Potential for Falls  Goal: Patient will remain free of falls  Description: INTERVENTIONS:  - Educate patient/family on patient safety including physical limitations  - Instruct patient to call for assistance with activity   - Consult OT/PT to assist with strengthening/mobility   - Keep Call bell within reach  - Keep bed low and locked with side rails adjusted as appropriate  - Keep care items and personal belongings within reach  - Initiate and maintain comfort rounds  - Make Fall Risk Sign visible to staff  - Obtain necessary fall risk management equipment  - Apply yellow socks and bracelet for high fall risk patients  - Consider moving patient to room near nurses station  Outcome: Progressing     Problem: PAIN - ADULT  Goal: Verbalizes/displays adequate comfort level or baseline comfort level  Description: Interventions:  - Encourage patient to monitor pain and request assistance  - Assess pain using appropriate pain scale  - Administer analgesics based on type and severity of pain and evaluate response  - Implement non-pharmacological measures as appropriate and evaluate response  - Consider cultural and social influences on pain and pain management  - Notify physician/advanced practitioner if interventions unsuccessful or patient reports new pain  Outcome: Progressing     Problem: INFECTION - ADULT  Goal: Absence or prevention of progression during hospitalization  Description: INTERVENTIONS:  - Assess and monitor for signs and symptoms of infection  - Monitor lab/diagnostic results  - Monitor all insertion sites, i.e. indwelling lines, tubes, and drains  - Monitor endotracheal if appropriate and nasal secretions for changes in amount and color  - [place] appropriate cooling/warming therapies per order  - Administer medications as ordered  - Instruct and encourage patient and family to use good hand hygiene technique  - Identify and instruct in appropriate isolation precautions for identified infection/condition  Outcome: Progressing     Problem: SAFETY ADULT  Goal: Patient will remain free of falls  Description: INTERVENTIONS:  - Educate patient/family on patient safety including physical limitations  - Instruct patient to call for assistance with activity   - Consult OT/PT to assist with strengthening/mobility   - Keep Call bell within reach  - Keep bed low and locked with side rails adjusted as appropriate  - Keep care items and personal belongings within reach  - Initiate and maintain comfort rounds  - Make Fall Risk Sign visible to staff  - Obtain necessary fall risk management equipment  - Apply yellow socks and bracelet for high fall risk patients  - Consider moving patient to room near nurses station  Outcome: Progressing  Goal: Maintain or return to baseline ADL function  Description: INTERVENTIONS:  -  Assess patient's ability to carry out ADLs; assess patient's baseline for ADL function and identify physical deficits which impact ability to perform ADLs (bathing, care of mouth/teeth, toileting, grooming, dressing, etc.)  - Assess/evaluate cause of self-care deficits   - Assess range of motion  - Assess patient's mobility; develop plan if impaired  - Assess patient's need for assistive devices and provide as appropriate  - Encourage maximum independence but intervene and supervise when necessary  - Involve family in performance of ADLs  - Assess for home care needs following discharge   - Consider OT consult to assist with ADL evaluation and planning for discharge  - Provide patient education as appropriate  Outcome: Progressing  Goal: Maintains/Returns to pre admission functional level  Description: INTERVENTIONS:  - Perform BMAT or MOVE assessment daily.   - Set and communicate daily mobility goal to care team and patient/family/caregiver. - Collaborate with rehabilitation services on mobility goals if consulted  - Perform Range of Motion 3 times a day.   - Reposition patient every 2 hours.  - Dangle patient 3 times a day  - Stand patient 3 times a day  - Ambulate patient 3 times a day  - Out of bed to chair 3 times a day   - Out of bed for meals 3 times a day  - Out of bed for toileting  - Record patient progress and toleration of activity level   Outcome: Progressing     Problem: DISCHARGE PLANNING  Goal: Discharge to home or other facility with appropriate resources  Description: INTERVENTIONS:  - Identify barriers to discharge w/patient and caregiver  - Arrange for needed discharge resources and transportation as appropriate  - Identify discharge learning needs (meds, wound care, etc.)  - Arrange for interpretive services to assist at discharge as needed  - Refer to Case Management Department for coordinating discharge planning if the patient needs post-hospital services based on physician/advanced practitioner order or complex needs related to functional status, cognitive ability, or social support system  Outcome: Progressing     Problem: Knowledge Deficit  Goal: Patient/family/caregiver demonstrates understanding of disease process, treatment plan, medications, and discharge instructions  Description: Complete learning assessment and assess knowledge base.   Interventions:  - Provide teaching at level of understanding  - Provide teaching via preferred learning methods  Outcome: Progressing     Problem: SKIN/TISSUE INTEGRITY - ADULT  Goal: Incision(s), wounds(s) or drain site(s) healing without S/S of infection  Description: INTERVENTIONS  - Assess and document dressing, incision, wound bed, drain sites and surrounding tissue  - Provide patient and family education  - Perform skin care/dressing changes per order and as needed for soilage  Outcome: Progressing     Problem: METABOLIC, FLUID AND ELECTROLYTES - ADULT  Goal: Electrolytes maintained within normal limits  Description: INTERVENTIONS:  - Monitor labs and assess patient for signs and symptoms of electrolyte imbalances  - Administer electrolyte replacement as ordered  - Monitor response to electrolyte replacements, including repeat lab results as appropriate  - Instruct patient on fluid and nutrition as appropriate  Outcome: Progressing  Goal: Fluid balance maintained  Description: INTERVENTIONS:  - Monitor labs   - Monitor I/O and WT  - Instruct patient on fluid and nutrition as appropriate  - Assess for signs & symptoms of volume excess or deficit  Outcome: Progressing

## 2023-07-28 NOTE — CONSULTS
Consultation - Infectious Disease   Saranya Welsh 66 y.o. male MRN: [id number]  Unit/Bed#: S -01 Encounter: [id number]      IMPRESSION & RECOMMENDATIONS:   Impression/Recommendations:  1. Right chest PermCath exit site infection with abscess. Consider possibility of bacteremia. Fortunately, patient is afebrile and clinically stable. No leukocytosis. Agree with removal of PermCath today. As patient is clinically stable, would treat empirically with vancomycin monotherapy for now. -Start IV vancomycin, dosing recommendations per pharmacy.  -Follow-up pending blood cultures  -Plan for PermCath removal today. -Recommend surgery evaluation   -Follow temperatures and hemodynamics  -Follow CBC    2. ESRD on HD via right IJ PermCath, due to polycystic kidney disease. Now complicated by PermCath infection, as above. Plan for temporary HD catheter noted. -Dose adjust antibiotic with HD  -Nephrology follow-up ongoing  -Hold off on placement of new PermCath until we have negative blood cultures. 3.  Cardiomyopathy with EF 20%. 4.  Chronic bilateral lower extremity wounds. No apparent signs of acute infection. Outpatient podiatry follow-up. Antibiotics:  Zosyn x1  Vancomycin D1    Discussed above plan with primary service and with nephrology service, as well as with patient. Personally reviewed prior hospitalization records. Thank you for this consultation. We will follow along with you. HISTORY OF PRESENT ILLNESS:  Reason for Consult: PermCath exit site infection    HPI: Saranya Welsh is a 66 y.o. male with cardiomyopathy, A-fib, polycystic kidney disease, longstanding ESRD on HD via right chest wall PermCath who presented today with significant purulent drainage from permacath exit site that worsened over the past 24 hours. He also noticed an enlarging wound at exit site. Patient denies fevers, chills or other systemic complaints.   Patient was admitted for suspected PermCath infection. Leukocytosis. No documented fevers. He is going for PermCath removal today followed by placement of temporary HD catheter. Patient received dose of Zosyn in ER. Vancomycin has been ordered. He has no other focal complaints. REVIEW OF SYSTEMS:  A complete system-based review of systems is otherwise negative. PAST MEDICAL HISTORY:  Past Medical History:   Diagnosis Date   • Complication of renal dialysis    • Polycystic kidney disease      Past Surgical History:   Procedure Laterality Date   • IR CATHETERGRAM  10/17/2019   • IR IMAGE GUIDED ASPIRATION / DRAINAGE  9/23/2019   • IR PARACENTESIS  10/9/2019   • IR TUNNELED CENTRAL LINE CHECK/CHANGE/REPOSITION  12/14/2021   • IR TUNNELED DIALYSIS CATHETER CHECK/CHANGE/REPOSITION/ANGIOPLASTY  10/17/2019   • IR TUNNELED DIALYSIS CATHETER PLACEMENT  9/30/2019   • SPLIT THICKNESS SKIN GRAFT Bilateral 11/7/2019    Procedure: SKIN GRAFT SPLIT THICKNESS (STSG)  EXTREMITY;  Surgeon: Yefri Leal MD;  [facility];  Service: Plastics   • SPLIT THICKNESS SKIN GRAFT Bilateral 11/12/2019    Procedure: SKIN GRAFT SPLIT THICKNESS (STSG)  EXTREMITY;  Surgeon: Yefri Leal MD;  [facility];  Service: Plastics   • VAC DRESSING APPLICATION Bilateral 69/95/0697    Procedure: CHANGE DRESSING;  Surgeon: Yefri Leal MD;  [facility];  Service: Plastics   • WOUND DEBRIDEMENT Bilateral 10/31/2019    Procedure: DEBRIDEMENT WOUND [facility] OUT); Surgeon: David Waddell DPM;  [facility];  Service: Podiatry   • WOUND DEBRIDEMENT Bilateral 11/5/2019    Procedure: DEBRIDEMENT WOUND [facility] OUT); Surgeon: David Waddell DPM;  [facility];  Service: Podiatry   • WOUND DEBRIDEMENT Bilateral 11/7/2019    Procedure: DEBRIDEMENT WOUND [facility] OUT);   Surgeon: Yefri Leal MD;  [facility];  Service: Plastics   • WOUND DEBRIDEMENT Bilateral 11/12/2019    Procedure: Turner [place] LOWER EXTREMITY [facility] OUT); Surgeon: Yana Paniagua MD;  [facility];  Service: Plastics       FAMILY HISTORY:  Non-contributory    SOCIAL HISTORY:  Social History     Substance and Sexual Activity   Alcohol Use Not Currently     Social History     Substance and Sexual Activity   Drug Use Not Currently     Social History     Tobacco Use   Smoking Status Never   Smokeless Tobacco Never       ALLERGIES:  Allergies   Allergen Reactions   • Sucroferric Oxyhydroxide Other (See Comments)   • Chlorhexidine Other (See Comments)   • Heparin Other (See Comments)     Brings plaletes down   • Other Other (See Comments)       MEDICATIONS:  All current active medications have been reviewed. PHYSICAL EXAM:  Vitals:  Temp:  [97.8 °F (36.6 °C)-98.2 °F (36.8 °C)] 98.2 °F (36.8 °C)  HR:  [101] 101  Resp:  [18] 18  BP: (144-170)/() 160/104  SpO2:  [99 %] 99 %  Temp (24hrs), Av.9 °F (36.6 °C), Min:97.8 °F (36.6 °C), Max:98.2 °F (36.8 °C)  Current: Temperature: 98.2 °F (36.8 °C)     Physical Exam:  General:  Well-nourished, well-developed, in no acute distress, nontoxic, sitting comfortably in chair  Eyes:  Conjunctive clear with no hemorrhages or effusions  Oropharynx:  No ulcers, no lesions  Neck:  Supple, trachea midline  Lungs:  Clear to auscultation bilaterally, no accessory muscle use  Right chest wall PermCath with large open wound at exit site with copious purulent drainage. There is also a fluctuant mildly tender area just proximal to exit site. Cardiac:  Regular rate and rhythm, no murmurs  Abdomen:  Soft, non-tender, non-distended  Extremities: Chronic venous stasis changes. No open draining wounds or cellulitis. Skin:  No diffuse rashes  Neurological:  Moves all four extremities spontaneously    LABS, IMAGING, & OTHER STUDIES:  Lab Results:  I have personally reviewed pertinent labs.   Results from last 7 days   Lab Units 23  0655   POTASSIUM mmol/L 5.4*   CHLORIDE mmol/L 92*   CO2 mmol/L 29   BUN mg/dL 81*   CREATININE mg/dL 8.97*   EGFR ml/min/1.73sq m 5   CALCIUM mg/dL 10.4*   AST U/L 5*   ALT U/L 8   ALK PHOS U/L 108*     Results from last 7 days   Lab Units 07/28/23  0655   WBC Thousand/uL 5.68   HEMOGLOBIN g/dL 12.7   PLATELETS Thousands/uL 104*     Results from last 7 days   Lab Units 07/28/23  0655   BLOOD CULTURE  Received in Microbiology Lab. Culture in Progress. Received in Microbiology Lab. Culture in Progress. Imaging Studies:   I have personally reviewed pertinent imaging study reports and images in PACS. EKG, Pathology, and Other Studies:   I have personally reviewed pertinent reports.

## 2023-07-28 NOTE — CONSULTS
[address] [facility] 66 y.o. male MRN: [id number]  Unit/Bed#: S -59 Encounter: [id number]    ASSESSMENT and PLAN:    ESRD on hemodialysis:  · Outpatient maintenance treatments at [address]  · Treatment days: Monday, Wednesday, Friday  · Negative disease ADPKD  · Target weight 81 kg  · Missed treatment today. Sent to the emergency room due to concern for PermCath infection. · Patient reports that he is intolerant of treatment beyond 3 hours due to anxiety. From review of outpatient records significant history of noncompliance. · Discussed plan of care with primary service regarding dialysis since line will likely have to be removed and temporary dialysis catheter placed  · Plan for hemodialysis treatment on 7/29  · Waiting final recs from infectious disease service. · Spoke with IR regarding removal of perm cath and insertion of temporary catheter    Access:  · Right IJ PermCath  · PermCath infection: Purulent drainage from exit site with swelling and tenderness proximal to entrance site. Foul odor noted  · Started on Zosyn  · Picture on chart  · Blood cultures pending  · No leukocytosis or fever  · Echocardiogram planned  · Awaiting ID consult  · IR consulted for removal of PermCath    Hyperkalemia:  · Potassium 5.4. Potassium chronically elevated per outpatient records. Noncompliant  · Start Veltassa daily  · Renal diet/low potassium diet    Hypertension/volume:  · Blood pressure elevated: Last reading 160/104. · Outpatient medications: Metoprolol. Torsemide  · From review of outpatient records noncompliant with medications    HFrEF/dilated cardiomyopathy:  · Prior echocardiogram ejection fraction 20%. · Scheduled for echocardiogram today  · Does not appear to be decompensated at this time.   Volume removal as tolerated on dialysis    Chronic lower extremity wounds:  · Follows with podiatry  · No open areas at this time  · Severe scarring of lower extremities with prior episodes of cellulitis, ulceration, blistering. No open areas at this time. CKD-MBD/secondary hyperparathyroidism of renal origin:  · On calcitriol 0.75 mcg 3 times a week  · Phosphorus uncontrolled, 9.2. From review of outpatient records -binder: On sevelamer, Velphoro. · Long history of medical noncompliance and hyperphosphatemia    Anemia in ESRD:  · Currently not on GRISELDA  · Last outpatient hemoglobin at target or above target  · Last iron studies acceptable    Other problems: Diabetes mellitus, polycystic kidney disease, atrial fibrillation    HISTORY OF PRESENT ILLNESS:  Requesting Physician: Guillermo Valadez DO  Reason for Consult: ESRD on hemodialysis    Evonne Lee is a 66 y.o. male with a past medical history of end-stage renal disease on hemodialysis, medical noncompliance who was admitted to [facility] After presenting with purulent drainage from around PermCath site. No fevers or chills reported, nontoxic-appearing. Patient reports that Wednesday there was some drainage around the catheter but he seems to dismiss it as being normal.  Today the exit site is actively draining purulent material.  There is a foul smell. There is swelling above the entrance of the PermCath proximal to insertion and tenderness. Patient has dialysis Monday, Wednesday, Friday he missed treatment earlier today. No acute shortness of breath. He reports that he has ongoing issues with blistering of lower extremities which are actually healing at this time but no open wounds. A renal consultation is requested today for assistance in the management of ESRD on hemodialysis. Despite being on dialysis for 8 years he continues to have a PermCath in place. Refuses to have vascular access placed.     PAST MEDICAL HISTORY:  Past Medical History:   Diagnosis Date   • Complication of renal dialysis    • Polycystic kidney disease        PAST SURGICAL HISTORY:  Past Surgical History:   Procedure Laterality Date   • IR CATHETERGRAM  10/17/2019   • IR IMAGE GUIDED ASPIRATION / DRAINAGE  9/23/2019   • IR PARACENTESIS  10/9/2019   • IR TUNNELED CENTRAL LINE CHECK/CHANGE/REPOSITION  12/14/2021   • IR TUNNELED DIALYSIS CATHETER CHECK/CHANGE/REPOSITION/ANGIOPLASTY  10/17/2019   • IR TUNNELED DIALYSIS CATHETER PLACEMENT  9/30/2019   • SPLIT THICKNESS SKIN GRAFT Bilateral 11/7/2019    Procedure: SKIN GRAFT SPLIT THICKNESS (STSG)  EXTREMITY;  Surgeon: Mark Trevino MD;  [facility];  Service: Plastics   • SPLIT THICKNESS SKIN GRAFT Bilateral 11/12/2019    Procedure: SKIN GRAFT SPLIT THICKNESS (STSG)  EXTREMITY;  Surgeon: Mark Trevino MD;  [facility];  Service: Plastics   • VAC DRESSING APPLICATION Bilateral 86/95/4721    Procedure: CHANGE DRESSING;  Surgeon: Mark Trevino MD;  [facility];  Service: Plastics   • WOUND DEBRIDEMENT Bilateral 10/31/2019    Procedure: DEBRIDEMENT WOUND [facility] OUT); Surgeon: Nay Us DPM;  [facility];  Service: Podiatry   • WOUND DEBRIDEMENT Bilateral 11/5/2019    Procedure: DEBRIDEMENT WOUND [facility] OUT); Surgeon: Nay Us DPM;  [facility];  Service: Podiatry   • WOUND DEBRIDEMENT Bilateral 11/7/2019    Procedure: DEBRIDEMENT WOUND [facility] OUT); Surgeon: Mark Trevino MD;  [facility];  Service: Plastics   • WOUND DEBRIDEMENT Bilateral 11/12/2019    Procedure: DEBRIDEMENT LOWER EXTREMITY [facility] OUT);   Surgeon: Mark Trevino MD;  [facility];  Service: Plastics       ALLERGIES:  Allergies   Allergen Reactions   • Sucroferric Oxyhydroxide Other (See Comments)   • Chlorhexidine Other (See Comments)   • Heparin Other (See Comments)     Brings plaletes down   • Other Other (See Comments)       SOCIAL HISTORY:  Social History     Substance and Sexual Activity   Alcohol Use Not Currently     Social History     Substance and Sexual Activity   Drug Use Not Currently     Social History     Tobacco Use   Smoking Status Never   Smokeless Tobacco Never       FAMILY HISTORY:  History reviewed. No pertinent family history. MEDICATIONS:    Current Facility-Administered Medications:   •  acetaminophen (TYLENOL) tablet 975 mg, 975 mg, Oral, Q8H [address], Teressa De La Rosa MD  •  naloxone (NARCAN) 0.04 mg/mL syringe 0.04 mg, 0.04 mg, Intravenous, Q1MIN PRN, Teressa De La Rosa MD  •  oxyCODONE (ROXICODONE) IR tablet 5 mg, 5 mg, Oral, Q4H PRN, Teressa De La Rosa MD  •  oxyCODONE (ROXICODONE) split tablet 2.5 mg, 2.5 mg, Oral, Q4H PRN, Teressa De La Rosa MD  •  senna-docusate sodium (SENOKOT S) 8.6-50 mg per tablet 1 tablet, 1 tablet, Oral, HS, Teressa De La Rosa MD    REVIEW OF SYSTEMS:  Constitutional: Denies fevers or chills  HENT: Negative for postnasal drip  Eyes: Negative for visual disturbance. Respiratory: Negative for cough, shortness of breath and wheezing. Cardiovascular: Negative for chest pain, palpitations  Gastrointestinal: Negative for abdominal pain, constipation, diarrhea, nausea and vomiting. Genitourinary: No dysuria, hematuria  Musculoskeletal: No unusual arthralgia or myalgia  Skin: Negative for rash. Skin over lower extremities bilaterally is friable appearing. Reports history of numerous wounds, blisters, infection. Neurological: Negative for focal weakness, headaches, dizziness. Hematological: Negative for  bleeding. Psychiatric/Behavioral: Negative for confusion   All the systems were reviewed and were negative except as documented on the HPI.     PHYSICAL EXAM:  Current Weight: Weight - Scale: 81.2 kg (179 lb 0.2 oz)  First Weight: Weight - Scale: 81.2 kg (179 lb 0.2 oz)  Vitals:    07/28/23 0627 07/28/23 0849 07/28/23 1201   BP: 170/99 (!) 144/101    BP Location: Right arm     Pulse: 101     Resp: 18 18    Temp: 97.8 °F (36.6 °C) 97.8 °F (36.6 °C)    TempSrc: Oral     SpO2: 99%     Weight:   81.2 kg (179 lb 0.2 oz)     No intake or output data in the 24 hours ending 07/28/23 1205  Physical Exam  Vitals reviewed. Constitutional:       General: He is not in acute distress. Appearance: He is well-developed. He is ill-appearing. He is not toxic-appearing or diaphoretic. HENT:      Head: Normocephalic and atraumatic. Nose: No congestion. Mouth/Throat:      Mouth: Mucous membranes are moist.      Pharynx: No oropharyngeal exudate. Eyes:      Extraocular Movements: Extraocular movements intact. Conjunctiva/sclera: Conjunctivae normal.   Neck:      Thyroid: No thyromegaly. Vascular: No carotid bruit or JVD. Trachea: No tracheal deviation. Cardiovascular:      Rate and Rhythm: Normal rate. Rhythm irregular. Heart sounds: No murmur heard. No friction rub. No gallop. Pulmonary:      Effort: Pulmonary effort is normal.      Breath sounds: Normal breath sounds. Chest:      Chest wall: Swelling present. Comments: Swelling proximal to PermCath insertion with significant purulent drainage and foul odor  Abdominal:      General: Abdomen is protuberant. Bowel sounds are normal. There is no distension. Palpations: Abdomen is soft. There is no mass. Tenderness: There is no abdominal tenderness. There is no guarding or rebound. Musculoskeletal:         General: Tenderness present. Normal range of motion. Cervical back: Normal range of motion and neck supple. No rigidity or tenderness. Right lower leg: No edema. Left lower leg: No edema. Comments: Right and left lower extremity extremely scarred from prior insult: History of cellulitis, wound ulcers, infection   Skin:     General: Skin is warm and dry. Capillary Refill: Capillary refill takes less than 2 seconds. Coloration: Skin is not jaundiced or pale. Findings: No erythema or rash. Neurological:      Mental Status: He is alert and oriented to person, place, and time.    Psychiatric:         Mood and Affect: Mood normal.         Behavior: Behavior normal.         Thought Content:  Thought content normal.         Judgment: Judgment normal.           Invasive Devices:      Lab Results:   Results from last 7 days   Lab Units 07/28/23  0655   WBC Thousand/uL 5.68   HEMOGLOBIN g/dL 12.7   HEMATOCRIT % 40.2   PLATELETS Thousands/uL 104*   POTASSIUM mmol/L 5.4*   CHLORIDE mmol/L 92*   CO2 mmol/L 29   BUN mg/dL 81*   CREATININE mg/dL 8.97*   CALCIUM mg/dL 10.4*   MAGNESIUM mg/dL 2.5   PHOSPHORUS mg/dL 9.2*   ALK PHOS U/L 108*   ALT U/L 8   AST U/L 5*     Other Studies:

## 2023-07-28 NOTE — CONSULTS
Interventional Radiology  Consultation 7/28/2023     Consults  Cira Wise   1960   [id number]      Assessment/Plan:  66year old male with ESRD on HD. Presents with infected IJ line. IR consulted for removal of tunneled line. The patient will require dialysis tomorrow and nephrology has asked us to also place a temp HD line in.     Medical Problems     Problem List     * (Principal) Hemodialysis catheter infection ([address])    Chronic kidney disease with end stage renal failure on dialysis [facility]) (Chronic)    Lab Results   Component Value Date    EGFR 5 07/28/2023    EGFR 8 11/20/2019    EGFR 10 11/19/2019    CREATININE 8.97 (H) 07/28/2023    CREATININE 6.81 (H) 11/20/2019    CREATININE 5.61 (H) 11/19/2019         Hyponatremia    Multiple and open wound of lower limb    Polycystic liver disease (Chronic)    Total bilirubin, elevated    Thrombocytopenia (HCC)    Gram-negative bacteremia    Polycystic kidney disease (Chronic)    ADPKD (autosomal dominant polycystic kidney) (Chronic)    Hypertension    Peripheral neuropathy (Chronic)    Paroxysmal atrial fibrillation (HCC)    Hypotension (Chronic)    Anemia due to chronic kidney disease, on chronic dialysis and Acute Blood Loss Anemia (HCC)    Lab Results   Component Value Date    EGFR 5 07/28/2023    EGFR 8 11/20/2019    EGFR 10 11/19/2019    CREATININE 8.97 (H) 07/28/2023    CREATININE 6.81 (H) 11/20/2019    CREATININE 5.61 (H) 11/19/2019         Ileus (HCC)    Ascites    Elevated d-dimer    ESRD (end stage renal disease) on dialysis [facility])    Lab Results   Component Value Date    EGFR 5 07/28/2023    EGFR 8 11/20/2019    EGFR 10 11/19/2019    CREATININE 8.97 (H) 07/28/2023    CREATININE 6.81 (H) 11/20/2019    CREATININE 5.61 (H) 11/19/2019         Hyperkalemia    Scrotal swelling    ESRD (end stage renal disease) ([address])    Lab Results   Component Value Date    EGFR 5 07/28/2023    EGFR 8 11/20/2019    EGFR 10 11/19/2019    CREATININE 8.97 (H) 07/28/2023 CREATININE 6.81 (H) 11/20/2019    CREATININE 5.61 (H) 11/19/2019         At Risk for Pressure ulcer, buttock    Wound of right leg    Overview Signed 10/30/2019 12:48 PM by Sherron Blake DPM     Added automatically from request for surgery [id number]         Wound of left leg    Overview Signed 10/30/2019 12:48 PM by Sherron Blake DPM     Added automatically from request for surgery [id number]         Constipation    Multiple wounds of skin    Overview Signed 11/7/2019  2:49 PM by Zuri Juan MD     Added automatically from request for surgery [id number]         Cellulitis    Overview Signed 11/12/2019  9:06 AM by Zuri Juan MD     Added automatically from request for surgery [id number]         Oropharyngeal dysphagia    GERD (gastroesophageal reflux disease)    Systolic dysfunction    Pruritus    Insomnia    Chronic hepatic failure without coma ([address])    Paresis ([address])    Hypoproteinemia ([address])    Screening for HIV without presence of risk factors    Heart failure with reduced ejection fraction due to cardiomyopathy ([address])    Wt Readings from Last 3 Encounters:   07/28/23 81.2 kg (179 lb 0.2 oz)   03/10/22 90 kg (198 lb 6.6 oz)   11/02/21 100 kg (220 lb 6 oz)                 Dilated cardiomyopathy ([address])    Type 2 diabetes mellitus with stage 5 chronic kidney disease not on chronic dialysis, with long-term current use of insulin ([address])    No results found for: "HGBA1C"    No results for input(s): "POCGLU" in the last 72 hours. Blood Sugar Average: Last 72 hrs:          Abnormal nuclear stress test    Venous stasis ulcer of left lower leg with edema of left lower leg (HCC)    Chronic venous stasis dermatitis of both lower extremities            Subjective:     Patient ID: Dain Navarro is a 66 y.o. male. History of Present Illness  66year old male with ESRD on HD. Presents with infected IJ line. IR consulted for removal of tunneled line.     Review of Systems  as above    Past Medical History:   Diagnosis Date   • Complication of renal dialysis    • Polycystic kidney disease         Past Surgical History:   Procedure Laterality Date   • IR CATHETERGRAM  10/17/2019   • IR IMAGE GUIDED ASPIRATION / DRAINAGE  9/23/2019   • IR PARACENTESIS  10/9/2019   • IR TUNNELED CENTRAL LINE CHECK/CHANGE/REPOSITION  12/14/2021   • IR TUNNELED DIALYSIS CATHETER CHECK/CHANGE/REPOSITION/ANGIOPLASTY  10/17/2019   • IR TUNNELED DIALYSIS CATHETER PLACEMENT  9/30/2019   • SPLIT THICKNESS SKIN GRAFT Bilateral 11/7/2019    Procedure: SKIN GRAFT SPLIT THICKNESS (STSG)  EXTREMITY;  Surgeon: Zuri Juan MD;  [facility];  Service: Plastics   • SPLIT THICKNESS SKIN GRAFT Bilateral 11/12/2019    Procedure: SKIN GRAFT SPLIT THICKNESS (STSG)  EXTREMITY;  Surgeon: Zuri Juan MD;  [facility];  Service: Plastics   • VAC DRESSING APPLICATION Bilateral 59/88/5682    Procedure: CHANGE DRESSING;  Surgeon: Zuri Juan MD;  [facility];  Service: Plastics   • WOUND DEBRIDEMENT Bilateral 10/31/2019    Procedure: DEBRIDEMENT WOUND [facility]); Surgeon: Sherron Blake DPM;  [facility];  Service: Podiatry   • WOUND DEBRIDEMENT Bilateral 11/5/2019    Procedure: DEBRIDEMENT WOUND [facility]); Surgeon: Sherron Blake DPM;  [facility];  Service: Podiatry   • WOUND DEBRIDEMENT Bilateral 11/7/2019    Procedure: DEBRIDEMENT WOUND [facility]); Surgeon: Zuri Juan MD;  [facility];  Service: Plastics   • WOUND DEBRIDEMENT Bilateral 11/12/2019    Procedure: DEBRIDEMENT LOWER EXTREMITY [facility]);   Surgeon: Zuri Juan MD;  [facility];  Service: Plastics        Social History     Tobacco Use   Smoking Status Never   Smokeless Tobacco Never        Social History     Substance and Sexual Activity   Alcohol Use Not Currently        Social History     Substance and Sexual Activity   Drug Use Not Currently Allergies   Allergen Reactions   • Sucroferric Oxyhydroxide Other (See Comments)   • Chlorhexidine Other (See Comments)   • Heparin Other (See Comments)     Brings plaletes down   • Other Other (See Comments)       Current Facility-Administered Medications   Medication Dose Route Frequency Provider Last Rate Last Admin   • acetaminophen (TYLENOL) tablet 975 mg  975 mg Oral [facility] Christelle Frost MD       • b complex-vitamin C-folic acid (RENAL) capsule 1 capsule  1 capsule Oral Daily With 7 Josh RIDLEY MD       • calcitriol (ROCALTROL) capsule 0.75 mcg  0.75 mcg Oral Once per day on Mon Wed Fri SAM Mcarthur       • calcium acetate (PHOSLO) capsule 1,334 mg  1,334 mg Oral TID With [address] [place] Renwick, MD       • cholecalciferol (VITAMIN D3) tablet 1,000 Units  1,000 Units Oral Daily Christelle Frost MD       • metoprolol succinate (TOPROL-XL) 24 hr tablet 25 mg  25 mg Oral Daily Christelle Frost MD       • multivitamin stress formula tablet 1 tablet  1 tablet Oral Daily Christelle Frost MD       • naloxone (NARCAN) 0.04 mg/mL syringe 0.04 mg  0.04 mg Intravenous Q1MIN PRN Christelle Frost MD       • oxyCODONE (ROXICODONE) IR tablet 5 mg  5 mg Oral Q4H PRN Christelle Frost MD       • oxyCODONE (ROXICODONE) split tablet 2.5 mg  2.5 mg Oral Q4H PRN Christelle Frost MD       • senna-docusate sodium (SENOKOT S) 8.6-50 mg per tablet 1 tablet  1 tablet Oral HS Christelle Frost MD       • sevelamer (RENAGEL) tablet 800 mg  800 mg Oral TID With [address] [place] Renwick, MD       • [facility]) chewable tablet 80 mg  80 mg Oral Q6H PRN Christelle Frost MD       • Sodium Zirconium Cyclosilicate (Lokelma) 10 g  10 g Oral Once SAM SUAREZ       • torsemide (DEMADEX) tablet 40 mg  40 mg Oral BID Christelle Frost MD       • vancomycin (VANCOCIN) 2,000 mg in sodium chloride 0.9 % 500 mL IVPB  25 mg/kg Intravenous Once Guillermo Valadez DO              Objective:    Vitals:    07/28/23 0627 07/28/23 0849 07/28/23 1201 07/28/23 1209   BP: 170/99 (!) 144/101  (!) 160/104   BP Location: Right arm      Pulse: 101      Resp: 18 18  18   Temp: 97.8 °F (36.6 °C) 97.8 °F (36.6 °C)  98.2 °F (36.8 °C)   TempSrc: Oral      SpO2: 99%      Weight:   81.2 kg (179 lb 0.2 oz)    Height:    5' 8" (1.727 m)        Physical Exam  Not performed    No results found for: "BNP"   Lab Results   Component Value Date    WBC 5.68 07/28/2023    HGB 12.7 07/28/2023    HCT 40.2 07/28/2023    MCV 99 (H) 07/28/2023     (L) 07/28/2023     Lab Results   Component Value Date    INR 1.08 10/26/2019    INR 1.34 (H) 10/06/2019    INR 1.42 (H) 10/03/2019    PROTIME 13.4 10/26/2019    PROTIME 15.9 (H) 10/06/2019    PROTIME 16.6 (H) 10/03/2019     Lab Results   Component Value Date    PTT 41 (H) 10/26/2019         I have personally reviewed pertinent imaging and laboratory results. Code Status: Level 1 - Full Code  Advance Directive and Living Will:      Power of :    POLST:      IR has been consulted to evaluate the patient, determine the appropriate procedure, and whether or not a procedure can and should be performed. Thank you for allowing me to participate in the care of Evonne Lee. Please don't hesitate to call, text, email, or TigerText with any questions. This text is generated with voice recognition software. There may be translation, syntax,  or grammatical errors. If you have any questions, please contact the dictating provider.

## 2023-07-28 NOTE — PROGRESS NOTES
Ligia Rader is a 66 y.o. male who is currently ordered Vancomycin IV with management by the Pharmacy Consult service. Relevant clinical data and objective / subjective history reviewed. Vancomycin Assessment:  Indication and Goal AUC/Trough: Soft tissue (goal -600, trough >10)  Micro:     Renal Function:  SCr: 8.97 mg/dL  CrCl: 8 mL/min  Renal replacement: HD  Days of Therapy: 1  Current Dose: 2 g IV once loading dose  Vancomycin Plan:  New Dosin mg IV administered after dialysis (MWF + any additional sessions)  Renal Function Monitoring: Daily BMP and UOP  Pharmacy will continue to follow closely for s/sx of nephrotoxicity, infusion reactions and appropriateness of therapy. BMP and CBC will be ordered per protocol. We will continue to follow the patient’s culture results and clinical progress daily.     Estela Hum

## 2023-07-28 NOTE — PLAN OF CARE
Problem: Potential for Falls  Goal: Patient will remain free of falls  Description: INTERVENTIONS:  - Educate patient/family on patient safety including physical limitations  - Instruct patient to call for assistance with activity   - Consult OT/PT to assist with strengthening/mobility   - Keep Call bell within reach  - Keep bed low and locked with side rails adjusted as appropriate  - Keep care items and personal belongings within reach  - Initiate and maintain comfort rounds  - Make Fall Risk Sign visible to staff  - Offer Toileting every 2 Hours, in advance of need  - Initiate/Maintain alarm  - Obtain necessary fall risk management equipment:   - Apply yellow socks and bracelet for high fall risk patients  - Consider moving patient to room near nurses station  7/28/2023 0850 by Anna Baker, RN  Outcome: Progressing  7/28/2023 0850 by Anna Baker, RN  Outcome: Progressing     Problem: PAIN - ADULT  Goal: Verbalizes/displays adequate comfort level or baseline comfort level  Description: Interventions:  - Encourage patient to monitor pain and request assistance  - Assess pain using appropriate pain scale  - Administer analgesics based on type and severity of pain and evaluate response  - Implement non-pharmacological measures as appropriate and evaluate response  - Consider cultural and social influences on pain and pain management  - Notify physician/advanced practitioner if interventions unsuccessful or patient reports new pain  Outcome: Progressing     Problem: INFECTION - ADULT  Goal: Absence or prevention of progression during hospitalization  Description: INTERVENTIONS:  - Assess and monitor for signs and symptoms of infection  - Monitor lab/diagnostic results  - Monitor all insertion sites, i.e. indwelling lines, tubes, and drains  - Monitor endotracheal if appropriate and nasal secretions for changes in amount and color  - [place] appropriate cooling/warming therapies per order  - Administer medications as ordered  - Instruct and encourage patient and family to use good hand hygiene technique  - Identify and instruct in appropriate isolation precautions for identified infection/condition  Outcome: Progressing     Problem: SAFETY ADULT  Goal: Patient will remain free of falls  Description: INTERVENTIONS:  - Educate patient/family on patient safety including physical limitations  - Instruct patient to call for assistance with activity   - Consult OT/PT to assist with strengthening/mobility   - Keep Call bell within reach  - Keep bed low and locked with side rails adjusted as appropriate  - Keep care items and personal belongings within reach  - Initiate and maintain comfort rounds  - Make Fall Risk Sign visible to staff  - Offer Toileting every 2 Hours, in advance of need  - Initiate/Maintain alarm  - Obtain necessary fall risk management equipment:   - Apply yellow socks and bracelet for high fall risk patients  - Consider moving patient to room near nurses station  7/28/2023 0850 by René Palencia RN  Outcome: Progressing  7/28/2023 0850 by René Palencia RN  Outcome: Progressing  Goal: Maintain or return to baseline ADL function  Description: INTERVENTIONS:  -  Assess patient's ability to carry out ADLs; assess patient's baseline for ADL function and identify physical deficits which impact ability to perform ADLs (bathing, care of mouth/teeth, toileting, grooming, dressing, etc.)  - Assess/evaluate cause of self-care deficits   - Assess range of motion  - Assess patient's mobility; develop plan if impaired  - Assess patient's need for assistive devices and provide as appropriate  - Encourage maximum independence but intervene and supervise when necessary  - Involve family in performance of ADLs  - Assess for home care needs following discharge   - Consider OT consult to assist with ADL evaluation and planning for discharge  - Provide patient education as appropriate  Outcome: Progressing  Goal: Maintains/Returns to pre admission functional level  Description: INTERVENTIONS:  - Perform BMAT or MOVE assessment daily.   - Set and communicate daily mobility goal to care team and patient/family/caregiver. - Collaborate with rehabilitation services on mobility goals if consulted  - Perform Range of Motion 2 times a day. - Reposition patient every 2 hours. - Dangle patient 2 times a day  - Stand patient 2 times a day  - Ambulate patient 2 times a day  - Out of bed to chair 2 times a day   - Out of bed for meals 2 times a day  - Out of bed for toileting  - Record patient progress and toleration of activity level   Outcome: Progressing     Problem: DISCHARGE PLANNING  Goal: Discharge to home or other facility with appropriate resources  Description: INTERVENTIONS:  - Identify barriers to discharge w/patient and caregiver  - Arrange for needed discharge resources and transportation as appropriate  - Identify discharge learning needs (meds, wound care, etc.)  - Arrange for interpretive services to assist at discharge as needed  - Refer to Case Management Department for coordinating discharge planning if the patient needs post-hospital services based on physician/advanced practitioner order or complex needs related to functional status, cognitive ability, or social support system  Outcome: Progressing     Problem: Knowledge Deficit  Goal: Patient/family/caregiver demonstrates understanding of disease process, treatment plan, medications, and discharge instructions  Description: Complete learning assessment and assess knowledge base.   Interventions:  - Provide teaching at level of understanding  - Provide teaching via preferred learning methods  Outcome: Progressing

## 2023-07-28 NOTE — ASSESSMENT & PLAN NOTE
· POA 2 day hx of purulent discharge, edema along HD cath site. No fevers, chills however pain and discomfort. · Hemodynamically stable, afebrile no leucocytosis. · At the Ed receive on dose of Zosyn  · Upon exam edma present, no erythema appreciated.  Purulent discharge somewhat foul smelling  · Plan  · IR consulted by ED possible removal of cath  · Nepro consulted HD today prior to removal  · ID consulted appreciate recommendations  · Continue with Vancomycin renally dose after HD  · Pharmacy Consult  · Continue Tylenol 975mg fevers and pain  · F/U Blood Cultures  · CBC am

## 2023-07-28 NOTE — ASSESSMENT & PLAN NOTE
· History dilated cardiomyopathy  · Last echo during nuclear stress test October 2021 significant for:  · LVEF 19%, cardiomyopathy with wall motion abnormalities.   · Repeat echo  · Resume previous outpatient medications Toprol-XL 25 mg once a day, torsemide 40 mg twice daily

## 2023-07-28 NOTE — ED PROVIDER NOTES
History  Chief Complaint   Patient presents with   • Medical Problem     Pt went to dialysis this morning and they said they could not do dialysis because the catheter is swollen and tender. 17-year-old male presenting to the emergency department with right chest dialysis catheter infection. Patient notes that he went to dialysis today and the asked him to come to ED as catheter site is infected. Patient notes that he has noticed some purulence recently and edema only this morning. Site is painful over the last day when it was not typically painful before that. Prior to Admission Medications   Prescriptions Last Dose Informant Patient Reported? Taking?    B Complex-C-Folic Acid (TRIPHROCAPS PO)  Self Yes No   Sig: Take 1 capsule by mouth daily   Patient not taking: Reported on 9/27/2021   Multiple Vitamin (MULTIVITAMIN) tablet  Self Yes No   Sig: Take 1 tablet by mouth daily   NON FORMULARY  Self Yes No   Sig: Hemodialysis Monday, Wednesday, Friday   Sucroferric Oxyhydroxide 500 MG CHEW  Self Yes No   Sig: Velphoro 500 mg chewable tablet   Patient not taking: No sig reported   b complex-vitamin C-folic acid (NEPHROCAPS) 1 mg capsule  Self Yes No   Sig: Take 1 mg by mouth    calcium acetate (PHOSLO) 667 mg capsule  Self Yes No   Sig: Take 1,334 mg by mouth 3 (three) times a day with meals   cholecalciferol (VITAMIN D3) 1,000 units tablet  Self Yes No   Sig: Take 1,000 Units by mouth daily   lanthanum (FOSRENOL) 500 mg chewable tablet  Self Yes No   Sig: Chew   metoprolol succinate (TOPROL-XL) 25 mg 24 hr tablet  Self No No   Sig: Take 1 tablet (25 mg total) by mouth daily   Patient not taking: Reported on 9/27/2021   metoprolol tartrate (LOPRESSOR) 25 mg tablet  Spouse/Significant Other No No   Sig: Take 0.5 tablets (12.5 mg total) by mouth every 12 (twelve) hours   Patient not taking: Reported on 2/14/2020   midodrine (PROAMATINE) 5 mg tablet  Self Yes No   Sig: midodrine 5 mg tablet   Patient not taking: No sig reported   nystatin (MYCOSTATIN) powder  Self Yes No   Sig: Nyamyc 100,000 unit/gram topical powder   omeprazole (PriLOSEC OTC) 20 MG tablet  Self No No   Sig: Take 1 tablet (20 mg total) by mouth daily   Patient not taking: Reported on 2/14/2020   polyethylene glycol (GLYCOLAX) powder  Self Yes No   Sig: MIX AND DRINK 17 GRAMS BY MOUTH ONCE A DAY FOR CONSTIPATION   Patient not taking: No sig reported   polyethylene glycol (MIRALAX) 17 g packet  Self No No   Sig: Take 17 g by mouth 2 (two) times a day   Patient not taking: Reported on 2/14/2020   senna-docusate sodium (SENOKOT S) 8.6-50 mg per tablet   No No   Sig: Take 2 tablets by mouth 2 (two) times a day for 10 days   Patient not taking: Reported on 1/9/2020   sevelamer (RENAGEL) 800 mg tablet   No No   Sig: Take 1 tablet (800 mg total) by mouth 3 (three) times a day with meals for 10 days   Patient not taking: Reported on 1/9/2020   sevelamer carbonate (RENVELA) 800 mg tablet  Self Yes No   Sig: sevelamer carbonate 800 mg tablet   Patient not taking: No sig reported   simethicone (MYLICON) 80 mg chewable tablet  Self No No   Sig: Chew 1 tablet (80 mg total) every 6 (six) hours as needed for flatulence   Patient not taking: Reported on 2/14/2020   torsemide (DEMADEX) 20 mg tablet   No No   Sig: Take 2 tablets (40 mg total) by mouth 2 (two) times a day   Patient not taking: Reported on 1/9/2020   valACYclovir (VALTREX) 500 mg tablet   No No   Sig: Take 1 tablet (500 mg total) by mouth daily for 10 days   Patient not taking: Reported on 9/27/2021   vitamin E, tocopherol, 200 units capsule  Self Yes No   Sig: Take by mouth   Patient not taking: Reported on 9/27/2021      Facility-Administered Medications: None       Past Medical History:   Diagnosis Date   • Complication of renal dialysis    • Polycystic kidney disease        Past Surgical History:   Procedure Laterality Date   • IR CATHETERGRAM  10/17/2019   • IR IMAGE GUIDED ASPIRATION / DRAINAGE 9/23/2019   • IR PARACENTESIS  10/9/2019   • IR TUNNELED CENTRAL LINE CHECK/CHANGE/REPOSITION  12/14/2021   • IR TUNNELED DIALYSIS CATHETER CHECK/CHANGE/REPOSITION/ANGIOPLASTY  10/17/2019   • IR TUNNELED DIALYSIS CATHETER PLACEMENT  9/30/2019   • SPLIT THICKNESS SKIN GRAFT Bilateral 11/7/2019    Procedure: SKIN GRAFT SPLIT THICKNESS (STSG)  EXTREMITY;  Surgeon: Denisse Castañeda MD;  [facility];  Service: Plastics   • SPLIT THICKNESS SKIN GRAFT Bilateral 11/12/2019    Procedure: SKIN GRAFT SPLIT THICKNESS (STSG)  EXTREMITY;  Surgeon: Denisse Castañeda MD;  [facility];  Service: Plastics   • VAC DRESSING APPLICATION Bilateral 60/42/5945    Procedure: CHANGE DRESSING;  Surgeon: Denisse Castañeda MD;  [facility];  Service: Plastics   • WOUND DEBRIDEMENT Bilateral 10/31/2019    Procedure: DEBRIDEMENT WOUND [facility]); Surgeon: Rosalee Aguilar DPM;  [facility];  Service: Podiatry   • WOUND DEBRIDEMENT Bilateral 11/5/2019    Procedure: DEBRIDEMENT WOUND [facility]); Surgeon: Rosalee Aguilar DPM;  [facility];  Service: Podiatry   • WOUND DEBRIDEMENT Bilateral 11/7/2019    Procedure: DEBRIDEMENT WOUND [facility]); Surgeon: Denisse Castañeda MD;  [facility];  Service: Plastics   • WOUND DEBRIDEMENT Bilateral 11/12/2019    Procedure: DEBRIDEMENT LOWER EXTREMITY [facility]); Surgeon: Denisse Castañeda MD;  [facility];  Service: Plastics       History reviewed. No pertinent family history. I have reviewed and agree with the history as documented. E-Cigarette/Vaping   • E-Cigarette Use Never User      E-Cigarette/Vaping Substances     Social History     Tobacco Use   • Smoking status: Never   • Smokeless tobacco: Never   Vaping Use   • Vaping Use: Never used   Substance Use Topics   • Alcohol use: Not Currently   • Drug use: Not Currently       Review of Systems   Constitutional: Negative for chills and fever. HENT: Negative for ear pain and sore throat. Eyes: Negative for pain and visual disturbance. Respiratory: Negative for cough and shortness of breath. Cardiovascular: Negative for chest pain and palpitations. Gastrointestinal: Negative for abdominal pain and vomiting. Genitourinary: Negative for dysuria and hematuria. Musculoskeletal: Negative for arthralgias and back pain. Skin: Negative for color change and rash. Neurological: Negative for seizures and syncope. All other systems reviewed and are negative. Physical Exam  Physical Exam  Vitals and nursing note reviewed. Constitutional:       General: He is not in acute distress. Appearance: He is well-developed. HENT:      Head: Normocephalic and atraumatic. Eyes:      Conjunctiva/sclera: Conjunctivae normal.   Cardiovascular:      Rate and Rhythm: Normal rate and regular rhythm. Heart sounds: No murmur heard. Comments: Right chest dialysis catheter site with edema and purulence on palpation with tenderness to palpation. No overlying erythema. Pulmonary:      Effort: Pulmonary effort is normal. No respiratory distress. Breath sounds: Normal breath sounds. Abdominal:      Palpations: Abdomen is soft. Tenderness: There is no abdominal tenderness. Musculoskeletal:         General: No swelling. Cervical back: Neck supple. Skin:     General: Skin is warm and dry. Capillary Refill: Capillary refill takes less than 2 seconds. Neurological:      Mental Status: He is alert.    Psychiatric:         Mood and Affect: Mood normal.         Vital Signs  ED Triage Vitals [07/28/23 0627]   Temperature Pulse Respirations Blood Pressure SpO2   97.8 °F (36.6 °C) 101 18 170/99 99 %      Temp Source Heart Rate Source Patient Position - Orthostatic VS BP Location FiO2 (%)   Oral Monitor -- Right arm --      Pain Score       --           Vitals:    07/28/23 0627   BP: 170/99   Pulse: 101         Visual Acuity      ED Medications  Medications   piperacillin-tazobactam (ZOSYN) 3.375 g in sodium chloride 0.9 % 100 mL IVPB (3.375 g Intravenous New Bag 7/28/23 0702)       Diagnostic Studies  Results Reviewed     Procedure Component Value Units Date/Time    Comprehensive metabolic panel [[id number]]  (Abnormal) Collected: 07/28/23 0655    Lab Status: Final result Specimen: Blood from Arm, Left Updated: 07/28/23 0729     Sodium 137 mmol/L      Potassium 5.4 mmol/L      Chloride 92 mmol/L      CO2 29 mmol/L      ANION GAP 16 mmol/L      BUN 81 mg/dL      Creatinine 8.97 mg/dL      Glucose 107 mg/dL      Calcium 10.4 mg/dL      AST 5 U/L      ALT 8 U/L      Alkaline Phosphatase 108 U/L      Total Protein 7.9 g/dL      Albumin 4.4 g/dL      Total Bilirubin 1.11 mg/dL      eGFR 5 ml/min/1.73sq m     Narrative:      National Kidney Disease Foundation guidelines for Chronic Kidney Disease (CKD):   •  Stage 1 with normal or high GFR (GFR > 90 mL/min/1.73 square meters)  •  Stage 2 Mild CKD (GFR = 60-89 mL/min/1.73 square meters)  •  Stage 3A Moderate CKD (GFR = 45-59 mL/min/1.73 square meters)  •  Stage 3B Moderate CKD (GFR = 30-44 mL/min/1.73 square meters)  •  Stage 4 Severe CKD (GFR = 15-29 mL/min/1.73 square meters)  •  Stage 5 End Stage CKD (GFR <15 mL/min/1.73 square meters)  Note: GFR calculation is accurate only with a steady state creatinine    Magnesium [[id number]]  (Normal) Collected: 07/28/23 0655    Lab Status: Final result Specimen: Blood from Arm, Left Updated: 07/28/23 0729     Magnesium 2.5 mg/dL     CBC and differential [[id number]] Collected: 07/28/23 0655    Lab Status: In process Specimen: Blood from Arm, Left Updated: 07/28/23 0704    Blood culture #2 [[id number]] Collected: 07/28/23 0655    Lab Status: In process Specimen: Blood from Arm, Right Updated: 07/28/23 0704    Blood culture #1 [[id number]] Collected: 07/28/23 0655    Lab Status:  In process Specimen: Blood from Arm, Left Updated: 07/28/23 0704                 No orders to display              Procedures  Procedures         ED Course                               SBIRT 20yo+    Flowsheet Row Most Recent Value   Initial Alcohol Screen: US AUDIT-C     1. How often do you have a drink containing alcohol? 0 Filed at: 07/28/2023 0659   2. How many drinks containing alcohol do you have on a typical day you are drinking? 0 Filed at: 07/28/2023 0659   3a. Male UNDER 65: How often do you have five or more drinks on one occasion? 0 Filed at: 07/28/2023 0659   3b. FEMALE Any Age, or MALE 65+: How often do you have 4 or more drinks on one occassion? 0 Filed at: 07/28/2023 0659   Audit-C Score 0 Filed at: 07/28/2023 2905   PAOLA: How many times in the past year have you. .. Used an illegal drug or used a prescription medication for non-medical reasons? Never Filed at: 07/28/2023 7856                    Medical Decision Making  62-year-old male with right chest dialysis catheter site infection. IR order for replacement of the dialysis catheter placed. Nephrology contacted, Dr. Zainab Huitron recommended IV antibiotics. Patient admitted to internal medicine to Dr. Vickie Lee for further management. Amount and/or Complexity of Data Reviewed  Labs: ordered. Risk  Decision regarding hospitalization.           Disposition  Final diagnoses:   Chronic kidney disease with end stage renal failure on dialysis [facility])   Skin infection     Time reflects when diagnosis was documented in both MDM as applicable and the Disposition within this note     Time User Action Codes Description Comment    7/28/2023  7:31 AM Andrés Price Add [N18.6,  Z99.2] Chronic kidney disease with end stage renal failure on dialysis ([address])     7/28/2023  7:33 AM Andrés Price Add [L08.9] Skin infection       ED Disposition     ED Disposition   Admit    Condition   Stable    Date/Time   Fri Jul 28, 2023  7:33 AM    Comment   Case was discussed with Dr. Polly Willard and the patient's admission status was agreed to be Admission Status: inpatient status to the service of Dr. Tk Palacios . Follow-up Information    None         Patient's Medications   Discharge Prescriptions    No medications on file       No discharge procedures on file.     PDMP Review     None          ED Provider  Electronically Signed by           Gosia Schwartz MD  07/28/23 9242 [facility] Zoe DOSS MD  07/28/23 7888

## 2023-07-28 NOTE — DISCHARGE INSTRUCTIONS
Perma-cath Removal   WHAT YOU NEED TO KNOW:   A perma-cath is a catheter placed through a vein into or near your right atrium. Your right atrium is the right upper chamber of your heart. A perma-cath is used for dialysis in an emergency or until a long-term device is ready to use. Or you no longer need dialysis. DISCHARGE INSTRUCTIONS:   Call 911 for any of the following: You feel lightheaded, short of breath, and have chest pain. You start bleeding    Contact Interventional Radiology at [phone] Marija PATIENTS: Contact Interventional Radiology at [phone]) Kenya Baker PATIENTS: Contact Interventional Radiology at [phone]) if:  Blood soaks through your bandage. You have new swelling in your arm, neck, face, or chest on your right side. Your bruises or pain get worse. You have a fever or chills. Persistent nausea or vomiting. Your incision is red, swollen, or draining pus. You have questions or concerns about your condition or care. Self-care:   Resume your normal diet. Small sips of flat soda will help with nausea. Keep your dressings dry. Do not get your perma-cath site wet until the incision closes. You may take a tub bath, but do not get your dressings wet. Water in your wound can cause bacteria to grow and cause an infection. If your dressing gets wet, remove the wet dressing and apply a dry bandaid. Keep it covered until the incision closes. This should only take a few days to heal. Do not use soaps or ointments. Immediately after your catheter is removed, no strenuous activity for twenty four hours and stay in an upright sitting position for two hours. Follow up with your healthcare provider as directed: Write down your questions so you remember to ask them during your visits. Perma-cath Placement   WHAT YOU NEED TO KNOW:   A perma-cath is a catheter placed through a vein into or near your right atrium. Your right atrium is the right upper chamber of your heart. A perma-cath is used for dialysis in an emergency or until a long-term device is ready to use. After your procedure, you will have some pain and swelling on your chest and neck. You may have some bruises on your chest and neck. You may also have 2 dressings, one on your chest and one on your neck. DISCHARGE INSTRUCTIONS:   Call 911 for any of the following: You feel lightheaded, short of breath, and have chest pain. Your catheter comes out   Contact Interventional Radiology at [phone] Marija PATIENTS: Contact Interventional Radiology at [phone]) Mitra Capps PATIENTS: Contact Interventional Radiology at [phone]) if:  Blood soaks through your bandage. You have new swelling in your arm, neck, face, or chest on your right side. Your catheter gets wet. Your bruises or pain get worse. You have a fever or chills. Persistent nausea or vomiting. Your incision is red, swollen, or draining pus. You have questions or concerns about your condition or care. Self-care:       Resume your normal diet. Keep your dressings dry. Do not take a shower or swim. You may take a tub bath, but do not get your dressings wet. Water in your wound can cause bacteria to grow and cause an infection. If your dressing gets wet, dry it off and cover it with dry sterile gauze. Call your healthcare provider. Do not use soaps or ointments. Do not change your dressings. Your healthcare provider or dialysis nurse will change your dressings. Your dressings should stay in place until your healthcare provider removes them. The dressing on your chest will stay as long as you have the catheter in place. The dressing prevents infection. Do not remove the red and blue caps from the end of your catheter. The caps prevent air from getting into your catheter. Follow up with your healthcare provider as directed: Write down your questions so you remember to ask them during your visits.

## 2023-07-28 NOTE — ASSESSMENT & PLAN NOTE
· History of chronic venostasis, necrotizing fasciitis  · Follows podiatry and wound care in the outpatient setting  · No active infection at this time

## 2023-07-28 NOTE — SEDATION DOCUMENTATION
Permacath removed from the right chest wall by Dr. Joanna Merlin. Catheter tip sent for culture. Dry dressing to site. New temporary HD cath placed in the left IJ. CHG dressing to left site. Both lumens capped. Patient tolerated well and transferred back to his room in stable condition.

## 2023-07-28 NOTE — ASSESSMENT & PLAN NOTE
Lab Results   Component Value Date    EGFR 5 07/28/2023    EGFR 8 11/20/2019    EGFR 10 11/19/2019    CREATININE 8.97 (H) 07/28/2023    CREATININE 6.81 (H) 11/20/2019    CREATININE 5.61 (H) 11/19/2019   · Hx ESRD due to Polycystic kydney disease on HD. Previously PD for 2 years however recurrent infections for which later transitioned to HD. · Dialysis over [facility] MWF schedule with per chart review hx of non compliance, as per patient only handling less than 3 hours due to anxiety. · Baseline creatine per chart reviewed variable 6-9    · Plan  · Nephrology consulted appreciate recommendations  · Most likely will get HD dialysis today, given HD cath likely will discontinue HD holiday throughout the weekend until permacath versus temp pleural cath will place afterwards pending clinical course.   · Outpatient binders sevelamer 800 mg 3 times daily, will continue while inpatient  · Continue calcitriol 0.75 mcg 3 times daily as per nephrology recommendations  · Renal function panel am  · Phosphorus

## 2023-07-28 NOTE — PROCEDURES
Central Line    Date/Time: 7/28/2023 6:23 AM    Performed by: Dina Krabbe, MD  Authorized by: Dina Krabbe, MD    Patient location:  IR  Consent:     Consent obtained:  Written    Consent given by:  Patient    Risks discussed:  Bleeding and infection  Universal protocol:     Procedure explained and questions answered to patient or proxy's satisfaction: yes      Relevant documents present and verified: yes      Test results available and properly labeled: yes      Radiology Images displayed and confirmed. If images not available, report reviewed: yes      Required blood products, implants, devices, and special equipment available: yes      Site/side marked: yes      Immediately prior to procedure, a time out was called: yes      Patient identity confirmed:  Verbally with patient  Pre-procedure details:     Skin preparation:  2% chlorhexidine  Indications:     Central line indications: dialysis    Procedure details:     Location:  Right internal jugular    Vessel type: vein      Laterality:  Right    Approach: percutaneous technique used      Patient position:  Flat    Catheter type:  Double lumen    Catheter size:  14 Fr    Landmarks identified: yes      Ultrasound guidance: yes      Ultrasound image availability:  Images available in PACS    Sterile ultrasound techniques: Sterile gel and sterile probe covers were used      Manometry confirmation: yes      Number of attempts:  1    Successful placement: yes    Post-procedure details:     Post-procedure:  Dressing applied    Assessment:  Blood return through all ports, no pneumothorax on x-ray, free fluid flow and placement verified by x-ray    Patient tolerance of procedure:   Tolerated well, no immediate complications

## 2023-07-28 NOTE — CONSULTS
Consultation -General Surgery  Ike Vila 66 y.o. male MRN: [id number]  Unit/Bed#: S -75 Encounter: [id number]    ASSESSMENT:  67 yo male with pmhx of ESRD on HD presenting with a right Chest PermCath site infection now s/p removal by IR and temporary left IJ catheter placement. Afebrile  No Leukocytosis     Plan:  -Tunneled PermCath exit site open and draining, this opening was extended with a scalpel by approximately 1 mm, the cavity was then deloculated/expressed and loosely packed. Lidocaine was ordered but not used as patient recently underwent IR removal with local anesthetic used and was still numb in the area. -Will obtain ultrasound of area to identify an further possible collections in this area and area overlying clavicle bone, extending medially with induration and tenderness to palpation.  -Exit site loosely packed with 1/2" packing to allow for further drainage, covered with 4x4 gauze and tape   -ID recs apprecaited  -Rest of care per primary team       Reason for Consult / Principal Problem:    HPI: Ike Vila is a 66y.o. year old male with pmhx Afib, cardiomyopathy, polycystic kidney disease, ESRD on HD who presents with Hemodialysis catheter infection ([address] Patient is now s/p removal of infected PermCath. Patient reports on Wednesday of this week he noticed a small amount of brown crusty drainage from his cath site when he went for dialysis, the area of cleansed and used for HD. Then today he went for HD and there was much more drainage and the dialysis team sent him to the ER with concerns for infection. He notes some mild discomfort/pressure in the area and some swelling in the area. He denies any fevers, chills, nausea, vomiting, diarrhea, abdominal pain. He does not recall any trauma to the area, scratching, picking or touching the area to the best of his knowledge.        Review of Systems   Constitutional: Negative for activity change, appetite change, chills and fever. Gastrointestinal: Negative for abdominal pain, diarrhea, nausea and vomiting. Neurological: Negative for dizziness, weakness, light-headedness and numbness. Historical Information   Past Medical History:   Diagnosis Date   • Complication of renal dialysis    • Polycystic kidney disease      Past Surgical History:   Procedure Laterality Date   • IR CATHETERGRAM  10/17/2019   • IR IMAGE GUIDED ASPIRATION / DRAINAGE  9/23/2019   • IR PARACENTESIS  10/9/2019   • IR TUNNELED CENTRAL LINE CHECK/CHANGE/REPOSITION  12/14/2021   • IR TUNNELED DIALYSIS CATHETER CHECK/CHANGE/REPOSITION/ANGIOPLASTY  10/17/2019   • IR TUNNELED DIALYSIS CATHETER PLACEMENT  9/30/2019   • SPLIT THICKNESS SKIN GRAFT Bilateral 11/7/2019    Procedure: SKIN GRAFT SPLIT THICKNESS (STSG)  EXTREMITY;  Surgeon: Yana Paniagua MD;  [facility];  Service: Plastics   • SPLIT THICKNESS SKIN GRAFT Bilateral 11/12/2019    Procedure: SKIN GRAFT SPLIT THICKNESS (STSG)  EXTREMITY;  Surgeon: Yana Paniagua MD;  [facility];  Service: Plastics   • VAC DRESSING APPLICATION Bilateral 80/12/3888    Procedure: CHANGE DRESSING;  Surgeon: Yana Paniagua MD;  [facility];  Service: Plastics   • WOUND DEBRIDEMENT Bilateral 10/31/2019    Procedure: DEBRIDEMENT WOUND [facility]); Surgeon: Nilay Mitchell DPM;  [facility];  Service: Podiatry   • WOUND DEBRIDEMENT Bilateral 11/5/2019    Procedure: DEBRIDEMENT WOUND [facility]); Surgeon: Nilay Mitchell DPM;  [facility];  Service: Podiatry   • WOUND DEBRIDEMENT Bilateral 11/7/2019    Procedure: DEBRIDEMENT WOUND [facility]); Surgeon: Yana Paniagua MD;  [facility];  Service: Plastics   • WOUND DEBRIDEMENT Bilateral 11/12/2019    Procedure: DEBRIDEMENT LOWER EXTREMITY [facility]);   Surgeon: Yana Paniagua MD;  [facility];  Service: Plastics     Social History   Social History     Substance and Sexual Activity   Alcohol Use Not Currently     Social History     Substance and Sexual Activity   Drug Use Not Currently     Social History     Tobacco Use   Smoking Status Never   Smokeless Tobacco Never     History reviewed. No pertinent family history.     Meds/Allergies     Medications Prior to Admission   Medication   • calcium acetate (PHOSLO) 667 mg capsule   • cholecalciferol (VITAMIN D3) 1,000 units tablet   • B Complex-C-Folic Acid (TRIPHROCAPS PO)   • b complex-vitamin C-folic acid (NEPHROCAPS) 1 mg capsule   • lanthanum (FOSRENOL) 500 mg chewable tablet   • metoprolol succinate (TOPROL-XL) 25 mg 24 hr tablet   • metoprolol tartrate (LOPRESSOR) 25 mg tablet   • midodrine (PROAMATINE) 5 mg tablet   • Multiple Vitamin (MULTIVITAMIN) tablet   • NON FORMULARY   • nystatin (MYCOSTATIN) powder   • omeprazole (PriLOSEC OTC) 20 MG tablet   • polyethylene glycol (GLYCOLAX) powder   • polyethylene glycol (MIRALAX) 17 g packet   • senna-docusate sodium (SENOKOT S) 8.6-50 mg per tablet   • sevelamer (RENAGEL) 800 mg tablet   • sevelamer carbonate (RENVELA) 800 mg tablet   • simethicone (MYLICON) 80 mg chewable tablet   • Sucroferric Oxyhydroxide 500 MG CHEW   • torsemide (DEMADEX) 20 mg tablet   • valACYclovir (VALTREX) 500 mg tablet   • vitamin E, tocopherol, 200 units capsule     Current Facility-Administered Medications   Medication Dose Route Frequency   • acetaminophen (TYLENOL) tablet 975 mg  975 mg Oral Q8H [address]   • b complex-vitamin C-folic acid (RENAL) capsule 1 capsule  1 capsule Oral Daily With Dinner   • [START ON 7/31/2023] calcitriol (ROCALTROL) capsule 0.75 mcg  0.75 mcg Oral Once per day on Mon Wed Fri   • calcium acetate (PHOSLO) capsule 1,334 mg  1,334 mg Oral TID With Meals   • cholecalciferol (VITAMIN D3) tablet 1,000 Units  1,000 Units Oral Daily   • hydrALAZINE (APRESOLINE) injection 10 mg  10 mg Intravenous Q4H PRN   • metoprolol succinate (TOPROL-XL) 24 hr tablet 25 mg  25 mg Oral Daily   • multivitamin stress formula tablet 1 tablet  1 tablet Oral Daily   • naloxone (NARCAN) 0.04 mg/mL syringe 0.04 mg  0.04 mg Intravenous Q1MIN PRN   • oxyCODONE (ROXICODONE) IR tablet 5 mg  5 mg Oral Q4H PRN   • oxyCODONE (ROXICODONE) split tablet 2.5 mg  2.5 mg Oral Q4H PRN   • senna-docusate sodium (SENOKOT S) 8.6-50 mg per tablet 1 tablet  1 tablet Oral HS   • sevelamer (RENAGEL) tablet 800 mg  800 mg Oral TID With Meals   • simethicone (MYLICON) chewable tablet 80 mg  80 mg Oral Q6H PRN   • Sodium Zirconium Cyclosilicate (Lokelma) 10 g  10 g Oral Once   • torsemide (DEMADEX) tablet 40 mg  40 mg Oral BID   • vancomycin (VANCOCIN) 2,000 mg in sodium chloride 0.9 % 500 mL IVPB  25 mg/kg Intravenous Once   • [START ON 7/31/2023] vancomycin (VANCOCIN) IVPB (premix in dextrose) 750 mg 150 mL  10 mg/kg Intravenous Once per day on Mon Wed Fri       Allergies   Allergen Reactions   • Sucroferric Oxyhydroxide Other (See Comments)   • Chlorhexidine Other (See Comments)   • Heparin Other (See Comments)     Brings plaletes down   • Other Other (See Comments)       Objective     Blood pressure (!) 160/104, pulse 101, temperature 98.2 °F (36.8 °C), resp. rate 18, height 5' 8" (1.727 m), weight 81.2 kg (179 lb), SpO2 99 %. No intake or output data in the 24 hours ending 07/28/23 1524    PHYSICAL EXAM  Physical Exam  Vitals reviewed. Constitutional:       General: He is not in acute distress. Appearance: He is normal weight. He is not ill-appearing or toxic-appearing. Cardiovascular:      Rate and Rhythm: Normal rate. Pulmonary:      Effort: Pulmonary effort is normal. No respiratory distress. Chest:          Comments: Right chest PermCath exit site with bloody/purulent drainage, the opening was extended approximately 1 mm and this area was probed with a q-tip to de-loculate and then manually expressed. Minimal purulent drainage. There is significant induration in this area without tenderness.    Superior and medially to this exit site is a indurated area overlying the clavicle. Mild tenderness to palpation. Questionable abscess vs reactive edema. Skin:     General: Skin is warm and dry. Neurological:      General: No focal deficit present. Mental Status: He is alert. Psychiatric:         Mood and Affect: Mood normal.         Behavior: Behavior normal.         Lab Results:   CBC:   Lab Results   Component Value Date    WBC 5.68 07/28/2023    HGB 12.7 07/28/2023    HCT 40.2 07/28/2023    MCV 99 (H) 07/28/2023     (L) 07/28/2023    RBC 4.08 07/28/2023    MCH 31.1 07/28/2023    MCHC 31.6 07/28/2023    RDW 14.7 07/28/2023    MPV 10.4 07/28/2023    NRBC 0 07/28/2023   , CMP:   Lab Results   Component Value Date    SODIUM 137 07/28/2023    K 5.4 (H) 07/28/2023    CL 92 (L) 07/28/2023    CO2 29 07/28/2023    BUN 81 (H) 07/28/2023    CREATININE 8.97 (H) 07/28/2023    CALCIUM 10.4 (H) 07/28/2023    AST 5 (L) 07/28/2023    ALT 8 07/28/2023    ALKPHOS 108 (H) 07/28/2023    EGFR 5 07/28/2023   , Coagulation:   Lab Results   Component Value Date    INR 1.27 (H) 07/28/2023   , Urinalysis: No results found for: "COLORU", "CLARITYU", "SPECGRAV", "PHUR", "LEUKOCYTESUR", "NITRITE", "PROTEINUA", "GLUCOSEU", "Shraddha Jose Raul", "BILIRUBINUR", "BLOODU", Amylase: No results found for: "AMYLASE", Lipase: No results found for: "LIPASE"  Imaging: I have personally reviewed pertinent reports. EKG, Pathology, and Other Studies: I have personally reviewed pertinent reports. Counseling / Coordination of Care  Total time spent today  30 minutes. Greater than 50% of total time was spent with the patient and / or family counseling and / or coordination of care.          PAUL Reyes  7/28/2023 3:24 PM

## 2023-07-28 NOTE — ASSESSMENT & PLAN NOTE
· History hypertension  · Home regimen torsemide 40 mg twice daily, Toprol-XL 25 mg once a day, midodrine  · Patient has been off medications for months now given inaccessibility on the outpatient setting.   · Blood Pressure: (!) 160/104     · Plan  · We will resume torsemide 40 mg twice daily, Toprol-XL  · Hydralazine 10 mg every 4 hours as needed for SBP >180, DBP >120

## 2023-07-28 NOTE — BRIEF OP NOTE (RAD/CATH)
INTERVENTIONAL RADIOLOGY PROCEDURE NOTE    Date: 7/28/2023    Procedure:   Procedure Summary     Date:  Room / Location:     Anesthesia Start:  Anesthesia Stop:     Procedure:  Diagnosis:     Scheduled Providers:  Responsible Provider:     Anesthesia Type: Not recorded ASA Status: Not recorded          Preoperative diagnosis:   1. Infection of hemodialysis catheter, initial encounter ([address])    2. Chronic kidney disease with end stage renal failure on dialysis ([address])    3. Skin infection    4. Hemodialysis catheter infection ([address])         Postoperative diagnosis: Same. Surgeon: Sukhdeep Nicolas MD     Assistant: None. No qualified resident was available. Blood loss: Minimal    Specimens: Catheter tip     Findings: Removal of infected right tunneled IJ catheter. Large volume purulent fluid expressed. We expressed as much pus as we could through the catheter opening, however given the amount of fluid removed the patient may benefit from I&D to further evaluate for residual collection and further  Irrigation. Complications: None immediate.     Anesthesia: local

## 2023-07-29 ENCOUNTER — APPOINTMENT (INPATIENT)
Dept: DIALYSIS | Facility: HOSPITAL | Age: 63
DRG: 314 | End: 2023-07-29
Attending: INTERNAL MEDICINE
Payer: MEDICARE

## 2023-07-29 LAB
ANION GAP SERPL CALCULATED.3IONS-SCNC: 19 MMOL/L
BASOPHILS # BLD AUTO: 0.05 THOUSANDS/ÂΜL (ref 0–0.1)
BASOPHILS NFR BLD AUTO: 1 % (ref 0–1)
BUN SERPL-MCNC: 93 MG/DL (ref 5–25)
CALCIUM SERPL-MCNC: 9.7 MG/DL (ref 8.4–10.2)
CHLORIDE SERPL-SCNC: 96 MMOL/L (ref 96–108)
CO2 SERPL-SCNC: 21 MMOL/L (ref 21–32)
CREAT SERPL-MCNC: 9.55 MG/DL (ref 0.6–1.3)
EOSINOPHIL # BLD AUTO: 0.18 THOUSAND/ÂΜL (ref 0–0.61)
EOSINOPHIL NFR BLD AUTO: 2 % (ref 0–6)
ERYTHROCYTE [DISTWIDTH] IN BLOOD BY AUTOMATED COUNT: 14.8 % (ref 11.6–15.1)
GFR SERPL CREATININE-BSD FRML MDRD: 5 ML/MIN/1.73SQ M
GLUCOSE SERPL-MCNC: 105 MG/DL (ref 65–140)
GLUCOSE SERPL-MCNC: 113 MG/DL (ref 65–140)
GLUCOSE SERPL-MCNC: 118 MG/DL (ref 65–140)
GLUCOSE SERPL-MCNC: 127 MG/DL (ref 65–140)
GLUCOSE SERPL-MCNC: 93 MG/DL (ref 65–140)
HCT VFR BLD AUTO: 39.8 % (ref 36.5–49.3)
HGB BLD-MCNC: 12.8 G/DL (ref 12–17)
IMM GRANULOCYTES # BLD AUTO: 0.04 THOUSAND/UL (ref 0–0.2)
IMM GRANULOCYTES NFR BLD AUTO: 1 % (ref 0–2)
LYMPHOCYTES # BLD AUTO: 0.55 THOUSANDS/ÂΜL (ref 0.6–4.47)
LYMPHOCYTES NFR BLD AUTO: 7 % (ref 14–44)
MCH RBC QN AUTO: 31.4 PG (ref 26.8–34.3)
MCHC RBC AUTO-ENTMCNC: 32.2 G/DL (ref 31.4–37.4)
MCV RBC AUTO: 98 FL (ref 82–98)
MONOCYTES # BLD AUTO: 0.64 THOUSAND/ÂΜL (ref 0.17–1.22)
MONOCYTES NFR BLD AUTO: 8 % (ref 4–12)
NEUTROPHILS # BLD AUTO: 6.83 THOUSANDS/ÂΜL (ref 1.85–7.62)
NEUTS SEG NFR BLD AUTO: 81 % (ref 43–75)
NRBC BLD AUTO-RTO: 0 /100 WBCS
PHOSPHATE SERPL-MCNC: 10.2 MG/DL (ref 2.3–4.1)
PLATELET # BLD AUTO: 117 THOUSANDS/UL (ref 149–390)
PMV BLD AUTO: 11.9 FL (ref 8.9–12.7)
POTASSIUM SERPL-SCNC: 5.9 MMOL/L (ref 3.5–5.3)
RBC # BLD AUTO: 4.08 MILLION/UL (ref 3.88–5.62)
SODIUM SERPL-SCNC: 136 MMOL/L (ref 135–147)
WBC # BLD AUTO: 8.29 THOUSAND/UL (ref 4.31–10.16)

## 2023-07-29 PROCEDURE — 99233 SBSQ HOSP IP/OBS HIGH 50: CPT | Performed by: INTERNAL MEDICINE

## 2023-07-29 PROCEDURE — 82948 REAGENT STRIP/BLOOD GLUCOSE: CPT

## 2023-07-29 PROCEDURE — 94644 CONT INHLJ TX 1ST HOUR: CPT

## 2023-07-29 PROCEDURE — 80048 BASIC METABOLIC PNL TOTAL CA: CPT

## 2023-07-29 PROCEDURE — 94760 N-INVAS EAR/PLS OXIMETRY 1: CPT

## 2023-07-29 PROCEDURE — 5A1D70Z PERFORMANCE OF URINARY FILTRATION, INTERMITTENT, LESS THAN 6 HOURS PER DAY: ICD-10-PCS | Performed by: HOSPITALIST

## 2023-07-29 PROCEDURE — 99232 SBSQ HOSP IP/OBS MODERATE 35: CPT | Performed by: INTERNAL MEDICINE

## 2023-07-29 PROCEDURE — 84100 ASSAY OF PHOSPHORUS: CPT

## 2023-07-29 PROCEDURE — 99223 1ST HOSP IP/OBS HIGH 75: CPT | Performed by: INTERNAL MEDICINE

## 2023-07-29 PROCEDURE — 85025 COMPLETE CBC W/AUTO DIFF WBC: CPT

## 2023-07-29 RX ORDER — SODIUM CHLORIDE FOR INHALATION 0.9 %
3 VIAL, NEBULIZER (ML) INHALATION ONCE
Status: COMPLETED | OUTPATIENT
Start: 2023-07-29 | End: 2023-07-29

## 2023-07-29 RX ORDER — DEXTROSE MONOHYDRATE 25 G/50ML
25 INJECTION, SOLUTION INTRAVENOUS ONCE
Status: COMPLETED | OUTPATIENT
Start: 2023-07-29 | End: 2023-07-29

## 2023-07-29 RX ORDER — ALBUTEROL SULFATE 2.5 MG/3ML
10 SOLUTION RESPIRATORY (INHALATION) ONCE
Status: COMPLETED | OUTPATIENT
Start: 2023-07-29 | End: 2023-07-29

## 2023-07-29 RX ORDER — CALCIUM GLUCONATE 20 MG/ML
1 INJECTION, SOLUTION INTRAVENOUS ONCE
Status: COMPLETED | OUTPATIENT
Start: 2023-07-29 | End: 2023-07-29

## 2023-07-29 RX ADMIN — METOPROLOL SUCCINATE 25 MG: 25 TABLET, EXTENDED RELEASE ORAL at 09:04

## 2023-07-29 RX ADMIN — VANCOMYCIN HYDROCHLORIDE 750 MG: 750 INJECTION, SOLUTION INTRAVENOUS at 20:42

## 2023-07-29 RX ADMIN — ACETAMINOPHEN 975 MG: 325 TABLET, FILM COATED ORAL at 21:13

## 2023-07-29 RX ADMIN — Medication 1 CAPSULE: at 16:42

## 2023-07-29 RX ADMIN — TORSEMIDE 40 MG: 20 TABLET ORAL at 09:04

## 2023-07-29 RX ADMIN — ALBUTEROL SULFATE 10 MG: 2.5 SOLUTION RESPIRATORY (INHALATION) at 08:29

## 2023-07-29 RX ADMIN — B-COMPLEX W/ C & FOLIC ACID TAB 1 TABLET: TAB at 09:04

## 2023-07-29 RX ADMIN — SEVELAMER HYDROCHLORIDE 800 MG: 800 TABLET ORAL at 11:47

## 2023-07-29 RX ADMIN — ACETAMINOPHEN 975 MG: 325 TABLET, FILM COATED ORAL at 05:21

## 2023-07-29 RX ADMIN — CALCIUM ACETATE 1334 MG: 667 CAPSULE ORAL at 16:42

## 2023-07-29 RX ADMIN — INSULIN HUMAN 10 UNITS: 100 INJECTION, SOLUTION PARENTERAL at 09:09

## 2023-07-29 RX ADMIN — CALCIUM ACETATE 1334 MG: 667 CAPSULE ORAL at 11:47

## 2023-07-29 RX ADMIN — ISODIUM CHLORIDE 3 ML: 0.03 SOLUTION RESPIRATORY (INHALATION) at 08:29

## 2023-07-29 RX ADMIN — SEVELAMER HYDROCHLORIDE 800 MG: 800 TABLET ORAL at 09:04

## 2023-07-29 RX ADMIN — SENNOSIDES AND DOCUSATE SODIUM 1 TABLET: 50; 8.6 TABLET ORAL at 21:13

## 2023-07-29 RX ADMIN — Medication 1000 UNITS: at 09:04

## 2023-07-29 RX ADMIN — CALCIUM ACETATE 1334 MG: 667 CAPSULE ORAL at 09:04

## 2023-07-29 RX ADMIN — TORSEMIDE 40 MG: 20 TABLET ORAL at 18:18

## 2023-07-29 RX ADMIN — CALCIUM GLUCONATE 1 G: 20 INJECTION, SOLUTION INTRAVENOUS at 09:13

## 2023-07-29 RX ADMIN — SEVELAMER HYDROCHLORIDE 800 MG: 800 TABLET ORAL at 16:42

## 2023-07-29 RX ADMIN — ACETAMINOPHEN 975 MG: 325 TABLET, FILM COATED ORAL at 13:17

## 2023-07-29 RX ADMIN — DEXTROSE MONOHYDRATE 25 ML: 25 INJECTION, SOLUTION INTRAVENOUS at 09:09

## 2023-07-29 NOTE — ASSESSMENT & PLAN NOTE
· History dilated cardiomyopathy  · Last echo during nuclear stress test October 2021 significant for:  · LVEF 19%, cardiomyopathy with wall motion abnormalities.   · Cardiology consult is pending

## 2023-07-29 NOTE — PROGRESS NOTES
[address] NOTE   Liz Clarity 66 y.o. male MRN: [id number]  Unit/Bed#: S -01 Encounter: [id number]  Reason for Consult: ESRD    ASSESSMENT and PLAN:    59-year-old male with a past medical history of ESRD on hemodialysis with polycystic kidney disease, A-fib, hypertension, cardiomyopathy, poor compliance with completing dialysis treatments and catheter care who initially presents with drainage from permacath site from dialysis.     Patient denies other complaints of fevers, chills, nausea, vomiting, and shortness of breath     On exam, patient is euvolemic, lungs are clear, 1+ edema with venous changes, catheter site-significant purulent material noted, also 2 cm boggy area proximal concerning for abscess     1-ESRD-     - On hemodialysis at [facility]  - Outpatient dialysis MWF  - Target weight 81 kg  - Tunneled dialysis catheter site infection-tunneled dialysis catheter removed 7/28 and temporary dialysis catheter placed on left IJ on 7/28  - 7/28-patient did not have dialysis due to needing procedure for catheter removal and placement of new catheter and no urgent need for dialysis  - 7/29-planning for dialysis      Plan  - Patient missed dialysis 7/28  - Currently with temporary dialysis catheter  - Follow-up final cultures-thus far no growth  - Continue antibiotics per ID team and primary team  - Dialysis today  - Next dialysis then on Monday after today's treatment  - Check blood sugars every 2 hours for 4 checks due to risk of hypoglycemia as the patient already received insulin and calcium gluconate by primary team  - Case reviewed with primary team resident we are agreement renal plan-  - Recheck phosphorus in a.m.  - Follow-up ultrasound of the head and neck soft tissue to follow-up pocket of purulent material at tunneled dialysis catheter site  - Patient is requesting to be discharged on Monday and I explained that he cannot be discharged until catheter is converted to a tunneled catheter and this cannot happen until we have clearance is appropriately once the infectious studies are returned. Patient did state understanding. it was also explained to the patient that we will not plan for tunneling his dialysis catheter until we have follow-up cultures return and also clearances from all teams involved     2-electrolytes-hyperkalemia-see discussion above, hyperphosphatemia start phosphorus binders and recheck phosphorus in a.m.     3-acid/base-bicarbonate stable     4-volume-euvolemic but does have trace to 1+ lower extremity edema and is above dry weight     5-CHF with EF 20%-echocardiogram with continued depressed EF.     - Continue torsemide and metoprolol  - Echocardiogram this admission-EF 20 to 31%, systolic function severely reduced, diastolic function abnormal, aortic valve sclerosis with trace regurgitation, mild to moderate mitral regurgitation, RV pressure 48, no significant change to prior     6-lower extremity wounds-per primary team     7-MBD-on calcitriol-see plan above     - Restart phosphorus binder and also history of noncompliance     8-anemia-not on GRISELDA. Current hemoglobin level is appropriate. 9-tunneled dialysis catheter site infection-    - Catheter was removed 7/28  - General surgery team and infectious disease teams were also brought on board       SUBJECTIVE / 24H INTERVAL HISTORY:  Patient denies complaints today. Blood pressures 668U to 945M systolic. On room air.   .    OBJECTIVE:  Current Weight: Weight - Scale: 84 kg (185 lb 3 oz)  Vitals:    07/29/23 0829 07/29/23 0832 07/29/23 1122 07/29/23 1437   BP:   138/88 140/82   BP Location:   Right arm Right arm   Pulse:    76   Resp:    16   Temp:    97.5 °F (36.4 °C)   TempSrc:    Oral   SpO2: 100%   97%   Weight:  84 kg (185 lb 3 oz)     Height:           Intake/Output Summary (Last 24 hours) at 7/29/2023 1458  Last data filed at 7/29/2023 0738  Gross per 24 hour   Intake 120 ml   Output --   Net 120 ml     General: NAD  Skin: no rash  Eyes: anicteric sclera  ENT: moist mucous membrane  Neck: supple  Chest: CTA b/l, no ronchii, no wheeze, no rubs, no rales  CVS: s1s2, no murmur, no gallop, no rub  Abdomen: soft, nontender, nl sounds  Extremities: no edema LE b/l  : no meeks  Neuro: AAOX3  Psych: normal affect    Medications:    Current Facility-Administered Medications:   •  acetaminophen (TYLENOL) tablet 975 mg, 975 mg, Oral, Q8H [address], Waylon Love MD, 975 mg at 07/29/23 1317  •  b complex-vitamin C-folic acid (RENAL) capsule 1 capsule, 1 capsule, Oral, Daily With Thai Martinez MD, 1 capsule at 07/28/23 1546  •  [START ON 7/31/2023] calcitriol (ROCALTROL) capsule 0.75 mcg, 0.75 mcg, Oral, Once per day on Mon Wed Fri, Han Renee MD  •  calcium acetate (PHOSLO) capsule 1,334 mg, 1,334 mg, Oral, TID With Meals, Waylon Love MD, 1,334 mg at 07/29/23 1147  •  cholecalciferol (VITAMIN D3) tablet 1,000 Units, 1,000 Units, Oral, Daily, Waylon Love MD, 1,000 Units at 07/29/23 2020  •  hydrALAZINE (APRESOLINE) injection 10 mg, 10 mg, Intravenous, Q4H PRN, Waylon Love MD  •  metoprolol succinate (TOPROL-XL) 24 hr tablet 25 mg, 25 mg, Oral, Daily, Waylon Love MD, 25 mg at 07/29/23 7290  •  multivitamin stress formula tablet 1 tablet, 1 tablet, Oral, Daily, Waylon Love MD, 1 tablet at 07/29/23 0904  •  naloxone (NARCAN) 0.04 mg/mL syringe 0.04 mg, 0.04 mg, Intravenous, Q1MIN PRN, Waylon Love MD  •  oxyCODONE (ROXICODONE) IR tablet 5 mg, 5 mg, Oral, Q4H PRN, Waylon Love MD, 5 mg at 07/28/23 1546  •  oxyCODONE (ROXICODONE) split tablet 2.5 mg, 2.5 mg, Oral, Q4H PRN, Waylon Love MD  •  senna-docusate sodium (SENOKOT S) 8.6-50 mg per tablet 1 tablet, 1 tablet, Oral, HS, Waylon Love MD  •  sevelamer (RENAGEL) tablet 800 mg, 800 mg, Oral, TID With Meals, Josep Wills MD, 800 mg at 07/29/23 1147  •  simethicone (MYLICON) chewable tablet 80 mg, 80 mg, Oral, Q6H PRN, Josep Wills MD  •  torsemide [facility]) tablet 40 mg, 40 mg, Oral, BID, Josep Wills MD, 40 mg at 07/29/23 3198  •  [START ON 7/31/2023] vancomycin (VANCOCIN) IVPB (premix in dextrose) 750 mg 150 mL, 10 mg/kg, Intravenous, Once per day on Mon Wed Fri, Khadar Hernández DO  •  vancomycin (VANCOCIN) IVPB (premix in dextrose) 750 mg 150 mL, 10 mg/kg, Intravenous, Once, Jody Riley DO    Laboratory Results:  Results from last 7 days   Lab Units 07/29/23  0530 07/28/23  0655   WBC Thousand/uL 8.29 5.68   HEMOGLOBIN g/dL 12.8 12.7   HEMATOCRIT % 39.8 40.2   PLATELETS Thousands/uL 117* 104*   POTASSIUM mmol/L 5.9* 5.4*   CHLORIDE mmol/L 96 92*   CO2 mmol/L 21 29   BUN mg/dL 93* 81*   CREATININE mg/dL 9.55* 8.97*   CALCIUM mg/dL 9.7 10.4*   MAGNESIUM mg/dL  --  2.5   PHOSPHORUS mg/dL 10.2* 9.2*

## 2023-07-29 NOTE — ASSESSMENT & PLAN NOTE
· POA 2 day hx of purulent discharge, edema along HD cath site. No fevers, chills however pain and discomfort. · Hemodynamically stable, afebrile no leucocytosis. · At the Ed receive on dose of Zosyn  · Upon exam edma present, no erythema appreciated. Purulent discharge somewhat foul smelling  · Plan  · S/p new temp cath placement yesterday  · ?  HD today - will confirm with nephrology

## 2023-07-29 NOTE — PLAN OF CARE
Post-Dialysis RN Treatment Note    Blood Pressure:  Pre 156/111 mm/Hg  Post 142/85 mmHg   EDW  81 kg    Weight:  Pre 84 kg   Post 81 kg   Mode of weight measurement: Standing Scale   Volume Removed  3,000 ml    Treatment duration 180 minutes    NS given  No    Treatment shortened? No   Medications given during Rx None Reported   Estimated Kt/V  Not Applicable   Access type: Temporary HD catheter   Access Issues: No    Report called to primary nurse   Yes, in person to Damon Escamilla LPN      Started patient on hemodialysis treatment with UF goal of 3L net as tolerated to reach EDW x 3 hours x 2K bath for serum k+ of 5.9 today 7/29/23.     Problem: METABOLIC, FLUID AND ELECTROLYTES - ADULT  Goal: Electrolytes maintained within normal limits  Description: INTERVENTIONS:  - Monitor labs and assess patient for signs and symptoms of electrolyte imbalances  - Administer electrolyte replacement as ordered  - Monitor response to electrolyte replacements, including repeat lab results as appropriate  - Instruct patient on fluid and nutrition as appropriate  Outcome: Progressing  Goal: Fluid balance maintained  Description: INTERVENTIONS:  - Monitor labs   - Monitor I/O and WT  - Instruct patient on fluid and nutrition as appropriate  - Assess for signs & symptoms of volume excess or deficit  Outcome: Progressing

## 2023-07-29 NOTE — ASSESSMENT & PLAN NOTE
· History hypertension  · Home regimen torsemide 40 mg twice daily, Toprol-XL 25 mg once a day, midodrine  · Patient has been off medications for months now given inaccessibility on the outpatient setting.   · Blood Pressure: 140/98     · Plan  · BP is 140/98  · Continue current meds

## 2023-07-29 NOTE — PROGRESS NOTES
Vancomycin IV Pharmacy-to-Dose Consultation    Thena Libman is a 66 y.o. male who is currently ordered Vancomycin IV with management by the Pharmacy Consult service. Relevant clinical data and objective / subjective history reviewed. Vancomycin Assessment:    Indication and Goal AUC/Trough: right chest PermCath exit site infection with abscess (goal -600, trough >10)    Clinical Status: stable    Micro:    catheter tip from CVC: in process   BC x 2: in process    Renal Function:  SCr: 9.55 mg/dL  CrCl: 8 mL/min  Renal replacement: ESRD on HD today () then Mon/Wed/Fri starting Monday ()    Days of Therapy: 2    Current Dose: 750 mg IV once today after HD    Vancomycin Plan:    New Dosin mg IV given after HD on Mon/Wed/Fri    Goal: target pre-HD random level of 15-20 mcg/mL    Next Level:  at 0600 prior to HD    Renal Function Monitoring: monitor for changes in HD status    Pharmacy will continue to follow closely for s/sx of nephrotoxicity, infusion reactions and appropriateness of therapy. BMP and CBC will be ordered per protocol. We will continue to follow the patient’s culture results and clinical progress daily.     Jyoti Ortez, PharmD  Pharmacist

## 2023-07-29 NOTE — ASSESSMENT & PLAN NOTE
Lab Results   Component Value Date    EGFR 5 07/29/2023    EGFR 5 07/28/2023    EGFR 8 11/20/2019    CREATININE 9.55 (H) 07/29/2023    CREATININE 8.97 (H) 07/28/2023    CREATININE 6.81 (H) 11/20/2019   · Hx ESRD due to Polycystic kydney disease on HD. Previously PD for 2 years however recurrent infections for which later transitioned to HD. · Dialysis over [facility] MWF schedule with per chart review hx of non compliance, as per patient only handling less than 3 hours due to anxiety.   · Baseline creatine per chart reviewed variable 6-9    · Plan  · Nephrology consulted appreciate recommendations  · Most likely will get HD dialysis today\

## 2023-07-29 NOTE — ASSESSMENT & PLAN NOTE
K is trending up to 5.9   Given nebulizers and calcium gluconate   Discussed with nephrology regarding any further interventions for this versus waiting for HD later today

## 2023-07-29 NOTE — PROGRESS NOTES
[facility]  Progress Note  Name: Sony Barfield  MRN: [id number]  Unit/Bed#: S -97 I Date of Admission: 7/28/2023   Date of Service: 7/29/2023  Hospital Day: 1    Assessment/Plan   Chronic venous stasis dermatitis of both lower extremities  Assessment & Plan  · History of chronic venostasis, necrotizing fasciitis  · Follows podiatry and wound care in the outpatient setting  · No active infection at this time      HFrEF Dilated cardiomyopathy [facility])  Assessment & Plan  · History dilated cardiomyopathy  · Last echo during nuclear stress test October 2021 significant for:  · LVEF 19%, cardiomyopathy with wall motion abnormalities. · Cardiology consult is pending    Hyperkalemia  Assessment & Plan  K is trending up to 5.9   Given nebulizers and calcium gluconate   Discussed with nephrology regarding any further interventions for this versus waiting for HD later today    ESRD (end stage renal disease) on dialysis [facility])  Assessment & Plan  Lab Results   Component Value Date    EGFR 5 07/29/2023    EGFR 5 07/28/2023    EGFR 8 11/20/2019    CREATININE 9.55 (H) 07/29/2023    CREATININE 8.97 (H) 07/28/2023    CREATININE 6.81 (H) 11/20/2019   · Hx ESRD due to Polycystic kydney disease on HD. Previously PD for 2 years however recurrent infections for which later transitioned to HD. · Dialysis over [facility] MWF schedule with per chart review hx of non compliance, as per patient only handling less than 3 hours due to anxiety. · Baseline creatine per chart reviewed variable 6-9    · Plan  · Nephrology consulted appreciate recommendations  · Most likely will get HD dialysis today\    Hypertension  Assessment & Plan  · History hypertension  · Home regimen torsemide 40 mg twice daily, Toprol-XL 25 mg once a day, midodrine  · Patient has been off medications for months now given inaccessibility on the outpatient setting.   · Blood Pressure: 140/98     · Plan  · BP is 140/98  · Continue current meds      * Hemodialysis catheter infection ([address])  Assessment & Plan  · POA 2 day hx of purulent discharge, edema along HD cath site. No fevers, chills however pain and discomfort. · Hemodynamically stable, afebrile no leucocytosis. · At the Ed receive on dose of Zosyn  · Upon exam edma present, no erythema appreciated. Purulent discharge somewhat foul smelling  · Plan  · S/p new temp cath placement yesterday  · ? HD today - will confirm with nephrology               VTE Pharmacologic Prophylaxis: VTE Score: 7 Moderate Risk (Score 3-4) - Pharmacological DVT Prophylaxis Ordered: heparin. Patient Centered Rounds: I performed bedside rounds with nursing staff today. Discussions with Specialists or Other Care Team Provider: tiger texted nephrology waiting to hear back    Education and Discussions with Family / Patient: Updated  (wife) via phone. Total Time Spent on Date of Encounter in care of patient: 30 min  This time was spent on one or more of the following: performing physical exam; counseling and coordination of care; obtaining or reviewing history; documenting in the medical record; reviewing/ordering tests, medications or procedures; communicating with other healthcare professionals and discussing with patient's family/caregivers. Current Length of Stay: 1 day(s)  Current Patient Status: Inpatient   Certification Statement: The patient will continue to require additional inpatient hospital stay due to HD   Discharge Plan: Anticipate discharge in 24-48 hrs to home. Code Status: Level 1 - Full Code    Subjective:   Patient seen and examined  Feeling "okay"    Objective:     Vitals:   Temp (24hrs), Av.9 °F (36.6 °C), Min:97.5 °F (36.4 °C), Max:98.3 °F (36.8 °C)    Temp:  [97.5 °F (36.4 °C)-98.3 °F (36.8 °C)] 97.6 °F (36.4 °C)  HR:  [] 85  Resp:  [16-18] 16  BP: (136-160)/() 140/98  SpO2:  [96 %-100 %] 100 %  Body mass index is 28.16 kg/m². Input and Output Summary (last 24 hours): Intake/Output Summary (Last 24 hours) at 7/29/2023 0935  Last data filed at 7/29/2023 8244  Gross per 24 hour   Intake 120 ml   Output --   Net 120 ml       Physical Exam:   Physical Exam  Constitutional:       General: He is not in acute distress. Appearance: He is not ill-appearing, toxic-appearing or diaphoretic. HENT:      Head: Normocephalic. Eyes:      General: No scleral icterus. Right eye: No discharge. Left eye: No discharge. Pupils: Pupils are equal, round, and reactive to light. Cardiovascular:      Rate and Rhythm: Normal rate. Heart sounds: No murmur heard. No friction rub. No gallop. Pulmonary:      Effort: No respiratory distress. Breath sounds: No stridor. No wheezing, rhonchi or rales. Chest:      Chest wall: No tenderness. Abdominal:      General: There is no distension. Palpations: There is no mass. Tenderness: There is no abdominal tenderness. There is no right CVA tenderness or guarding. Hernia: No hernia is present. Musculoskeletal:      Right lower leg: No edema. Left lower leg: No edema. Skin:     Capillary Refill: Capillary refill takes less than 2 seconds. Coloration: Skin is not jaundiced or pale. Findings: No bruising, erythema or lesion. Neurological:      General: No focal deficit present. Mental Status: He is alert. Cranial Nerves: No cranial nerve deficit. Sensory: No sensory deficit. Motor: No weakness.       Coordination: Coordination normal.      Gait: Gait normal.      Deep Tendon Reflexes: Reflexes normal.   Psychiatric:         Mood and Affect: Mood normal.          Additional Data:     Labs:  Results from last 7 days   Lab Units 07/29/23  0530   WBC Thousand/uL 8.29   HEMOGLOBIN g/dL 12.8   HEMATOCRIT % 39.8   PLATELETS Thousands/uL 117*   NEUTROS PCT % 81*   LYMPHS PCT % 7*   MONOS PCT % 8   EOS PCT % 2     Results from last 7 days   Lab Units 07/29/23  0530 07/28/23  0655   SODIUM mmol/L 136 137   POTASSIUM mmol/L 5.9* 5.4*   CHLORIDE mmol/L 96 92*   CO2 mmol/L 21 29   BUN mg/dL 93* 81*   CREATININE mg/dL 9.55* 8.97*   ANION GAP mmol/L 19 16   CALCIUM mg/dL 9.7 10.4*   ALBUMIN g/dL  --  4.4   TOTAL BILIRUBIN mg/dL  --  1.11*   ALK PHOS U/L  --  108*   ALT U/L  --  8   AST U/L  --  5*   GLUCOSE RANDOM mg/dL 93 107     Results from last 7 days   Lab Units 07/28/23  1313   INR  1.27*     Results from last 7 days   Lab Units 07/29/23  0758   POC GLUCOSE mg/dl 127               Lines/Drains:  Invasive Devices     Central Venous Catheter Line  Duration           CVC Central Lines 07/28/23 Double 1 day          Peripheral Intravenous Line  Duration           Peripheral IV 07/28/23 Left Antecubital 1 day          Hemodialysis Catheter  Duration           HD Temporary Double Catheter <1 day                Central Line:  Goal for removal: NA         Telemetry:  Telemetry Orders (From admission, onward)             24 Hour Telemetry Monitoring  Continuous x 24 Hours (Telem)        Question:  Reason for 24 Hour Telemetry  Answer:  Metabolic/electrolyte disturbance with high probability of dysrhythmia. K level <3 or >6 OR KCL infusion >10mEq/hr                 Telemetry Reviewed: Normal Sinus Rhythm  Indication for Continued Telemetry Use: Metabolic/electrolyte disturbance with high probability of dysrhythmia             Imaging: No pertinent imaging reviewed. Recent Cultures (last 7 days):   Results from last 7 days   Lab Units 07/28/23  0655   BLOOD CULTURE  Received in Microbiology Lab. Culture in Progress. Received in Microbiology Lab. Culture in Progress.        Last 24 Hours Medication List:   Current Facility-Administered Medications   Medication Dose Route Frequency Provider Last Rate   • acetaminophen  975 mg Oral [facility] Nguyen Hart MD     • b complex-vitamin C-folic acid  1 capsule Oral Daily With [employer] Nadia Sanchez MD     • [START ON 7/31/2023] calcitriol  0.75 mcg Oral Once per day on Mon Wed Fri Atiya Malhotra MD     • calcium acetate  1,334 mg Oral TID With [address] [place], MD     • calcium gluconate  1 g Intravenous Once Maryam Armenta MD 1 g (07/29/23 0913)   • cholecalciferol  1,000 Units Oral Daily Maryam Armenta MD     • hydrALAZINE  10 mg Intravenous Q4H PRN Maryam Armenta MD     • metoprolol succinate  25 mg Oral Daily Maryam Armenta MD     • multivitamin stress formula  1 tablet Oral Daily Maryam Armenta MD     • naloxone  0.04 mg Intravenous Q1MIN PRN Maryam Armenta MD     • oxyCODONE  5 mg Oral Q4H PRN Maryam Armenta MD     • oxyCODONE  2.5 mg Oral Q4H PRN Maryam Armenta MD     • senna-docusate sodium  1 tablet Oral HS Maryam Armenta MD     • sevelamer  800 mg Oral TID With [address] [place], MD     • simethicone  80 mg Oral Q6H PRN Maryam Armenta MD     • torsemide  40 mg Oral BID Maryam Armenta MD     • [START ON 7/31/2023] vancomycin  10 mg/kg Intravenous Once per day on Mon Wed Fri Bhanu Santiago DO     • vancomycin  10 mg/kg Intravenous Once Bhanu Santiago DO          Today, Patient Was Seen By: Natacha Renteria MD    **Please Note: This note may have been constructed using a voice recognition system. **

## 2023-07-30 LAB
ANION GAP SERPL CALCULATED.3IONS-SCNC: 15 MMOL/L
BACTERIA CATH TIP CULT: ABNORMAL
BASOPHILS # BLD AUTO: 0.05 THOUSANDS/ÂΜL (ref 0–0.1)
BASOPHILS NFR BLD AUTO: 1 % (ref 0–1)
BUN SERPL-MCNC: 59 MG/DL (ref 5–25)
CALCIUM SERPL-MCNC: 9.8 MG/DL (ref 8.4–10.2)
CHLORIDE SERPL-SCNC: 97 MMOL/L (ref 96–108)
CO2 SERPL-SCNC: 24 MMOL/L (ref 21–32)
CREAT SERPL-MCNC: 7.66 MG/DL (ref 0.6–1.3)
EOSINOPHIL # BLD AUTO: 0.25 THOUSAND/ÂΜL (ref 0–0.61)
EOSINOPHIL NFR BLD AUTO: 4 % (ref 0–6)
ERYTHROCYTE [DISTWIDTH] IN BLOOD BY AUTOMATED COUNT: 14.8 % (ref 11.6–15.1)
GFR SERPL CREATININE-BSD FRML MDRD: 6 ML/MIN/1.73SQ M
GLUCOSE SERPL-MCNC: 96 MG/DL (ref 65–140)
HCT VFR BLD AUTO: 37.6 % (ref 36.5–49.3)
HGB BLD-MCNC: 12 G/DL (ref 12–17)
IMM GRANULOCYTES # BLD AUTO: 0.02 THOUSAND/UL (ref 0–0.2)
IMM GRANULOCYTES NFR BLD AUTO: 0 % (ref 0–2)
LYMPHOCYTES # BLD AUTO: 0.62 THOUSANDS/ÂΜL (ref 0.6–4.47)
LYMPHOCYTES NFR BLD AUTO: 10 % (ref 14–44)
MCH RBC QN AUTO: 31.4 PG (ref 26.8–34.3)
MCHC RBC AUTO-ENTMCNC: 31.9 G/DL (ref 31.4–37.4)
MCV RBC AUTO: 98 FL (ref 82–98)
MONOCYTES # BLD AUTO: 0.72 THOUSAND/ÂΜL (ref 0.17–1.22)
MONOCYTES NFR BLD AUTO: 12 % (ref 4–12)
NEUTROPHILS # BLD AUTO: 4.48 THOUSANDS/ÂΜL (ref 1.85–7.62)
NEUTS SEG NFR BLD AUTO: 73 % (ref 43–75)
NRBC BLD AUTO-RTO: 0 /100 WBCS
PHOSPHATE SERPL-MCNC: 7.7 MG/DL (ref 2.3–4.1)
PLATELET # BLD AUTO: 112 THOUSANDS/UL (ref 149–390)
PMV BLD AUTO: 11.4 FL (ref 8.9–12.7)
POTASSIUM SERPL-SCNC: 5.1 MMOL/L (ref 3.5–5.3)
RBC # BLD AUTO: 3.82 MILLION/UL (ref 3.88–5.62)
SODIUM SERPL-SCNC: 136 MMOL/L (ref 135–147)
WBC # BLD AUTO: 6.14 THOUSAND/UL (ref 4.31–10.16)

## 2023-07-30 PROCEDURE — 85025 COMPLETE CBC W/AUTO DIFF WBC: CPT

## 2023-07-30 PROCEDURE — 99232 SBSQ HOSP IP/OBS MODERATE 35: CPT | Performed by: INTERNAL MEDICINE

## 2023-07-30 PROCEDURE — 84100 ASSAY OF PHOSPHORUS: CPT | Performed by: INTERNAL MEDICINE

## 2023-07-30 PROCEDURE — 80048 BASIC METABOLIC PNL TOTAL CA: CPT | Performed by: INTERNAL MEDICINE

## 2023-07-30 RX ORDER — LANOLIN ALCOHOL/MO/W.PET/CERES
3 CREAM (GRAM) TOPICAL
Status: DISCONTINUED | OUTPATIENT
Start: 2023-07-30 | End: 2023-08-01 | Stop reason: HOSPADM

## 2023-07-30 RX ADMIN — METOPROLOL SUCCINATE 25 MG: 25 TABLET, EXTENDED RELEASE ORAL at 08:42

## 2023-07-30 RX ADMIN — TORSEMIDE 40 MG: 20 TABLET ORAL at 08:42

## 2023-07-30 RX ADMIN — ACETAMINOPHEN 975 MG: 325 TABLET, FILM COATED ORAL at 13:03

## 2023-07-30 RX ADMIN — SEVELAMER HYDROCHLORIDE 800 MG: 800 TABLET ORAL at 08:42

## 2023-07-30 RX ADMIN — Medication 1000 UNITS: at 08:43

## 2023-07-30 RX ADMIN — SEVELAMER HYDROCHLORIDE 800 MG: 800 TABLET ORAL at 17:14

## 2023-07-30 RX ADMIN — SENNOSIDES AND DOCUSATE SODIUM 1 TABLET: 50; 8.6 TABLET ORAL at 21:28

## 2023-07-30 RX ADMIN — Medication 1 CAPSULE: at 17:14

## 2023-07-30 RX ADMIN — B-COMPLEX W/ C & FOLIC ACID TAB 1 TABLET: TAB at 08:43

## 2023-07-30 RX ADMIN — SEVELAMER HYDROCHLORIDE 800 MG: 800 TABLET ORAL at 12:23

## 2023-07-30 RX ADMIN — ACETAMINOPHEN 975 MG: 325 TABLET, FILM COATED ORAL at 05:03

## 2023-07-30 RX ADMIN — CEFTRIAXONE SODIUM 2000 MG: 10 INJECTION, POWDER, FOR SOLUTION INTRAVENOUS at 12:51

## 2023-07-30 RX ADMIN — CALCIUM ACETATE 1334 MG: 667 CAPSULE ORAL at 17:14

## 2023-07-30 RX ADMIN — CALCIUM ACETATE 1334 MG: 667 CAPSULE ORAL at 08:42

## 2023-07-30 RX ADMIN — CALCIUM ACETATE 1334 MG: 667 CAPSULE ORAL at 12:23

## 2023-07-30 RX ADMIN — OXYCODONE HYDROCHLORIDE 5 MG: 5 TABLET ORAL at 21:28

## 2023-07-30 RX ADMIN — ACETAMINOPHEN 975 MG: 325 TABLET, FILM COATED ORAL at 21:28

## 2023-07-30 RX ADMIN — Medication 3 MG: at 21:28

## 2023-07-30 RX ADMIN — TORSEMIDE 40 MG: 20 TABLET ORAL at 17:14

## 2023-07-30 NOTE — ASSESSMENT & PLAN NOTE
· POA 2 day hx of purulent discharge, edema along HD cath site. No fevers, chills however pain and discomfort. · Hemodynamically stable, afebrile no leucocytosis. · At the Ed receive on dose of Zosyn  · Upon exam edma present, no erythema appreciated. Purulent discharge somewhat foul smelling  · ID consult: Awaiting care for site culture. Blood culture shows: No growth in 24 hours  Ultrasound of area to identify any further possible connection in the area overlying clavicle bone: 2.8 x 1.1 cm  clinical concern for abscess  · Plan  · S/p new temp cath placement yesterday  · Dialysis 7/29 per patient.  MWF  ·   ·

## 2023-07-30 NOTE — ASSESSMENT & PLAN NOTE
K is trending up to 5.9   Given nebulizers and calcium gluconate   Patient underwent dialysis on 7/29    Plan  Next dialysis Monday( MWF)

## 2023-07-30 NOTE — TREATMENT TEAM
Infectious Diseases Chart Note:  - Cultures reviewed, catheter tip growing Serratia  - based on sensitivities, stop Vancomycin and start IV Ceftriaxone 2g daily  - follow up blood cultures to ensure negative at 72 hours  - ID will assess tomorrow    Plan and recommendations were discussed with primary team.    Maira Izaguirre MD

## 2023-07-30 NOTE — CONSULTS
Vancomycin IV Pharmacy-to-Dose Consultation    Connie Anguiano is a 66 y.o. male who was receiving Vancomycin IV with management by the Pharmacy Consult service. The patient's Vancomycin therapy has been discontinued. Thank you for allowing us to take part in this patient's care. Pharmacy will sign-off now; please call or re-consult if there are any questions.         Malou Causey, PharmD  Pharmacist

## 2023-07-30 NOTE — ASSESSMENT & PLAN NOTE
Lab Results   Component Value Date    EGFR 6 07/30/2023    EGFR 5 07/29/2023    EGFR 5 07/28/2023    CREATININE 7.66 (H) 07/30/2023    CREATININE 9.55 (H) 07/29/2023    CREATININE 8.97 (H) 07/28/2023   · Hx ESRD due to Polycystic kydney disease on HD. Previously PD for 2 years however recurrent infections for which later transitioned to HD. · Dialysis over [facility] MWF schedule with per chart review hx of non compliance, as per patient only handling less than 3 hours due to anxiety.   · Baseline creatine per chart reviewed variable 6-9  · Dialysis 7/29  · Nephrology consulted appreciate recommendations    · Plan

## 2023-07-30 NOTE — DISCHARGE INSTR - OTHER ORDERS
Skin Care Plan:  1-Apply Hydraguard barrier cream or equivalent to bilateral heels, sacro-buttocks and b/l lower legs 2x/day and as needed. 2-Turn/reposition every 2 hrs in bed for pressure re-distribution on skin . 3-Float heels on 2 pillows to offload pressure. Elevate legs when sitting to heart level or higher to minimize swelling & possible reopening of wounds. 4-Moisturize skin daily with skin nourishing cream  5-Use pressure redistribution cushion in chair when out of bed.

## 2023-07-30 NOTE — PROGRESS NOTES
[facility]  Progress Note  Name: Collin Rubin  MRN: [id number]  Unit/Bed#: S -10 I Date of Admission: 7/28/2023   Date of Service: 7/30/2023 I Hospital Day: 2    Assessment/Plan   Chronic venous stasis dermatitis of both lower extremities  Assessment & Plan  · History of chronic venostasis, necrotizing fasciitis  · Follows podiatry and wound care in the outpatient setting  · No active infection at this time      HFrEF Dilated cardiomyopathy [facility])  Assessment & Plan  · History dilated cardiomyopathy  · Last echo during nuclear stress test October 2021 significant for:  · LVEF 19%, cardiomyopathy with wall motion abnormalities. · Cardiology consult is pending: Patient was seen and offer her catheterization which she declined. Patient also declined aspirin or statin therapy. He was not agreeable to taking GDMT in the past    Plan   Metoprolol succinate initiated   ACE inhibitor can be initiated if patient agrees  Follow-up outpatient with cardiology for further work-up    Hyperkalemia  Assessment & Plan  K is trending up to 5.9   Given nebulizers and calcium gluconate   Patient underwent dialysis on 7/29    Plan  Next dialysis Monday( MWF)    ESRD (end stage renal disease) on dialysis [facility])  Assessment & Plan  Lab Results   Component Value Date    EGFR 6 07/30/2023    EGFR 5 07/29/2023    EGFR 5 07/28/2023    CREATININE 7.66 (H) 07/30/2023    CREATININE 9.55 (H) 07/29/2023    CREATININE 8.97 (H) 07/28/2023   · Hx ESRD due to Polycystic kydney disease on HD. Previously PD for 2 years however recurrent infections for which later transitioned to HD. · Dialysis over [facility] MWF schedule with per chart review hx of non compliance, as per patient only handling less than 3 hours due to anxiety.   · Baseline creatine per chart reviewed variable 6-9  · Dialysis 7/29  · Nephrology consulted appreciate recommendations    · Plan      * Hemodialysis catheter infection ([address])  Assessment & Plan  · POA 2 day hx of purulent discharge, edema along HD cath site. No fevers, chills however pain and discomfort. · Hemodynamically stable, afebrile no leucocytosis. · At the Ed receive on dose of Zosyn  · Upon exam edma present, no erythema appreciated. Purulent discharge somewhat foul smelling  · ID consult: Awaiting care for site culture. Blood culture shows: No growth in 24 hours  Ultrasound of area to identify any further possible connection in the area overlying clavicle bone: 2.8 x 1.1 cm  clinical concern for abscess  · Plan  · S/p new temp cath placement yesterday  · Dialysis  per patient. MWF  ·   ·              VTE Pharmacologic Prophylaxis: VTE Score: 7 High Risk (Score >/= 5) - Pharmacological DVT Prophylaxis Ordered: heparin. Sequential Compression Devices Ordered. Patient Centered Rounds: I performed bedside rounds with nursing staff today. Discussions with Specialists or Other Care Team Provider: Nephro  Education and Discussions with Family / Patient: Patient declined call to . Current Length of Stay: 2 day(s)  Current Patient Status: Inpatient   Discharge Plan: Anticipate discharge tomorrow to home. Code Status: Level 1 - Full Code    Subjective:   No acute event overnight. This morning patient said that he is feeling great he said that since they took  300 mL of fluid from the catheter site he has had no pain and had his dialysis yesterday. Since he is now back on dialysis, he would like to go back to eating his regular meals. Patient denies any fever, chills, shortness of breath, or drainage to the affected side. Objective:     Vitals:   Temp (24hrs), Av.8 °F (36.6 °C), Min:97.5 °F (36.4 °C), Max:98.2 °F (36.8 °C)    Temp:  [97.5 °F (36.4 °C)-98.2 °F (36.8 °C)] 97.7 °F (36.5 °C)  HR:  [76-88] 77  Resp:  [15-18] 16  BP: (133-156)/() 142/89  SpO2:  [97 %-99 %] 99 %  Body mass index is 27.25 kg/m².      Input and Output Summary (last 24 hours): Intake/Output Summary (Last 24 hours) at 7/30/2023 0836  Last data filed at 7/29/2023 1940  Gross per 24 hour   Intake 500 ml   Output 3500 ml   Net -3000 ml       Physical Exam:   Physical Exam  Vitals and nursing note reviewed. Constitutional:       General: He is not in acute distress. Appearance: Normal appearance. He is well-developed. HENT:      Head: Normocephalic and atraumatic. Eyes:      Conjunctiva/sclera: Conjunctivae normal.   Cardiovascular:      Rate and Rhythm: Normal rate and regular rhythm. Heart sounds: No murmur heard. Pulmonary:      Effort: Pulmonary effort is normal. No respiratory distress. Breath sounds: Normal breath sounds. Abdominal:      General: There is distension. Palpations: Abdomen is soft. Tenderness: There is no abdominal tenderness. Musculoskeletal:         General: No swelling. Cervical back: Neck supple. Skin:     General: Skin is warm and dry. Capillary Refill: Capillary refill takes less than 2 seconds. Neurological:      Mental Status: He is alert and oriented to person, place, and time.    Psychiatric:         Mood and Affect: Mood normal.         Additional Data:     Labs:  Results from last 7 days   Lab Units 07/30/23  0724   WBC Thousand/uL 6.14   HEMOGLOBIN g/dL 12.0   HEMATOCRIT % 37.6   PLATELETS Thousands/uL 112*   NEUTROS PCT % 73   LYMPHS PCT % 10*   MONOS PCT % 12   EOS PCT % 4     Results from last 7 days   Lab Units 07/30/23  0724 07/29/23  0530 07/28/23  0655   SODIUM mmol/L 136   < > 137   POTASSIUM mmol/L 5.1   < > 5.4*   CHLORIDE mmol/L 97   < > 92*   CO2 mmol/L 24   < > 29   BUN mg/dL 59*   < > 81*   CREATININE mg/dL 7.66*   < > 8.97*   ANION GAP mmol/L 15   < > 16   CALCIUM mg/dL 9.8   < > 10.4*   ALBUMIN g/dL  --   --  4.4   TOTAL BILIRUBIN mg/dL  --   --  1.11*   ALK PHOS U/L  --   --  108*   ALT U/L  --   --  8   AST U/L  --   --  5*   GLUCOSE RANDOM mg/dL 96   < > 107    < > = values in this interval not displayed. Results from last 7 days   Lab Units 07/28/23  1313   INR  1.27*     Results from last 7 days   Lab Units 07/29/23  2112 07/29/23  1801 07/29/23  1614 07/29/23  0758   POC GLUCOSE mg/dl 118 113 105 127               Lines/Drains:  Invasive Devices     Central Venous Catheter Line  Duration           CVC Central Lines 07/28/23 Double 2 days          Peripheral Intravenous Line  Duration           Peripheral IV 07/28/23 Left Antecubital 2 days          Hemodialysis Catheter  Duration           HD Temporary Double Catheter 1 day                Central Line:  Goal for removal: Port accessed. Will de-access as appropriate. Telemetry:  Telemetry Orders (From admission, onward)             24 Hour Telemetry Monitoring  Continuous x 24 Hours (Telem)        Question:  Reason for 24 Hour Telemetry  Answer:  Metabolic/electrolyte disturbance with high probability of dysrhythmia. K level <3 or >6 OR KCL infusion >10mEq/hr                 Telemetry Reviewed: Normal Sinus Rhythm  Indication for Continued Telemetry Use: Arrthymias requiring medical therapy             Imaging: Reviewed radiology reports from this admission including: ultrasound(s)    Recent Cultures (last 7 days):   Results from last 7 days   Lab Units 07/28/23  0655   BLOOD CULTURE  No Growth at 24 hrs. No Growth at 24 hrs.        Last 24 Hours Medication List:   Current Facility-Administered Medications   Medication Dose Route Frequency Provider Last Rate   • acetaminophen  975 mg Oral [facility] Nguyen Hart MD     • b complex-vitamin C-folic acid  1 capsule Oral Daily With [address] MD     • [START ON 7/31/2023] calcitriol  0.75 mcg Oral Once per day on Mon Wed Fri Angelina Ann MD     • calcium acetate  1,334 mg Oral TID With [address] [place], MD     • cholecalciferol  1,000 Units Oral Daily Nguyen Hart MD     • hydrALAZINE  10 mg Intravenous Q4H PRN Amrik Root MD     • metoprolol succinate  25 mg Oral Daily Amrik Root MD     • multivitamin stress formula  1 tablet Oral Daily Amrik Root MD     • naloxone  0.04 mg Intravenous Q1MIN PRN Amrik Root MD     • oxyCODONE  5 mg Oral Q4H PRN Amrik Root MD     • oxyCODONE  2.5 mg Oral Q4H PRN Amrik Root MD     • senna-docusate sodium  1 tablet Oral HS Amrik Root MD     • sevelamer  800 mg Oral TID With [address] [place], MD     • simethicone  80 mg Oral Q6H PRN Amrik Root MD     • torsemide  40 mg Oral BID Amrik Root MD     • [START ON 7/31/2023] vancomycin  10 mg/kg Intravenous Once per day on Mon Wed Fri Lanette Riley DO          Today, Patient Was Seen By: Christopher Ardon MD    **Please Note: This note may have been constructed using a voice recognition system. **

## 2023-07-30 NOTE — WOUND OSTOMY CARE
Consult Note - Wound   Kristen Lipscomb 66 y.o. male MRN: [id number]  Unit/Bed#: S -85 Encounter: [id number]        History and Present Illness:  Patient is a 67 yo male that was admitted to [address] [place] [address] for treatment of Hemodialysis catheter infection. Patient has a PMH of end-stage renal disease on hemodialysis, hypertension, cardiomyopathy, A-fib. Patient is min assist for turning and repositioning. Patient reports continence of bowel and bladder. On assessment, patient is sitting at edge of bed. Wound Care was consulted for chronic hemostasis wounds. Assessment Findings:   B/L heels are dry intact and denisa with no skin loss or wounds present. Recommend preventative Hydraguard Cream and proper offloading/ repositioning. Patient refused assessment of sacro-buttocks. Reports no wounds or pain in area. Preventative orders in place. 1. B/L Lower Extremities area intact with brown hemosiderin staining. No wounds or skin loss present. Recommend preventative hydraguard cream to area. No induration, fluctuance, odor, warmth/temperature differences, redness, or purulence noted to the above noted wounds and skin areas assessed. New dressings applied per orders listed below. Patient tolerated well- no s/s of non-verbal pain or discomfort observed during the encounter. Bedside nurse aware of plan of care. See flow sheets for more detailed assessment findings. Orders listed below and wound care will sign off, call or tiger text with questions. Skin Care Plan:  1-Preventative Hydraguard to bilateral heels, sacro-buttocks and b/l lower legs BID and PRN. 2-Turn/reposition q2h or when medically stable for pressure re-distribution on skin . 3-Elevate heels to offload pressure. 4-Moisturize skin daily with skin nourishing cream  5-Ehob cushion in chair when out of bed.         Wounds:  Wound 07/28/23 Venous Ulcer Pretibial Right (Active)   Wound Image   07/28/23 1922   Wound Description Intact 07/30/23 1200   Pressure Injury Stage O 07/28/23 1922   [place]-wound Assessment Intact 07/30/23 1200   Wound Length (cm) 0 cm 07/30/23 1200   Wound Width (cm) 0 cm 07/30/23 1200   Wound Depth (cm) 0 cm 07/30/23 1200   Wound Surface Area (cm^2) 0 cm^2 07/30/23 1200   Wound Volume (cm^3) 0 cm^3 07/30/23 1200   Calculated Wound Volume (cm^3) 0 cm^3 07/30/23 1200   Drainage Amount None 07/30/23 1200   Treatments Cleansed 07/30/23 1200   Dressing Moisture barrier 07/30/23 1200       Wound 07/28/23 Venous Ulcer Pretibial Left (Active)   Wound Image   07/28/23 1922   Wound Description Intact 07/30/23 1200   [place]-wound Assessment Intact 07/30/23 1200   Wound Length (cm) 0 cm 07/30/23 1200   Wound Width (cm) 0 cm 07/30/23 1200   Wound Depth (cm) 0 cm 07/30/23 1200   Wound Surface Area (cm^2) 0 cm^2 07/30/23 1200   Wound Volume (cm^3) 0 cm^3 07/30/23 1200   Calculated Wound Volume (cm^3) 0 cm^3 07/30/23 1200   Drainage Amount None 07/30/23 1200   Treatments Cleansed;Site care 07/30/23 1200   Dressing Moisture barrier 07/30/23 [address] [facility] [address], [place], [place]

## 2023-07-30 NOTE — PLAN OF CARE
Problem: Potential for Falls  Goal: Patient will remain free of falls  Description: INTERVENTIONS:  - Educate patient/family on patient safety including physical limitations  - Instruct patient to call for assistance with activity   - Consult OT/PT to assist with strengthening/mobility   - Keep Call bell within reach  - Keep bed low and locked with side rails adjusted as appropriate  - Keep care items and personal belongings within reach  - Initiate and maintain comfort rounds  - Make Fall Risk Sign visible to staff  - Offer Toileting every    Hours, in advance of need  - Initiate/Maintain alarm  - Obtain necessary fall risk management equipment:   - Apply yellow socks and bracelet for high fall risk patients  - Consider moving patient to room near nurses station  Outcome: Progressing     Problem: PAIN - ADULT  Goal: Verbalizes/displays adequate comfort level or baseline comfort level  Description: Interventions:  - Encourage patient to monitor pain and request assistance  - Assess pain using appropriate pain scale  - Administer analgesics based on type and severity of pain and evaluate response  - Implement non-pharmacological measures as appropriate and evaluate response  - Consider cultural and social influences on pain and pain management  - Notify physician/advanced practitioner if interventions unsuccessful or patient reports new pain  Outcome: Progressing     Problem: INFECTION - ADULT  Goal: Absence or prevention of progression during hospitalization  Description: INTERVENTIONS:  - Assess and monitor for signs and symptoms of infection  - Monitor lab/diagnostic results  - Monitor all insertion sites, i.e. indwelling lines, tubes, and drains  - Monitor endotracheal if appropriate and nasal secretions for changes in amount and color  - [place] appropriate cooling/warming therapies per order  - Administer medications as ordered  - Instruct and encourage patient and family to use good hand hygiene technique  - Identify and instruct in appropriate isolation precautions for identified infection/condition  Outcome: Progressing     Problem: SAFETY ADULT  Goal: Patient will remain free of falls  Description: INTERVENTIONS:  - Educate patient/family on patient safety including physical limitations  - Instruct patient to call for assistance with activity   - Consult OT/PT to assist with strengthening/mobility   - Keep Call bell within reach  - Keep bed low and locked with side rails adjusted as appropriate  - Keep care items and personal belongings within reach  - Initiate and maintain comfort rounds  - Make Fall Risk Sign visible to staff  - Offer Toileting every  Hours, in advance of need  - Initiate/Maintain alarm  - Obtain necessary fall risk management equipment:   - Apply yellow socks and bracelet for high fall risk patients  - Consider moving patient to room near nurses station  Outcome: Progressing  Goal: Maintain or return to baseline ADL function  Description: INTERVENTIONS:  -  Assess patient's ability to carry out ADLs; assess patient's baseline for ADL function and identify physical deficits which impact ability to perform ADLs (bathing, care of mouth/teeth, toileting, grooming, dressing, etc.)  - Assess/evaluate cause of self-care deficits   - Assess range of motion  - Assess patient's mobility; develop plan if impaired  - Assess patient's need for assistive devices and provide as appropriate  - Encourage maximum independence but intervene and supervise when necessary  - Involve family in performance of ADLs  - Assess for home care needs following discharge   - Consider OT consult to assist with ADL evaluation and planning for discharge  - Provide patient education as appropriate  Outcome: Progressing  Goal: Maintains/Returns to pre admission functional level  Description: INTERVENTIONS:  - Perform BMAT or MOVE assessment daily.   - Set and communicate daily mobility goal to care team and patient/family/caregiver. - Collaborate with rehabilitation services on mobility goals if consulted  - Perform Range of Motion  times a day. - Reposition patient every  hours. - Dangle patient  times a day  - Stand patient  times a day  - Ambulate patient times a day  - Out of bed to chair  times a day   - Out of bed for meals  times a day  - Out of bed for toileting  - Record patient progress and toleration of activity level   Outcome: Progressing     Problem: DISCHARGE PLANNING  Goal: Discharge to home or other facility with appropriate resources  Description: INTERVENTIONS:  - Identify barriers to discharge w/patient and caregiver  - Arrange for needed discharge resources and transportation as appropriate  - Identify discharge learning needs (meds, wound care, etc.)  - Arrange for interpretive services to assist at discharge as needed  - Refer to Case Management Department for coordinating discharge planning if the patient needs post-hospital services based on physician/advanced practitioner order or complex needs related to functional status, cognitive ability, or social support system  Outcome: Progressing     Problem: Knowledge Deficit  Goal: Patient/family/caregiver demonstrates understanding of disease process, treatment plan, medications, and discharge instructions  Description: Complete learning assessment and assess knowledge base.   Interventions:  - Provide teaching at level of understanding  - Provide teaching via preferred learning methods  Outcome: Progressing     Problem: SKIN/TISSUE INTEGRITY - ADULT  Goal: Incision(s), wounds(s) or drain site(s) healing without S/S of infection  Description: INTERVENTIONS  - Assess and document dressing, incision, wound bed, drain sites and surrounding tissue  - Provide patient and family education  - Perform skin care/dressing changes every   Outcome: Progressing     Problem: METABOLIC, FLUID AND ELECTROLYTES - ADULT  Goal: Electrolytes maintained within normal limits  Description: INTERVENTIONS:  - Monitor labs and assess patient for signs and symptoms of electrolyte imbalances  - Administer electrolyte replacement as ordered  - Monitor response to electrolyte replacements, including repeat lab results as appropriate  - Instruct patient on fluid and nutrition as appropriate  Outcome: Progressing  Goal: Fluid balance maintained  Description: INTERVENTIONS:  - Monitor labs   - Monitor I/O and WT  - Instruct patient on fluid and nutrition as appropriate  - Assess for signs & symptoms of volume excess or deficit  Outcome: Progressing     Problem: Nutrition/Hydration-ADULT  Goal: Nutrient/Hydration intake appropriate for improving, restoring or maintaining nutritional needs  Description: Monitor and assess patient's nutrition/hydration status for malnutrition. Collaborate with interdisciplinary team and initiate plan and interventions as ordered. Monitor patient's weight and dietary intake as ordered or per policy. Utilize nutrition screening tool and intervene as necessary. Determine patient's food preferences and provide high-protein, high-caloric foods as appropriate.      INTERVENTIONS:  - Monitor oral intake, urinary output, labs, and treatment plans  - Assess nutrition and hydration status and recommend course of action  - Evaluate amount of meals eaten  - Assist patient with eating if necessary   - Allow adequate time for meals  - Recommend/ encourage appropriate diets, oral nutritional supplements, and vitamin/mineral supplements  - Order, calculate, and assess calorie counts as needed  - Assess need for intravenous fluids  - Provide nutrition/hydration education as appropriate  - Include patient/family/caregiver in decisions related to nutrition  Outcome: Progressing

## 2023-07-30 NOTE — PROGRESS NOTES
Deniz Gardiner is a 66 y.o. male who is currently ordered Vancomycin IV with management by the Pharmacy Consult service. Relevant clinical data and objective / subjective history reviewed. Vancomycin Assessment:  Indication and Goal AUC/Trough: Soft tissue (goal -600, trough >10)  Clinical Status: stable  Micro:     Renal Function:  SCr: 9.55 mg/dL  CrCl: 8.6 mL/min  Renal replacement: HD scheduled ,,  Days of Therapy: 3  Vancomycin Plan:  Current Dosinmg IV once daily on ,,F post-HD      Next Level: 23 prior to HD  Renal Function Monitoring: Daily BMP and [place] will continue to follow closely for s/sx of nephrotoxicity, infusion reactions and appropriateness of therapy. BMP and CBC will be ordered per protocol. We will continue to follow the patient’s culture results and clinical progress daily.     Cecil Mars, Pharmacist

## 2023-07-30 NOTE — CASE MANAGEMENT
Case Management Assessment & Discharge Planning Note    Patient name Hue Leo  Location S MS 46/S -02 MRN [id number]  : 1960 Date 2023       Current Admission Date: 2023  Current Admission Diagnosis:Hemodialysis catheter infection [facility])   Patient Active Problem List    Diagnosis Date Noted   • Chronic venous stasis dermatitis of both lower extremities 2023   • Hemodialysis catheter infection ([address]) 2023   • Venous stasis ulcer of left lower leg with edema of left lower leg ([address]) 2023   • Abnormal nuclear stress test 2021   • Type 2 diabetes mellitus with stage 5 chronic kidney disease not on chronic dialysis, with long-term current use of insulin ([address]) 2021   • Heart failure with reduced ejection fraction due to cardiomyopathy ([address]) 2020   • HFrEF Dilated cardiomyopathy ([address]) 2020   • Pruritus 2020   • Insomnia 2020   • Chronic hepatic failure without coma ([address]) 2020   • Paresis ([address]) 2020   • Hypoproteinemia ([address]) 2020   • Screening for HIV without presence of risk factors    • Systolic dysfunction    • GERD (gastroesophageal reflux disease) 2019   • Oropharyngeal dysphagia 2019   • Constipation 2019   • At Risk for Pressure ulcer, buttock 10/29/2019   • ESRD (end stage renal disease) ([address]) 10/28/2019   • Scrotal swelling 10/27/2019   • Hyperkalemia 10/26/2019   • Wound of right leg 10/26/2019   • Wound of left leg 10/26/2019   • Multiple wounds of skin 10/26/2019   • Cellulitis 10/26/2019   • ESRD (end stage renal disease) on dialysis [facility])    • Ileus ([address]) 10/09/2019   • Ascites 10/09/2019   • Elevated d-dimer 10/09/2019   • Hypotension 10/06/2019   • Anemia due to chronic kidney disease, on chronic dialysis and Acute Blood Loss Anemia ([address]) 10/06/2019   • Paroxysmal atrial fibrillation ([address]) 2019   • Gram-negative bacteremia 2019   • Chronic kidney disease with end stage renal failure on dialysis ([facility]) 09/21/2019   • Hyponatremia 09/21/2019   • Multiple and open wound of lower limb 09/21/2019   • Total bilirubin, elevated 09/21/2019   • Thrombocytopenia ([address] 09/21/2019   • Polycystic liver disease 08/07/2018   • ADPKD (autosomal dominant polycystic kidney) 08/07/2018   • Peripheral neuropathy 11/23/2015   • Polycystic kidney disease 08/17/2015   • Hypertension 10/29/2013      LOS (days): 2  Geometric Mean LOS (GMLOS) (days): 4.90  Days to GMLOS:2.5     OBJECTIVE:    Risk of Unplanned Readmission Score: 17.25         Current admission status: Inpatient  Referral Reason: Medication Assistance    Preferred Pharmacy:   [facility] [place] [address] [address] [facility] [address]  [address] [place] [address] [address] [address]  Phone: [phone] Fax: [place] [facility] [address] [place] [address] [address] [address]  Phone: [phone] Fax: [phone]    Primary Care Provider: Kael Freeman DO    Primary Insurance: MEDICARE  Secondary Insurance: [address]    ASSESSMENT:  SUKHDEV/Tee Mojica Final, [address] [facility] Representative - Spouse   Primary Phone: [phone] (Mobile)                         Readmission Root Cause  30 Day Readmission: No    Patient Information  Admitted from[de-identified] Home  Mental Status: Alert  During Assessment patient was accompanied by: Not accompanied during assessment  Assessment information provided by[de-identified] Spouse  Primary Caregiver: Self  Support Systems: Self, Spouse/significant other, Family members  [place] of Residence: [address] do you live in?: [place] [address] [place] [address] entry access options.  Select all that apply.: Stairs  Number of steps to enter home.: 7  Do the steps have railings?: Yes  Type of Current Residence: [address] [place] home  In the last 12 months, was there a time when you were not able to pay the mortgage or rent on time?: No  In the last 12 months, how many places have you lived?: 1  In the last 12 months, was there a time when you did not have a steady place to sleep or slept in a shelter (including now)?: No  Homeless/housing insecurity resource given?: N/A  Living Arrangements: Lives w/ Spouse/significant other (Lives with his wife, daughter, and 2 granddaughters e/o week)  Is patient a ?: No    Activities of Daily Living Prior to Admission  Functional Status: Independent  Completes ADLs independently?: Yes  Ambulates independently?: Yes  Does patient use assisted devices?: Yes  Assisted Devices (DME) used: Straight Cane  Does patient currently own DME?: Yes  What DME does the patient currently own?: Straight Cane  Does patient have a history of Outpatient Therapy (PT/OT)?: No  Does the patient have a history of Short-Term Rehab?: Yes ([facility] (3 years ago))  Does patient have a history of HHC?: Yes  Does patient currently have [facility]?: No         Patient Information Continued  Income Source: SSI/SSD  Does patient have prescription coverage?: No (Patient does not have prescription coverage as he did not opt for it when selecting his plan)  Within the past 12 months, you worried that your food would run out before you got the money to buy more.: Never true  Within the past 12 months, the food you bought just didn't last and you didn't have money to get more.: Never true  Food insecurity resource given?: N/A  Does patient receive dialysis treatments?: Yes ([address] MWF 1st shift)  Does patient have a history of substance abuse?: No  Does patient have a history of Mental Health Diagnosis?: No         Means of Transportation  Means of Transport to Appts[de-identified] Drives Self  In the past 12 months, has lack of transportation kept you from medical appointments or from getting medications?: No  In the past 12 months, has lack of transportation kept you from meetings, work, or from getting things needed for daily living?: No  Was application for public transport provided?: N/A        DISCHARGE DETAILS:    Discharge planning discussed with[de-identified] Spouse  Freedom of Choice: Yes  Comments - Freedom of Choice: No current CM DC needs discussed or identifed  CM contacted family/caregiver?: Yes  Were Treatment Team discharge recommendations reviewed with patient/caregiver?: Yes  Did patient/caregiver verbalize understanding of patient care needs?: Yes  Were patient/caregiver advised of the risks associated with not following Treatment Team discharge recommendations?: Yes    Contacts  Patient Contacts: Cira Decker (Spouse)  Relationship to Patient[de-identified] Family  Contact Method: Phone  Phone Number: [phone] (M)  Reason/Outcome: Discharge Planning, Emergency Contact, Continuity of Care                      CM attempted to meet with patient at bedside to complete assessment however he was soundly sleeping. CM contacted patient's wife. CM name and role reviewed. CM assessment completed and charted above. Patient does not have prescription drug coverage despite having medical insurance both Medicare and a secondary plan. CM updated SLIM provider that since patient has opted to not have prescription coverage on his plan we would be unable to assist financially with medications at DC but discussed choosing medications that he could use a Good RX card for or that are on the [employer] $4 list.     CM reviewed discharge planning process including the following: identifying caregivers at home, preference for d/c planning needs, [facility] Meds to Bed program, availability of treatment team to discuss questions or concerns patient and/or family may have regarding diagnosis, plan of care, old or new medications and discharge planning. CM will continue to follow for care coordination and update assessment as necessary.

## 2023-07-30 NOTE — QUICK NOTE
General Surgery Quick Note:    Patient seen and examined bedside. No packing present upon wound evaluation. Scant sang drainage from prior permcath side. No purulence able to be expressed. Nontender upon exam.  Afebrile, no leukocytosis, bcx NGTD. Catheter tip with >100 colonies serratia marcescens. Sensitivities with susceptibility to ceftriaxone (patient's current regimen). Area repacked with 1in iodoform gauze.   Discussed with patient and patient's primary team.

## 2023-07-30 NOTE — ASSESSMENT & PLAN NOTE
· History dilated cardiomyopathy  · Last echo during nuclear stress test October 2021 significant for:  · LVEF 19%, cardiomyopathy with wall motion abnormalities. · Cardiology consult is pending: Patient was seen and offer her catheterization which she declined. Patient also declined aspirin or statin therapy.   He was not agreeable to taking GDMT in the past    Plan   Metoprolol succinate initiated   ACE inhibitor can be initiated if patient agrees  Follow-up outpatient with cardiology for further work-up

## 2023-07-30 NOTE — PROGRESS NOTES
[address]iste NOTE   Chaz [place] 66 y.o. male MRN: [id number]  Unit/Bed#: S -01 Encounter: [id number]  Reason for Consult: ESRD    ASSESSMENT and PLAN:       28-year-old male with a past medical history of ESRD on hemodialysis with polycystic kidney disease, A-fib, hypertension, cardiomyopathy, poor compliance with completing dialysis treatments and catheter care who initially presents with drainage from permacath site from dialysis.     Patient denies other complaints of fevers, chills, nausea, vomiting, and shortness of breath     On exam, patient is euvolemic, lungs are clear, 1+ edema with venous changes, catheter site-significant purulent material noted, also 2 cm boggy area proximal concerning for abscess     1-ESRD-     - On hemodialysis at [facility]  - Outpatient dialysis MWF  - Target weight 81 kg  - Tunneled dialysis catheter site infection-tunneled dialysis catheter removed 7/28 and temporary dialysis catheter placed on left IJ on 7/28  - 7/28-patient did not have dialysis due to needing procedure for catheter removal and placement of new catheter and no urgent need for dialysis  - 7/29- completed dialysis through temporary catheter with 3 L ultrafiltration  - 7/30- dialysis is not indicated today. Potassium improving. Phosphorus improving.   Cultures thus far still negative.      Plan  -  Dialysis next Monday  - Consider dialysis Tuesday morning prior to potential discharge  - Then patient will have dialysis Thursday in [address] [place] [address] if he is discharged (he is attempting to go to vacation where his family is currently)  -We will need to confirm all of this with his outpatient dialysis unit regarding outpatient dialysis plans this coming week as the patient had a planned vacation already  - Currently with temporary dialysis catheter  - Cannot be discharged until dialysis catheter plans are finalized and patient has a permanent dialysis catheter  - Follow-up final cultures  - Final plans for complex collection on ultrasound per primary and surgery teams    Patient is attempting to be discharged, if safe, by Tuesday. He has asked if he can have dialysis also on Tuesday as his outpatient schedule this week was going to be Tuesday, Thursday, Saturday because he and his family were to be a vacation in [facility]. For now I explained that the plan should be dialysis tomorrow and then consider dialysis Tuesday morning prior to discharge. I also explained that I am not sure when it will be safe to be discharged until we see what the final cultures show and plan for abscess? - Reviewed with primary team attendin and we are in agreement renal plan for dialysis tomorrowg  -Check iron panel in a.m. 2-electrolytes-hyperkalemia- improved with dialysis. Phosphorus improved with dialysis. Continue low potassium diet.     3-acid/base-bicarbonate stable     4-volume- improved with ultrafiltration. And is now euvolemic with trace pedal edema only.     5-CHF with EF 20%-echocardiogram with continued depressed EF.     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  Current hemoglobin level is appropriate.     9-tunneled dialysis catheter site infection-     - Catheter was removed 7/28  - General surgery team and infectious disease teams were also brought on board  - Ultrasound with 2.8 cm structure in the area of clinical concern.   Further plans per primary team    10-patient states he had severe pain in the lower extremities overnight which are now resolved. Check iron panel in a.m. SUBJECTIVE / 24H INTERVAL HISTORY:    Patient denies complaints with the exception of cramps overnight. OBJECTIVE:  Current Weight: Weight - Scale: 81.3 kg (179 lb 3.7 oz)  Vitals:    07/30/23 0300 07/30/23 0600 07/30/23 0700 07/30/23 0735   BP: 133/74  142/89    BP Location: Right arm      Pulse: 76  77    Resp: 16  16    Temp: 98.2 °F (36.8 °C)  97.7 °F (36.5 °C)    TempSrc: Oral  Oral    SpO2: 98%  99%    Weight:  83.6 kg (184 lb 4.9 oz)  81.3 kg (179 lb 3.7 oz)   Height:           Intake/Output Summary (Last 24 hours) at 7/30/2023 1117  Last data filed at 7/29/2023 1940  Gross per 24 hour   Intake 500 ml   Output 3500 ml   Net -3000 ml     General: NAD  Skin: no rash  Eyes: anicteric sclera  ENT: moist mucous membrane  Neck: supple  Chest: CTA b/l, no ronchii, no wheeze, no rubs, no rales  CVS: s1s2, no murmur, no gallop, no rub  Abdomen: soft, nontender, nl sounds  Extremities:  trace pedal edema, chronic venous changes  : no meeks  Neuro: AAOX3  Psych: normal affect  Left temporary dialysis catheter site without active bleeding. Dried blood noted.     Medications:    Current Facility-Administered Medications:   •  acetaminophen (TYLENOL) tablet 975 mg, 975 mg, Oral, Q8H [address], Jesus Mejia MD, 975 mg at 07/30/23 0503  •  b complex-vitamin C-folic acid (RENAL) capsule 1 capsule, 1 capsule, Oral, Daily With Maki Chisholm MD, 1 capsule at 07/29/23 1642  •  [START ON 7/31/2023] calcitriol (ROCALTROL) capsule 0.75 mcg, 0.75 mcg, Oral, Once per day on Mon Wed Fri, Sandrine More MD  •  calcium acetate (PHOSLO) capsule 1,334 mg, 1,334 mg, Oral, TID With Meals, Jesus Mejia MD, 1,334 mg at 07/30/23 1157  •  cholecalciferol (VITAMIN D3) tablet 1,000 Units, 1,000 Units, Oral, Daily, Jesus Mejia MD, 1,000 Units at 07/30/23 1523  •  hydrALAZINE (APRESOLINE) injection 10 mg, 10 mg, Intravenous, Q4H PRN, Ambika Garrison MD  •  metoprolol succinate (TOPROL-XL) 24 hr tablet 25 mg, 25 mg, Oral, Daily, Ambika Garrison MD, 25 mg at 07/30/23 5200  •  multivitamin stress formula tablet 1 tablet, 1 tablet, Oral, Daily, Ambika Garrison MD, 1 tablet at 07/30/23 0843  •  naloxone (NARCAN) 0.04 mg/mL syringe 0.04 mg, 0.04 mg, Intravenous, Q1MIN PRN, Ambika Garrison MD  •  oxyCODONE (ROXICODONE) IR tablet 5 mg, 5 mg, Oral, Q4H PRN, Ambika Garrison MD, 5 mg at 07/28/23 1546  •  oxyCODONE (ROXICODONE) split tablet 2.5 mg, 2.5 mg, Oral, Q4H PRN, Ambika Garrison MD  •  senna-docusate sodium (SENOKOT S) 8.6-50 mg per tablet 1 tablet, 1 tablet, Oral, HS, Ambika Garrison MD, 1 tablet at 07/29/23 2113  •  sevelamer (RENAGEL) tablet 800 mg, 800 mg, Oral, TID With Meals, Ambika Garrison MD, 800 mg at 07/30/23 3483  •  simethicone (MYLICON) chewable tablet 80 mg, 80 mg, Oral, Q6H PRN, Ambika Garrison MD  •  torsemide [facility]) tablet 40 mg, 40 mg, Oral, BID, Ambika Garrison MD, 40 mg at 07/30/23 0488  •  [START ON 7/31/2023] vancomycin (VANCOCIN) IVPB (premix in dextrose) 750 mg 150 mL, 10 mg/kg, Intravenous, Once per day on Mon Wed Fri, Khadar Hernández DO    Laboratory Results:  Results from last 7 days   Lab Units 07/30/23  0724 07/29/23  0530 07/28/23  0655   WBC Thousand/uL 6.14 8.29 5.68   HEMOGLOBIN g/dL 12.0 12.8 12.7   HEMATOCRIT % 37.6 39.8 40.2   PLATELETS Thousands/uL 112* 117* 104*   POTASSIUM mmol/L 5.1 5.9* 5.4*   CHLORIDE mmol/L 97 96 92*   CO2 mmol/L 24 21 29   BUN mg/dL 59* 93* 81*   CREATININE mg/dL 7.66* 9.55* 8.97*   CALCIUM mg/dL 9.8 9.7 10.4*   MAGNESIUM mg/dL  --   --  2.5   PHOSPHORUS mg/dL 7.7* 10.2* 9.2*

## 2023-07-31 ENCOUNTER — APPOINTMENT (INPATIENT)
Dept: DIALYSIS | Facility: HOSPITAL | Age: 63
DRG: 314 | End: 2023-07-31
Payer: MEDICARE

## 2023-07-31 LAB
ANION GAP SERPL CALCULATED.3IONS-SCNC: 17 MMOL/L
BUN SERPL-MCNC: 74 MG/DL (ref 5–25)
CALCIUM SERPL-MCNC: 9.5 MG/DL (ref 8.4–10.2)
CHLORIDE SERPL-SCNC: 96 MMOL/L (ref 96–108)
CO2 SERPL-SCNC: 22 MMOL/L (ref 21–32)
CREAT SERPL-MCNC: 8.47 MG/DL (ref 0.6–1.3)
ERYTHROCYTE [DISTWIDTH] IN BLOOD BY AUTOMATED COUNT: 14.8 % (ref 11.6–15.1)
FERRITIN SERPL-MCNC: 158 NG/ML (ref 24–336)
GFR SERPL CREATININE-BSD FRML MDRD: 6 ML/MIN/1.73SQ M
GLUCOSE SERPL-MCNC: 88 MG/DL (ref 65–140)
HCT VFR BLD AUTO: 35.8 % (ref 36.5–49.3)
HGB BLD-MCNC: 11.6 G/DL (ref 12–17)
IRON SATN MFR SERPL: 15 % (ref 20–50)
IRON SERPL-MCNC: 32 UG/DL (ref 65–175)
MAGNESIUM SERPL-MCNC: 2.5 MG/DL (ref 1.9–2.7)
MCH RBC QN AUTO: 31.6 PG (ref 26.8–34.3)
MCHC RBC AUTO-ENTMCNC: 32.4 G/DL (ref 31.4–37.4)
MCV RBC AUTO: 98 FL (ref 82–98)
PHOSPHATE SERPL-MCNC: 8.7 MG/DL (ref 2.3–4.1)
PLATELET # BLD AUTO: 111 THOUSANDS/UL (ref 149–390)
PMV BLD AUTO: 10.9 FL (ref 8.9–12.7)
POTASSIUM SERPL-SCNC: 5.4 MMOL/L (ref 3.5–5.3)
RBC # BLD AUTO: 3.67 MILLION/UL (ref 3.88–5.62)
SODIUM SERPL-SCNC: 135 MMOL/L (ref 135–147)
TIBC SERPL-MCNC: 219 UG/DL (ref 250–450)
WBC # BLD AUTO: 5.68 THOUSAND/UL (ref 4.31–10.16)

## 2023-07-31 PROCEDURE — 99232 SBSQ HOSP IP/OBS MODERATE 35: CPT | Performed by: INTERNAL MEDICINE

## 2023-07-31 PROCEDURE — 84100 ASSAY OF PHOSPHORUS: CPT | Performed by: INTERNAL MEDICINE

## 2023-07-31 PROCEDURE — 83540 ASSAY OF IRON: CPT | Performed by: INTERNAL MEDICINE

## 2023-07-31 PROCEDURE — 83550 IRON BINDING TEST: CPT | Performed by: INTERNAL MEDICINE

## 2023-07-31 PROCEDURE — 85027 COMPLETE CBC AUTOMATED: CPT | Performed by: INTERNAL MEDICINE

## 2023-07-31 PROCEDURE — 83735 ASSAY OF MAGNESIUM: CPT | Performed by: INTERNAL MEDICINE

## 2023-07-31 PROCEDURE — 82728 ASSAY OF FERRITIN: CPT | Performed by: INTERNAL MEDICINE

## 2023-07-31 PROCEDURE — NC001 PR NO CHARGE: Performed by: RADIOLOGY

## 2023-07-31 PROCEDURE — 80048 BASIC METABOLIC PNL TOTAL CA: CPT | Performed by: INTERNAL MEDICINE

## 2023-07-31 PROCEDURE — 90935 HEMODIALYSIS ONE EVALUATION: CPT | Performed by: INTERNAL MEDICINE

## 2023-07-31 RX ADMIN — B-COMPLEX W/ C & FOLIC ACID TAB 1 TABLET: TAB at 08:06

## 2023-07-31 RX ADMIN — CALCIUM ACETATE 1334 MG: 667 CAPSULE ORAL at 08:06

## 2023-07-31 RX ADMIN — CEFTRIAXONE SODIUM 2000 MG: 10 INJECTION, POWDER, FOR SOLUTION INTRAVENOUS at 17:06

## 2023-07-31 RX ADMIN — ACETAMINOPHEN 975 MG: 325 TABLET, FILM COATED ORAL at 20:42

## 2023-07-31 RX ADMIN — CALCIUM ACETATE 1334 MG: 667 CAPSULE ORAL at 17:00

## 2023-07-31 RX ADMIN — CALCITRIOL CAPSULES 0.25 MCG 0.75 MCG: 0.25 CAPSULE ORAL at 08:10

## 2023-07-31 RX ADMIN — Medication 1000 UNITS: at 08:06

## 2023-07-31 RX ADMIN — TORSEMIDE 40 MG: 20 TABLET ORAL at 17:00

## 2023-07-31 RX ADMIN — SEVELAMER HYDROCHLORIDE 800 MG: 800 TABLET ORAL at 17:00

## 2023-07-31 RX ADMIN — Medication 3 MG: at 20:43

## 2023-07-31 RX ADMIN — Medication 1 CAPSULE: at 17:02

## 2023-07-31 RX ADMIN — TORSEMIDE 40 MG: 20 TABLET ORAL at 08:06

## 2023-07-31 RX ADMIN — OXYCODONE HYDROCHLORIDE 5 MG: 5 TABLET ORAL at 23:18

## 2023-07-31 RX ADMIN — SEVELAMER HYDROCHLORIDE 800 MG: 800 TABLET ORAL at 11:02

## 2023-07-31 RX ADMIN — SEVELAMER HYDROCHLORIDE 800 MG: 800 TABLET ORAL at 08:06

## 2023-07-31 RX ADMIN — CALCIUM ACETATE 1334 MG: 667 CAPSULE ORAL at 11:03

## 2023-07-31 RX ADMIN — METOPROLOL SUCCINATE 25 MG: 25 TABLET, EXTENDED RELEASE ORAL at 08:06

## 2023-07-31 RX ADMIN — ACETAMINOPHEN 975 MG: 325 TABLET, FILM COATED ORAL at 05:27

## 2023-07-31 NOTE — ASSESSMENT & PLAN NOTE
· History of chronic venostasis, necrotizing fasciitis  · Follows podiatry and wound care in the outpatient setting  · No active infection at this time    Plan  Wound care consult

## 2023-07-31 NOTE — ASSESSMENT & PLAN NOTE
Lab Results   Component Value Date    EGFR 6 07/31/2023    EGFR 6 07/30/2023    EGFR 5 07/29/2023    CREATININE 8.47 (H) 07/31/2023    CREATININE 7.66 (H) 07/30/2023    CREATININE 9.55 (H) 07/29/2023   · Hx ESRD due to Polycystic kydney disease on HD. Previously PD for 2 years however recurrent infections for which later transitioned to HD. · Dialysis over [facility] MWF schedule with per chart review hx of non compliance, as per patient only handling less than 3 hours due to anxiety.   · Baseline creatine per chart reviewed variable 6-9  · Dialysis 7/29  · Nephrology consulted appreciate recommendations  · Awaiting iron panel : Iron Saturation 15, TIBC 219, iron: 32    · Plan  Dialysis today  Dialysis on Tuesday, patient will do Tuesday Thursday and Saturday for dialysis

## 2023-07-31 NOTE — TELEMEDICINE
INTERPROFESSIONAL (ELECTRONIC OR PHONE) CONSULTATION - Interventional Radiology  Deniz Gardiner 66 y.o. male MRN: [id number]  Unit/Bed#: S -46 Encounter: [id number]    IR has been consulted regarding the care of Deniz Gardiner. We were consulted by nephrology concerning this patient with renal failure    Inpatient Consult to IR  Consult performed by: Karle Scheuermann, MD  Consult ordered by: Ronnie Green PA-C        07/31/23      Assessment/Recommendation:   29-year-old with end-stage renal disease, admission for infected right tunneled dialysis catheter    This was removed and a left-sided nontunneled catheter was placed  july 28    Patient will require durable dialysis access for discharge    Anticipate clearance of blood cultures tomorrow    I will place order for tunneled dialysis catheter placement this may be a conversion of existing catheter or placement of new catheter    Earliest would be tomorrow pending IR availability if there are additional more urgent procedures that need to be scheduled this case will be deferred    I will make patient n.p.o. after midnight in case tomorrow works out    Total time spent in review of data, discussion with requesting provider and rendering advice was 17 minutes     Thank you for allowing Interventional Radiology to participate in the care of Deniz Gardiner. Please don't hesitate to call or TigerText us with any questions.      Karle Scheuermann, MD      Past Medical History:  Past Medical History:   Diagnosis Date   • Complication of renal dialysis    • Polycystic kidney disease        Past Surgical History:  Past Surgical History:   Procedure Laterality Date   • IR CATHETERGRAM  10/17/2019   • IR IMAGE GUIDED ASPIRATION / DRAINAGE  9/23/2019   • IR PARACENTESIS  10/9/2019   • IR TEMPORARY DIALYSIS CATHETER PLACEMENT  7/28/2023   • IR TUNNELED CENTRAL LINE CHECK/CHANGE/REPOSITION  12/14/2021   • IR TUNNELED DIALYSIS CATHETER CHECK/CHANGE/REPOSITION/ANGIOPLASTY  10/17/2019   • IR TUNNELED DIALYSIS CATHETER PLACEMENT  9/30/2019   • IR TUNNELED DIALYSIS CATHETER REMOVAL  7/28/2023   • SPLIT THICKNESS SKIN GRAFT Bilateral 11/7/2019    Procedure: SKIN GRAFT SPLIT THICKNESS (STSG)  EXTREMITY;  Surgeon: Adele Johnson MD;  [facility];  Service: Plastics   • SPLIT THICKNESS SKIN GRAFT Bilateral 11/12/2019    Procedure: SKIN GRAFT SPLIT THICKNESS (STSG)  EXTREMITY;  Surgeon: Adele Johnson MD;  [facility];  Service: Plastics   • VAC DRESSING APPLICATION Bilateral 20/83/5974    Procedure: CHANGE DRESSING;  Surgeon: Adele Johnson MD;  [facility];  Service: Plastics   • WOUND DEBRIDEMENT Bilateral 10/31/2019    Procedure: DEBRIDEMENT WOUND [facility]); Surgeon: Mateusz Owens DPM;  [facility];  Service: Podiatry   • WOUND DEBRIDEMENT Bilateral 11/5/2019    Procedure: DEBRIDEMENT WOUND [facility]); Surgeon: Mateusz Owens DPM;  [facility];  Service: Podiatry   • WOUND DEBRIDEMENT Bilateral 11/7/2019    Procedure: DEBRIDEMENT WOUND [facility]); Surgeon: Adele Johnson MD;  [facility];  Service: Plastics   • WOUND DEBRIDEMENT Bilateral 11/12/2019    Procedure: DEBRIDEMENT LOWER EXTREMITY [facility]); Surgeon: Adele Johnson MD;  [facility];  Service: Plastics       Social History:  Social History     Substance and Sexual Activity   Alcohol Use Not Currently     Social History     Substance and Sexual Activity   Drug Use Not Currently     Social History     Tobacco Use   Smoking Status Never   Smokeless Tobacco Never       Family History:  History reviewed. No pertinent family history. Allergies:   Allergies   Allergen Reactions   • Sucroferric Oxyhydroxide Other (See Comments)   • Chlorhexidine Other (See Comments)   • Heparin Other (See Comments)     Brings plaletes down   • Other Other (See Comments) Medications:  Medications Prior to Admission   Medication   • calcium acetate (PHOSLO) 667 mg capsule   • cholecalciferol (VITAMIN D3) 1,000 units tablet   • B Complex-C-Folic Acid (TRIPHROCAPS PO)   • b complex-vitamin C-folic acid (NEPHROCAPS) 1 mg capsule   • lanthanum (FOSRENOL) 500 mg chewable tablet   • metoprolol succinate (TOPROL-XL) 25 mg 24 hr tablet   • metoprolol tartrate (LOPRESSOR) 25 mg tablet   • midodrine (PROAMATINE) 5 mg tablet   • Multiple Vitamin (MULTIVITAMIN) tablet   • NON FORMULARY   • nystatin (MYCOSTATIN) powder   • omeprazole (PriLOSEC OTC) 20 MG tablet   • polyethylene glycol (GLYCOLAX) powder   • polyethylene glycol (MIRALAX) 17 g packet   • senna-docusate sodium (SENOKOT S) 8.6-50 mg per tablet   • sevelamer (RENAGEL) 800 mg tablet   • sevelamer carbonate (RENVELA) 800 mg tablet   • simethicone (MYLICON) 80 mg chewable tablet   • Sucroferric Oxyhydroxide 500 MG CHEW   • torsemide (DEMADEX) 20 mg tablet   • valACYclovir (VALTREX) 500 mg tablet   • vitamin E, tocopherol, 200 units capsule     Current Facility-Administered Medications   Medication Dose Route Frequency   • acetaminophen (TYLENOL) tablet 975 mg  975 mg Oral Q8H [address]   • b complex-vitamin C-folic acid (RENAL) capsule 1 capsule  1 capsule Oral Daily With Dinner   • calcitriol (ROCALTROL) capsule 0.75 mcg  0.75 mcg Oral Once per day on Mon Wed Fri   • calcium acetate (PHOSLO) capsule 1,334 mg  1,334 mg Oral TID With Meals   • cefTRIAXone (ROCEPHIN) 2,000 mg in dextrose 5 % 50 mL IVPB  2,000 mg Intravenous Q24H   • cholecalciferol (VITAMIN D3) tablet 1,000 Units  1,000 Units Oral Daily   • hydrALAZINE (APRESOLINE) injection 10 mg  10 mg Intravenous Q4H PRN   • melatonin tablet 3 mg  3 mg Oral HS   • metoprolol succinate (TOPROL-XL) 24 hr tablet 25 mg  25 mg Oral Daily   • multivitamin stress formula tablet 1 tablet  1 tablet Oral Daily   • naloxone (NARCAN) 0.04 mg/mL syringe 0.04 mg  0.04 mg Intravenous Q1MIN PRN   • oxyCODONE (ROXICODONE) IR tablet 5 mg  5 mg Oral Q4H PRN   • oxyCODONE (ROXICODONE) split tablet 2.5 mg  2.5 mg Oral Q4H PRN   • senna-docusate sodium (SENOKOT S) 8.6-50 mg per tablet 1 tablet  1 tablet Oral HS   • sevelamer (RENAGEL) tablet 800 mg  800 mg Oral TID With Meals   • simethicone (MYLICON) chewable tablet 80 mg  80 mg Oral Q6H PRN   • torsemide (DEMADEX) tablet 40 mg  40 mg Oral BID       Vitals:  /99   Pulse 80   Temp (!) 97.2 °F (36.2 °C) (Oral)   Resp 16   Ht 5' 8" (1.727 m)   Wt 82.4 kg (181 lb 10.5 oz)   SpO2 99%   BMI 27.62 kg/m²   Body mass index is 27.62 kg/m².   Weight (last 2 days)     Date/Time Weight    07/31/23 1305 --    Comment rows:    OBSERV: Diaysis started at 07/31/23 1305    07/31/23 1300 82.4 (181.66)    Comment rows:    OBSERV: Pt assessed pre-dialysis at 07/31/23 1300    07/31/23 0532 82.5 (181.88)    07/31/23 0531 82.5 (181.88)    07/30/23 0735 81.3 (179.23)    07/30/23 0600 83.6 (184.3)    07/29/23 0832 84 (185.19)          I/Os:    Intake/Output Summary (Last 24 hours) at 7/31/2023 1519  Last data filed at 7/31/2023 1305  Gross per 24 hour   Intake 380 ml   Output --   Net 380 ml       Lab Results and Cultures:   CBC with diff:   Lab Results   Component Value Date    WBC 5.68 07/31/2023    HGB 11.6 (L) 07/31/2023    HCT 35.8 (L) 07/31/2023    MCV 98 07/31/2023     (L) 07/31/2023    RBC 3.67 (L) 07/31/2023    MCH 31.6 07/31/2023    MCHC 32.4 07/31/2023    RDW 14.8 07/31/2023    MPV 10.9 07/31/2023    NRBC 0 07/30/2023      BMP/CMP:  Lab Results   Component Value Date    K 5.4 (H) 07/31/2023    CL 96 07/31/2023    CO2 22 07/31/2023    BUN 74 (H) 07/31/2023    CREATININE 8.47 (H) 07/31/2023    CALCIUM 9.5 07/31/2023    AST 5 (L) 07/28/2023    ALT 8 07/28/2023    ALKPHOS 108 (H) 07/28/2023    EGFR 6 07/31/2023   ,     Coags:   Lab Results   Component Value Date    PTT 41 (H) 10/26/2019    INR 1.27 (H) 07/28/2023   ,   Results from last 7 days   Lab Units 07/28/23  1313   INR  1.27*        HgbA1c: No results found for: "HGBA1C"    Blood Culture:   Lab Results   Component Value Date    BLOODCX No Growth at 72 hrs. 07/28/2023    BLOODCX No Growth at 72 hrs. 07/28/2023   ,   Urinalysis: No results found for: "COLORU", "CLARITYU", "Tereza Pucker", "PHUR", "LEUKOCYTESUR", "NITRITE", "Andover Clam", "GLUCOSEU", "Dewanda ", "Griffin Emelia", "BLOODU",   Urine Culture: No results found for: "Jose Sullivaner",   Wound Culure:    Lab Results   Component Value Date    WOUNDCULT 2+ Growth of Proteus mirabilis (A) 10/26/2019    WOUNDCULT (A) 10/26/2019     2+ Growth of Methicillin Resistant Staphylococcus aureus    WOUNDCULT 4+ Growth of 10/26/2019       Imaging Studies: I have personally reviewed pertinent reports.

## 2023-07-31 NOTE — PROGRESS NOTES
Progress Note - Infectious Disease   Ligia Rader 66 y.o. male MRN: [id number]  Unit/Bed#: S -01 Encounter: [id number]      Impression/Plan:  1. Right chest PermCath exit site infection with abscess. Serratia. Blood cultures remain negative. Fortunately, patient is afebrile and clinically stable. No leukocytosis. Permacath has been removed. -Continue ceftriaxone  -Follow-up pending blood cultures  -Serial exams  -Possibly transition to oral antibiotics after dose tomorrow  -Follow temperatures and hemodynamics  -Recheck CBC with differential     2. ESRD on HD via right IJ PermCath, due to polycystic kidney disease. Now complicated by PermCath infection, as above. Permacath removed  -No need to dose adjust the ceftriaxone  -Nephrology follow-up ongoing  -Okay to place new PermCath tomorrow if blood cultures remain negative     3.  Cardiomyopathy with EF 20%.     4. Chronic bilateral lower extremity wounds. No apparent signs of acute infection. Outpatient podiatry follow-up. Discussed the above management plan with the primary service    Antibiotics:  Ceftriaxone 2    Subjective:  Patient has no fever, chills, sweats; no nausea, vomiting, diarrhea; no cough, shortness of breath; no pain. No new symptoms. He is anxious to know the treatment plan. Objective:  Vitals:  Temp:  [97.4 °F (36.3 °C)-98.1 °F (36.7 °C)] 97.4 °F (36.3 °C)  HR:  [73-92] 73  Resp:  [15-18] 15  BP: (140-164)/() 140/101  SpO2:  [98 %-99 %] 99 %  Temp (24hrs), Av.7 °F (36.5 °C), Min:97.4 °F (36.3 °C), Max:98.1 °F (36.7 °C)  Current: Temperature: (!) 97.4 °F (36.3 °C)    Physical Exam:   General Appearance:  Alert, interactive, nontoxic, no acute distress. Throat: Oropharynx moist without lesions. Lungs:   Clear to auscultation bilaterally; no wheezes, rhonchi or rales; respirations unlabored   Heart:  RRR; no murmur, rub or gallop   Abdomen:   Soft, non-tender, non-distended, positive bowel sounds. Extremities: No clubbing, cyanosis or edema   Skin: No new rashes or lesions. No draining wounds noted. Right side of the neck dressed without any drainage, fluctuance, or spreading erythema. Labs, Imaging, & Other studies:   All pertinent labs and imaging studies were personally reviewed  Results from last 7 days   Lab Units 07/31/23  0432 07/30/23 0724 07/29/23  0530   WBC Thousand/uL 5.68 6.14 8.29   HEMOGLOBIN g/dL 11.6* 12.0 12.8   PLATELETS Thousands/uL 111* 112* 117*     Results from last 7 days   Lab Units 07/31/23  0432 07/30/23 0724 07/29/23  0530 07/28/23  0655   SODIUM mmol/L 135 136 136 137   POTASSIUM mmol/L 5.4* 5.1 5.9* 5.4*   CHLORIDE mmol/L 96 97 96 92*   CO2 mmol/L 22 24 21 29   BUN mg/dL 74* 59* 93* 81*   CREATININE mg/dL 8.47* 7.66* 9.55* 8.97*   EGFR ml/min/1.73sq m 6 6 5 5   CALCIUM mg/dL 9.5 9.8 9.7 10.4*   AST U/L  --   --   --  5*   ALT U/L  --   --   --  8   ALK PHOS U/L  --   --   --  108*     Results from last 7 days   Lab Units 07/28/23  0655   BLOOD CULTURE  No Growth at 72 hrs. No Growth at 72 hrs. Ultrasound head neck. Complex structure with surrounding vascularity suggesting the possibility of abscess.     Images personally reviewed by me in PACS

## 2023-07-31 NOTE — ASSESSMENT & PLAN NOTE
· POA 2 day hx of purulent discharge, edema along HD cath site. No fevers, chills however pain and discomfort. · Hemodynamically stable, afebrile no leucocytosis. · At the Ed receive on dose of Zosyn  · Upon exam edma present, no erythema appreciated. Purulent discharge somewhat foul smelling  · ID consult: Awaiting care for site culture. Blood culture shows: No growth in 24 hours  Ultrasound of area to identify any further possible connection in the area overlying clavicle bone: 2.8 x 1.1 cm  clinical concern for abscess.  Surgery has no concern for  Abscess, CT cancelled  72 hours no growth blood culture in 72 hours  Patient catheter tip growing serratia  Plan  · S/p new temp cath placement yesterday  · Needing permanent catheter before discharge  · Vancomycine stopped and IV ceftrixone 2g

## 2023-07-31 NOTE — PROGRESS NOTES
[address] [facility]  Progress Note  Name: Alexandra Sawyer  MRN: [id number]  Unit/Bed#: S -22 I Date of Admission: 7/28/2023   Date of Service: 7/31/2023 I Hospital Day: 3    Assessment/Plan   ESRD (end stage renal disease) on dialysis [facility])  Assessment & Plan  Lab Results   Component Value Date    EGFR 6 07/31/2023    EGFR 6 07/30/2023    EGFR 5 07/29/2023    CREATININE 8.47 (H) 07/31/2023    CREATININE 7.66 (H) 07/30/2023    CREATININE 9.55 (H) 07/29/2023   · Hx ESRD due to Polycystic kydney disease on HD. Previously PD for 2 years however recurrent infections for which later transitioned to HD. · Dialysis over [facility] MWF schedule with per chart review hx of non compliance, as per patient only handling less than 3 hours due to anxiety. · Baseline creatine per chart reviewed variable 6-9  · Dialysis 7/29  · Nephrology consulted appreciate recommendations  · Awaiting iron panel : Iron Saturation 15, TIBC 219, iron: 32    · Plan  Dialysis today  Dialysis on Tuesday, patient will do Tuesday Thursday and Saturday for dialysis      * Hemodialysis catheter infection ([address])  Assessment & Plan  · POA 2 day hx of purulent discharge, edema along HD cath site. No fevers, chills however pain and discomfort. · Hemodynamically stable, afebrile no leucocytosis. · At the Ed receive on dose of Zosyn  · Upon exam edma present, no erythema appreciated. Purulent discharge somewhat foul smelling  · ID consult: Awaiting care for site culture. Blood culture shows: No growth in 24 hours  Ultrasound of area to identify any further possible connection in the area overlying clavicle bone: 2.8 x 1.1 cm  clinical concern for abscess.  Surgery has no concern for  Abscess, CT cancelled  72 hours no growth blood culture in 72 hours  Patient catheter tip growing serratia  Plan  · S/p new temp cath placement yesterday  · Needing permanent catheter before discharge  · Vancomycine stopped and IV ceftrixone 2g             Chronic venous stasis dermatitis of both lower extremities  Assessment & Plan  · History of chronic venostasis, necrotizing fasciitis  · Follows podiatry and wound care in the outpatient setting  · No active infection at this time    Plan  Wound care consult      HFrEF Dilated cardiomyopathy [facility])  Assessment & Plan  · History dilated cardiomyopathy  · Last echo during nuclear stress test October 2021 significant for:  · LVEF 19%, cardiomyopathy with wall motion abnormalities. · Cardiology consult is pending: Patient was seen and offer her catheterization which she declined. Patient also declined aspirin or statin therapy. He was not agreeable to taking GDMT in the past    Plan   Metoprolol succinate initiated   ACE inhibitor can be initiated if patient agrees  Follow-up outpatient with cardiology for further work-up    Hyperkalemia  Assessment & Plan  K is trending up to 5.9   Given nebulizers and calcium gluconate   Patient underwent dialysis on 7/29    Plan  Dialysis today and tomorrow    Hypertension  Assessment & Plan  · History hypertension  · Home regimen torsemide 40 mg twice daily, Toprol-XL 25 mg once a day, midodrine  · Patient has been off medications for months now given inaccessibility on the outpatient setting. · Blood Pressure: (!) 140/101     Plan     · Continue current meds               VTE Pharmacologic Prophylaxis: VTE Score: 7 High Risk (Score >/= 5) - Pharmacological DVT Prophylaxis Ordered: heparin. Sequential Compression Devices Ordered. Patient Centered Rounds: I performed bedside rounds with nursing staff today. Discussions with Specialists or Other Care Team Provider: Nephro    Education and Discussions with Family / Patient: Patient declined call to . Current Length of Stay: 3 day(s)  Current Patient Status: Inpatient   Discharge Plan: Anticipate discharge in 24-48 hrs to home.     Code Status: Level 1 - Full Code    Subjective:   No acute event overnight. This morning patient said that he has not been able to eat because he wanted to eat American cheese, but has been getting cheddar cheese. Patient states that there were no drainage at the catheter site overnight. He states that he had gotten dressing change and denies fever, chills, chest pain, shortness of breath, neck pain, or urinary abnormalities. Objective:     Vitals:   Temp (24hrs), Av.6 °F (36.4 °C), Min:97.2 °F (36.2 °C), Max:98.1 °F (36.7 °C)    Temp:  [97.2 °F (36.2 °C)-98.1 °F (36.7 °C)] 97.2 °F (36.2 °C)  HR:  [73-92] 81  Resp:  [15-18] 16  BP: (137-164)/() 148/93  SpO2:  [98 %-99 %] 99 %  Body mass index is 27.62 kg/m². Input and Output Summary (last 24 hours): Intake/Output Summary (Last 24 hours) at 2023 1532  Last data filed at 2023 1305  Gross per 24 hour   Intake 380 ml   Output --   Net 380 ml       Physical Exam:   Physical Exam  Vitals and nursing note reviewed. Constitutional:       General: He is not in acute distress. Appearance: Normal appearance. He is well-developed. HENT:      Head: Normocephalic and atraumatic. Eyes:      Conjunctiva/sclera: Conjunctivae normal.   Cardiovascular:      Rate and Rhythm: Normal rate and regular rhythm. Heart sounds: No murmur heard. Pulmonary:      Effort: Pulmonary effort is normal. No respiratory distress. Breath sounds: Normal breath sounds. Abdominal:      General: Bowel sounds are normal. There is distension. Palpations: Abdomen is soft. Tenderness: There is no abdominal tenderness. Musculoskeletal:         General: No swelling. Cervical back: Neck supple. Skin:     General: Skin is warm and dry. Capillary Refill: Capillary refill takes less than 2 seconds. Neurological:      Mental Status: He is alert.    Psychiatric:         Mood and Affect: Mood normal.         Additional Data:     Labs:  Results from last 7 days   Lab Units 23  8612 07/30/23  0724   WBC Thousand/uL 5.68 6.14   HEMOGLOBIN g/dL 11.6* 12.0   HEMATOCRIT % 35.8* 37.6   PLATELETS Thousands/uL 111* 112*   NEUTROS PCT %  --  73   LYMPHS PCT %  --  10*   MONOS PCT %  --  12   EOS PCT %  --  4     Results from last 7 days   Lab Units 07/31/23  0432 07/29/23  0530 07/28/23  0655   SODIUM mmol/L 135   < > 137   POTASSIUM mmol/L 5.4*   < > 5.4*   CHLORIDE mmol/L 96   < > 92*   CO2 mmol/L 22   < > 29   BUN mg/dL 74*   < > 81*   CREATININE mg/dL 8.47*   < > 8.97*   ANION GAP mmol/L 17   < > 16   CALCIUM mg/dL 9.5   < > 10.4*   ALBUMIN g/dL  --   --  4.4   TOTAL BILIRUBIN mg/dL  --   --  1.11*   ALK PHOS U/L  --   --  108*   ALT U/L  --   --  8   AST U/L  --   --  5*   GLUCOSE RANDOM mg/dL 88   < > 107    < > = values in this interval not displayed. Results from last 7 days   Lab Units 07/28/23  1313   INR  1.27*     Results from last 7 days   Lab Units 07/29/23  2112 07/29/23  1801 07/29/23  1614 07/29/23  0758   POC GLUCOSE mg/dl 118 113 105 127               Lines/Drains:  Invasive Devices     Central Venous Catheter Line  Duration           CVC Central Lines 07/28/23 Double 3 days          Peripheral Intravenous Line  Duration           Peripheral IV 07/28/23 Left Antecubital 3 days    Peripheral IV 07/31/23 Right;Ventral (anterior) Forearm <1 day          Hemodialysis Catheter  Duration           HD Temporary Double Catheter 3 days                Central Line:  Goal for removal: Port accessed. Will de-access as appropriate. Imaging: Reviewed radiology reports from this admission including: ultrasound(s)    Recent Cultures (last 7 days):   Results from last 7 days   Lab Units 07/28/23  0655   BLOOD CULTURE  No Growth at 72 hrs. No Growth at 72 hrs.        Last 24 Hours Medication List:   Current Facility-Administered Medications   Medication Dose Route Frequency Provider Last Rate   • acetaminophen  975 mg Oral [facility] Nannette Parson MD     • b complex-vitamin C-folic acid  1 capsule Oral Daily With Marian RIDLEY MD     • calcitriol  0.75 mcg Oral Once per day on Mon Wed Fri Atiya Malhotra MD     • calcium acetate  1,334 mg Oral TID With [address] [place], MD     • cefTRIAXone  2,000 mg Intravenous Q24H Franchesca Ogden MD Stopped (07/31/23 1103)   • cholecalciferol  1,000 Units Oral Daily Maryam Armenta MD     • hydrALAZINE  10 mg Intravenous Q4H PRN Maryam Armenta MD     • melatonin  3 mg Oral HS Mac Blanca DO     • metoprolol succinate  25 mg Oral Daily Maryam Armenta MD     • multivitamin stress formula  1 tablet Oral Daily Maryam Armenta MD     • naloxone  0.04 mg Intravenous Q1MIN PRN Maryam Armenta MD     • oxyCODONE  5 mg Oral Q4H PRN Maryam Armenta MD     • oxyCODONE  2.5 mg Oral Q4H PRN Maryam Armenta MD     • senna-docusate sodium  1 tablet Oral HS Maryam Armenta MD     • sevelamer  800 mg Oral TID With [address] [place], MD     • simethicone  80 mg Oral Q6H PRN Maryam Armenta MD     • torsemide  40 mg Oral BID Maryam Armenta MD          Today, Patient Was Seen By: Kim Loera MD    **Please Note: This note may have been constructed using a voice recognition system. **

## 2023-07-31 NOTE — PHYSICAL THERAPY NOTE
PHYSICAL THERAPY SCREEN NOTE          Patient Name: Anay Vargas  [id number] Date: 7/31/2023 07/31/23 0831   Note Type   Note type Screen   Additional Comments PT orders received, chart review performed. Per Cares/Safety documentation in, pt has been mobilizing independently during current admission. Entered pt's room to educate pt on role of PT in acute care to assess mobility and assist w/ DC recommendation. Pt reports he has been ambulating 3+ laps in the hallway and does not have mobility concerns upon DC. Will screen and DC pt from Inpatient PT caseload due to pt w/o acute PT needs. Pt w/ c/o chronic leg weakness (reports legs are underdeveloped) and skin issues. Pt educated to follow-up w/ PCP for OPPT script as needed for chronic leg weakness and to address skin concerns w/ rounding Attending/Residents.          Trevin Moralez, PT, DPT  07/31/23

## 2023-07-31 NOTE — ASSESSMENT & PLAN NOTE
K is trending up to 5.9   Given nebulizers and calcium gluconate   Patient underwent dialysis on 7/29    Plan  Dialysis today and tomorrow

## 2023-07-31 NOTE — PLAN OF CARE
Problem: Potential for Falls  Goal: Patient will remain free of falls  Description: INTERVENTIONS:  - Educate patient/family on patient safety including physical limitations  - Instruct patient to call for assistance with activity   - Consult OT/PT to assist with strengthening/mobility   - Keep Call bell within reach  - Keep bed low and locked with side rails adjusted as appropriate  - Keep care items and personal belongings within reach  - Initiate and maintain comfort rounds  - Make Fall Risk Sign visible to staff  - Obtain necessary fall risk management equipment  - Apply yellow socks and bracelet for high fall risk patients  - Consider moving patient to room near nurses station  Outcome: Progressing     Problem: PAIN - ADULT  Goal: Verbalizes/displays adequate comfort level or baseline comfort level  Description: Interventions:  - Encourage patient to monitor pain and request assistance  - Assess pain using appropriate pain scale  - Administer analgesics based on type and severity of pain and evaluate response  - Implement non-pharmacological measures as appropriate and evaluate response  - Consider cultural and social influences on pain and pain management  - Notify physician/advanced practitioner if interventions unsuccessful or patient reports new pain  Outcome: Progressing     Problem: INFECTION - ADULT  Goal: Absence or prevention of progression during hospitalization  Description: INTERVENTIONS:  - Assess and monitor for signs and symptoms of infection  - Monitor lab/diagnostic results  - Monitor all insertion sites, i.e. indwelling lines, tubes, and drains  - Monitor endotracheal if appropriate and nasal secretions for changes in amount and color  - Claridge appropriate cooling/warming therapies per order  - Administer medications as ordered  - Instruct and encourage patient and family to use good hand hygiene technique  - Identify and instruct in appropriate isolation precautions for identified infection/condition  Outcome: Progressing     Problem: SAFETY ADULT  Goal: Patient will remain free of falls  Description: INTERVENTIONS:  - Educate patient/family on patient safety including physical limitations  - Instruct patient to call for assistance with activity   - Consult OT/PT to assist with strengthening/mobility   - Keep Call bell within reach  - Keep bed low and locked with side rails adjusted as appropriate  - Keep care items and personal belongings within reach  - Initiate and maintain comfort rounds  - Make Fall Risk Sign visible to staff  - Obtain necessary fall risk management equipment  - Apply yellow socks and bracelet for high fall risk patients  - Consider moving patient to room near nurses station  Outcome: Progressing  Goal: Maintain or return to baseline ADL function  Description: INTERVENTIONS:  -  Assess patient's ability to carry out ADLs; assess patient's baseline for ADL function and identify physical deficits which impact ability to perform ADLs (bathing, care of mouth/teeth, toileting, grooming, dressing, etc.)  - Assess/evaluate cause of self-care deficits   - Assess range of motion  - Assess patient's mobility; develop plan if impaired  - Assess patient's need for assistive devices and provide as appropriate  - Encourage maximum independence but intervene and supervise when necessary  - Involve family in performance of ADLs  - Assess for home care needs following discharge   - Consider OT consult to assist with ADL evaluation and planning for discharge  - Provide patient education as appropriate  Outcome: Progressing  Goal: Maintains/Returns to pre admission functional level  Description: INTERVENTIONS:  - Perform BMAT or MOVE assessment daily.   - Set and communicate daily mobility goal to care team and patient/family/caregiver. - Collaborate with rehabilitation services on mobility goals if consulted  - Perform Range of Motion 3 times a day.   - Reposition patient every 2 hours.  - Dangle patient 3 times a day  - Stand patient 3 times a day  - Ambulate patient 3 times a day  - Out of bed to chair 3 times a day   - Out of bed for meals 3 times a day  - Out of bed for toileting  - Record patient progress and toleration of activity level   Outcome: Progressing     Problem: DISCHARGE PLANNING  Goal: Discharge to home or other facility with appropriate resources  Description: INTERVENTIONS:  - Identify barriers to discharge w/patient and caregiver  - Arrange for needed discharge resources and transportation as appropriate  - Identify discharge learning needs (meds, wound care, etc.)  - Arrange for interpretive services to assist at discharge as needed  - Refer to Case Management Department for coordinating discharge planning if the patient needs post-hospital services based on physician/advanced practitioner order or complex needs related to functional status, cognitive ability, or social support system  Outcome: Progressing     Problem: Knowledge Deficit  Goal: Patient/family/caregiver demonstrates understanding of disease process, treatment plan, medications, and discharge instructions  Description: Complete learning assessment and assess knowledge base.   Interventions:  - Provide teaching at level of understanding  - Provide teaching via preferred learning methods  Outcome: Progressing     Problem: SKIN/TISSUE INTEGRITY - ADULT  Goal: Incision(s), wounds(s) or drain site(s) healing without S/S of infection  Description: INTERVENTIONS  - Assess and document dressing, incision, wound bed, drain sites and surrounding tissue  - Provide patient and family education  - Perform skin care/dressing changes daily and as needed for soilage  Outcome: Progressing     Problem: METABOLIC, FLUID AND ELECTROLYTES - ADULT  Goal: Electrolytes maintained within normal limits  Description: INTERVENTIONS:  - Monitor labs and assess patient for signs and symptoms of electrolyte imbalances  - Administer electrolyte replacement as ordered  - Monitor response to electrolyte replacements, including repeat lab results as appropriate  - Instruct patient on fluid and nutrition as appropriate  Outcome: Progressing  Goal: Fluid balance maintained  Description: INTERVENTIONS:  - Monitor labs   - Monitor I/O and WT  - Instruct patient on fluid and nutrition as appropriate  - Assess for signs & symptoms of volume excess or deficit  Outcome: Progressing     Problem: Nutrition/Hydration-ADULT  Goal: Nutrient/Hydration intake appropriate for improving, restoring or maintaining nutritional needs  Description: Monitor and assess patient's nutrition/hydration status for malnutrition. Collaborate with interdisciplinary team and initiate plan and interventions as ordered. Monitor patient's weight and dietary intake as ordered or per policy. Utilize nutrition screening tool and intervene as necessary. Determine patient's food preferences and provide high-protein, high-caloric foods as appropriate.      INTERVENTIONS:  - Monitor oral intake, urinary output, labs, and treatment plans  - Assess nutrition and hydration status and recommend course of action  - Evaluate amount of meals eaten  - Assist patient with eating if necessary   - Allow adequate time for meals  - Recommend/ encourage appropriate diets, oral nutritional supplements, and vitamin/mineral supplements  - Order, calculate, and assess calorie counts as needed  - Assess need for intravenous fluids  - Provide nutrition/hydration education as appropriate  - Include patient/family/caregiver in decisions related to nutrition  Outcome: Progressing

## 2023-07-31 NOTE — ASSESSMENT & PLAN NOTE
· History hypertension  · Home regimen torsemide 40 mg twice daily, Toprol-XL 25 mg once a day, midodrine  · Patient has been off medications for months now given inaccessibility on the outpatient setting.   · Blood Pressure: (!) 140/101     Plan     · Continue current meds

## 2023-07-31 NOTE — OCCUPATIONAL THERAPY NOTE
Occupational Therapy Screen        Patient Name: Kristen Lipscomb  [id number]'[id number] Date: 7/31/2023 07/31/23 6885   Note Type   Note type Screen   Additional Comments OT orders received, Chart reviewed. Per PT Jessy Maya/Safety documentation, pt has been mobilizing independently during current admission. Pt is independent w/ self-care in room. Pt reports no concerns. Will screen pt and D/C pt off OT caseload at this time, pt w/o acute OT needs.      Gaylan Lundborg, OTR/L

## 2023-07-31 NOTE — DISCHARGE INSTR - AVS FIRST PAGE
Right chest wall packing: Please change daily, remove old packing, cleanse with soap and water, pat dry, re-pack with 1/2 inch packing, packing does not have to fill the wound, just enough to keep wound edges apart until wound cavity heals from the inside out. Leave a long tail of packing out of the wound to make removal easy. Dear Lynn Martinez,     It was our pleasure to care for you here at [facility]. It is our hope that we were always able to exceed the expected standards for your care during your stay. You were hospitalized due to dialysis catheter infection. You were cared for on the 4th floor by Paula Izquierdo MD under the service of Alonzo Ko MD with the [facility] who covers for your primary care physician (PCP), Kerry Kapoor DO, while you were hospitalized. If you have any questions or concerns related to this hospitalization, you may contact us at [phone]. For follow up as well as any medication refills, we recommend that you follow up with your primary care physician. A registered nurse will reach out to you by phone within a few days after your discharge to answer any additional questions that you may have after going home.   However, at this time we provide for you here, the most important instructions / recommendations at discharge:     Notable Medication Adjustments -   Take Cefdinir 300 mg every 24 hours until 8/5  Please take sevelamer 1600 mg 3 times daily  Please take Rocaltrol 0.25mcg 3 times a week  Testing Required after Discharge -   None  Important follow up information -   Follow-up with PCP outpatient in 1 week  Follow-up with nephrology outpatient  Continue dialysis outpatient and return to your usual Tuesday Thursday Saturday schedule  Other Instructions -   If you are experiencing fever, chills, chest pain, shortness of breath, palpitations, abdominal pain, drainage from your peritoneal site, please return to the ED  Please review this entire after visit summary as additional general instructions including medication list, appointments, activity, diet, any pertinent wound care, and other additional recommendations from your care team that may be provided for you.       Sincerely,     Sho Comer MD

## 2023-07-31 NOTE — PROGRESS NOTES
[address] Kathryn Baptiste NOTE   Lalo Mondragon 66 y.o. male MRN: [id number]  Unit/Bed#: S -22 Encounter: [id number]  Reason for Consult: ESRD on HD    ASSESSMENT and PLAN:  1. ESRD on HD [facility] [place], MWF):   · HD today since this is his usual HD day. · However, he is arranged for HD at [facility] in [place], [place] since he will be going to the beach this week. His outpatient schedule is YaquelinANAIS stahl is TTS. · Plan for short HD treatment again tomorrow. 2. Access:   · Right IJ PermCath removed on 7/28/23 due to tunnel infection. · Currently with left IJ temp HD cath. · Will need conversion to a PermCath prior to discharge. 3. Tunnel infection of HD catheter  · Right IJ PermCath removed due to infection. · Blood cultures are currently negative. Tip culture is growing Serratia. · Currently on ceftriaxone. · ID following. Their input is much appreciated. 4. Hypertension:  · BP acceptable. EDW 81 kg. · Current medications: Metoprolol succinate 25 mg daily. Torsemide 40 mg twice a day. · 1.4 kg UF on HD today. · No changes to medications. 5. Anemia:  · Hemoglobin at goal.    6. Hyperkalemia:  · 2K bath on HD today. 7. Mineral and bone disease:  · Phosphorus is above goal. 8.7 today. · Increase sevelamer to 1600 mg TID. Continue PhosLo 1334 mg TID. · Continue calcitriol 0.75 mcg 3 times per week for 2HPT. Stacy Padilla · Per outpatient notes, patient is noncompliant with binders and low phosphorus diet. 8. Congestive heart failure  · Currently compensated. · EF 20%. 9. Lower extremity wounds    DISPOSITION:  · HD today. · 1.4 kg UF on HD today. · Plan for 2 hour HD again tomorrow. · Plan to convert to IJ permcath tomorrow  · Stable for discharge from renal standpoint once IJ PermCath is placed. · Increase sevelamer to 1600 mg TID.      The highlighted and/or bolded points in my assessment, plan, and disposition were discussed with Dr. Ugo Leslie and they agree with those points and the plan.    SUBJECTIVE / 24H INTERVAL HISTORY:  Denies any CP or SOB. He is hoping to be discharged soon so he can go to the beach with his family. He reports that he has an outpatient HD treatment set up at [facility] in [place], [place]. HEMODIALYSIS PROCEDURE NOTE  The patient was seen and examined on hemodialysis. Time: 3 hours  Sodium: 135 Blood flow: 300   Dialyzer: F180 Potassium: 2 Dialysate flow: 500   Access: L IJ temp cath Bicarbonate: 38 Ultrafiltration goal: 1.4 kg   Medications on HD: none. OBJECTIVE:  Current Weight: Weight - Scale: 82.4 kg (181 lb 10.5 oz)  Vitals:    07/31/23 1305 07/31/23 1330 07/31/23 1400 07/31/23 1430   BP: 145/100 150/98 146/92 150/92   BP Location:       Pulse: 79 78 83 82   Resp: 16 16 16 16   Temp:       TempSrc:       SpO2:       Weight:       Height:           Intake/Output Summary (Last 24 hours) at 7/31/2023 1459  Last data filed at 7/31/2023 1305  Gross per 24 hour   Intake 380 ml   Output --   Net 380 ml     General: conscious, cooperative, no distress  Skin: dry  Eyes: pink conjunctivae  ENT: moist mucous membranes  Respiratory: equal chest expansion, clear breath sounds. Cardiovascular: distinct heart sounds, normal rate, regular rhythm, no rub  Abdomen: soft, non tender, non distended, normal bowel sounds  Extremities: no edema. Genitourinary: no meeks catheter. Neuro: awake, alert.    Psych: appropriate affect    Medications:    Current Facility-Administered Medications:   •  acetaminophen (TYLENOL) tablet 975 mg, 975 mg, Oral, Q8H [address], Josep Wills MD, 975 mg at 07/31/23 2854  •  b complex-vitamin C-folic acid (RENAL) capsule 1 capsule, 1 capsule, Oral, Daily With Xiomy Madrid MD, 1 capsule at 07/30/23 7754  •  calcitriol (ROCALTROL) capsule 0.75 mcg, 0.75 mcg, Oral, Once per day on Mon Wed Fri, Katia Marino MD, 0.75 mcg at 07/31/23 6101  •  calcium acetate (PHOSLO) capsule 1,334 mg, 1,334 mg, Oral, TID With Meals, Ethan Sahni MD, 1,334 mg at 07/31/23 1103  •  cefTRIAXone (ROCEPHIN) 2,000 mg in dextrose 5 % 50 mL IVPB, 2,000 mg, Intravenous, Q24H, Andrew Mccarthy MD, Held at 07/31/23 1103  •  cholecalciferol (VITAMIN D3) tablet 1,000 Units, 1,000 Units, Oral, Daily, Ethan Sahni MD, 1,000 Units at 07/31/23 8398  •  hydrALAZINE (APRESOLINE) injection 10 mg, 10 mg, Intravenous, Q4H PRN, Ethan Sahni MD  •  melatonin tablet 3 mg, 3 mg, Oral, HS, Pari Bell DO, 3 mg at 07/30/23 2128  •  metoprolol succinate (TOPROL-XL) 24 hr tablet 25 mg, 25 mg, Oral, Daily, Ethan Sahni MD, 25 mg at 07/31/23 2815  •  multivitamin stress formula tablet 1 tablet, 1 tablet, Oral, Daily, Ethan Sahni MD, 1 tablet at 07/31/23 0806  •  naloxone (NARCAN) 0.04 mg/mL syringe 0.04 mg, 0.04 mg, Intravenous, Q1MIN PRN, Ethan Sahni MD  •  oxyCODONE (ROXICODONE) IR tablet 5 mg, 5 mg, Oral, Q4H PRN, Ethan Sahni MD, 5 mg at 07/30/23 2128  •  oxyCODONE (ROXICODONE) split tablet 2.5 mg, 2.5 mg, Oral, Q4H PRN, Ethan Sahni MD  •  senna-docusate sodium (SENOKOT S) 8.6-50 mg per tablet 1 tablet, 1 tablet, Oral, HS, Ethan Sahni MD, 1 tablet at 07/30/23 2128  •  sevelamer (RENAGEL) tablet 800 mg, 800 mg, Oral, TID With Meals, Ethan Sahni MD, 800 mg at 07/31/23 1102  •  simethicone (MYLICON) chewable tablet 80 mg, 80 mg, Oral, Q6H PRN, Ethan Sahni MD  •  torsemide [facility]) tablet 40 mg, 40 mg, Oral, BID, Ethan Sahni MD, 40 mg at 07/31/23 0333    Laboratory Results:  Results from last 7 days   Lab Units 07/31/23  0432 07/30/23  0724 07/29/23  0530 07/28/23  0655   WBC Thousand/uL 5.68 6.14 8.29 5.68   HEMOGLOBIN g/dL 11.6* 12.0 12.8 12.7   HEMATOCRIT % 35.8* 37.6 39.8 40.2   PLATELETS Thousands/uL 111* 112* 117* 104*   POTASSIUM mmol/L 5.4* 5.1 5.9* 5.4*   CHLORIDE mmol/L 96 97 96 92*   CO2 mmol/L 22 24 21 29   BUN mg/dL 74* 59* 93* 81*   CREATININE mg/dL 8.47* 7.66* 9.55* 8.97*   CALCIUM mg/dL 9.5 9.8 9.7 10.4*   MAGNESIUM mg/dL 2.5  --   --  2.5   PHOSPHORUS mg/dL 8.7* 7.7* 10.2* 9.2*

## 2023-07-31 NOTE — PLAN OF CARE
Goal is to UF 1.4 L as tolerated and ordered and tolerated on a 2 K+ bth for a morning serum K+ of 5.4; 3 HR TX. Dr Michelle Barney is aware and agreeable of goal and bath. Pt tolerated TX well. Problem: METABOLIC, FLUID AND ELECTROLYTES - ADULT  Goal: Electrolytes maintained within normal limits  Description: INTERVENTIONS:  - Monitor labs and assess patient for signs and symptoms of electrolyte imbalances  - Administer electrolyte replacement as ordered  - Monitor response to electrolyte replacements, including repeat lab results as appropriate  - Instruct patient on fluid and nutrition as appropriate  Outcome: Progressing  Goal: Fluid balance maintained  Description: INTERVENTIONS:  - Monitor labs   - Monitor I/O and WT  - Instruct patient on fluid and nutrition as appropriate  - Assess for signs & symptoms of volume excess or deficit  Outcome: Progressing     Post-Dialysis RN Treatment Note    Blood Pressure:  Pre 137/95 mm/Hg  Post 148/94 mmHg   EDW  81 kg    Weight:  Pre 82.4 kg   Post 81 kg   Mode of weight measurement: Standing Scale   Volume Removed  1400 ml net   Treatment duration 180 minutes    NS given  No    Treatment shortened?  No   Medications given during Rx None Reported   Estimated Kt/V  None Reported   Access type: Temporary HD catheter   Access Issues: No    Report called to primary nurse   Yes, Annette Lui, RN

## 2023-08-01 ENCOUNTER — APPOINTMENT (INPATIENT)
Dept: DIALYSIS | Facility: HOSPITAL | Age: 63
DRG: 314 | End: 2023-08-01
Payer: MEDICARE

## 2023-08-01 ENCOUNTER — APPOINTMENT (INPATIENT)
Dept: RADIOLOGY | Facility: HOSPITAL | Age: 63
DRG: 314 | End: 2023-08-01
Payer: MEDICARE

## 2023-08-01 VITALS
HEART RATE: 72 BPM | BODY MASS INDEX: 26.99 KG/M2 | TEMPERATURE: 97.5 F | DIASTOLIC BLOOD PRESSURE: 96 MMHG | SYSTOLIC BLOOD PRESSURE: 146 MMHG | OXYGEN SATURATION: 98 % | WEIGHT: 178.1 LBS | RESPIRATION RATE: 16 BRPM | HEIGHT: 68 IN

## 2023-08-01 LAB
ANION GAP SERPL CALCULATED.3IONS-SCNC: 16 MMOL/L
BUN SERPL-MCNC: 62 MG/DL (ref 5–25)
CALCIUM SERPL-MCNC: 9.5 MG/DL (ref 8.4–10.2)
CHLORIDE SERPL-SCNC: 96 MMOL/L (ref 96–108)
CO2 SERPL-SCNC: 23 MMOL/L (ref 21–32)
CREAT SERPL-MCNC: 7.21 MG/DL (ref 0.6–1.3)
ERYTHROCYTE [DISTWIDTH] IN BLOOD BY AUTOMATED COUNT: 14.6 % (ref 11.6–15.1)
GFR SERPL CREATININE-BSD FRML MDRD: 7 ML/MIN/1.73SQ M
GLUCOSE SERPL-MCNC: 89 MG/DL (ref 65–140)
HCT VFR BLD AUTO: 37.4 % (ref 36.5–49.3)
HGB BLD-MCNC: 11.8 G/DL (ref 12–17)
MAGNESIUM SERPL-MCNC: 2.3 MG/DL (ref 1.9–2.7)
MCH RBC QN AUTO: 31 PG (ref 26.8–34.3)
MCHC RBC AUTO-ENTMCNC: 31.6 G/DL (ref 31.4–37.4)
MCV RBC AUTO: 98 FL (ref 82–98)
PHOSPHATE SERPL-MCNC: 6.3 MG/DL (ref 2.3–4.1)
PLATELET # BLD AUTO: 91 THOUSANDS/UL (ref 149–390)
PMV BLD AUTO: 11.4 FL (ref 8.9–12.7)
POTASSIUM SERPL-SCNC: 5 MMOL/L (ref 3.5–5.3)
RBC # BLD AUTO: 3.81 MILLION/UL (ref 3.88–5.62)
SODIUM SERPL-SCNC: 135 MMOL/L (ref 135–147)
WBC # BLD AUTO: 5.24 THOUSAND/UL (ref 4.31–10.16)

## 2023-08-01 PROCEDURE — 0JH63XZ INSERTION OF TUNNELED VASCULAR ACCESS DEVICE INTO CHEST SUBCUTANEOUS TISSUE AND FASCIA, PERCUTANEOUS APPROACH: ICD-10-PCS | Performed by: HOSPITALIST

## 2023-08-01 PROCEDURE — 36558 INSERT TUNNELED CV CATH: CPT | Performed by: RADIOLOGY

## 2023-08-01 PROCEDURE — 02PAX3Z REMOVAL OF INFUSION DEVICE FROM HEART, EXTERNAL APPROACH: ICD-10-PCS | Performed by: HOSPITALIST

## 2023-08-01 PROCEDURE — 84100 ASSAY OF PHOSPHORUS: CPT

## 2023-08-01 PROCEDURE — 85027 COMPLETE CBC AUTOMATED: CPT

## 2023-08-01 PROCEDURE — 02H633Z INSERTION OF INFUSION DEVICE INTO RIGHT ATRIUM, PERCUTANEOUS APPROACH: ICD-10-PCS | Performed by: HOSPITALIST

## 2023-08-01 PROCEDURE — 36558 INSERT TUNNELED CV CATH: CPT

## 2023-08-01 PROCEDURE — 77001 FLUOROGUIDE FOR VEIN DEVICE: CPT | Performed by: RADIOLOGY

## 2023-08-01 PROCEDURE — C1750 CATH, HEMODIALYSIS,LONG-TERM: HCPCS

## 2023-08-01 PROCEDURE — 77001 FLUOROGUIDE FOR VEIN DEVICE: CPT

## 2023-08-01 PROCEDURE — 83735 ASSAY OF MAGNESIUM: CPT

## 2023-08-01 PROCEDURE — 99232 SBSQ HOSP IP/OBS MODERATE 35: CPT | Performed by: INTERNAL MEDICINE

## 2023-08-01 PROCEDURE — 80048 BASIC METABOLIC PNL TOTAL CA: CPT

## 2023-08-01 PROCEDURE — 99238 HOSP IP/OBS DSCHRG MGMT 30/<: CPT | Performed by: HOSPITALIST

## 2023-08-01 PROCEDURE — B5181ZA FLUOROSCOPY OF SUPERIOR VENA CAVA USING LOW OSMOLAR CONTRAST, GUIDANCE: ICD-10-PCS | Performed by: HOSPITALIST

## 2023-08-01 PROCEDURE — C1769 GUIDE WIRE: HCPCS

## 2023-08-01 RX ORDER — SEVELAMER HYDROCHLORIDE 800 MG/1
1600 TABLET, FILM COATED ORAL
Status: DISCONTINUED | OUTPATIENT
Start: 2023-08-01 | End: 2023-08-01 | Stop reason: HOSPADM

## 2023-08-01 RX ORDER — SEVELAMER HYDROCHLORIDE 800 MG/1
1600 TABLET, FILM COATED ORAL
Qty: 180 TABLET | Refills: 0 | Status: SHIPPED | OUTPATIENT
Start: 2023-08-01 | End: 2023-08-31

## 2023-08-01 RX ORDER — CALCITRIOL 0.25 UG/1
0.75 CAPSULE, LIQUID FILLED ORAL 3 TIMES WEEKLY
Qty: 36 CAPSULE | Refills: 0 | Status: SHIPPED | OUTPATIENT
Start: 2023-08-02 | End: 2023-09-01

## 2023-08-01 RX ORDER — CEFDINIR 300 MG/1
300 CAPSULE ORAL EVERY 24 HOURS
Qty: 4 CAPSULE | Refills: 0 | Status: SHIPPED | OUTPATIENT
Start: 2023-08-01 | End: 2023-08-05

## 2023-08-01 RX ORDER — OXYCODONE HYDROCHLORIDE 5 MG/1
2.5 TABLET ORAL EVERY 8 HOURS PRN
Qty: 3 TABLET | Refills: 0 | Status: SHIPPED | OUTPATIENT
Start: 2023-08-01

## 2023-08-01 RX ORDER — CEFDINIR 300 MG/1
300 CAPSULE ORAL 3 TIMES WEEKLY
Status: DISCONTINUED | OUTPATIENT
Start: 2023-08-02 | End: 2023-08-01 | Stop reason: HOSPADM

## 2023-08-01 RX ORDER — LIDOCAINE WITH 8.4% SOD BICARB 0.9%(10ML)
SYRINGE (ML) INJECTION AS NEEDED
Status: COMPLETED | OUTPATIENT
Start: 2023-08-01 | End: 2023-08-01

## 2023-08-01 RX ADMIN — ACETAMINOPHEN 975 MG: 325 TABLET, FILM COATED ORAL at 13:38

## 2023-08-01 RX ADMIN — Medication 1 CAPSULE: at 17:26

## 2023-08-01 RX ADMIN — TORSEMIDE 40 MG: 20 TABLET ORAL at 17:26

## 2023-08-01 RX ADMIN — Medication 20 ML: at 10:15

## 2023-08-01 RX ADMIN — CALCIUM ACETATE 1334 MG: 667 CAPSULE ORAL at 11:38

## 2023-08-01 RX ADMIN — SEVELAMER HYDROCHLORIDE 1600 MG: 800 TABLET ORAL at 17:26

## 2023-08-01 RX ADMIN — CALCIUM ACETATE 1334 MG: 667 CAPSULE ORAL at 17:26

## 2023-08-01 RX ADMIN — OXYCODONE HYDROCHLORIDE 5 MG: 5 TABLET ORAL at 14:15

## 2023-08-01 RX ADMIN — METOPROLOL SUCCINATE 25 MG: 25 TABLET, EXTENDED RELEASE ORAL at 11:35

## 2023-08-01 RX ADMIN — Medication 1000 UNITS: at 11:34

## 2023-08-01 RX ADMIN — CEFTRIAXONE SODIUM 2000 MG: 10 INJECTION, POWDER, FOR SOLUTION INTRAVENOUS at 14:11

## 2023-08-01 RX ADMIN — B-COMPLEX W/ C & FOLIC ACID TAB 1 TABLET: TAB at 11:36

## 2023-08-01 RX ADMIN — TORSEMIDE 40 MG: 20 TABLET ORAL at 11:38

## 2023-08-01 RX ADMIN — SEVELAMER HYDROCHLORIDE 800 MG: 800 TABLET ORAL at 11:34

## 2023-08-01 RX ADMIN — Medication 10 ML: at 10:15

## 2023-08-01 NOTE — ASSESSMENT & PLAN NOTE
· History dilated cardiomyopathy  · Last echo during nuclear stress test October 2021 significant for:  · LVEF 19%, cardiomyopathy with wall motion abnormalities. · Cardiology consult is pending: Patient was seen and offer her catheterization which she declined. Patient also declined aspirin or statin therapy.   He was not agreeable to taking GDMT in the past    Plan   Metoprolol succinate   ACE inhibitor can be initiated if patient agrees  Follow-up outpatient with cardiology for further work-up

## 2023-08-01 NOTE — PROGRESS NOTES
100 Kathryn Baptiste NOTE   Chaz Brewster 66 y.o. male MRN: [id number]  Unit/Bed#: S MS Dickson46 Encounter: [id number]  Reason for Consult: ESRD on HD    ASSESSMENT and PLAN:  1. ESRD on HD [facility] [place], MWF):   · Completed HD yesterday. · HD done again today since this is his HD schedule while on vacation in [place]. · Next HD will be Thursday at [facility] in [place], [place].     2. Access:   · Right IJ PermCath removed on 7/28/23 due to tunnel infection. · L IJ permcath placed 8/1/23    3. Tunnel infection of HD catheter  · Right IJ PermCath removed due to infection. · Blood cultures are currently negative. Tip culture is growing Serratia. · Currently on ceftriaxone. · ID following - plan to change to Cefdinir 300 mg PO q 24 hours until 8/5/23    4. Hypertension:  · BP acceptable. EDW 81 kg. · Current medications: Metoprolol succinate 25 mg daily. Torsemide 40 mg twice a day. · No changes today. 5. Anemia:  · Hemoglobin at goal. 11.8 today. 6. Hyperkalemia:  · K 5.0 today. · 2K bath on hD. 7. Mineral and bone disease:  · Phosphorus is above goal. Down to 6.3 today. · Continue Sevelamer 1600 mg TID. Continue PhosLo 1334 mg TID. · Continue calcitriol 0.75 mcg 3 times per week for 2HPT. Franklyn Fung · Per outpatient notes, patient is noncompliant with binders and low phosphorus diet. 8. Congestive heart failure  · Currently compensated. · EF 20%. 9. Lower extremity wounds    DISPOSITION:  · HD today completed. · L IJ permcath has been placed. · Oral antibiotics on discharge per ID. · May be discharged from renal standpoint. · He will undergo outpatient HD at [facility] in [place], [place] this Thursday (or Friday). SUBJECTIVE / 24H INTERVAL HISTORY:  Tolerated HD earlier today. No CP or SOB. Permcath placed earlier today.      OBJECTIVE:  Current Weight: Weight - Scale: 80.8 kg (178 lb 1.6 oz)  Vitals:    08/01/23 0930 08/01/23 0934 08/01/23 1015 08/01/23 1023   BP: 143/83 142/82 142/78 144/85   BP Location:       Pulse: 82 78 72 72   Resp: 20 20     Temp:  97.5 °F (36.4 °C)     TempSrc:  Oral     SpO2:   99% 98%   Weight:       Height:           Intake/Output Summary (Last 24 hours) at 8/1/2023 1423  Last data filed at 8/1/2023 6008  Gross per 24 hour   Intake 800 ml   Output 2136 ml   Net -1336 ml     General: conscious, cooperative, no distress  Skin: dry  Eyes: pink conjunctivae  ENT: moist mucous membranes  Respiratory: equal chest expansion, clear breath sounds. Cardiovascular: distinct heart sounds, normal rate, regular rhythm, no rub  Abdomen: soft, non tender, non distended, normal bowel sounds  Extremities: no edema. Genitourinary: no meeks catheter. Neuro: awake, alert.    Psych: appropriate affect    Medications:    Current Facility-Administered Medications:   •  acetaminophen (TYLENOL) tablet 975 mg, 975 mg, Oral, Q8H [facility], Vivi Eaton MD, 975 mg at 08/01/23 1338  •  b complex-vitamin C-folic acid (RENAL) capsule 1 capsule, 1 capsule, Oral, Daily With Sky Altman MD, 1 capsule at 07/31/23 1702  •  calcitriol (ROCALTROL) capsule 0.75 mcg, 0.75 mcg, Oral, Once per day on Mon Wed Fri, Augie Holm MD, 0.75 mcg at 07/31/23 2341  •  calcium acetate (PHOSLO) capsule 1,334 mg, 1,334 mg, Oral, TID With Meals, Vivi Eaton MD, 1,334 mg at 08/01/23 1138  •  [START ON 8/2/2023] cefdinir (OMNICEF) capsule 300 mg, 300 mg, Oral, Once per day on Mon Wed Fri, Irma Cooney MD  •  cefTRIAXone (ROCEPHIN) 2,000 mg in dextrose 5 % 50 mL IVPB, 2,000 mg, Intravenous, Q24H, Irma Cooney MD, Last Rate: 100 mL/hr at 08/01/23 1411, 2,000 mg at 08/01/23 1411  •  cholecalciferol (VITAMIN D3) tablet 1,000 Units, 1,000 Units, Oral, Daily, Vivi Eaton MD, 1,000 Units at 08/01/23 1134  •  hydrALAZINE (APRESOLINE) injection 10 mg, 10 mg, Intravenous, Q4H PRN, Vivi Eaton MD  •  melatonin tablet 3 mg, 3 mg, Oral, , Pari DO Ray, 3 mg at 07/31/23 2043  •  metoprolol succinate (TOPROL-XL) 24 hr tablet 25 mg, 25 mg, Oral, Daily, Shagufta Freedman MD, 25 mg at 08/01/23 1135  •  multivitamin stress formula tablet 1 tablet, 1 tablet, Oral, Daily, Shagufta Freedman MD, 1 tablet at 08/01/23 1136  •  naloxone (NARCAN) 0.04 mg/mL syringe 0.04 mg, 0.04 mg, Intravenous, Q1MIN PRN, Shagufta Freedman MD  •  oxyCODONE (ROXICODONE) IR tablet 5 mg, 5 mg, Oral, Q4H PRN, Shagufta Freedman MD, 5 mg at 08/01/23 1415  •  oxyCODONE (ROXICODONE) split tablet 2.5 mg, 2.5 mg, Oral, Q4H PRN, Shagufta Freedman MD  •  senna-docusate sodium (SENOKOT S) 8.6-50 mg per tablet 1 tablet, 1 tablet, Oral, HS, Shagufta Freedman MD, 1 tablet at 07/30/23 2128  •  sevelamer (RENAGEL) tablet 800 mg, 800 mg, Oral, TID With Meals, Shagufta Freedman MD, 800 mg at 08/01/23 1134  •  simethicone (MYLICON) chewable tablet 80 mg, 80 mg, Oral, Q6H PRN, Shagufta Freedman MD  •  torsemide [facility]) tablet 40 mg, 40 mg, Oral, BID, Shagufta Freedman MD, 40 mg at 08/01/23 1138    Laboratory Results:  Results from last 7 days   Lab Units 08/01/23  1202 08/01/23  0500 07/31/23  0432 07/30/23  0724 07/29/23  0530 07/28/23  0655   WBC Thousand/uL  --  5.24 5.68 6.14 8.29 5.68   HEMOGLOBIN g/dL  --  11.8* 11.6* 12.0 12.8 12.7   HEMATOCRIT %  --  37.4 35.8* 37.6 39.8 40.2   PLATELETS Thousands/uL  --  91* 111* 112* 117* 104*   POTASSIUM mmol/L  --  5.0 5.4* 5.1 5.9* 5.4*   CHLORIDE mmol/L  --  96 96 97 96 92*   CO2 mmol/L  --  23 22 24 21 29   BUN mg/dL  --  62* 74* 59* 93* 81*   CREATININE mg/dL  --  7.21* 8.47* 7.66* 9.55* 8.97*   CALCIUM mg/dL  --  9.5 9.5 9.8 9.7 10.4*   MAGNESIUM mg/dL  --  2.3 2.5  --   --  2.5   PHOSPHORUS mg/dL 6.3*  --  8.7* 7.7* 10.2* 9.2*

## 2023-08-01 NOTE — ASSESSMENT & PLAN NOTE
· History hypertension  · Home regimen torsemide 40 mg twice daily, Toprol-XL 25 mg once a day, midodrine  · Patient has been off medications for months now given inaccessibility on the outpatient setting.   · Blood Pressure: 148/91     Plan     · Continue current meds

## 2023-08-01 NOTE — PLAN OF CARE
Serum potassium is 5.0 on 2K2.5Ca bath. Will attempt for UF  to target weight. Patient requested to have 3 hours of treatment and Marti VILLANUEVA okayed   Problem: METABOLIC, FLUID AND ELECTROLYTES - ADULT  Goal: Electrolytes maintained within normal limits  Description: INTERVENTIONS:  - Monitor labs and assess patient for signs and symptoms of electrolyte imbalances  - Administer electrolyte replacement as ordered  - Monitor response to electrolyte replacements, including repeat lab results as appropriate  - Instruct patient on fluid and nutrition as appropriate  Outcome: Progressing  Goal: Fluid balance maintained  Description: INTERVENTIONS:  - Monitor labs   - Monitor I/O and WT  - Instruct patient on fluid and nutrition as appropriate  - Assess for signs & symptoms of volume excess or deficit  Outcome: Progressing   Post-Dialysis RN Treatment Note    Blood Pressure:  Pre 143/84 mm/Hg  Post 142/82 mmHg   EDW  81 kg    Weight:  Pre 81 kg   Post 81 kg   Mode of weight measurement: Standing Scale   Volume Removed  0 ml    Treatment duration 61 minutes    NS given  No    Treatment shortened?  Yes, describe: needed to go for permanent catheter placement   Medications given during Rx None Reported   Estimated Kt/V  None Reported   Access type: Temporary HD catheter   Access Issues: No    Report called to primary nurse   Yes        Litzy Larsen RN

## 2023-08-01 NOTE — BRIEF OP NOTE (RAD/CATH)
INTERVENTIONAL RADIOLOGY PROCEDURE NOTE    Date: 8/1/2023    Procedure: Conversion of left non tunneled to tunneled dialysis catheter  Procedure Summary     Date:  Room / Location:     Anesthesia Start:  Anesthesia Stop:     Procedure:  Diagnosis:     Scheduled Providers:  Responsible Provider:     Anesthesia Type: Not recorded ASA Status: Not recorded          Preoperative diagnosis:   1. Infection of hemodialysis catheter, initial encounter ([address])    2. Chronic kidney disease with end stage renal failure on dialysis ([address])    3. Skin infection    4. Hemodialysis catheter infection ([address])    5. HFrEF Dilated cardiomyopathy ([address])         Postoperative diagnosis: Same. Surgeon: Alonso Brooke MD     Assistant: None. No qualified resident was available. Blood loss: 10 mL    Specimens: None     Findings: Successful conversion of left non tunneled to tunneled dialysis catheter. Complications: None immediate.     Anesthesia: local

## 2023-08-01 NOTE — ASSESSMENT & PLAN NOTE
Lab Results   Component Value Date    EGFR 7 08/01/2023    EGFR 6 07/31/2023    EGFR 6 07/30/2023    CREATININE 7.21 (H) 08/01/2023    CREATININE 8.47 (H) 07/31/2023    CREATININE 7.66 (H) 07/30/2023   · Hx ESRD due to Polycystic kydney disease on HD. Previously PD for 2 years however recurrent infections for which later transitioned to HD. · Dialysis over [facility] MWF schedule with per chart review hx of non compliance, as per patient only handling less than 3 hours due to anxiety.   · Baseline creatine per chart reviewed variable 6-9  · Dialysis 7/29  · Nephrology consulted appreciate recommendations  · Awaiting iron panel : Iron Saturation 15, TIBC 219, iron: 32    Plan  · Continue outpatient hemodialysis  · Completed HD today since his HD is schedule while on vacation  · Next HD is Thursday at [facility] in [place], [place]

## 2023-08-01 NOTE — CASE MANAGEMENT
Case Management Discharge Planning Note    Patient name Fahad Sender  Location S MS 46/S -02 MRN [id number]  : 1960 Date 2023       Current Admission Date: 2023  Current Admission Diagnosis:Hemodialysis catheter infection [facility])   Patient Active Problem List    Diagnosis Date Noted   • Chronic venous stasis dermatitis of both lower extremities 2023   • Hemodialysis catheter infection ([address]) 2023   • Venous stasis ulcer of left lower leg with edema of left lower leg ([address]) 2023   • Abnormal nuclear stress test 2021   • Type 2 diabetes mellitus with stage 5 chronic kidney disease not on chronic dialysis, with long-term current use of insulin ([address]) 2021   • Heart failure with reduced ejection fraction due to cardiomyopathy ([address]) 2020   • HFrEF Dilated cardiomyopathy ([address]) 2020   • Pruritus 2020   • Insomnia 2020   • Chronic hepatic failure without coma ([address]) 2020   • Paresis ([address]) 2020   • Hypoproteinemia ([address]) 2020   • Screening for HIV without presence of risk factors    • Systolic dysfunction 1450   • GERD (gastroesophageal reflux disease) 2019   • Oropharyngeal dysphagia 2019   • Constipation 2019   • At Risk for Pressure ulcer, buttock 10/29/2019   • ESRD (end stage renal disease) ([address]) 10/28/2019   • Scrotal swelling 10/27/2019   • Hyperkalemia 10/26/2019   • Wound of right leg 10/26/2019   • Wound of left leg 10/26/2019   • Multiple wounds of skin 10/26/2019   • Cellulitis 10/26/2019   • ESRD (end stage renal disease) on dialysis [facility])    • Ileus ([address]) 10/09/2019   • Ascites 10/09/2019   • Elevated d-dimer 10/09/2019   • Hypotension 10/06/2019   • Anemia due to chronic kidney disease, on chronic dialysis and Acute Blood Loss Anemia ([address]) 10/06/2019   • Paroxysmal atrial fibrillation ([address]) 2019   • Gram-negative bacteremia 2019   • Chronic kidney disease with end stage renal failure on dialysis ([facility]) 09/21/2019   • Hyponatremia 09/21/2019   • Multiple and open wound of lower limb 09/21/2019   • Total bilirubin, elevated 09/21/2019   • Thrombocytopenia ([address]) 09/21/2019   • Polycystic liver disease 08/07/2018   • ADPKD (autosomal dominant polycystic kidney) 08/07/2018   • Peripheral neuropathy 11/23/2015   • Polycystic kidney disease 08/17/2015   • Hypertension 10/29/2013      LOS (days): 4  Geometric Mean LOS (GMLOS) (days): 4.90  Days to GMLOS:0.5     OBJECTIVE:  Risk of Unplanned Readmission Score: 18.01         Current admission status: Inpatient   [facility] Pharm[facility]:   [facility] [place] [facility] [address] [address]  Phone: [phone] Fax: [place] [place], [place] - [address] [place] [address]  [address] [facility] [address] [place] [address]  Phone: [phone] Fax: [phone]    Primary Care Provider: Nas So DO    Primary Insurance: MEDICARE  Secondary Insurance: Dirk Dale SHIELD    DISCHARGE DETAILS:                                                                                        IMM Given (Date):: 08/01/23  IMM Given to[de-identified] Patient        IMM reviewed with patient. Patient agrees with discharge determination. CM notified that patient is medically stable for DC home today. Per SLIM and nephro patient will plan on getting his HD treatment at [facility] in [place], [place] this Thursday (or Friday). No further CM DC needs were discussed or identified. CM reviewed that there are no assistance programs for his medications available other than using the $4 list at [facility] or [employer]. CM encouraged patient to enroll in prescription coverage during open enrollment this year. CM encouraged patient to follow up with all recommended appointments after discharge. Patient advised of importance for patient and family to participate in managing patient's medical well being.

## 2023-08-01 NOTE — ASSESSMENT & PLAN NOTE
· History of chronic venostasis, necrotizing fasciitis  · Follows podiatry and wound care in the outpatient setting  · No active infection at this time    Plan  Wound care outpatient

## 2023-08-01 NOTE — DISCHARGE SUMMARY
[address] [facility]  Discharge- Connie Anguiano 1960, 66 y.o. male MRN: [id number]  Unit/Bed#: S -01 Encounter: [id number]  Primary Care Provider: Baron Shari DO   Date and time admitted to hospital: 7/28/2023  6:23 AM    ESRD (end stage renal disease) on dialysis [facility])  Assessment & Plan  Lab Results   Component Value Date    EGFR 7 08/01/2023    EGFR 6 07/31/2023    EGFR 6 07/30/2023    CREATININE 7.21 (H) 08/01/2023    CREATININE 8.47 (H) 07/31/2023    CREATININE 7.66 (H) 07/30/2023   · Hx ESRD due to Polycystic kydney disease on HD. Previously PD for 2 years however recurrent infections for which later transitioned to HD. · Dialysis over [facility] MWF schedule with per chart review hx of non compliance, as per patient only handling less than 3 hours due to anxiety. · Baseline creatine per chart reviewed variable 6-9  · Dialysis 7/29  · Nephrology consulted appreciate recommendations  · Awaiting iron panel : Iron Saturation 15, TIBC 219, iron: 32    Plan  · Continue outpatient hemodialysis  · Completed HD today since his HD is schedule while on vacation  · Next HD is Thursday at [facility] in [place], [place]      * Hemodialysis catheter infection ([address])  Assessment & Plan  · POA 2 day hx of purulent discharge, edema along HD cath site. No fevers, chills however pain and discomfort. · Hemodynamically stable, afebrile no leucocytosis. · At the Ed receive on dose of Zosyn  · Upon exam edma present, no erythema appreciated. Purulent discharge somewhat foul smelling  · ID consult: Awaiting care for site culture. Blood culture shows: No growth in 24 hours  Ultrasound of area to identify any further possible connection in the area overlying clavicle bone: 2.8 x 1.1 cm  clinical concern for abscess.  Surgery has no concern for  Abscess, CT cancelled  72 hours no growth blood culture in 72 hours  Patient catheter tip growing serratia  Plan  · [facility] permcath placed 8/1/2023  · Cefdinir 300 mg PO q 24 hours until 8/5/2023            Chronic venous stasis dermatitis of both lower extremities  Assessment & Plan  · History of chronic venostasis, necrotizing fasciitis  · Follows podiatry and wound care in the outpatient setting  · No active infection at this time    Plan  Wound care outpatient      HFrEF Dilated cardiomyopathy [facility])  Assessment & Plan  · History dilated cardiomyopathy  · Last echo during nuclear stress test October 2021 significant for:  · LVEF 19%, cardiomyopathy with wall motion abnormalities. · Cardiology consult is pending: Patient was seen and offer her catheterization which she declined. Patient also declined aspirin or statin therapy. He was not agreeable to taking GDMT in the past    Plan   Metoprolol succinate   ACE inhibitor can be initiated if patient agrees  Follow-up outpatient with cardiology for further work-up    Hyperkalemia  Assessment & Plan  Patient on HD    Plan  · Dialysis today  · Potassium is 5.0   · 2K bath on HD    Hypertension  Assessment & Plan  · History hypertension  · Home regimen torsemide 40 mg twice daily, Toprol-XL 25 mg once a day, midodrine  · Patient has been off medications for months now given inaccessibility on the outpatient setting. · Blood Pressure: 148/91     Plan     · Continue current meds         Medical Problems     Resolved Problems  Date Reviewed: 7/29/2023   None       Discharging Resident:  Eyad Garrett MD  Discharging Attending: Tina Polk MD  PCP: Rush Araiza DO  Admission Date:   Admission Orders (From admission, onward)     Ordered        07/28/23 0734  INPATIENT ADMISSION  Once                      Discharge Date: 08/01/23    Consultations During Hospital Stay:  · Nephrology, ID, IR    Procedures Performed:   · Replacement of PermCath, Incision and drainage of abscess    Significant Findings / Test Results:   · PermCath tip grew serratia     Incidental Findings:   · None       Test Results Pending at Discharge (will require follow up): · Blood culture     Outpatient Tests Requested:  · None    Complications:  None    Reason for Admission: None    Hospital Course:   Jed Sherman is a 66 y.o. male patient with a past medical history of end-stage renal disease on dialysis, hypertension, cardiomyopathy, A-fib who originally presented to the hospital on 7/28/2023 due to purulent drainage from and around right PermCath site. Nephrology was consulted and IV vancomycine was started. IR was consulted and they recommended removal of permacath and insertion of a temporary catheter so that patient could continue hemodialysis. Patient original catheter site stopped draining as he completed inpatient hemodialysis using his new temporary catheter. Blood culture showed no growth after 4 days; however patient's catheter tip culture grew Serratia marcescens. Surgery rule out possible spread of infection with  Ultrasound of head/neck. Vancomycin was stopped and IV ceftriaxone was initiated. On 8/1/2023, he  received another inpatient hemodialysis  after his new permanent catheter was inserted. Patient was discharged with cefdinir 300 mg p.o. every 24 hours until 8/5/2023 and 5 mg oxycodone for pain. Patient is to follow-up with his PCP and nephrology outpatient. Please see above list of diagnoses and related plan for additional information. Condition at Discharge: good    Discharge Day Visit / Exam:   Subjective:    No acute event overnight. This morning patient states he feels really good. He slept for 4 hours and his eyes does not not feel baggy. Patient said he got oxycodone last night which helped him sleep and wants to know if he can get some to go home . Patient does not have a drug plan to get prescription outpatient and wants.  Patient also wants to know if he can do dialysis on Friday and skip 2 days because he is going to the [facility] with family.        Vitals: Blood Pressure: 146/96 (08/01/23 1525)  Pulse: 72 (08/01/23 1023)  Temperature: 97.5 °F (36.4 °C) (08/01/23 1525)  Temp Source: Oral (08/01/23 1525)  Respirations: 16 (08/01/23 1525)  Height: 5' 8" (172.7 cm) (07/28/23 1330)  Weight - Scale: 80.8 kg (178 lb 1.6 oz) (08/01/23 0600)  SpO2: 98 % (08/01/23 1023)  Exam:   Physical Exam  Vitals and nursing note reviewed. Constitutional:       General: He is not in acute distress. Appearance: Normal appearance. He is well-developed. HENT:      Head: Normocephalic and atraumatic. Eyes:      Conjunctiva/sclera: Conjunctivae normal.   Cardiovascular:      Rate and Rhythm: Normal rate and regular rhythm. Heart sounds: No murmur heard. Pulmonary:      Effort: Pulmonary effort is normal. No respiratory distress. Breath sounds: Normal breath sounds. Abdominal:      General: Abdomen is flat. Bowel sounds are normal.      Palpations: Abdomen is soft. Tenderness: There is no abdominal tenderness. Musculoskeletal:         General: No swelling. Cervical back: Neck supple. Skin:     General: Skin is warm and dry. Capillary Refill: Capillary refill takes less than 2 seconds. Findings: Lesion (lower legs chronic ulcers) present. Neurological:      Mental Status: He is alert and oriented to person, place, and time. Psychiatric:         Mood and Affect: Mood normal.         Discussion with Family: Patient declined call to . Discharge instructions/Information to patient and family:   See after visit summary for information provided to patient and family. Provisions for Follow-Up Care:  See after visit summary for information related to follow-up care and any pertinent home health orders. Disposition:   Home    Planned Readmission:None    Discharge Medications:  See after visit summary for reconciled discharge medications provided to patient and/or family.       **Please Note: This note may have been constructed using a voice recognition system**

## 2023-08-01 NOTE — PROGRESS NOTES
Progress Note - Infectious Disease   [facility] 66 [facility] MRN: [id number]  Unit/Bed#: S [place] 95839 Encounter: [id number]      Impression/Plan:  1.  Right chest PermCath exit site infection with abscess. Serratia.   Blood cultures remain negative.  Fortunately, patient is afebrile and clinically stable. No leukocytosis. Permacath has been removed. -Continue ceftriaxone with a last dose at 1400 today  -Follow-up pending blood cultures  -Serial exams  -Cefdinir 300 mg p.o. every 24 hours to begin tomorrow and continue through 2023 which will be 7 days total treatment  -Follow temperatures and hemodynamics  -Recheck CBC with differential     2.  ESRD on HD via right IJ PermCath, due to polycystic kidney disease.  Now complicated by PermCath infection, as above. Permacath removed  -No need to dose adjust the ceftriaxone  -Nephrology follow-up ongoing  -Okay to place new PermCath today with blood cultures negative     3.  Cardiomyopathy with EF 20%.     4.  Chronic bilateral lower extremity wounds.  No apparent signs of acute infection.  Outpatient podiatry follow-up. Discussed the above management plan with the primary service    Antibiotics:  Ceftriaxone 3    Subjective:  Patient has no fever, chills, sweats; no nausea, vomiting, diarrhea; no cough, shortness of breath; no pain. No new symptoms. He is very anxious to get out of the hospital    Objective:  Vitals:  Temp:  [97.2 °F (36.2 °C)-97.6 °F (36.4 °C)] 97.4 °F (36.3 °C)  HR:  [] 103  Resp:  [16-20] 20  BP: (137-153)/() 145/89  SpO2:  [98 %] 98 %  Temp (24hrs), Av.4 °F (36.3 °C), Min:97.2 °F (36.2 °C), Max:97.6 °F (36.4 °C)  Current: Temperature: (!) 97.4 °F (36.3 °C)    Physical Exam:   General Appearance:  Alert, interactive, nontoxic, no acute distress. Throat: Oropharynx moist without lesions.     Lungs:   Clear to auscultation bilaterally; no wheezes, rhonchi or rales; respirations unlabored   Heart:  RRR; no murmur, rub or gallop   Abdomen:   Soft, non-tender, non-distended, positive bowel sounds. Extremities: No clubbing, cyanosis or edema   Skin: No new rashes or lesions. No draining wounds noted. Labs, Imaging, & Other studies:   All pertinent labs and imaging studies were personally reviewed  Results from last 7 days   Lab Units 08/01/23  0500 07/31/23 0432 07/30/23 0724   WBC Thousand/uL 5.24 5.68 6.14   HEMOGLOBIN g/dL 11.8* 11.6* 12.0   PLATELETS Thousands/uL 91* 111* 112*     Results from last 7 days   Lab Units 08/01/23  0500 07/31/23 0432 07/30/23  0724 07/29/23  0530 07/28/23  0655   SODIUM mmol/L 135 135 136   < > 137   POTASSIUM mmol/L 5.0 5.4* 5.1   < > 5.4*   CHLORIDE mmol/L 96 96 97   < > 92*   CO2 mmol/L 23 22 24   < > 29   BUN mg/dL 62* 74* 59*   < > 81*   CREATININE mg/dL 7.21* 8.47* 7.66*   < > 8.97*   EGFR ml/min/1.73sq m 7 6 6   < > 5   CALCIUM mg/dL 9.5 9.5 9.8   < > 10.4*   AST U/L  --   --   --   --  5*   ALT U/L  --   --   --   --  8   ALK PHOS U/L  --   --   --   --  108*    < > = values in this interval not displayed. Results from last 7 days   Lab Units 07/28/23  0655   BLOOD CULTURE  No Growth at 72 hrs. No Growth at 72 hrs.              Results from last 7 days   Lab Units 07/31/23 0432   FERRITIN ng/mL 158

## 2023-08-01 NOTE — ASSESSMENT & PLAN NOTE
· POA 2 day hx of purulent discharge, edema along HD cath site. No fevers, chills however pain and discomfort. · Hemodynamically stable, afebrile no leucocytosis. · At the Ed receive on dose of Zosyn  · Upon exam edma present, no erythema appreciated. Purulent discharge somewhat foul smelling  · ID consult: Awaiting care for site culture. Blood culture shows: No growth in 24 hours  Ultrasound of area to identify any further possible connection in the area overlying clavicle bone: 2.8 x 1.1 cm  clinical concern for abscess.  Surgery has no concern for  Abscess, CT cancelled  72 hours no growth blood culture in 72 hours  Patient catheter tip growing serratia  Plan  · [facility] permcath placed 8/1/2023  · Cefdinir 300 mg PO q 24 hours until 8/5/2023

## 2023-08-01 NOTE — WOUND OSTOMY CARE
Progress Note - Wound   Ligia Rader 66 y.o. male MRN: [id number]  Unit/Bed#: S -01 Encounter: [id number]      Assessment: This is a 66year old male patient admitted on 7/28/23 with hemodialysis catheter infection. He has a history of ESRD on chronic hemodialysis, HTN and necrotizing fasciitis to BLE with skin grafting. He was AAO x 3 and receiving HD treatment - was agreeable to have wound visit done. Assessment Findings:  1-Bilateral lower legs with no open areas noted - patient states that he prefers to have legs in dependent position but was advised to elevate them to heart level or higher to minimize swelling & reopening of wounds. Orders in place for skin care. 2-Bilateral buttocks and heels intact - orders in place for skin care and for prevention. Skin Care Plan:  1-Apply Hydraguard to bilateral heels, sacro-buttocks and b/l lower legs BID and PRN. 2-Turn/reposition q2h or when medically stable for pressure re-distribution on skin . 3-Float heels on 2 pillows to offload pressure. Encourage patient to elevate legs when sitting to heart level or higher to minimize swelling & possible reopening of wounds. 4-Moisturize skin daily with skin nourishing cream  5-Ehob cushion in chair when out of bed. Wound 09/21/19 Surgical Other (Comment) Leg Right; Lower (Active)   Wound Image   08/01/23 0852   Wound Description Epithelialization 08/01/23 0852   Wound Length (cm) 0 cm 08/01/23 0852   Wound Width (cm) 0 cm 08/01/23 0852   Wound Depth (cm) 0 cm 08/01/23 0852   Wound Surface Area (cm^2) 0 cm^2 08/01/23 0852   Wound Volume (cm^3) 0 cm^3 08/01/23 0852   Calculated Wound Volume (cm^3) 0 cm^3 08/01/23 0852   Drainage Amount None 08/01/23 0852   Non-staged Wound Description Not applicable 22/01/60 8742   Treatments Cleansed 08/01/23 0852   Dressing Protective barrier 08/01/23 0852   Dressing Changed New 08/01/23 0852   Dressing Status Intact 08/01/23 0852       Wound 09/21/19 Surgical Other (Comment) Leg Left; Lower (Active)   Wound Image   08/01/23 0853   Wound Description Epithelialization 08/01/23 0853   Wound Length (cm) 0 cm 08/01/23 0853   Wound Width (cm) 0 cm 08/01/23 0853   Wound Depth (cm) 0 cm 08/01/23 0853   Wound Surface Area (cm^2) 0 cm^2 08/01/23 0853   Wound Volume (cm^3) 0 cm^3 08/01/23 0853   Calculated Wound Volume (cm^3) 0 cm^3 08/01/23 0853   Drainage Amount None 08/01/23 0853   Treatments Cleansed 08/01/23 0853   Dressing Protective barrier 08/01/23 0853   Dressing Changed New 08/01/23 0853   Dressing Status Intact 08/01/23 0853     Discussed assessment findings, and plan of care/recommendations with Baldev Ponce RN via [facility] Text. Wound care signing off, please call or tiger text with questions and concerns. Recommendations written as orders.   Yari Stout MSN, RN, Lor[facility] Ramirez

## 2023-08-02 LAB
BACTERIA BLD CULT: NORMAL
BACTERIA BLD CULT: NORMAL

## 2024-01-01 NOTE — PLAN OF CARE
Problem: Prexisting or High Potential for Compromised Skin Integrity  Goal: Skin integrity is maintained or improved  Description  INTERVENTIONS:  - Identify patients at risk for skin breakdown  - Assess and monitor skin integrity  - Assess and monitor nutrition and hydration status  - Monitor labs   - Assess for incontinence   - Turn and reposition patient  - Assist with mobility/ambulation  - Relieve pressure over bony prominences  - Avoid friction and shearing  - Provide appropriate hygiene as needed including keeping skin clean and dry  - Evaluate need for skin moisturizer/barrier cream  - Collaborate with interdisciplinary team   - Patient/family teaching  - Consider wound care consult   Outcome: Progressing     Problem: Potential for Falls  Goal: Patient will remain free of falls  Description  INTERVENTIONS:  - Assess patient frequently for physical needs  -  Identify cognitive and physical deficits and behaviors that affect risk of falls    -  [place] fall precautions as indicated by assessment   - Educate patient/family on patient safety including physical limitations  - Instruct patient to call for assistance with activity based on assessment  - Modify environment to reduce risk of injury  - Consider OT/PT consult to assist with strengthening/mobility  Outcome: Progressing     Problem: INFECTION - ADULT  Goal: Absence or prevention of progression during hospitalization  Description  INTERVENTIONS:  - Assess and monitor for signs and symptoms of infection  - Monitor lab/diagnostic results  - Monitor all insertion sites, i e  indwelling lines, tubes, and drains  - Monitor endotracheal if appropriate and nasal secretions for changes in amount and color  - [place] appropriate cooling/warming therapies per order  - Administer medications as ordered  - Instruct and encourage patient and family to use good hand hygiene technique  - Identify and instruct in appropriate isolation precautions for identified infection/condition  Outcome: Progressing     Problem: SKIN/TISSUE INTEGRITY - ADULT  Goal: Skin integrity remains intact  Description  INTERVENTIONS  - Identify patients at risk for skin breakdown  - Assess and monitor skin integrity  - Assess and monitor nutrition and hydration status  - Monitor labs (i e  albumin)  - Assess for incontinence   - Turn and reposition patient  - Assist with mobility/ambulation  - Relieve pressure over bony prominences  - Avoid friction and shearing  - Provide appropriate hygiene as needed including keeping skin clean and dry  - Evaluate need for skin moisturizer/barrier cream  - Collaborate with interdisciplinary team (i e  Nutrition, Rehabilitation, etc )   - Patient/family teaching  Outcome: Progressing 1310 1310 1310 1310 1310 1310 1310 1310 1310 1310

## 2024-01-01 NOTE — QUICK NOTE
Asked by nurse to assess patient due to finding out that he bumped his head earlier this morning. Patient says that around 1 AM he was dozing off and his head slumped forward and hit his table. Patient says that his forehead hurt a little but feels back to normal now. He says he did not want to bother the nurse therefore he did not let her know, but ended up telling her this morning. I arrived at bedside and patient was sitting at his chair. Patient not on anticoagulation. AAox4. RN at bedside throughout entire interaction. Physical Exam  Constitutional:       General: He is not in acute distress. Appearance: Normal appearance. HENT:      Head: Normocephalic and atraumatic. Right Ear: External ear normal.      Left Ear: External ear normal.      Nose: Nose normal.   Eyes:      Extraocular Movements: Extraocular movements intact. Conjunctiva/sclera: Conjunctivae normal.      Pupils: Pupils are equal, round, and reactive to light. Neck:      Comments: L IJ  Skin:     Findings: No bruising. Neurological:      General: No focal deficit present. Mental Status: He is alert and oriented to person, place, and time. GCS: GCS eye subscore is 4. GCS verbal subscore is 5. GCS motor subscore is 6. Cranial Nerves: No cranial nerve deficit. Sensory: No sensory deficit. Motor: No weakness or pronator drift.       Coordination: Coordination normal. Finger-Nose-Finger Test normal.   Psychiatric:         Mood and Affect: Mood normal.         Behavior: Behavior normal.      Comments: Cracks jokes throughout neuro exam       Assessment/Plan:  Minor blunt force injury - no swelling, hematoma, open skin  Will signout to dayteam  Monitor for any changes  Discussed with RN [Normal Development] : Normal Development [None] : none [Pats or smiles at reflection] : pats or smiles at reflection [Babbles] : babbles [Rolls over prone to supine] : rolls over prone to supine [Sits briefly without support] : sits briefly without support [Reaches for object and transfers] : reaches for object and transfers [Rakes small object with 4 fingers] : rakes small object with 4 fingers [[place] small object on surface] : bangs small object on surface [Begins to turn when name called] : does not begin to turn when name called

## 2024-02-21 PROBLEM — Z11.4 SCREENING FOR HIV WITHOUT PRESENCE OF RISK FACTORS: Status: RESOLVED | Noted: 2020-02-14 | Resolved: 2024-02-21

## 2024-02-21 NOTE — ASSESSMENT & PLAN NOTE
Onset: x3 days      Location / description: Intermittent right chest pain, under right breast pain. Pain is more prominent when getting up. Problem sleeping. Pain worse with taking a deep breath.     Precipitating Factors: Unknown    Pain Scale (1-10), 10 highest: 2/10 Under right breast pain    Symptom specific medications: Denies    What improves/worsens symptoms: Moving or palpation to area worsens symptoms.     Are you pregnant or breast feeding: denies both  LMP : 2/14/2024    Recent visits (last 3-4 weeks) for same reason or recent surgery: Denies both    PLAN:    Go to the Emergency Department    Patient/Caller agrees to follow recommendations.  Reason for Disposition   Taking a deep breath makes pain worse    Protocols used: Chest Pain-A-AH     · Patient has known polycystic kidney disease and is on hemodialysis for end-stage renal failure secondary to polycystic kidney disease  · CT scan after admission of the abdomen showed a calcified right renal cyst and was drained by IR, drain is still in, currently draining fluid suspicious for urine versus ascitic fluid, sent for analysis

## 2024-06-23 ENCOUNTER — APPOINTMENT (EMERGENCY)
Dept: CT IMAGING | Facility: HOSPITAL | Age: 64
DRG: 871 | End: 2024-06-23
Payer: MEDICARE

## 2024-06-23 ENCOUNTER — HOSPITAL ENCOUNTER (INPATIENT)
Facility: HOSPITAL | Age: 64
LOS: 6 days | Discharge: HOME | DRG: 871 | End: 2024-06-29
Attending: EMERGENCY MEDICINE | Admitting: INTERNAL MEDICINE
Payer: MEDICARE

## 2024-06-23 ENCOUNTER — APPOINTMENT (EMERGENCY)
Dept: RADIOLOGY | Facility: HOSPITAL | Age: 64
DRG: 871 | End: 2024-06-23
Payer: MEDICARE

## 2024-06-23 DIAGNOSIS — Q61.2 ADPKD (AUTOSOMAL DOMINANT POLYCYSTIC KIDNEY): ICD-10-CM

## 2024-06-23 DIAGNOSIS — N18.6 ESRD (END STAGE RENAL DISEASE) (HCC): ICD-10-CM

## 2024-06-23 DIAGNOSIS — K76.9 HEPATIC LESION: ICD-10-CM

## 2024-06-23 DIAGNOSIS — N18.6 ESRD (END STAGE RENAL DISEASE) ON DIALYSIS (HCC): ICD-10-CM

## 2024-06-23 DIAGNOSIS — N50.89 SCROTAL SWELLING: ICD-10-CM

## 2024-06-23 DIAGNOSIS — R53.1 GENERALIZED WEAKNESS: ICD-10-CM

## 2024-06-23 DIAGNOSIS — D72.829 LEUKOCYTOSIS: ICD-10-CM

## 2024-06-23 DIAGNOSIS — R10.9 ABDOMINAL PAIN: Primary | ICD-10-CM

## 2024-06-23 DIAGNOSIS — R11.2 NAUSEA AND VOMITING: ICD-10-CM

## 2024-06-23 DIAGNOSIS — Z99.2 ESRD (END STAGE RENAL DISEASE) ON DIALYSIS (HCC): ICD-10-CM

## 2024-06-23 DIAGNOSIS — R94.31 ABNORMAL EKG: ICD-10-CM

## 2024-06-23 DIAGNOSIS — K76.89 LIVER CYST: ICD-10-CM

## 2024-06-23 DIAGNOSIS — I42.0 DILATED CARDIOMYOPATHY (HCC): ICD-10-CM

## 2024-06-23 DIAGNOSIS — R18.8 OTHER ASCITES: ICD-10-CM

## 2024-06-23 DIAGNOSIS — E87.5 HYPERKALEMIA: ICD-10-CM

## 2024-06-23 LAB
2HR DELTA HS TROPONIN: 9 NG/L
ALBUMIN SERPL BCG-MCNC: 4.2 G/DL (ref 3.5–5)
ALP SERPL-CCNC: 95 U/L (ref 34–104)
ALT SERPL W P-5'-P-CCNC: 4 U/L (ref 7–52)
ANION GAP SERPL CALCULATED.3IONS-SCNC: 17 MMOL/L (ref 4–13)
APTT PPP: 34 SECONDS (ref 23–37)
AST SERPL W P-5'-P-CCNC: 6 U/L (ref 13–39)
BASOPHILS # BLD AUTO: 0.04 THOUSANDS/ÂΜL (ref 0–0.1)
BASOPHILS NFR BLD AUTO: 0 % (ref 0–1)
BILIRUB SERPL-MCNC: 1.97 MG/DL (ref 0.2–1)
BUN SERPL-MCNC: 58 MG/DL (ref 5–25)
CALCIUM SERPL-MCNC: 9.4 MG/DL (ref 8.4–10.2)
CARDIAC TROPONIN I PNL SERPL HS: 77 NG/L
CARDIAC TROPONIN I PNL SERPL HS: 86 NG/L
CHLORIDE SERPL-SCNC: 92 MMOL/L (ref 96–108)
CO2 SERPL-SCNC: 26 MMOL/L (ref 21–32)
CREAT SERPL-MCNC: 8.17 MG/DL (ref 0.6–1.3)
EOSINOPHIL # BLD AUTO: 0.02 THOUSAND/ÂΜL (ref 0–0.61)
EOSINOPHIL NFR BLD AUTO: 0 % (ref 0–6)
ERYTHROCYTE [DISTWIDTH] IN BLOOD BY AUTOMATED COUNT: 16.6 % (ref 11.6–15.1)
GFR SERPL CREATININE-BSD FRML MDRD: 6 ML/MIN/1.73SQ M
GLUCOSE SERPL-MCNC: 97 MG/DL (ref 65–140)
HCT VFR BLD AUTO: 40.6 % (ref 36.5–49.3)
HGB BLD-MCNC: 12.9 G/DL (ref 12–17)
IMM GRANULOCYTES # BLD AUTO: 0.11 THOUSAND/UL (ref 0–0.2)
IMM GRANULOCYTES NFR BLD AUTO: 1 % (ref 0–2)
INR PPP: 1.4 (ref 0.84–1.19)
LACTATE SERPL-SCNC: 1.4 MMOL/L (ref 0.5–2)
LYMPHOCYTES # BLD AUTO: 0.2 THOUSANDS/ÂΜL (ref 0.6–4.47)
LYMPHOCYTES NFR BLD AUTO: 1 % (ref 14–44)
MCH RBC QN AUTO: 31.5 PG (ref 26.8–34.3)
MCHC RBC AUTO-ENTMCNC: 31.8 G/DL (ref 31.4–37.4)
MCV RBC AUTO: 99 FL (ref 82–98)
MONOCYTES # BLD AUTO: 0.93 THOUSAND/ÂΜL (ref 0.17–1.22)
MONOCYTES NFR BLD AUTO: 7 % (ref 4–12)
NEUTROPHILS # BLD AUTO: 12.97 THOUSANDS/ÂΜL (ref 1.85–7.62)
NEUTS SEG NFR BLD AUTO: 91 % (ref 43–75)
NRBC BLD AUTO-RTO: 0 /100 WBCS
PLATELET # BLD AUTO: 95 THOUSANDS/UL (ref 149–390)
PLATELET BLD QL SMEAR: ABNORMAL
PMV BLD AUTO: 11.6 FL (ref 8.9–12.7)
POTASSIUM SERPL-SCNC: 5.5 MMOL/L (ref 3.5–5.3)
PROCALCITONIN SERPL-MCNC: 5.73 NG/ML
PROT SERPL-MCNC: 8.2 G/DL (ref 6.4–8.4)
PROTHROMBIN TIME: 17.9 SECONDS (ref 11.6–14.5)
RBC # BLD AUTO: 4.09 MILLION/UL (ref 3.88–5.62)
RBC MORPH BLD: NORMAL
SODIUM SERPL-SCNC: 135 MMOL/L (ref 135–147)
WBC # BLD AUTO: 14.27 THOUSAND/UL (ref 4.31–10.16)

## 2024-06-23 PROCEDURE — 87077 CULTURE AEROBIC IDENTIFY: CPT | Performed by: EMERGENCY MEDICINE

## 2024-06-23 PROCEDURE — 82248 BILIRUBIN DIRECT: CPT

## 2024-06-23 PROCEDURE — 71045 X-RAY EXAM CHEST 1 VIEW: CPT

## 2024-06-23 PROCEDURE — 85025 COMPLETE CBC W/AUTO DIFF WBC: CPT | Performed by: EMERGENCY MEDICINE

## 2024-06-23 PROCEDURE — 84484 ASSAY OF TROPONIN QUANT: CPT | Performed by: EMERGENCY MEDICINE

## 2024-06-23 PROCEDURE — 99285 EMERGENCY DEPT VISIT HI MDM: CPT

## 2024-06-23 PROCEDURE — 80053 COMPREHEN METABOLIC PANEL: CPT | Performed by: EMERGENCY MEDICINE

## 2024-06-23 PROCEDURE — 74177 CT ABD & PELVIS W/CONTRAST: CPT

## 2024-06-23 PROCEDURE — 96365 THER/PROPH/DIAG IV INF INIT: CPT

## 2024-06-23 PROCEDURE — 36415 COLL VENOUS BLD VENIPUNCTURE: CPT

## 2024-06-23 PROCEDURE — 87154 CUL TYP ID BLD PTHGN 6+ TRGT: CPT | Performed by: EMERGENCY MEDICINE

## 2024-06-23 PROCEDURE — 85730 THROMBOPLASTIN TIME PARTIAL: CPT | Performed by: EMERGENCY MEDICINE

## 2024-06-23 PROCEDURE — 85610 PROTHROMBIN TIME: CPT | Performed by: EMERGENCY MEDICINE

## 2024-06-23 PROCEDURE — 84145 PROCALCITONIN (PCT): CPT | Performed by: EMERGENCY MEDICINE

## 2024-06-23 PROCEDURE — 99285 EMERGENCY DEPT VISIT HI MDM: CPT | Performed by: EMERGENCY MEDICINE

## 2024-06-23 PROCEDURE — 87040 BLOOD CULTURE FOR BACTERIA: CPT | Performed by: EMERGENCY MEDICINE

## 2024-06-23 PROCEDURE — 93005 ELECTROCARDIOGRAM TRACING: CPT

## 2024-06-23 PROCEDURE — 83605 ASSAY OF LACTIC ACID: CPT | Performed by: EMERGENCY MEDICINE

## 2024-06-23 RX ORDER — ACETAMINOPHEN 325 MG/1
650 TABLET ORAL ONCE
Status: COMPLETED | OUTPATIENT
Start: 2024-06-23 | End: 2024-06-23

## 2024-06-23 RX ADMIN — IOHEXOL 80 ML: 350 INJECTION, SOLUTION INTRAVENOUS at 21:08

## 2024-06-23 RX ADMIN — ACETAMINOPHEN 650 MG: 325 TABLET, FILM COATED ORAL at 20:58

## 2024-06-23 RX ADMIN — CEFTRIAXONE SODIUM 1000 MG: 10 INJECTION, POWDER, FOR SOLUTION INTRAVENOUS at 22:45

## 2024-06-24 ENCOUNTER — APPOINTMENT (INPATIENT)
Dept: DIALYSIS | Facility: HOSPITAL | Age: 64
DRG: 871 | End: 2024-06-24
Payer: MEDICARE

## 2024-06-24 ENCOUNTER — APPOINTMENT (INPATIENT)
Dept: RADIOLOGY | Facility: HOSPITAL | Age: 64
DRG: 871 | End: 2024-06-24
Payer: MEDICARE

## 2024-06-24 LAB
4HR DELTA HS TROPONIN: 11 NG/L
ALBUMIN FLD-MCNC: 2.5 G/DL
ALBUMIN SERPL BCG-MCNC: 3.9 G/DL (ref 3.5–5)
ALP SERPL-CCNC: 87 U/L (ref 34–104)
ALT SERPL W P-5'-P-CCNC: <3 U/L (ref 7–52)
AMMONIA PLAS-SCNC: 28 UMOL/L (ref 18–72)
ANION GAP SERPL CALCULATED.3IONS-SCNC: 18 MMOL/L (ref 4–13)
ANISOCYTOSIS BLD QL SMEAR: PRESENT
APPEARANCE FLD: CLEAR
AST SERPL W P-5'-P-CCNC: 6 U/L (ref 13–39)
BASOPHILS # BLD MANUAL: 0.26 THOUSAND/UL (ref 0–0.1)
BASOPHILS NFR MAR MANUAL: 2 % (ref 0–1)
BILIRUB DIRECT SERPL-MCNC: 0.76 MG/DL (ref 0–0.2)
BILIRUB SERPL-MCNC: 1.65 MG/DL (ref 0.2–1)
BUN SERPL-MCNC: 58 MG/DL (ref 5–25)
CALCIUM SERPL-MCNC: 9.2 MG/DL (ref 8.4–10.2)
CARDIAC TROPONIN I PNL SERPL HS: 88 NG/L
CHLORIDE SERPL-SCNC: 92 MMOL/L (ref 96–108)
CO2 SERPL-SCNC: 23 MMOL/L (ref 21–32)
COLOR FLD: NORMAL
CREAT SERPL-MCNC: 8.14 MG/DL (ref 0.6–1.3)
EOSINOPHIL # BLD MANUAL: 0 THOUSAND/UL (ref 0–0.4)
EOSINOPHIL NFR BLD MANUAL: 0 % (ref 0–6)
ERYTHROCYTE [DISTWIDTH] IN BLOOD BY AUTOMATED COUNT: 16.8 % (ref 11.6–15.1)
GFR SERPL CREATININE-BSD FRML MDRD: 6 ML/MIN/1.73SQ M
GLUCOSE FLD-MCNC: 99 MG/DL
GLUCOSE SERPL-MCNC: 94 MG/DL (ref 65–140)
HCT VFR BLD AUTO: 38.8 % (ref 36.5–49.3)
HGB BLD-MCNC: 11.9 G/DL (ref 12–17)
HISTIOCYTES NFR FLD: 4 %
INR PPP: 1.53 (ref 0.84–1.19)
LDH FLD L TO P-CCNC: 103 U/L
LYMPHOCYTES # BLD AUTO: 0.26 THOUSAND/UL (ref 0.6–4.47)
LYMPHOCYTES # BLD AUTO: 2 % (ref 14–44)
LYMPHOCYTES NFR BLD AUTO: 81 %
MCH RBC QN AUTO: 31.5 PG (ref 26.8–34.3)
MCHC RBC AUTO-ENTMCNC: 30.7 G/DL (ref 31.4–37.4)
MCV RBC AUTO: 103 FL (ref 82–98)
MONO+MESO NFR FLD MANUAL: 8 %
MONOCYTES # BLD AUTO: 0.66 THOUSAND/UL (ref 0–1.22)
MONOCYTES NFR BLD: 5 % (ref 4–12)
NEUTROPHILS # BLD MANUAL: 12.02 THOUSAND/UL (ref 1.85–7.62)
NEUTS BAND NFR BLD MANUAL: 4 % (ref 0–8)
NEUTS SEG NFR BLD AUTO: 7 %
NEUTS SEG NFR BLD AUTO: 87 % (ref 43–75)
PATH REV: NO
PLATELET # BLD AUTO: 90 THOUSANDS/UL (ref 149–390)
PLATELET BLD QL SMEAR: ABNORMAL
PMV BLD AUTO: 12 FL (ref 8.9–12.7)
POTASSIUM SERPL-SCNC: 5.6 MMOL/L (ref 3.5–5.3)
PROCALCITONIN SERPL-MCNC: 13.55 NG/ML
PROT FLD-MCNC: 4.6 G/DL
PROT SERPL-MCNC: 7.5 G/DL (ref 6.4–8.4)
PROTHROMBIN TIME: 19.2 SECONDS (ref 11.6–14.5)
RBC # BLD AUTO: 3.78 MILLION/UL (ref 3.88–5.62)
RBC MORPH BLD: PRESENT
SITE: NORMAL
SODIUM SERPL-SCNC: 133 MMOL/L (ref 135–147)
TOTAL CELLS COUNTED SPEC: 100
WBC # BLD AUTO: 13.21 THOUSAND/UL (ref 4.31–10.16)
WBC # FLD MANUAL: 303 /UL

## 2024-06-24 PROCEDURE — 82042 OTHER SOURCE ALBUMIN QUAN EA: CPT

## 2024-06-24 PROCEDURE — 85007 BL SMEAR W/DIFF WBC COUNT: CPT

## 2024-06-24 PROCEDURE — 94760 N-INVAS EAR/PLS OXIMETRY 1: CPT

## 2024-06-24 PROCEDURE — 87075 CULTR BACTERIA EXCEPT BLOOD: CPT

## 2024-06-24 PROCEDURE — 87505 NFCT AGENT DETECTION GI: CPT

## 2024-06-24 PROCEDURE — 49083 ABD PARACENTESIS W/IMAGING: CPT

## 2024-06-24 PROCEDURE — 88305 TISSUE EXAM BY PATHOLOGIST: CPT | Performed by: STUDENT IN AN ORGANIZED HEALTH CARE EDUCATION/TRAINING PROGRAM

## 2024-06-24 PROCEDURE — 36415 COLL VENOUS BLD VENIPUNCTURE: CPT | Performed by: EMERGENCY MEDICINE

## 2024-06-24 PROCEDURE — 5A1D70Z PERFORMANCE OF URINARY FILTRATION, INTERMITTENT, LESS THAN 6 HOURS PER DAY: ICD-10-PCS | Performed by: INTERNAL MEDICINE

## 2024-06-24 PROCEDURE — 88112 CYTOPATH CELL ENHANCE TECH: CPT | Performed by: STUDENT IN AN ORGANIZED HEALTH CARE EDUCATION/TRAINING PROGRAM

## 2024-06-24 PROCEDURE — 84484 ASSAY OF TROPONIN QUANT: CPT | Performed by: EMERGENCY MEDICINE

## 2024-06-24 PROCEDURE — 82140 ASSAY OF AMMONIA: CPT

## 2024-06-24 PROCEDURE — 84157 ASSAY OF PROTEIN OTHER: CPT

## 2024-06-24 PROCEDURE — 85610 PROTHROMBIN TIME: CPT

## 2024-06-24 PROCEDURE — 82945 GLUCOSE OTHER FLUID: CPT

## 2024-06-24 PROCEDURE — 84145 PROCALCITONIN (PCT): CPT

## 2024-06-24 PROCEDURE — 80053 COMPREHEN METABOLIC PANEL: CPT

## 2024-06-24 PROCEDURE — 87070 CULTURE OTHR SPECIMN AEROBIC: CPT

## 2024-06-24 PROCEDURE — 89051 BODY FLUID CELL COUNT: CPT

## 2024-06-24 PROCEDURE — 88184 FLOWCYTOMETRY/ TC 1 MARKER: CPT

## 2024-06-24 PROCEDURE — 87205 SMEAR GRAM STAIN: CPT

## 2024-06-24 PROCEDURE — 99223 1ST HOSP IP/OBS HIGH 75: CPT | Performed by: INTERNAL MEDICINE

## 2024-06-24 PROCEDURE — 88185 FLOWCYTOMETRY/TC ADD-ON: CPT

## 2024-06-24 PROCEDURE — 94640 AIRWAY INHALATION TREATMENT: CPT

## 2024-06-24 PROCEDURE — G0257 UNSCHED DIALYSIS ESRD PT HOS: HCPCS

## 2024-06-24 PROCEDURE — 49083 ABD PARACENTESIS W/IMAGING: CPT | Performed by: RADIOLOGY

## 2024-06-24 PROCEDURE — 85027 COMPLETE CBC AUTOMATED: CPT

## 2024-06-24 PROCEDURE — 83615 LACTATE (LD) (LDH) ENZYME: CPT

## 2024-06-24 PROCEDURE — 99222 1ST HOSP IP/OBS MODERATE 55: CPT | Performed by: FAMILY MEDICINE

## 2024-06-24 PROCEDURE — 0W9G3ZX DRAINAGE OF PERITONEAL CAVITY, PERCUTANEOUS APPROACH, DIAGNOSTIC: ICD-10-PCS | Performed by: RADIOLOGY

## 2024-06-24 RX ORDER — LIDOCAINE WITH 8.4% SOD BICARB 0.9%(10ML)
SYRINGE (ML) INJECTION AS NEEDED
Status: COMPLETED | OUTPATIENT
Start: 2024-06-24 | End: 2024-06-24

## 2024-06-24 RX ORDER — CALCIUM GLUCONATE 20 MG/ML
1 INJECTION, SOLUTION INTRAVENOUS ONCE
Status: COMPLETED | OUTPATIENT
Start: 2024-06-24 | End: 2024-06-24

## 2024-06-24 RX ORDER — METOPROLOL SUCCINATE 25 MG/1
25 TABLET, EXTENDED RELEASE ORAL DAILY
COMMUNITY
End: 2024-06-29

## 2024-06-24 RX ORDER — HEPARIN SODIUM 5000 [USP'U]/ML
5000 INJECTION, SOLUTION INTRAVENOUS; SUBCUTANEOUS EVERY 8 HOURS SCHEDULED
Status: DISCONTINUED | OUTPATIENT
Start: 2024-06-24 | End: 2024-06-24

## 2024-06-24 RX ORDER — CALCITRIOL 0.25 UG/1
0.75 CAPSULE, LIQUID FILLED ORAL 3 TIMES WEEKLY
Status: DISCONTINUED | OUTPATIENT
Start: 2024-06-24 | End: 2024-06-29 | Stop reason: HOSPADM

## 2024-06-24 RX ORDER — METOPROLOL SUCCINATE 25 MG/1
25 TABLET, EXTENDED RELEASE ORAL DAILY
Status: DISCONTINUED | OUTPATIENT
Start: 2024-06-24 | End: 2024-06-24

## 2024-06-24 RX ORDER — SIMETHICONE 80 MG
80 TABLET,CHEWABLE ORAL 4 TIMES DAILY PRN
Status: DISCONTINUED | OUTPATIENT
Start: 2024-06-24 | End: 2024-06-29 | Stop reason: HOSPADM

## 2024-06-24 RX ORDER — CALCIUM ACETATE 667 MG/1
1334 CAPSULE ORAL
Status: DISCONTINUED | OUTPATIENT
Start: 2024-06-24 | End: 2024-06-29 | Stop reason: HOSPADM

## 2024-06-24 RX ORDER — SEVELAMER HYDROCHLORIDE 800 MG/1
1600 TABLET, FILM COATED ORAL
Status: DISCONTINUED | OUTPATIENT
Start: 2024-06-24 | End: 2024-06-29 | Stop reason: HOSPADM

## 2024-06-24 RX ORDER — ASCORBIC ACID, THIAMINE MONONITRATE,RIBOFLAVIN, NIACINAMIDE, PYRIDOXINE HYDROCHLORIDE, FOLIC ACID, CYANOCOBALAMIN, BIOTIN, CALCIUM PANTOTHENATE, 100; 1.5; 1.7; 20; 10; 1; 6000; 150000; 5 MG/1; MG/1; MG/1; MG/1; MG/1; MG/1; UG/1; UG/1; MG/1
1 CAPSULE, LIQUID FILLED ORAL
Status: DISCONTINUED | OUTPATIENT
Start: 2024-06-24 | End: 2024-06-29 | Stop reason: HOSPADM

## 2024-06-24 RX ORDER — ALBUMIN (HUMAN) 12.5 G/50ML
12.5 SOLUTION INTRAVENOUS ONCE
Status: CANCELLED | OUTPATIENT
Start: 2024-06-24 | End: 2024-06-24

## 2024-06-24 RX ORDER — ACETAMINOPHEN 325 MG/1
650 TABLET ORAL EVERY 6 HOURS PRN
Status: DISCONTINUED | OUTPATIENT
Start: 2024-06-24 | End: 2024-06-29 | Stop reason: HOSPADM

## 2024-06-24 RX ADMIN — Medication 1 CAPSULE: at 17:57

## 2024-06-24 RX ADMIN — ALBUTEROL SULFATE 10 MG: 2.5 SOLUTION RESPIRATORY (INHALATION) at 01:04

## 2024-06-24 RX ADMIN — SEVELAMER HYDROCHLORIDE 1600 MG: 800 TABLET ORAL at 17:55

## 2024-06-24 RX ADMIN — CALCIUM GLUCONATE 1 G: 20 INJECTION, SOLUTION INTRAVENOUS at 01:05

## 2024-06-24 RX ADMIN — CALCIUM ACETATE 1334 MG: 667 CAPSULE ORAL at 18:00

## 2024-06-24 RX ADMIN — CEFTRIAXONE SODIUM 1000 MG: 10 INJECTION, POWDER, FOR SOLUTION INTRAVENOUS at 01:46

## 2024-06-24 RX ADMIN — CEFTRIAXONE SODIUM 2000 MG: 10 INJECTION, POWDER, FOR SOLUTION INTRAVENOUS at 23:12

## 2024-06-24 RX ADMIN — CALCITRIOL 0.75 MCG: 0.25 CAPSULE, LIQUID FILLED ORAL at 10:21

## 2024-06-24 RX ADMIN — Medication 10 ML: at 09:19

## 2024-06-24 RX ADMIN — CALCIUM ACETATE 1334 MG: 667 CAPSULE ORAL at 10:20

## 2024-06-24 RX ADMIN — SEVELAMER HYDROCHLORIDE 1600 MG: 800 TABLET ORAL at 10:20

## 2024-06-24 RX ADMIN — Medication 1000 UNITS: at 10:21

## 2024-06-24 NOTE — ASSESSMENT & PLAN NOTE
Meets sepsis: tachycardic, febrile, Leukocytosis. Source likely GI: SBP vs gastroenteritis   Presented with diffuse abdominal pain with N/V, watery diarrhea.   Work up:  Procal 5.73  WBC 14. 27  Lactic: 1.4  CXR: unremarkable, no consolidations  CT A/P: mild splenomegaly with possible infarct, PCKD, Renal and left hepatic lobe lesions, large ascities, diverticulosis    Recent Labs     06/23/24 2031   WBC 14.27*       Plan:  Continue Ceftriaxone 2g for SBP  IR consult for paracentesis  F/u C diff/Enteric stool studies  F/u Blood cultures  F/u Procal AM  Trend fever curve and vitals  Trend CBC  Tylenol prn for fevers

## 2024-06-24 NOTE — ASSESSMENT & PLAN NOTE
Patient presenting with generalized abdominal pain. Notable history of polycystic liver and kidney disease requiring dialysis and previously requiring paracentesis.  CT Abd/Pelvis: Negative for bowel obstruction. Mild splenomegaly. Question geographic area of decreased attenuation within the spleen, possibly artifactual from streak artifact. Difficult to completely exclude subtle infarct. Polycystic kidney and liver disease with multiple complex and indeterminant renal and left hepatic lobe lesions. Correlation with nonemergent MRI abdomen with and without IV contrast recommended to exclude possibility of a solid lesion. Large amount of abdominopelvic ascites, increased from prior study. Sigmoid diverticulosis without diverticulitis.     Patient also has history of inguinal hernia. US from 2019 of scrotum and testicles: confirmed large complex hydrocele with numerous internal septations and debris measuring 15.6 x 9.3 x 11.4 without evidence of varicocele or scrotal thickness     Patient with fever, abdominal pain and large ascites. Procal 5.73, leukocytosis to 14.27. total bilirubin elevated to 1.93.    Plan:  See Sepsis plan  Recommend MRI abdomen with and without IV contrast outpatient to exclude solid lesion in hepatic/renal cysts  RUQ U/S w doppler ordered

## 2024-06-24 NOTE — ASSESSMENT & PLAN NOTE
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

## 2024-06-24 NOTE — ASSESSMENT & PLAN NOTE
Elevated trop 77->86, delta 9  EKG showed sinus tachycardic with ST depressions in II, III, V4, V5, V6, V1  No chest pain, angina symptoms, or chest palpitations    Lab Results   Component Value Date    HSTNI0 77 (H) 06/23/2024    HSTNI2 86 (H) 06/23/2024    HSTNID2 9 06/23/2024     Plan:  Trend troponin  Monitor on tele

## 2024-06-24 NOTE — ASSESSMENT & PLAN NOTE
5.5 POA.   Recent Labs     06/23/24 2031   K 5.5*       Consult nephrology for dialysis.   Albuterol nebulizer  Calcium gluconate   Monitor on tele.

## 2024-06-24 NOTE — ASSESSMENT & PLAN NOTE
Non compliant with medications including Toprol-XL 25mg  Patient hypervolemic on exam. Blood pressures stable.   7/28/23 Echo: EF 20-25%, severe global hypokinesis with regional variation, moderately reduced systolic function, diastolic dysfunction, severely dilated L atria, moderately dilated R atria, mild-moderate Mitral valve regurg    Plan:  Restart home metoprolol 25 mg  Renal [place] diet with 2L fluid restriction  Strict I/Os  Daily Weight

## 2024-06-24 NOTE — BRIEF OP NOTE (RAD/CATH)
IR PARACENTESIS Procedure Note    PATIENT NAME: Homero Koch  : 1960  MRN: [id number]    Pre-op Diagnosis:   1. Abdominal pain    2. Generalized weakness    3. Nausea and vomiting    4. Leukocytosis    5. Abnormal EKG    6. Hepatic lesion    7. ADPKD (autosomal dominant polycystic kidney)    8. ESRD (end stage renal disease) on dialysis (HCC)    9. Hyperkalemia      Post-op Diagnosis:   1. Abdominal pain    2. Generalized weakness    3. Nausea and vomiting    4. Leukocytosis    5. Abnormal EKG    6. Hepatic lesion    7. ADPKD (autosomal dominant polycystic kidney)    8. ESRD (end stage renal disease) on dialysis (HCC)    9. Hyperkalemia        Surgeon:   Aman Crockett MD  Resident:  Xochitl Mendez MD    Estimated Blood Loss: Less than 5 mL.    Findings: Successful paracentesis with clear yellow fluid drained.    Specimens: Specimen sent for laboratory analysis.    Complications:  No complications.    Anesthesia: local    Xochitl Mendez MD     Date: 2024  Time: 9:47 AM

## 2024-06-24 NOTE — ASSESSMENT & PLAN NOTE
Patient presenting with generalized abdominal pain. Notable history of polycystic liver and kidney disease requiring dialysis and previously requiring paracentesis.  CT Abd/Pelvis: Negative for bowel obstruction. Mild splenomegaly. Question geographic area of decreased attenuation within the spleen, possibly artifactual from streak artifact. Difficult to completely exclude subtle infarct. Polycystic kidney and liver disease with multiple complex and indeterminant renal and left hepatic lobe lesions. Correlation with nonemergent MRI abdomen with and without IV contrast recommended to exclude possibility of a solid lesion. Large amount of abdominopelvic ascites, increased from prior study. Sigmoid diverticulosis without diverticulitis.     Patient also has history of inguinal hernia. US from 2019 of scrotum and testicles: confirmed large complex hydrocele with numerous internal septations and debris measuring 15.6 x 9.3 x 11.4 without evidence of varicocele or scrotal thickness     Patient with fever, abdominal pain and large ascites. Procal 5.73, leukocytosis to 14.27. total bilirubin elevated to 1.93.    Plan:  See Sepsis plan  Recommend MRI abdomen with and without IV contrast outpatient to exclude solid lesion in hepatic/renal cysts

## 2024-06-24 NOTE — ASSESSMENT & PLAN NOTE
Non compliant with medications including Toprol-XL 25mg  Patient hypervolemic on exam. Blood pressures stable.   7/28/23 Echo: EF 20-25%, severe global hypokinesis with regional variation, moderately reduced systolic function, diastolic dysfunction, severely dilated L atria, moderately dilated R atria, mild-moderate Mitral valve regurg    Plan:  Holding metoprolol due to normotension   Renal [place] diet with 2L fluid restriction  Strict I/Os  Daily Weight

## 2024-06-24 NOTE — ASSESSMENT & PLAN NOTE
Elevated trop 77->86, delta 9, most likely due to demand  EKG showed sinus tachycardic with ST depressions in II, III, V4, V5, V6, V1  No chest pain, angina symptoms, or chest palpitations    Lab Results   Component Value Date    HSTNI0 77 (H) 06/23/2024    HSTNI2 86 (H) 06/23/2024    HSTNID2 9 06/23/2024    HSTNI4 88 (H) 06/24/2024    HSTNID4 11 06/24/2024     Plan:  Trend troponin  Monitor on tele

## 2024-06-24 NOTE — ED PROVIDER NOTES
History  Chief Complaint   Patient presents with    Weakness - Generalized     Patient reports he has hx of end stage kidney disease. Reporting fatigue, weakness, vomiting and dizziness that began today.     Abdominal Pain     Also reporting abd pain. States he has hx of abdominal hernia and reporting pain.      63 yo male with HTN, HFrEF secondary to dilated cardiomyopathy, ESRD on HD secondary to polycystic kidney disease, previously on PD however transitioned to HD given recurrent infections, coming in today with generalized weakness, fatigue, decreased PO intake, abdominal pain and vomiting.  Wife reports when she came home today, patient had several episodes of NBNB emesis, described as brown in color.  She became concerned because he also appeared very pale.  On arrival to ED, he was found to be febrile, no reported fevers or chills at home.  Patient also reported concern for right sided testicular swelling, however, on later questioning, he confirmed that the swelling is chronic and has been present for many years.  Denies chest pain, SOB.       Abdominal Pain  Associated symptoms: diarrhea, fatigue, fever, nausea and vomiting    Associated symptoms: no chest pain, no chills, no cough, no dysuria, no hematuria, no shortness of breath and no sore throat        Prior to Admission Medications   Prescriptions Last Dose Informant Patient Reported? Taking?   B Complex-C-Folic Acid (TRIPHROCAPS PO)  Self Yes No   Sig: Take 1 capsule by mouth daily   Patient not taking: Reported on 9/27/2021   NON FORMULARY  Self Yes No   Sig: Hemodialysis Monday, Wednesday, Friday   Sucroferric Oxyhydroxide 500 MG CHEW  Self Yes No   Sig: Velphoro 500 mg chewable tablet   Patient not taking: No sig reported   b complex-vitamin C-folic acid (NEPHROCAPS) 1 mg capsule  Self Yes No   Sig: Take 1 mg by mouth    Patient not taking: Reported on 7/28/2023   calcitriol (ROCALTROL) 0.25 mcg capsule   No No   Sig: Take 3 capsules (0.75 mcg  total) by mouth 3 (three) times a week   calcium acetate (PHOSLO) 667 mg capsule  Self Yes No   Sig: Take 1,334 mg by mouth 3 (three) times a day with meals   cholecalciferol (VITAMIN D3) 1,000 units tablet  Self Yes No   Sig: Take 1,000 Units by mouth daily   metoprolol succinate (TOPROL-XL) 25 mg 24 hr tablet   Yes Yes   Sig: Take 25 mg by mouth daily   midodrine (PROAMATINE) 5 mg tablet  Self Yes No   Sig: midodrine 5 mg tablet   Patient not taking: No sig reported   senna-docusate sodium (SENOKOT S) 8.6-50 mg per tablet   No No   Sig: Take 2 tablets by mouth 2 (two) times a day for 10 days   Patient not taking: Reported on 1/9/2020   sevelamer (RENAGEL) 800 mg tablet   No No   Sig: Take 2 tablets (1,600 mg total) by mouth 3 (three) times a day with meals   sevelamer carbonate (RENVELA) 800 mg tablet  Self Yes No   Sig: sevelamer carbonate 800 mg tablet   Patient not taking: No sig reported   simethicone (MYLICON) 80 mg chewable tablet  Self No No   Sig: Chew 1 tablet (80 mg total) every 6 (six) hours as needed for flatulence   Patient not taking: Reported on 2/14/2020   torsemide (DEMADEX) 20 mg tablet   No No   Sig: Take 2 tablets (40 mg total) by mouth 2 (two) times a day   Patient not taking: Reported on 1/9/2020      Facility-Administered Medications: None       Past Medical History:   Diagnosis Date    Complication of renal dialysis     Polycystic kidney disease        Past Surgical History:   Procedure Laterality Date    IR CATHETERGRAM  10/17/2019    IR IMAGE GUIDED ASPIRATION / DRAINAGE  9/23/2019    IR PARACENTESIS  10/9/2019    IR PARACENTESIS  6/24/2024    IR TEMPORARY DIALYSIS CATHETER PLACEMENT  7/28/2023    IR TUNNELED CENTRAL LINE CHECK/CHANGE/REPOSITION  12/14/2021    IR TUNNELED DIALYSIS CATHETER CHECK/CHANGE/REPOSITION/ANGIOPLASTY  10/17/2019    IR TUNNELED DIALYSIS CATHETER PLACEMENT  9/30/2019    IR TUNNELED DIALYSIS CATHETER PLACEMENT  8/1/2023    IR TUNNELED DIALYSIS CATHETER REMOVAL   7/28/2023    SPLIT THICKNESS SKIN GRAFT Bilateral 11/7/2019    Procedure: SKIN GRAFT SPLIT THICKNESS (STSG)  EXTREMITY;  Surgeon: Torey Cha MD;  [facility];  Service: Plastics    SPLIT THICKNESS SKIN GRAFT Bilateral 11/12/2019    Procedure: SKIN GRAFT SPLIT THICKNESS (STSG)  EXTREMITY;  Surgeon: Torey Cha MD;  [facility];  Service: Plastics    VAC DRESSING APPLICATION Bilateral 11/18/2019    Procedure: CHANGE DRESSING;  Surgeon: Torey Cha MD;  [facility];  Service: Plastics    WOUND DEBRIDEMENT Bilateral 10/31/2019    Procedure: DEBRIDEMENT WOUND (WASH OUT);  Surgeon: Selvin Han DPM;  [facility];  Service: Podiatry    WOUND DEBRIDEMENT Bilateral 11/5/2019    Procedure: DEBRIDEMENT WOUND (WASH OUT);  Surgeon: Selvin Han DPM;  [facility];  Service: Podiatry    WOUND DEBRIDEMENT Bilateral 11/7/2019    Procedure: DEBRIDEMENT WOUND (WASH OUT);  Surgeon: Torey Cha MD;  [facility];  Service: Plastics    WOUND DEBRIDEMENT Bilateral 11/12/2019    Procedure: DEBRIDEMENT LOWER EXTREMITY (WASH OUT);  Surgeon: Torey Cha MD;  [facility];  Service: Plastics       History reviewed. No pertinent family history.  I have reviewed and agree with the history as documented.    E-Cigarette/Vaping    E-Cigarette Use Never User      E-Cigarette/Vaping Substances    Nicotine No     THC No     CBD No     Flavoring No     Other No     Unknown No      Social History     Tobacco Use    Smoking status: Never    Smokeless tobacco: Never   Vaping Use    Vaping status: Never Used   Substance Use Topics    Alcohol use: Not Currently    Drug use: Yes     Types: Marijuana        Review of Systems   Constitutional:  Positive for appetite change, fatigue and fever. Negative for chills.   HENT:  Negative for ear pain and sore throat.    Eyes:  Negative for pain and visual disturbance.   Respiratory:   Negative for cough and shortness of breath.    Cardiovascular:  Negative for chest pain and palpitations.   Gastrointestinal:  Positive for abdominal distention, abdominal pain, diarrhea, nausea and vomiting.   Genitourinary:  Negative for dysuria and hematuria.   Musculoskeletal:  Negative for arthralgias and back pain.   Skin:  Negative for color change and rash.   Neurological:  Positive for dizziness, weakness and light-headedness. Negative for seizures and syncope.   All other systems reviewed and are negative.      Physical Exam  ED Triage Vitals   Temperature Pulse Respirations Blood Pressure SpO2   06/23/24 2017 06/23/24 2020 06/23/24 2020 06/23/24 2020 06/23/24 2017   (!) 101.9 °F (38.8 °C) 103 22 130/77 98 %      Temp Source Heart Rate Source Patient Position - Orthostatic VS BP Location FiO2 (%)   06/23/24 2017 06/23/24 2115 06/23/24 2115 06/23/24 2115 --   Oral Monitor Lying Right arm       Pain Score       06/23/24 2017       9             Orthostatic Vital Signs  Vitals:    06/24/24 1630 06/24/24 1700 06/24/24 1745 06/24/24 2130   BP: 134/85 132/72 131/70 114/70   Pulse: 69 70 71 77   Patient Position - Orthostatic VS:   Lying        Physical Exam  Vitals and nursing note reviewed.   Constitutional:       General: He is in acute distress.      Appearance: He is well-developed. He is ill-appearing. He is not toxic-appearing or diaphoretic.   HENT:      Head: Normocephalic and atraumatic.   Eyes:      Conjunctiva/sclera: Conjunctivae normal.   Cardiovascular:      Rate and Rhythm: Regular rhythm. Tachycardia present.      Heart sounds: No murmur heard.  Pulmonary:      Effort: Pulmonary effort is normal. No respiratory distress.      Breath sounds: Normal breath sounds.   Abdominal:      General: There is distension.      Palpations: Abdomen is soft. There is fluid wave.      Tenderness: There is no abdominal tenderness.   Genitourinary:     Testes:         Right: Swelling present.      Comments:   exam performed with Dr. Hunter as chaperone, significantly enlarged right sided scrotum, non-tender, patient does confirm that this is chronic   Musculoskeletal:         General: No swelling.      Cervical back: Neck supple.      Right lower leg: Edema present.      Left lower leg: Edema present.      Comments: Bilateral lower extremity pitting edema and erythema    Skin:     General: Skin is warm and dry.      Capillary Refill: Capillary refill takes less than 2 seconds.   Neurological:      General: No focal deficit present.      Mental Status: He is alert.         ED Medications  Medications   b complex-vitamin C-folic acid (RENAL) capsule 1 capsule (1 capsule Oral Given 6/24/24 1757)   calcitriol (ROCALTROL) capsule 0.75 mcg (0.75 mcg Oral Given 6/24/24 1021)   calcium acetate (PHOSLO) capsule 1,334 mg (1,334 mg Oral Given 6/24/24 1800)   Cholecalciferol (VITAMIN D3) tablet 1,000 Units (1,000 Units Oral Given 6/24/24 1021)   acetaminophen (TYLENOL) tablet 650 mg (has no administration in time range)   simethicone (MYLICON) chewable tablet 80 mg (has no administration in time range)   ceftriaxone (ROCEPHIN) 2 g/50 mL in dextrose IVPB (2,000 mg Intravenous New Bag 6/24/24 2312)   sevelamer (RENAGEL) tablet 1,600 mg (1,600 mg Oral Given 6/24/24 1755)   acetaminophen (TYLENOL) tablet 650 mg (650 mg Oral Given 6/23/24 2058)   iohexol (OMNIPAQUE) 350 MG/ML injection (MULTI-DOSE) 80 mL (80 mL Intravenous Given 6/23/24 2108)   ceftriaxone (ROCEPHIN) 1 g/50 mL in dextrose IVPB (0 mg Intravenous Stopped 6/23/24 2315)   calcium gluconate 1 g in sodium chloride 0.9% 50 mL (premix) (0 g Intravenous Stopped 6/24/24 0142)   albuterol inhalation solution 10 mg (10 mg Nebulization Given 6/24/24 0104)   ceftriaxone (ROCEPHIN) 1 g/50 mL in dextrose IVPB (0 mg Intravenous Stopped 6/24/24 0216)   lidocaine 1% buffered (10 mL Infiltration Given 6/24/24 0919)       Diagnostic Studies  Results Reviewed       Procedure Component Value  Units Date/Time    Blood Culture Identification Panel [[id number]] Collected: 06/23/24 2031    Lab Status: In process Specimen: Blood from Arm, Right Updated: 06/25/24 0225    Blood culture #1 [[id number]] Collected: 06/23/24 2033    Lab Status: Preliminary result Specimen: Blood from Arm, Right Updated: 06/25/24 0001     Blood Culture No Growth at 24 hrs.    Blood Culture Identification Panel [[id number]] Collected: 06/23/24 2031    Lab Status: Preliminary result Specimen: Blood from Arm, Right Updated: 06/24/24 2037     ALL TARGETS Not Detected    Body fluid culture and Gram stain [[id number]] Collected: 06/24/24 0929    Lab Status: Preliminary result Specimen: Body Fluid from Paracentesis Updated: 06/24/24 2024     Gram Stain Result Rare Polys      No bacteria seen    Ammonia [[id number]]  (Normal) Collected: 06/24/24 1922    Lab Status: Final result Specimen: Blood from Arm, Right Updated: 06/24/24 2014     Ammonia 28 umol/L     Total Protein, Fluid [[id number]] Collected: 06/24/24 0929    Lab Status: Final result Specimen: Body Fluid from Paracentesis Updated: 06/24/24 1444     Protein, Fluid 4.6 g/dL     Albumin, fluid [[id number]] Collected: 06/24/24 0929    Lab Status: Final result Specimen: Body Fluid from Paracentesis Updated: 06/24/24 1444     Albumin, Fluid 2.5 g/dL     LD (LDH), Body Fluid [[id number]] Collected: 06/24/24 0929    Lab Status: Final result Specimen: Body Fluid from Paracentesis Updated: 06/24/24 1444     LD, Fluid 103 U/L     Glucose, body fluid [[id number]] Collected: 06/24/24 0929    Lab Status: Final result Specimen: Body Fluid from Peritoneal Fluid Updated: 06/24/24 1444     Glucose, Fluid 99 mg/dL     Clostridium difficile toxin by PCR with EIA [[id number]] Collected: 06/24/24 1309    Lab Status: In process Specimen: Stool from Rectum Updated: 06/24/24 1318    Stool Enteric Bacterial Panel by PCR [[id number]] Collected: 06/24/24 1309    Lab Status: In process Specimen: Stool from Rectum  Updated: 06/24/24 1317    Pancreatic elastase, fecal [[id number]]     Lab Status: No result Specimen: Stool     Body Fluid Diff [[id number]] Collected: 06/24/24 0929    Lab Status: Final result Specimen: Body Fluid from Paracentesis Updated: 06/24/24 1137     Total Counted 100     Neutrophils % (Fluid) 7 %      Lymphs % (Fluid) 81 %      Mesothelial % (Fluid) 8 %      Histiocyte % (Fluid) 4 %      Pathology Review No    Body fluid white cell count with differential [[id number]] Collected: 06/24/24 0929    Lab Status: Final result Specimen: Body Fluid from Paracentesis Updated: 06/24/24 1129     Site Paracentesis     Color, Fluid Light Yellow     Clarity, Fluid Clear     WBC, Fluid 303 /ul     Leukemia/Lymphoma flow cytometry [[id number]] Collected: 06/24/24 0929    Lab Status: In process Specimen: Body Fluid from Ascites Updated: 06/24/24 1018    Anaerobic culture, body fluid [[id number]] Collected: 06/24/24 0929    Lab Status: In process Specimen: Body Fluid from Paracentesis Updated: 06/24/24 1017    Procalcitonin [[id number]]  (Abnormal) Collected: 06/24/24 0418    Lab Status: Final result Specimen: Blood from Arm, Right Updated: 06/24/24 0627     Procalcitonin 13.55 ng/ml     RBC Morphology Reflex Test [[id number]] Collected: 06/24/24 0418    Lab Status: Final result Specimen: Blood from Arm, Right Updated: 06/24/24 0601    Manual Differential(PHLEBS Do Not Order) [[id number]]  (Abnormal) Collected: 06/24/24 0418    Lab Status: Final result Specimen: Blood from Arm, Right Updated: 06/24/24 0528     Segmented % 87 %      Bands % 4 %      Lymphocytes % 2 %      Monocytes % 5 %      Eosinophils % 0 %      Basophils % 2 %      Absolute Neutrophils 12.02 Thousand/uL      Absolute Lymphocytes 0.26 Thousand/uL      Absolute Monocytes 0.66 Thousand/uL      Absolute Eosinophils 0.00 Thousand/uL      Absolute Basophils 0.26 Thousand/uL      Total Counted --     RBC Morphology Present     Platelet Estimate Decreased      Anisocytosis Present    CBC and differential [[id number]]  (Abnormal) Collected: 06/24/24 0418    Lab Status: Final result Specimen: Blood from Arm, Right Updated: 06/24/24 0528     WBC 13.21 Thousand/uL      RBC 3.78 Million/uL      Hemoglobin 11.9 g/dL      Hematocrit 38.8 %       fL      MCH 31.5 pg      MCHC 30.7 g/dL      RDW 16.8 %      MPV 12.0 fL      Platelets 90 Thousands/uL     Narrative:      This is an appended report.  These results have been appended to a previously verified report.    Comprehensive metabolic panel [[id number]]  (Abnormal) Collected: 06/24/24 0418    Lab Status: Final result Specimen: Blood from Arm, Right Updated: 06/24/24 0505     Sodium 133 mmol/L      Potassium 5.6 mmol/L      Chloride 92 mmol/L      CO2 23 mmol/L      ANION GAP 18 mmol/L      BUN 58 mg/dL      Creatinine 8.14 mg/dL      Glucose 94 mg/dL      Calcium 9.2 mg/dL      AST 6 U/L      ALT <3 U/L      Alkaline Phosphatase 87 U/L      Total Protein 7.5 g/dL      Albumin 3.9 g/dL      Total Bilirubin 1.65 mg/dL      eGFR 6 ml/min/1.73sq m     Narrative:      National Kid[employer] Foundation guidelines for Chronic Kidney Disease (CKD):     Stage 1 with normal or high GFR (GFR > 90 mL/min/1.73 square meters)    Stage 2 Mild CKD (GFR = 60-89 mL/min/1.73 square meters)    Stage 3A Moderate CKD (GFR = 45-59 mL/min/1.73 square meters)    Stage 3B Moderate CKD (GFR = 30-44 mL/min/1.73 square meters)    Stage 4 Severe CKD (GFR = 15-29 mL/min/1.73 square meters)    Stage 5 End Stage CKD (GFR <15 mL/min/1.73 square meters)  Note: GFR calculation is accurate only with a steady state creatinine    Protime-INR [[id number]]  (Abnormal) Collected: 06/24/24 0418    Lab Status: Final result Specimen: Blood from Arm, Right Updated: 06/24/24 0503     Protime 19.2 seconds      INR 1.53    Blood culture #2 [[id number]] Collected: 06/23/24 2031    Lab Status: Preliminary result Specimen: Blood from Arm, Right Updated: 06/24/24 0203      Blood Culture Received in Microbiology Lab. Culture in Progress.    HS Troponin I 4hr [[id number]]  (Abnormal) Collected: 06/24/24 0047    Lab Status: Final result Specimen: Blood from Arm, Right Updated: 06/24/24 0122     hs TnI 4hr 88 ng/L      Delta 4hr hsTnI 11 ng/L     Bilirubin, direct [[id number]]  (Abnormal) Collected: 06/23/24 2031    Lab Status: Final result Specimen: Blood from Arm, Right Updated: 06/24/24 0049     Bilirubin, Direct 0.76 mg/dL     HS Troponin I 2hr [[id number]]  (Abnormal) Collected: 06/23/24 2246    Lab Status: Final result Specimen: Blood from Arm, Right Updated: 06/23/24 2336     hs TnI 2hr 86 ng/L      Delta 2hr hsTnI 9 ng/L     CBC and differential [[id number]]  (Abnormal) Collected: 06/23/24 2031    Lab Status: Final result Specimen: Blood from Arm, Right Updated: 06/23/24 2134     WBC 14.27 Thousand/uL      RBC 4.09 Million/uL      Hemoglobin 12.9 g/dL      Hematocrit 40.6 %      MCV 99 fL      MCH 31.5 pg      MCHC 31.8 g/dL      RDW 16.6 %      MPV 11.6 fL      Platelets 95 Thousands/uL      nRBC 0 /100 WBCs      Segmented % 91 %      Immature Grans % 1 %      Lymphocytes % 1 %      Monocytes % 7 %      Eosinophils Relative 0 %      Basophils Relative 0 %      Absolute Neutrophils 12.97 Thousands/µL      Absolute Immature Grans 0.11 Thousand/uL      Absolute Lymphocytes 0.20 Thousands/µL      Absolute Monocytes 0.93 Thousand/µL      Eosinophils Absolute 0.02 Thousand/µL      Basophils Absolute 0.04 Thousands/µL     Narrative:      This is an appended report.  These results have been appended to a previously verified report.    Smear Review(Phlebs Do Not Order) [[id number]]  (Abnormal) Collected: 06/23/24 2031    Lab Status: Final result Specimen: Blood from Arm, Right Updated: 06/23/24 2134     RBC Morphology Normal     Platelet Estimate Decreased    Procalcitonin [[id number]]  (Abnormal) Collected: 06/23/24 2031    Lab Status: Final result Specimen: Blood from Arm, Right Updated:  06/23/24 2109     Procalcitonin 5.73 ng/ml     HS Troponin 0hr (reflex protocol) [[id number]]  (Abnormal) Collected: 06/23/24 2031    Lab Status: Final result Specimen: Blood from Arm, Right Updated: 06/23/24 2106     hs TnI 0hr 77 ng/L     APTT [[id number]]  (Normal) Collected: 06/23/24 2031    Lab Status: Final result Specimen: Blood from Arm, Right Updated: 06/23/24 2104     PTT 34 seconds     Protime-INR [[id number]]  (Abnormal) Collected: 06/23/24 2031    Lab Status: Final result Specimen: Blood from Arm, Right Updated: 06/23/24 2104     Protime 17.9 seconds      INR 1.40    Comprehensive metabolic panel [[id number]]  (Abnormal) Collected: 06/23/24 2031    Lab Status: Final result Specimen: Blood from Arm, Right Updated: 06/23/24 2100     Sodium 135 mmol/L      Potassium 5.5 mmol/L      Chloride 92 mmol/L      CO2 26 mmol/L      ANION GAP 17 mmol/L      BUN 58 mg/dL      Creatinine 8.17 mg/dL      Glucose 97 mg/dL      Calcium 9.4 mg/dL      AST 6 U/L      ALT 4 U/L      Alkaline Phosphatase 95 U/L      Total Protein 8.2 g/dL      Albumin 4.2 g/dL      Total Bilirubin 1.97 mg/dL      eGFR 6 ml/min/1.73sq m     Narrative:      National Kidney Disease Foundation guidelines for Chronic Kidney Disease (CKD):     Stage 1 with normal or high GFR (GFR > 90 mL/min/1.73 square meters)    Stage 2 Mild CKD (GFR = 60-89 mL/min/1.73 square meters)    Stage 3A Moderate CKD (GFR = 45-59 mL/min/1.73 square meters)    Stage 3B Moderate CKD (GFR = 30-44 mL/min/1.73 square meters)    Stage 4 Severe CKD (GFR = 15-29 mL/min/1.73 square meters)    Stage 5 End Stage CKD (GFR <15 mL/min/1.73 square meters)  Note: GFR calculation is accurate only with a steady state creatinine    Lactic acid, plasma (w/reflex if result > 2.0) [[id number]]  (Normal) Collected: 06/23/24 2031    Lab Status: Final result Specimen: Blood from Arm, Right Updated: 06/23/24 2100     LACTIC ACID 1.4 mmol/L     Narrative:      Result may be elevated if tourniquet was  used during collection.    UA w Reflex to Microscopic w Reflex to Culture [[id number]]     Lab Status: No result Specimen: Urine                    IR INPATIENT Paracentesis   Final Result by Aman Crockett MD (06/24 1832)   Successful ultrasound-guided paracentesis.      Attestation   Signer name: AMAN CROCKETT   I attest that I was present for the entire procedure. I reviewed the stored images and agree with the report as written.                        Workstation performed: [id number]         CT abdomen pelvis with contrast   Final Result by Randy Dozier DO (06/23 2244)      1. Negative for bowel obstruction.      2. Mild splenomegaly. Question geographic area of decreased attenuation within the spleen, possibly artifactual from streak artifact from overlying leads and/or rib shadow. Difficult to completely exclude subtle infarct. Correlate for any left upper    quadrant pain.      3. Polycystic kidney and liver disease with multiple complex and indeterminant renal and left hepatic lobe lesions. Correlation with nonemergent MRI abdomen with and without IV contrast recommended to exclude possibility of a solid lesion.      4. Large amount of abdominopelvic ascites, increased from prior study.      5. Sigmoid diverticulosis without diverticulitis.      The study was marked in EPIC for follow-up.      Workstation performed: [id number]         XR chest 1 view portable   Final Result by Sharon Barraza MD (06/23 2148)      No acute cardiopulmonary disease.            Workstation performed: [id number]               Procedures  ECG 12 Lead Documentation Only    Date/Time: 6/23/2024 8:27 PM    Performed by: Ellen Blanco MD  Authorized by: Ellen Blanco MD    Indications / Diagnosis:  Weakness  ECG reviewed by me, the ED Provider: yes    Patient location:  ED  Previous ECG:     Previous ECG:  Compared to current    Similarity:  Changes noted  Interpretation:     Interpretation: abnormal    Rate:     ECG  rate:  103    ECG rate assessment: tachycardic    Rhythm:     Rhythm: sinus tachycardia    Ectopy:     Ectopy: none    QRS:     QRS axis:  Normal    QRS intervals:  Normal  Conduction:     Conduction: normal    ST segments:     ST segments:  Abnormal    Depression:  V4, V5 and V6  T waves:     T waves: inverted      Inverted:  I, II, III, V1, aVF, V4, V5 and V6        ED Course                             SBIRT 20yo+      Flowsheet Row Most Recent Value   Initial Alcohol Screen: US AUDIT-C     1. How often do you have a drink containing alcohol? 1 Filed at: 06/23/2024 2020   2. How many drinks containing alcohol do you have on a typical day you are drinking?  1 Filed at: 06/23/2024 2020   3a. Male UNDER 65: How often do you have five or more drinks on one occasion? 0 Filed at: 06/23/2024 2020   3b. FEMALE Any Age, or MALE 65+: How often do you have 4 or more drinks on one occassion? 0 Filed at: 06/23/2024 2020   Audit-C Score 2 Filed at: 06/23/2024 2020   PAOLA: How many times in the past year have you...    Used an illegal drug or used a prescription medication for non-medical reasons? Never Filed at: 06/23/2024 2020                  Medical Decision Making  65 yo male with HTN, HFrEF secondary to dilated cardiomyopathy, ESRD on HD secondary to polycystic kidney disease, previously on PD however transitioned to HD given recurrent infections, coming in today with generalized weakness, fatigue, decreased PO intake, abdominal pain and vomiting.  On arrival to ED, patient meeting sepsis criteria with fever, tachycardia, and leukocytosis. Labs also reveal elevated procalcitonin.  Suspect intraabdominal source of infection at this time, will cover empirically with Rocephin.  EKG with ischemic changes including diffuse ST depressions, patient continues to deny any chest pain or shortness of breath.  Could be in setting of sepsis.  CT with multiple non-specific findings including possible splenic infarct, multiple renal and  hepatic lobe lesions, and large volume ascites.  Patient will be admitted for further management.      Amount and/or Complexity of Data Reviewed  Labs: ordered.  Radiology: ordered.    Risk  OTC drugs.  Prescription drug management.  Decision regarding hospitalization.          Disposition  Final diagnoses:   Abdominal pain   Generalized weakness   Nausea and vomiting   Leukocytosis   Abnormal EKG   Hepatic lesion     Time reflects when diagnosis was documented in both MDM as applicable and the Disposition within this note       Time User Action Codes Description Comment    6/23/2024 11:09 PM Ellen Blanco Add [R10.9] Abdominal pain     6/23/2024 11:09 PM Ellen Blanco Add [R53.1] Generalized weakness     6/23/2024 11:09 PM Ellen Blanco Add [R11.2] Nausea and vomiting     6/23/2024 11:09 PM Ellen Blanco Add [D72.829] Leukocytosis     6/23/2024 11:10 PM Ellen Blanco Add [R94.31] Abnormal EKG     6/23/2024 11:10 PM Ellen Blanco Add [K76.9] Hepatic lesion     6/24/2024 12:34 AM Dimple Hernandez Add [Q61.2] ADPKD (autosomal dominant polycystic kidney)     6/24/2024 12:35 AM Dimple Hernandez Add [N18.6,  Z99.2] ESRD (end stage renal disease) on dialysis (HCC)     6/24/2024 12:35 AM Dimple Hernandez Add [E87.5] Hyperkalemia           ED Disposition       ED Disposition   Admit    Condition   Stable    Date/Time   Sun Jun 23, 2024 6437    Comment   Case was discussed with NOLAN and the patient's admission status was agreed to be Admission Status: inpatient status to the service of Dr. Schuster .               Follow-up Information    None         Current Discharge Medication List        CONTINUE these medications which have NOT CHANGED    Details   metoprolol succinate (TOPROL-XL) 25 mg 24 hr tablet Take 25 mg by mouth daily      !! B Complex-C-Folic Acid (TRIPHROCAPS PO) Take 1 capsule by mouth daily      !! b complex-vitamin C-folic acid (NEPHROCAPS) 1 mg capsule Take 1 mg by mouth       calcitriol  (ROCALTROL) 0.25 mcg capsule Take 3 capsules (0.75 mcg total) by mouth 3 (three) times a week  Qty: 36 capsule, Refills: 0    Associated Diagnoses: Chronic kidney disease with end stage renal failure on dialysis ([facility])      calcium acetate (PHOSLO) 667 mg capsule Take 1,334 mg by mouth 3 (three) times a day with meals      cholecalciferol (VITAMIN D3) 1,000 units tablet Take 1,000 Units by mouth daily      midodrine (PROAMATINE) 5 mg tablet midodrine 5 mg tablet      NON FORMULARY Hemodialysis Monday, Wednesday, Friday      senna-docusate sodium (SENOKOT S) 8.6-50 mg per tablet Take 2 tablets by mouth 2 (two) times a day for 10 days  Qty: 40 tablet, Refills: 0    Associated Diagnoses: Constipation, unspecified constipation type      sevelamer (RENAGEL) 800 mg tablet Take 2 tablets (1,600 mg total) by mouth 3 (three) times a day with meals  Qty: 180 tablet, Refills: 0    Associated Diagnoses: Chronic kidney disease with end stage renal failure on dialysis ([facility])      sevelamer carbonate (RENVELA) 800 mg tablet sevelamer carbonate 800 mg tablet      simethicone (MYLICON) 80 mg chewable tablet Chew 1 tablet (80 mg total) every 6 (six) hours as needed for flatulence  Qty: 30 tablet, Refills: 0    Associated Diagnoses: Chronic kidney disease with end stage renal failure on dialysis ([facility])      Sucroferric Oxyhydroxide 500 MG CHEW Velphoro 500 mg chewable tablet      torsemide (DEMADEX) 20 mg tablet Take 2 tablets (40 mg total) by mouth 2 (two) times a day  Qty: 120 tablet, Refills: 0    Associated Diagnoses: Scrotal swelling       !! - Potential duplicate medications found. Please discuss with provider.        No discharge procedures on file.    PDMP Review       None             ED Provider  Attending physically available and evaluated Homero Koch. I managed the patient along with the ED Attending.    Electronically Signed by           Ellen Blanco MD  06/25/24 4644

## 2024-06-24 NOTE — CONSULTS
Consultation - Nephrology   Homero Jose Luis Koch 64 y.o. male MRN: [id number]  Unit/Bed#: ED-22 Encounter: [id number]    ASSESSMENT/PLAN:  ESRD on maintenance HD MWF[facility]:  -native disease: ADPKD  -last treatment 6/21/2024  -EDW: 84.4 kg  -For HD today  -next treatment 6/26/2024.  Will plan to continue regular Monday, Wednesday, Friday schedule during hospitalization.  -d/w Dr. Daly    Access: L IJV permcath  -site clear  -Patient has been on HD x 8 years via catheter.  He denies ever having AVF or AVG  -continue to use catheter for hemodialysis access    Hyperkalemia:  -In the setting of ESRD  -Chronic history of hyperkalemia as outpatient  -K+ 5.5 on admission 5.6 today, s/p calcium gluconate  -Will manage with HD.  Plan for 2K bath  -Low potassium diet  -Continue to monitor    Hyponatremia:  -in the setting of ESRD and inability to excrete free water  -Mild, serum sodium 133  -Fluid restriction  -Will manage with HD  -Continue to monitor    HTN/volume status:  -BP soft  -volume status mild hypervolemia  -maintain goal BP <140/90  -home medications: Toprol-XL 25 mg daily, torsemide 40 mg twice daily but patient states he has not taken either 1 of these medications.  -current medications: Toprol-XL 25 mg daily  -continue UF with dialysis as BP tolerates  -continue to monitor    HFrEF, dilated cardiomyopathy:  -Compensated  -Echo 7/28/2023: EF 20-25%, severe global hypokinesis, biatrial dilatation, mild-moderate MR, moderate-severe TR, IVC normal size  -Home diuretics: Torsemide 40 mg twice daily but patient noncompliant with medication  -On beta-blockers but noncompliant  -Fluid restriction, low-sodium (2 g) diet, daily weights, strict I/O  -Management per primary team    Anemia in CKD:  -Hbg 11.9, at goal.  Goal Hgb >10  -not on GRISELDA  -On Venofer IV 50 mg weekly as outpatient.  Hold IV iron in setting of possible infection  -continue to monitor  -transfuse for Hgb <7.0  -Management per primary team    Bone  mineral disease of renal origin:  -Hyperphosphatemia: Continue binders.  Continue PhosLo 1334 mg 3 times daily with meals.  Velphoro and sevelamer on patient's med list but he does not take these medications. Continue low phosphorus diet  -Secondary hyperparathyroidism: Continue calcitriol 0.  75 mcg 3 days a week with HD  -Renal diet  -continue to monitor phosphorus, PTH, calcium, magnesium as outpatient    Sepsis:  -Procalcitonin 5.73-->13.55  -lactate normal  -BC pending  -CT as below  -On ceftriaxone per primary team  -Management per primary team    Abdominal pain:  -p/w generalized abdominal pain.  Notable history of polycystic liver and kidney disease.  Previously requiring paracentesis  -CT A/P-with mild splenomegaly, possible subtle infarct, complex and indeterminate renal and left hepatic lobe lesions with large abdominopelvic ascites  -S/p paracentesis for 8.9L on admission  -Ongoing workup  -Consideration for outpatient MRI  -Management per primary team    HISTORY OF PRESENT ILLNESS:  Requesting Physician: Na Quintana MD  Reason for Consult: ESRD on HD    Homero Koch is a 64 y.o. year old male with ESRD on HD MWF, ADPKD, hx medication noncompliance, PAF, HFrEF (EF 25%), thrombocytopenia who was admitted to [facility] after presenting with abdominal pain, nausea, vomiting.  Patient was found by wife when she came home from [facility] with multiple episodes of nonbloody nonbilious vomiting and generalized weakness.  Patient reports abdominal pain, distention and left testicular swelling.  patient met sepsis criteria in the ED with fever 101.9, leukocytosis and tachycardia.  CT A/P with mild splenomegaly, possible infarct, renal left hepatic lobe lesions, large ascites and diverticulosis.  Antibiotics started. A renal consultation is requested today for assistance in the management of ESRD. Homero Koch is a known ESRD patient who undergoes maintenance hemodialysis at [facility] on Monday,  Wednesday, Friday.    PAST MEDICAL HISTORY:  Past Medical History:   Diagnosis Date    Complication of renal dialysis     Polycystic kidney disease        PAST SURGICAL HISTORY:  Past Surgical History:   Procedure Laterality Date    IR CATHETERGRAM  10/17/2019    IR IMAGE GUIDED ASPIRATION / DRAINAGE  9/23/2019    IR PARACENTESIS  10/9/2019    IR TEMPORARY DIALYSIS CATHETER PLACEMENT  7/28/2023    IR TUNNELED CENTRAL LINE CHECK/CHANGE/REPOSITION  12/14/2021    IR TUNNELED DIALYSIS CATHETER CHECK/CHANGE/REPOSITION/ANGIOPLASTY  10/17/2019    IR TUNNELED DIALYSIS CATHETER PLACEMENT  9/30/2019    IR TUNNELED DIALYSIS CATHETER PLACEMENT  8/1/2023    IR TUNNELED DIALYSIS CATHETER REMOVAL  7/28/2023    SPLIT THICKNESS SKIN GRAFT Bilateral 11/7/2019    Procedure: SKIN GRAFT SPLIT THICKNESS (STSG)  EXTREMITY;  Surgeon: Torey Cha MD;  [facility];  Service: Plastics    SPLIT THICKNESS SKIN GRAFT Bilateral 11/12/2019    Procedure: SKIN GRAFT SPLIT THICKNESS (STSG)  EXTREMITY;  Surgeon: Torey Cha MD;  [facility];  Service: Plastics    VAC DRESSING APPLICATION Bilateral 11/18/2019    Procedure: CHANGE DRESSING;  Surgeon: Torey Cha MD;  [facility];  Service: Plastics    WOUND DEBRIDEMENT Bilateral 10/31/2019    Procedure: DEBRIDEMENT WOUND (WASH OUT);  Surgeon: Selvin Han DPM;  [facility];  Service: Podiatry    WOUND DEBRIDEMENT Bilateral 11/5/2019    Procedure: DEBRIDEMENT WOUND (WASH OUT);  Surgeon: Selvin Han DPM;  [facility];  Service: Podiatry    WOUND DEBRIDEMENT Bilateral 11/7/2019    Procedure: DEBRIDEMENT WOUND (WASH OUT);  Surgeon: Torey Cha MD;  [facility];  Service: Plastics    WOUND DEBRIDEMENT Bilateral 11/12/2019    Procedure: DEBRIDEMENT LOWER EXTREMITY (WASH OUT);  Surgeon: Torey Cha MD;  [facility];  Service: Plastics       ALLERGIES:  Allergies   Allergen  Reactions    Sucroferric Oxyhydroxide Other (See Comments)    Chlorhexidine Other (See Comments)    Heparin Other (See Comments)     Brings plaletes down    Other Other (See Comments)       SOCIAL HISTORY:  Social History     Substance and Sexual Activity   Alcohol Use Not Currently     Social History     Substance and Sexual Activity   Drug Use Yes    Types: Marijuana     Social History     Tobacco Use   Smoking Status Never   Smokeless Tobacco Never       FAMILY HISTORY:  History reviewed. No pertinent family history.    MEDICATIONS:    Current Facility-Administered Medications:     acetaminophen (TYLENOL) tablet 650 mg, 650 mg, Oral, Q6H PRN, Dimple Hernandez DO    b complex-vitamin C-folic acid (RENAL) capsule 1 capsule, 1 mg, Oral, Daily With Dinner, Dimple Hernandez DO    calcitriol (ROCALTROL) capsule 0.75 mcg, 0.75 mcg, Oral, Once per day on Monday Wednesday Friday, Dimple Hernandez DO, 0.75 mcg at 06/24/24 1021    calcium acetate (PHOSLO) capsule 1,334 mg, 1,334 mg, Oral, TID With Meals, Dimple Hernandez DO, 1,334 mg at 06/24/24 1020    ceftriaxone (ROCEPHIN) 2 g/50 mL in dextrose IVPB, 2,000 mg, Intravenous, Q24H, Dimple Hernandez DO    Cholecalciferol (VITAMIN D3) tablet 1,000 Units, 1,000 Units, Oral, Daily, Dimple Hernandez DO, 1,000 Units at 06/24/24 1021    metoprolol succinate (TOPROL-XL) 24 hr tablet 25 mg, 25 mg, Oral, Daily, Dimple Hernandez, DO    sevelamer (RENAGEL) tablet 1,600 mg, 1,600 mg, Oral, TID With Meals, Dimple Hernandez DO, 1,600 mg at 06/24/24 1020    simethicone (MYLICON) chewable tablet 80 mg, 80 mg, Oral, 4x Daily PRN, Dimple Hernandez DO    Current Outpatient Medications:     metoprolol succinate (TOPROL-XL) 25 mg 24 hr tablet, Take 25 mg by mouth daily, Disp: , Rfl:     B Complex-C-Folic Acid (TRIPHROCAPS PO), Take 1 capsule by mouth daily (Patient not taking: Reported on 9/27/2021), Disp: , Rfl:     b  complex-vitamin C-folic acid (NEPHROCAPS) 1 mg capsule, Take 1 mg by mouth  (Patient not taking: Reported on 7/28/2023), Disp: , Rfl:     calcitriol (ROCALTROL) 0.25 mcg capsule, Take 3 capsules (0.75 mcg total) by mouth 3 (three) times a week, Disp: 36 capsule, Rfl: 0    calcium acetate (PHOSLO) 667 mg capsule, Take 1,334 mg by mouth 3 (three) times a day with meals, Disp: , Rfl:     cholecalciferol (VITAMIN D3) 1,000 units tablet, Take 1,000 Units by mouth daily, Disp: , Rfl:     midodrine (PROAMATINE) 5 mg tablet, midodrine 5 mg tablet (Patient not taking: No sig reported), Disp: , Rfl:     NON FORMULARY, Hemodialysis Monday, Wednesday, Friday, Disp: , Rfl:     senna-docusate sodium (SENOKOT S) 8.6-50 mg per tablet, Take 2 tablets by mouth 2 (two) times a day for 10 days (Patient not taking: Reported on 1/9/2020), Disp: 40 tablet, Rfl: 0    sevelamer (RENAGEL) 800 mg tablet, Take 2 tablets (1,600 mg total) by mouth 3 (three) times a day with meals, Disp: 180 tablet, Rfl: 0    sevelamer carbonate (RENVELA) 800 mg tablet, sevelamer carbonate 800 mg tablet (Patient not taking: No sig reported), Disp: , Rfl:     simethicone (MYLICON) 80 mg chewable tablet, Chew 1 tablet (80 mg total) every 6 (six) hours as needed for flatulence (Patient not taking: Reported on 2/14/2020), Disp: 30 tablet, Rfl: 0    Sucroferric Oxyhydroxide 500 MG CHEW, Velphoro 500 mg chewable tablet (Patient not taking: No sig reported), Disp: , Rfl:     torsemide (DEMADEX) 20 mg tablet, Take 2 tablets (40 mg total) by mouth 2 (two) times a day (Patient not taking: Reported on 1/9/2020), Disp: 120 tablet, Rfl: 0    REVIEW OF SYSTEMS:  A complete 10 point review of systems was performed and found to be negative unless otherwise noted below or in the HPI.  General: No fevers, chills.   Cardiovascular: No chest pain, shortness of breath, palpitations, leg edema.  Respiratory: No cough, sputum production, shortness of breath.  Gastrointestinal: No  nausea, No vomiting, +abdominal pain, No diarrhea.  Genitourinary: No hematuria.    PHYSICAL EXAM:  Current Weight:    First Weight:    Vitals:    06/24/24 0800 06/24/24 0847 06/24/24 0920 06/24/24 1006   BP: 119/74 104/61 127/74 106/62   BP Location:       Pulse: 77 80  75   Resp:  18  18   Temp:       TempSrc:       SpO2: 99% 100%  100%       Intake/Output Summary (Last 24 hours) at 6/24/2024 1022  Last data filed at 6/24/2024 1008  Gross per 24 hour   Intake 150 ml   Output 8725 ml   Net -8575 ml     General:  Awake, alert, appears comfortable and in no acute distress.  Nontoxic.  Skin:  No rash, warm, good skin turgor   Eyes:  PERRL, EOMI, sclerae nonicteric.  no periorbital edema   ENT:  Moist mucous membranes  Neck:  Trachea midline, symmetric.  No JVD.  No carotid bruits.  Chest:  Clear to auscultation bilaterally without wheezes, crackles or rhonchi  CVS:  Regular rate and rhythm without murmur, gallop or rub.  S1 and S2 identified and normal.  No S3, S4.   Abdomen:  Soft, nontender, nondistended without masses.  Normal bowel sounds x 4 quadrants.  No bruit.  Extremities:  Warm, pink, motor and sensory intact and well perfused.  No cyanosis, pallor.  Trace BLE edema.  Severe bilateral lower extremity hemosiderin staining with lipodermatosclerosis.  Neuro:  Awake, alert, oriented x3.  Grossly intact  Psych:  Appropriate affect.  Mentating appropriately.  Normal mental status exam  Access: + L IJV PermCath site clear.  Dressing intact       Invasive Devices: L IJV PermCath     Lab Results:   Results from last 7 days   Lab Units 06/24/24  0418 06/23/24 2031   WBC Thousand/uL 13.21* 14.27*   HEMOGLOBIN g/dL 11.9* 12.9   HEMATOCRIT % 38.8 40.6   PLATELETS Thousands/uL 90* 95*   SODIUM mmol/L 133* 135   POTASSIUM mmol/L 5.6* 5.5*   CHLORIDE mmol/L 92* 92*   CO2 mmol/L 23 26   BUN mg/dL 58* 58*   CREATININE mg/dL 8.14* 8.17*   CALCIUM mg/dL 9.2 9.4   ALK PHOS U/L 87 95   ALT U/L <3* 4*   AST U/L 6* 6*     Lab  Results   Component Value Date    PTH 33.9 10/10/2019    CALCIUM 9.2 06/24/2024    PHOS 6.3 (H) 08/01/2023   Imaging study:  CT abdomen pelvis with contrast 6/23/2024:  Imaging studies and images personally reviewed.  Mild splenomegaly measuring 13.8 cm with area of decreased attenuation, possible artifact versus subtle infarct.  Polycystic kidney and liver disease with multiple complex and indeterminate renal and left hepatic lobe lesions.  Large amount of abdominal pelvic ascites.  Sigmoid diverticulosis.    CXR 6/23/2024:  Imaging study and images personally reviewed.  No acute cardiopulmonary disease, pneumothorax or pleural effusion.  Mild pulmonary venous congestion    EMR, including [facility], [facility],  [facility] august and outpatient notes reviewed.  I have personally reviewed the blood work as stated above and in my note.  I have personally reviewed CT abdomen pelvis, CXR imaging studies.  I have personally reviewed primary medical service and previous nephrology note.

## 2024-06-24 NOTE — HEMODIALYSIS
Post-Dialysis RN Treatment Note    Blood Pressure:  Pre-122/81 mm/Hg  Post-131/70 mmHg   EDW-84.4 kg    Weight:  Pre-deferred on stretcher  Post-77 kg   Mode of weight measurement: Standing Scale   Volume Removed-0 ml, ran even due to paracentesis prior to hd.   Treatment duration-210 minutes    NS given-  No    Treatment shortened? No   Medications given during Rx- Not Applicable   Estimated Kt/V-1.02   Access type: Permacath/TDC   Access Issues: No    Report called to primary nurse   Yes-Shelby Flannery RN

## 2024-06-24 NOTE — DISCHARGE INSTRUCTIONS
Abdominal Paracentesis     WHAT YOU NEED TO KNOW:   Abdominal paracentesis is a procedure to remove abnormal fluid buildup in your abdomen. Fluid builds up because of liver problems, such as swelling and scarring. Heart failure, kidney disease, a mass, or problems with your pancreas may also cause fluid buildup.     DISCHARGE INSTRUCTIONS:     Follow up with your healthcare provider as directed: Write down your questions so you remember to ask them during your visits.     Wound care: Remove dressing after 24 hours. Leave glue in place.    Return to your normal activities    Contact Interventional Radiology at [phone] ([facility] PATIENTS: Contact Interventional Radiology at [phone]) ([facility] PATIENTS: Contact Interventional Radiology at [phone]) if:  You have a fever and your wound is red and swollen.   You have yellow, green, or bad-smelling discharge coming from your wound.   You have pain or swelling in your abdomen.   You have an upset stomach or you vomit.   You have sudden, sharp pain in your abdomen.   You urinate very little or not at all.   You feel confused and more tired than usual.   Your arm or leg feels warm, tender, and painful. It may look swollen and red.   You suddenly feel lightheaded and have trouble breathing.

## 2024-06-24 NOTE — ED NOTES
Returned from IR, back on monitor with meds given. Will be dialyzed after lunch. Pt awake and tolerated procedure     Sana Guevara RN  06/24/24 1024

## 2024-06-24 NOTE — ASSESSMENT & PLAN NOTE
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 Correlation with nonemergent MRI abdomen with and without IV contrast recommended to exclude possibility of a solid lesion. Large amount of abdominopelvic ascites, increased from prior study.

## 2024-06-24 NOTE — ED ATTENDING ATTESTATION
6/23/2024  I, Jose Luis Hunter MD, saw and evaluated the patient. I have discussed the patient with the resident/non-physician practitioner and agree with the resident's/non-physician practitioner's findings, Plan of Care, and MDM as documented in the resident's/non-physician practitioner's note, except where noted. All available labs and Radiology studies were reviewed.  I was present for key portions of any procedure(s) performed by the resident/non-physician practitioner and I was immediately available to provide assistance.       At this point I agree with the current assessment done in the Emergency Department.  I have conducted an independent evaluation of this patient a history and physical is as follows:    S:  Chief Complaint   Patient presents with    Weakness - Generalized     Patient reports he has hx of end stage kidney disease. Reporting fatigue, weakness, vomiting and dizziness that began today.     Abdominal Pain     Also reporting abd pain. States he has hx of abdominal hernia and reporting pain.      Homero is a 64 y.o. male presenting with the chief complaint of generalized weakness.  He has had fatigue, lightheadedness with nausea and vomiting today.  He is also having generalized abdominal pain.  He has a history of ESRD on HD (previously on PD 8 years ago).    O:  ED Triage Vitals   Temperature Pulse Respirations Blood Pressure SpO2   06/23/24 2017 06/23/24 2020 06/23/24 2020 06/23/24 2020 06/23/24 2017   (!) 101.9 °F (38.8 °C) 103 22 130/77 98 %      Temp Source Heart Rate Source Patient Position - Orthostatic VS BP Location FiO2 (%)   06/23/24 2017 06/23/24 2115 06/23/24 2115 06/23/24 2115 --   Oral Monitor Lying Right arm       Pain Score       06/23/24 2017       9         Physical Exam  Vitals and nursing note reviewed.   Constitutional:       General: He is in acute distress.      Appearance: He is well-developed.   HENT:      Head: Normocephalic and atraumatic.   Eyes:       Extraocular Movements: Extraocular movements intact.      Pupils: Pupils are equal, round, and reactive to light.   Neck:      Vascular: No JVD.   Cardiovascular:      Rate and Rhythm: Normal rate and regular rhythm.      Heart sounds: Normal heart sounds. No murmur heard.     No friction rub. No gallop.   Pulmonary:      Effort: Pulmonary effort is normal. No respiratory distress.      Breath sounds: Normal breath sounds. No wheezing or rales.   Chest:      Chest wall: No tenderness.   Abdominal:      General: Abdomen is protuberant. There is distension.      Palpations: There is shifting dullness and fluid wave.      Tenderness: There is generalized abdominal tenderness (mild) and tenderness in the right lower quadrant.      Hernia: A hernia is present. Hernia is present in the umbilical area.   Genitourinary:     Comments: Significant swelling of the right watson-scrotum, suspect ascites fluid in the scrotum - patient reports it has been this large for many years   Musculoskeletal:         General: No tenderness. Normal range of motion.      Cervical back: Normal range of motion.   Skin:     General: Skin is warm and dry.   Neurological:      General: No focal deficit present.      Mental Status: He is alert and oriented to person, place, and time.   Psychiatric:         Behavior: Behavior normal.         Thought Content: Thought content normal.         Judgment: Judgment normal.       A/P:  Background: 64 y.o. male presents with weakness, nausea, vomiting    Differential DX includes but is not limited to: hepatorenal syndrome, metabolic derangement, arrhythmia, atypical acs, anemia    Plan: cbc, cmp, EKG, ct abdomen and pelvis       ED Course     Labs Reviewed   PROTIME-INR - Abnormal       Result Value Ref Range Status    Protime 17.9 (*) 11.6 - 14.5 seconds Final    INR 1.40 (*) 0.84 - 1.19 Final   CBC AND DIFFERENTIAL - Abnormal    WBC 14.27 (*) 4.31 - 10.16 Thousand/uL Final    RBC 4.09  3.88 - 5.62 Million/uL  Final    Hemoglobin 12.9  12.0 - 17.0 g/dL Final    Hematocrit 40.6  36.5 - 49.3 % Final    MCV 99 (*) 82 - 98 fL Final    MCH 31.5  26.8 - 34.3 pg Final    MCHC 31.8  31.4 - 37.4 g/dL Final    RDW 16.6 (*) 11.6 - 15.1 % Final    MPV 11.6  8.9 - 12.7 fL Final    Platelets 95 (*) 149 - 390 Thousands/uL Final    nRBC 0  /100 WBCs Final    Segmented % 91 (*) 43 - 75 % Final    Immature Grans % 1  0 - 2 % Final    Lymphocytes % 1 (*) 14 - 44 % Final    Monocytes % 7  4 - 12 % Final    Eosinophils Relative 0  0 - 6 % Final    Basophils Relative 0  0 - 1 % Final    Absolute Neutrophils 12.97 (*) 1.85 - 7.62 Thousands/µL Final    Absolute Immature Grans 0.11  0.00 - 0.20 Thousand/uL Final    Absolute Lymphocytes 0.20 (*) 0.60 - 4.47 Thousands/µL Final    Absolute Monocytes 0.93  0.17 - 1.22 Thousand/µL Final    Eosinophils Absolute 0.02  0.00 - 0.61 Thousand/µL Final    Basophils Absolute 0.04  0.00 - 0.10 Thousands/µL Final    Narrative:     This is an appended report.  These results have been appended to a previously verified report.   COMPREHENSIVE METABOLIC PANEL - Abnormal    Sodium 135  135 - 147 mmol/L Final    Potassium 5.5 (*) 3.5 - 5.3 mmol/L Final    Chloride 92 (*) 96 - 108 mmol/L Final    CO2 26  21 - 32 mmol/L Final    ANION GAP 17 (*) 4 - 13 mmol/L Final    BUN 58 (*) 5 - 25 mg/dL Final    Creatinine 8.17 (*) 0.60 - 1.30 mg/dL Final    Comment: Standardized to IDMS reference method    Glucose 97  65 - 140 mg/dL Final    Comment: If the patient is fasting, the ADA then defines impaired fasting glucose as > 100 mg/dL and diabetes as > or equal to 123 mg/dL.    Calcium 9.4  8.4 - 10.2 mg/dL Final    AST 6 (*) 13 - 39 U/L Final    ALT 4 (*) 7 - 52 U/L Final    Comment: Specimen collection should occur prior to Sulfasalazine administration due to the potential for falsely depressed results.     Alkaline Phosphatase 95  34 - 104 U/L Final    Total Protein 8.2  6.4 - 8.4 g/dL Final    Albumin 4.2  3.5 - 5.0 g/dL  Final    Total Bilirubin 1.97 (*) 0.20 - 1.00 mg/dL Final    Comment: Use of this assay is not recommended for patients undergoing treatment with eltrombopag due to the potential for falsely elevated results.  N-acetyl-p-benzoquinone imine (metabolite of Acetaminophen) will generate erroneously low results in samples for patients that have taken an overdose of Acetaminophen.    eGFR 6  ml/min/1.73sq m Final    Narrative:     National Kid[employer] Foundation guidelines for Chronic Kidney Disease (CKD):     Stage 1 with normal or high GFR (GFR > 90 mL/min/1.73 square meters)    Stage 2 Mild CKD (GFR = 60-89 mL/min/1.73 square meters)    Stage 3A Moderate CKD (GFR = 45-59 mL/min/1.73 square meters)    Stage 3B Moderate CKD (GFR = 30-44 mL/min/1.73 square meters)    Stage 4 Severe CKD (GFR = 15-29 mL/min/1.73 square meters)    Stage 5 End Stage CKD (GFR <15 mL/min/1.73 square meters)  Note: GFR calculation is accurate only with a steady state creatinine   PROCALCITONIN TEST - Abnormal    Procalcitonin 5.73 (*) <=0.25 ng/ml Final    Comment: Suspected Lower Respiratory Tract Infection (LRTI):  - LESS than or EQUAL to 0.25 ng/mL:   low likelihood for bacterial LRTI; antibiotics DISCOURAGED.  - GREATER than 0.25 ng/mL:   increased likelihood for bacterial LRTI; antibiotics ENCOURAGED.    Suspected Sepsis:  - Strongly consider initiating antibiotics in ALL UNSTABLE patients.  - LESS than or EQUAL to 0.5 ng/mL:   low likelihood for bacterial sepsis; antibiotics DISCOURAGED.  - GREATER than 0.5 ng/mL:   increased likelihood for bacterial sepsis; antibiotics ENCOURAGED.  - GREATER than 2 ng/mL:   high risk for severe sepsis / septic shock; antibiotics strongly ENCOURAGED.    Decisions on antibiotic use should not be based solely on Procalcitonin (PCT) levels. If PCT is low but uncertainty exists with stopping antibiotics, repeat PCT in 6-24 hours to confirm the low level. If antibiotics are administered (regardless if  "initial PCT was high or low), repeat PCT every 1-2 days to consider early antibiotic cessation (when GREATER than 80% decrease from the peak OR when PCT drops below designated cutoffs, whichever comes first), so long as the infection is NOT one that typically requires prolonged treatment durations (e.g., bone/joint infections, endocarditis, Staph. aureus bacteremia).    Situations of FALSE-POSITIVE Procalcitonin values:  1) Newborns < 72 hours old  2) Massive stress from severe trauma / burns, major surgery, acute pancreatitis, cardiogenic / hemorrhagic shock, sickle cell crisis, or other organ perfusion abnormalities  3) Malaria and some Candidal infections  4) Treatment with agents that stimulate cytokines (e.g., OKT3, anti-lymphocyte globulins, alemtuzumab, IL-2, granulocyte transfusion [NOT GCSFs])  5) Chronic renal disease causes elevated baseline levels (consider GREATER than 0.75 ng/mL as an abnormal cut-off); initiating HD/CRRT may cause transient decreases  6) Paraneoplastic syndromes from medullary thyroid or SCLC, some forms of vasculitis, and acute [id number] disease    Situations of FALSE-NEGATIVE Procalcitonin values:  1) Too early in clinical course for PCT to have reached its peak (may repeat in 6-24 hours to confirm low level)  2) Localized infection WITHOUT systemic (SIRS / sepsis) response (e.g., an abscess, osteomyelitis, cystitis)  3) Mycobacteria (e.g., Tuberculosis, MAC)  4) Cystic fibrosis exacerbations     HS TROPONIN I 0HR - Abnormal    hs TnI 0hr 77 (*) \"Refer to ACS Flowchart\"- see link ng/L Final    Comment:                                              Initial (time 0) result  If >=50 ng/L, Myocardial injury suggested ;  Type of myocardial injury and treatment strategy  to be determined.  If 5-49 ng/L, a delta result at 2 hours and or 4 hours will be needed to further evaluate.  If <4 ng/L, and chest pain has been >3 hours since onset, patient may qualify for discharge based on the " HEART score in the ED.  If <5 ng/L and <3hours since onset of chest pain, a delta result at 2 hours will be needed to further evaluate.    HS Troponin 99th Percentile URL of a Health Population=12 ng/L with a 95% Confidence Interval of 8-18 ng/L.    Second Troponin (time 2 hours)  If calculated delta >= 20 ng/L,  Myocardial injury suggested ; Type of myocardial injury and treatment strategy to be determined.  If 5-49 ng/L and the calculated delta is 5-19 ng/L, consult medical service for evaluation.  Continue evaluation for ischemia on ecg and other possible etiology and repeat hs troponin at 4 hours.  If delta is <5 ng/L at 2 hours, consider discharge based on risk stratification via the HEART score (if in ED), or KELLY risk score in IP/Observation.    HS Troponin 99th Percentile URL of a Health Population=12 ng/L with a 95% Confidence Interval of 8-18 ng/L.   SMEAR REVIEW(PHLEBS DO NOT ORDER) - Abnormal    RBC Morphology Normal   Final    Platelet Estimate Decreased (*) Adequate Final   APTT - Normal    PTT 34  23 - 37 seconds Final    Comment: Therapeutic Heparin Range =  60-90 seconds   LACTIC ACID, PLASMA (W/REFLEX IF RESULT > 2.0) - Normal    LACTIC ACID 1.4  0.5 - 2.0 mmol/L Final    Narrative:     Result may be elevated if tourniquet was used during collection.   BLOOD CULTURE   BLOOD CULTURE   UA W REFLEX TO MICROSCOPIC WITH REFLEX TO CULTURE   HS TROPONIN I 2HR   HS TROPONIN I 4HR     CT abdomen pelvis with contrast   Final Result      1. Negative for bowel obstruction.      2. Mild splenomegaly. Question geographic area of decreased attenuation within the spleen, possibly artifactual from streak artifact from overlying leads and/or rib shadow. Difficult to completely exclude subtle infarct. Correlate for any left upper    quadrant pain.      3. Polycystic kidney and liver disease with multiple complex and indeterminant renal and left hepatic lobe lesions. Correlation with nonemergent MRI abdomen with and  without IV contrast recommended to exclude possibility of a solid lesion.      4. Large amount of abdominopelvic ascites, increased from prior study.      5. Sigmoid diverticulosis without diverticulitis.      The study was marked in EPIC for follow-up.      Workstation performed: [id number]         XR chest 1 view portable   Final Result      No acute cardiopulmonary disease.            Workstation performed: [id number]           Medications   acetaminophen (TYLENOL) tablet 650 mg (650 mg Oral Given 6/23/24 2058)   iohexol (OMNIPAQUE) 350 MG/ML injection (MULTI-DOSE) 80 mL (80 mL Intravenous Given 6/23/24 2108)   ceftriaxone (ROCEPHIN) 1 g/50 mL in dextrose IVPB (1,000 mg Intravenous New Bag 6/23/24 2245)         Critical Care Time  Procedures  Time reflects when diagnosis was documented in both MDM as applicable and the Disposition within this note       Time User Action Codes Description Comment    6/23/2024 11:09 PM Ellen Blanco Add [R10.9] Abdominal pain     6/23/2024 11:09 PM Ellen Blanco Add [R53.1] Generalized weakness     6/23/2024 11:09 PM Ellen Blanco Add [R11.2] Nausea and vomiting     6/23/2024 11:09 PM Ellen Blanco [D72.829] Leukocytosis     6/23/2024 11:10 PM Ellen Blanco Add [R94.31] Abnormal EKG     6/23/2024 11:10 PM Ellen Blanco Add [K76.9] Hepatic lesion           ED Disposition       ED Disposition   Admit    Condition   Stable    Date/Time   Sun Jun 23, 2024 11:09 PM    Comment   Case was discussed with NOLAN and the patient's admission status was agreed to be Admission Status: inpatient status to the service of Dr. Schuster .               Follow-up Information    None

## 2024-06-24 NOTE — ASSESSMENT & PLAN NOTE
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

## 2024-06-24 NOTE — ASSESSMENT & PLAN NOTE
On admission Plt 95  HIT work-up WNL, patient refused hematology evaluation outpatient  Possibly due to ADPKD    Recent Labs     06/23/24 2031   PLT 95*     Continue to trend daily  Currently holding DVT prophylaxis due to concern for HIT on prior admission

## 2024-06-24 NOTE — SEDATION DOCUMENTATION
Procedure completed by Dr Mendez and Dr Crockett. Pt tolerated without issues, VSS. Education provided to pt prior to and throughout procedure, questions answered as offered. 8725 clear dark kaylin fluid removed from abdominal space. Transported back to [facility] by IR staff, bedside report given.

## 2024-06-24 NOTE — H&P
[facility]  H&P  Name: Homero Koch 64 y.o. male I MRN: [id number]  Unit/Bed#: ED-22 I Date of Admission: 6/23/2024   Date of Service: 6/24/2024 I Hospital Day: 1      Assessment & Plan   * Sepsis (HCC)  Assessment & Plan  Meets sepsis: tachycardic, febrile, Leukocytosis. Source likely GI: SBP vs gastroenteritis   Presented with diffuse abdominal pain with N/V, watery diarrhea.   Work up:  Procal 5.73  WBC 14. 27  Lactic: 1.4  CXR: unremarkable, no consolidations  CT A/P: mild splenomegaly with possible infarct, PCKD, Renal and left hepatic lobe lesions, large ascities, diverticulosis    Recent Labs     06/23/24 2031   WBC 14.27*       Plan:  Continue Ceftriaxone 2g for SBP  IR consult for paracentesis  F/u C diff/Enteric stool studies  F/u Blood cultures  F/u Procal AM  Trend fever curve and vitals  Trend CBC  Tylenol prn for fevers    Chronic kidney disease with end stage renal failure on dialysis ([facility])  Assessment & Plan  Lab Results   Component Value Date    EGFR 6 06/23/2024    EGFR 7 08/01/2023    EGFR 6 07/31/2023    CREATININE 8.17 (H) 06/23/2024    CREATININE 7.21 (H) 08/01/2023    CREATININE 8.47 (H) 07/31/2023     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   Recent Labs     06/23/24 2031   K 5.5*       Consult nephrology for dialysis.   Albuterol nebulizer  Calcium gluconate   Monitor on tele.     HFrEF Dilated cardiomyopathy (HCC)  Assessment & Plan  Non compliant with medications  including Toprol-XL 25mg  Patient hypervolemic on exam. Blood pressures stable.   7/28/23 Echo: EF 20-25%, severe global hypokinesis with regional variation, moderately reduced systolic function, diastolic dysfunction, severely dilated L atria, moderately dilated R atria, mild-moderate Mitral valve regurg    Plan:  Restart home metoprolol 25 mg  Renal [place] diet with 2L fluid restriction  Strict I/Os  Daily Weight    Abdominal pain  Assessment & Plan  Patient presenting with generalized abdominal pain. Notable history of polycystic liver and kidney disease requiring dialysis and previously requiring paracentesis.  CT Abd/Pelvis: Negative for bowel obstruction. Mild splenomegaly. Question geographic area of decreased attenuation within the spleen, possibly artifactual from streak artifact. Difficult to completely exclude subtle infarct. Polycystic kidney and liver disease with multiple complex and indeterminant renal and left hepatic lobe lesions. Correlation with nonemergent MRI abdomen with and without IV contrast recommended to exclude possibility of a solid lesion. Large amount of abdominopelvic ascites, increased from prior study. Sigmoid diverticulosis without diverticulitis.     Patient also has history of inguinal hernia. US from 2019 of scrotum and testicles: confirmed large complex hydrocele with numerous internal septations and debris measuring 15.6 x 9.3 x 11.4 without evidence of varicocele or scrotal thickness     Patient with fever, abdominal pain and large ascites. Procal 5.73, leukocytosis to 14.27. total bilirubin elevated to 1.93.    Plan:  See Sepsis plan  Recommend MRI abdomen with and without IV contrast outpatient to exclude solid lesion in hepatic/renal cysts    Elevated troponin  Assessment & Plan  Elevated trop 77->86, delta 9, most likely due to demand  EKG showed sinus tachycardic with ST depressions in II, III, V4, V5, V6, V1  No chest pain, angina symptoms, or chest palpitations    Lab  Results   Component Value Date    HSTNI0 77 (H) 06/23/2024    HSTNI2 86 (H) 06/23/2024    HSTNID2 9 06/23/2024    HSTNI4 88 (H) 06/24/2024    HSTNID4 11 06/24/2024     Plan:  Trend troponin  Monitor on tele    Paroxysmal atrial fibrillation (HCC)  Assessment & Plan  Sinus tachycardia on admission  Monitor on tele  Resume Toprol XL    ADPKD (autosomal dominant polycystic kidney)  Assessment & Plan  History of ADPKD with liver involvement   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 Correlation with nonemergent MRI abdomen with and without IV contrast recommended to exclude possibility of a solid lesion. Large amount of abdominopelvic ascites, increased from prior study.     Thrombocytopenia (HCC)  Assessment & Plan  On admission Plt 95  HIT work-up WNL, patient refused hematology evaluation outpatient  Possibly due to ADPKD    Recent Labs     06/23/24 2031   PLT 95*       Continue to trend daily  Currently holding DVT prophylaxis due to concern for HIT on prior admission     Multiple and open wound of lower limb  Assessment & Plan  Wound care consulted             Tobacco and Toxic Substance Assessment and Intervention:     Tobacco use screening performed      Other interventions: Patient refused nicotine patch      VTE Pharmacologic Prophylaxis: VTE Score: 6 High Risk (Score >/= 5) - Pharmacological DVT Prophylaxis Contraindicated. Sequential Compression Devices Ordered.  Code Status: Level 3 - DNAR and DNI, discussed with patient and patient's wife  Discussion with family: Updated  (wife) at bedside.    Anticipated Length of Stay: Patient will be admitted on an inpatient basis with an anticipated length of stay of greater than 2 midnights secondary to sepsis.    Chief  Complaint: generalized weakness, abdominal pain with N/V.    History of Present Illness:  Homero Koch is a 64 y.o. 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 Patient was found by wife with multiple episodes of nonbloody, non bilious vomiting with generalized weakness and cyanotic in appearance when she came home from [facility]. Patient reports coming in for increased testicular swelling and abdominal pain with multiple episodes of N/V. He reports intermittent chronic watery diarrhea. Reports 2 episodes prior to admission today. Denies any chest pain or chest palpitation. Patient denies any cough or SOB, however patient's wife notes that he has SOB and PAYTON. Endorses poor PO intake, chills, fever, lightheadedness. Patient anuric. Per wife, patient's legs look unchanged in color but increased swelling. Denies any sick contacts. Denies any recent antibiotics.     Per wife, patient does not regularly follow with doctors and has an aversion to doctors. Only medication he is currently taking is Phoslo and medical mariajuana. Denies taking Toprol XL.     In ED, patient meet sepsis criteria, febrile 101.9F, tachycardic, normotensive.  Work up significant for leukocytosis 14K, thrombocytopenia 95K, hyperkalemia 5.5, elevated procal 5.7, mildly elevated trop with EKG sinus tachycardia with ST depressions in II, III, V4, V5, V6, V1. CXR unremarkable. CT A/P showed mild splenomegaly with possible infarct, PCKD, renal and left hepatic lobe lesions, large ascities, diverticulosis. In the ED, patient received 1g Ceftriaxone and Tylenol.       Review of Systems:  Review of Systems   Constitutional:  Positive for chills and fever.   HENT:  Negative for congestion, ear pain, rhinorrhea and sore throat.    Eyes:  Negative for visual disturbance.   Respiratory:  Positive for shortness of breath. Negative for cough.    Cardiovascular:   Positive for leg swelling. Negative for chest pain and palpitations.   Gastrointestinal:  Positive for abdominal distention, abdominal pain, diarrhea, nausea and vomiting. Negative for blood in stool and constipation.   Genitourinary:  Positive for scrotal swelling and testicular pain.        Reports Anuria    Skin:  Positive for wound (bilateral chronic leg wounds).   Neurological:  Positive for light-headedness. Negative for headaches.       Past Medical and Surgical History:   Past Medical History:   Diagnosis Date    Complication of renal dialysis     Polycystic kidney disease        Past Surgical History:   Procedure Laterality Date    IR CATHETERGRAM  10/17/2019    IR IMAGE GUIDED ASPIRATION / DRAINAGE  9/23/2019    IR PARACENTESIS  10/9/2019    IR TEMPORARY DIALYSIS CATHETER PLACEMENT  7/28/2023    IR TUNNELED CENTRAL LINE CHECK/CHANGE/REPOSITION  12/14/2021    IR TUNNELED DIALYSIS CATHETER CHECK/CHANGE/REPOSITION/ANGIOPLASTY  10/17/2019    IR TUNNELED DIALYSIS CATHETER PLACEMENT  9/30/2019    IR TUNNELED DIALYSIS CATHETER PLACEMENT  8/1/2023    IR TUNNELED DIALYSIS CATHETER REMOVAL  7/28/2023    SPLIT THICKNESS SKIN GRAFT Bilateral 11/7/2019    Procedure: SKIN GRAFT SPLIT THICKNESS (STSG)  EXTREMITY;  Surgeon: Torey Cha MD;  [facility];  Service: Plastics    SPLIT THICKNESS SKIN GRAFT Bilateral 11/12/2019    Procedure: SKIN GRAFT SPLIT THICKNESS (STSG)  EXTREMITY;  Surgeon: Torey Cha MD;  [facility];  Service: Plastics    VAC DRESSING APPLICATION Bilateral 11/18/2019    Procedure: CHANGE DRESSING;  Surgeon: Torey Cha MD;  [facility];  Service: Plastics    WOUND DEBRIDEMENT Bilateral 10/31/2019    Procedure: DEBRIDEMENT WOUND (WASH OUT);  Surgeon: Selvin Han DPM;  [facility];  Service: Podiatry    WOUND DEBRIDEMENT Bilateral 11/5/2019    Procedure: DEBRIDEMENT WOUND (WASH OUT);  Surgeon: Selvin Han DPM;   [facility];  Service: Podiatry    WOUND DEBRIDEMENT Bilateral 11/7/2019    Procedure: DEBRIDEMENT WOUND (WASH OUT);  Surgeon: Torey Cha MD;  [facility];  Service: Plastics    WOUND DEBRIDEMENT Bilateral 11/12/2019    Procedure: DEBRIDEMENT LOWER EXTREMITY (WASH OUT);  Surgeon: Torey Cha MD;  [facility];  Service: Plastics       Meds/Allergies:  Prior to Admission medications    Medication Sig Start Date End Date Taking? Authorizing Provider   B Complex-C-Folic Acid (TRIPHROCAPS PO) Take 1 capsule by mouth daily  Patient not taking: Reported on 9/27/2021    Historical Provider, MD ceballos complex-vitamin C-folic acid (NEPHROCAPS) 1 mg capsule Take 1 mg by mouth   Patient not taking: Reported on 7/28/2023 4/15/14   Historical Provider, MD   calcitriol (ROCALTROL) 0.25 mcg capsule Take 3 capsules (0.75 mcg total) by mouth 3 (three) times a week 8/2/23 9/1/23  Gio Daly MD   calcium acetate (PHOSLO) 667 mg capsule Take 1,334 mg by mouth 3 (three) times a day with meals    Historical Provider, MD   cholecalciferol (VITAMIN D3) 1,000 units tablet Take 1,000 Units by mouth daily    Historical Provider, MD   metoprolol succinate (TOPROL-XL) 25 mg 24 hr tablet Take 1 tablet (25 mg total) by mouth daily  Patient not taking: Reported on 9/27/2021 2/26/20   Ella Adamson DO   midodrine (PROAMATINE) 5 mg tablet midodrine 5 mg tablet  Patient not taking: No sig reported    Historical Provider, MD   Multiple Vitamin (MULTIVITAMIN) tablet Take 1 tablet by mouth daily  Patient not taking: Reported on 7/28/2023    Historical Provider, MD   NON FORMULARY Hemodialysis Monday, Wednesday, Friday    Historical Provider, MD   omeprazole (PriLOSEC OTC) 20 MG tablet Take 1 tablet (20 mg total) by mouth daily  Patient not taking: Reported on 2/14/2020 10/3/19   Ronnie Kulkarni MD   oxyCODONE (ROXICODONE) 5 immediate release tablet Take 0.5 tablets (2.5 mg total) by mouth every 8  (eight) hours as needed for moderate pain for up to 6 doses Max Daily Amount: 7.5 mg 8/1/23   Gio Daly MD   polyethylene glycol (GLYCOLAX) powder MIX AND DRINK 17 GRAMS BY MOUTH ONCE A DAY FOR CONSTIPATION  Patient not taking: No sig reported 1/3/20   Historical Provider, MD   polyethylene glycol (MIRALAX) 17 g packet Take 17 g by mouth 2 (two) times a day  Patient not taking: Reported on 2/14/2020 11/21/19   Gonzalo Hogan MD   senna-docusate sodium (SENOKOT S) 8.6-50 mg per tablet Take 2 tablets by mouth 2 (two) times a day for 10 days  Patient not taking: Reported on 1/9/2020 11/21/19 12/1/19  Gonzalo Hogan MD   sevelamer (RENAGEL) 800 mg tablet Take 2 tablets (1,600 mg total) by mouth 3 (three) times a day with meals 8/1/23 8/31/23  Gio Daly MD   sevelamer carbonate (RENVELA) 800 mg tablet sevelamer carbonate 800 mg tablet  Patient not taking: No sig reported    Historical Provider, MD   simethicone (MYLICON) 80 mg chewable tablet Chew 1 tablet (80 mg total) every 6 (six) hours as needed for flatulence  Patient not taking: Reported on 2/14/2020 10/15/19   Minna Prasad MD   Sucroferric Oxyhydroxide 500 MG CHEW Velphoro 500 mg chewable tablet  Patient not taking: No sig reported    Historical Provider, MD   torsemide (DEMADEX) 20 mg tablet Take 2 tablets (40 mg total) by mouth 2 (two) times a day  Patient not taking: Reported on 1/9/2020 11/21/19 12/21/19  Gonzalo Hogan MD   vitamin E, tocopherol, 200 units capsule Take by mouth  Patient not taking: Reported on 9/27/2021    Historical Provider, MD     I have reviewed home medications with patient personally. Reviewed with patient and patient's wife.    Allergies:   Allergies   Allergen Reactions    Sucroferric Oxyhydroxide Other (See Comments)    Chlorhexidine Other (See Comments)    Heparin Other (See Comments)     Brings plaletes down    Other Other (See Comments)       Social History:  Marital Status: /Civil Union   Patient Pre-hospital  Living Situation: Home, With spouse, With other family member: daughter, and grandchild  Patient Pre-hospital Level of Mobility: walks with cane  Patient Pre-hospital Diet Restrictions: Renal phos diet- very liberal  Substance Use History:   Social History     Substance and Sexual Activity   Alcohol Use Not Currently     Social History     Tobacco Use   Smoking Status Never   Smokeless Tobacco Never     Social History     Substance and Sexual Activity   Drug Use Yes    Types: Marijuana       Family History:  History reviewed. No pertinent family history.    Physical Exam:     Vitals:   Blood Pressure: 132/79 (06/24/24 0030)  Pulse: 92 (06/24/24 0030)  Temperature: 99.5 °F (37.5 °C) (06/23/24 2115)  Temp Source: Oral (06/23/24 2115)  Respirations: 20 (06/24/24 0030)  SpO2: 97 % (06/24/24 0104)    Physical Exam  Vitals and nursing note reviewed.   Constitutional:       General: He is not in acute distress.     Appearance: He is well-developed. He is ill-appearing (chronic).   HENT:      Head: Normocephalic and atraumatic.      Mouth/Throat:      Pharynx: Oropharynx is clear.   Eyes:      General: No scleral icterus.     Conjunctiva/sclera: Conjunctivae normal.   Cardiovascular:      Rate and Rhythm: Regular rhythm. Tachycardia present.      Heart sounds: Murmur heard.   Pulmonary:      Effort: Pulmonary effort is normal. No respiratory distress.      Breath sounds: Rales (rales bilaterally, R worse than L. patient laying on R side) present.      Comments: SpO2 100% not on oxygen  Abdominal:      General: There is distension.      Palpations: Abdomen is soft. There is fluid wave.      Tenderness: There is abdominal tenderness (umbilical). There is no guarding.      Hernia: A hernia is present.   Musculoskeletal:         General: No swelling.      Cervical back: Neck supple.      Right lower leg: Edema (3+ pitting) present.      Left lower leg: Edema (3+ pitting) present.   Skin:     General: Skin is warm and dry.       "Capillary Refill: Capillary refill takes less than 2 seconds.      Comments: Chronic bilateral wound on legs with darker pigmentation/hemosiderin skin changes  Right leg wound with serous drainage near medial malleolus    Neurological:      Mental Status: He is alert.   Psychiatric:         Mood and Affect: Mood normal.        Additional Data:     Lab Results:  Results from last 7 days   Lab Units 06/23/24 2031   WBC Thousand/uL 14.27*   HEMOGLOBIN g/dL 12.9   HEMATOCRIT % 40.6   PLATELETS Thousands/uL 95*   SEGS PCT % 91*   LYMPHO PCT % 1*   MONO PCT % 7   EOS PCT % 0     Results from last 7 days   Lab Units 06/23/24 2031   SODIUM mmol/L 135   POTASSIUM mmol/L 5.5*   CHLORIDE mmol/L 92*   CO2 mmol/L 26   BUN mg/dL 58*   CREATININE mg/dL 8.17*   ANION GAP mmol/L 17*   CALCIUM mg/dL 9.4   ALBUMIN g/dL 4.2   TOTAL BILIRUBIN mg/dL 1.97*   ALK PHOS U/L 95   ALT U/L 4*   AST U/L 6*   GLUCOSE RANDOM mg/dL 97     Results from last 7 days   Lab Units 06/23/24 2031   INR  1.40*         No results found for: \"HGBA1C\"  Results from last 7 days   Lab Units 06/23/24 2031   LACTIC ACID mmol/L 1.4   PROCALCITONIN ng/ml 5.73*       Lines/Drains:  Invasive Devices       Peripheral Intravenous Line  Duration             Peripheral IV 06/23/24 Proximal;Right;Ventral (anterior) Forearm 1 day    Peripheral IV 06/23/24 Right;Ventral (anterior) Forearm <1 day              Hemodialysis Catheter  Duration             HD Permanent Double Catheter 327 days                        Imaging: Reviewed radiology reports from this admission including: chest xray and abdominal/pelvic CT  CT abdomen pelvis with contrast   Final Result by Randy Dozier DO (06/23 2244)      1. Negative for bowel obstruction.      2. Mild splenomegaly. Question geographic area of decreased attenuation within the spleen, possibly artifactual from streak artifact from overlying leads and/or rib shadow. Difficult to completely exclude subtle infarct. Correlate " for any left upper    quadrant pain.      3. Polycystic kidney and liver disease with multiple complex and indeterminant renal and left hepatic lobe lesions. Correlation with nonemergent MRI abdomen with and without IV contrast recommended to exclude possibility of a solid lesion.      4. Large amount of abdominopelvic ascites, increased from prior study.      5. Sigmoid diverticulosis without diverticulitis.      The study was marked in EPIC for follow-up.      Workstation performed: [id number]         XR chest 1 view portable   Final Result by Sharon Barraza MD (06/23 2148)      No acute cardiopulmonary disease.            Workstation performed: [id number]         IR INPATIENT Paracentesis    (Results Pending)       EKG and Other Studies Reviewed on Admission:   EKG: Personally Reviewed. Sinus Tachycardia. . Qtc: 487    ** Please Note: This note has been constructed using a voice recognition system. **

## 2024-06-24 NOTE — PHYSICAL THERAPY NOTE
Physical Therapy Cancellation Note       06/24/24 1426   Note Type   Note type Cancelled Session   Cancel Reasons Patient off floor/hemodialysis   Additional Comments PT consult received and chart reviewed. Spoke with RN Sana. Pt currently undergoing bedside dialysis. Will hold PT evaluation and f/u as able and appropriate.       Kayce Mejias, PT, DPT   Available via [employer]  NPI # [id number]  [place] License - [id number]  6/24/2024

## 2024-06-24 NOTE — ASSESSMENT & PLAN NOTE
"No results found for: \"HGBA1C\"    No results for input(s): \"POCGLU\" in the last 72 hours.    Blood Sugar Average: Last 72 hrs:      "

## 2024-06-25 ENCOUNTER — APPOINTMENT (INPATIENT)
Dept: ULTRASOUND IMAGING | Facility: HOSPITAL | Age: 64
DRG: 871 | End: 2024-06-25
Payer: MEDICARE

## 2024-06-25 LAB
ANION GAP SERPL CALCULATED.3IONS-SCNC: 11 MMOL/L (ref 4–13)
ATRIAL RATE: 103 BPM
BUN SERPL-MCNC: 41 MG/DL (ref 5–25)
C COLI+JEJUNI TUF STL QL NAA+PROBE: NEGATIVE
C DIFF TOX GENS STL QL NAA+PROBE: NEGATIVE
CALCIUM SERPL-MCNC: 8.6 MG/DL (ref 8.4–10.2)
CHLORIDE SERPL-SCNC: 92 MMOL/L (ref 96–108)
CO2 SERPL-SCNC: 30 MMOL/L (ref 21–32)
CREAT SERPL-MCNC: 6.05 MG/DL (ref 0.6–1.3)
EC STX1+STX2 GENES STL QL NAA+PROBE: NEGATIVE
ERYTHROCYTE [DISTWIDTH] IN BLOOD BY AUTOMATED COUNT: 16.3 % (ref 11.6–15.1)
GFR SERPL CREATININE-BSD FRML MDRD: 9 ML/MIN/1.73SQ M
GLUCOSE SERPL-MCNC: 92 MG/DL (ref 65–140)
HCT VFR BLD AUTO: 35.7 % (ref 36.5–49.3)
HGB BLD-MCNC: 11.6 G/DL (ref 12–17)
MAGNESIUM SERPL-MCNC: 2 MG/DL (ref 1.9–2.7)
MCH RBC QN AUTO: 31.7 PG (ref 26.8–34.3)
MCHC RBC AUTO-ENTMCNC: 32.5 G/DL (ref 31.4–37.4)
MCV RBC AUTO: 98 FL (ref 82–98)
PHOSPHATE SERPL-MCNC: 5.8 MG/DL (ref 2.3–4.1)
PLATELET # BLD AUTO: 81 THOUSANDS/UL (ref 149–390)
PMV BLD AUTO: 11.1 FL (ref 8.9–12.7)
POTASSIUM SERPL-SCNC: 4.5 MMOL/L (ref 3.5–5.3)
PR INTERVAL: 180 MS
QRS AXIS: 55 DEGREES
QRSD INTERVAL: 108 MS
QT INTERVAL: 372 MS
QTC INTERVAL: 487 MS
RBC # BLD AUTO: 3.66 MILLION/UL (ref 3.88–5.62)
SALMONELLA SP SPAO STL QL NAA+PROBE: NEGATIVE
SHIGELLA SP+EIEC IPAH STL QL NAA+PROBE: NEGATIVE
SODIUM SERPL-SCNC: 133 MMOL/L (ref 135–147)
T WAVE AXIS: 231 DEGREES
VENTRICULAR RATE: 103 BPM
WBC # BLD AUTO: 5.2 THOUSAND/UL (ref 4.31–10.16)

## 2024-06-25 PROCEDURE — 83735 ASSAY OF MAGNESIUM: CPT | Performed by: INTERNAL MEDICINE

## 2024-06-25 PROCEDURE — 76870 US EXAM SCROTUM: CPT

## 2024-06-25 PROCEDURE — 99232 SBSQ HOSP IP/OBS MODERATE 35: CPT | Performed by: INTERNAL MEDICINE

## 2024-06-25 PROCEDURE — 93010 ELECTROCARDIOGRAM REPORT: CPT | Performed by: INTERNAL MEDICINE

## 2024-06-25 PROCEDURE — 84100 ASSAY OF PHOSPHORUS: CPT | Performed by: INTERNAL MEDICINE

## 2024-06-25 PROCEDURE — 85027 COMPLETE CBC AUTOMATED: CPT | Performed by: INTERNAL MEDICINE

## 2024-06-25 PROCEDURE — 80048 BASIC METABOLIC PNL TOTAL CA: CPT | Performed by: INTERNAL MEDICINE

## 2024-06-25 RX ADMIN — CALCIUM ACETATE 1334 MG: 667 CAPSULE ORAL at 13:05

## 2024-06-25 RX ADMIN — Medication 1000 UNITS: at 08:52

## 2024-06-25 RX ADMIN — SEVELAMER HYDROCHLORIDE 1600 MG: 800 TABLET ORAL at 08:52

## 2024-06-25 RX ADMIN — CALCIUM ACETATE 1334 MG: 667 CAPSULE ORAL at 17:39

## 2024-06-25 RX ADMIN — Medication 1 CAPSULE: at 17:39

## 2024-06-25 RX ADMIN — CEFTRIAXONE SODIUM 2000 MG: 10 INJECTION, POWDER, FOR SOLUTION INTRAVENOUS at 21:11

## 2024-06-25 RX ADMIN — SEVELAMER HYDROCHLORIDE 1600 MG: 800 TABLET ORAL at 17:38

## 2024-06-25 RX ADMIN — CALCIUM ACETATE 1334 MG: 667 CAPSULE ORAL at 08:52

## 2024-06-25 RX ADMIN — ACETAMINOPHEN 650 MG: 325 TABLET, FILM COATED ORAL at 13:50

## 2024-06-25 RX ADMIN — SEVELAMER HYDROCHLORIDE 1600 MG: 800 TABLET ORAL at 13:05

## 2024-06-25 NOTE — ASSESSMENT & PLAN NOTE
On admission Plt 95  HIT work-up WNL, patient refused hematology evaluation outpatient  Possibly due to ADPKD    Recent Labs     06/23/24  2031 06/24/24  0418 06/25/24  0516   PLT 95* 90* 81*       Continue to trend daily  Currently holding DVT prophylaxis due to concern for HIT on prior admission

## 2024-06-25 NOTE — PROGRESS NOTES
NEPHROLOGY PROGRESS NOTE   Homero Koch 64 y.o. male MRN: [id number]  Unit/Bed#: S -01 Encounter: [id number]  Reason for Consult: ESRD on HD    ASSESSMENT/PLAN:  ESRD on HD - in the setting of ADPKD. HD MWF at [facility]. Last HD prior to admisison on 6/21/24. Underwent HD while inpatient on 6/25.     Plan:    - Plan for HD tomorrow afternoon    - Continue renal diet, fluid restrition    L IJV Permcath - No history of AFV or AVG. Continue HD through catheter. Discussed with patient catheter care, patient notes intolerance to previously used adhesives. Now provided with alternative catheter cover which he is tolerating well.     Hyponatremia - mild hyponatremia with sodium 133 today, unchanged from yesterday. Likely in the setting of ESRD and volume overload.   - Continue fluid restriction  - Cotninue monitoring    Hyperkalemia - with K of 5.6 on 6/24. Now resolved after HD yesterday.    - Continue monitoring potassium levels.      Mineral Bone disease  Hyperphosphatemia - Phoslo, Sevelamer, Velphoro on patient's chat. Patient reports only taking Phoslo 1334 TID. States he has been unable to afford the rest.          Phosphorus levels today 5.8, decreased from 6.3 on 6/24  Secondary hyperparathyroidism    Plan:   - Continue Phoslo 1334 TID with meals   - Continue Sevelamer 1600mg TID with meals  - Continue Calcitrol 0.75mg three times per week (MWF).    - Continue renal diet   - Continue monitoring phosphorus levels    Hypertension - per chart review patient is on Torsemide 40mg daily and Toprol XL 25mg daily, however has not been taking these medications. BP acceptable at this time.    - Management per primary team   - Continue monitoring BP    Anemia - anemia of chronic disease in the setting of ESRD. Hb today 11.6, MCV 98. On Venofer 50mg weekly as outpatient. Not on GRISELDA.  - Hold Venofer in the setting of infection  - Continue monitoring Hb levels  - Transfuse for Hb <7  - Rest of management per  primary team.   Sepsis - met SIRS criteria with fever, leukocytosis and tachycardia, started on Rocephin due to concern for SBP. Patient without known history of cirrhosis and no cirrhotic morphology on imaging, however there are multiple liver cysts present likely in the setting of APCKD which can contribute to development of portal hypertension. Patient also noted to have 1x Blood culture positive for coagulase-negative staph and Corynebacterium Striatum. Other possible sources include cyst hemorrhage or infection, as well as viral gastroenteritis given presenting complaints of nausea, vomiting and diarrhea, although at this time he notes no further episodes of diarrhea.  At this time source is unclear, however patient is improving on the current antibiotic regimen.     - Continue Ceftriaxone    - Recommend repeat blood cultures with AM labs.     - Trend fever curves, monitor WBCs    - Rest of management per primary team    HFrEF - history of cardiomyopathy, with echo 2023 demonstrating ED 20-25% with global hypokinesis. He also had an abnormal stress test in 2021 with reversible ischemia in the inferior apical wall.  Had been ordered heart catheterization which he had declined.   Was also recommended aspirin and statin which she also declined.  Not agreeable to GDMT.   Per chart review also taking torsemide and Toprol-XL, however patient denies taking torsemide.   - Repeat TTE pending  - Management per primary team    Abdominal pain - presents with abdominal pain and distension. Underwent paracentesis with removal of 8.7L of fluid. Per discussion with patient, this has been an ongoing for sometime and he attributes it to noncompliance with dialysis, however notes that is had acutely gotten worse. Given history of APCKD and multiple liver cysts noted on CT abdomen/pelvis, there may also be a component of portal hypertension in the setting of liver involvement of APCKD. Patient reports improvement after  paracentesis.   - Hepatalogy has been consulted  - MRI liver pending at this time - ok to be done with contrast   - We will plan for afternoon HD   - Rest of management per primary team.     Scrotal swelling - ongoing scrotal swelling, patient reports history of similar after hospitalization in 2019. US at the time demonstrated a large complex right hydrocele containing numerous septations.  Possible increased vascularity in the left testicle indicating possible orchitis.  Suspect hydrocele likely in the setting of volume overload.  -Recommend repeat scrotal ultrasound.    Acid-base - AG has closed. Continue monitoring        SUBJECTIVE:  Patient seen and examined at the bedside, not in acute distress at the time of my evaluation. Reports improvement in SoB and Dunham since paracentesis performed on 6/25. Also notes slight improvement in testicular swelling. Denies further episodes of nausea and vomiting, states he had one normal bowel movement on the previous day. States he experienced lightheadedness when transitioning from the bed to the chair. Mr. Koch hopes that he can get another paracentesis today given improvement after fluid removal. Also reports lower extremities significantly diminished in size after paracentesis and HD with diminished weeping.     A complete review of systems was performed. Pertinent positives and negatives noted in the HPI. Otherwise the review of systems was negative.  OBJECTIVE:  Current Weight: Weight - Scale: 75.7 kg (166 lb 14.2 oz)  Vitals:    06/24/24 1745 06/24/24 2130 06/25/24 0600 06/25/24 0808   BP: 131/70 114/70  114/73   BP Location: Right arm      Pulse: 71 77  76   Resp: 18 20     Temp: 98.7 °F (37.1 °C) 99.8 °F (37.7 °C)  97.9 °F (36.6 °C)   TempSrc: Oral      SpO2: 100% 95%  95%   Weight:   75.7 kg (166 lb 14.2 oz)        Intake/Output Summary (Last 24 hours) at 6/25/2024 0905  Last data filed at 6/24/2024 2312  Gross per 24 hour   Intake 720 ml   Output 9225 ml   Net  -8505 ml       General:  Awake, alert, appears comfortable and in no acute distress.  Nontoxic.  Skin:  No rash, warm, good skin turgor   Eyes:  PERRL, EOMI, sclerae nonicteric.  no periorbital edema   ENT:  Moist mucous membranes  Neck:  Trachea midline, symmetric.  No JVD.  Chest:  Clear to auscultation bilaterally without wheezes, crackles or rhonchi  CVS:  Regular rate and rhythm without gallop or rub, +systolic murmur.  No S3, S4.   Abdomen:  Soft, nontender, distended, without palpable masses.  Normal bowel sounds x 4 quadrants.  No bruit.  Extremities:  Warm, pink, motor and sensory intact.  No cyanosis, pallor.  No BLE edema. Venous stasis changes in bilateral lower extremities noted.   Neuro:  Awake, alert, oriented x3.  Grossly intact  Psych:  Appropriate affect.  Mentating appropriately.  Normal mental status exam  : Scrotal edema present.       Medications:    Current Facility-Administered Medications:     acetaminophen (TYLENOL) tablet 650 mg, 650 mg, Oral, Q6H PRN, Dimple Hernandez DO    b complex-vitamin C-folic acid (RENAL) capsule 1 capsule, 1 mg, Oral, Daily With Dinner, Dimple Hernandez DO, 1 capsule at 06/24/24 1757    calcitriol (ROCALTROL) capsule 0.75 mcg, 0.75 mcg, Oral, Once per day on Monday Wednesday Friday, Dimple Hernandez DO, 0.75 mcg at 06/24/24 1021    calcium acetate (PHOSLO) capsule 1,334 mg, 1,334 mg, Oral, TID With Meals, Dimple Hernandez DO, 1,334 mg at 06/25/24 0852    ceftriaxone (ROCEPHIN) 2 g/50 mL in dextrose IVPB, 2,000 mg, Intravenous, Q24H, Dimple Hernandez DO, Last Rate: 100 mL/hr at 06/24/24 2312, 2,000 mg at 06/24/24 2312    Cholecalciferol (VITAMIN D3) tablet 1,000 Units, 1,000 Units, Oral, Daily, Dimple Hernandez DO, 1,000 Units at 06/25/24 0852    sevelamer (RENAGEL) tablet 1,600 mg, 1,600 mg, Oral, TID With Meals, Dimple Hernandez DO, 1,600 mg at 06/25/24 0852    simethicone (MYLICON) chewable tablet 80 mg,  80 mg, Oral, 4x Daily PRN, Dimple Hernandez,     Laboratory Results:  Results from last 7 days   Lab Units 06/25/24  0516 06/24/24  0418 06/23/24  2031   WBC Thousand/uL 5.20 13.21* 14.27*   HEMOGLOBIN g/dL 11.6* 11.9* 12.9   HEMATOCRIT % 35.7* 38.8 40.6   PLATELETS Thousands/uL 81* 90* 95*   SODIUM mmol/L 133* 133* 135   POTASSIUM mmol/L 4.5 5.6* 5.5*   CHLORIDE mmol/L 92* 92* 92*   CO2 mmol/L 30 23 26   BUN mg/dL 41* 58* 58*   CREATININE mg/dL 6.05* 8.14* 8.17*   CALCIUM mg/dL 8.6 9.2 9.4   MAGNESIUM mg/dL 2.0  --   --    PHOSPHORUS mg/dL 5.8*  --   --        I have personally reviewed the blood work as stated above and in my note.  I have personally reviewed internal Medicine, co-consultants and previous nephrology notes.

## 2024-06-25 NOTE — HOSPITAL COURSE
06/23/2024: ED course  64-year-old male presents to the ED with a chief complaint of generalized weakness and abdominal pain.  Patient's past medical history significant for hypertension and HFrEF secondary to dilated cardiomyopathy, ESRD on HD secondary to polycystic kidney disease previously on PD at returns to HD given her recurrent infections presented to the ED with generalized weakness fatigue decreased p.o. intake abdominal pain and vomiting.  Wife reported that the patient came home today had several episodes of nonbloody none bilious emesis, described as brown in color.  Wife became concerned because he also appeared very pale.  In ED patient was found to be febrile with a Tmax of 101.9 °F / 38.8 °C with a heart rate of 102 and a blood pressure of 130/77 saturating 98% on room air.  Patient reported that he was not having any fevers or chills at home.  Patient also reported concern for right-sided testicular swelling, however on later questioning the patient confirmed that the swelling is chronic and has been present for many years.  He denied any other Symptoms.  Workup in the ED found physical exam findings significant for patient acute distress, who.  Ill-appearing, with tachycardia with heart rate as mentioned above, abdominal distention with fluid wave positive.  Patient's right testicle also presented with swelling significantly enlarged right-sided scrotum, nontender, bilateral lower extremity edema/pitting edema with erythema; labs in the ED showed Pro-Vincent at 13.55, leukocytosis at 13.2 (87% segmented, absolute neutrophils elevated 12.02), hemoglobin at 11.9, , 16.8% RDW, platelets at 95; CMP showed hyponatremia 133, potassium elevated 5.6, BUN at 58, creatinine 8.14, AST decreased at 6 and ALT at 3, total bilirubin elevated at 1.65 with a GFR of 6.  Patient's PT/INR was elevated at 17.9/1.40; blood cultures obtained; troponins were found to be elevated at 0/77 further elevation to 86 at the  2-hour and 88 at the 4-hour-however patient is Dultz at the 2-hour was 9.  Direct bilirubin is elevated 2.76 this x-ray showed left central catheter upper right atrium, clear lungs no pneumothorax or pleural effusion, mild cardiomegaly, unremarkable bones for age, normal upper abdomen, overall impression with no acute cardiopulmonary disease.  Patient admitted under Slim at midnight.    06/24/2024:  64-year-old male presented to the ED with above findings, admitted for sepsis, started empiric Rocephin started for SBP coverage given bump in with large volume ascites.  Patient underwent paracentesis today and will need to follow-up on fluid results, trending WBC, PCT, follow fever curve.  ESRD on HD-HD planned for today.  HFrEF, patient nonadherent to GDMT per chart review patient has not any systemic AC for PAF as it was transient episode in the past regarding an admission for sepsis.  CBC showed white blood cells decreased from 14.27-13.21, Hgb down to 11.9 from 12.9; MCV increased from  this morning.  Platelets decreased from 95-90.  Patient's pro time INR now at 19.2/1.53 elevated from 17.9/1.40.  Paracentesis results showed light yellow color, clear clarity fluid, 303 white blood cells per fluid; body fluid culture and Gram stain showed rare polys however no bacteria seen; total protein and fluids found to be at 4.6, glucose at 99, albumin 2.5 g/dL; ; body fluid differential WNL throughout; lastly the patient's ammonia was found to be at 28 -however lab report that the patient's sample was slightly hemolyzed and the results may be affected.    06/25/2024:  Interval reaccumulation of ascitic fluid, no indication for repeat tap today as per IR. Case discussed with nephrology, with recommendation for updated echo as there may be cardiac component moreso than a liver component     6/26/24:   MRI abdomen to r/o polycystic disease of liver ordered and pending. Fluid studies continue to point towards  cardiac cause of ascites. Repeat echo pending, last EF 20-25%. Will involve cards depending on result     6/27/24:  Echo demonstrates EF 20% with global hypokinesis. Given advanced state recommendation per cardiology of medical mgmt with metoprolol and losartan, with ASA and statin down the line. Repeat paracentesis with albumin ordered.

## 2024-06-25 NOTE — PROGRESS NOTES
[facility]  Progress Note  Name: Homero Koch I  MRN: [id number]  Unit/Bed#: S MS 210Griselda I Date of Admission: 6/23/2024   Date of Service: 6/25/2024 I Hospital Day: 2    Assessment & Plan   * Sepsis (HCC)  Assessment & Plan  Meets sepsis: tachycardic, febrile, Leukocytosis. Source likely GI: SBP vs gastroenteritis   Presented with diffuse abdominal pain with N/V, watery diarrhea.   Work up:  Procal 5.73  WBC 14. 27  Lactic: 1.4  CXR: unremarkable, no consolidations  CT A/P: mild splenomegaly with possible infarct, PCKD, Renal and left hepatic lobe lesions, large ascities, diverticulosis    Recent Labs     06/23/24  2031 06/24/24  0418 06/25/24  0516   WBC 14.27* 13.21* 5.20       Plan:  Continue Ceftriaxone 2g for SBP  IR consult for paracentesis  F/u C diff/Enteric stool studies  Blood cultures growing Gram + cocci in clusters and gram + rods.  F/u Procal AM  Trend fever curve and vitals  Trend CBC  Tylenol prn for fevers    Abdominal pain  Assessment & Plan  Patient presenting with generalized abdominal pain. Notable history of polycystic liver and kidney disease requiring dialysis and previously requiring paracentesis.  CT Abd/Pelvis: Negative for bowel obstruction. Mild splenomegaly. Question geographic area of decreased attenuation within the spleen, possibly artifactual from streak artifact. Difficult to completely exclude subtle infarct. Polycystic kidney and liver disease with multiple complex and indeterminant renal and left hepatic lobe lesions. Correlation with nonemergent MRI abdomen with and without IV contrast recommended to exclude possibility of a solid lesion. Large amount of abdominopelvic ascites, increased from prior study. Sigmoid diverticulosis without diverticulitis.     Patient also has history of inguinal hernia. US from 2019 of scrotum and testicles: confirmed large complex hydrocele with numerous internal septations and debris measuring 15.6 x 9.3 x 11.4  without evidence of varicocele or scrotal thickness     Patient with fever, abdominal pain and large ascites. Procal 5.73, leukocytosis to 14.27. total bilirubin elevated to 1.93.    Plan:  See Sepsis plan  Recommend MRI abdomen with and without IV contrast outpatient to exclude solid lesion in hepatic/renal cysts  RUQ U/S w doppler ordered    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  Receives dialysis M/W/F  Last dialysis charted 6/19/2024  Per Patient, last dialysis was Friday 6/21/2024  Per notes, is noncompliant with dialysis prescription and meds, only medication patient taking is PhosLo TID    Plan:  Continue PhosLo TID with meal   Restart Sevelamer TID with meals  Restart Rocalitrol 0.25mcg  Restart Vitamin D 1,000U  Restart B complex   Renal [place] diet with 2L fluid restriction  Consult nephrology for assistance with dialysis while inpatient    HFrEF Dilated cardiomyopathy (HCC)  Assessment & Plan  Non compliant with medications including Toprol-XL 25mg  Patient hypervolemic on exam. Blood pressures stable.   7/28/23 Echo: EF 20-25%, severe global hypokinesis with regional variation, moderately reduced systolic function, diastolic dysfunction, severely dilated L atria, moderately dilated R atria, mild-moderate Mitral valve regurg    Plan:  Holding metoprolol due to normotension   Renal [place] diet with 2L fluid restriction  Strict I/Os  Daily Weight    Hyperkalemia  Assessment & Plan  5.5 POA.   Recent Labs     06/23/24  2031 06/24/24  0418 06/25/24  0516   K 5.5* 5.6* 4.5   MG  --   --  2.0       Consult nephrology for dialysis.   Albuterol nebulizer  Calcium gluconate   Monitor on tele.     Elevated troponin  Assessment & Plan  Elevated  trop 77->86, delta 9, most likely due to demand  EKG showed sinus tachycardic with ST depressions in II, III, V4, V5, V6, V1  No chest pain, angina symptoms, or chest palpitations    Lab Results   Component Value Date    HSTNI0 77 (H) 06/23/2024    HSTNI2 86 (H) 06/23/2024    HSTNID2 9 06/23/2024    HSTNI4 88 (H) 06/24/2024    HSTNID4 11 06/24/2024     Plan:  Trend troponin  Monitor on tele    Paroxysmal atrial fibrillation (HCC)  Assessment & Plan  Sinus tachycardia on admission  Monitor on tele  Resume Toprol XL    ADPKD (autosomal dominant polycystic kidney)  Assessment & Plan  History of ADPKD with liver involvement   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 Correlation with nonemergent MRI abdomen with and without IV contrast recommended to exclude possibility of a solid lesion. Large amount of abdominopelvic ascites, increased from prior study.     Thrombocytopenia (HCC)  Assessment & Plan  On admission Plt 95  HIT work-up WNL, patient refused hematology evaluation outpatient  Possibly due to ADPKD    Recent Labs     06/23/24  2031 06/24/24  0418 06/25/24  0516   PLT 95* 90* 81*       Continue to trend daily  Currently holding DVT prophylaxis due to concern for HIT on prior admission     Multiple and open wound of lower limb  Assessment & Plan  Wound care consulted           VTE Pharmacologic Prophylaxis: VTE Score: 6 High Risk (Score >/= 5) - Pharmacological DVT Prophylaxis Contraindicated. Sequential Compression Devices Ordered.    Mobility:   Basic Mobility Inpatient Raw Score: 18  JH-HLM Goal: 6: Walk 10 steps or more  JH-HLM Achieved: 3: Sit at edge of bed  JH-HLM Goal achieved. Continue to encourage appropriate mobility.    Patient Centered Rounds: I performed bedside  rounds with nursing staff today.  Discussions with Specialists or Other Care Team Provider: None    Education and Discussions with Family / Patient: Updated  (wife) at bedside.    Current Length of Stay: 2 day(s)  Current Patient Status: Inpatient   Discharge Plan: Anticipate discharge in >72 hrs to home.    Code Status: Level 3 - DNAR and DNI    Subjective:   No overnight events. Patient today expresses that he feels much better than prior to admission. He does endorse some increasing abdominal distension since his paracentesis yesterday. He denies any nausea, vomiting, diarrhea, or constipation. He states his pain is well controlled.      Objective:     Vitals:   Temp (24hrs), Av.8 °F (37.1 °C), Min:97.9 °F (36.6 °C), Max:99.8 °F (37.7 °C)    Temp:  [97.9 °F (36.6 °C)-99.8 °F (37.7 °C)] 97.9 °F (36.6 °C)  HR:  [69-77] 76  Resp:  [18-20] 20  BP: (114-134)/(70-86) 114/73  SpO2:  [95 %-100 %] 95 %  Body mass index is 25.38 kg/m².     Input and Output Summary (last 24 hours):     Intake/Output Summary (Last 24 hours) at 2024 1502  Last data filed at 2024 2312  Gross per 24 hour   Intake 520 ml   Output 500 ml   Net 20 ml       Physical Exam:   Physical Exam  Constitutional:       Appearance: Normal appearance.   Cardiovascular:      Rate and Rhythm: Normal rate and regular rhythm.      Pulses: Normal pulses.      Heart sounds: Normal heart sounds.   Pulmonary:      Effort: Pulmonary effort is normal.      Breath sounds: Normal breath sounds.   Abdominal:      General: Bowel sounds are normal. There is distension.      Tenderness: There is no abdominal tenderness. There is no right CVA tenderness or left CVA tenderness.      Hernia: A hernia is present.   Genitourinary:     Comments: Scrotum swollen  Neurological:      Mental Status: He is alert.          Additional Data:     Labs:  Results from last 7 days   Lab Units 24  0516 24  0418 24  2031   WBC Thousand/uL 5.20  13.21* 14.27*   HEMOGLOBIN g/dL 11.6* 11.9* 12.9   HEMATOCRIT % 35.7* 38.8 40.6   PLATELETS Thousands/uL 81* 90* 95*   BANDS PCT %  --  4  --    SEGS PCT %  --   --  91*   LYMPHO PCT %  --  2* 1*   MONO PCT %  --  5 7   EOS PCT %  --  0 0     Results from last 7 days   Lab Units 06/25/24  0516 06/24/24  0418   SODIUM mmol/L 133* 133*   POTASSIUM mmol/L 4.5 5.6*   CHLORIDE mmol/L 92* 92*   CO2 mmol/L 30 23   BUN mg/dL 41* 58*   CREATININE mg/dL 6.05* 8.14*   ANION GAP mmol/L 11 18*   CALCIUM mg/dL 8.6 9.2   ALBUMIN g/dL  --  3.9   TOTAL BILIRUBIN mg/dL  --  1.65*   ALK PHOS U/L  --  87   ALT U/L  --  <3*   AST U/L  --  6*   GLUCOSE RANDOM mg/dL 92 94     Results from last 7 days   Lab Units 06/24/24  0418   INR  1.53*             Results from last 7 days   Lab Units 06/24/24  0418 06/23/24 2031   LACTIC ACID mmol/L  --  1.4   PROCALCITONIN ng/ml 13.55* 5.73*       Lines/Drains:  Invasive Devices       Peripheral Intravenous Line  Duration             Peripheral IV 06/23/24 Proximal;Right;Ventral (anterior) Forearm 2 days    Peripheral IV 06/23/24 Right;Ventral (anterior) Forearm 1 day              Hemodialysis Catheter  Duration             HD Permanent Double Catheter 329 days                      Telemetry:  Telemetry Orders (From admission, onward)               24 Hour Telemetry Monitoring  Continuous x 24 Hours (Telem)        Question:  Reason for 24 Hour Telemetry  Answer:  Metabolic/electrolyte disturbance with high probability of dysrhythmia. K level <3 or >6 OR KCL infusion >10mEq/hr                     Telemetry Reviewed: Normal Sinus Rhythm  Indication for Continued Telemetry Use: Metabolic/electrolyte disturbance with high probability of dysrhythmia             Imaging: Reviewed radiology reports from this admission including: chest xray and abdominal/pelvic CT    Recent Cultures (last 7 days):   Results from last 7 days   Lab Units 06/24/24  1309 06/24/24  0929 06/23/24 2033 06/23/24 2031   BLOOD  CULTURE   --   --  No Growth at 24 hrs. Corynebacterium striatum group*   GRAM STAIN RESULT   --  Rare Polys  No bacteria seen  --  Gram positive rods*  Gram positive cocci in clusters*   BODY FLUID CULTURE, STERILE   --  No growth  --   --    C DIFF TOXIN B BY PCR  Negative  --   --   --        Last 24 Hours Medication List:   Current Facility-Administered Medications   Medication Dose Route Frequency Provider Last Rate    acetaminophen  650 mg Oral Q6H PRN Dimple Hernandez DO      b complex-vitamin C-folic acid  1 mg Oral Daily With Dinner Dimple Hernandez, DO      calcitriol  0.75 mcg Oral Once per day on Monday Wednesday Friday Dimple Hernandez, DO      calcium acetate  1,334 mg Oral TID With Meals Dimple Hernandez,       cefTRIAXone  2,000 mg Intravenous Q24H Dimple Hernandez, DO 2,000 mg (06/24/24 8102)    cholecalciferol  1,000 Units Oral Daily Dimple Hernandez, DO      sevelamer  1,600 mg Oral TID With Meals Dimple Hernandez, DO      simethicone  80 mg Oral 4x Daily PRN Dimple Hernandez DO          Today, Patient Was Seen By: Inder Ignacio DO    **Please Note: This note may have been constructed using a voice recognition system.**

## 2024-06-25 NOTE — ASSESSMENT & PLAN NOTE
Lab Results   Component Value Date    EGFR 9 06/25/2024    EGFR 6 06/24/2024    EGFR 6 06/23/2024    CREATININE 6.05 (H) 06/25/2024    CREATININE 8.14 (H) 06/24/2024    CREATININE 8.17 (H) 06/23/2024     ESRD 2/2 ADPKD  Receives dialysis M/W/F  Last dialysis charted 6/19/2024  Per Patient, last dialysis was Friday 6/21/2024  Per notes, is noncompliant with dialysis prescription and meds, only medication patient taking is PhosLo TID    Plan:  Continue PhosLo TID with meal   Restart Sevelamer TID with meals  Restart Rocalitrol 0.25mcg  Restart Vitamin D 1,000U  Restart B complex   Renal [place] diet with 2L fluid restriction  Consult nephrology for assistance with dialysis while inpatient

## 2024-06-25 NOTE — ASSESSMENT & PLAN NOTE
Meets sepsis: tachycardic, febrile, Leukocytosis. Source likely GI: SBP vs gastroenteritis   Presented with diffuse abdominal pain with N/V, watery diarrhea.   Work up:  Procal 5.73  WBC 14. 27  Lactic: 1.4  CXR: unremarkable, no consolidations  CT A/P: mild splenomegaly with possible infarct, PCKD, Renal and left hepatic lobe lesions, large ascities, diverticulosis    Recent Labs     06/23/24  2031 06/24/24  0418 06/25/24  0516   WBC 14.27* 13.21* 5.20       Plan:  Continue Ceftriaxone 2g for SBP  IR consult for paracentesis  C. difficile and enteric stool study negative  Blood cultures growing Gram + cocci in clusters and gram + rods  Body fluid showed no culture growth  Anaerobic culture showed no growth  Trend fever curve and vitals  Trend CBC  Tylenol prn for fevers

## 2024-06-25 NOTE — ASSESSMENT & PLAN NOTE
Non compliant with medications including Toprol-XL 25mg  Patient hypervolemic on exam. Blood pressures stable.   7/28/23 Echo: EF 20-25%, severe global hypokinesis with regional variation, moderately reduced systolic function, diastolic dysfunction, severely dilated L atria, moderately dilated R atria, mild-moderate Mitral valve regurg    Plan:  Holding metoprolol due to normotension   Renal [place] diet with 2L fluid restriction  Strict I/Os  Daily Weight

## 2024-06-25 NOTE — ASSESSMENT & PLAN NOTE
Patient presenting with generalized abdominal pain. Notable history of polycystic liver and kidney disease requiring dialysis and previously requiring paracentesis.  CT Abd/Pelvis: Negative for bowel obstruction. Mild splenomegaly. Question geographic area of decreased attenuation within the spleen, possibly artifactual from streak artifact. Difficult to completely exclude subtle infarct. Polycystic kidney and liver disease with multiple complex and indeterminant renal and left hepatic lobe lesions. Correlation with nonemergent MRI abdomen with and without IV contrast recommended to exclude possibility of a solid lesion. Large amount of abdominopelvic ascites, increased from prior study. Sigmoid diverticulosis without diverticulitis.     Patient also has history of inguinal hernia. US from 2019 of scrotum and testicles: confirmed large complex hydrocele with numerous internal septations and debris measuring 15.6 x 9.3 x 11.4 without evidence of varicocele or scrotal thickness     Patient with fever, abdominal pain and large ascites. Procal 5.73, leukocytosis to 14.27. total bilirubin elevated to 1.93.    Plan:  See Sepsis plan  MRI abdomen showed:  Multiple simple and proteinaceous hepatic cysts. No solid hepatic mass is identified. Lobulated liver contour may be related to numerous cysts although cirrhosis is possible.Hepatic iron deposition   Enlarged kidneys with innumerable simple and proteinaceous renal cysts in keeping with autosomal dominant polycystic kidney disease. A Bosniak IIF cystic renal mass in the right kidney lower pole measuring 5.8 cm has been stable since CT 10/7/2019.   Splenomegaly and splenic iron deposition.   Scrotal ultrasound showed no evidence of hernia but did show right hydrocele.

## 2024-06-25 NOTE — PLAN OF CARE
Problem: Potential for Falls  Goal: Patient will remain free of falls  Description: INTERVENTIONS:  - Educate patient/family on patient safety including physical limitations  - Instruct patient to call for assistance with activity   - Consult OT/PT to assist with strengthening/mobility   - Keep Call bell within reach  - Keep bed low and locked with side rails adjusted as appropriate  - Keep care items and personal belongings within reach  - Initiate and maintain comfort rounds  - Make Fall Risk Sign visible to staff  - Offer Toileting every 2 Hours, in advance of need  - Initiate/Maintain bed alarm  - Obtain necessary fall risk management equipment  - Apply yellow socks and bracelet for high fall risk patients  - Consider moving patient to room near nurses station  Outcome: Progressing     Problem: PAIN - ADULT  Goal: Verbalizes/displays adequate comfort level or baseline comfort level  Description: Interventions:  - Encourage patient to monitor pain and request assistance  - Assess pain using appropriate pain scale  - Administer analgesics based on type and severity of pain and evaluate response  - Implement non-pharmacological measures as appropriate and evaluate response  - Consider cultural and social influences on pain and pain management  - Notify physician/advanced practitioner if interventions unsuccessful or patient reports new pain  Outcome: Progressing     Problem: INFECTION - ADULT  Goal: Absence or prevention of progression during hospitalization  Description: INTERVENTIONS:  - Assess and monitor for signs and symptoms of infection  - Monitor lab/diagnostic results  - Monitor all insertion sites, i.e. indwelling lines, tubes, and drains  - Monitor endotracheal if appropriate and nasal secretions for changes in amount and color  - [place] appropriate cooling/warming therapies per order  - Administer medications as ordered  - Instruct and encourage patient and family to use good hand hygiene  technique  - Identify and instruct in appropriate isolation precautions for identified infection/condition  Outcome: Progressing

## 2024-06-25 NOTE — ASSESSMENT & PLAN NOTE
5.5 POA.   Recent Labs     06/23/24 2031 06/24/24  0418 06/25/24  0516   K 5.5* 5.6* 4.5   MG  --   --  2.0       Consult nephrology for dialysis.   Albuterol nebulizer  Calcium gluconate   Monitor on tele.

## 2024-06-26 ENCOUNTER — APPOINTMENT (INPATIENT)
Dept: DIALYSIS | Facility: HOSPITAL | Age: 64
DRG: 871 | End: 2024-06-26
Attending: INTERNAL MEDICINE
Payer: MEDICARE

## 2024-06-26 ENCOUNTER — APPOINTMENT (INPATIENT)
Dept: MRI IMAGING | Facility: HOSPITAL | Age: 64
DRG: 871 | End: 2024-06-26
Payer: MEDICARE

## 2024-06-26 LAB
ALBUMIN SERPL BCG-MCNC: 3.2 G/DL (ref 3.5–5)
ALP SERPL-CCNC: 104 U/L (ref 34–104)
ALT SERPL W P-5'-P-CCNC: 4 U/L (ref 7–52)
ANION GAP SERPL CALCULATED.3IONS-SCNC: 12 MMOL/L (ref 4–13)
ANISOCYTOSIS BLD QL SMEAR: PRESENT
AST SERPL W P-5'-P-CCNC: 6 U/L (ref 13–39)
BASOPHILS # BLD AUTO: 0.04 THOUSANDS/ÂΜL (ref 0–0.1)
BASOPHILS NFR BLD AUTO: 1 % (ref 0–1)
BILIRUB SERPL-MCNC: 0.76 MG/DL (ref 0.2–1)
BUN SERPL-MCNC: 58 MG/DL (ref 5–25)
CALCIUM ALBUM COR SERPL-MCNC: 9.3 MG/DL (ref 8.3–10.1)
CALCIUM SERPL-MCNC: 8.7 MG/DL (ref 8.4–10.2)
CHLORIDE SERPL-SCNC: 92 MMOL/L (ref 96–108)
CO2 SERPL-SCNC: 31 MMOL/L (ref 21–32)
CREAT SERPL-MCNC: 7.41 MG/DL (ref 0.6–1.3)
EOSINOPHIL # BLD AUTO: 0.24 THOUSAND/ÂΜL (ref 0–0.61)
EOSINOPHIL NFR BLD AUTO: 6 % (ref 0–6)
ERYTHROCYTE [DISTWIDTH] IN BLOOD BY AUTOMATED COUNT: 15.9 % (ref 11.6–15.1)
GFR SERPL CREATININE-BSD FRML MDRD: 7 ML/MIN/1.73SQ M
GLUCOSE SERPL-MCNC: 100 MG/DL (ref 65–140)
HCT VFR BLD AUTO: 37.1 % (ref 36.5–49.3)
HGB BLD-MCNC: 12 G/DL (ref 12–17)
IMM GRANULOCYTES # BLD AUTO: 0.01 THOUSAND/UL (ref 0–0.2)
IMM GRANULOCYTES NFR BLD AUTO: 0 % (ref 0–2)
LYMPHOCYTES # BLD AUTO: 0.46 THOUSANDS/ÂΜL (ref 0.6–4.47)
LYMPHOCYTES NFR BLD AUTO: 12 % (ref 14–44)
MAGNESIUM SERPL-MCNC: 2.2 MG/DL (ref 1.9–2.7)
MCH RBC QN AUTO: 31.5 PG (ref 26.8–34.3)
MCHC RBC AUTO-ENTMCNC: 32.3 G/DL (ref 31.4–37.4)
MCV RBC AUTO: 97 FL (ref 82–98)
MONOCYTES # BLD AUTO: 0.52 THOUSAND/ÂΜL (ref 0.17–1.22)
MONOCYTES NFR BLD AUTO: 13 % (ref 4–12)
NEUTROPHILS # BLD AUTO: 2.7 THOUSANDS/ÂΜL (ref 1.85–7.62)
NEUTS SEG NFR BLD AUTO: 68 % (ref 43–75)
NRBC BLD AUTO-RTO: 0 /100 WBCS
OVALOCYTES BLD QL SMEAR: PRESENT
PHOSPHATE SERPL-MCNC: 6.4 MG/DL (ref 2.3–4.1)
PLATELET # BLD AUTO: 96 THOUSANDS/UL (ref 149–390)
PLATELET BLD QL SMEAR: ABNORMAL
PMV BLD AUTO: 11.5 FL (ref 8.9–12.7)
POTASSIUM SERPL-SCNC: 4.1 MMOL/L (ref 3.5–5.3)
PROT SERPL-MCNC: 6.5 G/DL (ref 6.4–8.4)
RBC # BLD AUTO: 3.81 MILLION/UL (ref 3.88–5.62)
RBC MORPH BLD: PRESENT
SODIUM SERPL-SCNC: 135 MMOL/L (ref 135–147)
WBC # BLD AUTO: 3.97 THOUSAND/UL (ref 4.31–10.16)

## 2024-06-26 PROCEDURE — 90935 HEMODIALYSIS ONE EVALUATION: CPT | Performed by: INTERNAL MEDICINE

## 2024-06-26 PROCEDURE — 88305 TISSUE EXAM BY PATHOLOGIST: CPT | Performed by: STUDENT IN AN ORGANIZED HEALTH CARE EDUCATION/TRAINING PROGRAM

## 2024-06-26 PROCEDURE — 99223 1ST HOSP IP/OBS HIGH 75: CPT | Performed by: INTERNAL MEDICINE

## 2024-06-26 PROCEDURE — 97167 OT EVAL HIGH COMPLEX 60 MIN: CPT

## 2024-06-26 PROCEDURE — 5A1D70Z PERFORMANCE OF URINARY FILTRATION, INTERMITTENT, LESS THAN 6 HOURS PER DAY: ICD-10-PCS | Performed by: INTERNAL MEDICINE

## 2024-06-26 PROCEDURE — 85025 COMPLETE CBC W/AUTO DIFF WBC: CPT

## 2024-06-26 PROCEDURE — 87040 BLOOD CULTURE FOR BACTERIA: CPT

## 2024-06-26 PROCEDURE — 88112 CYTOPATH CELL ENHANCE TECH: CPT | Performed by: STUDENT IN AN ORGANIZED HEALTH CARE EDUCATION/TRAINING PROGRAM

## 2024-06-26 PROCEDURE — 84100 ASSAY OF PHOSPHORUS: CPT

## 2024-06-26 PROCEDURE — A9585 GADOBUTROL INJECTION: HCPCS | Performed by: INTERNAL MEDICINE

## 2024-06-26 PROCEDURE — 83735 ASSAY OF MAGNESIUM: CPT

## 2024-06-26 PROCEDURE — 99232 SBSQ HOSP IP/OBS MODERATE 35: CPT | Performed by: INTERNAL MEDICINE

## 2024-06-26 PROCEDURE — 97163 PT EVAL HIGH COMPLEX 45 MIN: CPT

## 2024-06-26 PROCEDURE — 80053 COMPREHEN METABOLIC PANEL: CPT

## 2024-06-26 PROCEDURE — 74183 MRI ABD W/O CNTR FLWD CNTR: CPT

## 2024-06-26 RX ORDER — GADOBUTROL 604.72 MG/ML
8 INJECTION INTRAVENOUS
Status: COMPLETED | OUTPATIENT
Start: 2024-06-26 | End: 2024-06-26

## 2024-06-26 RX ORDER — ALBUMIN (HUMAN) 12.5 G/50ML
25 SOLUTION INTRAVENOUS ONCE
Status: COMPLETED | OUTPATIENT
Start: 2024-06-26 | End: 2024-06-26

## 2024-06-26 RX ORDER — TORSEMIDE 20 MG/1
80 TABLET ORAL
Status: DISCONTINUED | OUTPATIENT
Start: 2024-06-27 | End: 2024-06-27

## 2024-06-26 RX ADMIN — CALCIUM ACETATE 1334 MG: 667 CAPSULE ORAL at 12:39

## 2024-06-26 RX ADMIN — CALCIUM ACETATE 1334 MG: 667 CAPSULE ORAL at 08:34

## 2024-06-26 RX ADMIN — SEVELAMER HYDROCHLORIDE 1600 MG: 800 TABLET ORAL at 12:39

## 2024-06-26 RX ADMIN — CEFTRIAXONE SODIUM 2000 MG: 10 INJECTION, POWDER, FOR SOLUTION INTRAVENOUS at 21:36

## 2024-06-26 RX ADMIN — CALCITRIOL 0.75 MCG: 0.25 CAPSULE, LIQUID FILLED ORAL at 08:34

## 2024-06-26 RX ADMIN — ALBUMIN (HUMAN) 25 G: 0.25 INJECTION, SOLUTION INTRAVENOUS at 15:10

## 2024-06-26 RX ADMIN — Medication 1 CAPSULE: at 17:24

## 2024-06-26 RX ADMIN — GADOBUTROL 8 ML: 604.72 INJECTION INTRAVENOUS at 10:34

## 2024-06-26 RX ADMIN — SEVELAMER HYDROCHLORIDE 1600 MG: 800 TABLET ORAL at 08:34

## 2024-06-26 RX ADMIN — CALCIUM ACETATE 1334 MG: 667 CAPSULE ORAL at 17:24

## 2024-06-26 RX ADMIN — Medication 1000 UNITS: at 08:34

## 2024-06-26 RX ADMIN — SEVELAMER HYDROCHLORIDE 1600 MG: 800 TABLET ORAL at 17:24

## 2024-06-26 NOTE — OCCUPATIONAL THERAPY NOTE
Occupational Therapy Evaluation     Patient Name: Homero Koch  Today's Date: 6/26/2024  Problem List  Principal Problem:    Sepsis (HCC)  Active Problems:    Chronic kidney disease with end stage renal failure on dialysis (HCC)    Multiple and open wound of lower limb    Thrombocytopenia (HCC)    ADPKD (autosomal dominant polycystic kidney)    Paroxysmal atrial fibrillation (HCC)    Elevated troponin    Hyperkalemia    HFrEF Dilated cardiomyopathy (HCC)    Abdominal pain    Past Medical History  Past Medical History:   Diagnosis Date    Complication of renal dialysis     Polycystic kidney disease      Past Surgical History  Past Surgical History:   Procedure Laterality Date    IR CATHETERGRAM  10/17/2019    IR IMAGE GUIDED ASPIRATION / DRAINAGE  9/23/2019    IR PARACENTESIS  10/9/2019    IR PARACENTESIS  6/24/2024    IR TEMPORARY DIALYSIS CATHETER PLACEMENT  7/28/2023    IR TUNNELED CENTRAL LINE CHECK/CHANGE/REPOSITION  12/14/2021    IR TUNNELED DIALYSIS CATHETER CHECK/CHANGE/REPOSITION/ANGIOPLASTY  10/17/2019    IR TUNNELED DIALYSIS CATHETER PLACEMENT  9/30/2019    IR TUNNELED DIALYSIS CATHETER PLACEMENT  8/1/2023    IR TUNNELED DIALYSIS CATHETER REMOVAL  7/28/2023    SPLIT THICKNESS SKIN GRAFT Bilateral 11/7/2019    Procedure: SKIN GRAFT SPLIT THICKNESS (STSG)  EXTREMITY;  Surgeon: Torey Cha MD;  [facility];  Service: Plastics    SPLIT THICKNESS SKIN GRAFT Bilateral 11/12/2019    Procedure: SKIN GRAFT SPLIT THICKNESS (STSG)  EXTREMITY;  Surgeon: Torey Cha MD;  [facility];  Service: Plastics    VAC DRESSING APPLICATION Bilateral 11/18/2019    Procedure: CHANGE DRESSING;  Surgeon: Torey Cha MD;  [facility];  Service: Plastics    WOUND DEBRIDEMENT Bilateral 10/31/2019    Procedure: DEBRIDEMENT WOUND (WASH OUT);  Surgeon: Selvin Han DPM;  [facility];  Service: Podiatry    WOUND DEBRIDEMENT Bilateral 11/5/2019     Procedure: DEBRIDEMENT WOUND (WASH OUT);  Surgeon: Selvin Han DPM;  [facility];  Service: Podiatry    WOUND DEBRIDEMENT Bilateral 11/7/2019    Procedure: DEBRIDEMENT WOUND (WASH OUT);  Surgeon: Torey Cha MD;  [facility];  Service: Plastics    WOUND DEBRIDEMENT Bilateral 11/12/2019    Procedure: DEBRIDEMENT LOWER EXTREMITY (WASH OUT);  Surgeon: Torey Cha MD;  [facility];  Service: Plastics           06/26/24 0936   OT Last Visit   OT Visit Date 06/26/24   Note Type   Note type Evaluation   Pain Assessment   Pain Assessment Tool 0-10   Pain Score No Pain   Restrictions/Precautions   Weight Bearing Precautions Per Order No   Other Precautions Cognitive;Bed Alarm;Chair Alarm;Fall Risk   Home Living   Type of Home House   Home Layout Multi-level;1/2 bath on main level;Bed/bath upstairs;Stairs to enter with rails  (1 large curb step + 5 + 1 FRANCISCO JAVIER)   Bathroom Shower/Tub Walk-in shower   Bathroom Toilet Raised   Bathroom Equipment Grab bars in shower   Bathroom Accessibility Accessible   Home Equipment Cane;Grab bars  (SPC with worn down rubber bottom - does not use)   Additional Comments Pt reports ambulating w/ gripped end of golf clubs throughout the home, as SPC has a worn down bottom   Prior Function   Level of [place] Independent with ADLs;Independent with functional mobility;Independent with IADLS  (Increased time to complete ADL tasks)   Lives With Spouse;Family  (dtr + grandchildren)   Receives Help From Family   IADLs Independent with driving;Independent with meal prep;Independent with medication management  (splits IADL tasks w/ spouse)   Falls in the last 6 months 0   Vocational On disability   Comments Pt reports independently completing ADL tasks w/ increased time   Lifestyle   Autonomy PTA, living w/ family in a multi-level home, independent w/ ADLs/IADLs and RONALDO for functional mobility w/ use of AD. (-) falls, (+) driving   Reciprocal  Relationships Supportive family   Service to Others On disability   Intrinsic Gratification Playing on Ipad   General   Additional Pertinent History Pt admit with generalized weakness, fatigue, decreased PO intake, abdominal pain and vomiting. Dx: sepsis, s/p 8.9 L paracentesis on admission. PMHx: ESRD on HD, HTN, HFrEF   Family/Caregiver Present Yes  (supportive spouse at bedside)   ADL   Eating Assistance 7  Independent   Grooming Assistance 6  Modified Independent   Grooming Deficit Wash/dry hands  (standing at the support w/o support or steadying)   UB Bathing Assistance 6  Modified Independent   LB Bathing Assistance 6  Modified Independent   UB Dressing Assistance 6  Modified independent   LB Dressing Assistance 6  Modified independent   Toileting Assistance  6  Modified independent   Toileting Deficit Clothing management up;Clothing management down;Perineal hygiene   Additional Comments Unable to formally assess UB dressing, bathing at time of eval. The above levels of assistance are anticipated based on functional performance deficits with use of clinical judgement.   Bed Mobility   Additional Comments Bed mobility NT; pt received sitting at the EOB   Transfers   Sit to Stand 5  Supervision   Additional items Increased time required;Verbal cues   Stand to Sit 5  Supervision   Additional items Increased time required;Verbal cues   Toilet transfer 5  Supervision   Additional items Increased time required;Verbal cues;Raised toilet seat  (BSC over toilet)   Additional Comments No overt LOB or instability noted w/ functional transfers. Use of RW vs SPC, education to sequence optimal hand placement   Functional Mobility   Functional Mobility 5  Supervision   Additional Comments for functional household distance to the bathroom w/ use of RW ~education on safety + pacing techniques. Additional household distance ambulated, patient progressing with use of SPC. No overt LOB or signs of instability.   Additional items    (RW progressing to SPC)   Balance   Static Sitting Good   Dynamic Sitting Fair +   Static Standing Fair +   Dynamic Standing Fair +   Activity Tolerance   Activity Tolerance Patient tolerated treatment well   Medical Staff Made Aware Spoke with CM, PT   Nurse Made Aware Spoke with RN pre/post   RUE Assessment   RUE Assessment WFL   LUE Assessment   LUE Assessment WFL   Hand Function   Gross Motor Coordination Functional   Fine Motor Coordination Functional   Cognition   Overall Cognitive Status Impaired   Arousal/Participation Alert;Responsive;Cooperative   Attention Within functional limits   Orientation Level Oriented X4   Memory Decreased recall of precautions;Decreased short term memory   Following Commands Follows one step commands without difficulty   Comments Pt Id via wristband, name and . Questionable insight into current limitiations, deficits and safety awareness. Pt impulsive at time and easily distracted. Perseverates on gait mechanics.   Assessment   Prognosis Good   Assessment Patient is a 64 y.o. male seen for OT evaluation at [facility] following admission on 2024  s/p Sepsis (HCC). Please see above for comprehensive list of comorbidities and significant PMHx impacting functional performance.  At baseline, living w/ family in a multi-level home, independent w/ ADLs/IADLs and RONALDO for functional mobility w/ use of AD. (-) falls, (+) driving. Pt requires RONALDO for UB ADLs, RONALDO for LB ADLs, supervision for transfers and supervision for functional mobility household distance with RW progressing to SPC. The AM-PAC & Barthel Index outcome tools were used to assist in determining pt safety w/self care /mobility and appropriate d/c recommendations, see above for score. Occupational performance is affected by the following deficits: impaired judgement and problem solving , impaired safety awareness , and impulsive behavior. Personal/Environmental factors impacting D/C include: steps  "to enter/navigate the home, FOS to second floor, decreased insight toward deficits , and decreased recall of precautions . Supporting factors include: support system available and attitude towards recovery.Pt appears to be functioning close to/or at baseline with functional mobility and ADL tasks. No further acute OT needs identified at this time. Recommend continued active ADL participation and mobilization with hospital staff while in the hospital to increase pt’s endurance and strength upon D/C. From OT standpoint, recommend D/C with no post-acute rehabilitation needs with family support when medically cleared. D/C pt from OT caseload at this time.   Goals   Patient Goals \"to get stronger\"   Plan   OT Frequency Eval only   Discharge Recommendation   Rehab Resource Intensity Level, OT No post-acute rehabilitation needs   AM-PAC Daily Activity Inpatient   Lower Body Dressing 3   Bathing 4   Toileting 3   Upper Body Dressing 4   Grooming 4   Eating 4   Daily Activity Raw Score 22   Daily Activity Standardized Score (Calc for Raw Score >=11) 47.1   AM-PAC Applied Cognition Inpatient   Following a Speech/Presentation 3   Understanding Ordinary Conversation 4   Taking Medications 2   Remembering Where Things Are Placed or Put Away 3   Remembering List of 4-5 Errands 3   Taking Care of Complicated Tasks 2   Applied Cognition Raw Score 17   Applied Cognition Standardized Score 36.52   Barthel Index   Feeding 10   Bathing 0   Grooming Score 5   Dressing Score 10   Bladder Score 10   Bowels Score 10   Toilet Use Score 10   Transfers (Bed/Chair) Score 10   Mobility (Level Surface) Score 10   Stairs Score 5   Barthel Index Score 80   End of Consult   Education Provided Yes;Family or social support of family present for education by provider   Patient Position at End of Consult Seated edge of bed;All needs within reach  (care turned over to PT Charity)   Nurse Communication Nurse aware of consult       Beena Mitchell MS " OTR/L   [place] Licensure# [id number]

## 2024-06-26 NOTE — CONSULTS
Consultation -  [facility]  Homero Jose Luis August 64 y.o. male MRN: [id number]  Unit/Bed#: S -01 Encounter: [id number]        Inpatient consult to gastroenterology  Consult performed by: Robyn Leung PA-C  Consult ordered by: Seth Cunha MD          Reason for Consult / Principal Problem: liver cysts, ascites     ASSESSMENT and PLAN:    Principal Problem:    Sepsis (HCC)  Active Problems:    Chronic kidney disease with end stage renal failure on dialysis (HCC)    Multiple and open wound of lower limb    Thrombocytopenia (HCC)    ADPKD (autosomal dominant polycystic kidney)    Paroxysmal atrial fibrillation (HCC)    Elevated troponin    Hyperkalemia    HFrEF Dilated cardiomyopathy (HCC)    Abdominal pain    #1. Liver cysts and large volume ascites: patient with polycystic kidney disease, also with multiple liver cyst on imaging, status post MRI.  Cirrhosis inconclusive.  Suspect that he could have some liver dysfunction from cardiac etiology versus multiple cysts; ascitic fluid was notable for a high SAG and high protein more consistent with cardiac ascites.  He was status post paracentesis of almost 9 L a few days ago and subsequently did not get any albumin. No SBP at that time. Unclear etiology of his gram + bacteremia.   -recommend echo and cardiology evaluation  -per discussion with nephro, patient makes little to no urine so diuretics are likely not helpful  -if patient still has significant abdominal distention after dialysis today, consider repeat tap tomorrow with IR, patient should get albumin for any large volume taps over 4-5 L  -low salt diet  -discussed with patient that he is a poor candidate for hernia repair due to ESRD, cardiac output, and ascites.  -abdominal pain has resolved, suspect this was related to large volume ascites    -------------------------------------------------------------------------------------------------------------------    HPI: This is a  64-year-old male with a history of CKD/end-stage renal disease on dialysis, dilated cardiomyopathy with congestive heart failure, new onset of ascites, atrial fibrillation, polycystic kidney disease, chronic thrombocytopenia who was admitted for new onset of ascites with abdominal pain and sepsis.  He has noted to have gram-positive bacteremia with an unknown primary source.  He had 9 L removed a few days ago via paracentesis without any signs of SBP.  Patient was not given albumin at that time.  Reports that his abdominal pain improved after the large-volume paracentesis.  Patient was having diarrhea at home which has resolved.  Reports that he is having normal stools now.  Stool studies negative denies any black tarry stool, blood in stool, nausea, vomiting.  Reports a good appetite.  Tries to stick to a low-salt diet at home.  Is scheduled to have an echocardiogram which has not yet been performed    REVIEW OF SYSTEMS:    CONSTITUTIONAL: Denies any fever, chills, or rigors. Good appetite, and no recent weight loss.  HEENT: No earache or tinnitus. Denies hearing loss or visual disturbances.  CARDIOVASCULAR: No chest pain or palpitations. +leg and scrotal swelling  RESPIRATORY: Denies any cough, hemoptysis, dyspnea on exertion. +SOB  GASTROINTESTINAL: As noted in the History of Present Illness.   GENITOURINARY: No problems with urination. Denies any hematuria or dysuria.  NEUROLOGIC: No dizziness or vertigo, denies headaches.   MUSCULOSKELETAL: Denies any muscle or joint pain.   SKIN: Denies skin rashes or itching.   ENDOCRINE: Denies excessive thirst. Denies intolerance to heat or cold.  PSYCHOSOCIAL: Denies depression or anxiety. Denies any recent memory loss.       Historical Information   Past Medical History:   Diagnosis Date    Complication of renal dialysis     Polycystic kidney disease      Past Surgical History:   Procedure Laterality Date    IR CATHETERGRAM  10/17/2019    IR IMAGE GUIDED ASPIRATION /  DRAINAGE  9/23/2019    IR PARACENTESIS  10/9/2019    IR PARACENTESIS  6/24/2024    IR TEMPORARY DIALYSIS CATHETER PLACEMENT  7/28/2023    IR TUNNELED CENTRAL LINE CHECK/CHANGE/REPOSITION  12/14/2021    IR TUNNELED DIALYSIS CATHETER CHECK/CHANGE/REPOSITION/ANGIOPLASTY  10/17/2019    IR TUNNELED DIALYSIS CATHETER PLACEMENT  9/30/2019    IR TUNNELED DIALYSIS CATHETER PLACEMENT  8/1/2023    IR TUNNELED DIALYSIS CATHETER REMOVAL  7/28/2023    SPLIT THICKNESS SKIN GRAFT Bilateral 11/7/2019    Procedure: SKIN GRAFT SPLIT THICKNESS (STSG)  EXTREMITY;  Surgeon: Torey Cha MD;  [facility];  Service: Plastics    SPLIT THICKNESS SKIN GRAFT Bilateral 11/12/2019    Procedure: SKIN GRAFT SPLIT THICKNESS (STSG)  EXTREMITY;  Surgeon: Torey Cha MD;  [facility];  Service: Plastics    VAC DRESSING APPLICATION Bilateral 11/18/2019    Procedure: CHANGE DRESSING;  Surgeon: Torey Cha MD;  [facility];  Service: Plastics    WOUND DEBRIDEMENT Bilateral 10/31/2019    Procedure: DEBRIDEMENT WOUND (WASH OUT);  Surgeon: Selvin Han DPM;  [facility];  Service: Podiatry    WOUND DEBRIDEMENT Bilateral 11/5/2019    Procedure: DEBRIDEMENT WOUND (WASH OUT);  Surgeon: Selvin Han DPM;  [facility];  Service: Podiatry    WOUND DEBRIDEMENT Bilateral 11/7/2019    Procedure: DEBRIDEMENT WOUND (WASH OUT);  Surgeon: Torey Cha MD;  [facility];  Service: Plastics    WOUND DEBRIDEMENT Bilateral 11/12/2019    Procedure: DEBRIDEMENT LOWER EXTREMITY (WASH OUT);  Surgeon: Torey Cha MD;  [facility];  Service: Plastics     Social History   Social History     Substance and Sexual Activity   Alcohol Use Not Currently     Social History     Substance and Sexual Activity   Drug Use Yes    Types: Marijuana     Social History     Tobacco Use   Smoking Status Never   Smokeless Tobacco Never     History reviewed. No  pertinent family history.    Meds/Allergies       Medications Prior to Admission:     metoprolol succinate (TOPROL-XL) 25 mg 24 hr tablet    B Complex-C-Folic Acid (TRIPHROCAPS PO)    b complex-vitamin C-folic acid (NEPHROCAPS) 1 mg capsule    calcitriol (ROCALTROL) 0.25 mcg capsule    calcium acetate (PHOSLO) 667 mg capsule    cholecalciferol (VITAMIN D3) 1,000 units tablet    midodrine (PROAMATINE) 5 mg tablet    NON FORMULARY    senna-docusate sodium (SENOKOT S) 8.6-50 mg per tablet    sevelamer (RENAGEL) 800 mg tablet    sevelamer carbonate (RENVELA) 800 mg tablet    simethicone (MYLICON) 80 mg chewable tablet    Sucroferric Oxyhydroxide 500 MG CHEW    torsemide (DEMADEX) 20 mg tablet  Current Facility-Administered Medications   Medication Dose Route Frequency    acetaminophen (TYLENOL) tablet 650 mg  650 mg Oral Q6H PRN    b complex-vitamin C-folic acid (RENAL) capsule 1 capsule  1 mg Oral Daily With Dinner    calcitriol (ROCALTROL) capsule 0.75 mcg  0.75 mcg Oral Once per day on Monday Wednesday Friday    calcium acetate (PHOSLO) capsule 1,334 mg  1,334 mg Oral TID With Meals    ceftriaxone (ROCEPHIN) 2 g/50 mL in dextrose IVPB  2,000 mg Intravenous Q24H    Cholecalciferol (VITAMIN D3) tablet 1,000 Units  1,000 Units Oral Daily    sevelamer (RENAGEL) tablet 1,600 mg  1,600 mg Oral TID With Meals    simethicone (MYLICON) chewable tablet 80 mg  80 mg Oral 4x Daily PRN       Allergies   Allergen Reactions    Sucroferric Oxyhydroxide Other (See Comments)    Chlorhexidine Other (See Comments)    Heparin Other (See Comments)     Brings plaletes down    Other Other (See Comments)           Objective     Blood pressure 110/68, pulse 83, temperature 98.1 °F (36.7 °C), resp. rate 14, weight 77.1 kg (170 lb), SpO2 92%.    No intake or output data in the 24 hours ending 06/26/24 1319      PHYSICAL EXAM:      General Appearance:   Alert, cooperative, no distress, appears stated age    HEENT:   Normocephalic, atraumatic,  anicteric, no oropharyngeal thrush present.     Neck:  Supple, symmetrical, trachea midline, no adenopathy;    thyroid: no enlargement/tenderness/nodules; no carotid  bruit or JVD    Lungs:   Clear to auscultation bilaterally; no rales, rhonchi or wheezing; respirations unlabored    Heart::   S1 and S2 normal; regular rate and rhythm; murmur present, no rub, or gallop.   Abdomen:   Soft, non-tender, distended with ascites, reducible large umbilical hernia present, ; normal bowel sounds; no masses, no organomegaly    Genitalia:   Deferred    Rectal:   Deferred    Extremities:  No cyanosis, clubbing; LE edema, chronic dark pigmentation, wounds on legs    Pulses:  2+ and symmetric all extremities    Skin:  Skin color, texture, turgor normal, no rashes or lesions    Lymph nodes:  No palpable cervical, axillary or inguinal lymphadenopathy        Lab Results:   Results from last 7 days   Lab Units 06/26/24  0544   WBC Thousand/uL 3.97*   HEMOGLOBIN g/dL 12.0   HEMATOCRIT % 37.1   PLATELETS Thousands/uL 96*   SEGS PCT % 68   LYMPHO PCT % 12*   MONO PCT % 13*   EOS PCT % 6     Results from last 7 days   Lab Units 06/26/24  0544   POTASSIUM mmol/L 4.1   CHLORIDE mmol/L 92*   CO2 mmol/L 31   BUN mg/dL 58*   CREATININE mg/dL 7.41*   CALCIUM mg/dL 8.7   ALK PHOS U/L 104   ALT U/L 4*   AST U/L 6*     Results from last 7 days   Lab Units 06/24/24  0418   INR  1.53*           Imaging Studies: I have personally reviewed pertinent imaging studies.    US scrotum and groin area    Result Date: 6/25/2024  Impression: 1.  Large right hydrocele, with mild complexity 2.  Ascitic fluid in the right inguinal canal, no hernia Workstation performed: [id number]     IR INPATIENT Paracentesis    Result Date: 6/24/2024  Impression: Successful ultrasound-guided paracentesis. Attestation Signer name: TIMA JONES I attest that I was present for the entire procedure. I reviewed the stored images and agree with the report as written. Workstation  performed: [id number]     CT abdomen pelvis with contrast    Result Date: 6/23/2024  Impression: 1. Negative for bowel obstruction. 2. Mild splenomegaly. Question geographic area of decreased attenuation within the spleen, possibly artifactual from streak artifact from overlying leads and/or rib shadow. Difficult to completely exclude subtle infarct. Correlate for any left upper quadrant pain. 3. Polycystic kidney and liver disease with multiple complex and indeterminant renal and left hepatic lobe lesions. Correlation with nonemergent MRI abdomen with and without IV contrast recommended to exclude possibility of a solid lesion. 4. Large amount of abdominopelvic ascites, increased from prior study. 5. Sigmoid diverticulosis without diverticulitis. The study was marked in EPIC for follow-up. Workstation performed: [id number]     XR chest 1 view portable    Result Date: 6/23/2024  Impression: No acute cardiopulmonary disease. Workstation performed: [id number]           Patient was seen and examined by Dr. Guerra. All silver medical decisions were made by Dr. Guerra. Thank you for allowing us to participate in the care of this present patient. We will follow-up with you closely.

## 2024-06-26 NOTE — PLAN OF CARE
Problem: PHYSICAL THERAPY ADULT  Goal: Performs mobility at highest level of function for planned discharge setting.  See evaluation for individualized goals.  Description: Treatment/Interventions: Functional transfer training, LE strengthening/ROM, Therapeutic exercise, Endurance training, Patient/family training, Equipment eval/education, Bed mobility, Gait training, Elevations  Equipment Recommended: Cane       See flowsheet documentation for full assessment, interventions and recommendations.  Note: Prognosis: Good  Problem List: Decreased strength, Decreased endurance, Impaired balance, Decreased mobility, Impaired judgement, Decreased safety awareness, Pain  Assessment: Pt is a 64 y.o. male seen for PT evaluation s/p admit to [facility] on 8/18/2022 w/ Sepsis (HCC).  Order placed for PT. Comorbidities affecting pt's physical performance at time of assessment include: DM, HTN, and neuropathy. Personal factors affecting pt at time of IE include: steps to enter environment, multi-level environment, and inability to perform IADLs. Prior to admission, pt was independent w/ all functional mobility w/ golf clubs, lived in multi-level home, had 1+5 FRANCISCO JAVIER (+) railing, and lived with wife . Upon evaluation: Pt requires supervision for sit to stand, supervision for ambulation with SPC, and min/CGA for stair negotiation with handrail.   (Please find full objective findings from PT assessment regarding body systems outlined above). Impairments and limitations also listed above, especially due to  weakness, impaired balance, decreased endurance, gait deviations, pain, decreased activity tolerance, decreased safety awareness, and fall risk.  Pt's clinical presentation is currently unstable/unpredictable seen in pt's presentation of impulsivity, decreased safety awareness, and fall risk.  Pt to benefit from continued skilled PT tx while in hospital and upon DC to address deficits as defined above and maximize level  of functional mobility.   Recommend  progression of ambulation, stair negotiation, and initiation of HEP as appropriate .        Rehab Resource Intensity Level, PT: III (Minimum Resource Intensity)    See flowsheet documentation for full assessment.

## 2024-06-26 NOTE — PHYSICAL THERAPY NOTE
PHYSICAL THERAPY EVALUATION  NAME: Homero Koch  AGE:   64 y.o.  MRN:  [id number]  ADMIT DX: Hyperkalemia [E87.5]  Leukocytosis [D72.829]  ADPKD (autosomal dominant polycystic kidney) [Q61.2]  Weakness [R53.1]  Abdominal pain [R10.9]  Nausea and vomiting [R11.2]  Abnormal EKG [R94.31]  ESRD (end stage renal disease) on dialysis (HCC) [N18.6, Z99.2]  Hepatic lesion [K76.9]  Generalized weakness [R53.1]    PMH:   Past Medical History:   Diagnosis Date    Complication of renal dialysis     Polycystic kidney disease      LENGTH OF STAY: 3        06/26/24 0953   PT Last Visit   PT Visit Date 06/26/24   Note Type   Note type Evaluation   Pain Assessment   Pain Assessment Tool 0-10   Pain Score 5   Pain Location/Orientation Location: Groin  (scrotum)   Hospital Pain Intervention(s) Ambulation/increased activity   Restrictions/Precautions   Weight Bearing Precautions Per Order No   Other Precautions Cognitive;Chair Alarm;Bed Alarm;Fall Risk;Pain   Home Living   Type of Home House   Home Layout Multi-level;1/2 bath on main level;Bed/bath upstairs;Stairs to enter with rails  (1 curb step +5 FRANCISCO JAVIER; FF to 2nd floor)   Bathroom Shower/Tub Walk-in shower   Bathroom Toilet Raised   Bathroom Equipment Grab bars in shower   Home Equipment Cane;Grab bars  (SPC is not usable due to worn down rubber stopper)   Additional Comments Pt ambulates with mod I and upside down golf club at baseline.   Prior Function   Level of [place] Independent with ADLs;Independent with functional mobility;Independent with IADLS   Lives With Spouse;Family  (daughter and grandchildren)   Receives Help From Family   IADLs Independent with driving;Independent with meal prep;Independent with medication management   Falls in the last 6 months 0   Vocational On disability   General   Family/Caregiver Present Yes   Cognition   Overall Cognitive Status Impaired   Arousal/Participation Cooperative   Attention Within functional limits   Orientation Level  "Oriented X4   Memory Decreased recall of precautions;Decreased short term memory   Following Commands Follows one step commands without difficulty   Comments Pt identified by name and .   Subjective   Subjective Agrees to PT evaluation and is pleasant and cooperative throughout session.  \"What percentage should I use my toes when I'm walking?\"   RLE Assessment   RLE Assessment X   Strength RLE   RLE Overall Strength 4/5  (functionally)   LLE Assessment   LLE Assessment X   Strength LLE   LLE Overall Strength 4/5  (functionally)   Bed Mobility   Supine to Sit Unable to assess  (sitting EOB pre/post session)   Transfers   Sit to Stand 5  Supervision   Additional items Increased time required;Verbal cues   Stand to Sit 5  Supervision   Additional items Increased time required;Verbal cues   Ambulation/Elevation   Gait pattern Inconsistent cyril  (hips overly extended (sitting on  Y ligaments) , impulsive, decreased safety awareness)   Gait Assistance 5  Supervision   Additional items Verbal cues   Assistive Device SPC   Distance ~90` x1   Stair Management Assistance 4  Minimal assist  (CGA)   Additional items Assist x 1;Verbal cues   Stair Management Technique One rail L;Nonreciprocal;With cane   Number of Stairs 8   Ambulation/Elevation Additional Comments impulsive on stairs, pt attempting to demonstrate using momentum with trunk to negotiate stairs   Balance   Static Sitting Good   Dynamic Sitting Fair +   Static Standing Fair +   Dynamic Standing Fair   Ambulatory Fair -   Endurance Deficit   Endurance Deficit Yes   Endurance Deficit Description limited ambulation distance, fatigue   Activity Tolerance   Activity Tolerance Patient limited by fatigue;Patient tolerated treatment well   Medical Staff Made Aware Beena CRAIN   Nurse Made Aware Per RN, pt appropriate to evaluate   Assessment   Prognosis Good   Problem List Decreased strength;Decreased endurance;Impaired balance;Decreased mobility;Impaired " judgement;Decreased safety awareness;Pain   Assessment Pt is a 64 y.o. male seen for PT evaluation s/p admit to [facility] on 8/18/2022 w/ Sepsis (HCC).  Order placed for PT. Comorbidities affecting pt's physical performance at time of assessment include: DM, HTN, and neuropathy. Personal factors affecting pt at time of IE include: steps to enter environment, multi-level environment, and inability to perform IADLs. Prior to admission, pt was independent w/ all functional mobility w/ golf clubs, lived in multi-level home, had 1+5 FRANCISCO JAVIER (+) railing, and lived with wife . Upon evaluation: Pt requires supervision for sit to stand, supervision for ambulation with SPC, and min/CGA for stair negotiation with handrail.   (Please find full objective findings from PT assessment regarding body systems outlined above). Impairments and limitations also listed above, especially due to  weakness, impaired balance, decreased endurance, gait deviations, pain, decreased activity tolerance, decreased safety awareness, and fall risk.  Pt's clinical presentation is currently unstable/unpredictable seen in pt's presentation of impulsivity, decreased safety awareness, and fall risk.  Pt to benefit from continued skilled PT tx while in hospital and upon DC to address deficits as defined above and maximize level of functional mobility.   Recommend  progression of ambulation, stair negotiation, and initiation of HEP as appropriate .   Goals   Patient Goals to get stronger   STG Expiration Date 07/06/24   Short Term Goal #1 Pt will be able to: (1) perform bed mobility with mod I to decrease caregiver burden (2) perform sit to stand with mod I to increase level of independence and promote safe toiletting and transfers (3) ambulate at least 200` with mod I and least restrictive AD to increase activity tolerance and allow for safe community mobilization (4) increase standing balance by 1 grade to decrease risk of falls (5) negotiate at  least a full flight of stairs with supervision and use of handrail to allow safe access to 2nd floor   PT Treatment Day 0   Plan   Treatment/Interventions Functional transfer training;LE strengthening/ROM;Therapeutic exercise;Endurance training;Patient/family training;Equipment eval/education;Bed mobility;Gait training;Elevations   PT Frequency 2-3x/wk   Discharge Recommendation   Rehab Resource Intensity Level, PT III (Minimum Resource Intensity)   Equipment Recommended Cane   [facility] Basic Mobility Inpatient   Turning in Flat Bed Without Bedrails 4   Lying on Back to Sitting on Edge of Flat Bed Without Bedrails 3   Moving Bed to Chair 3   Standing Up From Chair Using Arms 3   Walk in Room 3   Climb 3-5 Stairs With Railing 3   Basic Mobility Inpatient Raw Score 19   Basic Mobility Standardized Score 42.48   [facility] Highest Level Of Mobility   -HLM Goal 6: Walk 10 steps or more   -HLM Achieved 7: Walk 25 feet or more   End of Consult   Patient Position at End of Consult Seated edge of bed;All needs within reach     The patient's [facility] Basic Mobility Inpatient Short Form Raw Score is 19, Standardized Score is 42.48.  A Raw Score of greater than or equal to 16 suggests the patient may benefit from discharge to home. However please refer to therapist recommendation for discharge planning given other factors that may influence destination.     Adapted from Noé GIL, Estevan J, Flakita J, Eusebia J. Association [facility] “6-Clicks” Basic Mobility and Daily Activity Scores With Discharge Destination. Physical Therapy, 2021;101:1-9. DOI: [email or web address]      Charity Weber PT,DPT

## 2024-06-26 NOTE — PLAN OF CARE
UF goal was not met d/t UF was switched off mid-tx in response to patient's report of cramping bilateral foot that he felt until the end of the tx.    Post-Dialysis RN Treatment Note    Blood Pressure:  Pre 133/80 mm/Hg  Post 133/78 mmHg   EDW  76.5 kg    Weight:  Pre 77.1 kg   Post 76.7 kg   Mode of weight measurement: Standing Scale   Volume Removed  364 ml    Treatment duration 210 minutes    NS given  No    Treatment shortened? No   Medications given during Rx Albumin 25g IV   Estimated Kt/V  Not Applicable   Access type: Permacath/TDC   Access Issues: Yes, describe: per record, it was stated that patient has allergy to Chlorhexidine. Upon assessment, a chlorhexidine dressing is securing the dialysis catheter. When patient was asked about allergy, patient is unsure if he has allergy to the said agent. No redness, swelling nor rashes seen at the dressing area.     Report called to primary nurse   Yes, in person to Sugey AYALA RN      Started patient on hemodialysis treatment with UF goal of 1L net as tolerated per Dr. Daly x 3.5 hours x 3K bath for serum k+ of 4.1 today 6/26/24.    Problem: METABOLIC, FLUID AND ELECTROLYTES - ADULT  Goal: Electrolytes maintained within normal limits  Description: INTERVENTIONS:  - Monitor labs and assess patient for signs and symptoms of electrolyte imbalances  - Administer electrolyte replacement as ordered  - Monitor response to electrolyte replacements, including repeat lab results as appropriate  - Instruct patient on fluid and nutrition as appropriate  Outcome: Progressing  Goal: Fluid balance maintained  Description: INTERVENTIONS:  - Monitor labs   - Monitor I/O and WT  - Instruct patient on fluid and nutrition as appropriate  - Assess for signs & symptoms of volume excess or deficit  Outcome: Progressing

## 2024-06-26 NOTE — PROGRESS NOTES
[facility]  Progress Note  Name: Homero Koch I  MRN: [id number]  Unit/Bed#: S MS 210[facility] I Date of Admission: 6/23/2024   Date of Service: 6/26/2024 I Hospital Day: 3    Assessment & Plan   * Sepsis (HCC)  Assessment & Plan  Meets sepsis: tachycardic, febrile, Leukocytosis. Source likely GI: SBP vs gastroenteritis   Presented with diffuse abdominal pain with N/V, watery diarrhea.   Work up:  Procal 5.73  WBC 14. 27  Lactic: 1.4  CXR: unremarkable, no consolidations  CT A/P: mild splenomegaly with possible infarct, PCKD, Renal and left hepatic lobe lesions, large ascities, diverticulosis    Recent Labs     06/23/24  2031 06/24/24  0418 06/25/24  0516   WBC 14.27* 13.21* 5.20       Plan:  Continue Ceftriaxone 2g for SBP  IR consult for paracentesis  C. difficile and enteric stool study negative  Blood cultures growing Gram + cocci in clusters and gram + rods  Body fluid showed no culture growth  Anaerobic culture showed no growth  Trend fever curve and vitals  Trend CBC  Tylenol prn for fevers    Abdominal pain  Assessment & Plan  Patient presenting with generalized abdominal pain. Notable history of polycystic liver and kidney disease requiring dialysis and previously requiring paracentesis.  CT Abd/Pelvis: Negative for bowel obstruction. Mild splenomegaly. Question geographic area of decreased attenuation within the spleen, possibly artifactual from streak artifact. Difficult to completely exclude subtle infarct. Polycystic kidney and liver disease with multiple complex and indeterminant renal and left hepatic lobe lesions. Correlation with nonemergent MRI abdomen with and without IV contrast recommended to exclude possibility of a solid lesion. Large amount of abdominopelvic ascites, increased from prior study. Sigmoid diverticulosis without diverticulitis.     Patient also has history of inguinal hernia. US from 2019 of scrotum and testicles: confirmed large complex hydrocele with  numerous internal septations and debris measuring 15.6 x 9.3 x 11.4 without evidence of varicocele or scrotal thickness     Patient with fever, abdominal pain and large ascites. Procal 5.73, leukocytosis to 14.27. total bilirubin elevated to 1.93.    Plan:  See Sepsis plan  MRI abdomen showed:  Multiple simple and proteinaceous hepatic cysts. No solid hepatic mass is identified. Lobulated liver contour may be related to numerous cysts although cirrhosis is possible.Hepatic iron deposition   Enlarged kidneys with innumerable simple and proteinaceous renal cysts in keeping with autosomal dominant polycystic kidney disease. A Bosniak IIF cystic renal mass in the right kidney lower pole measuring 5.8 cm has been stable since CT 10/7/2019.   Splenomegaly and splenic iron deposition.   Scrotal ultrasound showed no evidence of hernia but did show right hydrocele.    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  Receives dialysis M/W/F  Last dialysis charted 6/19/2024  Per Patient, last dialysis was Friday 6/21/2024  Per notes, is noncompliant with dialysis prescription and meds, only medication patient taking is PhosLo TID    Plan:  Continue PhosLo TID with meal   Restart Sevelamer TID with meals  Restart Rocalitrol 0.25mcg  Restart Vitamin D 1,000U  Restart B complex   Renal [place] diet with 2L fluid restriction  Consult nephrology for assistance with dialysis while inpatient    HFrEF Dilated cardiomyopathy (HCC)  Assessment & Plan  Non compliant with medications including Toprol-XL 25mg  Patient hypervolemic on exam. Blood pressures stable.   7/28/23 Echo: EF 20-25%, severe global hypokinesis with regional variation, moderately reduced systolic function,  diastolic dysfunction, severely dilated L atria, moderately dilated R atria, mild-moderate Mitral valve regurg    Plan:  Holding metoprolol due to normotension   Renal [place] diet with 2L fluid restriction  Strict I/Os  Daily Weight    Hyperkalemia  Assessment & Plan  5.5 POA.   Recent Labs     06/23/24 2031 06/24/24 0418 06/25/24  0516   K 5.5* 5.6* 4.5   MG  --   --  2.0       Consult nephrology for dialysis.   Albuterol nebulizer  Calcium gluconate   Monitor on tele.     Elevated troponin  Assessment & Plan  Elevated trop 77->86, delta 9, most likely due to demand  EKG showed sinus tachycardic with ST depressions in II, III, V4, V5, V6, V1  No chest pain, angina symptoms, or chest palpitations    Lab Results   Component Value Date    HSTNI0 77 (H) 06/23/2024    HSTNI2 86 (H) 06/23/2024    HSTNID2 9 06/23/2024    HSTNI4 88 (H) 06/24/2024    HSTNID4 11 06/24/2024     Plan:  Trend troponin  Monitor on tele    Paroxysmal atrial fibrillation (HCC)  Assessment & Plan  Sinus tachycardia on admission  Monitor on tele  Resume Toprol XL    ADPKD (autosomal dominant polycystic kidney)  Assessment & Plan  History of ADPKD with liver involvement   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 Correlation with nonemergent MRI abdomen with and without IV contrast recommended to exclude possibility of a solid lesion. Large amount of abdominopelvic ascites, increased from prior study.     Thrombocytopenia (HCC)  Assessment & Plan  On admission Plt 95  HIT work-up WNL, patient refused hematology evaluation outpatient  Possibly due to ADPKD    Recent Labs     06/23/24 2031 06/24/24 0418 06/25/24  0516   PLT 95* 90* 81*       Continue to trend daily  Currently holding DVT prophylaxis due to concern  for HIT on prior admission     Multiple and open wound of lower limb  Assessment & Plan  Wound care consulted           VTE Pharmacologic Prophylaxis: VTE Score: 6 High Risk (Score >/= 5) - Pharmacological DVT Prophylaxis Contraindicated. Sequential Compression Devices Ordered.    Mobility:   Basic Mobility Inpatient Raw Score: 18  JH-HLM Goal: 6: Walk 10 steps or more  JH-HLM Achieved: 6: Walk 10 steps or more  JH-HLM Goal achieved. Continue to encourage appropriate mobility.    Patient Centered Rounds: I performed bedside rounds with nursing staff today.  Discussions with Specialists or Other Care Team Provider: Gastroenterology, IR    Education and Discussions with Family / Patient: Updated  (wife) at bedside.    Current Length of Stay: 3 day(s)  Current Patient Status: Inpatient   Discharge Plan: Anticipate discharge in 48-72 hrs to home.    Code Status: Level 3 - DNAR and DNI    Subjective:   Patient today is reporting that he is feeling well.  No overnight events.  Endorses some abdominal distention today.  His pain is well-controlled.    Objective:     Vitals:   Temp (24hrs), Av °F (36.7 °C), Min:97.9 °F (36.6 °C), Max:98.1 °F (36.7 °C)    Temp:  [97.9 °F (36.6 °C)-98.1 °F (36.7 °C)] 98.1 °F (36.7 °C)  HR:  [78-83] 83  Resp:  [14] 14  BP: (110-136)/(68-85) 110/68  SpO2:  [92 %-97 %] 92 %  Body mass index is 25.85 kg/m².     Input and Output Summary (last 24 hours):   No intake or output data in the 24 hours ending 24 [id number]    Physical Exam:   Physical Exam  Constitutional:       Appearance: Normal appearance.   Cardiovascular:      Rate and Rhythm: Normal rate and regular rhythm.      Pulses: Normal pulses.      Heart sounds: Normal heart sounds.   Abdominal:      General: Bowel sounds are normal. There is distension.      Tenderness: There is no abdominal tenderness.      Hernia: A hernia is present.   Musculoskeletal:      Right lower leg: No edema.      Left lower leg: No edema.    Neurological:      General: No focal deficit present.      Mental Status: He is alert and oriented to person, place, and time.          Additional Data:     Labs:  Results from last 7 days   Lab Units 06/26/24  0544 06/25/24  0516 06/24/24  0418   WBC Thousand/uL 3.97*   < > 13.21*   HEMOGLOBIN g/dL 12.0   < > 11.9*   HEMATOCRIT % 37.1   < > 38.8   PLATELETS Thousands/uL 96*   < > 90*   BANDS PCT %  --   --  4   SEGS PCT % 68  --   --    LYMPHO PCT % 12*  --  2*   MONO PCT % 13*  --  5   EOS PCT % 6  --  0    < > = values in this interval not displayed.     Results from last 7 days   Lab Units 06/26/24  0544   SODIUM mmol/L 135   POTASSIUM mmol/L 4.1   CHLORIDE mmol/L 92*   CO2 mmol/L 31   BUN mg/dL 58*   CREATININE mg/dL 7.41*   ANION GAP mmol/L 12   CALCIUM mg/dL 8.7   ALBUMIN g/dL 3.2*   TOTAL BILIRUBIN mg/dL 0.76   ALK PHOS U/L 104   ALT U/L 4*   AST U/L 6*   GLUCOSE RANDOM mg/dL 100     Results from last 7 days   Lab Units 06/24/24  0418   INR  1.53*             Results from last 7 days   Lab Units 06/24/24  0418 06/23/24 2031   LACTIC ACID mmol/L  --  1.4   PROCALCITONIN ng/ml 13.55* 5.73*       Lines/Drains:  Invasive Devices       Peripheral Intravenous Line  Duration             Peripheral IV 06/23/24 Proximal;Right;Ventral (anterior) Forearm 3 days    Peripheral IV 06/23/24 Right;Ventral (anterior) Forearm 2 days              Hemodialysis Catheter  Duration             HD Permanent Double Catheter 330 days                          Imaging: Reviewed radiology reports from this admission including: MRI abdomen/MRCP and ultrasound(s)    Recent Cultures (last 7 days):   Results from last 7 days   Lab Units 06/26/24  0546 06/24/24  1309 06/24/24  0929 06/23/24 2033 06/23/24 2031   BLOOD CULTURE  Received in Microbiology Lab. Culture in Progress.  --   --  No Growth at 48 hrs. Corynebacterium striatum group*  Staphylococcus coagulase negative*   GRAM STAIN RESULT   --   --  Rare Polys  No bacteria seen   --  Gram positive rods*  Gram positive cocci in clusters*   BODY FLUID CULTURE, STERILE   --   --  No growth  --   --    C DIFF TOXIN B BY PCR   --  Negative  --   --   --        Last 24 Hours Medication List:   Current Facility-Administered Medications   Medication Dose Route Frequency Provider Last Rate    acetaminophen  650 mg Oral Q6H PRN Dimple Hernandez, DO      b complex-vitamin C-folic acid  1 mg Oral Daily With Dinner Dimple Hernandez, DO      calcitriol  0.75 mcg Oral Once per day on Monday Wednesday Friday Dimple Hernandez, DO      calcium acetate  1,334 mg Oral TID With Meals Dimple Hernandez, DO      cefTRIAXone  2,000 mg Intravenous Q24H Dimple Hernandez, DO 2,000 mg (06/25/24 2111)    cholecalciferol  1,000 Units Oral Daily Dimple Hernandez, DO      sevelamer  1,600 mg Oral TID With Meals Dimple Hernandez, DO      simethicone  80 mg Oral 4x Daily PRN Dimple Hernandez DO      [START ON 6/27/2024] torsemide  80 mg Oral BID (diuretic) Jaiden Daly MD          Today, Patient Was Seen By: Inder Ignacio DO    **Please Note: This note may have been constructed using a voice recognition system.**

## 2024-06-26 NOTE — PROGRESS NOTES
NEPHROLOGY PROGRESS NOTE   Homero Koch 64 y.o. male MRN: [id number]  Unit/Bed#: S -01 Encounter: [id number]  Reason for Consult: ESRD    ASSESSMENT/PLAN:  ESRD on HD - in the setting of ADPKD. HD MWF at [facility]. Last HD prior to admisison on 6/21/24. Underwent HD while inpatient on 6/25.                           Plan:                          - Plan for dialysis today and tomorrow                          - Continue renal diet, fluid restrition    -  Start Torsemide 80mg BID     L IJV Permcath - No history of AFV or AVG. Continue HD through catheter. Discussed with patient catheter care, patient notes intolerance to previously used adhesives. Now provided with alternative catheter cover which he is tolerating well.      Hyponatremia - mild hyponatremia with sodium 133 noted for the past 2 days, now resolved. Sodium today 125. Likely in the setting of ESRD and volume overload.   - Continue fluid restriction  - Cotninue monitoring     Hyperkalemia -potassium today-4.1.              - Continue monitoring potassium levels.                 Mineral Bone disease  Hyperphosphatemia - Phoslo, Sevelamer, Velphoro on patient's chat. Patient reports only taking Phoslo 1334 TID. States he has been unable to afford the rest.          Phosphorus levels today 6.4  Secondary hyperparathyroidism               Plan:              - Continue Phoslo 1334 TID with meals              - Continue Sevelamer 1600mg TID with meals  - Continue Calcitrol 0.75mg three times per week (MWF).               - Continue renal diet              - Continue monitoring phosphorus levels     Hypertension - per chart review patient is on Torsemide 40mg daily and Toprol XL 25mg daily, however has not been taking these medications. BP acceptable at this time.               - Management per primary team              - Continue monitoring BP     Anemia - anemia of chronic disease in the setting of ESRD. Hb today 12.0, MCV 98. On Venofer 50mg  weekly as outpatient. Not on GRISELDA.   - Continue monitoring Hb levels  - Transfuse for Hb <7  - Rest of management per primary team.   Sepsis - met SIRS criteria with fever, leukocytosis and tachycardia, started on Rocephin due to concern for SBP. Patient without known history of cirrhosis and no cirrhotic morphology on imaging, however there are multiple liver cysts present likely in the setting of APCKD which can contribute to development of portal hypertension. Patient also noted to have 1x Blood culture positive for coagulase-negative staph and Corynebacterium Striatum. Other possible sources include cyst hemorrhage or infection, as well as viral gastroenteritis given presenting complaints of nausea, vomiting and diarrhea, although at this time he notes no further episodes of diarrhea.  At this time source is unclear, however patient is improving on the current antibiotic regimen.                           - Continue Ceftriaxone                          - Follow-up on repeat blood cultures.                          - Trend fever curves, monitor WBCs                          - Rest of management per primary team     HFrEF - history of cardiomyopathy, with echo 2023 demonstrating ED 20-25% with global hypokinesis. He also had an abnormal stress test in 2021 with reversible ischemia in the inferior apical wall.  Had been ordered heart catheterization which he had declined.              Was also recommended aspirin and statin which she also declined.  Not agreeable to GDMT.              Per chart review also taking torsemide and Toprol-XL, however patient denies taking torsemide.              - Repeat TTE pending  - Management per primary team     Abdominal pain - presents with abdominal pain and distension. Underwent paracentesis with removal of 8.7L of fluid. Per discussion with patient, this has been an ongoing for sometime and he attributes it to noncompliance with dialysis, however notes that is had acutely  "gotten worse. Given history of ADPKD and multiple liver cysts noted on CT abdomen/pelvis, there may also be a component of portal hypertension in the setting of liver involvement of ADPKD vs ascites in the setting of CHF. Patient reports improvement after paracentesis.   -GI has been consulted  - MRI liver pending at this time - ok to be done with contrast   - We will plan for afternoon HD   - Rest of management per primary team.      Scrotal swelling - ongoing scrotal swelling, patient reports history of similar after hospitalization in 2019. US at the time demonstrated a large complex right hydrocele containing numerous septations.  Possible increased vascularity in the left testicle indicating possible orchitis.  Suspect hydrocele likely in the setting of volume overload.  Scrotal U/S - Large hydrocele with single septation, ascites fluid in the right inguinal canal.     Acid-base - Continue monitoring         SUBJECTIVE:  Patient seen and examined at the bedside, not in acute distress at the time of my evaluation, just returned from MRI. He reports feeling \"a bit out of it\" but otherwise is doing well. Notes that abdomen is slightly more distended than yesterday, but not tender. Denies dizziness, lightheadedness, chest pain, SoB, nausea, vomiting. States he had one bowel movement and was able to urinate a little bit.     A complete review of systems was performed. Pertinent positives and negatives noted in the HPI. Otherwise the review of systems was negative.  OBJECTIVE:  Current Weight: Weight - Scale: 77.1 kg (170 lb)  Vitals:    06/25/24 1538 06/25/24 2230 06/26/24 0547 06/26/24 0749   BP: 112/70 136/85  110/68   Pulse: 78 83  83   Resp: 14      Temp: 97.9 °F (36.6 °C)   98.1 °F (36.7 °C)   TempSrc:       SpO2: 97% 97%  92%   Weight:   77.1 kg (170 lb)      No intake or output data in the 24 hours ending 06/26/24 0859    General:  Awake, alert, appears comfortable and in no acute distress.  Nontoxic.  Skin:  " No rash, warm, good skin turgor   Eyes:  PERRL, EOMI, sclerae nonicteric.  no periorbital edema   ENT:  Moist mucous membranes  Neck:  Trachea midline, symmetric.  No JVD.  Chest:  Clear to auscultation bilaterally without wheezes, crackles or rhonchi  CVS:  Regular rate and rhythm without gallop or rub. +systolic murmur.   No S3, S4.   Abdomen:  Distended, nontender, without palpable masses.  Normal bowel sounds x 4 quadrants.  No bruit.  Extremities:    No cyanosis, pallor.  No BLE edema. Venous stasis changes noted in the b/l lower extremities  Neuro:  Awake, alert, oriented x3.  Grossly intact  Psych:  Appropriate affect.  Mentating appropriately.  Normal mental status exam  : Scrotal edema present      Medications:    Current Facility-Administered Medications:     acetaminophen (TYLENOL) tablet 650 mg, 650 mg, Oral, Q6H PRN, Dimple Hernandez DO, 650 mg at 06/25/24 1350    b complex-vitamin C-folic acid (RENAL) capsule 1 capsule, 1 mg, Oral, Daily With Dinner, Dimple Hernandez DO, 1 capsule at 06/25/24 1739    calcitriol (ROCALTROL) capsule 0.75 mcg, 0.75 mcg, Oral, Once per day on Monday Wednesday Friday, Dimple Hernandez DO, 0.75 mcg at 06/26/24 0834    calcium acetate (PHOSLO) capsule 1,334 mg, 1,334 mg, Oral, TID With Meals, Dimple Hernandez DO, 1,334 mg at 06/26/24 0834    ceftriaxone (ROCEPHIN) 2 g/50 mL in dextrose IVPB, 2,000 mg, Intravenous, Q24H, Dimple Hernandez DO, Last Rate: 100 mL/hr at 06/25/24 2111, 2,000 mg at 06/25/24 2111    Cholecalciferol (VITAMIN D3) tablet 1,000 Units, 1,000 Units, Oral, Daily, Dimple Hernandez DO, 1,000 Units at 06/26/24 0834    sevelamer (RENAGEL) tablet 1,600 mg, 1,600 mg, Oral, TID With Meals, Dimple Hernandez DO, 1,600 mg at 06/26/24 0834    simethicone (MYLICON) chewable tablet 80 mg, 80 mg, Oral, 4x Daily PRN, Dimple Hernandez DO    Laboratory Results:  Results from last 7 days   Lab Units  06/26/24  0544 06/25/24  0516 06/24/24  0418 06/23/24  2031   WBC Thousand/uL  --  5.20 13.21* 14.27*   HEMOGLOBIN g/dL  --  11.6* 11.9* 12.9   HEMATOCRIT %  --  35.7* 38.8 40.6   PLATELETS Thousands/uL  --  81* 90* 95*   SODIUM mmol/L 135 133* 133* 135   POTASSIUM mmol/L 4.1 4.5 5.6* 5.5*   CHLORIDE mmol/L 92* 92* 92* 92*   CO2 mmol/L 31 30 23 26   BUN mg/dL 58* 41* 58* 58*   CREATININE mg/dL 7.41* 6.05* 8.14* 8.17*   CALCIUM mg/dL 8.7 8.6 9.2 9.4   MAGNESIUM mg/dL 2.2 2.0  --   --    PHOSPHORUS mg/dL 6.4* 5.8*  --   --        I have personally reviewed the blood work as stated above and in my note.  I have personally reviewed internal Medicine, co-consultants and previous nephrology notes.

## 2024-06-27 ENCOUNTER — APPOINTMENT (INPATIENT)
Dept: RADIOLOGY | Facility: HOSPITAL | Age: 64
DRG: 871 | End: 2024-06-27
Payer: MEDICARE

## 2024-06-27 ENCOUNTER — APPOINTMENT (INPATIENT)
Dept: NON INVASIVE DIAGNOSTICS | Facility: HOSPITAL | Age: 64
DRG: 871 | End: 2024-06-27
Payer: MEDICARE

## 2024-06-27 ENCOUNTER — APPOINTMENT (INPATIENT)
Dept: DIALYSIS | Facility: HOSPITAL | Age: 64
DRG: 871 | End: 2024-06-27
Attending: INTERNAL MEDICINE
Payer: MEDICARE

## 2024-06-27 PROBLEM — A41.9 SEPSIS (HCC): Status: RESOLVED | Noted: 2019-09-21 | Resolved: 2024-06-27

## 2024-06-27 LAB
ALBUMIN SERPL BCG-MCNC: 3.4 G/DL (ref 3.5–5)
ALL TARGETS: NOT DETECTED
ALP SERPL-CCNC: 100 U/L (ref 34–104)
ALT SERPL W P-5'-P-CCNC: 5 U/L (ref 7–52)
AMMONIA PLAS-SCNC: 11 UMOL/L (ref 18–72)
ANION GAP SERPL CALCULATED.3IONS-SCNC: 9 MMOL/L (ref 4–13)
AORTIC ROOT: 3.3 CM
AORTIC VALVE MEAN VELOCITY: 9.6 M/S
APICAL FOUR CHAMBER EJECTION FRACTION: 31 %
ASCENDING AORTA: 3.3 CM
AST SERPL W P-5'-P-CCNC: 8 U/L (ref 13–39)
AV LVOT MEAN GRADIENT: 1 MMHG
AV LVOT PEAK GRADIENT: 1 MMHG
AV MEAN GRADIENT: 4 MMHG
AV PEAK GRADIENT: 7 MMHG
AV VELOCITY RATIO: 0.46
BACTERIA SPEC ANAEROBE CULT: NO GROWTH
BACTERIA SPEC BFLD CULT: NO GROWTH
BILIRUB SERPL-MCNC: 0.76 MG/DL (ref 0.2–1)
BNP SERPL-MCNC: 4131 PG/ML (ref 0–100)
BSA FOR ECHO PROCEDURE: 1.91 M2
BUN SERPL-MCNC: 32 MG/DL (ref 5–25)
CALCIUM ALBUM COR SERPL-MCNC: 9.1 MG/DL (ref 8.3–10.1)
CALCIUM SERPL-MCNC: 8.6 MG/DL (ref 8.4–10.2)
CHLORIDE SERPL-SCNC: 92 MMOL/L (ref 96–108)
CO2 SERPL-SCNC: 32 MMOL/L (ref 21–32)
CREAT SERPL-MCNC: 4.76 MG/DL (ref 0.6–1.3)
DOP CALC AO PEAK VEL: 1.32 M/S
DOP CALC AO VTI: 22.3 CM
DOP CALC LVOT PEAK VEL VTI: 10.66 CM
DOP CALC LVOT PEAK VEL: 0.61 M/S
E WAVE DECELERATION TIME: 106 MS
E/A RATIO: 3.48
ERYTHROCYTE [DISTWIDTH] IN BLOOD BY AUTOMATED COUNT: 15.7 % (ref 11.6–15.1)
FRACTIONAL SHORTENING: 8 (ref 28–44)
GFR SERPL CREATININE-BSD FRML MDRD: 12 ML/MIN/1.73SQ M
GLOBAL LONGITUIDAL STRAIN: -7 %
GLUCOSE SERPL-MCNC: 85 MG/DL (ref 65–140)
GRAM STN SPEC: NORMAL
GRAM STN SPEC: NORMAL
HCT VFR BLD AUTO: 36.4 % (ref 36.5–49.3)
HGB BLD-MCNC: 11.9 G/DL (ref 12–17)
INTERVENTRICULAR SEPTUM IN DIASTOLE (PARASTERNAL SHORT AXIS VIEW): 1.6 CM
INTERVENTRICULAR SEPTUM: 1.6 CM (ref 0.6–1.1)
LAAS-AP2: 28.8 CM2
LAAS-AP4: 25 CM2
LEFT ATRIUM SIZE: 5.2 CM
LEFT ATRIUM VOLUME (MOD BIPLANE): 94 ML
LEFT ATRIUM VOLUME INDEX (MOD BIPLANE): 49 ML/M2
LEFT INTERNAL DIMENSION IN SYSTOLE: 4.5 CM (ref 2.1–4)
LEFT VENTRICLE DIASTOLIC VOLUME (MOD BIPLANE): 223 ML
LEFT VENTRICLE DIASTOLIC VOLUME INDEX (MOD BIPLANE): 116.8 ML/M2
LEFT VENTRICLE SYSTOLIC VOLUME (MOD BIPLANE): 158 ML
LEFT VENTRICLE SYSTOLIC VOLUME INDEX (MOD BIPLANE): 82.7 ML/M2
LEFT VENTRICULAR INTERNAL DIMENSION IN DIASTOLE: 4.9 CM (ref 3.5–6)
LEFT VENTRICULAR POSTERIOR WALL IN END DIASTOLE: 1.6 CM
LEFT VENTRICULAR STROKE VOLUME: 17 ML
LV EF: 29 %
LVSV (TEICH): 17 ML
MAGNESIUM SERPL-MCNC: 2 MG/DL (ref 1.9–2.7)
MCH RBC QN AUTO: 31.7 PG (ref 26.8–34.3)
MCHC RBC AUTO-ENTMCNC: 32.7 G/DL (ref 31.4–37.4)
MCV RBC AUTO: 97 FL (ref 82–98)
MV E'TISSUE VEL-LAT: 3 CM/S
MV E'TISSUE VEL-SEP: 5 CM/S
MV PEAK A VEL: 0.25 M/S
MV PEAK E VEL: 87 CM/S
MV STENOSIS PRESSURE HALF TIME: 31 MS
MV VALVE AREA P 1/2 METHOD: 7.1
PHOSPHATE SERPL-MCNC: 4.3 MG/DL (ref 2.3–4.1)
PISA MRMAX VEL: 0.42 M/S
PISA RADIUS: 0.7 CM
PLATELET # BLD AUTO: 95 THOUSANDS/UL (ref 149–390)
PMV BLD AUTO: 11.1 FL (ref 8.9–12.7)
POTASSIUM SERPL-SCNC: 4.2 MMOL/L (ref 3.5–5.3)
PROT SERPL-MCNC: 6.4 G/DL (ref 6.4–8.4)
RA PRESSURE ESTIMATED: 8 MMHG
RBC # BLD AUTO: 3.75 MILLION/UL (ref 3.88–5.62)
RIGHT ATRIUM AREA SYSTOLE A4C: 19.7 CM2
RIGHT VENTRICLE ID DIMENSION: 5.5 CM
RV PSP: 62 MMHG
S AUREUS+CONS DNA BLD POS NAA+NON-PROBE: DETECTED
SCAN RESULT: NORMAL
SL CV LEFT ATRIUM LENGTH A2C: 6 CM
SL CV LV EF: 20
SL CV PED ECHO LEFT VENTRICLE DIASTOLIC VOLUME (MOD BIPLANE) 2D: 111 ML
SL CV PED ECHO LEFT VENTRICLE SYSTOLIC VOLUME (MOD BIPLANE) 2D: 94 ML
SODIUM SERPL-SCNC: 133 MMOL/L (ref 135–147)
TR MAX PG: 54 MMHG
TR PEAK VELOCITY: 3.7 M/S
TRICUSPID ANNULAR PLANE SYSTOLIC EXCURSION: 0.8 CM
TRICUSPID VALVE PEAK REGURGITATION VELOCITY: 3.69 M/S
WBC # BLD AUTO: 4.08 THOUSAND/UL (ref 4.31–10.16)

## 2024-06-27 PROCEDURE — 80053 COMPREHEN METABOLIC PANEL: CPT

## 2024-06-27 PROCEDURE — 99223 1ST HOSP IP/OBS HIGH 75: CPT | Performed by: INTERNAL MEDICINE

## 2024-06-27 PROCEDURE — 97116 GAIT TRAINING THERAPY: CPT

## 2024-06-27 PROCEDURE — 85027 COMPLETE CBC AUTOMATED: CPT

## 2024-06-27 PROCEDURE — 99232 SBSQ HOSP IP/OBS MODERATE 35: CPT | Performed by: INTERNAL MEDICINE

## 2024-06-27 PROCEDURE — 84100 ASSAY OF PHOSPHORUS: CPT

## 2024-06-27 PROCEDURE — C8929 TTE W OR WO FOL WCON,DOPPLER: HCPCS

## 2024-06-27 PROCEDURE — 83735 ASSAY OF MAGNESIUM: CPT

## 2024-06-27 PROCEDURE — 93306 TTE W/DOPPLER COMPLETE: CPT | Performed by: INTERNAL MEDICINE

## 2024-06-27 PROCEDURE — 83880 ASSAY OF NATRIURETIC PEPTIDE: CPT | Performed by: NURSE PRACTITIONER

## 2024-06-27 PROCEDURE — 97530 THERAPEUTIC ACTIVITIES: CPT

## 2024-06-27 PROCEDURE — 82140 ASSAY OF AMMONIA: CPT

## 2024-06-27 PROCEDURE — 90935 HEMODIALYSIS ONE EVALUATION: CPT | Performed by: INTERNAL MEDICINE

## 2024-06-27 RX ORDER — METOPROLOL SUCCINATE 25 MG/1
25 TABLET, EXTENDED RELEASE ORAL DAILY
Status: DISCONTINUED | OUTPATIENT
Start: 2024-06-27 | End: 2024-06-29 | Stop reason: HOSPADM

## 2024-06-27 RX ORDER — LOSARTAN POTASSIUM 25 MG/1
25 TABLET ORAL DAILY
Status: DISCONTINUED | OUTPATIENT
Start: 2024-06-28 | End: 2024-06-28

## 2024-06-27 RX ORDER — ALBUMIN (HUMAN) 12.5 G/50ML
25 SOLUTION INTRAVENOUS ONCE
Status: CANCELLED | OUTPATIENT
Start: 2024-06-27 | End: 2024-06-27

## 2024-06-27 RX ADMIN — PERFLUTREN 0.4 ML/MIN: 6.52 INJECTION, SUSPENSION INTRAVENOUS at 09:05

## 2024-06-27 RX ADMIN — METOPROLOL SUCCINATE 25 MG: 25 TABLET, EXTENDED RELEASE ORAL at 18:33

## 2024-06-27 RX ADMIN — SEVELAMER HYDROCHLORIDE 1600 MG: 800 TABLET ORAL at 09:56

## 2024-06-27 RX ADMIN — CALCIUM ACETATE 1334 MG: 667 CAPSULE ORAL at 09:56

## 2024-06-27 RX ADMIN — Medication 1000 UNITS: at 09:58

## 2024-06-27 RX ADMIN — CALCIUM ACETATE 1334 MG: 667 CAPSULE ORAL at 17:24

## 2024-06-27 RX ADMIN — Medication 1 CAPSULE: at 17:25

## 2024-06-27 RX ADMIN — TORSEMIDE 80 MG: 20 TABLET ORAL at 09:56

## 2024-06-27 RX ADMIN — SEVELAMER HYDROCHLORIDE 1600 MG: 800 TABLET ORAL at 17:24

## 2024-06-27 RX ADMIN — CEFTRIAXONE SODIUM 2000 MG: 10 INJECTION, POWDER, FOR SOLUTION INTRAVENOUS at 21:08

## 2024-06-27 NOTE — ASSESSMENT & PLAN NOTE
5.5 POA.   Recent Labs     06/25/24  0516 06/26/24  0544 06/27/24  0438   K 4.5 4.1 4.2   MG 2.0 2.2 2.0       Consult nephrology for dialysis.   Albuterol nebulizer  Calcium gluconate   Monitor on tele.

## 2024-06-27 NOTE — ASSESSMENT & PLAN NOTE
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

## 2024-06-27 NOTE — PLAN OF CARE
Problem: PHYSICAL THERAPY ADULT  Goal: Performs mobility at highest level of function for planned discharge setting.  See evaluation for individualized goals.  Description: Treatment/Interventions: Functional transfer training, LE strengthening/ROM, Therapeutic exercise, Endurance training, Patient/family training, Equipment eval/education, Bed mobility, Gait training, Elevations  Equipment Recommended: Cane       See flowsheet documentation for full assessment, interventions and recommendations.  Outcome: Progressing  Note: Prognosis: Good  Problem List: Decreased strength, Decreased endurance, Impaired balance, Decreased mobility, Impaired judgement, Decreased safety awareness, Pain  Assessment: pt began tx session seated OOB in the recliner as pt was agreeable to participate in PT intervention. PT intervention to focus on transfer training, posture/balance with gait, stair trials and increasing activity tolerance. Progress was noted in todays tx session w/ skilled PT intervention as pt was able to complete multiple functional transfers with SPC, mod I as demonstrated good recall on hand placement to increase safety and balance. pt increased ambulation distance in todays to 150'x2, SPC, /s, no LOB. pt also completed a full flight of steps (14) with L sided hand rail and SPC in RUE. pt w/ inconsistant placement of SPC while ascending/descending steps pt required VC's for safety. Post tx pt in recliner with call bell and all pt needs met. pt would benefit from continued skilled PT intervention in order to increase activity tolerance, ambulation distance and balance w/ OOB activities.        Rehab Resource Intensity Level, PT: III (Minimum Resource Intensity)    See flowsheet documentation for full assessment.

## 2024-06-27 NOTE — CASE MANAGEMENT
Case Management Assessment & Discharge Planning Note    Patient name Homero Koch  Location S /S -01 MRN [id number]  : 1960 Date 2024       Current Admission Date: 2024  Current Admission Diagnosis:Abdominal pain   Patient Active Problem List    Diagnosis Date Noted Date Diagnosed    Abdominal pain 2024     Chronic venous stasis dermatitis of both lower extremities 2023     Hemodialysis catheter infection ([facility]) 2023     Venous stasis ulcer of left lower leg with edema of left lower leg  ([facility]) 2023     Abnormal nuclear stress test 2021     Type 2 diabetes mellitus with stage 5 chronic kidney disease not on chronic dialysis, with long-term current use of insulin ([facility]) 2021     Heart failure with reduced ejection fraction due to cardiomyopathy ([facility]) 2020     HFrEF Dilated cardiomyopathy ([facility]) 2020     Pruritus 2020     Insomnia 2020     Chronic hepatic failure without coma ([facility]) 2020     Paresis ([facility]) 2020     Hypoproteinemia ([facility]) 2020     Systolic dysfunction 2020     GERD (gastroesophageal reflux disease) 2019     Oropharyngeal dysphagia 2019     Constipation 2019     At Risk for Pressure ulcer, buttock 10/29/2019     ESRD (end stage renal disease) ([facility]) 10/28/2019     Scrotal swelling 10/27/2019     Hyperkalemia 10/26/2019     Wound of right leg 10/26/2019     Wound of left leg 10/26/2019     Multiple wounds of skin 10/26/2019     Cellulitis 10/26/2019     ESRD (end stage renal disease) on dialysis ([facility])      Ileus ([facility]) 10/09/2019     Ascites 10/09/2019     Elevated troponin 10/09/2019     Hypotension 10/06/2019     Anemia due to chronic kidney disease, on chronic dialysis and Acute Blood Loss Anemia ([facility]) 10/06/2019     Paroxysmal atrial fibrillation ([facility]) 2019     Gram-negative bacteremia 2019     Chronic kidney disease with end stage renal failure on dialysis  ([facility]) 09/21/2019     Hyponatremia 09/21/2019     Multiple and open wound of lower limb 09/21/2019     Total bilirubin, elevated 09/21/2019     Thrombocytopenia ([facility]) 09/21/2019     Polycystic liver disease 08/07/2018     ADPKD (autosomal dominant polycystic kidney) 08/07/2018     Peripheral neuropathy 11/23/2015     Polycystic kidney disease 08/17/2015     Hypertension 10/29/2013       LOS (days): 4  Geometric Mean LOS (GMLOS) (days): 5.1  Days to GMLOS:1.4     OBJECTIVE:    Risk of Unplanned Readmission Score: 19.11         Current admission status: Inpatient       Preferred Pharmacy:   [id number] - [place], [place] - [address]  [address]  [place] [address]  Phone: [phone] Fax: [phone]    [id number] - [place], [place] - [address]  [address]  [place] [address]  Phone: [phone] Fax: [phone]    Primary Care Provider: No primary care provider on file.    Primary Insurance: MEDICARE  Secondary Insurance: [employer]    ASSESSMENT:  Active Health Care Proxies       Tiffani Koch Health Care Representative - Spouse   Primary Phone: [phone] (Mobile)            Readmission Root Cause  30 Day Readmission: No    Patient Information  Admitted from:: Home  Mental Status: Alert  During Assessment patient was accompanied by: Not accompanied during assessment  Assessment information provided by:: Patient  Primary Caregiver: Self  Support Systems: Spouse/significant other, Family members, Children  County of Residence: [place]  What city do you live in?: [place]  Home entry access options. Select all that apply.: Stairs  Number of steps to enter home.: 5  Type of Current Residence: 3 story home  Upon entering residence, is there a bedroom on the main floor (no further steps)?: No  A bedroom is located on the following floor levels of residence (select all that apply):: 2nd Floor  Upon entering residence, is there a bathroom on the  main floor (no further steps)?: No  Indicate which floors of current residence have a bathroom (select all the apply):: 2nd Floor  Number of steps to 2nd floor from main floor: One Flight  Living Arrangements: Lives w/ Spouse/significant other    Activities of Daily Living Prior to Admission  Functional Status: Independent  Completes ADLs independently?: Yes  Ambulates independently?: Yes  Does patient use assisted devices?: No  Does patient currently own DME?: No  Does patient have a history of Outpatient Therapy (PT/OT)?: Yes  Does the patient have a history of Short-Term Rehab?: Yes  Does patient have a history of HHC?: Yes  Does patient currently have HHC?: No    Patient Information Continued  Income Source: SSI/SSD  Does patient have prescription coverage?: Yes  Does patient receive dialysis treatments?: Yes ([facility])    Means of Transportation  [facility]    Social Determinants of Health (SDOH)      Flowsheet Row Most Recent Value   Housing Stability    In the last 12 months, was there a time when you were not able to pay the mortgage or rent on time? N   In the past 12 months, how many times have you moved where you were living? 1   At any time in the past 12 months, were you homeless or living in a shelter (including now)? N   Transportation Needs    In the past 12 months, has lack of transportation kept you from medical appointments or from getting medications? no   In the past 12 months, has lack of transportation kept you from meetings, work, or from getting things needed for daily living? No   Food Insecurity    Within the past 12 months, you worried that your food would run out before you got the money to buy more. Never true   Within the past 12 months, the food you bought just didn't last and you didn't have money to get more. Never true   Utilities    In the past 12 months has the electric, gas, oil, or water company threatened to shut off services in your  home? No     DISCHARGE DETAILS:    Discharge planning discussed with:: patient at bedside  Freedom of Choice: Yes  Comments - Freedom of Choice: OP therapy    Other Referral/Resources/Interventions Provided:  Interventions: Outpatient OT, Outpatient PT  Referral Comments: Patient was admitted to [facility] due to abdominal pain. CM met with patient at bedside to complete assessment/ discuss dcp. Patient lives with wife, daughter and grandchildren. Patient is independent at baseline- owns no DME (uses a golf club as cane). Patient has a history of STR and HHC. Patient goes to HD; MWF at [facility]. CM discussed therapy recommendation of Level III- patient stating he would prefer OP therapy, states there is a location near his home he plans to go to. Patient was provided with cane. Patient aware CM is available, as needed.    Would you like to participate in our [facility] service program?  : No - Declined    Treatment Team Recommendation: Home  Discharge Destination Plan:: Home  Transport at Discharge : Family

## 2024-06-27 NOTE — PHYSICAL THERAPY NOTE
PHYSICAL THERAPY NOTE          Patient Name: Homero Koch  Today's Date: 24 0950   PT Last Visit   PT Visit Date 24   Note Type   Note Type Treatment   Pain Assessment   Pain Assessment Tool 0-10   Pain Score No Pain   Pain Location/Orientation Location: Groin   Effect of Pain on Daily Activities would limit comfort and activity tolerance   Patient's Stated Pain Goal No pain   Hospital Pain Intervention(s) Repositioned;Ambulation/increased activity;Rest   Multiple Pain Sites No   Restrictions/Precautions   Weight Bearing Precautions Per Order No   Other Precautions Chair Alarm;Cognitive;Bed Alarm;Pain;Fall Risk   General   Chart Reviewed Yes   Response to Previous Treatment Patient with no complaints from previous session.   Family/Caregiver Present Yes   Cognition   Overall Cognitive Status Unable to assess   Arousal/Participation Alert;Responsive;Cooperative   Attention Within functional limits   Orientation Level Oriented X4   Memory Decreased short term memory;Decreased recall of precautions   Following Commands Follows one step commands without difficulty   Comments pt ID by name and    Subjective   Subjective pt was agreeable to participate in PT intervention and stated 0/10 pain pre/post tx session   Bed Mobility   Additional Comments pt seated OOB in the recliner pre/post tx session   Transfers   Sit to Stand 6  Modified independent   Additional items Assist x 1;Armrests;Increased time required   Stand to Sit 6  Modified independent   Additional items Assist x 1;Armrests;Increased time required   Additional Comments pt was able to complete funcitonal transfers with SPC and a decrease in level of assistanec required compared to previous tx didier cohen I, no LOB, good recall on hand placement   Ambulation/Elevation   Gait pattern Decreased foot clearance;Short stride;Inconsistent  cyril   Gait Assistance 5  Supervision   Additional items Assist x 1;Verbal cues   Assistive Device SPC   Distance 150'x2 SPC   Stair Management Assistance 5  Supervision   Additional items Assist x 1;Verbal cues;Increased time required   Stair Management Technique One rail L;Alternating pattern;Foreward;With cane   Number of Stairs 14   Ambulation/Elevation Additional Comments pt with inconsistant placement of SPC on stair trial   Balance   Static Sitting Good   Dynamic Sitting Fair +   Static Standing Fair +   Dynamic Standing Fair   Ambulatory Fair -  (SPC)   Endurance Deficit   Endurance Deficit Yes   Endurance Deficit Description limited ambulation distance and activity tolerance   Activity Tolerance   Activity Tolerance Patient limited by fatigue   Nurse Made Aware Spoke to RN   Assessment   Prognosis Good   Problem List Decreased strength;Decreased endurance;Impaired balance;Decreased mobility;Impaired judgement;Decreased safety awareness;Pain   Assessment pt began tx session seated OOB in the recliner as pt was agreeable to participate in PT intervention. PT intervention to focus on transfer training, posture/balance with gait, stair trials and increasing activity tolerance. Progress was noted in todays tx session w/ skilled PT intervention as pt was able to complete multiple functional transfers with SPC, mod I as demonstrated good recall on hand placement to increase safety and balance. pt increased ambulation distance in todays to 150'x2, SPC, /s, no LOB. pt also completed a full flight of steps (14) with L sided hand rail and SPC in RUE. pt w/ inconsistant placement of SPC while ascending/descending steps pt required VC's for safety. Post tx pt in recliner with call bell and all pt needs met. pt would benefit from continued skilled PT intervention in order to increase activity tolerance, ambulation distance and balance w/ OOB activities.   Goals   Patient Goals to get better   STG Expiration Date  07/06/24   PT Treatment Day 1   Plan   Treatment/Interventions Functional transfer training;LE strengthening/ROM;Therapeutic exercise;Elevations;Endurance training;Patient/family training;Equipment eval/education;Bed mobility;Gait training;Spoke to nursing   Progress Progressing toward goals   PT Frequency 2-3x/wk   Discharge Recommendation   Rehab Resource Intensity Level, PT III (Minimum Resource Intensity)   Equipment Recommended Cane   AM-PAC Basic Mobility Inpatient   Turning in Flat Bed Without Bedrails 4   Lying on Back to Sitting on Edge of Flat Bed Without Bedrails 3   Moving Bed to Chair 3   Standing Up From Chair Using Arms 4   Walk in Room 3   Climb 3-5 Stairs With Railing 3   Basic Mobility Inpatient Raw Score 20   Basic Mobility Standardized Score 43.99   [facility] Highest Level Of Mobility   -HLM Goal 6: Walk 10 steps or more   -HLM Achieved 7: Walk 25 feet or more   Education   Education Provided Mobility training;Assistive device;Other  (stair trials)   Patient Demonstrates acceptance/verbal understanding   End of Consult   Patient Position at End of Consult Bedside chair;Bed/Chair alarm activated;All needs within reach   The patient's AM-PAC Basic Mobility Inpatient Short Form Raw Score is 20. A Raw score of greater than 16 suggests the patient may benefit from discharge to home. Please also refer to the recommendation of the Physical Therapist for safe discharge planning.    Solomon Alvarez

## 2024-06-27 NOTE — PLAN OF CARE
Problem: Potential for Falls  Goal: Patient will remain free of falls  Description: INTERVENTIONS:  - Educate patient/family on patient safety including physical limitations  - Instruct patient to call for assistance with activity   - Consult OT/PT to assist with strengthening/mobility   - Keep Call bell within reach  - Keep bed low and locked with side rails adjusted as appropriate  - Keep care items and personal belongings within reach  - Initiate and maintain comfort rounds  - Make Fall Risk Sign visible to staff    Problem: INFECTION - ADULT  Goal: Absence or prevention of progression during hospitalization  Description: INTERVENTIONS:  - Assess and monitor for signs and symptoms of infection  - Monitor lab/diagnostic results  - Monitor all insertion sites, i.e. indwelling lines, tubes, and drains  - Monitor endotracheal if appropriate and nasal secretions for changes in amount and color  - [place] appropriate cooling/warming therapies per order  - Administer medications as ordered  - Instruct and encourage patient and family to use good hand hygiene technique  - Identify and instruct in appropriate isolation precautions for identified infection/condition  Outcome: Progressing  Goal: Absence of fever/infection during neutropenic period  Description: INTERVENTIONS:  - Monitor WBC    Outcome: Progressing     Problem: METABOLIC, FLUID AND ELECTROLYTES - ADULT  Goal: Electrolytes maintained within normal limits  Description: INTERVENTIONS:  - Monitor labs and assess patient for signs and symptoms of electrolyte imbalances  - Administer electrolyte replacement as ordered  - Monitor response to electrolyte replacements, including repeat lab results as appropriate  - Instruct patient on fluid and nutrition as appropriate  Outcome: Progressing  Goal: Fluid balance maintained  Description: INTERVENTIONS:  - Monitor labs   - Monitor I/O and WT  - Instruct patient on fluid and nutrition as appropriate  - Assess for  signs & symptoms of volume excess or deficit  Outcome: Progressing     - Apply yellow socks and bracelet for high fall risk patients  - Consider moving patient to room near nurses station  Outcome: Progressing

## 2024-06-27 NOTE — ASSESSMENT & PLAN NOTE
On admission Plt 95  HIT work-up WNL, patient refused hematology evaluation outpatient  Possibly due to ADPKD    Recent Labs     06/25/24  0516 06/26/24  0544 06/27/24  0438   PLT 81* 96* 95*       Continue to trend daily  Currently holding DVT prophylaxis due to concern for HIT on prior admission

## 2024-06-27 NOTE — PROGRESS NOTES
NEPHROLOGY HOSPITAL PROGRESS NOTE   Homero Koch 64 y.o. male MRN: [id number]  Unit/Bed#: S -01 Encounter: [id number]  Reason for Consult: ESRD    ASSESSMENT and PLAN:    64-year-old male with past medical history of ESRD on hemodialysis, polycystic kidney disease, atrial fibrillation, hypertension, cardiomyopathy with EF 25%, prior poor compliance, and poor compliance with dialysis catheter care with tunneled infection of HD catheter in August 2023, who was initially presenting with abdominal pain, nausea, vomiting.  Nephrology is on board for ESRD.     Other complaints include vomiting at home nonbloody, nonbilious, worsening ascites, distention, testicular swelling.  Initially was noted to be febrile.       1-ESRD on hemodialysis MWF at [facility]     - Last treatment prior to this admission was June 21  - Prior estimated dry weight 84.4 kg  - June 24-postdialysis weight was 77 kg.  - June 26-dialysis and we will challenge dry weight to 76 kg.  Sodium level appropriate 135.  Bicarbonate potassium appropriate.  Phosphorus level slightly above goal and will continue phosphorus binders.  Postdialysis weight 76.7 kg.  Ultrafiltration 364 cc.  - June 27-dialysis extra treatment due to MRI with contrast     Plan  - Patient was seen on dialysis this afternoon and was tolerating treatment.  Sodium 135, bicarbonate 38, F1 80 filter, 450 blood flow, 3-hour treatment, ultrafiltration to 76 kg if tolerates  - Next dialysis tomorrow also  - Extra dialysis treatment due to MRI with contrast  - If large-volume paracentesis, no objection to albumin  - Reviewed case with cardiology team and we are in agreement to hold torsemide and cardiology team will be starting goal-directed medical therapy for heart failure  - Noted iron deposition.  Hold IV iron for now     2-electrolytes-     - Hyperkalemia-improved with dialysis and stable June 27     - Mild hyponatremia-monitor with hemodialysis and ultrafiltration and  resolved and stable June 27     3-Access-left IJ PermCath-patient is dialyzed through a catheter.  He has never had prior AVF or AVG.  He has opted to use catheter only has his dialysis access option and will defer further discussions and plans to outpatient dialysis team's    - Repeat blood culture June 26 no growth thus far  - Patient had blood culture June 23 with Corynebacterium and coag negative staph-primary team felt it to be contaminant     4-hypertension-     - Outpatient regimen includes metoprolol, torsemide but unclear compliance  - Monitor for hypotension  - Patient started on losartan and metoprolol June 27 per cardiology team     5-CHF with EF 20 to 25% with global hypokinesis, mild to moderate MR, moderate to severe TR     - On torsemide but patient is not compliant at home with this and given lack of urine output, no objection to keep holding  - Patient was 9 L paracentesis June 24 therefore will minimize ultrafiltration with dialysis today  - Repeat echocardiogram with continued reduced EF  - Starting goal-directed medical therapy per cardiology team     6-anemia-hemoglobin above goal.  Is on Venofer as an outpatient which we will hold due to infection     7-MBD-     - Hyperphosphatemia-on phosphorus binders.  To note, patient reports not taking his phosphorus binders.  Trend phosphorus for now.  Is on calcitriol also which we will continue for now  - Phosphorus improving and patient     8-sepsis-     - Follow-up final blood cultures-1 out of 2 cultures positive.  Await final speciation  - Follow-up final peritoneal studies-thus far does not appear to have SBP  - To note patient has tunneled catheter.  No drainage or erythema currently noted but patient is not compliant with keeping it covered  - Repeat culture unrevealing     9-abdominal pain-improved-CT scan with mild splenomegaly, possible subtle infarct, complex and indeterminate renal left hepatic lobe lesions with large abdominal pelvic  ascites     - Status post 8.9 L paracentesis on admission  - Etiology of ascites unclear.  Patient has PKD, poor EF, noncompliance with dialysis treatments.  Therefore may have multifactorial etiology as the cause.  - Has thrombocytopenia.  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  Ascitic fluid in right inguinal canal scrotal ultrasound     11-acid/base-lactic acid level 1.4.  Has elevated anion gap.  Monitor with dialysis.     12-elevated bilirubin level-per primary team.    SUBJECTIVE / 24H INTERVAL HISTORY:    Blood pressure is 1 20-1 40 systolic.  Afebrile.  Weight today 77.6 kg.  Will attempt 1 L ultrafiltration.  Patient denies complaints.  Family at bedside.    OBJECTIVE:  Current Weight: Weight - Scale: 77.6 kg (171 lb)  Vitals:    06/27/24 1410 06/27/24 1430 06/27/24 1500 06/27/24 1530   BP: 127/82 126/86 132/90 144/92   BP Location: Right arm Right arm Right arm Right arm   Pulse: 75 72 70 76   Resp: 16 16 16 16   Temp: 98.4 °F (36.9 °C)      TempSrc: Oral      SpO2:       Weight:       Height:           Intake/Output Summary (Last 24 hours) at 6/27/2024 1540  Last data filed at 6/27/2024 1400  Gross per 24 hour   Intake 500 ml   Output 864 ml   Net -364 ml     General: NAD  Skin: no rash  Eyes: anicteric sclera  ENT: moist mucous membrane  Neck: supple  Chest: CTA b/l, no ronchii, no wheeze, no rubs, no rales but diminished intake bases  CVS: s1s2,  no murmur, no gallop, no rub  Abdomen: soft, mild abdominal distention  Extremities: no edema LE b/l, dialysis catheter site without erythema or drainage  : no meeks  Neuro: AAOX3  Psych: normal affect    Medications:    Current Facility-Administered Medications:     acetaminophen (TYLENOL) tablet 650 mg, 650 mg, Oral, Q6H PRN, Dimple Hernandez DO, 650 mg at 06/25/24 1350    b complex-vitamin C-folic acid (RENAL) capsule 1 capsule, 1 mg, Oral, Daily With Dinner, Dimple Hernandez DO, 1 capsule at 06/26/24 1724    calcitriol (ROCALTROL) capsule 0.75 mcg, 0.75 mcg, Oral, Once per day on Monday Wednesday Friday, Dimple Hernandez DO, 0.75 mcg at 06/26/24 0834    calcium acetate (PHOSLO) capsule 1,334 mg, 1,334 mg, Oral, TID With Meals, Dimple Hernandez DO, 1,334 mg at 06/27/24 0956    ceftriaxone (ROCEPHIN) 2 g/50 mL in dextrose IVPB, 2,000 mg, Intravenous, Q24H, Dimple Hernandez DO, Last Rate: 100 mL/hr at 06/26/24 2136, 2,000 mg at 06/26/24 2136    Cholecalciferol (VITAMIN D3) tablet 1,000 Units, 1,000 Units, Oral, Daily, Dimple Hernandez DO, 1,000 Units at 06/27/24 0958    [START ON 6/28/2024] losartan (COZAAR) tablet 25 mg, 25 mg, Oral, Daily, SAM Valdez    metoprolol succinate (TOPROL-XL) 24 hr tablet 25 mg, 25 mg, Oral, Daily, SAM Valdez    sevelamer (RENAGEL) tablet 1,600 mg, 1,600 mg, Oral, TID With Meals, Dimple Hernandez DO, 1,600 mg at 06/27/24 0956    simethicone (MYLICON) chewable tablet 80 mg, 80 mg, Oral, 4x Daily PRN, Dimple Hernandez,     Laboratory Results:  Results from last 7 days   Lab Units 06/27/24  0438 06/26/24  0544 06/25/24  0516 06/24/24  0418 06/23/24  2031   WBC Thousand/uL 4.08* 3.97* 5.20 13.21* 14.27*   HEMOGLOBIN g/dL 11.9* 12.0 11.6* 11.9* 12.9   HEMATOCRIT % 36.4* 37.1 35.7* 38.8 40.6   PLATELETS Thousands/uL 95* 96* 81* 90* 95*   POTASSIUM mmol/L 4.2 4.1 4.5 5.6* 5.5*   CHLORIDE mmol/L 92*  92* 92* 92* 92*   CO2 mmol/L 32 31 30 23 26   BUN mg/dL 32* 58* 41* 58* 58*   CREATININE mg/dL 4.76* 7.41* 6.05* 8.14* 8.17*   CALCIUM mg/dL 8.6 8.7 8.6 9.2 9.4   MAGNESIUM mg/dL 2.0 2.2 2.0  --   --    PHOSPHORUS mg/dL 4.3* 6.4* 5.8*  --   --

## 2024-06-27 NOTE — PLAN OF CARE
Post-Dialysis RN Treatment Note    Blood Pressure:  Pre:   127/82 mm/Hg  Post:   142/82 mmHg   EDW:  76.5 kg    Weight:  Pre:  76.7 kg   Post:  75.5 kg   Mode of weight measurement: Bed Scale   Volume Removed:  1200 ml    Treatment duration:  180 minutes    NS given:   No    Treatment shortened? No   Medications given during Rx:   None Reported   Estimated Kt/V:   Not Applicable   Access type:    Permacath/TDC   Access Issues:    No    Report called to primary nurse:   Yes, [place]    Problem: METABOLIC, FLUID AND ELECTROLYTES - ADULT  Goal: Electrolytes maintained within normal limits  Description: INTERVENTIONS:  - Monitor labs and assess patient for signs and symptoms of electrolyte imbalances  - Administer electrolyte replacement as ordered  - Monitor response to electrolyte replacements, including repeat lab results as appropriate  - Instruct patient on fluid and nutrition as appropriate  Outcome: Progressing  Goal: Fluid balance maintained  Description: INTERVENTIONS:  - Monitor labs   - Monitor I/O and WT  - Instruct patient on fluid and nutrition as appropriate  - Assess for signs & symptoms of volume excess or deficit  Outcome: Progressing

## 2024-06-27 NOTE — ASSESSMENT & PLAN NOTE
Meets sepsis: tachycardic, febrile, Leukocytosis. Source likely GI: SBP vs gastroenteritis   Presented with diffuse abdominal pain with N/V, watery diarrhea.   Work up:  Procal 5.73  WBC 14. 27  Lactic: 1.4  CXR: unremarkable, no consolidations  CT A/P: mild splenomegaly with possible infarct, PCKD, Renal and left hepatic lobe lesions, large ascities, diverticulosis    Recent Labs     06/25/24  0516 06/26/24  0544 06/27/24  0438   WBC 5.20 3.97* 4.08*       Plan:  Continue Ceftriaxone 2g for SBP  IR consult for paracentesis  C. difficile and enteric stool study negative  Blood cultures growing Gram + cocci in clusters and gram + rods  Body fluid showed no culture growth  Anaerobic culture showed no growth  Trend fever curve and vitals  Trend CBC  Tylenol prn for fevers

## 2024-06-27 NOTE — CONSULTS
Cardiology   Homero Jose Luis Koch 64 y.o. male MRN: [id number]  Unit/Bed#: S -01 Encounter: [id number]      Reason for Consult / Principal Problem: Cardiomyopathy    Physician Requesting Consult:  Minna Prasad MD    Outpatient Cardiologist: Dr. Adamson    Assessment  1.  Acute on chronic HFrEF w/worsening RV failure  2.  Cardiomyopathy, LVEF 20% - unclear etiology - historically has declined further workup/testing and patient of GDMT's in the past.  Now agreeable to further workup/testing and initiation of GDMT's.  -BNP level ordered/pending.  -TTE 6/27/2024;  LVEF 20%, moderate concentric hypertrophy, RV dilated/systolic function moderately to severely reduced, LA dilated, mild MR, moderate TR estimated RV systolic pressure 62 mmHg, mild PVI, PASP severely increased.  Notching of the RVOT   -Cyndie MPI study/2021; significant reversible ischemia in the inferior apical wall, EF 19%.  -Volume management via HD.  Expresses good compliance, occasional missed sessions.  -Outpatient GDMT; none   -Inpatient GDMT; none.  3.  Large volume abdominal ascites  -S/p paracentesis on 6/24 yielding 9 L clear yellow fluid (albumin 2.5, glucose 99, lactate dehydrogenase 103, and protein 4.6).  No bacteria seen in fluid culture.  4. Presumed underlying CAD  -No recent anginal or anginal equivalent symptoms.  -HS troponin levels minimally elevated on admission (up to 88).  -ECG on admission demonstrated ST with inferior lateral ST-T wave abnormalities.   -Cyndie MPI study/2021; significant reversible ischemia in the inferior apical wall  -Lipid profile 3/20/2024; cholesterol 115, triglyceride 59, HDL 35 and LDL calculated 68.  5. ADPKD  6. ESRD on HD.  -Management per the nephrology team.  -On HD MWF    Plan  -Pt denies any specific cardiac or respiratory complaints at this time.  No current supplemental O2 requirements. No significant peripheral edema on exam, lung fields are clear but still has abd bloating/distention.   -Pt with  a longstanding CM history of unclear etiology. Presumed CAD based on the premise of a prior abnormal stress test back in 2021.  -Historically has declined further testing/workup and initiation of GDMT's in the past.   -TTE reviewed, EF 20%, appears to be having progressive/worsening RV dysfunction.  His presentation w/ heart volume ascites, scrotal, and lower extremity edema appear to be more consistent with right-sided HF features.  -Start metoprolol succinate 25 mg daily today.  -Start losartan 25 mg daily in the a.m, if BP remains stable.  He does not have prescription coverage, unlikely that Entresto will be affordable.  Would not utilize spironolactone in the context of ESRD w/ high risk for hyperkalemia.  Unlikely that he will be able to afford Jardiance as well without prescription coverage.  -Volume management via HD.  -Will consult our Advanced HF team to evaluate in the a.m for additional treatment recs/management.   -Strict I&Os, daily standing weights, 2 g Na+ diet 2LFR.  -Monitor renal function and electrolytes closely.  Replete to maintain K+ level 4.0 magnesium level 2.0.  -No indication at this time for telemetry monitoring.    HPI: Homero Amaya August 64 y.o. year old male with a medical history of chronic HFrEF, cardiomyopathy EF 20%, and polycystic kidney disease/ESRD on HD.  Denies nicotine use but does smoke marijuana/vape occasionally, denies excessive alcohol use.  He previously followed with Dr. Adamson with the  [facility], however has not been seen as an outpatient since 2021.  At this time it was recommended that the patient undergo further workup/testing and initiation of GDMT's in reference to his chronic cardiomyopathy, however patient had declined not only to pursue further testing/workup but declined initiation of GDMT's.  He presented to the [facility] campus on 6/23/2024 with complaints of feeling generally unwell, near syncopal, and complaining of generalized abdominal  pressure/distention.  On further workup was found to have large volume ascites.  He underwent a paracentesis procedure yielding 9 L of clear yellow fluid in which fluid analysis appeared to be insistent with CHF.  He was evaluated by the nephrology service on admission and restarted on HD sessions for volume management.  Cardiology has now been consulted to help assist in providing additional treatment recommendations in reference to his chronic cardiomyopathy.        Family History: History reviewed. No pertinent family history.  Historical Information   Past Medical History:   Diagnosis Date    Complication of renal dialysis     Polycystic kidney disease      Past Surgical History:   Procedure Laterality Date    IR CATHETERGRAM  10/17/2019    IR IMAGE GUIDED ASPIRATION / DRAINAGE  9/23/2019    IR PARACENTESIS  10/9/2019    IR PARACENTESIS  6/24/2024    IR TEMPORARY DIALYSIS CATHETER PLACEMENT  7/28/2023    IR TUNNELED CENTRAL LINE CHECK/CHANGE/REPOSITION  12/14/2021    IR TUNNELED DIALYSIS CATHETER CHECK/CHANGE/REPOSITION/ANGIOPLASTY  10/17/2019    IR TUNNELED DIALYSIS CATHETER PLACEMENT  9/30/2019    IR TUNNELED DIALYSIS CATHETER PLACEMENT  8/1/2023    IR TUNNELED DIALYSIS CATHETER REMOVAL  7/28/2023    SPLIT THICKNESS SKIN GRAFT Bilateral 11/7/2019    Procedure: SKIN GRAFT SPLIT THICKNESS (STSG)  EXTREMITY;  Surgeon: Torey Cha MD;  [facility];  Service: Plastics    SPLIT THICKNESS SKIN GRAFT Bilateral 11/12/2019    Procedure: SKIN GRAFT SPLIT THICKNESS (STSG)  EXTREMITY;  Surgeon: Torey Cha MD;  [facility];  Service: Plastics    VAC DRESSING APPLICATION Bilateral 11/18/2019    Procedure: CHANGE DRESSING;  Surgeon: Torey Cha MD;  [facility];  Service: Plastics    WOUND DEBRIDEMENT Bilateral 10/31/2019    Procedure: DEBRIDEMENT WOUND (WASH OUT);  Surgeon: Selvin Han DPM;  [facility];  Service: Podiatry    WOUND DEBRIDEMENT  Bilateral 11/5/2019    Procedure: DEBRIDEMENT WOUND (WASH OUT);  Surgeon: Selvin Han DPM;  [facility];  Service: Podiatry    WOUND DEBRIDEMENT Bilateral 11/7/2019    Procedure: DEBRIDEMENT WOUND (WASH OUT);  Surgeon: Torey Cha MD;  [facility];  Service: Plastics    WOUND DEBRIDEMENT Bilateral 11/12/2019    Procedure: DEBRIDEMENT LOWER EXTREMITY (WASH OUT);  Surgeon: Torey Cha MD;  [facility];  Service: Plastics     Social History   Social History     Substance and Sexual Activity   Alcohol Use Not Currently     Social History     Substance and Sexual Activity   Drug Use Yes    Types: Marijuana     Social History     Tobacco Use   Smoking Status Never   Smokeless Tobacco Never     Family History: History reviewed. No pertinent family history.    Review of Systems:  Review of Systems   Constitutional:  Negative for chills, fatigue and fever.   Eyes:  Negative for visual disturbance.   Respiratory:  Negative for cough, chest tightness and shortness of breath.    Cardiovascular:  Negative for chest pain, palpitations and leg swelling.   Gastrointestinal:  Positive for abdominal distention. Negative for abdominal pain.   Neurological:  Negative for dizziness, light-headedness and headaches.   All other systems reviewed and are negative.          Scheduled Meds:  Current Facility-Administered Medications   Medication Dose Route Frequency Provider Last Rate    acetaminophen  650 mg Oral Q6H PRN Dimple Hernandez,       b complex-vitamin C-folic acid  1 mg Oral Daily With Dinner Dimple Hernandez, DO      calcitriol  0.75 mcg Oral Once per day on Monday Wednesday Friday Dimple Hernandez, DO      calcium acetate  1,334 mg Oral TID With Meals Dimple Hernandez, DO      cefTRIAXone  2,000 mg Intravenous Q24H Dimple Hernandez, DO 2,000 mg (06/26/24 2136)    cholecalciferol  1,000 Units Oral Daily Dimple Hernandez, DO       "[START ON 6/28/2024] losartan  25 mg Oral Daily SAM Valdez      metoprolol succinate  25 mg Oral Daily SAM Valdez      sevelamer  1,600 mg Oral TID With Meals Dimple Hernandez DO      simethicone  80 mg Oral 4x Daily PRN Dimple Hernandez DO       Continuous Infusions:   PRN Meds:.  acetaminophen    simethicone  all current active meds have been reviewed and current meds:   Current Facility-Administered Medications   Medication Dose Route Frequency    acetaminophen (TYLENOL) tablet 650 mg  650 mg Oral Q6H PRN    b complex-vitamin C-folic acid (RENAL) capsule 1 capsule  1 mg Oral Daily With Dinner    calcitriol (ROCALTROL) capsule 0.75 mcg  0.75 mcg Oral Once per day on Monday Wednesday Friday    calcium acetate (PHOSLO) capsule 1,334 mg  1,334 mg Oral TID With Meals    ceftriaxone (ROCEPHIN) 2 g/50 mL in dextrose IVPB  2,000 mg Intravenous Q24H    Cholecalciferol (VITAMIN D3) tablet 1,000 Units  1,000 Units Oral Daily    [START ON 6/28/2024] losartan (COZAAR) tablet 25 mg  25 mg Oral Daily    metoprolol succinate (TOPROL-XL) 24 hr tablet 25 mg  25 mg Oral Daily    sevelamer (RENAGEL) tablet 1,600 mg  1,600 mg Oral TID With Meals    simethicone (MYLICON) chewable tablet 80 mg  80 mg Oral 4x Daily PRN       Allergies   Allergen Reactions    Sucroferric Oxyhydroxide Other (See Comments)    Chlorhexidine Other (See Comments)    Heparin Other (See Comments)     Brings plaletes down    Other Other (See Comments)       Objective   Vitals: Blood pressure 147/90, pulse 74, temperature 98.2 °F (36.8 °C), resp. rate 16, height 5' 8\" (1.727 m), weight 77.6 kg (171 lb), SpO2 92%., Body mass index is 26 kg/m².,   Orthostatic Blood Pressures      Flowsheet Row Most Recent Value   Blood Pressure 147/90 filed at 06/27/2024 0942   Patient Position - Orthostatic VS Lying filed at 06/26/2024 1210              Intake/Output Summary (Last 24 hours) at 6/27/2024 115  Last data filed at " 6/26/2024 1730  Gross per 24 hour   Intake 500 ml   Output 864 ml   Net -364 ml       Invasive Devices       Peripheral Intravenous Line  Duration             Peripheral IV 06/27/24 Proximal;Right;Ventral (anterior) Forearm <1 day              Hemodialysis Catheter  Duration             HD Permanent Double Catheter 331 days                    Physical Exam:  Physical Exam  Vitals and nursing note reviewed.   Constitutional:       General: He is not in acute distress.     Appearance: He is ill-appearing. He is not diaphoretic.   HENT:      Head: Normocephalic and atraumatic.   Eyes:      General: No scleral icterus.  Cardiovascular:      Rate and Rhythm: Normal rate and regular rhythm.      Pulses: Normal pulses.      Heart sounds: Normal heart sounds. No murmur heard.  Pulmonary:      Effort: Pulmonary effort is normal.      Breath sounds: Normal breath sounds. No wheezing or rales.   Abdominal:      General: There is distension.      Palpations: Abdomen is soft.   Musculoskeletal:      Right lower leg: No edema.      Left lower leg: No edema.      Comments: B/L LE PVD   Skin:     General: Skin is warm and dry.      Capillary Refill: Capillary refill takes less than 2 seconds.   Neurological:      General: No focal deficit present.      Mental Status: He is alert and oriented to person, place, and time.   Psychiatric:         Mood and Affect: Mood normal.         Lab Results:   Recent Results (from the past 24 hour(s))   CBC    Collection Time: 06/27/24  4:38 AM   Result Value Ref Range    WBC 4.08 (L) 4.31 - 10.16 Thousand/uL    RBC 3.75 (L) 3.88 - 5.62 Million/uL    Hemoglobin 11.9 (L) 12.0 - 17.0 g/dL    Hematocrit 36.4 (L) 36.5 - 49.3 %    MCV 97 82 - 98 fL    MCH 31.7 26.8 - 34.3 pg    MCHC 32.7 31.4 - 37.4 g/dL    RDW 15.7 (H) 11.6 - 15.1 %    Platelets 95 (L) 149 - 390 Thousands/uL    MPV 11.1 8.9 - 12.7 fL   Comprehensive metabolic panel    Collection Time: 06/27/24  4:38 AM   Result Value Ref Range     Sodium 133 (L) 135 - 147 mmol/L    Potassium 4.2 3.5 - 5.3 mmol/L    Chloride 92 (L) 96 - 108 mmol/L    CO2 32 21 - 32 mmol/L    ANION GAP 9 4 - 13 mmol/L    BUN 32 (H) 5 - 25 mg/dL    Creatinine 4.76 (H) 0.60 - 1.30 mg/dL    Glucose 85 65 - 140 mg/dL    Calcium 8.6 8.4 - 10.2 mg/dL    Corrected Calcium 9.1 8.3 - 10.1 mg/dL    AST 8 (L) 13 - 39 U/L    ALT 5 (L) 7 - 52 U/L    Alkaline Phosphatase 100 34 - 104 U/L    Total Protein 6.4 6.4 - 8.4 g/dL    Albumin 3.4 (L) 3.5 - 5.0 g/dL    Total Bilirubin 0.76 0.20 - 1.00 mg/dL    eGFR 12 ml/min/1.73sq m   Magnesium    Collection Time: 06/27/24  4:38 AM   Result Value Ref Range    Magnesium 2.0 1.9 - 2.7 mg/dL   Phosphorus    Collection Time: 06/27/24  4:38 AM   Result Value Ref Range    Phosphorus 4.3 (H) 2.3 - 4.1 mg/dL   Echo complete w/ contrast if indicated    Collection Time: 06/27/24  9:04 AM   Result Value Ref Range    BSA 1.91 m2    A4C EF 31 %    LV Diastolic Volume (BP) 223 mL    LV Systolic Volume (BP) 158 mL    EF 29 %    LVIDd 4.90 cm    LVIDS 4.50 cm    IVSd 1.60 cm    LVPWd 1.60 cm    LVOT peak VTI 10.66 cm    FS 8 28 - 44    MV E' Tissue Velocity Septal 5 cm/s    MV E' Tissue Velocity Lateral 3 cm/s    LA Volume Index (BP) 49.0 mL/m2    E/A ratio 3.48     E wave deceleration time 106 ms    MV Peak E Jorje 87 cm/s    MV Peak A Jorje 0.25 m/s    AV LVOT peak gradient 1 mmHg    LVOT peak jorje 0.61 m/s    RVID d 5.5 cm    Tricuspid annular plane systolic excursion 0.80 cm    LA size 5.2 cm    LA length (A2C) 6.00 cm    LA volume (BP) 94 mL    RAA A4C 19.7 cm2    Aortic valve peak velocity 1.32 m/s    Ao VTI 22.3 cm    AV mean gradient 4 mmHg    LVOT mn grad 1.0 mmHg    AV peak gradient 7 mmHg    MV stenosis pressure 1/2 time 31 ms    MV valve area p 1/2 method 7.10     Radius 0.7 cm    MR max velocity 0.42 m/s    TR Peak Jorje 3.7 m/s    Triscuspid Valve Regurgitation Peak Gradient 54.0 mmHg    Ao root 3.30 cm    Asc Ao 3.3 cm    Aortic valve mean velocity 9.60 m/s     Tricuspid valve peak regurgitation velocity 3.69 m/s    Left ventricular stroke volume (2D) 17.00 mL    IVS 1.6 cm    LEFT VENTRICLE SYSTOLIC VOLUME (MOD BIPLANE) 2D 94 mL    LV DIASTOLIC VOLUME (MOD BIPLANE) 2D 111 mL    Left Atrium Area-systolic Four Chamber 25 cm2    Left Atrium Area-systolic Apical Two Chamber 28.8 cm2    LVSV, 2D 17 mL    DVI 0.46     LV Diastolic Volume Index (BP) 116.8 mL/m2    LV Systolic Volume Index (BP) 82.7 mL/m2    GLS -7 %    LV EF 20     Est. RA pres 8.0 mmHg    Right Ventricular Peak Systolic Pressure 62.00 mmHg   Ammonia    Collection Time: 06/27/24 10:04 AM   Result Value Ref Range    Ammonia 11 (L) 18 - 72 umol/L       Imaging: I have personally reviewed pertinent reports.   and I have personally reviewed pertinent films in PACS  Code Status: LVL 3 DNR / DNI  Epic/ Allscripts/Care Everywhere records reviewed: Yes    * Please Note: Fluency DirectDictation voice to text software may have been used in the creation of this document. **, LA filling pressure elevated, RV dilated/systolic function moderately to severely reduced,, mild MR, mild TR, estimated RV systolic pressure 62 mmHg, mild PVI.  PASP severely increased.

## 2024-06-27 NOTE — ASSESSMENT & PLAN NOTE
Non compliant with medications including Toprol-XL 25mg  Patient hypervolemic on exam. Blood pressures stable.   7/28/23 Echo: EF 20-25%, severe global hypokinesis with regional variation, moderately reduced systolic function, diastolic dysfunction, severely dilated L atria, moderately dilated R atria, mild-moderate Mitral valve regurg  Echo on 06/27/2024 showed 20% ejection fraction    Plan:  Holding metoprolol due to normotension   Renal [place] diet with 2L fluid restriction  Strict I/Os  Daily Weight  Cardiology consulted

## 2024-06-27 NOTE — PROGRESS NOTES
[facility]  Progress Note  Name: Homero Koch I  MRN: [id number]  Unit/Bed#: S -01 I Date of Admission: 6/23/2024   Date of Service: 6/27/2024 I Hospital Day: 4    Assessment & Plan   Sepsis (HCC)-resolved as of 6/27/2024  Assessment & Plan  Meets sepsis: tachycardic, febrile, Leukocytosis. Source likely GI: SBP vs gastroenteritis   Presented with diffuse abdominal pain with N/V, watery diarrhea.   Work up:  Procal 5.73  WBC 14. 27  Lactic: 1.4  CXR: unremarkable, no consolidations  CT A/P: mild splenomegaly with possible infarct, PCKD, Renal and left hepatic lobe lesions, large ascities, diverticulosis    Recent Labs     06/25/24  0516 06/26/24  0544 06/27/24  0438   WBC 5.20 3.97* 4.08*       Plan:  Continue Ceftriaxone 2g for SBP  IR consult for paracentesis  C. difficile and enteric stool study negative  Blood cultures growing Gram + cocci in clusters and gram + rods  Body fluid showed no culture growth  Anaerobic culture showed no growth  Trend fever curve and vitals  Trend CBC  Tylenol prn for fevers    HFrEF Dilated cardiomyopathy (HCC)  Assessment & Plan  Non compliant with medications including Toprol-XL 25mg  Patient hypervolemic on exam. Blood pressures stable.   7/28/23 Echo: EF 20-25%, severe global hypokinesis with regional variation, moderately reduced systolic function, diastolic dysfunction, severely dilated L atria, moderately dilated R atria, mild-moderate Mitral valve regurg  Echo on 06/27/2024 showed 20% ejection fraction    Plan:  Holding metoprolol due to normotension   Renal [place] diet with 2L fluid restriction  Strict I/Os  Daily Weight  Cardiology consulted    * Abdominal pain  Assessment & Plan  Patient presenting with generalized abdominal pain. Notable history of polycystic liver and kidney disease requiring dialysis and previously requiring paracentesis.  CT Abd/Pelvis: Negative for bowel obstruction. Mild splenomegaly. Question geographic area  of decreased attenuation within the spleen, possibly artifactual from streak artifact. Difficult to completely exclude subtle infarct. Polycystic kidney and liver disease with multiple complex and indeterminant renal and left hepatic lobe lesions. Correlation with nonemergent MRI abdomen with and without IV contrast recommended to exclude possibility of a solid lesion. Large amount of abdominopelvic ascites, increased from prior study. Sigmoid diverticulosis without diverticulitis.     Patient also has history of inguinal hernia. US from 2019 of scrotum and testicles: confirmed large complex hydrocele with numerous internal septations and debris measuring 15.6 x 9.3 x 11.4 without evidence of varicocele or scrotal thickness     Patient with fever, abdominal pain and large ascites. Procal 5.73, leukocytosis to 14.27. total bilirubin elevated to 1.93.    Plan:  See Sepsis plan  MRI abdomen showed:  Multiple simple and proteinaceous hepatic cysts. No solid hepatic mass is identified. Lobulated liver contour may be related to numerous cysts although cirrhosis is possible.Hepatic iron deposition   Enlarged kidneys with innumerable simple and proteinaceous renal cysts in keeping with autosomal dominant polycystic kidney disease. A Bosniak IIF cystic renal mass in the right kidney lower pole measuring 5.8 cm has been stable since CT 10/7/2019.   Splenomegaly and splenic iron deposition.   Scrotal ultrasound showed no evidence of hernia but did show right hydrocele.    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   Recent Labs     06/25/24  0516 06/26/24  0544 06/27/24  0438   K 4.5 4.1 4.2   MG 2.0 2.2 2.0       Consult nephrology for dialysis.   Albuterol nebulizer  Calcium gluconate   Monitor on tele.     Elevated troponin  Assessment & Plan  Elevated trop 77->86, delta 9, most likely due to demand  EKG showed sinus tachycardic with ST depressions in II, III, V4, V5, V6, V1  No chest pain, angina symptoms, or chest palpitations    Lab Results   Component Value Date    HSTNI0 77 (H) 06/23/2024    HSTNI2 86 (H) 06/23/2024    HSTNID2 9 06/23/2024    HSTNI4 88 (H) 06/24/2024    HSTNID4 11 06/24/2024     Plan:  Trend troponin  Monitor on tele    Paroxysmal atrial fibrillation (HCC)  Assessment & Plan  Sinus tachycardia on admission  Monitor on tele  Resume Toprol XL    ADPKD (autosomal dominant polycystic kidney)  Assessment & Plan  History of ADPKD with liver involvement   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 Correlation with nonemergent MRI abdomen with and without IV contrast recommended to exclude possibility of a solid lesion. Large amount of abdominopelvic ascites, increased from prior study.     Thrombocytopenia (HCC)  Assessment & Plan  On admission Plt 95  HIT work-up WNL, patient refused hematology evaluation outpatient  Possibly due to ADPKD    Recent Labs     06/25/24  0516 06/26/24  0544 06/27/24  0438   PLT 81*  96* 95*       Continue to trend daily  Currently holding DVT prophylaxis due to concern for HIT on prior admission     Multiple and open wound of lower limb  Assessment & Plan  Wound care consulted             VTE Pharmacologic Prophylaxis: VTE Score: 6 High Risk (Score >/= 5) - Pharmacological DVT Prophylaxis Contraindicated. Sequential Compression Devices Ordered.    Mobility:   Basic Mobility Inpatient Raw Score: 20  JH-HLM Goal: 6: Walk 10 steps or more  JH-HLM Achieved: 7: Walk 25 feet or more  JH-HLM Goal achieved. Continue to encourage appropriate mobility.    Patient Centered Rounds: I performed bedside rounds with nursing staff today.  Discussions with Specialists or Other Care Team Provider: Cardiology     Education and Discussions with Family / Patient: Updated  (wife) at bedside.    Current Length of Stay: 4 day(s)  Current Patient Status: Inpatient   Discharge Plan: Anticipate discharge in 24-48 hrs to home.    Code Status: Level 3 - DNAR and DNI    Subjective:   Today patient is expressing that he feels well.  No overnight events.  Patient appears to be lacking understanding of the seriousness of his illness.  Patient was perseverative about his hydrocele and did not want to discuss the other conditions that are keeping him hospitalized.  Since were made to discuss with him and his wife.  Patient's wife expressed understanding of the situation but patient wanted more done for his hydrocele.    Objective:     Vitals:   Temp (24hrs), Av.2 °F (36.8 °C), Min:98 °F (36.7 °C), Max:98.3 °F (36.8 °C)    Temp:  [98 °F (36.7 °C)-98.3 °F (36.8 °C)] 98.2 °F (36.8 °C)  HR:  [70-80] 74  Resp:  [15-16] 16  BP: (126-147)/(77-90) 147/90  Body mass index is 26 kg/m².     Input and Output Summary (last 24 hours):     Intake/Output Summary (Last 24 hours) at 2024 1317  Last data filed at 2024 1730  Gross per 24 hour   Intake 500 ml   Output 864 ml   Net -364 ml       Physical Exam:   Physical  Exam  Constitutional:       Appearance: Normal appearance.   Cardiovascular:      Rate and Rhythm: Normal rate and regular rhythm.      Pulses: Normal pulses.   Abdominal:      General: Bowel sounds are normal. There is distension.      Tenderness: There is no abdominal tenderness. There is no right CVA tenderness, left CVA tenderness or guarding.      Hernia: A hernia is present.   Musculoskeletal:      Right lower leg: No edema.      Left lower leg: No edema.   Neurological:      General: No focal deficit present.      Mental Status: He is alert and oriented to person, place, and time.          Additional Data:     Labs:  Results from last 7 days   Lab Units 06/27/24  0438 06/26/24  0544 06/25/24  0516 06/24/24  0418   WBC Thousand/uL 4.08* 3.97*   < > 13.21*   HEMOGLOBIN g/dL 11.9* 12.0   < > 11.9*   HEMATOCRIT % 36.4* 37.1   < > 38.8   PLATELETS Thousands/uL 95* 96*   < > 90*   BANDS PCT %  --   --   --  4   SEGS PCT %  --  68  --   --    LYMPHO PCT %  --  12*  --  2*   MONO PCT %  --  13*  --  5   EOS PCT %  --  6  --  0    < > = values in this interval not displayed.     Results from last 7 days   Lab Units 06/27/24  0438   SODIUM mmol/L 133*   POTASSIUM mmol/L 4.2   CHLORIDE mmol/L 92*   CO2 mmol/L 32   BUN mg/dL 32*   CREATININE mg/dL 4.76*   ANION GAP mmol/L 9   CALCIUM mg/dL 8.6   ALBUMIN g/dL 3.4*   TOTAL BILIRUBIN mg/dL 0.76   ALK PHOS U/L 100   ALT U/L 5*   AST U/L 8*   GLUCOSE RANDOM mg/dL 85     Results from last 7 days   Lab Units 06/24/24  0418   INR  1.53*             Results from last 7 days   Lab Units 06/24/24  0418 06/23/24  2031   LACTIC ACID mmol/L  --  1.4   PROCALCITONIN ng/ml 13.55* 5.73*       Lines/Drains:  Invasive Devices       Peripheral Intravenous Line  Duration             Peripheral IV 06/27/24 Proximal;Right;Ventral (anterior) Forearm <1 day              Hemodialysis Catheter  Duration             HD Permanent Double Catheter 331 days                          Imaging: Reviewed  radiology reports from this admission including: MRI abdomen/MRCP and ultrasound(s)    Recent Cultures (last 7 days):   Results from last 7 days   Lab Units 06/26/24  0546 06/24/24  1309 06/24/24  0929 06/23/24 2033 06/23/24 2031   BLOOD CULTURE  No Growth at 24 hrs.  --   --  No Growth at 72 hrs. Corynebacterium striatum group*  Staphylococcus coagulase negative*   GRAM STAIN RESULT   --   --  Rare Polys  No bacteria seen  --  Gram positive rods*  Gram positive cocci in clusters*   BODY FLUID CULTURE, STERILE   --   --  No growth  --   --    C DIFF TOXIN B BY PCR   --  Negative  --   --   --        Last 24 Hours Medication List:   Current Facility-Administered Medications   Medication Dose Route Frequency Provider Last Rate    acetaminophen  650 mg Oral Q6H PRN Dimple Hernandez DO      b complex-vitamin C-folic acid  1 mg Oral Daily With Dinner Dimple Hernandez DO      calcitriol  0.75 mcg Oral Once per day on Monday Wednesday Friday Dimple Hernandez DO      calcium acetate  1,334 mg Oral TID With Meals Dimple Hernandez DO      cefTRIAXone  2,000 mg Intravenous Q24H Dimple Hernandez DO 2,000 mg (06/26/24 2136)    cholecalciferol  1,000 Units Oral Daily Dimple Hernandez DO      [START ON 6/28/2024] losartan  25 mg Oral Daily SAM Valdez      metoprolol succinate  25 mg Oral Daily SAM Valdez      sevelamer  1,600 mg Oral TID With Meals Dimple Hernandez DO      simethicone  80 mg Oral 4x Daily PRN Dimple Hernandez DO          Today, Patient Was Seen By: Inder Ignacio DO    **Please Note: This note may have been constructed using a voice recognition system.**

## 2024-06-28 ENCOUNTER — APPOINTMENT (INPATIENT)
Dept: DIALYSIS | Facility: HOSPITAL | Age: 64
DRG: 871 | End: 2024-06-28
Attending: INTERNAL MEDICINE
Payer: MEDICARE

## 2024-06-28 ENCOUNTER — APPOINTMENT (INPATIENT)
Dept: RADIOLOGY | Facility: HOSPITAL | Age: 64
DRG: 871 | End: 2024-06-28
Payer: MEDICARE

## 2024-06-28 LAB
ALBUMIN FLD-MCNC: 1.9 G/DL
AMYLASE FLD QL: 42 U/L
ANION GAP SERPL CALCULATED.3IONS-SCNC: 8 MMOL/L (ref 4–13)
APPEARANCE FLD: CLEAR
BILIRUB FLD-MCNC: 0.5 MG/DL
BUN SERPL-MCNC: 30 MG/DL (ref 5–25)
CALCIUM SERPL-MCNC: 8.6 MG/DL (ref 8.4–10.2)
CHLORIDE SERPL-SCNC: 95 MMOL/L (ref 96–108)
CO2 SERPL-SCNC: 30 MMOL/L (ref 21–32)
COLOR FLD: NORMAL
CREAT SERPL-MCNC: 4.11 MG/DL (ref 0.6–1.3)
ERYTHROCYTE [DISTWIDTH] IN BLOOD BY AUTOMATED COUNT: 15.8 % (ref 11.6–15.1)
GFR SERPL CREATININE-BSD FRML MDRD: 14 ML/MIN/1.73SQ M
GLUCOSE FLD-MCNC: 103 MG/DL
GLUCOSE SERPL-MCNC: 91 MG/DL (ref 65–140)
HCT VFR BLD AUTO: 38.8 % (ref 36.5–49.3)
HGB BLD-MCNC: 12.3 G/DL (ref 12–17)
HISTIOCYTES NFR FLD: 41 %
LDH FLD L TO P-CCNC: 77 U/L
LYMPHOCYTES NFR BLD AUTO: 53 %
MAGNESIUM SERPL-MCNC: 2 MG/DL (ref 1.9–2.7)
MCH RBC QN AUTO: 31.4 PG (ref 26.8–34.3)
MCHC RBC AUTO-ENTMCNC: 31.7 G/DL (ref 31.4–37.4)
MCV RBC AUTO: 99 FL (ref 82–98)
MONO+MESO NFR FLD MANUAL: 6 %
PH BODY FLUID: 7.6
PHOSPHATE SERPL-MCNC: 4.1 MG/DL (ref 2.3–4.1)
PLATELET # BLD AUTO: 81 THOUSANDS/UL (ref 149–390)
PMV BLD AUTO: 10.7 FL (ref 8.9–12.7)
POTASSIUM SERPL-SCNC: 5.5 MMOL/L (ref 3.5–5.3)
PROT FLD-MCNC: 3.5 G/DL
RBC # BLD AUTO: 3.92 MILLION/UL (ref 3.88–5.62)
RBC # FLD MANUAL: 3000 /UL
SITE: NORMAL
SODIUM SERPL-SCNC: 133 MMOL/L (ref 135–147)
TOTAL CELLS COUNTED SPEC: 100
WBC # BLD AUTO: 4.33 THOUSAND/UL (ref 4.31–10.16)
WBC # FLD MANUAL: 311 /UL

## 2024-06-28 PROCEDURE — 82042 OTHER SOURCE ALBUMIN QUAN EA: CPT

## 2024-06-28 PROCEDURE — 99232 SBSQ HOSP IP/OBS MODERATE 35: CPT | Performed by: INTERNAL MEDICINE

## 2024-06-28 PROCEDURE — 87075 CULTR BACTERIA EXCEPT BLOOD: CPT

## 2024-06-28 PROCEDURE — 89051 BODY FLUID CELL COUNT: CPT

## 2024-06-28 PROCEDURE — 87205 SMEAR GRAM STAIN: CPT

## 2024-06-28 PROCEDURE — 84157 ASSAY OF PROTEIN OTHER: CPT

## 2024-06-28 PROCEDURE — 89050 BODY FLUID CELL COUNT: CPT

## 2024-06-28 PROCEDURE — 84100 ASSAY OF PHOSPHORUS: CPT | Performed by: INTERNAL MEDICINE

## 2024-06-28 PROCEDURE — 49083 ABD PARACENTESIS W/IMAGING: CPT | Performed by: STUDENT IN AN ORGANIZED HEALTH CARE EDUCATION/TRAINING PROGRAM

## 2024-06-28 PROCEDURE — 99497 ADVNCD CARE PLAN 30 MIN: CPT | Performed by: INTERNAL MEDICINE

## 2024-06-28 PROCEDURE — 83735 ASSAY OF MAGNESIUM: CPT | Performed by: INTERNAL MEDICINE

## 2024-06-28 PROCEDURE — 82247 BILIRUBIN TOTAL: CPT

## 2024-06-28 PROCEDURE — 49083 ABD PARACENTESIS W/IMAGING: CPT

## 2024-06-28 PROCEDURE — 83615 LACTATE (LD) (LDH) ENZYME: CPT

## 2024-06-28 PROCEDURE — 82150 ASSAY OF AMYLASE: CPT

## 2024-06-28 PROCEDURE — 0W9G3ZZ DRAINAGE OF PERITONEAL CAVITY, PERCUTANEOUS APPROACH: ICD-10-PCS | Performed by: STUDENT IN AN ORGANIZED HEALTH CARE EDUCATION/TRAINING PROGRAM

## 2024-06-28 PROCEDURE — 85027 COMPLETE CBC AUTOMATED: CPT | Performed by: INTERNAL MEDICINE

## 2024-06-28 PROCEDURE — 87070 CULTURE OTHR SPECIMN AEROBIC: CPT

## 2024-06-28 PROCEDURE — 82945 GLUCOSE OTHER FLUID: CPT

## 2024-06-28 PROCEDURE — 83986 ASSAY PH BODY FLUID NOS: CPT

## 2024-06-28 PROCEDURE — 80048 BASIC METABOLIC PNL TOTAL CA: CPT | Performed by: INTERNAL MEDICINE

## 2024-06-28 RX ORDER — LIDOCAINE WITH 8.4% SOD BICARB 0.9%(10ML)
SYRINGE (ML) INJECTION AS NEEDED
Status: COMPLETED | OUTPATIENT
Start: 2024-06-28 | End: 2024-06-28

## 2024-06-28 RX ORDER — ASPIRIN 81 MG/1
81 TABLET, CHEWABLE ORAL DAILY
Qty: 30 TABLET | Refills: 0 | Status: SHIPPED | OUTPATIENT
Start: 2024-06-28 | End: 2024-07-28

## 2024-06-28 RX ORDER — METOPROLOL SUCCINATE 25 MG/1
25 TABLET, EXTENDED RELEASE ORAL DAILY
Qty: 30 TABLET | Refills: 0 | Status: SHIPPED | OUTPATIENT
Start: 2024-06-28 | End: 2024-07-28

## 2024-06-28 RX ORDER — ATORVASTATIN CALCIUM 40 MG/1
40 TABLET, FILM COATED ORAL DAILY
Qty: 30 TABLET | Refills: 0 | Status: SHIPPED | OUTPATIENT
Start: 2024-06-28 | End: 2024-07-28

## 2024-06-28 RX ORDER — LOSARTAN POTASSIUM 25 MG/1
25 TABLET ORAL DAILY
Status: DISCONTINUED | OUTPATIENT
Start: 2024-06-29 | End: 2024-06-29 | Stop reason: HOSPADM

## 2024-06-28 RX ORDER — ALBUMIN (HUMAN) 12.5 G/50ML
25 SOLUTION INTRAVENOUS ONCE
Status: COMPLETED | OUTPATIENT
Start: 2024-06-28 | End: 2024-06-29

## 2024-06-28 RX ORDER — METOPROLOL SUCCINATE 25 MG/1
25 TABLET, EXTENDED RELEASE ORAL DAILY
Qty: 30 TABLET | Refills: 0 | Status: SHIPPED | OUTPATIENT
Start: 2024-06-28 | End: 2024-06-28

## 2024-06-28 RX ORDER — MIDODRINE HYDROCHLORIDE 2.5 MG/1
2.5 TABLET ORAL
Status: DISCONTINUED | OUTPATIENT
Start: 2024-06-28 | End: 2024-06-29 | Stop reason: HOSPADM

## 2024-06-28 RX ORDER — LOSARTAN POTASSIUM 25 MG/1
25 TABLET ORAL DAILY
Qty: 30 TABLET | Refills: 0 | Status: SHIPPED | OUTPATIENT
Start: 2024-06-28 | End: 2024-07-28

## 2024-06-28 RX ADMIN — Medication 1000 UNITS: at 09:12

## 2024-06-28 RX ADMIN — CALCIUM ACETATE 1334 MG: 667 CAPSULE ORAL at 13:15

## 2024-06-28 RX ADMIN — ALBUMIN (HUMAN) 25 G: 0.25 INJECTION, SOLUTION INTRAVENOUS at 16:06

## 2024-06-28 RX ADMIN — SEVELAMER HYDROCHLORIDE 1600 MG: 800 TABLET ORAL at 09:12

## 2024-06-28 RX ADMIN — METOPROLOL SUCCINATE 25 MG: 25 TABLET, EXTENDED RELEASE ORAL at 20:16

## 2024-06-28 RX ADMIN — CALCITRIOL 0.75 MCG: 0.25 CAPSULE, LIQUID FILLED ORAL at 09:12

## 2024-06-28 RX ADMIN — Medication 10 ML: at 10:51

## 2024-06-28 RX ADMIN — CALCIUM ACETATE 1334 MG: 667 CAPSULE ORAL at 09:12

## 2024-06-28 RX ADMIN — SEVELAMER HYDROCHLORIDE 1600 MG: 800 TABLET ORAL at 13:15

## 2024-06-28 NOTE — PROGRESS NOTES
Replaced by [facility]  Progress Note  Name: Homero Koch I  MRN: [id number]  Unit/Bed#: S -01 I Date of Admission: 6/23/2024   Date of Service: 6/28/2024 I Hospital Day: 5    Assessment & Plan   HFrEF Dilated cardiomyopathy (HCC)  Assessment & Plan  Non compliant with medications including Toprol-XL 25mg  Patient hypervolemic on exam. Blood pressures stable.   7/28/23 Echo: EF 20-25%, severe global hypokinesis with regional variation, moderately reduced systolic function, diastolic dysfunction, severely dilated L atria, moderately dilated R atria, mild-moderate Mitral valve regurg  Echo on 06/27/2024 showed 20% ejection fraction    Plan:  Holding metoprolol due to normotension   Renal [place] diet with 2L fluid restriction  Strict I/Os  Daily Weight  Cardiology recommendations:  Metoprolol 25 mg daily started on 06/27 with plans to start losartan on 06/28  Patient will be seen by heart failure team on 06/28    * Abdominal pain  Assessment & Plan  Patient presenting with generalized abdominal pain. Notable history of polycystic liver and kidney disease requiring dialysis and previously requiring paracentesis.  CT Abd/Pelvis: Negative for bowel obstruction. Mild splenomegaly. Question geographic area of decreased attenuation within the spleen, possibly artifactual from streak artifact. Difficult to completely exclude subtle infarct. Polycystic kidney and liver disease with multiple complex and indeterminant renal and left hepatic lobe lesions. Correlation with nonemergent MRI abdomen with and without IV contrast recommended to exclude possibility of a solid lesion. Large amount of abdominopelvic ascites, increased from prior study. Sigmoid diverticulosis without diverticulitis.     Patient also has history of inguinal hernia. US from 2019 of scrotum and testicles: confirmed large complex hydrocele with numerous internal septations and debris measuring 15.6 x 9.3 x 11.4 without evidence of  varicocele or scrotal thickness     Patient with fever, abdominal pain and large ascites. Procal 5.73, leukocytosis to 14.27. total bilirubin elevated to 1.93.    Plan:  See Sepsis plan  MRI abdomen showed:  Multiple simple and proteinaceous hepatic cysts. No solid hepatic mass is identified. Lobulated liver contour may be related to numerous cysts although cirrhosis is possible.Hepatic iron deposition   Enlarged kidneys with innumerable simple and proteinaceous renal cysts in keeping with autosomal dominant polycystic kidney disease. A Bosniak IIF cystic renal mass in the right kidney lower pole measuring 5.8 cm has been stable since CT 10/7/2019.   Splenomegaly and splenic iron deposition.   Scrotal ultrasound showed no evidence of hernia but did show right hydrocele.  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   Recent Labs     06/26/24  0544 06/27/24  0438 06/28/24  0504   K 4.1 4.2 5.5*   MG 2.2 2.0 2.0       Consult nephrology for dialysis.   Albuterol nebulizer  Calcium gluconate   Monitor on tele.     Elevated troponin  Assessment & Plan  Elevated trop 77->86, delta 9, most likely due  to demand  EKG showed sinus tachycardic with ST depressions in II, III, V4, V5, V6, V1  No chest pain, angina symptoms, or chest palpitations    Lab Results   Component Value Date    HSTNI0 77 (H) 06/23/2024    HSTNI2 86 (H) 06/23/2024    HSTNID2 9 06/23/2024    HSTNI4 88 (H) 06/24/2024    HSTNID4 11 06/24/2024     Plan:  Trend troponin  Monitor on tele    Paroxysmal atrial fibrillation (HCC)  Assessment & Plan  Sinus tachycardia on admission  Monitor on tele  Resume Toprol XL    ADPKD (autosomal dominant polycystic kidney)  Assessment & Plan  History of ADPKD with liver involvement   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 Correlation with nonemergent MRI abdomen with and without IV contrast recommended to exclude possibility of a solid lesion. Large amount of abdominopelvic ascites, increased from prior study.     Thrombocytopenia (HCC)  Assessment & Plan  On admission Plt 95  HIT work-up WNL, patient refused hematology evaluation outpatient  Possibly due to ADPKD    Recent Labs     06/26/24  0544 06/27/24  0438 06/28/24  0504   PLT 96* 95* 81*       Continue to trend daily  Currently holding DVT prophylaxis due to concern for HIT on prior admission     Multiple and open wound of lower limb  Assessment & Plan  Wound care consulted             VTE Pharmacologic Prophylaxis: VTE Score: 6 High Risk (Score >/= 5) - Pharmacological DVT Prophylaxis Contraindicated. Sequential Compression Devices Ordered.    Mobility:   Basic Mobility Inpatient Raw Score: 20  JH-HLM Goal: 6: Walk 10 steps or more  JH-HLM Achieved: 6: Walk 10 steps or more  JH-HLM Goal achieved. Continue to encourage appropriate mobility.    Patient Centered Rounds: I performed bedside rounds with nursing staff  today.  Discussions with Specialists or Other Care Team Provider: Cardiology, gastroenterology, IR    Education and Discussions with Family / Patient: Updated  (wife) at bedside.    Current Length of Stay: 5 day(s)  Current Patient Status: Inpatient   Discharge Plan: Anticipate discharge in 24-48 hrs to home.    Code Status: Level 3 - DNAR and DNI    Subjective:   Patient today is not reporting complaints.  No overnight events.  States his abdomen is still swelling but he does not have any associated pain, nausea, vomiting, diarrhea, constipation.  Denies shortness of breath, chest pain.    Objective:     Vitals:   Temp (24hrs), Av.2 °F (36.8 °C), Min:97.9 °F (36.6 °C), Max:98.4 °F (36.9 °C)    Temp:  [97.9 °F (36.6 °C)-98.4 °F (36.9 °C)] 97.9 °F (36.6 °C)  HR:  [70-83] 83  Resp:  [14-16] 14  BP: ()/(48-92) 130/81  SpO2:  [90 %-100 %] 99 %  Body mass index is 26.06 kg/m².     Input and Output Summary (last 24 hours):     Intake/Output Summary (Last 24 hours) at 2024 0932  Last data filed at 2024 1744  Gross per 24 hour   Intake 500 ml   Output 1000 ml   Net -500 ml       Physical Exam:   Physical Exam  Constitutional:       Appearance: Normal appearance.   Cardiovascular:      Rate and Rhythm: Normal rate and regular rhythm.      Pulses: Normal pulses.      Heart sounds: Normal heart sounds.   Pulmonary:      Effort: Pulmonary effort is normal.      Breath sounds: Normal breath sounds.   Abdominal:      General: Bowel sounds are normal. There is distension.      Tenderness: There is no abdominal tenderness. There is no right CVA tenderness or left CVA tenderness.      Hernia: A hernia is present.   Musculoskeletal:      Right lower leg: No edema.      Left lower leg: No edema.   Neurological:      General: No focal deficit present.      Mental Status: He is alert and oriented to person, place, and time. Mental status is at baseline.          Additional Data:     Labs:  Results from  last 7 days   Lab Units 06/28/24  0504 06/27/24  0438 06/26/24  0544 06/25/24  0516 06/24/24  0418   WBC Thousand/uL 4.33   < > 3.97*   < > 13.21*   HEMOGLOBIN g/dL 12.3   < > 12.0   < > 11.9*   HEMATOCRIT % 38.8   < > 37.1   < > 38.8   PLATELETS Thousands/uL 81*   < > 96*   < > 90*   BANDS PCT %  --   --   --   --  4   SEGS PCT %  --   --  68  --   --    LYMPHO PCT %  --   --  12*  --  2*   MONO PCT %  --   --  13*  --  5   EOS PCT %  --   --  6  --  0    < > = values in this interval not displayed.     Results from last 7 days   Lab Units 06/28/24  0504 06/27/24 0438   SODIUM mmol/L 133* 133*   POTASSIUM mmol/L 5.5* 4.2   CHLORIDE mmol/L 95* 92*   CO2 mmol/L 30 32   BUN mg/dL 30* 32*   CREATININE mg/dL 4.11* 4.76*   ANION GAP mmol/L 8 9   CALCIUM mg/dL 8.6 8.6   ALBUMIN g/dL  --  3.4*   TOTAL BILIRUBIN mg/dL  --  0.76   ALK PHOS U/L  --  100   ALT U/L  --  5*   AST U/L  --  8*   GLUCOSE RANDOM mg/dL 91 85     Results from last 7 days   Lab Units 06/24/24 0418   INR  1.53*             Results from last 7 days   Lab Units 06/24/24 0418 06/23/24 2031   LACTIC ACID mmol/L  --  1.4   PROCALCITONIN ng/ml 13.55* 5.73*       Lines/Drains:  Invasive Devices       Peripheral Intravenous Line  Duration             Peripheral IV 06/27/24 Proximal;Right;Ventral (anterior) Forearm 1 day              Hemodialysis Catheter  Duration             HD Permanent Double Catheter 331 days                          Imaging: Reviewed radiology reports from this admission including: abdominal/pelvic CT, MRI abdomen/MRCP, ultrasound(s), and ECHO    Recent Cultures (last 7 days):   Results from last 7 days   Lab Units 06/26/24  0546 06/24/24  1309 06/24/24  0929 06/23/24 2033 06/23/24 2031   BLOOD CULTURE  No Growth at 24 hrs.  --   --   --  Corynebacterium striatum group*  Staphylococcus coagulase negative*   GRAM STAIN RESULT   --   --  Rare Polys  No bacteria seen Gram positive cocci in clusters* Gram positive rods*  Gram positive  cocci in clusters*   BODY FLUID CULTURE, STERILE   --   --  No growth  --   --    C DIFF TOXIN B BY PCR   --  Negative  --   --   --        Last 24 Hours Medication List:   Current Facility-Administered Medications   Medication Dose Route Frequency Provider Last Rate    acetaminophen  650 mg Oral Q6H PRN Dimple Hernandez DO      b complex-vitamin C-folic acid  1 mg Oral Daily With Dinner Dimple Hernandez DO      calcitriol  0.75 mcg Oral Once per day on Monday Wednesday Friday Dimple Hernandez DO      calcium acetate  1,334 mg Oral TID With Meals Dimple Hernandez DO      cefTRIAXone  2,000 mg Intravenous Q24H Dimple Hernandez DO 2,000 mg (06/27/24 2108)    cholecalciferol  1,000 Units Oral Daily Dimple Hernandez DO      losartan  25 mg Oral Daily SAM Valdez      metoprolol succinate  25 mg Oral Daily SAM Valdez      midodrine  2.5 mg Oral Before Dialysis Inder Ignacio DO      sevelamer  1,600 mg Oral TID With Meals Dimple Hernandez DO      simethicone  80 mg Oral 4x Daily PRN Dimple Hernandez DO          Today, Patient Was Seen By: Inder Ignacio DO    **Please Note: This note may have been constructed using a voice recognition system.**

## 2024-06-28 NOTE — ASSESSMENT & PLAN NOTE
Patient presenting with generalized abdominal pain. Notable history of polycystic liver and kidney disease requiring dialysis and previously requiring paracentesis.  CT Abd/Pelvis: Negative for bowel obstruction. Mild splenomegaly. Question geographic area of decreased attenuation within the spleen, possibly artifactual from streak artifact. Difficult to completely exclude subtle infarct. Polycystic kidney and liver disease with multiple complex and indeterminant renal and left hepatic lobe lesions. Correlation with nonemergent MRI abdomen with and without IV contrast recommended to exclude possibility of a solid lesion. Large amount of abdominopelvic ascites, increased from prior study. Sigmoid diverticulosis without diverticulitis.     Patient also has history of inguinal hernia. US from 2019 of scrotum and testicles: confirmed large complex hydrocele with numerous internal septations and debris measuring 15.6 x 9.3 x 11.4 without evidence of varicocele or scrotal thickness     Patient with fever, abdominal pain and large ascites. Procal 5.73, leukocytosis to 14.27. total bilirubin elevated to 1.93.    Plan:  See Sepsis plan  MRI abdomen showed:  Multiple simple and proteinaceous hepatic cysts. No solid hepatic mass is identified. Lobulated liver contour may be related to numerous cysts although cirrhosis is possible.Hepatic iron deposition   Enlarged kidneys with innumerable simple and proteinaceous renal cysts in keeping with autosomal dominant polycystic kidney disease. A Bosniak IIF cystic renal mass in the right kidney lower pole measuring 5.8 cm has been stable since CT 10/7/2019.   Splenomegaly and splenic iron deposition.   Scrotal ultrasound showed no evidence of hernia but did show right hydrocele.  Patient to receive second paracentesis 06/28

## 2024-06-28 NOTE — BRIEF OP NOTE (RAD/CATH)
IR PARACENTESIS Procedure Note    PATIENT NAME: Homero Koch  : 1960  MRN: [id number]    Pre-op Diagnosis:   1. Abdominal pain    2. Generalized weakness    3. Nausea and vomiting    4. Leukocytosis    5. Abnormal EKG    6. Hepatic lesion    7. ADPKD (autosomal dominant polycystic kidney)    8. ESRD (end stage renal disease) on dialysis (HCC)    9. Hyperkalemia    10. Liver cyst    11. Other ascites    12. HFrEF Dilated cardiomyopathy (HCC)    13. Scrotal swelling      Post-op Diagnosis:   1. Abdominal pain    2. Generalized weakness    3. Nausea and vomiting    4. Leukocytosis    5. Abnormal EKG    6. Hepatic lesion    7. ADPKD (autosomal dominant polycystic kidney)    8. ESRD (end stage renal disease) on dialysis (HCC)    9. Hyperkalemia    10. Liver cyst    11. Other ascites    12. HFrEF Dilated cardiomyopathy (HCC)    13. Scrotal swelling        Surgeon:   Xochitl Mendez MD  Assistants:     No qualified resident was available, Resident is only observing    Estimated Blood Loss: Less than 5 mL.    Findings: Ultrasound guided paracentesis.    Specimens: 3.6L of dark kaylin fluid drained with sample sent to lab for analysis.    Complications:  No complications.    Anesthesia: local    Xochitl Mendez MD     Date: 2024  Time: 11:38 AM

## 2024-06-28 NOTE — ASSESSMENT & PLAN NOTE
Non compliant with medications including Toprol-XL 25mg  Patient hypervolemic on exam. Blood pressures stable.   7/28/23 Echo: EF 20-25%, severe global hypokinesis with regional variation, moderately reduced systolic function, diastolic dysfunction, severely dilated L atria, moderately dilated R atria, mild-moderate Mitral valve regurg  Echo on 06/27/2024 showed 20% ejection fraction    Plan:  Holding metoprolol due to normotension   Renal Reading diet with 2L fluid restriction  Strict I/Os  Daily Weight  Cardiology recommendations:  Metoprolol 25 mg daily started on 06/27 with plans to start losartan on 06/28  Patient will be seen by heart failure team on 06/28

## 2024-06-28 NOTE — ASSESSMENT & PLAN NOTE
On admission Plt 95  HIT work-up WNL, patient refused hematology evaluation outpatient  Possibly due to ADPKD    Recent Labs     06/26/24  0544 06/27/24  0438 06/28/24  0504   PLT 96* 95* 81*       Continue to trend daily  Currently holding DVT prophylaxis due to concern for HIT on prior admission

## 2024-06-28 NOTE — PHYSICAL THERAPY NOTE
Physical Therapy Cancellation Note       06/28/24 1000   PT Last Visit   PT Visit Date 06/28/24   Note Type   Note Type Cancelled Session   Cancel Reasons Patient off floor/test   Assessment   Assessment Pt off floor for paracentisis. Pt will be recieveing HD later this afternoon. Will attempt to see pt later.       Estelle Coburn, PTA

## 2024-06-28 NOTE — ASSESSMENT & PLAN NOTE
5.5 POA.   Recent Labs     06/26/24  0544 06/27/24  0438 06/28/24  0504   K 4.1 4.2 5.5*   MG 2.2 2.0 2.0       Consult nephrology for dialysis.   Albuterol nebulizer  Calcium gluconate   Monitor on tele.

## 2024-06-28 NOTE — CASE MANAGEMENT
Case Management Discharge Planning Note    Patient name Homero Koch  Location S /S -01 MRN [id number]  : 1960 Date 2024       Current Admission Date: 2024  Current Admission Diagnosis:Abdominal pain   Patient Active Problem List    Diagnosis Date Noted Date Diagnosed    Abdominal pain 2024     Chronic venous stasis dermatitis of both lower extremities 2023     Hemodialysis catheter infection ([facility]) 2023     Venous stasis ulcer of left lower leg with edema of left lower leg  ([facility]) 2023     Abnormal nuclear stress test 2021     Type 2 diabetes mellitus with stage 5 chronic kidney disease not on chronic dialysis, with long-term current use of insulin ([facility]) 2021     Heart failure with reduced ejection fraction due to cardiomyopathy ([facility]) 2020     HFrEF Dilated cardiomyopathy ([facility]) 2020     Pruritus 2020     Insomnia 2020     Chronic hepatic failure without coma ([facility]) 2020     Paresis ([facility]) 2020     Hypoproteinemia ([facility]) 2020     Systolic dysfunction 2020     GERD (gastroesophageal reflux disease) 2019     Oropharyngeal dysphagia 2019     Constipation 2019     At Risk for Pressure ulcer, buttock 10/29/2019     ESRD (end stage renal disease) ([facility]) 10/28/2019     Scrotal swelling 10/27/2019     Hyperkalemia 10/26/2019     Wound of right leg 10/26/2019     Wound of left leg 10/26/2019     Multiple wounds of skin 10/26/2019     Cellulitis 10/26/2019     ESRD (end stage renal disease) on dialysis ([facility])      Ileus ([facility]) 10/09/2019     Ascites 10/09/2019     Elevated troponin 10/09/2019     Hypotension 10/06/2019     Anemia due to chronic kidney disease, on chronic dialysis and Acute Blood Loss Anemia ([facility]) 10/06/2019     Paroxysmal atrial fibrillation ([facility]) 2019     Gram-negative bacteremia 2019     Chronic kidney disease with end stage renal failure on dialysis ([facility])  09/21/2019     Hyponatremia 09/21/2019     Multiple and open wound of lower limb 09/21/2019     Total bilirubin, elevated 09/21/2019     Thrombocytopenia (HCC) 09/21/2019     Polycystic liver disease 08/07/2018     ADPKD (autosomal dominant polycystic kidney) 08/07/2018     Peripheral neuropathy 11/23/2015     Polycystic kidney disease 08/17/2015     Hypertension 10/29/2013       LOS (days): 5  Geometric Mean LOS (GMLOS) (days): 5.1  Days to GMLOS:0.4     OBJECTIVE:  Risk of Unplanned Readmission Score: 19.21         Current admission status: Inpatient   Preferred Pharmacy:   [id number] - [place], [place] - [address]  [address]  [place] [address]  Phone: [phone] Fax: [phone]    [id number] - [place], [place] - [address]  [address]  [place] [address]  Phone: [phone] Fax: [phone]    Primary Care Provider: No primary care provider on file.    Primary Insurance: MEDICARE  Secondary Insurance: [facility]    DISCHARGE DETAILS:    Discharge planning discussed with:: patient and wife, Tiffani at bedside  Freedom of Choice: Yes  Comments - Freedom of Choice: medication price check  CM contacted family/caregiver?: Yes  Were Treatment Team discharge recommendations reviewed with patient/caregiver?: Yes  Did patient/caregiver verbalize understanding of patient care needs?: Yes  Were patient/caregiver advised of the risks associated with not following Treatment Team discharge recommendations?: Yes    Contacts  Patient Contacts: Tiffani  Relationship to Patient:: Family (wife)  Contact Method: In Person  Reason/Outcome: Discharge Planning, Emergency Contact, Continuity of Care    Requested Home Health Care         Is the patient interested in HHC at discharge?: No    DME Referral Provided  Referral made for DME?: No    Other Referral/Resources/Interventions Provided:  Interventions: Prescription Price Check  Referral Comments: CM notified by  [employer], that patient does not have any RX coverage. All meds ran through [facility] (at [place], per family request)- totaling to $47.00. Printed coupons and provided to patient and wife at bedside; both appreciative of this. Per [facility], patient likely to d/c tomorrow. No further CM needs anticipated at this time.    Would you like to participate in our [facility] service program?  : No - Declined    Treatment Team Recommendation: Home  Discharge Destination Plan:: Home  Transport at Discharge : Family    IMM Given (Date):: 06/28/24  IMM Given to:: Patient (copy provided)  IMM reviewed with patient's caregiver, patient's caregiver agrees with discharge determination.

## 2024-06-28 NOTE — PROGRESS NOTES
General Cardiology   Progress Note -  Team One   Homero Jose Luis Croninwa 64 y.o. male MRN: [id number]    Unit/Bed#: S -01 Encounter: [id number]    Assessment  1.  Acute on chronic HFrEF w/RV dysfunction  2.  Cardiomyopathy, LVEF 20% - unclear etiology - historically has declined further workup/testing and patient of GDMT's in the past.  Now agreeable to further workup/testing and initiation of GDMT's.  -BNP level ordered/pending.  -TTE 6/27/2024;  LVEF 20%, moderate concentric hypertrophy, RV dilated/systolic function moderately to severely reduced, LA dilated, mild MR, moderate TR estimated RV systolic pressure 62 mmHg, mild PVI, PASP severely increased.  Notching of the RVOT   -Cyndie MPI study/2021; significant reversible ischemia in the inferior apical wall, EF 19%.  -Volume management via HD.  Expresses good compliance, occasional missed sessions.  -Outpatient GDMT; none   -Inpatient GDMT; metoprolol succinate 25 mg daily + losartan 25 mg daily.   3.  Large volume abdominal ascites  -S/p paracentesis on 6/24 yielding 9 L clear yellow fluid (albumin 2.5, glucose 99, lactate dehydrogenase 103, and protein 4.6).  No bacteria seen in fluid culture.  4. Presumed underlying CAD  -No recent anginal or anginal equivalent symptoms.  -HS troponin levels minimally elevated on admission (up to 88).  -ECG on admission demonstrated ST with inferior lateral ST-T wave abnormalities.   -Cyndie MPI study/2021; significant reversible ischemia in the inferior apical wall  -Lipid profile 3/20/2024; cholesterol 115, triglyceride 59, HDL 35 and LDL calculated 68.  5. ADPKD  6. ESRD on HD.  -Management per the nephrology team.  -On HD MWF     Plan  -Pt denies any specific cardiac or respiratory complaints at this time.  No current supplemental O2 requirements. No significant peripheral edema on exam, lung fields are clear but still has abd bloating/distention.   -Pt with a longstanding CM history of unclear etiology. Presumed CAD based  "on the premise of a prior abnormal stress test back in .  -Historically has declined further testing/workup and initiation of GDMT's in the past, now agreeable.   -TTE reviewed, EF 20%, w/moderate to severe RV dysfunction.  His presentation w/ heart volume ascites, scrotal, and lower extremity edema appear to be more consistent with right-sided HF features.  -Continue metoprolol succinate 25 mg daily today.  -Start losartan 25 mg (this evening) if BP remains stable following paracentesis / HD session.  He does not have prescription coverage, unlikely that Entresto will be affordable.  Would not utilize spironolactone in the context of ESRD w/ high risk for hyperkalemia.  Unlikely that he will be able to afford Jardiance as well without prescription coverage.  -Volume management via HD.  -Strict I&Os, daily standing weights, 2 g Na+ diet 2LFR.  -Monitor renal function and electrolytes closely.  Replete to maintain K+ level 4.0 magnesium level 2.0.  -No indication at this time for telemetry monitoring.    Subjective  ROS    Objective:   Physical Exam    Vitals: Blood pressure 123/77, pulse 74, temperature 97.9 °F (36.6 °C), resp. rate 18, height 5' 8\" (1.727 m), weight 77.7 kg (171 lb 6.4 oz), SpO2 100%.,     Body mass index is 26.06 kg/m².,   Systolic (24hrs), Av , Min:96 , Max:145     Diastolic (24hrs), Av, Min:48, Max:92      Intake/Output Summary (Last 24 hours) at 2024 1020  Last data filed at 2024 1744  Gross per 24 hour   Intake 500 ml   Output 1000 ml   Net -500 ml     Weight (last 2 days)       Date/Time Weight    24 0600 77.7 (171.4)    24 0834 77.6 (171)    24 0536 78 (171.96)    24 1730 76.7 (169.09)    24 1345 77.1 (169.97)    24 0547 77.1 (170)            LABORATORY RESULTS      CBC with diff:   Results from last 7 days   Lab Units 24  0504 24  0438 24  0544 24  0516 24  0418 24  2031   WBC Thousand/uL 4.33 " "4.08* 3.97* 5.20 13.21* 14.27*   HEMOGLOBIN g/dL 12.3 11.9* 12.0 11.6* 11.9* 12.9   HEMATOCRIT % 38.8 36.4* 37.1 35.7* 38.8 40.6   MCV fL 99* 97 97 98 103* 99*   PLATELETS Thousands/uL 81* 95* 96* 81* 90* 95*   RBC Million/uL 3.92 3.75* 3.81* 3.66* 3.78* 4.09   MCH pg 31.4 31.7 31.5 31.7 31.5 31.5   MCHC g/dL 31.7 32.7 32.3 32.5 30.7* 31.8   RDW % 15.8* 15.7* 15.9* 16.3* 16.8* 16.6*   MPV fL 10.7 11.1 11.5 11.1 12.0 11.6   NRBC AUTO /100 WBCs  --   --  0  --   --  0       CMP:  Results from last 7 days   Lab Units 06/28/24  0504 06/27/24  0438 06/26/24  0544 06/25/24  0516 06/24/24  0418 06/23/24  2031   POTASSIUM mmol/L 5.5* 4.2 4.1 4.5 5.6* 5.5*   CHLORIDE mmol/L 95* 92* 92* 92* 92* 92*   CO2 mmol/L 30 32 31 30 23 26   BUN mg/dL 30* 32* 58* 41* 58* 58*   CREATININE mg/dL 4.11* 4.76* 7.41* 6.05* 8.14* 8.17*   CALCIUM mg/dL 8.6 8.6 8.7 8.6 9.2 9.4   AST U/L  --  8* 6*  --  6* 6*   ALT U/L  --  5* 4*  --  <3* 4*   ALK PHOS U/L  --  100 104  --  87 95   EGFR ml/min/1.73sq m 14 12 7 9 6 6       BMP:  Results from last 7 days   Lab Units 06/28/24  0504 06/27/24  0438 06/26/24  0544 06/25/24  0516 06/24/24  0418 06/23/24 2031   POTASSIUM mmol/L 5.5* 4.2 4.1 4.5 5.6* 5.5*   CHLORIDE mmol/L 95* 92* 92* 92* 92* 92*   CO2 mmol/L 30 32 31 30 23 26   BUN mg/dL 30* 32* 58* 41* 58* 58*   CREATININE mg/dL 4.11* 4.76* 7.41* 6.05* 8.14* 8.17*   CALCIUM mg/dL 8.6 8.6 8.7 8.6 9.2 9.4       No results found for: \"NTBNP\"     Results from last 7 days   Lab Units 06/28/24  0504 06/27/24  0438 06/26/24  0544 06/25/24  0516   MAGNESIUM mg/dL 2.0 2.0 2.2 2.0                   Results from last 7 days   Lab Units 06/24/24  0418 06/23/24 2031   INR  1.53* 1.40*       Lipid Profile:   No results found for: \"CHOL\"  No results found for: \"HDL\"  No results found for: \"LDLCALC\"  No results found for: \"TRIG\"    Cardiac testing:   Results for orders placed during the hospital encounter of 02/17/20    Echo complete with contrast if " indicated    Narrative  [address] [address]  [phone]    Transthoracic Echocardiogram  2D, M-mode, Doppler, and Color Doppler    Study date:  2020    Patient: SUSAN RODRÍGUEZ  MR number: [id number]  Account number: [id number]  : 1960  Age: 59 years  Gender: Male  Status: Outpatient  Location: [address] [place] [address]  Height: 68 in  Weight: 164.6 lb  BP: 122/ 84 mmHg    Indications: Atrial fibrillation    Diagnoses: I48.0 - Atrial fibrillation    Sonographer:  Jaimie Olea RDCS  Referring Physician:  Sonali Obrien MD  Group:  [facility]  Cardiology Fellow:  Karyn Acosta MD  Interpreting Physician:  Shira Jones MD    SUMMARY    LEFT VENTRICLE:  The ventricle was markedly dilated.  Systolic function was severely reduced by visual assessment. Ejection fraction was estimated to be 20 %.  There was severe diffuse hypokinesis. Mild spontaneous echo contrast was present.  Left ventricular diastolic function parameters were abnormal.    RIGHT VENTRICLE:  The ventricle was mildly dilated.  Systolic function was mildly reduced.    LEFT ATRIUM:  The atrium was moderately dilated.    RIGHT ATRIUM:  The atrium was mildly dilated.    MITRAL VALVE:  There was moderate regurgitation.    TRICUSPID VALVE:  There was moderate regurgitation.    PULMONIC VALVE:  There was trace regurgitation.    HISTORY: PRIOR HISTORY: GERD. Atrial fibrillation. Hypertension. ESRD. Ascites. Polycystic liver disease.    PROCEDURE: The study was performed in the [address] [place] [address]. This was a routine study. The transthoracic approach was used. The study included complete 2D imaging, M-mode, complete spectral Doppler, and color Doppler. The  heart rate was 94 bpm, at the start of the study. Images were obtained from the parasternal, apical, subcostal, and suprasternal notch acoustic windows. Image quality was  adequate.    LEFT VENTRICLE: The ventricle was markedly dilated. Systolic function was severely reduced by visual assessment. Ejection fraction was estimated to be 20 %. There was severe diffuse hypokinesis. Mild spontaneous echo contrast was present.  Wall thickness was normal. DOPPLER: Left ventricular diastolic function parameters were abnormal.    RIGHT VENTRICLE: The ventricle was mildly dilated. Systolic function was mildly reduced. Wall thickness was normal.    LEFT ATRIUM: The atrium was moderately dilated.    RIGHT ATRIUM: The atrium was mildly dilated.    MITRAL VALVE: Valve structure was normal. There was normal leaflet separation. DOPPLER: The transmitral velocity was within the normal range. There was no evidence for stenosis. There was moderate regurgitation.    AORTIC VALVE: The valve was trileaflet. Leaflets exhibited mildly increased thickness, mild calcification, normal cuspal separation, and sclerosis. DOPPLER: Transaortic velocity was within the normal range. There was no evidence for  stenosis. There was no significant regurgitation.    TRICUSPID VALVE: The valve structure was normal. There was normal leaflet separation. DOPPLER: The transtricuspid velocity was within the normal range. There was no evidence for stenosis. There was moderate regurgitation.    PULMONIC VALVE: Leaflets exhibited normal thickness, no calcification, and normal cuspal separation. DOPPLER: The transpulmonic velocity was within the normal range. There was trace regurgitation.    PERICARDIUM: There was no pericardial effusion. The pericardium was normal in appearance.    AORTA: The root exhibited normal size.    SYSTEMIC VEINS: IVC: The inferior vena cava was normal in size.    SYSTEM MEASUREMENT TABLES    2D  %FS: 5.95 %  Ao Diam: 3.44 cm  EDV(Teich): 224.36 ml  EF Biplane: 11.72 %  EF(Cube): 16.82 %  EF(Teich): 13.02 %  ESV(Cube): 240.2 ml  ESV(Teich): 195.15 ml  IVSd: 0.81 cm  LA Area: 28.18 cm2  LA Diam: 4.93  cm  LVEDV MOD A2C: 201.94 ml  LVEDV MOD A4C: 187.47 ml  LVEDV MOD BP: 200.89 ml  LVEF MOD A2C: 13.18 %  LVEF MOD A4C: 12.21 %  LVESV MOD A2C: 175.32 ml  LVESV MOD A4C: 164.57 ml  LVESV MOD BP: 177.34 ml  LVIDd: 6.61 cm  LVIDs: 6.22 cm  LVLd A2C: 10.21 cm  LVLd A4C: 9.55 cm  LVLs A2C: 9.95 cm  LVLs A4C: 9.02 cm  LVPWd: 0.87 cm  RA Area: 19.32 cm2  RV Diam.: 4.36 cm  SI(Cube): 25.7 ml/m2  SI(Teich): 15.46 ml/m2  SV MOD A2C: 26.62 ml  SV MOD A4C: 22.89 ml  SV(Cube): 48.57 ml  SV(Teich): 29.21 ml    CW  TR Vmax: 2.8 m/s  TR maxP.73 mmHg    MM  TAPSE: 0.89 cm    [facility] Accredited Echocardiography Laboratory    Prepared and electronically signed by    Shira Jones MD  Signed 2020 15:46:12    No results found for this or any previous visit.    No results found for this or any previous visit.    No valid procedures specified.  No results found for this or any previous visit.      Meds/Allergies   all current active meds have been reviewed and current meds:   Current Facility-Administered Medications   Medication Dose Route Frequency    acetaminophen (TYLENOL) tablet 650 mg  650 mg Oral Q6H PRN    b complex-vitamin C-folic acid (RENAL) capsule 1 capsule  1 mg Oral Daily With Dinner    calcitriol (ROCALTROL) capsule 0.75 mcg  0.75 mcg Oral Once per day on     calcium acetate (PHOSLO) capsule 1,334 mg  1,334 mg Oral TID With Meals    Cholecalciferol (VITAMIN D3) tablet 1,000 Units  1,000 Units Oral Daily    losartan (COZAAR) tablet 25 mg  25 mg Oral Daily    metoprolol succinate (TOPROL-XL) 24 hr tablet 25 mg  25 mg Oral Daily    midodrine (PROAMATINE) tablet 2.5 mg  2.5 mg Oral Before Dialysis    sevelamer (RENAGEL) tablet 1,600 mg  1,600 mg Oral TID With Meals    simethicone (MYLICON) chewable tablet 80 mg  80 mg Oral 4x Daily PRN                Counseling / Coordination of Care  Total floor / unit time spent today 20 minutes.  Greater than 50% of total time was spent  with the patient and / or family counseling and / or coordination of care.      ** Please Note: Dragon 360 Dictation voice to text software may have been used in the creation of this document. **

## 2024-06-28 NOTE — SEDATION DOCUMENTATION
Procedure completed by Dr Mendez and Dr Roe. Pt tolerated without issues, VSS. Education provided to pt prior to and throughout procedure, questions answered as offered. Band-aid to site. 3600cc clear dark kaylin fluid removed from abdominal space. Transported back to [facility], bedside report given.

## 2024-06-28 NOTE — PROGRESS NOTES
NEPHROLOGY HOSPITAL PROGRESS NOTE   Homero Koch 64 y.o. male MRN: [id number]  Unit/Bed#: S -01 Encounter: [id number]  Reason for Consult: ESRD on HD    ASSESSMENT and PLAN:    64-year-old male with past medical history of ESRD on hemodialysis, polycystic kidney disease, atrial fibrillation, hypertension, cardiomyopathy with EF 25%, prior poor compliance, and poor compliance with dialysis catheter care with tunneled infection of HD catheter in August 2023, who was initially presenting with abdominal pain, nausea, vomiting.  Nephrology is on board for ESRD.     Other complaints include vomiting at home nonbloody, nonbilious, worsening ascites, distention, testicular swelling.  Initially was noted to be febrile.       1-ESRD on hemodialysis MWF at [facility]     - Last treatment prior to this admission was June 21  - Prior estimated dry weight 84.4 kg  - June 24-postdialysis weight was 77 kg.  - June 26-dialysis and we will challenge dry weight to 76 kg.  Sodium level appropriate 135.  Bicarbonate potassium appropriate.  Phosphorus level slightly above goal and will continue phosphorus binders.  Postdialysis weight 76.7 kg.  Ultrafiltration 364 cc.  - June 27-dialysis extra treatment due to MRI with contrast.  Post weight 75.5 kg  - June 28-status post paracentesis 3.6 L.  Many RBCs.  Awaiting final studies.  Dialysis planned for regular treatment     Plan  - Dialysis today  - Continue dialysis MWF  - Unclear what true dry weight will be but we will wait patient before dialysis today and this may be new dry weight as the patient just completed paracentesis  - Albumin with dialysis  - From renal standpoint next dialysis Monday after today  - Reviewed case with primary team resident we are in agreement renal plan for dialysis today  - Close abdominal exams per primary team  - Noted iron deposition.  Hold IV iron for now  - Hold parameters adjusted on losartan and can start losartan tomorrow as the  patient received paracentesis today and we are attempting to ultrafiltrate with dialysis potentially today again  - I have reviewed with primary team resident that patient is requesting abdominal binder.     2-electrolytes-     - Hyperkalemia-improved with dialysis and stable June 27, potassium elevated 6/28 but is hemolyzed, likely not accurate     - Mild hyponatremia-monitor with hemodialysis and ultrafiltration and resolved and stable June 28     3-Access-left IJ PermCath-patient is dialyzed through a catheter.  He has never had prior AVF or AVG.  He has opted to use catheter only has his dialysis access option and will defer further discussions and plans to outpatient dialysis team's     - Repeat blood culture June 26 no growth thus far  - Patient had blood culture June 23 with Corynebacterium and coag negative staph-primary team felt it to be contaminant     4-hypertension-     - Outpatient regimen includes metoprolol, torsemide but unclear compliance  - Monitor for hypotension  - Patient started on losartan and metoprolol June 27 per cardiology team     5-CHF with EF 20 to 25% with global hypokinesis, mild to moderate MR, moderate to severe TR     - On torsemide but patient is not compliant at home with this and given lack of urine output, no objection to keep holding  - Patient was 9 L paracentesis June 24 therefore will minimize ultrafiltration with dialysis today  - Repeat echocardiogram with continued reduced EF  - Starting goal-directed medical therapy per cardiology team     6-anemia-hemoglobin above goal.  Is on Venofer as an outpatient which we will hold due to infection     7-MBD-     - Hyperphosphatemia-on phosphorus binders.  To note, patient reports not taking his phosphorus binders.  Trend phosphorus for now.  Is on calcitriol also which we will continue for now  - Phosphorus improving and patient     8-sepsis-     - Follow-up final blood cultures-1 out of 2 cultures positive.  Await final  speciation  - Follow-up final peritoneal studies-thus far does not appear to have SBP  - To note patient has tunneled catheter.  No drainage or erythema currently noted but patient is not compliant with keeping it covered  - Repeat culture unrevealing     9-abdominal pain-improved-CT scan with mild splenomegaly, possible subtle infarct, complex and indeterminate renal left hepatic lobe lesions with large abdominal pelvic ascites     - Status post 8.9 L paracentesis on admission  - Etiology of ascites unclear.  Patient has PKD, poor EF, noncompliance with dialysis treatments.  Therefore may have multifactorial etiology as the cause.  - Has thrombocytopenia.  Unclear if this is related to liver disease  - Hepatology on board  - MRI June 26 with enlarged kidneys with innumerable simple and proteinaceous renal cysts, Bosniak 2 cystic renal mass in the right lower pole 5.8 cm stable since 2019, no solid renal mass, multiple simple and proteinaceous hepatic cyst, no solid hepatic mass identified, lobulated liver contour may be related to cysts although cirrhosis possible, hepatic iron deposition, splenomegaly and splenic iron deposition, large volume ascites  - Paracentesis June 28-3.6 L.     10-scrotal swelling noted on exam-     - Scrotal ultrasound large hydrocele with single septation.  Ascitic fluid in right inguinal canal scrotal ultrasound     11-acid/base-lactic acid level 1.4.  Has elevated anion gap.  Monitor with dialysis.     12-elevated bilirubin level-per primary team.    SUBJECTIVE / 24H INTERVAL HISTORY:  Patient currently denies new complaints.  States scrotal edema is improving.  Wife is at bedside.  States he feels his umbilical hernia and requesting abdominal binder    OBJECTIVE:  Current Weight: Weight - Scale: 77.7 kg (171 lb 6.4 oz)  Vitals:    06/28/24 0600 06/28/24 0852 06/28/24 1015 06/28/24 1119   BP:  130/81 123/77 125/75   Pulse:  83 74 74   Resp:  14 18 18   Temp:  97.9 °F (36.6 °C)      TempSrc:       SpO2:  99% 100% 100%   Weight: 77.7 kg (171 lb 6.4 oz)      Height:           Intake/Output Summary (Last 24 hours) at 6/28/2024 1339  Last data filed at 6/28/2024 1120  Gross per 24 hour   Intake 500 ml   Output 4600 ml   Net -4100 ml     General: NAD  Skin: no rash  Eyes: anicteric sclera  ENT: moist mucous membrane  Neck: supple  Chest: CTA b/l, no ronchii, no wheeze, no rubs, no rales  CVS: s1s2, no murmur, no gallop, no rub  Abdomen: soft, nontender, nl sounds, umbilical hernia easily reducible  Extremities: no edema LE b/l, tunneled catheter without erythema or drainage  : no meeks  Neuro: AAOX3  Psych: normal affect    Medications:    Current Facility-Administered Medications:     acetaminophen (TYLENOL) tablet 650 mg, 650 mg, Oral, Q6H PRN, Dimple Hernandez DO, 650 mg at 06/25/24 1350    b complex-vitamin C-folic acid (RENAL) capsule 1 capsule, 1 mg, Oral, Daily With Dinner, Dimple Hernandez DO, 1 capsule at 06/27/24 1725    calcitriol (ROCALTROL) capsule 0.75 mcg, 0.75 mcg, Oral, Once per day on Monday Wednesday Friday, Dimple Hernandez DO, 0.75 mcg at 06/28/24 0912    calcium acetate (PHOSLO) capsule 1,334 mg, 1,334 mg, Oral, TID With Meals, Dimple Hernandez DO, 1,334 mg at 06/28/24 1315    Cholecalciferol (VITAMIN D3) tablet 1,000 Units, 1,000 Units, Oral, Daily, Dimple Hernandez DO, 1,000 Units at 06/28/24 0912    losartan (COZAAR) tablet 25 mg, 25 mg, Oral, Daily, SAM Valdez    metoprolol succinate (TOPROL-XL) 24 hr tablet 25 mg, 25 mg, Oral, Daily, SAM Valdez, 25 mg at 06/27/24 1833    midodrine (PROAMATINE) tablet 2.5 mg, 2.5 mg, Oral, Before Dialysis, Inder Ignacio DO    sevelamer (RENAGEL) tablet 1,600 mg, 1,600 mg, Oral, TID With Meals, Dimple Hernandez DO, 1,600 mg at 06/28/24 1315    simethicone (MYLICON) chewable tablet 80 mg, 80 mg, Oral, 4x Daily PRN, Dimple Hernandez DO    Laboratory  Results:  Results from last 7 days   Lab Units 06/28/24  0504 06/27/24  0438 06/26/24  0544 06/25/24  0516 06/24/24  0418 06/23/24 2031   WBC Thousand/uL 4.33 4.08* 3.97* 5.20 13.21* 14.27*   HEMOGLOBIN g/dL 12.3 11.9* 12.0 11.6* 11.9* 12.9   HEMATOCRIT % 38.8 36.4* 37.1 35.7* 38.8 40.6   PLATELETS Thousands/uL 81* 95* 96* 81* 90* 95*   POTASSIUM mmol/L 5.5* 4.2 4.1 4.5 5.6* 5.5*   CHLORIDE mmol/L 95* 92* 92* 92* 92* 92*   CO2 mmol/L 30 32 31 30 23 26   BUN mg/dL 30* 32* 58* 41* 58* 58*   CREATININE mg/dL 4.11* 4.76* 7.41* 6.05* 8.14* 8.17*   CALCIUM mg/dL 8.6 8.6 8.7 8.6 9.2 9.4   MAGNESIUM mg/dL 2.0 2.0 2.2 2.0  --   --    PHOSPHORUS mg/dL 4.1 4.3* 6.4* 5.8*  --   --

## 2024-06-28 NOTE — ASSESSMENT & PLAN NOTE
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

## 2024-06-28 NOTE — PLAN OF CARE
Post-Dialysis RN Treatment Note    Blood Pressure:  Pre:   130/80 mm/Hg  Post: 148/74  mmHg   EDW:  76.5 kg    Weight:  Pre:  76.5 kg   Post:  76.0 kg   Mode of weight measurement:    Standing Scale   Volume Removed:   500 ml    Treatment duration:   180 minutes    NS given:  No      Treatment shortened?    No   Medications given during Rx:  Albumin   Estimated Kt/V:    Not Applicable   Access type:      Permacath/TDC   Access Issues:     No    Report called to primary nurse   Haja,  Tabatha    Problem: METABOLIC, FLUID AND ELECTROLYTES - ADULT  Goal: Electrolytes maintained within normal limits  Description: INTERVENTIONS:  - Monitor labs and assess patient for signs and symptoms of electrolyte imbalances  - Administer electrolyte replacement as ordered  - Monitor response to electrolyte replacements, including repeat lab results as appropriate  - Instruct patient on fluid and nutrition as appropriate  Outcome: Progressing  Goal: Fluid balance maintained  Description: INTERVENTIONS:  - Monitor labs   - Monitor I/O and WT  - Instruct patient on fluid and nutrition as appropriate  - Assess for signs & symptoms of volume excess or deficit  Outcome: Progressing

## 2024-06-29 VITALS
WEIGHT: 165.34 LBS | HEART RATE: 78 BPM | TEMPERATURE: 98.1 F | HEIGHT: 68 IN | SYSTOLIC BLOOD PRESSURE: 128 MMHG | RESPIRATION RATE: 17 BRPM | OXYGEN SATURATION: 98 % | BODY MASS INDEX: 25.06 KG/M2 | DIASTOLIC BLOOD PRESSURE: 81 MMHG

## 2024-06-29 LAB
ALBUMIN SERPL BCG-MCNC: 3.7 G/DL (ref 3.5–5)
ALP SERPL-CCNC: 119 U/L (ref 34–104)
ALT SERPL W P-5'-P-CCNC: 5 U/L (ref 7–52)
ANION GAP SERPL CALCULATED.3IONS-SCNC: 10 MMOL/L (ref 4–13)
AST SERPL W P-5'-P-CCNC: 12 U/L (ref 13–39)
BASOPHILS # BLD AUTO: 0.05 THOUSANDS/ÂΜL (ref 0–0.1)
BASOPHILS NFR BLD AUTO: 1 % (ref 0–1)
BILIRUB SERPL-MCNC: 0.72 MG/DL (ref 0.2–1)
BUN SERPL-MCNC: 35 MG/DL (ref 5–25)
CALCIUM SERPL-MCNC: 9.1 MG/DL (ref 8.4–10.2)
CHLORIDE SERPL-SCNC: 99 MMOL/L (ref 96–108)
CO2 SERPL-SCNC: 26 MMOL/L (ref 21–32)
CREAT SERPL-MCNC: 4.45 MG/DL (ref 0.6–1.3)
EOSINOPHIL # BLD AUTO: 0.3 THOUSAND/ÂΜL (ref 0–0.61)
EOSINOPHIL NFR BLD AUTO: 6 % (ref 0–6)
ERYTHROCYTE [DISTWIDTH] IN BLOOD BY AUTOMATED COUNT: 15.9 % (ref 11.6–15.1)
GFR SERPL CREATININE-BSD FRML MDRD: 13 ML/MIN/1.73SQ M
GLUCOSE SERPL-MCNC: 86 MG/DL (ref 65–140)
HCT VFR BLD AUTO: 38.6 % (ref 36.5–49.3)
HGB BLD-MCNC: 12 G/DL (ref 12–17)
IMM GRANULOCYTES # BLD AUTO: 0.02 THOUSAND/UL (ref 0–0.2)
IMM GRANULOCYTES NFR BLD AUTO: 0 % (ref 0–2)
LYMPHOCYTES # BLD AUTO: 0.66 THOUSANDS/ÂΜL (ref 0.6–4.47)
LYMPHOCYTES NFR BLD AUTO: 13 % (ref 14–44)
MAGNESIUM SERPL-MCNC: 2.1 MG/DL (ref 1.9–2.7)
MCH RBC QN AUTO: 31 PG (ref 26.8–34.3)
MCHC RBC AUTO-ENTMCNC: 31.1 G/DL (ref 31.4–37.4)
MCV RBC AUTO: 100 FL (ref 82–98)
MONOCYTES # BLD AUTO: 0.58 THOUSAND/ÂΜL (ref 0.17–1.22)
MONOCYTES NFR BLD AUTO: 11 % (ref 4–12)
NEUTROPHILS # BLD AUTO: 3.64 THOUSANDS/ÂΜL (ref 1.85–7.62)
NEUTS SEG NFR BLD AUTO: 69 % (ref 43–75)
NRBC BLD AUTO-RTO: 0 /100 WBCS
PHOSPHATE SERPL-MCNC: 4.4 MG/DL (ref 2.3–4.1)
PLATELET # BLD AUTO: 83 THOUSANDS/UL (ref 149–390)
PMV BLD AUTO: 11.6 FL (ref 8.9–12.7)
POTASSIUM SERPL-SCNC: 4.7 MMOL/L (ref 3.5–5.3)
PROT SERPL-MCNC: 6.7 G/DL (ref 6.4–8.4)
RBC # BLD AUTO: 3.87 MILLION/UL (ref 3.88–5.62)
SODIUM SERPL-SCNC: 135 MMOL/L (ref 135–147)
WBC # BLD AUTO: 5.25 THOUSAND/UL (ref 4.31–10.16)

## 2024-06-29 PROCEDURE — 83735 ASSAY OF MAGNESIUM: CPT

## 2024-06-29 PROCEDURE — 99232 SBSQ HOSP IP/OBS MODERATE 35: CPT | Performed by: INTERNAL MEDICINE

## 2024-06-29 PROCEDURE — 80053 COMPREHEN METABOLIC PANEL: CPT

## 2024-06-29 PROCEDURE — 85025 COMPLETE CBC W/AUTO DIFF WBC: CPT

## 2024-06-29 PROCEDURE — 99239 HOSP IP/OBS DSCHRG MGMT >30: CPT | Performed by: INTERNAL MEDICINE

## 2024-06-29 PROCEDURE — 84100 ASSAY OF PHOSPHORUS: CPT

## 2024-06-29 RX ORDER — MIDODRINE HYDROCHLORIDE 2.5 MG/1
2.5 TABLET ORAL
Qty: 90 TABLET | Refills: 0 | Status: SHIPPED | OUTPATIENT
Start: 2024-06-29

## 2024-06-29 RX ORDER — ASCORBIC ACID, THIAMINE MONONITRATE,RIBOFLAVIN, NIACINAMIDE, PYRIDOXINE HYDROCHLORIDE, FOLIC ACID, CYANOCOBALAMIN, BIOTIN, CALCIUM PANTOTHENATE, 100; 1.5; 1.7; 20; 10; 1; 6000; 150000; 5 MG/1; MG/1; MG/1; MG/1; MG/1; MG/1; UG/1; UG/1; MG/1
1 CAPSULE, LIQUID FILLED ORAL
Qty: 90 CAPSULE | Refills: 0 | Status: SHIPPED | OUTPATIENT
Start: 2024-06-29

## 2024-06-29 RX ADMIN — SEVELAMER HYDROCHLORIDE 1600 MG: 800 TABLET ORAL at 08:35

## 2024-06-29 RX ADMIN — Medication 1000 UNITS: at 08:35

## 2024-06-29 RX ADMIN — LOSARTAN POTASSIUM 25 MG: 25 TABLET, FILM COATED ORAL at 08:35

## 2024-06-29 RX ADMIN — CALCIUM ACETATE 1334 MG: 667 CAPSULE ORAL at 08:35

## 2024-06-29 RX ADMIN — METOPROLOL SUCCINATE 25 MG: 25 TABLET, EXTENDED RELEASE ORAL at 08:35

## 2024-06-29 NOTE — ASSESSMENT & PLAN NOTE
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  Patient will need to continue with his nephrologist/hemodialysis Monday Wednesday Friday at his current institution.  Patient and wife amenable to plan.

## 2024-06-29 NOTE — ASSESSMENT & PLAN NOTE
Wound care consulted  Continue following wound care instructions as outpatient  Stable for discharge.

## 2024-06-29 NOTE — CASE MANAGEMENT
Case Management Progress Note    Patient name Homero Koch  Location S /S -01 MRN [id number]  : 1960 Date 2024       LOS (days): 6  Geometric Mean LOS (GMLOS) (days): 5.1  Days to GMLOS:-0.4        OBJECTIVE:        Current admission status: Inpatient  Preferred Pharmacy:   [id number] - [place], [place] - [email or web address]  [address] [facility] [place] [address]  Phone: [phone] Fax: [phone]    [id number] - [place], [place] - [email or web address]  [address] [facility]  [place] [address]  Phone: [phone] Fax: [phone]    Primary Care Provider: No primary care provider on file.    Primary Insurance: MEDICARE  Secondary Insurance: [facility]    PROGRESS NOTE:  CM met with Pt and spouse Tiffani who reported all the Pt's discharging prescriptions be sent to [facility] in [place] and NOT [facility]. CM made Dr. Quintana aware of the request. Tiffani also requested a note for employer to excuse her absence.

## 2024-06-29 NOTE — ASSESSMENT & PLAN NOTE
Patient presenting with generalized abdominal pain. Notable history of polycystic liver and kidney disease requiring dialysis and previously requiring paracentesis.  CT Abd/Pelvis: Negative for bowel obstruction. Mild splenomegaly. Question geographic area of decreased attenuation within the spleen, possibly artifactual from streak artifact. Difficult to completely exclude subtle infarct. Polycystic kidney and liver disease with multiple complex and indeterminant renal and left hepatic lobe lesions. Correlation with nonemergent MRI abdomen with and without IV contrast recommended to exclude possibility of a solid lesion. Large amount of abdominopelvic ascites, increased from prior study. Sigmoid diverticulosis without diverticulitis.     Patient also has history of inguinal hernia. US from 2019 of scrotum and testicles: confirmed large complex hydrocele with numerous internal septations and debris measuring 15.6 x 9.3 x 11.4 without evidence of varicocele or scrotal thickness     Patient with fever, abdominal pain and large ascites. Procal 5.73, leukocytosis to 14.27. total bilirubin elevated to 1.93.    06/29/2024: Patient at risk for repeat paracentesis as outpatient however currently he is stable for discharge.  He will need to coordinate with GI, nephrology and cardiology and with his PCP as outpatient.

## 2024-06-29 NOTE — PROGRESS NOTES
Progress Note - Cardiology   Homero Jose Luis Koch 64 y.o. male MRN: [id number]  Unit/Bed#: S -01 Encounter: [id number]    Assessment:  #.  Acute on chronic HFrEF, RV dysfunction  #.  Cardiomyopathy  He has known cardiomyopathy with left ventricular ejection fraction 20% by transthoracic echocardiogram.  Also RV dysfunction and pulmonary hypertension. Previously has declined work up and GDMT. Echocardiogram w/ no significant changes from prior. Predominantly right-sided symptoms with ascites, scrotal edema, leg edema. Now more open to discussions about further testing and use of GDMT.  #.  Presumed CAD: He has a history of abnormal stress test in 2021 with reversible ischemia of the inferoapical wall.  He previously declined cardiac catheterization.  #.  Pulmonary hypertension  #.  Ascites  #.  Pleural effusion  #.  PKD  #.  ESRD on HD  #.  History of PAF: in the setting of sepsis and systemic AC was deferred     Plan:  -Continue volume management via HD  -Continue metoprolol succinate 25 mg and losartan 25 mg daily  -Start aspirin 81 mg daily for presumed CAD  -Lengthy discussion with both patient and spouse today about establishing regular care in the outpatient setting.  We also reviewed further workup for his cardiomyopathy including left heart cath. Reviewed goals to maximize GDMT and repeat echo when euvolemic. If persistently elevated pulmonary pressures when euvolemic, can consider RHC. All questions & concerns addressed. Outpatient follow up will be arranged.    Cardiology will sign off at this time and be available as needed.  Please call with questions.    Subjective/Objective   Chief Complaint: 64-year-old male with a past medical history of polycystic kidney disease, end-stage renal disease on HD, chronic HFrEF, cardiomyopathy.  He presented on 6/23/2024 with feeling unwell, nausea, vomiting, and with abdominal distention.  He was found to have significant ascites and underwent paracentesis.  He has  "been evaluated by nephrology and dialysis has been resumed. Cardiology was consulted due to his history of cardiomyopathy.     Last seen in the office by cardiology in 2021. At the time was recommended he have further workup for his cardiomyopathy and initiation of guideline directed medical therapy.  He declined further workup or medication for his cardiomyopathy.     Interval history: Patient seen and examined. No acute events overnight.      Objective:  Vitals: /79   Pulse 79   Temp 98.1 °F (36.7 °C)   Resp 16   Ht 5' 8\" (1.727 m)   Wt 77.7 kg (171 lb 6.4 oz)   SpO2 99%   BMI 26.06 kg/m²   Vitals:    06/27/24 0834 06/28/24 0600   Weight: 77.6 kg (171 lb) 77.7 kg (171 lb 6.4 oz)     Orthostatic Blood Pressures      Flowsheet Row Most Recent Value   Blood Pressure 126/79 filed at 06/28/2024 1950   Patient Position - Orthostatic VS Lying filed at 06/28/2024 1840              Intake/Output Summary (Last 24 hours) at 6/28/2024 2019  Last data filed at 6/28/2024 1912  Gross per 24 hour   Intake 500 ml   Output 4100 ml   Net -3600 ml       Invasive Devices       Peripheral Intravenous Line  Duration             Peripheral IV 06/27/24 Proximal;Right;Ventral (anterior) Forearm 1 day              Hemodialysis Catheter  Duration             HD Permanent Double Catheter 332 days                    Review of Systems: All systems reviewed and negative except for that noted above    Physical Exam: General appearance: alert and oriented, in no acute distress  Neck: no JVD  Lungs: Normal effort, clear to auscultation bilaterally, no wheezes, no rales  Heart: regular rate and rhythm, S1, S2 normal, no murmur, click, rub or gallop  Abdomen: Normal bowel sounds, soft, nontender, moderately distended  Extremities: Bilateral lower extremity edema  Skin: Warm and dry    Lab Results: I have personally reviewed pertinent lab results.    Imaging: I have personally reviewed pertinent reports.    EKG: Personally " reviewed      Counseling / Coordination of Care  Total time spent today 35 minutes. Greater than 50% of total time was spent with the patient and / or family counseling and / or coordination of care.

## 2024-06-29 NOTE — ASSESSMENT & PLAN NOTE
On admission Plt 95  HIT work-up WNL, patient refused hematology evaluation outpatient  Possibly due to ADPKD    Recent Labs     06/27/24  0438 06/28/24  0504 06/29/24  0558   PLT 95* 81* 83*         Stable for discharge follow-up PCP

## 2024-06-29 NOTE — DISCHARGE INSTR - AVS FIRST PAGE
Dear Homero Koch,     It was our pleasure to care for you here at [facility].  It is our hope that we were always able to exceed the expected standards for your care during your stay.  You were hospitalized due to Sepsis.  You were cared for on the 2nd floor by Tho Quintana DO under the service of Minna Prasad MD with the [facility] who covers for your primary care physician (PCP), No primary care provider on file., while you were hospitalized.  If you have any questions or concerns related to this hospitalization, you may contact us at .  For follow up as well as any medication refills, we recommend that you follow up with your primary care physician.  A registered nurse will reach out to you by phone within a few days after your discharge to answer any additional questions that you may have after going home.  However, at this time we provide for you here, the most important instructions / recommendations at discharge:     Notable Medication Adjustments -   Start taking aspirin 81 mg by mouth daily  Start taking atorvastatin/Lipitor 40 mg by mouth  Start taking Cozaar 25 mg by mouth daily  Start taking metoprolol succinate/Toprol XL 25 mg by mouth every 24 hours.  Testing Required after Discharge -   Recommend PCP obtain BMP labs within 1 week from discharge date.  Important follow up information -   Please follow-up with your PCP within 1 week from discharge date.  Follow-up with your nephrologist/dialysis center on Monday, 07/01/2024.  Schedule appoint with gastroenterologist for further assessment on continuing paracentesis.  Attend appointment with cardiology/heart failure team on 07/09/2024  Other Instructions -   None  Please review this entire after visit summary as additional general instructions including medication list, appointments, activity, diet, any pertinent wound care, and other additional recommendations from  your care team that may be provided for you.      Sincerely,     Tho Quintana, DO

## 2024-06-29 NOTE — ASSESSMENT & PLAN NOTE
Non compliant with medications including Toprol-XL 25mg  Patient hypervolemic on exam. Blood pressures stable.   7/28/23 Echo: EF 20-25%, severe global hypokinesis with regional variation, moderately reduced systolic function, diastolic dysfunction, severely dilated L atria, moderately dilated R atria, mild-moderate Mitral valve regurg  Echo on 06/27/2024 showed 20% ejection fraction    (06/29/2024): Patient stable for discharge, he will follow-up with cardiology as outpatient.  Risk of fluid overload is high since the patient has poor follow-up with his prior PCP and physicians.  Per cardiology, the patient will need to have continue volume management via HD, and was recommended to continue metoprolol succinate 25 mg p.o. daily and losartan p.o. daily 25 mg.  Recommend to repeat echo when euvolemic.    Plan:  Continue as outpatient: Metoprolol 25 mg daily and losartan   Patient stable for discharge

## 2024-06-29 NOTE — PROGRESS NOTES
NEPHROLOGY HOSPITAL PROGRESS NOTE   Homero Koch 64 y.o. male MRN: [id number]  Unit/Bed#: S -01 Encounter: [id number]  Reason for Consult: ESRD    ASSESSMENT and PLAN:    64-year-old male with past medical history of ESRD on hemodialysis, polycystic kidney disease, atrial fibrillation, hypertension, cardiomyopathy with EF 25%, prior poor compliance, and poor compliance with dialysis catheter care with tunneled infection of HD catheter in August 2023, who was initially presenting with abdominal pain, nausea, vomiting.  Nephrology is on board for ESRD.     Other complaints include vomiting at home nonbloody, nonbilious, worsening ascites, distention, testicular swelling.  Initially was noted to be febrile.       1-ESRD on hemodialysis MWF at [facility]     - Last treatment prior to this admission was June 21  - Prior estimated dry weight 84.4 kg  - June 24-postdialysis weight was 77 kg.  - June 26-dialysis and we will challenge dry weight to 76 kg.  Sodium level appropriate 135.  Bicarbonate potassium appropriate.  Phosphorus level slightly above goal and will continue phosphorus binders.  Postdialysis weight 76.7 kg.  Ultrafiltration 364 cc.  - June 27-dialysis extra treatment due to MRI with contrast.  Post weight 75.5 kg  - June 28-status post paracentesis 3.6 L.  Many RBCs.  Awaiting final studies.  Dialysis planned for regular treatment.  500 cc removed.  Postdialysis weight 76 kg.     Plan    - Continue dialysis MWF  - New dry weight may be approximately 75.5 kg  - Likely will need regular paracentesis and this will be arranged if needed by hepatology team  - Heart failure treatment in progress by cardiology team  - I had an extensive discussion with the patient and his wife my concern for his overall guarded prognosis long-term due to compliance issues, heart failure.  I emphasized patient needs to follow the recommendations of his outpatient nephrologist regarding dialysis as he is not  compliant with timing of dialysis and medications.  He also needs to establish care and follow with cardiology and hepatology closely.  His wife was present for this discussion and they both stated understanding.  - From renal standpoint next dialysis Monday  - If patient is discharged, will need close cardiology, hepatology follow-up  - Reviewed case with primary team attending we are in agreement renal plan for next dialysis Monday and follow-up coordination per primary team with other teams  - From a renal standpoint, diuretics is not adding much benefit and patient has minimal urine output and he does not follow recommendations to continue diuretics at home therefore no objection to hold to allow for losartan and metoprolol to remain on board per cardiology recommendations     2-electrolytes-     - Hyperkalemia-improved with dialysis and stable June 27, potassium elevated 6/28 but is hemolyzed, likely not accurate and normal on 6/29     - Mild hyponatremia-monitor with hemodialysis and ultrafiltration and resolved and improved 6/29 after ultrafiltration     3-Access-left IJ PermCath-patient is dialyzed through a catheter.  He has never had prior AVF or AVG.  He has opted to use catheter only has his dialysis access option and will defer further discussions and plans to outpatient dialysis team's     - Repeat blood culture June 26 no growth thus far  - Patient had blood culture June 23 with Corynebacterium and coag negative staph-primary team felt it to be contaminant     4-hypertension-     - Outpatient regimen includes metoprolol, torsemide but unclear compliance  - Monitor for hypotension  - Patient started on losartan and metoprolol June 27 per cardiology team and blood pressure is appropriate     5-CHF with EF 20 to 25% with global hypokinesis, mild to moderate MR, moderate to severe TR     - On torsemide but patient is not compliant at home with this and given lack of urine output, no objection to keep  holding  - Patient was 9 L paracentesis June 24 therefore will minimize ultrafiltration with dialysis today  - Repeat echocardiogram with continued reduced EF  - Starting goal-directed medical therapy per cardiology team     6-anemia-hemoglobin above goal.  Is on Venofer as an outpatient which we will hold due to infection     7-MBD-     - Hyperphosphatemia-on phosphorus binders.  To note, patient reports not taking his phosphorus binders.  Trend phosphorus for now.  Is on calcitriol also which we will continue for now  - Phosphorus improving and patient  - Advised the patient to talk to his outpatient dialysis unit regarding the cost of phosphorus binders as they may be able to provide phosphorus medications there.  His wife took notes on what I was stating and she will ask them Monday.     8-sepsis-?     - Follow-up final blood cultures-1 out of 2 cultures positive.  Await final speciation  - Follow-up final peritoneal studies-thus far does not appear to have SBP  - To note patient has tunneled catheter.  No drainage or erythema currently noted but patient is not compliant with keeping it covered  - Repeat culture unrevealing     9-abdominal pain-improved-CT scan with mild splenomegaly, possible subtle infarct, complex and indeterminate renal left hepatic lobe lesions with large abdominal pelvic ascites     - Status post 8.9 L paracentesis on admission  - Etiology of ascites unclear.  Patient has PKD, poor EF, noncompliance with dialysis treatments.  Therefore may have multifactorial etiology as the cause.  - Has thrombocytopenia.  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     10-scrotal swelling noted on exam-     - Scrotal ultrasound large hydrocele with single septation.  Ascitic fluid in right inguinal canal scrotal ultrasound     11-acid/base-lactic acid level 1.4.  Has elevated anion gap.  Monitor with dialysis.     12-elevated bilirubin level-per primary team.    SUBJECTIVE / 24H INTERVAL HISTORY:  Patient denies complaints.  Asking to be discharged.    OBJECTIVE:  Current Weight: Weight - Scale: 75 kg (165 lb 5.5 oz)  Vitals:    06/28/24 1950 06/28/24 2327 06/29/24 0600 06/29/24 0733   BP: 126/79 138/78  128/81   BP Location:       Pulse: 79 78  78   Resp:    17   Temp: 98.1 °F (36.7 °C) 98.3 °F (36.8 °C)  98.1 °F (36.7 °C)   TempSrc:       SpO2: 99% 100%  98%   Weight:   75 kg (165 lb 5.5 oz)    Height:           Intake/Output Summary (Last 24 hours) at 6/29/2024 1040  Last data filed at 6/29/2024 0900  Gross per 24 hour   Intake 980 ml   Output 4100 ml   Net -3120 ml     General: NAD  Skin: no rash  Eyes: anicteric sclera  ENT: moist mucous membrane  Neck: supple  Chest: CTA b/l, no ronchii, no wheeze, no rubs, no rales  CVS: s1s2, no murmur, no gallop, no rub  Abdomen: soft, nontender, nl sounds but there is slight distention again today  Extremities: no edema LE b/l, tunneled catheter without erythema or drainage and patient has been tolerating the current cover over catheter site  : no meeks, to note in addition yesterday, scrotal exam with improved edema but still scrotal swelling present  Neuro: AAOX3  Psych: normal affect    Medications:    Current Facility-Administered Medications:     acetaminophen (TYLENOL) tablet 650 mg, 650 mg, Oral, Q6H PRN, Dimple Hernandez DO, 650 mg at 06/25/24 1350    b complex-vitamin C-folic acid (RENAL) capsule 1 capsule, 1 mg, Oral, Daily With Dinner, Dimple Hernandez, DO, 1  capsule at 06/27/24 1725    calcitriol (ROCALTROL) capsule 0.75 mcg, 0.75 mcg, Oral, Once per day on Monday Wednesday Friday, Dimple Hernandez DO, 0.75 mcg at 06/28/24 0912    calcium acetate (PHOSLO) capsule 1,334 mg, 1,334 mg, Oral, TID With Meals, Dimple Hernandez DO, 1,334 mg at 06/29/24 0835    Cholecalciferol (VITAMIN D3) tablet 1,000 Units, 1,000 Units, Oral, Daily, Dimple Hernandez DO, 1,000 Units at 06/29/24 0835    losartan (COZAAR) tablet 25 mg, 25 mg, Oral, Daily, Jaiden Daly MD, 25 mg at 06/29/24 0835    metoprolol succinate (TOPROL-XL) 24 hr tablet 25 mg, 25 mg, Oral, Daily, SAM Valdez, 25 mg at 06/29/24 0835    midodrine (PROAMATINE) tablet 2.5 mg, 2.5 mg, Oral, Before Dialysis, Inder Ignacio DO    sevelamer (RENAGEL) tablet 1,600 mg, 1,600 mg, Oral, TID With Meals, Dimple Hernandez DO, 1,600 mg at 06/29/24 0835    simethicone (MYLICON) chewable tablet 80 mg, 80 mg, Oral, 4x Daily PRN, Dimple Hernandez DO    Laboratory Results:  Results from last 7 days   Lab Units 06/29/24  0558 06/28/24  0504 06/27/24  0438 06/26/24  0544 06/25/24  0516 06/24/24  0418 06/23/24  2031   WBC Thousand/uL 5.25 4.33 4.08* 3.97* 5.20 13.21* 14.27*   HEMOGLOBIN g/dL 12.0 12.3 11.9* 12.0 11.6* 11.9* 12.9   HEMATOCRIT % 38.6 38.8 36.4* 37.1 35.7* 38.8 40.6   PLATELETS Thousands/uL 83* 81* 95* 96* 81* 90* 95*   POTASSIUM mmol/L 4.7 5.5* 4.2 4.1 4.5 5.6* 5.5*   CHLORIDE mmol/L 99 95* 92* 92* 92* 92* 92*   CO2 mmol/L 26 30 32 31 30 23 26   BUN mg/dL 35* 30* 32* 58* 41* 58* 58*   CREATININE mg/dL 4.45* 4.11* 4.76* 7.41* 6.05* 8.14* 8.17*   CALCIUM mg/dL 9.1 8.6 8.6 8.7 8.6 9.2 9.4   MAGNESIUM mg/dL 2.1 2.0 2.0 2.2 2.0  --   --    PHOSPHORUS mg/dL 4.4* 4.1 4.3* 6.4* 5.8*  --   --         8

## 2024-06-29 NOTE — ASSESSMENT & PLAN NOTE
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 Correlation with nonemergent MRI abdomen with and without IV contrast recommended to exclude possibility of a solid lesion. Large amount of abdominopelvic ascites, increased from prior study.   For discharge, will need to continue with his dialysis treatments as outpatient and will be seen by his nephrologist.

## 2024-06-29 NOTE — ASSESSMENT & PLAN NOTE
5.5 POA.   Recent Labs     06/27/24  0438 06/28/24  0504 06/29/24  0558   K 4.2 5.5* 4.7   MG 2.0 2.0 2.1     Normal, stable for discharge.

## 2024-06-29 NOTE — ASSESSMENT & PLAN NOTE
Elevated trop 77->86, delta 9, most likely due to demand  EKG showed sinus tachycardic with ST depressions in II, III, V4, V5, V6, V1  No chest pain, angina symptoms, or chest palpitations    Lab Results   Component Value Date    HSTNI0 77 (H) 06/23/2024    HSTNI2 86 (H) 06/23/2024    HSTNID2 9 06/23/2024    HSTNI4 88 (H) 06/24/2024    HSTNID4 11 06/24/2024   Stable for discharge.

## 2024-06-29 NOTE — DISCHARGE SUMMARY
[facility]  Discharge- Homero Kohc 1960, 64 y.o. male MRN: [id number]  Unit/Bed#: S -Linette Encounter: [id number]  Primary Care Provider: No primary care provider on file.   Date and time admitted to hospital: 6/23/2024  8:13 PM    HFrEF Dilated cardiomyopathy (HCC)  Assessment & Plan  Non compliant with medications including Toprol-XL 25mg  Patient hypervolemic on exam. Blood pressures stable.   7/28/23 Echo: EF 20-25%, severe global hypokinesis with regional variation, moderately reduced systolic function, diastolic dysfunction, severely dilated L atria, moderately dilated R atria, mild-moderate Mitral valve regurg  Echo on 06/27/2024 showed 20% ejection fraction    (06/29/2024): Patient stable for discharge, he will follow-up with cardiology as outpatient.  Risk of fluid overload is high since the patient has poor follow-up with his prior PCP and physicians.  Per cardiology, the patient will need to have continue volume management via HD, and was recommended to continue metoprolol succinate 25 mg p.o. daily and losartan p.o. daily 25 mg.  Recommend to repeat echo when euvolemic.    Plan:  Continue as outpatient: Metoprolol 25 mg daily and losartan   Patient stable for discharge    * Abdominal pain  Assessment & Plan  Patient presenting with generalized abdominal pain. Notable history of polycystic liver and kidney disease requiring dialysis and previously requiring paracentesis.  CT Abd/Pelvis: Negative for bowel obstruction. Mild splenomegaly. Question geographic area of decreased attenuation within the spleen, possibly artifactual from streak artifact. Difficult to completely exclude subtle infarct. Polycystic kidney and liver disease with multiple complex and indeterminant renal and left hepatic lobe lesions. Correlation with nonemergent MRI abdomen with and without IV contrast recommended to exclude possibility of a solid lesion. Large amount of abdominopelvic ascites,  increased from prior study. Sigmoid diverticulosis without diverticulitis.     Patient also has history of inguinal hernia. US from 2019 of scrotum and testicles: confirmed large complex hydrocele with numerous internal septations and debris measuring 15.6 x 9.3 x 11.4 without evidence of varicocele or scrotal thickness     Patient with fever, abdominal pain and large ascites. Procal 5.73, leukocytosis to 14.27. total bilirubin elevated to 1.93.    06/29/2024: Patient at risk for repeat paracentesis as outpatient however currently he is stable for discharge.  He will need to coordinate with GI, nephrology and cardiology and with his PCP as outpatient.    Chronic kidney disease with end stage renal failure on dialysis (HCC)  Assessment & Plan  Lab Results   Component Value Date    EGFR 13 06/29/2024    EGFR 14 06/28/2024    EGFR 12 06/27/2024    CREATININE 4.45 (H) 06/29/2024    CREATININE 4.11 (H) 06/28/2024    CREATININE 4.76 (H) 06/27/2024     ESRD 2/2 ADPKD  Receives dialysis M/W/F  Last dialysis charted 6/19/2024  Per Patient, last dialysis was Friday 6/21/2024  Per notes, is noncompliant with dialysis prescription and meds, only medication patient taking is PhosLo TID  06/29/2024: Patient stable for discharge per nephrology.  Patient will need to continue with his nephrologist/hemodialysis Monday Wednesday Friday at his current institution.  Patient and wife amenable to plan.    Hyperkalemia  Assessment & Plan  5.5 POA.   Recent Labs     06/27/24  0438 06/28/24  0504 06/29/24  0558   K 4.2 5.5* 4.7   MG 2.0 2.0 2.1     Normal, stable for discharge.    Elevated troponin  Assessment & Plan  Elevated trop 77->86, delta 9, most likely due to demand  EKG showed sinus tachycardic with ST depressions in II, III, V4, V5, V6, V1  No chest pain, angina symptoms, or chest palpitations    Lab Results   Component Value Date    HSTNI0 77 (H) 06/23/2024    HSTNI2 86 (H) 06/23/2024    HSTNID2 9 06/23/2024    HSTNI4 88 (H)  06/24/2024    HSTNID4 11 06/24/2024   Stable for discharge.    ADPKD (autosomal dominant polycystic kidney)  Assessment & Plan  History of ADPKD with liver involvement   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 Correlation with nonemergent MRI abdomen with and without IV contrast recommended to exclude possibility of a solid lesion. Large amount of abdominopelvic ascites, increased from prior study.   For discharge, will need to continue with his dialysis treatments as outpatient and will be seen by his nephrologist.    Thrombocytopenia (HCC)  Assessment & Plan  On admission Plt 95  HIT work-up WNL, patient refused hematology evaluation outpatient  Possibly due to ADPKD    Recent Labs     06/27/24  0438 06/28/24  0504 06/29/24  0558   PLT 95* 81* 83*         Stable for discharge follow-up PCP    Multiple and open wound of lower limb  Assessment & Plan  Wound care consulted  Continue following wound care instructions as outpatient  Stable for discharge.    Paroxysmal atrial fibrillation (HCC)  Assessment & Plan  Sinus tachycardia on admission  Continue Toprol-XL.  Stable for discharge.      Discharging Resident Physician: Tho Quintana DO  Attending: Minna Prasad MD  PCP: No primary care provider on file.  Admission Date: 6/23/2024  Discharge Date: 06/29/24    Disposition:     Home    Reason for Admission: Abdominal pain    Consultations During Hospital Stay:  Gastroenterology  Interventional radiology  Cardiology    Procedures Performed:     Paracentesis    Significant Findings / Test Results:     Echo: Ejection fraction 20%, unable to assess diastolic function due to E/a fusion.      Incidental Findings:   None    Test Results Pending at Discharge (will require follow up):    None      Outpatient Tests Requested:  Repeat BMP within 1 week from discharge as per PCP    Complications: None    Hospital Course:     Homero Koch is a 64 y.o. male patient who originally presented to the hospital on 6/23/2024 due to abdominal pain.      06/23/2024: ED course  64-year-old male presents to the ED with a chief complaint of generalized weakness and abdominal pain.  Patient's past medical history significant for hypertension and HFrEF secondary to dilated cardiomyopathy, ESRD on HD secondary to polycystic kidney disease previously on PD at returns to HD given her recurrent infections presented to the ED with generalized weakness fatigue decreased p.o. intake abdominal pain and vomiting.  Wife reported that the patient came home today had several episodes of nonbloody none bilious emesis, described as brown in color.  Wife became concerned because he also appeared very pale.  In ED patient was found to be febrile with a Tmax of 101.9 °F / 38.8 °C with a heart rate of 102 and a blood pressure of 130/77 saturating 98% on room air.  Patient reported that he was not having any fevers or chills at home.  Patient also reported concern for right-sided testicular swelling, however on later questioning the patient confirmed that the swelling is chronic and has been present for many years.  He denied any other Symptoms.  Workup in the ED found physical exam findings significant for patient acute distress, who.  Ill-appearing, with tachycardia with heart rate as mentioned above, abdominal distention with fluid wave positive.  Patient's right testicle also presented with swelling significantly enlarged right-sided scrotum, nontender, bilateral lower extremity edema/pitting edema with erythema; labs in the ED showed Pro-Vincent at 13.55, leukocytosis at 13.2 (87% segmented, absolute neutrophils elevated 12.02), hemoglobin at 11.9, , 16.8% RDW, platelets at 95; CMP showed hyponatremia 133, potassium  elevated 5.6, BUN at 58, creatinine 8.14, AST decreased at 6 and ALT at 3, total bilirubin elevated at 1.65 with a GFR of 6.  Patient's PT/INR was elevated at 17.9/1.40; blood cultures obtained; troponins were found to be elevated at 0/77 further elevation to 86 at the 2-hour and 88 at the 4-hour-however patient is Dultz at the 2-hour was 9.  Direct bilirubin is elevated 2.76 this x-ray showed left central catheter upper right atrium, clear lungs no pneumothorax or pleural effusion, mild cardiomegaly, unremarkable bones for age, normal upper abdomen, overall impression with no acute cardiopulmonary disease.  Patient admitted under Slim at midnight.    06/24/2024:  64-year-old male presented to the ED with above findings, admitted for sepsis, started empiric Rocephin started for SBP coverage given bump in with large volume ascites.  Patient underwent paracentesis today and will need to follow-up on fluid results, trending WBC, PCT, follow fever curve.  ESRD on HD-HD planned for today.  HFrEF, patient nonadherent to GDMT per chart review patient has not any systemic AC for PAF as it was transient episode in the past regarding an admission for sepsis.  CBC showed white blood cells decreased from 14.27-13.21, Hgb down to 11.9 from 12.9; MCV increased from  this morning.  Platelets decreased from 95-90.  Patient's pro time INR now at 19.2/1.53 elevated from 17.9/1.40.  Paracentesis results showed light yellow color, clear clarity fluid, 303 white blood cells per fluid; body fluid culture and Gram stain showed rare polys however no bacteria seen; total protein and fluids found to be at 4.6, glucose at 99, albumin 2.5 g/dL; ; body fluid differential WNL throughout; lastly the patient's ammonia was found to be at 28 -however lab report that the patient's sample was slightly hemolyzed and the results may be affected.    06/25/2024:  Interval reaccumulation of ascitic fluid, no indication for repeat tap today as  "per IR. Case discussed with nephrology, with recommendation for updated echo as there may be cardiac component moreso than a liver component     6/26/24:   MRI abdomen to r/o polycystic disease of liver ordered and pending. Fluid studies continue to point towards cardiac cause of ascites. Repeat echo pending, last EF 20-25%. Will involve cards depending on result     6/27-28/24:  Echo demonstrates EF 20% with global hypokinesis. Given advanced state recommendation per cardiology of medical mgmt with metoprolol and losartan, with ASA and statin down the line. Repeat paracentesis with albumin ordered.      6/29/2024: Nephrology and primary team discussed with the patient and his wife about the importance of undergoing dialysis treatments and to have close follow-up with GI cardiology as his heart function has deteriorated over the course of the past few years.  It was also recommended the patient undergo serial paracentesis however unclear at this time who will be coordinating his care as he has poor follow-up with physicians.  It was also recommended the patient call his PCP for coordination of care.  Patient and family/wife amenable to recommendations provided by physicians today.  Patient stable for discharge.    Condition at Discharge: stable     Discharge Day Visit / Exam:     Subjective: Patient denies any symptoms today and reports he is ready to go home.  Vitals: Blood Pressure: 128/81 (06/29/24 0733)  Pulse: 78 (06/29/24 0733)  Temperature: 98.1 °F (36.7 °C) (06/29/24 0733)  Temp Source: Oral (06/28/24 1840)  Respirations: 17 (06/29/24 0733)  Height: 5' 8\" (172.7 cm) (06/27/24 0834)  Weight - Scale: 75 kg (165 lb 5.5 oz) (06/29/24 0600)  SpO2: 98 % (06/29/24 0733)  Exam:   Physical Exam  Constitutional:       Appearance: Normal appearance.   HENT:      Head: Normocephalic and atraumatic.   Cardiovascular:      Rate and Rhythm: Normal rate.      Pulses:           Radial pulses are 1+ on the left side. "   Pulmonary:      Effort: Pulmonary effort is normal.   Abdominal:      General: There is distension.      Palpations: Abdomen is soft.   Musculoskeletal:         General: Normal range of motion.      Cervical back: Normal range of motion and neck supple.   Skin:     General: Skin is warm and dry.      Capillary Refill: Capillary refill takes less than 2 seconds.   Neurological:      General: No focal deficit present.      Mental Status: He is alert and oriented to person, place, and time.   Psychiatric:         Mood and Affect: Mood normal.         Behavior: Behavior normal.         Thought Content: Thought content normal.         Judgment: Judgment normal.             Discussion with Family: Wife at bedside    Discharge instructions/Information to patient and family:   See after visit summary for information provided to patient and family.      Provisions for Follow-Up Care:  See after visit summary for information related to follow-up care and any pertinent home health orders.      Planned Readmission: None     Discharge Medications:  See after visit summary for reconciled discharge medications provided to patient and family.      ** Please Note: This note has been constructed using a voice recognition system **

## 2024-06-30 LAB
BACTERIA BLD CULT: ABNORMAL
GRAM STN SPEC: ABNORMAL

## 2024-07-01 ENCOUNTER — APPOINTMENT (OUTPATIENT)
Dept: LAB | Facility: MEDICAL CENTER | Age: 64
End: 2024-07-01
Payer: MEDICARE

## 2024-07-01 ENCOUNTER — TELEPHONE (OUTPATIENT)
Age: 64
End: 2024-07-01

## 2024-07-01 ENCOUNTER — TELEPHONE (OUTPATIENT)
Dept: GASTROENTEROLOGY | Facility: CLINIC | Age: 64
End: 2024-07-01

## 2024-07-01 ENCOUNTER — TRANSITIONAL CARE MANAGEMENT (OUTPATIENT)
Dept: FAMILY MEDICINE CLINIC | Facility: MEDICAL CENTER | Age: 64
End: 2024-07-01

## 2024-07-01 DIAGNOSIS — Q61.3 POLYCYSTIC KIDNEY DISEASE: Chronic | ICD-10-CM

## 2024-07-01 DIAGNOSIS — Q61.3 POLYCYSTIC KIDNEY DISEASE: Primary | Chronic | ICD-10-CM

## 2024-07-01 LAB
ANION GAP SERPL CALCULATED.3IONS-SCNC: 15 MMOL/L (ref 4–13)
BACTERIA BLD CULT: NORMAL
BACTERIA SPEC ANAEROBE CULT: NO GROWTH
BACTERIA SPEC BFLD CULT: NO GROWTH
BUN SERPL-MCNC: 40 MG/DL (ref 5–25)
CALCIUM SERPL-MCNC: 8.8 MG/DL (ref 8.4–10.2)
CHLORIDE SERPL-SCNC: 93 MMOL/L (ref 96–108)
CO2 SERPL-SCNC: 31 MMOL/L (ref 21–32)
CREAT SERPL-MCNC: 4.26 MG/DL (ref 0.6–1.3)
GFR SERPL CREATININE-BSD FRML MDRD: 13 ML/MIN/1.73SQ M
GLUCOSE SERPL-MCNC: 64 MG/DL (ref 65–140)
GRAM STN SPEC: NORMAL
POTASSIUM SERPL-SCNC: 4.5 MMOL/L (ref 3.5–5.3)
SODIUM SERPL-SCNC: 139 MMOL/L (ref 135–147)

## 2024-07-01 PROCEDURE — 36415 COLL VENOUS BLD VENIPUNCTURE: CPT

## 2024-07-01 PROCEDURE — 80048 BASIC METABOLIC PNL TOTAL CA: CPT

## 2024-07-01 NOTE — ACP (ADVANCE CARE PLANNING)
"Serious Illness Conversation    1. What is your understanding now of where you are with your illness?  Some understanding of prognosis however not full.      2. How much information about what is likely to be ahead with your illness would you like to have?  Information: patient wants to be fully informed  But also wants wife fully informed     3. What did you (clinician) communicate to the patient?  Prognostic Communication: Uncertain - It can be difficult to predict what will happen with your illness. I hope you will continue to live well for a long time but I’m worried that you could get sick quickly, and I think it is important to prepare for that possibility.     4. If your health situation worsens, what are your most important goals?  Patient states that he is very focused on getting better. In the past he has refused some interventions but now is very motivated to \"get better\"     5. What are the biggest fears and worries about the future and your health?  Patient states he is not worried, he remains very optimistic.      6. What abilities are so critical to your life that you cannot imagine living without them?  Patient is concerned that fluid will re-accumulate quickly. We did discuss that some of the underlying causes to his fluid re-accumulation may not be reversible and also that HD may not be enough to keep fluid off. He understands somewhat.      7. What gives you strength as you think about the future with your illness?  Wife and family.      8. If you become sicker, how much are you willing to go through for the possibility of gaining more time?  Be in the hospital: Yes Have a feeding tube: Yes   Be in the ICU: Yes Live in a nursing home: Yes   Be on a ventilator: Yes Be uncomfortable: Yes   Be on dialysis: Yes Undergo aggressive test and/or procedures: Yes   9. How much does your proxy and family know about your priorities and wishes?  Wants wife heavily involved.      I’ve heard you say that " controlling the edema is really important to you. Keeping that in mind, and what we know about your illness, I recommend that we set up close GI follow up to see if they can organize frequent OP paracentesis for you in addition to HD. This will help us make sure that your treatment plans reflect what’s important to you.     How does this plan sound to you? I will do everything I can to help you through this.     I have spent  20 minutes speaking with my patient on advanced care planning today or during this visit     Advanced directives

## 2024-07-01 NOTE — TELEPHONE ENCOUNTER
Pt was previously scheduled at [facility]. However Homero saw Dr. Cunha in the hospital and requested him as his PCP. Homero wanted to see if the doctor wanted him to have an blood work done prior to his upcoming appointment. Homero will use a [facility] lab    Please advise, thank you

## 2024-07-01 NOTE — TELEPHONE ENCOUNTER
Left vm message with Pt's daughter, Mindy Grimes, @ [phone].  Pt's daughter reports that Pt is at a dialysis treatment session.  Informed Pt's daughter that Pt has been scheduled for an appt with Dr. Goetz (Hepatology) on 7/23/24 at [facility]. Further informed Pt's daughter that an appt letter will be mailed to Pt's address in chart.

## 2024-07-05 ENCOUNTER — TELEPHONE (OUTPATIENT)
Age: 64
End: 2024-07-05

## 2024-07-05 ENCOUNTER — OFFICE VISIT (OUTPATIENT)
Age: 64
End: 2024-07-05
Payer: MEDICARE

## 2024-07-05 VITALS
BODY MASS INDEX: 25.58 KG/M2 | OXYGEN SATURATION: 99 % | DIASTOLIC BLOOD PRESSURE: 62 MMHG | HEART RATE: 59 BPM | WEIGHT: 168.8 LBS | HEIGHT: 68 IN | SYSTOLIC BLOOD PRESSURE: 130 MMHG

## 2024-07-05 DIAGNOSIS — H91.8X3 OTHER SPECIFIED HEARING LOSS OF BOTH EARS: ICD-10-CM

## 2024-07-05 DIAGNOSIS — N50.89 SCROTAL SWELLING: ICD-10-CM

## 2024-07-05 DIAGNOSIS — I48.0 PAROXYSMAL ATRIAL FIBRILLATION (HCC): ICD-10-CM

## 2024-07-05 DIAGNOSIS — Z99.2 ESRD (END STAGE RENAL DISEASE) ON DIALYSIS (HCC): Primary | ICD-10-CM

## 2024-07-05 DIAGNOSIS — E77.8 HYPOPROTEINEMIA (HCC): ICD-10-CM

## 2024-07-05 DIAGNOSIS — R18.8 OTHER ASCITES: ICD-10-CM

## 2024-07-05 DIAGNOSIS — R79.9 ABNORMAL FINDING OF BLOOD CHEMISTRY, UNSPECIFIED: ICD-10-CM

## 2024-07-05 DIAGNOSIS — Q61.2 ADPKD (AUTOSOMAL DOMINANT POLYCYSTIC KIDNEY): Chronic | ICD-10-CM

## 2024-07-05 DIAGNOSIS — I42.0 DILATED CARDIOMYOPATHY (HCC): ICD-10-CM

## 2024-07-05 DIAGNOSIS — N18.6 ESRD (END STAGE RENAL DISEASE) ON DIALYSIS (HCC): Primary | ICD-10-CM

## 2024-07-05 LAB
DME PARACHUTE DELIVERY DATE REQUESTED: NORMAL
DME PARACHUTE ITEM DESCRIPTION: NORMAL
DME PARACHUTE ORDER STATUS: NORMAL
DME PARACHUTE SUPPLIER NAME: NORMAL
DME PARACHUTE SUPPLIER PHONE: NORMAL

## 2024-07-05 PROCEDURE — G2211 COMPLEX E/M VISIT ADD ON: HCPCS

## 2024-07-05 PROCEDURE — 99213 OFFICE O/P EST LOW 20 MIN: CPT

## 2024-07-05 RX ORDER — NYSTATIN 100000 [USP'U]/G
POWDER TOPICAL
COMMUNITY

## 2024-07-05 RX ORDER — CHOLECALCIFEROL (VITAMIN D3) 25 MCG
CAPSULE ORAL DAILY
COMMUNITY
Start: 2024-07-02

## 2024-07-05 RX ORDER — ASCORBIC ACID 100 MG
1 TABLET,CHEWABLE ORAL DAILY
COMMUNITY
Start: 2024-07-02

## 2024-07-05 NOTE — ASSESSMENT & PLAN NOTE
Lab Results   Component Value Date    EGFR 13 07/01/2024    EGFR 13 06/29/2024    EGFR 14 06/28/2024    CREATININE 4.26 (H) 07/01/2024    CREATININE 4.45 (H) 06/29/2024    CREATININE 4.11 (H) 06/28/2024   Patient requires dialysis due to end-stage renal disease, has been on dialysis for past 4 years  Complications include poor compliance with occasional follow-up episodes of volume overload as seen on most recent admission as well as recurrent infections and receiving peritoneal dialysis which prompted switch to hemodialysis    Plan:  Continue follow-up with [facility] for dialysis and nephrology management  Stressed importance of patient compliance with both dialysis schedule as well as pretreatment with midodrine and maintenance of prescribed pharmacologic agents including sevelamer.  Patient did request second nephrology opinion, stating he is dissatisfied with his current nephrologist.  Referral provided to patient at visit today    Dialysis MWF  [facility]

## 2024-07-08 NOTE — PROGRESS NOTES
"Advanced Heart Failure/Pulmonary Hypertension Outpatient Note - Homero Koch 64 y.o. male MRN: [id number]    @ Encounter: [id number]      Assessment:  64 y.o. male PMH and acute problems listed later in this note (a partial list may also be included within 'assessment' section) p/w HF consult.  I first met Homero Koch on 7/9/24.    The patient's primary cardiac team is general cardiology, follows Dr. Adamson  HFrEF, LVEF 20%, biv failure  +stress test past, pt declined LHC per prior notes  PH  Ascites  Pl effusion  PKD  ESRD on HD, was taken off renal xplant list in past for low EF, per notes  Liver cysts, unclear is cirrhosis per MRI read 2024  pAF  H/O noncompliance and declining key testing      I have reviewed all pertinent patient data including but not limited to:        Lab Units 07/01/24  1541 06/29/24  0558 06/28/24  0504   CREATININE mg/dL 4.26* 4.45* 4.11*           Lab Results   Component Value Date    K 4.5 07/01/2024     No results found for: \"HGBA1C\"  No results found for: \"[id number]\", \"TSH\"  No results found for: \"LDLCALC\"  Lab Results   Component Value Date    BNP 4,131 (H) 06/27/2024      No results found for: \"NTBNP\"       TODAY'S PLAN:     07/09/24  I am meeting patient for the first time today  Warm, near euvolemic  No new cardiac complaints, feels generally well - says he feels much better after 30-40lbs fluid removal recently    Advance gdmt as able, as below  Limited by multiple factors  Gently up metop today  Consider Future repeat echo on max gdmt    See below comments regarding advanced Tx    Has urology and GI fu near term    We reviewed utility of lipitor, he may decide to start today    On ASA    Uses midodrine with HD    Follow up:  With his primary cardiologist in 1 month  With me in 1 year  In addition to follow up with their other medical providers    Neurohormonal Blockade/GDMT:  --Beta-Blocker: toprol xl 25 qd > 50 qd today  --ACEi, ARB, ARNi: losartan 25 " qd  --MRA: renal failure precludes  --SGLT2i: renal failure precludes  --Hydralazine/nitrates: not on    --Diuretic: makes no urine he says    Other HF pharmaco-invasive therapy (if applicable):   PPM,  ICD , CRT (if applicable):  Interrogation:  Advanced Therapies (if applicable):   --Inotrope:  --LVAD/Transplant Candidacy:  Poor candidate 2/2 insight, H/O noncompliance, poor RV fxn for VAD, as of yet unclear component of PH.  Would be dual or triple organ xplant consideration. Unclear if has cirrhosis as of 7/9/24, upcoming GI eval.  Limited mobility.    Studies:  I have reviewed all pertinent patient data/labs/imaging where available, including but not limited to the below studies. Selected results may be displayed here but comprehensive listing is omitted for note clarity and can be found in the epic chart.    ECG.    Echo.    Stress.    Cath.    HPI:   64 y.o. male PMH and acute problems listed later in this note (a partial list may also be included within 'assessment' section) p/w HF consult.  I first met Homero Koch on 7/9/24.  No new CP/SOB/dizziness/palpitations/syncope.  No new fatigue.  No new unintentional weight changes.  No new leg swelling, PND, pillow orthopnea.  No new fevers, chills, cough, nausea, vomiting, diarrhea, dysuria.        Past Medical History:   Diagnosis Date    Complication of renal dialysis     Polycystic kidney disease      Patient Active Problem List    Diagnosis Date Noted    Abdominal pain 06/23/2024    Chronic venous stasis dermatitis of both lower extremities 07/28/2023    Hemodialysis catheter infection (HCC) 07/28/2023    Venous stasis ulcer of left lower leg with edema of left lower leg  (HCC) 04/12/2023    Abnormal nuclear stress test 11/02/2021    Type 2 diabetes mellitus with stage 5 chronic kidney disease not on chronic dialysis, with long-term current use of insulin (HCC) 09/27/2021    Heart failure with reduced ejection fraction due to cardiomyopathy (HCC)  05/08/2020    HFrEF Dilated cardiomyopathy (Formerly [facility]) 05/08/2020    Pruritus 02/14/2020    Insomnia 02/14/2020    Chronic hepatic failure without coma (Formerly [facility]) 02/14/2020    Paresis ([facility]ly [facility]) 02/14/2020    Hypoproteinemia ([facility]ly [facility]) 02/14/2020    Systolic dysfunction 01/09/2020    GERD (gastroesophageal reflux disease) 11/14/2019    Oropharyngeal dysphagia 11/12/2019    Constipation 11/02/2019    At Risk for Pressure ulcer, buttock 10/29/2019    ESRD (end stage renal disease) (Formerly [facility]) 10/28/2019    Scrotal swelling 10/27/2019    Hyperkalemia 10/26/2019    Wound of right leg 10/26/2019    Wound of left leg 10/26/2019    Multiple wounds of skin 10/26/2019    Cellulitis 10/26/2019    ESRD (end stage renal disease) on dialysis (Formerly [facility])     Ileus ([facility]) 10/09/2019    Ascites 10/09/2019    Elevated troponin 10/09/2019    Hypotension 10/06/2019    Anemia due to chronic kidney disease, on chronic dialysis and Acute Blood Loss Anemia ([facility]) 10/06/2019    Paroxysmal atrial fibrillation ([facility]) 09/26/2019    Gram-negative bacteremia 09/22/2019    Chronic kidney disease with end stage renal failure on dialysis ([facility]) 09/21/2019    Hyponatremia 09/21/2019    Multiple and open wound of lower limb 09/21/2019    Total bilirubin, elevated 09/21/2019    Thrombocytopenia ([facility]) 09/21/2019    Polycystic liver disease 08/07/2018    ADPKD (autosomal dominant polycystic kidney) 08/07/2018    Peripheral neuropathy 11/23/2015    Polycystic kidney disease 08/17/2015    Hypertension 10/29/2013       ROS:  10 point ROS negative except as specified in HPI    Allergies   Allergen Reactions    Sucroferric Oxyhydroxide Other (See Comments)    Chlorhexidine Other (See Comments)    Heparin Other (See Comments)     Brings plaletes down    Other Other (See Comments)     Current Outpatient Medications   Medication Instructions    Ascorbic Acid (Vitamin C) 100 MG CHEW 1 tablet, Oral, Daily    aspirin 81 mg, Oral, Daily    atorvastatin (LIPITOR) 40 mg, Oral, Daily    B  Complex-C-Folic Acid (TRIPHROCAPS PO) 1 capsule, Daily    b complex-vitamin C-folic acid (RENAL) 1 mg 1 capsule, Oral, Daily with dinner    calcitriol (ROCALTROL) 0.75 mcg, Oral, 3 times weekly    calcium acetate (PHOSLO) 1,334 mg, Oral, 3 times daily with meals    Cholecalciferol (Vitamin D-3) 25 MCG (1000 UT) CAPS Daily    cholecalciferol (VITAMIN D3) 1,000 Units, Daily    losartan (COZAAR) 25 mg, Oral, Daily    metoprolol succinate (TOPROL-XL) 50 mg, Oral, Daily    midodrine (PROAMATINE) 2.5 mg, Oral, Before Dialysis    nystatin powder     sevelamer (RENAGEL) 1,600 mg, Oral, 3 times daily with meals      Social History     Socioeconomic History    Marital status: /Civil Union     Spouse name: Not on file    Number of children: 3    Years of education: Not on file    Highest education level: Associate degree: occupational, technical, or vocational program   Occupational History    Not on file   Tobacco Use    Smoking status: Never    Smokeless tobacco: Never   Vaping Use    Vaping status: Never Used   Substance and Sexual Activity    Alcohol use: Not Currently    Drug use: Yes     Types: Marijuana    Sexual activity: Not on file   Other Topics Concern    Not on file   Social History Narrative    Not on file     Social Determinants of Health     Financial Resource Strain: Medium Risk (1/9/2020)    Overall Financial Resource Strain (CARDIA)     Difficulty of Paying Living Expenses: Somewhat hard   Food Insecurity: No Food Insecurity (6/27/2024)    Hunger Vital Sign     Worried About Running Out of Food in the Last Year: Never true     Ran Out of Food in the Last Year: Never true   Transportation Needs: No Transportation Needs (6/27/2024)    PRAPARE - Transportation     Lack of Transportation (Medical): No     Lack of Transportation (Non-Medical): No   Physical Activity: Not on file   Stress: No Stress Concern Present (1/9/2020)    [employer] - Occupational Stress Questionnaire     " Feeling of Stress : Not at all   Social Connections: Unknown (1/9/2020)    Social Connection and Isolation Panel [NHANES]     Frequency of Communication with Friends and Family: Not on file     Frequency of Social Gatherings with Friends and Family: Not on file     Attends Synagogue Services: Not on file     Active Member of Clubs or Organizations: Not on file     Attends Club or Organization Meetings: Not on file     Marital Status:    Intimate Partner Violence: Not At Risk (1/9/2020)    Humiliation, Afraid, Rape, and Kick questionnaire     Fear of Current or Ex-Partner: No     Emotionally Abused: No     Physically Abused: No     Sexually Abused: No   Housing Stability: Low Risk  (6/27/2024)    Housing Stability Vital Sign     Unable to Pay for Housing in the Last Year: No     Number of Times Moved in the Last Year: 1     Homeless in the Last Year: No     No family history on file.    Physical Exam:  Vitals:    07/09/24 1123   BP: 132/82   BP Location: Left arm   Patient Position: Sitting   Cuff Size: Standard   Pulse: 82   SpO2: 100%   Weight: 77.3 kg (170 lb 6.4 oz)   Height: 5' 8\" (1.727 m)     Constitutional: NAD, non toxic, frail  Ears/nose/mouth/throat: atraumatic  CV: RRR, no JVD  Resp: CTABL  GI: Soft, NTND  MSK: no swollen joints in exposed areas  Extr: +1 LE edema, warm LE  Pysche: odd affect  Neuro: appropriate in conversation  Skin: dry and intact in exposed areas    Labs & Results:  Lab Results   Component Value Date    WBC 5.25 06/29/2024    HGB 12.0 06/29/2024    HCT 38.6 06/29/2024     (H) 06/29/2024    PLT 83 (L) 06/29/2024     Lab Results   Component Value Date    SODIUM 139 07/01/2024    K 4.5 07/01/2024    CL 93 (L) 07/01/2024    CO2 31 07/01/2024    BUN 40 (H) 07/01/2024    CREATININE 4.26 (H) 07/01/2024    GLUC 64 (L) 07/01/2024    CALCIUM 8.8 07/01/2024       Counseling / Coordination of Care  Time spent today 37 minutes.  Greater than 50% of total time was spent with the " patient and / or family counseling and / or coordination of care. We discussed diagnoses, most recent studies and any changes in treatment.    Thank you for the opportunity to participate in the care of this patient.    Sherif Ward MD  Attending Physician  Advanced Heart Failure and Transplant Cardiology  [facility]

## 2024-07-09 ENCOUNTER — OFFICE VISIT (OUTPATIENT)
Age: 64
End: 2024-07-09
Payer: MEDICARE

## 2024-07-09 ENCOUNTER — OFFICE VISIT (OUTPATIENT)
Dept: CARDIOLOGY CLINIC | Facility: CLINIC | Age: 64
End: 2024-07-09
Payer: MEDICARE

## 2024-07-09 VITALS
WEIGHT: 171.6 LBS | BODY MASS INDEX: 26.01 KG/M2 | OXYGEN SATURATION: 99 % | SYSTOLIC BLOOD PRESSURE: 130 MMHG | DIASTOLIC BLOOD PRESSURE: 80 MMHG | HEART RATE: 83 BPM | HEIGHT: 68 IN

## 2024-07-09 VITALS
WEIGHT: 170.4 LBS | BODY MASS INDEX: 25.82 KG/M2 | HEART RATE: 82 BPM | SYSTOLIC BLOOD PRESSURE: 132 MMHG | HEIGHT: 68 IN | DIASTOLIC BLOOD PRESSURE: 82 MMHG | OXYGEN SATURATION: 100 %

## 2024-07-09 DIAGNOSIS — I83.029 VENOUS STASIS ULCER OF LEFT LOWER LEG WITH EDEMA OF LEFT LOWER LEG  (HCC): ICD-10-CM

## 2024-07-09 DIAGNOSIS — Q44.6 POLYCYSTIC LIVER DISEASE: Chronic | ICD-10-CM

## 2024-07-09 DIAGNOSIS — I42.0 DILATED CARDIOMYOPATHY (HCC): Primary | ICD-10-CM

## 2024-07-09 DIAGNOSIS — E11.22 TYPE 2 DIABETES MELLITUS WITH DIABETIC CHRONIC KIDNEY DISEASE, UNSPECIFIED CKD STAGE, UNSPECIFIED WHETHER LONG TERM INSULIN USE (HCC): ICD-10-CM

## 2024-07-09 DIAGNOSIS — I48.0 PAROXYSMAL ATRIAL FIBRILLATION (HCC): ICD-10-CM

## 2024-07-09 DIAGNOSIS — I83.892 VENOUS STASIS ULCER OF LEFT LOWER LEG WITH EDEMA OF LEFT LOWER LEG  (HCC): ICD-10-CM
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
## 2024-07-09 DIAGNOSIS — L97.929 VENOUS STASIS ULCER OF LEFT LOWER LEG WITH EDEMA OF LEFT LOWER LEG  (HCC): ICD-10-CM

## 2024-07-09 DIAGNOSIS — R60.0 VENOUS STASIS ULCER OF LEFT LOWER LEG WITH EDEMA OF LEFT LOWER LEG  (HCC): ICD-10-CM

## 2024-07-09 PROBLEM — N18.5 TYPE 2 DIABETES MELLITUS WITH STAGE 5 CHRONIC KIDNEY DISEASE NOT ON CHRONIC DIALYSIS, WITH LONG-TERM CURRENT USE OF INSULIN (HCC): Status: RESOLVED | Noted: 2021-09-27 | Resolved: 2024-07-09

## 2024-07-09 PROBLEM — Z79.4 TYPE 2 DIABETES MELLITUS WITH STAGE 5 CHRONIC KIDNEY DISEASE NOT ON CHRONIC DIALYSIS, WITH LONG-TERM CURRENT USE OF INSULIN (HCC): Status: RESOLVED | Noted: 2021-09-27 | Resolved: 2024-07-09

## 2024-07-09 PROCEDURE — 99204 OFFICE O/P NEW MOD 45 MIN: CPT | Performed by: STUDENT IN AN ORGANIZED HEALTH CARE EDUCATION/TRAINING PROGRAM

## 2024-07-09 PROCEDURE — 99213 OFFICE O/P EST LOW 20 MIN: CPT

## 2024-07-09 PROCEDURE — G2211 COMPLEX E/M VISIT ADD ON: HCPCS

## 2024-07-09 RX ORDER — AMOXICILLIN AND CLAVULANATE POTASSIUM 250; 125 MG/1; MG/1
1 TABLET, FILM COATED ORAL EVERY 24 HOURS
Qty: 5 TABLET | Refills: 0 | Status: CANCELLED | OUTPATIENT
Start: 2024-07-09 | End: 2024-07-14

## 2024-07-09 RX ORDER — METOPROLOL SUCCINATE 25 MG/1
50 TABLET, EXTENDED RELEASE ORAL DAILY
Qty: 180 TABLET | Refills: 5 | Status: SHIPPED | OUTPATIENT
Start: 2024-07-09 | End: 2025-12-31

## 2024-07-09 NOTE — PATIENT INSTRUCTIONS
[email or web address]    [employer]ia Belt for Men

## 2024-07-09 NOTE — PROGRESS NOTES
Ambulatory Visit  Name: Homero Koch      : 1960      MRN: [id number]  Encounter Provider: Seth Cunha MD  Encounter Date: 2024   Encounter department: [facility]    Assessment & Plan   Patient presented to same-day visit over concern for dog bite.  Patient stated roughly 2 weeks ago he had been playing with a puppy when he received several minor abrasions to left forearm.  Patient stated that abrasions had since healed with minimal concern, but he did wish to have the area examined.  On visual examination well-healing scrapes present of left forearm with mild circumferential bruising but no tenderness to palpation, erythema, or any signs of active infection.  Scabs examined with all intact, no signs of skin breakdown or purulent drainage.  Given remote history of animal bite as well as well-healing appearance of wounds, decision made not to utilize antibiotics at this time.  Patient vies to continue to monitor and call office again if purulence, pain, or other concerning features manifest.    1. Dog bite, initial encounter       History of Present Illness     Pt presenting for assessment of puppy bite  Well-healed at time of exam, no concern for infection   Review of Systems   Constitutional:  Negative for chills and fever.   HENT:  Negative for ear pain and sore throat.    Eyes:  Negative for pain and visual disturbance.   Respiratory:  Negative for cough and shortness of breath.    Cardiovascular:  Negative for chest pain and palpitations.   Gastrointestinal:  Negative for abdominal pain and vomiting.   Genitourinary:  Negative for dysuria and hematuria.   Musculoskeletal:  Negative for arthralgias and back pain.   Skin:  Negative for color change and rash.   Neurological:  Negative for seizures and syncope.   All other systems reviewed and are negative.  Past Medical History:   Diagnosis Date   • Complication of renal dialysis    • Polycystic kidney disease      Past  Surgical History:   Procedure Laterality Date   • IR CATHETERGRAM  10/17/2019   • IR IMAGE GUIDED ASPIRATION / DRAINAGE  9/23/2019   • IR PARACENTESIS  10/9/2019   • IR PARACENTESIS  6/24/2024   • IR PARACENTESIS  6/28/2024   • IR TEMPORARY DIALYSIS CATHETER PLACEMENT  7/28/2023   • IR TUNNELED CENTRAL LINE CHECK/CHANGE/REPOSITION  12/14/2021   • IR TUNNELED DIALYSIS CATHETER CHECK/CHANGE/REPOSITION/ANGIOPLASTY  10/17/2019   • IR TUNNELED DIALYSIS CATHETER PLACEMENT  9/30/2019   • IR TUNNELED DIALYSIS CATHETER PLACEMENT  8/1/2023   • IR TUNNELED DIALYSIS CATHETER REMOVAL  7/28/2023   • SPLIT THICKNESS SKIN GRAFT Bilateral 11/7/2019    Procedure: SKIN GRAFT SPLIT THICKNESS (STSG)  EXTREMITY;  Surgeon: Torey Cha MD;  [facility];  Service: Plastics   • SPLIT THICKNESS SKIN GRAFT Bilateral 11/12/2019    Procedure: SKIN GRAFT SPLIT THICKNESS (STSG)  EXTREMITY;  Surgeon: Torey Cha MD;  [facility];  Service: Plastics   • VAC DRESSING APPLICATION Bilateral 11/18/2019    Procedure: CHANGE DRESSING;  Surgeon: Torey Cha MD;  [facility];  Service: Plastics   • WOUND DEBRIDEMENT Bilateral 10/31/2019    Procedure: DEBRIDEMENT WOUND (WASH OUT);  Surgeon: Selvin Han DPM;  [facility];  Service: Podiatry   • WOUND DEBRIDEMENT Bilateral 11/5/2019    Procedure: DEBRIDEMENT WOUND (WASH OUT);  Surgeon: Selvin Han DPM;  [facility];  Service: Podiatry   • WOUND DEBRIDEMENT Bilateral 11/7/2019    Procedure: DEBRIDEMENT WOUND (WASH OUT);  Surgeon: Torey Cha MD;  [facility];  Service: Plastics   • WOUND DEBRIDEMENT Bilateral 11/12/2019    Procedure: DEBRIDEMENT LOWER EXTREMITY (WASH OUT);  Surgeon: Torey Cha MD;  [facility];  Service: Plastics     History reviewed. No pertinent family history.  Social History     Tobacco Use   • Smoking status: Never   • Smokeless tobacco: Never    Vaping Use   • Vaping status: Never Used   Substance and Sexual Activity   • Alcohol use: Not Currently   • Drug use: Yes     Types: Marijuana   • Sexual activity: Not on file     Current Outpatient Medications on File Prior to Visit   Medication Sig   • Ascorbic Acid (Vitamin C) 100 MG CHEW Chew 1 tablet daily   • aspirin 81 mg chewable tablet Chew 1 tablet (81 mg total) daily   • atorvastatin (LIPITOR) 40 mg tablet Take 1 tablet (40 mg total) by mouth daily (Patient not taking: Reported on 7/9/2024)   • B Complex-C-Folic Acid (TRIPHROCAPS PO) Take 1 capsule by mouth daily (Patient not taking: Reported on 7/9/2024)   • b complex-vitamin C-folic acid (RENAL) 1 mg Take 1 capsule by mouth daily with dinner   • calcitriol (ROCALTROL) 0.25 mcg capsule Take 3 capsules (0.75 mcg total) by mouth 3 (three) times a week   • calcium acetate (PHOSLO) 667 mg capsule Take 1,334 mg by mouth 3 (three) times a day with meals   • Cholecalciferol (Vitamin D-3) 25 MCG (1000 UT) CAPS Take by mouth daily (Patient not taking: Reported on 7/9/2024)   • cholecalciferol (VITAMIN D3) 1,000 units tablet Take 1,000 Units by mouth daily (Patient not taking: Reported on 7/9/2024)   • losartan (COZAAR) 25 mg tablet Take 1 tablet (25 mg total) by mouth daily   • metoprolol succinate (TOPROL-XL) 25 mg 24 hr tablet Take 2 tablets (50 mg total) by mouth daily   • midodrine (PROAMATINE) 2.5 mg tablet Take 1 tablet (2.5 mg total) by mouth Before Dialysis (For SBP less than 110 pre-dialysis)   • nystatin powder  (Patient not taking: Reported on 7/9/2024)   • sevelamer (RENAGEL) 800 mg tablet Take 2 tablets (1,600 mg total) by mouth 3 (three) times a day with meals (Patient not taking: Reported on 7/9/2024)   • [DISCONTINUED] metoprolol succinate (TOPROL-XL) 25 mg 24 hr tablet Take 1 tablet (25 mg total) by mouth daily     Allergies   Allergen Reactions   • Sucroferric Oxyhydroxide Other (See Comments)   • Chlorhexidine Other (See Comments)   • Heparin  "Other (See Comments)     Brings plaletes down   • Other Other (See Comments)       There is no immunization history on file for this patient.  Objective     /80   Pulse 83   Ht 5' 8\" (1.727 m)   Wt 77.8 kg (171 lb 9.6 oz)   SpO2 99%   BMI 26.09 kg/m²     Physical Exam  Vitals and nursing note reviewed.   Constitutional:       General: He is not in acute distress.     Appearance: He is well-developed.   HENT:      Head: Normocephalic and atraumatic.   Eyes:      Conjunctiva/sclera: Conjunctivae normal.   Cardiovascular:      Rate and Rhythm: Normal rate and regular rhythm.      Heart sounds: No murmur heard.  Pulmonary:      Effort: Pulmonary effort is normal. No respiratory distress.      Breath sounds: Normal breath sounds.   Abdominal:      General: There is distension.      Palpations: Abdomen is soft.      Tenderness: There is no abdominal tenderness. There is no guarding.      Hernia: A hernia is present.   Musculoskeletal:         General: No swelling.      Cervical back: Neck supple.   Skin:     General: Skin is warm and dry.      Capillary Refill: Capillary refill takes less than 2 seconds.      Comments: Several well-healing abrasion to skin of L forearm with mild circumferential bruising but no active bleeding or signs of infection   Neurological:      Mental Status: He is alert.   Psychiatric:         Mood and Affect: Mood normal.     "

## 2024-07-09 NOTE — TELEPHONE ENCOUNTER
Patient is calling to follow up on prior request for handicap parking paperwork, please let patient know when ready  Also patient will like to let provider know that he was bit by a puppy 2 weeks ago and he just noticed that the area is a little red, wants to know if he should get tested, the puppy is too small stilt for vaccines  Thank you

## 2024-07-12 ENCOUNTER — APPOINTMENT (OUTPATIENT)
Dept: LAB | Facility: MEDICAL CENTER | Age: 64
End: 2024-07-12
Payer: MEDICARE

## 2024-07-12 DIAGNOSIS — R18.8 OTHER ASCITES: ICD-10-CM

## 2024-07-12 DIAGNOSIS — Z99.2 ESRD (END STAGE RENAL DISEASE) ON DIALYSIS (HCC): ICD-10-CM

## 2024-07-12 DIAGNOSIS — Q61.2 ADPKD (AUTOSOMAL DOMINANT POLYCYSTIC KIDNEY): Chronic | ICD-10-CM

## 2024-07-12 DIAGNOSIS — N18.6 ESRD (END STAGE RENAL DISEASE) ON DIALYSIS (HCC): ICD-10-CM

## 2024-07-12 DIAGNOSIS — R79.9 ABNORMAL FINDING OF BLOOD CHEMISTRY, UNSPECIFIED: ICD-10-CM

## 2024-07-12 DIAGNOSIS — I42.0 DILATED CARDIOMYOPATHY (HCC): ICD-10-CM

## 2024-07-12 DIAGNOSIS — E77.8 HYPOPROTEINEMIA (HCC): ICD-10-CM

## 2024-07-12 LAB
ALBUMIN SERPL BCG-MCNC: 4 G/DL (ref 3.5–5)
ALP SERPL-CCNC: 163 U/L (ref 34–104)
ALT SERPL W P-5'-P-CCNC: 13 U/L (ref 7–52)
AST SERPL W P-5'-P-CCNC: 23 U/L (ref 13–39)
BILIRUB DIRECT SERPL-MCNC: 0.25 MG/DL (ref 0–0.2)
BILIRUB SERPL-MCNC: 0.87 MG/DL (ref 0.2–1)
EST. AVERAGE GLUCOSE BLD GHB EST-MCNC: 91 MG/DL
HBA1C MFR BLD: 4.8 %
PROT SERPL-MCNC: 7.8 G/DL (ref 6.4–8.4)

## 2024-07-12 PROCEDURE — 36415 COLL VENOUS BLD VENIPUNCTURE: CPT

## 2024-07-12 PROCEDURE — 83036 HEMOGLOBIN GLYCOSYLATED A1C: CPT

## 2024-07-12 PROCEDURE — 80076 HEPATIC FUNCTION PANEL: CPT

## 2024-07-17 NOTE — ASSESSMENT & PLAN NOTE
Patient recently hospitalized 6/24 through 6/29 where he was found to have significant volume overload state with cardiac ascites.  Volume status was managed with paracentesis x 2 as well as dialysis in the hospital with eventual euvolemia being achieved.  Repeat echo on 6/27/2024 demonstrated ejection fraction of 20% with moderate ventricular wall hypertrophy.  Patient currently asymptomatic at this time, states he is able to ambulate and attend his ADLs without significant dyspnea.    Plan:  Continue hemodialysis for volume management  Close follow-up with heart failure team  Continue metoprolol 25 mg daily, losartan 25 mg daily  Patient would likely benefit from

## 2024-07-17 NOTE — ASSESSMENT & PLAN NOTE
Patient continues to endorse large scrotal swelling which previous imaging has shown to be right hydrocele containing numerous septations  Likely a complication of his abdominal ascites extending down inguinal canal and scrotum at this point in time, managed with paracentesis as well as large-volume hemodialysis.  Patient has been given a urology referral at discharge from hospital, advised to follow-up with them for definitive management  Additionally advised patient that he could wear a hernia support belt to assist in forcing this fluid back into his abdomen where it may be recovered by dialysis.

## 2024-07-17 NOTE — ASSESSMENT & PLAN NOTE
Rate controlled at office visit today    Plan:  Continue with Toprol 25 mg daily  [id number] score of 2, but given other comorbidities patient would likely significantly benefit from blood thinning medication at this time

## 2024-07-17 NOTE — ASSESSMENT & PLAN NOTE
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  Patient follows with [facility] nephrology, receives Monday/Wednesday/Friday dialysis.  Patient states he is currently dissatisfied with his nephrologist through that organization, is requesting second opinion.    Recent abdominal imaging:  MRI abdomen 6/26/2024:  Enlarged kidneys with innumerable simple and proteinaceous renal cysts in keeping with autosomal dominant polycystic kidney disease. 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  Additionally advised patient to continue taking his sevelamer as prescribed and follow all instructions from his current nephrologist.

## 2024-07-17 NOTE — ASSESSMENT & PLAN NOTE
Left 2nd message for patient to call back.      In addition to Stress results, Per Melissa Guillaume PA-C Labs are normal.     Component      Latest Ref Rng & Units 4/6/2021   Fasting Status      Hours 12   CHOLESTEROL      <=199 mg/dL 166   TRIGLYCERIDE      <=149 mg/dL 109   HDL      >=50 mg/dL 43 (L)   CALCULATED LDL      <=129 mg/dL 101   CALCULATED NON HDL      mg/dL 123   CHOL/HDL      <=4.4 3.9   GLYCOHEMOGLOBIN A1C      4.5 - 5.6 % 5.2          While inpatient patient was noted to have large volume abdominal ascites which was drained x 2.  Laboratory studies of fluid demonstrated likely cardiac ascites but some concern present for cirrhosis given polycystic appearance of liver on imaging studies.    Plan:  Likely cardiac at this point in time but patient does have upcoming GI evaluation  Laboratory studies demonstrating normal liver function make cirrhotic ascites less likely at this point in time  Advised patient to continue with dialysis and medication compliance that this will be the best agent for volume management  Encourage patient to call this office again if he feels he is having abdominal reaccumulation as well as utilizing daily weights.  If reaccumulation can consider interventional radiology referral for outpatient paracentesis

## 2024-07-17 NOTE — ASSESSMENT & PLAN NOTE
Most recent hepatic function panel demonstrated normal serum protein as well as normal serum albumin  Advised patient to continue with dietary protein supplementation

## 2024-07-20 NOTE — PROGRESS NOTES
Referring Physician: Seth Cunha MD  A copy of this note was sent to the referring physician.       Diagnoses and all orders for this visit:    Scrotal swelling  -     Ambulatory Referral to Urology            Assessment and plan:       RIght Hydrocele  -Communicating hydrocele based on imaging, physical exam, containing ascitic fluid    2.Abdominal Ascites    3. Polycystic Kidney Disease    4. ESRD on HD      Today we had a discussion on the multifactorial etiology of hydroceles and the standardized treatment approaches.     Since this is a communicating hydrocele and is developing as a sequela to the patient's high-volume abdominal ascites, I am concerned that attempting surgical repair would have a significantly high risk of recurrence.  As such I recommended conservative measures.  Patient is generally asymptomatic.  He and his daughter are amenable to expectant management and he will follow-up as needed.      Driss Mulligan MD      Chief Complaint     Hydrocele consult      History of Present Illness     Homero Koch is a 64 y.o. referred in consultation for hydrocele    Detailed Urologic History     - please refer to HPI    Review of Systems     Review of Systems   Constitutional: Negative for activity change and fatigue.   HENT: Negative for congestion.    Eyes: Negative for visual disturbance.   Respiratory: Negative for shortness of breath and wheezing.    Cardiovascular: Negative for chest pain and leg swelling.   Gastrointestinal: Negative for abdominal pain.   Endocrine: Negative for polyuria.   Genitourinary: Negative for dysuria, flank pain, hematuria and urgency.   Musculoskeletal: Negative for back pain.   Allergic/Immunologic: Negative for immunocompromised state.   Neurological: Negative for dizziness and numbness.   Psychiatric/Behavioral: Negative for dysphoric mood.   All other systems reviewed and are negative.          Allergies     Allergies   Allergen Reactions    Sucroferric  Oxyhydroxide Other (See Comments)    Chlorhexidine Other (See Comments)    Heparin Other (See Comments)     Brings plaletes down    Other Other (See Comments)       Physical Exam       Physical Exam  Constitutional:       General: He is not in acute distress.     Appearance: He is well-developed.   HENT:      Head: Normocephalic and atraumatic.   Cardiovascular:      Comments: Negative lower extremity edema  Pulmonary:      Effort: Pulmonary effort is normal.      Breath sounds: Normal breath sounds.   Abdominal:      Palpations: Abdomen is soft.   Musculoskeletal:         General: Normal range of motion.      Cervical back: Normal range of motion.   Skin:     General: Skin is warm.   Neurological:      Mental Status: He is alert and oriented to person, place, and time.   Psychiatric:         Behavior: Behavior normal.           Vital Signs  There were no vitals filed for this visit.      Current Medications       Current Outpatient Medications:     Ascorbic Acid (Vitamin C) 100 MG CHEW, Chew 1 tablet daily, Disp: , Rfl:     aspirin 81 mg chewable tablet, Chew 1 tablet (81 mg total) daily, Disp: 30 tablet, Rfl: 0    atorvastatin (LIPITOR) 40 mg tablet, Take 1 tablet (40 mg total) by mouth daily (Patient not taking: Reported on 7/9/2024), Disp: 30 tablet, Rfl: 0    B Complex-C-Folic Acid (TRIPHROCAPS PO), Take 1 capsule by mouth daily (Patient not taking: Reported on 7/9/2024), Disp: , Rfl:     b complex-vitamin C-folic acid (RENAL) 1 mg, Take 1 capsule by mouth daily with dinner, Disp: 90 capsule, Rfl: 0    calcitriol (ROCALTROL) 0.25 mcg capsule, Take 3 capsules (0.75 mcg total) by mouth 3 (three) times a week, Disp: 36 capsule, Rfl: 0    calcium acetate (PHOSLO) 667 mg capsule, Take 1,334 mg by mouth 3 (three) times a day with meals, Disp: , Rfl:     Cholecalciferol (Vitamin D-3) 25 MCG (1000 UT) CAPS, Take by mouth daily (Patient not taking: Reported on 7/9/2024), Disp: , Rfl:     cholecalciferol (VITAMIN D3)  1,000 units tablet, Take 1,000 Units by mouth daily (Patient not taking: Reported on 7/9/2024), Disp: , Rfl:     losartan (COZAAR) 25 mg tablet, Take 1 tablet (25 mg total) by mouth daily, Disp: 30 tablet, Rfl: 0    metoprolol succinate (TOPROL-XL) 25 mg 24 hr tablet, Take 2 tablets (50 mg total) by mouth daily, Disp: 180 tablet, Rfl: 5    midodrine (PROAMATINE) 2.5 mg tablet, Take 1 tablet (2.5 mg total) by mouth Before Dialysis (For SBP less than 110 pre-dialysis), Disp: 90 tablet, Rfl: 0    nystatin powder, , Disp: , Rfl:     sevelamer (RENAGEL) 800 mg tablet, Take 2 tablets (1,600 mg total) by mouth 3 (three) times a day with meals (Patient not taking: Reported on 7/9/2024), Disp: 180 tablet, Rfl: 0      Active Problems     Patient Active Problem List   Diagnosis    Chronic kidney disease with end stage renal failure on dialysis (HCC)    Hyponatremia    Multiple and open wound of lower limb    Polycystic liver disease    Total bilirubin, elevated    Thrombocytopenia (HCC)    Gram-negative bacteremia    Polycystic kidney disease    ADPKD (autosomal dominant polycystic kidney)    Hypertension    Peripheral neuropathy    Paroxysmal atrial fibrillation (HCC)    Hypotension    Anemia due to chronic kidney disease, on chronic dialysis and Acute Blood Loss Anemia (HCC)    Ileus (HCC)    Ascites    Elevated troponin    ESRD (end stage renal disease) on dialysis (HCC)    Hyperkalemia    Scrotal swelling    ESRD (end stage renal disease) (HCC)    At Risk for Pressure ulcer, buttock    Wound of right leg    Wound of left leg    Constipation    Multiple wounds of skin    Cellulitis    Oropharyngeal dysphagia    GERD (gastroesophageal reflux disease)    Systolic dysfunction    Pruritus    Insomnia    Chronic hepatic failure without coma (HCC)    Paresis (HCC)    Hypoproteinemia (HCC)    Heart failure with reduced ejection fraction due to cardiomyopathy (HCC)    HFrEF Dilated cardiomyopathy (HCC)    Abnormal nuclear stress  test    Venous stasis ulcer of left lower leg with edema of left lower leg  (HCC)    Chronic venous stasis dermatitis of both lower extremities    Hemodialysis catheter infection (HCC)    Abdominal pain         Past Medical History     Past Medical History:   Diagnosis Date    Complication of renal dialysis     Polycystic kidney disease          Surgical History     Past Surgical History:   Procedure Laterality Date    IR CATHETERGRAM  10/17/2019    IR IMAGE GUIDED ASPIRATION / DRAINAGE  9/23/2019    IR PARACENTESIS  10/9/2019    IR PARACENTESIS  6/24/2024    IR PARACENTESIS  6/28/2024    IR TEMPORARY DIALYSIS CATHETER PLACEMENT  7/28/2023    IR TUNNELED CENTRAL LINE CHECK/CHANGE/REPOSITION  12/14/2021    IR TUNNELED DIALYSIS CATHETER CHECK/CHANGE/REPOSITION/ANGIOPLASTY  10/17/2019    IR TUNNELED DIALYSIS CATHETER PLACEMENT  9/30/2019    IR TUNNELED DIALYSIS CATHETER PLACEMENT  8/1/2023    IR TUNNELED DIALYSIS CATHETER REMOVAL  7/28/2023    SPLIT THICKNESS SKIN GRAFT Bilateral 11/7/2019    Procedure: SKIN GRAFT SPLIT THICKNESS (STSG)  EXTREMITY;  Surgeon: Torey Cha MD;  [facility];  Service: Plastics    SPLIT THICKNESS SKIN GRAFT Bilateral 11/12/2019    Procedure: SKIN GRAFT SPLIT THICKNESS (STSG)  EXTREMITY;  Surgeon: Torey Cha MD;  [facility];  Service: Plastics    VAC DRESSING APPLICATION Bilateral 11/18/2019    Procedure: CHANGE DRESSING;  Surgeon: Torey Cha MD;  [facility];  Service: Plastics    WOUND DEBRIDEMENT Bilateral 10/31/2019    Procedure: DEBRIDEMENT WOUND (WASH OUT);  Surgeon: Selvin Han DPM;  [facility];  Service: Podiatry    WOUND DEBRIDEMENT Bilateral 11/5/2019    Procedure: DEBRIDEMENT WOUND (WASH OUT);  Surgeon: Selvin Han DPM;  [facility];  Service: Podiatry    WOUND DEBRIDEMENT Bilateral 11/7/2019    Procedure: DEBRIDEMENT WOUND (WASH OUT);  Surgeon: Torey Cha MD;   "[facility];  Service: Plastics    WOUND DEBRIDEMENT Bilateral 11/12/2019    Procedure: DEBRIDEMENT LOWER EXTREMITY (WASH OUT);  Surgeon: Torey Cha MD;  [facility];  Service: Plastics         Family History     No family history on file.      Social History     Social History     Social History     Tobacco Use   Smoking Status Never   Smokeless Tobacco Never         Pertinent Lab Values     Lab Results   Component Value Date    CREATININE 4.26 (H) 07/01/2024       No results found for: \"PSA\"    @RESULTRCNT(1H])@      Pertinent Imaging       IR INPATIENT Paracentesis    Result Date: 6/28/2024  Narrative: PROCEDURE SUMMARY: 1.  Limited abdominal ultrasound 2.  Ultrasound-guided paracentesis (therapeutic and diagnostic) PROCEDURAL PERSONNEL: Attending physician(s): Kahlil Roe MD Resident physician(s): Xochitl Mendez MD Advanced practice provider(s): None INDICATION: Reaccumulation of ascites. Last paracentesis 6/24/2024. COMPLICATIONS: No immediate complications. PROCEDURE DETAILS: Informed consent for the procedure including risks, benefits and alternatives was obtained and time-out was performed prior to the procedure. The site was prepared and draped using all elements of maximal sterile barrier technique including sterile gloves, sterile gown, cap, mask, large sterile sheet, sterile ultrasound probe cover, hand hygiene and cutaneous antisepsis with Betadine. Ultrasound images were obtained to identify an access window.  Permanent images were stored and sent to PACS. An access window in the right lower abdomen  was localized and 1 % local lidocaine was administered.  Under real-time ultrasound guidance, access to the ascites was obtained with a 5-Maori catheter using a trocar technique. The catheter was placed to suction drainage. At the end of the procedure the catheter was removed and sterile bandage was applied to the puncture site. Specimens removed: 3.6 L of clear dark " kaylin ascites.     Impression: Successful ultrasound-guided paracentesis. Attestation Signer name: Yogesh Brenda Roe MD I attest that I was present for the entire procedure. I reviewed the stored images and agree with the report as written. Workstation performed: [id number]     Echo complete w/ contrast if indicated    Result Date: 6/27/2024  Narrative:   Left Ventricle: Left ventricular cavity size is normal. Wall thickness is moderately increased. There is moderate concentric hypertrophy. The left ventricular ejection fraction is 20%. Systolic function is severely reduced. Global longitudinal strain is reduced at -7%. There is severe global hypokinesis. Unable to assess diastolic function due to E/A fusion. Left atrial filling pressure is elevated.   Right Ventricle: Right ventricular cavity size is dilated. Systolic function is moderately to severely reduced.   Left Atrium: The atrium is dilated.   Mitral Valve: There is mild regurgitation.   Tricuspid Valve: There is moderate regurgitation. The right ventricular systolic pressure is severely elevated. The estimated right ventricular systolic pressure is 62.00 mmHg.   Pulmonic Valve: There is mild regurgitation.   Pericardium: There is a left pleural effusion. There is a right pleural effusion.   Pulmonary Artery: The pulmonary artery systolic pressure is severely increased. Notching of the RVOT Doppler waveform is noted. Strain was performed to quantify interventricular dyssynchrony and evaluate components of myocardial function due to (positive family history of HCM, family history of sudden death, HCM, Chemotherapy, complex CHD, genetic abnormality, viral infection) . Results from the utilization of Strain Analysis are listed in the report below.     MRI abdomen w wo contrast    Result Date: 6/26/2024  Narrative: MRI - ABDOMEN - WITH AND WITHOUT CONTRAST INDICATION: 64 years / Male. exclude solid lesion in hepatic/renal cysts. COMPARISON: CT abdomen  pelvis 6/23/2024 TECHNIQUE: Multiplanar/multisequence MRI of the abdomen was performed before and after administration of contrast. IV Contrast: 8 mL of Gadobutrol injection (SINGLE-DOSE) FINDINGS: LOWER CHEST: Unremarkable. LIVER: Lobulated liver contour may be related to numerous cysts although cirrhosis is possible. Innumerable cysts throughout the liver, some of which are proteinaceous. No solid hepatic mass is identified. The previously described 4.1 rim calcified lesion in the left lobe (series 9 image 16) demonstrates T1 hyperintense signal in keeping with a proteinaceous cyst. Increased in signal on out of phase dual echo imaging in keeping with hepatic iron deposition. Patent hepatic and portal veins. BILE DUCTS: No intrahepatic or extrahepatic bile duct dilation. GALLBLADDER: Normal. PANCREAS: Unremarkable. ADRENAL GLANDS: Unremarkable. SPLEEN: Spleen is enlarged measuring 15.8 cm. Increase in signal on out of phase dual echo imaging in keeping with splenic iron deposition. Defect in the posterior spleen with associated susceptibility effect may be due to prior infarct with old blood products. Area of mild geographic hypoenhancement in the spleen on arterial phase (series 13 image 42) suggests perfusional change. KIDNEYS/PROXIMAL URETERS: The kidneys are enlarged and contain innumerable simple and proteinaceous cysts in keeping with autosomal dominant polycystic kidney disease. A cyst in the right kidney lower pole measuring 5.8 cm demonstrates slightly thickened  calcified wall measuring 5 mm and is heterogeneous on T1 and T2 weighted images (series 12 image 113). Bosniak IIF. This cyst was previously drained on 9/23/2019 and is stable in size since CT 10/7/2019. Rim calcification of multiple cysts is better visualized on CT. No solid renal mass is identified. No hydroureteronephrosis. BOWEL: No dilated loops of bowel. PERITONEUM/RETROPERITONEUM: Redemonstrated large volume ascites. LYMPH NODES: No  abdominal lymphadenopathy. VESSELS: No aneurysm. ABDOMINAL WALL: Unremarkable BONES: No suspicious osseous lesion.     Impression: 1. Enlarged kidneys with innumerable simple and proteinaceous renal cysts in keeping with autosomal dominant polycystic kidney disease. A Bosniak IIF cystic renal mass in the right kidney lower pole measuring 5.8 cm has been stable since CT 10/7/2019. This was previously drained on 9/23/2019. No solid renal mass is identified. 2. Multiple simple and proteinaceous hepatic cysts. No solid hepatic mass is identified. Lobulated liver contour may be related to numerous cysts although cirrhosis is possible.Hepatic iron deposition. 3. Splenomegaly and splenic iron deposition. 4. Large volume ascites. Workstation performed: [id number]     US scrotum and groin area    Result Date: 6/25/2024  Narrative: SCROTAL ULTRASOUND INDICATION: edema. COMPARISON: CT 6/23/2024, ultrasound 10/27/2019 TECHNIQUE: Ultrasound the scrotal contents was performed with a high frequency linear transducer utilizing volumetric sweep imaging as well as standard still image techniques. Imaging performed in longitudinal and transverse orientation. Color and spectral Doppler evaluation also performed bilaterally. FINDINGS: TESTES: Testes are symmetric and normal in size. RIGHT testis = 6.1 x 2.0 x 2.1 cm. Volume 13.1 mL Normal contour with homogeneous smooth echotexture. Small testicular appendage No intratesticular mass lesion or calcifications. LEFT testis = 4.1 x 1.7 x 2.8 cm. Volume 9.9 mL Normal contour with homogeneous smooth echotexture. No intratesticular mass lesion or calcifications. Doppler flow within both testes is present and symmetric. EPIDIDYMIDES: Normal size. Doppler ultrasound demonstrates normal blood flow. No epididymal lesions. HYDROCELE: Very large right hydrocele containing hypoechoic material and internal hyperechoic speckles. Single septation present VARICOCELE: None present. SCROTUM: Scrotal  thickness and appearance within normal limits. No evidence for extratesticular mass Fluid is present within the right inguinal canal     Impression: 1.  Large right hydrocele, with mild complexity 2.  Ascitic fluid in the right inguinal canal, no hernia Workstation performed: [id number]      INPATIENT Paracentesis    Result Date: 6/24/2024  Narrative: PROCEDURE SUMMARY: 1.  Limited abdominal ultrasound 2.  Ultrasound-guided paracentesis (therapeutic and diagnostic) PROCEDURAL PERSONNEL: Attending physician(s): Aman Jones MD Resident physician(s): Xochitl Mendez MD Advanced practice provider(s): None INDICATION: Rule out spontaneous bacterial peritonitis. COMPLICATIONS: No immediate complications. PROCEDURE DETAILS: Informed consent for the procedure including risks, benefits and alternatives was obtained and time-out was performed prior to the procedure. The site was prepared and draped using all elements of maximal sterile barrier technique including sterile gloves, sterile gown, cap, mask, large sterile sheet, sterile ultrasound probe cover, hand hygiene and cutaneous antisepsis with 2% chlorhexidine. Ultrasound images were obtained to identify an access window.  Permanent images were stored and sent to PACS. An access window in the right lower abdomen  was localized and 1 % local lidocaine was administered.  Under real-time ultrasound guidance, access to the ascites was obtained with a 5-Guinean catheter using a trocar technique. The catheter was placed to suction drainage. At the end of the procedure the catheter was removed and sterile bandage was applied to the puncture site. Specimens removed: 8.7 L of dark clear kaylin ascites.     Impression: Successful ultrasound-guided paracentesis. Attestation Signer name: AMAN JONES I attest that I was present for the entire procedure. I reviewed the stored images and agree with the report as written. Workstation performed: [id number]     CT abdomen pelvis with  contrast    Result Date: 6/23/2024  Narrative: CT ABDOMEN AND PELVIS WITH IV CONTRAST INDICATION: Vomiting, abdominal pain and distention. COMPARISON: CT abdomen pelvis 10/7/2019, CTA abdominal aorta with runoff 9/21/2019 TECHNIQUE: CT examination of the abdomen and pelvis was performed. Dual energy CT scan technique (DECT) was employed. Multiplanar 2D reformatted images were created from the source data. This examination, like all CT scans performed in the [facility], was performed utilizing techniques to minimize radiation dose exposure, including the use of iterative reconstruction and automated exposure control. Radiation dose length product (DLP) for this visit: 836 mGy-cm IV Contrast: 80 mL of iohexol (OMNIPAQUE) Enteric Contrast: Not administered. FINDINGS: ABDOMEN LOWER CHEST: Mild dependent hypoventilatory changes. Mild cardiomegaly. Atherosclerotic changes thoracic aorta and coronary arteries. LIVER/BILIARY TREE: Extensive hepatic cysts redemonstrated many with rim calcification. 4.4 x 3.2 cm partially rim calcified lesion in the left lobe liver has slightly decreased in size (previously 4.5 x 3.5 cm) currently demonstrating more thickened calcification of the rim and increased attenuation centrally compared to the prior study. Presumably this represents hemorrhagic or proteinaceous cyst. GALLBLADDER: No calcified gallstones. No pericholecystic inflammatory change. SPLEEN: Mildly enlarged measuring 13.8 cm in length. Question geographic area of decreased attenuation within the spleen, possibly artifactual from streak artifact from overlying leads and/or rib shadow. Difficult to completely exclude subtle infarct. PANCREAS: Mild fatty replacement of the pancreas without acute findings. ADRENAL GLANDS: Unremarkable. KIDNEYS/URETERS: Polycystic kidneys are again identified with simple and complex bilateral cysts many of which demonstrate rim calcification again identified. These can also be  evaluated at time of follow-up MRI. No hydronephrosis. Renovascular calcifications noted. STOMACH AND BOWEL: The stomach is grossly unremarkable within the slight notations of underdistention. The small bowel is normal caliber. Sigmoid diverticulosis without diverticulitis. APPENDIX: No findings to suggest appendicitis. ABDOMINOPELVIC CAVITY: There is a large volume abdominopelvic ascites, increased from prior study. No pneumoperitoneum. A few shotty retroperitoneal lymph nodes, not enlarged by CT criteria. VESSELS: Atherosclerotic changes abdominal aorta without evidence of aneurysm. PELVIS REPRODUCTIVE ORGANS: Unremarkable for patient's age. URINARY BLADDER: Underdistended with resultant mild wall thickening. No intraluminal calculi. ABDOMINAL WALL/INGUINAL REGIONS: Small umbilical and right inguinal hernias containing fluid. BONES: No acute fracture or suspicious osseous lesion. Degenerative changes of the spine.     Impression: 1. Negative for bowel obstruction. 2. Mild splenomegaly. Question geographic area of decreased attenuation within the spleen, possibly artifactual from streak artifact from overlying leads and/or rib shadow. Difficult to completely exclude subtle infarct. Correlate for any left upper quadrant pain. 3. Polycystic kidney and liver disease with multiple complex and indeterminant renal and left hepatic lobe lesions. Correlation with nonemergent MRI abdomen with and without IV contrast recommended to exclude possibility of a solid lesion. 4. Large amount of abdominopelvic ascites, increased from prior study. 5. Sigmoid diverticulosis without diverticulitis. The study was marked in EPIC for follow-up. Workstation performed: [id number]     XR chest 1 view portable    Result Date: 6/23/2024  Narrative: XR CHEST PORTABLE INDICATION: Sepsis. COMPARISON: Abdomen CT 6/23/2024, CXR 10/6/2019. FINDINGS: Left central catheter in upper right atrium. Clear lungs. No pneumothorax or pleural effusion. Mild  "cardiomegaly. Bones are unremarkable for age. Normal upper abdomen.     Impression: No acute cardiopulmonary disease. Workstation performed: [id number]         Portions of the record may have been created with voice recognition software.  Occasional wrong word or \"sound a like\" substitutions may have occurred due to the inherent limitations of voice recognition software.  In addition some of the content generated from this outpatient encounter includes information designed for patient education and/or communication back to the referring provider.  Read the chart carefully and recognize, using context, where substitutions have occurred.    "

## 2024-07-22 ENCOUNTER — CONSULT (OUTPATIENT)
Dept: UROLOGY | Facility: CLINIC | Age: 64
End: 2024-07-22
Payer: MEDICARE

## 2024-07-22 VITALS
HEIGHT: 68 IN | DIASTOLIC BLOOD PRESSURE: 90 MMHG | HEART RATE: 94 BPM | SYSTOLIC BLOOD PRESSURE: 134 MMHG | BODY MASS INDEX: 25.46 KG/M2 | OXYGEN SATURATION: 99 % | WEIGHT: 168 LBS

## 2024-07-22 DIAGNOSIS — N50.89 SCROTAL SWELLING: ICD-10-CM

## 2024-07-22 PROCEDURE — 99203 OFFICE O/P NEW LOW 30 MIN: CPT | Performed by: UROLOGY

## 2024-07-23 ENCOUNTER — OFFICE VISIT (OUTPATIENT)
Dept: GASTROENTEROLOGY | Facility: CLINIC | Age: 64
End: 2024-07-23
Payer: MEDICARE

## 2024-07-23 VITALS
HEIGHT: 68 IN | BODY MASS INDEX: 25.88 KG/M2 | WEIGHT: 170.8 LBS | SYSTOLIC BLOOD PRESSURE: 138 MMHG | DIASTOLIC BLOOD PRESSURE: 92 MMHG | TEMPERATURE: 97.7 F

## 2024-07-23 DIAGNOSIS — Q44.6 POLYCYSTIC LIVER DISEASE: Chronic | ICD-10-CM

## 2024-07-23 DIAGNOSIS — Z99.2 CHRONIC KIDNEY DISEASE WITH END STAGE RENAL FAILURE ON DIALYSIS (HCC): Chronic | ICD-10-CM

## 2024-07-23 DIAGNOSIS — N18.6 CHRONIC KIDNEY DISEASE WITH END STAGE RENAL FAILURE ON DIALYSIS (HCC): Chronic | ICD-10-CM

## 2024-07-23 DIAGNOSIS — R18.8 OTHER ASCITES: Primary | ICD-10-CM

## 2024-07-23 DIAGNOSIS — D69.6 THROMBOCYTOPENIA (HCC): ICD-10-CM

## 2024-07-23 DIAGNOSIS — Q61.3 POLYCYSTIC KIDNEY DISEASE: Chronic | ICD-10-CM

## 2024-07-23 DIAGNOSIS — I42.0 DILATED CARDIOMYOPATHY (HCC): ICD-10-CM

## 2024-07-23 PROCEDURE — 99204 OFFICE O/P NEW MOD 45 MIN: CPT | Performed by: STUDENT IN AN ORGANIZED HEALTH CARE EDUCATION/TRAINING PROGRAM

## 2024-07-23 PROCEDURE — G2211 COMPLEX E/M VISIT ADD ON: HCPCS | Performed by: STUDENT IN AN ORGANIZED HEALTH CARE EDUCATION/TRAINING PROGRAM

## 2024-07-23 NOTE — PROGRESS NOTES
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     He was recently hospitalized for abdominal pain, weakness and fevers. He had a CT which showed large volume abdominopelvic ascites with evidence of polycystic kidney and liver disease and splenomegaly. He underwent LVP with fluid studies notable for high SAAG and Tp, c/w cardiac ascites. Fluid studies were also negative for SBP. Subsequent MRI showed lobulated liver contour related to innumerable cysts with patent hepatic and portal veins. He required additional LVP for comfort and discharged without diuretics as he does not make urine.  He had a TTE in June 2024 which showed severe global hypokinesis with EF 20% with elevated RVSP 62 and right ventricular dilation.     Of note, he has been dialysis dependent for >10 years and was previously evaluated for renal transplant in 2020. However, he was deemed a poor surgical candidate due to his CMY. He has never had a cardiac catheterization and opted for medical management. He denies family history of ADPKD, ESRD, and cirrhosis. Also denies family history of CVA and brain aneurysms.    Since his hospitalization, he reports persistent abdominal distension and scrotal swelling. Denies early satiety and pruritus. He denies excessive EtOH consumption and recreational drug use. His labs were reviewed which showed isolated elevated ALP, normal liver synthetic function and thrombocytopenia (dating back to 2023).    Discussed that he has PLD Fishman C with c/f portal hypertension as evidenced by his splenomegaly. However, he does not have evidence of hepatic decompensation as his ascites is due to a cardiac source given high Tp and SAAG. I recommended that he undergo cardiac work-up for ischemia and right sided heart failure given interval increase in RVSP last year on his most recent TTE.     Will also order serial LVPs on a monthly basis  but discussed that he may increase the frequency based on comfort. He will return to clinic in 3 months. Please contact us with any questions.    Beena Goetz MD

## 2024-07-23 NOTE — PROGRESS NOTES
[facility]  Outpatient Consultation  Encounter: [id number]    PATIENT INFO     Name: Homero Koch  YOB: 1960   Age: 64 y.o.   Sex: male   MRN: [id number]    ASSESSMENT & PLAN     Problems Addressed this Visit:  1. Other ascites    2. Polycystic liver disease    3. HFrEF Dilated cardiomyopathy (HCC)    4. Chronic kidney disease with end stage renal failure on dialysis (HCC)    5. Thrombocytopenia (HCC)      Orders Placed This Encounter   Procedures    IR AMB Paracentesis     # Polycystic Liver Disease; Abdominal Ascites: Patient with a longstanding history of polycystic kidney and liver disease.  This has been complicated by ESRD resulting in need for hemodialysis over the last 8 to 10 years.  Patient was previously evaluated for kidney transplant but unfortunately not a candidate due to significant cardiac disease with EF of 20%.  Unclear etiology of CHF as patient has refused cardiac catheterization and further workup in the past.  Most recently though, patient hospitalized and found to have significant abdominal distention and large amount of abdominal ascites seen on CT/MRI imaging.  Imaging also revealed significant polycystic liver disease.  Patient now status post IR paracentesis x 2 with fluid studies demonstrating elevated SAAG and high total protein.     There was initial question about possibility of underlying liver disease contributing to development of ascites; however, fluid studies more so consistent with cardiac ascites in setting of patient's significant heart failure.  Recent CT/MRI imaging personally reviewed and patient with significant cystic liver disease.  Possible that this could be contributing to development of noncirrhotic portal hypertension.  However if this were to be the case, patient with no clear decompensation at this time as ascites, again, appears to be more so due to cardiac cause.      No indication for liver biopsy or portal  pressure measurements at this time as patient would be high risk and ultimately management would not change.  Only additional treatment would be consideration of liver transplant although patient would not be a transplant candidate due to significant comorbid cardiac/kidney conditions -would ultimately require liver/kidney transplant if he were to 1 day become a candidate.  Therefore, recommend close follow-up with nephrology and cardiology and would strongly consider patient to pursue ischemic evaluation in the near future.  In the interim, we will plan to complete IR paracentesis on a scheduled places to allow for relief of symptoms.    Plan:  Orders for monthly IR paracentesis placed, plan for albumin infusion given CKD  Continue to monitor and trend LFTs to ensure continued stabilization  Ongoing follow-up with nephrology along with cardiology  Strongly consider ischemic workup with cardiology  Ongoing follow-up with hepatology, return to clinic in 3 months for further management    I have spent a total time of 45 minutes in caring for this patient on the day of the visit/encounter including Diagnostic results, Prognosis, Risks and benefits of tx options, Instructions for management, Patient and family education, Importance of tx compliance, Risk factor reductions, Impressions, Counseling / Coordination of care, Documenting in the medical record, Reviewing / ordering tests, medicine, procedures  , and Obtaining or reviewing history  .      FOLLOW-UP: Schedule a follow-up visit in 3 months.    HISTORY OF PRESENT ILLNESS       Homero Koch is a 64 y.o. male who presents to GI office for initial consultation. Patient with a PMHx of polycystic liver disease, CKD/ESRD 2/2 polycystic kidney disease on HD, dilated CM with CHF with EF 20%, and atrial fibrillation.    Patient hospitalized from 6/24 to 6/29 at [facility].  Patient presented with generalized weakness and associated with abdominal  pain and vomiting.  Patient found to be febrile in the ED and physical exam revealed significant abdominal distention and scrotal swelling.  Imaging at that time including a CT which showed large amount of abdominal pelvic ascites accompanied by both polycystic kidney and liver disease with multiple complex and indeterminate renal and left hepatic lobe lesions.  Follow-up MRI did show consistent findings.  Patient underwent IR paracentesis twice during hospital admission.  First IR paracentesis performed on 6/24/2024 with removal of 8.7 L of fluid.  Studies negative for SBP.  Second IR paracentesis performed on 6/28/2024 with removal of 3.6 L of fluid.  Ascites fluid studies demonstrated elevated SAAG along with elevated total protein.  Enterology was consulted during admission however ascites was thought to be more so due to cardiac etiology as opposed to portal hypertension.    During today's appointment, patient presents accompanied by his significant other.  Patient states that he is doing well overall.  Does admit to ongoing abdominal distention and scrotal swelling.  States that he has been compliant with nephrology follow-up for hemodialysis.  States that he has been on dialysis for 8 to 10 years and currently does not produce any urine.  Denies any history of significant alcohol use.  No known family history of chronic liver disease including family history of polycystic kidney or liver disease.  Otherwise, patient offers no complaints during today's visit.    REVIEW OF SYSTEMS     CONSTITUTIONAL: Denies any fever, chills, rigors, and weight loss  HEENT: No earache or tinnitus, denies hearing loss or visual disturbances  CARDIOVASCULAR: No chest pain or palpitations  RESPIRATORY: Denies any cough, hemoptysis, shortness of breath or dyspnea on exertion  GASTROINTESTINAL: As noted in the History of Present Illness  GENITOURINARY: No problems with urination, denies any hematuria or dysuria  NEUROLOGIC: No  dizziness or vertigo, denies headaches   MUSCULOSKELETAL: Denies any muscle or joint pain   SKIN: Denies skin rashes or itching  ENDOCRINE: Denies excessive thirst, denies intolerance to heat or cold  PSYCHOSOCIAL: Denies depression or anxiety, denies any recent memory loss     Historical Information   Past Medical History:   Diagnosis Date    Complication of renal dialysis     Polycystic kidney disease      Past Surgical History:   Procedure Laterality Date    IR CATHETERGRAM  10/17/2019    IR IMAGE GUIDED ASPIRATION / DRAINAGE  9/23/2019    IR PARACENTESIS  10/9/2019    IR PARACENTESIS  6/24/2024    IR PARACENTESIS  6/28/2024    IR TEMPORARY DIALYSIS CATHETER PLACEMENT  7/28/2023    IR TUNNELED CENTRAL LINE CHECK/CHANGE/REPOSITION  12/14/2021    IR TUNNELED DIALYSIS CATHETER CHECK/CHANGE/REPOSITION/ANGIOPLASTY  10/17/2019    IR TUNNELED DIALYSIS CATHETER PLACEMENT  9/30/2019    IR TUNNELED DIALYSIS CATHETER PLACEMENT  8/1/2023    IR TUNNELED DIALYSIS CATHETER REMOVAL  7/28/2023    SPLIT THICKNESS SKIN GRAFT Bilateral 11/7/2019    Procedure: SKIN GRAFT SPLIT THICKNESS (STSG)  EXTREMITY;  Surgeon: Torey Cha MD;  [facility];  Service: Plastics    SPLIT THICKNESS SKIN GRAFT Bilateral 11/12/2019    Procedure: SKIN GRAFT SPLIT THICKNESS (STSG)  EXTREMITY;  Surgeon: Torey Cha MD;  [facility];  Service: Plastics    VAC DRESSING APPLICATION Bilateral 11/18/2019    Procedure: CHANGE DRESSING;  Surgeon: Torey Cha MD;  [facility];  Service: Plastics    WOUND DEBRIDEMENT Bilateral 10/31/2019    Procedure: DEBRIDEMENT WOUND (WASH OUT);  Surgeon: Selvin Han DPM;  [facility];  Service: Podiatry    WOUND DEBRIDEMENT Bilateral 11/5/2019    Procedure: DEBRIDEMENT WOUND (WASH OUT);  Surgeon: Selvin Han DPM;  [facility];  Service: Podiatry    WOUND DEBRIDEMENT Bilateral 11/7/2019    Procedure: DEBRIDEMENT WOUND (WASH OUT);   "Surgeon: Torey Cha MD;  [facility];  Service: Plastics    WOUND DEBRIDEMENT Bilateral 11/12/2019    Procedure: DEBRIDEMENT LOWER EXTREMITY (WASH OUT);  Surgeon: Torey Cha MD;  [facility];  Service: Plastics     Social History   Social History     Substance and Sexual Activity   Alcohol Use Not Currently     Social History     Substance and Sexual Activity   Drug Use Yes    Types: Marijuana     Social History     Tobacco Use   Smoking Status Never   Smokeless Tobacco Never     History reviewed. No pertinent family history.      MEDICATIONS AND ALLERGIES     Current Outpatient Medications   Medication Instructions    Ascorbic Acid (Vitamin C) 100 MG CHEW 1 tablet, Oral, Daily    aspirin 81 mg, Oral, Daily    atorvastatin (LIPITOR) 40 mg, Oral, Daily    B Complex-C-Folic Acid (TRIPHROCAPS PO) 1 capsule, Daily    b complex-vitamin C-folic acid (RENAL) 1 mg 1 capsule, Oral, Daily with dinner    calcitriol (ROCALTROL) 0.75 mcg, Oral, 3 times weekly    calcium acetate (PHOSLO) 1,334 mg, Oral, 3 times daily with meals    Cholecalciferol (Vitamin D-3) 25 MCG (1000 UT) CAPS Daily    cholecalciferol (VITAMIN D3) 1,000 Units, Daily    losartan (COZAAR) 25 mg, Oral, Daily    metoprolol succinate (TOPROL-XL) 50 mg, Oral, Daily    midodrine (PROAMATINE) 2.5 mg, Oral, Before Dialysis    nystatin powder     sevelamer (RENAGEL) 1,600 mg, Oral, 3 times daily with meals     Allergies   Allergen Reactions    Sucroferric Oxyhydroxide Other (See Comments)    Chlorhexidine Other (See Comments)    Heparin Other (See Comments)     Brings plaletes down    Other Other (See Comments)       PHYSICAL EXAM      Objective   Blood pressure 138/92, temperature 97.7 °F (36.5 °C), temperature source Tympanic, height 5' 8\" (1.727 m), weight 77.5 kg (170 lb 12.8 oz). Body mass index is 25.97 kg/m².    General Appearance:   Alert, cooperative, no distress   HEENT:   Normocephalic, atraumatic, anicteric   "   Neck:   Supple, symmetrical, trachea midline   Lungs:   Equal chest rise, respirations unlabored    Heart:   Regular rate and rhythm   Abdomen:   Soft, non-tender, non-distended; normal bowel sounds; no masses, no organomegaly    Rectal:   Deferred    Extremities:   No cyanosis, clubbing or edema    Neuro:   Moves all 4 extremities    Skin:   No jaundice, rashes, or lesions      LABORATORY RESULTS     No visits with results within 1 Day(s) from this visit.   Latest known visit with results is:   Appointment on 07/12/2024   Component Date Value    Hemoglobin A1C 07/12/2024 4.8     EAG 07/12/2024 91     Total Bilirubin 07/12/2024 0.87     Bilirubin, Direct 07/12/2024 0.25 (H)     Alkaline Phosphatase 07/12/2024 163 (H)     AST 07/12/2024 23     ALT 07/12/2024 13     Total Protein 07/12/2024 7.8     Albumin 07/12/2024 4.0      IR INPATIENT Paracentesis    Result Date: 6/28/2024  Narrative: PROCEDURE SUMMARY: 1.  Limited abdominal ultrasound 2.  Ultrasound-guided paracentesis (therapeutic and diagnostic) PROCEDURAL PERSONNEL: Attending physician(s): Kahlil Roe MD Resident physician(s): Xochitl Mendez MD Advanced practice provider(s): None INDICATION: Reaccumulation of ascites. Last paracentesis 6/24/2024. COMPLICATIONS: No immediate complications. PROCEDURE DETAILS: Informed consent for the procedure including risks, benefits and alternatives was obtained and time-out was performed prior to the procedure. The site was prepared and draped using all elements of maximal sterile barrier technique including sterile gloves, sterile gown, cap, mask, large sterile sheet, sterile ultrasound probe cover, hand hygiene and cutaneous antisepsis with Betadine. Ultrasound images were obtained to identify an access window.  Permanent images were stored and sent to PACS. An access window in the right lower abdomen  was localized and 1 % local lidocaine was administered.  Under real-time ultrasound guidance, access to the ascites  was obtained with a 5-Setswana catheter using a trocar technique. The catheter was placed to suction drainage. At the end of the procedure the catheter was removed and sterile bandage was applied to the puncture site. Specimens removed: 3.6 L of clear dark kaylin ascites.     Impression: Successful ultrasound-guided paracentesis. Attestation Signer name: Yogesh Roe MD I attest that I was present for the entire procedure. I reviewed the stored images and agree with the report as written. Workstation performed: [id number]     Echo complete w/ contrast if indicated    Result Date: 6/27/2024  Narrative:   Left Ventricle: Left ventricular cavity size is normal. Wall thickness is moderately increased. There is moderate concentric hypertrophy. The left ventricular ejection fraction is 20%. Systolic function is severely reduced. Global longitudinal strain is reduced at -7%. There is severe global hypokinesis. Unable to assess diastolic function due to E/A fusion. Left atrial filling pressure is elevated.   Right Ventricle: Right ventricular cavity size is dilated. Systolic function is moderately to severely reduced.   Left Atrium: The atrium is dilated.   Mitral Valve: There is mild regurgitation.   Tricuspid Valve: There is moderate regurgitation. The right ventricular systolic pressure is severely elevated. The estimated right ventricular systolic pressure is 62.00 mmHg.   Pulmonic Valve: There is mild regurgitation.   Pericardium: There is a left pleural effusion. There is a right pleural effusion.   Pulmonary Artery: The pulmonary artery systolic pressure is severely increased. Notching of the RVOT Doppler waveform is noted. Strain was performed to quantify interventricular dyssynchrony and evaluate components of myocardial function due to (positive family history of HCM, family history of sudden death, HCM, Chemotherapy, complex CHD, genetic abnormality, viral infection) . Results from the utilization of  Strain Analysis are listed in the report below.     MRI abdomen w wo contrast    Result Date: 6/26/2024  Narrative: MRI - ABDOMEN - WITH AND WITHOUT CONTRAST INDICATION: 64 years / Male. exclude solid lesion in hepatic/renal cysts. COMPARISON: CT abdomen pelvis 6/23/2024 TECHNIQUE: Multiplanar/multisequence MRI of the abdomen was performed before and after administration of contrast. IV Contrast: 8 mL of Gadobutrol injection (SINGLE-DOSE) FINDINGS: LOWER CHEST: Unremarkable. LIVER: Lobulated liver contour may be related to numerous cysts although cirrhosis is possible. Innumerable cysts throughout the liver, some of which are proteinaceous. No solid hepatic mass is identified. The previously described 4.1 rim calcified lesion in the left lobe (series 9 image 16) demonstrates T1 hyperintense signal in keeping with a proteinaceous cyst. Increased in signal on out of phase dual echo imaging in keeping with hepatic iron deposition. Patent hepatic and portal veins. BILE DUCTS: No intrahepatic or extrahepatic bile duct dilation. GALLBLADDER: Normal. PANCREAS: Unremarkable. ADRENAL GLANDS: Unremarkable. SPLEEN: Spleen is enlarged measuring 15.8 cm. Increase in signal on out of phase dual echo imaging in keeping with splenic iron deposition. Defect in the posterior spleen with associated susceptibility effect may be due to prior infarct with old blood products. Area of mild geographic hypoenhancement in the spleen on arterial phase (series 13 image 42) suggests perfusional change. KIDNEYS/PROXIMAL URETERS: The kidneys are enlarged and contain innumerable simple and proteinaceous cysts in keeping with autosomal dominant polycystic kidney disease. A cyst in the right kidney lower pole measuring 5.8 cm demonstrates slightly thickened  calcified wall measuring 5 mm and is heterogeneous on T1 and T2 weighted images (series 12 image 113). Bosniak IIF. This cyst was previously drained on 9/23/2019 and is stable in size since CT  10/7/2019. Rim calcification of multiple cysts is better visualized on CT. No solid renal mass is identified. No hydroureteronephrosis. BOWEL: No dilated loops of bowel. PERITONEUM/RETROPERITONEUM: Redemonstrated large volume ascites. LYMPH NODES: No abdominal lymphadenopathy. VESSELS: No aneurysm. ABDOMINAL WALL: Unremarkable BONES: No suspicious osseous lesion.     Impression: 1. Enlarged kidneys with innumerable simple and proteinaceous renal cysts in keeping with autosomal dominant polycystic kidney disease. A Bosniak IIF cystic renal mass in the right kidney lower pole measuring 5.8 cm has been stable since CT 10/7/2019. This was previously drained on 9/23/2019. No solid renal mass is identified. 2. Multiple simple and proteinaceous hepatic cysts. No solid hepatic mass is identified. Lobulated liver contour may be related to numerous cysts although cirrhosis is possible.Hepatic iron deposition. 3. Splenomegaly and splenic iron deposition. 4. Large volume ascites. Workstation performed: [id number]     US scrotum and groin area    Result Date: 6/25/2024  Narrative: SCROTAL ULTRASOUND INDICATION: edema. COMPARISON: CT 6/23/2024, ultrasound 10/27/2019 TECHNIQUE: Ultrasound the scrotal contents was performed with a high frequency linear transducer utilizing volumetric sweep imaging as well as standard still image techniques. Imaging performed in longitudinal and transverse orientation. Color and spectral Doppler evaluation also performed bilaterally. FINDINGS: TESTES: Testes are symmetric and normal in size. RIGHT testis = 6.1 x 2.0 x 2.1 cm. Volume 13.1 mL Normal contour with homogeneous smooth echotexture. Small testicular appendage No intratesticular mass lesion or calcifications. LEFT testis = 4.1 x 1.7 x 2.8 cm. Volume 9.9 mL Normal contour with homogeneous smooth echotexture. No intratesticular mass lesion or calcifications. Doppler flow within both testes is present and symmetric. EPIDIDYMIDES: Normal  size. Doppler ultrasound demonstrates normal blood flow. No epididymal lesions. HYDROCELE: Very large right hydrocele containing hypoechoic material and internal hyperechoic speckles. Single septation present VARICOCELE: None present. SCROTUM: Scrotal thickness and appearance within normal limits. No evidence for extratesticular mass Fluid is present within the right inguinal canal     Impression: 1.  Large right hydrocele, with mild complexity 2.  Ascitic fluid in the right inguinal canal, no hernia Workstation performed: [id number]      INPATIENT Paracentesis    Result Date: 6/24/2024  Narrative: PROCEDURE SUMMARY: 1.  Limited abdominal ultrasound 2.  Ultrasound-guided paracentesis (therapeutic and diagnostic) PROCEDURAL PERSONNEL: Attending physician(s): Aman Crockett MD Resident physician(s): Xochitl Mendez MD Advanced practice provider(s): None INDICATION: Rule out spontaneous bacterial peritonitis. COMPLICATIONS: No immediate complications. PROCEDURE DETAILS: Informed consent for the procedure including risks, benefits and alternatives was obtained and time-out was performed prior to the procedure. The site was prepared and draped using all elements of maximal sterile barrier technique including sterile gloves, sterile gown, cap, mask, large sterile sheet, sterile ultrasound probe cover, hand hygiene and cutaneous antisepsis with 2% chlorhexidine. Ultrasound images were obtained to identify an access window.  Permanent images were stored and sent to PACS. An access window in the right lower abdomen  was localized and 1 % local lidocaine was administered.  Under real-time ultrasound guidance, access to the ascites was obtained with a 5-Swedish catheter using a trocar technique. The catheter was placed to suction drainage. At the end of the procedure the catheter was removed and sterile bandage was applied to the puncture site. Specimens removed: 8.7 L of dark clear kaylin ascites.     Impression: Successful  ultrasound-guided paracentesis. Attestation Signer name: TIMA JONES I attest that I was present for the entire procedure. I reviewed the stored images and agree with the report as written. Workstation performed: [id number]     CT abdomen pelvis with contrast    Result Date: 6/23/2024  Narrative: CT ABDOMEN AND PELVIS WITH IV CONTRAST INDICATION: Vomiting, abdominal pain and distention. COMPARISON: CT abdomen pelvis 10/7/2019, CTA abdominal aorta with runoff 9/21/2019 TECHNIQUE: CT examination of the abdomen and pelvis was performed. Dual energy CT scan technique (DECT) was employed. Multiplanar 2D reformatted images were created from the source data. This examination, like all CT scans performed in the [facility], was performed utilizing techniques to minimize radiation dose exposure, including the use of iterative reconstruction and automated exposure control. Radiation dose length product (DLP) for this visit: 836 mGy-cm IV Contrast: 80 mL of iohexol (OMNIPAQUE) Enteric Contrast: Not administered. FINDINGS: ABDOMEN LOWER CHEST: Mild dependent hypoventilatory changes. Mild cardiomegaly. Atherosclerotic changes thoracic aorta and coronary arteries. LIVER/BILIARY TREE: Extensive hepatic cysts redemonstrated many with rim calcification. 4.4 x 3.2 cm partially rim calcified lesion in the left lobe liver has slightly decreased in size (previously 4.5 x 3.5 cm) currently demonstrating more thickened calcification of the rim and increased attenuation centrally compared to the prior study. Presumably this represents hemorrhagic or proteinaceous cyst. GALLBLADDER: No calcified gallstones. No pericholecystic inflammatory change. SPLEEN: Mildly enlarged measuring 13.8 cm in length. Question geographic area of decreased attenuation within the spleen, possibly artifactual from streak artifact from overlying leads and/or rib shadow. Difficult to completely exclude subtle infarct. PANCREAS: Mild fatty  replacement of the pancreas without acute findings. ADRENAL GLANDS: Unremarkable. KIDNEYS/URETERS: Polycystic kidneys are again identified with simple and complex bilateral cysts many of which demonstrate rim calcification again identified. These can also be evaluated at time of follow-up MRI. No hydronephrosis. Renovascular calcifications noted. STOMACH AND BOWEL: The stomach is grossly unremarkable within the slight notations of underdistention. The small bowel is normal caliber. Sigmoid diverticulosis without diverticulitis. APPENDIX: No findings to suggest appendicitis. ABDOMINOPELVIC CAVITY: There is a large volume abdominopelvic ascites, increased from prior study. No pneumoperitoneum. A few shotty retroperitoneal lymph nodes, not enlarged by CT criteria. VESSELS: Atherosclerotic changes abdominal aorta without evidence of aneurysm. PELVIS REPRODUCTIVE ORGANS: Unremarkable for patient's age. URINARY BLADDER: Underdistended with resultant mild wall thickening. No intraluminal calculi. ABDOMINAL WALL/INGUINAL REGIONS: Small umbilical and right inguinal hernias containing fluid. BONES: No acute fracture or suspicious osseous lesion. Degenerative changes of the spine.     Impression: 1. Negative for bowel obstruction. 2. Mild splenomegaly. Question geographic area of decreased attenuation within the spleen, possibly artifactual from streak artifact from overlying leads and/or rib shadow. Difficult to completely exclude subtle infarct. Correlate for any left upper quadrant pain. 3. Polycystic kidney and liver disease with multiple complex and indeterminant renal and left hepatic lobe lesions. Correlation with nonemergent MRI abdomen with and without IV contrast recommended to exclude possibility of a solid lesion. 4. Large amount of abdominopelvic ascites, increased from prior study. 5. Sigmoid diverticulosis without diverticulitis. The study was marked in EPIC for follow-up. Workstation performed: [id number]     XR  chest 1 view portable    Result Date: 6/23/2024  Narrative: XR CHEST PORTABLE INDICATION: Sepsis. COMPARISON: Abdomen CT 6/23/2024, CXR 10/6/2019. FINDINGS: Left central catheter in upper right atrium. Clear lungs. No pneumothorax or pleural effusion. Mild cardiomegaly. Bones are unremarkable for age. Normal upper abdomen.     Impression: No acute cardiopulmonary disease. Workstation performed: [id number]       RADIOLOGY RESULTS: I have personally reviewed pertinent imaging studies.      Ericka Morris DO  Gastroenterology Fellow  [facility]  Division of Gastroenterology & Hepatology  Available on [employer]    ** Please Note: This note is constructed using a voice recognition dictation system. **

## 2024-07-25 ENCOUNTER — ANESTHESIA EVENT (INPATIENT)
Dept: PERIOP | Facility: HOSPITAL | Age: 64
DRG: 329 | End: 2024-07-25
Payer: MEDICARE

## 2024-07-25 ENCOUNTER — APPOINTMENT (EMERGENCY)
Dept: CT IMAGING | Facility: HOSPITAL | Age: 64
DRG: 329 | End: 2024-07-25
Payer: MEDICARE

## 2024-07-25 ENCOUNTER — ANESTHESIA (INPATIENT)
Dept: PERIOP | Facility: HOSPITAL | Age: 64
DRG: 329 | End: 2024-07-25
Payer: MEDICARE

## 2024-07-25 ENCOUNTER — HOSPITAL ENCOUNTER (INPATIENT)
Facility: HOSPITAL | Age: 64
LOS: 4 days | Discharge: HOME | DRG: 329 | End: 2024-07-29
Admitting: SURGERY
Payer: MEDICARE

## 2024-07-25 DIAGNOSIS — K42.0 INCARCERATED UMBILICAL HERNIA: Primary | ICD-10-CM

## 2024-07-25 DIAGNOSIS — N18.6 ESRD ON DIALYSIS (HCC): ICD-10-CM

## 2024-07-25 DIAGNOSIS — Z99.2 ESRD ON DIALYSIS (HCC): ICD-10-CM

## 2024-07-25 DIAGNOSIS — K72.10 CHRONIC HEPATIC FAILURE WITHOUT COMA (HCC): ICD-10-CM

## 2024-07-25 DIAGNOSIS — I42.0 DILATED CARDIOMYOPATHY (HCC): ICD-10-CM

## 2024-07-25 DIAGNOSIS — E87.5 HYPERKALEMIA: ICD-10-CM

## 2024-07-25 DIAGNOSIS — N18.6 ESRD (END STAGE RENAL DISEASE) (HCC): ICD-10-CM

## 2024-07-25 LAB
ABO GROUP BLD: NORMAL
ALBUMIN SERPL BCG-MCNC: 4.3 G/DL (ref 3.5–5)
ALP SERPL-CCNC: 116 U/L (ref 34–104)
ALT SERPL W P-5'-P-CCNC: 9 U/L (ref 7–52)
ANION GAP SERPL CALCULATED.3IONS-SCNC: 16 MMOL/L (ref 4–13)
AST SERPL W P-5'-P-CCNC: 19 U/L (ref 13–39)
BASOPHILS # BLD AUTO: 0.02 THOUSANDS/ÂΜL (ref 0–0.1)
BASOPHILS NFR BLD AUTO: 0 % (ref 0–1)
BILIRUB DIRECT SERPL-MCNC: 0.39 MG/DL (ref 0–0.2)
BILIRUB SERPL-MCNC: 1.09 MG/DL (ref 0.2–1)
BLD GP AB SCN SERPL QL: NEGATIVE
BUN SERPL-MCNC: 71 MG/DL (ref 5–25)
CA-I BLD-SCNC: 1.09 MMOL/L (ref 1.12–1.32)
CALCIUM SERPL-MCNC: 9.9 MG/DL (ref 8.4–10.2)
CHLORIDE SERPL-SCNC: 94 MMOL/L (ref 96–108)
CO2 SERPL-SCNC: 25 MMOL/L (ref 21–32)
CREAT SERPL-MCNC: 6.85 MG/DL (ref 0.6–1.3)
EOSINOPHIL # BLD AUTO: 0.08 THOUSAND/ÂΜL (ref 0–0.61)
EOSINOPHIL NFR BLD AUTO: 2 % (ref 0–6)
ERYTHROCYTE [DISTWIDTH] IN BLOOD BY AUTOMATED COUNT: 16.5 % (ref 11.6–15.1)
GFR SERPL CREATININE-BSD FRML MDRD: 7 ML/MIN/1.73SQ M
GLUCOSE SERPL-MCNC: 165 MG/DL (ref 65–140)
GLUCOSE SERPL-MCNC: 99 MG/DL (ref 65–140)
HCT VFR BLD AUTO: 38.8 % (ref 36.5–49.3)
HGB BLD-MCNC: 12.3 G/DL (ref 12–17)
IMM GRANULOCYTES # BLD AUTO: 0.01 THOUSAND/UL (ref 0–0.2)
IMM GRANULOCYTES NFR BLD AUTO: 0 % (ref 0–2)
INR PPP: 1.29 (ref 0.84–1.19)
LACTATE SERPL-SCNC: 0.9 MMOL/L (ref 0.5–2)
LYMPHOCYTES # BLD AUTO: 0.43 THOUSANDS/ÂΜL (ref 0.6–4.47)
LYMPHOCYTES NFR BLD AUTO: 8 % (ref 14–44)
MAGNESIUM SERPL-MCNC: 2.5 MG/DL (ref 1.9–2.7)
MCH RBC QN AUTO: 31.9 PG (ref 26.8–34.3)
MCHC RBC AUTO-ENTMCNC: 31.7 G/DL (ref 31.4–37.4)
MCV RBC AUTO: 101 FL (ref 82–98)
MONOCYTES # BLD AUTO: 0.49 THOUSAND/ÂΜL (ref 0.17–1.22)
MONOCYTES NFR BLD AUTO: 9 % (ref 4–12)
NEUTROPHILS # BLD AUTO: 4.25 THOUSANDS/ÂΜL (ref 1.85–7.62)
NEUTS SEG NFR BLD AUTO: 81 % (ref 43–75)
NRBC BLD AUTO-RTO: 0 /100 WBCS
PHOSPHATE SERPL-MCNC: 7.2 MG/DL (ref 2.3–4.1)
PLATELET # BLD AUTO: 119 THOUSANDS/UL (ref 149–390)
PMV BLD AUTO: 10.2 FL (ref 8.9–12.7)
POTASSIUM SERPL-SCNC: 6.1 MMOL/L (ref 3.5–5.3)
PROT SERPL-MCNC: 8.2 G/DL (ref 6.4–8.4)
PROTHROMBIN TIME: 16.8 SECONDS (ref 11.6–14.5)
RBC # BLD AUTO: 3.85 MILLION/UL (ref 3.88–5.62)
RH BLD: POSITIVE
SODIUM SERPL-SCNC: 135 MMOL/L (ref 135–147)
SPECIMEN EXPIRATION DATE: NORMAL
WBC # BLD AUTO: 5.28 THOUSAND/UL (ref 4.31–10.16)

## 2024-07-25 PROCEDURE — 0DB80ZZ EXCISION OF SMALL INTESTINE, OPEN APPROACH: ICD-10-PCS | Performed by: SURGERY

## 2024-07-25 PROCEDURE — 85025 COMPLETE CBC W/AUTO DIFF WBC: CPT

## 2024-07-25 PROCEDURE — 83735 ASSAY OF MAGNESIUM: CPT | Performed by: SURGERY

## 2024-07-25 PROCEDURE — 82803 BLOOD GASES ANY COMBINATION: CPT

## 2024-07-25 PROCEDURE — 0WQF0ZZ REPAIR ABDOMINAL WALL, OPEN APPROACH: ICD-10-PCS | Performed by: SURGERY

## 2024-07-25 PROCEDURE — 86850 RBC ANTIBODY SCREEN: CPT | Performed by: SURGERY

## 2024-07-25 PROCEDURE — 96375 TX/PRO/DX INJ NEW DRUG ADDON: CPT

## 2024-07-25 PROCEDURE — 82948 REAGENT STRIP/BLOOD GLUCOSE: CPT

## 2024-07-25 PROCEDURE — 80076 HEPATIC FUNCTION PANEL: CPT | Performed by: SURGERY

## 2024-07-25 PROCEDURE — 99223 1ST HOSP IP/OBS HIGH 75: CPT | Performed by: SURGERY

## 2024-07-25 PROCEDURE — 44121 REMOVAL OF SMALL INTESTINE: CPT | Performed by: SURGERY

## 2024-07-25 PROCEDURE — 84132 ASSAY OF SERUM POTASSIUM: CPT

## 2024-07-25 PROCEDURE — 80048 BASIC METABOLIC PNL TOTAL CA: CPT

## 2024-07-25 PROCEDURE — 44120 REMOVAL OF SMALL INTESTINE: CPT | Performed by: SURGERY

## 2024-07-25 PROCEDURE — 84295 ASSAY OF SERUM SODIUM: CPT

## 2024-07-25 PROCEDURE — 93005 ELECTROCARDIOGRAM TRACING: CPT

## 2024-07-25 PROCEDURE — 74177 CT ABD & PELVIS W/CONTRAST: CPT

## 2024-07-25 PROCEDURE — 82330 ASSAY OF CALCIUM: CPT

## 2024-07-25 PROCEDURE — 96374 THER/PROPH/DIAG INJ IV PUSH: CPT

## 2024-07-25 PROCEDURE — 36415 COLL VENOUS BLD VENIPUNCTURE: CPT

## 2024-07-25 PROCEDURE — 82947 ASSAY GLUCOSE BLOOD QUANT: CPT

## 2024-07-25 PROCEDURE — 99285 EMERGENCY DEPT VISIT HI MDM: CPT

## 2024-07-25 PROCEDURE — 85014 HEMATOCRIT: CPT

## 2024-07-25 PROCEDURE — 83605 ASSAY OF LACTIC ACID: CPT

## 2024-07-25 PROCEDURE — 84100 ASSAY OF PHOSPHORUS: CPT | Performed by: SURGERY

## 2024-07-25 PROCEDURE — 86901 BLOOD TYPING SEROLOGIC RH(D): CPT | Performed by: SURGERY

## 2024-07-25 PROCEDURE — 85610 PROTHROMBIN TIME: CPT | Performed by: SURGERY

## 2024-07-25 PROCEDURE — 88307 TISSUE EXAM BY PATHOLOGIST: CPT | Performed by: PATHOLOGY

## 2024-07-25 PROCEDURE — 86900 BLOOD TYPING SEROLOGIC ABO: CPT | Performed by: SURGERY

## 2024-07-25 PROCEDURE — 82330 ASSAY OF CALCIUM: CPT | Performed by: SURGERY

## 2024-07-25 RX ORDER — PROPOFOL 10 MG/ML
INJECTION, EMULSION INTRAVENOUS AS NEEDED
Status: DISCONTINUED | OUTPATIENT
Start: 2024-07-25 | End: 2024-07-26

## 2024-07-25 RX ORDER — KETAMINE HCL IN NACL, ISO-OSM 100MG/10ML
SYRINGE (ML) INJECTION AS NEEDED
Status: DISCONTINUED | OUTPATIENT
Start: 2024-07-25 | End: 2024-07-26

## 2024-07-25 RX ORDER — ONDANSETRON 2 MG/ML
4 INJECTION INTRAMUSCULAR; INTRAVENOUS EVERY 4 HOURS PRN
Status: DISCONTINUED | OUTPATIENT
Start: 2024-07-25 | End: 2024-07-29 | Stop reason: HOSPADM

## 2024-07-25 RX ORDER — CALCIUM GLUCONATE 20 MG/ML
2 INJECTION, SOLUTION INTRAVENOUS ONCE
Status: COMPLETED | OUTPATIENT
Start: 2024-07-25 | End: 2024-07-26

## 2024-07-25 RX ORDER — NEOSTIGMINE METHYLSULFATE 1 MG/ML
INJECTION INTRAVENOUS AS NEEDED
Status: DISCONTINUED | OUTPATIENT
Start: 2024-07-25 | End: 2024-07-26

## 2024-07-25 RX ORDER — METRONIDAZOLE 500 MG/100ML
500 INJECTION, SOLUTION INTRAVENOUS EVERY 8 HOURS
Status: DISCONTINUED | OUTPATIENT
Start: 2024-07-25 | End: 2024-07-25

## 2024-07-25 RX ORDER — DEXAMETHASONE SODIUM PHOSPHATE 10 MG/ML
INJECTION, SOLUTION INTRAMUSCULAR; INTRAVENOUS AS NEEDED
Status: DISCONTINUED | OUTPATIENT
Start: 2024-07-25 | End: 2024-07-26

## 2024-07-25 RX ORDER — FENTANYL CITRATE 50 UG/ML
INJECTION, SOLUTION INTRAMUSCULAR; INTRAVENOUS AS NEEDED
Status: DISCONTINUED | OUTPATIENT
Start: 2024-07-25 | End: 2024-07-26

## 2024-07-25 RX ORDER — CEFAZOLIN SODIUM 2 G/50ML
2000 SOLUTION INTRAVENOUS ONCE
Status: COMPLETED | OUTPATIENT
Start: 2024-07-25 | End: 2024-07-26

## 2024-07-25 RX ORDER — SODIUM CHLORIDE 9 MG/ML
INJECTION, SOLUTION INTRAVENOUS CONTINUOUS PRN
Status: DISCONTINUED | OUTPATIENT
Start: 2024-07-25 | End: 2024-07-26

## 2024-07-25 RX ORDER — MIDODRINE HYDROCHLORIDE 2.5 MG/1
2.5 TABLET ORAL
Status: DISCONTINUED | OUTPATIENT
Start: 2024-07-25 | End: 2024-07-26

## 2024-07-25 RX ORDER — ONDANSETRON 2 MG/ML
4 INJECTION INTRAMUSCULAR; INTRAVENOUS ONCE
Status: COMPLETED | OUTPATIENT
Start: 2024-07-25 | End: 2024-07-25

## 2024-07-25 RX ORDER — FENTANYL CITRATE 50 UG/ML
50 INJECTION, SOLUTION INTRAMUSCULAR; INTRAVENOUS ONCE
Status: COMPLETED | OUTPATIENT
Start: 2024-07-25 | End: 2024-07-25

## 2024-07-25 RX ORDER — GLYCOPYRROLATE 0.2 MG/ML
INJECTION INTRAMUSCULAR; INTRAVENOUS AS NEEDED
Status: DISCONTINUED | OUTPATIENT
Start: 2024-07-25 | End: 2024-07-26

## 2024-07-25 RX ORDER — HYDROMORPHONE HCL IN WATER/PF 6 MG/30 ML
0.2 PATIENT CONTROLLED ANALGESIA SYRINGE INTRAVENOUS
Status: DISCONTINUED | OUTPATIENT
Start: 2024-07-25 | End: 2024-07-29 | Stop reason: HOSPADM

## 2024-07-25 RX ORDER — DEXTROSE MONOHYDRATE 25 G/50ML
50 INJECTION, SOLUTION INTRAVENOUS ONCE
Status: COMPLETED | OUTPATIENT
Start: 2024-07-25 | End: 2024-07-25

## 2024-07-25 RX ORDER — METOPROLOL SUCCINATE 50 MG/1
50 TABLET, EXTENDED RELEASE ORAL DAILY
Status: DISCONTINUED | OUTPATIENT
Start: 2024-07-26 | End: 2024-07-26

## 2024-07-25 RX ORDER — ROCURONIUM BROMIDE 10 MG/ML
INJECTION, SOLUTION INTRAVENOUS AS NEEDED
Status: DISCONTINUED | OUTPATIENT
Start: 2024-07-25 | End: 2024-07-26

## 2024-07-25 RX ORDER — HYDROMORPHONE HCL/PF 1 MG/ML
0.5 SYRINGE (ML) INJECTION ONCE
Status: COMPLETED | OUTPATIENT
Start: 2024-07-25 | End: 2024-07-25

## 2024-07-25 RX ORDER — OXYCODONE HYDROCHLORIDE 5 MG/1
5 TABLET ORAL EVERY 8 HOURS PRN
Status: DISCONTINUED | OUTPATIENT
Start: 2024-07-25 | End: 2024-07-29 | Stop reason: HOSPADM

## 2024-07-25 RX ORDER — LIDOCAINE HYDROCHLORIDE 20 MG/ML
INJECTION, SOLUTION EPIDURAL; INFILTRATION; INTRACAUDAL; PERINEURAL AS NEEDED
Status: DISCONTINUED | OUTPATIENT
Start: 2024-07-25 | End: 2024-07-26

## 2024-07-25 RX ORDER — METRONIDAZOLE 500 MG/100ML
500 INJECTION, SOLUTION INTRAVENOUS ONCE
Status: COMPLETED | OUTPATIENT
Start: 2024-07-25 | End: 2024-07-26

## 2024-07-25 RX ORDER — CEFAZOLIN SODIUM 2 G/50ML
2000 SOLUTION INTRAVENOUS
Status: CANCELLED | OUTPATIENT
Start: 2024-07-25

## 2024-07-25 RX ADMIN — FENTANYL CITRATE 25 MCG: 50 INJECTION INTRAMUSCULAR; INTRAVENOUS at 22:23

## 2024-07-25 RX ADMIN — EPINEPHRINE 10 MCG: 1 INJECTION, SOLUTION, CONCENTRATE INTRAVENOUS at 21:46

## 2024-07-25 RX ADMIN — FENTANYL CITRATE 50 MCG: 50 INJECTION INTRAMUSCULAR; INTRAVENOUS at 18:39

## 2024-07-25 RX ADMIN — FENTANYL CITRATE 25 MCG: 50 INJECTION INTRAMUSCULAR; INTRAVENOUS at 23:23

## 2024-07-25 RX ADMIN — CEFAZOLIN SODIUM 2000 MG: 2 SOLUTION INTRAVENOUS at 20:47

## 2024-07-25 RX ADMIN — FENTANYL CITRATE 50 MCG: 50 INJECTION INTRAMUSCULAR; INTRAVENOUS at 21:43

## 2024-07-25 RX ADMIN — INSULIN HUMAN 10 UNITS: 100 INJECTION, SOLUTION PARENTERAL at 20:55

## 2024-07-25 RX ADMIN — ONDANSETRON 4 MG: 2 INJECTION INTRAMUSCULAR; INTRAVENOUS at 23:31

## 2024-07-25 RX ADMIN — ROCURONIUM BROMIDE 20 MG: 10 INJECTION INTRAVENOUS at 22:24

## 2024-07-25 RX ADMIN — DEXTROSE MONOHYDRATE 50 ML: 25 INJECTION, SOLUTION INTRAVENOUS at 20:55

## 2024-07-25 RX ADMIN — DEXAMETHASONE SODIUM PHOSPHATE 10 MG: 10 INJECTION, SOLUTION INTRAMUSCULAR; INTRAVENOUS at 21:55

## 2024-07-25 RX ADMIN — LIDOCAINE HYDROCHLORIDE 80 MG: 20 INJECTION, SOLUTION EPIDURAL; INFILTRATION; INTRACAUDAL at 21:43

## 2024-07-25 RX ADMIN — Medication 50 MG: at 21:46

## 2024-07-25 RX ADMIN — HYDROMORPHONE HYDROCHLORIDE 0.5 MG: 1 INJECTION, SOLUTION INTRAMUSCULAR; INTRAVENOUS; SUBCUTANEOUS at 17:24

## 2024-07-25 RX ADMIN — IOHEXOL 100 ML: 350 INJECTION, SOLUTION INTRAVENOUS at 17:38

## 2024-07-25 RX ADMIN — CALCIUM GLUCONATE 2 G: 20 INJECTION, SOLUTION INTRAVENOUS at 20:31

## 2024-07-25 RX ADMIN — NEOSTIGMINE METHYLSULFATE 3 MG: 1 INJECTION INTRAVENOUS at 23:47

## 2024-07-25 RX ADMIN — SODIUM CHLORIDE: 0.9 INJECTION, SOLUTION INTRAVENOUS at 21:34

## 2024-07-25 RX ADMIN — SODIUM CHLORIDE: 9 INJECTION, SOLUTION INTRAVENOUS at 21:51

## 2024-07-25 RX ADMIN — PROPOFOL 50 MG: 10 INJECTION, EMULSION INTRAVENOUS at 21:46

## 2024-07-25 RX ADMIN — GLYCOPYRROLATE 0.4 MCG: 0.2 INJECTION INTRAMUSCULAR; INTRAVENOUS at 23:47

## 2024-07-25 RX ADMIN — METRONIDAZOLE 500 MG: 500 INJECTION, SOLUTION INTRAVENOUS at 21:10

## 2024-07-25 RX ADMIN — ONDANSETRON 4 MG: 2 INJECTION INTRAMUSCULAR; INTRAVENOUS at 17:22

## 2024-07-25 RX ADMIN — ROCURONIUM BROMIDE 100 MG: 10 INJECTION INTRAVENOUS at 21:46

## 2024-07-25 NOTE — H&P
H&P - General Surgery  : [facility] Surgery Resident role   Homero Koch 64 y.o. male MRN: [id number]  Unit/Bed#: ED-32 Encounter: [id number]        Assessment:  64 y.o. year old male with umbilical hernia and concern for threatened loop of small bowel in the setting of liver disease with ascites    Plan:  Admit to general surgery SD2  Reviewed EKG  Consult nephrology, cardiology, GI  Will rediscuss with patient/wife in approximately half an hour   Will proceed to OR if patient amenable  Hold DVT ppx for now as patient has a documented reaction of low platelets with heparin, and is not an ideal candidate for Arixtra given ESRD      HPI:  Homero Koch is a 64 y.o. 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  Patient has had a known umbilical hernia for a long time which he states started before he was on HD, when he was on peritoneal dialysis.  The umbilical hernia is usually soft and fluid-filled, reducible, nonpainful.  Around 6 AM this morning patient states that he began having significant abdominal pain.  He did tolerate some oral intake and have a bowel movement this morning, but has not had an appetite since due to pain.  He is not having any nausea or vomiting but he has been passing flatus throughout the day.  He has not experienced pain like this before.    We attempted to reduce the hernia after administration of 50 mcg of fentanyl along with at least half an hour of Trendelenburg positioning with knees to his chest and placed on the hernia.  We were unsuccessful and  were not able to reduce the hernia even partially.    After discussion with radiology, we recommended go to the operating room tonight for umbilical hernia repair.  During discussion of the alternatives, we did tell patient  that he could decline surgery and opted for expectant management, possible paracentesis tomorrow and try again to reduce the hernia.  We did tell patient and his wife that declining surgery tonight places him at increased risk for ischemic bowel, perforation, abdominal infection, all of which may be lethal.  Patient and his wife verbalized understanding and would like to think about their options and request that we return within the hour to discuss their final decision.    Regarding functional status patient states that he can make up a flight of stairs with some difficulty, he may have to stop.  He thinks he should move into a ranch house to avoid stairs.  He does live at home with his wife.    Physical Exam  Constitutional:       General: He is not in acute distress.     Appearance: Normal appearance.   HENT:      Head: Normocephalic and atraumatic.      Right Ear: External ear normal.      Left Ear: External ear normal.      Nose: Nose normal.      Mouth/Throat:      Mouth: Mucous membranes are moist.      Pharynx: Oropharynx is clear.   Eyes:      General:         Right eye: No discharge.         Left eye: No discharge.      Extraocular Movements: Extraocular movements intact.      Conjunctiva/sclera: Conjunctivae normal.      Pupils: Pupils are equal, round, and reactive to light.   Cardiovascular:      Rate and Rhythm: Normal rate.      Pulses: Normal pulses.      Heart sounds: Normal heart sounds.   Pulmonary:      Effort: Pulmonary effort is normal. No respiratory distress.   Abdominal:      General: Abdomen is flat. There is no distension.      Tenderness: There is no abdominal tenderness. There is no guarding or rebound.      Hernia: A hernia (Firm umbilical hernia bulge approximately 4cm in diameter without overlying skin changes. Not reducible) is present.      Comments: Abdominal exam is benign aside from his concerning umbilical hernia   Musculoskeletal:         General: No swelling, tenderness or  signs of injury.      Cervical back: Normal range of motion and neck supple. No rigidity or tenderness.   Skin:     Coloration: Skin is not jaundiced.      Findings: No lesion.   Neurological:      General: No focal deficit present.      Mental Status: He is alert and oriented to person, place, and time. Mental status is at baseline.   Psychiatric:         Mood and Affect: Mood normal.         Behavior: Behavior normal.            Review of Systems   Constitutional:  Positive for appetite change. Negative for activity change, chills and fever.   HENT: Negative.  Negative for congestion, dental problem, ear pain, facial swelling, nosebleeds, sore throat and trouble swallowing.    Eyes: Negative.  Negative for pain and visual disturbance.   Respiratory: Negative.  Negative for apnea and shortness of breath.    Cardiovascular: Negative.  Negative for chest pain and palpitations.   Gastrointestinal:  Positive for abdominal pain. Negative for anal bleeding, blood in stool, nausea and vomiting.   Endocrine: Negative.  Negative for cold intolerance and heat intolerance.   Genitourinary: Negative.    Musculoskeletal:  Positive for gait problem. Negative for joint swelling.   Skin: Negative.  Negative for color change.   Allergic/Immunologic: Negative.    Neurological:  Negative for dizziness, seizures, light-headedness and numbness.   Hematological: Negative.    Psychiatric/Behavioral: Negative.  Negative for behavioral problems and hallucinations.         Objective       No intake or output data in the 24 hours ending 07/25/24 1947    First Vitals:   Blood Pressure: 157/97 (07/25/24 1644)  Pulse: 86 (07/25/24 1644)  Temperature: 98.2 °F (36.8 °C) (07/25/24 1644)  Temp Source: Oral (07/25/24 1644)  Respirations: 16 (07/25/24 1644)  SpO2: 99 % (07/25/24 1644)    Current Vitals:   Blood Pressure: 157/97 (07/25/24 1644)  Pulse: 86 (07/25/24 1644)  Temperature: 98.2 °F (36.8 °C) (07/25/24 1644)  Temp Source: Oral (07/25/24  1644)  Respirations: 16 (07/25/24 1644)  SpO2: 99 % (07/25/24 1644)    Invasive Devices       Peripheral Intravenous Line  Duration             Peripheral IV 06/27/24 Proximal;Right;Ventral (anterior) Forearm 28 days    Peripheral IV 07/25/24 Distal;Right;Ventral (anterior) Forearm <1 day              Hemodialysis Catheter  Duration             HD Permanent Double Catheter 359 days                    Imaging: I have personally reviewed pertinent reports.      CT abdomen pelvis with contrast    Addendum Date: 7/25/2024    ADDENDUM: Liver the small bowel within the umbilical hernia and surrounding ascites. Findings concerning for threatened incarceration and discussed with surgery.    Result Date: 7/25/2024  Impression: 1.  Findings suggestive of enteritis. No evidence of bowel obstruction, colitis or diverticulitis. 2. Mild ascites. Workstation performed: [id number]       EKG, Pathology, and Other Studies: I have personally reviewed pertinent reports.    VTE Pharmacologic Prophylaxis: Sequential compression device (Venodyne)   VTE Mechanical Prophylaxis: sequential compression device    Historical Information   Past Medical History:   Diagnosis Date    Complication of renal dialysis     Polycystic kidney disease      Past Surgical History:   Procedure Laterality Date    IR CATHETERGRAM  10/17/2019    IR IMAGE GUIDED ASPIRATION / DRAINAGE  9/23/2019    IR PARACENTESIS  10/9/2019    IR PARACENTESIS  6/24/2024    IR PARACENTESIS  6/28/2024    IR TEMPORARY DIALYSIS CATHETER PLACEMENT  7/28/2023    IR TUNNELED CENTRAL LINE CHECK/CHANGE/REPOSITION  12/14/2021    IR TUNNELED DIALYSIS CATHETER CHECK/CHANGE/REPOSITION/ANGIOPLASTY  10/17/2019    IR TUNNELED DIALYSIS CATHETER PLACEMENT  9/30/2019    IR TUNNELED DIALYSIS CATHETER PLACEMENT  8/1/2023    IR TUNNELED DIALYSIS CATHETER REMOVAL  7/28/2023    SPLIT THICKNESS SKIN GRAFT Bilateral 11/7/2019    Procedure: SKIN GRAFT SPLIT THICKNESS (STSG)  EXTREMITY;  Surgeon: Torey SANTIZO  Liam Cha MD;  [facility]: [facility];  Service: Plastics    SPLIT THICKNESS SKIN GRAFT Bilateral 11/12/2019    Procedure: SKIN GRAFT SPLIT THICKNESS (STSG)  EXTREMITY;  Surgeon: Torey Cha MD;  [facility];  Service: Plastics    VAC DRESSING APPLICATION Bilateral 11/18/2019    Procedure: CHANGE DRESSING;  Surgeon: Torey Cha MD;  [facility];  Service: Plastics    WOUND DEBRIDEMENT Bilateral 10/31/2019    Procedure: DEBRIDEMENT WOUND (WASH OUT);  Surgeon: Selvin Han DPM;  [facility];  Service: Podiatry    WOUND DEBRIDEMENT Bilateral 11/5/2019    Procedure: DEBRIDEMENT WOUND (WASH OUT);  Surgeon: Selvin Han DPM;  [facility];  Service: Podiatry    WOUND DEBRIDEMENT Bilateral 11/7/2019    Procedure: DEBRIDEMENT WOUND (WASH OUT);  Surgeon: Torey Cha MD;  [facility];  Service: Plastics    WOUND DEBRIDEMENT Bilateral 11/12/2019    Procedure: DEBRIDEMENT LOWER EXTREMITY (WASH OUT);  Surgeon: Torey Cha MD;  [facility];  Service: Plastics     Social History   Social History     Substance and Sexual Activity   Alcohol Use Not Currently     Social History     Substance and Sexual Activity   Drug Use Yes    Types: Marijuana     Social History     Tobacco Use   Smoking Status Never   Smokeless Tobacco Never     No family history on file.    Meds/Allergies   all current active meds have been reviewed, current meds:   Current Facility-Administered Medications   Medication Dose Route Frequency    HYDROmorphone HCl (DILAUDID) injection 0.2 mg  0.2 mg Intravenous Q3H PRN    [START ON 7/26/2024] metoprolol succinate (TOPROL-XL) 24 hr tablet 50 mg  50 mg Oral Daily    midodrine (PROAMATINE) tablet 2.5 mg  2.5 mg Oral Before Dialysis    ondansetron (ZOFRAN) injection 4 mg  4 mg Intravenous Q4H PRN    oxyCODONE (ROXICODONE) IR tablet 5 mg  5 mg Oral Q8H PRN    oxyCODONE (ROXICODONE) split tablet 2.5 mg   2.5 mg Oral Q8H PRN    and PTA meds:   Prior to Admission Medications   Prescriptions Last Dose Informant Patient Reported? Taking?   Ascorbic Acid (Vitamin C) 100 MG CHEW  Other (Specify), Self Yes No   Sig: Chew 1 tablet daily   B Complex-C-Folic Acid (TRIPHROCAPS PO)  Self, Other (Specify) Yes No   Sig: Take 1 capsule by mouth daily   Patient not taking: Reported on 7/9/2024   Cholecalciferol (Vitamin D-3) 25 MCG (1000 UT) CAPS  Other (Specify), Self Yes No   Sig: Take by mouth daily   Patient not taking: Reported on 7/9/2024   aspirin 81 mg chewable tablet  Other (Specify), Self No No   Sig: Chew 1 tablet (81 mg total) daily   atorvastatin (LIPITOR) 40 mg tablet  Other (Specify), Self No No   Sig: Take 1 tablet (40 mg total) by mouth daily   Patient not taking: Reported on 7/9/2024   b complex-vitamin C-folic acid (RENAL) 1 mg  Other (Specify), Self No No   Sig: Take 1 capsule by mouth daily with dinner   calcitriol (ROCALTROL) 0.25 mcg capsule  Other (Specify), Self No No   Sig: Take 3 capsules (0.75 mcg total) by mouth 3 (three) times a week   calcium acetate (PHOSLO) 667 mg capsule  Self, Other (Specify) Yes No   Sig: Take 1,334 mg by mouth 3 (three) times a day with meals   cholecalciferol (VITAMIN D3) 1,000 units tablet  Self, Other (Specify) Yes No   Sig: Take 1,000 Units by mouth daily   Patient not taking: Reported on 7/9/2024   losartan (COZAAR) 25 mg tablet  Other (Specify), Self No No   Sig: Take 1 tablet (25 mg total) by mouth daily   metoprolol succinate (TOPROL-XL) 25 mg 24 hr tablet  Self, Other (Specify) No No   Sig: Take 2 tablets (50 mg total) by mouth daily   midodrine (PROAMATINE) 2.5 mg tablet  Other (Specify), Self No No   Sig: Take 1 tablet (2.5 mg total) by mouth Before Dialysis (For SBP less than 110 pre-dialysis)   nystatin powder  Other (Specify), Self Yes No   Patient not taking: Reported on 7/9/2024   sevelamer (RENAGEL) 800 mg tablet   No No   Sig: Take 2 tablets (1,600 mg total) by  mouth 3 (three) times a day with meals   Patient not taking: Reported on 7/9/2024      Facility-Administered Medications: None     Allergies   Allergen Reactions    Sucroferric Oxyhydroxide Other (See Comments)    Chlorhexidine Other (See Comments)    Heparin Other (See Comments)     Brings plaletes down    Other Other (See Comments)       Lab Results: I have personally reviewed pertinent lab results.  , CBC:   Lab Results   Component Value Date    WBC 5.28 07/25/2024    HGB 12.3 07/25/2024    HCT 38.8 07/25/2024     (H) 07/25/2024     (L) 07/25/2024    RBC 3.85 (L) 07/25/2024    MCH 31.9 07/25/2024    MCHC 31.7 07/25/2024    RDW 16.5 (H) 07/25/2024    MPV 10.2 07/25/2024    NRBC 0 07/25/2024   , CMP:   Lab Results   Component Value Date    SODIUM 135 07/25/2024    K 6.1 (H) 07/25/2024    CL 94 (L) 07/25/2024    CO2 25 07/25/2024    BUN 71 (H) 07/25/2024    CREATININE 6.85 (H) 07/25/2024    CALCIUM 9.9 07/25/2024    AST 19 07/25/2024    ALT 9 07/25/2024    ALKPHOS 116 (H) 07/25/2024    EGFR 7 07/25/2024       Counseling / Coordination of Care  Total floor / unit time spent today 25 minutes.  Greater than 50% of total time was spent with the patient and / or family counseling and / or coordination of care.        Brandyn Fuentes MD  7/25/2024 7:47 PM

## 2024-07-26 ENCOUNTER — APPOINTMENT (INPATIENT)
Dept: DIALYSIS | Facility: HOSPITAL | Age: 64
DRG: 329 | End: 2024-07-26
Payer: MEDICARE

## 2024-07-26 ENCOUNTER — APPOINTMENT (INPATIENT)
Dept: RADIOLOGY | Facility: HOSPITAL | Age: 64
DRG: 329 | End: 2024-07-26
Payer: MEDICARE

## 2024-07-26 PROBLEM — K46.0 INCARCERATED HERNIA: Status: ACTIVE | Noted: 2024-07-26

## 2024-07-26 PROBLEM — K42.0 INCARCERATED UMBILICAL HERNIA: Status: ACTIVE | Noted: 2024-07-26

## 2024-07-26 LAB
ALBUMIN SERPL BCG-MCNC: 3.8 G/DL (ref 3.5–5)
ALP SERPL-CCNC: 97 U/L (ref 34–104)
ALT SERPL W P-5'-P-CCNC: 9 U/L (ref 7–52)
ANION GAP SERPL CALCULATED.3IONS-SCNC: 12 MMOL/L (ref 4–13)
ANION GAP SERPL CALCULATED.3IONS-SCNC: 15 MMOL/L (ref 4–13)
ANION GAP SERPL CALCULATED.3IONS-SCNC: 16 MMOL/L (ref 4–13)
ANION GAP SERPL CALCULATED.3IONS-SCNC: 17 MMOL/L (ref 4–13)
APTT PPP: 32 SECONDS (ref 23–37)
APTT PPP: 72 SECONDS (ref 23–37)
AST SERPL W P-5'-P-CCNC: 15 U/L (ref 13–39)
ATRIAL RATE: 75 BPM
BASE EXCESS BLDA CALC-SCNC: -3.3 MMOL/L
BASE EXCESS BLDA CALC-SCNC: 0 MMOL/L (ref -2–3)
BASOPHILS # BLD AUTO: 0 THOUSANDS/ÂΜL (ref 0–0.1)
BASOPHILS # BLD AUTO: 0.01 THOUSANDS/ÂΜL (ref 0–0.1)
BASOPHILS NFR BLD AUTO: 0 % (ref 0–1)
BASOPHILS NFR BLD AUTO: 0 % (ref 0–1)
BILIRUB SERPL-MCNC: 0.95 MG/DL (ref 0.2–1)
BUN SERPL-MCNC: 38 MG/DL (ref 5–25)
BUN SERPL-MCNC: 75 MG/DL (ref 5–25)
BUN SERPL-MCNC: 79 MG/DL (ref 5–25)
BUN SERPL-MCNC: 82 MG/DL (ref 5–25)
CA-I BLD-SCNC: 1.11 MMOL/L (ref 1.12–1.32)
CA-I BLD-SCNC: 1.13 MMOL/L (ref 1.12–1.32)
CA-I BLD-SCNC: 1.22 MMOL/L (ref 1.12–1.32)
CALCIUM SERPL-MCNC: 10.4 MG/DL (ref 8.4–10.2)
CALCIUM SERPL-MCNC: 9.1 MG/DL (ref 8.4–10.2)
CALCIUM SERPL-MCNC: 9.4 MG/DL (ref 8.4–10.2)
CALCIUM SERPL-MCNC: 9.8 MG/DL (ref 8.4–10.2)
CFFMA (FUNCTIONAL FIBRINOGEN MAX AMPLITUDE): 17.8 MM (ref 15–32)
CFFMA (FUNCTIONAL FIBRINOGEN MAX AMPLITUDE): 19.6 MM (ref 15–32)
CHLORIDE SERPL-SCNC: 93 MMOL/L (ref 96–108)
CHLORIDE SERPL-SCNC: 95 MMOL/L (ref 96–108)
CKLY30: 2.5 % (ref 0–2.6)
CKLY30: 2.7 % (ref 0–2.6)
CKR(REACTION TIME): 13.7 MIN (ref 4.6–9.1)
CKR(REACTION TIME): 7.2 MIN (ref 4.6–9.1)
CO2 SERPL-SCNC: 21 MMOL/L (ref 21–32)
CO2 SERPL-SCNC: 22 MMOL/L (ref 21–32)
CO2 SERPL-SCNC: 23 MMOL/L (ref 21–32)
CO2 SERPL-SCNC: 26 MMOL/L (ref 21–32)
CREAT SERPL-MCNC: 4.46 MG/DL (ref 0.6–1.3)
CREAT SERPL-MCNC: 6.99 MG/DL (ref 0.6–1.3)
CREAT SERPL-MCNC: 7.19 MG/DL (ref 0.6–1.3)
CREAT SERPL-MCNC: 7.43 MG/DL (ref 0.6–1.3)
CRTMA(RAPIDTEG MAX AMPLITUDE): 55.8 MM (ref 52–70)
CRTMA(RAPIDTEG MAX AMPLITUDE): 58.7 MM (ref 52–70)
EOSINOPHIL # BLD AUTO: 0.01 THOUSAND/ÂΜL (ref 0–0.61)
EOSINOPHIL # BLD AUTO: 0.03 THOUSAND/ÂΜL (ref 0–0.61)
EOSINOPHIL NFR BLD AUTO: 0 % (ref 0–6)
EOSINOPHIL NFR BLD AUTO: 1 % (ref 0–6)
ERYTHROCYTE [DISTWIDTH] IN BLOOD BY AUTOMATED COUNT: 16.3 % (ref 11.6–15.1)
ERYTHROCYTE [DISTWIDTH] IN BLOOD BY AUTOMATED COUNT: 16.5 % (ref 11.6–15.1)
GFR SERPL CREATININE-BSD FRML MDRD: 13 ML/MIN/1.73SQ M
GFR SERPL CREATININE-BSD FRML MDRD: 7 ML/MIN/1.73SQ M
GLUCOSE SERPL-MCNC: 104 MG/DL (ref 65–140)
GLUCOSE SERPL-MCNC: 128 MG/DL (ref 65–140)
GLUCOSE SERPL-MCNC: 136 MG/DL (ref 65–140)
GLUCOSE SERPL-MCNC: 188 MG/DL (ref 65–140)
GLUCOSE SERPL-MCNC: 87 MG/DL (ref 65–140)
GLUCOSE SERPL-MCNC: 99 MG/DL (ref 65–140)
HCO3 BLDA-SCNC: 21.7 MMOL/L (ref 22–28)
HCO3 BLDA-SCNC: 25.2 MMOL/L (ref 22–28)
HCT VFR BLD AUTO: 35.3 % (ref 36.5–49.3)
HCT VFR BLD AUTO: 36.5 % (ref 36.5–49.3)
HCT VFR BLD CALC: 29 % (ref 36.5–49.3)
HGB BLD-MCNC: 11.6 G/DL (ref 12–17)
HGB BLD-MCNC: 11.8 G/DL (ref 12–17)
HGB BLDA-MCNC: 9.9 G/DL (ref 12–17)
IMM GRANULOCYTES # BLD AUTO: 0.02 THOUSAND/UL (ref 0–0.2)
IMM GRANULOCYTES # BLD AUTO: 0.03 THOUSAND/UL (ref 0–0.2)
IMM GRANULOCYTES NFR BLD AUTO: 0 % (ref 0–2)
IMM GRANULOCYTES NFR BLD AUTO: 1 % (ref 0–2)
LACTATE SERPL-SCNC: 1.5 MMOL/L (ref 0.5–2)
LYMPHOCYTES # BLD AUTO: 0.15 THOUSANDS/ÂΜL (ref 0.6–4.47)
LYMPHOCYTES # BLD AUTO: 0.21 THOUSANDS/ÂΜL (ref 0.6–4.47)
LYMPHOCYTES NFR BLD AUTO: 3 % (ref 14–44)
LYMPHOCYTES NFR BLD AUTO: 3 % (ref 14–44)
MAGNESIUM SERPL-MCNC: 2.3 MG/DL (ref 1.9–2.7)
MAGNESIUM SERPL-MCNC: 2.4 MG/DL (ref 1.9–2.7)
MCH RBC QN AUTO: 32.3 PG (ref 26.8–34.3)
MCH RBC QN AUTO: 32.3 PG (ref 26.8–34.3)
MCHC RBC AUTO-ENTMCNC: 32.3 G/DL (ref 31.4–37.4)
MCHC RBC AUTO-ENTMCNC: 32.9 G/DL (ref 31.4–37.4)
MCV RBC AUTO: 100 FL (ref 82–98)
MCV RBC AUTO: 98 FL (ref 82–98)
MONOCYTES # BLD AUTO: 0.06 THOUSAND/ÂΜL (ref 0.17–1.22)
MONOCYTES # BLD AUTO: 0.13 THOUSAND/ÂΜL (ref 0.17–1.22)
MONOCYTES NFR BLD AUTO: 1 % (ref 4–12)
MONOCYTES NFR BLD AUTO: 2 % (ref 4–12)
NEUTROPHILS # BLD AUTO: 5.43 THOUSANDS/ÂΜL (ref 1.85–7.62)
NEUTROPHILS # BLD AUTO: 6.03 THOUSANDS/ÂΜL (ref 1.85–7.62)
NEUTS SEG NFR BLD AUTO: 93 % (ref 43–75)
NEUTS SEG NFR BLD AUTO: 96 % (ref 43–75)
NRBC BLD AUTO-RTO: 0 /100 WBCS
NRBC BLD AUTO-RTO: 0 /100 WBCS
O2 CT BLDA-SCNC: 15.8 ML/DL (ref 16–23)
OVALOCYTES BLD QL SMEAR: PRESENT
OVALOCYTES BLD QL SMEAR: PRESENT
OXYHGB MFR BLDA: 90.3 % (ref 94–97)
P AXIS: 35 DEGREES
PCO2 BLD: 27 MMOL/L (ref 21–32)
PCO2 BLD: 43.9 MM HG (ref 36–44)
PCO2 BLDA: 38.7 MM HG (ref 36–44)
PH BLD: 7.37 [PH] (ref 7.35–7.45)
PH BLDA: 7.37 [PH] (ref 7.35–7.45)
PHOSPHATE SERPL-MCNC: 6.8 MG/DL (ref 2.3–4.1)
PHOSPHATE SERPL-MCNC: 7.5 MG/DL (ref 2.3–4.1)
PLATELET # BLD AUTO: 114 THOUSANDS/UL (ref 149–390)
PLATELET # BLD AUTO: 137 THOUSANDS/UL (ref 149–390)
PLATELET BLD QL SMEAR: ABNORMAL
PLATELET BLD QL SMEAR: ABNORMAL
PMV BLD AUTO: 10.1 FL (ref 8.9–12.7)
PMV BLD AUTO: 9.9 FL (ref 8.9–12.7)
PO2 BLD: >400 MM HG (ref 75–129)
PO2 BLDA: 71.1 MM HG (ref 75–129)
POIKILOCYTOSIS BLD QL SMEAR: PRESENT
POIKILOCYTOSIS BLD QL SMEAR: PRESENT
POTASSIUM BLD-SCNC: 4.7 MMOL/L (ref 3.5–5.3)
POTASSIUM SERPL-SCNC: 5 MMOL/L (ref 3.5–5.3)
POTASSIUM SERPL-SCNC: 5.1 MMOL/L (ref 3.5–5.3)
POTASSIUM SERPL-SCNC: 6.3 MMOL/L (ref 3.5–5.3)
POTASSIUM SERPL-SCNC: 7.1 MMOL/L (ref 3.5–5.3)
PR INTERVAL: 182 MS
PROT SERPL-MCNC: 7.3 G/DL (ref 6.4–8.4)
QRS AXIS: 2 DEGREES
QRSD INTERVAL: 124 MS
QT INTERVAL: 458 MS
QTC INTERVAL: 511 MS
RBC # BLD AUTO: 3.59 MILLION/UL (ref 3.88–5.62)
RBC # BLD AUTO: 3.65 MILLION/UL (ref 3.88–5.62)
RBC MORPH BLD: PRESENT
RBC MORPH BLD: PRESENT
SODIUM BLD-SCNC: 134 MMOL/L (ref 136–145)
SODIUM SERPL-SCNC: 131 MMOL/L (ref 135–147)
SODIUM SERPL-SCNC: 132 MMOL/L (ref 135–147)
SODIUM SERPL-SCNC: 133 MMOL/L (ref 135–147)
SODIUM SERPL-SCNC: 134 MMOL/L (ref 135–147)
SPECIMEN SOURCE: ABNORMAL
T WAVE AXIS: 116 DEGREES
VENTRICULAR RATE: 75 BPM
WBC # BLD AUTO: 5.67 THOUSAND/UL (ref 4.31–10.16)
WBC # BLD AUTO: 6.44 THOUSAND/UL (ref 4.31–10.16)

## 2024-07-26 PROCEDURE — 80053 COMPREHEN METABOLIC PANEL: CPT | Performed by: SURGERY

## 2024-07-26 PROCEDURE — 85397 CLOTTING FUNCT ACTIVITY: CPT | Performed by: SURGERY

## 2024-07-26 PROCEDURE — 80048 BASIC METABOLIC PNL TOTAL CA: CPT

## 2024-07-26 PROCEDURE — 93010 ELECTROCARDIOGRAM REPORT: CPT | Performed by: INTERNAL MEDICINE

## 2024-07-26 PROCEDURE — 85025 COMPLETE CBC W/AUTO DIFF WBC: CPT | Performed by: SURGERY

## 2024-07-26 PROCEDURE — 99223 1ST HOSP IP/OBS HIGH 75: CPT | Performed by: STUDENT IN AN ORGANIZED HEALTH CARE EDUCATION/TRAINING PROGRAM

## 2024-07-26 PROCEDURE — 90935 HEMODIALYSIS ONE EVALUATION: CPT | Performed by: NURSE PRACTITIONER

## 2024-07-26 PROCEDURE — 85576 BLOOD PLATELET AGGREGATION: CPT | Performed by: NURSE PRACTITIONER

## 2024-07-26 PROCEDURE — 85347 COAGULATION TIME ACTIVATED: CPT | Performed by: SURGERY

## 2024-07-26 PROCEDURE — 74018 RADEX ABDOMEN 1 VIEW: CPT

## 2024-07-26 PROCEDURE — 99223 1ST HOSP IP/OBS HIGH 75: CPT | Performed by: INTERNAL MEDICINE

## 2024-07-26 PROCEDURE — 83735 ASSAY OF MAGNESIUM: CPT | Performed by: NURSE PRACTITIONER

## 2024-07-26 PROCEDURE — 85576 BLOOD PLATELET AGGREGATION: CPT | Performed by: SURGERY

## 2024-07-26 PROCEDURE — 85730 THROMBOPLASTIN TIME PARTIAL: CPT | Performed by: NURSE PRACTITIONER

## 2024-07-26 PROCEDURE — 84100 ASSAY OF PHOSPHORUS: CPT | Performed by: SURGERY

## 2024-07-26 PROCEDURE — 99223 1ST HOSP IP/OBS HIGH 75: CPT | Performed by: EMERGENCY MEDICINE

## 2024-07-26 PROCEDURE — 82805 BLOOD GASES W/O2 SATURATION: CPT | Performed by: SURGERY

## 2024-07-26 PROCEDURE — 83605 ASSAY OF LACTIC ACID: CPT | Performed by: SURGERY

## 2024-07-26 PROCEDURE — 85384 FIBRINOGEN ACTIVITY: CPT | Performed by: SURGERY

## 2024-07-26 PROCEDURE — 84100 ASSAY OF PHOSPHORUS: CPT | Performed by: NURSE PRACTITIONER

## 2024-07-26 PROCEDURE — 85025 COMPLETE CBC W/AUTO DIFF WBC: CPT | Performed by: NURSE PRACTITIONER

## 2024-07-26 PROCEDURE — 82330 ASSAY OF CALCIUM: CPT | Performed by: NURSE PRACTITIONER

## 2024-07-26 PROCEDURE — 80048 BASIC METABOLIC PNL TOTAL CA: CPT | Performed by: NURSE PRACTITIONER

## 2024-07-26 PROCEDURE — 82330 ASSAY OF CALCIUM: CPT | Performed by: SURGERY

## 2024-07-26 PROCEDURE — 99024 POSTOP FOLLOW-UP VISIT: CPT | Performed by: SURGERY

## 2024-07-26 PROCEDURE — 82948 REAGENT STRIP/BLOOD GLUCOSE: CPT

## 2024-07-26 PROCEDURE — 83735 ASSAY OF MAGNESIUM: CPT | Performed by: SURGERY

## 2024-07-26 PROCEDURE — 5A1D70Z PERFORMANCE OF URINARY FILTRATION, INTERMITTENT, LESS THAN 6 HOURS PER DAY: ICD-10-PCS | Performed by: SURGERY

## 2024-07-26 PROCEDURE — 71045 X-RAY EXAM CHEST 1 VIEW: CPT

## 2024-07-26 PROCEDURE — 85397 CLOTTING FUNCT ACTIVITY: CPT | Performed by: NURSE PRACTITIONER

## 2024-07-26 PROCEDURE — 85347 COAGULATION TIME ACTIVATED: CPT | Performed by: NURSE PRACTITIONER

## 2024-07-26 PROCEDURE — 85384 FIBRINOGEN ACTIVITY: CPT | Performed by: NURSE PRACTITIONER

## 2024-07-26 RX ORDER — ONDANSETRON 2 MG/ML
4 INJECTION INTRAMUSCULAR; INTRAVENOUS ONCE AS NEEDED
Status: DISCONTINUED | OUTPATIENT
Start: 2024-07-26 | End: 2024-07-26

## 2024-07-26 RX ORDER — CHLORHEXIDINE GLUCONATE ORAL RINSE 1.2 MG/ML
15 SOLUTION DENTAL EVERY 12 HOURS SCHEDULED
Status: CANCELLED | OUTPATIENT
Start: 2024-07-25

## 2024-07-26 RX ORDER — METRONIDAZOLE 500 MG/100ML
500 INJECTION, SOLUTION INTRAVENOUS EVERY 8 HOURS
Status: COMPLETED | OUTPATIENT
Start: 2024-07-26 | End: 2024-07-27

## 2024-07-26 RX ORDER — CALCITRIOL 0.25 UG/1
0.25 CAPSULE, LIQUID FILLED ORAL 3 TIMES WEEKLY
Status: DISCONTINUED | OUTPATIENT
Start: 2024-07-29 | End: 2024-07-29 | Stop reason: HOSPADM

## 2024-07-26 RX ORDER — PROMETHAZINE HYDROCHLORIDE 25 MG/ML
12.5 INJECTION, SOLUTION INTRAMUSCULAR; INTRAVENOUS ONCE AS NEEDED
Status: DISCONTINUED | OUTPATIENT
Start: 2024-07-26 | End: 2024-07-26

## 2024-07-26 RX ORDER — ARGATROBAN 1 MG/ML
.1-3 INJECTION INTRAVENOUS
Status: DISCONTINUED | OUTPATIENT
Start: 2024-07-26 | End: 2024-07-26

## 2024-07-26 RX ORDER — METOPROLOL TARTRATE 1 MG/ML
2.5 INJECTION, SOLUTION INTRAVENOUS EVERY 4 HOURS
Status: DISCONTINUED | OUTPATIENT
Start: 2024-07-26 | End: 2024-07-29

## 2024-07-26 RX ORDER — HYDROMORPHONE HCL/PF 1 MG/ML
0.5 SYRINGE (ML) INJECTION
Status: DISCONTINUED | OUTPATIENT
Start: 2024-07-26 | End: 2024-07-26

## 2024-07-26 RX ORDER — LIDOCAINE HYDROCHLORIDE AND EPINEPHRINE 10; 10 MG/ML; UG/ML
INJECTION, SOLUTION INFILTRATION; PERINEURAL AS NEEDED
Status: DISCONTINUED | OUTPATIENT
Start: 2024-07-26 | End: 2024-07-26 | Stop reason: HOSPADM

## 2024-07-26 RX ORDER — PANTOPRAZOLE SODIUM 40 MG/10ML
40 INJECTION, POWDER, LYOPHILIZED, FOR SOLUTION INTRAVENOUS
Status: DISCONTINUED | OUTPATIENT
Start: 2024-07-26 | End: 2024-07-29

## 2024-07-26 RX ORDER — CHLORHEXIDINE GLUCONATE ORAL RINSE 1.2 MG/ML
15 SOLUTION DENTAL EVERY 12 HOURS SCHEDULED
Status: DISCONTINUED | OUTPATIENT
Start: 2024-07-26 | End: 2024-07-29 | Stop reason: HOSPADM

## 2024-07-26 RX ORDER — BUPIVACAINE HYDROCHLORIDE 5 MG/ML
INJECTION, SOLUTION EPIDURAL; INTRACAUDAL AS NEEDED
Status: DISCONTINUED | OUTPATIENT
Start: 2024-07-26 | End: 2024-07-26 | Stop reason: HOSPADM

## 2024-07-26 RX ORDER — MAGNESIUM HYDROXIDE 1200 MG/15ML
LIQUID ORAL AS NEEDED
Status: DISCONTINUED | OUTPATIENT
Start: 2024-07-26 | End: 2024-07-26 | Stop reason: HOSPADM

## 2024-07-26 RX ORDER — FENTANYL CITRATE/PF 50 MCG/ML
50 SYRINGE (ML) INJECTION
Status: DISCONTINUED | OUTPATIENT
Start: 2024-07-26 | End: 2024-07-26

## 2024-07-26 RX ORDER — CALCIUM GLUCONATE 20 MG/ML
2 INJECTION, SOLUTION INTRAVENOUS ONCE
Status: COMPLETED | OUTPATIENT
Start: 2024-07-26 | End: 2024-07-26

## 2024-07-26 RX ORDER — DEXTROSE MONOHYDRATE 25 G/50ML
50 INJECTION, SOLUTION INTRAVENOUS ONCE
Status: COMPLETED | OUTPATIENT
Start: 2024-07-26 | End: 2024-07-26

## 2024-07-26 RX ADMIN — HYDROMORPHONE HYDROCHLORIDE 0.2 MG: 0.2 INJECTION, SOLUTION INTRAMUSCULAR; INTRAVENOUS; SUBCUTANEOUS at 22:09

## 2024-07-26 RX ADMIN — CHLORHEXIDINE GLUCONATE 15 ML: 1.2 RINSE ORAL at 08:05

## 2024-07-26 RX ADMIN — METOROPROLOL TARTRATE 2.5 MG: 5 INJECTION, SOLUTION INTRAVENOUS at 16:40

## 2024-07-26 RX ADMIN — METRONIDAZOLE 500 MG: 500 INJECTION, SOLUTION INTRAVENOUS at 20:21

## 2024-07-26 RX ADMIN — PHENOL 1 SPRAY: 1.4 SPRAY ORAL at 11:01

## 2024-07-26 RX ADMIN — ARGATROBAN 1 MCG/KG/MIN: 1 INJECTION, SOLUTION INTRAVENOUS at 02:53

## 2024-07-26 RX ADMIN — DEXTROSE MONOHYDRATE 50 ML: 25 INJECTION, SOLUTION INTRAVENOUS at 06:54

## 2024-07-26 RX ADMIN — Medication 2.5 MG: at 19:42

## 2024-07-26 RX ADMIN — METRONIDAZOLE 500 MG: 500 INJECTION, SOLUTION INTRAVENOUS at 04:50

## 2024-07-26 RX ADMIN — CALCIUM GLUCONATE 2 G: 20 INJECTION, SOLUTION INTRAVENOUS at 00:46

## 2024-07-26 RX ADMIN — CALCIUM GLUCONATE 2 G: 20 INJECTION, SOLUTION INTRAVENOUS at 06:53

## 2024-07-26 RX ADMIN — METOROPROLOL TARTRATE 2.5 MG: 5 INJECTION, SOLUTION INTRAVENOUS at 01:10

## 2024-07-26 RX ADMIN — HYDROMORPHONE HYDROCHLORIDE 0.2 MG: 0.2 INJECTION, SOLUTION INTRAMUSCULAR; INTRAVENOUS; SUBCUTANEOUS at 16:44

## 2024-07-26 RX ADMIN — INSULIN HUMAN 10 UNITS: 100 INJECTION, SOLUTION PARENTERAL at 06:54

## 2024-07-26 RX ADMIN — APIXABAN 2.5 MG: 2.5 TABLET, FILM COATED ORAL at 18:26

## 2024-07-26 RX ADMIN — METOROPROLOL TARTRATE 2.5 MG: 5 INJECTION, SOLUTION INTRAVENOUS at 12:06

## 2024-07-26 RX ADMIN — METOROPROLOL TARTRATE 2.5 MG: 5 INJECTION, SOLUTION INTRAVENOUS at 04:51

## 2024-07-26 RX ADMIN — ARGATROBAN 1 MCG/KG/MIN: 1 INJECTION, SOLUTION INTRAVENOUS at 03:56

## 2024-07-26 RX ADMIN — METRONIDAZOLE 500 MG: 500 INJECTION, SOLUTION INTRAVENOUS at 12:04

## 2024-07-26 RX ADMIN — CEFTRIAXONE SODIUM 2000 MG: 10 INJECTION, POWDER, FOR SOLUTION INTRAVENOUS at 01:10

## 2024-07-26 RX ADMIN — METOROPROLOL TARTRATE 2.5 MG: 5 INJECTION, SOLUTION INTRAVENOUS at 08:05

## 2024-07-26 RX ADMIN — PANTOPRAZOLE SODIUM 40 MG: 40 INJECTION, POWDER, FOR SOLUTION INTRAVENOUS at 10:58

## 2024-07-26 RX ADMIN — METOROPROLOL TARTRATE 2.5 MG: 5 INJECTION, SOLUTION INTRAVENOUS at 20:21

## 2024-07-26 NOTE — WOUND OSTOMY CARE
Progress Note - Wound   Homero Koch 64 y.o. male MRN: [id number]  Unit/Bed#: ICU 03 Encounter: [id number]      Assessment:   Patient admitted to [facility] due to incarcerated umbilical hernia with necrotic bowel. History of ESRD on hemodialysis. Wound care consulted for bilateral lower extremity wounds. Patient is agreeable to assessment, alert and oriented x4, continent of bowel and bladder, independently turns for assessment, is in ICU on a critical care low air loss mattress, is an assist with care.     1. Bilateral elbows, hips, sacrum, buttock, and heels- skin is dry, intact, blanchable.    2. Right lateral proximal tibial abrasion- Wound is round in shape, partial thickness, 100% pink tissue, with scant amount of serosanguineous drainage noted. [place]-wound is dry, intact, no redness.    Educated patient on importance of frequent offloading of pressure via turning, repositioning, and weight-shifting. Verbalized understanding of plan of care.    No induration, fluctuance, odor, warmth, redness, or purulence noted to the above noted wound. New dressing applied. Patient tolerated well, denies pain to the wound. Primary nurse aware of plan of care. See flow sheets for more detailed assessment findings. Will follow along.       Skin care plans:  1-Hydraguard to bilateral sacrum, buttock and heels BID and PRN  2-Elevate heels to offload pressure.  3-Ehob cushion in chair when out of bed.  4-Moisturize skin daily with skin nourishing cream.  5-Turn/reposition q2h for pressure re-distribution on skin.  6-Right lateral tibial- Cleanse wound with NSS, pat dry. Apply Silicone foam dressing over wound bed. Change every 3 days and as needed for soilage/displacement.        Wound 07/26/24 Tibial Proximal;Right;Lateral (Active)   Wound Image   07/26/24 1148   Wound Description [place] 07/26/24 1148   [place]-wound Assessment Dry;Intact 07/26/24 1148   Wound Length (cm) 0.3 cm 07/26/24 1148   Wound Width (cm) 0.3 cm 07/26/24 1148    Wound Depth (cm) 0.1 cm 07/26/24 1148   Wound Surface Area (cm^2) 0.09 cm^2 07/26/24 1148   Wound Volume (cm^3) 0.009 cm^3 07/26/24 1148   Calculated Wound Volume (cm^3) 0.01 cm^3 07/26/24 1148   Drainage Amount Scant 07/26/24 1148   Drainage Description Serosanguineous 07/26/24 1148   Non-staged Wound Description Partial thickness 07/26/24 1148   Treatments Cleansed;Site care 07/26/24 1148   Dressing Foam, Silicon (eg. Allevyn, etc) 07/26/24 1148   Wound packed? No 07/26/24 1148   Dressing Changed Changed 07/26/24 1148   Patient Tolerance Tolerated well 07/26/24 1148   Dressing Status Clean;Dry;Intact 07/26/24 1148       Contact through [employer] Secure Chat with any questions  Wound Care will continue to follow    Lolis COLEMANN RN CWON  Wound and Ostomy care

## 2024-07-26 NOTE — CONSULTS
NEPHROLOGY HOSPITAL CONSULTATION   Homero Koch 64 y.o. male MRN: [id number]  Unit/Bed#: ICU 03 Encounter: [id number]    ASSESSMENT and PLAN:  End-stage renal disease on hemodialysis:  Outpatient maintenance hemodialysis at [facility] [place]  Treatment days Monday, Wednesday, Friday  Target weight 84.4 kg  Native disease: Polycystic kidneys  Treatment today.  See procedure note    Access: Left IJ PermCath.  Tolerating blood flow rate of 400 and mL per minute.  Noted at outpatient    Electrolytes/acid-base:  Hyperkalemia: Potassium 7.1 on admission with repeat level 6.3.  Patient runs chronically high potassium due to noncompliance per outpatient notes at [facility]  Received temporizing treatment.  Definitive treatment plan to start with 1K bath for 1 hour then switch to 2K bath for the remainder of treatment  Mild hyponatremia: Likely related to decreased ability to excrete free water, volume excess.  Volume removal as tolerated on dialysis today going for 2 L.  Bicarbonate acceptable.  Elevated anion gap in the setting of necrotic bowel/perfusion.     Hypertension/volume:  Blood pressure acceptable  Chest x-ray: Lungs clear  Weight below target but using a bed scale therefore likely inaccurate.  Considering postop state going for 2 L of fluid today.  Medications per [facility] med rec: Losartan, metoprolol succinate and midodrine  Takes midodrine 2.5 mg as needed prior to dialysis  Oral medications on hold.  On IV metoprolol for rate control.    Cardiomyopathy/CHF with reduced ejection fraction  Prior echocardiogram reviewed: Ejection fraction 20 to 25% with global hypokinesis and mild to moderate MR.  Moderate to severe TR  Volume removal on treatment today.    ESRD MBD/secondary hyperparathyroidism of renal origin:  For management of secondary hyperparathyroidism on calcitriol 3 times a week 0.25 mcg  For management of hyperphosphatemia on sevelamer and calcium acetate.  Continue binders when patient begins oral  intake  Continue outpatient monitoring    ESRD anemia:  Outpatient on Venofer and Mircera protocol  Current hemoglobin 11.8.  Hemoglobin at/above goal for ESRD.  No indication for GRISELDA at this time.    Incarcerated umbilical hernia with necrotic bowel:  Status post OR for reduction of hernia, small bowel resection due to necrotic bowel.  2 L ascites was evacuated  Surgery following    Other: History of atrial fibrillation, poor compliance    SUMMARY OF RECOMMENDATIONS:  Hemodialysis today with next treatment planned for Monday    HISTORY OF PRESENT ILLNESS:  Requesting Physician: Ike Guo DO  Reason for Consult: ESRD on hemodialysis    Homero Koch is a 64 y.o. male with a past medical history of end-stage renal disease on hemodialysis, polycystic kidneys, hypertension, cardiomyopathy with ejection fraction 25%, atrial fibrillation, ascites, poor compliance who was admitted to [facility] after presenting with abdominal pain.  Homero has a history of umbilical hernia.  It was found to be incarcerated.  He underwent surgical reduction of hernia and small bowel resection due to necrotic bowel.  Patient was seen the day following surgery.  He is on hemodialysis.  He is lying quietly in bed.  Hemodynamically stable.  A renal consultation is requested today for assistance in the management of ESRD on hemodialysis.    PAST MEDICAL HISTORY:  Past Medical History:   Diagnosis Date    Complication of renal dialysis     Polycystic kidney disease        PAST SURGICAL HISTORY:  Past Surgical History:   Procedure Laterality Date    IR CATHETERGRAM  10/17/2019    IR IMAGE GUIDED ASPIRATION / DRAINAGE  9/23/2019    IR PARACENTESIS  10/9/2019    IR PARACENTESIS  6/24/2024    IR PARACENTESIS  6/28/2024    IR TEMPORARY DIALYSIS CATHETER PLACEMENT  7/28/2023    IR TUNNELED CENTRAL LINE CHECK/CHANGE/REPOSITION  12/14/2021    IR TUNNELED DIALYSIS CATHETER CHECK/CHANGE/REPOSITION/ANGIOPLASTY  10/17/2019    IR  TUNNELED DIALYSIS CATHETER PLACEMENT  9/30/2019    IR TUNNELED DIALYSIS CATHETER PLACEMENT  8/1/2023    IR TUNNELED DIALYSIS CATHETER REMOVAL  7/28/2023    SPLIT THICKNESS SKIN GRAFT Bilateral 11/7/2019    Procedure: SKIN GRAFT SPLIT THICKNESS (STSG)  EXTREMITY;  Surgeon: Torey Cha MD;  [facility];  Service: Plastics    SPLIT THICKNESS SKIN GRAFT Bilateral 11/12/2019    Procedure: SKIN GRAFT SPLIT THICKNESS (STSG)  EXTREMITY;  Surgeon: Torey Cha MD;  [facility];  Service: Plastics    UMBILICAL HERNIA REPAIR N/A 7/25/2024    Procedure: REPAIR HERNIA UMBILICAL; SMALL BOWEL RESECTION, PRIMARY ANASTOMOSIS;  Surgeon: Ike Guo DO;  [facility];  Service: General    VAC DRESSING APPLICATION Bilateral 11/18/2019    Procedure: CHANGE DRESSING;  Surgeon: Torey Cha MD;  [facility];  Service: Plastics    WOUND DEBRIDEMENT Bilateral 10/31/2019    Procedure: DEBRIDEMENT WOUND (WASH OUT);  Surgeon: Selvin Han DPM;  [facility];  Service: Podiatry    WOUND DEBRIDEMENT Bilateral 11/5/2019    Procedure: DEBRIDEMENT WOUND (WASH OUT);  Surgeon: Selvin Han DPM;  [facility];  Service: Podiatry    WOUND DEBRIDEMENT Bilateral 11/7/2019    Procedure: DEBRIDEMENT WOUND (WASH OUT);  Surgeon: Torey Cha MD;  [facility];  Service: Plastics    WOUND DEBRIDEMENT Bilateral 11/12/2019    Procedure: DEBRIDEMENT LOWER EXTREMITY (WASH OUT);  Surgeon: Torey Cha MD;  [facility];  Service: Plastics       ALLERGIES:  Allergies   Allergen Reactions    Sucroferric Oxyhydroxide Other (See Comments)     Not sure    Chlorhexidine Other (See Comments)     ok    Heparin Other (See Comments)     Brings plaletes down    Other Other (See Comments)     unsure       SOCIAL HISTORY:  Social History     Substance and Sexual Activity   Alcohol Use Not Currently     Social History     Substance  and Sexual Activity   Drug Use Yes    Types: Marijuana     Social History     Tobacco Use   Smoking Status Never   Smokeless Tobacco Never       FAMILY HISTORY:  No family history on file.    MEDICATIONS:    Current Facility-Administered Medications:     cefTRIAXone (ROCEPHIN) 2,000 mg in dextrose 5 % 50 mL IVPB, 2,000 mg, Intravenous, Q24H, Rah Barragan MD, Stopped at 07/26/24 0800    chlorhexidine (PERIDEX) 0.12 % oral rinse 15 mL, 15 mL, Mouth/Throat, Q12H SHERON, Anali Bernardo, CRNP, 15 mL at 07/26/24 0805    HYDROmorphone HCl (DILAUDID) injection 0.2 mg, 0.2 mg, Intravenous, Q3H PRN, Brandyn Fuentes MD    metoprolol (LOPRESSOR) injection 2.5 mg, 2.5 mg, Intravenous, Q4H, Brandyn Fuentes MD, 2.5 mg at 07/26/24 0805    metroNIDAZOLE (FLAGYL) IVPB (premix) 500 mg 100 mL, 500 mg, Intravenous, Q8H, Rah Barragan MD, Stopped at 07/26/24 0800    ondansetron (ZOFRAN) injection 4 mg, 4 mg, Intravenous, Q4H PRN, Brandyn Fuentes MD, 4 mg at 07/25/24 2331    oxyCODONE (ROXICODONE) IR tablet 5 mg, 5 mg, Oral, Q8H PRN, Brandyn Fuentes MD    oxyCODONE (ROXICODONE) split tablet 2.5 mg, 2.5 mg, Oral, Q8H PRN, Brandyn Fuentes MD    phenol (CHLORASEPTIC) 1.4 % mucosal liquid 1 spray, 1 spray, Mouth/Throat, Q2H PRN, Rah Barragan MD    promethazine (PHENERGAN) injection 12.5 mg, 12.5 mg, Intravenous, Once PRN, Fredrick Irby MD    REVIEW OF SYSTEMS:  Constitutional: Positive for fatigue  HENT: Negative for postnasal drip  Eyes: Negative for visual disturbance.   Respiratory: Denies shortness of breath at this time  Cardiovascular: Negative for chest pain, palpitations and leg swelling.   Gastrointestinal: Presented with abdominal pain.  Postop repair of hernia this morning  Genitourinary: No dysuria, hematuria  Musculoskeletal: Negative for unusual arthralgia or myalgia  Skin: Negative for rash.   Neurological: Negative for focal weakness  Hematological: Negative for   bleeding.  Psychiatric/Behavioral: Negative for confusion   All the systems were reviewed and were negative except as documented on the HPI.    PHYSICAL EXAM:  Current Weight: Weight - Scale: 75.9 kg (167 lb 5.3 oz)  First Weight: Weight - Scale: 75.9 kg (167 lb 5.3 oz)  Vitals:    07/26/24 0700 07/26/24 0802 07/26/24 0853 07/26/24 0900   BP:  138/88     BP Location:  Right arm     Pulse: 71 80 70 71   Resp: 15 (!) 11 12 (!) 11   Temp:  97.7 °F (36.5 °C) 97.7 °F (36.5 °C)    TempSrc:  Oral Oral    SpO2: 98% 92% 98% 100%   Weight:       Height:           Intake/Output Summary (Last 24 hours) at 7/26/2024 0909  Last data filed at 7/26/2024 0853  Gross per 24 hour   Intake 1417.17 ml   Output --   Net 1417.17 ml     Physical Exam  Vitals reviewed.   Constitutional:       General: He is not in acute distress.     Appearance: He is well-developed. He is ill-appearing. He is not toxic-appearing.   HENT:      Head: Normocephalic and atraumatic.      Nose: Nose normal. No congestion.      Mouth/Throat:      Mouth: Mucous membranes are moist.      Pharynx: No oropharyngeal exudate.   Eyes:      General: No scleral icterus.        Right eye: No discharge.         Left eye: No discharge.      Extraocular Movements: Extraocular movements intact.      Conjunctiva/sclera: Conjunctivae normal.   Neck:      Thyroid: No thyromegaly.      Vascular: No carotid bruit or JVD.      Trachea: No tracheal deviation.   Cardiovascular:      Rate and Rhythm: Normal rate and regular rhythm.      Heart sounds: No murmur heard.     No friction rub. No gallop.   Pulmonary:      Effort: Pulmonary effort is normal.      Breath sounds: Normal breath sounds.   Abdominal:      General: Bowel sounds are absent. There is distension.      Tenderness: There is abdominal tenderness.   Musculoskeletal:         General: No tenderness or signs of injury. Normal range of motion.      Cervical back: Normal range of motion and neck supple. No rigidity or  tenderness.      Right lower leg: Edema present.      Left lower leg: Edema present.      Comments: Mild edema   Skin:     General: Skin is warm and dry.      Capillary Refill: Capillary refill takes less than 2 seconds.      Coloration: Skin is not jaundiced or pale.      Findings: No erythema or rash.   Neurological:      Mental Status: He is alert and oriented to person, place, and time.   Psychiatric:         Mood and Affect: Mood normal.         Behavior: Behavior normal.           Invasive Devices:      Lab Results:   Results from last 7 days   Lab Units 07/26/24  0805 07/26/24  0542 07/26/24  0453 07/26/24  0026 07/25/24  2202 07/25/24  1720   WBC Thousand/uL  --   --  5.67 6.44  --  5.28   HEMOGLOBIN g/dL  --   --  11.8* 11.6*  --  12.3   I STAT HEMOGLOBIN g/dl  --   --   --   --  9.9*  --    HEMATOCRIT %  --   --  36.5 35.3*  --  38.8   HEMATOCRIT, ISTAT %  --   --   --   --  29*  --    PLATELETS Thousands/uL  --   --  114* 137*  --  119*   POTASSIUM mmol/L 6.3* 7.1*  --  5.0  --  6.1*   CHLORIDE mmol/L 95* 95*  --  95*  --  94*   CO2 mmol/L 22 21  --  23  --  25   CO2, I-STAT mmol/L  --   --   --   --  27  --    BUN mg/dL 82* 79*  --  75*  --  71*   CREATININE mg/dL 7.43* 7.19*  --  6.99*  --  6.85*   CALCIUM mg/dL 10.4* 9.8  --  9.4  --  9.9   MAGNESIUM mg/dL  --   --  2.4 2.3  --  2.5   PHOSPHORUS mg/dL  --   --  7.5* 6.8*  --  7.2*   ALK PHOS U/L  --   --   --  97  --  116*   ALT U/L  --   --   --  9  --  9   AST U/L  --   --   --  15  --  19   GLUCOSE, ISTAT mg/dl  --   --   --   --  87  --      Other Studies:

## 2024-07-26 NOTE — ANESTHESIA POSTPROCEDURE EVALUATION
Post-Op Assessment Note    CV Status:  Stable  Pain Score: 0    Pain management: adequate       Mental Status:  Alert and awake   Hydration Status:  Euvolemic   PONV Controlled:  Controlled   Airway Patency:  Patent     Post Op Vitals Reviewed: Yes    No anethesia notable event occurred.    Staff: Anesthesiologist               BP      Temp      Pulse     Resp      SpO2

## 2024-07-26 NOTE — PLAN OF CARE
Post-Dialysis RN Treatment Note    Blood Pressure:  Pre 140/72 mm/Hg  Post 149/56 mmHg   EDW  76 kg    Weight:  Pre 74.9 kg   Post 72.7 kg   Mode of weight measurement: Bed Scale   Volume Removed  2002 ml    Treatment duration 210 minutes    NS given  No    Treatment shortened? No   Medications given during Rx None Reported   Estimated Kt/V  1.16   Access type: Permacath/TDC   Access Issues: No    Report called to primary nurse   Yes Dianelys Ladd RN        Current hemodialysis plan of care is to remove a total of 2500 ml of fluid over a 3 1/2 hour treatment for a net of 2 liters as tolerated.  Monitor vital signs every 15 minutes while on treatment for patient safety.  Maintain cardiac monitoring while on treatment for patient safety.  Utilize a 1 K+ bath for 1 hour then change to a 2 K+ bath for 2 1/2 hours for serum potassium of 7.1 to decrease and maintain electrolyte balance.  Report received from Mary Farnsworth RN.  Plan reviewed with Dr Reyes Bahamonde.        Problem: METABOLIC, FLUID AND ELECTROLYTES - ADULT  Goal: Electrolytes maintained within normal limits  Description: INTERVENTIONS:  - Monitor labs and assess patient for signs and symptoms of electrolyte imbalances  - Administer electrolyte replacement as ordered  - Monitor response to electrolyte replacements, including repeat lab results as appropriate  - Instruct patient on fluid and nutrition as appropriate  Outcome: Progressing  Goal: Fluid balance maintained  Description: INTERVENTIONS:  - Monitor labs   - Monitor I/O and WT  - Instruct patient on fluid and nutrition as appropriate  - Assess for signs & symptoms of volume excess or deficit  Outcome: Progressing

## 2024-07-26 NOTE — ASSESSMENT & PLAN NOTE
"Patient seen in ER for abdominal pain.  He reported that his umbilical hernia is usually soft, non-painful and reducible.  Starting approximately 6 AM on 7/25 he was c/o significant abdominal pain. Early morning he was able to tolerate PO intake but his appetite decreased 2/2 pain. Denies N/V, and states that he was passing flatus.    In ER Surgery was consulted and manual reduction was attempted, but unsuccessful.    CT a/p: with \"periumbilical hernia containing fluid and fat measuring 4 cm.  Findings suggestive of enteritis, no evidence of bowel obstruction, colitis or diverticulitis, mild ascites.\"  Patient taken to OR for reduction of hernia, extubated post-procedure  7/26 POD #1 s/p reduction of hernia, small bowel resection for necrotic bowel, and evacuation of 2L ascites  Post-op endpoints: Hgb 11.6, Platelets 137, mag 2.3, lactic 1.5, VBG base deficit -3    Plan:  NPO per surgery  Maintain NGT LCWS  Continue ceftriaxone and flagyl for surgical prophylaxis  OOB to chair TID  Multimodal pain regimen  DVT ppx with SCDs and argatroban 2/2 heparin allergy  "

## 2024-07-26 NOTE — ASSESSMENT & PLAN NOTE
Lab Results   Component Value Date    EGFR 7 07/26/2024    EGFR 7 07/25/2024    EGFR 13 07/01/2024    CREATININE 6.99 (H) 07/26/2024    CREATININE 6.85 (H) 07/25/2024    CREATININE 4.26 (H) 07/01/2024     In the setting of autosomal dominant polycystic kidney disease  Has AVF  Follows with [facility]  PTA: calcitriol, sevelamer, midodrine on HD days    Plan:  BMP in am  PO meds currently on hold 2/2 bowel resection  Consult to nephrology for HD  Avoid hypotension, nephrotoxins

## 2024-07-26 NOTE — ASSESSMENT & PLAN NOTE
Follows with [facility]  2L ascites removed in OR  Has intermittent paracentesis  Last paracentesis during admission June 2024    Plan:  Serial abdominal exams  CMP  Given paracentesis, if develops hypotension will give albumin.  Currently hemodynamically stable.  Avoid hepatotoxins

## 2024-07-26 NOTE — PROGRESS NOTES
"General Surgery  Progress Note   Homero Koch 64 y.o. male MRN: [id number]  Unit/Bed#: ICU 03 Encounter: [id number]    Assessment:  65 yo male with 65 yo male with incarcerated umbilical hernia, s/p umbilical hernia repair with primary anastomosis, SBR .     Plan:  -NPO/NGT  -IV fluids  -DC Sandie  -Awaiting return of bowel function  -Cardiology consultation for cardiomyopathy  -Nephrology consultation for hemodialysis  -Wound care consultation for LE wounds  -DVT ppx  -Pain/ nausea control PRN  -OOB/ ambulation  -Incentive Spirometry  -Please message the General surgery resident role with any concerns      Subjective/Objective     Subjective:   Patient seen and examined at bedside, in no acute distress. No acute events overnight.  Patient reports no abdominal pain.  Patient has not yet passing bowel movements or flatus.  Patient has discomfort in his throat due to the NG tube.  No nausea vomiting fevers chills chest pain or shortness of breath.    Objective:   Vitals:Blood pressure 153/85, pulse 88, temperature 97.7 °F (36.5 °C), temperature source Oral, resp. rate 12, height 5' 8\" (1.727 m), weight 75.9 kg (167 lb 5.3 oz), SpO2 94%.  Temp (24hrs), Av.8 °F (36.6 °C), Min:97.5 °F (36.4 °C), Max:98.2 °F (36.8 °C)      I/O       None            Invasive Devices       Peripheral Intravenous Line  Duration             Peripheral IV 24 Distal;Right;Ventral (anterior) Forearm <1 day    Peripheral IV 24 Proximal;Right;Ventral (anterior) Forearm <1 day              Arterial Line  Duration             Arterial Line 24 Right Radial <1 day              Hemodialysis Catheter  Duration             HD Permanent Double Catheter -- days    HD Permanent Double Catheter 359 days              Drain  Duration             NG/OG/Enteral Tube Left nare <1 day                    Physical Exam:  GEN: NAD  HEENT: MMM, NGT in place  CV: well perfused RR  Lung: Normal effort  Ab: Soft, ND, NT, abdominal dressing " clean dry and intact  Extrem: No CCE   Neuro: A+Ox3     Lab Results   Component Value Date    WBC 6.44 07/26/2024    HGB 11.6 (L) 07/26/2024    HCT 35.3 (L) 07/26/2024    MCV 98 07/26/2024     (L) 07/26/2024      Lab Results   Component Value Date    SODIUM 134 (L) 07/26/2024    K 5.0 07/26/2024    CL 95 (L) 07/26/2024    CO2 23 07/26/2024    AGAP 16 (H) 07/26/2024    BUN 75 (H) 07/26/2024    CREATININE 6.99 (H) 07/26/2024    GLUC 128 07/26/2024    CALCIUM 9.4 07/26/2024    AST 15 07/26/2024    ALT 9 07/26/2024    ALKPHOS 97 07/26/2024    TP 7.3 07/26/2024    TBILI 0.95 07/26/2024    EGFR 7 07/26/2024       VTE Prophylaxis: Sequential compression device (Venodyne)         Rah Barragan MD  7/26/2024  4:32 AM

## 2024-07-26 NOTE — ASSESSMENT & PLAN NOTE
PTA: metoprolol, ASA, losartan.  Also takes midodrine on HD days   Refused Left heart cath in past.  Also non-compliant with GDMT in the past  Last echo: 6/27 EF 20%, thickness moderately increased, moderate concentric hypertrophy, severe global hypokinesis RV systolic function moderate to severely reduced, TV moderate regurgitation, RVSP 62, PASP severely increased    Plan:  Resume metoprolol, if unable to take po will change to IV  Resume losartan once BP allows and okay to take PO per surgery  Holding asa for now, until okay to resume per surgery  Monitor volume status closely.    HD for volume status per nephrology  Continue telemetry

## 2024-07-26 NOTE — CONSULTS
"[facility]  ICU Acceptance Note  Name: Homero Koch 64 y.o. male I MRN: [id number]  Unit/Bed#: ICU 03 I Date of Admission: 7/25/2024   Date of Service: 7/26/2024 I Hospital Day: 1    Consults    Assessment & Plan   * Incarcerated umbilical hernia with necrotic bowel  Assessment & Plan  Patient seen in ER for abdominal pain.  He reported that his umbilical hernia is usually soft, non-painful and reducible.  Starting approximately 6 AM on 7/25 he was c/o significant abdominal pain. Early morning he was able to tolerate PO intake but his appetite decreased 2/2 pain. Denies N/V, and states that he was passing flatus.    In ER Surgery was consulted and manual reduction was attempted, but unsuccessful.    CT a/p: with \"periumbilical hernia containing fluid and fat measuring 4 cm.  Findings suggestive of enteritis, no evidence of bowel obstruction, colitis or diverticulitis, mild ascites.\"  Patient taken to OR for reduction of hernia, extubated post-procedure  7/26 POD #1 s/p reduction of hernia, small bowel resection for necrotic bowel, and evacuation of 2L ascites  Post-op endpoints: Hgb 11.6, Platelets 137, mag 2.3, lactic 1.5, VBG base deficit -3    Plan:  NPO per surgery  Maintain NGT LCWS  Continue ceftriaxone and flagyl for surgical prophylaxis  OOB to chair TID  Multimodal pain regimen  DVT ppx with SCDs and argatroban 2/2 heparin allergy    HFrEF Dilated cardiomyopathy (HCC)  Assessment & Plan  PTA: metoprolol, ASA, losartan.  Also takes midodrine on HD days   Refused Left heart cath in past.  Also non-compliant with GDMT in the past  Last echo: 6/27 EF 20%, thickness moderately increased, moderate concentric hypertrophy, severe global hypokinesis RV systolic function moderate to severely reduced, TV moderate regurgitation, RVSP 62, PASP severely increased    Plan:  Resume metoprolol, if unable to take po will change to IV  Resume losartan once BP allows and okay to take PO per " surgery  Holding asa for now, until okay to resume per surgery  Monitor volume status closely.    HD for volume status per nephrology  Continue telemetry    Chronic kidney disease with end stage renal failure on dialysis (HCC)  Assessment & Plan  Lab Results   Component Value Date    EGFR 7 07/26/2024    EGFR 7 07/25/2024    EGFR 13 07/01/2024    CREATININE 6.99 (H) 07/26/2024    CREATININE 6.85 (H) 07/25/2024    CREATININE 4.26 (H) 07/01/2024     In the setting of autosomal dominant polycystic kidney disease  Has AVF  Follows with [facility]  PTA: calcitriol, sevelamer, midodrine on HD days    Plan:  BMP in am  PO meds currently on hold 2/2 bowel resection  Consult to nephrology for HD  Avoid hypotension, nephrotoxins    Polycystic liver disease  Assessment & Plan  Follows with [facility]  2L ascites removed in OR  Has intermittent paracentesis  Last paracentesis during admission June 2024    Plan:  Serial abdominal exams  CMP  Given paracentesis, if develops hypotension will give albumin.  Currently hemodynamically stable.  Avoid hepatotoxins    Anemia due to chronic kidney disease, on chronic dialysis and Acute Blood Loss Anemia (HCC)  Assessment & Plan  Lab Results   Component Value Date    HGB 11.6 (L) 07/26/2024    HGB 9.9 (L) 07/25/2024    HGB 12.3 07/25/2024     Baseline appears to be 11  Minimal EBL in OR, did not require transfusion    Plan:  Post-op Hgb 11.6  Transfuse for Hgb < 8 given cardiac history or hemodynamic instability  Has allergy to heparin  DVT ppx with argatroban  CBC and PTT monitoring per argatroban order set             History of Present Illness     HPI: Homero Koch is a 64 y.o. who presented to the ER 7/25 with c/o 1 day significant abdominal pain. He has a PMHx HFrEF, ESRD on HD 2/2 ADPKD, polycystic liver disease, chronic anemia.    He states that his abdominal pain started early am and he was able to take in food.  Denies N/V, an states that he had a BM in the am  and was passing flatus.  He reported that his umbilical hernia is normally soft, non-painful, and easily reducible.  While in the ER, surgery was consulted and manual reduction of umbilical hernia was attempted, but unsuccessful.  He was taken to the OR for reduction of incarcerated hernia.  He was found to also have necrotic bowel requiring small bowel resection.  During the case he was on epinephrine, but vasopressors were off by conclusion of the case.  Post-op he was extubated and transferred to Step Down Level 1 under Surgical Critical Care.     History obtained from chart review and the patient.  Review of Systems: Review of Systems   Constitutional:  Negative for chills, fatigue and fever.   HENT: Negative.     Eyes: Negative.    Respiratory:  Negative for cough, chest tightness, shortness of breath and wheezing.    Cardiovascular:  Negative for chest pain, palpitations and leg swelling.   Gastrointestinal:  Positive for abdominal pain (significant pain prior to OR, but pain improved now.). Negative for abdominal distention, diarrhea, nausea and vomiting.   Endocrine: Negative for polydipsia, polyphagia and polyuria.   Genitourinary:  Positive for decreased urine volume (On HD M-W-F). Negative for difficulty urinating, frequency and hematuria.   Musculoskeletal:  Negative for arthralgias and myalgias.   Skin:  Negative for color change, pallor, rash and wound.   Allergic/Immunologic: Negative.    Neurological:  Negative for dizziness, syncope, weakness and light-headedness.   Hematological: Negative.    Psychiatric/Behavioral: Negative.       Disposition: Stepdown Level 1   Historical Information   Past Medical History:  No date: Complication of renal dialysis  No date: Polycystic kidney disease Past Surgical History:  10/17/2019: IR CATHETERGRAM  9/23/2019: IR IMAGE GUIDED ASPIRATION / DRAINAGE  10/9/2019: IR PARACENTESIS  6/24/2024: IR PARACENTESIS  6/28/2024: IR PARACENTESIS  7/28/2023: IR TEMPORARY  DIALYSIS CATHETER PLACEMENT  12/14/2021: IR TUNNELED CENTRAL LINE CHECK/CHANGE/REPOSITION  10/17/2019: IR TUNNELED DIALYSIS CATHETER CHECK/CHANGE/REPOSITION/  ANGIOPLASTY  9/30/2019: IR TUNNELED DIALYSIS CATHETER PLACEMENT  8/1/2023: IR TUNNELED DIALYSIS CATHETER PLACEMENT  7/28/2023: IR TUNNELED DIALYSIS CATHETER REMOVAL  11/7/2019: SPLIT THICKNESS SKIN GRAFT; Bilateral      Comment:  Procedure: SKIN GRAFT SPLIT THICKNESS (STSG)  EXTREMITY;               Surgeon: Torey Cha MD;  [facility];  Service: Plastics  11/12/2019: SPLIT THICKNESS SKIN GRAFT; Bilateral      Comment:  Procedure: SKIN GRAFT SPLIT THICKNESS (STSG)  EXTREMITY;               Surgeon: Torey Cha MD;  [facility];  Service: Plastics  11/18/2019: VAC DRESSING APPLICATION; Bilateral      Comment:  Procedure: CHANGE DRESSING;  Surgeon: Torey Cha MD;  [facility];  Service: Plastics  10/31/2019: WOUND DEBRIDEMENT; Bilateral      Comment:  Procedure: DEBRIDEMENT WOUND (WASH OUT);  Surgeon:                Selvin Han DPM;  [facility];  Service:                Podiatry  11/5/2019: WOUND DEBRIDEMENT; Bilateral      Comment:  Procedure: DEBRIDEMENT WOUND (WASH OUT);  Surgeon:                Selvin Han DPM;  [facility];  Service:                Podiatry  11/7/2019: WOUND DEBRIDEMENT; Bilateral      Comment:  Procedure: DEBRIDEMENT WOUND (WASH OUT);  Surgeon: Torey Cha MD;  [facility];  Service:                Plastics  11/12/2019: WOUND DEBRIDEMENT; Bilateral      Comment:  Procedure: DEBRIDEMENT LOWER EXTREMITY (WASH OUT);                 Surgeon: Torey Cha MD;  [facility];  Service: Plastics   Current Outpatient Medications   Medication Instructions    Ascorbic Acid (Vitamin C) 100 MG CHEW 1 tablet, Oral, Daily    aspirin 81 mg,  Oral, Daily    atorvastatin (LIPITOR) 40 mg, Oral, Daily    B Complex-C-Folic Acid (TRIPHROCAPS PO) 1 capsule, Daily    b complex-vitamin C-folic acid (RENAL) 1 mg 1 capsule, Oral, Daily with dinner    calcitriol (ROCALTROL) 0.75 mcg, Oral, 3 times weekly    calcium acetate (PHOSLO) 1,334 mg, Oral, 3 times daily with meals    Cholecalciferol (Vitamin D-3) 25 MCG (1000 UT) CAPS Daily    cholecalciferol (VITAMIN D3) 1,000 Units, Daily    losartan (COZAAR) 25 mg, Oral, Daily    metoprolol succinate (TOPROL-XL) 50 mg, Oral, Daily    midodrine (PROAMATINE) 2.5 mg, Oral, Before Dialysis    nystatin powder     sevelamer (RENAGEL) 1,600 mg, Oral, 3 times daily with meals    Allergies   Allergen Reactions    Sucroferric Oxyhydroxide Other (See Comments)     Not sure    Chlorhexidine Other (See Comments)     ok    Heparin Other (See Comments)     Brings plaletes down    Other Other (See Comments)     unsure      Social History     Tobacco Use    Smoking status: Never    Smokeless tobacco: Never   Vaping Use    Vaping status: Never Used   Substance Use Topics    Alcohol use: Not Currently    Drug use: Yes     Types: Marijuana    No family history on file.     Objective                            Vitals I/O      Most Recent Min/Max in 24hrs   Temp 97.7 °F (36.5 °C) Temp  Min: 97.5 °F (36.4 °C)  Max: 98.2 °F (36.8 °C)   Pulse 88 Pulse  Min: 74  Max: 88   Resp 12 Resp  Min: 12  Max: 18   /85 BP  Min: 153/85  Max: 169/86   O2 Sat 94 % SpO2  Min: 94 %  Max: 99 %      Intake/Output Summary (Last 24 hours) at 7/26/2024 0428  Last data filed at 7/26/2024 0110  Gross per 24 hour   Intake 930 ml   Output --   Net 930 ml       Diet NPO; Sips with meds    Invasive Monitoring   Arterial Line  Sandie /71  Arterial Line BP  Min: 163/71  Max: 163/71    mmHg  Arterial Line MAP (mmHg)  Min: 102 mmHg  Max: 102 mmHg           Physical Exam   Physical Exam  Vitals and nursing note reviewed.   Eyes:      General: Vision grossly  intact. NeglectNo scleral icterus.        Right eye: No discharge.         Left eye: No discharge.      Extraocular Movements: Extraocular movements intact.      Conjunctiva/sclera: Conjunctivae normal.      Pupils: Pupils are equal, round, and reactive to light.   Skin:     General: Skin is warm, dry and not mottled extremities.      Capillary Refill: Capillary refill takes less than 2 seconds.      Coloration: Skin is not jaundiced or pale.      Findings: No rash.   HENT:      Head: Normocephalic and atraumatic.      Right Ear: Hearing normal. No drainage.      Left Ear: Hearing normal. No drainage.      Nose: Nasogastric tube (scant drainage noted) present. No nasal deformity, nasal tenderness, congestion, rhinorrhea or epistaxis.      Mouth/Throat:      Mouth: Mucous membranes are dry. No injury or angioedema.      Dentition: Abnormal dentition.      Pharynx: No oropharyngeal exudate.   Neck:      Vascular: No JVD.   no midline tenderness Cardiovascular:      Rate and Rhythm: Normal rate and regular rhythm.      Pulses: No decreased pulses.      Heart sounds: No murmur heard.     No friction rub. No gallop.   Musculoskeletal:         General: No deformity or signs of injury. Normal range of motion.      Right lower leg: No edema.      Left lower leg: No edema.   Abdominal: General: Bowel sounds are decreased. There is no distension.      Palpations: Abdomen is soft.      Tenderness: There is abdominal tenderness (appropriately tender, incision).   Constitutional:       General: He is not in acute distress.     Appearance: He is well-developed and well-nourished. He is ill-appearing. He is not toxic-appearing.      Interventions: He is not sedated, not intubated and not restrained.  Pulmonary:      Effort: Pulmonary effort is normal. No accessory muscle usage, respiratory distress or accessory muscle usage. He is not intubated.      Breath sounds: Normal breath sounds. No wheezing, rhonchi or rales.  "  Psychiatric:         Mood and Affect:  mood and affect abnormal.        Speech: Speech is not no receptive aphasia or no expressive aphasia.   Neurological:      General: No focal deficit present.      Mental Status: He is alert and oriented to person, place and time. Mental status is at baseline. He is CAM ICU negative.      Cranial Nerves: No dysarthria or facial asymmetry.      Sensory: Sensation is intact.      Motor: gross motor function is at baseline for patient. Strength full and intact in all extremities. No pronator drift or motor deficit.            Diagnostic Studies      EKG: sinus rhythm   Imagin/25 CT a/p: CT a/p: with \"periumbilical hernia containing fluid and fat measuring 4 cm. Findings suggestive of enteritis, no evidence of bowel obstruction, colitis or diverticulitis, mild ascites.\"  I have personally reviewed pertinent reports.   and I have personally reviewed pertinent films in PACS     Medications:  Scheduled PRN   cefTRIAXone, 2,000 mg, Q24H  chlorhexidine, 15 mL, Q12H SHERON  metoprolol, 2.5 mg, Q4H  metroNIDAZOLE, 500 mg, Q8H      HYDROmorphone, 0.2 mg, Q3H PRN  ondansetron, 4 mg, Q4H PRN  oxyCODONE, 5 mg, Q8H PRN  oxyCODONE, 2.5 mg, Q8H PRN  phenol, 1 spray, Q2H PRN  promethazine, 12.5 mg, Once PRN       Continuous    argatroban, 0.1-3 mcg/kg/min, Last Rate: 1 mcg/kg/min (24 0356)         Labs:    CBC    Recent Labs     24  1720 242 24  0026   WBC 5.28  --  6.44   HGB 12.3 9.9* 11.6*   HCT 38.8 29* 35.3*   *  --  137*     BMP    Recent Labs     24  1720 242 24  0026   SODIUM 135  --  134*   K 6.1*  --  5.0   CL 94*  --  95*   CO2  23   AGAP 16*  --  16*   BUN 71*  --  75*   CREATININE 6.85*  --  6.99*   CALCIUM 9.9  --  9.4       Coags    Recent Labs     24  1820 24  0254   INR 1.29*  --    PTT  --  32        Additional Electrolytes  Recent Labs     24  1720 24  1820 24  2202 24  0026 "   MG 2.5  --   --  2.3   PHOS 7.2*  --   --  6.8*   CAIONIZED  --    < > 1.22 1.11*    < > = values in this interval not displayed.          Blood Gas    Recent Labs     07/26/24  0038   PHART 7.366   JBC3HYG 38.7   PO2ART 71.1*   SXU0RTX 21.7*   BEART -3.3     No recent results LFTs  Recent Labs     07/25/24  1720 07/26/24  0026   ALT 9 9   AST 19 15   ALKPHOS 116* 97   ALB 4.3 3.8   TBILI 1.09* 0.95       Infectious  No recent results  Glucose  Recent Labs     07/25/24  1720 07/26/24  0026   GLUC 99 128               SAM Marie

## 2024-07-26 NOTE — QUICK NOTE
We met at bedside again to discuss neck steps.  Patient has thought about his options and wishes to proceed with surgery tonight.  Booked for emergent umbilical hernia repair, reach out to anesthesia and the OR team.    In the interim we reached out to nephrology team and discussed his hyperkalemia of 6.1.  The lab did not appear hemolyzed.  Attending nephrologist agreed with temporizing measures of glucose and insulin, requested that we reach back out if he needs dialysis before operating room and can afford to wait several hours.    Will discuss risks benefits alternatives and obtain formal written informed consent with patient immediately.

## 2024-07-26 NOTE — CASE MANAGEMENT
Case Management Assessment & Discharge Planning Note    Patient name Homero Koch  Location ICU 03/ICU 03 MRN [id number]  : 1960 Date 2024       Current Admission Date: 2024  Current Admission Diagnosis:Incarcerated umbilical hernia with necrotic bowel   Patient Active Problem List    Diagnosis Date Noted Date Diagnosed    Incarcerated umbilical hernia with necrotic bowel 2024     Abdominal pain 2024     Chronic venous stasis dermatitis of both lower extremities 2023     Hemodialysis catheter infection (HCC) 2023     Venous stasis ulcer of left lower leg with edema of left lower leg  ([facility]) 2023     Abnormal nuclear stress test 2021     Heart failure with reduced ejection fraction due to cardiomyopathy ([facility]) 2020     HFrEF Dilated cardiomyopathy ([facility]) 2020     Pruritus 2020     Insomnia 2020     Chronic hepatic failure without coma ([facility]) 2020     Paresis ([facility]) 2020     Hypoproteinemia ([facility]) 2020     Systolic dysfunction 2020     GERD (gastroesophageal reflux disease) 2019     Oropharyngeal dysphagia 2019     Constipation 2019     At Risk for Pressure ulcer, buttock 10/29/2019     ESRD (end stage renal disease) ([facility]) 10/28/2019     Scrotal swelling 10/27/2019     Hyperkalemia 10/26/2019     Wound of right leg 10/26/2019     Wound of left leg 10/26/2019     Multiple wounds of skin 10/26/2019     Cellulitis 10/26/2019     ESRD (end stage renal disease) on dialysis ([facility])      Ileus ([facility]) 10/09/2019     Ascites 10/09/2019     Elevated troponin 10/09/2019     Hypotension 10/06/2019     Anemia due to chronic kidney disease, on chronic dialysis and Acute Blood Loss Anemia ([facility]) 10/06/2019     Paroxysmal atrial fibrillation ([facility]) 2019     Gram-negative bacteremia 2019     Chronic kidney disease with end stage renal failure on dialysis ([facility]) 2019     Hyponatremia 2019      Multiple and open wound of lower limb 09/21/2019     Total bilirubin, elevated 09/21/2019     Thrombocytopenia (HCC) 09/21/2019     Polycystic liver disease 08/07/2018     ADPKD (autosomal dominant polycystic kidney) 08/07/2018     Peripheral neuropathy 11/23/2015     Polycystic kidney disease 08/17/2015     Hypertension 10/29/2013       LOS (days): 1  Geometric Mean LOS (GMLOS) (days): 9.8  Days to GMLOS:8.9     OBJECTIVE:  PATIENT READMITTED TO HOSPITAL  Risk of Unplanned Readmission Score: 26.23         Current admission status: Inpatient       Preferred Pharmacy:   [id number] - [place], [place] - [address]  [address]  [place] [address]  Phone: [phone] Fax: [phone]    [id number] - [place], [place] - [address]  [address]  [place] [address]  Phone: [phone] Fax: [phone]    Primary Care Provider: Seth Cunha MD    Primary Insurance: MEDICARE  Secondary Insurance: [employer]    ASSESSMENT:  Active Health Care Proxies       Tiffani Koch Health Care Representative - Spouse   Primary Phone: [phone] (Mobile)  Home Phone: [phone]                 Patient Information  Admitted from:: Home  Mental Status: Alert  During Assessment patient was accompanied by: Not accompanied during assessment  Assessment information provided by:: Patient  Primary Caregiver: Self  Support Systems: Spouse/significant other, Daughter, Family members  County of Residence: [place]  What city do you live in?: [place]  Home entry access options. Select all that apply.: Stairs  Number of steps to enter home.: 5 (5 through the front or 6 steps through the side)  Type of Current Residence: 2 story home  Upon entering residence, is there a bedroom on the main floor (no further steps)?: No  A bedroom is located on the following floor levels of residence (select all that apply):: 2nd Floor  Upon entering residence, is there a bathroom on the main  floor (no further steps)?: Yes (half bathroom on first and full on second)  Number of steps to 2nd floor from main floor: One Flight  Living Arrangements: Lives w/ Spouse/significant other, Lives w/ Daughter, Lives w/ Extended Family    Activities of Daily Living Prior to Admission  Functional Status: Independent  Completes ADLs independently?: Yes  Ambulates independently?: Yes  Does patient use assisted devices?: Yes  Assisted Devices (DME) used: Straight Cane (as needed)  Does patient currently own DME?: Yes  What DME does the patient currently own?: Straight Cane  Does patient have a history of Outpatient Therapy (PT/OT)?: No  Does the patient have a history of Short-Term Rehab?: No  Does patient have a history of HHC?: Yes  Does patient currently have HHC?: No    Patient Information Continued  Income Source: Pension/[employer]  Does patient have prescription coverage?: Yes  Does patient receive dialysis treatments?: [place] [place] 5:30 [place]  Does patient have a history of substance abuse?: No  Does patient have a history of Mental Health Diagnosis?: No    Means of Transportation  Means of Transport to Appts:: Drives Self    Social Determinants of Health (SDOH)      Flowsheet Row Most Recent Value   Housing Stability    In the last 12 months, was there a time when you were not able to pay the mortgage or rent on time? N   At any time in the past 12 months, were you homeless or living in a shelter (including now)? N   Transportation Needs    In the past 12 months, has lack of transportation kept you from medical appointments or from getting medications? no   In the past 12 months, has lack of transportation kept you from meetings, work, or from getting things needed for daily living? No   Food Insecurity    Within the past 12 months, you worried that your food would run out before you got the money to buy more. Never true   Within the past 12 months, the food you bought just didn't last and you didn't have  money to get more. Never true   Utilities    In the past 12 months has the electric, gas, oil, or water company threatened to shut off services in your home? No            DISCHARGE DETAILS:    Discharge planning discussed with:: pt    Other Referral/Resources/Interventions Provided:  Interventions: Other (Specify)  Referral Comments: CM met with pt at bedside and introduced self/role with dcp. Pt reports he resides with his wife, daughter, and grandson in a multi story home. Pt's bedroom/bathroom is on second floor and half bathroom on first. Pt reports he is fully IPTA. Uses a SPC as needed. Pt reports he does transport himself to and from . Pt aware CM will f/u with him and his family with any dc recommendations.

## 2024-07-26 NOTE — PROCEDURES
HEMODIALYSIS PROCEDURE NOTE  The patient was seen and examined on hemodialysis.  Time: 3.5 hours  Sodium: 135 Blood flow: 400   Dialyzer: F160 Potassium: 1-->2 Dialysate flow: 800   Access: CVC Bicarbonate: 35 Ultrafiltration goal: 2L   Medications on HD: none   No issues on treatment.  Patient tolerating procedure.  No issues with PermCath at this time.  Noted at outpatient dialysis blood flow rates are significantly lower which may indicate that there is possible flow issues.  Will continue to monitor.

## 2024-07-26 NOTE — ANESTHESIA PREPROCEDURE EVALUATION
Procedure:  REPAIR HERNIA UMBILICAL (Abdomen)    Relevant Problems   ANESTHESIA (within normal limits)      CARDIO   (+) Hypertension   (+) Paroxysmal atrial fibrillation (HCC)      GI/HEPATIC   (+) Chronic hepatic failure without coma (HCC)   (+) GERD (gastroesophageal reflux disease)   (+) Ileus (HCC)   (+) Oropharyngeal dysphagia   (+) Polycystic liver disease      /RENAL   (+) ADPKD (autosomal dominant polycystic kidney)   (+) Anemia due to chronic kidney disease, on chronic dialysis and Acute Blood Loss Anemia (HCC)   (+) Chronic kidney disease with end stage renal failure on dialysis (HCC)   (+) ESRD (end stage renal disease) (HCC)   (+) ESRD (end stage renal disease) on dialysis (HCC)   (+) Polycystic kidney disease      HEMATOLOGY   (+) Anemia due to chronic kidney disease, on chronic dialysis and Acute Blood Loss Anemia (HCC)   (+) Thrombocytopenia (HCC)      Echo 06/27/2024:    Left Ventricle: Left ventricular cavity size is normal. Wall thickness is moderately increased. There is moderate concentric hypertrophy. The left ventricular ejection fraction is 20%. Systolic function is severely reduced. Global longitudinal strain is reduced at -7%. There is severe global hypokinesis. Unable to assess diastolic function due to E/A fusion. Left atrial filling pressure is elevated.    Right Ventricle: Right ventricular cavity size is dilated. Systolic function is moderately to severely reduced.    Left Atrium: The atrium is dilated.    Mitral Valve: There is mild regurgitation.    Tricuspid Valve: There is moderate regurgitation. The right ventricular systolic pressure is severely elevated. The estimated right ventricular systolic pressure is 62.00 mmHg.    Pulmonic Valve: There is mild regurgitation.    Pericardium: There is a left pleural effusion. There is a right pleural effusion.    Pulmonary Artery: The pulmonary artery systolic pressure is severely increased. Notching of the RVOT Doppler waveform is  noted.    Physical Exam    Airway    Mallampati score: II  TM Distance: >3 FB  Neck ROM: full     Dental   Comment: Denies any loose teeth     Cardiovascular      Pulmonary      Other Findings        Anesthesia Plan  ASA Score- 4 Emergent    Anesthesia Type- general with ASA Monitors.         Additional Monitors: arterial line.    Airway Plan: ETT.    Comment: Patient is at significant risk for complications from anesthesia during this surgery. HD dependent MWF, last dialyzed on Wednesday. Potassium today is 6.1 - treated with insulin and glucose pre-operatively. Last PO at 0600 this AM, however stomach is full on CTAP. He also has severe right and left heart dysfunction with severe pulmonary hypertension. I discussed with him his significantly elevated risk profile for undergoing a surgery and a general anesthetic and he is agreeable with proceeding. We will plan on a pre-induction arterial line. He has two 20 gauge IV lines on the right. He has a limb alert bracelet on his left upper extremity, when asked about this he states it is to save the arm for future dialysis access. Discussed possibility of placing a central line for large bore IV access and the patient states that he would rather me use his LUE limb despite the intention of avoiding use for future dialysis access. If he deteriorates under general anesthesia he may still require a CVC. He has a full stomach and a SBO on CT, will plan for RSI with rocuronium given his elevated potassium. Overall he is at significantly elevated risk for perioperative morbidity or even mortality, however his case is emergent and any delay in care to attempt to optimize him puts him at further risk for the sequelae of an ischemic bowel, which would likely only worsen the possibility of a poor outcome.  .       Plan Factors-Exercise tolerance (METS): >4 METS.    Chart reviewed. EKG reviewed. Imaging results reviewed. Existing labs reviewed.                   Induction-  intravenous and rapid sequence induction.    Postoperative Plan- Plan for postoperative opioid use. Planned trial extubation    Perioperative Resuscitation Plan - Level 1 - Full Code.       Informed Consent- Anesthetic plan and risks discussed with patient.  I personally reviewed this patient with the CRNA. Discussed and agreed on the Anesthesia Plan with the CRNA..

## 2024-07-26 NOTE — ASSESSMENT & PLAN NOTE
Lab Results   Component Value Date    HGB 11.6 (L) 07/26/2024    HGB 9.9 (L) 07/25/2024    HGB 12.3 07/25/2024     Baseline appears to be 11  Minimal EBL in OR, did not require transfusion    Plan:  Post-op Hgb 11.6  Transfuse for Hgb < 8 given cardiac history or hemodynamic instability  Has allergy to heparin  DVT ppx with argatroban  CBC and PTT monitoring per argatroban order set

## 2024-07-26 NOTE — ED PROVIDER NOTES
History  Chief Complaint   Patient presents with    Abdominal Pain     Patient states this morning he started with umbilical pain around 0600 hx of hernia. No prior sx to hernia. Hernia is not reducible. Patient also stating his R testicle is swollen.      Deniz is a 64 yom with history of ESRD on dialysis, polycystic liver disease, and CHF who presents with a painful periumbilical hernia. States that he had an umbilical hernia for at least 8 years however it never caused pain and he was always able to push it back in place himself.  Woke up at 0600 this morning with his hernia larger than normal and with severe pain in it.  Has been in severe pain all day, has tried to reduce it himself numerous times today without success.  Ate a small amount for breakfast but has not been able to eat or drink since due to pain. Denies fever, chills, weakness, chest pain, dyspnea, nausea, vomiting, diarrhea, or constipation, is passing flatus.  Also has right testicular swelling but states that it is chronic, is not painful, and he is not wishing to be evaluated for same(he is undergoing outpatient workup, states it is from his ascites).  No confusion.  No abdominal pain other than in hernia.  Is on dialysis M/W/F, next session tomorrow.        Prior to Admission Medications   Prescriptions Last Dose Informant Patient Reported? Taking?   Ascorbic Acid (Vitamin C) 100 MG CHEW  Other (Specify), Self Yes No   Sig: Chew 1 tablet daily   B Complex-C-Folic Acid (TRIPHROCAPS PO)  Self, Other (Specify) Yes No   Sig: Take 1 capsule by mouth daily   Patient not taking: Reported on 7/9/2024   Cholecalciferol (Vitamin D-3) 25 MCG (1000 UT) CAPS  Other (Specify), Self Yes No   Sig: Take by mouth daily   Patient not taking: Reported on 7/9/2024   aspirin 81 mg chewable tablet  Other (Specify), Self No No   Sig: Chew 1 tablet (81 mg total) daily   atorvastatin (LIPITOR) 40 mg tablet  Other (Specify), Self No No   Sig: Take 1 tablet (40 mg total)  by mouth daily   Patient not taking: Reported on 7/9/2024   b complex-vitamin C-folic acid (RENAL) 1 mg  Other (Specify), Self No No   Sig: Take 1 capsule by mouth daily with dinner   calcitriol (ROCALTROL) 0.25 mcg capsule  Other (Specify), Self No No   Sig: Take 3 capsules (0.75 mcg total) by mouth 3 (three) times a week   calcium acetate (PHOSLO) 667 mg capsule  Self, Other (Specify) Yes No   Sig: Take 1,334 mg by mouth 3 (three) times a day with meals   cholecalciferol (VITAMIN D3) 1,000 units tablet  Self, Other (Specify) Yes No   Sig: Take 1,000 Units by mouth daily   Patient not taking: Reported on 7/9/2024   losartan (COZAAR) 25 mg tablet  Other (Specify), Self No No   Sig: Take 1 tablet (25 mg total) by mouth daily   metoprolol succinate (TOPROL-XL) 25 mg 24 hr tablet  Self, Other (Specify) No No   Sig: Take 2 tablets (50 mg total) by mouth daily   midodrine (PROAMATINE) 2.5 mg tablet  Other (Specify), Self No No   Sig: Take 1 tablet (2.5 mg total) by mouth Before Dialysis (For SBP less than 110 pre-dialysis)   nystatin powder  Other (Specify), Self Yes No   Patient not taking: Reported on 7/9/2024   sevelamer (RENAGEL) 800 mg tablet   No No   Sig: Take 2 tablets (1,600 mg total) by mouth 3 (three) times a day with meals   Patient not taking: Reported on 7/9/2024      Facility-Administered Medications: None       Past Medical History:   Diagnosis Date    Complication of renal dialysis     Polycystic kidney disease        Past Surgical History:   Procedure Laterality Date    IR CATHETERGRAM  10/17/2019    IR IMAGE GUIDED ASPIRATION / DRAINAGE  9/23/2019    IR PARACENTESIS  10/9/2019    IR PARACENTESIS  6/24/2024    IR PARACENTESIS  6/28/2024    IR TEMPORARY DIALYSIS CATHETER PLACEMENT  7/28/2023    IR TUNNELED CENTRAL LINE CHECK/CHANGE/REPOSITION  12/14/2021    IR TUNNELED DIALYSIS CATHETER CHECK/CHANGE/REPOSITION/ANGIOPLASTY  10/17/2019    IR TUNNELED DIALYSIS CATHETER PLACEMENT  9/30/2019    IR TUNNELED  DIALYSIS CATHETER PLACEMENT  8/1/2023    IR TUNNELED DIALYSIS CATHETER REMOVAL  7/28/2023    SPLIT THICKNESS SKIN GRAFT Bilateral 11/7/2019    Procedure: SKIN GRAFT SPLIT THICKNESS (STSG)  EXTREMITY;  Surgeon: Torey Cha MD;  [facility];  Service: Plastics    SPLIT THICKNESS SKIN GRAFT Bilateral 11/12/2019    Procedure: SKIN GRAFT SPLIT THICKNESS (STSG)  EXTREMITY;  Surgeon: Torey Cha MD;  [facility];  Service: Plastics    VAC DRESSING APPLICATION Bilateral 11/18/2019    Procedure: CHANGE DRESSING;  Surgeon: Torey Cha MD;  [facility];  Service: Plastics    WOUND DEBRIDEMENT Bilateral 10/31/2019    Procedure: DEBRIDEMENT WOUND (WASH OUT);  Surgeon: Selvin Han DPM;  [facility];  Service: Podiatry    WOUND DEBRIDEMENT Bilateral 11/5/2019    Procedure: DEBRIDEMENT WOUND (WASH OUT);  Surgeon: eSlvin Han DPM;  [facility];  Service: Podiatry    WOUND DEBRIDEMENT Bilateral 11/7/2019    Procedure: DEBRIDEMENT WOUND (WASH OUT);  Surgeon: Torey Cha MD;  [facility];  Service: Plastics    WOUND DEBRIDEMENT Bilateral 11/12/2019    Procedure: DEBRIDEMENT LOWER EXTREMITY (WASH OUT);  Surgeon: Torey Cha MD;  [facility];  Service: Plastics       No family history on file.  I have reviewed and agree with the history as documented.    E-Cigarette/Vaping    E-Cigarette Use Never User      E-Cigarette/Vaping Substances    Nicotine No     THC No     CBD No     Flavoring No     Other No     Unknown No      Social History     Tobacco Use    Smoking status: Never    Smokeless tobacco: Never   Vaping Use    Vaping status: Never Used   Substance Use Topics    Alcohol use: Not Currently    Drug use: Yes     Types: Marijuana       Review of Systems   Constitutional: Negative.  Negative for chills, diaphoresis, fatigue and fever.   HENT: Negative.     Eyes: Negative.    Respiratory:  Negative.  Negative for cough and shortness of breath.    Cardiovascular: Negative.  Negative for chest pain and palpitations.   Gastrointestinal:  Positive for abdominal pain. Negative for abdominal distention, blood in stool, constipation, diarrhea, nausea and vomiting.   Endocrine: Negative.    Genitourinary:         Does not make urine at baseline.   Musculoskeletal: Negative.  Negative for back pain and neck pain.   Skin: Negative.    Allergic/Immunologic: Negative.    Neurological: Negative.    Hematological: Negative.    Psychiatric/Behavioral: Negative.     All other systems reviewed and are negative.      Physical Exam  Physical Exam  Vitals and nursing note reviewed. Exam conducted with a chaperone present.   Constitutional:       General: He is in acute distress.      Comments: In acute distress due to apparent severe pain.   HENT:      Head: Normocephalic.      Nose: Nose normal.      Mouth/Throat:      Mouth: Mucous membranes are moist.      Pharynx: Oropharynx is clear.   Eyes:      General: No scleral icterus.     Extraocular Movements: Extraocular movements intact.      Pupils: Pupils are equal, round, and reactive to light.   Cardiovascular:      Rate and Rhythm: Normal rate and regular rhythm.      Pulses: Normal pulses.      Heart sounds: Normal heart sounds.   Pulmonary:      Effort: Pulmonary effort is normal. No respiratory distress.      Breath sounds: Normal breath sounds.   Abdominal:      General: Abdomen is flat. Bowel sounds are normal. There is no distension.      Palpations: Abdomen is soft.      Tenderness: There is left CVA tenderness. There is no guarding or rebound.      Hernia: A hernia is present.      Comments: 4 cm umbilical hernia with very mild overlying erythema, no duskiness.  Feels firm and is nonreducible, is severely tender to palpation.  No tenderness to palpation of the remainder of the abdomen.   Genitourinary:     Comments: Scrotal edema without tenderness, erythema,  crepitus, or other skin changes. Cremasteric reflex intact bilaterally.  Musculoskeletal:         General: Normal range of motion.      Cervical back: Normal range of motion and neck supple.   Lymphadenopathy:      Cervical: No cervical adenopathy.   Skin:     General: Skin is warm and dry.      Coloration: Skin is not jaundiced.      Findings: No bruising.   Neurological:      General: No focal deficit present.      Mental Status: He is alert and oriented to person, place, and time.      Motor: No weakness.         Vital Signs  ED Triage Vitals   Temperature Pulse Respirations Blood Pressure SpO2   07/25/24 1644 07/25/24 1644 07/25/24 1644 07/25/24 1644 07/25/24 1644   98.2 °F (36.8 °C) 86 16 157/97 99 %      Temp Source Heart Rate Source Patient Position - Orthostatic VS BP Location FiO2 (%)   07/25/24 1644 07/25/24 1644 07/25/24 1644 07/25/24 1644 --   Oral Monitor Sitting Right arm       Pain Score       07/25/24 1724       10 - Worst Possible Pain           Vitals:    07/25/24 1644 07/25/24 2101 07/25/24 2114   BP: 157/97 156/78 169/86   Pulse: 86 74 79   Patient Position - Orthostatic VS: Sitting Lying          Visual Acuity      ED Medications  Medications   metoprolol succinate (TOPROL-XL) 24 hr tablet 50 mg (has no administration in time range)   midodrine (PROAMATINE) tablet 2.5 mg (has no administration in time range)   ondansetron (ZOFRAN) injection 4 mg (has no administration in time range)   oxyCODONE (ROXICODONE) IR tablet 5 mg (has no administration in time range)   oxyCODONE (ROXICODONE) split tablet 2.5 mg (has no administration in time range)   HYDROmorphone HCl (DILAUDID) injection 0.2 mg (has no administration in time range)   calcium gluconate 2 g in sodium chloride 0.9% 100 mL (premix) (2 g Intravenous New Bag 7/25/24 2031)   metroNIDAZOLE (FLAGYL) IVPB (premix) 500 mg 100 mL (500 mg Intravenous New Bag 7/25/24 2110)   ceFAZolin (ANCEF) IVPB (premix in dextrose) 2,000 mg 50 mL (2,000 mg  Intravenous New Bag 7/25/24 2047)   HYDROmorphone (DILAUDID) injection 0.5 mg (0.5 mg Intravenous Given 7/25/24 1724)   ondansetron (ZOFRAN) injection 4 mg (4 mg Intravenous Given 7/25/24 1722)   iohexol (OMNIPAQUE) 350 MG/ML injection (MULTI-DOSE) 100 mL (100 mL Intravenous Given 7/25/24 1738)   fentaNYL injection 50 mcg (50 mcg Intravenous Given 7/25/24 1839)   insulin regular (HumuLIN R,NovoLIN R) IV syringe 10 Units (10 Units Intravenous Given 7/25/24 2055)   dextrose 50 % IV solution 50 mL (50 mL Intravenous Given 7/25/24 2055)       Diagnostic Studies  Results Reviewed       Procedure Component Value Units Date/Time    Hepatic function panel [[id number]]  (Abnormal) Collected: 07/25/24 1720    Lab Status: Final result Specimen: Blood from Arm, Right Updated: 07/25/24 1859     Total Bilirubin 1.09 mg/dL      Bilirubin, Direct 0.39 mg/dL      Alkaline Phosphatase 116 U/L      AST 19 U/L      ALT 9 U/L      Total Protein 8.2 g/dL      Albumin 4.3 g/dL     Protime-INR [[id number]]  (Abnormal) Collected: 07/25/24 1820    Lab Status: Final result Specimen: Blood from Arm, Right Updated: 07/25/24 1853     Protime 16.8 seconds      INR 1.29    Magnesium [[id number]]  (Normal) Collected: 07/25/24 1720    Lab Status: Final result Specimen: Blood from Arm, Right Updated: 07/25/24 1841     Magnesium 2.5 mg/dL     Phosphorus [[id number]]  (Abnormal) Collected: 07/25/24 1720    Lab Status: Final result Specimen: Blood from Arm, Right Updated: 07/25/24 1841     Phosphorus 7.2 mg/dL     Calcium, ionized [[id number]]  (Abnormal) Collected: 07/25/24 1820    Lab Status: Final result Specimen: Blood from Arm, Right Updated: 07/25/24 1836     Calcium, Ionized 1.09 mmol/L     Lactic acid, plasma (w/reflex if result > 2.0) [[id number]]  (Normal) Collected: 07/25/24 1720    Lab Status: Final result Specimen: Blood from Arm, Right Updated: 07/25/24 1746     LACTIC ACID 0.9 mmol/L     Narrative:      Result may be elevated if tourniquet was  used during collection.    Basic metabolic panel [[id number]]  (Abnormal) Collected: 07/25/24 1720    Lab Status: Final result Specimen: Blood from Arm, Right Updated: 07/25/24 1747     Sodium 135 mmol/L      Potassium 6.1 mmol/L      Chloride 94 mmol/L      CO2 25 mmol/L      ANION GAP 16 mmol/L      BUN 71 mg/dL      Creatinine 6.85 mg/dL      Glucose 99 mg/dL      Calcium 9.9 mg/dL      eGFR 7 ml/min/1.73sq m     Narrative:      National Kid[employer] guidelines for Chronic Kidney Disease (CKD):     Stage 1 with normal or high GFR (GFR > 90 mL/min/1.73 square meters)    Stage 2 Mild CKD (GFR = 60-89 mL/min/1.73 square meters)    Stage 3A Moderate CKD (GFR = 45-59 mL/min/1.73 square meters)    Stage 3B Moderate CKD (GFR = 30-44 mL/min/1.73 square meters)    Stage 4 Severe CKD (GFR = 15-29 mL/min/1.73 square meters)    Stage 5 End Stage CKD (GFR <15 mL/min/1.73 square meters)  Note: GFR calculation is accurate only with a steady state creatinine    CBC and differential [[id number]]  (Abnormal) Collected: 07/25/24 1720    Lab Status: Final result Specimen: Blood from Arm, Right Updated: 07/25/24 1738     WBC 5.28 Thousand/uL      RBC 3.85 Million/uL      Hemoglobin 12.3 g/dL      Hematocrit 38.8 %       fL      MCH 31.9 pg      MCHC 31.7 g/dL      RDW 16.5 %      MPV 10.2 fL      Platelets 119 Thousands/uL      nRBC 0 /100 WBCs      Segmented % 81 %      Immature Grans % 0 %      Lymphocytes % 8 %      Monocytes % 9 %      Eosinophils Relative 2 %      Basophils Relative 0 %      Absolute Neutrophils 4.25 Thousands/µL      Absolute Immature Grans 0.01 Thousand/uL      Absolute Lymphocytes 0.43 Thousands/µL      Absolute Monocytes 0.49 Thousand/µL      Eosinophils Absolute 0.08 Thousand/µL      Basophils Absolute 0.02 Thousands/µL                    CT abdomen pelvis with contrast   Final Result by David Brantley MD (07/25 1919)   Addendum (preliminary) 1 of 1 by David Brantley MD (07/25  1919)   ADDENDUM:      Liver the small bowel within the umbilical hernia and surrounding ascites.    Findings concerning for threatened incarceration and discussed with    surgery.      Final      1.  Findings suggestive of enteritis. No evidence of bowel obstruction, colitis or diverticulitis.   2. Mild ascites.         Workstation performed: [id number]                    Procedures  Procedures         ED Course  ED Course as of 07/25/24 2116   Thu Jul 25, 2024   1744 Unable to reduce hernia at bedside, concerning for incarcerated hernia. No overlying skin changes.  Ice applied, IV pain medication given, pt placed in Trendelenburg, subsequent attempts to reduce by general surgery unsuccessful.    After discussion, will send for CT to evaluate further.   1748 LACTIC ACID: 0.9  Normal.   1748 Basic metabolic panel(!)  Has history of ESRD on dialysis, next session tomorrow am.    1828 CT abdomen pelvis with contrast     IMPRESSION:     1.  Findings suggestive of enteritis. No evidence of bowel obstruction, colitis or diverticulitis.  2. Mild ascites.     1828 Disagree with radiologists interpretation of no abdominal wall abnormalities, pt clinically has incarcerated on exam.    General surgery to repeat evaluation.   1859 POCT INR(!): 1.29   1924 Edited CT result:  ADDENDUM:     Liver the small bowel within the umbilical hernia and surrounding ascites. Findings concerning for threatened incarceration and discussed with surgery.                                   SBIRT 20yo+      Flowsheet Row Most Recent Value   Initial Alcohol Screen: US AUDIT-C     1. How often do you have a drink containing alcohol? 0 Filed at: 07/25/2024 1825   2. How many drinks containing alcohol do you have on a typical day you are drinking?  0 Filed at: 07/25/2024 1825   3a. Male UNDER 65: How often do you have five or more drinks on one occasion? 0 Filed at: 07/25/2024 1825   Audit-C Score 0 Filed at: 07/25/2024 1825   PAOLA: How many times in  the past year have you...    Used an illegal drug or used a prescription medication for non-medical reasons? Never Filed at: 07/25/2024 1825                      Medical Decision Making  Patient presents with painful, nonreducible umbilical hernia.  Vital signs within normal is, patient appears to be in severe pain.  Hernia is incarcerated but does not appear to be strangulated.  See ED course for remainder of discussion.  Of note, an ECG was ordered just prior to admission, however was reviewed by the admitting team.  Taken to OR for operative repair.    Amount and/or Complexity of Data Reviewed  External Data Reviewed: labs, radiology, ECG and notes.  Labs: ordered. Decision-making details documented in ED Course.  Radiology: ordered. Decision-making details documented in ED Course.    Risk  Prescription drug management.  Decision regarding hospitalization.                 Disposition  Final diagnoses:   Incarcerated umbilical hernia   ESRD on dialysis (HCC)   Hyperkalemia     Time reflects when diagnosis was documented in both MDM as applicable and the Disposition within this note       Time User Action Codes Description Comment    7/25/2024  5:49 PM Beena Garcia Add [K42.0] Incarcerated umbilical hernia     7/25/2024  7:42 PM Devin Fuenteson Add [Q61.2] ADPKD (autosomal dominant polycystic kidney)     7/25/2024  7:42 PM Brandyn Fuentes Add [N18.6] ESRD (end stage renal disease) ([facility])     7/25/2024  7:42 PM Devin Fuenteson Remove [Q61.2] ADPKD (autosomal dominant polycystic kidney)     7/25/2024  7:42 PM Brandyn Fuentes Add [I42.0] HFrEF Dilated cardiomyopathy (HCC)     7/25/2024  7:42 PM Devin Fuenteson Add [K72.10] Chronic hepatic failure without coma ([facility])     7/25/2024  7:53 PM Beena Garcia Add [N18.6,  Z99.2] ESRD on dialysis ([facility])     7/25/2024  7:53 PM Beena Garcia [E87.5] Hyperkalemia           ED Disposition       ED Disposition   Admit    Condition   Stable    Date/Time   Thu  Jul 25, 2024 1953    Comment   Case was discussed with Dr. Fuentes and the patient's admission status was agreed to be Admission Status: inpatient status to the service of Dr. Guo .               Follow-up Information    None         Patient's Medications   Discharge Prescriptions    No medications on file       No discharge procedures on file.    PDMP Review       None            ED Provider  Electronically Signed by             Beena Garcia DO  07/25/24 4465

## 2024-07-26 NOTE — OCCUPATIONAL THERAPY NOTE
Occupational Therapy Cancellation Note        Patient Name: Homero Koch  Today's Date: 7/26/2024 07/26/24 0932   Note Type   Note type Cancelled Session   Cancel Reasons Patient off floor/hemodialysis   Additional Comments OT orders received, chart reviewed. Pt currently receiving bedside HD, will cancel and f/u as appropriate and schedule allows     Damaris Valle, OT

## 2024-07-26 NOTE — CONSULTS
Consultation - Cardiology Team One  Homero Koch 64 y.o. male MRN: [id number]  Unit/Bed#: ICU 03 Encounter: [id number]    Inpatient consult to Cardiology  Consult performed by: Melba Jones PA-C  Consult ordered by: Brandyn Fuentes MD          Physician Requesting Consult: Ike Guo DO  Reason for Consult / Principal Problem: Cardiomyopathy    Assessment:    1.  Incarcerated umbilical hernia with necrotic bowel: s/p umbilical hernia repair with necrotic small bowel resection and evacuation of 2 L of ascites.  Remains NPO with NG tube.  Management per surgery team.     2.  Chronic HFrEF: Volume management per HD. Extremities warm and well perfused without evidence of low output.    3.  Cardiomyopathy: EF 20%.  Has a history of a positive stress test but has declined L[facility].  Maintained on Toprol-XL and losartan 25 mg daily that is on hold as patient is NPO. Not on a diuretic as patient does not produce urine.  Volume management through HD.    4.  PAF: Currently maintaining NSR.  As patient is NPO, Toprol has been switched to IV Lopressor 2.5 mg every 4 hours.    5.  ESRD on HD: MWF.  Management per nephrology    6.  History of medical noncompliance      Plan/Recommendations:  Volume management per HD  Continue IV Lopressor while patient NPO  Restart GDMT with Toprol and losartan as able  ______________________________________________________________________    CC: Abdominal pain      History of Present Illness   HPI: Homero Koch is a 64 y.o. year old male who has HFrEF, dilated cardiomyopathy with positive stress test but declined [facility] for ischemic workup, PAH, PAF, ESRD on HD (MWF), umbilical hernia, history of medication noncompliance who follows with cardiologist Dr. Adamson.  Patient presented to the emergency room at [facility] on 7/25/2024 with severe umbilical pain.  Patient tried to reduce the umbilical hernia numerous times without success.  Able to the ED patient's  vital signs were stable with oxygen saturation 99% on room air.  EKG revealed sinus rhythm with nonspecific IVCD and ST-T wave abnormality.  CT A/P revealed enteritis with no evidence of bowel obstruction, colitis or diverticulitis.  Labs revealed potassium of 6.1, creatinine 6.85, stable CBC.  Patient was admitted with nonreducible umbilical hernia.  After discussion of treatment options patient underwent emergent repair of umbilical hernia with necrotic small bowel resection with evacuation of 2 L of ascites.  Patient was admitted to the ICU and nephrology was consulted for ESRD.  Cardiology has been consulted for history of cardiomyopathy.    Home medication regimen includes aspirin 81 mg daily, atorvastatin 40 mg daily, losartan 25 mg daily, Toprol-XL 50 mg daily, midodrine 2.5 mg before dialysis.    Patient resting in bed during consultation and denies any chest pain, sob, palpitations, lightheadedness, or dizziness.       Echocardiogram 6/27/2024: EF 20% with moderate concentric LVH and severe global hypokinesis, moderate-severely reduced RV function, mild LA dilatation, mild MR, moderate TR with PASP 62 mmHg.  No significant change when compared to the EKG from 7/28/2023.    EKG reviewed personally: 7/25/2024-sinus rhythm at a rate of 75 bpm with nonspecific IVCD and ST-T wave abnormality laterally.  No significant change when compared to the EKG from 6/23/2024.    Telemetry reviewed personally:         Review of Systems   Constitutional: Negative. Negative for chills.   HENT:          Sore throat from NG tube   Cardiovascular:  Negative for chest pain, dyspnea on exertion, leg swelling, near-syncope, orthopnea, palpitations, paroxysmal nocturnal dyspnea and syncope.   Respiratory: Negative.  Negative for cough, shortness of breath and wheezing.    Endocrine: Negative.    Hematologic/Lymphatic: Negative.    Skin: Negative.    Musculoskeletal: Negative.    Gastrointestinal: Negative.  Negative for diarrhea,  nausea and vomiting.   Neurological:  Negative for dizziness, light-headedness and weakness.   Psychiatric/Behavioral: Negative.  Negative for altered mental status.    All other systems reviewed and are negative.    Historical Information   Past Medical History:   Diagnosis Date    Complication of renal dialysis     Polycystic kidney disease      Past Surgical History:   Procedure Laterality Date    IR CATHETERGRAM  10/17/2019    IR IMAGE GUIDED ASPIRATION / DRAINAGE  9/23/2019    IR PARACENTESIS  10/9/2019    IR PARACENTESIS  6/24/2024    IR PARACENTESIS  6/28/2024    IR TEMPORARY DIALYSIS CATHETER PLACEMENT  7/28/2023    IR TUNNELED CENTRAL LINE CHECK/CHANGE/REPOSITION  12/14/2021    IR TUNNELED DIALYSIS CATHETER CHECK/CHANGE/REPOSITION/ANGIOPLASTY  10/17/2019    IR TUNNELED DIALYSIS CATHETER PLACEMENT  9/30/2019    IR TUNNELED DIALYSIS CATHETER PLACEMENT  8/1/2023    IR TUNNELED DIALYSIS CATHETER REMOVAL  7/28/2023    SPLIT THICKNESS SKIN GRAFT Bilateral 11/7/2019    Procedure: SKIN GRAFT SPLIT THICKNESS (STSG)  EXTREMITY;  Surgeon: Torey Cha MD;  [facility];  Service: Plastics    SPLIT THICKNESS SKIN GRAFT Bilateral 11/12/2019    Procedure: SKIN GRAFT SPLIT THICKNESS (STSG)  EXTREMITY;  Surgeon: Torey Cha MD;  [facility];  Service: Plastics    UMBILICAL HERNIA REPAIR N/A 7/25/2024    Procedure: REPAIR HERNIA UMBILICAL; SMALL BOWEL RESECTION, PRIMARY ANASTOMOSIS;  Surgeon: Ike Guo DO;  [facility];  Service: General    VAC DRESSING APPLICATION Bilateral 11/18/2019    Procedure: CHANGE DRESSING;  Surgeon: Torey Cha MD;  [facility];  Service: Plastics    WOUND DEBRIDEMENT Bilateral 10/31/2019    Procedure: DEBRIDEMENT WOUND (WASH OUT);  Surgeon: Selvin Han DPM;  [facility];  Service: Podiatry    WOUND DEBRIDEMENT Bilateral 11/5/2019    Procedure: DEBRIDEMENT WOUND (WASH OUT);  Surgeon: Selvin  NANO Han;  [facility];  Service: Podiatry    WOUND DEBRIDEMENT Bilateral 11/7/2019    Procedure: DEBRIDEMENT WOUND (WASH OUT);  Surgeon: Torey Cha MD;  [facility];  Service: Plastics    WOUND DEBRIDEMENT Bilateral 11/12/2019    Procedure: DEBRIDEMENT LOWER EXTREMITY (WASH OUT);  Surgeon: Torey Cha MD;  [facility];  Service: Plastics     Social History     Substance and Sexual Activity   Alcohol Use Not Currently     Social History     Substance and Sexual Activity   Drug Use Yes    Types: Marijuana     Social History     Tobacco Use   Smoking Status Never   Smokeless Tobacco Never     Family History: No family history on file.    Meds/Allergies   all current active meds have been reviewed, current meds:   Current Facility-Administered Medications   Medication Dose Route Frequency    [START ON 7/29/2024] calcitriol (ROCALTROL) capsule 0.25 mcg  0.25 mcg Oral Once per day on Monday Wednesday Friday    cefTRIAXone (ROCEPHIN) 2,000 mg in dextrose 5 % 50 mL IVPB  2,000 mg Intravenous Q24H    chlorhexidine (PERIDEX) 0.12 % oral rinse 15 mL  15 mL Mouth/Throat Q12H SHERON    HYDROmorphone HCl (DILAUDID) injection 0.2 mg  0.2 mg Intravenous Q3H PRN    metoprolol (LOPRESSOR) injection 2.5 mg  2.5 mg Intravenous Q4H    metroNIDAZOLE (FLAGYL) IVPB (premix) 500 mg 100 mL  500 mg Intravenous Q8H    ondansetron (ZOFRAN) injection 4 mg  4 mg Intravenous Q4H PRN    oxyCODONE (ROXICODONE) IR tablet 5 mg  5 mg Oral Q8H PRN    oxyCODONE (ROXICODONE) split tablet 2.5 mg  2.5 mg Oral Q8H PRN    pantoprazole (PROTONIX) injection 40 mg  40 mg Intravenous Q24H SHERON    phenol (CHLORASEPTIC) 1.4 % mucosal liquid 1 spray  1 spray Mouth/Throat Q2H PRN    promethazine (PHENERGAN) injection 12.5 mg  12.5 mg Intravenous Once PRN   , and PTA meds:   Prior to Admission Medications   Prescriptions Last Dose Informant Patient Reported? Taking?   Ascorbic Acid (Vitamin C) 100 MG CHEW  Other  (Specify), Self Yes No   Sig: Chew 1 tablet daily   B Complex-C-Folic Acid (TRIPHROCAPS PO)  Self, Other (Specify) Yes No   Sig: Take 1 capsule by mouth daily   Patient not taking: Reported on 7/9/2024   Cholecalciferol (Vitamin D-3) 25 MCG (1000 UT) CAPS  Other (Specify), Self Yes No   Sig: Take by mouth daily   Patient not taking: Reported on 7/9/2024   aspirin 81 mg chewable tablet  Other (Specify), Self No No   Sig: Chew 1 tablet (81 mg total) daily   atorvastatin (LIPITOR) 40 mg tablet  Other (Specify), Self No No   Sig: Take 1 tablet (40 mg total) by mouth daily   Patient not taking: Reported on 7/9/2024   b complex-vitamin C-folic acid (RENAL) 1 mg  Other (Specify), Self No No   Sig: Take 1 capsule by mouth daily with dinner   calcitriol (ROCALTROL) 0.25 mcg capsule  Other (Specify), Self No No   Sig: Take 3 capsules (0.75 mcg total) by mouth 3 (three) times a week   calcium acetate (PHOSLO) 667 mg capsule  Self, Other (Specify) Yes No   Sig: Take 1,334 mg by mouth 3 (three) times a day with meals   cholecalciferol (VITAMIN D3) 1,000 units tablet  Self, Other (Specify) Yes No   Sig: Take 1,000 Units by mouth daily   Patient not taking: Reported on 7/9/2024   losartan (COZAAR) 25 mg tablet  Other (Specify), Self No No   Sig: Take 1 tablet (25 mg total) by mouth daily   metoprolol succinate (TOPROL-XL) 25 mg 24 hr tablet  Self, Other (Specify) No No   Sig: Take 2 tablets (50 mg total) by mouth daily   midodrine (PROAMATINE) 2.5 mg tablet  Other (Specify), Self No No   Sig: Take 1 tablet (2.5 mg total) by mouth Before Dialysis (For SBP less than 110 pre-dialysis)   nystatin powder  Other (Specify), Self Yes No   Patient not taking: Reported on 7/9/2024   sevelamer (RENAGEL) 800 mg tablet   No No   Sig: Take 2 tablets (1,600 mg total) by mouth 3 (three) times a day with meals   Patient not taking: Reported on 7/9/2024      Facility-Administered Medications: None          Allergies   Allergen Reactions     "Sucroferric Oxyhydroxide Other (See Comments)     Not sure    Chlorhexidine Other (See Comments)     ok    Heparin Other (See Comments)     Brings plaletes down    Other Other (See Comments)     unsure       Objective   Vitals: Blood pressure 138/88, pulse 74, temperature 97.7 °F (36.5 °C), temperature source Oral, resp. rate (!) 11, height 5' 8\" (1.727 m), weight 75.9 kg (167 lb 5.3 oz), SpO2 99%.,     Body mass index is 25.44 kg/m².,     Systolic (24hrs), Av , Min:138 , Max:169     Diastolic (24hrs), Av, Min:78, Max:97    Wt Readings from Last 3 Encounters:   24 75.9 kg (167 lb 5.3 oz)   24 77.5 kg (170 lb 12.8 oz)   24 76.2 kg (168 lb)      Lab Results   Component Value Date    CREATININE 7.43 (H) 2024    CREATININE 7.19 (H) 2024    CREATININE 6.99 (H) 2024         Intake/Output Summary (Last 24 hours) at 2024 1017  Last data filed at 2024 0853  Gross per 24 hour   Intake 1417.17 ml   Output --   Net 1417.17 ml     Weight (last 2 days)       Date/Time Weight    24 0037 75.9 (167.33)          Invasive Devices       Peripheral Intravenous Line  Duration             Peripheral IV 24 Distal;Right;Ventral (anterior) Forearm <1 day    Peripheral IV 24 Proximal;Right;Ventral (anterior) Forearm <1 day              Arterial Line  Duration             Arterial Line 24 Right Radial <1 day              Hemodialysis Catheter  Duration             HD Permanent Double Catheter 359 days              Drain  Duration             NG/OG/Enteral Tube Left nare <1 day                      Physical Exam  Vitals and nursing note reviewed.   Constitutional:       General: He is not in acute distress.     Appearance: He is well-developed.      Comments: On RA in NAD  NG tube noted   HENT:      Head: Normocephalic and atraumatic.   Neck:      Vascular: No JVD.   Cardiovascular:      Rate and Rhythm: Normal rate and regular rhythm.      Heart sounds: Normal heart " sounds. No murmur heard.     No friction rub. No gallop.   Pulmonary:      Effort: Pulmonary effort is normal. No respiratory distress.      Breath sounds: Normal breath sounds. No wheezing or rales.   Chest:      Chest wall: No tenderness.   Abdominal:      General: Bowel sounds are normal. There is no distension.      Palpations: Abdomen is soft.      Tenderness: There is no abdominal tenderness.   Musculoskeletal:         General: No tenderness. Normal range of motion.      Cervical back: Normal range of motion and neck supple.      Right lower leg: No edema.      Left lower leg: No edema.   Skin:     General: Skin is warm and dry.      Coloration: Skin is not pale.      Findings: No erythema.   Neurological:      Mental Status: He is alert and oriented to person, place, and time.   Psychiatric:         Mood and Affect: Mood normal.         Behavior: Behavior normal.         Thought Content: Thought content normal.         Judgment: Judgment normal.           LABORATORY RESULTS:      CBC with diff:   Results from last 7 days   Lab Units 07/26/24  0453 07/26/24  0026 07/25/24 2202 07/25/24  1720   WBC Thousand/uL 5.67 6.44  --  5.28   HEMOGLOBIN g/dL 11.8* 11.6*  --  12.3   I STAT HEMOGLOBIN g/dl  --   --  9.9*  --    HEMATOCRIT % 36.5 35.3*  --  38.8   HEMATOCRIT, ISTAT %  --   --  29*  --    MCV fL 100* 98  --  101*   PLATELETS Thousands/uL 114* 137*  --  119*   RBC Million/uL 3.65* 3.59*  --  3.85*   MCH pg 32.3 32.3  --  31.9   MCHC g/dL 32.3 32.9  --  31.7   RDW % 16.3* 16.5*  --  16.5*   MPV fL 9.9 10.1  --  10.2   NRBC AUTO /100 WBCs 0 0  --  0       CMP:  Results from last 7 days   Lab Units 07/26/24  0805 07/26/24  0542 07/26/24  0026 07/25/24 2202 07/25/24  1720   POTASSIUM mmol/L 6.3* 7.1* 5.0  --  6.1*   CHLORIDE mmol/L 95* 95* 95*  --  94*   CO2 mmol/L 22 21 23  --  25   CO2, I-STAT mmol/L  --   --   --  27  --    BUN mg/dL 82* 79* 75*  --  71*   CREATININE mg/dL 7.43* 7.19* 6.99*  --  6.85*  "  GLUCOSE, ISTAT mg/dl  --   --   --  87  --    CALCIUM mg/dL 10.4* 9.8 9.4  --  9.9   AST U/L  --   --  15  --  19   ALT U/L  --   --  9  --  9   ALK PHOS U/L  --   --  97  --  116*   EGFR ml/min/1.73sq m 7 7 7  --  7       BMP:  Results from last 7 days   Lab Units 07/26/24  0805 07/26/24  0542 07/26/24  0026 07/25/24  2202 07/25/24  1720   POTASSIUM mmol/L 6.3* 7.1* 5.0  --  6.1*   CHLORIDE mmol/L 95* 95* 95*  --  94*   CO2 mmol/L 22 21 23  --  25   CO2, I-STAT mmol/L  --   --   --  27  --    BUN mg/dL 82* 79* 75*  --  71*   CREATININE mg/dL 7.43* 7.19* 6.99*  --  6.85*   GLUCOSE, ISTAT mg/dl  --   --   --  87  --    CALCIUM mg/dL 10.4* 9.8 9.4  --  9.9          No results found for: \"NTBNP\"         Results from last 7 days   Lab Units 07/26/24  0453 07/26/24  0026 07/25/24  1720   MAGNESIUM mg/dL 2.4 2.3 2.5         Results from last 7 days   Lab Units 07/25/24  1820   INR  1.29*     Lipid Profile:   No results found for: \"CHOL\"  No results found for: \"HDL\"  No results found for: \"LDLCALC\"  No results found for: \"TRIG\"    Imaging: I have personally reviewed pertinent reports.    XR chest 1 view    Result Date: 7/26/2024  Narrative: XR CHEST 1 VIEW INDICATION: Confirm NGT placement. COMPARISON: Chest radiograph 6/23/2024 FINDINGS: Enteric catheter tip is in the proximal stomach with sideport in the lower esophagus. Left subclavian central venous catheter tip in the upper right atrium. Clear lungs. No pneumothorax or pleural effusion. Cardiomegaly. Bones are unremarkable for age. Normal upper abdomen.     Impression: Enteric catheter tip in the proximal stomach with sideport in the lower esophagus. Recommend advancement. The study was marked in EPIC for immediate notification. Workstation performed: [id number]     CT abdomen pelvis with contrast    Addendum Date: 7/25/2024 Addendum:   ADDENDUM: Liver the small bowel within the umbilical hernia and surrounding ascites. Findings concerning for threatened " incarceration and discussed with surgery.    Result Date: 7/25/2024  Narrative: CT ABDOMEN AND PELVIS WITH IV CONTRAST INDICATION: incarcerated umbilical hernia with severe pain, CT to evaluate prior to reduction per gen surg, no need to await labs(has ESRD on dialysis, does not make urine). COMPARISON: None. TECHNIQUE: CT examination of the abdomen and pelvis was performed. Multiplanar 2D reformatted images were created from the source data. This examination, like all CT scans performed in the [facility], was performed utilizing techniques to minimize radiation dose exposure, including the use of iterative reconstruction and automated exposure control. Radiation dose length product (DLP) for this visit: 829 mGy-cm IV Contrast: 100 mL of iohexol (OMNIPAQUE) Enteric Contrast: Not administered. FINDINGS: ABDOMEN LOWER CHEST: Clear lung bases. LIVER/BILIARY TREE: Innumerable cysts throughout the liver, some of which are calcified, not significantly changed since the prior exam. GALLBLADDER: No calcified gallstones. No pericholecystic inflammatory change. SPLEEN: Stable wedgelike hypoattenuation in the spleen could represent a chronic infarct. PANCREAS: Unremarkable. ADRENAL GLANDS: Unremarkable. KIDNEYS/URETERS: Polycystic kidneys. No significant change in size or number of the cysts. No obstructive uropathy. STOMACH AND BOWEL: Periumbilical hernia containing fluid and fat, measuring 4 cm. APPENDIX: No findings to suggest appendicitis. ABDOMINOPELVIC CAVITY: Mild ascites. Fluid-filled mildly distended loops of small bowel in the mid abdomen. VESSELS: Unremarkable for patient's age. PELVIS REPRODUCTIVE ORGANS: Unremarkable for patient's age. URINARY BLADDER: Collapsed. ABDOMINAL WALL/INGUINAL REGIONS: Unremarkable. BONES: No acute fracture or suspicious osseous lesion.     Impression: 1.  Findings suggestive of enteritis. No evidence of bowel obstruction, colitis or diverticulitis. 2. Mild ascites.  Workstation performed: [id number]      INPATIENT Paracentesis    Result Date: 6/28/2024  Narrative: PROCEDURE SUMMARY: 1.  Limited abdominal ultrasound 2.  Ultrasound-guided paracentesis (therapeutic and diagnostic) PROCEDURAL PERSONNEL: Attending physician(s): Kahlil Roe MD Resident physician(s): Xochitl Mendez MD Advanced practice provider(s): None INDICATION: Reaccumulation of ascites. Last paracentesis 6/24/2024. COMPLICATIONS: No immediate complications. PROCEDURE DETAILS: Informed consent for the procedure including risks, benefits and alternatives was obtained and time-out was performed prior to the procedure. The site was prepared and draped using all elements of maximal sterile barrier technique including sterile gloves, sterile gown, cap, mask, large sterile sheet, sterile ultrasound probe cover, hand hygiene and cutaneous antisepsis with Betadine. Ultrasound images were obtained to identify an access window.  Permanent images were stored and sent to PACS. An access window in the right lower abdomen  was localized and 1 % local lidocaine was administered.  Under real-time ultrasound guidance, access to the ascites was obtained with a 5-Ukrainian catheter using a trocar technique. The catheter was placed to suction drainage. At the end of the procedure the catheter was removed and sterile bandage was applied to the puncture site. Specimens removed: 3.6 L of clear dark kaylin ascites.     Impression: Successful ultrasound-guided paracentesis. Attestation Signer name: Yogesh Roe MD I attest that I was present for the entire procedure. I reviewed the stored images and agree with the report as written. Workstation performed: [id number]     Echo complete w/ contrast if indicated    Result Date: 6/27/2024  Narrative:   Left Ventricle: Left ventricular cavity size is normal. Wall thickness is moderately increased. There is moderate concentric hypertrophy. The left ventricular ejection fraction is  20%. Systolic function is severely reduced. Global longitudinal strain is reduced at -7%. There is severe global hypokinesis. Unable to assess diastolic function due to E/A fusion. Left atrial filling pressure is elevated.   Right Ventricle: Right ventricular cavity size is dilated. Systolic function is moderately to severely reduced.   Left Atrium: The atrium is dilated.   Mitral Valve: There is mild regurgitation.   Tricuspid Valve: There is moderate regurgitation. The right ventricular systolic pressure is severely elevated. The estimated right ventricular systolic pressure is 62.00 mmHg.   Pulmonic Valve: There is mild regurgitation.   Pericardium: There is a left pleural effusion. There is a right pleural effusion.   Pulmonary Artery: The pulmonary artery systolic pressure is severely increased. Notching of the RVOT Doppler waveform is noted. Strain was performed to quantify interventricular dyssynchrony and evaluate components of myocardial function due to (positive family history of HCM, family history of sudden death, HCM, Chemotherapy, complex CHD, genetic abnormality, viral infection) . Results from the utilization of Strain Analysis are listed in the report below.     MRI abdomen w wo contrast    Result Date: 6/26/2024  Narrative: MRI - ABDOMEN - WITH AND WITHOUT CONTRAST INDICATION: 64 years / Male. exclude solid lesion in hepatic/renal cysts. COMPARISON: CT abdomen pelvis 6/23/2024 TECHNIQUE: Multiplanar/multisequence MRI of the abdomen was performed before and after administration of contrast. IV Contrast: 8 mL of Gadobutrol injection (SINGLE-DOSE) FINDINGS: LOWER CHEST: Unremarkable. LIVER: Lobulated liver contour may be related to numerous cysts although cirrhosis is possible. Innumerable cysts throughout the liver, some of which are proteinaceous. No solid hepatic mass is identified. The previously described 4.1 rim calcified lesion in the left lobe (series 9 image 16) demonstrates T1 hyperintense  signal in keeping with a proteinaceous cyst. Increased in signal on out of phase dual echo imaging in keeping with hepatic iron deposition. Patent hepatic and portal veins. BILE DUCTS: No intrahepatic or extrahepatic bile duct dilation. GALLBLADDER: Normal. PANCREAS: Unremarkable. ADRENAL GLANDS: Unremarkable. SPLEEN: Spleen is enlarged measuring 15.8 cm. Increase in signal on out of phase dual echo imaging in keeping with splenic iron deposition. Defect in the posterior spleen with associated susceptibility effect may be due to prior infarct with old blood products. Area of mild geographic hypoenhancement in the spleen on arterial phase (series 13 image 42) suggests perfusional change. KIDNEYS/PROXIMAL URETERS: The kidneys are enlarged and contain innumerable simple and proteinaceous cysts in keeping with autosomal dominant polycystic kidney disease. A cyst in the right kidney lower pole measuring 5.8 cm demonstrates slightly thickened  calcified wall measuring 5 mm and is heterogeneous on T1 and T2 weighted images (series 12 image 113). Bosniak IIF. This cyst was previously drained on 9/23/2019 and is stable in size since CT 10/7/2019. Rim calcification of multiple cysts is better visualized on CT. No solid renal mass is identified. No hydroureteronephrosis. BOWEL: No dilated loops of bowel. PERITONEUM/RETROPERITONEUM: Redemonstrated large volume ascites. LYMPH NODES: No abdominal lymphadenopathy. VESSELS: No aneurysm. ABDOMINAL WALL: Unremarkable BONES: No suspicious osseous lesion.     Impression: 1. Enlarged kidneys with innumerable simple and proteinaceous renal cysts in keeping with autosomal dominant polycystic kidney disease. A Bosniak IIF cystic renal mass in the right kidney lower pole measuring 5.8 cm has been stable since CT 10/7/2019. This was previously drained on 9/23/2019. No solid renal mass is identified. 2. Multiple simple and proteinaceous hepatic cysts. No solid hepatic mass is identified.  Lobulated liver contour may be related to numerous cysts although cirrhosis is possible.Hepatic iron deposition. 3. Splenomegaly and splenic iron deposition. 4. Large volume ascites. Workstation performed: [id number]         Counseling / Coordination of Care  Total floor / unit time spent today 45 minutes.  Greater than 50% of total time was spent with the patient and / or family counseling and / or coordination of care.  A description of the counseling / coordination of care: Review of history, current assessment, development of a plan.      Code Status: Level 1 - Full Code    ** Please Note: Dragon 360 Dictation voice to text software may have been used in the creation of this document. **

## 2024-07-26 NOTE — ED NOTES
This RN reached out to pharmacy r/t IV insulin, pharmacy says they sent it but will remake it now since it is not up here.     Dianelys Gonzalez RN  07/25/24 2050

## 2024-07-26 NOTE — QUICK NOTE
"Noted patient's NG tube malpositioned on CXR. Attempted to advance NG tube with RN however resistance met. Removed NG tube. Patient initially refusing replacement despite adequate education. Eventually, patient gave verbal consent and allowed replacement of NG tube which occurred without any complications. KUB performed confirming proper positioning. Patient notes improvement in pain from NG tube.     Patient is very frustrated by this experience and states he does not want this to happen to anyone else. He feels his symptoms were ignored and he was given chloraseptic spray as inappropriate trmt. He states \"the graphs\" from the NG tube suction should have told the team there was an issue and he states the person that placed the NG tube in the OR needs to be terminated. He states he should have the NG tube placed awake sitting up and not in OR and there should have been an CXR for NG tube performed in OR. At the end of our conversation patient is satisfied and appreciative. Swabs given to patient. Trmt plan discussed including planned transfer to [place]. Robyn Bennett PA-C  "

## 2024-07-26 NOTE — ANESTHESIA PROCEDURE NOTES
Arterial Line Insertion    Performed by: Lena Roman CRNA  Authorized by: Fredrick Irby MD  Preparation: Patient was prepped and draped in the usual sterile fashion.  Indications: hemodynamic monitoring  Orientation:  Right  Location: radial artery  Procedure Details:  Needle gauge: 20  Number of attempts: 1    Post-procedure:  Post-procedure: dressing applied  Waveform: good waveform  Post-procedure CNS: unchanged  Patient tolerance: Patient tolerated the procedure well with no immediate complications  Comments: Ultrasound used

## 2024-07-26 NOTE — OP NOTE
OPERATIVE REPORT  PATIENT NAME: Homero Koch    :  1960  MRN: [id number]  Pt Location: AN OR ROOM 02    SURGERY DATE: 2024    Surgeons and Role:     * Ike Guo DO - Primary     * Brandyn Fuentes MD - Assisting     * Rah Barragan MD - Assisting    Preop Diagnosis:  Incarcerated umbilical hernia [K42.0]    Post-Op Diagnosis Codes:     * Incarcerated umbilical hernia [K42.0]    Procedure(s):  REPAIR HERNIA UMBILICAL; SMALL BOWEL RESECTION. PRIMARY ANASTOMOSIS    Specimen(s):  ID Type Source Tests Collected by Time Destination   1 : small bowel Tissue Small Bowel, NOS TISSUE EXAM Ike Guo DO 2024 [id number]        Estimated Blood Loss:   Minimal    Drains:  * No LDAs found *    Anesthesia Type:   General    Operative Indications:  Incarcerated umbilical hernia [K42.0]      Operative Findings:  Necrotic small bowel requiring resection and primary anastomosis  Primary UHR with Prolene  Drained 2L of ascites        Complications:   None apparent    Procedure and Technique:  Patient was brought to OR and transferred supine to operating table, secured with a lap belt. GETA induced uneventfully, prepped and draped the usual sterile fashion. Time out was held.    Infraumbilical curvilinear incision was made, dissection was carried out to free the hernia sac with blunt and electrocautery dissection. After circumferentially freeing the hernia sac from the surrounding skin we opened the hernia sac and noted a small amount of hemorrhagic ascites. Note that he was generally coagulopathic throughout the case. The contained loop of SB was necrotic. We enlarged the fascial defect to pull the necrotic bowel out of the wound. We suctioned out 2L of pink kaylin ascites. Approximately 15cm of small bowel was necrotic, transected both ends with stapler and took down mesentery with super jaw. Passed necrotic small bowel off for specimen. Stapled side-to-side antiperistaltic anastomosis was  created with 45mm JENNIFER stapler and inspected. One edge of the bowel wall appeared dusky so we resected the anastomosis and created a second anastomosis in side-to-side antiperistaltic fashion. We palpated a generous wide-open anastomosis. Transverse staple line was oversewn with interrupted silk lemberts.This anastomosis was pink and well-perfused. We closed the mesenteric defect with vicryl and oversewed several mesenteric bleeders. We returned the bowel to the abdomen and copiously irrigated until the effluent ran clear.    Hernia defect was closed with 0 Prolene in running fashion. We changed gloves and irrigated the incision. The deep layer was closed with 2-0 vicryl in running fashion. There was a significant amount of excess/redundant skin that was trimmed with scalpel. Umbilicus was tacked to the fascia. Skin was closed with 2-0 Prolene with vertical mattresses. Skin was dressed with bacitracin, 4x4 gauze, tegaderm.    Patient emerged from anesthesia and was extubated uneventfully.      Dr. Guo was scrubbed for the entire operation.      Patient Disposition:  ICU        SIGNATURE: Brandyn Fuentes MD  DATE: July 26, 2024  TIME: 12:23 AM

## 2024-07-27 ENCOUNTER — APPOINTMENT (INPATIENT)
Dept: DIALYSIS | Facility: HOSPITAL | Age: 64
DRG: 329 | End: 2024-07-27
Payer: MEDICARE

## 2024-07-27 LAB
ANION GAP SERPL CALCULATED.3IONS-SCNC: 12 MMOL/L (ref 4–13)
BUN SERPL-MCNC: 48 MG/DL (ref 5–25)
CA-I BLD-SCNC: 1.07 MMOL/L (ref 1.12–1.32)
CALCIUM SERPL-MCNC: 9.1 MG/DL (ref 8.4–10.2)
CHLORIDE SERPL-SCNC: 93 MMOL/L (ref 96–108)
CO2 SERPL-SCNC: 27 MMOL/L (ref 21–32)
CREAT SERPL-MCNC: 5.04 MG/DL (ref 0.6–1.3)
ERYTHROCYTE [DISTWIDTH] IN BLOOD BY AUTOMATED COUNT: 16.7 % (ref 11.6–15.1)
GFR SERPL CREATININE-BSD FRML MDRD: 11 ML/MIN/1.73SQ M
GLUCOSE SERPL-MCNC: 99 MG/DL (ref 65–140)
HCT VFR BLD AUTO: 34.2 % (ref 36.5–49.3)
HGB BLD-MCNC: 11.3 G/DL (ref 12–17)
MAGNESIUM SERPL-MCNC: 2.1 MG/DL (ref 1.9–2.7)
MCH RBC QN AUTO: 32.6 PG (ref 26.8–34.3)
MCHC RBC AUTO-ENTMCNC: 33 G/DL (ref 31.4–37.4)
MCV RBC AUTO: 99 FL (ref 82–98)
PHOSPHATE SERPL-MCNC: 8.6 MG/DL (ref 2.3–4.1)
PLATELET # BLD AUTO: 136 THOUSANDS/UL (ref 149–390)
PMV BLD AUTO: 10.2 FL (ref 8.9–12.7)
POTASSIUM SERPL-SCNC: 5.6 MMOL/L (ref 3.5–5.3)
RBC # BLD AUTO: 3.47 MILLION/UL (ref 3.88–5.62)
SODIUM SERPL-SCNC: 132 MMOL/L (ref 135–147)
WBC # BLD AUTO: 9.79 THOUSAND/UL (ref 4.31–10.16)

## 2024-07-27 PROCEDURE — 99232 SBSQ HOSP IP/OBS MODERATE 35: CPT | Performed by: SURGERY

## 2024-07-27 PROCEDURE — 84100 ASSAY OF PHOSPHORUS: CPT

## 2024-07-27 PROCEDURE — 99024 POSTOP FOLLOW-UP VISIT: CPT | Performed by: SURGERY

## 2024-07-27 PROCEDURE — 83735 ASSAY OF MAGNESIUM: CPT

## 2024-07-27 PROCEDURE — 99232 SBSQ HOSP IP/OBS MODERATE 35: CPT | Performed by: STUDENT IN AN ORGANIZED HEALTH CARE EDUCATION/TRAINING PROGRAM

## 2024-07-27 PROCEDURE — 80048 BASIC METABOLIC PNL TOTAL CA: CPT

## 2024-07-27 PROCEDURE — 82330 ASSAY OF CALCIUM: CPT

## 2024-07-27 PROCEDURE — 85027 COMPLETE CBC AUTOMATED: CPT

## 2024-07-27 RX ORDER — CALCIUM GLUCONATE 20 MG/ML
2 INJECTION, SOLUTION INTRAVENOUS ONCE
Status: COMPLETED | OUTPATIENT
Start: 2024-07-27 | End: 2024-07-27

## 2024-07-27 RX ADMIN — METOROPROLOL TARTRATE 2.5 MG: 5 INJECTION, SOLUTION INTRAVENOUS at 05:02

## 2024-07-27 RX ADMIN — METRONIDAZOLE 500 MG: 500 INJECTION, SOLUTION INTRAVENOUS at 05:02

## 2024-07-27 RX ADMIN — PANTOPRAZOLE SODIUM 40 MG: 40 INJECTION, POWDER, FOR SOLUTION INTRAVENOUS at 08:19

## 2024-07-27 RX ADMIN — METOROPROLOL TARTRATE 2.5 MG: 5 INJECTION, SOLUTION INTRAVENOUS at 20:51

## 2024-07-27 RX ADMIN — APIXABAN 2.5 MG: 2.5 TABLET, FILM COATED ORAL at 17:32

## 2024-07-27 RX ADMIN — CEFTRIAXONE SODIUM 2000 MG: 10 INJECTION, POWDER, FOR SOLUTION INTRAVENOUS at 00:33

## 2024-07-27 RX ADMIN — METOROPROLOL TARTRATE 2.5 MG: 5 INJECTION, SOLUTION INTRAVENOUS at 08:19

## 2024-07-27 RX ADMIN — CHLORHEXIDINE GLUCONATE 15 ML: 1.2 RINSE ORAL at 20:51

## 2024-07-27 RX ADMIN — CALCIUM GLUCONATE 2 G: 20 INJECTION, SOLUTION INTRAVENOUS at 08:24

## 2024-07-27 RX ADMIN — CHLORHEXIDINE GLUCONATE 15 ML: 1.2 RINSE ORAL at 08:19

## 2024-07-27 RX ADMIN — METOROPROLOL TARTRATE 2.5 MG: 5 INJECTION, SOLUTION INTRAVENOUS at 13:43

## 2024-07-27 RX ADMIN — APIXABAN 2.5 MG: 2.5 TABLET, FILM COATED ORAL at 08:19

## 2024-07-27 RX ADMIN — METOROPROLOL TARTRATE 2.5 MG: 5 INJECTION, SOLUTION INTRAVENOUS at 17:32

## 2024-07-27 NOTE — PROGRESS NOTES
"General surgery-progress Note  Homero Koch 64 y.o. male MRN: [id number]  Unit/Bed#: ICU 03 Encounter: [id number]      Assessment  64 M with incarcerated umbilical hernia s/p UHR with small bowel resection, primary anastomosis.  S/p HD 2L 7/26    Plan  - N.p.o. consider advancing diet as tolerated  - Consider removing NGT  - No further ABX  - Eliquis 2.5 as Ppx (in setting of possible HIT Rx)  - Continue home HD schedule  -- DVT ppx    Subjective/Objective   NAOE. Pain controlled.  NPO.  Has appetite.  No n/v.  No flatus.  Does not make urine.  Walking.    /73   Pulse 70   Temp 98.6 °F (37 °C) (Axillary)   Resp (!) 11   Ht 5' 8\" (1.727 m)   Wt 75.9 kg (167 lb 5.3 oz)   SpO2 98%   BMI 25.44 kg/m²     Physical Exam:  General: NAD  CV: nl rate  Lungs: nl wob. No resp distress.  ABD: Soft, ND, NT  Extrem: No CCE  Incision: CDI  NGT: No output noted in canister  Recent Labs     07/25/24  1720 07/25/24  1820 07/25/24  2202 07/26/24  0026 07/26/24  0254 07/26/24  0453 07/26/24  0542 07/26/24  2028 07/27/24  0458   WBC 5.28  --   --  6.44  --  5.67  --   --  9.79   HGB 12.3  --    < > 11.6*  --  11.8*  --   --  11.3*   *  --   --  137*  --  114*  --   --  136*   SODIUM 135  --   --  134*  --   --    < > 131* 132*   K 6.1*  --   --  5.0  --   --    < > 5.1 5.6*   CL 94*  --   --  95*  --   --    < > 93* 93*   CO2 25  --    < > 23  --   --    < > 26 27   BUN 71*  --   --  75*  --   --    < > 38* 48*   CREATININE 6.85*  --   --  6.99*  --   --    < > 4.46* 5.04*   GLUC 99  --   --  128  --   --    < > 104 99   CALCIUM 9.9  --   --  9.4  --   --    < > 9.1 9.1   MG 2.5  --   --  2.3  --  2.4  --   --  2.1   PHOS 7.2*  --   --  6.8*  --  7.5*  --   --  8.6*   AST 19  --   --  15  --   --   --   --   --    ALT 9  --   --  9  --   --   --   --   --    ALKPHOS 116*  --   --  97  --   --   --   --   --    TBILI 1.09*  --   --  0.95  --   --   --   --   --    EGFR 7  --   --  7  --   --    < > 13 11   PTT  " --   --   --   --  32 72*  --   --   --    INR  --  1.29*  --   --   --   --   --   --   --     < > = values in this interval not displayed.          Diet Orders   (From admission, onward)                 Start     Ordered    07/26/24 0700  Diet NPO  Diet effective now        References:    Adult Nutrition Support Algorithm    RD Therapeutic Diet Order Protocol   Question Answer Comment   Diet Type NPO    RD to adjust diet per protocol? Yes        07/26/24 0639

## 2024-07-27 NOTE — PLAN OF CARE
Post-Dialysis RN Treatment Note    Blood Pressure:  Pre 123/60 mm/Hg  Post 154/71 mmHg   EDW  76 kg    Weight:  Pre 73.2 kg   Post 73.2 kg   Mode of weight measurement: Bed Scale   Volume Removed  0 ml    Treatment duration 150 minutes    NS given  No    Treatment shortened? No   Medications given during Rx Not Applicable   Estimated Kt/V  0.99   Access type: Permacath/TDC   Access Issues: No    Report called to primary nurse   Yes   Taylor Cortez RN        Goal of treatment is the correction of electrolyte anomaly.  No UF will be done today  Problem: METABOLIC, FLUID AND ELECTROLYTES - ADULT  Goal: Electrolytes maintained within normal limits  Description: INTERVENTIONS:  - Monitor labs and assess patient for signs and symptoms of electrolyte imbalances  - Administer electrolyte replacement as ordered  - Monitor response to electrolyte replacements, including repeat lab results as appropriate  - Instruct patient on fluid and nutrition as appropriate  Outcome: Progressing  Goal: Fluid balance maintained  Description: INTERVENTIONS:  - Monitor labs   - Monitor I/O and WT  - Instruct patient on fluid and nutrition as appropriate  - Assess for signs & symptoms of volume excess or deficit  Outcome: Progressing

## 2024-07-27 NOTE — ASSESSMENT & PLAN NOTE
Lab Results   Component Value Date    HGB 11.3 (L) 07/27/2024    HGB 11.8 (L) 07/26/2024    HGB 11.6 (L) 07/26/2024     Baseline appears to be 11  Minimal EBL in OR, did not require transfusion    Plan:  Post-op Hgb 11.6  Transfuse for Hgb < 8 given cardiac history or hemodynamic instability  Has allergy to heparin  DVT ppx with argatroban  CBC and PTT monitoring

## 2024-07-27 NOTE — PROGRESS NOTES
"[facility]  Progress Note  Name: Homero Koch I  MRN: [id number]  Unit/Bed#: ICU 03 I Date of Admission: 7/25/2024   Date of Service: 7/27/2024 I Hospital Day: 2    Assessment & Plan   * Incarcerated umbilical hernia with necrotic bowel  Assessment & Plan  Patient seen in ER for abdominal pain.  He reported that his umbilical hernia is usually soft, non-painful and reducible.  Starting approximately 6 AM on 7/25 he was c/o significant abdominal pain. Early morning he was able to tolerate PO intake but his appetite decreased 2/2 pain. Denies N/V, and states that he was passing flatus.    In ER Surgery was consulted and manual reduction was attempted, but unsuccessful.    CT a/p: with \"periumbilical hernia containing fluid and fat measuring 4 cm.  Findings suggestive of enteritis, no evidence of bowel obstruction, colitis or diverticulitis, mild ascites.\"  Patient taken to OR for reduction of hernia, extubated post-procedure  7/26 POD #1 s/p reduction of hernia, small bowel resection for necrotic bowel, and evacuation of 2L ascites  Post-op endpoints: Hgb 11.6, Platelets 137, mag 2.3, lactic 1.5, VBG base deficit -3    Plan:  Appreciate surgery note  NPO per surgery  Maintain NGT LCWS  Continue ceftriaxone and flagyl for surgical prophylaxis  OOB to chair TID  Multimodal pain regimen  DVT ppx with SCDs and argatroban 2/2 heparin allergy    HFrEF Dilated cardiomyopathy (HCC)  Assessment & Plan  PTA: metoprolol, ASA, losartan.  Also takes midodrine on HD days   Refused Left heart cath in past.  Also non-compliant with GDMT in the past  Last echo: 6/27 EF 20%, thickness moderately increased, moderate concentric hypertrophy, severe global hypokinesis RV systolic function moderate to severely reduced, TV moderate regurgitation, RVSP 62, PASP severely increased    Plan:  Resume metoprolol, if unable to take po will change to IV  Resume losartan once BP allows and okay to take PO per " surgery  Holding asa for now, until okay to resume per surgery  Monitor volume status closely.    HD for volume status per nephrology  Continue telemetry    Chronic kidney disease with end stage renal failure on dialysis (HCC)  Assessment & Plan  Lab Results   Component Value Date    EGFR 13 07/26/2024    EGFR 7 07/26/2024    EGFR 7 07/26/2024    CREATININE 4.46 (H) 07/26/2024    CREATININE 7.43 (H) 07/26/2024    CREATININE 7.19 (H) 07/26/2024     In the setting of autosomal dominant polycystic kidney disease  Has AVF  Follows with [facility]  PTA: calcitriol, sevelamer, midodrine on HD days    Plan:  BMP in am  PO meds currently on hold 2/2 bowel resection  Appreciate nephrology consult  Avoid hypotension, nephrotoxins    Polycystic liver disease  Assessment & Plan  Follows with [facility]  2L ascites removed in OR  Has intermittent paracentesis  Last paracentesis during admission June 2024    Plan:  Serial abdominal exams  CMP  Given paracentesis, if develops hypotension will give albumin.  Currently hemodynamically stable.  Avoid hepatotoxins    Anemia due to chronic kidney disease, on chronic dialysis and Acute Blood Loss Anemia (HCC)  Assessment & Plan  Lab Results   Component Value Date    HGB 11.8 (L) 07/26/2024    HGB 11.6 (L) 07/26/2024    HGB 9.9 (L) 07/25/2024     Baseline appears to be 11  Minimal EBL in OR, did not require transfusion    Plan:  Post-op Hgb 11.6  Transfuse for Hgb < 8 given cardiac history or hemodynamic instability  Has allergy to heparin  DVT ppx with argatroban  CBC and PTT monitoring               Disposition: Med Surg    ICU Core Measures     A: Assess, Prevent, and Manage Pain Has pain been assessed? Yes  Need for changes to pain regimen? No   B: Both SAT/SAT  N/A   C: Choice of Sedation RASS Goal: 0 Alert and Calm  Need for changes to sedation or analgesia regimen? No   D: Delirium CAM-ICU: Negative   E: Early Mobility  Plan for early mobility? Yes   F: Family Engagement  Plan for family engagement today? Yes       Antibiotic Review: Continue broad spectrum secondary to severity of illness.     Review of Invasive Devices:      Central access plan: Hemodynamic monitoring HD cath in place.  Plan continue  Sandie Plan: Keep arterial line for hemodynamic monitoring and frequent ABGs    Prophylaxis:  VTE VTE covered by:  apixaban, Oral, 2.5 mg at 07/26/24 1826       Stress Ulcer  covered bypantoprazole (PROTONIX) injection 40 mg [[id number]]         Significant 24hr Events     24hr events: OR yesterday and was on epi during procedure.  No longer on pressors in unit.  No new complaints.     Subjective   Review of Systems: Review of Systems   Constitutional:  Negative for chills and fever.   Respiratory:  Negative for chest tightness and shortness of breath.    Cardiovascular:  Negative for chest pain and palpitations.   Gastrointestinal:  Positive for abdominal pain (post op abdominal soreness). Negative for nausea.   Musculoskeletal:  Negative for back pain.   Neurological:  Negative for dizziness.   Psychiatric/Behavioral:  Negative for confusion.         Objective                            Vitals I/O      Most Recent Min/Max in 24hrs   Temp 98.6 °F (37 °C) Temp  Min: 97.7 °F (36.5 °C)  Max: 98.6 °F (37 °C)   Pulse 68 Pulse  Min: 67  Max: 80   Resp 12 Resp  Min: 10  Max: 31   /73 BP  Min: 117/73  Max: 138/88   O2 Sat 97 % SpO2  Min: 92 %  Max: 100 %      Intake/Output Summary (Last 24 hours) at 7/27/2024 0051  Last data filed at 7/26/2024 1224  Gross per 24 hour   Intake 1117.17 ml   Output 2502 ml   Net -1384.83 ml       Diet NPO    Invasive Monitoring   Arterial Line  Sandie BP 96/45  Arterial Line BP  Min: 96/45  Max: 159/63   MAP (!) 61 mmHg  Arterial Line MAP (mmHg)  Min: 61 mmHg  Max: 102 mmHg           Physical Exam   Physical Exam  Eyes:      Extraocular Movements: Extraocular movements intact.      Pupils: Pupils are equal, round, and reactive to light.   Skin:     General:  Skin is warm.   HENT:      Head: Normocephalic and atraumatic.      Right Ear: Hearing normal.      Left Ear: Hearing normal.      Nose:      Comments: NG tube in place     Mouth/Throat:      Mouth: Mucous membranes are moist.   Cardiovascular:      Rate and Rhythm: Normal rate and regular rhythm.   Musculoskeletal:         General: Normal range of motion.   Abdominal:      Palpations: Abdomen is soft.      Tenderness: There is abdominal tenderness (mild soreness around op site).      Comments: Dressing clean and dry   Constitutional:       Appearance: He is well-developed and well-nourished.   Pulmonary:      Effort: Pulmonary effort is normal.      Breath sounds: Normal breath sounds.   Neurological:      General: No focal deficit present.      Mental Status: He is oriented to person, place and time.            Diagnostic Studies      EKG: NA  Imaging:  I have personally reviewed pertinent reports.   and I have personally reviewed pertinent films in PACS     Medications:  Scheduled PRN   apixaban, 2.5 mg, BID  [START ON 7/29/2024] calcitriol, 0.25 mcg, Once per day on Monday Wednesday Friday  cefTRIAXone, 2,000 mg, Q24H  chlorhexidine, 15 mL, Q12H SHERON  metoprolol, 2.5 mg, Q4H  metroNIDAZOLE, 500 mg, Q8H  pantoprazole, 40 mg, Q24H SHERON      HYDROmorphone, 0.2 mg, Q3H PRN  ondansetron, 4 mg, Q4H PRN  oxyCODONE, 5 mg, Q8H PRN  oxyCODONE, 2.5 mg, Q8H PRN  phenol, 1 spray, Q2H PRN       Continuous          Labs:    CBC    Recent Labs     07/26/24  0026 07/26/24  0453   WBC 6.44 5.67   HGB 11.6* 11.8*   HCT 35.3* 36.5   * 114*     BMP    Recent Labs     07/26/24  0805 07/26/24 2028   SODIUM 132* 131*   K 6.3* 5.1   CL 95* 93*   CO2 22 26   AGAP 15* 12   BUN 82* 38*   CREATININE 7.43* 4.46*   CALCIUM 10.4* 9.1       Coags    Recent Labs     07/25/24  1820 07/26/24  0254 07/26/24  0453   INR 1.29*  --   --    PTT  --  32 72*        Additional Electrolytes  Recent Labs     07/26/24  0026 07/26/24  0453   MG 2.3 2.4    PHOS 6.8* 7.5*   CAIONIZED 1.11* 1.13          Blood Gas    Recent Labs     07/26/24  0038   PHART 7.366   [id number] 38.7   PO2ART 71.1*   XQT0SDT 21.7*   BEART -3.3     No recent results LFTs  Recent Labs     07/25/24  1720 07/26/24  0026   ALT 9 9   AST 19 15   ALKPHOS 116* 97   ALB 4.3 3.8   TBILI 1.09* 0.95       Infectious  No recent results  Glucose  Recent Labs     07/26/24  0026 07/26/24  0542 07/26/24  0805 07/26/24 2028   GLUC 128 136 188* 104               Babatunde Olmos MD

## 2024-07-27 NOTE — PROGRESS NOTES
NEPHROLOGY PROGRESS NOTE   Homero Koch 64 y.o. male MRN: [id number]  Unit/Bed#: ICU 03 Encounter: [id number]  Reason for Consult: Management of ESRD    ASSESSMENT AND PLAN:  63 yo man with PMH of ESRD on HD MWF polycystic kidney disease, hypertension, cardiomyopathy, CHF, EF 25%, A-fib, p/w incarcerated hernia status post surgical intervention.  Patient was found to have a potassium of 7.1 this morning.  Nephrology is consulted for management of ESRD     Plan:  #ESRD on HD MWF:  Dialysis unit/days: [facility] [place]  Access: PermCath  Had dialysis yesterday, well-tolerated.  UF 2 L  HD today in the settings of hyperkalemia, next dialysis on Monday  Fluid restriction 1-1.5L/d  Adjust medications to GFR<10  Avoid opioids   See hyperkalemia below     #Volume status/hypertension:  Volume: Euvolemic on exam  Blood pressure: Tendency to hypotension, /58, goal less than 140/90  Monitor BP as per ICU     # Hyperkalemia  Serum potassium 5.6 mEq/L today , 7.1 on admission   Plan for short dialysis today for clearance   Unable to take Lokelma he is n.p.o.     #Secondary Hyperparasitoidism   Calcitriol patient cannot tolerate p.o.     # Anemia of Kidney Disease  Current hemoglobin: 11.3 mg/dL  Treatment:  EPO as an outpatient  Transfuse for hemoglobin less than 7.0 per primary service       # Incarcerated hernia  Status post surgical intervention  Patient is n.p.o.  Management as per surgery team    The highlighted and/or bolded points in my assessment, plan, and disposition were discussed with the primary team and they agree with those points and the plan.  Previous records were personally reviewed by me to obtain a baseline creatinine.   The images (CXR) were personally reviewed by me in PACS      SUBJECTIVE:  Patient seen and examined at bedside.  Patient feels uncomfortable, with NG tube, no shortness of breath    OBJECTIVE:  Current Weight: Weight - Scale: 75.9 kg (167 lb 5.3 oz)  Vitals:    07/27/24 0706   BP: 123/60    Pulse:    Resp:    Temp: 97.9 °F (36.6 °C)   SpO2:        Intake/Output Summary (Last 24 hours) at 7/27/2024 0934  Last data filed at 7/27/2024 0201  Gross per 24 hour   Intake 460 ml   Output 2502 ml   Net -2042 ml     Wt Readings from Last 3 Encounters:   07/26/24 75.9 kg (167 lb 5.3 oz)   07/23/24 77.5 kg (170 lb 12.8 oz)   07/22/24 76.2 kg (168 lb)     Temp Readings from Last 3 Encounters:   07/27/24 97.9 °F (36.6 °C) (Axillary)   07/23/24 97.7 °F (36.5 °C) (Tympanic)   06/29/24 98.1 °F (36.7 °C)     BP Readings from Last 3 Encounters:   07/27/24 123/60   07/23/24 138/92   07/22/24 134/90     Pulse Readings from Last 3 Encounters:   07/27/24 71   07/22/24 94   07/09/24 83        General:  no acute distress at this time  Skin:  No acute rash  Eyes:  No scleral icterus and noninjected  ENT: NG tube  Neck:  no carotid bruits  Chest:  Clear to auscultation percussion, good respiratory effort, no use of accessory respiratory muscles  CVS:  Regular rate and rhythm without rub   Abdomen:   tender  Extremities: lower extremity edema  Neuro:  No gross focality  Psych:  Alert , cooperative   Dialysis access: Left AV fistula      Medications:    Current Facility-Administered Medications:     apixaban (ELIQUIS) tablet 2.5 mg, 2.5 mg, Oral, BID, Robyn Bennett PA-C, 2.5 mg at 07/27/24 0819    [START ON 7/29/2024] calcitriol (ROCALTROL) capsule 0.25 mcg, 0.25 mcg, Oral, Once per day on Monday Wednesday Friday, SAM Rouse    chlorhexidine (PERIDEX) 0.12 % oral rinse 15 mL, 15 mL, Mouth/Throat, Q12H SHERON, SAM Marie, 15 mL at 07/27/24 0819    HYDROmorphone HCl (DILAUDID) injection 0.2 mg, 0.2 mg, Intravenous, Q3H PRN, Brandyn Fuentes MD, 0.2 mg at 07/26/24 2209    metoprolol (LOPRESSOR) injection 2.5 mg, 2.5 mg, Intravenous, Q4H, Brandyn Fuentes MD, 2.5 mg at 07/27/24 0890    ondansetron (ZOFRAN) injection 4 mg, 4 mg, Intravenous, Q4H PRN, Brandyn Fuentes MD, 4 mg at 07/25/24 5205     oxyCODONE (ROXICODONE) IR tablet 5 mg, 5 mg, Oral, Q8H PRN, Brandyn Fuentes MD    oxyCODONE (ROXICODONE) split tablet 2.5 mg, 2.5 mg, Oral, Q8H PRN, Brandyn Fuentes MD, 2.5 mg at 07/26/24 1942    pantoprazole (PROTONIX) injection 40 mg, 40 mg, Intravenous, Q24H SHERON, Robyn Bennett PA-C, 40 mg at 07/27/24 0819    phenol (CHLORASEPTIC) 1.4 % mucosal liquid 1 spray, 1 spray, Mouth/Throat, Q2H PRN, Rah Barragan MD, 1 spray at 07/26/24 1101    Laboratory Results:  Results from last 7 days   Lab Units 07/27/24  0458 07/26/24 2028 07/26/24  0805 07/26/24  0542 07/26/24  0453 07/26/24  0026 07/25/24  2202 07/25/24  1720   WBC Thousand/uL 9.79  --   --   --  5.67 6.44  --  5.28   HEMOGLOBIN g/dL 11.3*  --   --   --  11.8* 11.6*  --  12.3   I STAT HEMOGLOBIN g/dl  --   --   --   --   --   --  9.9*  --    HEMATOCRIT % 34.2*  --   --   --  36.5 35.3*  --  38.8   HEMATOCRIT, ISTAT %  --   --   --   --   --   --  29*  --    PLATELETS Thousands/uL 136*  --   --   --  114* 137*  --  119*   SODIUM mmol/L 132* 131* 132* 133*  --  134*  --  135   POTASSIUM mmol/L 5.6* 5.1 6.3* 7.1*  --  5.0  --  6.1*   CHLORIDE mmol/L 93* 93* 95* 95*  --  95*  --  94*   CO2 mmol/L 27 26 22 21  --  23  --  25   CO2, I-STAT mmol/L  --   --   --   --   --   --  27  --    BUN mg/dL 48* 38* 82* 79*  --  75*  --  71*   CREATININE mg/dL 5.04* 4.46* 7.43* 7.19*  --  6.99*  --  6.85*   CALCIUM mg/dL 9.1 9.1 10.4* 9.8  --  9.4  --  9.9   MAGNESIUM mg/dL 2.1  --   --   --  2.4 2.3  --  2.5   PHOSPHORUS mg/dL 8.6*  --   --   --  7.5* 6.8*  --  7.2*   GLUCOSE, ISTAT mg/dl  --   --   --   --   --   --  87  --        XR abdomen 1 view kub   Final Result by Stephanie Davis MD (07/26 1650)      A feeding tube is seen terminating below the diaphragm in the region of the stomach         Workstation performed: [id number]         XR chest 1 view   Final Result by Mario Maddox MD (07/26 0810)      Enteric catheter tip in the proximal stomach with  "sideport in the lower esophagus. Recommend advancement.         The study was marked in EPIC for immediate notification.      Workstation performed: [id number]         CT abdomen pelvis with contrast   Final Result by David Brantley MD (07/25 1919)   Addendum (preliminary) 1 of 1 by David Brantley MD (07/25 1919)   ADDENDUM:      Liver the small bowel within the umbilical hernia and surrounding ascites.    Findings concerning for threatened incarceration and discussed with    surgery.      Final      1.  Findings suggestive of enteritis. No evidence of bowel obstruction, colitis or diverticulitis.   2. Mild ascites.         Workstation performed: [id number]             Portions of the record may have been created with voice recognition software. Occasional wrong word or \"sound a like\" substitutions may have occurred due to the inherent limitations of voice recognition software. Read the chart carefully and recognize, using context, where substitutions have occurred.    "

## 2024-07-27 NOTE — ASSESSMENT & PLAN NOTE
Lab Results   Component Value Date    EGFR 13 07/26/2024    EGFR 7 07/26/2024    EGFR 7 07/26/2024    CREATININE 4.46 (H) 07/26/2024    CREATININE 7.43 (H) 07/26/2024    CREATININE 7.19 (H) 07/26/2024     In the setting of autosomal dominant polycystic kidney disease  Has AVF  Follows with [facility]  PTA: calcitriol, sevelamer, midodrine on HD days    Plan:  BMP in am  PO meds currently on hold 2/2 bowel resection  Appreciate nephrology consult  Avoid hypotension, nephrotoxins

## 2024-07-27 NOTE — ASSESSMENT & PLAN NOTE
Lab Results   Component Value Date    HGB 11.8 (L) 07/26/2024    HGB 11.6 (L) 07/26/2024    HGB 9.9 (L) 07/25/2024     Baseline appears to be 11  Minimal EBL in OR, did not require transfusion    Plan:  Post-op Hgb 11.6  Transfuse for Hgb < 8 given cardiac history or hemodynamic instability  Has allergy to heparin  DVT ppx with argatroban  CBC and PTT monitoring per argatroban order set

## 2024-07-27 NOTE — PLAN OF CARE
Problem: Prexisting or High Potential for Compromised Skin Integrity  Goal: Skin integrity is maintained or improved  Description: INTERVENTIONS:  - Identify patients at risk for skin breakdown  - Assess and monitor skin integrity  - Assess and monitor nutrition and hydration status  - Monitor labs   - Assess for incontinence   - Turn and reposition patient  - Assist with mobility/ambulation  - Relieve pressure over bony prominences  - Avoid friction and shearing  - Provide appropriate hygiene as needed including keeping skin clean and dry  - Evaluate need for skin moisturizer/barrier cream  - Collaborate with interdisciplinary team   - Patient/family teaching  - Consider wound care consult   Outcome: Progressing     Problem: Knowledge Deficit  Goal: Patient/family/caregiver demonstrates understanding of disease process, treatment plan, medications, and discharge instructions  Description: Complete learning assessment and assess knowledge base.  Interventions:  - Provide teaching at level of understanding  - Provide teaching via preferred learning methods  Outcome: Progressing     Problem: METABOLIC, FLUID AND ELECTROLYTES - ADULT  Goal: Electrolytes maintained within normal limits  Description: INTERVENTIONS:  - Monitor labs and assess patient for signs and symptoms of electrolyte imbalances  - Administer electrolyte replacement as ordered  - Monitor response to electrolyte replacements, including repeat lab results as appropriate  - Instruct patient on fluid and nutrition as appropriate  Outcome: Progressing  Goal: Fluid balance maintained  Description: INTERVENTIONS:  - Monitor labs   - Monitor I/O and WT  - Instruct patient on fluid and nutrition as appropriate  - Assess for signs & symptoms of volume excess or deficit  Outcome: Progressing     Problem: Nutrition/Hydration-ADULT  Goal: Nutrient/Hydration intake appropriate for improving, restoring or maintaining nutritional needs  Description: Monitor and  assess patient's nutrition/hydration status for malnutrition. Collaborate with interdisciplinary team and initiate plan and interventions as ordered.  Monitor patient's weight and dietary intake as ordered or per policy. Utilize nutrition screening tool and intervene as necessary. Determine patient's food preferences and provide high-protein, high-caloric foods as appropriate.     INTERVENTIONS:  - Monitor oral intake, urinary output, labs, and treatment plans  - Assess nutrition and hydration status and recommend course of action  - Evaluate amount of meals eaten  - Assist patient with eating if necessary   - Allow adequate time for meals  - Recommend/ encourage appropriate diets, oral nutritional supplements, and vitamin/mineral supplements  - Order, calculate, and assess calorie counts as needed  - Recommend, monitor, and adjust tube feedings and TPN/PPN based on assessed needs  - Assess need for intravenous fluids  - Provide specific nutrition/hydration education as appropriate  - Include patient/family/caregiver in decisions related to nutrition  Outcome: Progressing

## 2024-07-27 NOTE — ASSESSMENT & PLAN NOTE
"Patient seen in ER for abdominal pain.  He reported that his umbilical hernia is usually soft, non-painful and reducible.  Starting approximately 6 AM on 7/25 he was c/o significant abdominal pain. Early morning he was able to tolerate PO intake but his appetite decreased 2/2 pain. Denies N/V, and states that he was passing flatus.    In ER Surgery was consulted and manual reduction was attempted, but unsuccessful.    CT a/p: with \"periumbilical hernia containing fluid and fat measuring 4 cm.  Findings suggestive of enteritis, no evidence of bowel obstruction, colitis or diverticulitis, mild ascites.\"  Patient taken to OR for reduction of hernia, extubated post-procedure  7/26 POD #1 s/p reduction of hernia, small bowel resection for necrotic bowel, and evacuation of 2L ascites  Post-op endpoints: Hgb 11.6, Platelets 137, mag 2.3, lactic 1.5, VBG base deficit -3    Plan:  Appreciate surgery note  NPO per surgery  Maintain NGT LCWS  Continue ceftriaxone and flagyl for surgical prophylaxis  OOB to chair TID  Multimodal pain regimen  DVT ppx with SCDs and argatroban 2/2 heparin allergy  "

## 2024-07-28 LAB
ANION GAP SERPL CALCULATED.3IONS-SCNC: 10 MMOL/L (ref 4–13)
BASOPHILS # BLD AUTO: 0.03 THOUSANDS/ÂΜL (ref 0–0.1)
BASOPHILS NFR BLD AUTO: 1 % (ref 0–1)
BUN SERPL-MCNC: 43 MG/DL (ref 5–25)
CA-I BLD-SCNC: 1.05 MMOL/L (ref 1.12–1.32)
CALCIUM SERPL-MCNC: 8.4 MG/DL (ref 8.4–10.2)
CHLORIDE SERPL-SCNC: 91 MMOL/L (ref 96–108)
CO2 SERPL-SCNC: 26 MMOL/L (ref 21–32)
CREAT SERPL-MCNC: 4.63 MG/DL (ref 0.6–1.3)
EOSINOPHIL # BLD AUTO: 0.22 THOUSAND/ÂΜL (ref 0–0.61)
EOSINOPHIL NFR BLD AUTO: 4 % (ref 0–6)
ERYTHROCYTE [DISTWIDTH] IN BLOOD BY AUTOMATED COUNT: 16.5 % (ref 11.6–15.1)
GFR SERPL CREATININE-BSD FRML MDRD: 12 ML/MIN/1.73SQ M
GLUCOSE SERPL-MCNC: 93 MG/DL (ref 65–140)
HCT VFR BLD AUTO: 33.1 % (ref 36.5–49.3)
HGB BLD-MCNC: 11 G/DL (ref 12–17)
IMM GRANULOCYTES # BLD AUTO: 0.02 THOUSAND/UL (ref 0–0.2)
IMM GRANULOCYTES NFR BLD AUTO: 0 % (ref 0–2)
LYMPHOCYTES # BLD AUTO: 0.52 THOUSANDS/ÂΜL (ref 0.6–4.47)
LYMPHOCYTES NFR BLD AUTO: 9 % (ref 14–44)
MAGNESIUM SERPL-MCNC: 2 MG/DL (ref 1.9–2.7)
MCH RBC QN AUTO: 32.4 PG (ref 26.8–34.3)
MCHC RBC AUTO-ENTMCNC: 33.2 G/DL (ref 31.4–37.4)
MCV RBC AUTO: 98 FL (ref 82–98)
MONOCYTES # BLD AUTO: 0.54 THOUSAND/ÂΜL (ref 0.17–1.22)
MONOCYTES NFR BLD AUTO: 9 % (ref 4–12)
NEUTROPHILS # BLD AUTO: 4.68 THOUSANDS/ÂΜL (ref 1.85–7.62)
NEUTS SEG NFR BLD AUTO: 77 % (ref 43–75)
NRBC BLD AUTO-RTO: 0 /100 WBCS
OSMOLALITY UR/SERPL-RTO: 296 MMOL/KG (ref 282–298)
PHOSPHATE SERPL-MCNC: 6.3 MG/DL (ref 2.3–4.1)
PLATELET # BLD AUTO: 127 THOUSANDS/UL (ref 149–390)
PMV BLD AUTO: 10 FL (ref 8.9–12.7)
POTASSIUM SERPL-SCNC: 4.5 MMOL/L (ref 3.5–5.3)
RBC # BLD AUTO: 3.39 MILLION/UL (ref 3.88–5.62)
SODIUM SERPL-SCNC: 127 MMOL/L (ref 135–147)
WBC # BLD AUTO: 6.01 THOUSAND/UL (ref 4.31–10.16)

## 2024-07-28 PROCEDURE — 85025 COMPLETE CBC W/AUTO DIFF WBC: CPT

## 2024-07-28 PROCEDURE — 83735 ASSAY OF MAGNESIUM: CPT

## 2024-07-28 PROCEDURE — 82330 ASSAY OF CALCIUM: CPT

## 2024-07-28 PROCEDURE — 83930 ASSAY OF BLOOD OSMOLALITY: CPT | Performed by: STUDENT IN AN ORGANIZED HEALTH CARE EDUCATION/TRAINING PROGRAM

## 2024-07-28 PROCEDURE — 99024 POSTOP FOLLOW-UP VISIT: CPT | Performed by: SURGERY

## 2024-07-28 PROCEDURE — 99232 SBSQ HOSP IP/OBS MODERATE 35: CPT | Performed by: STUDENT IN AN ORGANIZED HEALTH CARE EDUCATION/TRAINING PROGRAM

## 2024-07-28 PROCEDURE — 84100 ASSAY OF PHOSPHORUS: CPT

## 2024-07-28 PROCEDURE — 80048 BASIC METABOLIC PNL TOTAL CA: CPT

## 2024-07-28 RX ADMIN — CHLORHEXIDINE GLUCONATE 15 ML: 1.2 RINSE ORAL at 20:25

## 2024-07-28 RX ADMIN — PANTOPRAZOLE SODIUM 40 MG: 40 INJECTION, POWDER, FOR SOLUTION INTRAVENOUS at 08:02

## 2024-07-28 RX ADMIN — METOROPROLOL TARTRATE 2.5 MG: 5 INJECTION, SOLUTION INTRAVENOUS at 08:02

## 2024-07-28 RX ADMIN — METOROPROLOL TARTRATE 2.5 MG: 5 INJECTION, SOLUTION INTRAVENOUS at 00:19

## 2024-07-28 RX ADMIN — METOROPROLOL TARTRATE 2.5 MG: 5 INJECTION, SOLUTION INTRAVENOUS at 12:40

## 2024-07-28 RX ADMIN — APIXABAN 2.5 MG: 2.5 TABLET, FILM COATED ORAL at 17:05

## 2024-07-28 RX ADMIN — METOROPROLOL TARTRATE 2.5 MG: 5 INJECTION, SOLUTION INTRAVENOUS at 20:25

## 2024-07-28 RX ADMIN — METOROPROLOL TARTRATE 2.5 MG: 5 INJECTION, SOLUTION INTRAVENOUS at 17:05

## 2024-07-28 RX ADMIN — METOROPROLOL TARTRATE 2.5 MG: 5 INJECTION, SOLUTION INTRAVENOUS at 04:28

## 2024-07-28 RX ADMIN — APIXABAN 2.5 MG: 2.5 TABLET, FILM COATED ORAL at 08:02

## 2024-07-28 RX ADMIN — CHLORHEXIDINE GLUCONATE 15 ML: 1.2 RINSE ORAL at 08:02

## 2024-07-28 NOTE — PLAN OF CARE
Problem: Knowledge Deficit  Goal: Patient/family/caregiver demonstrates understanding of disease process, treatment plan, medications, and discharge instructions  Description: Complete learning assessment and assess knowledge base.  Interventions:  - Provide teaching at level of understanding  - Provide teaching via preferred learning methods  Outcome: Progressing     Problem: METABOLIC, FLUID AND ELECTROLYTES - ADULT  Goal: Electrolytes maintained within normal limits  Description: INTERVENTIONS:  - Monitor labs and assess patient for signs and symptoms of electrolyte imbalances  - Administer electrolyte replacement as ordered  - Monitor response to electrolyte replacements, including repeat lab results as appropriate  - Instruct patient on fluid and nutrition as appropriate  Outcome: Progressing  Goal: Fluid balance maintained  Description: INTERVENTIONS:  - Monitor labs   - Monitor I/O and WT  - Instruct patient on fluid and nutrition as appropriate  - Assess for signs & symptoms of volume excess or deficit  Outcome: Progressing

## 2024-07-28 NOTE — PROGRESS NOTES
NEPHROLOGY PROGRESS NOTE   Homero Koch 64 y.o. male MRN: [id number]  Unit/Bed#: ICU 03 Encounter: [id number]  Reason for Consult: Management of ESRD    ASSESSMENT AND PLAN:  63 yo man with PMH of ESRD on HD MWF polycystic kidney disease, hypertension, cardiomyopathy, CHF, EF 25%, A-fib, p/w incarcerated hernia status post surgical intervention.  Patient was found to have a potassium of 7.1 this morning.  Nephrology is consulted for management of ESRD     Plan:  #ESRD on HD MWF:  Dialysis unit/days: [facility] [place]  Access: PermCath  Had extra dialysis yesterday only for clearance due to hyperkalemia  Plan for regular dialysis tomorrow if remains inpatient  Adjust medications to GFR<10  Avoid opioids      #Volume status/hypertension:  Volume: Euvolemic on exam  Blood pressure: Normotensive, /65 , goal less than 140/90  Consider decreasing metoprolol dose     # Hyperkalemia  Serum potassium 4.5 mg/dL  mEq/L, back to normal  Had extra dialysis of 2-1/2 hours yesterday due to hyperkalemia   Low potassium diet     #Secondary Hyperparasitoidism   Calcitriol      # Anemia of Kidney Disease  Current hemoglobin: 11 mg/dL  Treatment:  EPO as an outpatient  Transfuse for hemoglobin less than 7.0 per primary service       # Incarcerated hernia  Status post surgical intervention  On clears .  Management as per surgery team         SUBJECTIVE:  Patient seen and examined at bedside. No chest pain, shortness of breath, nausea, vomiting    OBJECTIVE:  Current Weight: Weight - Scale: 75.9 kg (167 lb 5.3 oz)  Vitals:    07/27/24 2330   BP: 118/65   Pulse:    Resp:    Temp: 98.6 °F (37 °C)   SpO2:        Intake/Output Summary (Last 24 hours) at 7/28/2024 0557  Last data filed at 7/27/2024 1215  Gross per 24 hour   Intake 700 ml   Output 500 ml   Net 200 ml     Wt Readings from Last 3 Encounters:   07/28/24 75.9 kg (167 lb 5.3 oz)   07/23/24 77.5 kg (170 lb 12.8 oz)   07/22/24 76.2 kg (168 lb)     Temp Readings from Last 3 Encounters:    07/27/24 98.6 °F (37 °C) (Oral)   07/23/24 97.7 °F (36.5 °C) (Tympanic)   06/29/24 98.1 °F (36.7 °C)     BP Readings from Last 3 Encounters:   07/27/24 118/65   07/23/24 138/92   07/22/24 134/90     Pulse Readings from Last 3 Encounters:   07/27/24 73   07/22/24 94   07/09/24 83        General:  no acute distress at this time  Skin:  No acute rash  Eyes:  No scleral icterus and noninjected  ENT:  mucous membranes moist  Neck:  no carotid bruits  Chest:  Clear to auscultation percussion, good respiratory effort, no use of accessory respiratory muscles  CVS:  Regular rate and rhythm without rub   Abdomen:  soft and nontender   Extremities: no significant lower extremity edema  Neuro:  No gross focality  Psych:  Alert , cooperative   Dialysis access PermCath      Medications:    Current Facility-Administered Medications:     apixaban (ELIQUIS) tablet 2.5 mg, 2.5 mg, Oral, BID, Robyn Bennett PA-C, 2.5 mg at 07/27/24 1732    [START ON 7/29/2024] calcitriol (ROCALTROL) capsule 0.25 mcg, 0.25 mcg, Oral, Once per day on Monday Wednesday Friday, SAM Rouse    chlorhexidine (PERIDEX) 0.12 % oral rinse 15 mL, 15 mL, Mouth/Throat, Q12H [facility], SAM Marie, 15 mL at 07/27/24 2051    HYDROmorphone HCl (DILAUDID) injection 0.2 mg, 0.2 mg, Intravenous, Q3H PRN, Brandyn Fuentes MD, 0.2 mg at 07/26/24 2209    metoprolol (LOPRESSOR) injection 2.5 mg, 2.5 mg, Intravenous, Q4H, Brandyn Fuentes MD, 2.5 mg at 07/28/24 0428    ondansetron (ZOFRAN) injection 4 mg, 4 mg, Intravenous, Q4H PRN, Brandyn Fuentes MD, 4 mg at 07/25/24 2331    oxyCODONE (ROXICODONE) IR tablet 5 mg, 5 mg, Oral, Q8H PRN, Brandyn Fuentes MD    oxyCODONE (ROXICODONE) split tablet 2.5 mg, 2.5 mg, Oral, Q8H PRN, Brandyn Fuentes MD, 2.5 mg at 07/26/24 1942    pantoprazole (PROTONIX) injection 40 mg, 40 mg, Intravenous, Q24H SHERON, Robyn Bennett PA-C, 40 mg at 07/27/24 0819    phenol (CHLORASEPTIC) 1.4 % mucosal liquid 1 spray,  1 spray, Mouth/Throat, Q2H PRN, Rah Barragan MD, 1 spray at 07/26/24 1101    Laboratory Results:  Results from last 7 days   Lab Units 07/28/24  0441 07/27/24  0458 07/26/24 2028 07/26/24  0805 07/26/24  0542 07/26/24  0453 07/26/24  0026 07/25/24  2202 07/25/24  1720   WBC Thousand/uL 6.01 9.79  --   --   --  5.67 6.44  --  5.28   HEMOGLOBIN g/dL 11.0* 11.3*  --   --   --  11.8* 11.6*  --  12.3   I STAT HEMOGLOBIN g/dl  --   --   --   --   --   --   --  9.9*  --    HEMATOCRIT % 33.1* 34.2*  --   --   --  36.5 35.3*  --  38.8   HEMATOCRIT, ISTAT %  --   --   --   --   --   --   --  29*  --    PLATELETS Thousands/uL 127* 136*  --   --   --  114* 137*  --  119*   SODIUM mmol/L 127* 132* 131* 132* 133*  --  134*  --  135   POTASSIUM mmol/L 4.5 5.6* 5.1 6.3* 7.1*  --  5.0  --  6.1*   CHLORIDE mmol/L 91* 93* 93* 95* 95*  --  95*  --  94*   CO2 mmol/L 26 27 26 22 21  --  23  --  25   CO2, I-STAT mmol/L  --   --   --   --   --   --   --  27  --    BUN mg/dL 43* 48* 38* 82* 79*  --  75*  --  71*   CREATININE mg/dL 4.63* 5.04* 4.46* 7.43* 7.19*  --  6.99*  --  6.85*   CALCIUM mg/dL 8.4 9.1 9.1 10.4* 9.8  --  9.4  --  9.9   MAGNESIUM mg/dL 2.0 2.1  --   --   --  2.4 2.3  --  2.5   PHOSPHORUS mg/dL 6.3* 8.6*  --   --   --  7.5* 6.8*  --  7.2*   GLUCOSE, ISTAT mg/dl  --   --   --   --   --   --   --  87  --        XR abdomen 1 view kub   Final Result by Stephanie Davis MD (07/26 1650)      A feeding tube is seen terminating below the diaphragm in the region of the stomach         Workstation performed: [id number]         XR chest 1 view   Final Result by Mario Maddox MD (07/26 0810)      Enteric catheter tip in the proximal stomach with sideport in the lower esophagus. Recommend advancement.         The study was marked in EPIC for immediate notification.      Workstation performed: [id number]         CT abdomen pelvis with contrast   Final Result by David Brantley MD (07/25 1919)   Addendum (preliminary)  "1 of 1 by David Brantley MD (07/25 1919)   ADDENDUM:      Liver the small bowel within the umbilical hernia and surrounding ascites.    Findings concerning for threatened incarceration and discussed with    surgery.      Final      1.  Findings suggestive of enteritis. No evidence of bowel obstruction, colitis or diverticulitis.   2. Mild ascites.         Workstation performed: [id number]             Portions of the record may have been created with voice recognition software. Occasional wrong word or \"sound a like\" substitutions may have occurred due to the inherent limitations of voice recognition software. Read the chart carefully and recognize, using context, where substitutions have occurred.    "

## 2024-07-28 NOTE — PROGRESS NOTES
"Progress Note - General Surgery  Homero Koch 64 y.o. male MRN: [id number]  Unit/Bed#: ICU 03 Encounter: [id number]    Assessment:  64 y.o. male who presented with a strangulated umbilical hernia, is now 3 Days Post-Op s/p Procedure(s) (LRB):  REPAIR HERNIA UMBILICAL; SMALL BOWEL RESECTION, PRIMARY ANASTOMOSIS (N/A).  NGT was successfully removed yesterday and patient was advanced to a clear liquid diet, which she appears to be tolerating.  Abdominal exam appears to be significantly improved.  Patient is being seen by nephrology for his CKD.  He is known to have worsening hyponatremia which appears to be asymptomatic.  Sodium is 127 from a prior 135 2 days ago.  He is still undergoing regularly scheduled dialysis, last dialyzed yesterday.    Plan:  -Advance to regular diet  - Pain and Nausea control PRN  - Incentive spirometry  - OOB, ambulate  - Follow-up nephrology regarding CKD and hyponatremia  - Patient may need to be fluid restricted pending nephrology recommendations  - Will obtain urine lytes  - Please message the General surgery resident role with any concerns    Subjective/Objective     Subjective: Patient feels well this morning.  Having flatus but no bowel movements.  Denies any nausea since NGT was removed.  Has minimal to no abdominal pain..      Objective:   Vitals: Blood pressure 118/69, pulse 73, temperature 98 °F (36.7 °C), temperature source Oral, resp. rate 14, height 5' 8\" (1.727 m), weight 75.9 kg (167 lb 5.3 oz), SpO2 94%.,Body mass index is 25.44 kg/m².    I/O         07/26 0701 07/27 0700 07/27 0701 07/28 0700 07/28 0701 07/29 0700    I.V. (mL/kg) 587.2 (7.7) 500 (6.6)     Other  200     IV Piggyback 360      Total Intake(mL/kg) 947.2 (12.5) 700 (9.2)     Urine (mL/kg/hr) 0 (0)      Other 2502 500     Total Output 2502 500     Net -1554.8 +200                    Physical Exam:  Gen: NAD, Aox3, Comfortable in bed  Chest: Normal work of breathing, no respiratory distress  Abd: Soft, " ND, mild distention.  Surgical site is clean, dry, intact with Prolene stitches in place.  Ext: No Edema  Skin: Warm, Dry, Intact      Lab, Imaging and other studies: I have personally reviewed pertinent reports.  , CBC with diff:   Lab Results   Component Value Date    WBC 6.01 07/28/2024    HGB 11.0 (L) 07/28/2024    HCT 33.1 (L) 07/28/2024    MCV 98 07/28/2024     (L) 07/28/2024    RBC 3.39 (L) 07/28/2024    MCH 32.4 07/28/2024    MCHC 33.2 07/28/2024    RDW 16.5 (H) 07/28/2024    MPV 10.0 07/28/2024    NRBC 0 07/28/2024   , BMP/CMP:   Lab Results   Component Value Date    SODIUM 127 (L) 07/28/2024    K 4.5 07/28/2024    CL 91 (L) 07/28/2024    CO2 26 07/28/2024    BUN 43 (H) 07/28/2024    CREATININE 4.63 (H) 07/28/2024    CALCIUM 8.4 07/28/2024    EGFR 12 07/28/2024     VTE Pharmacologic Prophylaxis: Eliquis  VTE Mechanical Prophylaxis: sequential compression device        Thee Anaya MD  7/28/2024  8:24 AM

## 2024-07-29 ENCOUNTER — APPOINTMENT (INPATIENT)
Dept: DIALYSIS | Facility: HOSPITAL | Age: 64
DRG: 329 | End: 2024-07-29
Payer: MEDICARE

## 2024-07-29 VITALS
RESPIRATION RATE: 16 BRPM | HEIGHT: 68 IN | HEART RATE: 73 BPM | OXYGEN SATURATION: 99 % | WEIGHT: 156.75 LBS | DIASTOLIC BLOOD PRESSURE: 68 MMHG | BODY MASS INDEX: 23.76 KG/M2 | SYSTOLIC BLOOD PRESSURE: 135 MMHG | TEMPERATURE: 98.3 F

## 2024-07-29 LAB
ANION GAP SERPL CALCULATED.3IONS-SCNC: 15 MMOL/L (ref 4–13)
BASOPHILS # BLD AUTO: 0.03 THOUSANDS/ÂΜL (ref 0–0.1)
BASOPHILS NFR BLD AUTO: 1 % (ref 0–1)
BUN SERPL-MCNC: 75 MG/DL (ref 5–25)
CALCIUM SERPL-MCNC: 8.4 MG/DL (ref 8.4–10.2)
CHLORIDE SERPL-SCNC: 92 MMOL/L (ref 96–108)
CO2 SERPL-SCNC: 23 MMOL/L (ref 21–32)
CREAT SERPL-MCNC: 6.34 MG/DL (ref 0.6–1.3)
EOSINOPHIL # BLD AUTO: 0.22 THOUSAND/ÂΜL (ref 0–0.61)
EOSINOPHIL NFR BLD AUTO: 4 % (ref 0–6)
ERYTHROCYTE [DISTWIDTH] IN BLOOD BY AUTOMATED COUNT: 16.4 % (ref 11.6–15.1)
GFR SERPL CREATININE-BSD FRML MDRD: 8 ML/MIN/1.73SQ M
GLUCOSE SERPL-MCNC: 110 MG/DL (ref 65–140)
HCT VFR BLD AUTO: 33.5 % (ref 36.5–49.3)
HGB BLD-MCNC: 10.8 G/DL (ref 12–17)
IMM GRANULOCYTES # BLD AUTO: 0.01 THOUSAND/UL (ref 0–0.2)
IMM GRANULOCYTES NFR BLD AUTO: 0 % (ref 0–2)
LYMPHOCYTES # BLD AUTO: 0.46 THOUSANDS/ÂΜL (ref 0.6–4.47)
LYMPHOCYTES NFR BLD AUTO: 8 % (ref 14–44)
MAGNESIUM SERPL-MCNC: 2.2 MG/DL (ref 1.9–2.7)
MCH RBC QN AUTO: 31.8 PG (ref 26.8–34.3)
MCHC RBC AUTO-ENTMCNC: 32.2 G/DL (ref 31.4–37.4)
MCV RBC AUTO: 99 FL (ref 82–98)
MONOCYTES # BLD AUTO: 0.66 THOUSAND/ÂΜL (ref 0.17–1.22)
MONOCYTES NFR BLD AUTO: 11 % (ref 4–12)
NEUTROPHILS # BLD AUTO: 4.61 THOUSANDS/ÂΜL (ref 1.85–7.62)
NEUTS SEG NFR BLD AUTO: 76 % (ref 43–75)
NRBC BLD AUTO-RTO: 0 /100 WBCS
PHOSPHATE SERPL-MCNC: 7.5 MG/DL (ref 2.3–4.1)
PLATELET # BLD AUTO: 126 THOUSANDS/UL (ref 149–390)
PMV BLD AUTO: 10.5 FL (ref 8.9–12.7)
POTASSIUM SERPL-SCNC: 4.8 MMOL/L (ref 3.5–5.3)
RBC # BLD AUTO: 3.4 MILLION/UL (ref 3.88–5.62)
SODIUM SERPL-SCNC: 130 MMOL/L (ref 135–147)
WBC # BLD AUTO: 5.99 THOUSAND/UL (ref 4.31–10.16)

## 2024-07-29 PROCEDURE — NC001 PR NO CHARGE: Performed by: SURGERY

## 2024-07-29 PROCEDURE — 84100 ASSAY OF PHOSPHORUS: CPT

## 2024-07-29 PROCEDURE — 80048 BASIC METABOLIC PNL TOTAL CA: CPT

## 2024-07-29 PROCEDURE — 85025 COMPLETE CBC W/AUTO DIFF WBC: CPT

## 2024-07-29 PROCEDURE — 99232 SBSQ HOSP IP/OBS MODERATE 35: CPT | Performed by: INTERNAL MEDICINE

## 2024-07-29 PROCEDURE — 99024 POSTOP FOLLOW-UP VISIT: CPT | Performed by: SURGERY

## 2024-07-29 PROCEDURE — 83735 ASSAY OF MAGNESIUM: CPT

## 2024-07-29 RX ORDER — METOPROLOL SUCCINATE 25 MG/1
25 TABLET, EXTENDED RELEASE ORAL DAILY
Status: DISCONTINUED | OUTPATIENT
Start: 2024-07-29 | End: 2024-07-29 | Stop reason: HOSPADM

## 2024-07-29 RX ORDER — PANTOPRAZOLE SODIUM 40 MG/1
40 TABLET, DELAYED RELEASE ORAL
Status: DISCONTINUED | OUTPATIENT
Start: 2024-07-29 | End: 2024-07-29 | Stop reason: HOSPADM

## 2024-07-29 RX ORDER — CALCIUM ACETATE 667 MG/1
2001 CAPSULE ORAL
Status: DISCONTINUED | OUTPATIENT
Start: 2024-07-29 | End: 2024-07-29 | Stop reason: HOSPADM

## 2024-07-29 RX ADMIN — CALCIUM ACETATE 2001 MG: 667 CAPSULE ORAL at 18:01

## 2024-07-29 RX ADMIN — APIXABAN 2.5 MG: 2.5 TABLET, FILM COATED ORAL at 08:41

## 2024-07-29 RX ADMIN — PANTOPRAZOLE SODIUM 40 MG: 40 TABLET, DELAYED RELEASE ORAL at 08:41

## 2024-07-29 RX ADMIN — METOROPROLOL TARTRATE 2.5 MG: 5 INJECTION, SOLUTION INTRAVENOUS at 04:26

## 2024-07-29 RX ADMIN — CALCITRIOL 0.25 MCG: 0.25 CAPSULE, LIQUID FILLED ORAL at 08:41

## 2024-07-29 RX ADMIN — APIXABAN 2.5 MG: 2.5 TABLET, FILM COATED ORAL at 18:01

## 2024-07-29 RX ADMIN — CHLORHEXIDINE GLUCONATE 15 ML: 1.2 RINSE ORAL at 08:40

## 2024-07-29 RX ADMIN — METOROPROLOL TARTRATE 2.5 MG: 5 INJECTION, SOLUTION INTRAVENOUS at 00:18

## 2024-07-29 RX ADMIN — CALCIUM ACETATE 2001 MG: 667 CAPSULE ORAL at 11:45

## 2024-07-29 RX ADMIN — METOROPROLOL TARTRATE 2.5 MG: 5 INJECTION, SOLUTION INTRAVENOUS at 08:40

## 2024-07-29 NOTE — PHYSICAL THERAPY NOTE
Physical Therapy Screen    Patient Name: Homero Koch  Today's Date: 7/29/2024  Problem List  Principal Problem:    Incarcerated umbilical hernia with necrotic bowel  Active Problems:    Chronic kidney disease with end stage renal failure on dialysis (HCC)    Polycystic liver disease    Anemia due to chronic kidney disease, on chronic dialysis and Acute Blood Loss Anemia (HCC)    HFrEF Dilated cardiomyopathy (HCC)        07/29/24 0834   PT Last Visit   PT Visit Date 07/29/24   Note Type   Note type Screen   Additional Comments PT consult received and chart reviewed. Pt is a 64 y.o. M admitted 7/25/24 w/ abdominal pain, R testicle edema. 7/25/24 underwent repair of umbilical hernia, small bowel resection by Dr. Guo. Spoke w/ RN Radha whom reports that pt has been mobilizing wihtout issue in room and ambulating in hallways. No indication for skilled PT services at this time. Will d/c from PT caseload, continue to encourage frequent bouts of ambulation in order to combat hospital acquired deconditioning. Please reconsult if pt's functional status significantly changes.       Kayce Mejias, PT, DPT   Available via [employer]  NPI # [id number]  [place] License - [id number]  7/29/2024

## 2024-07-29 NOTE — PLAN OF CARE
Post-Dialysis RN Treatment Note    Blood Pressure:  Pre 129/78 mm/Hg  Post 135/68 mmHg   EDW  72 kg    Weight:  Pre 73.2 kg   Post 71.1 kg   Mode of weight measurement: Standing Scale   Volume Removed  2,000 ml    Treatment duration 210 minutes    NS given  No    Treatment shortened? No   Medications given during Rx None Reported   Estimated Kt/V  Not Applicable   Access type: Permacath/TDC   Access Issues: No    Report called to primary nurse   Yes, via ESC to Ludivina TAO RN      Started patient on hemodialysis treatment with UF goal of L net as tolerated x 3.5 hours x 2K bath for serum k+ of 4.8 today 7/29/24.    Problem: METABOLIC, FLUID AND ELECTROLYTES - ADULT  Goal: Electrolytes maintained within normal limits  Description: INTERVENTIONS:  - Monitor labs and assess patient for signs and symptoms of electrolyte imbalances  - Administer electrolyte replacement as ordered  - Monitor response to electrolyte replacements, including repeat lab results as appropriate  - Instruct patient on fluid and nutrition as appropriate  Outcome: Progressing  Goal: Fluid balance maintained  Description: INTERVENTIONS:  - Monitor labs   - Monitor I/O and WT  - Instruct patient on fluid and nutrition as appropriate  - Assess for signs & symptoms of volume excess or deficit  Outcome: Progressing

## 2024-07-29 NOTE — DISCHARGE SUMMARY
Discharge Summary - Homero Koch 64 y.o. male MRN: [id number]  Unit/Bed#: S -01 Encounter: [id number]    Admission Date:   Admission Orders (From admission, onward)       Ordered        07/25/24 1943  Inpatient Admission  Once                          Admitting Diagnosis: Incarcerated umbilical hernia [K42.0]  Hyperkalemia [E87.5]  Dilated cardiomyopathy (HCC) [I42.0]  Abdominal pain [R10.9]  ESRD (end stage renal disease) (HCC) [N18.6]  ESRD on dialysis (HCC) [N18.6, Z99.2]  Chronic hepatic failure without coma (HCC) [K72.10]    HPI per 7/25:  64 y.o. 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  Patient has had a known umbilical hernia for a long time which he states started before he was on HD, when he was on peritoneal dialysis.  The umbilical hernia is usually soft and fluid-filled, reducible, nonpainful.  Around 6 AM this morning patient states that he began having significant abdominal pain.  He did tolerate some oral intake and have a bowel movement this morning, but has not had an appetite since due to pain.  He is not having any nausea or vomiting but he has been passing flatus throughout the day.  He has not experienced pain like this before.     We attempted to reduce the hernia after administration of 50 mcg of fentanyl along with at least half an hour of Trendelenburg positioning with knees to his chest and placed on the hernia.  We were unsuccessful and  were not able to reduce the hernia even partially.     After discussion with radiology, we recommended go to the operating room tonight for umbilical hernia repair.  During discussion of the alternatives, we did tell patient that he could decline surgery and opted for expectant management, possible paracentesis tomorrow and try again to reduce  the hernia.  We did tell patient and his wife that declining surgery tonight places him at increased risk for ischemic bowel, perforation, abdominal infection, all of which may be lethal.  Patient and his wife verbalized understanding and would like to think about their options and request that we return within the hour to discuss their final decision.     Regarding functional status patient states that he can make up a flight of stairs with some difficulty, he may have to stop.  He thinks he should move into a ranch house to avoid stairs.  He does live at home with his wife.    Procedures Performed: No orders of the defined types were placed in this encounter.    Summary of Hospital Course: Patient started on ctx/flagyl and underwent 7/29 UHR, SBR with primary anastomosis. Tolerated procedure well. Underwent HD 7/26, 7/27, and 7/29.  NGT, [place], abx d/c'd POD2. Advanced to regular diet by 7/28. PT/OT had no needs. Stable for discharge home. Will f/u outpt in 2w.    Discharge Diagnosis: Incarcerated umbilical hernia [K42.0]  Hyperkalemia [E87.5]  Dilated cardiomyopathy (HCC) [I42.0]  Abdominal pain [R10.9]  ESRD (end stage renal disease) (HCC) [N18.6]  ESRD on dialysis (HCC) [N18.6, Z99.2]  Chronic hepatic failure without coma (HCC) [K72.10]    Medical Problems       Resolved Problems  Date Reviewed: 7/26/2024   None       Condition at Discharge: stable     Discharge Information:  See after visit summary for information provided to patient and family, information related to follow-up care and any pertinent home health orders, and for reconciled discharge medications provided to patient and family.    PCP: Seth Cunha MD    Disposition: Home  Planned Readmission: No    Discharge Statement   I spent 30 minutes discharging the patient. This time was spent on the day of discharge. I had direct contact with the patient on the day of discharge. Additional documentation is required if more than 30 minutes were spent on  discharge.

## 2024-07-29 NOTE — PROGRESS NOTES
"General surgery-progress Note  Homero Koch 64 y.o. male MRN: [id number]  Unit/Bed#: S -01 Encounter: [id number]    Assessment  64 M with strangulated umbilical hernia, s/p 7/26 small bowel resection, primary anastomosis    Plan  - Continue low residue diet   - HD 7/29  -- DVT ppx    Subjective/Objective   NAOE. Pain controlled.  Tolerating diet. No n/v.  No BM, having flatus. Voiding light yellow urine.    /86   Pulse 81   Temp 98 °F (36.7 °C)   Resp 16   Ht 5' 8\" (1.727 m)   Wt 73.2 kg (161 lb 6 oz)   SpO2 100%   BMI 24.54 kg/m²     Physical Exam:  General: NAD  CV: nl rate  Lungs: nl wob. No resp distress.  ABD: Soft, ND, NT.  Incision CDI  Extrem: No CCE    Recent Labs     07/28/24  0441 07/29/24  0458   WBC 6.01 5.99   HGB 11.0* 10.8*   * 126*   SODIUM 127* 130*   K 4.5 4.8   CL 91* 92*   CO2 26 23   BUN 43* 75*   CREATININE 4.63* 6.34*   GLUC 93 110   CALCIUM 8.4 8.4   MG 2.0 2.2   PHOS 6.3* 7.5*   EGFR 12 8              Diet Orders   (From admission, onward)                 Start     Ordered    07/28/24 0829  Diet GI; Lo Fiber/Lo Residue  Diet effective now        References:    Adult Nutrition Support Algorithm    RD Therapeutic Diet Order Protocol   Question Answer Comment   Diet Type GI    GI Lo Fiber/Lo Residue    RD to adjust diet per protocol? Yes        07/28/24 0828                    "

## 2024-07-29 NOTE — PLAN OF CARE
Problem: Prexisting or High Potential for Compromised Skin Integrity  Goal: Skin integrity is maintained or improved  Description: INTERVENTIONS:  - Identify patients at risk for skin breakdown  - Assess and monitor skin integrity  - Assess and monitor nutrition and hydration status  - Monitor labs   - Assess for incontinence   - Turn and reposition patient  - Assist with mobility/ambulation  - Relieve pressure over bony prominences  - Avoid friction and shearing  - Provide appropriate hygiene as needed including keeping skin clean and dry  - Evaluate need for skin moisturizer/barrier cream  - Collaborate with interdisciplinary team   - Patient/family teaching  - Consider wound care consult   Outcome: Completed     Problem: Knowledge Deficit  Goal: Patient/family/caregiver demonstrates understanding of disease process, treatment plan, medications, and discharge instructions  Description: Complete learning assessment and assess knowledge base.  Interventions:  - Provide teaching at level of understanding  - Provide teaching via preferred learning methods  Outcome: Completed     Problem: METABOLIC, FLUID AND ELECTROLYTES - ADULT  Goal: Electrolytes maintained within normal limits  Description: INTERVENTIONS:  - Monitor labs and assess patient for signs and symptoms of electrolyte imbalances  - Administer electrolyte replacement as ordered  - Monitor response to electrolyte replacements, including repeat lab results as appropriate  - Instruct patient on fluid and nutrition as appropriate  Outcome: Completed  Goal: Fluid balance maintained  Description: INTERVENTIONS:  - Monitor labs   - Monitor I/O and WT  - Instruct patient on fluid and nutrition as appropriate  - Assess for signs & symptoms of volume excess or deficit  Outcome: Completed     Problem: Nutrition/Hydration-ADULT  Goal: Nutrient/Hydration intake appropriate for improving, restoring or maintaining nutritional needs  Description: Monitor and assess  patient's nutrition/hydration status for malnutrition. Collaborate with interdisciplinary team and initiate plan and interventions as ordered.  Monitor patient's weight and dietary intake as ordered or per policy. Utilize nutrition screening tool and intervene as necessary. Determine patient's food preferences and provide high-protein, high-caloric foods as appropriate.     INTERVENTIONS:  - Monitor oral intake, urinary output, labs, and treatment plans  - Assess nutrition and hydration status and recommend course of action  - Evaluate amount of meals eaten  - Assist patient with eating if necessary   - Allow adequate time for meals  - Recommend/ encourage appropriate diets, oral nutritional supplements, and vitamin/mineral supplements  - Order, calculate, and assess calorie counts as needed  - Recommend, monitor, and adjust tube feedings and TPN/PPN based on assessed needs  - Assess need for intravenous fluids  - Provide specific nutrition/hydration education as appropriate  - Include patient/family/caregiver in decisions related to nutrition  Outcome: Completed

## 2024-07-29 NOTE — CASE MANAGEMENT
Case Management Discharge Planning Note    Patient name Homero Koch  Location S /S -01 MRN [id number]  : 1960 Date 2024       Current Admission Date: 2024  Current Admission Diagnosis:Incarcerated umbilical hernia with necrotic bowel   Patient Active Problem List    Diagnosis Date Noted Date Diagnosed    Incarcerated umbilical hernia with necrotic bowel 2024     Abdominal pain 2024     Chronic venous stasis dermatitis of both lower extremities 2023     Hemodialysis catheter infection (HCC) 2023     Venous stasis ulcer of left lower leg with edema of left lower leg  ([facility]) 2023     Abnormal nuclear stress test 2021     Heart failure with reduced ejection fraction due to cardiomyopathy ([facility]) 2020     HFrEF Dilated cardiomyopathy ([facility]) 2020     Pruritus 2020     Insomnia 2020     Chronic hepatic failure without coma ([facility]) 2020     Paresis ([facility]) 2020     Hypoproteinemia ([facility]) 2020     Systolic dysfunction 2020     GERD (gastroesophageal reflux disease) 2019     Oropharyngeal dysphagia 2019     Constipation 2019     At Risk for Pressure ulcer, buttock 10/29/2019     ESRD (end stage renal disease) ([facility]) 10/28/2019     Scrotal swelling 10/27/2019     Hyperkalemia 10/26/2019     Wound of right leg 10/26/2019     Wound of left leg 10/26/2019     Multiple wounds of skin 10/26/2019     Cellulitis 10/26/2019     ESRD (end stage renal disease) on dialysis ([facility])      Ileus ([facility]) 10/09/2019     Ascites 10/09/2019     Elevated troponin 10/09/2019     Hypotension 10/06/2019     Anemia due to chronic kidney disease, on chronic dialysis and Acute Blood Loss Anemia ([facility]) 10/06/2019     Paroxysmal atrial fibrillation ([facility]) 2019     Gram-negative bacteremia 2019     Chronic kidney disease with end stage renal failure on dialysis ([facility]) 2019     Hyponatremia 2019     Multiple  and open wound of lower limb 09/21/2019     Total bilirubin, elevated 09/21/2019     Thrombocytopenia (HCC) 09/21/2019     Polycystic liver disease 08/07/2018     ADPKD (autosomal dominant polycystic kidney) 08/07/2018     Peripheral neuropathy 11/23/2015     Polycystic kidney disease 08/17/2015     Hypertension 10/29/2013       LOS (days): 4  Geometric Mean LOS (GMLOS) (days): 9.8  Days to GMLOS:6     OBJECTIVE:  Risk of Unplanned Readmission Score: 22.92         Current admission status: Inpatient   Preferred Pharmacy:   [id number] - [place], [place] - [address]  [address]  [place] [address]  Phone: [phone] Fax: [phone]    [id number] - [place], [place] - [address]  [address]  [place] [address]  Phone: [phone] Fax: [phone]    Primary Care Provider: Seth Cunha MD    Primary Insurance: MEDICARE  Secondary Insurance: [facility]    DISCHARGE DETAILS:    Discharge planning discussed with:: Patient  Freedom of Choice: Yes  Comments - Freedom of Choice: No CM DC needs discussed or identified  CM contacted family/caregiver?: Yes  Were Treatment Team discharge recommendations reviewed with patient/caregiver?: Yes  Did patient/caregiver verbalize understanding of patient care needs?: Yes  Were patient/caregiver advised of the risks associated with not following Treatment Team discharge recommendations?: Yes    Contacts  Patient Contacts: Patient  Relationship to Patient:: Other (Comment)  Contact Method: In Person  Reason/Outcome: Discharge Planning, Continuity of Care    Requested Home Health Care         Is the patient interested in HHC at discharge?: No    DME Referral Provided  Referral made for DME?: No    Other Referral/Resources/Interventions Provided:  Interventions: None Indicated         Treatment Team Recommendation: Home  Discharge Destination Plan:: Home  Transport at Discharge : Family                             IMM  Given (Date):: 07/29/24  IMM Given to:: Patient        IMM reviewed with patient.  Patient agrees with discharge determination.       CM notified by surgery team that they plan on discharging patient this evening when he completes his HD treatment.    CM met with patient at bedside to discuss DCP.  No CM DC needs were discussed or identified.  Patient stated when he knows for sure that he will be discharged that he will call his wife for a ride.    CM reviewed the availability of treatment team to discuss questions or concerns patient and/or family may have regarding  understanding medications and recognizing signs and symptoms once discharged.  CM also encouraged patient to follow up with all recommended appointments after discharge. Patient advised of importance for patient and family to participate in managing patient's medical well being.

## 2024-07-29 NOTE — PROGRESS NOTES
The pantoprazole has been converted to Oral per [facility] IV-to-PO Auto-Conversion Protocol for Adults as approved by the Pharmacy and Therapeutics Committee. The patient met all eligible criteria:  1) Age = 18 years old   2) Received at least one dose of the IV form   3) Receiving at least one other scheduled oral/enteral medication   4) Tolerating an oral/enteral diet   and did not have any exclusions:   1) Critical care patient   2) Active GI bleed (IF assessing H2RAs or PPIs)   3) Continuous tube feeding (IF assessing cipro, doxycycline, levofloxacin, minocycline, rifampin, or voriconazole)   4) Receiving PO vancomycin (IF assessing metronidazole)   5) Persistent nausea and/or vomiting   6) Ileus or gastrointestinal obstruction   7) Asuncion/nasogastric tube set for continuous suction   8) Specific order not to automatically convert to PO (in the order's comments or if discussed in the most recent Infectious Disease or primary team's progress notes).

## 2024-07-29 NOTE — DISCHARGE INSTR - AVS FIRST PAGE
[facility] Surgical Discharge Instructions    Please call [facility] General Surgery ([phone]) to schedule your follow-up appointment in 2 weeks.    Activity:  - No lifting, pushing, or pulling anything over 10 pounds for 4-6 weeks or until cleared by your physician  - Regular activity (working around the house, walking up/down stairs, etc.) is ok and encouraged  - No driving until you are no longer using narcotic pain medications    Diet:    - You may resume your normal diet    Wound Care:  - You may remove your dressing and shower 24hrs after your procedure  - When you shower, allow soapy water to run over your incisions and then pat dry. Do NOT rub or scrub your incisions  - Do not submerge your incisions in tubs, baths, pools, etc until cleared by your surgeon  - Do not apply any lotions, creams, or ointments to your incision until cleared by your surgeon  - You may use daily dressing changes as needed (to prevent catching on your clothing or to absorb any drainage) with a dry gauze dressing held in place with a small piece of tape    Medications:    - Continue your pre-hospital medication regimen after discharge unless you were instructed otherwise. Please refer to your discharge medication list for further details.  - For the first few days following your procedure, use ice for pain control.    Additional Instructions:  - If you have any questions or concerns after discharge please do not hesitate to contact our office, we would be happy to provide clarification      Call our office or return to the Emergency Room if you develop a fever greater than 101F, chills, persistent nausea/vomiting, worsening/uncontrollable pain, and/or increasing redness or purulent/foul smelling drainage from your incision(s)

## 2024-07-29 NOTE — PROGRESS NOTES
NEPHROLOGY PROGRESS NOTE   Homero Koch 64 y.o. male MRN: [id number]  Unit/Bed#: S -01 Encounter: [id number]      ASSESSMENT/PLAN:  ESRD on HD MWF at [facility]  Had extra HD on Saturday due to hyperkalemia  HD today  EDW: 76 kg --- currently below dry weight with last weight 73 . Challenge weight today given that patient complains of scrotal edema  Access: PermCath  Hyperkalemia  Low potassium diet  HD today  Hypertension  BP currently acceptable  Outpatient losartan 25 mg daily, metoprolol 25 mg daily  Takes midodrine 2.5 mg with HD for systolic blood pressure less than 110  Secondary hyperparathyroidism  Calcitriol 0.25 mcg q. HD  Hyperphosphatemia  Phosphorus 7.5  Resume home binders--3 tabs 3 times daily with meals per patient  Anemia of CKD  Hemoglobin 10.8  Continue outpatient GRISELDA per protocol  Hyponatremia  Sodium 130  Due to ESRD.  Monitor with UF on dialysis  Strangulated umbilical hernia status post small bowel resection 7/26    Plan Summary:   Home PhosLo 3 tabs 3 times daily with meals  Low potassium diet, low phosphorus diet  Dialysis today    SUBJECTIVE:  Complains of scrotal edema.  No chest pain or shortness of breath.    OBJECTIVE:  Current Weight: Weight - Scale: 73.2 kg (161 lb 6 oz)  Vitals:    07/29/24 0725   BP: 141/86   Pulse: 81   Resp: 16   Temp: 98 °F (36.7 °C)   SpO2: 100%       Intake/Output Summary (Last 24 hours) at 7/29/2024 1132  Last data filed at 7/29/2024 0725  Gross per 24 hour   Intake 240 ml   Output --   Net 240 ml     General:  appears comfortable and in no acute distress   Skin:  No rash  Eyes:  Sclerae anicteric, no periorbital edema   ENT:  Moist mucous membranes  Neck:  Trachea midline, symmetric   Chest:  Clear to auscultation bilaterally with no wheezes, rales or rhonchi  CVS:  Regular rate and rhythm  Abdomen:  Soft, nontender, nondistended  Neuro:  Awake and alert  Psych:  Appropriate affect  Extremities: no lower extremity edema        Medications:  Scheduled Meds:  Current Facility-Administered Medications   Medication Dose Route Frequency Provider Last Rate    apixaban  2.5 mg Oral BID Eloise Gan, SAM      calcitriol  0.25 mcg Oral Once per day on Monday Wednesday Friday Eloise Gan, SAM      chlorhexidine  15 mL Mouth/Throat Q12H [facility] lEoise Gan, SAM      HYDROmorphone  0.2 mg Intravenous Q3H PRN Eloise Gan, CRNP      metoprolol  2.5 mg Intravenous Q4H Eloise Gan, CRNP      ondansetron  4 mg Intravenous Q4H PRN Eloise Gan, CRNP      oxyCODONE  5 mg Oral Q8H PRN Eloise Gan, ALEXNP      oxyCODONE  2.5 mg Oral Q8H PRN Eloise Gan, CRNP      pantoprazole  40 mg Oral Early Morning Eloise Gan, SAM      phenol  1 spray Mouth/Throat Q2H PRN Eloise Gan, ALEXNP         PRN Meds:.  HYDROmorphone    ondansetron    oxyCODONE    oxyCODONE    phenol    Laboratory Results:  Results from last 7 days   Lab Units 07/29/24  0458 07/28/24  0441 07/27/24  0458 07/26/24  2028 07/26/24  0805 07/26/24  0542 07/26/24  0453 07/26/24  0026 07/25/24  2202 07/25/24  1720   WBC Thousand/uL 5.99 6.01 9.79  --   --   --  5.67 6.44  --  5.28   HEMOGLOBIN g/dL 10.8* 11.0* 11.3*  --   --   --  11.8* 11.6*  --  12.3   I STAT HEMOGLOBIN g/dl  --   --   --   --   --   --   --   --  9.9*  --    HEMATOCRIT % 33.5* 33.1* 34.2*  --   --   --  36.5 35.3*  --  38.8   HEMATOCRIT, ISTAT %  --   --   --   --   --   --   --   --  29*  --    PLATELETS Thousands/uL 126* 127* 136*  --   --   --  114* 137*  --  119*   SODIUM mmol/L 130* 127* 132* 131* 132* 133*  --  134*  --  135   POTASSIUM mmol/L 4.8 4.5 5.6* 5.1 6.3* 7.1*  --  5.0  --  6.1*   CHLORIDE mmol/L 92* 91* 93* 93* 95* 95*  --  95*  --  94*   CO2 mmol/L 23 26 27 26 22 21  --  23  --  25   CO2, I-STAT mmol/L  --   --   --   --   --   --   --   --  27  --    BUN mg/dL 75* 43* 48* 38* 82* 79*  --  75*  --  71*   CREATININE mg/dL 6.34* 4.63* 5.04* 4.46* 7.43* 7.19*  --  6.99*  --  6.85*    CALCIUM mg/dL 8.4 8.4 9.1 9.1 10.4* 9.8  --  9.4  --  9.9   MAGNESIUM mg/dL 2.2 2.0 2.1  --   --   --  2.4 2.3  --  2.5   PHOSPHORUS mg/dL 7.5* 6.3* 8.6*  --   --   --  7.5* 6.8*  --  7.2*   GLUCOSE, ISTAT mg/dl  --   --   --   --   --   --   --   --  87  --

## 2024-07-30 ENCOUNTER — TRANSITIONAL CARE MANAGEMENT (OUTPATIENT)
Age: 64
End: 2024-07-30

## 2024-07-30 ENCOUNTER — TELEPHONE (OUTPATIENT)
Age: 64
End: 2024-07-30

## 2024-07-30 PROCEDURE — 88307 TISSUE EXAM BY PATHOLOGIST: CPT | Performed by: PATHOLOGY

## 2024-07-30 NOTE — TELEPHONE ENCOUNTER
Sent to [place] Provider from another encounter:    Received call from access center that when pt went to hand in paperwork for handicapped parking. Pt states the license number for Dr Cunha was not correct according to Sandra. I gave the license number again and it's the same the pt has. Pt is going to attempt again. Does pt need another appointment for this situation if this keeps occurring?

## 2024-07-30 NOTE — TELEPHONE ENCOUNTER
Pt called regarding his disability parking pass; he received a notice from [employer] stating Dr. Cunha medical license # was invalid/ not verified. I spoke with Sonali in the clinical office and confirmed his medical license # which was the same as well as took note of his NPI # for the pt. Advised pt of the NPI # to see if that is acceptable for [employer] and pt stated he would give it a try. Please advise pt if any new information turns up and/or any additional information.

## 2024-07-31 ENCOUNTER — OFFICE VISIT (OUTPATIENT)
Age: 64
End: 2024-07-31
Payer: MEDICARE

## 2024-07-31 ENCOUNTER — TELEPHONE (OUTPATIENT)
Age: 64
End: 2024-07-31

## 2024-07-31 VITALS
WEIGHT: 159.8 LBS | BODY MASS INDEX: 24.22 KG/M2 | RESPIRATION RATE: 16 BRPM | TEMPERATURE: 98 F | DIASTOLIC BLOOD PRESSURE: 62 MMHG | HEART RATE: 91 BPM | SYSTOLIC BLOOD PRESSURE: 138 MMHG | OXYGEN SATURATION: 99 % | HEIGHT: 68 IN

## 2024-07-31 DIAGNOSIS — R53.81 PHYSICAL DECONDITIONING: Primary | ICD-10-CM

## 2024-07-31 DIAGNOSIS — Z99.2 ESRD (END STAGE RENAL DISEASE) ON DIALYSIS (HCC): ICD-10-CM

## 2024-07-31 DIAGNOSIS — N18.6 ESRD (END STAGE RENAL DISEASE) ON DIALYSIS (HCC): ICD-10-CM

## 2024-07-31 DIAGNOSIS — K42.0 INCARCERATED UMBILICAL HERNIA: ICD-10-CM

## 2024-07-31 PROCEDURE — 99496 TRANSJ CARE MGMT HIGH F2F 7D: CPT

## 2024-07-31 NOTE — TELEPHONE ENCOUNTER
Patient calling back, he states he knows he is [facility] patient however, was recently hospitalized at [facility] where he was seen and treated by nephrology. He is requesting the nephrologist who saw him in the hospital which was Chiara Tellez to coordinate care with his PCP and tell his PCP what was done in the hospital and why. His PCP is Dr. Cunha and office number is [phone]

## 2024-07-31 NOTE — TELEPHONE ENCOUNTER
Patient called [facility]IE in error. He sees an [facility] doctor. I called patient back and left him a message with the correct number, and I called his nephrology office and left his phone number and info to reach back out to him.

## 2024-07-31 NOTE — ASSESSMENT & PLAN NOTE
Lab Results   Component Value Date    EGFR 8 07/29/2024    EGFR 12 07/28/2024    EGFR 11 07/27/2024    CREATININE 6.34 (H) 07/29/2024    CREATININE 4.63 (H) 07/28/2024    CREATININE 5.04 (H) 07/27/2024     Managed by [facility] in [facility]. He does dialysis there MWF. He will be speaking to them to update his plan of care and his healthcare goals in regards to his abdominal fluid buildup.

## 2024-07-31 NOTE — PROGRESS NOTES
Transition of Care Visit  Name: Homero Koch      : 1960      MRN: [id number]  Encounter Provider: Dorian Whitt DO  Encounter Date: 2024   Encounter department: [facility]    Assessment & Plan   1. Physical deconditioning  Assessment & Plan:  We will send him for Physical Therapy after some deconditioning in the hospital. He feels his legs are weak and he would like to improve his physical fitness.   Orders:  -     Ambulatory Referral to Physical Therapy; Future  2. ESRD (end stage renal disease) on dialysis (HCC)  Assessment & Plan:  Lab Results   Component Value Date    EGFR 8 2024    EGFR 12 2024    EGFR 11 2024    CREATININE 6.34 (H) 2024    CREATININE 4.63 (H) 2024    CREATININE 5.04 (H) 2024     Managed by [facility] in [facility]. He does dialysis there MW. He will be speaking to them to update his plan of care and his healthcare goals in regards to his abdominal fluid buildup.  3. Incarcerated umbilical hernia with necrotic bowel  Assessment & Plan:  POD6 today from his surgery.  The incision site is warm, clean, and dry. No signs of erythema or infection. No swelling.   He will see surgery for follow up on 2024.          History of Present Illness     Transitional Care Management Review:   Homero Koch is a 64 y.o. male here for TCM follow up.     During the TCM phone call patient stated:  TCM Call       Date and time call was made  2024 11:39 AM    Hospital care reviewed  Records reviewed    Patient was hospitialized at  [facility]    Date of Admission  24    Date of discharge  24    Diagnosis  Incarcerated umbilical hernia with necrotic bowel    Disposition  Home    Were the patients medications reviewed and updated  Yes    Current Symptoms  None          TCM Call       Post hospital issues  None    Should patient be enrolled in anticoag monitoring?  No    Scheduled  "for follow up?  Yes    Patients specialists  Cardiologist    Did you obtain your prescribed medications  Yes    Do you need help managing your prescriptions or medications  No    Is transportation to your appointment needed  No    I have advised the patient to call PCP with any new or worsening symptoms  Tiffany Espinoza    Living Arrangements  Alone    Support System  Family; Children    The type of support provided  Emotional; Physical    Do you have social support  Yes, as much as I need    Are you recieving any outpatient services  Yes    Are you recieving home care services  No    Are you using any community resources  No    Current waiver services  No    Have you fallen in the last 12 months  No    Interperter language line needed  No    Counseling  Family          Had umbilical hernia surgery on July 25th. Has been on hemodialyis for 8 years, out of central line, he does not have a fistula, he now does HD in clinic [facility]. May want to transition his care all to [facility] from [facility]. Wants nephrology associates seen in [facility] to get his information to dialysis center. Is concerned about his weights, feels there is conflicting weight goals between 71-76kg. He is very motivated to get the fluid out of his abdomen. He is not sure if this dialysis center is caring for him well. [facility] - it is a [facility] group. Dr. Davis is there. Will reach out to dialysis - \"patient wants more fluid off, and if the protocols need to change please talk to this patient's nephrologist. He has had some weight gain, would like to discuss his goals.\"    He feels quite well today. Feels like himself besides the fact he just had abdominal surgery. Last bowel movement was yesterday.    Incision site looks well. No swelling or erythema. No signs of infection.     Mentioned physical therapy, inquired if it might be useful for him for some deconditioning while in the hospital.     Dr Cunha filled out a form and " "the patient says the information is wrong, comes with a letter from [employer]. We can fill this out for him for a handicap placard.    Has visit scheduled with surgery on August 5th in [place].       Review of Systems   Constitutional:  Negative for chills and fever.   HENT:  Negative for sore throat.    Respiratory:  Negative for shortness of breath.    Cardiovascular:  Negative for chest pain.   Gastrointestinal:  Negative for abdominal pain, constipation and diarrhea.   Genitourinary:  Positive for scrotal swelling.     Objective     /62   Pulse 91   Temp 98 °F (36.7 °C)   Resp 16   Ht 5' 8\" (1.727 m)   Wt 72.5 kg (159 lb 12.8 oz)   SpO2 99%   BMI 24.30 kg/m²     Physical Exam  HENT:      Head: Normocephalic.      Mouth/Throat:      Mouth: Mucous membranes are dry.   Eyes:      Extraocular Movements: Extraocular movements intact.   Cardiovascular:      Rate and Rhythm: Normal rate and regular rhythm.      Pulses: Normal pulses.   Pulmonary:      Effort: Pulmonary effort is normal.   Abdominal:      General: There is distension.      Palpations: There is no mass.      Tenderness: There is no guarding.      Hernia: No hernia is present.   Skin:     General: Skin is dry.      Comments: Hemosiderin staining in lower legs.   Neurological:      Mental Status: He is alert.       Medications have been reviewed by provider in current encounter    Administrative Statements         "

## 2024-07-31 NOTE — ASSESSMENT & PLAN NOTE
POD6 today from his surgery.  The incision site is warm, clean, and dry. No signs of erythema or infection. No swelling.   He will see surgery for follow up on August 5th 2024.

## 2024-07-31 NOTE — ASSESSMENT & PLAN NOTE
We will send him for Physical Therapy after some deconditioning in the hospital. He feels his legs are weak and he would like to improve his physical fitness.

## 2024-08-01 NOTE — TELEPHONE ENCOUNTER
Patient calling back upset asking if anyone called [facility] to discuss his dry weight. He is asking if Chiara Tellez PA-C can call [facility] and let them know his dry weight after the hospital was 71.1kg. He then stated on Wednesday it was 73kg. I told him weight fluctuates especially with dialysis patients. He just wants that message relayed to his care team at [facility].

## 2024-08-01 NOTE — TELEPHONE ENCOUNTER
Patient stating he was disconnected when on the phone with Paola. Informed her she sent a message requesting Chiara to call dialysis and inform them of his dry weight. He states this si very important. Please call patient with update once done.  Please advise

## 2024-08-01 NOTE — TELEPHONE ENCOUNTER
I called Homero and left him a message and advised him that his PCP and nephrologist have acces to care everywhere through epic so all notes from his hospital stay are shared with all providers in his care tea,. If his PCP or Nephrologist have any questions in regards to his care he received while he was admitted I advised they can call [phone] with any questions.

## 2024-08-02 NOTE — TELEPHONE ENCOUNTER
adonis Henderson called his dialysis unit and informed them of his hospital weight but she had to leave a message as no one answered her call.

## 2024-08-05 ENCOUNTER — OFFICE VISIT (OUTPATIENT)
Dept: SURGERY | Facility: CLINIC | Age: 64
End: 2024-08-05

## 2024-08-05 VITALS
OXYGEN SATURATION: 96 % | TEMPERATURE: 96.7 F | WEIGHT: 164.5 LBS | RESPIRATION RATE: 14 BRPM | BODY MASS INDEX: 25.01 KG/M2

## 2024-08-05 DIAGNOSIS — K42.0 INCARCERATED UMBILICAL HERNIA: Primary | ICD-10-CM

## 2024-08-05 PROCEDURE — 99024 POSTOP FOLLOW-UP VISIT: CPT | Performed by: SURGERY

## 2024-08-05 NOTE — ASSESSMENT & PLAN NOTE
- S/p umbilical hernia repair, small bowel resection on 7/29.  - Doing well, having regular bowel function and completing activities of daily living.  - Will plan to remove prolene sutures at next office visit.  - Continue to lift no more than 20 lbs.  - Plan for next office visit in 2 weeks.

## 2024-08-05 NOTE — PROGRESS NOTES
Ambulatory Visit  Name: Homero Koch      : 1960      MRN: [id number]  Encounter Provider: Devin Robbins MD  Encounter Date: 2024   Encounter department: [facility] [place]    Assessment & Plan   1. Incarcerated umbilical hernia with necrotic bowel  Assessment & Plan:  - S/p umbilical hernia repair, small bowel resection on .  - Doing well, having regular bowel function and completing activities of daily living.  - Will plan to remove prolene sutures at next office visit.  - Continue to lift no more than 20 lbs.  - Plan for next office visit in 2 weeks.      History of Present Illness     Homero Koch is a 64 y.o. male who presents for post op evaluation following an open umbilical hernia repair, small bowel resection, closure on . The patient states he is doing well. He denies having nausea, vomiting, fevers, chills, chest pain, shortness of breath. He is tolerating PO intake. He is voiding without difficulty, having bowel movements, and passing flatus. He is able to complete activities of daily living without difficulty. Has no complaints at this point in time.     Review of Systems   Constitutional:  Negative for activity change, appetite change, chills and fever.   HENT:  Negative for congestion, sinus pressure and sinus pain.    Eyes:  Negative for visual disturbance.   Respiratory:  Negative for chest tightness and shortness of breath.    Cardiovascular:  Negative for chest pain and palpitations.   Gastrointestinal:  Negative for abdominal pain, nausea and vomiting.   Endocrine: Negative for polydipsia and polyuria.   Genitourinary:  Negative for difficulty urinating and flank pain.   Musculoskeletal:  Negative for back pain.   Skin:  Positive for wound.        Umbilical hernia incision appreciated. Prolene sutures in place.   Allergic/Immunologic: Negative for immunocompromised state.   Neurological:  Negative for dizziness and headaches.    Psychiatric/Behavioral:  Negative for agitation and confusion.      Pertinent Medical History   See above    Objective     Temp (!) 96.7 °F (35.9 °C) (Temporal)   Resp 14   Wt 74.6 kg (164 lb 8 oz)   SpO2 96%   BMI 25.01 kg/m²     Physical Exam  Constitutional:       Appearance: Normal appearance.   HENT:      Head: Normocephalic.      Nose: Nose normal.      Mouth/Throat:      Mouth: Mucous membranes are moist.      Pharynx: Oropharynx is clear.   Eyes:      Extraocular Movements: Extraocular movements intact.      Pupils: Pupils are equal, round, and reactive to light.   Cardiovascular:      Rate and Rhythm: Normal rate and regular rhythm.      Pulses: Normal pulses.      Heart sounds: Normal heart sounds.   Pulmonary:      Effort: Pulmonary effort is normal.      Breath sounds: Normal breath sounds.   Abdominal:      General: Abdomen is flat. There is no distension.      Palpations: Abdomen is soft.      Tenderness: There is no abdominal tenderness.      Comments: Previous umbilical hernia repair incision is clean/dry/intact. Prolene sutures in place.    Musculoskeletal:         General: Normal range of motion.      Cervical back: Normal range of motion.   Skin:     General: Skin is warm.   Neurological:      General: No focal deficit present.      Mental Status: He is alert and oriented to person, place, and time.   Psychiatric:         Mood and Affect: Mood normal.         Behavior: Behavior normal.       Administrative Statements   I have spent a total time of 20 minutes in caring for this patient on the day of the visit/encounter including Instructions for management and Patient and family education.

## 2024-08-12 ENCOUNTER — RA CDI HCC (OUTPATIENT)
Dept: OTHER | Facility: HOSPITAL | Age: 64
End: 2024-08-12

## 2024-08-19 ENCOUNTER — OFFICE VISIT (OUTPATIENT)
Dept: SURGERY | Facility: CLINIC | Age: 64
End: 2024-08-19

## 2024-08-19 VITALS
TEMPERATURE: 97.2 F | WEIGHT: 165.6 LBS | BODY MASS INDEX: 25.18 KG/M2 | OXYGEN SATURATION: 95 % | RESPIRATION RATE: 14 BRPM

## 2024-08-19 DIAGNOSIS — K42.0 INCARCERATED UMBILICAL HERNIA: Primary | ICD-10-CM

## 2024-08-19 PROCEDURE — 99024 POSTOP FOLLOW-UP VISIT: CPT | Performed by: SURGERY

## 2024-08-19 NOTE — PROGRESS NOTES
Ambulatory Visit  Name: Homero Koch      : 1960      MRN: [id number]  Encounter Provider: Johny Barnes DO  Encounter Date: 2024   Encounter department: [facility] [place]    Assessment & Plan   1. Incarcerated umbilical hernia with necrotic bowel  Assessment & Plan:  - Prolene sutures removed, picture in chart.  - Patient instructed no lifting > 20 lbs for next 3 weeks.  - No baths/swimming for next 3 weeks.  - After 3 weeks, all restrictions lifted.  - Patient can follow up PRN.      History of Present Illness     Homero Koch is a 64 y.o. male who presents for removal of his sutures. Patient had prolene sutures placed during his umbilical herina repair, bowel resection, closure on . Patient is 3 weeks out from surgery. Denies having nausea, vomiting, fevers, chills, chest pain, shorntess of breaht. Voiding wtihout difficulty. Having bowel movements and passing flatus. Has no complaints at this point in time. No changes in PMHX/PSHX. No new pertinent labs or imaging ro review. See above for assessment and plan:    Review of Systems   Constitutional:  Negative for activity change and appetite change.   HENT:  Negative for congestion, sinus pressure and sinus pain.    Eyes:  Negative for visual disturbance.   Respiratory:  Negative for chest tightness and shortness of breath.    Cardiovascular:  Negative for chest pain.   Gastrointestinal:  Negative for abdominal pain, nausea and vomiting.   Endocrine: Negative for polydipsia and polyuria.   Genitourinary:  Negative for difficulty urinating.   Musculoskeletal:  Negative for back pain.   Allergic/Immunologic: Negative for immunocompromised state.   Neurological:  Negative for dizziness and headaches.   Psychiatric/Behavioral:  Negative for agitation and confusion.    All other systems reviewed and are negative.    Pertinent Medical History     See above        Objective     Temp (!) 97.2 °F (36.2 °C)  (Temporal)   Resp 14   Wt 75.1 kg (165 lb 9.6 oz)   SpO2 95%   BMI 25.18 kg/m²     Physical Exam  Constitutional:       Appearance: He is normal weight.   HENT:      Head: Normocephalic and atraumatic.      Nose: Nose normal.      Mouth/Throat:      Mouth: Mucous membranes are moist.      Pharynx: Oropharynx is clear.   Eyes:      Extraocular Movements: Extraocular movements intact.      Pupils: Pupils are equal, round, and reactive to light.   Cardiovascular:      Rate and Rhythm: Normal rate and regular rhythm.      Pulses: Normal pulses.      Heart sounds: Normal heart sounds.   Pulmonary:      Effort: Pulmonary effort is normal.      Breath sounds: Normal breath sounds.   Abdominal:      General: Abdomen is flat.      Palpations: Abdomen is soft.      Comments: Umbilical hernia repair healing well. Sutures removed.    Musculoskeletal:         General: Normal range of motion.      Cervical back: Normal range of motion.   Skin:     General: Skin is warm.   Neurological:      General: No focal deficit present.      Mental Status: He is alert and oriented to person, place, and time.   Psychiatric:         Mood and Affect: Mood normal.         Behavior: Behavior normal.       Administrative Statements   I have spent a total time of 15 minutes in caring for this patient on the day of the visit/encounter including Instructions for management.

## 2024-08-19 NOTE — ASSESSMENT & PLAN NOTE
- Prolene sutures removed, picture in chart.  - Patient instructed no lifting > 20 lbs for next 3 weeks.  - No baths/swimming for next 3 weeks.  - After 3 weeks, all restrictions lifted.  - Patient can follow up PRN.

## 2024-08-22 NOTE — DISCHARGE INSTRUCTIONS
Assessment/Plan:    1. Pelvic pain  -     Lyme Total AB W Reflex to IGM/IGG; Future  -     CBC and differential; Future  -     CT abdomen pelvis w contrast; Future; Expected date: 08/22/2024  -     UA w Reflex to Microscopic w Reflex to Culture; Future  2. Pain of upper abdomen  -     Lyme Total AB W Reflex to IGM/IGG; Future  -     CBC and differential; Future  -     CT abdomen pelvis w contrast; Future; Expected date: 08/22/2024  -     UA w Reflex to Microscopic w Reflex to Culture; Future  3. Rash  -     Lyme Total AB W Reflex to IGM/IGG; Future  4. Other headache syndrome  -     Lyme Total AB W Reflex to IGM/IGG; Future  5. Risk of exposure to Lyme disease  -     Lyme Total AB W Reflex to IGM/IGG; Future  6. History of breast cancer  -     CT abdomen pelvis w contrast; Future; Expected date: 08/22/2024      The case discussed with patient using patient centered shared decision making.The patient was counseled regarding instructions for management,-- risk factor reductions,-- prognosis,-- impressions,-- risks and benefits of treatment options,-- importance of compliance with treatment. I have reviewed the instructions with the patient, answering all questions to her satisfaction.    Abigail is reassured by me  Exam is unremarkable -there is no evidence of mass, sign of infection etc.  She had unusual rash to her legs earlier this summer.  She spends time outside and is potentially exposed to ticks.  I recommend doing rule out for Lyme in setting of rash, migratory body aches.  Additionally I recommend CAT scan of abdomen and pelvis to evaluate for upper abdominal and pelvic pain etiology  She agrees with the same  I will call with results    There are no Patient Instructions on file for this visit.    Return for Recheck.    Subjective:      Patient ID: Abigail Garcia is a 63 y.o. female.    Chief Complaint   Patient presents with    Pain     Patient reports pain under breast/ribcage and now in pelvis.        Abigail is  Please do a colonoscopy outpatient for anemia  If not followed up a pre malignant condition could be missed or in the worst case scenario colon cancer could be missed  Abdominal Paracentesis     WHAT YOU NEED TO KNOW:   Abdominal paracentesis is a procedure to remove abnormal fluid buildup in your abdomen  Fluid builds up because of liver problems, such as swelling and scarring  Heart failure, kidney disease, a mass, or problems with your pancreas may also cause fluid buildup  DISCHARGE INSTRUCTIONS:     Follow up with your healthcare provider as directed: Write down your questions so you remember to ask them during your visits  Wound care: Remove dressing after 24 hours  Leave glue in place  Return to your normal activities    Contact Interventional Radiology at [phone] [facility] PATIENTS: Contact Interventional Radiology at [phone]) [facility] PATIENTS: Contact Interventional Radiology at [phone]) if:  · You have a fever and your wound is red and swollen  · You have yellow, green, or bad-smelling discharge coming from your wound  · You have pain or swelling in your abdomen  · You have an upset stomach or you vomit  · You have sudden, sharp pain in your abdomen  · You urinate very little or not at all  · You feel confused and more tired than usual    · Your arm or leg feels warm, tender, and painful  It may look swollen and red  · You suddenly feel lightheaded and have trouble breathing  TUBE CARE INSTRUCTIONS    Care after your procedure:    Resume your normal diet  Small sips of flat soda will help with nausea  1  The properly functioning catheter should be forward flushed once (1x) daily with 10ml of normal saline using clean technique  You will be given a prescription for flushes  To flush the tube, clean both connections with alcohol swab  Twist off the drainage bag/ bulb  tubing and twist the saline syringe into the drainage tube and flush   Remove the a 63-year-old female well-known to me presents for same-day and follow-up to appointment from 8/8  Today she reports that earlier this month she developed episode of unwell feeling including posterior occiput headache, fatigue, chest rash, mid back pain which wraps around to upper abdomen  She was evaluated in this office  Blood work including inflammatory markers was ordered which was  normal  She had x-rays of all 3 regions of her spine which was normal    She returns today as she still does not feel herself and pain has migrated to pelvis  She is concerned due to her history of breast cancer.  She is scared that she has new neoplasm  She still feels fatigued, having muscle aches.  Rash did not a return    Denies fever, chills, weight loss, inordinate abdominal bloating, rectal bleeding, vaginal bleeding, appetite change, mental status change        The following portions of the patient's history were reviewed and updated as appropriate: allergies, current medications, past family history, past medical history, past social history, past surgical history and problem list.    Review of Systems   Constitutional:  Positive for fatigue. Negative for appetite change, fever and unexpected weight change.   HENT:  Negative for trouble swallowing.    Respiratory:  Negative for cough and shortness of breath.    Cardiovascular: Negative.    Gastrointestinal:  Positive for abdominal pain. Negative for blood in stool, nausea and vomiting.   Endocrine: Negative.    Genitourinary:  Positive for pelvic pain. Negative for difficulty urinating, dysuria, hematuria and vaginal bleeding.   Musculoskeletal:  Positive for arthralgias and myalgias.   Skin:  Positive for rash. Negative for wound.   Neurological:  Positive for headaches. Negative for dizziness.   Hematological:  Does not bruise/bleed easily.   Psychiatric/Behavioral:  Negative for sleep disturbance.          Current Outpatient Medications   Medication Sig Dispense Refill     syringe and twist the drainage bag / bulb tubing tubing back on     2  The drainage bag/bulb may be emptied as necessary  Keep a record of the amount of fluid you drain from your tube  This should be done with clean technique as well  3  A fresh dressing should be applied daily over the tube insertion site  4  As the tube is secured to the skin with only a suture,try not to pull on your tube  Tub baths are not permitted  Showers are permitted if the patient's skin entry site is prevented from getting wet  Similarly, washcloth "baths" are acceptable  Contact Interventional Radiology at [phone] [facility] PATIENTS: Contact Interventional Radiology at [phone]) [facility] PATIENTS: Contact Interventional Radiology at [phone]) if:    1  Leakage or large amounts of liquid around the catheter  2  Fever of 101 degrees lasting several hours without other obvious cause (such as sore throat, flu, etc)  3  Persistent nausea or vomiting  4  Diminished drainage, which may be associated with pressure or pain  Or when the     drainage from your tube is less than 10mls for 48 hours  5  Catheter pulled back or falls out  The following pharmacies carry the flush syringes  [facility]                     [facility]  [address]  Phone [phone]            Phone [phone] [address]             [address] [facility]                                [phone]  [address] [facility] [address]                      [address] [facility]  Phone [phone]            Phone [phone]                      [facility] "rosuvastatin (CRESTOR) 10 MG tablet Take 1 tablet (10 mg total) by mouth daily 90 tablet 1    methocarbamol (ROBAXIN) 500 mg tablet Take 1 tablet (500 mg total) by mouth 3 (three) times a day (Patient not taking: Reported on 8/22/2024) 30 tablet 1    triamcinolone (KENALOG) 0.5 % cream Apply topically 2 (two) times a day (Patient not taking: Reported on 8/8/2024) 30 g 0     No current facility-administered medications for this visit.       Objective:    /80 (BP Location: Left arm, Patient Position: Sitting, Cuff Size: Standard)   Pulse 80   Temp 98.3 °F (36.8 °C) (Temporal)   Resp 17   Ht 5' 5\" (1.651 m)   Wt 74.4 kg (164 lb)   LMP  (LMP Unknown) Comment: LMP 2009  SpO2 98%   BMI 27.29 kg/m²        Physical Exam  Vitals and nursing note reviewed.   Constitutional:       General: She is not in acute distress.     Appearance: Normal appearance.   HENT:      Head: Normocephalic and atraumatic.   Cardiovascular:      Rate and Rhythm: Normal rate and regular rhythm.      Pulses: Normal pulses.      Heart sounds: Normal heart sounds.   Pulmonary:      Effort: Pulmonary effort is normal.      Breath sounds: Normal breath sounds.   Abdominal:      General: Bowel sounds are normal.      Palpations: Abdomen is soft. There is no mass.      Tenderness: There is no abdominal tenderness. There is no rebound.   Lymphadenopathy:      Cervical: No cervical adenopathy.   Skin:     General: Skin is warm and dry.      Coloration: Skin is not pale.      Findings: No rash.   Neurological:      General: No focal deficit present.      Mental Status: She is alert. Mental status is at baseline.      Motor: No weakness.   Psychiatric:         Mood and Affect: Mood normal.                TARAS Collier  " [address] [facility]    [address]  Phone [phone]

## 2025-01-18 NOTE — PHYSICAL THERAPY NOTE
Physical Therapy Cancellation Note       07/26/24 0833   Note Type   Note type Cancelled Session   Cancel Reasons Patient off floor/hemodialysis   Additional Comments referral received for PT eval and tx. attempted to see pt for eval but he is receiving dialysis at bedside. will follow and initiate PT as medically appropriate and schedule allows.     Phu Armijo, PT                                                                                       Yes

## 2025-01-21 NOTE — ASSESSMENT & PLAN NOTE
· Local skin care  · Low Air loss mattress changed 11/12/2019 to help with prevention of wounds  · Turn / Reposition frequently  01-Feb-2025

## 2025-02-04 ENCOUNTER — TELEPHONE (OUTPATIENT)
Age: 65
End: 2025-02-04

## 2025-02-04 NOTE — TELEPHONE ENCOUNTER
Patient was last seen in July. He stated the Dr advised him of a procedure to see if he should have fluid removed. He mentioned there was a place in [place], but would like to confirm further information about locations and having this procedure done. Patient's weight at his last visit was 159 and he is currently at 187.     Please advise on further information on where patient would need to go and if he would need to schedule an appointment or if this would be a walk-in.     # [phone]

## 2025-02-04 NOTE — TELEPHONE ENCOUNTER
Contacted pcp. He stated nephrology would be the one to discuss this with him. If not, he can go to the hospital to get fluid drained.    Called pt back to tell him-he understood and is going to contact nephrology. I told him to call us back though if he needs more assistance.

## 2025-02-17 ENCOUNTER — TELEPHONE (OUTPATIENT)
Age: 65
End: 2025-02-17

## 2025-04-07 ENCOUNTER — OFFICE VISIT (OUTPATIENT)
Dept: DENTISTRY | Facility: CLINIC | Age: 65
End: 2025-04-07

## 2025-04-07 DIAGNOSIS — K02.9 TEETH DECAYED: Primary | ICD-10-CM

## 2025-04-07 PROCEDURE — D0330 PANORAMIC RADIOGRAPHIC IMAGE: HCPCS

## 2025-04-07 PROCEDURE — D0150 COMPREHENSIVE ORAL EVALUATION - NEW OR ESTABLISHED PATIENT: HCPCS

## 2025-04-07 NOTE — PROGRESS NOTES
"Comprehensice exam, Brand   REVIEWED MED HX: mes, allergies, health changes reviewed in EPIC. All consents signed. Patient has a port for dialysis purposes  CHIEF CONCERN: \" I need all of my teeth pulled and dentures or implants made.\"  PAIN SCALE:  0  ASA CLASS:  III    PERIO: N/A    Visual and Tactile Intraoral/ Extraoral evaluation: Oral and Oropharyngeal cancer evaluation. No findings     Dr. Dumont   Reviewed with patient clinical and radiographic findings and patient verbalized understanding. All questions and concerns addressed.     REFERRALS: OMFS referral provided to have all existing teeth extracted and alveoloplasty.       TREATMENT  PLAN :   NV: Oral surgery to have all existing teeth out .  NV2: 4-6 weeks following exts. Re eval for implants / dentures    Last [facility] : 4/7/2025  "

## 2025-04-18 ENCOUNTER — TELEPHONE (OUTPATIENT)
Dept: DENTISTRY | Facility: CLINIC | Age: 65
End: 2025-04-18

## 2025-04-18 NOTE — TELEPHONE ENCOUNTER
Homero called in requesting prices for possible dentures. He asked for prices for implants, I went over the costs. I mentioned to him I would reach out to the opal and see if she could help him. I did mention that we did not have a percentage for his SFS yet.

## 2025-05-15 ENCOUNTER — OFFICE VISIT (OUTPATIENT)
Dept: FAMILY MEDICINE CLINIC | Facility: CLINIC | Age: 65
End: 2025-05-15
Payer: MEDICARE

## 2025-05-15 VITALS
BODY MASS INDEX: 26.83 KG/M2 | DIASTOLIC BLOOD PRESSURE: 72 MMHG | HEIGHT: 68 IN | WEIGHT: 177 LBS | RESPIRATION RATE: 18 BRPM | HEART RATE: 72 BPM | SYSTOLIC BLOOD PRESSURE: 134 MMHG | OXYGEN SATURATION: 97 % | TEMPERATURE: 97.7 F

## 2025-05-15 DIAGNOSIS — L97.929 VENOUS STASIS ULCER OF LEFT LOWER LEG WITH EDEMA OF LEFT LOWER LEG  (HCC): ICD-10-CM

## 2025-05-15 DIAGNOSIS — Z99.2 ESRD (END STAGE RENAL DISEASE) ON DIALYSIS (HCC): ICD-10-CM

## 2025-05-15 DIAGNOSIS — R15.9 INCONTINENCE OF FECES, UNSPECIFIED FECAL INCONTINENCE TYPE: ICD-10-CM

## 2025-05-15 DIAGNOSIS — I83.892 VENOUS STASIS ULCER OF LEFT LOWER LEG WITH EDEMA OF LEFT LOWER LEG  (HCC): ICD-10-CM

## 2025-05-15 DIAGNOSIS — R18.8 OTHER ASCITES: ICD-10-CM

## 2025-05-15 DIAGNOSIS — Q61.2 ADPKD (AUTOSOMAL DOMINANT POLYCYSTIC KIDNEY): Primary | Chronic | ICD-10-CM

## 2025-05-15 DIAGNOSIS — R53.81 PHYSICAL DECONDITIONING: ICD-10-CM

## 2025-05-15 DIAGNOSIS — R60.0 VENOUS STASIS ULCER OF LEFT LOWER LEG WITH EDEMA OF LEFT LOWER LEG  (HCC): ICD-10-CM

## 2025-05-15 DIAGNOSIS — K72.10 CHRONIC HEPATIC FAILURE WITHOUT COMA (HCC): ICD-10-CM

## 2025-05-15 DIAGNOSIS — I48.0 PAROXYSMAL ATRIAL FIBRILLATION (HCC): ICD-10-CM

## 2025-05-15 DIAGNOSIS — R93.2 ABNORMAL FINDINGS ON DIAGNOSTIC IMAGING OF LIVER AND BILIARY TRACT: ICD-10-CM

## 2025-05-15 DIAGNOSIS — R14.0 ABDOMINAL DISTENSION: ICD-10-CM

## 2025-05-15 DIAGNOSIS — N18.6 ESRD (END STAGE RENAL DISEASE) ON DIALYSIS (HCC): ICD-10-CM

## 2025-05-15 DIAGNOSIS — K42.9 UMBILICAL HERNIA WITHOUT OBSTRUCTION AND WITHOUT GANGRENE: ICD-10-CM

## 2025-05-15 DIAGNOSIS — I83.029 VENOUS STASIS ULCER OF LEFT LOWER LEG WITH EDEMA OF LEFT LOWER LEG  (HCC): ICD-10-CM

## 2025-05-15 DIAGNOSIS — I50.9 HEART FAILURE, UNSPECIFIED HF CHRONICITY, UNSPECIFIED HEART FAILURE TYPE (HCC): ICD-10-CM

## 2025-05-15 PROBLEM — G83.9 PARESIS (HCC): Status: RESOLVED | Noted: 2020-02-14 | Resolved: 2025-05-15

## 2025-05-15 PROBLEM — R78.81 GRAM-NEGATIVE BACTEREMIA: Status: RESOLVED | Noted: 2019-09-22 | Resolved: 2025-05-15

## 2025-05-15 PROCEDURE — G2211 COMPLEX E/M VISIT ADD ON: HCPCS | Performed by: FAMILY MEDICINE

## 2025-05-15 PROCEDURE — 99215 OFFICE O/P EST HI 40 MIN: CPT | Performed by: FAMILY MEDICINE

## 2025-05-15 NOTE — PROGRESS NOTES
Name: Homero Koch      : 1960      MRN: [id number]  Encounter Provider: Bo Osborne MD  Encounter Date: 5/15/2025   Encounter department: [facility]  :  Assessment & Plan  Chronic hepatic failure without coma (HCC)    Orders:    Hepatic function panel; Future    Ambulatory Referral to Hepatology; Future    ESRD (end stage renal disease) on dialysis (HCC)  Lab Results   Component Value Date    EGFR 8 2024    EGFR 12 2024    EGFR 11 2024    CREATININE 6.34 (H) 2024    CREATININE 4.63 (H) 2024    CREATININE 5.04 (H) 2024       Orders:    Ambulatory Referral to Nephrology; Future    Venous stasis ulcer of left lower leg with edema of left lower leg  (HCC)         Paroxysmal atrial fibrillation (HCC)         ADPKD (autosomal dominant polycystic kidney)    Orders:    Ambulatory Referral to Nephrology; Future    Other ascites    Orders:    CT abdomen pelvis w contrast; Future    CBC and differential; Future    Comprehensive metabolic panel; Future    Hepatic function panel; Future    AFP tumor marker; Future    Ambulatory Referral to Hepatology; Future    Physical deconditioning    Orders:    Ambulatory Referral to Physical Therapy; Future    Abdominal distension  Extensive medical history with ongoing abdominal distention concerning for possible cirrhosis.  Patient believes everything is related to his legs and that strengthening his legs would cure all of his other ailments.  He has been noncompliant in the past.  Low confidence that imaging and follow-ups will be completed.  Emphasized the importance of completing orders.  Patient did get  Argument with his wife in office over differences of opinion on his current condition.  Wife is visibly frustrated with patient.  Orders:    CT abdomen pelvis w contrast; Future    Ambulatory Referral to Hepatology; Future    Ambulatory Referral to Gastroenterology; Future    Abnormal findings on  "diagnostic imaging of liver and biliary tract    Orders:    AFP tumor marker; Future    Umbilical hernia without obstruction and without gangrene    Orders:    Abdominal binder    Incontinence of feces, unspecified fecal incontinence type    Orders:    Ambulatory Referral to Gastroenterology; Future    Heart failure, unspecified HF chronicity, unspecified heart failure type (HCC)  Wt Readings from Last 3 Encounters:   05/15/25 80.3 kg (177 lb)   08/19/24 75.1 kg (165 lb 9.6 oz)   08/05/24 74.6 kg (164 lb 8 oz)                           History of Present Illness   [facility] nephrology.  Notes weight loss.  Per recent nephrology note patient is noncompliant and he has a large scrotal hernia and plans on scheduling surgery himself.  Noncompliant with phosphate binders.  Currently undergoes dialysis Monday Wednesday Friday  Underwent therapeutic paracentesis February 2025 for ascites  Polycystic kidney disease end-stage renal disease    Per patient.   Unable to control his bowels  Retains fluid.    BL venous insuff Dark purple ulcers   Distended abdomen, jauncidce          Review of Systems   Constitutional:  Positive for fatigue and unexpected weight change.   Gastrointestinal:  Positive for abdominal distention.   Skin:  Positive for color change.       Objective   /72 (BP Location: Left arm, Patient Position: Sitting, Cuff Size: Standard)   Pulse 72   Temp 97.7 °F (36.5 °C) (Temporal)   Resp 18   Ht 5' 8\" (1.727 m)   Wt 80.3 kg (177 lb)   SpO2 97%   BMI 26.91 kg/m²      Physical Exam  Abdominal:      General: There is distension.      Hernia: A hernia is present.      Comments: Ascites        Musculoskeletal:      Comments: Venous ulcers BL LE      Skin:     Findings: Erythema present.       Administrative Statements   I have spent a total time of 50 minutes in caring for this patient on the day of the visit/encounter including Diagnostic results, Prognosis, Risks and benefits of tx options, " Instructions for management, Patient and family education, Importance of tx compliance, Risk factor reductions, Impressions, Counseling / Coordination of care, Documenting in the medical record, Reviewing/placing orders in the medical record (including tests, medications, and/or procedures), and Obtaining or reviewing history  .

## 2025-05-15 NOTE — ASSESSMENT & PLAN NOTE
Orders:    CT abdomen pelvis w contrast; Future    CBC and differential; Future    Comprehensive metabolic panel; Future    Hepatic function panel; Future    AFP tumor marker; Future    Ambulatory Referral to Hepatology; Future

## 2025-05-15 NOTE — ASSESSMENT & PLAN NOTE
Lab Results   Component Value Date    EGFR 8 07/29/2024    EGFR 12 07/28/2024    EGFR 11 07/27/2024    CREATININE 6.34 (H) 07/29/2024    CREATININE 4.63 (H) 07/28/2024    CREATININE 5.04 (H) 07/27/2024       Orders:    Ambulatory Referral to Nephrology; Future

## 2025-05-15 NOTE — PROGRESS NOTES
Name: Homero Koch      : 1960      MRN: [id number]  Encounter Provider: Bo Osborne MD  Encounter Date: 5/15/2025   Encounter department: [facility]  :  Assessment & Plan  Chronic hepatic failure without coma (HCC)         ESRD (end stage renal disease) on dialysis (HCC)  Lab Results   Component Value Date    EGFR 8 2024    EGFR 12 2024    EGFR 11 2024    CREATININE 6.34 (H) 2024    CREATININE 4.63 (H) 2024    CREATININE 5.04 (H) 2024            Venous stasis ulcer of left lower leg with edema of left lower leg  (HCC)         Paroxysmal atrial fibrillation (HCC)         ADPKD (autosomal dominant polycystic kidney)              [facility] nephrology.  Notes weight loss.  Per recent nephrology note patient is noncompliant and he has a large scrotal hernia and plans on scheduling surgery himself.  Noncompliant with phosphate binders.  Currently undergoes dialysis   Underwent therapeutic paracentesis 2025 for ascites  Polycystic kidney disease end-stage renal disease  Preventive health issues were discussed with patient, and age appropriate screening tests were ordered as noted in patient's After Visit Summary. Personalized health advice and appropriate referrals for health education or preventive services given if needed, as noted in patient's After Visit Summary.    History of Present Illness   {?Quick Links Encounters * My Last Note * Last Note in Specialty * Snapshot * Since Last Visit * History :81641}  Patient presents to establish care with our office and [facility].  Last seen by PCP in  at [facility]         Patient Care Team:  Bo Osborne MD as PCP - General (Family Medicine)  Rodrigue Julien DO    Review of Systems  Medical History Reviewed by provider this encounter:       Annual Wellness Visit Questionnaire   Annual Wellness Visit  Social Drivers of Health     Financial  "Resource Strain: Medium Risk (4/2/2025)    Overall Financial Resource Strain (CARDIA)     Difficulty of Paying Living Expenses: Somewhat hard   Food Insecurity: No Food Insecurity (4/2/2025)    Nursing - Inadequate Food Risk Classification     Worried About Running Out of Food in the Last Year: Never true     Ran Out of Food in the Last Year: Never true   Transportation Needs: No Transportation Needs (4/2/2025)    PRAPARE - Transportation     Lack of Transportation (Medical): No     Lack of Transportation (Non-Medical): No   Housing Stability: Low Risk  (4/2/2025)    Housing Stability Vital Sign     Unable to Pay for Housing in the Last Year: No     Number of Times Moved in the Last Year: 0     Homeless in the Last Year: No   Utilities: Not At Risk (4/2/2025)    [facility] Utilities     Threatened with loss of utilities: No     No results found.    Objective {?Quick Links Trend Vitals * Enter New Vitals * Results Review * Timeline (Adult) * Labs * Imaging * Cardiology * Procedures * Lung Cancer Screening * Surgical eConsent :95927}  /72 (BP Location: Left arm, Patient Position: Sitting, Cuff Size: Standard)   Pulse 72   Temp 97.7 °F (36.5 °C) (Temporal)   Resp 18   Ht 5' 8\" (1.727 m)   Wt 80.3 kg (177 lb)   SpO2 97%   BMI 26.91 kg/m²     Physical Exam  {Administrative / Billing Section (Optional):16672}  "

## 2025-05-15 NOTE — ASSESSMENT & PLAN NOTE
-- DO NOT REPLY / DO NOT REPLY ALL --  -- Message is from the [facility]--    Order Request  Lab: LED TESTING    Message / reason: PATIENT IS REQUESTING A LED TESTING DUE TO WORK ENVIRONMENT.     Insurance type: MEDICARE  Payor: MEDICARE / Plan: PARTA AND B / Product Type: MEDICARE           Preferred Delivery Method   Call when ready for pickup - phone number to notify:       Caller Information       Type Contact Phone    02/07/2020 11:35 AM Phone (Incoming) Ysabel Lopez (Self)           Alternative phone number: NONE    Turnaround time given to caller:   \"This message will be sent to [state Provider's name]. The clinical team will fulfill your request as soon as they review your message.\"   Lab Results   Component Value Date    EGFR 8 07/29/2024    EGFR 12 07/28/2024    EGFR 11 07/27/2024    CREATININE 6.34 (H) 07/29/2024    CREATININE 4.63 (H) 07/28/2024    CREATININE 5.04 (H) 07/27/2024

## 2025-06-03 NOTE — ASSESSMENT & PLAN NOTE
History of chronic hypotension, per patient he typically runs anywhere from  systolic  Continue midodrine on dialysis days    Monitor blood pressure  used

## 2025-06-05 ENCOUNTER — RA CDI HCC (OUTPATIENT)
Dept: OTHER | Facility: HOSPITAL | Age: 65
End: 2025-06-05

## 2025-06-06 NOTE — ASSESSMENT & PLAN NOTE
· There was a slight drop in hemoglobin during procedure  · Consider transfusion with dialysis prior to any additional procedure, if needed to get hemoglobin over 8   · Monitor counts - CBC monitoring  Private car

## (undated) DEVICE — TONGUE DEPRESSOR STERILE

## (undated) DEVICE — COTTON BALLS: Brand: DEROYAL

## (undated) DEVICE — ACE WRAP 4 IN STERILE

## (undated) DEVICE — TUBING SUCTION 5MM X 12 FT

## (undated) DEVICE — EXOFIN PRECISION PEN HIGH VISCOSITY TOPICAL SKIN ADHESIVE: Brand: EXOFIN PRECISION PEN, 1G

## (undated) DEVICE — DRESSING XEROFORM 5 X 9

## (undated) DEVICE — PAD GROUNDING ADULT

## (undated) DEVICE — ACE WRAP 4 IN UNSTERILE

## (undated) DEVICE — PADDING CAST 4 IN  COTTON STRL

## (undated) DEVICE — ACE WRAP 6 IN STERILE

## (undated) DEVICE — SUT VICRYL 3-0 REEL 54 IN J285G

## (undated) DEVICE — BETHLEHEM UNIVERSAL  MIONR EXT: Brand: CARDINAL HEALTH

## (undated) DEVICE — SUT SILK 3-0 SH 30 IN K832H

## (undated) DEVICE — POOLE SUCTION HANDLE: Brand: CARDINAL HEALTH

## (undated) DEVICE — PLUMEPEN PRO 10FT

## (undated) DEVICE — SPONGE STICK WITH PVP-I: Brand: KENDALL

## (undated) DEVICE — ANTIBACTERIAL UNDYED BRAIDED (POLYGLACTIN 910), SYNTHETIC ABSORBABLE SUTURE: Brand: COATED VICRYL

## (undated) DEVICE — KERLIX BANDAGE ROLL: Brand: KERLIX

## (undated) DEVICE — SUT MONOCRYL 4-0 PS-2 27 IN Y426H

## (undated) DEVICE — GAUZE SPONGES,16 PLY: Brand: CURITY

## (undated) DEVICE — INTENDED FOR TISSUE SEPARATION, AND OTHER PROCEDURES THAT REQUIRE A SHARP SURGICAL BLADE TO PUNCTURE OR CUT.: Brand: BARD-PARKER SAFETY BLADES SIZE 10, STERILE

## (undated) DEVICE — SPONGE PVP SCRUB WING STERILE

## (undated) DEVICE — ENSEAL 20 CM SHAFT, LARGE JAW: Brand: ENSEAL X1

## (undated) DEVICE — BETHLEHEM UNIVERSAL OUTPATIENT: Brand: CARDINAL HEALTH

## (undated) DEVICE — CHLORAPREP HI-LITE 26ML ORANGE

## (undated) DEVICE — GLOVE SRG BIOGEL 8

## (undated) DEVICE — LIGHT HANDLE COVER SLEEVE DISP BLUE STELLAR

## (undated) DEVICE — 3M™ TEGADERM™ TRANSPARENT FILM DRESSING FRAME STYLE, 1628, 6 IN X 8 IN (15 CM X 20 CM), 10/CT 8CT/CASE: Brand: 3M™ TEGADERM™

## (undated) DEVICE — CYSTO TUBING SINGLE IRRIGATION

## (undated) DEVICE — GLOVE INDICATOR PI UNDERGLOVE SZ 7 BLUE

## (undated) DEVICE — PENCIL ELECTROSURG E-Z CLEAN -0035H

## (undated) DEVICE — DRAPE SHEET THREE QUARTER

## (undated) DEVICE — SUT VICRYL 2-0 CTB-1 36 IN JB945

## (undated) DEVICE — GLOVE SRG BIOGEL 6

## (undated) DEVICE — NEEDLE 25G X 1 1/2

## (undated) DEVICE — INTENDED FOR TISSUE SEPARATION, AND OTHER PROCEDURES THAT REQUIRE A SHARP SURGICAL BLADE TO PUNCTURE OR CUT.: Brand: BARD-PARKER SAFETY BLADES SIZE 15, STERILE

## (undated) DEVICE — TISSEEL FIBRIN 10 ML FROZEN

## (undated) DEVICE — PROXIMATE SKIN STAPLERS (35 WIDE) CONTAINS 35 STAINLESS STEEL STAPLES (FIXED HEAD): Brand: PROXIMATE

## (undated) DEVICE — 3M™ TEGADERM™ TRANSPARENT FILM DRESSING FRAME STYLE, 1624W, 2-3/8 IN X 2-3/4 IN (6 CM X 7 CM), 100/CT 4CT/CASE: Brand: 3M™ TEGADERM™

## (undated) DEVICE — NEEDLE 22 G X 1 1/2 SAFETY

## (undated) DEVICE — U-DRAPE: Brand: CONVERTORS

## (undated) DEVICE — DRESSING,OPTILOCK,NON-ADHESIVE,4X4: Brand: MEDLINE

## (undated) DEVICE — SYRINGE 10ML LL

## (undated) DEVICE — DRESSING, ALGINATE, MAXORB II, 4"X4": Brand: MEDLINE INDUSTRIES, INC.

## (undated) DEVICE — SUT VICRYL 1 CTB-1 36 IN JB947

## (undated) DEVICE — STOCKINETTE REGULAR

## (undated) DEVICE — ELECTRODE NEEDLE MOD E-Z CLEAN 2.75IN 7CM -0013M

## (undated) DEVICE — PROXIMATE RELOADABLE LINEAR CUTTER WITH SAFETY LOCK-OUT, 75MM: Brand: PROXIMATE

## (undated) DEVICE — CURITY NON-ADHERENT STRIPS: Brand: CURITY

## (undated) DEVICE — GLOVE SRG BIOGEL ECLIPSE 8

## (undated) DEVICE — GLOVE INDICATOR PI UNDERGLOVE SZ 8 BLUE

## (undated) DEVICE — GLOVE INDICATOR PI UNDERGLOVE SZ 8.5 BLUE

## (undated) DEVICE — PROXIMATE LINEAR CUTTER RELOAD, BLUE, 75MM: Brand: PROXIMATE

## (undated) DEVICE — UNIVERSAL MAJOR EXTREMITY,KIT: Brand: CARDINAL HEALTH

## (undated) DEVICE — GLOVE SRG BIOGEL ECLIPSE 7.5

## (undated) DEVICE — DRESSING PRISMA MATRIX 4.3IN X 4.3IN

## (undated) DEVICE — GLOVE INDICATOR UNDERGLOVE SZ 6 BLUE

## (undated) DEVICE — GLOVE SRG BIOGEL 7

## (undated) DEVICE — SILVER-COATED ANTIMICROBIAL BARRIER DRESSING: Brand: ACTICOAT   4" X 8"

## (undated) DEVICE — ACE WRAP 6 IN UNSTERILE

## (undated) DEVICE — INTENDED FOR TISSUE SEPARATION, AND OTHER PROCEDURES THAT REQUIRE A SHARP SURGICAL BLADE TO PUNCTURE OR CUT.: Brand: BARD-PARKER ® CARBON RIB-BACK BLADES

## (undated) DEVICE — DRESSING PRISMA MATRIX 19.1IN X 19.1IN

## (undated) DEVICE — INTENDED FOR TISSUE SEPARATION, AND OTHER PROCEDURES THAT REQUIRE A SHARP SURGICAL BLADE TO PUNCTURE OR CUT.: Brand: BARD-PARKER SAFETY BLADES SIZE 11, STERILE

## (undated) DEVICE — ELECTRODE BLADE MOD E-Z CLEAN  2.75IN 7CM -0012AM

## (undated) DEVICE — THE SIMPULSE SOLO SYSTEM WITH ULTREX RETRACTABLE SPLASH SHIELD TIP: Brand: SIMPULSE SOLO

## (undated) DEVICE — ABDOMINAL PAD: Brand: DERMACEA

## (undated) DEVICE — BETHLEHEM UNIVERSAL MINOR GEN: Brand: CARDINAL HEALTH